# Patient Record
Sex: FEMALE | Race: WHITE | NOT HISPANIC OR LATINO | Employment: OTHER | URBAN - METROPOLITAN AREA
[De-identification: names, ages, dates, MRNs, and addresses within clinical notes are randomized per-mention and may not be internally consistent; named-entity substitution may affect disease eponyms.]

---

## 2017-01-01 ENCOUNTER — MYC REFILL (OUTPATIENT)
Dept: INTERNAL MEDICINE | Facility: CLINIC | Age: 62
End: 2017-01-01

## 2017-01-01 DIAGNOSIS — F41.9 ANXIETY: Primary | ICD-10-CM

## 2017-01-01 DIAGNOSIS — M54.50 CHRONIC BILATERAL LOW BACK PAIN WITHOUT SCIATICA: ICD-10-CM

## 2017-01-01 DIAGNOSIS — G89.29 CHRONIC BILATERAL LOW BACK PAIN WITHOUT SCIATICA: ICD-10-CM

## 2017-01-04 RX ORDER — ALPRAZOLAM 0.25 MG
TABLET ORAL
Qty: 30 TABLET | Refills: 0 | Status: SHIPPED | OUTPATIENT
Start: 2017-01-04 | End: 2017-02-28

## 2017-01-04 RX ORDER — HYDROCODONE BITARTRATE AND ACETAMINOPHEN 5; 325 MG/1; MG/1
1-2 TABLET ORAL EVERY 6 HOURS PRN
Qty: 100 TABLET | Refills: 0 | Status: SHIPPED | OUTPATIENT
Start: 2017-01-04 | End: 2017-01-20

## 2017-01-13 ENCOUNTER — OFFICE VISIT (OUTPATIENT)
Dept: INTERNAL MEDICINE | Facility: CLINIC | Age: 62
End: 2017-01-13
Payer: COMMERCIAL

## 2017-01-13 ENCOUNTER — TELEPHONE (OUTPATIENT)
Dept: INTERNAL MEDICINE | Facility: CLINIC | Age: 62
End: 2017-01-13

## 2017-01-13 ENCOUNTER — RADIANT APPOINTMENT (OUTPATIENT)
Dept: GENERAL RADIOLOGY | Facility: CLINIC | Age: 62
End: 2017-01-13
Attending: INTERNAL MEDICINE
Payer: COMMERCIAL

## 2017-01-13 VITALS
TEMPERATURE: 97.7 F | BODY MASS INDEX: 23.91 KG/M2 | HEIGHT: 70 IN | SYSTOLIC BLOOD PRESSURE: 118 MMHG | HEART RATE: 74 BPM | OXYGEN SATURATION: 95 % | WEIGHT: 167 LBS | DIASTOLIC BLOOD PRESSURE: 74 MMHG

## 2017-01-13 DIAGNOSIS — M54.5 CHRONIC BILATERAL LOW BACK PAIN, WITH SCIATICA PRESENCE UNSPECIFIED: ICD-10-CM

## 2017-01-13 DIAGNOSIS — F41.9 ANXIETY: ICD-10-CM

## 2017-01-13 DIAGNOSIS — J45.40 MODERATE PERSISTENT ASTHMA WITHOUT COMPLICATION: Primary | ICD-10-CM

## 2017-01-13 DIAGNOSIS — I42.0 DILATED CARDIOMYOPATHY (H): ICD-10-CM

## 2017-01-13 DIAGNOSIS — R06.02 SOB (SHORTNESS OF BREATH): ICD-10-CM

## 2017-01-13 DIAGNOSIS — I25.10 CORONARY ARTERY DISEASE INVOLVING NATIVE CORONARY ARTERY OF NATIVE HEART WITHOUT ANGINA PECTORIS: ICD-10-CM

## 2017-01-13 DIAGNOSIS — G89.29 CHRONIC BILATERAL LOW BACK PAIN, WITH SCIATICA PRESENCE UNSPECIFIED: ICD-10-CM

## 2017-01-13 LAB
ALBUMIN SERPL-MCNC: 3.5 G/DL (ref 3.4–5)
ALP SERPL-CCNC: 53 U/L (ref 40–150)
ALT SERPL W P-5'-P-CCNC: 22 U/L (ref 0–50)
ANION GAP SERPL CALCULATED.3IONS-SCNC: 7 MMOL/L (ref 3–14)
AST SERPL W P-5'-P-CCNC: 14 U/L (ref 0–45)
BILIRUB SERPL-MCNC: 0.4 MG/DL (ref 0.2–1.3)
BUN SERPL-MCNC: 6 MG/DL (ref 7–30)
CALCIUM SERPL-MCNC: 8.4 MG/DL (ref 8.5–10.1)
CHLORIDE SERPL-SCNC: 104 MMOL/L (ref 94–109)
CO2 SERPL-SCNC: 29 MMOL/L (ref 20–32)
CREAT SERPL-MCNC: 0.81 MG/DL (ref 0.52–1.04)
D DIMER PPP FEU-MCNC: NORMAL UG/ML FEU (ref 0–0.5)
ERYTHROCYTE [DISTWIDTH] IN BLOOD BY AUTOMATED COUNT: 12.8 % (ref 10–15)
GFR SERPL CREATININE-BSD FRML MDRD: 72 ML/MIN/1.7M2
GLUCOSE SERPL-MCNC: 95 MG/DL (ref 70–99)
HCT VFR BLD AUTO: 44.7 % (ref 35–47)
HGB BLD-MCNC: 14 G/DL (ref 11.7–15.7)
MCH RBC QN AUTO: 29.6 PG (ref 26.5–33)
MCHC RBC AUTO-ENTMCNC: 31.3 G/DL (ref 31.5–36.5)
MCV RBC AUTO: 95 FL (ref 78–100)
NT-PROBNP SERPL-MCNC: 223 PG/ML (ref 0–125)
PLATELET # BLD AUTO: 275 10E9/L (ref 150–450)
POTASSIUM SERPL-SCNC: 3.7 MMOL/L (ref 3.4–5.3)
PROT SERPL-MCNC: 6.4 G/DL (ref 6.8–8.8)
RBC # BLD AUTO: 4.73 10E12/L (ref 3.8–5.2)
SODIUM SERPL-SCNC: 140 MMOL/L (ref 133–144)
TROPONIN I SERPL-MCNC: NORMAL UG/L (ref 0–0.04)
WBC # BLD AUTO: 7.4 10E9/L (ref 4–11)

## 2017-01-13 PROCEDURE — 80053 COMPREHEN METABOLIC PANEL: CPT | Performed by: INTERNAL MEDICINE

## 2017-01-13 PROCEDURE — 36415 COLL VENOUS BLD VENIPUNCTURE: CPT | Performed by: INTERNAL MEDICINE

## 2017-01-13 PROCEDURE — 71020 XR CHEST 2 VW: CPT

## 2017-01-13 PROCEDURE — 99215 OFFICE O/P EST HI 40 MIN: CPT | Performed by: INTERNAL MEDICINE

## 2017-01-13 PROCEDURE — 83880 ASSAY OF NATRIURETIC PEPTIDE: CPT | Performed by: INTERNAL MEDICINE

## 2017-01-13 PROCEDURE — 85379 FIBRIN DEGRADATION QUANT: CPT | Performed by: INTERNAL MEDICINE

## 2017-01-13 PROCEDURE — 84484 ASSAY OF TROPONIN QUANT: CPT | Performed by: INTERNAL MEDICINE

## 2017-01-13 PROCEDURE — 85027 COMPLETE CBC AUTOMATED: CPT | Performed by: INTERNAL MEDICINE

## 2017-01-13 PROCEDURE — 93000 ELECTROCARDIOGRAM COMPLETE: CPT | Performed by: INTERNAL MEDICINE

## 2017-01-13 RX ORDER — CODEINE PHOSPHATE AND GUAIFENESIN 10; 100 MG/5ML; MG/5ML
1 SOLUTION ORAL EVERY 4 HOURS PRN
Qty: 120 ML | Refills: 0 | Status: SHIPPED | OUTPATIENT
Start: 2017-01-13 | End: 2017-04-03

## 2017-01-13 RX ORDER — IPRATROPIUM BROMIDE AND ALBUTEROL SULFATE 2.5; .5 MG/3ML; MG/3ML
1 SOLUTION RESPIRATORY (INHALATION) EVERY 6 HOURS PRN
Qty: 30 VIAL | Refills: 1 | Status: SHIPPED | OUTPATIENT
Start: 2017-01-13 | End: 2017-05-23

## 2017-01-13 RX ORDER — TIOTROPIUM BROMIDE 18 UG/1
18 CAPSULE ORAL; RESPIRATORY (INHALATION) DAILY
Qty: 90 CAPSULE | Refills: 0 | Status: SHIPPED | OUTPATIENT
Start: 2017-01-13 | End: 2017-05-12

## 2017-01-13 RX ORDER — PREDNISONE 20 MG/1
20 TABLET ORAL 2 TIMES DAILY
Qty: 20 TABLET | Refills: 0 | Status: SHIPPED | OUTPATIENT
Start: 2017-01-13 | End: 2017-02-13

## 2017-01-13 RX ORDER — ALBUTEROL SULFATE 0.83 MG/ML
1 SOLUTION RESPIRATORY (INHALATION) EVERY 6 HOURS PRN
Qty: 25 VIAL | Refills: 0 | Status: SHIPPED | OUTPATIENT
Start: 2017-01-13 | End: 2018-04-16

## 2017-01-13 NOTE — PROGRESS NOTES
"  SUBJECTIVE:                                                    Trisha Bender is a 61 year old female who presents to clinic today for the following health issues:      Asthma Sxs ( SOB/Wheezing/mild cough):    Presents with increased SOB for 2-3  Weeks. Started to improve but past 2  Days again has SOB. Has had cough with thick clear phlegm. No fever. No orthopnea. Takes Albuterol up to 8 times daily. No chest pain. No weight change.   Has h/o asthma. On inhaled steroid and bronchodilators.   Has h/o CAD, CHF. Unable to tolerate B blockers, provoked asthma exacerbations. No legs edema. No palpitations   Has h/o anxiety. Controlled on PRN anxiolytic.   Has chronic back pain, related to DDD. On analgesics. Tried Lyrica , but stopped , caused edema. Prior to that has failed Neurontin.       PROBLEMS TO ADD ON...    Problem list and histories reviewed & adjusted, as indicated.  Additional history: none    Problem list, Medication list, Allergies, and Medical/Social/Surgical histories reviewed in EPIC and updated as appropriate.    ROS:  Constitutional, HEENT, cardiovascular, pulmonary, GI, , musculoskeletal, neuro, skin, endocrine and psych systems are negative, except as otherwise noted.    OBJECTIVE:                                                    /74 mmHg  Pulse 74  Temp(Src) 97.7  F (36.5  C) (Oral)  Ht 1.778 m (5' 10\")  Wt 75.751 kg (167 lb)  BMI 23.96 kg/m2  SpO2 95%  LMP 09/20/2005  Body mass index is 23.96 kg/(m^2).  GENERAL:weak , frail , alert and no distress  EYES: Eyes grossly normal to inspection, PERRL and conjunctivae and sclerae normal  HENT: ear canals and TM's normal, nose and mouth without ulcers or lesions  NECK: no adenopathy, no asymmetry, masses, or scars and thyroid normal to palpation  RESP: lungs clear to auscultation - no rales, rhonchi or wheezes, wheezing with forced expirium   CV: regular rate and rhythm, normal S1 S2, no S3 or S4, no murmur, click or rub, no " peripheral edema and peripheral pulses strong  ABDOMEN: soft, nontender, no hepatosplenomegaly, no masses and bowel sounds normal  MS: no gross musculoskeletal defects noted, no edema  SKIN: no suspicious lesions or rashes  NEURO: Normal strength and tone, mentation intact and speech normal    Diagnostic Test Results:  Results for orders placed or performed in visit on 01/13/17   CBC with platelets   Result Value Ref Range    WBC 7.4 4.0 - 11.0 10e9/L    RBC Count 4.73 3.8 - 5.2 10e12/L    Hemoglobin 14.0 11.7 - 15.7 g/dL    Hematocrit 44.7 35.0 - 47.0 %    MCV 95 78 - 100 fl    MCH 29.6 26.5 - 33.0 pg    MCHC 31.3 (L) 31.5 - 36.5 g/dL    RDW 12.8 10.0 - 15.0 %    Platelet Count 275 150 - 450 10e9/L   Comprehensive metabolic panel   Result Value Ref Range    Sodium 140 133 - 144 mmol/L    Potassium 3.7 3.4 - 5.3 mmol/L    Chloride 104 94 - 109 mmol/L    Carbon Dioxide 29 20 - 32 mmol/L    Anion Gap 7 3 - 14 mmol/L    Glucose 95 70 - 99 mg/dL    Urea Nitrogen 6 (L) 7 - 30 mg/dL    Creatinine 0.81 0.52 - 1.04 mg/dL    GFR Estimate 72 >60 mL/min/1.7m2    GFR Estimate If Black 87 >60 mL/min/1.7m2    Calcium 8.4 (L) 8.5 - 10.1 mg/dL    Bilirubin Total 0.4 0.2 - 1.3 mg/dL    Albumin 3.5 3.4 - 5.0 g/dL    Protein Total 6.4 (L) 6.8 - 8.8 g/dL    Alkaline Phosphatase 53 40 - 150 U/L    ALT 22 0 - 50 U/L    AST 14 0 - 45 U/L   BNP-N terminal pro   Result Value Ref Range    N-Terminal Pro Bnp 223 (H) 0 - 125 pg/mL   Troponin I   Result Value Ref Range    Troponin I ES  0.000 - 0.045 ug/L     <0.015  The 99th percentile for upper reference range is 0.045 ug/L.  Troponin values in   the range of 0.045 - 0.120 ug/L may be associated with risks of adverse   clinical events.     D dimer, quantitative   Result Value Ref Range    D Dimer  0.0 - 0.50 ug/ml FEU     <0.3  This D-dimer assay is intended for use in conjuntion with a clinical pretest   probability assessment model to exclude pulmonary embolism (PE) and as an aid   in the  diagnosis of deep venous thrombosis (DVT) in outpatients suspected of PE   or DVT. The cut-off value is 0.5 g/mL FEU.          ASSESSMENT/PLAN:                                                      Problem List Items Addressed This Visit     Anxiety    Chronic low back pain    Relevant Medications    predniSONE (DELTASONE) 20 MG tablet    Dilated cardiomyopathy (H)    SOB (shortness of breath)    Relevant Medications    albuterol (2.5 MG/3ML) 0.083% neb solution    tiotropium (SPIRIVA HANDIHALER) 18 MCG capsule    ipratropium - albuterol 0.5 mg/2.5 mg/3 mL (DUONEB) 0.5-2.5 (3) MG/3ML neb solution    Other Relevant Orders    EKG 12-lead complete w/read - Clinics (Completed)    XR Chest 2 Views (Completed)    CBC with platelets (Completed)    Comprehensive metabolic panel (Completed)    BNP-N terminal pro (Completed)    Troponin I (Completed)    D dimer, quantitative (Completed)    Coronary artery disease involving native coronary artery of native heart without angina pectoris    Moderate persistent asthma without complication - Primary    Relevant Medications    albuterol (2.5 MG/3ML) 0.083% neb solution    tiotropium (SPIRIVA HANDIHALER) 18 MCG capsule    ipratropium - albuterol 0.5 mg/2.5 mg/3 mL (DUONEB) 0.5-2.5 (3) MG/3ML neb solution    predniSONE (DELTASONE) 20 MG tablet    guaiFENesin-codeine (ROBITUSSIN AC) 100-10 MG/5ML SOLN solution           Assess CXR, lab , EKG - possible pneumonia, asthma exacerbation, CHF exacerbation   Results reviewed, negative  Start on Prednisone taper, duonebs   Cont rest of medications  Symptomatic mucolytic, advised for side effects     Follow-Up:in 1 week if needed     Bandar Weinberg MD  [facility]

## 2017-01-13 NOTE — NURSING NOTE
"Chief Complaint   Patient presents with     Asthma     Pt C/O extreme SOB/Wheezing, mild cough ( using Albuterol PRN)       Initial /74 mmHg  Pulse 74  Temp(Src) 97.7  F (36.5  C) (Oral)  Ht 5' 10\" (1.778 m)  Wt 167 lb (75.751 kg)  BMI 23.96 kg/m2  SpO2 95%  LMP 09/20/2005 Estimated body mass index is 23.96 kg/(m^2) as calculated from the following:    Height as of this encounter: 5' 10\" (1.778 m).    Weight as of this encounter: 167 lb (75.751 kg).  BP completed using cuff size: lolita Garcia MA      "

## 2017-01-13 NOTE — TELEPHONE ENCOUNTER
"Pt calls, reports she had cold symptoms with cough that has resolved, but SOB has continued. Indicates she had \"extreme\" SOB yest, but not today. Discuss that if she were to have severe/extreme SOB again she should go to ER. Verbalized understanding.     Pt made appt with PCP on Monday. No appts available today. Pt asking if MD would send in prednisone rx to get her to her appt no Mon or asking if MD would work her into his schedule today. Indicates her  is her ride who works an hour drive away so would need at least 1.5 hour advance notice if appt worked in today.    Please advise, thanks.  "

## 2017-01-18 ENCOUNTER — MYC REFILL (OUTPATIENT)
Dept: INTERNAL MEDICINE | Facility: CLINIC | Age: 62
End: 2017-01-18

## 2017-01-18 DIAGNOSIS — G89.29 CHRONIC BILATERAL LOW BACK PAIN WITHOUT SCIATICA: Primary | ICD-10-CM

## 2017-01-18 DIAGNOSIS — M54.50 CHRONIC BILATERAL LOW BACK PAIN WITHOUT SCIATICA: Primary | ICD-10-CM

## 2017-01-20 ENCOUNTER — THERAPY VISIT (OUTPATIENT)
Dept: PHYSICAL THERAPY | Facility: CLINIC | Age: 62
End: 2017-01-20
Payer: COMMERCIAL

## 2017-01-20 DIAGNOSIS — M54.59 MECHANICAL LOW BACK PAIN: Primary | ICD-10-CM

## 2017-01-20 PROCEDURE — 97112 NEUROMUSCULAR REEDUCATION: CPT | Mod: GP | Performed by: PHYSICAL THERAPIST

## 2017-01-20 PROCEDURE — 97110 THERAPEUTIC EXERCISES: CPT | Mod: GP | Performed by: PHYSICAL THERAPIST

## 2017-01-20 RX ORDER — HYDROCODONE BITARTRATE AND ACETAMINOPHEN 5; 325 MG/1; MG/1
1-2 TABLET ORAL EVERY 6 HOURS PRN
Qty: 100 TABLET | Refills: 0 | Status: SHIPPED | OUTPATIENT
Start: 2017-01-20 | End: 2017-02-08

## 2017-01-20 NOTE — TELEPHONE ENCOUNTER
Norco      Last Written Prescription Date:  1/4/2017  Last Fill Quantity: 100,   # refills: 0  Last Office Visit with [facility], [facility] or [facility] prescribing provider: 1/13/2017  Future Office visit:    Next 5 appointments (look out 90 days)     Apr 03, 2017  3:10 PM   Return Visit with ELLE Medina CNP   [facility]YS[facility] AT [place] ([facility])    [address] [address] [address] [phone]                   Routing refill request to provider for review/approval because:  Drug not on the [facility], [facility] or [facility] refill protocol or controlled substance      Pt informed that she is calling early for the refill  Pt stated that she is allowed 7 per day, so that's why she is short.  7 for 30 day is 210.  Please advise  Hakan MARKS RN

## 2017-01-25 DIAGNOSIS — G43.809 OTHER TYPE OF MIGRAINE: Primary | ICD-10-CM

## 2017-01-25 RX ORDER — SUMATRIPTAN 25 MG/1
25 TABLET, FILM COATED ORAL
Qty: 12 TABLET | Refills: 3 | Status: SHIPPED | OUTPATIENT
Start: 2017-01-25 | End: 2017-06-02

## 2017-01-31 ENCOUNTER — TELEPHONE (OUTPATIENT)
Dept: INTERNAL MEDICINE | Facility: CLINIC | Age: 62
End: 2017-01-31

## 2017-01-31 DIAGNOSIS — R06.02 SOB (SHORTNESS OF BREATH): Primary | ICD-10-CM

## 2017-01-31 DIAGNOSIS — J43.9 PULMONARY EMPHYSEMA, UNSPECIFIED EMPHYSEMA TYPE (H): ICD-10-CM

## 2017-01-31 NOTE — TELEPHONE ENCOUNTER
Advair       Last Written Prescription Date: 06/09/10  Last Fill Quantity: 3, # refills: 3    Last Office Visit with [facility], [facility] or [facility] prescribing provider:  01/13/17   Future Office Visit:    Next 5 appointments (look out 90 days)     Apr 03, 2017  3:10 PM   Return Visit with ELLE Medina CNP   [facility]YSIC[facility]ANS [facility] AT [place] ([facility])    [address] [address] [address] [phone]                   Date of Last Asthma Action Plan Letter:   Asthma Action Plan Q1 Year    Topic Date Due     Asthma Action Plan - yearly  11/08/2017      Asthma Control Test:   ACT Total Scores 11/8/2016   ACT TOTAL SCORE -   ASTHMA ER VISITS -   ASTHMA HOSPITALIZATIONS -   ACT TOTAL SCORE (Goal Greater than or Equal to 20) 24   In the past 12 months, how many times did you visit the emergency room for your asthma without being admitted to the hospital? 0   In the past 12 months, how many times were you hospitalized overnight because of your asthma? 0       Date of Last Spirometry Test:   No results found for this or any previous visit.

## 2017-02-07 ENCOUNTER — MYC REFILL (OUTPATIENT)
Dept: INTERNAL MEDICINE | Facility: CLINIC | Age: 62
End: 2017-02-07

## 2017-02-07 DIAGNOSIS — G89.29 CHRONIC BILATERAL LOW BACK PAIN WITHOUT SCIATICA: ICD-10-CM

## 2017-02-07 DIAGNOSIS — J45.40 MODERATE PERSISTENT ASTHMA WITHOUT COMPLICATION: ICD-10-CM

## 2017-02-07 DIAGNOSIS — M54.50 CHRONIC BILATERAL LOW BACK PAIN WITHOUT SCIATICA: ICD-10-CM

## 2017-02-08 ENCOUNTER — RADIANT APPOINTMENT (OUTPATIENT)
Dept: GENERAL RADIOLOGY | Facility: CLINIC | Age: 62
End: 2017-02-08
Attending: INTERNAL MEDICINE
Payer: COMMERCIAL

## 2017-02-08 ENCOUNTER — OFFICE VISIT (OUTPATIENT)
Dept: INTERNAL MEDICINE | Facility: CLINIC | Age: 62
End: 2017-02-08
Payer: COMMERCIAL

## 2017-02-08 VITALS
HEART RATE: 78 BPM | WEIGHT: 167.6 LBS | DIASTOLIC BLOOD PRESSURE: 70 MMHG | RESPIRATION RATE: 16 BRPM | OXYGEN SATURATION: 95 % | HEIGHT: 70 IN | SYSTOLIC BLOOD PRESSURE: 122 MMHG | BODY MASS INDEX: 23.99 KG/M2 | TEMPERATURE: 97.5 F

## 2017-02-08 DIAGNOSIS — R05.9 COUGH: ICD-10-CM

## 2017-02-08 DIAGNOSIS — I10 ESSENTIAL HYPERTENSION: ICD-10-CM

## 2017-02-08 DIAGNOSIS — R60.0 BILATERAL EDEMA OF LOWER EXTREMITY: ICD-10-CM

## 2017-02-08 DIAGNOSIS — R09.81 NASAL CONGESTION: ICD-10-CM

## 2017-02-08 DIAGNOSIS — J43.9 PULMONARY EMPHYSEMA, UNSPECIFIED EMPHYSEMA TYPE (H): ICD-10-CM

## 2017-02-08 DIAGNOSIS — G89.29 CHRONIC BILATERAL LOW BACK PAIN WITHOUT SCIATICA: Primary | ICD-10-CM

## 2017-02-08 DIAGNOSIS — R07.0 THROAT PAIN: ICD-10-CM

## 2017-02-08 DIAGNOSIS — M54.50 CHRONIC BILATERAL LOW BACK PAIN WITHOUT SCIATICA: Primary | ICD-10-CM

## 2017-02-08 LAB
DEPRECATED S PYO AG THROAT QL EIA: NORMAL
ERYTHROCYTE [DISTWIDTH] IN BLOOD BY AUTOMATED COUNT: 12.6 % (ref 10–15)
HCT VFR BLD AUTO: 41.1 % (ref 35–47)
HGB BLD-MCNC: 12.9 G/DL (ref 11.7–15.7)
MCH RBC QN AUTO: 30.1 PG (ref 26.5–33)
MCHC RBC AUTO-ENTMCNC: 31.4 G/DL (ref 31.5–36.5)
MCV RBC AUTO: 96 FL (ref 78–100)
MICRO REPORT STATUS: NORMAL
NT-PROBNP SERPL-MCNC: 207 PG/ML (ref 0–125)
PLATELET # BLD AUTO: 207 10E9/L (ref 150–450)
RBC # BLD AUTO: 4.28 10E12/L (ref 3.8–5.2)
SPECIMEN SOURCE: NORMAL
WBC # BLD AUTO: 5.7 10E9/L (ref 4–11)

## 2017-02-08 PROCEDURE — 36415 COLL VENOUS BLD VENIPUNCTURE: CPT | Performed by: INTERNAL MEDICINE

## 2017-02-08 PROCEDURE — 87880 STREP A ASSAY W/OPTIC: CPT | Performed by: INTERNAL MEDICINE

## 2017-02-08 PROCEDURE — 80053 COMPREHEN METABOLIC PANEL: CPT | Performed by: INTERNAL MEDICINE

## 2017-02-08 PROCEDURE — 71020 XR CHEST 2 VW: CPT

## 2017-02-08 PROCEDURE — 99214 OFFICE O/P EST MOD 30 MIN: CPT | Performed by: INTERNAL MEDICINE

## 2017-02-08 PROCEDURE — 83880 ASSAY OF NATRIURETIC PEPTIDE: CPT | Performed by: INTERNAL MEDICINE

## 2017-02-08 PROCEDURE — 87081 CULTURE SCREEN ONLY: CPT | Performed by: INTERNAL MEDICINE

## 2017-02-08 PROCEDURE — 85027 COMPLETE CBC AUTOMATED: CPT | Performed by: INTERNAL MEDICINE

## 2017-02-08 RX ORDER — HYDROCODONE BITARTRATE AND ACETAMINOPHEN 5; 325 MG/1; MG/1
1-2 TABLET ORAL EVERY 6 HOURS PRN
Qty: 100 TABLET | Refills: 0 | Status: SHIPPED | OUTPATIENT
Start: 2017-02-08 | End: 2017-04-12

## 2017-02-08 RX ORDER — CODEINE PHOSPHATE AND GUAIFENESIN 10; 100 MG/5ML; MG/5ML
1 SOLUTION ORAL EVERY 4 HOURS PRN
Qty: 120 ML | Refills: 0 | Status: SHIPPED | OUTPATIENT
Start: 2017-02-08 | End: 2017-04-03

## 2017-02-08 RX ORDER — FLUTICASONE PROPIONATE 50 MCG
1-2 SPRAY, SUSPENSION (ML) NASAL DAILY
Qty: 1 BOTTLE | Refills: 11 | Status: SHIPPED | OUTPATIENT
Start: 2017-02-08 | End: 2018-02-14

## 2017-02-08 NOTE — PROGRESS NOTES
"  SUBJECTIVE:                                                    Trisha Bender is a 61 year old female who presents to clinic today for the following health issues:      RESPIRATORY SYMPTOMS      Duration: 7 days    Description  nasal congestion, cough and headache    Severity: moderate    Accompanying signs and symptoms: None    History (predisposing factors):  none    Precipitating or alleviating factors: None    Therapies tried and outcome:  none     Patient is seen for a follow up visit.    Has had recurrent symptoms of cough, SOB, congestion.   No fever. Feels tired. Concern for LE edema. No orthopnea.   Has h/o COPD and CHF. On medical treatment.   Has had increased productive cough. Started Doxycycline. Gradually improving.   Has chronic back and chest pain. On narcotic analgesics. No side effects. Reports improved pain.   Has h/o HTN. on medical treatment. BP has been controlled. No side effects from medications. No CP, HA, dizziness. good compliance with medications and low salt diet.    Problem list and histories reviewed & adjusted, as indicated.  Additional history: as documented    Labs reviewed in EPIC  Problem list, Medication list, Allergies, and Medical/Social/Surgical histories reviewed in [facility] and updated as appropriate.    ROS:  Constitutional, HEENT, cardiovascular, pulmonary, gi and gu systems are negative, except as otherwise noted.    OBJECTIVE:                                                    /70 mmHg  Pulse 78  Temp(Src) 97.5  F (36.4  C) (Oral)  Resp 16  Ht 5' 10\" (1.778 m)  Wt 167 lb 9.6 oz (76.023 kg)  BMI 24.05 kg/m2  SpO2 95%  LMP 09/20/2005  Body mass index is 24.05 kg/(m^2).  GENERAL: weak , alert and no distress  NECK: no adenopathy, no asymmetry, masses, or scars and thyroid normal to palpation  RESP: lungs clear to auscultation - no rales, rhonchi or wheezes  CV: regular rate and rhythm, normal S1 S2, no S3 or S4, no murmur, click or rub, no peripheral edema " and peripheral pulses strong  ABDOMEN: soft, nontender, no hepatosplenomegaly, no masses and bowel sounds normal  MS: no gross musculoskeletal defects noted, no edema    Diagnostic Test Results:  none      ASSESSMENT/PLAN:                                                      Problem List Items Addressed This Visit     Essential hypertension    Chronic airway obstruction (H)    Relevant Medications    fluticasone (FLONASE) 50 MCG/ACT spray    guaiFENesin-codeine (ROBITUSSIN AC) 100-10 MG/5ML SOLN solution    Chronic low back pain - Primary    Relevant Medications    HYDROcodone-acetaminophen (NORCO) 5-325 MG per tablet      Other Visit Diagnoses     Nasal congestion         Relevant Medications     fluticasone (FLONASE) 50 MCG/ACT spray     Other Relevant Orders     Strep, Rapid Screen (Completed)     Cough         Relevant Medications     fluticasone (FLONASE) 50 MCG/ACT spray     guaiFENesin-codeine (ROBITUSSIN AC) 100-10 MG/5ML SOLN solution     Other Relevant Orders     XR Chest 2 Views (Completed)     Strep, Rapid Screen (Completed)     Throat pain         Relevant Orders     CBC with platelets (Completed)     Comprehensive metabolic panel (Completed)     BNP-N terminal pro (Completed)     Strep, Rapid Screen (Completed)     Beta strep group A culture (Completed)     Bilateral edema of lower extremity         Relevant Medications     HYDROcodone-acetaminophen (NORCO) 5-325 MG per tablet     fluticasone (FLONASE) 50 MCG/ACT spray            Assess lab and CXR  Cont treatment   Medications refilled   Start on nasal steroid       Follow-Up: in 1 month     Bandar Weinberg MD  [facility]

## 2017-02-08 NOTE — NURSING NOTE
"Chief Complaint   Patient presents with     URI       Initial /70 mmHg  Pulse 78  Temp(Src) 97.5  F (36.4  C) (Oral)  Resp 16  Ht 5' 10\" (1.778 m)  Wt 167 lb 9.6 oz (76.023 kg)  BMI 24.05 kg/m2  SpO2 95%  LMP 09/20/2005 Estimated body mass index is 24.05 kg/(m^2) as calculated from the following:    Height as of this encounter: 5' 10\" (1.778 m).    Weight as of this encounter: 167 lb 9.6 oz (76.023 kg).  Medication Reconciliation: complete    "

## 2017-02-09 LAB
ALBUMIN SERPL-MCNC: 3.1 G/DL (ref 3.4–5)
ALP SERPL-CCNC: 57 U/L (ref 40–150)
ALT SERPL W P-5'-P-CCNC: 23 U/L (ref 0–50)
ANION GAP SERPL CALCULATED.3IONS-SCNC: 7 MMOL/L (ref 3–14)
AST SERPL W P-5'-P-CCNC: 16 U/L (ref 0–45)
BILIRUB SERPL-MCNC: 0.3 MG/DL (ref 0.2–1.3)
BUN SERPL-MCNC: 7 MG/DL (ref 7–30)
CALCIUM SERPL-MCNC: 8.6 MG/DL (ref 8.5–10.1)
CHLORIDE SERPL-SCNC: 103 MMOL/L (ref 94–109)
CO2 SERPL-SCNC: 31 MMOL/L (ref 20–32)
CREAT SERPL-MCNC: 0.75 MG/DL (ref 0.52–1.04)
GFR SERPL CREATININE-BSD FRML MDRD: 78 ML/MIN/1.7M2
GLUCOSE SERPL-MCNC: 96 MG/DL (ref 70–99)
POTASSIUM SERPL-SCNC: 3.7 MMOL/L (ref 3.4–5.3)
PROT SERPL-MCNC: 5.8 G/DL (ref 6.8–8.8)
SODIUM SERPL-SCNC: 141 MMOL/L (ref 133–144)

## 2017-02-13 LAB
BACTERIA SPEC CULT: NORMAL
MICRO REPORT STATUS: NORMAL
SPECIMEN SOURCE: NORMAL

## 2017-02-13 RX ORDER — HYDROCODONE BITARTRATE AND ACETAMINOPHEN 5; 325 MG/1; MG/1
1-2 TABLET ORAL EVERY 6 HOURS PRN
Qty: 100 TABLET | Refills: 0 | Status: SHIPPED | OUTPATIENT
Start: 2017-02-13 | End: 2017-03-20

## 2017-02-13 RX ORDER — PREDNISONE 20 MG/1
20 TABLET ORAL 2 TIMES DAILY
Qty: 20 TABLET | Refills: 0 | Status: SHIPPED | OUTPATIENT
Start: 2017-02-13 | End: 2017-04-03

## 2017-02-13 NOTE — TELEPHONE ENCOUNTER
Message from [employer]:  Kiana Sung RN Sun Feb 12, 2017 10:53 AM        ----- Message -----   From: Trisha Bender   Sent: 2/7/2017 2:43 PM   To: Ri Ian  Subject: Medication Renewal Request     Original authorizing provider: MD Trisha Marvin would like a refill of the following medications:  predniSONE (DELTASONE) 20 MG tablet [Bandar Weinberg MD]  HYDROcodone-acetaminophen (NORCO) 5-325 MG per tablet [Bandar Weinberg MD]    Preferred pharmacy: [address] [place].    Comment:  Dr. Weinberg and Nursing Staff: I would like to get a refill on the Hydrocodone-Acetaminophen prescription. I would also like to get the Prednisone refilled. I have a terrible cold/cough again. Soonest appointment I could get isn't until 2/14. With my asthma I feel the Prednisone would be beneficial. Thank you. Trisha Bender

## 2017-02-20 DIAGNOSIS — I50.21 ACUTE SYSTOLIC CONGESTIVE HEART FAILURE (H): ICD-10-CM

## 2017-02-20 RX ORDER — FUROSEMIDE 20 MG
20 TABLET ORAL DAILY PRN
Qty: 30 TABLET | Refills: 2 | Status: SHIPPED | OUTPATIENT
Start: 2017-02-20 | End: 2017-05-08

## 2017-02-28 ENCOUNTER — MYC REFILL (OUTPATIENT)
Dept: INTERNAL MEDICINE | Facility: CLINIC | Age: 62
End: 2017-02-28

## 2017-02-28 DIAGNOSIS — F41.9 ANXIETY: ICD-10-CM

## 2017-03-01 RX ORDER — ALPRAZOLAM 0.25 MG
TABLET ORAL
Qty: 30 TABLET | Refills: 0 | Status: SHIPPED | OUTPATIENT
Start: 2017-03-01 | End: 2017-03-31

## 2017-03-01 NOTE — TELEPHONE ENCOUNTER
Xanax      Last Written Prescription Date:  1/4/17  Last Fill Quantity: 30,   # refills: 0  Last Office Visit with [facility] or [facility] prescribing provider: 2/8/17  Future Office visit:    Next 5 appointments (look out 90 days)     Apr 03, 2017  3:10 PM CDT   Return Visit with ELLE Medina CNP   [facility] AT [place] ([facility])    [address] [place] [address] [phone]                   Routing refill request to provider for review/approval because:  Drug not on the [facility], [facility] or [facility] refill protocol or controlled substance

## 2017-03-01 NOTE — TELEPHONE ENCOUNTER
Message from [employer]:  Original authorizing provider: MD Cynthia Marvin would like a refill of the following medications:  ALPRAZolam (XANAX) 0.25 MG tablet [Bandar Weinberg MD]    Preferred pharmacy: [place], [place] - [facility] [address] [facility].    Comment:  Dr. Weinberg and Nursing Staff, I would like to get a refill on the Alprazolam prescription. Thank you. Trisha Bender

## 2017-03-08 ENCOUNTER — MYC REFILL (OUTPATIENT)
Dept: INTERNAL MEDICINE | Facility: CLINIC | Age: 62
End: 2017-03-08

## 2017-03-08 DIAGNOSIS — J06.9 UPPER RESPIRATORY TRACT INFECTION, UNSPECIFIED TYPE: ICD-10-CM

## 2017-03-09 NOTE — TELEPHONE ENCOUNTER
Message from [employer]:  Original authorizing provider: MD Cynthia Marvin would like a refill of the following medications:  doxycycline (VIBRAMYCIN) 100 MG capsule [Bandar Weinberg MD]    Preferred pharmacy: [facility] [address] [facility] [id number] [address]    Comment:  Dr. Weinberg and Nursing Staff, I recently returned from a two week trip with another cold/cough. I would like to get a refill and get started on the Doxycycline prescription as soon as possible. Thank you. Trisha Bneder

## 2017-03-10 RX ORDER — DOXYCYCLINE 100 MG/1
100 CAPSULE ORAL 2 TIMES DAILY
Qty: 20 CAPSULE | Refills: 0 | Status: SHIPPED | OUTPATIENT
Start: 2017-03-10 | End: 2017-04-03

## 2017-03-20 ENCOUNTER — MYC REFILL (OUTPATIENT)
Dept: INTERNAL MEDICINE | Facility: CLINIC | Age: 62
End: 2017-03-20

## 2017-03-20 DIAGNOSIS — G89.29 CHRONIC BILATERAL LOW BACK PAIN WITHOUT SCIATICA: ICD-10-CM

## 2017-03-20 DIAGNOSIS — M54.50 CHRONIC BILATERAL LOW BACK PAIN WITHOUT SCIATICA: ICD-10-CM

## 2017-03-20 RX ORDER — HYDROCODONE BITARTRATE AND ACETAMINOPHEN 5; 325 MG/1; MG/1
1-2 TABLET ORAL EVERY 6 HOURS PRN
Qty: 100 TABLET | Refills: 0 | Status: SHIPPED | OUTPATIENT
Start: 2017-03-20 | End: 2017-04-03

## 2017-03-20 NOTE — TELEPHONE ENCOUNTER
Message from [employer]:  Original authorizing provider: MD Trisha Marvin would like a refill of the following medications:  HYDROcodone-acetaminophen (NORCO) 5-325 MG per tablet [Bandar Weinberg MD]    Preferred pharmacy: [place], [place] - [facility] [address]    Comment:  Dr. Weinberg and Nursing Staff, I would like to get a refill on the Hydrocodone-Acetaminophen prescription. Thank you. Trisha Bender

## 2017-03-22 ENCOUNTER — OFFICE VISIT (OUTPATIENT)
Dept: URGENT CARE | Facility: URGENT CARE | Age: 62
End: 2017-03-22
Payer: COMMERCIAL

## 2017-03-22 ENCOUNTER — RADIANT APPOINTMENT (OUTPATIENT)
Dept: GENERAL RADIOLOGY | Facility: CLINIC | Age: 62
End: 2017-03-22
Attending: NURSE PRACTITIONER
Payer: COMMERCIAL

## 2017-03-22 VITALS
HEIGHT: 70 IN | WEIGHT: 159.2 LBS | DIASTOLIC BLOOD PRESSURE: 82 MMHG | TEMPERATURE: 97.6 F | OXYGEN SATURATION: 97 % | RESPIRATION RATE: 18 BRPM | BODY MASS INDEX: 22.79 KG/M2 | SYSTOLIC BLOOD PRESSURE: 120 MMHG | HEART RATE: 66 BPM

## 2017-03-22 DIAGNOSIS — R63.4 LOSS OF WEIGHT: ICD-10-CM

## 2017-03-22 DIAGNOSIS — J45.30 MILD PERSISTENT ASTHMA WITHOUT COMPLICATION: ICD-10-CM

## 2017-03-22 DIAGNOSIS — R06.02 SOB (SHORTNESS OF BREATH): ICD-10-CM

## 2017-03-22 DIAGNOSIS — R11.0 NAUSEA: ICD-10-CM

## 2017-03-22 DIAGNOSIS — J45.991 COUGH VARIANT ASTHMA: Primary | ICD-10-CM

## 2017-03-22 PROCEDURE — 99214 OFFICE O/P EST MOD 30 MIN: CPT | Performed by: NURSE PRACTITIONER

## 2017-03-22 PROCEDURE — 71020 XR CHEST 2 VW: CPT

## 2017-03-22 RX ORDER — ALBUTEROL SULFATE 90 UG/1
2 AEROSOL, METERED RESPIRATORY (INHALATION) EVERY 6 HOURS PRN
Qty: 1 INHALER | Refills: 0 | Status: SHIPPED | OUTPATIENT
Start: 2017-03-22 | End: 2017-04-18

## 2017-03-22 RX ORDER — ONDANSETRON 4 MG/1
4-8 TABLET, ORALLY DISINTEGRATING ORAL EVERY 8 HOURS PRN
Qty: 20 TABLET | Refills: 0 | Status: SHIPPED | OUTPATIENT
Start: 2017-03-22 | End: 2018-03-23

## 2017-03-22 RX ORDER — PREDNISONE 20 MG/1
40 TABLET ORAL DAILY
Qty: 10 TABLET | Refills: 0 | Status: SHIPPED | OUTPATIENT
Start: 2017-03-22 | End: 2017-03-27

## 2017-03-22 NOTE — MR AVS SNAPSHOT
After Visit Summary   3/22/2017    Trisha Bender    MRN: [id number]           Patient Information     Date Of Birth          1955        Visit Information        Provider Department      3/22/2017 7:45 PM Guido Espinal APRN [facility]        Today's Diagnoses     Cough variant asthma    -  1    SOB (shortness of breath)        Nausea          Care Instructions      Shortness of Breath (Dyspnea)  Shortness of breath is the feeling that you can't catch your breath or get enough air. It is also known as dyspnea.  Dyspnea can be caused by many different conditions. They include:    Acute asthma attack.    Worsening of chronic lung diseases such as chronic bronchitis and emphysema.     Heart failure. This is when weak heart muscle allows extra fluid to collect in the lungs.    Panic attacks or anxiety. Fear can cause rapid breathing (hyperventilation).    Pneumonia, or an infection in the lung tissue.    Exposure to toxic substances, fumes, smoke, or certain medicines.    Blood clot in the lung (pulmonary embolism). This is often from a piece of blood clot in a deep vein of the leg (deep vein thrombosis) that breaks off and travels to the lungs.     Heart attack or heart-related chest pain (angina).    Anemia.    Collapsed lung (pneumothorax).    Dehydration.    Pregnancy.  Based on your visit today, the exact cause of your shortness of breath is not certain. Your tests don t show any of the serious causes of dyspnea. You may need other tests to find out if you have a serious problem. It s important to watch for any new symptoms or symptoms that get worse. Follow up with your healthcare provider as directed.  Home care  Follow these tips to take care of yourself at home:    When your symptoms are better, go back to your usual activities.    If you smoke, you should stop. Join a quit-smoking program or ask your healthcare provider for help.    Eat a healthy diet  and get plenty of sleep.    Get regular exercise. Talk with your healthcare provider before starting to exercise, especially if you have other medical problems.    Cut down on the amount of caffeine and stimulants you consume.  Follow-up care  Follow up with your healthcare provider, or as advised.  If tests were done, you will be told if your treatment needs to be changed. You can call as directed for the results.  (Note: If an X-ray was taken, a specialist will review it. You will be notified of any new findings that may affect your care.)  Call 911 or get immediate medical care  Shortness of breath may be a sign of a serious medical problem. For example, it may be a problem with your heart or lungs. Call 911 if you have worsening shortness of breath or trouble breathing, especially with any of the symptoms below:    You are confused or it s difficult to wake you.    You faint or lose consciousness.    You have a fast heartbeat, or your heartbeat is irregular.     You are coughing up blood.    You have pain in your chest, arm, shoulder, neck, or upper back.    You break out in a sweat.  When to seek medical advice  Call your healthcare provider right away if any of these occur:    Slight shortness of breath or wheezing     Redness, pain or swelling in your leg, arm, or other body area    Swelling in both legs or ankles    Fast weight gain    Dizziness or weakness    Fever of 100.4 F (38 C) or higher, or as directed by your healthcare provider    8222-0811 [employer]. [address] [place] [address]. All rights reserved. This information is not intended as a substitute for professional medical care. Always follow your healthcare professional's instructions.              Follow-ups after your visit        Your next 10 appointments already scheduled     Apr 03, 2017  3:10 PM CDT   Return Visit with ELLE Medina CNP   [facility] ([facility])  "   [address]  [facility] [id number] [phone]            Apr 05, 2017  2:40 PM CDT   Elliott Rodriguez with Bandar Weinberg MD   [facility]    [address]  [facility] [id number] [phone]              Who to contact     If you have questions or need follow up information about today's clinic visit or your schedule please contact [facility] directly at [phone].  Normal or non-critical lab and imaging results will be communicated to you by MyChart, letter or phone within 4 business days after the clinic has received the results. If you do not hear from us within 7 days, please contact the clinic through [employer] or phone. If you have a critical or abnormal lab result, we will notify you by phone as soon as possible.  Submit refill requests through [employer] or call your pharmacy and they will forward the refill request to us. Please allow 3 business days for your refill to be completed.          Additional Information About Your Visit        MyChart Information     [employer] gives you secure access to your electronic health record. If you see a primary care provider, you can also send messages to your care team and make appointments. If you have questions, please call your primary care clinic.  If you do not have a primary care provider, please call [phone] and they will assist you.        Care EveryWhere ID     This is your Care EveryWhere ID. This could be used by other organizations to access your [place] medical records  [id number]        Your Vitals Were     Pulse Temperature Respirations Height Last Period Pulse Oximetry    66 97.6  F (36.4  C) (Oral) 18 5' 10\" (1.778 m) 09/20/2005 97%    BMI (Body Mass Index)                   22.84 kg/m2            Blood Pressure from Last 3 Encounters:   03/22/17 120/82   02/08/17 122/70   01/13/17 118/74    Weight from Last 3 Encounters:   03/22/17 159 lb " 3.2 oz (72.2 kg)   02/08/17 167 lb 9.6 oz (76 kg)   01/13/17 167 lb (75.8 kg)                 Today's Medication Changes          These changes are accurate as of: 3/22/17  9:46 PM.  If you have any questions, ask your nurse or doctor.               Start taking these medicines.        Dose/Directions    ondansetron 4 MG ODT tab   Commonly known as:  ZOFRAN ODT   Used for:  Nausea   Started by:  Guido Espinal APRN CNP        Dose:  4-8 mg   Take 1-2 tablets (4-8 mg) by mouth every 8 hours as needed for nausea   Quantity:  20 tablet   Refills:  0         These medicines have changed or have updated prescriptions.        Dose/Directions    * predniSONE 20 MG tablet   Commonly known as:  DELTASONE   This may have changed:  Another medication with the same name was added. Make sure you understand how and when to take each.   Used for:  Moderate persistent asthma without complication   Changed by:  Bandar Weinberg MD        Dose:  20 mg   Take 1 tablet (20 mg) by mouth 2 times daily Two times daily for 5 days, then once daily for 5 days, then 1/2 daily for 7 days.   Quantity:  20 tablet   Refills:  0       * predniSONE 20 MG tablet   Commonly known as:  DELTASONE   This may have changed:  You were already taking a medication with the same name, and this prescription was added. Make sure you understand how and when to take each.   Used for:  Cough variant asthma   Changed by:  Guido Espinal APRN CNP        Dose:  40 mg   Take 2 tablets (40 mg) by mouth daily for 5 days   Quantity:  10 tablet   Refills:  0       * Notice:  This list has 2 medication(s) that are the same as other medications prescribed for you. Read the directions carefully, and ask your doctor or other care provider to review them with you.         Where to get your medicines      These medications were sent to [facility] [address] [place] [address] [id number] AT [place] [address]  [id number] [place] [address] [facility]  [facility] [address]    Hours:  24-hours Phone:  [phone]     ondansetron 4 MG ODT tab    predniSONE 20 MG tablet                Primary Care Provider Office Phone # Fax #    Bandar Weinberg [phone]       [facility] [address]  [facility] [address]        Thank you!     Thank you for choosing [facility]  for your care. Our goal is always to provide you with excellent care. Hearing back from our patients is one way we can continue to improve our services. Please take a few minutes to complete the written survey that you may receive in the mail after your visit with us. Thank you!             Your Updated Medication List - Protect others around you: Learn how to safely use, store and throw away your medicines at www.disposemymeds.org.          This list is accurate as of: 3/22/17  9:46 PM.  Always use your most recent med list.                   Brand Name Dispense Instructions for use    * albuterol 108 (90 BASE) MCG/ACT Inhaler    albuterol    1 Inhaler    Inhale 2 puffs into the lungs every 4 hours as needed for shortness of breath / dyspnea       * albuterol (2.5 MG/3ML) 0.083% neb solution     25 vial    Take 1 vial (2.5 mg) by nebulization every 6 hours as needed for shortness of breath / dyspnea or wheezing       ALPRAZolam 0.25 MG tablet    XANAX    30 tablet    Take 1 tablet by mouth. Every twelve hours as needed for anxiety       atorvastatin 10 MG tablet    LIPITOR    30 tablet    3 times per week on Monday, Wednesday, friday       baclofen 10 MG tablet    LIORESAL    30 tablet    Take 1 tablet (10 mg) by mouth 3 times daily       [employer] FIBER GUMMIES 2.5 G Chew      Take 1 tablet by mouth daily       doxycycline 100 MG capsule    VIBRAMYCIN    20 capsule    Take 1 capsule (100 mg) by mouth 2 times daily       fluticasone 50 MCG/ACT spray    FLONASE    1 Bottle    Spray 1-2 sprays into both nostrils daily       fluticasone-salmeterol 100-50  MCG/DOSE diskus inhaler    ADVAIR    3 Inhaler    Inhale 1 puff into the lungs every morning       furosemide 20 MG tablet    LASIX    30 tablet    Take 1 tablet (20 mg) by mouth daily as needed severe swelling       * guaiFENesin-codeine 100-10 MG/5ML Soln solution    ROBITUSSIN AC    120 mL    Take 5 mLs by mouth every 4 hours as needed for cough       * guaiFENesin-codeine 100-10 MG/5ML Soln solution    ROBITUSSIN AC    120 mL    Take 5 mLs by mouth every 4 hours as needed for cough       * HYDROcodone-acetaminophen 5-325 MG per tablet    NORCO    100 tablet    Take 1-2 tablets by mouth every 6 hours as needed for pain Maximum 7 daily       * HYDROcodone-acetaminophen 5-325 MG per tablet    NORCO    100 tablet    Take 1-2 tablets by mouth every 6 hours as needed for pain Maximum 7 daily       ipratropium - albuterol 0.5 mg/2.5 mg/3 mL 0.5-2.5 (3) MG/3ML neb solution    DUONEB    30 vial    Take 1 vial (3 mLs) by nebulization every 6 hours as needed for shortness of breath / dyspnea or wheezing       lisinopril 2.5 MG tablet    PRINIVIL/Zestril    90 tablet    Take 1 tablet (2.5 mg) by mouth daily       montelukast 10 MG tablet    SINGULAIR    90 tablet    Take 1 tablet (10 mg) by mouth At Bedtime       Multiple Vitamin Chew      Take 1 tablet by mouth daily       nitroglycerin 0.4 MG sublingual tablet    NITROSTAT    25 tablet    Place 1 tablet (0.4 mg) under the tongue every 5 minutes as needed for chest pain If you are still having symptoms after 3 doses (15 minutes) call 911.       ondansetron 4 MG ODT tab    ZOFRAN ODT    20 tablet    Take 1-2 tablets (4-8 mg) by mouth every 8 hours as needed for nausea       ondansetron 4 MG tablet    ZOFRAN    20 tablet    Take 1 tablet (4 mg) by mouth every 8 hours as needed for nausea       pantoprazole 40 MG EC tablet    PROTONIX    90 tablet    Take 1 tablet (40 mg) by mouth daily       potassium chloride 10 MEQ tablet    K-TAB,KLOR-CON    1 tablet    Take 1 tablet (10  mEq) by mouth 3 times daily       * predniSONE 20 MG tablet    DELTASONE    20 tablet    Take 1 tablet (20 mg) by mouth 2 times daily Two times daily for 5 days, then once daily for 5 days, then 1/2 daily for 7 days.       * predniSONE 20 MG tablet    DELTASONE    10 tablet    Take 2 tablets (40 mg) by mouth daily for 5 days       pregabalin 50 MG capsule    LYRICA    90 capsule    Take 1 capsule (50 mg) by mouth 3 times daily       SUMAtriptan 25 MG tablet    IMITREX    12 tablet    Take 1 tablet (25 mg) by mouth at onset of headache for migraine May repeat in 2 hours if needed: max 2/day;       tiotropium 18 MCG capsule    SPIRIVA HANDIHALER    90 capsule    Inhale 1 capsule (18 mcg) into the lungs daily Inhale contents of one capsule       TYLENOL EXTRA STRENGTH PO      1 TABLET EVERY 4 HOURS AS NEEDED       * Notice:  This list has 8 medication(s) that are the same as other medications prescribed for you. Read the directions carefully, and ask your doctor or other care provider to review them with you.       PAIN SCALE 5 OF 10.

## 2017-03-23 NOTE — PATIENT INSTRUCTIONS
Shortness of Breath (Dyspnea)  Shortness of breath is the feeling that you can't catch your breath or get enough air. It is also known as dyspnea.  Dyspnea can be caused by many different conditions. They include:    Acute asthma attack.    Worsening of chronic lung diseases such as chronic bronchitis and emphysema.     Heart failure. This is when weak heart muscle allows extra fluid to collect in the lungs.    Panic attacks or anxiety. Fear can cause rapid breathing (hyperventilation).    Pneumonia, or an infection in the lung tissue.    Exposure to toxic substances, fumes, smoke, or certain medicines.    Blood clot in the lung (pulmonary embolism). This is often from a piece of blood clot in a deep vein of the leg (deep vein thrombosis) that breaks off and travels to the lungs.     Heart attack or heart-related chest pain (angina).    Anemia.    Collapsed lung (pneumothorax).    Dehydration.    Pregnancy.  Based on your visit today, the exact cause of your shortness of breath is not certain. Your tests don t show any of the serious causes of dyspnea. You may need other tests to find out if you have a serious problem. It s important to watch for any new symptoms or symptoms that get worse. Follow up with your healthcare provider as directed.  Home care  Follow these tips to take care of yourself at home:    When your symptoms are better, go back to your usual activities.    If you smoke, you should stop. Join a quit-smoking program or ask your healthcare provider for help.    Eat a healthy diet and get plenty of sleep.    Get regular exercise. Talk with your healthcare provider before starting to exercise, especially if you have other medical problems.    Cut down on the amount of caffeine and stimulants you consume.  Follow-up care  Follow up with your healthcare provider, or as advised.  If tests were done, you will be told if your treatment needs to be changed. You can call as directed for the  results.  (Note: If an X-ray was taken, a specialist will review it. You will be notified of any new findings that may affect your care.)  Call 911 or get immediate medical care  Shortness of breath may be a sign of a serious medical problem. For example, it may be a problem with your heart or lungs. Call 911 if you have worsening shortness of breath or trouble breathing, especially with any of the symptoms below:    You are confused or it s difficult to wake you.    You faint or lose consciousness.    You have a fast heartbeat, or your heartbeat is irregular.     You are coughing up blood.    You have pain in your chest, arm, shoulder, neck, or upper back.    You break out in a sweat.  When to seek medical advice  Call your healthcare provider right away if any of these occur:    Slight shortness of breath or wheezing     Redness, pain or swelling in your leg, arm, or other body area    Swelling in both legs or ankles    Fast weight gain    Dizziness or weakness    Fever of 100.4 F (38 C) or higher, or as directed by your healthcare provider    8064-0064 [employer] [address] [place] [address]. All rights reserved. This information is not intended as a substitute for professional medical care. Always follow your healthcare professional's instructions.

## 2017-03-23 NOTE — PROGRESS NOTES
Karlee Rico,     Your results have come back and are within acceptable limits.     If you have any questions please let me know.     Sincerely,   ELLE Kennedy CNP

## 2017-03-23 NOTE — PROGRESS NOTES
"Chief Complaint   Patient presents with     Urgent Care     Asthma     Nausea     Headache       SUBJECTIVE:  Trisha Bender is a 61 year old female who presents with multiple ongoing symptoms. She has had some shortness of breath, some coughing, some nausea, headaches, and some weight loss. Symptoms have been ongoing for several weeks. Initially about 3 months ago she had some similar symptoms which were treated with an antibiotic and prednisone for a combination of possible asthma and bacterial bronchitis. Symptoms improved somewhat but early this month she started having symptoms again. She contacted her PCP who prescribed her some doxycycline which concluded yesterday. She had mild improvement of symptoms. She is a former smoker with a 30-pack-year history of smoking. Reporting having received only mild improvement of symptoms with the doxycycline. Lately she has been having some dyspnea to exertion. No edema. No calf tenderness. No prior history of thrombosis. However previously she has had some form of heart failure and is being seen by cardiology. She has a pending follow-up appointment with cardiology. Has noted about 1 pound weight loss every 2 days over the last 1 week. No appetite. No diarrhea. No constipation. No urinary symptoms. Some nausea but no vomiting. No sore throat. No relevant exposure. Denying chest pain.        OBJECTIVE:  /82  Pulse 66  Temp 97.6  F (36.4  C) (Oral)  Resp 18  Ht 5' 10\" (1.778 m)  Wt 159 lb 3.2 oz (72.2 kg)  LMP 09/20/2005  SpO2 97%  BMI 22.84 kg/m2   elderly female here with spouse in no acute distress. Nontoxic looking. Some slightly coarse lung sounds with expiratory wheezing but no rales. No rhonchi. No edema. No calf tenderness. Bilateral eyes were non injected with pupils equal and reactive to light. Fundi was normal. Bilateral TM were clear. Bilateral external ear canals were clear. Oral mucosa was moist without any erythema or exudate. No " significant cervical adenopathy.  Heart was of regular rhythm and rate. No murmur. Abdomen was soft and nontender, with bowel sounds active throughout. No organomegaly. Skin was benign.    Chest x-ray was normal        ASSESSMENT/PLAN:      (R06.02) SOB (shortness of breath)  Comment: Symptoms may be a case of reactive airway. However we discussed complexity of symptoms including weight loss. For now I will start her on prednisone. I advised her to follow up with primary care for definitive further evaluation. She can be seen emergently with concerning symptomatology.  Plan: XR Chest 2 Views            (R11.0) Nausea  Comment: Unclear etiology. Symptomatic management for now. Is following up with PCP for further evaluation.  Plan: ondansetron (ZOFRAN ODT) 4 MG ODT tab                ELLE Kennedy CNP

## 2017-03-23 NOTE — NURSING NOTE
"Chief Complaint   Patient presents with     Urgent Care     Asthma     Nausea       Initial /82  Pulse 66  Temp 97.6  F (36.4  C) (Oral)  Resp 18  Ht 5' 10\" (1.778 m)  Wt 159 lb 3.2 oz (72.2 kg)  LMP 09/20/2005  SpO2 97%  BMI 22.84 kg/m2 Estimated body mass index is 22.84 kg/(m^2) as calculated from the following:    Height as of this encounter: 5' 10\" (1.778 m).    Weight as of this encounter: 159 lb 3.2 oz (72.2 kg).  Medication Reconciliation: complete       Мария Shirley CMA      "

## 2017-03-31 ENCOUNTER — MYC REFILL (OUTPATIENT)
Dept: INTERNAL MEDICINE | Facility: CLINIC | Age: 62
End: 2017-03-31

## 2017-03-31 DIAGNOSIS — F41.9 ANXIETY: ICD-10-CM

## 2017-04-03 ENCOUNTER — OFFICE VISIT (OUTPATIENT)
Dept: CARDIOLOGY | Facility: CLINIC | Age: 62
End: 2017-04-03
Attending: NURSE PRACTITIONER
Payer: COMMERCIAL

## 2017-04-03 VITALS
HEART RATE: 91 BPM | DIASTOLIC BLOOD PRESSURE: 62 MMHG | HEIGHT: 70 IN | SYSTOLIC BLOOD PRESSURE: 106 MMHG | WEIGHT: 162.6 LBS | BODY MASS INDEX: 23.28 KG/M2

## 2017-04-03 DIAGNOSIS — I42.9 CARDIOMYOPATHY, UNSPECIFIED (H): ICD-10-CM

## 2017-04-03 PROCEDURE — 99213 OFFICE O/P EST LOW 20 MIN: CPT | Performed by: NURSE PRACTITIONER

## 2017-04-03 RX ORDER — ALPRAZOLAM 0.25 MG
TABLET ORAL
Qty: 30 TABLET | Refills: 0 | Status: SHIPPED | OUTPATIENT
Start: 2017-04-03 | End: 2017-04-19

## 2017-04-03 NOTE — LETTER
4/3/2017    Bandar Weinberg MD  [facility]   [address] [facility] [address]    RE: Trisha Bender       Dear Colleague,    I had the pleasure of seeing Trisha Bender in the [facility].    1.  This 61-year-old female who presents to [facility] today for a followup visit.  She is a patient of Dr. Odell's seen in our clinic for cardiomyopathy, mixed hyperlipidemia and aspirin.      Trisha was noted to have congestive heart failure in 07/2015.  Echocardiogram did demonstrate left ventricular ejection fraction of 20%-25%.  Coronary angiography revealed mild nonobstructive coronary artery disease and she was diagnosed with idiopathic cardiomyopathy.  There was some thought this was likely Takotsubo or stress cardiomyopathy.  In followup, on carvedilol and lisinopril, her left ventricular function normalized 3 months later.  However, she has had intolerance to carvedilol and Toprol due to worsening asthma symptoms and for the last year or so she has been on lisinopril and as needed Lasix.  An echocardiogram in 09/2016 demonstrated a left ventricular ejection fraction of 45%-50%, 1+ mitral regurgitation and no significant change.      Trisha does have an extensive history of asthma, on multiple agents to control.  It appears through the records for the last few months she has had asthma exacerbations and bronchitis.  She has been intermittently on prednisone.  A BNP drawn February of this year was 207.  She returns today for a 6-month reassessment.      Trisha comes in with her  today as usual.  She did state she has had 3 bouts of asthma and bronchitis issues over the last 3 months.  She tells me now that she has been off prednisone for a week and her breathing has been quite stable.  She tends to get short of breath first thing in the morning and for the last week or so has needed Xanax to help sleep.  She  denies any orthopnea.  Trisha does watch her weight on a regular basis and has not noticed any weight fluctuations at home.  She did appreciate some more peripheral edema when on prednisone, but this has now stabilized.  Trisha tells me for the last 6-9 months, she has been taking 40 mg of Lasix every day and this has kept her weight stable.  She has no chest pain with activity or at rest.  She has no sensations of palpitations, lightheadedness or dizziness.  Holli is planning on meeting with Dr. Weinberg later this week in regards to her respiratory issues.      PHYSICAL EXAMINATION:  Blood pressure today is 106/62 mmHg, heart rate 91 beats per minute and is regular.  She does have distant heart tones.  Her lungs are clear.  There is no peripheral edema.  Her weight is fairly stable at 162 pounds.     Outpatient Encounter Prescriptions as of 4/3/2017   Medication Sig Dispense Refill     ALPRAZolam (XANAX) 0.25 MG tablet Take 1 tablet by mouth. Every twelve hours as needed for anxiety 30 tablet 0     ondansetron (ZOFRAN ODT) 4 MG ODT tab Take 1-2 tablets (4-8 mg) by mouth every 8 hours as needed for nausea 20 tablet 0     albuterol (ALBUTEROL) 108 (90 BASE) MCG/ACT Inhaler Inhale 2 puffs into the lungs every 6 hours as needed for shortness of breath / dyspnea 1 Inhaler 0     furosemide (LASIX) 20 MG tablet Take 1 tablet (20 mg) by mouth daily as needed severe swelling 30 tablet 2     HYDROcodone-acetaminophen (NORCO) 5-325 MG per tablet Take 1-2 tablets by mouth every 6 hours as needed for pain Maximum 7 daily 100 tablet 0     fluticasone (FLONASE) 50 MCG/ACT spray Spray 1-2 sprays into both nostrils daily 1 Bottle 11     fluticasone-salmeterol (ADVAIR) 100-50 MCG/DOSE diskus inhaler Inhale 1 puff into the lungs every morning 3 Inhaler 1     SUMAtriptan (IMITREX) 25 MG tablet Take 1 tablet (25 mg) by mouth at onset of headache for migraine May repeat in 2 hours if needed: max 2/day; 12 tablet 3     albuterol (2.5  MG/3ML) 0.083% neb solution Take 1 vial (2.5 mg) by nebulization every 6 hours as needed for shortness of breath / dyspnea or wheezing 25 vial 0     tiotropium (SPIRIVA HANDIHALER) 18 MCG capsule Inhale 1 capsule (18 mcg) into the lungs daily Inhale contents of one capsule 90 capsule 0     ipratropium - albuterol 0.5 mg/2.5 mg/3 mL (DUONEB) 0.5-2.5 (3) MG/3ML neb solution Take 1 vial (3 mLs) by nebulization every 6 hours as needed for shortness of breath / dyspnea or wheezing 30 vial 1     lisinopril (PRINIVIL,ZESTRIL) 2.5 MG tablet Take 1 tablet (2.5 mg) by mouth daily 90 tablet 1     pantoprazole (PROTONIX) 40 MG enteric coated tablet Take 1 tablet (40 mg) by mouth daily 90 tablet 3     potassium chloride (K-TAB,KLOR-CON) 10 MEQ tablet Take 1 tablet (10 mEq) by mouth 3 times daily 1 tablet 0     montelukast (SINGULAIR) 10 MG tablet Take 1 tablet (10 mg) by mouth At Bedtime 90 tablet 1     atorvastatin (LIPITOR) 10 MG tablet 3 times per week on Monday, Wednesday, friday 30 tablet 11     nitroglycerin (NITROSTAT) 0.4 MG SL tablet Place 1 tablet (0.4 mg) under the tongue every 5 minutes as needed for chest pain If you are still having symptoms after 3 doses (15 minutes) call 911. 25 tablet 0     [employer] FIBER GUMMIES 2.5 G CHEW Take 1 tablet by mouth daily       Multiple Vitamin CHEW Take 1 tablet by mouth daily        TYLENOL EXTRA STRENGTH OR 1 TABLET EVERY 4 HOURS AS NEEDED       [DISCONTINUED] HYDROcodone-acetaminophen (NORCO) 5-325 MG per tablet Take 1-2 tablets by mouth every 6 hours as needed for pain Maximum 7 daily 100 tablet 0     [DISCONTINUED] doxycycline (VIBRAMYCIN) 100 MG capsule Take 1 capsule (100 mg) by mouth 2 times daily 20 capsule 0     [DISCONTINUED] predniSONE (DELTASONE) 20 MG tablet Take 1 tablet (20 mg) by mouth 2 times daily Two times daily for 5 days, then once daily for 5 days, then 1/2 daily for 7 days. 20 tablet 0     [DISCONTINUED] guaiFENesin-codeine (ROBITUSSIN AC) 100-10 MG/5ML SOLN  solution Take 5 mLs by mouth every 4 hours as needed for cough 120 mL 0     [DISCONTINUED] guaiFENesin-codeine (ROBITUSSIN AC) 100-10 MG/5ML SOLN solution Take 5 mLs by mouth every 4 hours as needed for cough 120 mL 0     baclofen (LIORESAL) 10 MG tablet Take 1 tablet (10 mg) by mouth 3 times daily (Patient not taking: Reported on 4/3/2017) 30 tablet 1     [DISCONTINUED] pregabalin (LYRICA) 50 MG capsule Take 1 capsule (50 mg) by mouth 3 times daily 90 capsule 0     [DISCONTINUED] ondansetron (ZOFRAN) 4 MG tablet Take 1 tablet (4 mg) by mouth every 8 hours as needed for nausea 20 tablet 1     No facility-administered encounter medications on file as of 4/3/2017.       IMPRESSION AND PLAN:   1.  Idiopathic cardiomyopathy.  Initially diagnosed in 07/2015.  It was felt that her initial left ejection fraction of 20%-25% was likely stress induced.  In followup, her left ventricular function has been stable around 45%-50%.  Unfortunately, she is intolerant to beta blockers due to worsening asthma symptoms, but has been faithful about taking her lisinopril.  She also continues with Lasix and a low-salt diet.  There are no significant signs and symptoms of fluid overload on today's examination.  We will continue the current medical regimen.   2.  Asthma.  She has had frequent bronchitis and asthma exacerbations here in the last few months and does have followup planned with Dr. Weinberg in a week or so.   3.  Treated hyperlipidemia.  Recent LDL level of 37 on low-dose atorvastatin.            Thank you for allowing me to participate in this patient's care.  We will have her follow up with Dr. Odell in about 9 months along with a BMP.      Thank you for allowing me to participate in this patient's care.     Sincerely,    ELLE Landa [facility]

## 2017-04-03 NOTE — PROGRESS NOTES
HPI and Plan:  #[id number]  See dictation    Orders Placed This Encounter   Procedures     Basic metabolic panel     Follow-Up with Cardiologist       No orders of the defined types were placed in this encounter.      Medications Discontinued During This Encounter   Medication Reason     doxycycline (VIBRAMYCIN) 100 MG capsule Therapy completed     guaiFENesin-codeine (ROBITUSSIN AC) 100-10 MG/5ML SOLN solution Therapy completed     guaiFENesin-codeine (ROBITUSSIN AC) 100-10 MG/5ML SOLN solution Therapy completed     HYDROcodone-acetaminophen (NORCO) 5-325 MG per tablet Medication Reconciliation Clean Up     ondansetron (ZOFRAN) 4 MG tablet Medication Reconciliation Clean Up     predniSONE (DELTASONE) 20 MG tablet Therapy completed     pregabalin (LYRICA) 50 MG capsule Stopped by Patient         Encounter Diagnosis   Name Primary?     Cardiomyopathy, unspecified (H)        CURRENT MEDICATIONS:  Current Outpatient Prescriptions   Medication Sig Dispense Refill     ALPRAZolam (XANAX) 0.25 MG tablet Take 1 tablet by mouth. Every twelve hours as needed for anxiety 30 tablet 0     ondansetron (ZOFRAN ODT) 4 MG ODT tab Take 1-2 tablets (4-8 mg) by mouth every 8 hours as needed for nausea 20 tablet 0     albuterol (ALBUTEROL) 108 (90 BASE) MCG/ACT Inhaler Inhale 2 puffs into the lungs every 6 hours as needed for shortness of breath / dyspnea 1 Inhaler 0     furosemide (LASIX) 20 MG tablet Take 1 tablet (20 mg) by mouth daily as needed severe swelling 30 tablet 2     HYDROcodone-acetaminophen (NORCO) 5-325 MG per tablet Take 1-2 tablets by mouth every 6 hours as needed for pain Maximum 7 daily 100 tablet 0     fluticasone (FLONASE) 50 MCG/ACT spray Spray 1-2 sprays into both nostrils daily 1 Bottle 11     fluticasone-salmeterol (ADVAIR) 100-50 MCG/DOSE diskus inhaler Inhale 1 puff into the lungs every morning 3 Inhaler 1     SUMAtriptan (IMITREX) 25 MG tablet Take 1 tablet (25 mg) by mouth at onset of headache for migraine May  repeat in 2 hours if needed: max 2/day; 12 tablet 3     albuterol (2.5 MG/3ML) 0.083% neb solution Take 1 vial (2.5 mg) by nebulization every 6 hours as needed for shortness of breath / dyspnea or wheezing 25 vial 0     tiotropium (SPIRIVA HANDIHALER) 18 MCG capsule Inhale 1 capsule (18 mcg) into the lungs daily Inhale contents of one capsule 90 capsule 0     ipratropium - albuterol 0.5 mg/2.5 mg/3 mL (DUONEB) 0.5-2.5 (3) MG/3ML neb solution Take 1 vial (3 mLs) by nebulization every 6 hours as needed for shortness of breath / dyspnea or wheezing 30 vial 1     lisinopril (PRINIVIL,ZESTRIL) 2.5 MG tablet Take 1 tablet (2.5 mg) by mouth daily 90 tablet 1     pantoprazole (PROTONIX) 40 MG enteric coated tablet Take 1 tablet (40 mg) by mouth daily 90 tablet 3     potassium chloride (K-TAB,KLOR-CON) 10 MEQ tablet Take 1 tablet (10 mEq) by mouth 3 times daily 1 tablet 0     montelukast (SINGULAIR) 10 MG tablet Take 1 tablet (10 mg) by mouth At Bedtime 90 tablet 1     atorvastatin (LIPITOR) 10 MG tablet 3 times per week on Monday, Wednesday, friday 30 tablet 11     nitroglycerin (NITROSTAT) 0.4 MG SL tablet Place 1 tablet (0.4 mg) under the tongue every 5 minutes as needed for chest pain If you are still having symptoms after 3 doses (15 minutes) call 911. 25 tablet 0     [employer] FIBER GUMMIES 2.5 G CHEW Take 1 tablet by mouth daily       Multiple Vitamin CHEW Take 1 tablet by mouth daily        TYLENOL EXTRA STRENGTH OR 1 TABLET EVERY 4 HOURS AS NEEDED       baclofen (LIORESAL) 10 MG tablet Take 1 tablet (10 mg) by mouth 3 times daily (Patient not taking: Reported on 4/3/2017) 30 tablet 1       ALLERGIES     Allergies   Allergen Reactions     Gabapentin Swelling     Aspirin      Contrast Dye      Iodine     Ibuprofen      Peanuts [Nuts]      Penicillins      rash     Sulfa Drugs      Tace [Chlorotrianisene]      joint swelling     Strawberry Rash       PAST MEDICAL HISTORY:  Past Medical History:   Diagnosis Date     Anxiety       CAD (coronary artery disease) 7/2/15    mild per cath     CHF (congestive heart failure) (H)     EF=20-25% per echo 6/30/15     Chronic airway obstruction, not elsewhere classified     FEV1 36% of pred; VC 67%  in .     COPD (chronic obstructive pulmonary disease) (H)      Esophageal reflux      Mild persistent asthma 2012     OSTEOPENIA      Other and unspecified hyperlipidemia      Other forms of migraine, without mention of intractable migraine without mention of status migrainosus      Takotsubo cardiomyopathy      Unspecified essential hypertension        PAST SURGICAL HISTORY:  Past Surgical History:   Procedure Laterality Date     ANGIOGRAM  2015    Minimal CAD. LV dysfunction, Mid RCA 20% stenosis, EF 20-25%, c/w Takotsubo cardiomyopathy     APPENDECTOMY       C NONSPECIFIC PROCEDURE      GB and appendectomy  abstracted [id number]     C NONSPECIFIC PROCEDURE      Nasal septoplasty and mucous retention cyst     CHOLECYSTECTOMY       ESOPHAGOSCOPY, GASTROSCOPY, DUODENOSCOPY (EGD), COMBINED  11/15/2011    Procedure:COMBINED ESOPHAGOSCOPY, GASTROSCOPY, DUODENOSCOPY (EGD);  ESOPHAGOSCOPY, GASTROSCOPY, DUODENOSCOPY (EGD) ; Surgeon:JIM URENA; Location:[facility]     HC REMOVE TONSILS/ADENOIDS,<13 Y/O       LAPAROSCOPIC ABLATION ENDOMETRIOSIS       wisdom teeth         FAMILY HISTORY:  Family History   Problem Relation Age of Onset     CANCER Mother      Colon cancer; dxed at age 64;  at age 69     Cancer - colorectal Mother      Other Cancer Mother      GASTROINTESTINAL DISEASE Sister      Acute pancreatitis     HEART DISEASE Father      ?     Coronary Artery Disease Father      Circulatory Maternal Aunt      AAA     Circulatory Maternal Uncle      AAA       SOCIAL HISTORY:  Social History     Social History     Marital status:      Spouse name: N/A     Number of children: N/A     Years of education: N/A     Social History Main Topics     Smoking status: Former Smoker      "Packs/day: 1.00     Years: 30.00     Quit date: 1/17/2006     Smokeless tobacco: Never Used      Comment: Quit in 2006     Alcohol use No     Drug use: No     Sexual activity: No      Comment: vasectomy     Other Topics Concern     Caffeine Concern No     none     Hobby Hazards No     Stress Concern No     Special Diet Yes     low sodium     Exercise Yes     limited due to back     Social History Narrative       Review of Systems:  Skin:  Positive for bruising eczema   Eyes:  Positive for glasses    ENT:  Positive for sinus trouble allergies  Respiratory:  Positive for dyspnea on exertion;shortness of breath asthma   Cardiovascular:    Positive for;palpitations;edema heart is racing in the am when waking up - takes xanax; edema of the ankles  Gastroenterology: Positive for reflux;heartburn;nausea gas in stomach - takes gas-x  Genitourinary:  Negative      Musculoskeletal:  Positive for back pain    Neurologic:  Positive for migraine headaches    Psychiatric:  Positive for anxiety sleeping a little better that usual  Heme/Lymph/Imm:  Negative      Endocrine:  Positive for thyroid disorder growths on thyroid - being watched    Physical Exam:  Vitals: /62 (BP Location: Right arm, Patient Position: Chair, Cuff Size: Adult Regular)  Pulse 91  Ht 1.778 m (5' 10\")  Wt 73.8 kg (162 lb 9.6 oz)  LMP 09/20/2005  BMI 23.33 kg/m2    Constitutional:           Skin:           Head:           Eyes:           ENT:           Neck:           Chest:             Cardiac:                    Abdomen:           Vascular:                                          Extremities and Back:                 Neurological:                 AMY Snell, APRN [facility]  [address]  [place] [address]                  "

## 2017-04-03 NOTE — MR AVS SNAPSHOT
After Visit Summary   4/3/2017    Trisha Bender    MRN: [id number]           Patient Information     Date Of Birth          1955        Visit Information        Provider Department      4/3/2017 3:10 PM Yi Snell APRN CNP [facility]        Today's Diagnoses     Cardiomyopathy, unspecified (H)           Follow-ups after your visit        Additional Services     Follow-Up with Cardiologist                 Your next 10 appointments already scheduled     Apr 05, 2017  2:40 PM CDT   Elliott Rodriguez with Bandar Weinberg MD   [facility] ([facility])    [address]  [facility] [address]   [phone]              Future tests that were ordered for you today     Open Future Orders        Priority Expected Expires Ordered    Basic metabolic panel Routine 12/29/2017 4/3/2018 4/3/2017    Follow-Up with Cardiologist Routine 12/29/2017 4/3/2018 4/3/2017            Who to contact     If you have questions or need follow up information about today's clinic visit or your schedule please contact [facility] directly at [phone].  Normal or non-critical lab and imaging results will be communicated to you by MyChart, letter or phone within 4 business days after the clinic has received the results. If you do not hear from us within 7 days, please contact the clinic through [employer] or phone. If you have a critical or abnormal lab result, we will notify you by phone as soon as possible.  Submit refill requests through [employer] or call your pharmacy and they will forward the refill request to us. Please allow 3 business days for your refill to be completed.          Additional Information About Your Visit        MyChart Information     [employer] gives you secure access to your electronic health record. If you see a primary care provider, you can also send messages to your care  "team and make appointments. If you have questions, please call your primary care clinic.  If you do not have a primary care provider, please call [phone] and they will assist you.        Care EveryWhere ID     This is your Care EveryWhere ID. This could be used by other organizations to access your [place] medical records  [id number]        Your Vitals Were     Pulse Height Last Period BMI (Body Mass Index)          91 1.778 m (5' 10\") 09/20/2005 23.33 kg/m2         Blood Pressure from Last 3 Encounters:   04/03/17 106/62   03/22/17 120/82   02/08/17 122/70    Weight from Last 3 Encounters:   04/03/17 73.8 kg (162 lb 9.6 oz)   03/22/17 72.2 kg (159 lb 3.2 oz)   02/08/17 76 kg (167 lb 9.6 oz)              We Performed the Following     Follow-Up with Cardiac Advanced Practice Provider        Primary Care Provider Office Phone # Fax #    Bandar Weinberg [phone]       [facility] [address] [place] [address]        Thank you!     Thank you for choosing [facility] [place]  for your care. Our goal is always to provide you with excellent care. Hearing back from our patients is one way we can continue to improve our services. Please take a few minutes to complete the written survey that you may receive in the mail after your visit with us. Thank you!             Your Updated Medication List - Protect others around you: Learn how to safely use, store and throw away your medicines at www.disposemymeds.org.          This list is accurate as of: 4/3/17  3:52 PM.  Always use your most recent med list.                   Brand Name Dispense Instructions for use    * albuterol (2.5 MG/3ML) 0.083% neb solution     25 vial    Take 1 vial (2.5 mg) by nebulization every 6 hours as needed for shortness of breath / dyspnea or wheezing       * albuterol 108 (90 BASE) MCG/ACT Inhaler    albuterol    1 Inhaler    Inhale 2 puffs into the lungs every 6 " hours as needed for shortness of breath / dyspnea       ALPRAZolam 0.25 MG tablet    XANAX    30 tablet    Take 1 tablet by mouth. Every twelve hours as needed for anxiety       atorvastatin 10 MG tablet    LIPITOR    30 tablet    3 times per week on Monday, Wednesday, friday       baclofen 10 MG tablet    LIORESAL    30 tablet    Take 1 tablet (10 mg) by mouth 3 times daily       [employer] FIBER GUMMIES 2.5 G Chew      Take 1 tablet by mouth daily       fluticasone 50 MCG/ACT spray    FLONASE    1 Bottle    Spray 1-2 sprays into both nostrils daily       fluticasone-salmeterol 100-50 MCG/DOSE diskus inhaler    ADVAIR    3 Inhaler    Inhale 1 puff into the lungs every morning       furosemide 20 MG tablet    LASIX    30 tablet    Take 1 tablet (20 mg) by mouth daily as needed severe swelling       HYDROcodone-acetaminophen 5-325 MG per tablet    NORCO    100 tablet    Take 1-2 tablets by mouth every 6 hours as needed for pain Maximum 7 daily       ipratropium - albuterol 0.5 mg/2.5 mg/3 mL 0.5-2.5 (3) MG/3ML neb solution    DUONEB    30 vial    Take 1 vial (3 mLs) by nebulization every 6 hours as needed for shortness of breath / dyspnea or wheezing       lisinopril 2.5 MG tablet    PRINIVIL/Zestril    90 tablet    Take 1 tablet (2.5 mg) by mouth daily       montelukast 10 MG tablet    SINGULAIR    90 tablet    Take 1 tablet (10 mg) by mouth At Bedtime       Multiple Vitamin Chew      Take 1 tablet by mouth daily       nitroglycerin 0.4 MG sublingual tablet    NITROSTAT    25 tablet    Place 1 tablet (0.4 mg) under the tongue every 5 minutes as needed for chest pain If you are still having symptoms after 3 doses (15 minutes) call 911.       ondansetron 4 MG ODT tab    ZOFRAN ODT    20 tablet    Take 1-2 tablets (4-8 mg) by mouth every 8 hours as needed for nausea       pantoprazole 40 MG EC tablet    PROTONIX    90 tablet    Take 1 tablet (40 mg) by mouth daily       potassium chloride 10 MEQ tablet    K-TAB,KLOR-CON    1  tablet    Take 1 tablet (10 mEq) by mouth 3 times daily       SUMAtriptan 25 MG tablet    IMITREX    12 tablet    Take 1 tablet (25 mg) by mouth at onset of headache for migraine May repeat in 2 hours if needed: max 2/day;       tiotropium 18 MCG capsule    SPIRIVA HANDIHALER    90 capsule    Inhale 1 capsule (18 mcg) into the lungs daily Inhale contents of one capsule       TYLENOL EXTRA STRENGTH PO      1 TABLET EVERY 4 HOURS AS NEEDED       * Notice:  This list has 2 medication(s) that are the same as other medications prescribed for you. Read the directions carefully, and ask your doctor or other care provider to review them with you.

## 2017-04-03 NOTE — TELEPHONE ENCOUNTER
Message from [employer]:  Original authorizing provider: MD Trisha Marvin would like a refill of the following medications:  ALPRAZolam (XANAX) 0.25 MG tablet [Bandar Weinberg MD]    Preferred pharmacy: [place] [address] [facility].    Comment:  Dr. Weinberg and Nursing Staff, I would like to get a refill for the Alprazolam prescription as soon as possible. Thank you. Trisha Bender

## 2017-04-04 NOTE — PROGRESS NOTES
HISTORY OF PRESENT ILLNESS:   1.  This 61-year-old female who presents to [facility] today for a followup visit.  She is a patient of Dr. Odell's seen in our clinic for cardiomyopathy, mixed hyperlipidemia and aspirin.      Trisha was noted to have congestive heart failure in 07/2015.  Echocardiogram did demonstrate left ventricular ejection fraction of 20%-25%.  Coronary angiography revealed mild nonobstructive coronary artery disease and she was diagnosed with idiopathic cardiomyopathy.  There was some thought this was likely Takotsubo or stress cardiomyopathy.  In followup, on carvedilol and lisinopril, her left ventricular function normalized 3 months later.  However, she has had intolerance to carvedilol and Toprol due to worsening asthma symptoms and for the last year or so she has been on lisinopril and as needed Lasix.  An echocardiogram in 09/2016 demonstrated a left ventricular ejection fraction of 45%-50%, 1+ mitral regurgitation and no significant change.      Trisha does have an extensive history of asthma, on multiple agents to control.  It appears through the records for the last few months she has had asthma exacerbations and bronchitis.  She has been intermittently on prednisone.  A BNP drawn February of this year was 207.  She returns today for a 6-month reassessment.      Trisha comes in with her  today as usual.  She did state she has had 3 bouts of asthma and bronchitis issues over the last 3 months.  She tells me now that she has been off prednisone for a week and her breathing has been quite stable.  She tends to get short of breath first thing in the morning and for the last week or so has needed Xanax to help sleep.  She denies any orthopnea.  Trisha does watch her weight on a regular basis and has not noticed any weight fluctuations at home.  She did appreciate some more peripheral edema when on prednisone, but this has now stabilized.   Moses tells me for the last 6-9 months, she has been taking 40 mg of Lasix every day and this has kept her weight stable.  She has no chest pain with activity or at rest.  She has no sensations of palpitations, lightheadedness or dizziness.  Holli is planning on meeting with Dr. Weinberg later this week in regards to her respiratory issues.      PHYSICAL EXAMINATION:  Blood pressure today is 106/62 mmHg, heart rate 91 beats per minute and is regular.  She does have distant heart tones.  Her lungs are clear.  There is no peripheral edema.  Her weight is fairly stable at 162 pounds.      IMPRESSION AND PLAN:   1.  Idiopathic cardiomyopathy.  Initially diagnosed in 2015.  It was felt that her initial left ejection fraction of 20%-25% was likely stress induced.  In followup, her left ventricular function has been stable around 45%-50%.  Unfortunately, she is intolerant to beta blockers due to worsening asthma symptoms, but has been faithful about taking her lisinopril.  She also continues with Lasix and a low-salt diet.  There are no significant signs and symptoms of fluid overload on today's examination.  We will continue the current medical regimen.   2.  Asthma.  She has had frequent bronchitis and asthma exacerbations here in the last few months and does have followup planned with Dr. Weinberg in a week or so.   3.  Treated hyperlipidemia.  Recent LDL level of 37 on low-dose atorvastatin.      Thank you for allowing me to participate in this patient's care.  We will have her follow up with Dr. Odell in about 9 months along with a BMP.      Thank you for allowing me to participate in this patient's care.         ELLE SHANKS, CNP             D: 2017 15:52   T: 2017 08:58   MT: al      Name:     MOSES TIMMONS   MRN:      [id number]        Account:      [id number]   :      1955           Service Date: 2017      Document: [id number]

## 2017-04-08 DIAGNOSIS — J45.30 MILD PERSISTENT ASTHMA WITHOUT COMPLICATION: ICD-10-CM

## 2017-04-10 RX ORDER — ALBUTEROL SULFATE 90 UG/1
AEROSOL, METERED RESPIRATORY (INHALATION)
Qty: 18 G | Refills: 0 | Status: SHIPPED | OUTPATIENT
Start: 2017-04-10 | End: 2017-05-08

## 2017-04-12 ENCOUNTER — MYC REFILL (OUTPATIENT)
Dept: INTERNAL MEDICINE | Facility: CLINIC | Age: 62
End: 2017-04-12

## 2017-04-12 DIAGNOSIS — G89.29 CHRONIC BILATERAL LOW BACK PAIN WITHOUT SCIATICA: ICD-10-CM

## 2017-04-12 DIAGNOSIS — G43.809 OTHER TYPE OF MIGRAINE: ICD-10-CM

## 2017-04-12 DIAGNOSIS — M54.50 CHRONIC BILATERAL LOW BACK PAIN WITHOUT SCIATICA: ICD-10-CM

## 2017-04-12 RX ORDER — SUMATRIPTAN 25 MG/1
25 TABLET, FILM COATED ORAL
Qty: 12 TABLET | Refills: 3 | Status: CANCELLED | OUTPATIENT
Start: 2017-04-12

## 2017-04-12 RX ORDER — HYDROCODONE BITARTRATE AND ACETAMINOPHEN 5; 325 MG/1; MG/1
1-2 TABLET ORAL EVERY 6 HOURS PRN
Qty: 100 TABLET | Refills: 0 | Status: SHIPPED | OUTPATIENT
Start: 2017-04-12 | End: 2017-04-27

## 2017-04-12 NOTE — TELEPHONE ENCOUNTER
Message from [employer]:  Original authorizing provider: MD Trisha Marvin would like a refill of the following medications:  SUMAtriptan (IMITREX) 25 MG tablet [Bandar Weinberg MD]  HYDROcodone-acetaminophen (NORCO) 5-325 MG per tablet [Bandar Weinberg MD]    Preferred pharmacy: [place] [address]    Comment:  Dr. Weinberg and Nursing Staff, I would like to get a refill on the Hydrocodone-acetaminophen prescription and also the Sumatriptan for Migraines. I was told by Pharmacy that the Sumatriptan can be written for 12 pills instead of 9. Thank you. Trisha Bender

## 2017-04-15 DIAGNOSIS — J45.30 MILD PERSISTENT ASTHMA WITHOUT COMPLICATION: ICD-10-CM

## 2017-04-17 RX ORDER — MONTELUKAST SODIUM 10 MG/1
TABLET ORAL
Qty: 90 TABLET | Refills: 1 | Status: SHIPPED | OUTPATIENT
Start: 2017-04-17 | End: 2017-10-14

## 2017-04-18 ENCOUNTER — OFFICE VISIT (OUTPATIENT)
Dept: INTERNAL MEDICINE | Facility: CLINIC | Age: 62
End: 2017-04-18
Payer: COMMERCIAL

## 2017-04-18 VITALS
TEMPERATURE: 97.5 F | SYSTOLIC BLOOD PRESSURE: 114 MMHG | WEIGHT: 155.8 LBS | BODY MASS INDEX: 22.3 KG/M2 | HEIGHT: 70 IN | OXYGEN SATURATION: 96 % | DIASTOLIC BLOOD PRESSURE: 80 MMHG | HEART RATE: 148 BPM

## 2017-04-18 DIAGNOSIS — R06.02 SOB (SHORTNESS OF BREATH): Primary | ICD-10-CM

## 2017-04-18 DIAGNOSIS — J45.40 MODERATE PERSISTENT ASTHMA WITHOUT COMPLICATION: ICD-10-CM

## 2017-04-18 DIAGNOSIS — J45.30 MILD PERSISTENT ASTHMA WITHOUT COMPLICATION: ICD-10-CM

## 2017-04-18 DIAGNOSIS — I50.22 CHRONIC SYSTOLIC CONGESTIVE HEART FAILURE (H): ICD-10-CM

## 2017-04-18 LAB
ANION GAP SERPL CALCULATED.3IONS-SCNC: 8 MMOL/L (ref 3–14)
BUN SERPL-MCNC: 4 MG/DL (ref 7–30)
CALCIUM SERPL-MCNC: 9.1 MG/DL (ref 8.5–10.1)
CHLORIDE SERPL-SCNC: 103 MMOL/L (ref 94–109)
CO2 SERPL-SCNC: 31 MMOL/L (ref 20–32)
CREAT SERPL-MCNC: 0.76 MG/DL (ref 0.52–1.04)
GFR SERPL CREATININE-BSD FRML MDRD: 78 ML/MIN/1.7M2
GLUCOSE SERPL-MCNC: 102 MG/DL (ref 70–99)
NT-PROBNP SERPL-MCNC: 483 PG/ML (ref 0–125)
POTASSIUM SERPL-SCNC: 3.5 MMOL/L (ref 3.4–5.3)
SODIUM SERPL-SCNC: 142 MMOL/L (ref 133–144)

## 2017-04-18 PROCEDURE — 80048 BASIC METABOLIC PNL TOTAL CA: CPT | Performed by: FAMILY MEDICINE

## 2017-04-18 PROCEDURE — 83880 ASSAY OF NATRIURETIC PEPTIDE: CPT | Performed by: FAMILY MEDICINE

## 2017-04-18 PROCEDURE — 99214 OFFICE O/P EST MOD 30 MIN: CPT | Performed by: FAMILY MEDICINE

## 2017-04-18 PROCEDURE — 36415 COLL VENOUS BLD VENIPUNCTURE: CPT | Performed by: FAMILY MEDICINE

## 2017-04-18 RX ORDER — PREDNISONE 20 MG/1
20 TABLET ORAL 2 TIMES DAILY
Qty: 20 TABLET | Refills: 0 | Status: SHIPPED | OUTPATIENT
Start: 2017-04-18 | End: 2017-04-24

## 2017-04-18 NOTE — NURSING NOTE
"Chief Complaint   Patient presents with     Breathing Problem     She is having shortness of breath, feeling nausea and tired, when woke up in the morning-can't breathe. She is using her albuterol at home.       Initial /80 (BP Location: Right arm, Patient Position: Chair, Cuff Size: Adult Regular)  Pulse 148  Temp 97.5  F (36.4  C) (Oral)  Ht 5' 10\" (1.778 m)  Wt 155 lb 12.8 oz (70.7 kg)  LMP 09/20/2005  SpO2 96%  BMI 22.35 kg/m2 Estimated body mass index is 22.35 kg/(m^2) as calculated from the following:    Height as of this encounter: 5' 10\" (1.778 m).    Weight as of this encounter: 155 lb 12.8 oz (70.7 kg).  Medication Reconciliation: complete   Dolores Peraza MA        "

## 2017-04-18 NOTE — MR AVS SNAPSHOT
After Visit Summary   4/18/2017    Trisha Bender    MRN: [id number]           Patient Information     Date Of Birth          1955        Visit Information        Provider Department      4/18/2017 10:40 AM Hugh Rolon MD [facility]        Today's Diagnoses     SOB (shortness of breath)    -  1    Chronic systolic congestive heart failure (H)        Mild persistent asthma without complication        Moderate persistent asthma without complication          Care Instructions    Take prescribed medication as directed.    Make appt with Lung Specialist.    We will reply on labs.          Follow-ups after your visit        Additional Services     PULMONARY MEDICINE REFERRAL       Your provider has referred you to: N: [facility] [phone]   [email or web address]    Please be aware that coverage of these services is subject to the terms and limitations of your health insurance plan.  Call member services at your health plan with any benefit or coverage questions.      Please bring the following with you to your appointment:    (1) Any X-Rays, CTs or MRIs which have been performed.  Contact the facility where they were done to arrange for  prior to your scheduled appointment.    (2) List of current medications   (3) This referral request   (4) Any documents/labs given to you for this referral                  Who to contact     If you have questions or need follow up information about today's clinic visit or your schedule please contact [facility] directly at [phone].  Normal or non-critical lab and imaging results will be communicated to you by MyChart, letter or phone within 4 business days after the clinic has received the results. If you do not hear from us within 7 days, please contact the clinic through MyChart or phone. If you have a critical or abnormal lab result, we will notify you by phone as soon as  "possible.  Submit refill requests through [employer] or call your pharmacy and they will forward the refill request to us. Please allow 3 business days for your refill to be completed.          Additional Information About Your Visit        [employer] Information     [employer] gives you secure access to your electronic health record. If you see a primary care provider, you can also send messages to your care team and make appointments. If you have questions, please call your primary care clinic.  If you do not have a primary care provider, please call [phone] and they will assist you.        Care EveryWhere ID     This is your Care EveryWhere ID. This could be used by other organizations to access your [facility] medical records  [id number]        Your Vitals Were     Pulse Temperature Height Last Period Pulse Oximetry BMI (Body Mass Index)    148 97.5  F (36.4  C) (Oral) 5' 10\" (1.778 m) 09/20/2005 96% 22.35 kg/m2       Blood Pressure from Last 3 Encounters:   04/18/17 114/80   04/03/17 106/62   03/22/17 120/82    Weight from Last 3 Encounters:   04/18/17 155 lb 12.8 oz (70.7 kg)   04/03/17 162 lb 9.6 oz (73.8 kg)   03/22/17 159 lb 3.2 oz (72.2 kg)              We Performed the Following     Basic metabolic panel  (Ca, Cl, CO2, Creat, Gluc, K, Na, BUN)     BNP-N terminal pro     PULMONARY MEDICINE REFERRAL          Today's Medication Changes          These changes are accurate as of: 4/18/17 11:14 AM.  If you have any questions, ask your nurse or doctor.               Start taking these medicines.        Dose/Directions    predniSONE 20 MG tablet   Commonly known as:  DELTASONE   Used for:  Moderate persistent asthma without complication, Mild persistent asthma without complication   Started by:  uHgh Rolon MD        Dose:  20 mg   Take 1 tablet (20 mg) by mouth 2 times daily Two times daily for 5 days, then once daily for 5 days, then 1/2 daily for 7 days.   Quantity:  20 tablet   Refills:  0            Where " to get your medicines      These medications were sent to [facility] [place], [place] - [address]  [address] [facility] [address]     Phone:  [phone]     predniSONE 20 MG tablet                Primary Care Provider Office Phone # Fax #    Bandar Weinberg [phone]       [facility] [address]  [facility] [address]        Thank you!     Thank you for choosing [facility]  for your care. Our goal is always to provide you with excellent care. Hearing back from our patients is one way we can continue to improve our services. Please take a few minutes to complete the written survey that you may receive in the mail after your visit with us. Thank you!             Your Updated Medication List - Protect others around you: Learn how to safely use, store and throw away your medicines at www.disposemymeds.org.          This list is accurate as of: 4/18/17 11:14 AM.  Always use your most recent med list.                   Brand Name Dispense Instructions for use    * albuterol (2.5 MG/3ML) 0.083% neb solution     25 vial    Take 1 vial (2.5 mg) by nebulization every 6 hours as needed for shortness of breath / dyspnea or wheezing       * VENTOLIN  (90 BASE) MCG/ACT Inhaler   Generic drug:  albuterol     18 g    INHALE 2 PUFFS BY MOUTH EVERY 4 HOURS AS NEEDED FOR SHORTNESS OF BREATH       ALPRAZolam 0.25 MG tablet    XANAX    30 tablet    Take 1 tablet by mouth. Every twelve hours as needed for anxiety       atorvastatin 10 MG tablet    LIPITOR    30 tablet    Reported on 4/18/2017       baclofen 10 MG tablet    LIORESAL    30 tablet    Take 1 tablet (10 mg) by mouth 3 times daily       [employer] FIBER GUMMIES 2.5 G Chew      Take 1 tablet by mouth daily       fluticasone 50 MCG/ACT spray    FLONASE    1 Bottle    Spray 1-2 sprays into both nostrils daily       fluticasone-salmeterol 100-50 MCG/DOSE diskus inhaler    ADVAIR     3 Inhaler    Inhale 1 puff into the lungs every morning       furosemide 20 MG tablet    LASIX    30 tablet    Take 1 tablet (20 mg) by mouth daily as needed severe swelling       HYDROcodone-acetaminophen 5-325 MG per tablet    NORCO    100 tablet    Take 1-2 tablets by mouth every 6 hours as needed for pain Maximum 7 daily       ipratropium - albuterol 0.5 mg/2.5 mg/3 mL 0.5-2.5 (3) MG/3ML neb solution    DUONEB    30 vial    Take 1 vial (3 mLs) by nebulization every 6 hours as needed for shortness of breath / dyspnea or wheezing       lisinopril 2.5 MG tablet    PRINIVIL/Zestril    90 tablet    Take 1 tablet (2.5 mg) by mouth daily       montelukast 10 MG tablet    SINGULAIR    90 tablet    TAKE 1 TABLET AT BEDTIME       Multiple Vitamin Chew      Take 1 tablet by mouth daily       nitroglycerin 0.4 MG sublingual tablet    NITROSTAT    25 tablet    Place 1 tablet (0.4 mg) under the tongue every 5 minutes as needed for chest pain If you are still having symptoms after 3 doses (15 minutes) call 911.       ondansetron 4 MG ODT tab    ZOFRAN ODT    20 tablet    Take 1-2 tablets (4-8 mg) by mouth every 8 hours as needed for nausea       pantoprazole 40 MG EC tablet    PROTONIX    90 tablet    Take 1 tablet (40 mg) by mouth daily       potassium chloride 10 MEQ tablet    K-TAB,KLOR-CON    1 tablet    Take 1 tablet (10 mEq) by mouth 3 times daily       predniSONE 20 MG tablet    DELTASONE    20 tablet    Take 1 tablet (20 mg) by mouth 2 times daily Two times daily for 5 days, then once daily for 5 days, then 1/2 daily for 7 days.       SUMAtriptan 25 MG tablet    IMITREX    12 tablet    Take 1 tablet (25 mg) by mouth at onset of headache for migraine May repeat in 2 hours if needed: max 2/day;       tiotropium 18 MCG capsule    SPIRIVA HANDIHALER    90 capsule    Inhale 1 capsule (18 mcg) into the lungs daily Inhale contents of one capsule       TYLENOL EXTRA STRENGTH PO      1 TABLET EVERY 4 HOURS AS NEEDED       *  Notice:  This list has 2 medication(s) that are the same as other medications prescribed for you. Read the directions carefully, and ask your doctor or other care provider to review them with you.

## 2017-04-18 NOTE — PROGRESS NOTES
CHIEF COMPLAINT    SOB      HISTORY    Trisha indicates that she has felt more SOB in the last week. She is using Albuterol by inhaler and occasionally by neb. She is on Advair qd. She says she has had respiratory infection monthly Jan - March. Usually improves with Prednisone.     She is wondering about CHF vs asthma/COPD.    She has FEV1 of 1.15 liter (37%) Jan 2016    Her EF is 45-50% as of Echo 9-2016.    Patient Active Problem List   Diagnosis     Essential hypertension     Other type of migraine     Esophageal reflux     Disorder of bone and cartilage     Family history of malignant neoplasm of gastrointestinal tract     Chronic airway obstruction (H)     Cystocele     Advanced directives, counseling/discussion     Family history of thyroid cancer - 1/2 brother     Anxiety     Mild persistent asthma without complication     Chronic low back pain     Hair loss     Lateral epicondylitis of right elbow     Dilated cardiomyopathy (H)     Right bundle branch block (RBBB)     CHF (congestive heart failure) (H)     SOB (shortness of breath)     Coronary artery disease involving native coronary artery of native heart without angina pectoris     Mixed hyperlipidemia     Controlled substance agreement signed     Moderate persistent asthma without complication     Hypokalemia     Mechanical low back pain       REVIEW OF SYSTEMS    No fever  No sputum production  No N and V  No CP  No edema      Past Medical History:   Diagnosis Date     Anxiety      CAD (coronary artery disease) 7/2/15    mild per cath     CHF (congestive heart failure) (H)     EF=20-25% per echo 6/30/15     Chronic airway obstruction, not elsewhere classified     FEV1 36% of pred; VC 67%  in Jan 06.     COPD (chronic obstructive pulmonary disease) (H)      Esophageal reflux      Mild persistent asthma 8/30/2012     OSTEOPENIA      Other and unspecified hyperlipidemia      Other forms of migraine, without mention of intractable migraine without mention  "of status migrainosus      Takotsubo cardiomyopathy      Unspecified essential hypertension        EXAM  /80 (BP Location: Right arm, Patient Position: Chair, Cuff Size: Adult Regular)  Pulse 148  Temp 97.5  F (36.4  C) (Oral)  Ht 5' 10\" (1.778 m)  Wt 155 lb 12.8 oz (70.7 kg)  LMP 09/20/2005  SpO2 96%  BMI 22.35 kg/m2    Thin woman.  She can talk in repeated sentences w/o struggling to breath.  Neck: no mass  Chest: few exp wheezes bilat  CV: RSR, no murmur  Extrem: no edema      (R06.02) SOB (shortness of breath)  (primary encounter diagnosis)  Comment: I suspect this is primarily a lung issue today.  Plan: BNP-N terminal pro, PULMONARY MEDICINE REFERRAL            (I50.22) Chronic systolic congestive heart failure (H)  Comment:   Plan: Basic metabolic panel  (Ca, Cl, CO2, Creat,         Gluc, K, Na, BUN)            (J45.30) Mild persistent asthma without complication  Comment:   Plan: predniSONE (DELTASONE) 20 MG tablet, PULMONARY         MEDICINE REFERRAL            (J45.40) Moderate persistent asthma without complication  Comment:   Plan: predniSONE (DELTASONE) 20 MG tablet            Take prescribed medication as directed.    Make appt with Lung Specialist.    We will reply on labs.                              "

## 2017-04-18 NOTE — PATIENT INSTRUCTIONS
Take prescribed medication as directed.    Make appt with Lung Specialist.    We will reply on labs.

## 2017-04-19 ENCOUNTER — MYC MEDICAL ADVICE (OUTPATIENT)
Dept: INTERNAL MEDICINE | Facility: CLINIC | Age: 62
End: 2017-04-19

## 2017-04-19 ENCOUNTER — MYC REFILL (OUTPATIENT)
Dept: INTERNAL MEDICINE | Facility: CLINIC | Age: 62
End: 2017-04-19

## 2017-04-19 DIAGNOSIS — F41.9 ANXIETY: ICD-10-CM

## 2017-04-19 RX ORDER — ALPRAZOLAM 0.25 MG
TABLET ORAL
Qty: 30 TABLET | Refills: 0 | Status: SHIPPED | OUTPATIENT
Start: 2017-04-19 | End: 2017-06-02

## 2017-04-19 ASSESSMENT — ASTHMA QUESTIONNAIRES: ACT_TOTALSCORE: 11

## 2017-04-19 NOTE — TELEPHONE ENCOUNTER
Message from [employer]:  Original authorizing provider: MD Trisha Marvin would like a refill of the following medications:  ALPRAZolam (XANAX) 0.25 MG tablet [Bandar Weinberg MD]    Preferred pharmacy: [place] [address] [facility].    Comment:  Dr. Weinberg and Nursing Staff, I would like to get a refill on the Alprazolam prescription as soon as possible. Thank you. Trisha Bender

## 2017-04-19 NOTE — TELEPHONE ENCOUNTER
Xanax      Last Written Prescription Date:  4-3-17  Last Fill Quantity: 30,   # refills: 0  Last [facility] Visit with [facility] prescribing provider: 4-18-17  Future Office visit:    Next 5 appointments (look out 90 days)     May 03, 2017  4:20 PM CDT   Elliott oRdriguez with Bandar Weinberg MD   [facility]    [address]  [facility] [address]   [phone]                   Routing refill request to provider for review/approval because:  Drug not on the [facility] refill protocol or controlled substance    Signed CSA form in chart.    Please advise, thanks.

## 2017-04-24 ENCOUNTER — MYC REFILL (OUTPATIENT)
Dept: INTERNAL MEDICINE | Facility: CLINIC | Age: 62
End: 2017-04-24

## 2017-04-24 DIAGNOSIS — J45.40 MODERATE PERSISTENT ASTHMA WITHOUT COMPLICATION: ICD-10-CM

## 2017-04-24 DIAGNOSIS — J45.30 MILD PERSISTENT ASTHMA WITHOUT COMPLICATION: ICD-10-CM

## 2017-04-25 ENCOUNTER — MYC MEDICAL ADVICE (OUTPATIENT)
Dept: INTERNAL MEDICINE | Facility: CLINIC | Age: 62
End: 2017-04-25

## 2017-04-25 RX ORDER — PREDNISONE 20 MG/1
20 TABLET ORAL 2 TIMES DAILY
Qty: 20 TABLET | Refills: 0 | Status: SHIPPED | OUTPATIENT
Start: 2017-04-25 | End: 2017-04-26

## 2017-04-26 ENCOUNTER — MYC REFILL (OUTPATIENT)
Dept: INTERNAL MEDICINE | Facility: CLINIC | Age: 62
End: 2017-04-26

## 2017-04-26 DIAGNOSIS — J45.30 MILD PERSISTENT ASTHMA WITHOUT COMPLICATION: ICD-10-CM

## 2017-04-26 DIAGNOSIS — J45.40 MODERATE PERSISTENT ASTHMA WITHOUT COMPLICATION: ICD-10-CM

## 2017-04-26 RX ORDER — PREDNISONE 20 MG/1
20 TABLET ORAL 2 TIMES DAILY
Qty: 20 TABLET | Refills: 0 | Status: SHIPPED | OUTPATIENT
Start: 2017-04-26 | End: 2017-05-08

## 2017-04-26 NOTE — TELEPHONE ENCOUNTER
Message from [employer]:  Kiana Sung RN Wed Apr 26, 2017 8:38 AM        ----- Message -----   From: Trisha Bender   Sent: 4/26/2017 8:38 AM   To: Radha Sosa  Subject: Medication Renewal Request     Original authorizing provider: MD Trisha Marvin would like a refill of the following medications:  predniSONE (DELTASONE) 20 MG tablet [Bandar Weinberg MD]    Preferred pharmacy: [facility] - [place], [place] - [address]    Comment:  Dr. Weinberg and Nursing Staff, Although Dr. Weinberg approved the Prednisone prescription yesterday so that I could taper off gradually, the pharmacy has refused to fill it. They say it is too soon. I took too much last time, more than it was written for, in order to control my asthma. I guess you have to change the instructions for dosage to meet their requirement. Could you please contact [facility] and up dosage or get special dispensation? I am at 20mg. now but may run out before Monday.

## 2017-04-27 ENCOUNTER — MYC REFILL (OUTPATIENT)
Dept: INTERNAL MEDICINE | Facility: CLINIC | Age: 62
End: 2017-04-27

## 2017-04-27 DIAGNOSIS — G89.29 CHRONIC BILATERAL LOW BACK PAIN WITHOUT SCIATICA: ICD-10-CM

## 2017-04-27 DIAGNOSIS — M54.50 CHRONIC BILATERAL LOW BACK PAIN WITHOUT SCIATICA: ICD-10-CM

## 2017-04-28 RX ORDER — HYDROCODONE BITARTRATE AND ACETAMINOPHEN 5; 325 MG/1; MG/1
1-2 TABLET ORAL EVERY 6 HOURS PRN
Qty: 100 TABLET | Refills: 0 | Status: SHIPPED | OUTPATIENT
Start: 2017-04-28 | End: 2017-05-15

## 2017-04-28 NOTE — TELEPHONE ENCOUNTER
Message from [employer]:  Original authorizing provider: MD Trisha Marvin would like a refill of the following medications:  HYDROcodone-acetaminophen (NORCO) 5-325 MG per tablet [Bandar Weinberg MD]    Preferred pharmacy: [place], [place] - [facility] [address]    Comment:  Dr. Weinberg and Nursing Staff, I would like to get a refill on the Hydrocodone-Acetaminophen prescription as soon as possible. Thank you. Trisha Bender

## 2017-04-28 NOTE — TELEPHONE ENCOUNTER
Pt req Norco refill.  See mychart message from pt re: amount taken per day.    Please advise, thanks.    Please hand carry rx to [facility].

## 2017-05-08 ENCOUNTER — OFFICE VISIT (OUTPATIENT)
Dept: INTERNAL MEDICINE | Facility: CLINIC | Age: 62
End: 2017-05-08
Payer: COMMERCIAL

## 2017-05-08 VITALS
BODY MASS INDEX: 22.48 KG/M2 | HEART RATE: 108 BPM | OXYGEN SATURATION: 96 % | DIASTOLIC BLOOD PRESSURE: 78 MMHG | TEMPERATURE: 97.6 F | SYSTOLIC BLOOD PRESSURE: 124 MMHG | WEIGHT: 157 LBS | HEIGHT: 70 IN

## 2017-05-08 DIAGNOSIS — N64.4 BREAST PAIN: ICD-10-CM

## 2017-05-08 DIAGNOSIS — I50.22 CHRONIC SYSTOLIC CONGESTIVE HEART FAILURE (H): ICD-10-CM

## 2017-05-08 DIAGNOSIS — Z12.11 COLON CANCER SCREENING: ICD-10-CM

## 2017-05-08 DIAGNOSIS — L20.84 INTRINSIC ECZEMA: ICD-10-CM

## 2017-05-08 DIAGNOSIS — I10 ESSENTIAL HYPERTENSION: ICD-10-CM

## 2017-05-08 DIAGNOSIS — E87.6 HYPOKALEMIA: ICD-10-CM

## 2017-05-08 DIAGNOSIS — J45.40 MODERATE PERSISTENT ASTHMA WITHOUT COMPLICATION: ICD-10-CM

## 2017-05-08 DIAGNOSIS — R06.02 SOB (SHORTNESS OF BREATH): Primary | ICD-10-CM

## 2017-05-08 DIAGNOSIS — Z87.891 HISTORY OF TOBACCO USE: ICD-10-CM

## 2017-05-08 DIAGNOSIS — M54.41 CHRONIC BILATERAL LOW BACK PAIN WITH RIGHT-SIDED SCIATICA: ICD-10-CM

## 2017-05-08 DIAGNOSIS — G89.29 CHRONIC BILATERAL LOW BACK PAIN WITH RIGHT-SIDED SCIATICA: ICD-10-CM

## 2017-05-08 DIAGNOSIS — I50.21 ACUTE SYSTOLIC CONGESTIVE HEART FAILURE (H): ICD-10-CM

## 2017-05-08 PROCEDURE — 93000 ELECTROCARDIOGRAM COMPLETE: CPT | Performed by: INTERNAL MEDICINE

## 2017-05-08 PROCEDURE — G0296 VISIT TO DETERM LDCT ELIG: HCPCS | Performed by: INTERNAL MEDICINE

## 2017-05-08 PROCEDURE — 99215 OFFICE O/P EST HI 40 MIN: CPT | Performed by: INTERNAL MEDICINE

## 2017-05-08 RX ORDER — BACLOFEN 10 MG/1
10 TABLET ORAL 3 TIMES DAILY PRN
Qty: 30 TABLET | Refills: 1 | Status: SHIPPED | OUTPATIENT
Start: 2017-05-08 | End: 2017-08-14

## 2017-05-08 RX ORDER — PREDNISONE 20 MG/1
10 TABLET ORAL DAILY
Qty: 20 TABLET | Refills: 0 | Status: SHIPPED | OUTPATIENT
Start: 2017-05-08 | End: 2017-05-23

## 2017-05-08 RX ORDER — MOMETASONE FUROATE 1 MG/G
OINTMENT TOPICAL
Qty: 45 G | Refills: 0 | Status: SHIPPED | OUTPATIENT
Start: 2017-05-08 | End: 2018-09-20

## 2017-05-08 RX ORDER — FUROSEMIDE 20 MG
40 TABLET ORAL DAILY
Qty: 180 TABLET | Refills: 1 | Status: SHIPPED | OUTPATIENT
Start: 2017-05-08 | End: 2017-10-11

## 2017-05-08 RX ORDER — POTASSIUM CHLORIDE 750 MG/1
10 TABLET, EXTENDED RELEASE ORAL 3 TIMES DAILY
Qty: 90 TABLET | Refills: 5 | Status: SHIPPED | OUTPATIENT
Start: 2017-05-08 | End: 2017-12-11

## 2017-05-08 RX ORDER — ALBUTEROL SULFATE 90 UG/1
2 AEROSOL, METERED RESPIRATORY (INHALATION) EVERY 6 HOURS PRN
Qty: 18 G | Refills: 3 | Status: SHIPPED | OUTPATIENT
Start: 2017-05-08 | End: 2017-06-02

## 2017-05-08 NOTE — PROGRESS NOTES
SUBJECTIVE:                                                    Trisha Bender is a 61 year old female who presents to clinic today for the following health issues:    Complains of SOB. Has h/o asthma. Has had cough, wheezing. Improved with Prednisone. Still has SOB on exertion. Taking her Albuterol daily. No chest pain. No fever.      Has h/o CHF. Legs are not swollen. No orthopnea. Seen cardiology. Advised for continuing her current Lasix at 40 mg daily.     Hyperlipidemia Follow-Up      Rate your low fat/cholesterol diet?: good    Taking statin?  Not taking, Pt stopped due to cramps (for 2 months)    Other lipid medications/supplements?:  none   Has H/O hyperlipidemia. On medical treatment and diet. No side effects. No muscle weakness, myalgias or upset stomach.     Hypertension Follow-up      Outpatient blood pressures are not being checked.    Low Salt Diet: low salt   Has h/o HTN. on medical treatment. BP has been controlled. No side effects from medications. No CP, HA, dizziness. good compliance with medications and low salt diet.    Has chronic LBP. On narcotic analgesic, PRN and Baclofen PRN.   Controlled.  Concern for right breat pain. No lumps.   Has family history of colon cancer. Needs colonoscopy.   Has h/o smoking, quit in 2006. Has had CT for lung cancer screen.       Amount of exercise or physical activity: minimal    Problems taking medications regularly: No    Medication side effects: muscle cramps on statin    Diet: regular and low salt          Problem list and histories reviewed & adjusted, as indicated.  Additional history: none    Patient Active Problem List   Diagnosis     Essential hypertension     Other type of migraine     Esophageal reflux     Disorder of bone and cartilage     Family history of malignant neoplasm of gastrointestinal tract     Chronic airway obstruction (H)     Cystocele     Advanced directives, counseling/discussion     Family history of thyroid cancer - 1/2  brother     Anxiety     Mild persistent asthma without complication     Chronic low back pain     Hair loss     Lateral epicondylitis of right elbow     Dilated cardiomyopathy (H)     Right bundle branch block (RBBB)     CHF (congestive heart failure) (H)     SOB (shortness of breath)     Coronary artery disease involving native coronary artery of native heart without angina pectoris     Mixed hyperlipidemia     Controlled substance agreement signed     Moderate persistent asthma without complication     Hypokalemia     Mechanical low back pain     Past Surgical History:   Procedure Laterality Date     ANGIOGRAM  2015    Minimal CAD. LV dysfunction, Mid RCA 20% stenosis, EF 20-25%, c/w Takotsubo cardiomyopathy     APPENDECTOMY       C NONSPECIFIC PROCEDURE      GB and appendectomy  abstracted [id number]     C NONSPECIFIC PROCEDURE      Nasal septoplasty and mucous retention cyst     CHOLECYSTECTOMY       ESOPHAGOSCOPY, GASTROSCOPY, DUODENOSCOPY (EGD), COMBINED  11/15/2011    Procedure:COMBINED ESOPHAGOSCOPY, GASTROSCOPY, DUODENOSCOPY (EGD);  ESOPHAGOSCOPY, GASTROSCOPY, DUODENOSCOPY (EGD) ; Surgeon:JIM URENA; Location: GI     HC REMOVE TONSILS/ADENOIDS,<11 Y/O       LAPAROSCOPIC ABLATION ENDOMETRIOSIS  1998     wisdom teeth         Social History   Substance Use Topics     Smoking status: Former Smoker     Packs/day: 1.00     Years: 30.00     Quit date: 2006     Smokeless tobacco: Never Used      Comment: Quit in      Alcohol use No     Family History   Problem Relation Age of Onset     CANCER Mother      Colon cancer; dxed at age 64;  at age 69     Cancer - colorectal Mother      Other Cancer Mother      GASTROINTESTINAL DISEASE Sister      Acute pancreatitis     HEART DISEASE Father      ?     Coronary Artery Disease Father      Circulatory Maternal Aunt      AAA     Circulatory Maternal Uncle      AAA     DIABETES Son          Current Outpatient Prescriptions   Medication Sig Dispense  Refill     furosemide (LASIX) 20 MG tablet Take 2 tablets (40 mg) by mouth daily severe swelling 180 tablet 1     potassium chloride (K-TAB,KLOR-CON) 10 MEQ tablet Take 1 tablet (10 mEq) by mouth 3 times daily 90 tablet 5     albuterol (VENTOLIN HFA) 108 (90 BASE) MCG/ACT Inhaler Inhale 2 puffs into the lungs every 6 hours as needed for shortness of breath / dyspnea or wheezing 18 g 3     predniSONE (DELTASONE) 20 MG tablet Take 0.5 tablets (10 mg) by mouth daily Two times daily for 5 days, then once daily for 5 days, then 1/2 daily for 7 days. 20 tablet 0     mometasone (ELOCON) 0.1 % ointment Apply sparingly to affected area twice daily as needed.  Do not apply to face. 45 g 0     baclofen (LIORESAL) 10 MG tablet Take 1 tablet (10 mg) by mouth 3 times daily as needed for muscle spasms 30 tablet 1     HYDROcodone-acetaminophen (NORCO) 5-325 MG per tablet Take 1-2 tablets by mouth every 6 hours as needed for pain Maximum 7 daily 100 tablet 0     ALPRAZolam (XANAX) 0.25 MG tablet Take 1 tablet by mouth. Every twelve hours as needed for anxiety 30 tablet 0     montelukast (SINGULAIR) 10 MG tablet TAKE 1 TABLET AT BEDTIME 90 tablet 1     ondansetron (ZOFRAN ODT) 4 MG ODT tab Take 1-2 tablets (4-8 mg) by mouth every 8 hours as needed for nausea 20 tablet 0     fluticasone (FLONASE) 50 MCG/ACT spray Spray 1-2 sprays into both nostrils daily 1 Bottle 11     fluticasone-salmeterol (ADVAIR) 100-50 MCG/DOSE diskus inhaler Inhale 1 puff into the lungs every morning 3 Inhaler 1     SUMAtriptan (IMITREX) 25 MG tablet Take 1 tablet (25 mg) by mouth at onset of headache for migraine May repeat in 2 hours if needed: max 2/day; 12 tablet 3     albuterol (2.5 MG/3ML) 0.083% neb solution Take 1 vial (2.5 mg) by nebulization every 6 hours as needed for shortness of breath / dyspnea or wheezing 25 vial 0     tiotropium (SPIRIVA HANDIHALER) 18 MCG capsule Inhale 1 capsule (18 mcg) into the lungs daily Inhale contents of one capsule 90  "capsule 0     ipratropium - albuterol 0.5 mg/2.5 mg/3 mL (DUONEB) 0.5-2.5 (3) MG/3ML neb solution Take 1 vial (3 mLs) by nebulization every 6 hours as needed for shortness of breath / dyspnea or wheezing 30 vial 1     lisinopril (PRINIVIL,ZESTRIL) 2.5 MG tablet Take 1 tablet (2.5 mg) by mouth daily 90 tablet 1     pantoprazole (PROTONIX) 40 MG enteric coated tablet Take 1 tablet (40 mg) by mouth daily 90 tablet 3     nitroglycerin (NITROSTAT) 0.4 MG SL tablet Place 1 tablet (0.4 mg) under the tongue every 5 minutes as needed for chest pain If you are still having symptoms after 3 doses (15 minutes) call 911. 25 tablet 0     [employer] FIBER GUMMIES 2.5 G CHEW Take 1 tablet by mouth daily Reported on 5/8/2017       Multiple Vitamin CHEW Take 1 tablet by mouth daily        TYLENOL EXTRA STRENGTH OR 1 TABLET EVERY 4 HOURS AS NEEDED       [DISCONTINUED] VENTOLIN  (90 BASE) MCG/ACT Inhaler INHALE 2 PUFFS BY MOUTH EVERY 4 HOURS AS NEEDED FOR SHORTNESS OF BREATH 18 g 0     [DISCONTINUED] furosemide (LASIX) 20 MG tablet Take 1 tablet (20 mg) by mouth daily as needed severe swelling 30 tablet 2     atorvastatin (LIPITOR) 10 MG tablet Reported on 5/8/2017 30 tablet 11       Reviewed and updated as needed this visit by clinical staff       Reviewed and updated as needed this visit by Provider         ROS:  Constitutional, HEENT, cardiovascular, pulmonary, GI, , musculoskeletal, neuro, skin, endocrine and psych systems are negative, except as otherwise noted.    OBJECTIVE:                                                    /78  Pulse 108  Temp 97.6  F (36.4  C) (Oral)  Ht 5' 10\" (1.778 m)  Wt 157 lb (71.2 kg)  LMP 09/20/2005  SpO2 96%  BMI 22.53 kg/m2  Body mass index is 22.53 kg/(m^2).  GENERAL: frail, alert and no distress  EYES: Eyes grossly normal to inspection, PERRL and conjunctivae and sclerae normal  NECK: no adenopathy, no asymmetry, masses, or scars and thyroid normal to palpation  RESP: lungs clear to " auscultation - no rales, rhonchi or wheezes, diminished breaths sounds diffusely   CV: regular rate and rhythm, normal S1 S2, no S3 or S4, no murmur, click or rub,   ABDOMEN: soft, nontender, no hepatosplenomegaly, no masses and bowel sounds normal  MS: no gross musculoskeletal defects noted, trace LE edema  SKIN: no suspicious lesions or rashes  NEURO: Normal strength and tone, mentation intact and speech normal    Diagnostic Test Results:  none      ASSESSMENT/PLAN:                                                      Problem List Items Addressed This Visit     Essential hypertension    Chronic low back pain    Relevant Medications    predniSONE (DELTASONE) 20 MG tablet    baclofen (LIORESAL) 10 MG tablet    CHF (congestive heart failure) (H)    Relevant Medications    furosemide (LASIX) 20 MG tablet    SOB (shortness of breath) - Primary    Relevant Medications    albuterol (VENTOLIN HFA) 108 (90 BASE) MCG/ACT Inhaler    Other Relevant Orders    EKG 12-lead complete w/read - Clinics (Completed)    Echocardiogram Complete    Moderate persistent asthma without complication    Relevant Medications    albuterol (VENTOLIN HFA) 108 (90 BASE) MCG/ACT Inhaler    predniSONE (DELTASONE) 20 MG tablet    Hypokalemia    Relevant Medications    potassium chloride (K-TAB,KLOR-CON) 10 MEQ tablet      Other Visit Diagnoses     Colon cancer screening        Relevant Orders    GASTROENTEROLOGY ADULT REF PROCEDURE ONLY    Intrinsic eczema        Relevant Medications    predniSONE (DELTASONE) 20 MG tablet    mometasone (ELOCON) 0.1 % ointment    Breast pain        Relevant Orders    MA Diagnostic Digital Bilateral    History of tobacco use        Relevant Orders    Prof fee: Shared Decisionmaking for Lung Cancer Screening (Completed)    CT Chest Lung Cancer Scrn Low Dose wo    Okay for Smoking Cessation Study (PLUTO) to Contact Patient (Completed)           Assess for CHF  To see pulmonary , continue Prednisone 5 mg bid until seen      Follow-Up:with results     Bandar Weinberg MD  [facility]

## 2017-05-08 NOTE — MR AVS SNAPSHOT
After Visit Summary   5/8/2017    Trisha Bender    MRN: [id number]           Patient Information     Date Of Birth          1955        Visit Information        Provider Department      5/8/2017 3:40 PM Bandar Weinberg MD [facility]        Today's Diagnoses     Colon cancer screening    -  1    Acute systolic congestive heart failure (H)        Hypokalemia        Mild persistent asthma without complication        Moderate persistent asthma without complication        Intrinsic eczema        Breast pain        SOB (shortness of breath)        Chronic bilateral low back pain with right-sided sciatica           Follow-ups after your visit        Additional Services     GASTROENTEROLOGY ADULT REF PROCEDURE ONLY       Last Lab Result: Creatinine (mg/dL)       Date                     Value                 04/18/2017               0.76             ----------  Body mass index is 22.53 kg/(m^2).     Needed:  No  Language:  English    Patient will be contacted to schedule procedure.     Please be aware that coverage of these services is subject to the terms and limitations of your health insurance plan.  Call member services at your health plan with any benefit or coverage questions.  Any procedures must be performed at a [facility] facility OR coordinated by your clinic's referral office.    Please bring the following with you to your appointment:    (1) Any X-Rays, CTs or MRIs which have been performed.  Contact the facility where they were done to arrange for  prior to your scheduled appointment.    (2) List of current medications   (3) This referral request   (4) Any documents/labs given to you for this referral                  Future tests that were ordered for you today     Open Future Orders        Priority Expected Expires Ordered    Echocardiogram Complete Routine  5/8/2018 5/8/2017    MA Diagnostic Digital Bilateral Routine  5/8/2018 5/8/2017           "  Who to contact     If you have questions or need follow up information about today's clinic visit or your schedule please contact [facility] directly at [phone].  Normal or non-critical lab and imaging results will be communicated to you by MyChart, letter or phone within 4 business days after the clinic has received the results. If you do not hear from us within 7 days, please contact the clinic through [employer] or phone. If you have a critical or abnormal lab result, we will notify you by phone as soon as possible.  Submit refill requests through [employer] or call your pharmacy and they will forward the refill request to us. Please allow 3 business days for your refill to be completed.          Additional Information About Your Visit        [employer] Information     [employer] gives you secure access to your electronic health record. If you see a primary care provider, you can also send messages to your care team and make appointments. If you have questions, please call your primary care clinic.  If you do not have a primary care provider, please call [phone] and they will assist you.        Care EveryWhere ID     This is your Care EveryWhere ID. This could be used by other organizations to access your [facility] medical records  [id number]        Your Vitals Were     Pulse Temperature Height Last Period Pulse Oximetry BMI (Body Mass Index)    108 97.6  F (36.4  C) (Oral) 5' 10\" (1.778 m) 09/20/2005 96% 22.53 kg/m2       Blood Pressure from Last 3 Encounters:   05/08/17 124/78   04/18/17 114/80   04/03/17 106/62    Weight from Last 3 Encounters:   05/08/17 157 lb (71.2 kg)   04/18/17 155 lb 12.8 oz (70.7 kg)   04/03/17 162 lb 9.6 oz (73.8 kg)              We Performed the Following     EKG 12-lead complete w/read - Clinics     GASTROENTEROLOGY ADULT REF PROCEDURE ONLY          Today's Medication Changes          These changes are accurate as of: 5/8/17  4:28 PM.  If you have any questions, ask " your nurse or doctor.               Start taking these medicines.        Dose/Directions    mometasone 0.1 % ointment   Commonly known as:  ELOCON   Used for:  Intrinsic eczema   Started by:  Bandar Weinberg MD        Apply sparingly to affected area twice daily as needed.  Do not apply to face.   Quantity:  45 g   Refills:  0         These medicines have changed or have updated prescriptions.        Dose/Directions    * albuterol (2.5 MG/3ML) 0.083% neb solution   This may have changed:  Another medication with the same name was changed. Make sure you understand how and when to take each.   Used for:  SOB (shortness of breath)   Changed by:  Bandar Weinberg MD        Dose:  1 vial   Take 1 vial (2.5 mg) by nebulization every 6 hours as needed for shortness of breath / dyspnea or wheezing   Quantity:  25 vial   Refills:  0       * albuterol 108 (90 BASE) MCG/ACT Inhaler   Commonly known as:  VENTOLIN HFA   This may have changed:  See the new instructions.   Used for:  Mild persistent asthma without complication   Changed by:  Bandar Weinberg MD        Dose:  2 puff   Inhale 2 puffs into the lungs every 6 hours as needed for shortness of breath / dyspnea or wheezing   Quantity:  18 g   Refills:  3       baclofen 10 MG tablet   Commonly known as:  LIORESAL   This may have changed:    - when to take this  - reasons to take this   Used for:  Chronic bilateral low back pain with right-sided sciatica   Changed by:  Bandar Weinberg MD        Dose:  10 mg   Take 1 tablet (10 mg) by mouth 3 times daily as needed for muscle spasms   Quantity:  30 tablet   Refills:  1       furosemide 20 MG tablet   Commonly known as:  LASIX   This may have changed:    - how much to take  - when to take this  - reasons to take this   Used for:  Acute systolic congestive heart failure (H)   Changed by:  Bandar Weinberg MD        Dose:  40 mg   Take 2 tablets (40 mg) by mouth daily severe swelling   Quantity:  180 tablet    Refills:  1       predniSONE 20 MG tablet   Commonly known as:  DELTASONE   This may have changed:    - how much to take  - when to take this   Used for:  Moderate persistent asthma without complication, Mild persistent asthma without complication   Changed by:  Bandar Weinberg MD        Dose:  10 mg   Take 0.5 tablets (10 mg) by mouth daily Two times daily for 5 days, then once daily for 5 days, then 1/2 daily for 7 days.   Quantity:  20 tablet   Refills:  0       * Notice:  This list has 2 medication(s) that are the same as other medications prescribed for you. Read the directions carefully, and ask your doctor or other care provider to review them with you.         Where to get your medicines      These medications were sent to [facility] [place], [place] - [facility].  [facility] [address] [facility] [address]     Phone:  [phone]     albuterol 108 (90 BASE) MCG/ACT Inhaler    baclofen 10 MG tablet    furosemide 20 MG tablet    mometasone 0.1 % ointment    potassium chloride 10 MEQ tablet    predniSONE 20 MG tablet                Primary Care Provider Office Phone # Fax #    Bandar Weinberg [phone]       [facility] [address]  [place] [place] [address]        Thank you!     Thank you for choosing [facility]  for your care. Our goal is always to provide you with excellent care. Hearing back from our patients is one way we can continue to improve our services. Please take a few minutes to complete the written survey that you may receive in the mail after your visit with us. Thank you!             Your Updated Medication List - Protect others around you: Learn how to safely use, store and throw away your medicines at www.disposemymeds.org.          This list is accurate as of: 5/8/17  4:28 PM.  Always use your most recent med list.                   Brand Name Dispense Instructions for use    * albuterol (2.5 MG/3ML)  0.083% neb solution     25 vial    Take 1 vial (2.5 mg) by nebulization every 6 hours as needed for shortness of breath / dyspnea or wheezing       * albuterol 108 (90 BASE) MCG/ACT Inhaler    VENTOLIN HFA    18 g    Inhale 2 puffs into the lungs every 6 hours as needed for shortness of breath / dyspnea or wheezing       ALPRAZolam 0.25 MG tablet    XANAX    30 tablet    Take 1 tablet by mouth. Every twelve hours as needed for anxiety       atorvastatin 10 MG tablet    LIPITOR    30 tablet    Reported on 5/8/2017       baclofen 10 MG tablet    LIORESAL    30 tablet    Take 1 tablet (10 mg) by mouth 3 times daily as needed for muscle spasms       [employer] F[employer]ER GUMMIES 2.5 G Chew      Take 1 tablet by mouth daily Reported on 5/8/2017       fluticasone 50 MCG/ACT spray    FLONASE    1 Bottle    Spray 1-2 sprays into both nostrils daily       fluticasone-salmeterol 100-50 MCG/DOSE diskus inhaler    ADVAIR    3 Inhaler    Inhale 1 puff into the lungs every morning       furosemide 20 MG tablet    LASIX    180 tablet    Take 2 tablets (40 mg) by mouth daily severe swelling       HYDROcodone-acetaminophen 5-325 MG per tablet    NORCO    100 tablet    Take 1-2 tablets by mouth every 6 hours as needed for pain Maximum 7 daily       ipratropium - albuterol 0.5 mg/2.5 mg/3 mL 0.5-2.5 (3) MG/3ML neb solution    DUONEB    30 vial    Take 1 vial (3 mLs) by nebulization every 6 hours as needed for shortness of breath / dyspnea or wheezing       lisinopril 2.5 MG tablet    PRINIVIL/Zestril    90 tablet    Take 1 tablet (2.5 mg) by mouth daily       mometasone 0.1 % ointment    ELOCON    45 g    Apply sparingly to affected area twice daily as needed.  Do not apply to face.       montelukast 10 MG tablet    SINGULAIR    90 tablet    TAKE 1 TABLET AT BEDTIME       Multiple Vitamin Chew      Take 1 tablet by mouth daily       nitroglycerin 0.4 MG sublingual tablet    NITROSTAT    25 tablet    Place 1 tablet (0.4 mg) under the tongue every  5 minutes as needed for chest pain If you are still having symptoms after 3 doses (15 minutes) call 911.       ondansetron 4 MG ODT tab    ZOFRAN ODT    20 tablet    Take 1-2 tablets (4-8 mg) by mouth every 8 hours as needed for nausea       pantoprazole 40 MG EC tablet    PROTONIX    90 tablet    Take 1 tablet (40 mg) by mouth daily       potassium chloride 10 MEQ tablet    K-TAB,KLOR-CON    90 tablet    Take 1 tablet (10 mEq) by mouth 3 times daily       predniSONE 20 MG tablet    DELTASONE    20 tablet    Take 0.5 tablets (10 mg) by mouth daily Two times daily for 5 days, then once daily for 5 days, then 1/2 daily for 7 days.       SUMAtriptan 25 MG tablet    IMITREX    12 tablet    Take 1 tablet (25 mg) by mouth at onset of headache for migraine May repeat in 2 hours if needed: max 2/day;       tiotropium 18 MCG capsule    SPIRIVA HANDIHALER    90 capsule    Inhale 1 capsule (18 mcg) into the lungs daily Inhale contents of one capsule       TYLENOL EXTRA STRENGTH PO      1 TABLET EVERY 4 HOURS AS NEEDED       * Notice:  This list has 2 medication(s) that are the same as other medications prescribed for you. Read the directions carefully, and ask your doctor or other care provider to review them with you.

## 2017-05-08 NOTE — PROGRESS NOTES
Lung Cancer Screening Shared Decision Making Visit     Trisha Bender is eligible for lung cancer screening on the basis of the information provided in my signed lung cancer screening order.     I have discussed with patient the risks and benefits of screening for lung cancer with low-dose CT.     The risks include:   radiation exposure    false positives     over-diagnosis    The benefit of early detection of lung cancer is contingent upon adherence to annual screening or more frequent follow up if indicated.     Furthermore, reaping the benefits of screening requires Trisha Bender to be willing and physically able to undergo diagnostic procedures, if indicated. Although no specific guide is available for determining severity of comorbidities, it is reasonable to withhold screening in patients who have greater mortality risk from other diseases.     We did discuss that the only way to prevent lung cancer is to not smoke. Smoking cessation assistance was offered.    I did offer risk estimation using a calculator such as this one:    ShouldIScreen

## 2017-05-08 NOTE — NURSING NOTE
"Chief Complaint   Patient presents with     Recheck Medication     F/U on asthma, meds       Initial /78  Pulse 108  Temp 97.6  F (36.4  C) (Oral)  Ht 5' 10\" (1.778 m)  Wt 157 lb (71.2 kg)  LMP 09/20/2005  SpO2 96%  BMI 22.53 kg/m2 Estimated body mass index is 22.53 kg/(m^2) as calculated from the following:    Height as of this encounter: 5' 10\" (1.778 m).    Weight as of this encounter: 157 lb (71.2 kg).  Medication Reconciliation: complete   Madhavi Garcia MA      "

## 2017-05-08 NOTE — PATIENT INSTRUCTIONS

## 2017-05-12 DIAGNOSIS — J45.40 MODERATE PERSISTENT ASTHMA WITHOUT COMPLICATION: ICD-10-CM

## 2017-05-13 RX ORDER — TIOTROPIUM BROMIDE 18 UG/1
CAPSULE ORAL; RESPIRATORY (INHALATION)
Qty: 90 CAPSULE | Refills: 1 | Status: SHIPPED | OUTPATIENT
Start: 2017-05-13 | End: 2017-11-06

## 2017-05-15 ENCOUNTER — MYC REFILL (OUTPATIENT)
Dept: INTERNAL MEDICINE | Facility: CLINIC | Age: 62
End: 2017-05-15

## 2017-05-15 DIAGNOSIS — G89.29 CHRONIC BILATERAL LOW BACK PAIN WITHOUT SCIATICA: ICD-10-CM

## 2017-05-15 DIAGNOSIS — M54.50 CHRONIC BILATERAL LOW BACK PAIN WITHOUT SCIATICA: ICD-10-CM

## 2017-05-15 RX ORDER — HYDROCODONE BITARTRATE AND ACETAMINOPHEN 5; 325 MG/1; MG/1
1-2 TABLET ORAL EVERY 6 HOURS PRN
Qty: 100 TABLET | Refills: 0 | Status: SHIPPED | OUTPATIENT
Start: 2017-05-15 | End: 2017-05-26

## 2017-05-15 NOTE — TELEPHONE ENCOUNTER
Message from [employer]:  Original authorizing provider: MD Trisha Marvin would like a refill of the following medications:  HYDROcodone-acetaminophen (NORCO) 5-325 MG per tablet [Bandar Weinberg MD]    Preferred pharmacy: [place], [place] - [facility] [address]    Comment:  Dr. Weinberg and Nursing Staff, I would like to get a refill on the Hydrocodone-acetaminophen prescription as soon as possible. Thank you. Trisha Bender

## 2017-05-16 ENCOUNTER — TRANSFERRED RECORDS (OUTPATIENT)
Dept: HEALTH INFORMATION MANAGEMENT | Facility: CLINIC | Age: 62
End: 2017-05-16

## 2017-05-17 ENCOUNTER — MYC MEDICAL ADVICE (OUTPATIENT)
Dept: INTERNAL MEDICINE | Facility: CLINIC | Age: 62
End: 2017-05-17

## 2017-05-17 NOTE — TELEPHONE ENCOUNTER
See mychart message.  See my response to [employer] message.    Please advice  Pharmacy listed.  Hakan MARKS RN

## 2017-05-18 ENCOUNTER — HOSPITAL ENCOUNTER (OUTPATIENT)
Dept: MAMMOGRAPHY | Facility: CLINIC | Age: 62
Discharge: HOME | End: 2017-05-18
Attending: INTERNAL MEDICINE | Admitting: INTERNAL MEDICINE
Payer: COMMERCIAL

## 2017-05-18 ENCOUNTER — HOSPITAL ENCOUNTER (OUTPATIENT)
Dept: ULTRASOUND IMAGING | Facility: CLINIC | Age: 62
End: 2017-05-18
Attending: INTERNAL MEDICINE
Payer: COMMERCIAL

## 2017-05-18 DIAGNOSIS — N64.4 BREAST PAIN: ICD-10-CM

## 2017-05-18 PROCEDURE — 76642 ULTRASOUND BREAST LIMITED: CPT | Mod: RT

## 2017-05-18 PROCEDURE — G0204 DX MAMMO INCL CAD BI: HCPCS

## 2017-05-19 ENCOUNTER — HOSPITAL ENCOUNTER (OUTPATIENT)
Dept: CT IMAGING | Facility: CLINIC | Age: 62
Discharge: HOME | End: 2017-05-19
Attending: INTERNAL MEDICINE | Admitting: INTERNAL MEDICINE
Payer: COMMERCIAL

## 2017-05-19 DIAGNOSIS — Z87.891 HISTORY OF TOBACCO USE: ICD-10-CM

## 2017-05-19 PROCEDURE — G0297 LDCT FOR LUNG CA SCREEN: HCPCS

## 2017-05-22 ENCOUNTER — MYC MEDICAL ADVICE (OUTPATIENT)
Dept: INTERNAL MEDICINE | Facility: CLINIC | Age: 62
End: 2017-05-22

## 2017-05-23 ENCOUNTER — OFFICE VISIT (OUTPATIENT)
Dept: INTERNAL MEDICINE | Facility: CLINIC | Age: 62
End: 2017-05-23
Payer: COMMERCIAL

## 2017-05-23 VITALS
TEMPERATURE: 97.7 F | OXYGEN SATURATION: 95 % | DIASTOLIC BLOOD PRESSURE: 80 MMHG | BODY MASS INDEX: 22.9 KG/M2 | WEIGHT: 160 LBS | SYSTOLIC BLOOD PRESSURE: 126 MMHG | HEIGHT: 70 IN | HEART RATE: 100 BPM

## 2017-05-23 DIAGNOSIS — J45.40 MODERATE PERSISTENT ASTHMA WITHOUT COMPLICATION: ICD-10-CM

## 2017-05-23 DIAGNOSIS — I42.0 DILATED CARDIOMYOPATHY (H): ICD-10-CM

## 2017-05-23 DIAGNOSIS — J43.9 PULMONARY EMPHYSEMA, UNSPECIFIED EMPHYSEMA TYPE (H): ICD-10-CM

## 2017-05-23 DIAGNOSIS — J45.901 ASTHMA EXACERBATION: Primary | ICD-10-CM

## 2017-05-23 DIAGNOSIS — G89.29 CHRONIC BILATERAL LOW BACK PAIN, WITH SCIATICA PRESENCE UNSPECIFIED: ICD-10-CM

## 2017-05-23 DIAGNOSIS — M54.5 CHRONIC BILATERAL LOW BACK PAIN, WITH SCIATICA PRESENCE UNSPECIFIED: ICD-10-CM

## 2017-05-23 DIAGNOSIS — R06.02 SOB (SHORTNESS OF BREATH): ICD-10-CM

## 2017-05-23 PROCEDURE — 99214 OFFICE O/P EST MOD 30 MIN: CPT | Performed by: INTERNAL MEDICINE

## 2017-05-23 RX ORDER — DOXYCYCLINE 100 MG/1
100 CAPSULE ORAL 2 TIMES DAILY
Qty: 20 CAPSULE | Refills: 0 | Status: SHIPPED | OUTPATIENT
Start: 2017-05-23 | End: 2017-06-02

## 2017-05-23 RX ORDER — CODEINE PHOSPHATE AND GUAIFENESIN 10; 100 MG/5ML; MG/5ML
1 SOLUTION ORAL EVERY 4 HOURS PRN
Qty: 120 ML | Refills: 0 | Status: SHIPPED | OUTPATIENT
Start: 2017-05-23 | End: 2017-05-26

## 2017-05-23 RX ORDER — IPRATROPIUM BROMIDE AND ALBUTEROL SULFATE 2.5; .5 MG/3ML; MG/3ML
1 SOLUTION RESPIRATORY (INHALATION) EVERY 6 HOURS PRN
Qty: 30 VIAL | Refills: 1 | Status: SHIPPED | OUTPATIENT
Start: 2017-05-23 | End: 2023-01-01

## 2017-05-23 RX ORDER — LISINOPRIL 2.5 MG/1
2.5 TABLET ORAL DAILY
Qty: 90 TABLET | Refills: 1 | Status: SHIPPED | OUTPATIENT
Start: 2017-05-23 | End: 2017-11-06

## 2017-05-23 RX ORDER — PREDNISONE 20 MG/1
10 TABLET ORAL DAILY
Qty: 15 TABLET | Refills: 0 | Status: SHIPPED | OUTPATIENT
Start: 2017-05-23 | End: 2017-05-26

## 2017-05-23 NOTE — NURSING NOTE
"Chief Complaint   Patient presents with     URI     Cough, congestion and SOB       Initial /80  Pulse 100  Temp 97.7  F (36.5  C) (Oral)  Ht 5' 10\" (1.778 m)  Wt 160 lb (72.6 kg)  LMP 09/20/2005  SpO2 95%  BMI 22.96 kg/m2 Estimated body mass index is 22.96 kg/(m^2) as calculated from the following:    Height as of this encounter: 5' 10\" (1.778 m).    Weight as of this encounter: 160 lb (72.6 kg).  Medication Reconciliation: complete   Madhavi Garcia MA      "

## 2017-05-23 NOTE — MR AVS SNAPSHOT
After Visit Summary   5/23/2017    Trisha Bender    MRN: [id number]           Patient Information     Date Of Birth          1955        Visit Information        Provider Department      5/23/2017 4:20 PM Bandar Weinberg MD [facility]        Today's Diagnoses     Asthma exacerbation    -  1    Dilated cardiomyopathy (H)        Moderate persistent asthma without complication        Chronic bilateral low back pain, with sciatica presence unspecified        SOB (shortness of breath)        Pulmonary emphysema, unspecified emphysema type (H)           Follow-ups after your visit        Your next 10 appointments already scheduled     May 24, 2017  3:00 PM CDT   Ech Complete with [id number]   [facility]    [address]  [place] [id number] [phone]           1.  Please bring or wear a comfortable two-piece outfit. 2.  You may eat, drink and take your normal medicines. 3.  For any questions that cannot be answered, please contact the ordering physician ***Please check-in at the [place] [facility] located in [address] in the [facility] building. When you are finished registering, please go to [address] and have a seat. The technician will call your name for the test.            May 30, 2017   Procedure with Steve Acosta MD   [facility] ([facility])    [address] [facility] [address]   [phone]           [facility] is located at [address] [facility]. [place]            Jun 02, 2017  2:40 PM CDT   MyChart Long with Bandar Weinberg MD   [facility] ([facility])    [address] [place]  [place] [address]   [phone]            Jun 08, 2017  4:30 PM CDT   Pulmonary Eval with [facility] 2   [facility] ([facility])    [address]  [address]  [facility] [id number] [phone]              Who to contact     If you have questions or need follow up information about today's clinic visit or your schedule please contact [facility] directly at [phone].  Normal or non-critical lab and imaging results will be communicated to you by MyChart, letter or phone within 4 business days after the clinic has received the results. If you do not hear from us within 7 days, please contact the clinic through [facility] or phone. If you have a critical or abnormal lab result, we will notify you by phone as soon as possible.  Submit refill requests through [employer] or call your pharmacy and they will forward the refill request to us. Please allow 3 business days for your refill to be completed.          Additional Information About Your Visit        [facility] Information     [employer] gives you secure access to your electronic health record. If you see a primary care provider, you can also send messages to your care team and make appointments. If you have questions, please call your primary care clinic.  If you do not have a primary care provider, please call [phone] and they will assist you.        Care EveryWhere ID     This is your Care EveryWhere ID. This could be used by other organizations to access your [place] medical records  [id number]        Your Vitals Were     Pulse Temperature Height Last Period Pulse Oximetry BMI (Body Mass Index)    100 97.7  F (36.5  C) (Oral) 5' 10\" (1.778 m) 09/20/2005 95% 22.96 kg/m2       Blood Pressure from Last 3 Encounters:   05/23/17 126/80   05/08/17 124/78   04/18/17 114/80    Weight from Last 3 Encounters:   05/23/17 160 lb (72.6 kg)   05/08/17 157 lb (71.2 kg)   04/18/17 155 lb 12.8 oz (70.7 kg)              Today, you had the following     No orders found for display         Today's Medication Changes          These changes are accurate as of: 5/23/17 11:59 PM.  If you have " any questions, ask your nurse or doctor.               Start taking these medicines.        Dose/Directions    doxycycline 100 MG capsule   Commonly known as:  VIBRAMYCIN   Used for:  Asthma exacerbation   Started by:  Bandar Weinberg MD        Dose:  100 mg   Take 1 capsule (100 mg) by mouth 2 times daily   Quantity:  20 capsule   Refills:  0       guaiFENesin-codeine 100-10 MG/5ML Soln solution   Commonly known as:  ROBITUSSIN AC   Used for:  Asthma exacerbation   Started by:  Bandar Weinberg MD        Dose:  1 tsp.   Take 5 mLs by mouth every 4 hours as needed for cough   Quantity:  120 mL   Refills:  0         These medicines have changed or have updated prescriptions.        Dose/Directions    fluticasone-salmeterol 250-50 MCG/DOSE diskus inhaler   Commonly known as:  ADVAIR   This may have changed:  Another medication with the same name was removed. Continue taking this medication, and follow the directions you see here.   Used for:  Moderate persistent asthma without complication   Changed by:  Bandar Weinberg MD        Dose:  1 puff   Inhale 1 puff into the lungs 2 times daily   Quantity:  1 Inhaler   Refills:  1       predniSONE 20 MG tablet   Commonly known as:  DELTASONE   This may have changed:  additional instructions   Used for:  Moderate persistent asthma without complication   Changed by:  Bandar Weinberg MD        Dose:  10 mg   Take 0.5 tablets (10 mg) by mouth daily Take 20 mg am and 10 mg pm for 5 days, then 20 mg in am for 5 days, then 10 mg in am for 5 days, and stop.   Quantity:  15 tablet   Refills:  0            Where to get your medicines      These medications were sent to [facility] [place], [place] - [address]  [address] [facility] [address]     Phone:  [phone]     doxycycline 100 MG capsule    ipratropium - albuterol 0.5 mg/2.5 mg/3 mL 0.5-2.5 (3) MG/3ML neb solution    lisinopril 2.5 MG tablet         Some of these will need  a paper prescription and others can be bought over the counter.  Ask your nurse if you have questions.     Bring a paper prescription for each of these medications     guaiFENesin-codeine 100-10 MG/5ML Soln solution    predniSONE 20 MG tablet                Primary Care Provider Office Phone # Fax #    Bandar Weinberg [phone]       [facility] [address] [facility] [address]        Thank you!     Thank you for choosing [facility]  for your care. Our goal is always to provide you with excellent care. Hearing back from our patients is one way we can continue to improve our services. Please take a few minutes to complete the written survey that you may receive in the mail after your visit with us. Thank you!             Your Updated Medication List - Protect others around you: Learn how to safely use, store and throw away your medicines at www.disposemymeds.org.          This list is accurate as of: 5/23/17 11:59 PM.  Always use your most recent med list.                   Brand Name Dispense Instructions for use    * albuterol (2.5 MG/3ML) 0.083% neb solution     25 vial    Take 1 vial (2.5 mg) by nebulization every 6 hours as needed for shortness of breath / dyspnea or wheezing       * albuterol 108 (90 BASE) MCG/ACT Inhaler    VENTOLIN HFA    18 g    Inhale 2 puffs into the lungs every 6 hours as needed for shortness of breath / dyspnea or wheezing       ALPRAZolam 0.25 MG tablet    XANAX    30 tablet    Take 1 tablet by mouth. Every twelve hours as needed for anxiety       atorvastatin 10 MG tablet    LIPITOR    30 tablet    Reported on 5/8/2017       baclofen 10 MG tablet    LIORESAL    30 tablet    Take 1 tablet (10 mg) by mouth 3 times daily as needed for muscle spasms       [employer] F[employer] GUMMIES 2.5 G Chew      Take 1 tablet by mouth daily Reported on 5/8/2017       doxycycline 100 MG capsule    VIBRAMYCIN    20 capsule    Take 1 capsule (100 mg) by mouth  2 times daily       fluticasone 50 MCG/ACT spray    FLONASE    1 Bottle    Spray 1-2 sprays into both nostrils daily       fluticasone-salmeterol 250-50 MCG/DOSE diskus inhaler    ADVAIR    1 Inhaler    Inhale 1 puff into the lungs 2 times daily       furosemide 20 MG tablet    LASIX    180 tablet    Take 2 tablets (40 mg) by mouth daily severe swelling       guaiFENesin-codeine 100-10 MG/5ML Soln solution    ROBITUSSIN AC    120 mL    Take 5 mLs by mouth every 4 hours as needed for cough       HYDROcodone-acetaminophen 5-325 MG per tablet    NORCO    100 tablet    Take 1-2 tablets by mouth every 6 hours as needed for pain Maximum 7 daily       ipratropium - albuterol 0.5 mg/2.5 mg/3 mL 0.5-2.5 (3) MG/3ML neb solution    DUONEB    30 vial    Take 1 vial (3 mLs) by nebulization every 6 hours as needed for shortness of breath / dyspnea or wheezing       lisinopril 2.5 MG tablet    PRINIVIL/Zestril    90 tablet    Take 1 tablet (2.5 mg) by mouth daily       mometasone 0.1 % ointment    ELOCON    45 g    Apply sparingly to affected area twice daily as needed.  Do not apply to face.       montelukast 10 MG tablet    SINGULAIR    90 tablet    TAKE 1 TABLET AT BEDTIME       Multiple Vitamin Chew      Take 1 tablet by mouth daily       nitroglycerin 0.4 MG sublingual tablet    NITROSTAT    25 tablet    Place 1 tablet (0.4 mg) under the tongue every 5 minutes as needed for chest pain If you are still having symptoms after 3 doses (15 minutes) call 911.       ondansetron 4 MG ODT tab    ZOFRAN ODT    20 tablet    Take 1-2 tablets (4-8 mg) by mouth every 8 hours as needed for nausea       pantoprazole 40 MG EC tablet    PROTONIX    90 tablet    Take 1 tablet (40 mg) by mouth daily       potassium chloride 10 MEQ tablet    K-TAB,KLOR-CON    90 tablet    Take 1 tablet (10 mEq) by mouth 3 times daily       predniSONE 20 MG tablet    DELTASONE    15 tablet    Take 0.5 tablets (10 mg) by mouth daily Take 20 mg am and 10 mg pm for 5  days, then 20 mg in am for 5 days, then 10 mg in am for 5 days, and stop.       SPIRIVA HANDIHALER 18 MCG capsule   Generic drug:  tiotropium     90 capsule    USING THE HANDIHALER, INHALE THE CONTENTS OF ONE CAPSULE BY MOUTH DAILY       SUMAtriptan 25 MG tablet    IMITREX    12 tablet    Take 1 tablet (25 mg) by mouth at onset of headache for migraine May repeat in 2 hours if needed: max 2/day;       TYLENOL EXTRA STRENGTH PO      1 TABLET EVERY 4 HOURS AS NEEDED       * Notice:  This list has 2 medication(s) that are the same as other medications prescribed for you. Read the directions carefully, and ask your doctor or other care provider to review them with you.

## 2017-05-23 NOTE — PROGRESS NOTES
SUBJECTIVE:                                                    Trisha Bender is a 61 year old female who presents to clinic today for the following health issues:    SOB, cough and congestion:  Patient is seen for a follow up visit.  Has had increased cough, wheezing, SOB, phlegm . For 2 weeks. Has h/o asthma. Seen pulmonary. Started on higher dose Advair - 250/50.   Has stopped Prednisone, but now recurrent symptoms.   Has h/o cardiomyopathy. Has noted mild legs edema. Pending ECHO tomorrow. On low dose Lisinopril.   Has chronic pain, on narcotic analgesics, currently is controlled.       Problem list and histories reviewed & adjusted, as indicated.  Additional history: as documented    Patient Active Problem List   Diagnosis     Essential hypertension     Other type of migraine     Esophageal reflux     Disorder of bone and cartilage     Family history of malignant neoplasm of gastrointestinal tract     Chronic airway obstruction (H)     Cystocele     Advanced directives, counseling/discussion     Family history of thyroid cancer - 1/2 brother     Anxiety     Mild persistent asthma without complication     Chronic low back pain     Hair loss     Lateral epicondylitis of right elbow     Dilated cardiomyopathy (H)     Right bundle branch block (RBBB)     CHF (congestive heart failure) (H)     SOB (shortness of breath)     Coronary artery disease involving native coronary artery of native heart without angina pectoris     Mixed hyperlipidemia     Controlled substance agreement signed     Moderate persistent asthma without complication     Hypokalemia     Mechanical low back pain     Past Surgical History:   Procedure Laterality Date     ANGIOGRAM  07/02/2015    Minimal CAD. LV dysfunction, Mid RCA 20% stenosis, EF 20-25%, c/w Takotsubo cardiomyopathy     APPENDECTOMY       C NONSPECIFIC PROCEDURE  1974    GB and appendectomy  abstracted [id number]     C NONSPECIFIC PROCEDURE      Nasal septoplasty and mucous  retention cyst     CHOLECYSTECTOMY       ESOPHAGOSCOPY, GASTROSCOPY, DUODENOSCOPY (EGD), COMBINED  11/15/2011    Procedure:COMBINED ESOPHAGOSCOPY, GASTROSCOPY, DUODENOSCOPY (EGD);  ESOPHAGOSCOPY, GASTROSCOPY, DUODENOSCOPY (EGD) ; Surgeon:JIM URENA; Location:[facility]     HC REMOVE TONSILS/ADENOIDS,<11 Y/O       LAPAROSCOPIC ABLATION ENDOMETRIOSIS       wisdom teeth         Social History   Substance Use Topics     Smoking status: Former Smoker     Packs/day: 1.00     Years: 30.00     Quit date: 2006     Smokeless tobacco: Never Used      Comment: Quit in      Alcohol use No     Family History   Problem Relation Age of Onset     CANCER Mother      Colon cancer; dxed at age 64;  at age 69     Cancer - colorectal Mother      Other Cancer Mother      GASTROINTESTINAL DISEASE Sister      Acute pancreatitis     HEART DISEASE Father      ?     Coronary Artery Disease Father      Circulatory Maternal Aunt      AAA     Circulatory Maternal Uncle      AAA     DIABETES Son          Current Outpatient Prescriptions   Medication Sig Dispense Refill     fluticasone-salmeterol (ADVAIR) 250-50 MCG/DOSE diskus inhaler Inhale 1 puff into the lungs 2 times daily 1 Inhaler 1     lisinopril (PRINIVIL/ZESTRIL) 2.5 MG tablet Take 1 tablet (2.5 mg) by mouth daily 90 tablet 1     predniSONE (DELTASONE) 20 MG tablet Take 0.5 tablets (10 mg) by mouth daily Take 20 mg am and 10 mg pm for 5 days, then 20 mg in am for 5 days, then 10 mg in am for 5 days, and stop. 15 tablet 0     ipratropium - albuterol 0.5 mg/2.5 mg/3 mL (DUONEB) 0.5-2.5 (3) MG/3ML neb solution Take 1 vial (3 mLs) by nebulization every 6 hours as needed for shortness of breath / dyspnea or wheezing 30 vial 1     guaiFENesin-codeine (ROBITUSSIN AC) 100-10 MG/5ML SOLN solution Take 5 mLs by mouth every 4 hours as needed for cough 120 mL 0     doxycycline (VIBRAMYCIN) 100 MG capsule Take 1 capsule (100 mg) by mouth 2 times daily 20 capsule 0      HYDROcodone-acetaminophen (NORCO) 5-325 MG per tablet Take 1-2 tablets by mouth every 6 hours as needed for pain Maximum 7 daily 100 tablet 0     SPIRIVA HANDIHALER 18 MCG capsule USING THE HANDIHALER, INHALE THE CONTENTS OF ONE CAPSULE BY MOUTH DAILY 90 capsule 1     furosemide (LASIX) 20 MG tablet Take 2 tablets (40 mg) by mouth daily severe swelling 180 tablet 1     potassium chloride (K-TAB,KLOR-CON) 10 MEQ tablet Take 1 tablet (10 mEq) by mouth 3 times daily 90 tablet 5     albuterol (VENTOLIN HFA) 108 (90 BASE) MCG/ACT Inhaler Inhale 2 puffs into the lungs every 6 hours as needed for shortness of breath / dyspnea or wheezing 18 g 3     mometasone (ELOCON) 0.1 % ointment Apply sparingly to affected area twice daily as needed.  Do not apply to face. 45 g 0     baclofen (LIORESAL) 10 MG tablet Take 1 tablet (10 mg) by mouth 3 times daily as needed for muscle spasms 30 tablet 1     ALPRAZolam (XANAX) 0.25 MG tablet Take 1 tablet by mouth. Every twelve hours as needed for anxiety 30 tablet 0     montelukast (SINGULAIR) 10 MG tablet TAKE 1 TABLET AT BEDTIME 90 tablet 1     ondansetron (ZOFRAN ODT) 4 MG ODT tab Take 1-2 tablets (4-8 mg) by mouth every 8 hours as needed for nausea 20 tablet 0     fluticasone (FLONASE) 50 MCG/ACT spray Spray 1-2 sprays into both nostrils daily 1 Bottle 11     SUMAtriptan (IMITREX) 25 MG tablet Take 1 tablet (25 mg) by mouth at onset of headache for migraine May repeat in 2 hours if needed: max 2/day; 12 tablet 3     albuterol (2.5 MG/3ML) 0.083% neb solution Take 1 vial (2.5 mg) by nebulization every 6 hours as needed for shortness of breath / dyspnea or wheezing 25 vial 0     pantoprazole (PROTONIX) 40 MG enteric coated tablet Take 1 tablet (40 mg) by mouth daily 90 tablet 3     atorvastatin (LIPITOR) 10 MG tablet Reported on 5/8/2017 30 tablet 11     nitroglycerin (NITROSTAT) 0.4 MG SL tablet Place 1 tablet (0.4 mg) under the tongue every 5 minutes as needed for chest pain If you are  "still having symptoms after 3 doses (15 minutes) call 911. 25 tablet 0     [employer] F[employer] GUMMIES 2.5 G CHEW Take 1 tablet by mouth daily Reported on 5/8/2017       Multiple Vitamin CHEW Take 1 tablet by mouth daily        TYLENOL EXTRA STRENGTH OR 1 TABLET EVERY 4 HOURS AS NEEDED       [DISCONTINUED] fluticasone-salmeterol (ADVAIR) 100-50 MCG/DOSE diskus inhaler Inhale 1 puff into the lungs every morning 3 Inhaler 1       Reviewed and updated as needed this visit by clinical staff       Reviewed and updated as needed this visit by Provider         ROS:  Constitutional, HEENT, cardiovascular, pulmonary, GI, , musculoskeletal, neuro, skin, endocrine and psych systems are negative, except as otherwise noted.    OBJECTIVE:                                                    /80  Pulse 100  Temp 97.7  F (36.5  C) (Oral)  Ht 5' 10\" (1.778 m)  Wt 160 lb (72.6 kg)  LMP 09/20/2005  SpO2 95%  BMI 22.96 kg/m2  Body mass index is 22.96 kg/(m^2).  GENERAL: healthy, alert and no distress  NECK: no adenopathy, no asymmetry, masses, or scars and thyroid normal to palpation  RESP: lungs  - no rales,  Mild bilateral rhonchi and expiratory wheezes, with prolonged expirium   CV: regular rate and rhythm, normal S1 S2, no S3 or S4, no murmur, click or rub, no peripheral edema and peripheral pulses strong  ABDOMEN: soft, nontender, no hepatosplenomegaly, no masses and bowel sounds normal  MS: no gross musculoskeletal defects noted, no edema    Diagnostic Test Results:  none      ASSESSMENT/PLAN:                                                      Problem List Items Addressed This Visit     Chronic airway obstruction (H)    Relevant Medications    predniSONE (DELTASONE) 20 MG tablet    ipratropium - albuterol 0.5 mg/2.5 mg/3 mL (DUONEB) 0.5-2.5 (3) MG/3ML neb solution    guaiFENesin-codeine (ROBITUSSIN AC) 100-10 MG/5ML SOLN solution    fluticasone-salmeterol (ADVAIR) 250-50 MCG/DOSE diskus inhaler    Chronic low back pain    " Relevant Medications    predniSONE (DELTASONE) 20 MG tablet    Dilated cardiomyopathy (H)    Relevant Medications    lisinopril (PRINIVIL/ZESTRIL) 2.5 MG tablet    SOB (shortness of breath)    Relevant Medications    ipratropium - albuterol 0.5 mg/2.5 mg/3 mL (DUONEB) 0.5-2.5 (3) MG/3ML neb solution    fluticasone-salmeterol (ADVAIR) 250-50 MCG/DOSE diskus inhaler    Moderate persistent asthma without complication    Relevant Medications    predniSONE (DELTASONE) 20 MG tablet    ipratropium - albuterol 0.5 mg/2.5 mg/3 mL (DUONEB) 0.5-2.5 (3) MG/3ML neb solution    fluticasone-salmeterol (ADVAIR) 250-50 MCG/DOSE diskus inhaler      Other Visit Diagnoses     Asthma exacerbation    -  Primary    Relevant Medications    ipratropium - albuterol 0.5 mg/2.5 mg/3 mL (DUONEB) 0.5-2.5 (3) MG/3ML neb solution    guaiFENesin-codeine (ROBITUSSIN AC) 100-10 MG/5ML SOLN solution    doxycycline (VIBRAMYCIN) 100 MG capsule    fluticasone-salmeterol (ADVAIR) 250-50 MCG/DOSE diskus inhaler           Start on antibiotic   Prednisone taper   Use duonebs tid until improved   Cont rest of medications   Has had recent chest CT- no pneumonia   Pending ECHO    Follow-Up:in 2 weeks     Bandar Weinberg MD  [facility]

## 2017-05-24 ENCOUNTER — HOSPITAL ENCOUNTER (OUTPATIENT)
Dept: CARDIOLOGY | Facility: CLINIC | Age: 62
Discharge: HOME | End: 2017-05-24
Attending: INTERNAL MEDICINE | Admitting: INTERNAL MEDICINE
Payer: COMMERCIAL

## 2017-05-24 DIAGNOSIS — R06.02 SOB (SHORTNESS OF BREATH): ICD-10-CM

## 2017-05-24 PROCEDURE — 93306 TTE W/DOPPLER COMPLETE: CPT

## 2017-05-24 PROCEDURE — 93306 TTE W/DOPPLER COMPLETE: CPT | Mod: 26 | Performed by: INTERNAL MEDICINE

## 2017-06-02 ENCOUNTER — OFFICE VISIT (OUTPATIENT)
Dept: INTERNAL MEDICINE | Facility: CLINIC | Age: 62
End: 2017-06-02
Payer: COMMERCIAL

## 2017-06-02 VITALS
HEIGHT: 70 IN | OXYGEN SATURATION: 96 % | HEART RATE: 100 BPM | BODY MASS INDEX: 22.9 KG/M2 | DIASTOLIC BLOOD PRESSURE: 80 MMHG | WEIGHT: 160 LBS | SYSTOLIC BLOOD PRESSURE: 126 MMHG | TEMPERATURE: 98.1 F

## 2017-06-02 DIAGNOSIS — G43.809 OTHER TYPE OF MIGRAINE: ICD-10-CM

## 2017-06-02 DIAGNOSIS — F41.9 ANXIETY: ICD-10-CM

## 2017-06-02 DIAGNOSIS — J45.901 ASTHMA EXACERBATION: ICD-10-CM

## 2017-06-02 DIAGNOSIS — I10 ESSENTIAL HYPERTENSION: Primary | ICD-10-CM

## 2017-06-02 PROCEDURE — 99214 OFFICE O/P EST MOD 30 MIN: CPT | Performed by: INTERNAL MEDICINE

## 2017-06-02 RX ORDER — SUMATRIPTAN 25 MG/1
25 TABLET, FILM COATED ORAL
Qty: 12 TABLET | Refills: 3 | Status: SHIPPED | OUTPATIENT
Start: 2017-06-02 | End: 2017-12-19

## 2017-06-02 RX ORDER — PREDNISONE 5 MG/1
5 TABLET ORAL 2 TIMES DAILY
Qty: 30 TABLET | Refills: 0 | Status: SHIPPED | OUTPATIENT
Start: 2017-06-02 | End: 2017-06-09

## 2017-06-02 RX ORDER — ALBUTEROL SULFATE 90 UG/1
2 AEROSOL, METERED RESPIRATORY (INHALATION) EVERY 6 HOURS PRN
Qty: 18 G | Refills: 3 | Status: SHIPPED | OUTPATIENT
Start: 2017-06-02

## 2017-06-02 RX ORDER — CODEINE PHOSPHATE AND GUAIFENESIN 10; 100 MG/5ML; MG/5ML
1 SOLUTION ORAL EVERY 4 HOURS PRN
Qty: 120 ML | Refills: 0 | Status: SHIPPED | OUTPATIENT
Start: 2017-06-02 | End: 2017-06-09

## 2017-06-02 RX ORDER — DOXYCYCLINE 100 MG/1
100 CAPSULE ORAL 2 TIMES DAILY
Qty: 20 CAPSULE | Refills: 0 | Status: SHIPPED | OUTPATIENT
Start: 2017-06-02 | End: 2017-06-14

## 2017-06-02 RX ORDER — ALPRAZOLAM 0.25 MG
TABLET ORAL
Qty: 30 TABLET | Refills: 0 | Status: SHIPPED | OUTPATIENT
Start: 2017-06-02 | End: 2017-07-31

## 2017-06-02 NOTE — PROGRESS NOTES
SUBJECTIVE:                                                    Trisha Bender is a 61 year old female who presents to clinic today for the following health issues:      Cough, congestion, wheezing and SOB:    Patient is seen for a follow up visit.  Pt with h/o asthma. Has had recurrent symptoms of SOB, cough, wheezing. Has been on Doxycycline and Prednisone for 10 days. Slowly improving , but still has significant SOB on exertion , continuous cough, productive of scarce yellow phlegm. No fever. Not on oxygen.   Has h/o HTN. on medical treatment. BP has been controlled. No side effects from medications. No CP, HA, dizziness. good compliance with medications and low salt diet.  Has h/o CHF, recent ECHO showed low- normal EF. Has  Mild ankles edema. No weight changes.   Has h/o anxiety. On anxiolytics , controlled.       PROBLEMS TO ADD ON...    Problem list and histories reviewed & adjusted, as indicated.  Additional history: as documented    Patient Active Problem List   Diagnosis     Essential hypertension     Other type of migraine     Esophageal reflux     Disorder of bone and cartilage     Family history of malignant neoplasm of gastrointestinal tract     Chronic airway obstruction (H)     Cystocele     Advanced directives, counseling/discussion     Family history of thyroid cancer - 1/2 brother     Anxiety     Mild persistent asthma without complication     Chronic low back pain     Hair loss     Lateral epicondylitis of right elbow     Dilated cardiomyopathy (H)     Right bundle branch block (RBBB)     CHF (congestive heart failure) (H)     SOB (shortness of breath)     Coronary artery disease involving native coronary artery of native heart without angina pectoris     Mixed hyperlipidemia     Controlled substance agreement signed     Moderate persistent asthma without complication     Hypokalemia     Mechanical low back pain     Past Surgical History:   Procedure Laterality Date     ANGIOGRAM   2015    Minimal CAD. LV dysfunction, Mid RCA 20% stenosis, EF 20-25%, c/w Takotsubo cardiomyopathy     APPENDECTOMY       C NONSPECIFIC PROCEDURE      GB and appendectomy  abstracted [id number]     C NONSPECIFIC PROCEDURE      Nasal septoplasty and mucous retention cyst     CHOLECYSTECTOMY       ESOPHAGOSCOPY, GASTROSCOPY, DUODENOSCOPY (EGD), COMBINED  11/15/2011    Procedure:COMBINED ESOPHAGOSCOPY, GASTROSCOPY, DUODENOSCOPY (EGD);  ESOPHAGOSCOPY, GASTROSCOPY, DUODENOSCOPY (EGD) ; Surgeon:JIM URENA; Location:[facility]     HC REMOVE TONSILS/ADENOIDS,<13 Y/O       LAPAROSCOPIC ABLATION ENDOMETRIOSIS       wisdom teeth         Social History   Substance Use Topics     Smoking status: Former Smoker     Packs/day: 1.00     Years: 30.00     Quit date: 2006     Smokeless tobacco: Never Used      Comment: Quit in      Alcohol use No     Family History   Problem Relation Age of Onset     CANCER Mother      Colon cancer; dxed at age 64;  at age 69     Cancer - colorectal Mother      Other Cancer Mother      GASTROINTESTINAL DISEASE Sister      Acute pancreatitis     HEART DISEASE Father      ?     Coronary Artery Disease Father      Circulatory Maternal Aunt      AAA     Circulatory Maternal Uncle      AAA     DIABETES Son          Current Outpatient Prescriptions   Medication Sig Dispense Refill     doxycycline (VIBRAMYCIN) 100 MG capsule Take 1 capsule (100 mg) by mouth 2 times daily 20 capsule 0     albuterol (VENTOLIN HFA) 108 (90 BASE) MCG/ACT Inhaler Inhale 2 puffs into the lungs every 6 hours as needed for shortness of breath / dyspnea or wheezing 18 g 3     ALPRAZolam (XANAX) 0.25 MG tablet Take 1 tablet by mouth. Every twelve hours as needed for anxiety 30 tablet 0     predniSONE (DELTASONE) 5 MG tablet Take 1 tablet (5 mg) by mouth 2 times daily Taper down by 5 mg weekly 30 tablet 0     guaiFENesin-codeine (ROBITUSSIN AC) 100-10 MG/5ML SOLN solution Take 5 mLs by mouth every 4 hours as  needed for cough 120 mL 0     SUMAtriptan (IMITREX) 25 MG tablet Take 1 tablet (25 mg) by mouth at onset of headache for migraine May repeat in 2 hours if needed: max 2/day; 12 tablet 3     HYDROcodone-acetaminophen (NORCO) 5-325 MG per tablet Take 1-2 tablets by mouth every 6 hours as needed for pain Maximum 7 daily 100 tablet 0     predniSONE (DELTASONE) 20 MG tablet Take 0.5 tablets (10 mg) by mouth daily Take 20 mg am and 10 mg pm for 5 days, then 20 mg in am for 5 days, then 10 mg in am for 5 days, and stop. 15 tablet 0     fluticasone-salmeterol (ADVAIR) 250-50 MCG/DOSE diskus inhaler Inhale 1 puff into the lungs 2 times daily 1 Inhaler 1     lisinopril (PRINIVIL/ZESTRIL) 2.5 MG tablet Take 1 tablet (2.5 mg) by mouth daily 90 tablet 1     ipratropium - albuterol 0.5 mg/2.5 mg/3 mL (DUONEB) 0.5-2.5 (3) MG/3ML neb solution Take 1 vial (3 mLs) by nebulization every 6 hours as needed for shortness of breath / dyspnea or wheezing 30 vial 1     SPIRIVA HANDIHALER 18 MCG capsule USING THE HANDIHALER, INHALE THE CONTENTS OF ONE CAPSULE BY MOUTH DAILY 90 capsule 1     furosemide (LASIX) 20 MG tablet Take 2 tablets (40 mg) by mouth daily severe swelling 180 tablet 1     potassium chloride (K-TAB,KLOR-CON) 10 MEQ tablet Take 1 tablet (10 mEq) by mouth 3 times daily 90 tablet 5     mometasone (ELOCON) 0.1 % ointment Apply sparingly to affected area twice daily as needed.  Do not apply to face. 45 g 0     baclofen (LIORESAL) 10 MG tablet Take 1 tablet (10 mg) by mouth 3 times daily as needed for muscle spasms 30 tablet 1     montelukast (SINGULAIR) 10 MG tablet TAKE 1 TABLET AT BEDTIME 90 tablet 1     ondansetron (ZOFRAN ODT) 4 MG ODT tab Take 1-2 tablets (4-8 mg) by mouth every 8 hours as needed for nausea 20 tablet 0     fluticasone (FLONASE) 50 MCG/ACT spray Spray 1-2 sprays into both nostrils daily 1 Bottle 11     albuterol (2.5 MG/3ML) 0.083% neb solution Take 1 vial (2.5 mg) by nebulization every 6 hours as needed  "for shortness of breath / dyspnea or wheezing 25 vial 0     pantoprazole (PROTONIX) 40 MG enteric coated tablet Take 1 tablet (40 mg) by mouth daily 90 tablet 3     atorvastatin (LIPITOR) 10 MG tablet Reported on 5/8/2017 30 tablet 11     nitroglycerin (NITROSTAT) 0.4 MG SL tablet Place 1 tablet (0.4 mg) under the tongue every 5 minutes as needed for chest pain If you are still having symptoms after 3 doses (15 minutes) call 911. 25 tablet 0     [employer] FIBER GUMMIES 2.5 G CHEW Take 1 tablet by mouth daily Reported on 5/8/2017       Multiple Vitamin CHEW Take 1 tablet by mouth daily        TYLENOL EXTRA STRENGTH OR 1 TABLET EVERY 4 HOURS AS NEEDED         Reviewed and updated as needed this visit by clinical staff       Reviewed and updated as needed this visit by Provider         ROS:  Constitutional, HEENT, cardiovascular, pulmonary, GI, , musculoskeletal, neuro, skin, endocrine and psych systems are negative, except as otherwise noted.    OBJECTIVE:                                                    /80  Pulse 100  Temp 98.1  F (36.7  C) (Oral)  Ht 5' 10\" (1.778 m)  Wt 160 lb (72.6 kg)  LMP 09/20/2005  SpO2 96%  BMI 22.96 kg/m2  Body mass index is 22.96 kg/(m^2).  GENERAL: healthy, alert and no distress  EYES: Eyes grossly normal to inspection  HENT: ear canals and TM's normal, nose and mouth without ulcers or lesions  NECK: no adenopathy, no asymmetry, masses, or scars and thyroid normal to palpation  RESP: lungs clear to auscultation - no rales, rhonchi , expiratory mild wheezes  CV: regular rate and rhythm, normal S1 S2, no S3 or S4, no murmur, click or rub,  and peripheral pulses strong  ABDOMEN: soft, nontender, no hepatosplenomegaly, no masses and bowel sounds normal  MS: no gross musculoskeletal defects noted, trace ankles edema    Diagnostic Test Results:  none      ASSESSMENT/PLAN:                                                      Problem List Items Addressed This Visit     Essential " hypertension - Primary    Other type of migraine    Relevant Medications    SUMAtriptan (IMITREX) 25 MG tablet    Anxiety    Relevant Medications    ALPRAZolam (XANAX) 0.25 MG tablet      Other Visit Diagnoses     Asthma exacerbation        Relevant Medications    doxycycline (VIBRAMYCIN) 100 MG capsule    albuterol (VENTOLIN HFA) 108 (90 BASE) MCG/ACT Inhaler    predniSONE (DELTASONE) 5 MG tablet    guaiFENesin-codeine (ROBITUSSIN AC) 100-10 MG/5ML SOLN solution           Repeat course of antibiotic and steroid  Symptomatic treatment  Cont rest of medications   Follow up with pulmonary     Follow-Up: in 1 month     Bandar Weinberg MD  [facility]

## 2017-06-02 NOTE — NURSING NOTE
"Chief Complaint   Patient presents with     Cough     Cough, congestion, asthma Sxs( Wheezing/rattling, SOB)       Initial /80  Pulse 100  Temp 98.1  F (36.7  C) (Oral)  Ht 5' 10\" (1.778 m)  Wt 160 lb (72.6 kg)  LMP 09/20/2005  SpO2 96%  BMI 22.96 kg/m2 Estimated body mass index is 22.96 kg/(m^2) as calculated from the following:    Height as of this encounter: 5' 10\" (1.778 m).    Weight as of this encounter: 160 lb (72.6 kg).  Medication Reconciliation: complete   Madhavi Garcia MA      "

## 2017-06-02 NOTE — MR AVS SNAPSHOT
"              After Visit Summary   6/2/2017    Trisha Bender    MRN: [id number]           Patient Information     Date Of Birth          1955        Visit Information        Provider Department      6/2/2017 2:40 PM Bandar Weinberg MD [facility]        Today's Diagnoses     Essential hypertension    -  1    Asthma exacerbation        Anxiety        Other type of migraine           Follow-ups after your visit        Who to contact     If you have questions or need follow up information about today's clinic visit or your schedule please contact [facility] directly at [phone].  Normal or non-critical lab and imaging results will be communicated to you by Adociahart, letter or phone within 4 business days after the clinic has received the results. If you do not hear from us within 7 days, please contact the clinic through [employer] or phone. If you have a critical or abnormal lab result, we will notify you by phone as soon as possible.  Submit refill requests through [employer] or call your pharmacy and they will forward the refill request to us. Please allow 3 business days for your refill to be completed.          Additional Information About Your Visit        MyChart Information     [employer] gives you secure access to your electronic health record. If you see a primary care provider, you can also send messages to your care team and make appointments. If you have questions, please call your primary care clinic.  If you do not have a primary care provider, please call [phone] and they will assist you.        Care EveryWhere ID     This is your Care EveryWhere ID. This could be used by other organizations to access your [place] medical records  [id number]        Your Vitals Were     Pulse Temperature Height Last Period Pulse Oximetry BMI (Body Mass Index)    100 98.1  F (36.7  C) (Oral) 5' 10\" (1.778 m) 09/20/2005 96% 22.96 kg/m2       Blood Pressure from Last 3 " Encounters:   06/02/17 126/80   05/23/17 126/80   05/08/17 124/78    Weight from Last 3 Encounters:   06/02/17 160 lb (72.6 kg)   05/23/17 160 lb (72.6 kg)   05/08/17 157 lb (71.2 kg)              Today, you had the following     No orders found for display         Today's Medication Changes          These changes are accurate as of: 6/2/17 11:59 PM.  If you have any questions, ask your nurse or doctor.               These medicines have changed or have updated prescriptions.        Dose/Directions    * predniSONE 20 MG tablet   Commonly known as:  DELTASONE   This may have changed:  Another medication with the same name was added. Make sure you understand how and when to take each.   Used for:  Moderate persistent asthma without complication   Changed by:  Bandar Weinberg MD        Dose:  10 mg   Take 0.5 tablets (10 mg) by mouth daily Take 20 mg am and 10 mg pm for 5 days, then 20 mg in am for 5 days, then 10 mg in am for 5 days, and stop.   Quantity:  15 tablet   Refills:  0       * predniSONE 5 MG tablet   Commonly known as:  DELTASONE   This may have changed:  You were already taking a medication with the same name, and this prescription was added. Make sure you understand how and when to take each.   Used for:  Asthma exacerbation   Changed by:  Bandar Weinbreg MD        Dose:  5 mg   Take 1 tablet (5 mg) by mouth 2 times daily Taper down by 5 mg weekly   Quantity:  30 tablet   Refills:  0       * Notice:  This list has 2 medication(s) that are the same as other medications prescribed for you. Read the directions carefully, and ask your doctor or other care provider to review them with you.         Where to get your medicines      These medications were sent to [facility] [place], [place] - [address]  [address] [facility] [address]     Phone:  [phone]     albuterol 108 (90 BASE) MCG/ACT Inhaler    doxycycline 100 MG capsule    predniSONE 5 MG tablet     SUMAtriptan 25 MG tablet         Some of these will need a paper prescription and others can be bought over the counter.  Ask your nurse if you have questions.     Bring a paper prescription for each of these medications     ALPRAZolam 0.25 MG tablet    guaiFENesin-codeine 100-10 MG/5ML Soln solution                Primary Care Provider Office Phone # Fax #    Bandar Weinberg [phone]       [facility] [address] [facility] [address]        Thank you!     Thank you for choosing [facility]  for your care. Our goal is always to provide you with excellent care. Hearing back from our patients is one way we can continue to improve our services. Please take a few minutes to complete the written survey that you may receive in the mail after your visit with us. Thank you!             Your Updated Medication List - Protect others around you: Learn how to safely use, store and throw away your medicines at www.disposemymeds.org.          This list is accurate as of: 6/2/17 11:59 PM.  Always use your most recent med list.                   Brand Name Dispense Instructions for use    * albuterol (2.5 MG/3ML) 0.083% neb solution     25 vial    Take 1 vial (2.5 mg) by nebulization every 6 hours as needed for shortness of breath / dyspnea or wheezing       * albuterol 108 (90 BASE) MCG/ACT Inhaler    VENTOLIN HFA    18 g    Inhale 2 puffs into the lungs every 6 hours as needed for shortness of breath / dyspnea or wheezing       ALPRAZolam 0.25 MG tablet    XANAX    30 tablet    Take 1 tablet by mouth. Every twelve hours as needed for anxiety       atorvastatin 10 MG tablet    LIPITOR    30 tablet    Reported on 5/8/2017       baclofen 10 MG tablet    LIORESAL    30 tablet    Take 1 tablet (10 mg) by mouth 3 times daily as needed for muscle spasms       [employer] FIBER GUMMIES 2.5 G Chew      Take 1 tablet by mouth daily Reported on 5/8/2017       doxycycline 100 MG capsule     VIBRAMYCIN    20 capsule    Take 1 capsule (100 mg) by mouth 2 times daily       fluticasone 50 MCG/ACT spray    FLONASE    1 Bottle    Spray 1-2 sprays into both nostrils daily       fluticasone-salmeterol 250-50 MCG/DOSE diskus inhaler    ADVAIR    1 Inhaler    Inhale 1 puff into the lungs 2 times daily       furosemide 20 MG tablet    LASIX    180 tablet    Take 2 tablets (40 mg) by mouth daily severe swelling       guaiFENesin-codeine 100-10 MG/5ML Soln solution    ROBITUSSIN AC    120 mL    Take 5 mLs by mouth every 4 hours as needed for cough       HYDROcodone-acetaminophen 5-325 MG per tablet    NORCO    100 tablet    Take 1-2 tablets by mouth every 6 hours as needed for pain Maximum 7 daily       ipratropium - albuterol 0.5 mg/2.5 mg/3 mL 0.5-2.5 (3) MG/3ML neb solution    DUONEB    30 vial    Take 1 vial (3 mLs) by nebulization every 6 hours as needed for shortness of breath / dyspnea or wheezing       lisinopril 2.5 MG tablet    PRINIVIL/Zestril    90 tablet    Take 1 tablet (2.5 mg) by mouth daily       mometasone 0.1 % ointment    ELOCON    45 g    Apply sparingly to affected area twice daily as needed.  Do not apply to face.       montelukast 10 MG tablet    SINGULAIR    90 tablet    TAKE 1 TABLET AT BEDTIME       Multiple Vitamin Chew      Take 1 tablet by mouth daily       nitroglycerin 0.4 MG sublingual tablet    NITROSTAT    25 tablet    Place 1 tablet (0.4 mg) under the tongue every 5 minutes as needed for chest pain If you are still having symptoms after 3 doses (15 minutes) call 911.       ondansetron 4 MG ODT tab    ZOFRAN ODT    20 tablet    Take 1-2 tablets (4-8 mg) by mouth every 8 hours as needed for nausea       pantoprazole 40 MG EC tablet    PROTONIX    90 tablet    Take 1 tablet (40 mg) by mouth daily       potassium chloride 10 MEQ tablet    K-TAB,KLOR-CON    90 tablet    Take 1 tablet (10 mEq) by mouth 3 times daily       * predniSONE 20 MG tablet    DELTASONE    15 tablet    Take 0.5  tablets (10 mg) by mouth daily Take 20 mg am and 10 mg pm for 5 days, then 20 mg in am for 5 days, then 10 mg in am for 5 days, and stop.       * predniSONE 5 MG tablet    DELTASONE    30 tablet    Take 1 tablet (5 mg) by mouth 2 times daily Taper down by 5 mg weekly       SPIRIVA HANDIHALER 18 MCG capsule   Generic drug:  tiotropium     90 capsule    USING THE HANDIHALER, INHALE THE CONTENTS OF ONE CAPSULE BY MOUTH DAILY       SUMAtriptan 25 MG tablet    IMITREX    12 tablet    Take 1 tablet (25 mg) by mouth at onset of headache for migraine May repeat in 2 hours if needed: max 2/day;       TYLENOL EXTRA STRENGTH PO      1 TABLET EVERY 4 HOURS AS NEEDED       * Notice:  This list has 4 medication(s) that are the same as other medications prescribed for you. Read the directions carefully, and ask your doctor or other care provider to review them with you.

## 2017-06-04 ASSESSMENT — ANXIETY QUESTIONNAIRES
7. FEELING AFRAID AS IF SOMETHING AWFUL MIGHT HAPPEN: NEARLY EVERY DAY
IF YOU CHECKED OFF ANY PROBLEMS ON THIS QUESTIONNAIRE, HOW DIFFICULT HAVE THESE PROBLEMS MADE IT FOR YOU TO DO YOUR WORK, TAKE CARE OF THINGS AT HOME, OR GET ALONG WITH OTHER PEOPLE: SOMEWHAT DIFFICULT
GAD7 TOTAL SCORE: 13
6. BECOMING EASILY ANNOYED OR IRRITABLE: SEVERAL DAYS
3. WORRYING TOO MUCH ABOUT DIFFERENT THINGS: MORE THAN HALF THE DAYS
1. FEELING NERVOUS, ANXIOUS, OR ON EDGE: NEARLY EVERY DAY
5. BEING SO RESTLESS THAT IT IS HARD TO SIT STILL: NOT AT ALL
2. NOT BEING ABLE TO STOP OR CONTROL WORRYING: MORE THAN HALF THE DAYS

## 2017-06-04 ASSESSMENT — PATIENT HEALTH QUESTIONNAIRE - PHQ9: 5. POOR APPETITE OR OVEREATING: MORE THAN HALF THE DAYS

## 2017-06-05 ASSESSMENT — ANXIETY QUESTIONNAIRES: GAD7 TOTAL SCORE: 13

## 2017-06-07 ENCOUNTER — MYC REFILL (OUTPATIENT)
Dept: INTERNAL MEDICINE | Facility: CLINIC | Age: 62
End: 2017-06-07

## 2017-06-07 DIAGNOSIS — M54.50 CHRONIC BILATERAL LOW BACK PAIN WITHOUT SCIATICA: ICD-10-CM

## 2017-06-07 DIAGNOSIS — G89.29 CHRONIC BILATERAL LOW BACK PAIN WITHOUT SCIATICA: ICD-10-CM

## 2017-06-07 DIAGNOSIS — J45.901 ASTHMA EXACERBATION: ICD-10-CM

## 2017-06-07 RX ORDER — PREDNISONE 5 MG/1
5 TABLET ORAL 2 TIMES DAILY
Qty: 30 TABLET | Refills: 0 | Status: CANCELLED | OUTPATIENT
Start: 2017-06-07

## 2017-06-07 RX ORDER — CODEINE PHOSPHATE AND GUAIFENESIN 10; 100 MG/5ML; MG/5ML
1 SOLUTION ORAL EVERY 4 HOURS PRN
Qty: 120 ML | Refills: 0 | Status: CANCELLED | OUTPATIENT
Start: 2017-06-07

## 2017-06-08 NOTE — TELEPHONE ENCOUNTER
Last refill on Vicodin-5/26/17-#100    Last OV-6/2/17    CSA on file     Last refill on Prednisone-6/2/17-#30  Last refill on Robitussin-6/2/17-#120mls      Appt sched for 7/24/17

## 2017-06-08 NOTE — TELEPHONE ENCOUNTER
Message from [employer]:  Original authorizing provider: MD Trisha Marvin would like a refill of the following medications:  predniSONE (DELTASONE) 5 MG tablet [Bandar Weinberg MD]  guaiFENesin-codeine (ROBITUSSIN AC) 100-10 MG/5ML SOLN solution [Bandar Weinberg MD]    Preferred pharmacy: [place], [place] - [facility] [address]    Comment:  Dr. Weinberg and Nursing Staff, I am still having asthma/lung inflammation. The cough medicine is working but the quantity doesn't last if one takes it every 4 hours. Trying to reduce amount of Prednisone by tapering off. Would like a refill on pain medication by Monday. Made an appointment to see Dr. Weinberg but soonest is July 24? Thank you. Trisha Bender    Medication renewals requested in this message routed to other providers:  HYDROcodone-acetaminophen (NORCO) 5-325 MG per tablet [Madeline Ferrer MD]

## 2017-06-08 NOTE — TELEPHONE ENCOUNTER
Message from [employer]:  Original authorizing provider: MD Trisha Marshall would like a refill of the following medications:  HYDROcodone-acetaminophen (NORCO) 5-325 MG per tablet [Madeline Ferrer MD]    Preferred pharmacy: [place], [place] - [facility] [address]    Comment:  Dr. Weinberg and Nursing Staff, I am still having asthma/lung inflammation. The cough medicine is working but the quantity doesn't last if one takes it every 4 hours. Trying to reduce amount of Prednisone by tapering off. Would like a refill on pain medication by Monday. Made an appointment to see Dr. Weinberg but soonest is July 24? Thank you. Trisha Bender    Medication renewals requested in this message routed to other providers:  predniSONE (DELTASONE) 5 MG tablet [Bandar Weinberg MD]  guaiFENesin-codeine (ROBITUSSIN AC) 100-10 MG/5ML SOLN solution [Bandar Weinberg MD]

## 2017-06-09 ENCOUNTER — MYC REFILL (OUTPATIENT)
Dept: INTERNAL MEDICINE | Facility: CLINIC | Age: 62
End: 2017-06-09

## 2017-06-09 DIAGNOSIS — J45.901 ASTHMA EXACERBATION: ICD-10-CM

## 2017-06-12 RX ORDER — CODEINE PHOSPHATE AND GUAIFENESIN 10; 100 MG/5ML; MG/5ML
1 SOLUTION ORAL EVERY 4 HOURS PRN
Qty: 120 ML | Refills: 0 | Status: SHIPPED | OUTPATIENT
Start: 2017-06-12 | End: 2017-10-11

## 2017-06-12 RX ORDER — PREDNISONE 5 MG/1
5 TABLET ORAL 2 TIMES DAILY
Qty: 30 TABLET | Refills: 0 | Status: SHIPPED | OUTPATIENT
Start: 2017-06-12 | End: 2017-10-11

## 2017-06-12 NOTE — TELEPHONE ENCOUNTER
Norco was refilled 6/9/17  I will refill today Prednisone and Robitussin AC, but she needs further assessment with pulmonary.

## 2017-06-12 NOTE — TELEPHONE ENCOUNTER
Message from [employer]:  Kiana Sung RN Mon Jun 12, 2017 6:57 AM        ----- Message -----   From: Trisha Bender   Sent: 6/9/2017 2:19 PM   To: Radha Sosa  Subject: Medication Renewal Request     Original authorizing provider: MD Trisha Marvin would like a refill of the following medications:  predniSONE (DELTASONE) 5 MG tablet [Bandar Weinberg MD]  guaiFENesin-codeine (ROBITUSSIN AC) 100-10 MG/5ML SOLN solution [Bandar Weinberg MD]    Preferred pharmacy: [place], [place] - [facility].    Comment:  Dr. Weinberg and Nursing Staff, I am wondering about the refills I wrote about on Wednesday. The request should be going to Dr. Weinberg unless he is unavailable. Thank you. Trisha Bender    Medication renewals requested in this message routed to other providers:  HYDROcodone-acetaminophen (NORCO) 5-325 MG per tablet [Madeline Ferrer MD]

## 2017-06-14 ENCOUNTER — MYC REFILL (OUTPATIENT)
Dept: INTERNAL MEDICINE | Facility: CLINIC | Age: 62
End: 2017-06-14

## 2017-06-14 DIAGNOSIS — J45.901 ASTHMA EXACERBATION: ICD-10-CM

## 2017-06-15 RX ORDER — DOXYCYCLINE 100 MG/1
100 CAPSULE ORAL 2 TIMES DAILY
Qty: 20 CAPSULE | Refills: 0 | Status: SHIPPED | OUTPATIENT
Start: 2017-06-15 | End: 2017-10-11

## 2017-06-15 NOTE — TELEPHONE ENCOUNTER
Message from [employer]:  Original authorizing provider: MD Trisha Marvin would like a refill of the following medications:  doxycycline (VIBRAMYCIN) 100 MG capsule [Bandar Weinberg MD]    Preferred pharmacy: [place] [address]    Comment:  Dr. Weinberg and Nursing Staff, Am requesting another refill on the antibiotic. Finished last dosage a few days ago, I do think it was working. Still have the cough and bringing up mucus with terrible aftertaste, so would like a refill on the Doxycycline. Thank you. Trisha Bender

## 2017-06-20 ENCOUNTER — OFFICE VISIT (OUTPATIENT)
Dept: PODIATRY | Facility: CLINIC | Age: 62
End: 2017-06-20
Payer: COMMERCIAL

## 2017-06-20 VITALS — HEART RATE: 98 BPM | HEIGHT: 70 IN | BODY MASS INDEX: 23.05 KG/M2 | WEIGHT: 161 LBS

## 2017-06-20 DIAGNOSIS — L98.9 SKIN LESION: ICD-10-CM

## 2017-06-20 DIAGNOSIS — L84 CALLUS: ICD-10-CM

## 2017-06-20 DIAGNOSIS — L60.0 ONYCHOCRYPTOSIS: Primary | ICD-10-CM

## 2017-06-20 PROCEDURE — 99203 OFFICE O/P NEW LOW 30 MIN: CPT | Performed by: PODIATRIST

## 2017-06-20 NOTE — NURSING NOTE
"Chief Complaint   Patient presents with     Ingrown Toenail     L medial edge     Foot Problems     L plantar forefoot pain       Initial Pulse 98  Ht 5' 10\" (1.778 m)  Wt 161 lb (73 kg)  LMP 09/20/2005  BMI 23.1 kg/m2 Estimated body mass index is 23.1 kg/(m^2) as calculated from the following:    Height as of this encounter: 5' 10\" (1.778 m).    Weight as of this encounter: 161 lb (73 kg).  Medication Reconciliation: complete  "

## 2017-06-20 NOTE — MR AVS SNAPSHOT
After Visit Summary   6/20/2017    Trisha Bender    MRN: [id number]           Patient Information     Date Of Birth          1955        Visit Information        Provider Department      6/20/2017 3:30 PM Lukas Rawls DPM [facility] [place] [facility]        Care Instructions      DR. RAWLS'S CLINIC LOCATIONS:   MONDAY - [place] TUESDAY - [place]   [address] [facility] [address] [facility] [address]   [place] [address] [place] [address]   [phone]       THURSDAY AM - [place] THURSDAY PM - [place]   [address]   [place] [address] [place], [place] [address] [phone]       FRIDAY AM - [place] SCHEDULE SURGERY: [phone] [address] APPOINTMENTS: [phone]   [place] [address] AFTER HOURS: [phone]   [phone] FAX NUMBER: [phone]     Follow Up: as needed    CALLUS / CORN / IPK    When there is excessive friction or pressure on the skin, the body responds by making the skin thicker to protect the deeper structures from becoming exposed. While this works well to protect the deeper structures, the thickened skin can cause increased pressure and pain.    Callus: flat, diffuse thickened areas are simple calluses and they are usually caused by friction. Often these are the result of rubbing on a shoe or from going barefoot.    Corns: calluses with a central core on or between the toes are called corns. These result from prominent joints on toes rubbing together. Theses are a symptom of bone malalignment or illfitting shoes and will always recur unless the underlying bones are addressed surgically.    IPK: calluses with a central core on the ball of the foot are usually IPKs (intractable plantar keratosis). These are caused by excessive pressure from the metatarsals, the bones that make up the ball of the foot. Often one of these bones is too long or too prominent. Again, these will always  recur unless the underlying bone issue is addressed. There is no cure for these. They will either go away by themselves, recur, or more could develop.    Home Treatment  - File: Trim them down with a pumice stone or callus file a couple times a week to remove callus tissue that builds up. An electric callus removing device. Amope Pedi Perfect Electronic Pedicure Foot File and Callus Remover can be a good option.   - Moisturize: Lotion can be applied to soften the callus. A lactic acid or urea based cream such as Carmol, Kersal or Vanicream or thicker cream with shea butter are good options.   - Foot Gear: Good supportive shoes and minimizing barefoot walking can slow down callus formation and can decrease pain levels. Gel inserts can also provide padding to the bottom of the foot to prevent pain and slow recurrence. Toe spacers, toe covers, can custom orthotic inserts can be beneficial as well.  - Surgery: If there is a surgical pathology noted, such as a prominent bone, often this needs to be addressed surgically to minimize recurrence. However, sometimes the lesion simply migrates to another spot after surgery, so it is not a guaranteed cure.     If you cannot treat them yourself at home, call the home foot care nurses below:  [facility]  [phone] [facility]   [phone] [facility]   [phone]                   Body Mass Index (BMI)  Many things can cause foot and ankle problems. Foot structure, activity level, foot mechanics and injuries are common causes of pain.  One very important issue that often goes unmentioned, is body weight.  Extra weight can cause increased stress on muscles, ligaments, bones and tendons.  Sometimes just a few extra pounds is all it takes to put one over her/his threshold. Without reducing that stress, it can be difficult to alleviate pain. Some people are uncomfortable addressing this issue, but we feel it is important for you to think about it. As Foot &  Ankle specialists,  our job is addressing the lower extremity problem and possible causes. Regarding extra body weight, we encourage patients to discuss diet and weight management plans with their primary care doctors. It is this team approach that gives you the best opportunity for pain relief and getting you back on your feet.            Follow-ups after your visit        Your next 10 appointments already scheduled     Jul 24, 2017  4:20 PM CDT   Elliott Rodriguez with Bandar Weinberg MD   [facility]    [address]  [facility] [address] [phone]              Who to contact     If you have questions or need follow up information about today's clinic visit or your schedule please contact [facility] directly at [phone].  Normal or non-critical lab and imaging results will be communicated to you by MyChart, letter or phone within 4 business days after the clinic has received the results. If you do not hear from us within 7 days, please contact the clinic through [employer] or phone. If you have a critical or abnormal lab result, we will notify you by phone as soon as possible.  Submit refill requests through [employer] or call your pharmacy and they will forward the refill request to us. Please allow 3 business days for your refill to be completed.          Additional Information About Your Visit        MyChart Information     [employer] gives you secure access to your electronic health record. If you see a primary care provider, you can also send messages to your care team and make appointments. If you have questions, please call your primary care clinic.  If you do not have a primary care provider, please call [phone] and they will assist you.        Care EveryWhere ID     This is your Care EveryWhere ID. This could be used by other organizations to access your [place] medical records  [id number]        Your Vitals Were     Pulse Height Last Period BMI  "(Body Mass Index)          98 5' 10\" (1.778 m) 09/20/2005 23.1 kg/m2         Blood Pressure from Last 3 Encounters:   06/02/17 126/80   05/23/17 126/80   05/08/17 124/78    Weight from Last 3 Encounters:   06/20/17 161 lb (73 kg)   06/02/17 160 lb (72.6 kg)   05/23/17 160 lb (72.6 kg)              Today, you had the following     No orders found for display       Primary Care Provider Office Phone # Fax #    Bandar Weinberg [phone]       [facility] [address] [facility] [address]        Thank you!     Thank you for choosing [facility]  for your care. Our goal is always to provide you with excellent care. Hearing back from our patients is one way we can continue to improve our services. Please take a few minutes to complete the written survey that you may receive in the mail after your visit with us. Thank you!             Your Updated Medication List - Protect others around you: Learn how to safely use, store and throw away your medicines at www.disposemymeds.org.          This list is accurate as of: 6/20/17  3:44 PM.  Always use your most recent med list.                   Brand Name Dispense Instructions for use    * albuterol (2.5 MG/3ML) 0.083% neb solution     25 vial    Take 1 vial (2.5 mg) by nebulization every 6 hours as needed for shortness of breath / dyspnea or wheezing       * albuterol 108 (90 BASE) MCG/ACT Inhaler    VENTOLIN HFA    18 g    Inhale 2 puffs into the lungs every 6 hours as needed for shortness of breath / dyspnea or wheezing       ALPRAZolam 0.25 MG tablet    XANAX    30 tablet    Take 1 tablet by mouth. Every twelve hours as needed for anxiety       atorvastatin 10 MG tablet    LIPITOR    30 tablet    Reported on 5/8/2017       baclofen 10 MG tablet    LIORESAL    30 tablet    Take 1 tablet (10 mg) by mouth 3 times daily as needed for muscle spasms       [employer] F[employer]ER GUMMIES 2.5 G Chew      Take 1 tablet by mouth daily Reported on " 5/8/2017       doxycycline 100 MG capsule    VIBRAMYCIN    20 capsule    Take 1 capsule (100 mg) by mouth 2 times daily       fluticasone 50 MCG/ACT spray    FLONASE    1 Bottle    Spray 1-2 sprays into both nostrils daily       fluticasone-salmeterol 250-50 MCG/DOSE diskus inhaler    ADVAIR    1 Inhaler    Inhale 1 puff into the lungs 2 times daily       furosemide 20 MG tablet    LASIX    180 tablet    Take 2 tablets (40 mg) by mouth daily severe swelling       guaiFENesin-codeine 100-10 MG/5ML Soln solution    ROBITUSSIN AC    120 mL    Take 5 mLs by mouth every 4 hours as needed for cough       HYDROcodone-acetaminophen 5-325 MG per tablet    NORCO    100 tablet    Take 2 tablets by mouth every 8 hours as needed for pain Maximum 6 daily       ipratropium - albuterol 0.5 mg/2.5 mg/3 mL 0.5-2.5 (3) MG/3ML neb solution    DUONEB    30 vial    Take 1 vial (3 mLs) by nebulization every 6 hours as needed for shortness of breath / dyspnea or wheezing       lisinopril 2.5 MG tablet    PRINIVIL/Zestril    90 tablet    Take 1 tablet (2.5 mg) by mouth daily       mometasone 0.1 % ointment    ELOCON    45 g    Apply sparingly to affected area twice daily as needed.  Do not apply to face.       montelukast 10 MG tablet    SINGULAIR    90 tablet    TAKE 1 TABLET AT BEDTIME       Multiple Vitamin Chew      Take 1 tablet by mouth daily       nitroglycerin 0.4 MG sublingual tablet    NITROSTAT    25 tablet    Place 1 tablet (0.4 mg) under the tongue every 5 minutes as needed for chest pain If you are still having symptoms after 3 doses (15 minutes) call 911.       ondansetron 4 MG ODT tab    ZOFRAN ODT    20 tablet    Take 1-2 tablets (4-8 mg) by mouth every 8 hours as needed for nausea       pantoprazole 40 MG EC tablet    PROTONIX    90 tablet    Take 1 tablet (40 mg) by mouth daily       potassium chloride 10 MEQ tablet    K-TAB,KLOR-CON    90 tablet    Take 1 tablet (10 mEq) by mouth 3 times daily       * predniSONE 20 MG  tablet    DELTASONE    15 tablet    Take 0.5 tablets (10 mg) by mouth daily Take 20 mg am and 10 mg pm for 5 days, then 20 mg in am for 5 days, then 10 mg in am for 5 days, and stop.       * predniSONE 5 MG tablet    DELTASONE    30 tablet    Take 1 tablet (5 mg) by mouth 2 times daily Taper down by 5 mg weekly       SPIRIVA HANDIHALER 18 MCG capsule   Generic drug:  tiotropium     90 capsule    USING THE HANDIHALER, INHALE THE CONTENTS OF ONE CAPSULE BY MOUTH DAILY       SUMAtriptan 25 MG tablet    IMITREX    12 tablet    Take 1 tablet (25 mg) by mouth at onset of headache for migraine May repeat in 2 hours if needed: max 2/day;       TYLENOL EXTRA STRENGTH PO      1 TABLET EVERY 4 HOURS AS NEEDED       * Notice:  This list has 4 medication(s) that are the same as other medications prescribed for you. Read the directions carefully, and ask your doctor or other care provider to review them with you.

## 2017-06-20 NOTE — PROGRESS NOTES
"Foot & Ankle Surgery  June 20, 2017    CC: L foot pain    I was asked to see Trisha Bender regarding the chief complaint by:  none    HPI:  Pt is a 61 year old female who presents with above complaint.  L foot pain for \"years\".  Describes \"throbbing\" pain.  7/10 \"every few weeks.  Pain medial L hallux, painful when nail grows back.  She's been \"self cutting of nail but made it bleed\".  She also notices a bump lateral R foot, which rubs in her shoes.  She also has a bump L forefoot, treating at home, and a callus sub 5th MPJ R that she's treated with a scalpel, but she cut herself.      ROS:   Pos for CC.  The patient denies current nausea, vomiting, chills, fevers, belly pain, calf pain, chest pain or SOB.  Complete remainder of ROS is otherwise neg.    VITALS:    Vitals:    06/20/17 1529   Pulse: 98   Weight: 161 lb (73 kg)   Height: 5' 10\" (1.778 m)       PMH:    Past Medical History:   Diagnosis Date     Anxiety      CAD (coronary artery disease) 7/2/15    mild per cath     CHF (congestive heart failure) (H)     EF=20-25% per echo 6/30/15     Chronic airway obstruction, not elsewhere classified     FEV1 36% of pred; VC 67%  in Jan 06.     COPD (chronic obstructive pulmonary disease) (H)      Esophageal reflux      Mild persistent asthma 8/30/2012     OSTEOPENIA      Other and unspecified hyperlipidemia      Other forms of migraine, without mention of intractable migraine without mention of status migrainosus      Takotsubo cardiomyopathy      Unspecified essential hypertension        SXHX:    Past Surgical History:   Procedure Laterality Date     ANGIOGRAM  07/02/2015    Minimal CAD. LV dysfunction, Mid RCA 20% stenosis, EF 20-25%, c/w Takotsubo cardiomyopathy     APPENDECTOMY       C NONSPECIFIC PROCEDURE  1974    GB and appendectomy  abstracted [id number]     C NONSPECIFIC PROCEDURE      Nasal septoplasty and mucous retention cyst     CHOLECYSTECTOMY  1974     ESOPHAGOSCOPY, GASTROSCOPY, DUODENOSCOPY (EGD), " COMBINED  11/15/2011    Procedure:COMBINED ESOPHAGOSCOPY, GASTROSCOPY, DUODENOSCOPY (EGD);  ESOPHAGOSCOPY, GASTROSCOPY, DUODENOSCOPY (EGD) ; Surgeon:JIM URENA; Location:[facility]     HC REMOVE TONSILS/ADENOIDS,<13 Y/O       LAPAROSCOPIC ABLATION ENDOMETRIOSIS  1998     wisdom teeth          MEDS:    Current Outpatient Prescriptions   Medication     doxycycline (VIBRAMYCIN) 100 MG capsule     predniSONE (DELTASONE) 5 MG tablet     guaiFENesin-codeine (ROBITUSSIN AC) 100-10 MG/5ML SOLN solution     HYDROcodone-acetaminophen (NORCO) 5-325 MG per tablet     albuterol (VENTOLIN HFA) 108 (90 BASE) MCG/ACT Inhaler     ALPRAZolam (XANAX) 0.25 MG tablet     SUMAtriptan (IMITREX) 25 MG tablet     predniSONE (DELTASONE) 20 MG tablet     fluticasone-salmeterol (ADVAIR) 250-50 MCG/DOSE diskus inhaler     lisinopril (PRINIVIL/ZESTRIL) 2.5 MG tablet     ipratropium - albuterol 0.5 mg/2.5 mg/3 mL (DUONEB) 0.5-2.5 (3) MG/3ML neb solution     SPIRIVA HANDIHALER 18 MCG capsule     furosemide (LASIX) 20 MG tablet     potassium chloride (K-TAB,KLOR-CON) 10 MEQ tablet     mometasone (ELOCON) 0.1 % ointment     baclofen (LIORESAL) 10 MG tablet     montelukast (SINGULAIR) 10 MG tablet     ondansetron (ZOFRAN ODT) 4 MG ODT tab     fluticasone (FLONASE) 50 MCG/ACT spray     albuterol (2.5 MG/3ML) 0.083% neb solution     pantoprazole (PROTONIX) 40 MG enteric coated tablet     atorvastatin (LIPITOR) 10 MG tablet     nitroglycerin (NITROSTAT) 0.4 MG SL tablet     [employer] FIBER GUMMIES 2.5 G CHEW     Multiple Vitamin CHEW     TYLENOL EXTRA STRENGTH OR     No current facility-administered medications for this visit.        ALL:     Allergies   Allergen Reactions     Gabapentin Swelling     Aspirin      Contrast Dye      Iodine     Ibuprofen      Peanuts [Nuts]      Penicillins      rash     Sulfa Drugs      Tace [Chlorotrianisene]      joint swelling     Strawberry Rash       FMH:    Family History   Problem Relation Age of Onset     CANCER Mother   "    Colon cancer; dxed at age 64;  at age 69     Cancer - colorectal Mother      Other Cancer Mother      GASTROINTESTINAL DISEASE Sister      Acute pancreatitis     HEART DISEASE Father      ?     Coronary Artery Disease Father      Circulatory Maternal Aunt      AAA     Circulatory Maternal Uncle      AAA     DIABETES Son        SocHx:    Social History     Social History     Marital status:      Spouse name: N/A     Number of children: N/A     Years of education: N/A     Occupational History     Not on file.     Social History Main Topics     Smoking status: Former Smoker     Packs/day: 1.00     Years: 30.00     Quit date: 2006     Smokeless tobacco: Never Used      Comment: Quit in      Alcohol use No     Drug use: No     Sexual activity: No      Comment: vasectomy     Other Topics Concern     Caffeine Concern No     none     Hobby Hazards No     Stress Concern No     Special Diet Yes     low sodium     Exercise Yes     limited due to back     Social History Narrative           EXAMINATION:  Gen:   No apparent distress  Neuro:   A&Ox3, no deficits  Psych:    Answering questions appropriately for age and situation with normal affect  Head:    NCAT  Eye:    Visual scanning without deficit  Ear:    Response to auditory stimuli wnl  Lung:    Non-labored breathing on RA noted  Abd:    NTND per patient report  Lymph:    Neg for pitting/non-pitting edema BLE  Vasc:    Pulses palpable, CFT minimally delayed  Neuro:    Light touch sensation intact to all sensory nerve distributions without paresthesias  Derm:    Medial L hallux onychocryptosis, a portion has been removed.  No SOI, minimal discomfort today.  Callus sub 5th MPJ R.  Callus/IPK L forefoot.  Small \"bump\" lateral R midfoot, does not appear to be a callus or a wart, but rather a skin or sub-q small mass.    MSK:    ROM, strength wnl without limitation, no pain on palpation noted.  Calf:    Neg for redness, swelling or " tenderness      Assessment:  61 year old female with onychocryptosis medial L hallux; callus sub 5th MPJ R foot; callus/IPK L forefoot; skin/sub-q lesion lateral R foot      Plan:  Discussed etiologies and options  1.  Onychocryptosis medial L hallux  -continue home debridement as able  -discussed partial permanent avulsion as next option    2.  Callus/IPK L forefoot  -home callus instructions dispensed and discussed    3.  Callus plantar R 5th MPJ  -home callus instructions    4.  Skin/sub-q lesion  -discussed padding and accommodative shoe gear  -discussed excision/biopsy option       Follow up:  Prn  or sooner with acute issues      Patient's medical history was reviewed today    Body mass index is 23.1 kg/(m^2).          Lukas Gomes DPM   Podiatric Foot & Ankle Surgeon  [facility]  [phone]

## 2017-06-20 NOTE — PATIENT INSTRUCTIONS
DR. RAWLS'S CLINIC LOCATIONS:   MONDAY - [place] TUESDAY - [place]   [address] [facility] [address] [facility] [address]   [place] [address] [place] [address]   [phone]       THURSDAY AM - [place] THURSDAY PM - [place]   [address]   [place] [address] [place] [address]   [phone]       FRIDAY AM - [place] SCHEDULE SURGERY: [phone] [address] APPOINTMENTS: [phone]   [place] [address] AFTER HOURS: [phone] FAX NUMBER: [phone]     Follow Up: as needed    CALLUS / CORN / IPK    When there is excessive friction or pressure on the skin, the body responds by making the skin thicker to protect the deeper structures from becoming exposed. While this works well to protect the deeper structures, the thickened skin can cause increased pressure and pain.    Callus: flat, diffuse thickened areas are simple calluses and they are usually caused by friction. Often these are the result of rubbing on a shoe or from going barefoot.    Corns: calluses with a central core on or between the toes are called corns. These result from prominent joints on toes rubbing together. Theses are a symptom of bone malalignment or illfitting shoes and will always recur unless the underlying bones are addressed surgically.    IPK: calluses with a central core on the ball of the foot are usually IPKs (intractable plantar keratosis). These are caused by excessive pressure from the metatarsals, the bones that make up the ball of the foot. Often one of these bones is too long or too prominent. Again, these will always recur unless the underlying bone issue is addressed. There is no cure for these. They will either go away by themselves, recur, or more could develop.    Home Treatment  - File: Trim them down with a pumice stone or callus file a couple times a week to remove callus tissue that builds up. An electric callus  removing device. Amope Pedi Perfect Electronic Pedicure Foot File and Callus Remover can be a good option.   - Moisturize: Lotion can be applied to soften the callus. A lactic acid or urea based cream such as Carmol, Kersal or Vanicream or thicker cream with shea butter are good options.   - Foot Gear: Good supportive shoes and minimizing barefoot walking can slow down callus formation and can decrease pain levels. Gel inserts can also provide padding to the bottom of the foot to prevent pain and slow recurrence. Toe spacers, toe covers, can custom orthotic inserts can be beneficial as well.  - Surgery: If there is a surgical pathology noted, such as a prominent bone, often this needs to be addressed surgically to minimize recurrence. However, sometimes the lesion simply migrates to another spot after surgery, so it is not a guaranteed cure.     If you cannot treat them yourself at home, call the home foot care nurses below:  [facility]  [phone] [facility]   [phone] [facility]   [phone]                   Body Mass Index (BMI)  Many things can cause foot and ankle problems. Foot structure, activity level, foot mechanics and injuries are common causes of pain.  One very important issue that often goes unmentioned, is body weight.  Extra weight can cause increased stress on muscles, ligaments, bones and tendons.  Sometimes just a few extra pounds is all it takes to put one over her/his threshold. Without reducing that stress, it can be difficult to alleviate pain. Some people are uncomfortable addressing this issue, but we feel it is important for you to think about it. As Foot &  Ankle specialists, our job is addressing the lower extremity problem and possible causes. Regarding extra body weight, we encourage patients to discuss diet and weight management plans with their primary care doctors. It is this team approach that gives you the best opportunity for pain relief and getting you back on your  feet.

## 2017-06-23 ENCOUNTER — MYC REFILL (OUTPATIENT)
Dept: INTERNAL MEDICINE | Facility: CLINIC | Age: 62
End: 2017-06-23

## 2017-06-23 DIAGNOSIS — G89.29 CHRONIC BILATERAL LOW BACK PAIN WITHOUT SCIATICA: ICD-10-CM

## 2017-06-23 DIAGNOSIS — J45.901 ASTHMA EXACERBATION: ICD-10-CM

## 2017-06-23 DIAGNOSIS — M54.50 CHRONIC BILATERAL LOW BACK PAIN WITHOUT SCIATICA: ICD-10-CM

## 2017-06-23 RX ORDER — ALBUTEROL SULFATE 90 UG/1
2 AEROSOL, METERED RESPIRATORY (INHALATION) EVERY 6 HOURS PRN
Qty: 18 G | Refills: 3 | Status: CANCELLED | OUTPATIENT
Start: 2017-06-23

## 2017-06-23 RX ORDER — HYDROCODONE BITARTRATE AND ACETAMINOPHEN 5; 325 MG/1; MG/1
2 TABLET ORAL EVERY 8 HOURS PRN
Qty: 100 TABLET | Refills: 0 | Status: CANCELLED | OUTPATIENT
Start: 2017-06-23

## 2017-06-23 NOTE — TELEPHONE ENCOUNTER
Message from [employer]:  Original authorizing provider: MD Trisha Marvin would like a refill of the following medications:  albuterol (VENTOLIN HFA) 108 (90 BASE) MCG/ACT Inhaler [Bandar Weinberg MD]  HYDROcodone-acetaminophen (NORCO) 5-325 MG per tablet [Bandar Weinberg MD]    Preferred pharmacy: [address].    Comment:  Dr. Weinberg and Nursing Staff, Hi. I would like to get refills on the Hydrocodone-acetaminophen and the Ventolin Inhaler by Monday. Thank you. Trisha Bender

## 2017-06-23 NOTE — TELEPHONE ENCOUNTER
"Not due for albuterol refill yet.    Norco      Last Written Prescription Date:  06/09/17  Last Fill Quantity: 100,   # refills: 0  Last Office Visit with G, P or [facility] prescribing provider: 06/02/17  Future Office visit:    Next 5 appointments (look out 90 days)     Jul 24, 2017  4:20 PM CDT   MyChart Long with Bandar Weinberg MD   [facility]    [address] [facility] [id number] [phone]                   Sent pt [employer] message as it early for a refill as dispense amount is meant to last 1 month.    Per 06/09 mychart refill: \"For the refill of Norco I want to taper it down slowly as it it a narcotic medication and long term use is not recommended.   Will change to take maximum 6 tablets daily.\"  "

## 2017-06-24 NOTE — TELEPHONE ENCOUNTER
Last refill-6/9/17-#100    Last OV-6/2/17    CSA on file     See pt's my chart message-pt allowed max 6/day

## 2017-06-24 NOTE — TELEPHONE ENCOUNTER
[facility]:  Original authorizing provider: MD Trisha Marvin would like a refill of the following medications:  HYDROcodone-acetaminophen (NORCO) 5-325 MG per tablet [Bandar Weinberg MD]    Preferred pharmacy: [place], [place] - [facility] [address].    Comment:  Dr. Weinberg and Nursing Staff, I want to be sure that you know the prescription is written for up to 6 pills per day which means it will last 16 days. I asked for a refill for Monday which will be 17 days since it was filled. I really need the medication to be filled on Monday. Please forward this message to Dr. Weinberg instead of being denied by the Triage nurse. Thank you. Trisha Bender

## 2017-06-26 ENCOUNTER — MYC REFILL (OUTPATIENT)
Dept: INTERNAL MEDICINE | Facility: CLINIC | Age: 62
End: 2017-06-26

## 2017-06-26 DIAGNOSIS — M54.50 CHRONIC BILATERAL LOW BACK PAIN WITHOUT SCIATICA: ICD-10-CM

## 2017-06-26 DIAGNOSIS — G89.29 CHRONIC BILATERAL LOW BACK PAIN WITHOUT SCIATICA: ICD-10-CM

## 2017-06-26 RX ORDER — HYDROCODONE BITARTRATE AND ACETAMINOPHEN 5; 325 MG/1; MG/1
2 TABLET ORAL EVERY 8 HOURS PRN
Qty: 100 TABLET | Refills: 0 | Status: SHIPPED | OUTPATIENT
Start: 2017-06-26 | End: 2017-07-11

## 2017-06-26 NOTE — TELEPHONE ENCOUNTER
Vicodin RX given to me from Dr. Sam.  Pharmacy is downstairs but they are closed right now  Left on JACKIE Gustafson desk to to bring down in am.    Hakan MARKS RN

## 2017-06-26 NOTE — TELEPHONE ENCOUNTER
Message from Anipipo:  Kiana Sung RN Mon Jun 26, 2017 11:25 AM        ----- Message -----   From: Trisha Bender   Sent: 6/26/2017 11:24 AM   To: Radha Sosa  Subject: Medication Renewal Request     Original authorizing provider: MD Trisha Marvin would like a refill of the following medications:  HYDROcodone-acetaminophen (NORCO) 5-325 MG per tablet [Bandar Weinberg MD]    Preferred pharmacy: [place] [address]    Comment:  Dr. Weinberg and Nursing Staff, I requested a refill last Friday for the Hydrocodone-acetaminophen prescription to be filled today. I got a response from Triage nurse Friday saying it was too soon though be filled. I wrote Friday explaining about the dosage of 6 pills per day and how I would run out of medication and haven't heard anything about the refill since. It also appears that the last request didn't get read so am re sending the request. Thank you. Trisha Bender

## 2017-07-11 ENCOUNTER — MYC REFILL (OUTPATIENT)
Dept: INTERNAL MEDICINE | Facility: CLINIC | Age: 62
End: 2017-07-11

## 2017-07-11 DIAGNOSIS — G89.29 CHRONIC BILATERAL LOW BACK PAIN WITHOUT SCIATICA: ICD-10-CM

## 2017-07-11 DIAGNOSIS — M54.50 CHRONIC BILATERAL LOW BACK PAIN WITHOUT SCIATICA: ICD-10-CM

## 2017-07-11 NOTE — TELEPHONE ENCOUNTER
Last refill-6/26/17-#100    Last OV-6/2/17    CSA on file     Per my chart message-Comment:  Dr. Sam for Dr. Weinberg and Nursing Staff, I am requesting a refill on the Hydrocodone-acetaminophen prescription.  It is written for 6 pills per day and that is generally the dosage that I take for severe back pain.  Would like to receive it as soon as possible as I will be out of medication soon.  Really appreciate your attention to this.  Thank you. Trisha Bender

## 2017-07-11 NOTE — TELEPHONE ENCOUNTER
Message from [employer]:  Original authorizing provider: MD Trisha Frye would like a refill of the following medications:  HYDROcodone-acetaminophen (NORCO) 5-325 MG per tablet [Ibis Sam MD]    Preferred pharmacy: [place], [place] - [facility] [address]    Comment:  Dr. Sam for Dr. Weinberg and Nursing Staff, I am requesting a refill on the Hydrocodone-acetaminophen prescription. It is written for 6 pills per day and that is generally the dosage that I take for severe back pain. Would like to receive it as soon as possible as I will be out of medication soon. Really appreciate your attention to this. Thank you. Trisha Bender

## 2017-07-12 RX ORDER — HYDROCODONE BITARTRATE AND ACETAMINOPHEN 5; 325 MG/1; MG/1
2 TABLET ORAL EVERY 8 HOURS PRN
Qty: 100 TABLET | Refills: 0 | Status: SHIPPED | OUTPATIENT
Start: 2017-07-12 | End: 2017-07-25

## 2017-07-12 NOTE — TELEPHONE ENCOUNTER
Rx hand carried to [facility].   Informed patient via [email or web address] message.

## 2017-07-19 ENCOUNTER — TRANSFERRED RECORDS (OUTPATIENT)
Dept: HEALTH INFORMATION MANAGEMENT | Facility: CLINIC | Age: 62
End: 2017-07-19

## 2017-07-25 ENCOUNTER — MYC REFILL (OUTPATIENT)
Dept: INTERNAL MEDICINE | Facility: CLINIC | Age: 62
End: 2017-07-25

## 2017-07-25 DIAGNOSIS — G89.29 CHRONIC BILATERAL LOW BACK PAIN WITHOUT SCIATICA: ICD-10-CM

## 2017-07-25 DIAGNOSIS — M54.50 CHRONIC BILATERAL LOW BACK PAIN WITHOUT SCIATICA: ICD-10-CM

## 2017-07-26 RX ORDER — HYDROCODONE BITARTRATE AND ACETAMINOPHEN 5; 325 MG/1; MG/1
2 TABLET ORAL EVERY 8 HOURS PRN
Qty: 100 TABLET | Refills: 0 | Status: SHIPPED | OUTPATIENT
Start: 2017-07-26 | End: 2017-08-08

## 2017-07-31 ENCOUNTER — MYC REFILL (OUTPATIENT)
Dept: INTERNAL MEDICINE | Facility: CLINIC | Age: 62
End: 2017-07-31

## 2017-07-31 DIAGNOSIS — F41.9 ANXIETY: ICD-10-CM

## 2017-07-31 RX ORDER — ALPRAZOLAM 0.25 MG
TABLET ORAL
Qty: 30 TABLET | Refills: 0 | Status: SHIPPED | OUTPATIENT
Start: 2017-07-31 | End: 2017-09-11

## 2017-07-31 NOTE — TELEPHONE ENCOUNTER
Message from artaculoushart:  Original authorizing provider: MD Trisha Marvin would like a refill of the following medications:  ALPRAZolam (XANAX) 0.25 MG tablet [Bandar Weinberg MD]    Preferred pharmacy: [place], [place] - [facility] [address]    Comment:  Dr. Weinberg and Nursing Staff, I would like to get a refill on the Alprazolam prescription. Thank you very much. Trisha Bender

## 2017-08-08 ENCOUNTER — MYC REFILL (OUTPATIENT)
Dept: INTERNAL MEDICINE | Facility: CLINIC | Age: 62
End: 2017-08-08

## 2017-08-08 DIAGNOSIS — M54.50 CHRONIC BILATERAL LOW BACK PAIN WITHOUT SCIATICA: ICD-10-CM

## 2017-08-08 DIAGNOSIS — G89.29 CHRONIC BILATERAL LOW BACK PAIN WITHOUT SCIATICA: ICD-10-CM

## 2017-08-09 RX ORDER — HYDROCODONE BITARTRATE AND ACETAMINOPHEN 5; 325 MG/1; MG/1
2 TABLET ORAL EVERY 8 HOURS PRN
Qty: 100 TABLET | Refills: 0 | Status: SHIPPED | OUTPATIENT
Start: 2017-08-09 | End: 2017-10-11

## 2017-08-09 NOTE — TELEPHONE ENCOUNTER
Message from [employer]:  Original authorizing provider: MD Trisha Marvin would like a refill of the following medications:  HYDROcodone-acetaminophen (NORCO) 5-325 MG per tablet [Bandar Weinberg MD]    Preferred pharmacy: [place], [place] - [facility] [address]    Comment:  Dr. Weinberg and Nursing Staff, I would like to get a refill on the Hydrocodone-acetaminophen prescription by Friday since I will run out of medication this weekend. Thank you. Trisha Bender

## 2017-08-14 DIAGNOSIS — M54.41 CHRONIC BILATERAL LOW BACK PAIN WITH RIGHT-SIDED SCIATICA: ICD-10-CM

## 2017-08-14 DIAGNOSIS — G89.29 CHRONIC BILATERAL LOW BACK PAIN WITH RIGHT-SIDED SCIATICA: ICD-10-CM

## 2017-08-14 RX ORDER — BACLOFEN 10 MG/1
TABLET ORAL
Qty: 30 TABLET | Refills: 1 | Status: SHIPPED | OUTPATIENT
Start: 2017-08-14 | End: 2017-10-11

## 2017-08-14 NOTE — TELEPHONE ENCOUNTER
Baclofen      Last Written Prescription Date: 5/8/17  Last Fill Quantity: 30,  # refills: 1   Last Office Visit with G, P or [facility] prescribing provider: 6/2/17

## 2017-08-18 ENCOUNTER — TELEPHONE (OUTPATIENT)
Dept: INTERNAL MEDICINE | Facility: CLINIC | Age: 62
End: 2017-08-18

## 2017-08-18 RX ORDER — HYDROCODONE BITARTRATE AND ACETAMINOPHEN 5; 325 MG/1; MG/1
1-2 TABLET ORAL EVERY 6 HOURS PRN
Qty: 100 TABLET | Refills: 0 | Status: SHIPPED | OUTPATIENT
Start: 2017-08-18 | End: 2017-09-05

## 2017-08-18 RX ORDER — CODEINE PHOSPHATE AND GUAIFENESIN 10; 100 MG/5ML; MG/5ML
1 SOLUTION ORAL EVERY 4 HOURS PRN
Qty: 120 ML | Refills: 0 | Status: SHIPPED | OUTPATIENT
Start: 2017-08-18 | End: 2018-02-26

## 2017-08-18 RX ORDER — PREDNISONE 5 MG/1
5 TABLET ORAL 2 TIMES DAILY
Qty: 30 TABLET | Refills: 0 | Status: SHIPPED | OUTPATIENT
Start: 2017-08-18 | End: 2017-09-17

## 2017-08-18 NOTE — TELEPHONE ENCOUNTER
Yandy, pharmacist from [facility] called to clarify directions on prednisone 5mg that was sent over today 8/18/17. Pharmacist states that if patient to follow directions as written it would be for a two week cycle with 21 tablets but the script is written for 30 tablets.     PCP please review and advise  Thank you

## 2017-08-22 ENCOUNTER — MYC REFILL (OUTPATIENT)
Dept: INTERNAL MEDICINE | Facility: CLINIC | Age: 62
End: 2017-08-22

## 2017-08-22 DIAGNOSIS — G89.29 CHRONIC BILATERAL LOW BACK PAIN WITHOUT SCIATICA: ICD-10-CM

## 2017-08-22 DIAGNOSIS — M54.50 CHRONIC BILATERAL LOW BACK PAIN WITHOUT SCIATICA: ICD-10-CM

## 2017-08-22 RX ORDER — HYDROCODONE BITARTRATE AND ACETAMINOPHEN 5; 325 MG/1; MG/1
2 TABLET ORAL EVERY 8 HOURS PRN
Qty: 100 TABLET | Refills: 0 | Status: CANCELLED | OUTPATIENT
Start: 2017-08-22

## 2017-08-23 NOTE — TELEPHONE ENCOUNTER
Message from [employer]:  Original authorizing provider: MD Trisha Marvin would like a refill of the following medications:  HYDROcodone-acetaminophen (NORCO) 5-325 MG per tablet [Bandar Weinberg MD]    Preferred pharmacy: [place], [place] - [facility] [address]    Comment:  Dr. Weinberg and Nursing Staff, I would like to get a refill on the Hydrocodone-acetaminophen prescription to be picked up Friday. I am confused about my medication list, it looks as though the prescription has already been approved but not taken to pharmacy? My back pain has been so severe the past two weeks, been difficult to walk. I will run out before the weekend and doubt Pharmacy will fill before Monday but hope they will Friday. Thank you. Trisha Bender

## 2017-08-23 NOTE — TELEPHONE ENCOUNTER
Rx done per PCP 8-18-17.    Spoke with Madeline in pharm.  Received rx but states earliest med can be filled is 8-25-17.    Pt informed of this via IPGt.

## 2017-09-05 ENCOUNTER — MYC REFILL (OUTPATIENT)
Dept: INTERNAL MEDICINE | Facility: CLINIC | Age: 62
End: 2017-09-05

## 2017-09-05 DIAGNOSIS — M54.50 CHRONIC BILATERAL LOW BACK PAIN WITHOUT SCIATICA: ICD-10-CM

## 2017-09-05 DIAGNOSIS — G89.29 CHRONIC BILATERAL LOW BACK PAIN WITHOUT SCIATICA: ICD-10-CM

## 2017-09-06 RX ORDER — HYDROCODONE BITARTRATE AND ACETAMINOPHEN 5; 325 MG/1; MG/1
1-2 TABLET ORAL EVERY 6 HOURS PRN
Qty: 100 TABLET | Refills: 0 | Status: SHIPPED | OUTPATIENT
Start: 2017-09-06 | End: 2017-09-17

## 2017-09-06 NOTE — TELEPHONE ENCOUNTER
Message from larkhart:  Original authorizing provider: MD Trisha Marvin would like a refill of the following medications:  HYDROcodone-acetaminophen (NORCO) 5-325 MG per tablet [Bandar Weinberg MD]    Preferred pharmacy: [place], [place] - [facility] [address]    Comment:  Dr. Weinberg and Nursing Staff, I would like to get a refill on the Hydrocodone-acetaminophen prescription to be picked up by this Friday. Thank you. Trisha Bender

## 2017-09-11 DIAGNOSIS — F41.9 ANXIETY: ICD-10-CM

## 2017-09-11 RX ORDER — ALPRAZOLAM 0.25 MG
TABLET ORAL
Qty: 30 TABLET | Refills: 0 | Status: SHIPPED | OUTPATIENT
Start: 2017-09-11 | End: 2017-10-11

## 2017-09-11 NOTE — TELEPHONE ENCOUNTER
Xanax 0.25mg      Last Written Prescription Date: 07/31/17  Last Fill Quantity: 30,  # refills: 0   Last Office Visit with G, [facility] or [facility] prescribing provider: 06/02/17

## 2017-09-17 ENCOUNTER — MYC REFILL (OUTPATIENT)
Dept: INTERNAL MEDICINE | Facility: CLINIC | Age: 62
End: 2017-09-17

## 2017-09-17 DIAGNOSIS — J45.901 ASTHMA EXACERBATION: ICD-10-CM

## 2017-09-17 DIAGNOSIS — M54.50 CHRONIC BILATERAL LOW BACK PAIN WITHOUT SCIATICA: ICD-10-CM

## 2017-09-17 DIAGNOSIS — G89.29 CHRONIC BILATERAL LOW BACK PAIN WITHOUT SCIATICA: ICD-10-CM

## 2017-09-18 RX ORDER — PREDNISONE 5 MG/1
TABLET ORAL
Qty: 21 TABLET | Refills: 0 | Status: SHIPPED | OUTPATIENT
Start: 2017-09-18 | End: 2017-10-11

## 2017-09-18 RX ORDER — HYDROCODONE BITARTRATE AND ACETAMINOPHEN 5; 325 MG/1; MG/1
1-2 TABLET ORAL EVERY 6 HOURS PRN
Qty: 100 TABLET | Refills: 0 | Status: SHIPPED | OUTPATIENT
Start: 2017-09-18 | End: 2017-09-30

## 2017-09-18 NOTE — TELEPHONE ENCOUNTER
Message from [employer]:  Original authorizing provider: MD Trisha Marvin would like a refill of the following medications:  HYDROcodone-acetaminophen (NORCO) 5-325 MG per tablet [Bandar Weinberg MD]    Preferred pharmacy: [place], [place] - [facility] [address]    Comment:  Dr. Weinberg and Nursing Staff, I would like to get a refill on the Hydrocodone-acetaminophen prescription. Thank you. Trisha Bender

## 2017-09-30 ENCOUNTER — MYC REFILL (OUTPATIENT)
Dept: INTERNAL MEDICINE | Facility: CLINIC | Age: 62
End: 2017-09-30

## 2017-09-30 DIAGNOSIS — M54.50 CHRONIC BILATERAL LOW BACK PAIN WITHOUT SCIATICA: ICD-10-CM

## 2017-09-30 DIAGNOSIS — J45.901 ASTHMA EXACERBATION: ICD-10-CM

## 2017-09-30 DIAGNOSIS — G89.29 CHRONIC BILATERAL LOW BACK PAIN WITHOUT SCIATICA: ICD-10-CM

## 2017-09-30 DIAGNOSIS — I50.21 ACUTE SYSTOLIC CONGESTIVE HEART FAILURE (H): ICD-10-CM

## 2017-10-02 RX ORDER — PREDNISONE 5 MG/1
TABLET ORAL
Qty: 21 TABLET | Refills: 0 | OUTPATIENT
Start: 2017-10-02

## 2017-10-02 RX ORDER — HYDROCODONE BITARTRATE AND ACETAMINOPHEN 5; 325 MG/1; MG/1
1-2 TABLET ORAL EVERY 8 HOURS PRN
Qty: 180 TABLET | Refills: 0 | Status: SHIPPED | OUTPATIENT
Start: 2017-10-02 | End: 2017-10-29

## 2017-10-02 RX ORDER — FUROSEMIDE 20 MG
TABLET ORAL
Qty: 180 TABLET | Refills: 1 | OUTPATIENT
Start: 2017-10-02

## 2017-10-02 NOTE — TELEPHONE ENCOUNTER
Message from RPOhart:  Original authorizing provider: MD Trisha Marvin would like a refill of the following medications:  HYDROcodone-acetaminophen (NORCO) 5-325 MG per tablet [Bandar Weinberg MD]  predniSONE (DELTASONE) 5 MG tablet [Bandar Weinberg MD]    [facility] [address] [facility]    Comment:  Dr. Weinberg and Nursing Staff, I would like to get refills on the Hydrocodone-Acetaminophen prescription and the 5 mg. Prednisone prescription as soon as possible. Your attention to this is much appreciated. Thank you. Trisha Bender

## 2017-10-02 NOTE — TELEPHONE ENCOUNTER
Pt should not need refill on Lasix-rx faxed on 5/8/17-#180 x1    Prednisone      Last Written Prescription Date: 9/18/17  Last Fill Quantity: 21,  # refills: 0   Last Office Visit with G, P or [facility] prescribing provider: 6/2/17      [facility] refused Pred rx in other 9/30 refill entry

## 2017-10-02 NOTE — TELEPHONE ENCOUNTER
Last refill on Vicodin-9/18/17-#100    Last refill on Prednisone-9/18/17-#21    Last OV=6/2/17    CSA on file

## 2017-10-11 ENCOUNTER — OFFICE VISIT (OUTPATIENT)
Dept: INTERNAL MEDICINE | Facility: CLINIC | Age: 62
End: 2017-10-11
Payer: COMMERCIAL

## 2017-10-11 VITALS
BODY MASS INDEX: 25.4 KG/M2 | TEMPERATURE: 98 F | RESPIRATION RATE: 24 BRPM | HEART RATE: 130 BPM | SYSTOLIC BLOOD PRESSURE: 110 MMHG | WEIGHT: 177.4 LBS | OXYGEN SATURATION: 97 % | HEIGHT: 70 IN | DIASTOLIC BLOOD PRESSURE: 70 MMHG

## 2017-10-11 DIAGNOSIS — J43.9 PULMONARY EMPHYSEMA, UNSPECIFIED EMPHYSEMA TYPE (H): Primary | ICD-10-CM

## 2017-10-11 DIAGNOSIS — M54.41 CHRONIC BILATERAL LOW BACK PAIN WITH RIGHT-SIDED SCIATICA: ICD-10-CM

## 2017-10-11 DIAGNOSIS — Z23 NEED FOR VACCINATION: ICD-10-CM

## 2017-10-11 DIAGNOSIS — I50.22 CHRONIC SYSTOLIC CONGESTIVE HEART FAILURE (H): ICD-10-CM

## 2017-10-11 DIAGNOSIS — F41.9 ANXIETY: ICD-10-CM

## 2017-10-11 DIAGNOSIS — Z12.11 SPECIAL SCREENING FOR MALIGNANT NEOPLASMS, COLON: ICD-10-CM

## 2017-10-11 DIAGNOSIS — Z23 NEED FOR PROPHYLACTIC VACCINATION AND INOCULATION AGAINST INFLUENZA: ICD-10-CM

## 2017-10-11 DIAGNOSIS — G89.29 CHRONIC BILATERAL LOW BACK PAIN WITH RIGHT-SIDED SCIATICA: ICD-10-CM

## 2017-10-11 DIAGNOSIS — I50.21 ACUTE SYSTOLIC CONGESTIVE HEART FAILURE (H): ICD-10-CM

## 2017-10-11 DIAGNOSIS — I10 ESSENTIAL HYPERTENSION: ICD-10-CM

## 2017-10-11 LAB
ERYTHROCYTE [DISTWIDTH] IN BLOOD BY AUTOMATED COUNT: 12.3 % (ref 10–15)
HCT VFR BLD AUTO: 43.7 % (ref 35–47)
HGB BLD-MCNC: 13.6 G/DL (ref 11.7–15.7)
MCH RBC QN AUTO: 29.6 PG (ref 26.5–33)
MCHC RBC AUTO-ENTMCNC: 31.1 G/DL (ref 31.5–36.5)
MCV RBC AUTO: 95 FL (ref 78–100)
PLATELET # BLD AUTO: 274 10E9/L (ref 150–450)
RBC # BLD AUTO: 4.59 10E12/L (ref 3.8–5.2)
WBC # BLD AUTO: 7.3 10E9/L (ref 4–11)

## 2017-10-11 PROCEDURE — 90471 IMMUNIZATION ADMIN: CPT | Performed by: INTERNAL MEDICINE

## 2017-10-11 PROCEDURE — 90714 TD VACC NO PRESV 7 YRS+ IM: CPT | Performed by: INTERNAL MEDICINE

## 2017-10-11 PROCEDURE — 90472 IMMUNIZATION ADMIN EACH ADD: CPT | Performed by: INTERNAL MEDICINE

## 2017-10-11 PROCEDURE — 85027 COMPLETE CBC AUTOMATED: CPT | Performed by: INTERNAL MEDICINE

## 2017-10-11 PROCEDURE — 36415 COLL VENOUS BLD VENIPUNCTURE: CPT | Performed by: INTERNAL MEDICINE

## 2017-10-11 PROCEDURE — 80053 COMPREHEN METABOLIC PANEL: CPT | Performed by: INTERNAL MEDICINE

## 2017-10-11 PROCEDURE — 90686 IIV4 VACC NO PRSV 0.5 ML IM: CPT | Performed by: INTERNAL MEDICINE

## 2017-10-11 PROCEDURE — 99215 OFFICE O/P EST HI 40 MIN: CPT | Mod: 25 | Performed by: INTERNAL MEDICINE

## 2017-10-11 RX ORDER — ALPRAZOLAM 0.25 MG
TABLET ORAL
Qty: 30 TABLET | Refills: 0 | Status: SHIPPED | OUTPATIENT
Start: 2017-10-11 | End: 2018-01-19

## 2017-10-11 RX ORDER — FUROSEMIDE 20 MG
40 TABLET ORAL DAILY
Qty: 180 TABLET | Refills: 1 | Status: SHIPPED | OUTPATIENT
Start: 2017-10-11 | End: 2017-11-06

## 2017-10-11 RX ORDER — BACLOFEN 10 MG/1
TABLET ORAL
Qty: 90 TABLET | Refills: 1 | Status: SHIPPED | OUTPATIENT
Start: 2017-10-11 | End: 2018-02-14

## 2017-10-11 RX ORDER — PREDNISONE 5 MG/1
5 TABLET ORAL DAILY
Qty: 15 TABLET | Refills: 0 | Status: SHIPPED | OUTPATIENT
Start: 2017-10-11 | End: 2017-10-16

## 2017-10-11 ASSESSMENT — PAIN SCALES - GENERAL: PAINLEVEL: MODERATE PAIN (5)

## 2017-10-11 NOTE — PROGRESS NOTES
SUBJECTIVE:   Trisha Bender is a 62 year old female who presents to clinic today for the following health issues:    SOB ongoing for over a year close to 2 years  TD and FLU shot today   Medication refills    Patient is seen for a follow up visit.    Patient has h/o COPD. Has remote history of smoking. Has symptoms of SOB on exertion, cough, wheezing. Not on oxygen. Has seen pulmonary and is on inhaled steroids and bronchodilators. Has had recurrent exacerbations of SOB and is treated with short courses of systemic steroids - Prednisone. When off Prednisone her symptoms re occur. Currently has SOB on minimal exertion. No fever or chest pain. No productive cough.     Has history of CHF. Follows with cardiology. Her latest ECHO from May 2017  showed preserved EF, mild to moderate MR.   Has has symptoms of LE edema, on Lasix and has been SOB on exertion. No chest pain.     Has h/o chronic LBP and right sided abdominal and chest pain, no definite etiology. On PRN analgesics. No change in symptoms.       Has chronic anxiety. On PRN Xanax. Helps with symptoms.     Has h/o GERD on PPI treatment. symptoms are controlled. No nausea, vomiting, heartburns, bloating.    Has H/O hyperlipidemia. On medical treatment and diet. No side effects. No muscle weakness, myalgias or upset stomach.           Problem list and histories reviewed & adjusted, as indicated.  Additional history: as documented    Patient Active Problem List   Diagnosis     Essential hypertension     Other type of migraine     Esophageal reflux     Disorder of bone and cartilage     Family history of malignant neoplasm of gastrointestinal tract     Chronic airway obstruction (H)     Cystocele     Advanced directives, counseling/discussion     Family history of thyroid cancer - 1/2 brother     Anxiety     Mild persistent asthma without complication     Chronic low back pain     Hair loss     Lateral epicondylitis of right elbow     Dilated cardiomyopathy  (H)     Right bundle branch block (RBBB)     CHF (congestive heart failure) (H)     SOB (shortness of breath)     Coronary artery disease involving native coronary artery of native heart without angina pectoris     Mixed hyperlipidemia     Controlled substance agreement signed     Moderate persistent asthma without complication     Hypokalemia     Mechanical low back pain     Past Surgical History:   Procedure Laterality Date     ANGIOGRAM  2015    Minimal CAD. LV dysfunction, Mid RCA 20% stenosis, EF 20-25%, c/w Takotsubo cardiomyopathy     APPENDECTOMY       C NONSPECIFIC PROCEDURE      GB and appendectomy  abstracted [id number]     C NONSPECIFIC PROCEDURE      Nasal septoplasty and mucous retention cyst     CHOLECYSTECTOMY       ESOPHAGOSCOPY, GASTROSCOPY, DUODENOSCOPY (EGD), COMBINED  11/15/2011    Procedure:COMBINED ESOPHAGOSCOPY, GASTROSCOPY, DUODENOSCOPY (EGD);  ESOPHAGOSCOPY, GASTROSCOPY, DUODENOSCOPY (EGD) ; Surgeon:JIM URENA; Location:[facility]     HC REMOVE TONSILS/ADENOIDS,<13 Y/O       LAPAROSCOPIC ABLATION ENDOMETRIOSIS       wisdom teeth         Social History   Substance Use Topics     Smoking status: Former Smoker     Packs/day: 1.00     Years: 30.00     Quit date: 2006     Smokeless tobacco: Never Used      Comment: Quit in      Alcohol use No     Family History   Problem Relation Age of Onset     CANCER Mother      Colon cancer; dxed at age 64;  at age 69     Cancer - colorectal Mother      Other Cancer Mother      GASTROINTESTINAL DISEASE Sister      Acute pancreatitis     HEART DISEASE Father      ?     Coronary Artery Disease Father      Circulatory Maternal Aunt      AAA     Circulatory Maternal Uncle      AAA     DIABETES Son          Current Outpatient Prescriptions   Medication Sig Dispense Refill     fluticasone-salmeterol (ADVAIR) 500-50 MCG/DOSE diskus inhaler Inhale 1 puff into the lungs 2 times daily 3 Inhaler 1     predniSONE (DELTASONE) 5 MG tablet Take 1  tablet (5 mg) by mouth daily 15 tablet 0     baclofen (LIORESAL) 10 MG tablet TAKE ONE TABLET BY MOUTH THREE TIMES A DAY AS NEEDED FOR MUSCLE SPASMS 90 tablet 1     ALPRAZolam (XANAX) 0.25 MG tablet Take 1 tablet by mouth. Every twelve hours as needed for anxiety 30 tablet 0     furosemide (LASIX) 20 MG tablet Take 2 tablets (40 mg) by mouth daily severe swelling 180 tablet 1     HYDROcodone-acetaminophen (NORCO) 5-325 MG per tablet Take 1-2 tablets by mouth every 8 hours as needed for pain Maximum 6 daily 180 tablet 0     albuterol (VENTOLIN HFA) 108 (90 BASE) MCG/ACT Inhaler Inhale 2 puffs into the lungs every 6 hours as needed for shortness of breath / dyspnea or wheezing 18 g 3     SUMAtriptan (IMITREX) 25 MG tablet Take 1 tablet (25 mg) by mouth at onset of headache for migraine May repeat in 2 hours if needed: max 2/day; 12 tablet 3     fluticasone-salmeterol (ADVAIR) 250-50 MCG/DOSE diskus inhaler Inhale 1 puff into the lungs 2 times daily 1 Inhaler 1     lisinopril (PRINIVIL/ZESTRIL) 2.5 MG tablet Take 1 tablet (2.5 mg) by mouth daily 90 tablet 1     ipratropium - albuterol 0.5 mg/2.5 mg/3 mL (DUONEB) 0.5-2.5 (3) MG/3ML neb solution Take 1 vial (3 mLs) by nebulization every 6 hours as needed for shortness of breath / dyspnea or wheezing 30 vial 1     SPIRIVA HANDIHALER 18 MCG capsule USING THE HANDIHALER, INHALE THE CONTENTS OF ONE CAPSULE BY MOUTH DAILY 90 capsule 1     potassium chloride (K-TAB,KLOR-CON) 10 MEQ tablet Take 1 tablet (10 mEq) by mouth 3 times daily 90 tablet 5     mometasone (ELOCON) 0.1 % ointment Apply sparingly to affected area twice daily as needed.  Do not apply to face. 45 g 0     montelukast (SINGULAIR) 10 MG tablet TAKE 1 TABLET AT BEDTIME 90 tablet 1     ondansetron (ZOFRAN ODT) 4 MG ODT tab Take 1-2 tablets (4-8 mg) by mouth every 8 hours as needed for nausea 20 tablet 0     fluticasone (FLONASE) 50 MCG/ACT spray Spray 1-2 sprays into both nostrils daily 1 Bottle 11     albuterol  "(2.5 MG/3ML) 0.083% neb solution Take 1 vial (2.5 mg) by nebulization every 6 hours as needed for shortness of breath / dyspnea or wheezing 25 vial 0     pantoprazole (PROTONIX) 40 MG enteric coated tablet Take 1 tablet (40 mg) by mouth daily 90 tablet 3     atorvastatin (LIPITOR) 10 MG tablet Reported on 5/8/2017 30 tablet 11     nitroglycerin (NITROSTAT) 0.4 MG SL tablet Place 1 tablet (0.4 mg) under the tongue every 5 minutes as needed for chest pain If you are still having symptoms after 3 doses (15 minutes) call 911. 25 tablet 0     [employer] FIBER GUMMIES 2.5 G CHEW Take 1 tablet by mouth daily Reported on 5/8/2017       Multiple Vitamin CHEW Take 1 tablet by mouth daily        TYLENOL EXTRA STRENGTH OR 1 TABLET EVERY 4 HOURS AS NEEDED       guaiFENesin-codeine (ROBITUSSIN AC) 100-10 MG/5ML SOLN solution Take 5 mLs by mouth every 4 hours as needed for cough (Patient not taking: Reported on 10/11/2017) 120 mL 0         Reviewed and updated as needed this visit by clinical staffTobacco  Allergies       Reviewed and updated as needed this visit by Provider         ROS:  Constitutional, HEENT, cardiovascular, pulmonary, GI, , musculoskeletal, neuro, skin, endocrine and psych systems are negative, except as otherwise noted.      OBJECTIVE:   /70 (BP Location: Left arm, Patient Position: Sitting, Cuff Size: Adult Regular)  Pulse 130  Temp 98  F (36.7  C) (Oral)  Resp 24  Ht 5' 10\" (1.778 m)  Wt 177 lb 6.4 oz (80.5 kg)  LMP 09/20/2005  SpO2 97%  Breastfeeding? No  BMI 25.45 kg/m2  Body mass index is 25.45 kg/(m^2).   GENERAL: frail, alert and no distress  NECK: no adenopathy, no asymmetry, masses, or scars and thyroid normal to palpation  RESP: lungs clear to auscultation - no rales, rhonchi or wheezes, mild base crepitations, diffusely diminished breaths sounds   CV: regular rate and rhythm, normal S1 S2, no S3 or S4, 3/6 systolic murmur,no click or rub, 1+ peripheral edema and peripheral pulses " strong  ABDOMEN: soft, mildly tender RUQ, no hepatosplenomegaly, no masses and bowel sounds normal  MS: no gross musculoskeletal defects noted, 1+ LE edema  SKIN: no suspicious lesions or rashes  NEURO: Normal strength and tone, mentation intact and speech normal    Diagnostic Test Results:  Results for orders placed or performed in visit on 10/11/17   Comprehensive metabolic panel   Result Value Ref Range    Sodium 140 133 - 144 mmol/L    Potassium 3.9 3.4 - 5.3 mmol/L    Chloride 104 94 - 109 mmol/L    Carbon Dioxide 26 20 - 32 mmol/L    Anion Gap 10 3 - 14 mmol/L    Glucose 94 70 - 99 mg/dL    Urea Nitrogen 6 (L) 7 - 30 mg/dL    Creatinine 0.75 0.52 - 1.04 mg/dL    GFR Estimate 78 >60 mL/min/1.7m2    GFR Estimate If Black >90 >60 mL/min/1.7m2    Calcium 8.9 8.5 - 10.1 mg/dL    Bilirubin Total 0.2 0.2 - 1.3 mg/dL    Albumin 3.3 (L) 3.4 - 5.0 g/dL    Protein Total 6.2 (L) 6.8 - 8.8 g/dL    Alkaline Phosphatase 72 40 - 150 U/L    ALT 34 0 - 50 U/L    AST 20 0 - 45 U/L   CBC with platelets   Result Value Ref Range    WBC 7.3 4.0 - 11.0 10e9/L    RBC Count 4.59 3.8 - 5.2 10e12/L    Hemoglobin 13.6 11.7 - 15.7 g/dL    Hematocrit 43.7 35.0 - 47.0 %    MCV 95 78 - 100 fl    MCH 29.6 26.5 - 33.0 pg    MCHC 31.1 (L) 31.5 - 36.5 g/dL    RDW 12.3 10.0 - 15.0 %    Platelet Count 274 150 - 450 10e9/L       ASSESSMENT/PLAN:     Problem List Items Addressed This Visit     Essential hypertension    Relevant Orders    Comprehensive metabolic panel (Completed)    CBC with platelets (Completed)    Chronic airway obstruction (H) - Primary    Relevant Medications    fluticasone-salmeterol (ADVAIR) 500-50 MCG/DOSE diskus inhaler    predniSONE (DELTASONE) 5 MG tablet    Anxiety    Relevant Medications    ALPRAZolam (XANAX) 0.25 MG tablet    Chronic low back pain    Relevant Medications    predniSONE (DELTASONE) 5 MG tablet    baclofen (LIORESAL) 10 MG tablet    CHF (congestive heart failure) (H)    Relevant Medications    furosemide  (LASIX) 20 MG tablet      Other Visit Diagnoses     Need for prophylactic vaccination and inoculation against influenza        Relevant Orders    FLU VAC, SPLIT VIRUS IM > 3 YO (QUADRIVALENT) [95809] (Completed)    Vaccine Administration, Initial [87090] (Completed)    Need for vaccination        Relevant Orders    TD PRSERV FREE >=7 YRS ADS IM [71095] (Completed)    Each additional admin.  (Right click and add QUANTITY)  [42212] (Completed)    Special screening for malignant neoplasms, colon        Relevant Orders    Fecal colorectal cancer screen (FIT)           Assess lab work   Will increase inhaled steroid dose - advair 500/50  Start low dose Prednisone for 2 weeks, then reassess   Needs to see pulmonary for follow up   Continue diuretic, use TEDs  Immunized   Colon cancer screen     Follow-Up:in 2 weeks     Bandar Weinberg MD  [facility]  Injectable Influenza Immunization Documentation    1.  Is the person to be vaccinated sick today?   No    2. Does the person to be vaccinated have an allergy to a component   of the vaccine?   No    3. Has the person to be vaccinated ever had a serious reaction   to influenza vaccine in the past?   No    4. Has the person to be vaccinated ever had Guillain-Barré syndrome?   No    Form completed by Aileen Moreno, Medical Assistant

## 2017-10-11 NOTE — MR AVS SNAPSHOT
After Visit Summary   10/11/2017    Trisha Bender    MRN: [id number]           Patient Information     Date Of Birth          1955        Visit Information        Provider Department      10/11/2017 3:40 PM Bandar Weinberg MD [facility]        Today's Diagnoses     Pulmonary emphysema, unspecified emphysema type (H)    -  1    Need for prophylactic vaccination and inoculation against influenza        Need for vaccination        Essential hypertension        Chronic systolic congestive heart failure (H)        Chronic bilateral low back pain with right-sided sciatica        Anxiety        Acute systolic congestive heart failure (H)        Special screening for malignant neoplasms, colon          Care Instructions    Will increase Advair to 500 / 50   Start on low dose Prednisone for 2 weeks - 5 mg  Follow up with lung specialist           Follow-ups after your visit        Follow-up notes from your care team     Return in about 4 weeks (around 11/8/2017).      Future tests that were ordered for you today     Open Future Orders        Priority Expected Expires Ordered    Fecal colorectal cancer screen (FIT) Routine 11/1/2017 1/3/2018 10/11/2017            Who to contact     If you have questions or need follow up information about today's clinic visit or your schedule please contact [facility] directly at [phone].  Normal or non-critical lab and imaging results will be communicated to you by MyChart, letter or phone within 4 business days after the clinic has received the results. If you do not hear from us within 7 days, please contact the clinic through MyChart or phone. If you have a critical or abnormal lab result, we will notify you by phone as soon as possible.  Submit refill requests through [facility] or call your pharmacy and they will forward the refill request to us. Please allow 3 business days for your refill to be completed.           "Additional Information About Your Visit        [employer] Information     [employer] gives you secure access to your electronic health record. If you see a primary care provider, you can also send messages to your care team and make appointments. If you have questions, please call your primary care clinic.  If you do not have a primary care provider, please call [phone] and they will assist you.        Care EveryWhere ID     This is your Care EveryWhere ID. This could be used by other organizations to access your [place] medical records  [id number]        Your Vitals Were     Pulse Temperature Respirations Height Last Period Pulse Oximetry    130 98  F (36.7  C) (Oral) 24 5' 10\" (1.778 m) 09/20/2005 97%    Breastfeeding? BMI (Body Mass Index)                No 25.45 kg/m2           Blood Pressure from Last 3 Encounters:   10/11/17 110/70   06/02/17 126/80   05/23/17 126/80    Weight from Last 3 Encounters:   10/11/17 177 lb 6.4 oz (80.5 kg)   06/20/17 161 lb (73 kg)   06/02/17 160 lb (72.6 kg)              We Performed the Following     CBC with platelets     Comprehensive metabolic panel     Each additional admin.  (Right click and add QUANTITY)  [63483]     FLU VAC, SPLIT VIRUS IM > 3 YO (QUADRIVALENT) [38085]     TD PRSERV FREE >=7 YRS ADS IM [07853]     Vaccine Administration, Initial [68361]          Today's Medication Changes          These changes are accurate as of: 10/11/17  4:38 PM.  If you have any questions, ask your nurse or doctor.               Start taking these medicines.        Dose/Directions    predniSONE 5 MG tablet   Commonly known as:  DELTASONE   Used for:  Pulmonary emphysema, unspecified emphysema type (H)   Started by:  Bandar Weinberg MD        Dose:  5 mg   Take 1 tablet (5 mg) by mouth daily   Quantity:  15 tablet   Refills:  0         These medicines have changed or have updated prescriptions.        Dose/Directions    baclofen 10 MG tablet   Commonly known as:  LIORESAL   This " may have changed:  See the new instructions.   Used for:  Chronic bilateral low back pain with right-sided sciatica   Changed by:  Bandar Weinberg MD        TAKE ONE TABLET BY MOUTH THREE TIMES A DAY AS NEEDED FOR MUSCLE SPASMS   Quantity:  90 tablet   Refills:  1       * fluticasone-salmeterol 250-50 MCG/DOSE diskus inhaler   Commonly known as:  ADVAIR   This may have changed:  Another medication with the same name was added. Make sure you understand how and when to take each.   Used for:  Moderate persistent asthma without complication   Changed by:  Bandar Weinberg MD        Dose:  1 puff   Inhale 1 puff into the lungs 2 times daily   Quantity:  1 Inhaler   Refills:  1       * fluticasone-salmeterol 500-50 MCG/DOSE diskus inhaler   Commonly known as:  ADVAIR   This may have changed:  You were already taking a medication with the same name, and this prescription was added. Make sure you understand how and when to take each.   Used for:  Pulmonary emphysema, unspecified emphysema type (H)   Changed by:  Bandar Weinberg MD        Dose:  1 puff   Inhale 1 puff into the lungs 2 times daily   Quantity:  3 Inhaler   Refills:  1       * Notice:  This list has 2 medication(s) that are the same as other medications prescribed for you. Read the directions carefully, and ask your doctor or other care provider to review them with you.         Where to get your medicines      These medications were sent to [facility] [place] [address]  [address] [facility] [address]     Phone:  [phone]     baclofen 10 MG tablet    fluticasone-salmeterol 500-50 MCG/DOSE diskus inhaler    furosemide 20 MG tablet    predniSONE 5 MG tablet         Some of these will need a paper prescription and others can be bought over the counter.  Ask your nurse if you have questions.     Bring a paper prescription for each of these medications     ALPRAZolam 0.25 MG tablet                 Primary Care Provider Office Phone # Fax #    Bandar Weinberg [phone]       [address] [facility] [address]        Equal Access to Services     KAMILA RIVEROJOSHUA : Hadii aad ku hadbriano Soomaali, waaxda luqadaha, qaybta kaalmada adefrancoiseyada, jonathan gamezleora aditya. So [facility] [phone].    ATENCIÓN: Si habla español, tiene a madison disposición servicios gratuitos de asistencia lingüística. Llame al [phone].    We comply with applicable federal civil rights laws and [place] laws. We do not discriminate on the basis of race, color, national origin, age, disability, sex, sexual orientation, or gender identity.            Thank you!     Thank you for choosing [facility]  for your care. Our goal is always to provide you with excellent care. Hearing back from our patients is one way we can continue to improve our services. Please take a few minutes to complete the written survey that you may receive in the mail after your visit with us. Thank you!             Your Updated Medication List - Protect others around you: Learn how to safely use, store and throw away your medicines at www.disposemymeds.org.          This list is accurate as of: 10/11/17  4:38 PM.  Always use your most recent med list.                   Brand Name Dispense Instructions for use Diagnosis    * albuterol (2.5 MG/3ML) 0.083% neb solution     25 vial    Take 1 vial (2.5 mg) by nebulization every 6 hours as needed for shortness of breath / dyspnea or wheezing    SOB (shortness of breath)       * albuterol 108 (90 BASE) MCG/ACT Inhaler    VENTOLIN HFA    18 g    Inhale 2 puffs into the lungs every 6 hours as needed for shortness of breath / dyspnea or wheezing    Asthma exacerbation       ALPRAZolam 0.25 MG tablet    XANAX    30 tablet    Take 1 tablet by mouth. Every twelve hours as needed for anxiety    Anxiety       atorvastatin 10 MG tablet    LIPITOR    30 tablet    Reported on  5/8/2017    Coronary artery disease involving native coronary artery of native heart without angina pectoris, Mixed hyperlipidemia       baclofen 10 MG tablet    LIORESAL    90 tablet    TAKE ONE TABLET BY MOUTH THREE TIMES A DAY AS NEEDED FOR MUSCLE SPASMS    Chronic bilateral low back pain with right-sided sciatica       [employer] FIBER GUMMIES 2.5 G Chew      Take 1 tablet by mouth daily Reported on 5/8/2017        fluticasone 50 MCG/ACT spray    FLONASE    1 Bottle    Spray 1-2 sprays into both nostrils daily    Nasal congestion       * fluticasone-salmeterol 250-50 MCG/DOSE diskus inhaler    ADVAIR    1 Inhaler    Inhale 1 puff into the lungs 2 times daily    Moderate persistent asthma without complication       * fluticasone-salmeterol 500-50 MCG/DOSE diskus inhaler    ADVAIR    3 Inhaler    Inhale 1 puff into the lungs 2 times daily    Pulmonary emphysema, unspecified emphysema type (H)       furosemide 20 MG tablet    LASIX    180 tablet    Take 2 tablets (40 mg) by mouth daily severe swelling    Acute systolic congestive heart failure (H)       guaiFENesin-codeine 100-10 MG/5ML Soln solution    ROBITUSSIN AC    120 mL    Take 5 mLs by mouth every 4 hours as needed for cough    Asthma exacerbation       HYDROcodone-acetaminophen 5-325 MG per tablet    NORCO    180 tablet    Take 1-2 tablets by mouth every 8 hours as needed for pain Maximum 6 daily    Chronic bilateral low back pain without sciatica       ipratropium - albuterol 0.5 mg/2.5 mg/3 mL 0.5-2.5 (3) MG/3ML neb solution    DUONEB    30 vial    Take 1 vial (3 mLs) by nebulization every 6 hours as needed for shortness of breath / dyspnea or wheezing    Moderate persistent asthma without complication       lisinopril 2.5 MG tablet    PRINIVIL/Zestril    90 tablet    Take 1 tablet (2.5 mg) by mouth daily    Dilated cardiomyopathy (H)       mometasone 0.1 % ointment    ELOCON    45 g    Apply sparingly to affected area twice daily as needed.  Do not apply to  face.    Intrinsic eczema       montelukast 10 MG tablet    SINGULAIR    90 tablet    TAKE 1 TABLET AT BEDTIME    Mild persistent asthma without complication       Multiple Vitamin Chew      Take 1 tablet by mouth daily        nitroGLYcerin 0.4 MG sublingual tablet    NITROSTAT    25 tablet    Place 1 tablet (0.4 mg) under the tongue every 5 minutes as needed for chest pain If you are still having symptoms after 3 doses (15 minutes) call 911.    Acute chest pain       ondansetron 4 MG ODT tab    ZOFRAN ODT    20 tablet    Take 1-2 tablets (4-8 mg) by mouth every 8 hours as needed for nausea    Nausea       pantoprazole 40 MG EC tablet    PROTONIX    90 tablet    Take 1 tablet (40 mg) by mouth daily    Gastroesophageal reflux disease without esophagitis       potassium chloride 10 MEQ tablet    K-TAB,KLOR-CON    90 tablet    Take 1 tablet (10 mEq) by mouth 3 times daily    Hypokalemia       predniSONE 5 MG tablet    DELTASONE    15 tablet    Take 1 tablet (5 mg) by mouth daily    Pulmonary emphysema, unspecified emphysema type (H)       SPIRIVA HANDIHALER 18 MCG capsule   Generic drug:  tiotropium     90 capsule    USING THE HANDIHALER, INHALE THE CONTENTS OF ONE CAPSULE BY MOUTH DAILY    Moderate persistent asthma without complication       SUMAtriptan 25 MG tablet    IMITREX    12 tablet    Take 1 tablet (25 mg) by mouth at onset of headache for migraine May repeat in 2 hours if needed: max 2/day;    Other type of migraine       TYLENOL EXTRA STRENGTH PO      1 TABLET EVERY 4 HOURS AS NEEDED    Abdominal pain, right upper quadrant       * Notice:  This list has 4 medication(s) that are the same as other medications prescribed for you. Read the directions carefully, and ask your doctor or other care provider to review them with you.

## 2017-10-11 NOTE — PATIENT INSTRUCTIONS
Will increase Advair to 500 / 50   Start on low dose Prednisone for 2 weeks - 5 mg  Follow up with lung specialist

## 2017-10-11 NOTE — NURSING NOTE
"Chief Complaint   Patient presents with     Shortness of Breath     ongoing for over year     Imm/Inj     TD and flu shot     Recheck Medication     medication refills       Initial /70 (BP Location: Left arm, Patient Position: Sitting, Cuff Size: Adult Regular)  Pulse 130  Temp 98  F (36.7  C) (Oral)  Resp 24  Ht 5' 10\" (1.778 m)  Wt 177 lb 6.4 oz (80.5 kg)  LMP 09/20/2005  SpO2 97%  Breastfeeding? No  BMI 25.45 kg/m2 Estimated body mass index is 25.45 kg/(m^2) as calculated from the following:    Height as of this encounter: 5' 10\" (1.778 m).    Weight as of this encounter: 177 lb 6.4 oz (80.5 kg).  Medication Reconciliation: complete   Aileen Moreno, Medical Assistant      "

## 2017-10-12 LAB
ALBUMIN SERPL-MCNC: 3.3 G/DL (ref 3.4–5)
ALP SERPL-CCNC: 72 U/L (ref 40–150)
ALT SERPL W P-5'-P-CCNC: 34 U/L (ref 0–50)
ANION GAP SERPL CALCULATED.3IONS-SCNC: 10 MMOL/L (ref 3–14)
AST SERPL W P-5'-P-CCNC: 20 U/L (ref 0–45)
BILIRUB SERPL-MCNC: 0.2 MG/DL (ref 0.2–1.3)
BUN SERPL-MCNC: 6 MG/DL (ref 7–30)
CALCIUM SERPL-MCNC: 8.9 MG/DL (ref 8.5–10.1)
CHLORIDE SERPL-SCNC: 104 MMOL/L (ref 94–109)
CO2 SERPL-SCNC: 26 MMOL/L (ref 20–32)
CREAT SERPL-MCNC: 0.75 MG/DL (ref 0.52–1.04)
GFR SERPL CREATININE-BSD FRML MDRD: 78 ML/MIN/1.7M2
GLUCOSE SERPL-MCNC: 94 MG/DL (ref 70–99)
POTASSIUM SERPL-SCNC: 3.9 MMOL/L (ref 3.4–5.3)
PROT SERPL-MCNC: 6.2 G/DL (ref 6.8–8.8)
SODIUM SERPL-SCNC: 140 MMOL/L (ref 133–144)

## 2017-10-14 DIAGNOSIS — J45.30 MILD PERSISTENT ASTHMA WITHOUT COMPLICATION: ICD-10-CM

## 2017-10-16 ENCOUNTER — MYC REFILL (OUTPATIENT)
Dept: INTERNAL MEDICINE | Facility: CLINIC | Age: 62
End: 2017-10-16

## 2017-10-16 DIAGNOSIS — J43.9 PULMONARY EMPHYSEMA, UNSPECIFIED EMPHYSEMA TYPE (H): ICD-10-CM

## 2017-10-17 RX ORDER — MONTELUKAST SODIUM 10 MG/1
TABLET ORAL
Qty: 90 TABLET | Refills: 1 | Status: SHIPPED | OUTPATIENT
Start: 2017-10-17 | End: 2017-11-06

## 2017-10-17 RX ORDER — PREDNISONE 5 MG/1
5 TABLET ORAL DAILY
Qty: 15 TABLET | Refills: 0 | Status: SHIPPED | OUTPATIENT
Start: 2017-10-17 | End: 2018-03-23

## 2017-10-17 NOTE — TELEPHONE ENCOUNTER
Message from [employer]:  Original authorizing provider: MD Trisha Marvin would like a refill of the following medications:  predniSONE (DELTASONE) 5 MG tablet [Bandar Weinberg MD]    Preferred pharmacy: [facility] [place] [address]    Comment:  Dr. Weinberg and Nursing Staff, I misunderstood you at the clinic and thought you had started me on 10 mg of Prednisone, 5 mg in the morning and 5 mg at night. So I started out that way before realizing the dosage was 5 mg daily. I am now taking the 5 mg daily in the morning. The earliest appointment I could get with Dr. Valerio (Lung) is Oct. 24. You said you would refill Prednisone once or twice. Would you please refill Prednisone with 5 or 10 mg. daily until I see Dr. Valerio? Thank you.

## 2017-10-17 NOTE — TELEPHONE ENCOUNTER
Per my chart message-Comment:  Dr. Weinberg and Nursing Staff,     I misunderstood you at the clinic and thought you had started me on 10 mg of Prednisone, 5 mg in the morning and 5 mg at night.  So I started out that way before realizing the dosage was 5 mg daily.  I am now taking the 5 mg daily in the morning.  The earliest appointment I could get with Dr. Valerio (Lung) is Oct. 24.  You said you would refill Prednisone once or twice.  Would you please refill Prednisone with 5 or 10 mg. daily until I see Dr. Valerio?  Thank you.

## 2017-10-24 ENCOUNTER — TRANSFERRED RECORDS (OUTPATIENT)
Dept: HEALTH INFORMATION MANAGEMENT | Facility: CLINIC | Age: 62
End: 2017-10-24

## 2017-10-25 ENCOUNTER — TELEPHONE (OUTPATIENT)
Dept: TRANSPLANT | Facility: CLINIC | Age: 62
End: 2017-10-25

## 2017-10-25 VITALS — HEIGHT: 70 IN | BODY MASS INDEX: 24.34 KG/M2 | WEIGHT: 170 LBS

## 2017-10-25 NOTE — LETTER
October 25, 2017    Trisha Bender  [address] [facility] [address]    Fax: [phone]  Re: Trisha Bender    MRN: [id number]     Hello,   Our mutual patient, Trisha Bender has been referred for a lung transplant at the [facility].  In order to provide the best possible recommendations for this patient, we require some medical records as listed on the attached page.    All information needs to arrive at our facility by 10/28/2017.  All scans may be burned onto a CD in DICOM format.  Pathology slides should be accompanied by the appropriate report from your institution.    Please fax all paper records to [phone].    Please send all scans/slides to:   [facility]  Solid Organ Transplant Office  [address] [address]    Please call our office at [phone] if you have any questions or concerns.  Please call or fax if the patient is hand carrying any information, or if any of the requested information is not at your facility.  Thank you for your assistance in caring for this patient!     Sincerely,   [facility]   Lung Transplant TeamRequest for Records from Primary Care Providers Office for Patients Referred to [facility] Lung Transplant Program  Images Needed to Process Intake of Patient:  o CXR Images (most recent)  o Chest CT Images (all within last 24 months or most recent)  Records Needed to Process Intake of Patient:   o Chest CT Report (all within last 24 months or most recent)  o Chest X-Ray Report (all within last 24 months or most recent)  o Colonoscopy Results with Pathology  o Consulting Physician History and Physical (last 3 reports on file)  o Culture Results (last 2 years on file)  o DEXA Scan Results (most recent on file)  o ECHO Results (most recent on file)  o Hospital Discharge Summaries (last 2 years on file)  o Immunization Records  (most recent on file)  o Lab Results (most recent on file)  o Original History and Physical   o Physician Notes (last 3 reports on file)  o Pulmonary Function Testing Results (last 3 reports on file)  o Radiology Reports not including Chest X-rays (last 2 years on file)  o Rheumatology Provider Notes (original and most recent on file)  As applicable:   o Gynecological Exam (most recent on file)  o Mammogram Results (most recent on file)  o Pap Smear Results (most recent on file)  o PSA Lab Results (most recent on file)    Please fax all paper records to [phone].    Please send all scans/slides to:   [facility]  Solid Organ Transplant Office  [address] [place] [address], [address] [address]  Please call our office at [phone] if you have any questions or concerns.

## 2017-10-25 NOTE — TELEPHONE ENCOUNTER
Intake Progress Note    Organ: Lung    Initial Call Made by: Referral received via fax from [facility]    Referring Physician: Dr. Thomas Valerio    Assigned Coordinator: Elisa Roger    Reported Diagnosis: COPD    On Dialysis: No    Dialysis Schedule: N/A    Reported Medical History: COPD. No hospitalizations in the past 12 months per patient.    Records:  I will request.     Preferred Evaluation Start Date:  ASAP     Online Forms    Best time patient can be reached: Anytime    Type of packet sent:  Lung Packet    Misc. Notes: May speak with emergency contacts listed: Y    Verbal Consent: Y     Email Consent: Y     Informed patient to call if doesn't receive packet and or phone call from coordinator within given time: Y    Insurance: [employer]BS on file in [employer]    PCP is Liu Alarcon- Dr. Bandar Weinberg

## 2017-10-25 NOTE — LETTER
October 25, 2017    Trisha Bender  [address] [facility] [address]    Fax: [phone]//Attn: Medical Records  Re: Trisha Bender    MRN: [id number]     [facility],   Our mutual patient, Trisha Bender has been referred for a lung transplant at the [facility].  In order to provide the best possible recommendations for this patient, we require some medical records as listed on the attached page.    All information needs to arrive at our facility by 10/28/2017.  All scans may be burned onto a CD in DICOM format.  Pathology slides should be accompanied by the appropriate report from your institution.    Please fax all paper records to [phone].    Please send all scans/slides to:   [facility]  Solid Organ Transplant Office  [address] [place] [address]    Please call our office at [phone] if you have any questions or concerns.  Please call or fax if the patient is hand carrying any information, or if any of the requested information is not at your facility.  Thank you for your assistance in caring for this patient!     Sincerely,   [facility]   Lung Transplant TeamRequest for Records from Referring Providers Office for Patients Referred to [facility] Lung Transplant Program  Images Needed to Process Intake of Patient:  o CXR Images (most recent)  o Chest CT Images (all within last 24 months or most recent)  Records Needed to Process Intake of Patient:   o Any Alpha 1 Level and Typing available  o Cardiac Catheterization Results (most recent on file)  o Any Cystic Fibrosis Genotyping available  o Chest CT Report (all within last 24 months or most recent)  o Chest X-Ray Report (all within last 24 months or most recent)  o Culture Results (last 2 years on file)  o ECHO Results (most recent on file)  o Hospital Discharge Summaries (last 2 years  on file)  o Lab Results (most recent on file)  o History and Physical (original on file)  o Any Pathology Reports available  o Physicians Notes (last 3 reports on file)  o Pulmonary Function Test Results (last 3 reports on file)  o Pulmonary Testing (last 2 years on file)  o Radiology Reports (not including Chest X-ray, last 2 years on file)  o Rheumatology Notes (original note and most recent note on file)  o Six Minute Walk Results (most recent on file)    Please fax all paper records to [phone].    Please send all scans/slides to:   [facility]  Solid Organ Transplant Office  [address] [place] [address], [address] [address]  Please call our office at [phone] if you have any questions or concerns.

## 2017-10-25 NOTE — LETTER
October 25, 2017      Dr. Thomas Valerio  [address] [place] [address]      RE:  Trisha Bender          1955      Dear Dr. Valerio,    We are writing to inform you that Trisha Bender has completed the referral process with us to be evaluated for a lung transplant.    Their assigned Transplant Coordinator is Elisa Roger.  If you should have any questions or concerns, please don t hesitate to contact us.        Thank you,       Solid Organ Transplant Department  [address]  [address], [address] [address]  Phone:  [phone]

## 2017-10-29 ENCOUNTER — MYC REFILL (OUTPATIENT)
Dept: INTERNAL MEDICINE | Facility: CLINIC | Age: 62
End: 2017-10-29

## 2017-10-29 DIAGNOSIS — G89.29 CHRONIC BILATERAL LOW BACK PAIN WITHOUT SCIATICA: ICD-10-CM

## 2017-10-29 DIAGNOSIS — M54.50 CHRONIC BILATERAL LOW BACK PAIN WITHOUT SCIATICA: ICD-10-CM

## 2017-10-30 RX ORDER — HYDROCODONE BITARTRATE AND ACETAMINOPHEN 5; 325 MG/1; MG/1
1-2 TABLET ORAL EVERY 8 HOURS PRN
Qty: 180 TABLET | Refills: 0 | Status: SHIPPED | OUTPATIENT
Start: 2017-10-30 | End: 2017-11-03

## 2017-10-30 NOTE — TELEPHONE ENCOUNTER
Message from [employer]:  Original authorizing provider: MD Trisha Marvin would like a refill of the following medications:  HYDROcodone-acetaminophen (NORCO) 5-325 MG per tablet [Bandar Weinberg MD]    Preferred pharmacy: [place], [place] - [facility] [address].    Comment:  Dr. Weinberg and Nursing Staff, I would like to get a refill on the Hydrocodone-Acetaminophen prescription as soon as possible. I found it to be difficult to make the 6 pills per day last the month. It was taxing to do and much easier to have a little leeway when there was 7 pills per day. Your attention to this is much appreciated. Thank you. Trisha Bender

## 2017-10-31 ENCOUNTER — TELEPHONE (OUTPATIENT)
Dept: INTERNAL MEDICINE | Facility: CLINIC | Age: 62
End: 2017-10-31

## 2017-10-31 NOTE — TELEPHONE ENCOUNTER
Pt's  dropped off letter from pt's insurance company detailing why they denied paying for her low-dose chest CT. States that pt spoke to Dr. Weinberg about this and Dr. Weinberg had agreed to write a letter stating why this was medically necessary in order to get them to cover it. Will place letter/form in Dr. Weinberg's inbasket for review.

## 2017-11-01 ENCOUNTER — MYC MEDICAL ADVICE (OUTPATIENT)
Dept: INTERNAL MEDICINE | Facility: CLINIC | Age: 62
End: 2017-11-01

## 2017-11-01 ENCOUNTER — MYC REFILL (OUTPATIENT)
Dept: INTERNAL MEDICINE | Facility: CLINIC | Age: 62
End: 2017-11-01

## 2017-11-01 DIAGNOSIS — M54.50 CHRONIC BILATERAL LOW BACK PAIN WITHOUT SCIATICA: ICD-10-CM

## 2017-11-01 DIAGNOSIS — G89.29 CHRONIC BILATERAL LOW BACK PAIN WITHOUT SCIATICA: ICD-10-CM

## 2017-11-01 RX ORDER — HYDROCODONE BITARTRATE AND ACETAMINOPHEN 5; 325 MG/1; MG/1
1-2 TABLET ORAL EVERY 8 HOURS PRN
Qty: 180 TABLET | Refills: 0 | Status: CANCELLED | OUTPATIENT
Start: 2017-11-01

## 2017-11-01 NOTE — TELEPHONE ENCOUNTER
Please see pt's mychart message below--asking for a new rx for Brand Norco.    Please advise, thanks.

## 2017-11-01 NOTE — TELEPHONE ENCOUNTER
Dr Weinberg states to have the Walk in nurse see her and count her pills before he will write a new script.    Will send message back.

## 2017-11-01 NOTE — TELEPHONE ENCOUNTER
Message from [employer]:  Original authorizing provider: MD Trisha Marvin would like a refill of the following medications:  HYDROcodone-acetaminophen (NORCO) 5-325 MG per tablet [Bandar Weinberg MD]    Preferred pharmacy: [place], [place] - [facility] [address].    Comment:  Dr. Weinberg and Nursing Staff, Sent another letter through My Chart detailing that the [employer] manufactured Hydrocodone- Acetaminophen does not work and gives me hives and a headache. Would you please represcribe for the name brand medication instead since the pharmacy changed their generic provider? I will bring in the other bottle. Your attention to this is much appreciated. Thank you. Trisha Bender

## 2017-11-03 ENCOUNTER — TELEPHONE (OUTPATIENT)
Dept: INTERNAL MEDICINE | Facility: CLINIC | Age: 62
End: 2017-11-03

## 2017-11-03 ENCOUNTER — ALLIED HEALTH/NURSE VISIT (OUTPATIENT)
Dept: NURSING | Facility: CLINIC | Age: 62
End: 2017-11-03

## 2017-11-03 DIAGNOSIS — M54.50 CHRONIC BILATERAL LOW BACK PAIN WITHOUT SCIATICA: ICD-10-CM

## 2017-11-03 DIAGNOSIS — G89.29 CHRONIC BILATERAL LOW BACK PAIN WITHOUT SCIATICA: ICD-10-CM

## 2017-11-03 DIAGNOSIS — M54.5 LOW BACK PAIN, UNSPECIFIED BACK PAIN LATERALITY, UNSPECIFIED CHRONICITY, WITH SCIATICA PRESENCE UNSPECIFIED: Primary | ICD-10-CM

## 2017-11-03 DIAGNOSIS — Z53.9 DIAGNOSIS FOR ++++ WALK IN CLINIC ++++: Primary | ICD-10-CM

## 2017-11-03 RX ORDER — HYDROCODONE BITARTRATE AND ACETAMINOPHEN 5; 325 MG/1; MG/1
1-2 TABLET ORAL EVERY 8 HOURS PRN
Qty: 18 TABLET | Refills: 0 | Status: SHIPPED | OUTPATIENT
Start: 2017-11-03 | End: 2018-02-25

## 2017-11-03 RX ORDER — HYDROCODONE BITARTRATE AND ACETAMINOPHEN 5; 300 MG/1; MG/1
1-2 TABLET ORAL 3 TIMES DAILY PRN
Qty: 18 TABLET | Refills: 0 | Status: SHIPPED | OUTPATIENT
Start: 2017-11-03 | End: 2018-02-26

## 2017-11-03 NOTE — MR AVS SNAPSHOT
After Visit Summary   11/3/2017    Trisha Bender    MRN: [id number]           Patient Information     Date Of Birth          1955        Visit Information        Provider Department      11/3/2017 9:00 AM Rn, [facility]        Today's Diagnoses     DIAGNOSIS FOR ++++ WALK IN CLINIC ++++    -  1       Follow-ups after your visit        Your next 10 appointments already scheduled     Nov 06, 2017  4:20 PM CST   Elliott Rodriguez with Bandar Weinberg MD   [facility] ([facility]    [address]  [facility] [id number] [phone]              Who to contact     If you have questions or need follow up information about today's clinic visit or your schedule please contact [facility] directly at [phone].  Normal or non-critical lab and imaging results will be communicated to you by MyChart, letter or phone within 4 business days after the clinic has received the results. If you do not hear from us within 7 days, please contact the clinic through [employer] or phone. If you have a critical or abnormal lab result, we will notify you by phone as soon as possible.  Submit refill requests through [employer] or call your pharmacy and they will forward the refill request to us. Please allow 3 business days for your refill to be completed.          Additional Information About Your Visit        MyChart Information     [employer] gives you secure access to your electronic health record. If you see a primary care provider, you can also send messages to your care team and make appointments. If you have questions, please call your primary care clinic.  If you do not have a primary care provider, please call [phone] and they will assist you.        Care EveryWhere ID     This is your Care EveryWhere ID. This could be used by other organizations to access your [facility] medical records  [id number]        Your Vitals Were      Last Period                   09/20/2005            Blood Pressure from Last 3 Encounters:   10/11/17 110/70   06/02/17 126/80   05/23/17 126/80    Weight from Last 3 Encounters:   10/25/17 170 lb (77.1 kg)   10/11/17 177 lb 6.4 oz (80.5 kg)   06/20/17 161 lb (73 kg)              Today, you had the following     No orders found for display         Where to get your medicines      Some of these will need a paper prescription and others can be bought over the counter.  Ask your nurse if you have questions.     Bring a paper prescription for each of these medications     HYDROcodone-acetaminophen 5-325 MG per tablet          Primary Care Provider Office Phone # Fax #    Bandar Weinberg [phone]       [address]  [facility] [address]        Equal Access to Services     [facility] CenterJOSHUA : Hadii aad ku hadasho Soradha, waaxda luqadaha, qaybta kaalmada adesherman, jonathan burris . So [facility] [phone].    ATENCIÓN: Si habla español, tiene a madison disposición servicios gratuitos de asistencia lingüística. LlSouthern [facility] [phone].    We comply with applicable federal civil rights laws and [place] laws. We do not discriminate on the basis of race, color, national origin, age, disability, sex, sexual orientation, or gender identity.            Thank you!     Thank you for choosing [facility]  for your care. Our goal is always to provide you with excellent care. Hearing back from our patients is one way we can continue to improve our services. Please take a few minutes to complete the written survey that you may receive in the mail after your visit with us. Thank you!             Your Updated Medication List - Protect others around you: Learn how to safely use, store and throw away your medicines at www.disposemymeds.org.          This list is accurate as of: 11/3/17  9:05 AM.  Always use your most recent med list.                   Brand Name Dispense  Instructions for use Diagnosis    * albuterol (2.5 MG/3ML) 0.083% neb solution     25 vial    Take 1 vial (2.5 mg) by nebulization every 6 hours as needed for shortness of breath / dyspnea or wheezing    SOB (shortness of breath)       * albuterol 108 (90 BASE) MCG/ACT Inhaler    VENTOLIN HFA    18 g    Inhale 2 puffs into the lungs every 6 hours as needed for shortness of breath / dyspnea or wheezing    Asthma exacerbation       ALPRAZolam 0.25 MG tablet    XANAX    30 tablet    Take 1 tablet by mouth. Every twelve hours as needed for anxiety    Anxiety       atorvastatin 10 MG tablet    LIPITOR    30 tablet    Reported on 5/8/2017    Coronary artery disease involving native coronary artery of native heart without angina pectoris, Mixed hyperlipidemia       baclofen 10 MG tablet    LIORESAL    90 tablet    TAKE ONE TABLET BY MOUTH THREE TIMES A DAY AS NEEDED FOR MUSCLE SPASMS    Chronic bilateral low back pain with right-sided sciatica       [employer] FIBER GUMMIES 2.5 G Chew      Take 1 tablet by mouth daily Reported on 5/8/2017        fluticasone 50 MCG/ACT spray    FLONASE    1 Bottle    Spray 1-2 sprays into both nostrils daily    Nasal congestion       * fluticasone-salmeterol 250-50 MCG/DOSE diskus inhaler    ADVAIR    1 Inhaler    Inhale 1 puff into the lungs 2 times daily    Moderate persistent asthma without complication       * fluticasone-salmeterol 500-50 MCG/DOSE diskus inhaler    ADVAIR    3 Inhaler    Inhale 1 puff into the lungs 2 times daily    Pulmonary emphysema, unspecified emphysema type (H)       furosemide 20 MG tablet    LASIX    180 tablet    Take 2 tablets (40 mg) by mouth daily severe swelling    Acute systolic congestive heart failure (H)       guaiFENesin-codeine 100-10 MG/5ML Soln solution    ROBITUSSIN AC    120 mL    Take 5 mLs by mouth every 4 hours as needed for cough    Asthma exacerbation       HYDROcodone-acetaminophen 5-325 MG per tablet    NORCO    18 tablet    Take 1-2 tablets by  mouth every 8 hours as needed for pain Maximum 6 daily    Chronic bilateral low back pain without sciatica       ipratropium - albuterol 0.5 mg/2.5 mg/3 mL 0.5-2.5 (3) MG/3ML neb solution    DUONEB    30 vial    Take 1 vial (3 mLs) by nebulization every 6 hours as needed for shortness of breath / dyspnea or wheezing    Moderate persistent asthma without complication       lisinopril 2.5 MG tablet    PRINIVIL/Zestril    90 tablet    Take 1 tablet (2.5 mg) by mouth daily    Dilated cardiomyopathy (H)       mometasone 0.1 % ointment    ELOCON    45 g    Apply sparingly to affected area twice daily as needed.  Do not apply to face.    Intrinsic eczema       montelukast 10 MG tablet    SINGULAIR    90 tablet    TAKE 1 TABLET AT BEDTIME    Mild persistent asthma without complication       Multiple Vitamin Chew      Take 1 tablet by mouth daily        nitroGLYcerin 0.4 MG sublingual tablet    NITROSTAT    25 tablet    Place 1 tablet (0.4 mg) under the tongue every 5 minutes as needed for chest pain If you are still having symptoms after 3 doses (15 minutes) call 911.    Acute chest pain       ondansetron 4 MG ODT tab    ZOFRAN ODT    20 tablet    Take 1-2 tablets (4-8 mg) by mouth every 8 hours as needed for nausea    Nausea       pantoprazole 40 MG EC tablet    PROTONIX    90 tablet    Take 1 tablet (40 mg) by mouth daily    Gastroesophageal reflux disease without esophagitis       potassium chloride 10 MEQ tablet    K-TAB,KLOR-CON    90 tablet    Take 1 tablet (10 mEq) by mouth 3 times daily    Hypokalemia       predniSONE 5 MG tablet    DELTASONE    15 tablet    Take 1 tablet (5 mg) by mouth daily    Pulmonary emphysema, unspecified emphysema type (H)       SPIRIVA HANDIHALER 18 MCG capsule   Generic drug:  tiotropium     90 capsule    USING THE HANDIHALER, INHALE THE CONTENTS OF ONE CAPSULE BY MOUTH DAILY    Moderate persistent asthma without complication       SUMAtriptan 25 MG tablet    IMITREX    12 tablet    Take 1  tablet (25 mg) by mouth at onset of headache for migraine May repeat in 2 hours if needed: max 2/day;    Other type of migraine       TYLENOL EXTRA STRENGTH PO      1 TABLET EVERY 4 HOURS AS NEEDED    Abdominal pain, right upper quadrant       * Notice:  This list has 4 medication(s) that are the same as other medications prescribed for you. Read the directions carefully, and ask your doctor or other care provider to review them with you.

## 2017-11-03 NOTE — TELEPHONE ENCOUNTER
Pt walked into clinic.  She brought in remaining Norco tabs (almost a full bottle).  She was given a new written rx for Norco JULIO #18 tabs to get her thru the weekend.  She will find a pharmacy that has JULIO [place] in stock.    She spoke with  pharm.  They are currently out of stock for the previous Norton med/ ([place]) but pt was told should have med in on 11-6-17.    She has an OV on 11-6-17 to see PCP and will get a new rx to be hand carried to pharm for the Norco made by [facility] --that is expected to be in stock at the pharm on 11-6-17.    Norco tabs that pt brought in today were destroyed in clinic using Rx destroyer--a total of 168 tabs--and witnessed by Valencia PASTOR RN.

## 2017-11-03 NOTE — TELEPHONE ENCOUNTER
(See [employer] message 11-1-17.)  Pt walked into clinic for  2nd time today.  Was given an rx for JULIO [place] earlier today to hand carry to a [facility] pharmacy.  Pt states that the pharmacy does not have JULIO Norco in stock.  They have Vicodin 3/500mg in stock and is asking for an rx to be written for this med.    Also, needs letter (for insurance purpose d/t will come up as being too early to fill med) to give to the pharm stating ok to fill Vicodin today and that pt brought her remaining Norco tabs, from rx written 10-30-17, to clinic to be destroyed.    New rx for Vicodin 5/300mg written per PCP and given to pt to hand carry to [facility] pharm.  Letter also given to pt.    (Rx written earlier today for JULIO [place] destroyed and witnessed by JACKIE Bruno.)

## 2017-11-03 NOTE — LETTER
[facility]  [address], [address]  [place], [place]  [address]                                            TEL:[phone]  FAX:[phone]      Trisha Bender  [address] [facility] [address]      November 3, 2017    To Whom It May Concern:  Please allow pt to have the Vicodin 5/300mg filled today.  She returned the rest of the prescription of her Norco tabs that was written and filled on 10-30-17 (168 tabs were destroyed in the clinic today).      Sincerely,      Amelie Hung RN for Dr. Weinberg

## 2017-11-06 ENCOUNTER — OFFICE VISIT (OUTPATIENT)
Dept: INTERNAL MEDICINE | Facility: CLINIC | Age: 62
End: 2017-11-06
Payer: COMMERCIAL

## 2017-11-06 VITALS
DIASTOLIC BLOOD PRESSURE: 70 MMHG | TEMPERATURE: 97.6 F | WEIGHT: 172.1 LBS | HEIGHT: 70 IN | BODY MASS INDEX: 24.64 KG/M2 | HEART RATE: 56 BPM | SYSTOLIC BLOOD PRESSURE: 116 MMHG | OXYGEN SATURATION: 96 %

## 2017-11-06 DIAGNOSIS — G89.29 CHRONIC BILATERAL LOW BACK PAIN WITHOUT SCIATICA: ICD-10-CM

## 2017-11-06 DIAGNOSIS — J45.30 MILD PERSISTENT ASTHMA WITHOUT COMPLICATION: ICD-10-CM

## 2017-11-06 DIAGNOSIS — J43.9 PULMONARY EMPHYSEMA, UNSPECIFIED EMPHYSEMA TYPE (H): ICD-10-CM

## 2017-11-06 DIAGNOSIS — J06.9 UPPER RESPIRATORY TRACT INFECTION, UNSPECIFIED TYPE: Primary | ICD-10-CM

## 2017-11-06 DIAGNOSIS — M54.50 CHRONIC BILATERAL LOW BACK PAIN WITHOUT SCIATICA: ICD-10-CM

## 2017-11-06 DIAGNOSIS — I50.21 ACUTE SYSTOLIC CONGESTIVE HEART FAILURE (H): ICD-10-CM

## 2017-11-06 DIAGNOSIS — R30.0 DYSURIA: ICD-10-CM

## 2017-11-06 DIAGNOSIS — I42.0 DILATED CARDIOMYOPATHY (H): ICD-10-CM

## 2017-11-06 DIAGNOSIS — J45.40 MODERATE PERSISTENT ASTHMA WITHOUT COMPLICATION: ICD-10-CM

## 2017-11-06 PROCEDURE — 99215 OFFICE O/P EST HI 40 MIN: CPT | Performed by: INTERNAL MEDICINE

## 2017-11-06 RX ORDER — MONTELUKAST SODIUM 10 MG/1
1 TABLET ORAL AT BEDTIME
Qty: 90 TABLET | Refills: 1 | Status: SHIPPED | OUTPATIENT
Start: 2017-11-06 | End: 2020-03-03

## 2017-11-06 RX ORDER — HYDROCODONE BITARTRATE AND ACETAMINOPHEN 5; 325 MG/1; MG/1
1-2 TABLET ORAL EVERY 8 HOURS PRN
Qty: 180 TABLET | Refills: 0 | Status: SHIPPED | OUTPATIENT
Start: 2017-11-06 | End: 2017-12-03

## 2017-11-06 RX ORDER — DOXYCYCLINE 100 MG/1
100 CAPSULE ORAL 2 TIMES DAILY
Qty: 20 CAPSULE | Refills: 0 | Status: SHIPPED | OUTPATIENT
Start: 2017-11-06 | End: 2018-02-26

## 2017-11-06 RX ORDER — LISINOPRIL 2.5 MG/1
2.5 TABLET ORAL DAILY
Qty: 90 TABLET | Refills: 3 | Status: SHIPPED | OUTPATIENT
Start: 2017-11-06 | End: 2020-01-07

## 2017-11-06 RX ORDER — FUROSEMIDE 20 MG
40 TABLET ORAL DAILY
Qty: 180 TABLET | Refills: 1 | Status: SHIPPED | OUTPATIENT
Start: 2017-11-06 | End: 2018-04-10

## 2017-11-06 RX ORDER — TIOTROPIUM BROMIDE 18 UG/1
CAPSULE ORAL; RESPIRATORY (INHALATION)
Qty: 90 CAPSULE | Refills: 1 | Status: SHIPPED | OUTPATIENT
Start: 2017-11-06 | End: 2018-04-10

## 2017-11-06 RX ORDER — ROFLUMILAST 500 UG/1
500 TABLET ORAL AT BEDTIME
COMMUNITY
Start: 2017-11-06

## 2017-11-06 NOTE — PROGRESS NOTES
SUBJECTIVE:   Trisha Bender is a 62 year old female who presents to clinic today for the following health issues:      She is here for a follow up on shortness of breath-saw you last time, was given medication for that. She also need refills on her medications for 90 days worth.  Patient is seen for a follow up visit.  Patient is feeling better regarding her SOB. On Prednisone 10 mg daily for one month. Has h/o COPD/ Asthma, history of smoking. On increased dose of Advair to 500/50, Spiriva , singulair. PRN Albuterol.   Has seen pulmonary. Has discussed possible considerations for lung transplant in the future. She is not on Oxygen treatment. No cough, wheezing. Has mild SOB, feels improved.   Has h/o CHF. EF has improved with medical treatment. No change in weight. Has mild LE edema, no chest pain or palpitations.   Has h/o chronic back pain. On narcotic analgesics. Reports controlled pain with episodes of exacerbations. No medications side effects. No neurologic symptoms.       PROBLEMS TO ADD ON...  Concern for urine frequency, no burning , hematuria or flank pain.     Has h/o anxiety. No change, controlled.   Has h/o GERD on PPI  treatment. symptoms are controlled. No nausea, vomiting, heartburns, bloating.  Has H/O hyperlipidemia. On medical treatment and diet. No side effects. No muscle weakness, myalgias or upset stomach.       Problem list and histories reviewed & adjusted, as indicated.  Additional history: as documented    Patient Active Problem List   Diagnosis     Essential hypertension     Other type of migraine     Esophageal reflux     Disorder of bone and cartilage     Family history of malignant neoplasm of gastrointestinal tract     Chronic airway obstruction (H)     Cystocele     Advanced directives, counseling/discussion     Family history of thyroid cancer - 1/2 brother     Anxiety     Mild persistent asthma without complication     Chronic low back pain     Hair loss     Lateral  epicondylitis of right elbow     Dilated cardiomyopathy (H)     Right bundle branch block (RBBB)     CHF (congestive heart failure) (H)     SOB (shortness of breath)     Coronary artery disease involving native coronary artery of native heart without angina pectoris     Mixed hyperlipidemia     Controlled substance agreement signed     Moderate persistent asthma without complication     Hypokalemia     Mechanical low back pain     Pulmonary emphysema, unspecified emphysema type (H)     Past Surgical History:   Procedure Laterality Date     ANGIOGRAM  2015    Minimal CAD. LV dysfunction, Mid RCA 20% stenosis, EF 20-25%, c/w Takotsubo cardiomyopathy     APPENDECTOMY       C NONSPECIFIC PROCEDURE      GB and appendectomy  abstracted [id number]     C NONSPECIFIC PROCEDURE      Nasal septoplasty and mucous retention cyst     CHOLECYSTECTOMY       ESOPHAGOSCOPY, GASTROSCOPY, DUODENOSCOPY (EGD), COMBINED  11/15/2011    Procedure:COMBINED ESOPHAGOSCOPY, GASTROSCOPY, DUODENOSCOPY (EGD);  ESOPHAGOSCOPY, GASTROSCOPY, DUODENOSCOPY (EGD) ; Surgeon:JIM URENA; Location: GI     HC REMOVE TONSILS/ADENOIDS,<13 Y/O       LAPAROSCOPIC ABLATION ENDOMETRIOSIS  1998     wisdom teeth         Social History   Substance Use Topics     Smoking status: Former Smoker     Packs/day: 1.00     Years: 30.00     Quit date: 2006     Smokeless tobacco: Never Used      Comment: Quit in      Alcohol use No     Family History   Problem Relation Age of Onset     CANCER Mother      Colon cancer; dxed at age 64;  at age 69     Cancer - colorectal Mother      Other Cancer Mother      GASTROINTESTINAL DISEASE Sister      Acute pancreatitis     HEART DISEASE Father      ?     Coronary Artery Disease Father      Circulatory Maternal Aunt      AAA     Circulatory Maternal Uncle      AAA     DIABETES Son          Current Outpatient Prescriptions   Medication Sig Dispense Refill     lisinopril (PRINIVIL/ZESTRIL) 2.5 MG tablet Take 1  tablet (2.5 mg) by mouth daily 90 tablet 3     furosemide (LASIX) 20 MG tablet Take 2 tablets (40 mg) by mouth daily severe swelling 180 tablet 1     montelukast (SINGULAIR) 10 MG tablet Take 1 tablet (10 mg) by mouth At Bedtime 90 tablet 1     tiotropium (SPIRIVA HANDIHALER) 18 MCG capsule USING THE HANDIHALER, INHALE THE CONTENTS OF ONE CAPSULE BY MOUTH DAILY 90 capsule 1     roflumilast (DALIRESP) 500 MCG TABS tablet Take 1 tablet (500 mcg) by mouth daily       HYDROcodone-acetaminophen (NORCO) 5-325 MG per tablet Take 1-2 tablets by mouth every 8 hours as needed for pain Maximum 6 daily 180 tablet 0     doxycycline (VIBRAMYCIN) 100 MG capsule Take 1 capsule (100 mg) by mouth 2 times daily 20 capsule 0     HYDROcodone-acetaminophen (NORCO) 5-325 MG per tablet Take 1-2 tablets by mouth every 8 hours as needed for pain Maximum 6 daily 18 tablet 0     Hydrocodone-Acetaminophen 5-300 MG TABS Take 1-2 tablets by mouth 3 times daily as needed 18 tablet 0     predniSONE (DELTASONE) 5 MG tablet Take 1 tablet (5 mg) by mouth daily 15 tablet 0     fluticasone-salmeterol (ADVAIR) 500-50 MCG/DOSE diskus inhaler Inhale 1 puff into the lungs 2 times daily 3 Inhaler 1     baclofen (LIORESAL) 10 MG tablet TAKE ONE TABLET BY MOUTH THREE TIMES A DAY AS NEEDED FOR MUSCLE SPASMS 90 tablet 1     ALPRAZolam (XANAX) 0.25 MG tablet Take 1 tablet by mouth. Every twelve hours as needed for anxiety 30 tablet 0     guaiFENesin-codeine (ROBITUSSIN AC) 100-10 MG/5ML SOLN solution Take 5 mLs by mouth every 4 hours as needed for cough 120 mL 0     albuterol (VENTOLIN HFA) 108 (90 BASE) MCG/ACT Inhaler Inhale 2 puffs into the lungs every 6 hours as needed for shortness of breath / dyspnea or wheezing 18 g 3     SUMAtriptan (IMITREX) 25 MG tablet Take 1 tablet (25 mg) by mouth at onset of headache for migraine May repeat in 2 hours if needed: max 2/day; 12 tablet 3     ipratropium - albuterol 0.5 mg/2.5 mg/3 mL (DUONEB) 0.5-2.5 (3) MG/3ML neb  solution Take 1 vial (3 mLs) by nebulization every 6 hours as needed for shortness of breath / dyspnea or wheezing 30 vial 1     potassium chloride (K-TAB,KLOR-CON) 10 MEQ tablet Take 1 tablet (10 mEq) by mouth 3 times daily 90 tablet 5     mometasone (ELOCON) 0.1 % ointment Apply sparingly to affected area twice daily as needed.  Do not apply to face. 45 g 0     ondansetron (ZOFRAN ODT) 4 MG ODT tab Take 1-2 tablets (4-8 mg) by mouth every 8 hours as needed for nausea 20 tablet 0     fluticasone (FLONASE) 50 MCG/ACT spray Spray 1-2 sprays into both nostrils daily 1 Bottle 11     albuterol (2.5 MG/3ML) 0.083% neb solution Take 1 vial (2.5 mg) by nebulization every 6 hours as needed for shortness of breath / dyspnea or wheezing 25 vial 0     pantoprazole (PROTONIX) 40 MG enteric coated tablet Take 1 tablet (40 mg) by mouth daily 90 tablet 3     atorvastatin (LIPITOR) 10 MG tablet Reported on 5/8/2017 30 tablet 11     nitroglycerin (NITROSTAT) 0.4 MG SL tablet Place 1 tablet (0.4 mg) under the tongue every 5 minutes as needed for chest pain If you are still having symptoms after 3 doses (15 minutes) call 911. 25 tablet 0     [employer] FIBER GUMMIES 2.5 G CHEW Take 1 tablet by mouth daily Reported on 5/8/2017       Multiple Vitamin CHEW Take 1 tablet by mouth daily        TYLENOL EXTRA STRENGTH OR 1 TABLET EVERY 4 HOURS AS NEEDED           Reviewed and updated as needed this visit by clinical staffTobacco  Allergies  Med Hx  Surg Hx  Fam Hx  Soc Hx      Reviewed and updated as needed this visit by Provider         ROS:  C: NEGATIVE for fever, chills, change in weight  I: NEGATIVE for worrisome rashes, moles or lesions  E: NEGATIVE for vision changes or irritation  E/M: NEGATIVE for ear, mouth and throat problems  R: NEGATIVE for significant cough , + mild SOB  B: NEGATIVE for masses, tenderness or discharge  CV: NEGATIVE for chest pain, palpitations or peripheral edema  GI: NEGATIVE for nausea, abdominal pain,  "heartburn, or change in bowel habits  : NEGATIVE for frequency, dysuria, or hematuria  M: NEGATIVE for significant arthralgias or myalgia, + chronic back pain  N: NEGATIVE for weakness, dizziness or paresthesias  E: NEGATIVE for temperature intolerance, skin/hair changes  H: NEGATIVE for bleeding problems  P: NEGATIVE for changes in mood or affect    OBJECTIVE:     /70 (BP Location: Left arm, Patient Position: Sitting, Cuff Size: Adult Large)  Pulse 56  Temp 97.6  F (36.4  C) (Oral)  Ht 5' 10\" (1.778 m)  Wt 172 lb 1.6 oz (78.1 kg)  LMP 09/20/2005  SpO2 96%  BMI 24.69 kg/m2  Body mass index is 24.69 kg/(m^2).   GENERAL: frail, alert and no distress  EYES: Eyes grossly normal to inspection, PERRL and conjunctivae and sclerae normal  HENT: ear canals and TM's normal, nose and mouth without ulcers or lesions  NECK: no adenopathy, no asymmetry, masses, or scars and thyroid normal to palpation  RESP: lungs clear to auscultation - no rales, rhonchi or wheezes  CV: regular rate and rhythm, normal S1 S2, no S3 or S4, no murmur, click or rub, no peripheral edema and peripheral pulses strong  ABDOMEN: soft, nontender, no hepatosplenomegaly, no masses and bowel sounds normal  MS: no gross musculoskeletal defects noted, 1+ le edema  SKIN: no suspicious lesions or rashes  NEURO: Normal strength and tone, mentation intact and speech normal  PSYCH: mentation appears normal, affect normal/bright    Diagnostic Test Results:  Results for orders placed or performed in visit on 10/11/17   Comprehensive metabolic panel   Result Value Ref Range    Sodium 140 133 - 144 mmol/L    Potassium 3.9 3.4 - 5.3 mmol/L    Chloride 104 94 - 109 mmol/L    Carbon Dioxide 26 20 - 32 mmol/L    Anion Gap 10 3 - 14 mmol/L    Glucose 94 70 - 99 mg/dL    Urea Nitrogen 6 (L) 7 - 30 mg/dL    Creatinine 0.75 0.52 - 1.04 mg/dL    GFR Estimate 78 >60 mL/min/1.7m2    GFR Estimate If Black >90 >60 mL/min/1.7m2    Calcium 8.9 8.5 - 10.1 mg/dL    " Bilirubin Total 0.2 0.2 - 1.3 mg/dL    Albumin 3.3 (L) 3.4 - 5.0 g/dL    Protein Total 6.2 (L) 6.8 - 8.8 g/dL    Alkaline Phosphatase 72 40 - 150 U/L    ALT 34 0 - 50 U/L    AST 20 0 - 45 U/L   CBC with platelets   Result Value Ref Range    WBC 7.3 4.0 - 11.0 10e9/L    RBC Count 4.59 3.8 - 5.2 10e12/L    Hemoglobin 13.6 11.7 - 15.7 g/dL    Hematocrit 43.7 35.0 - 47.0 %    MCV 95 78 - 100 fl    MCH 29.6 26.5 - 33.0 pg    MCHC 31.1 (L) 31.5 - 36.5 g/dL    RDW 12.3 10.0 - 15.0 %    Platelet Count 274 150 - 450 10e9/L       ASSESSMENT/PLAN:     Problem List Items Addressed This Visit     Mild persistent asthma without complication    Relevant Medications    montelukast (SINGULAIR) 10 MG tablet    tiotropium (SPIRIVA HANDIHALER) 18 MCG capsule    roflumilast (DALIRESP) 500 MCG TABS tablet    Chronic low back pain    Relevant Medications    HYDROcodone-acetaminophen (NORCO) 5-325 MG per tablet    Dilated cardiomyopathy (H)    Relevant Medications    lisinopril (PRINIVIL/ZESTRIL) 2.5 MG tablet    CHF (congestive heart failure) (H)    Relevant Medications    furosemide (LASIX) 20 MG tablet    Moderate persistent asthma without complication    Relevant Medications    montelukast (SINGULAIR) 10 MG tablet    tiotropium (SPIRIVA HANDIHALER) 18 MCG capsule    roflumilast (DALIRESP) 500 MCG TABS tablet    Pulmonary emphysema, unspecified emphysema type (H)    Relevant Medications    montelukast (SINGULAIR) 10 MG tablet    tiotropium (SPIRIVA HANDIHALER) 18 MCG capsule    roflumilast (DALIRESP) 500 MCG TABS tablet      Other Visit Diagnoses     Upper respiratory tract infection, unspecified type    -  Primary    Relevant Medications    doxycycline (VIBRAMYCIN) 100 MG capsule    Dysuria        Relevant Orders    *UA reflex to Microscopic and Culture ([place] [facility] (except [place] and [place])           CONTINUE CURRENT TREATMENT   Improved SOB, taper steroid treatment slowly   Follow up with pulmonary in 3 months    Monitor BP  Discussed chronic narcotic use, trial to decrease dose slowly   Assess UA for possible UTI    Follow-Up:in 3 months or as needed     Bandar Weinberg MD  [facility]

## 2017-11-06 NOTE — NURSING NOTE
"Chief Complaint   Patient presents with     RECHECK     Follow up on SOB and need refill on medications.       Initial /70 (BP Location: Left arm, Patient Position: Sitting, Cuff Size: Adult Large)  Pulse 56  Temp 97.6  F (36.4  C) (Oral)  Ht 5' 10\" (1.778 m)  Wt 172 lb 1.6 oz (78.1 kg)  LMP 09/20/2005  SpO2 96%  BMI 24.69 kg/m2 Estimated body mass index is 24.69 kg/(m^2) as calculated from the following:    Height as of this encounter: 5' 10\" (1.778 m).    Weight as of this encounter: 172 lb 1.6 oz (78.1 kg).  Medication Reconciliation: complete   Dolores Peraza MA    "

## 2017-11-06 NOTE — MR AVS SNAPSHOT
After Visit Summary   11/6/2017    Trisha Bender    MRN: [id number]           Patient Information     Date Of Birth          1955        Visit Information        Provider Department      11/6/2017 4:20 PM Bandar Weinberg MD [facility]        Today's Diagnoses     Upper respiratory tract infection, unspecified type    -  1    Dilated cardiomyopathy (H)        Acute systolic congestive heart failure (H)        Mild persistent asthma without complication        Moderate persistent asthma without complication        Chronic bilateral low back pain without sciatica        Dysuria        Pulmonary emphysema, unspecified emphysema type (H)           Follow-ups after your visit        Who to contact     If you have questions or need follow up information about today's clinic visit or your schedule please contact [facility] directly at [phone].  Normal or non-critical lab and imaging results will be communicated to you by LeadCloudhart, letter or phone within 4 business days after the clinic has received the results. If you do not hear from us within 7 days, please contact the clinic through LeadCloudhart or phone. If you have a critical or abnormal lab result, we will notify you by phone as soon as possible.  Submit refill requests through PadSquad or call your pharmacy and they will forward the refill request to us. Please allow 3 business days for your refill to be completed.          Additional Information About Your Visit        MyCharShawarmanji Information     PadSquad gives you secure access to your electronic health record. If you see a primary care provider, you can also send messages to your care team and make appointments. If you have questions, please call your primary care clinic.  If you do not have a primary care provider, please call [phone] and they will assist you.        Care EveryWhere ID     This is your Care EveryWhere ID. This could be used by other  "organizations to access your [place] medical records  [id number]        Your Vitals Were     Pulse Temperature Height Last Period Pulse Oximetry BMI (Body Mass Index)    56 97.6  F (36.4  C) (Oral) 5' 10\" (1.778 m) 09/20/2005 96% 24.69 kg/m2       Blood Pressure from Last 3 Encounters:   11/06/17 116/70   10/11/17 110/70   06/02/17 126/80    Weight from Last 3 Encounters:   11/06/17 172 lb 1.6 oz (78.1 kg)   10/25/17 170 lb (77.1 kg)   10/11/17 177 lb 6.4 oz (80.5 kg)                 Today's Medication Changes          These changes are accurate as of: 11/6/17 11:59 PM.  If you have any questions, ask your nurse or doctor.               Start taking these medicines.        Dose/Directions    doxycycline 100 MG capsule   Commonly known as:  VIBRAMYCIN   Used for:  Upper respiratory tract infection, unspecified type   Started by:  Bandar Weinberg MD        Dose:  100 mg   Take 1 capsule (100 mg) by mouth 2 times daily   Quantity:  20 capsule   Refills:  0         These medicines have changed or have updated prescriptions.        Dose/Directions    * HYDROcodone-acetaminophen 5-325 MG per tablet   Commonly known as:  NORCO   This may have changed:  Another medication with the same name was added. Make sure you understand how and when to take each.   Used for:  Chronic bilateral low back pain without sciatica   Changed by:  Bandar Weinberg MD        Dose:  1-2 tablet   Take 1-2 tablets by mouth every 8 hours as needed for pain Maximum 6 daily   Quantity:  18 tablet   Refills:  0       * Hydrocodone-Acetaminophen 5-300 MG Tabs   This may have changed:  Another medication with the same name was added. Make sure you understand how and when to take each.   Used for:  Low back pain, unspecified back pain laterality, unspecified chronicity, with sciatica presence unspecified   Changed by:  Bandar Weinberg MD        Dose:  1-2 tablet   Take 1-2 tablets by mouth 3 times daily as needed   Quantity:  18 tablet "   Refills:  0       * HYDROcodone-acetaminophen 5-325 MG per tablet   Commonly known as:  NORCO   This may have changed:  You were already taking a medication with the same name, and this prescription was added. Make sure you understand how and when to take each.   Used for:  Chronic bilateral low back pain without sciatica   Changed by:  Bandar Weinberg MD        Dose:  1-2 tablet   Take 1-2 tablets by mouth every 8 hours as needed for pain Maximum 6 daily   Quantity:  180 tablet   Refills:  0       montelukast 10 MG tablet   Commonly known as:  SINGULAIR   This may have changed:  See the new instructions.   Used for:  Mild persistent asthma without complication   Changed by:  Bandar Weinberg MD        Dose:  1 tablet   Take 1 tablet (10 mg) by mouth At Bedtime   Quantity:  90 tablet   Refills:  1       tiotropium 18 MCG capsule   Commonly known as:  SPIRIVA HANDIHALER   This may have changed:  See the new instructions.   Used for:  Moderate persistent asthma without complication   Changed by:  Bandar Weinberg MD        USING THE HANDIHALER, INHALE THE CONTENTS OF ONE CAPSULE BY MOUTH DAILY   Quantity:  90 capsule   Refills:  1       * Notice:  This list has 3 medication(s) that are the same as other medications prescribed for you. Read the directions carefully, and ask your doctor or other care provider to review them with you.         Where to get your medicines      These medications were sent to [place] [address] [place], [place] - [address]  [address] [facility] [address]     Phone:  [phone]     furosemide 20 MG tablet    lisinopril 2.5 MG tablet    montelukast 10 MG tablet    tiotropium 18 MCG capsule         These medications were sent to [place], [place] - [address]  [address] [facility] [address]     Phone:  [phone]     doxycycline 100 MG capsule         Some of these will need a paper prescription and others  can be bought over the counter.  Ask your nurse if you have questions.     Bring a paper prescription for each of these medications     HYDROcodone-acetaminophen 5-325 MG per tablet                Primary Care Provider Office Phone # Fax #    Bandar Weinberg [phone]       [address] [facility] [address]        Equal Access to Services     MELANIEMad [facility] Community HospitalJOSHUA : Hadii aad ku hadasho Soomaali, waaxda luqadaha, qaybta kaalmada adeegyada, waxay idiin hayaan adeeg khradsh lason . So [facility] [phone].    ATENCIÓN: Si habla español, tiene a madison disposición servicios gratuitos de asistencia lingüística. Llame al [phone].    We comply with applicable federal civil rights laws and [place] laws. We do not discriminate on the basis of race, color, national origin, age, disability, sex, sexual orientation, or gender identity.            Thank you!     Thank you for choosing [facility]  for your care. Our goal is always to provide you with excellent care. Hearing back from our patients is one way we can continue to improve our services. Please take a few minutes to complete the written survey that you may receive in the mail after your visit with us. Thank you!             Your Updated Medication List - Protect others around you: Learn how to safely use, store and throw away your medicines at www.disposemymeds.org.          This list is accurate as of: 11/6/17 11:59 PM.  Always use your most recent med list.                   Brand Name Dispense Instructions for use Diagnosis    * albuterol (2.5 MG/3ML) 0.083% neb solution     25 vial    Take 1 vial (2.5 mg) by nebulization every 6 hours as needed for shortness of breath / dyspnea or wheezing    SOB (shortness of breath)       * albuterol 108 (90 BASE) MCG/ACT Inhaler    VENTOLIN HFA    18 g    Inhale 2 puffs into the lungs every 6 hours as needed for shortness of breath / dyspnea or wheezing    Asthma exacerbation        ALPRAZolam 0.25 MG tablet    XANAX    30 tablet    Take 1 tablet by mouth. Every twelve hours as needed for anxiety    Anxiety       atorvastatin 10 MG tablet    LIPITOR    30 tablet    Reported on 5/8/2017    Coronary artery disease involving native coronary artery of native heart without angina pectoris, Mixed hyperlipidemia       baclofen 10 MG tablet    LIORESAL    90 tablet    TAKE ONE TABLET BY MOUTH THREE TIMES A DAY AS NEEDED FOR MUSCLE SPASMS    Chronic bilateral low back pain with right-sided sciatica       [employer] FIBER GUMMIES 2.5 G Chew      Take 1 tablet by mouth daily Reported on 5/8/2017        doxycycline 100 MG capsule    VIBRAMYCIN    20 capsule    Take 1 capsule (100 mg) by mouth 2 times daily    Upper respiratory tract infection, unspecified type       fluticasone 50 MCG/ACT spray    FLONASE    1 Bottle    Spray 1-2 sprays into both nostrils daily    Nasal congestion       fluticasone-salmeterol 500-50 MCG/DOSE diskus inhaler    ADVAIR    3 Inhaler    Inhale 1 puff into the lungs 2 times daily    Pulmonary emphysema, unspecified emphysema type (H)       furosemide 20 MG tablet    LASIX    180 tablet    Take 2 tablets (40 mg) by mouth daily severe swelling    Acute systolic congestive heart failure (H)       guaiFENesin-codeine 100-10 MG/5ML Soln solution    ROBITUSSIN AC    120 mL    Take 5 mLs by mouth every 4 hours as needed for cough    Asthma exacerbation       * HYDROcodone-acetaminophen 5-325 MG per tablet    NORCO    18 tablet    Take 1-2 tablets by mouth every 8 hours as needed for pain Maximum 6 daily    Chronic bilateral low back pain without sciatica       * Hydrocodone-Acetaminophen 5-300 MG Tabs     18 tablet    Take 1-2 tablets by mouth 3 times daily as needed    Low back pain, unspecified back pain laterality, unspecified chronicity, with sciatica presence unspecified       * HYDROcodone-acetaminophen 5-325 MG per tablet    NORCO    180 tablet    Take 1-2 tablets by mouth every 8  hours as needed for pain Maximum 6 daily    Chronic bilateral low back pain without sciatica       ipratropium - albuterol 0.5 mg/2.5 mg/3 mL 0.5-2.5 (3) MG/3ML neb solution    DUONEB    30 vial    Take 1 vial (3 mLs) by nebulization every 6 hours as needed for shortness of breath / dyspnea or wheezing    Moderate persistent asthma without complication       lisinopril 2.5 MG tablet    PRINIVIL/Zestril    90 tablet    Take 1 tablet (2.5 mg) by mouth daily    Dilated cardiomyopathy (H)       mometasone 0.1 % ointment    ELOCON    45 g    Apply sparingly to affected area twice daily as needed.  Do not apply to face.    Intrinsic eczema       montelukast 10 MG tablet    SINGULAIR    90 tablet    Take 1 tablet (10 mg) by mouth At Bedtime    Mild persistent asthma without complication       Multiple Vitamin Chew      Take 1 tablet by mouth daily        nitroGLYcerin 0.4 MG sublingual tablet    NITROSTAT    25 tablet    Place 1 tablet (0.4 mg) under the tongue every 5 minutes as needed for chest pain If you are still having symptoms after 3 doses (15 minutes) call 911.    Acute chest pain       ondansetron 4 MG ODT tab    ZOFRAN ODT    20 tablet    Take 1-2 tablets (4-8 mg) by mouth every 8 hours as needed for nausea    Nausea       pantoprazole 40 MG EC tablet    PROTONIX    90 tablet    Take 1 tablet (40 mg) by mouth daily    Gastroesophageal reflux disease without esophagitis       potassium chloride 10 MEQ tablet    K-TAB,KLOR-CON    90 tablet    Take 1 tablet (10 mEq) by mouth 3 times daily    Hypokalemia       predniSONE 5 MG tablet    DELTASONE    15 tablet    Take 1 tablet (5 mg) by mouth daily    Pulmonary emphysema, unspecified emphysema type (H)       roflumilast 500 MCG Tabs tablet    DALIRESP     Take 1 tablet (500 mcg) by mouth daily        SUMAtriptan 25 MG tablet    IMITREX    12 tablet    Take 1 tablet (25 mg) by mouth at onset of headache for migraine May repeat in 2 hours if needed: max 2/day;    Other  type of migraine       tiotropium 18 MCG capsule    SPIRIVA HANDIHALER    90 capsule    USING THE HANDIHALER, INHALE THE CONTENTS OF ONE CAPSULE BY MOUTH DAILY    Moderate persistent asthma without complication       TYLENOL EXTRA STRENGTH PO      1 TABLET EVERY 4 HOURS AS NEEDED    Abdominal pain, right upper quadrant       * Notice:  This list has 5 medication(s) that are the same as other medications prescribed for you. Read the directions carefully, and ask your doctor or other care provider to review them with you.

## 2017-11-07 PROBLEM — J43.9 PULMONARY EMPHYSEMA, UNSPECIFIED EMPHYSEMA TYPE (H): Status: ACTIVE | Noted: 2017-11-07

## 2017-11-08 DIAGNOSIS — R30.0 DYSURIA: ICD-10-CM

## 2017-11-08 LAB
ALBUMIN UR-MCNC: NEGATIVE MG/DL
APPEARANCE UR: CLEAR
BILIRUB UR QL STRIP: NEGATIVE
COLOR UR AUTO: YELLOW
GLUCOSE UR STRIP-MCNC: NEGATIVE MG/DL
HGB UR QL STRIP: NEGATIVE
KETONES UR STRIP-MCNC: NEGATIVE MG/DL
LEUKOCYTE ESTERASE UR QL STRIP: NEGATIVE
NITRATE UR QL: NEGATIVE
PH UR STRIP: 6 PH (ref 5–7)
SOURCE: NORMAL
SP GR UR STRIP: 1.01 (ref 1–1.03)
UROBILINOGEN UR STRIP-ACNC: 0.2 EU/DL (ref 0.2–1)

## 2017-11-08 PROCEDURE — 81003 URINALYSIS AUTO W/O SCOPE: CPT | Performed by: INTERNAL MEDICINE

## 2017-11-13 ENCOUNTER — MYC REFILL (OUTPATIENT)
Dept: INTERNAL MEDICINE | Facility: CLINIC | Age: 62
End: 2017-11-13

## 2017-11-13 DIAGNOSIS — J45.40 MODERATE PERSISTENT ASTHMA WITHOUT COMPLICATION: ICD-10-CM

## 2017-11-13 RX ORDER — TIOTROPIUM BROMIDE 18 UG/1
CAPSULE ORAL; RESPIRATORY (INHALATION)
Qty: 90 CAPSULE | Refills: 1 | Status: CANCELLED | OUTPATIENT
Start: 2017-11-13

## 2017-11-13 NOTE — TELEPHONE ENCOUNTER
Message from Science Exchanget:  Original authorizing provider: MD Trisha Marvin would like a refill of the following medications:  tiotropium (SPIRIVA HANDIHALER) 18 MCG capsule [Bandar Weinberg MD]    Preferred pharmacy: [facility] [address] [place] [facility], [place] - [address] [address]    Comment:  Dr. Weinberg and Nursing Staff, Thought I had enough of the Spiriva for my trip but as I was packing last night (supposed to leave today) I discovered that I would run out on the trip. Not sure if the "[employer] insurance co. wants this written for 3 months as they do others. Could you please, please get it called in to [facility] in [place] as soon as possible. I really appreciate it. Thank you so much. Trisha Bender

## 2017-11-16 NOTE — TELEPHONE ENCOUNTER
SOT LUNG INTAKE    November 16, 2017    Trisha Bender  [id number]  Body mass index is 24.39 kg/(m^2).  Referring Provider: Dr. Thomas Valerio (Pulmonary), Dr. Bandar Weinberg (PCP at [facility])  Diagnosis: COPD  [facility]    History:  Smoking/nicotine use history: cigarettes 1 pk/ day for 30 yrs, quit 2006  Alcohol use history:    Drug use history:   Cancer history:    Cardiac history: CAD, dilated cardiomyopathy, CHF    Family History and Social History were completed.    Allergies were updated    Primary Care  Mammogram: [facility]  5/18/17, in [facility]  PAP: [facility]  7/20/16, in [facility]  Colonoscopy: [facility]  8/20/09, in [facility]  Dental:   Hepatitis A/B: none seen   Pneumovax: updated in Epic    Pulmonary Tests   PFTs: [facility]  10/24/17 (pg 7/ 31)  6 Minute Walk:   Sleep Study:     Diagnostic Tests/Imaging  Heart cath: [facility]  7/2/15, in Epic  Stress Test:   ECHO: [facility]  5/24/17, in [facility]     Chest CT: [facility], per clinic notes done on 5/2017  DEXA: [facility]  5/16/11, in [facility]   Upper GI: [facility]  11/15/11, in Epic     Notes: Clinic note 10/24/17- a 3 month f/u visit.  Pt with frequent exacerbations requiring prednisone, remains on 15 mg, attempt to taper.  Plan to start daliresp, low dose prednisone 5mg qd, start pulmonary rehab at [facility], next CT in 5/2018, referral to [facility] lung transplant.    I had attempted contacts on:   11/16/2017 02:03 PM Phone (Outgoing) Napoleon Trisha Cynthia (Self) [phone] (M)   Left Message -  1st attempt to complete intake, lft vm msg, as noted on 11/13, pt planned a trip.   01/04/2018 09:20 AM Phone (Outgoing) Marty Bendera Cynthia (Self) [phone] (H)   Left Message -  2nd attempt to complete intake, lft vn msgs on both nbrs listed.     My intake was completed per [facility] and clinic notes received.  Per Edouard, she contacted pt and so will be routing chart to her and will complete checklist once all  med rec req received.

## 2017-11-22 NOTE — TELEPHONE ENCOUNTER
Detail Level: Zone Please see MyChart msg from pt, she had OV 6/2/17.  Do you want her to be seen again for this med?

## 2017-12-03 ENCOUNTER — MYC REFILL (OUTPATIENT)
Dept: INTERNAL MEDICINE | Facility: CLINIC | Age: 62
End: 2017-12-03

## 2017-12-03 DIAGNOSIS — G89.29 CHRONIC BILATERAL LOW BACK PAIN WITHOUT SCIATICA: ICD-10-CM

## 2017-12-03 DIAGNOSIS — M54.50 CHRONIC BILATERAL LOW BACK PAIN WITHOUT SCIATICA: ICD-10-CM

## 2017-12-04 ENCOUNTER — MYC REFILL (OUTPATIENT)
Dept: INTERNAL MEDICINE | Facility: CLINIC | Age: 62
End: 2017-12-04

## 2017-12-04 DIAGNOSIS — J43.9 PULMONARY EMPHYSEMA, UNSPECIFIED EMPHYSEMA TYPE (H): ICD-10-CM

## 2017-12-04 RX ORDER — HYDROCODONE BITARTRATE AND ACETAMINOPHEN 5; 325 MG/1; MG/1
1-2 TABLET ORAL EVERY 8 HOURS PRN
Qty: 150 TABLET | Refills: 0 | Status: SHIPPED | OUTPATIENT
Start: 2017-12-04 | End: 2018-01-01

## 2017-12-04 NOTE — TELEPHONE ENCOUNTER
Message from Funsherpahart:  Original authorizing provider: MD Trisha Marvin would like a refill of the following medications:  HYDROcodone-acetaminophen (NORCO) 5-325 MG per tablet [Bandar Weinberg MD]    Preferred pharmacy: [place], [place] - [facility] [address]    Comment:  Dr. Weinberg and Nursing Staff, I would like to get a refill on the Hydrocodone-Acetaminophen prescription as soon as possible. Thank you. Trisha Bender

## 2017-12-04 NOTE — TELEPHONE ENCOUNTER
Called Pt and informed of MD message/recommendations below. Pt seems to understand and will try to take the Tylenol as directed per MD.  Rx sent to [place] pharmacy.  Madhavi Garcia MA

## 2017-12-04 NOTE — TELEPHONE ENCOUNTER
Should limit the use of narcotics. Will decrease dose to maximum of 5 Hydrocodone daily. Should take one at a time and repeat if needed.   Substitute one Hydrocodone with Tylenol. Will dispense 150 tablets to last her a month.

## 2017-12-05 NOTE — TELEPHONE ENCOUNTER
Message from Betfairt:  Original authorizing provider: MD Trisha Marvin would like a refill of the following medications:  fluticasone-salmeterol (ADVAIR) 500-50 MCG/DOSE diskus inhaler [Bandar Weinberg MD]    Preferred pharmacy: [facility] [id number] [facility], [place] - [address] [address]    Comment:  Dr. Weinberg and Nursing Staff, Hello. I had called in a refill on Advair and heard back from [facility] that my insurance co. would not fill it. [facility] is now insisting that prescriptions be written for 90 days at a time and go through [facility] or their mail order. Could you please send refill for Advair for 90 days to [facility] in [place]? Their phone number is: [phone]. Will be out of this medication soon. Thanks! Trisha Bender

## 2017-12-11 DIAGNOSIS — E87.6 HYPOKALEMIA: ICD-10-CM

## 2017-12-12 RX ORDER — POTASSIUM CHLORIDE 750 MG/1
TABLET, EXTENDED RELEASE ORAL
Qty: 90 TABLET | Refills: 5 | Status: SHIPPED | OUTPATIENT
Start: 2017-12-12 | End: 2018-08-30

## 2017-12-13 ENCOUNTER — TRANSFERRED RECORDS (OUTPATIENT)
Dept: HEALTH INFORMATION MANAGEMENT | Facility: CLINIC | Age: 62
End: 2017-12-13

## 2017-12-19 ENCOUNTER — MYC REFILL (OUTPATIENT)
Dept: INTERNAL MEDICINE | Facility: CLINIC | Age: 62
End: 2017-12-19

## 2017-12-19 DIAGNOSIS — G43.709 CHRONIC MIGRAINE WITHOUT AURA WITHOUT STATUS MIGRAINOSUS, NOT INTRACTABLE: ICD-10-CM

## 2017-12-21 RX ORDER — SUMATRIPTAN 25 MG/1
25 TABLET, FILM COATED ORAL
Qty: 9 TABLET | Refills: 11 | Status: SHIPPED | OUTPATIENT
Start: 2017-12-21 | End: 2018-12-17

## 2017-12-21 NOTE — TELEPHONE ENCOUNTER
Message from CyVekhart:  Original authorizing provider: MD Trisha Marvin would like a refill of the following medications:  SUMAtriptan (IMITREX) 25 MG tablet [Bandar Weinberg MD]    Preferred pharmacy: [facility] [place], [place] - [facility] [address]    Comment:  Dr. Weinberg and Nursing Staff, Although I have a refill on the Sumatriptan the [facility] says it's too soon to refill it. I had it filled on 12/11/17 and wanted to get more before the Christmas Holiday. I have been having so many migraines lately. Please authorize and refill the Sumatriptan for me. Thank you so much. Happy Holidays to everyone there! Trisha Bender

## 2017-12-21 NOTE — TELEPHONE ENCOUNTER
See my chart message    Imitrex       Last Written Prescription Date: 6/2/17  Last Fill Quantity: 12, # refills: 3  Last Office Visit with G, [facility] or [facility] prescribing provider: 11/6/17  Next 5 appointments (look out 90 days)     Feb 05, 2018  3:15 PM CST   Return Visit with Michael Odell MD   [facility] ([facility])    [address] [place] [address] [phone]                   BP Readings from Last 3 Encounters:   11/06/17 116/70   10/11/17 110/70   06/02/17 126/80

## 2018-01-01 ENCOUNTER — MYC REFILL (OUTPATIENT)
Dept: INTERNAL MEDICINE | Facility: CLINIC | Age: 63
End: 2018-01-01

## 2018-01-01 DIAGNOSIS — G89.29 CHRONIC BILATERAL LOW BACK PAIN WITHOUT SCIATICA: ICD-10-CM

## 2018-01-01 DIAGNOSIS — M54.50 CHRONIC BILATERAL LOW BACK PAIN WITHOUT SCIATICA: ICD-10-CM

## 2018-01-02 ENCOUNTER — TELEPHONE (OUTPATIENT)
Dept: TRANSPLANT | Facility: CLINIC | Age: 63
End: 2018-01-02

## 2018-01-02 NOTE — TELEPHONE ENCOUNTER
NPT CALL- NO ANSWER LEFT MESSAGE  Called patient to complete NPT intake, discuss transplant process, and schedule NPT visit. Patient was unavailable at time of call. I left a message with contact information requesting patient call back. A plan was made to call patient if no return call occurs within one week. After three attempts to contact patient will send patient a letter indicating inability to contact patient and closure of lung transplant referral.

## 2018-01-03 ENCOUNTER — MYC REFILL (OUTPATIENT)
Dept: INTERNAL MEDICINE | Facility: CLINIC | Age: 63
End: 2018-01-03

## 2018-01-03 DIAGNOSIS — G89.29 CHRONIC BILATERAL LOW BACK PAIN WITHOUT SCIATICA: ICD-10-CM

## 2018-01-03 DIAGNOSIS — M54.50 CHRONIC BILATERAL LOW BACK PAIN WITHOUT SCIATICA: ICD-10-CM

## 2018-01-04 ENCOUNTER — TELEPHONE (OUTPATIENT)
Dept: TRANSPLANT | Facility: CLINIC | Age: 63
End: 2018-01-04

## 2018-01-04 NOTE — TELEPHONE ENCOUNTER
Norco    CSA signed.   Last Written Prescription Date:  12/4/17  Last Fill Quantity: 150,   # refills: 0    Last Office Visit: 11/6/17    Future Office visit:    Next 5 appointments (look out 90 days)     Feb 05, 2018  3:15 PM CST   Return Visit with Michael Odell MD   [facility] ([facility])    [address] [place] [address] [phone]                   Routing refill request to provider for review/approval because:  Drug not on the [facility], [facility] or [facility] refill protocol or controlled substance

## 2018-01-04 NOTE — TELEPHONE ENCOUNTER
Message from Central Desktophart:  Original authorizing provider: MD Trisha Marvin would like a refill of the following medications:  HYDROcodone-acetaminophen (NORCO) 5-325 MG per tablet [Bandar Weinberg MD]    Preferred pharmacy: [place], [place] - [facility] [address]    Comment:  Dr. Weinberg and Nursing Staff, I would like to get a refill on the Hydrocodone-Acetaminophen prescription. Thank you. Trisha Bender

## 2018-01-05 RX ORDER — HYDROCODONE BITARTRATE AND ACETAMINOPHEN 5; 325 MG/1; MG/1
1-2 TABLET ORAL EVERY 8 HOURS PRN
Qty: 150 TABLET | Refills: 0 | Status: SHIPPED | OUTPATIENT
Start: 2018-01-05 | End: 2018-01-29

## 2018-01-08 RX ORDER — HYDROCODONE BITARTRATE AND ACETAMINOPHEN 5; 325 MG/1; MG/1
1-2 TABLET ORAL EVERY 8 HOURS PRN
Qty: 150 TABLET | Refills: 0 | OUTPATIENT
Start: 2018-01-08

## 2018-01-08 NOTE — TELEPHONE ENCOUNTER
Message from Knokhart:  Original authorizing provider: MD Trisha Marvin would like a refill of the following medications:  HYDROcodone-acetaminophen (NORCO) 5-325 MG per tablet [Bandar Weinberg MD]    Preferred pharmacy: [place], [place] - [facility] [address]    Comment:  Dr. Weinberg and Nursing Staff, It doesn't appear that the previous request for my prescription renewal was read and may have been overlooked so I am sending it again. Your attention to this is much appreciated. Thank you. Trisha Bender

## 2018-01-19 DIAGNOSIS — F41.9 ANXIETY: ICD-10-CM

## 2018-01-22 ENCOUNTER — MYC REFILL (OUTPATIENT)
Dept: INTERNAL MEDICINE | Facility: CLINIC | Age: 63
End: 2018-01-22

## 2018-01-22 DIAGNOSIS — F41.9 ANXIETY: ICD-10-CM

## 2018-01-22 RX ORDER — ALPRAZOLAM 0.25 MG
TABLET ORAL
Qty: 30 TABLET | Refills: 0 | Status: CANCELLED | OUTPATIENT
Start: 2018-01-22

## 2018-01-24 RX ORDER — ALPRAZOLAM 0.25 MG
TABLET ORAL
Qty: 30 TABLET | Refills: 0 | Status: SHIPPED | OUTPATIENT
Start: 2018-01-24 | End: 2018-03-01

## 2018-01-24 NOTE — TELEPHONE ENCOUNTER
Xanax      Last Written Prescription Date:  10-11-17  Last Fill Quantity: 30,   # refills: 0  Last Office Visit: 11-6-17  Future Office visit:    Next 5 appointments (look out 90 days)     Feb 05, 2018  3:15 PM CST   Return Visit with Michael Odell MD   [facility] ([facility])    [address] [address] [address] [phone]                   Routing refill request to provider for review/approval because:  Drug not on the [facility], [facility] or [facility] refill protocol or controlled substance    Signed CSA form in chart.    [place]: access not granted by provider.    [place]  [email or web address]      Please advise, thanks.

## 2018-01-25 NOTE — TELEPHONE ENCOUNTER
Message from Ymagishart:  Original authorizing provider: MD Trisha Marvin would like a refill of the following medications:  ALPRAZolam (XANAX) 0.25 MG tablet [Bandar Weinberg MD]    Preferred pharmacy: [place], [place] - [facility] [address]    Comment:  Dr. Weinberg and Nursing Staff, I would like to get a refill on the Alprazolam prescription soon. I called it into the [facility] automated line last week but perhaps that wasn t correct for the medication since it hasn t been filled and I haven t heard back from anyone there. Your attention to this is my hero appreciated. Thank you. Trisha Bender

## 2018-01-29 ENCOUNTER — MYC REFILL (OUTPATIENT)
Dept: INTERNAL MEDICINE | Facility: CLINIC | Age: 63
End: 2018-01-29

## 2018-01-29 ENCOUNTER — MYC MEDICAL ADVICE (OUTPATIENT)
Dept: INTERNAL MEDICINE | Facility: CLINIC | Age: 63
End: 2018-01-29

## 2018-01-29 DIAGNOSIS — G89.29 CHRONIC BILATERAL LOW BACK PAIN WITHOUT SCIATICA: ICD-10-CM

## 2018-01-29 DIAGNOSIS — M54.50 CHRONIC BILATERAL LOW BACK PAIN WITHOUT SCIATICA: ICD-10-CM

## 2018-01-31 RX ORDER — HYDROCODONE BITARTRATE AND ACETAMINOPHEN 5; 325 MG/1; MG/1
1-2 TABLET ORAL EVERY 8 HOURS PRN
Qty: 150 TABLET | Refills: 0 | Status: SHIPPED | OUTPATIENT
Start: 2018-01-31 | End: 2018-02-05

## 2018-01-31 NOTE — TELEPHONE ENCOUNTER
Controlled Substance Refill Request for norco  Problem List Complete:  No     PROVIDER TO CONSIDER COMPLETION OF PROBLEM LIST AND OVERVIEW/CONTROLLED SUBSTANCE AGREEMENT    Last Written Prescription Date:  1/5/18  Last Fill Quantity: 150,   # refills: 0    Last Office Visit with [facility] primary care provider: 11/6/17    Future Office visit:   Next 5 appointments (look out 90 days)     Feb 05, 2018  3:15 PM CST   Return Visit with Michael Odell MD   [facility] ([facility])    [address] [place] [address] [phone]                  Controlled substance agreement on file: No.     Processing:  Staff will hand deliver Rx to on-site pharmacy

## 2018-01-31 NOTE — TELEPHONE ENCOUNTER
Message from Evirxhart:  Original authorizing provider: MD Trisha Marvin would like a refill of the following medications:  HYDROcodone-acetaminophen (NORCO) 5-325 MG per tablet [Bandar Weinberg MD]    Preferred pharmacy: [place], [place] - [facility] [address].    Comment:  Dr. Weinberg and Nursing Staff, I would like to get a refill on the Hydrocodone-Acetaminophen prescription as soon as possible. It has been very difficult to go from 6 pills to 5 pills per day. On some days I had to take 6 for my back pain. Therefore, I will run out by the weekend so hope to get a refill and that [facility] will fill it by Friday. Thank you for your attention to this. Trisha Bender

## 2018-02-05 ENCOUNTER — OFFICE VISIT (OUTPATIENT)
Dept: CARDIOLOGY | Facility: CLINIC | Age: 63
End: 2018-02-05
Attending: NURSE PRACTITIONER
Payer: COMMERCIAL

## 2018-02-05 VITALS
WEIGHT: 180 LBS | BODY MASS INDEX: 25.77 KG/M2 | SYSTOLIC BLOOD PRESSURE: 112 MMHG | HEIGHT: 70 IN | HEART RATE: 98 BPM | DIASTOLIC BLOOD PRESSURE: 68 MMHG

## 2018-02-05 DIAGNOSIS — R06.02 SOB (SHORTNESS OF BREATH): ICD-10-CM

## 2018-02-05 DIAGNOSIS — I42.9 CARDIOMYOPATHY (H): ICD-10-CM

## 2018-02-05 DIAGNOSIS — I42.0 DILATED CARDIOMYOPATHY (H): Primary | ICD-10-CM

## 2018-02-05 DIAGNOSIS — J43.1 PANLOBULAR EMPHYSEMA (H): ICD-10-CM

## 2018-02-05 DIAGNOSIS — I10 ESSENTIAL HYPERTENSION WITH GOAL BLOOD PRESSURE LESS THAN 130/80: ICD-10-CM

## 2018-02-05 LAB
ANION GAP SERPL CALCULATED.3IONS-SCNC: 8 MMOL/L (ref 3–14)
BUN SERPL-MCNC: 5 MG/DL (ref 7–30)
CALCIUM SERPL-MCNC: 8.6 MG/DL (ref 8.5–10.1)
CHLORIDE SERPL-SCNC: 100 MMOL/L (ref 94–109)
CO2 SERPL-SCNC: 29 MMOL/L (ref 20–32)
CREAT SERPL-MCNC: 0.65 MG/DL (ref 0.52–1.04)
GFR SERPL CREATININE-BSD FRML MDRD: >90 ML/MIN/1.7M2
GLUCOSE SERPL-MCNC: 119 MG/DL (ref 70–99)
POTASSIUM SERPL-SCNC: 3.9 MMOL/L (ref 3.4–5.3)
SODIUM SERPL-SCNC: 137 MMOL/L (ref 133–144)

## 2018-02-05 PROCEDURE — 99213 OFFICE O/P EST LOW 20 MIN: CPT | Performed by: INTERNAL MEDICINE

## 2018-02-05 PROCEDURE — 80048 BASIC METABOLIC PNL TOTAL CA: CPT | Performed by: NURSE PRACTITIONER

## 2018-02-05 PROCEDURE — 36415 COLL VENOUS BLD VENIPUNCTURE: CPT | Performed by: NURSE PRACTITIONER

## 2018-02-05 NOTE — LETTER
2/5/2018      Bandar Weinberg MD  [address] [facility] [address]      RE: Trisha Bender       Dear Colleague,    I had the pleasure of seeing Trisha Bender in the [facility].    Service Date: 02/05/2018      PRIMARY CARE PHYSICIAN:  Dr. Bandar Weinberg.      HISTORY OF PRESENT ILLNESS:  I again had the pleasure of seeing your patient, Trisha Bender, at [facility] for evaluation of an idiopathic cardiomyopathy.  The patient presented with shortness of breath in 07/2015.  Her ejection fraction was 20%-25% by echocardiography.  Coronary angiography showed mild coronary artery disease.  Medical management was recommended.  We felt these findings were consistent with takotsubo cardiomyopathy.  She was discharged on Lasix, carvedilol and lisinopril.  The Lasix was decreased due to dizziness.  Because of peripheral edema the Lasix was then increased to 20 mg daily.  She has had increasing shortness of breath requiring discontinuation of her carvedilol and then metoprolol.  She has a history of asthma and COPD.  She has followed up with her pulmonologist who actually has now suggested she undergo lung transplantation evaluation.  Her ejection fraction has varied from full recovery at 55%-60% to more recently in 05/2017 to 50%-55%.  No other significant abnormalities were seen with normal right ventricular systolic pressures.  She does not exercise to any major extent.  The patient has not been exercising and remains in her house trying to avoid influenza.  She denies PND, orthopnea or peripheral edema.  She is now on steroids since 11/2017.  She has gained 20 pounds since that time.  Her BMP today includes sodium 137, potassium 3.9, BUN 5, creatinine 0.65.  Her blood glucose level is 119.      PHYSICAL EXAMINATION:  As listed below.      ASSESSMENT:   1.  Trisha Bender is a pleasant 62-year-old female accompanied by her  today and  evaluated for an idiopathic cardiomyopathy.  We suspected this was takotsubo cardiomyopathy.  Her ejection fraction has varied from 20%-25% to more recently 50%-55%.  She is now off all beta blockers but remains on lisinopril.  We will reassess with an echocardiogram in May of this year.  Her heart rate remains elevated possibly due to her COPD.   2.  She has no significant pulmonary hypertension.  She has had mild venous insufficiency.  I would suggest continuing with support stockings usually above the knee if possible.   3.  The patient is doing well with atorvastatin.  I suspect her triglycerides are due to her steroids and possibly on a genetic basis.  We can follow this periodically.      It is my intention to see this patient again in 1 year.  We will assess with an echocardiogram in 3 months.  All her questions were answered to her satisfaction.   Jim Odell MD       cc:   Bandar Weinberg MD    [facility] [place]    [address], [address]    [place]  [address]         JIM ODELL MD, [facility]             D: 2018   T: 2018   MT: HERNAN      Name:     MOSES TIMMONS   MRN:      [id number]        Account:      [id number]   :      1955           Service Date: 2018      Document: [id number]         Outpatient Encounter Prescriptions as of 2018   Medication Sig Dispense Refill     ALPRAZolam (XANAX) 0.25 MG tablet Take 1 tablet by mouth. Every twelve hours as needed for anxiety 30 tablet 0     SUMAtriptan (IMITREX) 25 MG tablet Take 1 tablet (25 mg) by mouth at onset of headache for migraine May repeat in 2 hours if needed: max 2/day; 9 tablet 11     potassium chloride (K-TAB,KLOR-CON) 10 MEQ tablet TAKE ONE TABLET BY MOUTH THREE TIMES A DAY 90 tablet 5     lisinopril (PRINIVIL/ZESTRIL) 2.5 MG tablet Take 1 tablet (2.5 mg) by mouth daily 90 tablet 3     montelukast (SINGULAIR) 10 MG tablet Take 1 tablet (10 mg) by mouth At Bedtime 90 tablet 1      tiotropium (SPIRIVA HANDIHALER) 18 MCG capsule USING THE HANDIHALER, INHALE THE CONTENTS OF ONE CAPSULE BY MOUTH DAILY 90 capsule 1     roflumilast (DALIRESP) 500 MCG TABS tablet Take 1 tablet (500 mcg) by mouth daily       doxycycline (VIBRAMYCIN) 100 MG capsule Take 1 capsule (100 mg) by mouth 2 times daily 20 capsule 0     HYDROcodone-acetaminophen (NORCO) 5-325 MG per tablet Take 1-2 tablets by mouth every 8 hours as needed for pain Maximum 6 daily 18 tablet 0     Hydrocodone-Acetaminophen 5-300 MG TABS Take 1-2 tablets by mouth 3 times daily as needed 18 tablet 0     predniSONE (DELTASONE) 5 MG tablet Take 1 tablet (5 mg) by mouth daily 15 tablet 0     fluticasone-salmeterol (ADVAIR) 500-50 MCG/DOSE diskus inhaler Inhale 1 puff into the lungs 2 times daily 3 Inhaler 1     baclofen (LIORESAL) 10 MG tablet TAKE ONE TABLET BY MOUTH THREE TIMES A DAY AS NEEDED FOR MUSCLE SPASMS 90 tablet 1     albuterol (VENTOLIN HFA) 108 (90 BASE) MCG/ACT Inhaler Inhale 2 puffs into the lungs every 6 hours as needed for shortness of breath / dyspnea or wheezing 18 g 3     ipratropium - albuterol 0.5 mg/2.5 mg/3 mL (DUONEB) 0.5-2.5 (3) MG/3ML neb solution Take 1 vial (3 mLs) by nebulization every 6 hours as needed for shortness of breath / dyspnea or wheezing 30 vial 1     mometasone (ELOCON) 0.1 % ointment Apply sparingly to affected area twice daily as needed.  Do not apply to face. 45 g 0     ondansetron (ZOFRAN ODT) 4 MG ODT tab Take 1-2 tablets (4-8 mg) by mouth every 8 hours as needed for nausea 20 tablet 0     fluticasone (FLONASE) 50 MCG/ACT spray Spray 1-2 sprays into both nostrils daily 1 Bottle 11     albuterol (2.5 MG/3ML) 0.083% neb solution Take 1 vial (2.5 mg) by nebulization every 6 hours as needed for shortness of breath / dyspnea or wheezing 25 vial 0     pantoprazole (PROTONIX) 40 MG enteric coated tablet Take 1 tablet (40 mg) by mouth daily 90 tablet 3     atorvastatin (LIPITOR) 10 MG tablet Takes 3 times a week  30 tablet 11     nitroglycerin (NITROSTAT) 0.4 MG SL tablet Place 1 tablet (0.4 mg) under the tongue every 5 minutes as needed for chest pain If you are still having symptoms after 3 doses (15 minutes) call 911. 25 tablet 0     Multiple Vitamin CHEW Take 1 tablet by mouth daily        TYLENOL EXTRA STRENGTH OR 1 TABLET EVERY 4 HOURS AS NEEDED       [DISCONTINUED] HYDROcodone-acetaminophen (NORCO) 5-325 MG per tablet Take 1-2 tablets by mouth every 8 hours as needed for pain Maximum 5 daily 150 tablet 0     furosemide (LASIX) 20 MG tablet Take 2 tablets (40 mg) by mouth daily severe swelling (Patient not taking: Reported on 2/5/2018) 180 tablet 1     guaiFENesin-codeine (ROBITUSSIN AC) 100-10 MG/5ML SOLN solution Take 5 mLs by mouth every 4 hours as needed for cough (Patient not taking: Reported on 2/5/2018) 120 mL 0     [employer] FIBER GUMMIES 2.5 G CHEW Take 1 tablet by mouth daily Reported on 5/8/2017       No facility-administered encounter medications on file as of 2/5/2018.        Again, thank you for allowing me to participate in the care of your patient.      Sincerely,    Michael Odell MD     [facility]    cc:   Yi Snell, APRN CNP  [address]  [place] [address]

## 2018-02-05 NOTE — MR AVS SNAPSHOT
After Visit Summary   2/5/2018    Trisha Bender    MRN: [id number]           Patient Information     Date Of Birth          1955        Visit Information        Provider Department      2/5/2018 3:15 PM Michael Odell MD [facility]        Today's Diagnoses     Dilated cardiomyopathy (H)    -  1    Essential hypertension with goal blood pressure less than 130/80        SOB (shortness of breath)        Panlobular emphysema (H)           Follow-ups after your visit        Additional Services     Follow-Up with Cardiologist                 Your next 10 appointments already scheduled     Feb 12, 2018  1:00 PM CST   Pulmonary Eval with [facility]   [facility] ([facility])    [address] [place] [id number] [phone]              Future tests that were ordered for you today     Open Future Orders        Priority Expected Expires Ordered    Follow-Up with Cardiologist Routine 2/5/2019 2/6/2019 2/5/2018    Echocardiogram Routine 5/24/2018 2/5/2019 2/5/2018            Who to contact     If you have questions or need follow up information about today's clinic visit or your schedule please contact [facility] directly at [phone].  Normal or non-critical lab and imaging results will be communicated to you by MyChart, letter or phone within 4 business days after the clinic has received the results. If you do not hear from us within 7 days, please contact the clinic through MyChart or phone. If you have a critical or abnormal lab result, we will notify you by phone as soon as possible.  Submit refill requests through [employer] or call your pharmacy and they will forward the refill request to us. Please allow 3 business days for your refill to be completed.          Additional Information About Your Visit        MyChart Information     [employer]  "gives you secure access to your electronic health record. If you see a primary care provider, you can also send messages to your care team and make appointments. If you have questions, please call your primary care clinic.  If you do not have a primary care provider, please call [phone] and they will assist you.        Care EveryWhere ID     This is your Care EveryWhere ID. This could be used by other organizations to access your [facility] medical records  [id number]        Your Vitals Were     Pulse Height Last Period BMI (Body Mass Index)          98 1.778 m (5' 10\") 09/29/2005 25.83 kg/m2         Blood Pressure from Last 3 Encounters:   02/05/18 112/68   11/06/17 116/70   10/11/17 110/70    Weight from Last 3 Encounters:   02/05/18 81.6 kg (180 lb)   11/06/17 78.1 kg (172 lb 1.6 oz)   10/25/17 77.1 kg (170 lb)              We Performed the Following     Follow-Up with Cardiologist          Today's Medication Changes          These changes are accurate as of 2/5/18  3:59 PM.  If you have any questions, ask your nurse or doctor.               These medicines have changed or have updated prescriptions.        Dose/Directions    * HYDROcodone-acetaminophen 5-325 MG per tablet   Commonly known as:  NORCO   This may have changed:  Another medication with the same name was removed. Continue taking this medication, and follow the directions you see here.   Used for:  Chronic bilateral low back pain without sciatica   Changed by:  Michael Odell MD        Dose:  1-2 tablet   Take 1-2 tablets by mouth every 8 hours as needed for pain Maximum 6 daily   Quantity:  18 tablet   Refills:  0       * Hydrocodone-Acetaminophen 5-300 MG Tabs   This may have changed:  Another medication with the same name was removed. Continue taking this medication, and follow the directions you see here.   Used for:  Low back pain, unspecified back pain laterality, unspecified chronicity, with sciatica presence unspecified   Changed by: "  Michael Odell MD        Dose:  1-2 tablet   Take 1-2 tablets by mouth 3 times daily as needed   Quantity:  18 tablet   Refills:  0       * Notice:  This list has 2 medication(s) that are the same as other medications prescribed for you. Read the directions carefully, and ask your doctor or other care provider to review them with you.             Primary Care Provider Office Phone # Fax #    Bandar Weinberg [phone]       [address] [facility] [address]        Equal Access to Services     [facility]: Hadii aad ku hadasho Soomaali, waaxda luqadaha, qaybta kaalmada adeegyada, waxay idiin hayaan adeeg kharajoseph burris . So [facility] [phone].    ATENCIÓN: Si habla español, tiene a madison disposición servicios gratuitos de asistencia lingüística. Llame al [phone].    We comply with applicable federal civil rights laws and [place] laws. We do not discriminate on the basis of race, color, national origin, age, disability, sex, sexual orientation, or gender identity.            Thank you!     Thank you for choosing [facility]  for your care. Our goal is always to provide you with excellent care. Hearing back from our patients is one way we can continue to improve our services. Please take a few minutes to complete the written survey that you may receive in the mail after your visit with us. Thank you!             Your Updated Medication List - Protect others around you: Learn how to safely use, store and throw away your medicines at www.disposemymeds.org.          This list is accurate as of 2/5/18  3:59 PM.  Always use your most recent med list.                   Brand Name Dispense Instructions for use Diagnosis    * albuterol (2.5 MG/3ML) 0.083% neb solution     25 vial    Take 1 vial (2.5 mg) by nebulization every 6 hours as needed for shortness of breath / dyspnea or wheezing    SOB (shortness of breath)       * albuterol 108 (90  BASE) MCG/ACT Inhaler    VENTOLIN HFA    18 g    Inhale 2 puffs into the lungs every 6 hours as needed for shortness of breath / dyspnea or wheezing    Asthma exacerbation       ALPRAZolam 0.25 MG tablet    XANAX    30 tablet    Take 1 tablet by mouth. Every twelve hours as needed for anxiety    Anxiety       atorvastatin 10 MG tablet    LIPITOR    30 tablet    Takes 3 times a week    Coronary artery disease involving native coronary artery of native heart without angina pectoris, Mixed hyperlipidemia       baclofen 10 MG tablet    LIORESAL    90 tablet    TAKE ONE TABLET BY MOUTH THREE TIMES A DAY AS NEEDED FOR MUSCLE SPASMS    Chronic bilateral low back pain with right-sided sciatica       [employer] FIBER GUMMIES 2.5 G Chew      Take 1 tablet by mouth daily Reported on 5/8/2017        doxycycline 100 MG capsule    VIBRAMYCIN    20 capsule    Take 1 capsule (100 mg) by mouth 2 times daily    Upper respiratory tract infection, unspecified type       fluticasone 50 MCG/ACT spray    FLONASE    1 Bottle    Spray 1-2 sprays into both nostrils daily    Nasal congestion       fluticasone-salmeterol 500-50 MCG/DOSE diskus inhaler    ADVAIR    3 Inhaler    Inhale 1 puff into the lungs 2 times daily    Pulmonary emphysema, unspecified emphysema type (H)       furosemide 20 MG tablet    LASIX    180 tablet    Take 2 tablets (40 mg) by mouth daily severe swelling    Acute systolic congestive heart failure (H)       guaiFENesin-codeine 100-10 MG/5ML Soln solution    ROBITUSSIN AC    120 mL    Take 5 mLs by mouth every 4 hours as needed for cough    Asthma exacerbation       * HYDROcodone-acetaminophen 5-325 MG per tablet    NORCO    18 tablet    Take 1-2 tablets by mouth every 8 hours as needed for pain Maximum 6 daily    Chronic bilateral low back pain without sciatica       * Hydrocodone-Acetaminophen 5-300 MG Tabs     18 tablet    Take 1-2 tablets by mouth 3 times daily as needed    Low back pain, unspecified back pain laterality,  unspecified chronicity, with sciatica presence unspecified       ipratropium - albuterol 0.5 mg/2.5 mg/3 mL 0.5-2.5 (3) MG/3ML neb solution    DUONEB    30 vial    Take 1 vial (3 mLs) by nebulization every 6 hours as needed for shortness of breath / dyspnea or wheezing    Moderate persistent asthma without complication       lisinopril 2.5 MG tablet    PRINIVIL/Zestril    90 tablet    Take 1 tablet (2.5 mg) by mouth daily    Dilated cardiomyopathy (H)       mometasone 0.1 % ointment    ELOCON    45 g    Apply sparingly to affected area twice daily as needed.  Do not apply to face.    Intrinsic eczema       montelukast 10 MG tablet    SINGULAIR    90 tablet    Take 1 tablet (10 mg) by mouth At Bedtime    Mild persistent asthma without complication       Multiple Vitamin Chew      Take 1 tablet by mouth daily        nitroGLYcerin 0.4 MG sublingual tablet    NITROSTAT    25 tablet    Place 1 tablet (0.4 mg) under the tongue every 5 minutes as needed for chest pain If you are still having symptoms after 3 doses (15 minutes) call 911.    Acute chest pain       ondansetron 4 MG ODT tab    ZOFRAN ODT    20 tablet    Take 1-2 tablets (4-8 mg) by mouth every 8 hours as needed for nausea    Nausea       pantoprazole 40 MG EC tablet    PROTONIX    90 tablet    Take 1 tablet (40 mg) by mouth daily    Gastroesophageal reflux disease without esophagitis       potassium chloride 10 MEQ tablet    K-TAB,KLOR-CON    90 tablet    TAKE ONE TABLET BY MOUTH THREE TIMES A DAY    Hypokalemia       predniSONE 5 MG tablet    DELTASONE    15 tablet    Take 1 tablet (5 mg) by mouth daily    Pulmonary emphysema, unspecified emphysema type (H)       roflumilast 500 MCG Tabs tablet    DALIRESP     Take 1 tablet (500 mcg) by mouth daily        SUMAtriptan 25 MG tablet    IMITREX    9 tablet    Take 1 tablet (25 mg) by mouth at onset of headache for migraine May repeat in 2 hours if needed: max 2/day;    Chronic migraine without aura without status  migrainosus, not intractable       tiotropium 18 MCG capsule    SPIRIVA HANDIHALER    90 capsule    USING THE HANDIHALER, INHALE THE CONTENTS OF ONE CAPSULE BY MOUTH DAILY    Moderate persistent asthma without complication       TYLENOL EXTRA STRENGTH PO      1 TABLET EVERY 4 HOURS AS NEEDED    Abdominal pain, right upper quadrant       * Notice:  This list has 4 medication(s) that are the same as other medications prescribed for you. Read the directions carefully, and ask your doctor or other care provider to review them with you.

## 2018-02-05 NOTE — PROGRESS NOTES
HPI and Plan:   See dictation:[id number]    Orders Placed This Encounter   Procedures     Follow-Up with Cardiologist     Echocardiogram       No orders of the defined types were placed in this encounter.      Medications Discontinued During This Encounter   Medication Reason     HYDROcodone-acetaminophen (NORCO) 5-325 MG per tablet          Encounter Diagnoses   Name Primary?     Dilated cardiomyopathy (H) Yes     Essential hypertension with goal blood pressure less than 130/80      SOB (shortness of breath)      Panlobular emphysema (H)        CURRENT MEDICATIONS:  Current Outpatient Prescriptions   Medication Sig Dispense Refill     ALPRAZolam (XANAX) 0.25 MG tablet Take 1 tablet by mouth. Every twelve hours as needed for anxiety 30 tablet 0     SUMAtriptan (IMITREX) 25 MG tablet Take 1 tablet (25 mg) by mouth at onset of headache for migraine May repeat in 2 hours if needed: max 2/day; 9 tablet 11     potassium chloride (K-TAB,KLOR-CON) 10 MEQ tablet TAKE ONE TABLET BY MOUTH THREE TIMES A DAY 90 tablet 5     lisinopril (PRINIVIL/ZESTRIL) 2.5 MG tablet Take 1 tablet (2.5 mg) by mouth daily 90 tablet 3     montelukast (SINGULAIR) 10 MG tablet Take 1 tablet (10 mg) by mouth At Bedtime 90 tablet 1     tiotropium (SPIRIVA HANDIHALER) 18 MCG capsule USING THE HANDIHALER, INHALE THE CONTENTS OF ONE CAPSULE BY MOUTH DAILY 90 capsule 1     roflumilast (DALIRESP) 500 MCG TABS tablet Take 1 tablet (500 mcg) by mouth daily       doxycycline (VIBRAMYCIN) 100 MG capsule Take 1 capsule (100 mg) by mouth 2 times daily 20 capsule 0     HYDROcodone-acetaminophen (NORCO) 5-325 MG per tablet Take 1-2 tablets by mouth every 8 hours as needed for pain Maximum 6 daily 18 tablet 0     Hydrocodone-Acetaminophen 5-300 MG TABS Take 1-2 tablets by mouth 3 times daily as needed 18 tablet 0     predniSONE (DELTASONE) 5 MG tablet Take 1 tablet (5 mg) by mouth daily 15 tablet 0     fluticasone-salmeterol (ADVAIR) 500-50 MCG/DOSE diskus inhaler  Inhale 1 puff into the lungs 2 times daily 3 Inhaler 1     baclofen (LIORESAL) 10 MG tablet TAKE ONE TABLET BY MOUTH THREE TIMES A DAY AS NEEDED FOR MUSCLE SPASMS 90 tablet 1     albuterol (VENTOLIN HFA) 108 (90 BASE) MCG/ACT Inhaler Inhale 2 puffs into the lungs every 6 hours as needed for shortness of breath / dyspnea or wheezing 18 g 3     ipratropium - albuterol 0.5 mg/2.5 mg/3 mL (DUONEB) 0.5-2.5 (3) MG/3ML neb solution Take 1 vial (3 mLs) by nebulization every 6 hours as needed for shortness of breath / dyspnea or wheezing 30 vial 1     mometasone (ELOCON) 0.1 % ointment Apply sparingly to affected area twice daily as needed.  Do not apply to face. 45 g 0     ondansetron (ZOFRAN ODT) 4 MG ODT tab Take 1-2 tablets (4-8 mg) by mouth every 8 hours as needed for nausea 20 tablet 0     fluticasone (FLONASE) 50 MCG/ACT spray Spray 1-2 sprays into both nostrils daily 1 Bottle 11     albuterol (2.5 MG/3ML) 0.083% neb solution Take 1 vial (2.5 mg) by nebulization every 6 hours as needed for shortness of breath / dyspnea or wheezing 25 vial 0     pantoprazole (PROTONIX) 40 MG enteric coated tablet Take 1 tablet (40 mg) by mouth daily 90 tablet 3     atorvastatin (LIPITOR) 10 MG tablet Takes 3 times a week 30 tablet 11     nitroglycerin (NITROSTAT) 0.4 MG SL tablet Place 1 tablet (0.4 mg) under the tongue every 5 minutes as needed for chest pain If you are still having symptoms after 3 doses (15 minutes) call 911. 25 tablet 0     Multiple Vitamin CHEW Take 1 tablet by mouth daily        TYLENOL EXTRA STRENGTH OR 1 TABLET EVERY 4 HOURS AS NEEDED       furosemide (LASIX) 20 MG tablet Take 2 tablets (40 mg) by mouth daily severe swelling (Patient not taking: Reported on 2/5/2018) 180 tablet 1     guaiFENesin-codeine (ROBITUSSIN AC) 100-10 MG/5ML SOLN solution Take 5 mLs by mouth every 4 hours as needed for cough (Patient not taking: Reported on 2/5/2018) 120 mL 0     [employer] FIBER GUMMIES 2.5 G CHEW Take 1 tablet by mouth daily  Reported on 2017         ALLERGIES     Allergies   Allergen Reactions     Gabapentin Swelling     Aspirin      Contrast Dye      Iodine     Ibuprofen      Peanuts [Nuts]      Penicillins      rash     Sulfa Drugs      Tace [Chlorotrianisene]      joint swelling     Strawberry Rash       PAST MEDICAL HISTORY:  Past Medical History:   Diagnosis Date     Anxiety      CAD (coronary artery disease) 7/2/15    mild per cath     CHF (congestive heart failure) (H)     EF=20-25% per echo 6/30/15     Chronic airway obstruction, not elsewhere classified     FEV1 36% of pred; VC 67%  in .     COPD (chronic obstructive pulmonary disease) (H)      Esophageal reflux      Mild persistent asthma 2012     OSTEOPENIA      Other and unspecified hyperlipidemia      Other forms of migraine, without mention of intractable migraine without mention of status migrainosus      Takotsubo cardiomyopathy      Unspecified essential hypertension        PAST SURGICAL HISTORY:  Past Surgical History:   Procedure Laterality Date     ANGIOGRAM  2015    Minimal CAD. LV dysfunction, Mid RCA 20% stenosis, EF 20-25%, c/w Takotsubo cardiomyopathy     APPENDECTOMY       C NONSPECIFIC PROCEDURE      GB and appendectomy  abstracted [id number]     C NONSPECIFIC PROCEDURE      Nasal septoplasty and mucous retention cyst     CHOLECYSTECTOMY       ESOPHAGOSCOPY, GASTROSCOPY, DUODENOSCOPY (EGD), COMBINED  11/15/2011    Procedure:COMBINED ESOPHAGOSCOPY, GASTROSCOPY, DUODENOSCOPY (EGD);  ESOPHAGOSCOPY, GASTROSCOPY, DUODENOSCOPY (EGD) ; Surgeon:JIM URENA; Location: GI     HC REMOVE TONSILS/ADENOIDS,<11 Y/O       LAPAROSCOPIC ABLATION ENDOMETRIOSIS       wisdom teeth         FAMILY HISTORY:  Family History   Problem Relation Age of Onset     CANCER Mother      Colon cancer; dxed at age 64;  at age 69     Cancer - colorectal Mother      Other Cancer Mother      GASTROINTESTINAL DISEASE Sister      Acute pancreatitis     HEART  "DISEASE Father      ?     Coronary Artery Disease Father      Circulatory Maternal Aunt      AAA     Circulatory Maternal Uncle      AAA     DIABETES Son        SOCIAL HISTORY:  Social History     Social History     Marital status:      Spouse name: N/A     Number of children: N/A     Years of education: N/A     Social History Main Topics     Smoking status: Former Smoker     Packs/day: 1.00     Years: 30.00     Quit date: 1/17/2006     Smokeless tobacco: Never Used      Comment: Quit in 2006     Alcohol use No     Drug use: No     Sexual activity: No      Comment: vasectomy     Other Topics Concern     Caffeine Concern No     none     Hobby Hazards No     Stress Concern No     Special Diet Yes     low sodium     Exercise Yes     limited due to back     Social History Narrative       Review of Systems:  Skin:  Negative for       Eyes:  Positive for glasses    ENT:  Negative for      Respiratory:    dyspnea on exertion     Cardiovascular:    edema;Positive for edema ankles  Gastroenterology: Positive for heartburn;reflux    Genitourinary:  Negative for      Musculoskeletal:  Positive for arthritis;back pain    Neurologic:    migraine headaches    Psychiatric:    sleep disturbances;anxiety    Heme/Lymph/Imm:  Positive for allergies    Endocrine:  Positive for thyroid disorder growths on thyroid - being watched    Physical Exam:  Vitals: /68  Pulse 98  Ht 1.778 m (5' 10\")  Wt 81.6 kg (180 lb)  LMP 09/29/2005  BMI 25.83 kg/m2    Constitutional:  cooperative, alert and oriented, well developed, well nourished, in no acute distress        Skin:  warm and dry to the touch, no apparent skin lesions or masses noted          Head:  normocephalic, no masses or lesions        Eyes:  pupils equal and round;sclera white;no xanthalasma        Lymph:      ENT:  no pallor or cyanosis, dentition good        Neck:  carotid pulses are full and equal bilaterally, JVP normal, no carotid bruit        Respiratory:  clear " to auscultation prolonged expiration       Cardiac: regular rhythm;normal S1 and S2;no S3 or S4 tachycardic distant heart sounds no presence of murmur          pulses full and equal, no bruits auscultated                                        GI:  abdomen soft, non-tender, BS normoactive, no mass, no HSM, no bruits        Extremities and Muscular Skeletal:  no deformities, clubbing, cyanosis, erythema observed;no edema clubbing       bilateral support stockings    Neurological:  no gross motor deficits;affect appropriate        Psych:  Alert and Oriented x 3        CC  ELLE Medina CNP  [id number]  [place] [address]

## 2018-02-12 ENCOUNTER — HOSPITAL ENCOUNTER (OUTPATIENT)
Dept: CARDIAC REHAB | Facility: CLINIC | Age: 63
End: 2018-02-12
Attending: INTERNAL MEDICINE
Payer: COMMERCIAL

## 2018-02-12 PROCEDURE — G0424 PULMONARY REHAB W EXER: HCPCS

## 2018-02-12 PROCEDURE — 40000244 ZZH STATISTIC VISIT PULM REHAB

## 2018-02-14 DIAGNOSIS — G89.29 CHRONIC BILATERAL LOW BACK PAIN WITH RIGHT-SIDED SCIATICA: ICD-10-CM

## 2018-02-14 DIAGNOSIS — R09.81 NASAL CONGESTION: ICD-10-CM

## 2018-02-14 DIAGNOSIS — M54.41 CHRONIC BILATERAL LOW BACK PAIN WITH RIGHT-SIDED SCIATICA: ICD-10-CM

## 2018-02-15 DIAGNOSIS — J43.9 PULMONARY EMPHYSEMA, UNSPECIFIED EMPHYSEMA TYPE (H): ICD-10-CM

## 2018-02-15 NOTE — ADDENDUM NOTE
Encounter addended by: Jenifer Alejandra RT on: 2/15/2018  5:52 PM<BR>     Actions taken: Charge Capture section accepted

## 2018-02-16 ENCOUNTER — MYC REFILL (OUTPATIENT)
Dept: INTERNAL MEDICINE | Facility: CLINIC | Age: 63
End: 2018-02-16

## 2018-02-16 DIAGNOSIS — M54.41 CHRONIC BILATERAL LOW BACK PAIN WITH RIGHT-SIDED SCIATICA: ICD-10-CM

## 2018-02-16 DIAGNOSIS — G89.29 CHRONIC BILATERAL LOW BACK PAIN WITH RIGHT-SIDED SCIATICA: ICD-10-CM

## 2018-02-16 DIAGNOSIS — R09.81 NASAL CONGESTION: ICD-10-CM

## 2018-02-16 RX ORDER — BACLOFEN 10 MG/1
TABLET ORAL
Qty: 90 TABLET | Refills: 1 | Status: CANCELLED | OUTPATIENT
Start: 2018-02-16

## 2018-02-16 RX ORDER — FLUTICASONE PROPIONATE 50 MCG
1-2 SPRAY, SUSPENSION (ML) NASAL DAILY
Qty: 1 BOTTLE | Refills: 11 | Status: CANCELLED | OUTPATIENT
Start: 2018-02-16

## 2018-02-19 ENCOUNTER — MYC REFILL (OUTPATIENT)
Dept: INTERNAL MEDICINE | Facility: CLINIC | Age: 63
End: 2018-02-19

## 2018-02-19 DIAGNOSIS — J43.9 PULMONARY EMPHYSEMA, UNSPECIFIED EMPHYSEMA TYPE (H): ICD-10-CM

## 2018-02-19 RX ORDER — FLUTICASONE PROPIONATE 50 MCG
SPRAY, SUSPENSION (ML) NASAL
Qty: 16 G | Refills: 7 | Status: ON HOLD | OUTPATIENT
Start: 2018-02-19 | End: 2019-09-08

## 2018-02-19 RX ORDER — BACLOFEN 10 MG/1
TABLET ORAL
Qty: 90 TABLET | Refills: 1 | Status: SHIPPED | OUTPATIENT
Start: 2018-02-19 | End: 2018-09-12

## 2018-02-19 NOTE — TELEPHONE ENCOUNTER
"Requested Prescriptions   Pending Prescriptions Disp Refills     baclofen (LIORESAL) 10 MG tablet [Pharmacy Med Name: BACLOFEN 10MG TABS] 90 tablet 1     Sig: TAKE ONE TABLET BY MOUTH THREE TIMES A DAY AS NEEDED FOR MUSCLE SPASMS    There is no refill protocol information for this order         Last Written Prescription Date:  10/11/17  Last Fill Quantity: 90,   # refills: 1  Last Office Visit: 2/5/18  Future Office visit:       Routing refill request to provider for review/approval because:  Drug not on the [facility], [facility] or [facility] refill protocol or controlled substance       fluticasone (FLONASE) 50 MCG/ACT spray [Pharmacy Med Name: FLUTICASONE 50MCG NASAL SPRAY] 16 g 11     Sig: SPRAY 1 TO 2 SPRAYS IN EACH NOSTRIL ONCE DAILY    Inhaled Steroids Protocol Failed    2/14/2018  3:40 AM       Failed - Asthma control test 20 or greater in last 6 months    Please review ACT score.          Passed - Patient is age 12 or older       Passed - Recent (6 mo) or future visit with authorizing provider's specialty    Patient had office visit in the last 6 months or has a visit in the next 30 days with authorizing provider.  See \"Patient Info\" tab in inbasket, or \"Choose Columns\" in Meds & Orders section of the refill encounter.          Prescription approved per [facility] Refill Protocol.      "

## 2018-02-20 NOTE — TELEPHONE ENCOUNTER
Message from [email or web address]:  Original authorizing provider: MD Trisha Marvin would like a refill of the following medications:  fluticasone (FLONASE) 50 MCG/ACT spray [Bandar Weinberg MD]  baclofen (LIORESAL) 10 MG tablet [Bandar Weinberg MD]    Preferred pharmacy: [address]    Comment:  Dr. Weinberg and Nursing Staff, Hi. I called the Fluticasone and Baclofen prescriptions in through the refill line at [facility] last Tuesday. Thought I would have heard something by now. Thanks for your attention to this. Trisha Bender

## 2018-02-20 NOTE — TELEPHONE ENCOUNTER
"Requested Prescriptions   Pending Prescriptions Disp Refills     ADVAIR DISKUS 500-50 MCG/DOSE diskus inhaler [Pharmacy Med Name: ADVAIR 500-50 DISKUS]  0     Sig: INHALE 1 PUFF TWO TIMES DAILY    Inhaled Steroids Protocol Failed    2/15/2018 10:29 AM       Failed - Asthma control test 20 or greater in last 6 months    Please review ACT score.          Passed - Patient is age 12 or older       Passed - Recent (6 mo) or future visit with authorizing provider's specialty    Patient had office visit in the last 6 months or has a visit in the next 30 days with authorizing provider.  See \"Patient Info\" tab in inbasket, or \"Choose Columns\" in Meds & Orders section of the refill encounter.            Routing refill request to provider for review/approval because:  Drug not on the [facility] refill protocol for Dx        "

## 2018-02-20 NOTE — TELEPHONE ENCOUNTER
Message from [email or web address]:  Original authorizing provider: MD Trisha Marvin would like a refill of the following medications:  fluticasone-salmeterol (ADVAIR) 500-50 MCG/DOSE diskus inhaler [Bandar Weinberg MD]    Preferred pharmacy: [address], [place] - [address] [address]    Comment:  Dr. Weinberg and Nursing Staff, I thought I had a refill on the Advair at [facility]. They said that they have a call in to you for a refill and are waiting to hear back. I think it must be that 3 month thing instead of one. Thank you for your attention to this. Really appreciate it. Trisha Bender

## 2018-02-22 DIAGNOSIS — Z76.82 LUNG TRANSPLANT CANDIDATE: ICD-10-CM

## 2018-02-22 DIAGNOSIS — J44.9 COPD (CHRONIC OBSTRUCTIVE PULMONARY DISEASE) (H): Primary | ICD-10-CM

## 2018-02-25 ENCOUNTER — MYC MEDICAL ADVICE (OUTPATIENT)
Dept: INTERNAL MEDICINE | Facility: CLINIC | Age: 63
End: 2018-02-25

## 2018-02-25 ENCOUNTER — MYC REFILL (OUTPATIENT)
Dept: INTERNAL MEDICINE | Facility: CLINIC | Age: 63
End: 2018-02-25

## 2018-02-25 DIAGNOSIS — G89.29 CHRONIC BILATERAL LOW BACK PAIN WITHOUT SCIATICA: ICD-10-CM

## 2018-02-25 DIAGNOSIS — M54.50 CHRONIC BILATERAL LOW BACK PAIN WITHOUT SCIATICA: ICD-10-CM

## 2018-02-25 DIAGNOSIS — M54.59 MECHANICAL LOW BACK PAIN: Primary | ICD-10-CM

## 2018-02-26 ENCOUNTER — OFFICE VISIT (OUTPATIENT)
Dept: TRANSPLANT | Facility: CLINIC | Age: 63
End: 2018-02-26
Attending: INTERNAL MEDICINE
Payer: COMMERCIAL

## 2018-02-26 ENCOUNTER — MYC MEDICAL ADVICE (OUTPATIENT)
Dept: INTERNAL MEDICINE | Facility: CLINIC | Age: 63
End: 2018-02-26

## 2018-02-26 ENCOUNTER — MYC REFILL (OUTPATIENT)
Dept: INTERNAL MEDICINE | Facility: CLINIC | Age: 63
End: 2018-02-26

## 2018-02-26 VITALS
SYSTOLIC BLOOD PRESSURE: 135 MMHG | OXYGEN SATURATION: 96 % | HEART RATE: 125 BPM | BODY MASS INDEX: 25.81 KG/M2 | WEIGHT: 180.3 LBS | DIASTOLIC BLOOD PRESSURE: 84 MMHG | TEMPERATURE: 97.5 F | HEIGHT: 70 IN

## 2018-02-26 DIAGNOSIS — Z76.82 LUNG TRANSPLANT CANDIDATE: ICD-10-CM

## 2018-02-26 DIAGNOSIS — M54.50 CHRONIC BILATERAL LOW BACK PAIN WITHOUT SCIATICA: ICD-10-CM

## 2018-02-26 DIAGNOSIS — G89.29 CHRONIC BILATERAL LOW BACK PAIN WITHOUT SCIATICA: ICD-10-CM

## 2018-02-26 DIAGNOSIS — J42 CHRONIC BRONCHITIS, UNSPECIFIED CHRONIC BRONCHITIS TYPE (H): Primary | ICD-10-CM

## 2018-02-26 DIAGNOSIS — R06.00 DYSPNEA, UNSPECIFIED TYPE: ICD-10-CM

## 2018-02-26 DIAGNOSIS — J44.9 COPD (CHRONIC OBSTRUCTIVE PULMONARY DISEASE) (H): ICD-10-CM

## 2018-02-26 LAB
ALBUMIN SERPL-MCNC: 3.3 G/DL (ref 3.4–5)
ALP SERPL-CCNC: 55 U/L (ref 40–150)
ALT SERPL W P-5'-P-CCNC: 32 U/L (ref 0–50)
ANION GAP SERPL CALCULATED.3IONS-SCNC: 6 MMOL/L (ref 3–14)
AST SERPL W P-5'-P-CCNC: 15 U/L (ref 0–45)
BASE EXCESS BLDV CALC-SCNC: 5.4 MMOL/L
BILIRUB SERPL-MCNC: 0.4 MG/DL (ref 0.2–1.3)
BUN SERPL-MCNC: 9 MG/DL (ref 7–30)
CALCIUM SERPL-MCNC: 8.7 MG/DL (ref 8.5–10.1)
CHLORIDE SERPL-SCNC: 101 MMOL/L (ref 94–109)
CO2 SERPL-SCNC: 30 MMOL/L (ref 20–32)
CREAT SERPL-MCNC: 0.85 MG/DL (ref 0.52–1.04)
ERYTHROCYTE [DISTWIDTH] IN BLOOD BY AUTOMATED COUNT: 13.1 % (ref 10–15)
GFR SERPL CREATININE-BSD FRML MDRD: 68 ML/MIN/1.7M2
GLUCOSE SERPL-MCNC: 110 MG/DL (ref 70–99)
HCO3 BLDV-SCNC: 32 MMOL/L (ref 21–28)
HCT VFR BLD AUTO: 43.7 % (ref 35–47)
HGB BLD-MCNC: 13.6 G/DL (ref 11.7–15.7)
MCH RBC QN AUTO: 29.4 PG (ref 26.5–33)
MCHC RBC AUTO-ENTMCNC: 31.1 G/DL (ref 31.5–36.5)
MCV RBC AUTO: 95 FL (ref 78–100)
O2/TOTAL GAS SETTING VFR VENT: ABNORMAL %
PCO2 BLDV: 53 MM HG (ref 40–50)
PH BLDV: 7.39 PH (ref 7.32–7.43)
PLATELET # BLD AUTO: 313 10E9/L (ref 150–450)
PO2 BLDV: 19 MM HG (ref 25–47)
POTASSIUM SERPL-SCNC: 3.2 MMOL/L (ref 3.4–5.3)
PROT SERPL-MCNC: 6.2 G/DL (ref 6.8–8.8)
RBC # BLD AUTO: 4.62 10E12/L (ref 3.8–5.2)
SODIUM SERPL-SCNC: 137 MMOL/L (ref 133–144)
WBC # BLD AUTO: 12.6 10E9/L (ref 4–11)

## 2018-02-26 PROCEDURE — 85027 COMPLETE CBC AUTOMATED: CPT | Performed by: INTERNAL MEDICINE

## 2018-02-26 PROCEDURE — 80307 DRUG TEST PRSMV CHEM ANLYZR: CPT | Performed by: INTERNAL MEDICINE

## 2018-02-26 PROCEDURE — 40000724 ZZH UMP OPEN ENCOUNTER >70 DAYS

## 2018-02-26 PROCEDURE — 80053 COMPREHEN METABOLIC PANEL: CPT | Performed by: INTERNAL MEDICINE

## 2018-02-26 PROCEDURE — 36415 COLL VENOUS BLD VENIPUNCTURE: CPT | Performed by: INTERNAL MEDICINE

## 2018-02-26 PROCEDURE — G0463 HOSPITAL OUTPT CLINIC VISIT: HCPCS | Mod: ZF

## 2018-02-26 PROCEDURE — 82803 BLOOD GASES ANY COMBINATION: CPT | Performed by: INTERNAL MEDICINE

## 2018-02-26 RX ORDER — HYDROCODONE BITARTRATE AND ACETAMINOPHEN 5; 325 MG/1; MG/1
1-2 TABLET ORAL EVERY 8 HOURS PRN
Qty: 18 TABLET | Refills: 0 | Status: SHIPPED | OUTPATIENT
Start: 2018-02-26 | End: 2018-02-27

## 2018-02-26 RX ORDER — HYDROCODONE BITARTRATE AND ACETAMINOPHEN 5; 325 MG/1; MG/1
1-2 TABLET ORAL EVERY 8 HOURS PRN
Qty: 18 TABLET | Refills: 0 | Status: CANCELLED | OUTPATIENT
Start: 2018-02-26

## 2018-02-26 RX ORDER — LIDOCAINE 50 MG/G
PATCH TOPICAL
Qty: 30 PATCH | Refills: 0 | Status: ON HOLD | OUTPATIENT
Start: 2018-02-26 | End: 2019-09-08

## 2018-02-26 ASSESSMENT — PAIN SCALES - GENERAL: PAINLEVEL: MODERATE PAIN (4)

## 2018-02-26 NOTE — TELEPHONE ENCOUNTER
"Requested Prescriptions   Pending Prescriptions Disp Refills     lidocaine (LIDODERM) 5 % Patch 30 patch 0     Sig: Apply up to 3 patches to painful area at once for up to 12 h within a 24 h period.  Remove after 12 hours.    Topical Anesthetic Agents Passed    2/26/2018  3:42 PM       Passed - Recent or future visit with authorizing provider's specialty    Patient had office visit in the last year or has a visit in the next 30 days with authorizing provider.  See \"Patient Info\" tab in inbasket, or \"Choose Columns\" in Meds & Orders section of the refill encounter.            Passed - Patient is age 2 or older        Routing refill request to provider for review/approval because:  Medication is reported/historical        "

## 2018-02-26 NOTE — LETTER
2/26/2018      RE: Trisha Bender  [address] [facility] [id number]             Pulmonary Medicine  Pre - Lung Transplant Clinic Note   February 26, 2018       Patient: Trisha Bender  MRN: [id number]           Assessment and Plan:     Trisha Bender is a 62 year old female with a PMHx significant for severe COPD. She is no longer smoking and denies vaping. She has no recent imaging or PFTs for my review at this appointment. However I feel that it is very reasonable to proceed with the full pre-lung transplant evaluation    Lung tx consideration:     A. I spent quite some time discussing both the lung transplantation evaluation listing process including complications that can be expected post lung transplantation.      B. One of the main points I did reinforce is that the survival post lung transplantation at this time is around 50 to 55% at 5 years. The main reason for this is infection and/or rejection. While most bacterial infections are treatable, the viral infections can cause significant morbidity and mortality. Acute rejection is usually treatable with high dose steroids but chronic rejection (BLOSSOM) as you already know does not have good therapy as of date. The other main point is that there is minimal to no survival advantage with lung transplantation.     C. The lung transplant evaluation will involve multiple tests and meeting with cardiothoracic surgeon, Transplant , Nutritionist etc., from our transplant team. Post testing, we will discuss the details at our transplant meeting and will be listed if she meets the criteria for lung transplantation.      D. Once on the list, Trisha Bender can expect to stay on the list anywhere from few months to few years, depending on the LAS score. Also the other factors that might play a role is number of organs required and whether she is positive for panel reactive antibodies. She has not recieved transfusions to date. She  will need to stay within a 50 mile radius of our center for the first three months after the lung transplantation (after hospital discharge).     E. Post lung transplantation, she will require multiple bronchoscopies to evaluate for infections and/or rejections. The major complications post transplantation experienced by most of our patients include: 1. Diabetes, about 30 to 40% of our patients remain on insulin for the rest of their life. 2. Chronic kidney disease, with majority losing about 50% of the kidney function and up to 5 ot 10% requiring hemodialysis and/or renal transplantation. 3. Hypertension, usually well controlled with medications. 4. Malignancy: Especially of skin cancer, and/or lymphoma - usually treatable. The above is not an exhaustive list of all the complications. The others include also airway complications occurring in about 5% of the patients requiring bronchoscopy with bronchial dilation, sometimes stent placement. There is increased risk for DVT and PE particularly in the first six months after transplantation.      F. Discussed with Trisha Bender that lung transplantation is an option. The other option is to continue as is and continue cares with us or with her local pulmonologist. We will glad to have palliative care team involved in your care to help manage your symptoms.     G. Discussed about increased risk donors (For HIV/Hep C predominantly).      H. I answered all of the patient and family's questions and provided them support     Issues to be addressed:  - CT imaging for Multi-pulmonary Nodules  - Obtain hospital records of admission for Tiffany       Other Recommendations:  1. Encourage daily physical activity  2. Strong encourage participation in pulmonary rehab  3. Continue cessation from tobacco products and vaping  4. Continue to follow-up with your Primary Pulmonologist for management of your lung disease and with you Primary Care Physician for age- and  gender-specific health care maintenance  5. Will obtain patient prior medical records for review          Subjective & Interval History:     Trisha Bender is a 62 year old female, former smoker and denies recent tobacco or use of vaping products, with a PMHx significant for severe COPD, History of Tako-Tsubo cardiomyopathy admitted in 06/2015 with that diagnosis.  At that time she had an echocardiogram which showed EF of 20-25%.  Etiology was thought to be myocarditis.  She had an angiogram at that time showing minimal coronary disease.  Her last echocardiogram was done in 08/2015, which showed normal improved EF of 50-55%.  At that time appeared that her Lasix was discontinued and plan for 6 month repeat echo was done at that time, ct of chest notable for multiple small pulmonary nodules undergoing screening with no significant change,  and History of angiogram which showed mild coronary disease only in 06/2015. With respect to her COPD   She has had multple visits to the ER or admission for exacerbations. She is currently on a bronchodilator regimen that includes Advair 500/50, Spiriva, Daliresp and prn albuterol. She is currently not on prednisone but has required multiple prednisone burst and/or tapers in the past year. She reports daily symptoms in including paroxysm of cough, wheezing, and chest tightness. She denies any occupational or environmental exposures.  She denies fevers, chills, chest pain productive cough, and reflux symptoms. She had many members of her family (adult children) attend this appointment.           Medications:     Current Outpatient Prescriptions   Medication     ADVAIR DISKUS 500-50 MCG/DOSE diskus inhaler     baclofen (LIORESAL) 10 MG tablet     fluticasone (FLONASE) 50 MCG/ACT spray     SUMAtriptan (IMITREX) 25 MG tablet     potassium chloride (K-TAB,KLOR-CON) 10 MEQ tablet     lisinopril (PRINIVIL/ZESTRIL) 2.5 MG tablet     furosemide (LASIX) 20 MG tablet     montelukast  (SINGULAIR) 10 MG tablet     tiotropium (SPIRIVA HANDIHALER) 18 MCG capsule     roflumilast (DALIRESP) 500 MCG TABS tablet     predniSONE (DELTASONE) 5 MG tablet     albuterol (VENTOLIN HFA) 108 (90 BASE) MCG/ACT Inhaler     mometasone (ELOCON) 0.1 % ointment     albuterol (2.5 MG/3ML) 0.083% neb solution     [employer] FIBER GUMMIES 2.5 G CHEW     Multiple Vitamin CHEW     TYLENOL EXTRA STRENGTH OR     ALPRAZolam (XANAX) 0.25 MG tablet     pantoprazole (PROTONIX) 40 MG EC tablet     HYDROcodone-acetaminophen (NORCO) 5-325 MG per tablet     lidocaine (LIDODERM) 5 % Patch     ipratropium - albuterol 0.5 mg/2.5 mg/3 mL (DUONEB) 0.5-2.5 (3) MG/3ML neb solution     ondansetron (ZOFRAN ODT) 4 MG ODT tab     atorvastatin (LIPITOR) 10 MG tablet     nitroglycerin (NITROSTAT) 0.4 MG SL tablet     No current facility-administered medications for this visit.       Social History     Social History     Marital status:      Spouse name: N/A     Number of children: N/A     Years of education: N/A     Occupational History     Not on file.     Social History Main Topics     Smoking status: Former Smoker     Packs/day: 1.00     Years: 30.00     Quit date: 2006     Smokeless tobacco: Never Used      Comment: Quit in      Alcohol use No     Drug use: No     Sexual activity: No      Comment: vasectomy     Other Topics Concern     Caffeine Concern No     none     Hobby Hazards No     Stress Concern No     Special Diet Yes     low sodium     Exercise Yes     limited due to back     Social History Narrative     Family History   Problem Relation Age of Onset     CANCER Mother      Colon cancer; dxed at age 64;  at age 69     Cancer - colorectal Mother      Other Cancer Mother      GASTROINTESTINAL DISEASE Sister      Acute pancreatitis     HEART DISEASE Father      ?     Coronary Artery Disease Father      Circulatory Maternal Aunt      AAA     Circulatory Maternal Uncle      AAA     DIABETES Son                 Review of  "Systems:     Please see HPI.  ROS: 10 point ROS neg other than the symptoms noted above in the HPI.             Physical Exam:     /84  Pulse 125  Temp 97.5  F (36.4  C) (Oral)  Ht 1.778 m (5' 10\")  Wt 81.8 kg (180 lb 4.8 oz)  LMP 09/29/2005  SpO2 96%  BMI 25.87 kg/m2    .PHYSICAL EXAMINATION:   GEN: Awake and alert, in no acute distress, sitting upright in chair. Pleasant and cooperative  HEENT: Pupils equal and reactive to light, conjunctiva are pink conjunctivae, sclera anicteric,  Oral mucosa moist without lesions.  NECK: supple no JVD/LAD  PULM: Good air flow bilaterally, symmetric in expansion, Scattered crackles to lower lobes. No rhonchi, no wheezes. Breathing non-labored.   CV: Normal S1 and S2. RRR.  No murmur, gallop, or rub.  No peripheral edema.  Peripheral pulses intact.   ABD: Soft, nontender, nondistended. Bowel sound normoactive, no guarding, no rebound.   MSK: .  No apparent muscle wasting. No clubbing, cyanosis, or edema  NEURO: Alert and oriented to person, place, and time. , motor 5/5 upper/lower extremity b/l, sensation grossly intact to touch, gait normal  SKIN: Warm and dry. No visible rashes or lesions   PSYCH: Mood stable, judgment and insight appropriate.              Data:     Weight trend:   Vitals:    02/26/18 1224   Weight: 81.8 kg (180 lb 4.8 oz)      Lab Results   Component Value Date    WBC 12.6 02/26/2018     Lab Results   Component Value Date    RBC 4.62 02/26/2018     Lab Results   Component Value Date    HGB 13.6 02/26/2018     Lab Results   Component Value Date    HCT 43.7 02/26/2018     No components found for: MCT  Lab Results   Component Value Date    MCV 95 02/26/2018     Lab Results   Component Value Date    MCH 29.4 02/26/2018     Lab Results   Component Value Date    MCHC 31.1 02/26/2018     Lab Results   Component Value Date    RDW 13.1 02/26/2018     Lab Results   Component Value Date     02/26/2018     basic metabolic panel  Last Basic Metabolic " Panel:  Lab Results   Component Value Date     02/26/2018      Lab Results   Component Value Date    POTASSIUM 3.2 02/26/2018     Lab Results   Component Value Date    CHLORIDE 101 02/26/2018     Lab Results   Component Value Date    JEANNIE 8.7 02/26/2018     Lab Results   Component Value Date    CO2 30 02/26/2018     Lab Results   Component Value Date    BUN 9 02/26/2018     Lab Results   Component Value Date    CR 0.85 02/26/2018     Lab Results   Value Date    110 02/26/2018 2/26/2018 12:17   Nicotine and Cotinine Urine NEGATIVE        2/26/2018 12:01   Ph Venous 7.39   PCO2 Venous 53 (H)   PO2 Venous 19 (L)   Bicarbonate Venous 32 (H)   Base Excess Venous 5.4     CT of Chest 5/19/17 report and film personally reviewd by me:  Lungs: Stable 0.1 cm pulmonary nodule posterior right lower lobe  series 3 image 48. Stable calcified granuloma lateral right lower lobe  image 46. Stable cluster of ground-glass nodules at the posterolateral  left lower lobe base with an example nodule measuring 0.5 cm series 3  image 51. No significant change in comparison with previous studies    No recent imaging or PFTs for my review.    Patient was seen and examined by me. I personally reviewed the electronic medical record including labs, flowsheets, imaging reports and films, vitals, and medications.     Lebron Kahn MD, [facility]   of Medicine  Pulmonary, Critical Care and Sleep Medicine  [facility]  Pager: [phone]

## 2018-02-26 NOTE — TELEPHONE ENCOUNTER
"SOT LUNG INTAKE    2018    Trisha Bender  [id number]  Referring Provider: Dr. Thomas Valerio (Pulmonary), Dr. Bandar Weinberg (PCP at [facility])  Diagnosis: COPD  Source/Facility: [facility] [place] [facility]   62 year old    Height: 5'10 Weight: 177 BMI: 24.44 (10/25/2018)    Significant Chemical Dependency History:  Smokin ppd for approximately 30 years, started at age 17 years intermittently smoking, patient noted she did not smoke during her three pregnancies.   Quit date:  - Patient's  reported her quit date at     Nicotine Products: None, patient reports never using nicotine products to help quit   Quit date: n/a - Patient's  reported she use nicotine gum to help her quit    Second-hand Smoke exposure: None, patient denies     ETOH Hx: Intermittent use, rare use. Patient reported she drinks very rarely, only on special occassions and will only consume one drink.  Quit date: n/a    Recreational Drug: None, patient denies history.   Quit date: n/a    Past Medical History  Pulmonary Manifestations date: 2991-5250   Details: Patient reported she felt short of breath walking up a hill while on vacations with friends, noted her shortness of breath to be more than what she would have thought was normal. Noted she did not see a pulmonologist until , did not know her lung disease was as severe.   Diabetes: Patient denies hx;  Family Hx: Patient reported her son's (twins) have diabetes Type 2- noting one son is diabetic and requires medications/ glucose monitoring and the other is prediabetic.   Coronary Artery Disease: Patient denies hx, patient's [facility] Angio noted mild CAD present. 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 Recent visit to cardiologist who told patient her heart had recovered. Plan made to see cardiologist in 3  Month for a \"final follow up\" ECHO. Patient had been on carvedilol, was switched to metoprolol but had increase in " "shortness of breath so was stopped.    Hypertension: Patient denies hx;  Family Hx:None noted   Previous transfusion(s): Patient denies hx  History of Cancer: Patient denies hx;  Family Hx: None noted   GERD: Patient reported she was diagnosed with GERD \"a few years ago\"; currently treated with Protonix.   Bleeding Disorders: Patient denies hx;  Family Hx: None noted    Other Past Medical History: Patient noted having endometriosis in 1995 dx from severe stomach pain requiring surgical intervention for adhesions.     Past Medical History:   Diagnosis Date     Anxiety      CAD (coronary artery disease) 7/2/15    mild per cath     CHF (congestive heart failure) (H)     EF=20-25% per echo 6/30/15     Chronic airway obstruction, not elsewhere classified     FEV1 36% of pred; VC 67%  in Jan 06.     COPD (chronic obstructive pulmonary disease) (H)      Esophageal reflux      Mild persistent asthma 8/30/2012     OSTEOPENIA      Other and unspecified hyperlipidemia      Other forms of migraine, without mention of intractable migraine without mention of status migrainosus      Takotsubo cardiomyopathy      Unspecified essential hypertension        Surgical History   Lung Biopsy: Patient denies hx  Pneumothorax: Patient denies hx  Chest Surgery: Patient denies hx    Past Surgical History:   Procedure Laterality Date     ANGIOGRAM  07/02/2015    Minimal CAD. LV dysfunction, Mid RCA 20% stenosis, EF 20-25%, c/w Takotsubo cardiomyopathy     APPENDECTOMY       C NONSPECIFIC PROCEDURE  1974    GB and appendectomy  abstracted [id number]     C NONSPECIFIC PROCEDURE      Nasal septoplasty and mucous retention cyst     CHOLECYSTECTOMY  1974     ESOPHAGOSCOPY, GASTROSCOPY, DUODENOSCOPY (EGD), COMBINED  11/15/2011    Procedure:COMBINED ESOPHAGOSCOPY, GASTROSCOPY, DUODENOSCOPY (EGD);  ESOPHAGOSCOPY, GASTROSCOPY, DUODENOSCOPY (EGD) ; Surgeon:JIM URENA; Location: GI     HC REMOVE TONSILS/ADENOIDS,<11 Y/O       LAPAROSCOPIC ABLATION " ENDOMETRIOSIS  1998     wisdom teeth         Pertinent Pulmonary History  Oxygen use   At rest: None   With Activity: None   Date of O2 initiation: n/a    Oxygen Company: n/a   Contact name/number: n/a    BiPAP/CPAP: No    Pulmonary Rehab: Currently enrolled; Patient reports she feels she will benefit from pulmonary rehab and is eager to continue to go.    Date: Started 2018 Location: [facility] (18 week program)    Hospitalizations in prior 12 months - None    Pulmonary Tests   PFT's  Date: 2017  FVC 4.03/2.20 55%  FEV1  3.11/0.88 28%  DLCO Not completed  Date: 10/24/2017  FVC 4.03/2.09 52%  FEV1  3.11/0.86 28%  DLCO Not completed  Date: 2017  FVC 4.06/1.90 47%  FEV1  3.13/0.71 23%  DLCO Not completed  Date: 2017  FVC 4.06/1.80 44%  FEV1  3.13/0.76 24%  DLCO 22.6/14.2 63% TLC 6.09/3.44 56%    Historical AB2015   Ref. Range 2015 07:35   pH Arterial Latest Ref Range: 7.35 - 7.45 pH 7.41   pCO2 Arterial Latest Ref Range: 35 - 45 mm Hg 35   PO2 Arterial Latest Ref Range: 80 - 105 mm Hg 68 (L)   Bicarbonate Arterial Latest Ref Range: 21 - 28 mmol/L 22   Base Deficit Art Latest Units: mmol/L 2.1   FIO2 Unknown Room Air      Current VBG 2018    Ref. Range 2018 12:01   FIO2 Unknown Right   Ph Venous Latest Ref Range: 7.32 - 7.43 pH 7.39   PCO2 Venous Latest Ref Range: 40 - 50 mm Hg 53 (H)   PO2 Venous Latest Ref Range: 25 - 47 mm Hg 19 (L)   Bicarbonate Venous Latest Ref Range: 21 - 28 mmol/L 32 (H)   Base Excess Venous Latest Units: mmol/L 5.4     6 Minute Walk: Per patient report recently completed at cardiopulmonary rehab [facility] - Will request record as none in EMR (2018)    Sleep Study: No    LABS  A1AT: Yes, MS phenotype  Rheumatology: No, Patient denies joint pain, skin rash  Cystic Fibrosis: No    Medications  Blood Thinners: No  Prednisone: Yes, has been on prednisone since 2017. Started on 15 mg daily and  has been tapering down. Currently taking 5 mg AM and 5 mg PRN.   Antibiotics: None, last reported abx April/May  2017 for pneumonia    Diagnostic Tests/Imaging  Heart cath: Completed 7/6/2015 - Findings consistent with Takotsubo Stress Cardiomyopathy   FINDINGS: Mild CAD. LV dysfunction   LEFT MAIN CORONARY ARTERY: Normal   LEFT ANTERIOR DESCENDING ARTERY: Minimal luminal irregularities    CIRCUMFLEX ARTERY: Normal    RIGHT CORONARY ARTERY: Mild luminal irregularities. The mid vessel of 20% stenosis.   HEMODYNAMICS: Aortic pressures is 119/73, LV systolic pressures is 113, LV end-systolic pressure is 24. Left ventriculography revealed ejection fraction 20-25%. The basilar  segment best functioning.    Stress Test: No    ECHO: 09/16/2016 - Left ventricular systolic function is mildly reduced. The visual ejection fraction is estimated at 45-50%. There is mild global hypokinesia of the left ventricle. There is mild (1+) mitral regurgitation. The study was technically difficult. Compared to  prior study, there is no significant change.    ECHO: 05/24/2017 - Left ventricular systolic function is low normal. The visual ejection fraction is estimated at 50-55%. There is mild to moderate (1-2+) mitral regurgitation. No significant change compared to study from 9/16. The study was technically difficult.    CXR: 03/22/2017 - Bilateral hyperinflated lungs with no concerning nodules, masses effusion, pneumothorax.     Chest CT: 05/2017  FINDINGS: Lungs: Stable 0.1 cm pulmonary nodule posterior right lower lobe series 3 image 48. Stable calcified granuloma lateral right lower lobe image 46. Stable cluster of ground-glass nodules at the posterolateral left lower lobe base with an example nodule measuring  0.5 cm series 3  image 51.  DEXA: 05/16/2018    FINDINGS: Lumbar Spine L1-L4:  T-score -0.6; Left Femoral Neck:  T-score -1.1;  Right Femoral Neck:  T-score -0.7    IMPRESSION: Osteopenia  Upper GI: 11/15/2011 Z-line regular, 38  cm from the incisors. Normal cricopharyngeus, upper third of esophagus middle third of esophagus and lower third of esophagus. Acute gastritis. Normal anterior wall of the stomach and posterior wall of the stomach.   Normal anterior wall of the stomach and posterior wall of the stomach. Normal duodenal bulb and 2nd part of the duodenum.  US Thyroid: 07/28/2011 Imrpession: Nodules in the right lobe and left thyroid isthmus as described. Thyroid nodules are identified as follows: 1. Lateral lower right lobe, 1.8 x 0.8 x 1.1 cm, mostly solid with peripheral cystic components and no calcifications. This is the  Nodule seen on the MRI. 2. Left thyroid isthmus, 1.2 x 1.4 x 0.5 cm, hypoechoic without calcifications. Pathology 08/10/2011 from needle aspiration - left isthmus (benign) and lateral lower right lobe (benign).     Primary Care  Mammogram: 05/08/2017   IMPRESSION: BI-RADS CATEGORY: 1 -  NEGATIVE.   RECOMMENDED FOLLOW-UP: Annual Mammography.  PAP: Patient reported she has not completed recently, will complete when able; Last completed- 7/20/2016 [facility]  Colonoscopy: Family history of colon CA, mother diagnosed and passed away from Colon CA. 08/20/2009: 2 polyps ( Sessile 3 mm, 5 mm) removed, recommended follow up 5 years- patient reports not completing follow up.   Dental: Patient reported her last visit 2/21/2018 No issues noted.   Hepatitis A/B: No  Pneumovax: Prevnar 13 02/09/2015; Pneumo- poly 10/31/2006; ZostaVax 12/26/2014     Allergies  Gabapentin; Aspirin; Contrast dye; Ibuprofen; Peanuts [nuts]; Penicillins; Sulfa drugs; Tace [chlorotrianisene]; and Labelle     Psycho-Social   Spouse: Steve ()  42 years, supportive of lung transplant   Support: Four adult children (son's) all but one live in surrounding [place] area.   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 Noted concern for mold in basement, home being abated for mold/radon  Pets: 2 Dogs, 2 Cats No history with birds    Concerns: Patient noted she was not against a transplant but did not know if she was sick enough to warrant one at this time. She reported being more limited by her lung dysfunction but did not require O2.

## 2018-02-26 NOTE — MR AVS SNAPSHOT
"              After Visit Summary   2/26/2018    Trisha Bender    MRN: [id number]           Patient Information     Date Of Birth          1955        Visit Information        Provider Department      2/26/2018 1:00 PM Lebron Kahn MD [facility] Solid [facility] Transplant        Care Instructions    Pulmonary Rehab: Please remember to stay active.  Continue exercises learned in pulmonary rehab or continue participating in pulmonary rehab, if able.     Current oxygen use: Rest 0L Activity 0L Sleep 0L     5' 10\" 180 lbs 4.8 oz Body mass index is 25.87 kg/(m^2).  Goal BMI greater than 18, less than 30 for lung transplant.     Medication changes: None made   Future orders: None  Antibody blood test (PRA) due every 3 months: Not completed this visit will complete during lung transplant evaluation.   Next appointment:     Test or procedures to be complete prior to next appointment:   PFTs: Not completed today    6MW: Not completed today    CT Scan: Not completed today    Please remember to stay up to date with your primary care requirements including:  Annual check-ups with primary care physician   Mammogram   Pap smear (as appropriate for women)    PSA (for men over 50)   Dental visits   Annual flu shots/ immunizations as needed   Colonoscopy (patients over 50).     Thank-you for allowing us to participate in your care.    If your condition should change, please contact your transplant coordinator. This includes: worsening symptoms, need for antibiotics, hospitalizations, transfusions.    Thoracic Transplant Office phone [phone], option 2, fax [phone]    Office Hours 8:30 - 5:00 pm                Follow-ups after your visit        Your next 10 appointments already scheduled     Mar 05, 2018  3:15 PM CST   CONSULT with  Pulmonary Rehab 1   [facility]    [address] [facility], [address] [address] [phone]              Who to contact     " "If you have questions or need follow up information about today's clinic visit or your schedule please contact [facility] directly at [phone].  Normal or non-critical lab and imaging results will be communicated to you by Buttonhart, letter or phone within 4 business days after the clinic has received the results. If you do not hear from us within 7 days, please contact the clinic through FlightOfficet or phone. If you have a critical or abnormal lab result, we will notify you by phone as soon as possible.  Submit refill requests through Jobs2Web or call your pharmacy and they will forward the refill request to us. Please allow 3 business days for your refill to be completed.          Additional Information About Your Visit        Jobs2Web Information     [employer] gives you secure access to your electronic health record. If you see a primary care provider, you can also send messages to your care team and make appointments. If you have questions, please call your primary care clinic.  If you do not have a primary care provider, please call [phone] and they will assist you.        Care EveryWhere ID     This is your Care EveryWhere ID. This could be used by other organizations to access your [place] medical records  [id number]        Your Vitals Were     Pulse Temperature Height Last Period Pulse Oximetry BMI (Body Mass Index)    125 97.5  F (36.4  C) (Oral) 1.778 m (5' 10\") 09/29/2005 96% 25.87 kg/m2       Blood Pressure from Last 3 Encounters:   02/26/18 135/84   02/05/18 112/68   11/06/17 116/70    Weight from Last 3 Encounters:   02/26/18 81.8 kg (180 lb 4.8 oz)   02/05/18 81.6 kg (180 lb)   11/06/17 78.1 kg (172 lb 1.6 oz)              Today, you had the following     No orders found for display         Today's Medication Changes          These changes are accurate as of 2/26/18  2:07 PM.  If you have any questions, ask your nurse or doctor.               These medicines have changed or have " updated prescriptions.        Dose/Directions    HYDROcodone-acetaminophen 5-325 MG per tablet   Commonly known as:  NORCO   This may have changed:  Another medication with the same name was removed. Continue taking this medication, and follow the directions you see here.   Used for:  Chronic bilateral low back pain without sciatica   Changed by:  Lebron Kahn MD        Dose:  1-2 tablet   Take 1-2 tablets by mouth every 8 hours as needed for pain Maximum 6 daily   Quantity:  18 tablet   Refills:  0         Stop taking these medicines if you haven't already. Please contact your care team if you have questions.     doxycycline 100 MG capsule   Commonly known as:  VIBRAMYCIN   Stopped by:  Lebron Kahn MD           guaiFENesin-codeine 100-10 MG/5ML Soln solution   Commonly known as:  ROBITUSSIN AC   Stopped by:  Lebron Kahn MD                    Primary Care Provider Office Phone # Fax #    Bandar Weinberg [phone]       [address] [facility] [address]        Equal Access to Services     [facility]: Hadii aad ku hadasho Soomaali, waaxda luqadaha, qaybta kaalmada adeegyada, waxay idiin hayaan adefrancoise burris . So [facility] [phone].    ATENCIÓN: Si raysala español, tiene a madison disposición servicios gratuitos de asistencia lingüística. Llame al [phone].    We comply with applicable federal civil rights laws and [place] laws. We do not discriminate on the basis of race, color, national origin, age, disability, sex, sexual orientation, or gender identity.            Thank you!     Thank you for choosing [facility]  for your care. Our goal is always to provide you with excellent care. Hearing back from our patients is one way we can continue to improve our services. Please take a few minutes to complete the written survey that you may receive in the mail after your visit with us. Thank you!             Your Updated Medication List - Protect  others around you: Learn how to safely use, store and throw away your medicines at www.disposemymeds.org.          This list is accurate as of 2/26/18  2:07 PM.  Always use your most recent med list.                   Brand Name Dispense Instructions for use Diagnosis    ADVAIR DISKUS 500-50 MCG/DOSE diskus inhaler   Generic drug:  fluticasone-salmeterol     3 Inhaler    INHALE 1 PUFF TWO TIMES DAILY    Pulmonary emphysema, unspecified emphysema type (H)       * albuterol (2.5 MG/3ML) 0.083% neb solution     25 vial    Take 1 vial (2.5 mg) by nebulization every 6 hours as needed for shortness of breath / dyspnea or wheezing    SOB (shortness of breath)       * albuterol 108 (90 BASE) MCG/ACT Inhaler    VENTOLIN HFA    18 g    Inhale 2 puffs into the lungs every 6 hours as needed for shortness of breath / dyspnea or wheezing    Asthma exacerbation       ALPRAZolam 0.25 MG tablet    XANAX    30 tablet    Take 1 tablet by mouth. Every twelve hours as needed for anxiety    Anxiety       atorvastatin 10 MG tablet    LIPITOR    30 tablet    Takes 3 times a week    Coronary artery disease involving native coronary artery of native heart without angina pectoris, Mixed hyperlipidemia       baclofen 10 MG tablet    LIORESAL    90 tablet    TAKE ONE TABLET BY MOUTH THREE TIMES A DAY AS NEEDED FOR MUSCLE SPASMS    Chronic bilateral low back pain with right-sided sciatica       [facility] FIBER GUMMIES 2.5 G Chew      Take 1 tablet by mouth daily Reported on 5/8/2017        fluticasone 50 MCG/ACT spray    FLONASE    16 g    SPRAY 1 TO 2 SPRAYS IN EACH NOSTRIL ONCE DAILY    Nasal congestion       furosemide 20 MG tablet    LASIX    180 tablet    Take 2 tablets (40 mg) by mouth daily severe swelling    Acute systolic congestive heart failure (H)       HYDROcodone-acetaminophen 5-325 MG per tablet    NORCO    18 tablet    Take 1-2 tablets by mouth every 8 hours as needed for pain Maximum 6 daily    Chronic bilateral low back pain without  sciatica       ipratropium - albuterol 0.5 mg/2.5 mg/3 mL 0.5-2.5 (3) MG/3ML neb solution    DUONEB    30 vial    Take 1 vial (3 mLs) by nebulization every 6 hours as needed for shortness of breath / dyspnea or wheezing    Moderate persistent asthma without complication       lisinopril 2.5 MG tablet    PRINIVIL/Zestril    90 tablet    Take 1 tablet (2.5 mg) by mouth daily    Dilated cardiomyopathy (H)       mometasone 0.1 % ointment    ELOCON    45 g    Apply sparingly to affected area twice daily as needed.  Do not apply to face.    Intrinsic eczema       montelukast 10 MG tablet    SINGULAIR    90 tablet    Take 1 tablet (10 mg) by mouth At Bedtime    Mild persistent asthma without complication       Multiple Vitamin Chew      Take 1 tablet by mouth daily        nitroGLYcerin 0.4 MG sublingual tablet    NITROSTAT    25 tablet    Place 1 tablet (0.4 mg) under the tongue every 5 minutes as needed for chest pain If you are still having symptoms after 3 doses (15 minutes) call 911.    Acute chest pain       ondansetron 4 MG ODT tab    ZOFRAN ODT    20 tablet    Take 1-2 tablets (4-8 mg) by mouth every 8 hours as needed for nausea    Nausea       pantoprazole 40 MG EC tablet    PROTONIX    90 tablet    Take 1 tablet (40 mg) by mouth daily    Gastroesophageal reflux disease without esophagitis       potassium chloride 10 MEQ tablet    K-TAB,KLOR-CON    90 tablet    TAKE ONE TABLET BY MOUTH THREE TIMES A DAY    Hypokalemia       predniSONE 5 MG tablet    DELTASONE    15 tablet    Take 1 tablet (5 mg) by mouth daily    Pulmonary emphysema, unspecified emphysema type (H)       roflumilast 500 MCG Tabs tablet    DALIRESP     Take 1 tablet (500 mcg) by mouth daily        SUMAtriptan 25 MG tablet    IMITREX    9 tablet    Take 1 tablet (25 mg) by mouth at onset of headache for migraine May repeat in 2 hours if needed: max 2/day;    Chronic migraine without aura without status migrainosus, not intractable       tiotropium 18  MCG capsule    SPIRIVA HANDIHALER    90 capsule    USING THE HANDIHALER, INHALE THE CONTENTS OF ONE CAPSULE BY MOUTH DAILY    Moderate persistent asthma without complication       TYLENOL EXTRA STRENGTH PO      1 TABLET EVERY 4 HOURS AS NEEDED    Abdominal pain, right upper quadrant       * Notice:  This list has 2 medication(s) that are the same as other medications prescribed for you. Read the directions carefully, and ask your doctor or other care provider to review them with you.

## 2018-02-26 NOTE — TELEPHONE ENCOUNTER
Norco   TAKE to  pharmacy     Last Written Prescription Date:  11/3/17  Last Fill Quantity: 18,   # refills: 0    Last Office Visit: 11/6/17    Future Office visit:       Routing refill request to provider for review/approval because:  Drug not on the FMG, UMP or [facility] refill protocol or controlled substance

## 2018-02-26 NOTE — TELEPHONE ENCOUNTER
Message from [employer]:  Original authorizing provider: MD Trisha Marvin would like a refill of the following medications:  HYDROcodone-acetaminophen (NORCO) 5-325 MG per tablet [Bandar Weinberg MD]    Preferred pharmacy: [place], [place] - [facility] [address].    Comment:  Dr. Weinberg and Nursing Staff, I would like to get a refill on the Hydrocodone-Acetaminophen 5-325 for 150-180 tablets. I have extreme back pain. Wrote two previous letters addressing my issues. Hope this gets filled as soon as possible. This site seems to be working now but the prescription was not listed above so checked another one. Thank you. Trisha Bender

## 2018-02-26 NOTE — PATIENT INSTRUCTIONS
"Pulmonary Rehab: Please remember to stay active.  Continue exercises learned in pulmonary rehab or continue participating in pulmonary rehab, if able.     Current oxygen use: Rest 0L Activity 0L Sleep 0L     5' 10\" 180 lbs 4.8 oz Body mass index is 25.87 kg/(m^2).  Goal BMI greater than 18, less than 30 for lung transplant.     Medication changes: None made   Future orders: None  Antibody blood test (PRA) due every 3 months: Not completed this visit will complete during lung transplant evaluation.   Next appointment:     Test or procedures to be complete prior to next appointment:   PFTs: Not completed today    6MW: Not completed today    CT Scan: Not completed today    Please remember to stay up to date with your primary care requirements including:  Annual check-ups with primary care physician   Mammogram   Pap smear (as appropriate for women)    PSA (for men over 50)   Dental visits   Annual flu shots/ immunizations as needed   Colonoscopy (patients over 50).     Thank-you for allowing us to participate in your care.    If your condition should change, please contact your transplant coordinator. This includes: worsening symptoms, need for antibiotics, hospitalizations, transfusions.    Thoracic Transplant Office phone [phone], option 2, fax [phone]    Office Hours 8:30 - 5:00 pm        "

## 2018-02-26 NOTE — NURSING NOTE
"CLINIC VISIT    Trisha Bender 62 year old  [id number]    5' 10\"  180 lbs 4.8 oz  Body mass index is 25.87 kg/(m^2).    Support Person Present: Patient arrives to clinic with her  and primary support. Both are actively involved in patient care and asking appropriate questions.     O2 needs: Not currently on oxygen    Nicotine use, if applicable: None, patient reported quitting in 2012   ETOH intake: Rare use, only on special occassions.     Physical Status    Current Activity Tolerated  Basic ADLs     Feeding No concerns noted    Toileting No concerns noted    Selecting proper attire No concerns noted    Grooming No concerns noted    Maintaining continence No concerns noted    Putting on clothes Patient reports shortness in breath which varies from day to day but is able to complete independently.     Bathing Patient reports shortness in breath which varies from day to day but is able to complete independently.     Walking & Transferring  Experiences shortness of breath, in particular with climbing stairs.      Instrumental ADLs     Managing Finances No concerns noted    Handling Transportation No concerns noted    Shopping No concerns noted    Preparing meals No concerns noted    Using telephone & communication devices Reports in home wifi and computer, not concerns noted    Managing medications No concerns noted    Housework & basic home maintenance No concerns noted     Steroid Use: On daily prednisone since November 2017. Currently taking 5 mg in AM and 5 mg PM (PRN).     Primary Care Needs   Dentist: Last seen 2/21/2018, no issues noted   Mammo: 5/2017 Bi RADS Negative   Pap: Not recently completed, patient educated she will need to complete prior to transplant    PSA: n/a   Colonoscopy: 2009     Recent Hospitalizations: No hospitalizations in the last 12 months    Voiced Concerns: MD discussed MITCH index in regard to timing of transplant.     Plan: Complete lung transplant evaluation based on " patient decision to move forward with lung transplant process.     Follow-up needed: Will need to get recent 6MW from [facility].

## 2018-02-26 NOTE — NURSING NOTE
"Chief Complaint   Patient presents with     RECHECK     follow up with pre lung transplant, tzimmer cma       Initial /84  Pulse 125  Temp 97.5  F (36.4  C) (Oral)  Ht 1.778 m (5' 10\")  Wt 81.8 kg (180 lb 4.8 oz)  LMP 09/29/2005  SpO2 96%  BMI 25.87 kg/m2 Estimated body mass index is 25.87 kg/(m^2) as calculated from the following:    Height as of this encounter: 1.778 m (5' 10\").    Weight as of this encounter: 81.8 kg (180 lb 4.8 oz).  Medication Reconciliation: complete    "

## 2018-02-27 DIAGNOSIS — K21.9 GASTROESOPHAGEAL REFLUX DISEASE WITHOUT ESOPHAGITIS: ICD-10-CM

## 2018-02-27 RX ORDER — HYDROCODONE BITARTRATE AND ACETAMINOPHEN 5; 325 MG/1; MG/1
1-2 TABLET ORAL EVERY 8 HOURS PRN
Qty: 150 TABLET | Refills: 0 | Status: SHIPPED | OUTPATIENT
Start: 2018-02-27 | End: 2018-03-23

## 2018-02-27 NOTE — TELEPHONE ENCOUNTER
Message from [employer]:  Original authorizing provider: MD Trisha Marvin would like a refill of the following medications:  HYDROcodone-acetaminophen (NORCO) 5-325 MG per tablet [Bandar Weinberg MD]    Preferred pharmacy: [place], [place] - [facility] [address].    Comment:  Dr. Weinberg and Nursing Staff, This prescription is not the amount that I have been taking. The other prescription was missing so I checked this one to be able to continue instead of cancel. [facility] has the most recent dosage. Have sent a letter explaining this to you. Thank you for your attention to this. My back pain has been extremely severe. Trisha Bender

## 2018-02-27 NOTE — TELEPHONE ENCOUNTER
"See [employer] message below requesting Norco rx for #150-180. Per 02/26 mychart refill encounter: \"Dr. Weinberg and Nursing Staff,  This prescription is not the amount that I have been taking. The other prescription was missing so I checked this one to be able to continue instead of cancel.  [facility] has the most recent dosage. Have sent a letter explaining this to you.  Thank you for your attention to this. My back pain has been extremely severe.\" Per 01/31/18 rx disp amount was 150.     Pended Norco rx for #150 per pt request.    Requested Prescriptions   Pending Prescriptions Disp Refills     HYDROcodone-acetaminophen (NORCO) 5-325 MG per tablet  0     Sig: Take 1-2 tablets by mouth every 8 hours as needed for pain Maximum 6 daily          Last Written Prescription Date:  02/26/18  Last Fill Quantity: 18,   # refills: 0  Last Office Visit: 11/06/17  Future Office visit:       Routing refill request to provider for review/approval because:  Drug not on the FMG, P or  Health refill protocol or controlled substance  CSA in epic.  Rx to [facility] pharm when available.     RX [place] reviewed: not authorized to review for this PCP    [place]:  [email or web address]  Please advise, thanks.          "

## 2018-02-28 LAB — COTININE UR QL: NEGATIVE NG/ML

## 2018-02-28 RX ORDER — PANTOPRAZOLE SODIUM 40 MG/1
TABLET, DELAYED RELEASE ORAL
Qty: 90 TABLET | Refills: 2 | Status: SHIPPED | OUTPATIENT
Start: 2018-02-28 | End: 2018-09-05

## 2018-02-28 NOTE — TELEPHONE ENCOUNTER
"Last OV 11/6/17    Requested Prescriptions   Pending Prescriptions Disp Refills     pantoprazole (PROTONIX) 40 MG EC tablet [Pharmacy Med Name: PANTOPRAZOLE SODIUM 40MG TBEC] 90 tablet 3     Sig: TAKE ONE TABLET BY MOUTH EVERY DAY    PPI Protocol Failed    2/27/2018  5:58 PM       Failed - No positive pregnancy test in past 12 months       Passed - Not on Clopidogrel (unless Pantoprazole ordered)       Passed - No diagnosis of osteoporosis on record       Passed - Recent or future visit with authorizing provider's specialty    Patient had office visit in the last year or has a visit in the next 30 days with authorizing provider.  See \"Patient Info\" tab in inbasket, or \"Choose Columns\" in Meds & Orders section of the refill encounter.            Passed - Patient is age 18 or older       Passed - No active pregnacy on record          "

## 2018-03-01 ENCOUNTER — TELEPHONE (OUTPATIENT)
Dept: INTERNAL MEDICINE | Facility: CLINIC | Age: 63
End: 2018-03-01

## 2018-03-01 ENCOUNTER — MYC REFILL (OUTPATIENT)
Dept: INTERNAL MEDICINE | Facility: CLINIC | Age: 63
End: 2018-03-01

## 2018-03-01 DIAGNOSIS — F41.9 ANXIETY: ICD-10-CM

## 2018-03-01 NOTE — TELEPHONE ENCOUNTER
Prior Authorization Retail Medication Request  Medication/Dose: lidocaine 5%  Diagnosis and ICD code: Mechanical low back pain [M54.5]   New/Renewal/Insurance Change PA:   Previously Tried and Failed Therapies:     Received from [email or web address]-  Help Desk: [phone]    [employer] [facility] [phone]  Fax: [phone]

## 2018-03-02 RX ORDER — ALPRAZOLAM 0.25 MG
TABLET ORAL
Qty: 30 TABLET | Refills: 0 | Status: SHIPPED | OUTPATIENT
Start: 2018-03-02 | End: 2018-03-23

## 2018-03-02 NOTE — TELEPHONE ENCOUNTER
Message from [employer]el[employer]:  Original authorizing provider: MD Trisha Marvin would like a refill of the following medications:  ALPRAZolam (XANAX) 0.25 MG tablet [Bandar Weinberg MD]    Preferred pharmacy: [place], [place] - [facility] [address] [facility].    Comment:  Dr. Weinberg and Nursing Staff, I would like to get a refill on the Alprazolam prescription. Thank you. Trisha Bender

## 2018-03-02 NOTE — TELEPHONE ENCOUNTER
xanax      Last Written Prescription Date:  1/24/18  Last Fill Quantity: 30,   # refills: 0  Last Office Visit: 11/6/17  Future Office visit:    Next 5 appointments (look out 90 days)     Mar 23, 2018  2:40 PM CDT   Elliott Rodriguez with Bandar Weinberg MD   [facility]    [address]  [facility] [address]   [phone]                   Routing refill request to provider for review/approval because:  Drug not on the FMG, UMP or [facility] refill protocol or controlled substance

## 2018-03-05 ENCOUNTER — HOSPITAL ENCOUNTER (OUTPATIENT)
Dept: CARDIAC REHAB | Facility: CLINIC | Age: 63
End: 2018-03-05
Attending: INTERNAL MEDICINE
Payer: COMMERCIAL

## 2018-03-05 PROCEDURE — G0424 PULMONARY REHAB W EXER: HCPCS

## 2018-03-05 PROCEDURE — 40000244 ZZH STATISTIC VISIT PULM REHAB

## 2018-03-07 NOTE — TELEPHONE ENCOUNTER
PA Initiation    Medication: Lidocaine 5% Patch  Insurance Company: [email or web address] - Phone [phone] Fax [phone]  Pharmacy Filling [id number] [place], [place] - [address]  Filling Pharmacy Phone: [phone]  Filling Pharmacy Fax: [phone]  Start Date: 3/7/2018    Central Prior Authorization Team   Phone: [phone]

## 2018-03-07 NOTE — TELEPHONE ENCOUNTER
PRIOR AUTHORIZATION DENIED    Medication: Lidocaine 5% Patch DENIED    Denial Date: 3/7/2018    Denial Rational: Denied as patient does not have a diagnosis of pain associated with PHN (shingles), pain associated with diabetic neuropathy, or pain associated with cancer related neuropathy:            Appeal Information:

## 2018-03-08 NOTE — TELEPHONE ENCOUNTER
Hello, the PA has been denied, please see rational. If an appeal is desired, please submit a written letter of medical necessity to our PA Team and we can initiate the appeal. Please close encounter if finished. Thank you, Jo-Ann   (Routing comment)            OTC?

## 2018-03-12 ENCOUNTER — HOSPITAL ENCOUNTER (OUTPATIENT)
Dept: CARDIAC REHAB | Facility: CLINIC | Age: 63
End: 2018-03-12
Attending: INTERNAL MEDICINE
Payer: COMMERCIAL

## 2018-03-12 PROCEDURE — G0424 PULMONARY REHAB W EXER: HCPCS

## 2018-03-12 PROCEDURE — 40000244 ZZH STATISTIC VISIT PULM REHAB

## 2018-03-13 ENCOUNTER — HOSPITAL ENCOUNTER (OUTPATIENT)
Dept: CARDIAC REHAB | Facility: CLINIC | Age: 63
End: 2018-03-13
Attending: INTERNAL MEDICINE
Payer: COMMERCIAL

## 2018-03-13 PROCEDURE — 40000244 ZZH STATISTIC VISIT PULM REHAB

## 2018-03-13 PROCEDURE — G0424 PULMONARY REHAB W EXER: HCPCS

## 2018-03-15 ENCOUNTER — HOSPITAL ENCOUNTER (OUTPATIENT)
Dept: CARDIAC REHAB | Facility: CLINIC | Age: 63
End: 2018-03-15
Attending: INTERNAL MEDICINE
Payer: COMMERCIAL

## 2018-03-15 PROCEDURE — G0424 PULMONARY REHAB W EXER: HCPCS

## 2018-03-15 PROCEDURE — 40000244 ZZH STATISTIC VISIT PULM REHAB

## 2018-03-16 NOTE — PROGRESS NOTES
Pulmonary Medicine  Pre - Lung Transplant Clinic Note   February 26, 2018       Patient: Trisha Bender  MRN: [id number]           Assessment and Plan:     Trisha Bender is a 62 year old female with a PMHx significant for severe COPD. She is no longer smoking and denies vaping. She has no recent imaging or PFTs for my review at this appointment. However I feel that it is very reasonable to proceed with the full pre-lung transplant evaluation    Lung tx consideration:     A. I spent quite some time discussing both the lung transplantation evaluation listing process including complications that can be expected post lung transplantation.      B. One of the main points I did reinforce is that the survival post lung transplantation at this time is around 50 to 55% at 5 years. The main reason for this is infection and/or rejection. While most bacterial infections are treatable, the viral infections can cause significant morbidity and mortality. Acute rejection is usually treatable with high dose steroids but chronic rejection (BLOSSOM) as you already know does not have good therapy as of date. The other main point is that there is minimal to no survival advantage with lung transplantation.     C. The lung transplant evaluation will involve multiple tests and meeting with cardiothoracic surgeon, Transplant , Nutritionist etc., from our transplant team. Post testing, we will discuss the details at our transplant meeting and will be listed if she meets the criteria for lung transplantation.      D. Once on the list, Trisha Bender can expect to stay on the list anywhere from few months to few years, depending on the LAS score. Also the other factors that might play a role is number of organs required and whether she is positive for panel reactive antibodies. She has not recieved transfusions to date. She will need to stay within a 50 mile radius of our center for the first three months after  the lung transplantation (after hospital discharge).     E. Post lung transplantation, she will require multiple bronchoscopies to evaluate for infections and/or rejections. The major complications post transplantation experienced by most of our patients include: 1. Diabetes, about 30 to 40% of our patients remain on insulin for the rest of their life. 2. Chronic kidney disease, with majority losing about 50% of the kidney function and up to 5 ot 10% requiring hemodialysis and/or renal transplantation. 3. Hypertension, usually well controlled with medications. 4. Malignancy: Especially of skin cancer, and/or lymphoma - usually treatable. The above is not an exhaustive list of all the complications. The others include also airway complications occurring in about 5% of the patients requiring bronchoscopy with bronchial dilation, sometimes stent placement. There is increased risk for DVT and PE particularly in the first six months after transplantation.      F. Discussed with Trisha Bender that lung transplantation is an option. The other option is to continue as is and continue cares with us or with her local pulmonologist. We will glad to have palliative care team involved in your care to help manage your symptoms.     G. Discussed about increased risk donors (For HIV/Hep C predominantly).      H. I answered all of the patient and family's questions and provided them support     Issues to be addressed:  - CT imaging for Multi-pulmonary Nodules  - Obtain hospital records of admission for [facility]       Other Recommendations:  1. Encourage daily physical activity  2. Strong encourage participation in pulmonary rehab  3. Continue cessation from tobacco products and vaping  4. Continue to follow-up with your Primary Pulmonologist for management of your lung disease and with you Primary Care Physician for age- and gender-specific health care maintenance  5. Will obtain patient prior medical records for review           Subjective & Interval History:     Trisha Bender is a 62 year old female, former smoker and denies recent tobacco or use of vaping products, with a PMHx significant for severe COPD, History of Tako-Tsubo cardiomyopathy admitted in 06/2015 with that diagnosis.  At that time she had an echocardiogram which showed EF of 20-25%.  Etiology was thought to be myocarditis.  She had an angiogram at that time showing minimal coronary disease.  Her last echocardiogram was done in 08/2015, which showed normal improved EF of 50-55%.  At that time appeared that her Lasix was discontinued and plan for 6 month repeat echo was done at that time, ct of chest notable for multiple small pulmonary nodules undergoing screening with no significant change,  and History of angiogram which showed mild coronary disease only in 06/2015. With respect to her COPD   She has had multple visits to the ER or admission for exacerbations. She is currently on a bronchodilator regimen that includes Advair 500/50, Spiriva, Daliresp and prn albuterol. She is currently not on prednisone but has required multiple prednisone burst and/or tapers in the past year. She reports daily symptoms in including paroxysm of cough, wheezing, and chest tightness. She denies any occupational or environmental exposures.  She denies fevers, chills, chest pain productive cough, and reflux symptoms. She had many members of her family (adult children) attend this appointment.           Medications:     Current Outpatient Prescriptions   Medication     ADVAIR DISKUS 500-50 MCG/DOSE diskus inhaler     baclofen (LIORESAL) 10 MG tablet     fluticasone (FLONASE) 50 MCG/ACT spray     SUMAtriptan (IMITREX) 25 MG tablet     potassium chloride (K-TAB,KLOR-CON) 10 MEQ tablet     lisinopril (PRINIVIL/ZESTRIL) 2.5 MG tablet     furosemide (LASIX) 20 MG tablet     montelukast (SINGULAIR) 10 MG tablet     tiotropium (SPIRIVA HANDIHALER) 18 MCG capsule     roflumilast (DALIRESP) 500  MCG TABS tablet     predniSONE (DELTASONE) 5 MG tablet     albuterol (VENTOLIN HFA) 108 (90 BASE) MCG/ACT Inhaler     mometasone (ELOCON) 0.1 % ointment     albuterol (2.5 MG/3ML) 0.083% neb solution     [employer] FIBER GUMMIES 2.5 G CHEW     Multiple Vitamin CHEW     TYLENOL EXTRA STRENGTH OR     ALPRAZolam (XANAX) 0.25 MG tablet     pantoprazole (PROTONIX) 40 MG EC tablet     HYDROcodone-acetaminophen (NORCO) 5-325 MG per tablet     lidocaine (LIDODERM) 5 % Patch     ipratropium - albuterol 0.5 mg/2.5 mg/3 mL (DUONEB) 0.5-2.5 (3) MG/3ML neb solution     ondansetron (ZOFRAN ODT) 4 MG ODT tab     atorvastatin (LIPITOR) 10 MG tablet     nitroglycerin (NITROSTAT) 0.4 MG SL tablet     No current facility-administered medications for this visit.       Social History     Social History     Marital status:      Spouse name: N/A     Number of children: N/A     Years of education: N/A     Occupational History     Not on file.     Social History Main Topics     Smoking status: Former Smoker     Packs/day: 1.00     Years: 30.00     Quit date: 2006     Smokeless tobacco: Never Used      Comment: Quit in      Alcohol use No     Drug use: No     Sexual activity: No      Comment: vasectomy     Other Topics Concern     Caffeine Concern No     none     Hobby Hazards No     Stress Concern No     Special Diet Yes     low sodium     Exercise Yes     limited due to back     Social History Narrative     Family History   Problem Relation Age of Onset     CANCER Mother      Colon cancer; dxed at age 64;  at age 69     Cancer - colorectal Mother      Other Cancer Mother      GASTROINTESTINAL DISEASE Sister      Acute pancreatitis     HEART DISEASE Father      ?     Coronary Artery Disease Father      Circulatory Maternal Aunt      AAA     Circulatory Maternal Uncle      AAA     DIABETES Son                 Review of Systems:     Please see HPI.  ROS: 10 point ROS neg other than the symptoms noted above in the HPI.              "Physical Exam:     /84  Pulse 125  Temp 97.5  F (36.4  C) (Oral)  Ht 1.778 m (5' 10\")  Wt 81.8 kg (180 lb 4.8 oz)  LMP 09/29/2005  SpO2 96%  BMI 25.87 kg/m2    .PHYSICAL EXAMINATION:   GEN: Awake and alert, in no acute distress, sitting upright in chair. Pleasant and cooperative  HEENT: Pupils equal and reactive to light, conjunctiva are pink conjunctivae, sclera anicteric,  Oral mucosa moist without lesions.  NECK: supple no JVD/LAD  PULM: Good air flow bilaterally, symmetric in expansion, Scattered crackles to lower lobes. No rhonchi, no wheezes. Breathing non-labored.   CV: Normal S1 and S2. RRR.  No murmur, gallop, or rub.  No peripheral edema.  Peripheral pulses intact.   ABD: Soft, nontender, nondistended. Bowel sound normoactive, no guarding, no rebound.   MSK: .  No apparent muscle wasting. No clubbing, cyanosis, or edema  NEURO: Alert and oriented to person, place, and time. , motor 5/5 upper/lower extremity b/l, sensation grossly intact to touch, gait normal  SKIN: Warm and dry. No visible rashes or lesions   PSYCH: Mood stable, judgment and insight appropriate.              Data:     Weight trend:   Vitals:    02/26/18 1224   Weight: 81.8 kg (180 lb 4.8 oz)      Lab Results   Component Value Date    WBC 12.6 02/26/2018     Lab Results   Component Value Date    RBC 4.62 02/26/2018     Lab Results   Component Value Date    HGB 13.6 02/26/2018     Lab Results   Component Value Date    HCT 43.7 02/26/2018     No components found for: MCT  Lab Results   Component Value Date    MCV 95 02/26/2018     Lab Results   Component Value Date    MCH 29.4 02/26/2018     Lab Results   Component Value Date    MCHC 31.1 02/26/2018     Lab Results   Component Value Date    RDW 13.1 02/26/2018     Lab Results   Component Value Date     02/26/2018     basic metabolic panel  Last Basic Metabolic Panel:  Lab Results   Component Value Date     02/26/2018      Lab Results   Component Value Date    " POTASSIUM 3.2 02/26/2018     Lab Results   Component Value Date    CHLORIDE 101 02/26/2018     Lab Results   Component Value Date    JEANNIE 8.7 02/26/2018     Lab Results   Component Value Date    CO2 30 02/26/2018     Lab Results   Component Value Date    BUN 9 02/26/2018     Lab Results   Component Value Date    CR 0.85 02/26/2018     Lab Results   Value Date    110 02/26/2018 2/26/2018 12:17   Nicotine and Cotinine Urine NEGATIVE        2/26/2018 12:01   Ph Venous 7.39   PCO2 Venous 53 (H)   PO2 Venous 19 (L)   Bicarbonate Venous 32 (H)   Base Excess Venous 5.4     CT of Chest 5/19/17 report and film personally reviewd by me:  Lungs: Stable 0.1 cm pulmonary nodule posterior right lower lobe  series 3 image 48. Stable calcified granuloma lateral right lower lobe  image 46. Stable cluster of ground-glass nodules at the posterolateral  left lower lobe base with an example nodule measuring 0.5 cm series 3  image 51. No significant change in comparison with previous studies    No recent imaging or PFTs for my review.    Patient was seen and examined by me. I personally reviewed the electronic medical record including labs, flowsheets, imaging reports and films, vitals, and medications.     Lebron Kahn MD, MACM   of Medicine  Pulmonary, Critical Care and Sleep Medicine  [facility]  Pager: [phone]

## 2018-03-19 ENCOUNTER — HOSPITAL ENCOUNTER (OUTPATIENT)
Dept: CARDIAC REHAB | Facility: CLINIC | Age: 63
End: 2018-03-19
Attending: INTERNAL MEDICINE
Payer: COMMERCIAL

## 2018-03-19 PROCEDURE — 40000576 ZZH STATISTIC CARDIO PULMONARY REHAB VISIT #2: Performed by: REHABILITATION PRACTITIONER

## 2018-03-19 PROCEDURE — G0424 PULMONARY REHAB W EXER: HCPCS | Performed by: REHABILITATION PRACTITIONER

## 2018-03-21 ENCOUNTER — TRANSFERRED RECORDS (OUTPATIENT)
Dept: HEALTH INFORMATION MANAGEMENT | Facility: CLINIC | Age: 63
End: 2018-03-21

## 2018-03-22 ENCOUNTER — HOSPITAL ENCOUNTER (OUTPATIENT)
Dept: CARDIAC REHAB | Facility: CLINIC | Age: 63
End: 2018-03-22
Attending: INTERNAL MEDICINE
Payer: COMMERCIAL

## 2018-03-22 PROCEDURE — 40000116 ZZH STATISTIC OP CR VISIT

## 2018-03-22 PROCEDURE — G0424 PULMONARY REHAB W EXER: HCPCS

## 2018-03-23 ENCOUNTER — TELEPHONE (OUTPATIENT)
Dept: INTERNAL MEDICINE | Facility: CLINIC | Age: 63
End: 2018-03-23

## 2018-03-23 ENCOUNTER — OFFICE VISIT (OUTPATIENT)
Dept: INTERNAL MEDICINE | Facility: CLINIC | Age: 63
End: 2018-03-23
Payer: COMMERCIAL

## 2018-03-23 VITALS
DIASTOLIC BLOOD PRESSURE: 66 MMHG | HEIGHT: 70 IN | HEART RATE: 77 BPM | SYSTOLIC BLOOD PRESSURE: 110 MMHG | BODY MASS INDEX: 25.77 KG/M2 | OXYGEN SATURATION: 95 % | TEMPERATURE: 97.6 F | WEIGHT: 180 LBS

## 2018-03-23 DIAGNOSIS — R11.0 NAUSEA: ICD-10-CM

## 2018-03-23 DIAGNOSIS — F41.9 ANXIETY: ICD-10-CM

## 2018-03-23 DIAGNOSIS — M54.50 BILATERAL LOW BACK PAIN WITHOUT SCIATICA, UNSPECIFIED CHRONICITY: ICD-10-CM

## 2018-03-23 DIAGNOSIS — M54.50 CHRONIC BILATERAL LOW BACK PAIN WITHOUT SCIATICA: ICD-10-CM

## 2018-03-23 DIAGNOSIS — G89.29 CHRONIC BILATERAL LOW BACK PAIN WITHOUT SCIATICA: ICD-10-CM

## 2018-03-23 DIAGNOSIS — F33.2 SEVERE EPISODE OF RECURRENT MAJOR DEPRESSIVE DISORDER, WITHOUT PSYCHOTIC FEATURES (H): Primary | ICD-10-CM

## 2018-03-23 DIAGNOSIS — J43.9 PULMONARY EMPHYSEMA, UNSPECIFIED EMPHYSEMA TYPE (H): ICD-10-CM

## 2018-03-23 LAB
ERYTHROCYTE [DISTWIDTH] IN BLOOD BY AUTOMATED COUNT: 13.6 % (ref 10–15)
HCT VFR BLD AUTO: 43.4 % (ref 35–47)
HGB BLD-MCNC: 13.3 G/DL (ref 11.7–15.7)
MCH RBC QN AUTO: 29.5 PG (ref 26.5–33)
MCHC RBC AUTO-ENTMCNC: 30.6 G/DL (ref 31.5–36.5)
MCV RBC AUTO: 96 FL (ref 78–100)
PLATELET # BLD AUTO: 330 10E9/L (ref 150–450)
RBC # BLD AUTO: 4.51 10E12/L (ref 3.8–5.2)
WBC # BLD AUTO: 8.4 10E9/L (ref 4–11)

## 2018-03-23 PROCEDURE — 80048 BASIC METABOLIC PNL TOTAL CA: CPT | Performed by: INTERNAL MEDICINE

## 2018-03-23 PROCEDURE — 99215 OFFICE O/P EST HI 40 MIN: CPT | Performed by: INTERNAL MEDICINE

## 2018-03-23 PROCEDURE — 85027 COMPLETE CBC AUTOMATED: CPT | Performed by: INTERNAL MEDICINE

## 2018-03-23 PROCEDURE — 36415 COLL VENOUS BLD VENIPUNCTURE: CPT | Performed by: INTERNAL MEDICINE

## 2018-03-23 RX ORDER — PREDNISONE 5 MG/1
5 TABLET ORAL DAILY
Qty: 15 TABLET | Refills: 0 | Status: SHIPPED | OUTPATIENT
Start: 2018-03-23 | End: 2018-04-01

## 2018-03-23 RX ORDER — ONDANSETRON 4 MG/1
4-8 TABLET, ORALLY DISINTEGRATING ORAL EVERY 8 HOURS PRN
Qty: 20 TABLET | Refills: 3 | Status: SHIPPED | OUTPATIENT
Start: 2018-03-23 | End: 2018-12-17

## 2018-03-23 RX ORDER — OXYCODONE AND ACETAMINOPHEN 5; 325 MG/1; MG/1
1 TABLET ORAL EVERY 4 HOURS PRN
Qty: 42 TABLET | Refills: 0 | Status: SHIPPED | OUTPATIENT
Start: 2018-03-23 | End: 2018-04-13

## 2018-03-23 RX ORDER — HYDROCODONE BITARTRATE AND ACETAMINOPHEN 5; 325 MG/1; MG/1
1-2 TABLET ORAL EVERY 8 HOURS PRN
Qty: 180 TABLET | Refills: 0 | Status: SHIPPED | OUTPATIENT
Start: 2018-03-27 | End: 2018-04-23

## 2018-03-23 RX ORDER — HYDROCODONE BITARTRATE AND ACETAMINOPHEN 5; 325 MG/1; MG/1
1-2 TABLET ORAL EVERY 8 HOURS PRN
Qty: 150 TABLET | Refills: 0 | Status: SHIPPED | OUTPATIENT
Start: 2018-03-23 | End: 2018-03-23

## 2018-03-23 RX ORDER — DULOXETIN HYDROCHLORIDE 20 MG/1
20 CAPSULE, DELAYED RELEASE ORAL 2 TIMES DAILY
Qty: 60 CAPSULE | Refills: 1 | Status: SHIPPED | OUTPATIENT
Start: 2018-03-23 | End: 2018-04-16

## 2018-03-23 RX ORDER — ALPRAZOLAM 0.25 MG
TABLET ORAL
Qty: 30 TABLET | Refills: 0 | Status: SHIPPED | OUTPATIENT
Start: 2018-03-23 | End: 2018-04-23

## 2018-03-23 ASSESSMENT — ANXIETY QUESTIONNAIRES
3. WORRYING TOO MUCH ABOUT DIFFERENT THINGS: NEARLY EVERY DAY
6. BECOMING EASILY ANNOYED OR IRRITABLE: SEVERAL DAYS
2. NOT BEING ABLE TO STOP OR CONTROL WORRYING: NEARLY EVERY DAY
5. BEING SO RESTLESS THAT IT IS HARD TO SIT STILL: NEARLY EVERY DAY
IF YOU CHECKED OFF ANY PROBLEMS ON THIS QUESTIONNAIRE, HOW DIFFICULT HAVE THESE PROBLEMS MADE IT FOR YOU TO DO YOUR WORK, TAKE CARE OF THINGS AT HOME, OR GET ALONG WITH OTHER PEOPLE: EXTREMELY DIFFICULT
1. FEELING NERVOUS, ANXIOUS, OR ON EDGE: NEARLY EVERY DAY
7. FEELING AFRAID AS IF SOMETHING AWFUL MIGHT HAPPEN: NEARLY EVERY DAY
GAD7 TOTAL SCORE: 19

## 2018-03-23 ASSESSMENT — PATIENT HEALTH QUESTIONNAIRE - PHQ9: 5. POOR APPETITE OR OVEREATING: NEARLY EVERY DAY

## 2018-03-23 NOTE — TELEPHONE ENCOUNTER
Spoke to Dr. Weinberg, he was concerned insurance would not allow pt to fill Norco now. If they will allow Norco to be filled early, he gives verbal order for this, if not then Percocet should be filled. He states current Norco order would be a 25 day supply.     Called Yandy, gave her verbal order from Dr Weinberg to fill [place] early. Even with adjusting previous prescription to a 25 day supply, pt would be 4 days early for refill - pt last filled 3/2/18. Insurance will only allow 240 tabs every 25 days. She states they could fill a small prescription for 30 tabs and then do a new prescription on 3/27/18 for the 180 tablets for a 30 days supply.     Dr. Weinberg gave verbal order to dispense 30 tabs today and agreed to do new order for 30 day supply to be filled on 3/27/18.    Yandy advised of plan, she will fill 30 tabs of Norco today and shred the prescription for Percocet. Order pended for Norco, #180 with start date of 3/27/18 - Yandy states we can bring this down today and they will not fill until 3/27/18.

## 2018-03-23 NOTE — MR AVS SNAPSHOT
After Visit Summary   3/23/2018    Trisha Bender    MRN: [id number]           Patient Information     Date Of Birth          1955        Visit Information        Provider Department      3/23/2018 2:40 PM Bandar Weinberg MD [facility]        Today's Diagnoses     Severe episode of recurrent major depressive disorder, without psychotic features (H)    -  1    Pulmonary emphysema, unspecified emphysema type (H)        Nausea        Bilateral low back pain without sciatica, unspecified chronicity        Anxiety        Chronic bilateral low back pain without sciatica          Care Instructions    Call in 2 week for Prednisone refill           Follow-ups after your visit        Additional Services     [facility]       Please call [phone] to make an appointment. Clinic is located: [facility] [address] 4th [place] [address]      Please complete the following questions:    Procedure/Referral: Procedure Only -  Procedure or injection only - please call the [facility] at [phone] to schedule: Epidural Steroid (interlaminar approach): Lumbar    What is your diagnosis for the patient's pain? LBP, DDD     What are your specific questions for the pain specialist? Epidural steroid injection     Are there any red flags that may impact the assessment or management of the patient? None                  Follow-up notes from your care team     Return in about 4 weeks (around 4/20/2018).      Your next 10 appointments already scheduled     Mar 26, 2018  3:15 PM CDT   CONSULT with  Pulmonary Rehab [address] [place] [address] ([facility])    [id number] [place] [address], [address] [id number] [phone]            Mar 27, 2018  3:00 PM CDT   Pulmonary Treatment with  Pulmonary Rehab [address] [place] [address] ([facility])    [address] [place] [address], [address]  [facility] [address]   [phone]            Mar 29, 2018  3:00 PM CDT   Pulmonary Treatment with [facility] 1   [facility] ([facility])    [address], [address]  [facility] [address]   [phone]            Apr 02, 2018  2:00 PM CDT   Pulmonary Treatment with [facility] 1   [facility] ([facility])    [address]  [facility] [address]   [phone]            Apr 03, 2018  3:00 PM CDT   Pulmonary Treatment with [facility] Pulmonary Rehab 1   [facility] ([facility])    [address]  [facility] [address]   [phone]            Apr 05, 2018  3:00 PM CDT   Pulmonary Treatment with [facility] Rehab 1   [facility] ([facility])    [address]  [facility] [address]   [phone]            Apr 09, 2018  2:00 PM CDT   Pulmonary Treatment with [facility] 1   [facility] ([facility])    [address]  [facility] [address]   [phone]            Apr 10, 2018  3:00 PM CDT   Pulmonary Treatment with [facility] Re[facility] 1   [facility] ([facility])    [address]  [facility] [address]   [phone]            Apr 12, 2018  3:00 PM CDT   Pulmonary Treatment with [facility] 1   [facility] ([facility])    [address]  [facility] [address]   [phone]            Apr 16, 2018  2:00 PM CDT   Pulmonary Treatment with [facility] Rehab 1   [facility] ([facility])    [address]  [facility] [address]   [phone]              Who to contact     If you have questions or need follow up information about today's clinic visit or your schedule please contact [facility] directly at [phone].  Normal or non-critical lab and imaging results will be communicated to you by MyChart, letter or phone within 4 business days after the clinic has received the results. If you do not hear from us within 7 days, please contact the clinic through M[employer] [employer]hart or phone. If you have a critical or abnormal lab result, we will notify you by phone as soon as possible.  Submit refill requests through [employer] or call your pharmacy and they will forward the refill request to us. Please allow 3 business days for your refill to be completed.          Additional Information About Your Visit        Moxie JeanharApaja Information     [employer] gives you secure access to your electronic health record. If you see a primary care provider, you can also send messages to your care team and make appointments. If you have questions, please call your primary care clinic.  If you do not have a primary care provider, please call [phone] and they will assist you.        Care EveryWhere ID     This is your Care EveryWhere ID. This could be used by other organizations to access your [facility] medical records  [id number]        Your Vitals Were     Pulse Temperature Height Last Period Pulse Oximetry BMI (Body Mass Index)    77 97.6  F (36.4  C) (Oral) 5' 10\" (1.778 m) 09/29/2005 95% 25.83 kg/m2       Blood Pressure from Last 3 Encounters:   03/23/18 110/66   02/26/18 135/84   02/05/18 112/68    Weight from Last 3 Encounters:   03/23/18 180 lb (81.6 kg)   02/26/18 180 lb 4.8 oz (81.8 kg)   02/05/18 180 lb (81.6 kg)              We Performed the Following     Basic metabolic panel     CBC with platelets     [facility]          Today's Medication Changes          These changes are accurate as of 3/23/18  3:28 PM.  If you have any questions, ask your nurse or doctor.               Start taking these medicines.        Dose/Directions    DULoxetine 20 MG EC capsule   Commonly known as:  " CYMBALTA   Used for:  Severe episode of recurrent major depressive disorder, without psychotic features (H)   Started by:  Bandar Weinberg MD        Dose:  20 mg   Take 1 capsule (20 mg) by mouth 2 times daily   Quantity:  60 capsule   Refills:  1            Where to get your medicines      These medications were sent to [facility] [place], [place] - [facility] [address]  [facility] [address] [facility] [address]     Phone:  [phone]     DULoxetine 20 MG EC capsule    ondansetron 4 MG ODT tab    predniSONE 5 MG tablet         Some of these will need a paper prescription and others can be bought over the counter.  Ask your nurse if you have questions.     Bring a paper prescription for each of these medications     ALPRAZolam 0.25 MG tablet    HYDROcodone-acetaminophen 5-325 MG per tablet                Primary Care Provider Office Phone # Fax #    Bandar Weinberg [phone]       [address]  [facility] [address]        Equal Access to Services     [facility] AH: Hadii aad ku hadasho Soomaali, waaxda luqadaha, qaybta kaalmada adeegyada, waxay idiin hayverenicen frank burris . So [facility] [phone].    ATENCIÓN: Si habla español, tiene a madison disposición servicios gratuitos de asistencia lingüística. [facility] [phone].    We comply with applicable federal civil rights laws and [place] laws. We do not discriminate on the basis of race, color, national origin, age, disability, sex, sexual orientation, or gender identity.            Thank you!     Thank you for choosing [facility]  for your care. Our goal is always to provide you with excellent care. Hearing back from our patients is one way we can continue to improve our services. Please take a few minutes to complete the written survey that you may receive in the mail after your visit with us. Thank you!             Your Updated Medication List - Protect others around you: Learn how to  safely use, store and throw away your medicines at www.disposemymeds.org.          This list is accurate as of 3/23/18  3:28 PM.  Always use your most recent med list.                   Brand Name Dispense Instructions for use Diagnosis    ADVAIR DISKUS 500-50 MCG/DOSE diskus inhaler   Generic drug:  fluticasone-salmeterol     3 Inhaler    INHALE 1 PUFF TWO TIMES DAILY    Pulmonary emphysema, unspecified emphysema type (H)       * albuterol (2.5 MG/3ML) 0.083% neb solution     25 vial    Take 1 vial (2.5 mg) by nebulization every 6 hours as needed for shortness of breath / dyspnea or wheezing    SOB (shortness of breath)       * albuterol 108 (90 BASE) MCG/ACT Inhaler    VENTOLIN HFA    18 g    Inhale 2 puffs into the lungs every 6 hours as needed for shortness of breath / dyspnea or wheezing    Asthma exacerbation       ALPRAZolam 0.25 MG tablet    XANAX    30 tablet    Take 1 tablet by mouth. Every twelve hours as needed for anxiety    Anxiety       atorvastatin 10 MG tablet    LIPITOR    30 tablet    Takes 3 times a week    Coronary artery disease involving native coronary artery of native heart without angina pectoris, Mixed hyperlipidemia       baclofen 10 MG tablet    LIORESAL    90 tablet    TAKE ONE TABLET BY MOUTH THREE TIMES A DAY AS NEEDED FOR MUSCLE SPASMS    Chronic bilateral low back pain with right-sided sciatica       [employer] F[employer]ER GUMMIES 2.5 G Chew      Take 1 tablet by mouth daily Reported on 5/8/2017        DULoxetine 20 MG EC capsule    CYMBALTA    60 capsule    Take 1 capsule (20 mg) by mouth 2 times daily    Severe episode of recurrent major depressive disorder, without psychotic features (H)       fluticasone 50 MCG/ACT spray    FLONASE    16 g    SPRAY 1 TO 2 SPRAYS IN EACH NOSTRIL ONCE DAILY    Nasal congestion       furosemide 20 MG tablet    LASIX    180 tablet    Take 2 tablets (40 mg) by mouth daily severe swelling    Acute systolic congestive heart failure (H)        HYDROcodone-acetaminophen 5-325 MG per tablet    NORCO    150 tablet    Take 1-2 tablets by mouth every 8 hours as needed for pain Maximum 6 daily    Chronic bilateral low back pain without sciatica       ipratropium - albuterol 0.5 mg/2.5 mg/3 mL 0.5-2.5 (3) MG/3ML neb solution    DUONEB    30 vial    Take 1 vial (3 mLs) by nebulization every 6 hours as needed for shortness of breath / dyspnea or wheezing    Moderate persistent asthma without complication       lidocaine 5 % Patch    LIDODERM    30 patch    Apply up to 3 patches to painful area at once for up to 12 h within a 24 h period.  Remove after 12 hours.    Mechanical low back pain       lisinopril 2.5 MG tablet    PRINIVIL/Zestril    90 tablet    Take 1 tablet (2.5 mg) by mouth daily    Dilated cardiomyopathy (H)       mometasone 0.1 % ointment    ELOCON    45 g    Apply sparingly to affected area twice daily as needed.  Do not apply to face.    Intrinsic eczema       montelukast 10 MG tablet    SINGULAIR    90 tablet    Take 1 tablet (10 mg) by mouth At Bedtime    Mild persistent asthma without complication       Multiple Vitamin Chew      Take 1 tablet by mouth daily        nitroGLYcerin 0.4 MG sublingual tablet    NITROSTAT    25 tablet    Place 1 tablet (0.4 mg) under the tongue every 5 minutes as needed for chest pain If you are still having symptoms after 3 doses (15 minutes) call 911.    Acute chest pain       ondansetron 4 MG ODT tab    ZOFRAN ODT    20 tablet    Take 1-2 tablets (4-8 mg) by mouth every 8 hours as needed for nausea    Nausea       pantoprazole 40 MG EC tablet    PROTONIX    90 tablet    TAKE ONE TABLET BY MOUTH EVERY DAY    Gastroesophageal reflux disease without esophagitis       potassium chloride 10 MEQ tablet    K-TAB,KLOR-CON    90 tablet    TAKE ONE TABLET BY MOUTH THREE TIMES A DAY    Hypokalemia       predniSONE 5 MG tablet    DELTASONE    15 tablet    Take 1 tablet (5 mg) by mouth daily    Pulmonary emphysema, unspecified  emphysema type (H)       roflumilast 500 MCG Tabs tablet    DALIRESP     Take 1 tablet (500 mcg) by mouth daily        SUMAtriptan 25 MG tablet    IMITREX    9 tablet    Take 1 tablet (25 mg) by mouth at onset of headache for migraine May repeat in 2 hours if needed: max 2/day;    Chronic migraine without aura without status migrainosus, not intractable       tiotropium 18 MCG capsule    SPIRIVA HANDIHALER    90 capsule    USING THE HANDIHALER, INHALE THE CONTENTS OF ONE CAPSULE BY MOUTH DAILY    Moderate persistent asthma without complication       TYLENOL EXTRA STRENGTH PO      1 TABLET EVERY 4 HOURS AS NEEDED    Abdominal pain, right upper quadrant       * Notice:  This list has 2 medication(s) that are the same as other medications prescribed for you. Read the directions carefully, and ask your doctor or other care provider to review them with you.

## 2018-03-23 NOTE — PROGRESS NOTES
SUBJECTIVE:   Trisha Bender is a 62 year old female who presents to clinic today for the following health issues:    Patient is seen for a follow up visit.  Has h/o COPD. Follows with pulmonary. On inhaled steroids, bronchodilators, PO Prednisone. Assessed for lung transplant. Advised to stop Prednisone if transplant considered.   Has SOB on exertion, cough, weakness.     Has developed depression symptoms, feels down, no energy, no ambition, crying frequently.   Has h/o anxiety. On Xanax. Helps with symptoms.     Reports recurrent lower back and thoracic back pain. Has h/o DDD. On chronic narcotic analgesics. Not well controlled. Takes more than prescribed pain pills. No sedation, no side effects.     Hyperlipidemia Follow-Up  Has H/O hyperlipidemia. On medical treatment and diet. No side effects. No muscle weakness, myalgias or upset stomach.       Rate your low fat/cholesterol diet?: good    Taking statin?  Stopped     Other lipid medications/supplements?:  none    Hypertension Follow-up  Has h/o HTN. on medical treatment. BP has been controlled. No side effects from medications. No CP, HA, dizziness. good compliance with medications and low salt diet.      Outpatient blood pressures are being checked at office.  Results are normal.    Low Salt Diet: no added salt    Has h/o IHD. No chest pain.         Problem list and histories reviewed & adjusted, as indicated.  Additional history: as documented    Patient Active Problem List   Diagnosis     Essential hypertension with goal blood pressure less than 130/80     Chronic migraine without aura without status migrainosus, not intractable     Esophageal reflux     Disorder of bone and cartilage     Family history of malignant neoplasm of gastrointestinal tract     Chronic airway obstruction (H)     Cystocele     Advanced directives, counseling/discussion     Family history of thyroid cancer - 1/2 brother     Anxiety     Mild persistent asthma without  complication     Chronic low back pain     Hair loss     Lateral epicondylitis of right elbow     Dilated cardiomyopathy (H)     Right bundle branch block (RBBB)     CHF (congestive heart failure) (H)     SOB (shortness of breath)     Coronary artery disease involving native coronary artery of native heart without angina pectoris     Mixed hyperlipidemia     Controlled substance agreement signed     Moderate persistent asthma without complication     Hypokalemia     Mechanical low back pain     Panlobular emphysema (H)     Severe episode of recurrent major depressive disorder, without psychotic features (H)     Past Surgical History:   Procedure Laterality Date     ANGIOGRAM  2015    Minimal CAD. LV dysfunction, Mid RCA 20% stenosis, EF 20-25%, c/w Takotsubo cardiomyopathy     APPENDECTOMY       C NONSPECIFIC PROCEDURE      GB and appendectomy  abstracted [id number]     C NONSPECIFIC PROCEDURE      Nasal septoplasty and mucous retention cyst     CHOLECYSTECTOMY       ESOPHAGOSCOPY, GASTROSCOPY, DUODENOSCOPY (EGD), COMBINED  11/15/2011    Procedure:COMBINED ESOPHAGOSCOPY, GASTROSCOPY, DUODENOSCOPY (EGD);  ESOPHAGOSCOPY, GASTROSCOPY, DUODENOSCOPY (EGD) ; Surgeon:JIM URENA; Location:[facility]     HC REMOVE TONSILS/ADENOIDS,<11 Y/O       LAPAROSCOPIC ABLATION ENDOMETRIOSIS       wisdom teeth         Social History   Substance Use Topics     Smoking status: Former Smoker     Packs/day: 1.00     Years: 30.00     Quit date: 2006     Smokeless tobacco: Never Used      Comment: Quit in      Alcohol use No     Family History   Problem Relation Age of Onset     CANCER Mother      Colon cancer; dxed at age 64;  at age 69     Cancer - colorectal Mother      Other Cancer Mother      GASTROINTESTINAL DISEASE Sister      Acute pancreatitis     HEART DISEASE Father      ?     Coronary Artery Disease Father      Circulatory Maternal Aunt      AAA     Circulatory Maternal Uncle      AAA     DIABETES Son           Current Outpatient Prescriptions   Medication Sig Dispense Refill     predniSONE (DELTASONE) 5 MG tablet Take 1 tablet (5 mg) by mouth daily 15 tablet 0     ondansetron (ZOFRAN ODT) 4 MG ODT tab Take 1-2 tablets (4-8 mg) by mouth every 8 hours as needed for nausea 20 tablet 3     DULoxetine (CYMBALTA) 20 MG EC capsule Take 1 capsule (20 mg) by mouth 2 times daily 60 capsule 1     ALPRAZolam (XANAX) 0.25 MG tablet Take 1 tablet by mouth. Every twelve hours as needed for anxiety 30 tablet 0     HYDROcodone-acetaminophen (NORCO) 5-325 MG per tablet Take 1-2 tablets by mouth every 8 hours as needed for pain Maximum 6 daily 150 tablet 0     oxyCODONE-acetaminophen (PERCOCET) 5-325 MG per tablet Take 1 tablet by mouth every 4 hours as needed for severe pain maximum 6 tablet(s) per day 42 tablet 0     pantoprazole (PROTONIX) 40 MG EC tablet TAKE ONE TABLET BY MOUTH EVERY DAY 90 tablet 2     lidocaine (LIDODERM) 5 % Patch Apply up to 3 patches to painful area at once for up to 12 h within a 24 h period.  Remove after 12 hours. 30 patch 0     ADVAIR DISKUS 500-50 MCG/DOSE diskus inhaler INHALE 1 PUFF TWO TIMES DAILY 3 Inhaler 3     baclofen (LIORESAL) 10 MG tablet TAKE ONE TABLET BY MOUTH THREE TIMES A DAY AS NEEDED FOR MUSCLE SPASMS 90 tablet 1     fluticasone (FLONASE) 50 MCG/ACT spray SPRAY 1 TO 2 SPRAYS IN EACH NOSTRIL ONCE DAILY 16 g 7     SUMAtriptan (IMITREX) 25 MG tablet Take 1 tablet (25 mg) by mouth at onset of headache for migraine May repeat in 2 hours if needed: max 2/day; 9 tablet 11     potassium chloride (K-TAB,KLOR-CON) 10 MEQ tablet TAKE ONE TABLET BY MOUTH THREE TIMES A DAY 90 tablet 5     lisinopril (PRINIVIL/ZESTRIL) 2.5 MG tablet Take 1 tablet (2.5 mg) by mouth daily 90 tablet 3     furosemide (LASIX) 20 MG tablet Take 2 tablets (40 mg) by mouth daily severe swelling 180 tablet 1     montelukast (SINGULAIR) 10 MG tablet Take 1 tablet (10 mg) by mouth At Bedtime 90 tablet 1     tiotropium (SPIRIVA  "HANDIHALER) 18 MCG capsule USING THE HANDIHALER, INHALE THE CONTENTS OF ONE CAPSULE BY MOUTH DAILY 90 capsule 1     roflumilast (DALIRESP) 500 MCG TABS tablet Take 1 tablet (500 mcg) by mouth daily       albuterol (VENTOLIN HFA) 108 (90 BASE) MCG/ACT Inhaler Inhale 2 puffs into the lungs every 6 hours as needed for shortness of breath / dyspnea or wheezing 18 g 3     ipratropium - albuterol 0.5 mg/2.5 mg/3 mL (DUONEB) 0.5-2.5 (3) MG/3ML neb solution Take 1 vial (3 mLs) by nebulization every 6 hours as needed for shortness of breath / dyspnea or wheezing 30 vial 1     mometasone (ELOCON) 0.1 % ointment Apply sparingly to affected area twice daily as needed.  Do not apply to face. 45 g 0     albuterol (2.5 MG/3ML) 0.083% neb solution Take 1 vial (2.5 mg) by nebulization every 6 hours as needed for shortness of breath / dyspnea or wheezing 25 vial 0     atorvastatin (LIPITOR) 10 MG tablet Takes 3 times a week 30 tablet 11     nitroglycerin (NITROSTAT) 0.4 MG SL tablet Place 1 tablet (0.4 mg) under the tongue every 5 minutes as needed for chest pain If you are still having symptoms after 3 doses (15 minutes) call 911. 25 tablet 0     [employer] FIBER GUMMIES 2.5 G CHEW Take 1 tablet by mouth daily Reported on 5/8/2017       Multiple Vitamin CHEW Take 1 tablet by mouth daily        TYLENOL EXTRA STRENGTH OR 1 TABLET EVERY 4 HOURS AS NEEDED         Reviewed and updated as needed this visit by clinical staff       Reviewed and updated as needed this visit by Provider         ROS:  Constitutional, HEENT, cardiovascular, pulmonary, GI, , musculoskeletal, neuro, skin, endocrine and psych systems are negative, except as otherwise noted.    OBJECTIVE:     /66  Pulse 77  Temp 97.6  F (36.4  C) (Oral)  Ht 5' 10\" (1.778 m)  Wt 180 lb (81.6 kg)  LMP 09/29/2005  SpO2 95%  BMI 25.83 kg/m2  Body mass index is 25.83 kg/(m^2).   GENERAL: weak, frail, alert and no distress  EYES: Eyes grossly normal to inspection, PERRL and " conjunctivae and sclerae normal  NECK: no adenopathy, no asymmetry, masses, or scars and thyroid normal to palpation  RESP: lungs clear to auscultation - no rales, rhonchi or wheezes, diminished breaths sounds diffusely   CV: regular rate and rhythm, normal S1 S2, no S3 or S4, no murmur, click or rub, no peripheral edema and peripheral pulses strong  ABDOMEN: soft, nontender, no hepatosplenomegaly, no masses and bowel sounds normal  MS: no gross musculoskeletal defects noted, no edema, LS spine palpatory tenderness   SKIN: no suspicious lesions or rashes  NEURO: Normal strength and tone, mentation intact and speech normal    Diagnostic Test Results:  none     ASSESSMENT/PLAN:     Problem List Items Addressed This Visit     Chronic airway obstruction (H)    Relevant Medications    predniSONE (DELTASONE) 5 MG tablet    Other Relevant Orders    CBC with platelets (Completed)    Basic metabolic panel (Completed)    Anxiety    Relevant Medications    DULoxetine (CYMBALTA) 20 MG EC capsule    ALPRAZolam (XANAX) 0.25 MG tablet    Chronic low back pain    Relevant Medications    predniSONE (DELTASONE) 5 MG tablet    HYDROcodone-acetaminophen (NORCO) 5-325 MG per tablet    oxyCODONE-acetaminophen (PERCOCET) 5-325 MG per tablet    Severe episode of recurrent major depressive disorder, without psychotic features (H) - Primary    Relevant Medications    DULoxetine (CYMBALTA) 20 MG EC capsule    ALPRAZolam (XANAX) 0.25 MG tablet      Other Visit Diagnoses     Nausea        Relevant Medications    ondansetron (ZOFRAN ODT) 4 MG ODT tab    Bilateral low back pain without sciatica, unspecified chronicity        Relevant Medications    predniSONE (DELTASONE) 5 MG tablet    HYDROcodone-acetaminophen (NORCO) 5-325 MG per tablet    oxyCODONE-acetaminophen (PERCOCET) 5-325 MG per tablet    Other Relevant Orders    [facility]           Assess lab work   Refer to pain management for epidural steroid injection    Taper down Prednisone by 1 mg every 1-2 weeks as tolerated   Start on antidepressant , titrate dose as tolerated   Cont pain treatment   Cont inhalers     Follow-Up:in 1 month     Bandar Weinberg MD  [facility]

## 2018-03-23 NOTE — LETTER
April 2, 2018      Trisha Bender  [address] [facility] [address]        Dear ,    We are writing to inform you of your test results.    NORMAL    Resulted Orders   CBC with platelets   Result Value Ref Range    WBC 8.4 4.0 - 11.0 10e9/L    RBC Count 4.51 3.8 - 5.2 10e12/L    Hemoglobin 13.3 11.7 - 15.7 g/dL    Hematocrit 43.4 35.0 - 47.0 %    MCV 96 78 - 100 fl    MCH 29.5 26.5 - 33.0 pg    MCHC 30.6 (L) 31.5 - 36.5 g/dL      Comment:      Reviewed: OK with previous    RDW 13.6 10.0 - 15.0 %    Platelet Count 330 150 - 450 10e9/L   Basic metabolic panel   Result Value Ref Range    Sodium 142 133 - 144 mmol/L    Potassium 3.7 3.4 - 5.3 mmol/L    Chloride 106 94 - 109 mmol/L    Carbon Dioxide 30 20 - 32 mmol/L    Anion Gap 6 3 - 14 mmol/L    Glucose 103 (H) 70 - 99 mg/dL    Urea Nitrogen 6 (L) 7 - 30 mg/dL    Creatinine 0.73 0.52 - 1.04 mg/dL    GFR Estimate 81 >60 mL/min/1.7m2      Comment:      Non  GFR Calc    GFR Estimate If Black >90 >60 mL/min/1.7m2      Comment:       GFR Calc    Calcium 8.9 8.5 - 10.1 mg/dL       If you have any questions or concerns, please call the clinic at the number listed above.       Sincerely,        Bandar Weinberg MD

## 2018-03-23 NOTE — NURSING NOTE
"Chief Complaint   Patient presents with     Recheck Medication     F/U on meds, Discuss/review depression     RECHECK     F/U from Pulmonary       Initial /66  Pulse 77  Temp 97.6  F (36.4  C) (Oral)  Ht 5' 10\" (1.778 m)  Wt 180 lb (81.6 kg)  LMP 09/29/2005  SpO2 95%  BMI 25.83 kg/m2 Estimated body mass index is 25.83 kg/(m^2) as calculated from the following:    Height as of this encounter: 5' 10\" (1.778 m).    Weight as of this encounter: 180 lb (81.6 kg).  Medication Reconciliation: complete   Madhavi Garcia MA      "

## 2018-03-24 LAB
ANION GAP SERPL CALCULATED.3IONS-SCNC: 6 MMOL/L (ref 3–14)
BUN SERPL-MCNC: 6 MG/DL (ref 7–30)
CALCIUM SERPL-MCNC: 8.9 MG/DL (ref 8.5–10.1)
CHLORIDE SERPL-SCNC: 106 MMOL/L (ref 94–109)
CO2 SERPL-SCNC: 30 MMOL/L (ref 20–32)
CREAT SERPL-MCNC: 0.73 MG/DL (ref 0.52–1.04)
GFR SERPL CREATININE-BSD FRML MDRD: 81 ML/MIN/1.7M2
GLUCOSE SERPL-MCNC: 103 MG/DL (ref 70–99)
POTASSIUM SERPL-SCNC: 3.7 MMOL/L (ref 3.4–5.3)
SODIUM SERPL-SCNC: 142 MMOL/L (ref 133–144)

## 2018-03-24 ASSESSMENT — ANXIETY QUESTIONNAIRES: GAD7 TOTAL SCORE: 19

## 2018-03-24 ASSESSMENT — ASTHMA QUESTIONNAIRES: ACT_TOTALSCORE: 8

## 2018-03-24 ASSESSMENT — PATIENT HEALTH QUESTIONNAIRE - PHQ9: SUM OF ALL RESPONSES TO PHQ QUESTIONS 1-9: 20

## 2018-03-26 ENCOUNTER — TELEPHONE (OUTPATIENT)
Dept: ANESTHESIOLOGY | Facility: CLINIC | Age: 63
End: 2018-03-26

## 2018-03-26 ENCOUNTER — HOSPITAL ENCOUNTER (OUTPATIENT)
Dept: CARDIAC REHAB | Facility: CLINIC | Age: 63
End: 2018-03-26
Attending: INTERNAL MEDICINE
Payer: COMMERCIAL

## 2018-03-26 DIAGNOSIS — M54.50 BILATERAL LOW BACK PAIN WITHOUT SCIATICA: Primary | ICD-10-CM

## 2018-03-26 PROCEDURE — 40000244 ZZH STATISTIC VISIT PULM REHAB

## 2018-03-26 PROCEDURE — G0424 PULMONARY REHAB W EXER: HCPCS

## 2018-03-26 NOTE — TELEPHONE ENCOUNTER
Rx sent to [facility] pharmacy. Called Pt and informed it will be ready tomorrow by pharmacy.  Madhavi Garcia MA

## 2018-03-26 NOTE — PROGRESS NOTES
LPN called pt and left VM, informing them that their pre-procedure instruction will be sent to them via [employer].  Pt was asked to read through them and then follow up with clinic nurse if they were to have questions.     Meghan Myers LPN

## 2018-03-26 NOTE — TELEPHONE ENCOUNTER
I called Trisha to schedule her procedure.     I have her scheduled for 4/13/2018.     She is aware that she needs a .

## 2018-03-27 ENCOUNTER — HOSPITAL ENCOUNTER (OUTPATIENT)
Dept: CARDIAC REHAB | Facility: CLINIC | Age: 63
End: 2018-03-27
Attending: INTERNAL MEDICINE
Payer: COMMERCIAL

## 2018-03-27 ENCOUNTER — TELEPHONE (OUTPATIENT)
Dept: GASTROENTEROLOGY | Facility: CLINIC | Age: 63
End: 2018-03-27

## 2018-03-27 PROCEDURE — G0424 PULMONARY REHAB W EXER: HCPCS

## 2018-03-27 PROCEDURE — 40000244 ZZH STATISTIC VISIT PULM REHAB

## 2018-03-27 NOTE — PATIENT INSTRUCTIONS
Spoke with Cynthia to let her know I consulted with Dr Acosta and colonoscopy will be in OR due to cardiac and lung issues.  Cynthia expressed thanks and I let her know I will be sending her the instructions.

## 2018-03-29 ENCOUNTER — HOSPITAL ENCOUNTER (OUTPATIENT)
Dept: CARDIAC REHAB | Facility: CLINIC | Age: 63
End: 2018-03-29
Attending: INTERNAL MEDICINE
Payer: COMMERCIAL

## 2018-03-29 PROCEDURE — 40000244 ZZH STATISTIC VISIT PULM REHAB

## 2018-03-29 PROCEDURE — G0424 PULMONARY REHAB W EXER: HCPCS

## 2018-04-01 ENCOUNTER — MYC REFILL (OUTPATIENT)
Dept: INTERNAL MEDICINE | Facility: CLINIC | Age: 63
End: 2018-04-01

## 2018-04-01 DIAGNOSIS — J43.9 PULMONARY EMPHYSEMA, UNSPECIFIED EMPHYSEMA TYPE (H): ICD-10-CM

## 2018-04-02 ENCOUNTER — HOSPITAL ENCOUNTER (OUTPATIENT)
Dept: CARDIAC REHAB | Facility: CLINIC | Age: 63
End: 2018-04-02
Attending: INTERNAL MEDICINE
Payer: COMMERCIAL

## 2018-04-02 DIAGNOSIS — R93.89 ABNORMAL CT SCAN, CHEST: ICD-10-CM

## 2018-04-02 DIAGNOSIS — J44.9 COPD (CHRONIC OBSTRUCTIVE PULMONARY DISEASE) (H): Primary | ICD-10-CM

## 2018-04-02 DIAGNOSIS — Z76.82 ORGAN TRANSPLANT CANDIDATE: ICD-10-CM

## 2018-04-02 PROCEDURE — 40000244 ZZH STATISTIC VISIT PULM REHAB

## 2018-04-02 PROCEDURE — G0424 PULMONARY REHAB W EXER: HCPCS

## 2018-04-02 RX ORDER — PREDNISONE 5 MG/1
5 TABLET ORAL DAILY
Qty: 15 TABLET | Refills: 0 | Status: SHIPPED | OUTPATIENT
Start: 2018-04-02 | End: 2018-04-12

## 2018-04-02 NOTE — Clinical Note
Loida, Orders for full lung txp evaluation 4/16/2018. Patient does not need to see Dr Kahn if no availability. Please omit EKG, to be ordered and completed during [facility].Patient will need complete PFTs and all other standard testing for Lung TXP. Please schedule patient for RN visit with me at end of week for 30 minutes to complete frailty testing. Can be scheduled prior to one hour closure visit.

## 2018-04-02 NOTE — TELEPHONE ENCOUNTER
Message from [employer]:  Original authorizing provider: MD Trisha Marvin would like a refill of the following medications:  predniSONE (DELTASONE) 5 MG tablet [Bandar Weinberg MD]    Preferred pharmacy: [facility] [address]    Comment:  Dr. Weinberg and Nursing Staff, DrRdaha asked me to notify him at the beginning of the week how things were going. Have an appt. set up to see him on 4/23. Would like to stay on the 5mg. dosage of Prednisone until then. Please refill the 5 mg. Prednisone prescription as soon as possible. Thank you. Trisha Bender

## 2018-04-03 ENCOUNTER — HOSPITAL ENCOUNTER (OUTPATIENT)
Facility: CLINIC | Age: 63
End: 2018-04-03
Attending: INTERNAL MEDICINE | Admitting: INTERNAL MEDICINE
Payer: COMMERCIAL

## 2018-04-03 NOTE — PROGRESS NOTES
Patient called to confirm she would like to move forward with full lung transplant evaluation. Discussed with Dr Kahn, who is ok with plan. Patient requesting first available evaluation. Orders sent for evaluation week to start 4/16/2018.

## 2018-04-05 ENCOUNTER — HOSPITAL ENCOUNTER (OUTPATIENT)
Dept: CARDIAC REHAB | Facility: CLINIC | Age: 63
End: 2018-04-05
Attending: INTERNAL MEDICINE
Payer: COMMERCIAL

## 2018-04-05 PROCEDURE — G0424 PULMONARY REHAB W EXER: HCPCS

## 2018-04-05 PROCEDURE — 40000244 ZZH STATISTIC VISIT PULM REHAB

## 2018-04-09 ENCOUNTER — HOSPITAL ENCOUNTER (OUTPATIENT)
Dept: CARDIAC REHAB | Facility: CLINIC | Age: 63
End: 2018-04-09
Attending: INTERNAL MEDICINE
Payer: COMMERCIAL

## 2018-04-09 PROCEDURE — 40000244 ZZH STATISTIC VISIT PULM REHAB

## 2018-04-09 PROCEDURE — G0424 PULMONARY REHAB W EXER: HCPCS

## 2018-04-10 ENCOUNTER — HOSPITAL ENCOUNTER (OUTPATIENT)
Dept: CARDIAC REHAB | Facility: CLINIC | Age: 63
End: 2018-04-10
Attending: INTERNAL MEDICINE
Payer: COMMERCIAL

## 2018-04-10 DIAGNOSIS — I50.21 ACUTE SYSTOLIC CONGESTIVE HEART FAILURE (H): ICD-10-CM

## 2018-04-10 DIAGNOSIS — J45.40 MODERATE PERSISTENT ASTHMA WITHOUT COMPLICATION: ICD-10-CM

## 2018-04-10 PROCEDURE — G0424 PULMONARY REHAB W EXER: HCPCS

## 2018-04-10 PROCEDURE — 40000244 ZZH STATISTIC VISIT PULM REHAB

## 2018-04-11 NOTE — TELEPHONE ENCOUNTER
"Last OV 3/23/18. Last ACT = 8 on 3/23/18.   Provider approval needed.     ACT Total Scores 11/8/2016 4/18/2017 3/23/2018   ACT TOTAL SCORE - - -   ASTHMA ER VISITS - - -   ASTHMA HOSPITALIZATIONS - - -   ACT TOTAL SCORE (Goal Greater than or Equal to 20) 24 11 8   In the past 12 months, how many times did you visit the emergency room for your asthma without being admitted to the hospital? 0 1 0   In the past 12 months, how many times were you hospitalized overnight because of your asthma? 0 0 0       Requested Prescriptions   Pending Prescriptions Disp Refills     furosemide (LASIX) 20 MG tablet [Pharmacy Med Name: FUROSEMIDE 20 MG TABLET] 180 tablet 1     Sig: TAKE 2 TABLETS (40 MG) BY MOUTH DAILY SEVERE SWELLING    Diuretics (Including Combos) Protocol Passed    4/10/2018  6:24 PM       Passed - Blood pressure under 140/90 in past 12 months    BP Readings from Last 3 Encounters:   03/23/18 110/66   02/26/18 135/84   02/05/18 112/68                Passed - Recent (12 mo) or future (30 days) visit within the authorizing provider's specialty    Patient had office visit in the last 12 months or has a visit in the next 30 days with authorizing provider or within the authorizing provider's specialty.  See \"Patient Info\" tab in inbasket, or \"Choose Columns\" in Meds & Orders section of the refill encounter.           Passed - Patient is age 18 or older       Passed - No active pregancy on record       Passed - Normal serum creatinine on file in past 12 months    Recent Labs   Lab Test  03/23/18   1532   CR  0.73             Passed - Normal serum potassium on file in past 12 months    Recent Labs   Lab Test  03/23/18   1532   POTASSIUM  3.7                   Passed - Normal serum sodium on file in past 12 months    Recent Labs   Lab Test  03/23/18   1532   NA  142             Passed - No positive pregnancy test in past 12 months        SPIRIVA HANDIHALER 18 MCG capsule [Pharmacy Med Name: SPIRIVA 18 MCG CP-HANDIHALER]  1 " "    Sig: USING THE HANDIHALER, INHALE THE CONTENTS OF ONE CAPSULE BY MOUTH DAILY    Asthma Maintenance Inhalers - Anticholinergics Failed    4/10/2018  6:24 PM       Failed - Asthma control assessment score within normal limits in last 6 months    Please review ACT score.          Passed - Patient is age 12 years or older       Passed - Recent (6 mo) or future (30 days) visit within the authorizing provider's specialty    Patient had office visit in the last 6 months or has a visit in the next 30 days with authorizing provider or within the authorizing provider's specialty.  See \"Patient Info\" tab in inbasket, or \"Choose Columns\" in Meds & Orders section of the refill encounter.              "

## 2018-04-12 ENCOUNTER — HOSPITAL ENCOUNTER (OUTPATIENT)
Dept: CARDIAC REHAB | Facility: CLINIC | Age: 63
End: 2018-04-12
Attending: INTERNAL MEDICINE
Payer: COMMERCIAL

## 2018-04-12 ENCOUNTER — MYC REFILL (OUTPATIENT)
Dept: INTERNAL MEDICINE | Facility: CLINIC | Age: 63
End: 2018-04-12

## 2018-04-12 DIAGNOSIS — E78.2 MIXED HYPERLIPIDEMIA: ICD-10-CM

## 2018-04-12 DIAGNOSIS — J43.9 PULMONARY EMPHYSEMA, UNSPECIFIED EMPHYSEMA TYPE (H): ICD-10-CM

## 2018-04-12 DIAGNOSIS — I25.10 CORONARY ARTERY DISEASE INVOLVING NATIVE CORONARY ARTERY OF NATIVE HEART WITHOUT ANGINA PECTORIS: ICD-10-CM

## 2018-04-12 PROCEDURE — G0424 PULMONARY REHAB W EXER: HCPCS

## 2018-04-12 PROCEDURE — 40000244 ZZH STATISTIC VISIT PULM REHAB

## 2018-04-12 RX ORDER — ATORVASTATIN CALCIUM 10 MG/1
TABLET, FILM COATED ORAL
Qty: 36 TABLET | Refills: 3 | Status: SHIPPED | OUTPATIENT
Start: 2018-04-12 | End: 2019-05-16

## 2018-04-12 NOTE — TELEPHONE ENCOUNTER
Message from [employer]:  Original authorizing provider: MD Trisha Marvin would like a refill of the following medications:  predniSONE (DELTASONE) 5 MG tablet [Bandar Weinberg MD]    Preferred pharmacy: [facility] [address]    Comment:  Dr. Weinberg and Nursing Staff, I would like to get a refill on the 5 mg. Prednisone prescription. Thank you. Trisha Bender

## 2018-04-13 ENCOUNTER — RADIANT APPOINTMENT (OUTPATIENT)
Dept: RADIOLOGY | Facility: AMBULATORY SURGERY CENTER | Age: 63
End: 2018-04-13
Payer: COMMERCIAL

## 2018-04-13 ENCOUNTER — HOSPITAL ENCOUNTER (OUTPATIENT)
Facility: AMBULATORY SURGERY CENTER | Age: 63
End: 2018-04-13
Payer: COMMERCIAL

## 2018-04-13 ENCOUNTER — SURGERY (OUTPATIENT)
Age: 63
End: 2018-04-13

## 2018-04-13 VITALS
SYSTOLIC BLOOD PRESSURE: 124 MMHG | BODY MASS INDEX: 25.77 KG/M2 | WEIGHT: 180 LBS | HEART RATE: 92 BPM | DIASTOLIC BLOOD PRESSURE: 70 MMHG | RESPIRATION RATE: 15 BRPM | OXYGEN SATURATION: 100 % | HEIGHT: 70 IN | TEMPERATURE: 98.5 F

## 2018-04-13 DIAGNOSIS — R52 PAIN: ICD-10-CM

## 2018-04-13 RX ORDER — BUPIVACAINE HYDROCHLORIDE 2.5 MG/ML
INJECTION, SOLUTION EPIDURAL; INFILTRATION; INTRACAUDAL PRN
Status: DISCONTINUED | OUTPATIENT
Start: 2018-04-13 | End: 2018-04-13 | Stop reason: HOSPADM

## 2018-04-13 RX ORDER — FUROSEMIDE 20 MG
TABLET ORAL
Qty: 180 TABLET | Refills: 1 | Status: SHIPPED | OUTPATIENT
Start: 2018-04-13 | End: 2018-09-06

## 2018-04-13 RX ORDER — LIDOCAINE HYDROCHLORIDE 10 MG/ML
INJECTION, SOLUTION EPIDURAL; INFILTRATION; INTRACAUDAL; PERINEURAL PRN
Status: DISCONTINUED | OUTPATIENT
Start: 2018-04-13 | End: 2018-04-13 | Stop reason: HOSPADM

## 2018-04-13 RX ORDER — METHYLPREDNISOLONE ACETATE 40 MG/ML
INJECTION, SUSPENSION INTRA-ARTICULAR; INTRALESIONAL; INTRAMUSCULAR; SOFT TISSUE PRN
Status: DISCONTINUED | OUTPATIENT
Start: 2018-04-13 | End: 2018-04-13 | Stop reason: HOSPADM

## 2018-04-13 RX ORDER — PREDNISONE 5 MG/1
5 TABLET ORAL DAILY
Qty: 15 TABLET | Refills: 0 | Status: SHIPPED | OUTPATIENT
Start: 2018-04-13 | End: 2018-04-23

## 2018-04-13 RX ORDER — TIOTROPIUM BROMIDE 18 UG/1
CAPSULE ORAL; RESPIRATORY (INHALATION)
Qty: 90 CAPSULE | Refills: 1 | Status: SHIPPED | OUTPATIENT
Start: 2018-04-13 | End: 2018-09-27

## 2018-04-13 RX ADMIN — METHYLPREDNISOLONE ACETATE 80 MG: 40 INJECTION, SUSPENSION INTRA-ARTICULAR; INTRALESIONAL; INTRAMUSCULAR; SOFT TISSUE at 11:40

## 2018-04-13 RX ADMIN — BUPIVACAINE HYDROCHLORIDE 1 ML: 2.5 INJECTION, SOLUTION EPIDURAL; INFILTRATION; INTRACAUDAL at 11:40

## 2018-04-13 RX ADMIN — LIDOCAINE HYDROCHLORIDE 5 ML: 10 INJECTION, SOLUTION EPIDURAL; INFILTRATION; INTRACAUDAL; PERINEURAL at 11:40

## 2018-04-13 NOTE — IP AVS SNAPSHOT
[facility]    [address] [place] [address]    Phone:  [phone]    Fax:  [phone]                                       After Visit Summary   4/13/2018    Trisha Bender    MRN: [id number]           After Visit Summary Signature Page     I have received my discharge instructions, and my questions have been answered. I have discussed any challenges I see with this plan with the nurse or doctor.    ..........................................................................................................................................  Patient/Patient Representative Signature      ..........................................................................................................................................  Patient Representative Print Name and Relationship to Patient    ..................................................               ................................................  Date                                            Time    ..........................................................................................................................................  Reviewed by Signature/Title    ...................................................              ..............................................  Date                                                            Time

## 2018-04-13 NOTE — DISCHARGE INSTRUCTIONS
Home Care Instructions after an Epidural Steroid Pain Injection    A lumbar epidural steroid injection delivers steroid medication directly into the area that may be causing your lower back pain and/or leg pain. A cervical or thoracic epidural steroid injection delivers steroids into the epidural space surrounding spinal nerve roots to help relieve pain in the upper spine/neck.    Activity  -Rest today  -Do not work today  -Resume normal activity tomorrow  -DO NOT shower for 24 hours  -DO NOT remove bandaid for 24 hours    Pain  -You may experience soreness at the injection site for one or two days  -You may use an ice pack for 20 minutes every 2 hours for the first 24 hours  -You may use a heating pad after the first 24 hours  -You may use Tylenol (acetaminophen) every 4 hours or other pain medicines as     directed by your physician    You may experience numbness radiating into your legs or arms (depending on the procedure location). This numbness may last several hours. Until sensation returns to normal; please use caution in walking, climbing stairs, and stepping out of your vehicle, etc.    DID YOU RECEIVE SEDATION TODAY?  No    Safety  Sedation medicine, if given, may remain active for many hours. It is important for the next 24 hours that you do not:  -Drive a car  -Operate machines or power tools  -Consume alcohol, including beer  -Sign any important papers or legal documents      Common side effects of steroids:  Not everyone will experience corticosteroid side effects. If side effects are experienced, they will gradually subside in the 7-10 day period following an injection. Most common side effects include:  -Flushed face and/or chest  -Feeling of warmth, particularly in the face but could be an overall feeling of warmth  -Increased blood sugar in diabetic patients  -Menstrual irregularities my occur. If taking hormone-based birth control an alternate method of birth control is recommended  -Sleep  disturbances and/or mood swings are possible  -Leg cramps    Please contact us if you have:  -Severe pain  -Fever more than 101.5 degrees Fahrenheit  -Signs of infection at the injection site (redness, swelling, or drainage)    If you have questions, please contact our office at [phone] between the hours of 7:00 am and 3:00 pm Monday through Friday. After office hours you can contact the on call provider by dialing [phone]. If you need immediate attention, we recommend that you go to a hospital emergency room or dial 118.

## 2018-04-16 ENCOUNTER — HOSPITAL ENCOUNTER (OUTPATIENT)
Dept: CARDIOLOGY | Facility: CLINIC | Age: 63
Discharge: HOME | End: 2018-04-16
Attending: INTERNAL MEDICINE | Admitting: INTERNAL MEDICINE
Payer: COMMERCIAL

## 2018-04-16 ENCOUNTER — RESULTS ONLY (OUTPATIENT)
Dept: OTHER | Facility: CLINIC | Age: 63
End: 2018-04-16

## 2018-04-16 ENCOUNTER — RADIANT APPOINTMENT (OUTPATIENT)
Dept: GENERAL RADIOLOGY | Facility: CLINIC | Age: 63
End: 2018-04-16
Attending: INTERNAL MEDICINE
Payer: COMMERCIAL

## 2018-04-16 ENCOUNTER — OFFICE VISIT (OUTPATIENT)
Dept: TRANSPLANT | Facility: CLINIC | Age: 63
End: 2018-04-16
Attending: INTERNAL MEDICINE
Payer: COMMERCIAL

## 2018-04-16 ENCOUNTER — RADIANT APPOINTMENT (OUTPATIENT)
Dept: BONE DENSITY | Facility: CLINIC | Age: 63
End: 2018-04-16
Attending: INTERNAL MEDICINE
Payer: COMMERCIAL

## 2018-04-16 ENCOUNTER — HOSPITAL ENCOUNTER (OUTPATIENT)
Dept: NUCLEAR MEDICINE | Facility: CLINIC | Age: 63
Setting detail: NUCLEAR MEDICINE
End: 2018-04-16
Attending: INTERNAL MEDICINE
Payer: COMMERCIAL

## 2018-04-16 VITALS
HEIGHT: 70 IN | WEIGHT: 181.1 LBS | DIASTOLIC BLOOD PRESSURE: 78 MMHG | TEMPERATURE: 98.2 F | SYSTOLIC BLOOD PRESSURE: 131 MMHG | HEART RATE: 72 BPM | OXYGEN SATURATION: 99 % | BODY MASS INDEX: 25.93 KG/M2

## 2018-04-16 DIAGNOSIS — J44.9 COPD (CHRONIC OBSTRUCTIVE PULMONARY DISEASE) (H): ICD-10-CM

## 2018-04-16 DIAGNOSIS — Z76.82 ORGAN TRANSPLANT CANDIDATE: ICD-10-CM

## 2018-04-16 DIAGNOSIS — J43.8 OTHER EMPHYSEMA (H): Primary | ICD-10-CM

## 2018-04-16 LAB
ABO + RH BLD: NORMAL
ALBUMIN SERPL-MCNC: 3.5 G/DL (ref 3.4–5)
ALBUMIN UR-MCNC: NEGATIVE MG/DL
ALP SERPL-CCNC: 58 U/L (ref 40–150)
ALT SERPL W P-5'-P-CCNC: 17 U/L (ref 0–50)
AMYLASE SERPL-CCNC: 33 U/L (ref 30–110)
ANION GAP SERPL CALCULATED.3IONS-SCNC: 8 MMOL/L (ref 3–14)
APPEARANCE UR: CLEAR
APTT PPP: 27 SEC (ref 22–37)
AST SERPL W P-5'-P-CCNC: 6 U/L (ref 0–45)
BASE EXCESS BLDV CALC-SCNC: 5.6 MMOL/L
BASOPHILS # BLD AUTO: 0 10E9/L (ref 0–0.2)
BASOPHILS NFR BLD AUTO: 0.4 %
BILIRUB SERPL-MCNC: 0.4 MG/DL (ref 0.2–1.3)
BILIRUB UR QL STRIP: NEGATIVE
BLD GP AB SCN SERPL QL: NORMAL
BLD GP AB SCN TITR SERPL: NORMAL {TITER}
BLOOD BANK CMNT PATIENT-IMP: NORMAL
BLOOD BANK CMNT PATIENT-IMP: NORMAL
BUN SERPL-MCNC: 8 MG/DL (ref 7–30)
CALCIUM SERPL-MCNC: 8.6 MG/DL (ref 8.5–10.1)
CHLORIDE SERPL-SCNC: 105 MMOL/L (ref 94–109)
CHOLEST SERPL-MCNC: 199 MG/DL
CMV IGG SERPL QL IA: >8 AI (ref 0–0.8)
CO2 SERPL-SCNC: 28 MMOL/L (ref 20–32)
COLOR UR AUTO: YELLOW
CREAT SERPL-MCNC: 0.66 MG/DL (ref 0.52–1.04)
DEPRECATED CALCIDIOL+CALCIFEROL SERPL-MC: 29 UG/L (ref 20–75)
DIFFERENTIAL METHOD BLD: ABNORMAL
EBV VCA IGG SER QL IA: 7.7 AI (ref 0–0.8)
EOSINOPHIL # BLD AUTO: 0 10E9/L (ref 0–0.7)
EOSINOPHIL NFR BLD AUTO: 0.2 %
ERYTHROCYTE [DISTWIDTH] IN BLOOD BY AUTOMATED COUNT: 12.9 % (ref 10–15)
GFR SERPL CREATININE-BSD FRML MDRD: 90 ML/MIN/1.7M2
GLUCOSE SERPL-MCNC: 108 MG/DL (ref 70–99)
GLUCOSE UR STRIP-MCNC: NEGATIVE MG/DL
HAV IGG SER QL IA: NONREACTIVE
HBA1C MFR BLD: 5.7 % (ref 0–6.4)
HBV CORE AB SERPL QL IA: NONREACTIVE
HBV SURFACE AB SERPL IA-ACNC: 0 M[IU]/ML
HBV SURFACE AG SERPL QL IA: NONREACTIVE
HCO3 BLDV-SCNC: 32 MMOL/L (ref 21–28)
HCT VFR BLD AUTO: 41.5 % (ref 35–47)
HCV AB SERPL QL IA: NONREACTIVE
HDLC SERPL-MCNC: 50 MG/DL
HGB BLD-MCNC: 13.5 G/DL (ref 11.7–15.7)
HGB UR QL STRIP: NEGATIVE
HIV 1+2 AB+HIV1 P24 AG SERPL QL IA: NONREACTIVE
HLA TYPING COMPLETE SOT RECIPIENT: NORMAL
HSV1 IGG SERPL QL IA: <0.2 AI (ref 0–0.8)
HSV2 IGG SERPL QL IA: <0.2 AI (ref 0–0.8)
IGA SERPL-MCNC: 66 MG/DL (ref 70–380)
IGM SERPL-MCNC: 60 MG/DL (ref 60–265)
IMM GRANULOCYTES # BLD: 0.1 10E9/L (ref 0–0.4)
IMM GRANULOCYTES NFR BLD: 0.9 %
INR PPP: 0.98 (ref 0.86–1.14)
KETONES UR STRIP-MCNC: NEGATIVE MG/DL
LDLC SERPL CALC-MCNC: 93 MG/DL
LEUKOCYTE ESTERASE UR QL STRIP: NEGATIVE
LYMPHOCYTES # BLD AUTO: 2 10E9/L (ref 0.8–5.3)
LYMPHOCYTES NFR BLD AUTO: 17.4 %
MAGNESIUM SERPL-MCNC: 2.1 MG/DL (ref 1.6–2.3)
MCH RBC QN AUTO: 31 PG (ref 26.5–33)
MCHC RBC AUTO-ENTMCNC: 32.5 G/DL (ref 31.5–36.5)
MCV RBC AUTO: 95 FL (ref 78–100)
MONOCYTES # BLD AUTO: 0.9 10E9/L (ref 0–1.3)
MONOCYTES NFR BLD AUTO: 7.7 %
NEUTROPHILS # BLD AUTO: 8.3 10E9/L (ref 1.6–8.3)
NEUTROPHILS NFR BLD AUTO: 73.4 %
NITRATE UR QL: NEGATIVE
NONHDLC SERPL-MCNC: 149 MG/DL
NRBC # BLD AUTO: 0 10*3/UL
NRBC BLD AUTO-RTO: 0 /100
O2/TOTAL GAS SETTING VFR VENT: ABNORMAL %
OXYHGB MFR BLDV: 29 %
PCO2 BLDV: 53 MM HG (ref 40–50)
PH BLDV: 7.39 PH (ref 7.32–7.43)
PH UR STRIP: 7 PH (ref 5–7)
PHOSPHATE SERPL-MCNC: 3.4 MG/DL (ref 2.5–4.5)
PLATELET # BLD AUTO: 326 10E9/L (ref 150–450)
PO2 BLDV: 19 MM HG (ref 25–47)
POTASSIUM SERPL-SCNC: 3.5 MMOL/L (ref 3.4–5.3)
PRA SINGLE ANTIGEN IGG ANTIBODY: NORMAL
PREALB SERPL IA-MCNC: 32 MG/DL (ref 15–45)
PROT SERPL-MCNC: 6.5 G/DL (ref 6.8–8.8)
RBC # BLD AUTO: 4.36 10E12/L (ref 3.8–5.2)
RBC #/AREA URNS AUTO: 1 /HPF (ref 0–2)
SODIUM SERPL-SCNC: 141 MMOL/L (ref 133–144)
SOURCE: NORMAL
SP GR UR STRIP: 1.01 (ref 1–1.03)
SPECIMEN EXP DATE BLD: NORMAL
SPECIMEN EXP DATE BLD: NORMAL
SQUAMOUS #/AREA URNS AUTO: 1 /HPF (ref 0–1)
T GONDII IGG SER QL: <3 IU/ML (ref 0–7.1)
TRANSFERRIN SERPL-MCNC: 267 MG/DL (ref 210–360)
TRIGL SERPL-MCNC: 276 MG/DL
TSH SERPL DL<=0.005 MIU/L-ACNC: 0.4 MU/L (ref 0.4–4)
UROBILINOGEN UR STRIP-MCNC: 0 MG/DL (ref 0–2)
VZV IGG SER QL IA: 2 AI (ref 0–0.8)
WBC # BLD AUTO: 11.3 10E9/L (ref 4–11)
WBC #/AREA URNS AUTO: <1 /HPF (ref 0–5)

## 2018-04-16 PROCEDURE — 84443 ASSAY THYROID STIM HORMONE: CPT | Performed by: INTERNAL MEDICINE

## 2018-04-16 PROCEDURE — 82787 IGG 1 2 3 OR 4 EACH: CPT | Performed by: INTERNAL MEDICINE

## 2018-04-16 PROCEDURE — 34300033 ZZH RX 343: Performed by: INTERNAL MEDICINE

## 2018-04-16 PROCEDURE — 86850 RBC ANTIBODY SCREEN: CPT | Performed by: INTERNAL MEDICINE

## 2018-04-16 PROCEDURE — 80061 LIPID PANEL: CPT | Performed by: INTERNAL MEDICINE

## 2018-04-16 PROCEDURE — 86900 BLOOD TYPING SEROLOGIC ABO: CPT | Performed by: INTERNAL MEDICINE

## 2018-04-16 PROCEDURE — 85025 COMPLETE CBC W/AUTO DIFF WBC: CPT | Performed by: INTERNAL MEDICINE

## 2018-04-16 PROCEDURE — 86665 EPSTEIN-BARR CAPSID VCA: CPT | Performed by: INTERNAL MEDICINE

## 2018-04-16 PROCEDURE — 80323 ALKALOIDS NOS: CPT | Performed by: INTERNAL MEDICINE

## 2018-04-16 PROCEDURE — 86704 HEP B CORE ANTIBODY TOTAL: CPT | Performed by: INTERNAL MEDICINE

## 2018-04-16 PROCEDURE — 82657 ENZYME CELL ACTIVITY: CPT | Performed by: INTERNAL MEDICINE

## 2018-04-16 PROCEDURE — 25500064 ZZH RX 255 OP 636: Performed by: INTERNAL MEDICINE

## 2018-04-16 PROCEDURE — 83036 HEMOGLOBIN GLYCOSYLATED A1C: CPT | Performed by: INTERNAL MEDICINE

## 2018-04-16 PROCEDURE — 81001 URINALYSIS AUTO W/SCOPE: CPT | Performed by: INTERNAL MEDICINE

## 2018-04-16 PROCEDURE — 86832 HLA CLASS I HIGH DEFIN QUAL: CPT

## 2018-04-16 PROCEDURE — A9540 TC99M MAA: HCPCS | Performed by: INTERNAL MEDICINE

## 2018-04-16 PROCEDURE — 36415 COLL VENOUS BLD VENIPUNCTURE: CPT | Performed by: INTERNAL MEDICINE

## 2018-04-16 PROCEDURE — 86696 HERPES SIMPLEX TYPE 2 TEST: CPT | Performed by: INTERNAL MEDICINE

## 2018-04-16 PROCEDURE — 82805 BLOOD GASES W/O2 SATURATION: CPT | Performed by: INTERNAL MEDICINE

## 2018-04-16 PROCEDURE — 85610 PROTHROMBIN TIME: CPT | Performed by: INTERNAL MEDICINE

## 2018-04-16 PROCEDURE — 86708 HEPATITIS A ANTIBODY: CPT | Performed by: INTERNAL MEDICINE

## 2018-04-16 PROCEDURE — 85730 THROMBOPLASTIN TIME PARTIAL: CPT | Performed by: INTERNAL MEDICINE

## 2018-04-16 PROCEDURE — 82150 ASSAY OF AMYLASE: CPT | Performed by: INTERNAL MEDICINE

## 2018-04-16 PROCEDURE — 86905 BLOOD TYPING RBC ANTIGENS: CPT | Performed by: INTERNAL MEDICINE

## 2018-04-16 PROCEDURE — 93306 TTE W/DOPPLER COMPLETE: CPT

## 2018-04-16 PROCEDURE — 86706 HEP B SURFACE ANTIBODY: CPT | Performed by: INTERNAL MEDICINE

## 2018-04-16 PROCEDURE — 84100 ASSAY OF PHOSPHORUS: CPT | Performed by: INTERNAL MEDICINE

## 2018-04-16 PROCEDURE — 80307 DRUG TEST PRSMV CHEM ANLYZR: CPT | Performed by: INTERNAL MEDICINE

## 2018-04-16 PROCEDURE — G0463 HOSPITAL OUTPT CLINIC VISIT: HCPCS | Mod: ZF

## 2018-04-16 PROCEDURE — 82784 ASSAY IGA/IGD/IGG/IGM EACH: CPT | Performed by: INTERNAL MEDICINE

## 2018-04-16 PROCEDURE — 86695 HERPES SIMPLEX TYPE 1 TEST: CPT | Performed by: INTERNAL MEDICINE

## 2018-04-16 PROCEDURE — 86803 HEPATITIS C AB TEST: CPT | Performed by: INTERNAL MEDICINE

## 2018-04-16 PROCEDURE — 87340 HEPATITIS B SURFACE AG IA: CPT | Performed by: INTERNAL MEDICINE

## 2018-04-16 PROCEDURE — 86644 CMV ANTIBODY: CPT | Performed by: INTERNAL MEDICINE

## 2018-04-16 PROCEDURE — 86886 COOMBS TEST INDIRECT TITER: CPT | Performed by: INTERNAL MEDICINE

## 2018-04-16 PROCEDURE — 40000866 ZZHCL STATISTIC HIV 1/2 ANTIGEN/ANTIBODY PRETRANSPLANT ONLY: Performed by: INTERNAL MEDICINE

## 2018-04-16 PROCEDURE — 93306 TTE W/DOPPLER COMPLETE: CPT | Mod: 26 | Performed by: INTERNAL MEDICINE

## 2018-04-16 PROCEDURE — 78580 LUNG PERFUSION IMAGING: CPT

## 2018-04-16 PROCEDURE — 86787 VARICELLA-ZOSTER ANTIBODY: CPT | Performed by: INTERNAL MEDICINE

## 2018-04-16 PROCEDURE — 82785 ASSAY OF IGE: CPT | Performed by: INTERNAL MEDICINE

## 2018-04-16 PROCEDURE — 84466 ASSAY OF TRANSFERRIN: CPT | Performed by: INTERNAL MEDICINE

## 2018-04-16 PROCEDURE — 84134 ASSAY OF PREALBUMIN: CPT | Performed by: INTERNAL MEDICINE

## 2018-04-16 PROCEDURE — 80053 COMPREHEN METABOLIC PANEL: CPT | Performed by: INTERNAL MEDICINE

## 2018-04-16 PROCEDURE — 86901 BLOOD TYPING SEROLOGIC RH(D): CPT | Performed by: INTERNAL MEDICINE

## 2018-04-16 PROCEDURE — 82306 VITAMIN D 25 HYDROXY: CPT | Performed by: INTERNAL MEDICINE

## 2018-04-16 PROCEDURE — 86777 TOXOPLASMA ANTIBODY: CPT | Performed by: INTERNAL MEDICINE

## 2018-04-16 PROCEDURE — 86480 TB TEST CELL IMMUN MEASURE: CPT | Performed by: INTERNAL MEDICINE

## 2018-04-16 PROCEDURE — 83735 ASSAY OF MAGNESIUM: CPT | Performed by: INTERNAL MEDICINE

## 2018-04-16 RX ADMIN — Medication 6.1 MILLICURIE: at 14:19

## 2018-04-16 RX ADMIN — HUMAN ALBUMIN MICROSPHERES AND PERFLUTREN 6 ML: 10; .22 INJECTION, SOLUTION INTRAVENOUS at 14:00

## 2018-04-16 ASSESSMENT — PAIN SCALES - GENERAL: PAINLEVEL: NO PAIN (0)

## 2018-04-16 NOTE — LETTER
2018      RE: Trisha Benedr  [address] [facility] [address]             Pulmonary Medicine   Post-Lung Transplant Clinic Note   2018       Patient: Trisha Bender  MRN: [id number]           Assessment and Plan:     Trisha Bender is a 62 year old female with a PMHx significant for end-stage COPD who is undergoing prelung transplant evaluation  Recommendations:  - encourage continue participation in pulmonary rehab  - encourage ongoing particpation in LTX support group  - complete pre-lung transplant evaluation  - RTC in 2 - 4  weeks to discuss results of evaluation     Clinical update and recommendations were discussed with the patient and her . I answered all of their questions and provided them support        Subjective & Interval History:   Patient presents for routine followup. Her  (to be prinicpal caregiver post-LTX) was also in attendance Patient has no new respiratory complaint. States she was recently restarted on prednisone 5 mg daily for exacerbation of her lung disease She reports enjoying pulmonary rehab and her visit to the LTX support group. She has found them to be empowering. She had questions about ability to travel to [facility] for her son's wedding. She denies fevers, chills, chest pain, N/V, abdomina pain. She is on the first day of her pre-lung transplant evaluation.            Review of Systems:     See HPI.  ROS: 10 point ROS neg other than the symptoms noted above in the HPI.    I reviewed the family and social histories with the patient and there are not significant changegs  Social History   Substance Use Topics     Smoking status: Former Smoker     Packs/day: 1.00     Years: 30.00     Quit date: 2006     Smokeless tobacco: Never Used      Comment: Quit in      Alcohol use No     Family History   Problem Relation Age of Onset     CANCER Mother      Colon cancer; dxed at age 64;  at age 69     Cancer - colorectal  Mother      Other Cancer Mother      GASTROINTESTINAL DISEASE Sister      Acute pancreatitis     HEART DISEASE Father      ?     Coronary Artery Disease Father      Circulatory Maternal Aunt      AAA     Circulatory Maternal Uncle      AAA     DIABETES Son                Medications:     Current Outpatient Prescriptions   Medication     furosemide (LASIX) 20 MG tablet     SPIRIVA HANDIHALER 18 MCG capsule     predniSONE (DELTASONE) 5 MG tablet     atorvastatin (LIPITOR) 10 MG tablet     ondansetron (ZOFRAN ODT) 4 MG ODT tab     ALPRAZolam (XANAX) 0.25 MG tablet     HYDROcodone-acetaminophen (NORCO) 5-325 MG per tablet     pantoprazole (PROTONIX) 40 MG EC tablet     lidocaine (LIDODERM) 5 % Patch     ADVAIR DISKUS 500-50 MCG/DOSE diskus inhaler     baclofen (LIORESAL) 10 MG tablet     fluticasone (FLONASE) 50 MCG/ACT spray     SUMAtriptan (IMITREX) 25 MG tablet     potassium chloride (K-TAB,KLOR-CON) 10 MEQ tablet     lisinopril (PRINIVIL/ZESTRIL) 2.5 MG tablet     montelukast (SINGULAIR) 10 MG tablet     roflumilast (DALIRESP) 500 MCG TABS tablet     albuterol (VENTOLIN HFA) 108 (90 BASE) MCG/ACT Inhaler     ipratropium - albuterol 0.5 mg/2.5 mg/3 mL (DUONEB) 0.5-2.5 (3) MG/3ML neb solution     mometasone (ELOCON) 0.1 % ointment     [employer] FIBER GUMMIES 2.5 G CHEW     Multiple Vitamin CHEW     TYLENOL EXTRA STRENGTH OR     [DISCONTINUED] albuterol (2.5 MG/3ML) 0.083% neb solution     nitroglycerin (NITROSTAT) 0.4 MG SL tablet     No current facility-administered medications for this visit.      Social History     Social History     Marital status:      Spouse name: N/A     Number of children: N/A     Years of education: N/A     Occupational History     Not on file.     Social History Main Topics     Smoking status: Former Smoker     Packs/day: 1.00     Years: 30.00     Quit date: 1/17/2006     Smokeless tobacco: Never Used      Comment: Quit in 2006     Alcohol use No     Drug use: No     Sexual activity: No      " Comment: vasectomy     Other Topics Concern     Caffeine Concern No     none     Hobby Hazards No     Stress Concern No     Special Diet Yes     low sodium     Exercise Yes     limited due to back     Social History Narrative     Family History   Problem Relation Age of Onset     CANCER Mother      Colon cancer; dxed at age 64;  at age 69     Cancer - colorectal Mother      Other Cancer Mother      GASTROINTESTINAL DISEASE Sister      Acute pancreatitis     HEART DISEASE Father      ?     Coronary Artery Disease Father      Circulatory Maternal Aunt      AAA     Circulatory Maternal Uncle      AAA     DIABETES Son             Physical Exam:   PHYSICAL EXAMINATION:   GEN: Awake and alert, in no acute distress, sitting upright in chair. Pleasant and cooperative  HEENT: Pupils equal and reactive to light, conjunctiva are pink conjunctivae, sclera anicteric,  Oral mucosa moist without lesions.  NECK: supple no JVD/LAD  PULM: Good air flow bilaterally, symmetric in expansion, CTAB No rhonchi, no wheezes. Breathing non-labored.   CV: Normal S1 and S2. RRR.  No murmur, gallop, or rub.  No peripheral edema.  Peripheral pulses intact.   ABD: Soft, nontender, nondistended. Bowel sound normoactive, no guarding, no rebound.   MSK: .  No apparent muscle wasting. No clubbing, cyanosis, or edema  NEURO: Alert and oriented to person, place, and time. motor 5/5 upper/lower extremity b/l, sensation grossly intact to touch, gait normal  SKIN: Warm and dry. No visible rashes or lesions   PSYCH: Mood stable, judgment and insight appropriate.         Data:     All vital signs, laboratory and imaging data for the past 24 hours reviewed.      Vital signs:  Temp: 98.2  F (36.8  C) Temp src: Oral BP: 131/78 Pulse: 72     SpO2: 99 %     Height: 177.8 cm (5' 10\") Weight: 82.1 kg (181 lb 1.6 oz)    Weight trend:   Vitals:    18 1517   Weight: 82.1 kg (181 lb 1.6 oz)      Patient was seen and examined by me. I personally reviewed the " electronic medical record including  vitals, and medications.  Clinical update provided to the patient and her potential caregiver, I answered all of their questions and provided them support    Lebron Kahn MD, [facility]   of Medicine  Pulmonary, Critical Care and Sleep Medicine  [facility]  Pager: [phone]

## 2018-04-16 NOTE — LETTER
4/16/2018       RE: Trisha Bender  [address] [facility] [address]     Dear Colleague,    Thank you for referring your patient, Trisha Bender, to the [facility]GAN TRANSPL[facility] at [facility]. Please see a copy of my visit note below.          Pulmonary Medicine   Post-Lung Transplant Clinic Note   April 16, 2018       Patient: Trisha Bender  MRN: [id number]           Assessment and Plan:     Trisha Bender is a 62 year old female with a PMHx significant for end-stage COPD who is undergoing prelung transplant evaluation  Recommendations:  - encourage continue participation in pulmonary rehab  - encourage ongoing particpation in LTX support group  - complete pre-lung transplant evaluation  - RTC in 2 - 4  weeks to discuss results of evaluation     Clinical update and recommendations were discussed with the patient and her . I answered all of their questions and provided them support        Subjective & Interval History:   Patient presents for routine followup. Her  (to be prinicpal caregiver post-LTX) was also in attendance Patient has no new respiratory complaint. States she was recently restarted on prednisone 5 mg daily for exacerbation of her lung disease She reports enjoying pulmonary rehab and her visit to the LTX support group. She has found them to be empowering. She had questions about ability to travel to [facility] for her son's wedding. She denies fevers, chills, chest pain, N/V, abdomina pain. She is on the first day of her pre-lung transplant evaluation.            Review of Systems:     See HPI.  ROS: 10 point ROS neg other than the symptoms noted above in the HPI.    I reviewed the family and social histories with the patient and there are not significant changegs  Social History   Substance Use Topics     Smoking status: Former Smoker     Packs/day: 1.00     Years: 30.00     Quit date: 1/17/2006      Smokeless tobacco: Never Used      Comment: Quit in 2006     Alcohol use No     Family History   Problem Relation Age of Onset     CANCER Mother      Colon cancer; dxed at age 64;  at age 69     Cancer - colorectal Mother      Other Cancer Mother      GASTROINTESTINAL DISEASE Sister      Acute pancreatitis     HEART DISEASE Father      ?     Coronary Artery Disease Father      Circulatory Maternal Aunt      AAA     Circulatory Maternal Uncle      AAA     DIABETES Son                Medications:     Current Outpatient Prescriptions   Medication     furosemide (LASIX) 20 MG tablet     SPIRIVA HANDIHALER 18 MCG capsule     predniSONE (DELTASONE) 5 MG tablet     atorvastatin (LIPITOR) 10 MG tablet     ondansetron (ZOFRAN ODT) 4 MG ODT tab     ALPRAZolam (XANAX) 0.25 MG tablet     HYDROcodone-acetaminophen (NORCO) 5-325 MG per tablet     pantoprazole (PROTONIX) 40 MG EC tablet     lidocaine (LIDODERM) 5 % Patch     ADVAIR DISKUS 500-50 MCG/DOSE diskus inhaler     baclofen (LIORESAL) 10 MG tablet     fluticasone (FLONASE) 50 MCG/ACT spray     SUMAtriptan (IMITREX) 25 MG tablet     potassium chloride (K-TAB,KLOR-CON) 10 MEQ tablet     lisinopril (PRINIVIL/ZESTRIL) 2.5 MG tablet     montelukast (SINGULAIR) 10 MG tablet     roflumilast (DALIRESP) 500 MCG TABS tablet     albuterol (VENTOLIN HFA) 108 (90 BASE) MCG/ACT Inhaler     ipratropium - albuterol 0.5 mg/2.5 mg/3 mL (DUONEB) 0.5-2.5 (3) MG/3ML neb solution     mometasone (ELOCON) 0.1 % ointment     [employer] FIBER GUMMIES 2.5 G CHEW     Multiple Vitamin CHEW     TYLENOL EXTRA STRENGTH OR     [DISCONTINUED] albuterol (2.5 MG/3ML) 0.083% neb solution     nitroglycerin (NITROSTAT) 0.4 MG SL tablet     No current facility-administered medications for this visit.      Social History     Social History     Marital status:      Spouse name: N/A     Number of children: N/A     Years of education: N/A     Occupational History     Not on file.     Social History Main Topics      Smoking status: Former Smoker     Packs/day: 1.00     Years: 30.00     Quit date: 2006     Smokeless tobacco: Never Used      Comment: Quit in      Alcohol use No     Drug use: No     Sexual activity: No      Comment: vasectomy     Other Topics Concern     Caffeine Concern No     none     Hobby Hazards No     Stress Concern No     Special Diet Yes     low sodium     Exercise Yes     limited due to back     Social History Narrative     Family History   Problem Relation Age of Onset     CANCER Mother      Colon cancer; dxed at age 64;  at age 69     Cancer - colorectal Mother      Other Cancer Mother      GASTROINTESTINAL DISEASE Sister      Acute pancreatitis     HEART DISEASE Father      ?     Coronary Artery Disease Father      Circulatory Maternal Aunt      AAA     Circulatory Maternal Uncle      AAA     DIABETES Son             Physical Exam:   PHYSICAL EXAMINATION:   GEN: Awake and alert, in no acute distress, sitting upright in chair. Pleasant and cooperative  HEENT: Pupils equal and reactive to light, conjunctiva are pink conjunctivae, sclera anicteric,  Oral mucosa moist without lesions.  NECK: supple no JVD/LAD  PULM: Good air flow bilaterally, symmetric in expansion, CTAB No rhonchi, no wheezes. Breathing non-labored.   CV: Normal S1 and S2. RRR.  No murmur, gallop, or rub.  No peripheral edema.  Peripheral pulses intact.   ABD: Soft, nontender, nondistended. Bowel sound normoactive, no guarding, no rebound.   MSK: .  No apparent muscle wasting. No clubbing, cyanosis, or edema  NEURO: Alert and oriented to person, place, and time. motor 5/5 upper/lower extremity b/l, sensation grossly intact to touch, gait normal  SKIN: Warm and dry. No visible rashes or lesions   PSYCH: Mood stable, judgment and insight appropriate.         Data:     All vital signs, laboratory and imaging data for the past 24 hours reviewed.      Vital signs:  Temp: 98.2  F (36.8  C) Temp src: Oral BP: 131/78 Pulse: 72     " SpO2: 99 %     Height: 177.8 cm (5' 10\") Weight: 82.1 kg (181 lb 1.6 oz)    Weight trend:   Vitals:    04/16/18 1517   Weight: 82.1 kg (181 lb 1.6 oz)      Patient was seen and examined by me. I personally reviewed the electronic medical record including  vitals, and medications.  Clinical update provided to the patient and her potential caregiver, I answered all of their questions and provided them support    Lebron Kahn MD, [facility]   of Medicine  Pulmonary, Critical Care and Sleep Medicine  [facility]  Pager: [phone]          "

## 2018-04-16 NOTE — MR AVS SNAPSHOT
"              After Visit Summary   4/16/2018    Trisha Bender    MRN: [id number]           Patient Information     Date Of Birth          1955        Visit Information        Provider Department      4/16/2018 3:00 PM Lebron Kahn MD [facility] Solid Organ Transplant        Care Instructions    Pulmonary Rehab: Please remember to stay active.  Continue exercises learned in pulmonary rehab or continue participating in pulmonary rehab, if able.     Current oxygen use: Rest 0L Activity 0L Sleep 0L     5' 10\" 181 lbs 1.6 oz Body mass index is 25.99 kg/(m^2).  Goal BMI greater than 18, less than 30 for lung transplant.     Medication changes: No medication changes  Future orders: Continue with lung transplant evaluation  Antibody blood test (PRA) due every 3 months: Completed during evaluation will need to complete every three month  Next appointment: TBD based on MD recommendation    Test or procedures to be complete prior to next appointment:   PFTs: To be completed 4/17/2018   6MW: To be complete 4/17/2018   CT Scan: To be completed 4/20/2018    Please remember to stay up to date with your primary care requirements including: Annual check-ups with primary care physician   Mammogram   Pap smear (as appropriate for women)    PSA (for men over 50)   Dental visits   Annual flu shots/ immunizations as needed   Colonoscopy (patients over 50).     Thank-you for allowing us to participate in your care.    If your condition should change, please contact your transplant coordinator. This includes: worsening symptoms, need for antibiotics, hospitalizations, transfusions.    Thoracic Transplant Office phone [phone], option 2, fax [phone]    Office Hours 8:30 - 5:00 pm                Follow-ups after your visit        Your next 10 appointments already scheduled     Apr 17, 2018  8:00 AM CDT   SIX MINUTE WALK with UC PFL 6 MINUTE WALK 1, UC PFL B   [facility] Pulmonary Function Testing ([facility])    [address]  3rd Floor  [facility] [place] [facility] [address]   [phone]            Apr 17, 2018  9:30 AM CDT   (Arrive by 9:15 AM)   SOT CARE COORDINATOR EVAL with Elisa Roger RN   [facility] Solid Organ Transplant ([facility])    [address]  [address]  [place] [address]   [phone]            Apr 17, 2018 10:00 AM CDT   (Arrive by 9:45 AM)   SOT CARE COORDINATOR EVAL with Elisa Roger RN   [facility] Solid Organ Transplant ([facility])    [address]  [address]  [place] [address]   [phone]            Apr 18, 2018 10:30 AM CDT   Procedure 1.5 hr with U2A ROOM 6   [facility] ([facility])    [address] [address]               Apr 18, 2018 12:00 PM CDT   Cath 90 Minute with UUHCVR4   [facility],  Heart Cath Lab ([facility])    [address] [address]   [phone]            Apr 19, 2018 10:30 AM CDT   (Arrive by 10:15 AM)   New Patient Visit with Nikolai Gore MD   [facility] Heart Care ([facility])    [address]  [address]  [place] [address]   [phone]            Apr 19, 2018 11:15 AM CDT   Lab with UC LAB    Health Lab ([facility])    [address]  1st Floor  [place] [address]   [phone]            Apr 19, 2018   Procedure with Rober Martinez MD   [facility] [place], Endoscopy ([facility])    [address] [address]   [phone]           The [facility] is located on the corner of [address] and [address] on the [address]. It is easily accessible from virtually any point in the [place] area, via [address] and [address].            Apr 20, 2018 10:00 AM CDT  "  (Arrive by 9:45 AM)   SOT SOCIAL WORK EVAL with Mikal Crane, LICSW   [facility] Solid Organ Transplant ([facility])    [address]  [address]  [facility] [address] [phone]              Who to contact     If you have questions or need follow up information about today's clinic visit or your schedule please contact [facility] SOLID ORGAN TRANSPLANT directly at [phone].  Normal or non-critical lab and imaging results will be communicated to you by [employer], letter or phone within 4 business days after the clinic has received the results. If you do not hear from us within 7 days, please contact the clinic through [employer] or phone. If you have a critical or abnormal lab result, we will notify you by phone as soon as possible.  Submit refill requests through [employer] or call your pharmacy and they will forward the refill request to us. Please allow 3 business days for your refill to be completed.          Additional Information About Your Visit        [employer] Information     [employer] gives you secure access to your electronic health record. If you see a primary care provider, you can also send messages to your care team and make appointments. If you have questions, please call your primary care clinic.  If you do not have a primary care provider, please call [phone] and they will assist you.        Care EveryWhere ID     This is your Care EveryWhere ID. This could be used by other organizations to access your [place] medical records  [id number]        Your Vitals Were     Pulse Temperature Height Last Period Pulse Oximetry BMI (Body Mass Index)    72 98.2  F (36.8  C) (Oral) 1.778 m (5' 10\") 09/29/2005 99% 25.99 kg/m2       Blood Pressure from Last 3 Encounters:   04/16/18 131/78   04/13/18 124/70   03/23/18 110/66    Weight from Last 3 Encounters:   04/16/18 82.1 kg (181 lb 1.6 oz)   04/13/18 81.6 kg (180 lb)   03/23/18 81.6 kg (180 lb)              Today, you " had the following     No orders found for display       Primary Care Provider Office Phone # Fax #    Bandar Weinberg [phone]       [address] [facility] [address]        Equal Access to Services     KAMILA ALLEN : Hadii aad ku hadbriano Soomaali, waaxda luqadaha, qaybta kaalmada adefrancoiseyada, jonathan blandn frank gamezleora burgess. So [facility] [phone].    ATENCIÓN: Si habla español, tiene a madison disposición servicios gratuitos de asistencia lingüística. Llame al [phone].    We comply with applicable federal civil rights laws and [place] laws. We do not discriminate on the basis of race, color, national origin, age, disability, sex, sexual orientation, or gender identity.            Thank you!     Thank you for choosing [facility] SOLID ORGAN TRANSPLANT  for your care. Our goal is always to provide you with excellent care. Hearing back from our patients is one way we can continue to improve our services. Please take a few minutes to complete the written survey that you may receive in the mail after your visit with us. Thank you!             Your Updated Medication List - Protect others around you: Learn how to safely use, store and throw away your medicines at www.disposemymeds.org.          This list is accurate as of 4/16/18  4:07 PM.  Always use your most recent med list.                   Brand Name Dispense Instructions for use Diagnosis    ADVAIR DISKUS 500-50 MCG/DOSE diskus inhaler   Generic drug:  fluticasone-salmeterol     3 Inhaler    INHALE 1 PUFF TWO TIMES DAILY    Pulmonary emphysema, unspecified emphysema type (H)       albuterol 108 (90 Base) MCG/ACT Inhaler    VENTOLIN HFA    18 g    Inhale 2 puffs into the lungs every 6 hours as needed for shortness of breath / dyspnea or wheezing    Asthma exacerbation       ALPRAZolam 0.25 MG tablet    XANAX    30 tablet    Take 1 tablet by mouth. Every twelve hours as needed for anxiety    Anxiety       atorvastatin 10 MG  tablet    LIPITOR    36 tablet    Takes 3 times a week    Coronary artery disease involving native coronary artery of native heart without angina pectoris, Mixed hyperlipidemia       baclofen 10 MG tablet    LIORESAL    90 tablet    TAKE ONE TABLET BY MOUTH THREE TIMES A DAY AS NEEDED FOR MUSCLE SPASMS    Chronic bilateral low back pain with right-sided sciatica       [employer] FIBER GUMMIES 2.5 g Chew      Take 1 tablet by mouth daily Reported on 5/8/2017        fluticasone 50 MCG/ACT spray    FLONASE    16 g    SPRAY 1 TO 2 SPRAYS IN EACH NOSTRIL ONCE DAILY    Nasal congestion       furosemide 20 MG tablet    LASIX    180 tablet    TAKE 2 TABLETS (40 MG) BY MOUTH DAILY SEVERE SWELLING    Acute systolic congestive heart failure (H)       HYDROcodone-acetaminophen 5-325 MG per tablet    NORCO    180 tablet    Take 1-2 tablets by mouth every 8 hours as needed for pain Maximum 6 daily    Chronic bilateral low back pain without sciatica       ipratropium - albuterol 0.5 mg/2.5 mg/3 mL 0.5-2.5 (3) MG/3ML neb solution    DUONEB    30 vial    Take 1 vial (3 mLs) by nebulization every 6 hours as needed for shortness of breath / dyspnea or wheezing    Moderate persistent asthma without complication       lidocaine 5 % Patch    LIDODERM    30 patch    Apply up to 3 patches to painful area at once for up to 12 h within a 24 h period.  Remove after 12 hours.    Mechanical low back pain       lisinopril 2.5 MG tablet    PRINIVIL/Zestril    90 tablet    Take 1 tablet (2.5 mg) by mouth daily    Dilated cardiomyopathy (H)       mometasone 0.1 % ointment    ELOCON    45 g    Apply sparingly to affected area twice daily as needed.  Do not apply to face.    Intrinsic eczema       montelukast 10 MG tablet    SINGULAIR    90 tablet    Take 1 tablet (10 mg) by mouth At Bedtime    Mild persistent asthma without complication       Multiple Vitamin Chew      Take 1 tablet by mouth daily        nitroGLYcerin 0.4 MG sublingual tablet    NITROSTAT     25 tablet    Place 1 tablet (0.4 mg) under the tongue every 5 minutes as needed for chest pain If you are still having symptoms after 3 doses (15 minutes) call 911.    Acute chest pain       ondansetron 4 MG ODT tab    ZOFRAN ODT    20 tablet    Take 1-2 tablets (4-8 mg) by mouth every 8 hours as needed for nausea    Nausea       pantoprazole 40 MG EC tablet    PROTONIX    90 tablet    TAKE ONE TABLET BY MOUTH EVERY DAY    Gastroesophageal reflux disease without esophagitis       potassium chloride 10 MEQ tablet    K-TAB,KLOR-CON    90 tablet    TAKE ONE TABLET BY MOUTH THREE TIMES A DAY    Hypokalemia       predniSONE 5 MG tablet    DELTASONE    15 tablet    Take 1 tablet (5 mg) by mouth daily    Pulmonary emphysema, unspecified emphysema type (H)       roflumilast 500 MCG Tabs tablet    DALIRESP     Take 1 tablet (500 mcg) by mouth daily        SPIRIVA HANDIHALER 18 MCG capsule   Generic drug:  tiotropium     90 capsule    USING THE HANDIHALER, INHALE THE CONTENTS OF ONE CAPSULE BY MOUTH DAILY    Moderate persistent asthma without complication       SUMAtriptan 25 MG tablet    IMITREX    9 tablet    Take 1 tablet (25 mg) by mouth at onset of headache for migraine May repeat in 2 hours if needed: max 2/day;    Chronic migraine without aura without status migrainosus, not intractable       TYLENOL EXTRA STRENGTH PO      1 TABLET EVERY 4 HOURS AS NEEDED    Abdominal pain, right upper quadrant

## 2018-04-16 NOTE — PATIENT INSTRUCTIONS
"Pulmonary Rehab: Please remember to stay active.  Continue exercises learned in pulmonary rehab or continue participating in pulmonary rehab, if able.     Current oxygen use: Rest 0L Activity 0L Sleep 0L     5' 10\" 181 lbs 1.6 oz Body mass index is 25.99 kg/(m^2).  Goal BMI greater than 18, less than 30 for lung transplant.     Medication changes: No medication changes  Future orders: Continue with lung transplant evaluation  Antibody blood test (PRA) due every 3 months: Completed during evaluation will need to complete every three month  Next appointment: TBD based on MD recommendation    Test or procedures to be complete prior to next appointment:   PFTs: To be completed 4/17/2018   6MW: To be complete 4/17/2018   CT Scan: To be completed 4/20/2018    Please remember to stay up to date with your primary care requirements including: Annual check-ups with primary care physician   Mammogram   Pap smear (as appropriate for women)    PSA (for men over 50)   Dental visits   Annual flu shots/ immunizations as needed   Colonoscopy (patients over 50).     Thank-you for allowing us to participate in your care.    If your condition should change, please contact your transplant coordinator. This includes: worsening symptoms, need for antibiotics, hospitalizations, transfusions.    Thoracic Transplant Office phone [phone], option 2, fax [phone]    Office Hours 8:30 - 5:00 pm        "

## 2018-04-16 NOTE — PROGRESS NOTES
Pulmonary Medicine   Post-Lung Transplant Clinic Note   2018       Patient: Trisha Bender  MRN: [id number]           Assessment and Plan:     Trisha Bender is a 62 year old female with a PMHx significant for end-stage COPD who is undergoing prelung transplant evaluation  Recommendations:  - encourage continue participation in pulmonary rehab  - encourage ongoing particpation in LTX support group  - complete pre-lung transplant evaluation  - RTC in 2 - 4  weeks to discuss results of evaluation     Clinical update and recommendations were discussed with the patient and her . I answered all of their questions and provided them support        Subjective & Interval History:   Patient presents for routine followup. Her  (to be prinicpal caregiver post-LTX) was also in attendance Patient has no new respiratory complaint. States she was recently restarted on prednisone 5 mg daily for exacerbation of her lung disease She reports enjoying pulmonary rehab and her visit to the LTX support group. She has found them to be empowering. She had questions about ability to [facility] her son's [place]. She denies fevers, chills, chest pain, N/V, abdomina pain. She is on the first day of her pre-lung transplant evaluation.            Review of Systems:     See HPI.  ROS: 10 point ROS neg other than the symptoms noted above in the HPI.    I reviewed the family and social histories with the patient and there are not significant changegs  Social History   Substance Use Topics     Smoking status: Former Smoker     Packs/day: 1.00     Years: 30.00     Quit date: 2006     Smokeless tobacco: Never Used      Comment: Quit in      Alcohol use No     Family History   Problem Relation Age of Onset     CANCER Mother      Colon cancer; dxed at age 64;  at age 69     Cancer - colorectal Mother      Other Cancer Mother      GASTROINTESTINAL DISEASE Sister      Acute  pancreatitis     HEART DISEASE Father      ?     Coronary Artery Disease Father      Circulatory Maternal Aunt      AAA     Circulatory Maternal Uncle      AAA     DIABETES Son                Medications:     Current Outpatient Prescriptions   Medication     furosemide (LASIX) 20 MG tablet     SPIRIVA HANDIHALER 18 MCG capsule     predniSONE (DELTASONE) 5 MG tablet     atorvastatin (LIPITOR) 10 MG tablet     ondansetron (ZOFRAN ODT) 4 MG ODT tab     ALPRAZolam (XANAX) 0.25 MG tablet     HYDROcodone-acetaminophen (NORCO) 5-325 MG per tablet     pantoprazole (PROTONIX) 40 MG EC tablet     lidocaine (LIDODERM) 5 % Patch     ADVAIR DISKUS 500-50 MCG/DOSE diskus inhaler     baclofen (LIORESAL) 10 MG tablet     fluticasone (FLONASE) 50 MCG/ACT spray     SUMAtriptan (IMITREX) 25 MG tablet     potassium chloride (K-TAB,KLOR-CON) 10 MEQ tablet     lisinopril (PRINIVIL/ZESTRIL) 2.5 MG tablet     montelukast (SINGULAIR) 10 MG tablet     roflumilast (DALIRESP) 500 MCG TABS tablet     albuterol (VENTOLIN HFA) 108 (90 BASE) MCG/ACT Inhaler     ipratropium - albuterol 0.5 mg/2.5 mg/3 mL (DUONEB) 0.5-2.5 (3) MG/3ML neb solution     mometasone (ELOCON) 0.1 % ointment     [employer] FIBER GUMMIES 2.5 G CHEW     Multiple Vitamin CHEW     TYLENOL EXTRA STRENGTH OR     [DISCONTINUED] albuterol (2.5 MG/3ML) 0.083% neb solution     nitroglycerin (NITROSTAT) 0.4 MG SL tablet     No current facility-administered medications for this visit.      Social History     Social History     Marital status:      Spouse name: N/A     Number of children: N/A     Years of education: N/A     Occupational History     Not on file.     Social History Main Topics     Smoking status: Former Smoker     Packs/day: 1.00     Years: 30.00     Quit date: 1/17/2006     Smokeless tobacco: Never Used      Comment: Quit in 2006     Alcohol use No     Drug use: No     Sexual activity: No      Comment: vasectomy     Other Topics Concern     Caffeine Concern No     none  "    Hobby Hazards No     Stress Concern No     Special Diet Yes     low sodium     Exercise Yes     limited due to back     Social History Narrative     Family History   Problem Relation Age of Onset     CANCER Mother      Colon cancer; dxed at age 64;  at age 69     Cancer - colorectal Mother      Other Cancer Mother      GASTROINTESTINAL DISEASE Sister      Acute pancreatitis     HEART DISEASE Father      ?     Coronary Artery Disease Father      Circulatory Maternal Aunt      AAA     Circulatory Maternal Uncle      AAA     DIABETES Son             Physical Exam:   PHYSICAL EXAMINATION:   GEN: Awake and alert, in no acute distress, sitting upright in chair. Pleasant and cooperative  HEENT: Pupils equal and reactive to light, conjunctiva are pink conjunctivae, sclera anicteric,  Oral mucosa moist without lesions.  NECK: supple no JVD/LAD  PULM: Good air flow bilaterally, symmetric in expansion, CTAB No rhonchi, no wheezes. Breathing non-labored.   CV: Normal S1 and S2. RRR.  No murmur, gallop, or rub.  No peripheral edema.  Peripheral pulses intact.   ABD: Soft, nontender, nondistended. Bowel sound normoactive, no guarding, no rebound.   MSK: .  No apparent muscle wasting. No clubbing, cyanosis, or edema  NEURO: Alert and oriented to person, place, and time. motor 5/5 upper/lower extremity b/l, sensation grossly intact to touch, gait normal  SKIN: Warm and dry. No visible rashes or lesions   PSYCH: Mood stable, judgment and insight appropriate.         Data:     All vital signs, laboratory and imaging data for the past 24 hours reviewed.      Vital signs:  Temp: 98.2  F (36.8  C) Temp src: Oral BP: 131/78 Pulse: 72     SpO2: 99 %     Height: 177.8 cm (5' 10\") Weight: 82.1 kg (181 lb 1.6 oz)    Weight trend:   Vitals:    18 1517   Weight: 82.1 kg (181 lb 1.6 oz)      Patient was seen and examined by me. I personally reviewed the electronic medical record including  vitals, and medications.  Clinical update " provided to the patient and her potential caregiver, I answered all of their questions and provided them support    Lebron Kahn MD, MACM   of Medicine  Pulmonary, Critical Care and Sleep Medicine  [facility]  Pager: [phone]

## 2018-04-16 NOTE — NURSING NOTE
Chief Complaint   Patient presents with     RECHECK     Pre-Lung TXP   Pt roomed, vitals, meds, and allergies reviewed with pt. Pt ready for provider.  Kirt Powell, CMA

## 2018-04-17 ENCOUNTER — ALLIED HEALTH/NURSE VISIT (OUTPATIENT)
Dept: TRANSPLANT | Facility: CLINIC | Age: 63
End: 2018-04-17
Attending: INTERNAL MEDICINE
Payer: COMMERCIAL

## 2018-04-17 DIAGNOSIS — J44.9 COPD (CHRONIC OBSTRUCTIVE PULMONARY DISEASE) (H): Primary | ICD-10-CM

## 2018-04-17 DIAGNOSIS — Z76.82 ORGAN TRANSPLANT CANDIDATE: ICD-10-CM

## 2018-04-17 DIAGNOSIS — R93.89 ABNORMAL CT SCAN, CHEST: ICD-10-CM

## 2018-04-17 DIAGNOSIS — J43.9 PULMONARY EMPHYSEMA, UNSPECIFIED EMPHYSEMA TYPE (H): Primary | ICD-10-CM

## 2018-04-17 DIAGNOSIS — Z91.041 CONTRAST MEDIA ALLERGY: ICD-10-CM

## 2018-04-17 LAB
6 MIN WALK (FT): 1185 FT
6 MIN WALK (M): 361 M
COTININE UR QL: NEGATIVE NG/ML
IGG SERPL-MCNC: 486 MG/DL (ref 695–1620)
IGG1 SER-MCNC: 289 MG/DL (ref 300–856)
IGG2 SER-MCNC: 82 MG/DL (ref 158–761)
IGG3 SER-MCNC: 26 MG/DL (ref 24–192)
IGG4 SER-MCNC: 13 MG/DL (ref 11–86)
M TB TUBERC IFN-G BLD QL: NEGATIVE
M TB TUBERC IFN-G/MITOGEN IGNF BLD: 0 IU/ML

## 2018-04-17 RX ORDER — PREDNISONE 10 MG/1
30 TABLET ORAL ONCE
Qty: 3 TABLET | Refills: 0 | Status: SHIPPED | OUTPATIENT
Start: 2018-04-17 | End: 2018-04-17

## 2018-04-17 RX ORDER — PREDNISONE 10 MG/1
60 TABLET ORAL ONCE
Qty: 6 TABLET | Refills: 0 | Status: SHIPPED | OUTPATIENT
Start: 2018-04-17 | End: 2018-04-17

## 2018-04-17 NOTE — NURSING NOTE
"Patient and spouse came to the pre lung class, content per [employer] ([employer] videos. They were attentive, stated understanding, and asked good questions. They were given all relevant handouts.   Verified that patient has received the following items:      \"Questions and Answers for Transplant Candidates and Families about Multiple Listing Waiting Time Transfer\"       One-Year Survival Rates for Heart and Lung Transplant  for [facility].      Reviewed the following documents with the patient:      \"Guidelines for Being on the Transplant List\".      \" What You Need to Know about a Lung and Heart-Lung Transplant .      Provided Thoracic Transplant Patient Handbook.     Required signatures obtained and forms are scanned to EMR.  Addressed patient questions and concerns regarding transplant.    Discussed donor offers including increased risk, and DCD donors.     Discussed physician and coordinator management pre to post lung transplant.     HLA results pending.  Discussed PRA monitoring with patient.     Will review with Lung Transplant Team for transplant candidacy when evaluation complete.      Pt and family were encouraged to create login/password for [employer] to continue to view videos to reinforce education.      Patient received form for information for donor family at time of donation/transplant.  Will collect during closure visit.    "

## 2018-04-17 NOTE — MR AVS SNAPSHOT
After Visit Summary   4/17/2018    Trisha Bender    MRN: [id number]           Patient Information     Date Of Birth          1955        Visit Information        Provider Department      4/17/2018 10:00 AM Elisa Roger RN [facility] Solid Organ Transplant        Today's Diagnoses     COPD (chronic obstructive pulmonary disease) (H)    -  1    Organ transplant candidate           Follow-ups after your visit        Your next 10 appointments already scheduled     Apr 18, 2018 10:30 AM CDT   Procedure 1.5 hr with U2A ROOM 6   Unit 2A [facility] ([facility])    [address] [address]               Apr 18, 2018 12:00 PM CDT   Cath 90 Minute with [id number]   [facility]    [address] [address]   [phone]            Apr 19, 2018 10:30 AM CDT   (Arrive by 10:15 AM)   New Patient Visit with Nikolai Gore MD   [facility] Heart Care ([facility])    [address]  [address]  [place] [address]   [phone]            Apr 19, 2018 11:15 AM CDT   Lab with UC LAB   [facility] Lab ([facility])    [address]  1st Floor  [place] [address]   [phone]            Apr 19, 2018   Procedure with Rober Martinez MD   [facility] [place], Endoscopy ([facility])    [address] [address]   [phone]           The [facility] is located on the corner of [address] [facility] [address] on the [facility]. It is easily accessible from virtually any point in the [place] area, via [address] and [address].            Apr 20, 2018  8:30 AM CDT   Pulmonary Eval with [facility] Pul[facility] Rehab 1   [facility]    [address]st [address]  [facility] [address]   [phone]            Apr 20, 2018 10:00 AM CDT   (Arrive by 9:45 AM)   SOT SOCIAL WORK EVAL with MIRIAM Nice   [facility] Solid Organ Transplant ([facility])    [address]  [address]  [place] [facility] [address] [phone]            Apr 20, 2018 11:30 AM CDT   NUTRITION VISIT with Diamante Bruner RD   [facility] Solid Organ Transplant ([facility])    [address]  [address]  [place] [id number] [phone]            Apr 20, 2018 12:00 PM CDT   (Arrive by 11:45 AM)   CT CHEST W/O CONTRAST with [email or web address]   [facility] CT ([facility])    [address]  1st Floor  [facility] [id number] [phone]           Please bring any scans or X-rays taken at other hospitals, if similar tests were done. Also bring a list of your medicines, including vitamins, minerals and over-the-counter drugs. It is safest to leave personal items at home.  Be sure to tell your doctor:   If you have any allergies.   If there s any chance you are pregnant.   If you are breastfeeding.  You do not need to do anything special to prepare for this exam.  Please wear loose clothing, such as a sweat suit or jogging clothes. Avoid snaps, zippers and other metal. We may ask you to undress and put on a hospital gown.            Apr 20, 2018 12:30 PM CDT   (Arrive by 12:15 PM)   SOT CARE COORDINATOR EVAL with Elisa Roger RN   [facility] Solid Organ Transplant ([facility])    [address]  [address]  [facility] [address] [phone]              Who to contact     If you have questions or need follow up information about today's clinic visit or your schedule please contact [facility] SOLID ORGAN TRANSPLANT directly at [phone].  Normal or non-critical lab and imaging results will  be communicated to you by hCentivehart, letter or phone within 4 business days after the clinic has received the results. If you do not hear from us within 7 days, please contact the clinic through GoPlaceIt or phone. If you have a critical or abnormal lab result, we will notify you by phone as soon as possible.  Submit refill requests through GoPlaceIt or call your pharmacy and they will forward the refill request to us. Please allow 3 business days for your refill to be completed.          Additional Information About Your Visit        GoPlaceIt Information     GoPlaceIt gives you secure access to your electronic health record. If you see a primary care provider, you can also send messages to your care team and make appointments. If you have questions, please call your primary care clinic.  If you do not have a primary care provider, please call [phone] and they will assist you.        Care EveryWhere ID     This is your Care EveryWhere ID. This could be used by other organizations to access your [place] medical records  [id number]        Your Vitals Were     Last Period                   09/29/2005            Blood Pressure from Last 3 Encounters:   04/16/18 131/78   04/13/18 124/70   03/23/18 110/66    Weight from Last 3 Encounters:   04/16/18 82.1 kg (181 lb 1.6 oz)   04/13/18 81.6 kg (180 lb)   03/23/18 81.6 kg (180 lb)              Today, you had the following     No orders found for display         Today's Medication Changes          These changes are accurate as of 4/17/18  4:03 PM.  If you have any questions, ask your nurse or doctor.               These medicines have changed or have updated prescriptions.        Dose/Directions    * predniSONE 5 MG tablet   Commonly known as:  DELTASONE   This may have changed:  Another medication with the same name was added. Make sure you understand how and when to take each.   Used for:  Pulmonary emphysema, unspecified emphysema type (H)   Changed by:  Elisa Roger,  RN        Dose:  5 mg   Take 1 tablet (5 mg) by mouth daily   Quantity:  15 tablet   Refills:  0       * predniSONE 10 MG tablet   Commonly known as:  DELTASONE   This may have changed:  You were already taking a medication with the same name, and this prescription was added. Make sure you understand how and when to take each.   Used for:  Contrast media allergy, COPD (chronic obstructive pulmonary disease) (H)   Changed by:  Elisa Roger RN        Dose:  60 mg   Take 6 tablets (60 mg) by mouth once for 1 dose   Quantity:  6 tablet   Refills:  0       * predniSONE 10 MG tablet   Commonly known as:  DELTASONE   This may have changed:  You were already taking a medication with the same name, and this prescription was added. Make sure you understand how and when to take each.   Used for:  COPD (chronic obstructive pulmonary disease) (H), Contrast media allergy   Changed by:  Elisa Roger RN        Dose:  30 mg   Take 3 tablets (30 mg) by mouth once for 1 dose   Quantity:  3 tablet   Refills:  0       * Notice:  This list has 3 medication(s) that are the same as other medications prescribed for you. Read the directions carefully, and ask your doctor or other care provider to review them with you.         Where to get your medicines      These medications were sent to [address] [id number]    Hours:  TRANSPLANT PHONE NUMBER [phone] Phone:  [phone]     predniSONE 10 MG tablet    predniSONE 10 MG tablet                Primary Care Provider Office Phone # Fax #    Bandar Weinberg [phone]       [facility] [address]  [facility] [address]        Equal Access to Services     [facility] AH: Hadii pradeep leavitt hadasho Soradha, waaxda luqadaha, qaybta kaalmada wandy, jonathan burgess. Formerly [facility] [phone].    ATENCIÓN: alexander Bernal  disposición servicios gratuitos de asistencia lingüística. [place] mcallister [phone].    We comply with applicable federal civil rights laws and [place] laws. We do not discriminate on the basis of race, color, national origin, age, disability, sex, sexual orientation, or gender identity.            Thank you!     Thank you for choosing [facility] SOLID ORGAN TRANSPLANT  for your care. Our goal is always to provide you with excellent care. Hearing back from our patients is one way we can continue to improve our services. Please take a few minutes to complete the written survey that you may receive in the mail after your visit with us. Thank you!             Your Updated Medication List - Protect others around you: Learn how to safely use, store and throw away your medicines at www.disposemymeds.org.          This list is accurate as of 4/17/18  4:03 PM.  Always use your most recent med list.                   Brand Name Dispense Instructions for use Diagnosis    ADVAIR DISKUS 500-50 MCG/DOSE diskus inhaler   Generic drug:  fluticasone-salmeterol     3 Inhaler    INHALE 1 PUFF TWO TIMES DAILY    Pulmonary emphysema, unspecified emphysema type (H)       albuterol 108 (90 Base) MCG/ACT Inhaler    VENTOLIN HFA    18 g    Inhale 2 puffs into the lungs every 6 hours as needed for shortness of breath / dyspnea or wheezing    Asthma exacerbation       ALPRAZolam 0.25 MG tablet    XANAX    30 tablet    Take 1 tablet by mouth. Every twelve hours as needed for anxiety    Anxiety       atorvastatin 10 MG tablet    LIPITOR    36 tablet    Takes 3 times a week    Coronary artery disease involving native coronary artery of native heart without angina pectoris, Mixed hyperlipidemia       baclofen 10 MG tablet    LIORESAL    90 tablet    TAKE ONE TABLET BY MOUTH THREE TIMES A DAY AS NEEDED FOR MUSCLE SPASMS    Chronic bilateral low back pain with right-sided sciatica       [employer] FIBER GUMMIES 2.5 g Chew      Take 1 tablet by mouth daily  Reported on 5/8/2017        fluticasone 50 MCG/ACT spray    FLONASE    16 g    SPRAY 1 TO 2 SPRAYS IN EACH NOSTRIL ONCE DAILY    Nasal congestion       furosemide 20 MG tablet    LASIX    180 tablet    TAKE 2 TABLETS (40 MG) BY MOUTH DAILY SEVERE SWELLING    Acute systolic congestive heart failure (H)       HYDROcodone-acetaminophen 5-325 MG per tablet    NORCO    180 tablet    Take 1-2 tablets by mouth every 8 hours as needed for pain Maximum 6 daily    Chronic bilateral low back pain without sciatica       ipratropium - albuterol 0.5 mg/2.5 mg/3 mL 0.5-2.5 (3) MG/3ML neb solution    DUONEB    30 vial    Take 1 vial (3 mLs) by nebulization every 6 hours as needed for shortness of breath / dyspnea or wheezing    Moderate persistent asthma without complication       lidocaine 5 % Patch    LIDODERM    30 patch    Apply up to 3 patches to painful area at once for up to 12 h within a 24 h period.  Remove after 12 hours.    Mechanical low back pain       lisinopril 2.5 MG tablet    PRINIVIL/Zestril    90 tablet    Take 1 tablet (2.5 mg) by mouth daily    Dilated cardiomyopathy (H)       mometasone 0.1 % ointment    ELOCON    45 g    Apply sparingly to affected area twice daily as needed.  Do not apply to face.    Intrinsic eczema       montelukast 10 MG tablet    SINGULAIR    90 tablet    Take 1 tablet (10 mg) by mouth At Bedtime    Mild persistent asthma without complication       Multiple Vitamin Chew      Take 1 tablet by mouth daily        nitroGLYcerin 0.4 MG sublingual tablet    NITROSTAT    25 tablet    Place 1 tablet (0.4 mg) under the tongue every 5 minutes as needed for chest pain If you are still having symptoms after 3 doses (15 minutes) call 911.    Acute chest pain       ondansetron 4 MG ODT tab    ZOFRAN ODT    20 tablet    Take 1-2 tablets (4-8 mg) by mouth every 8 hours as needed for nausea    Nausea       pantoprazole 40 MG EC tablet    PROTONIX    90 tablet    TAKE ONE TABLET BY MOUTH EVERY DAY     Gastroesophageal reflux disease without esophagitis       potassium chloride 10 MEQ tablet    K-TAB,KLOR-CON    90 tablet    TAKE ONE TABLET BY MOUTH THREE TIMES A DAY    Hypokalemia       * predniSONE 5 MG tablet    DELTASONE    15 tablet    Take 1 tablet (5 mg) by mouth daily    Pulmonary emphysema, unspecified emphysema type (H)       * predniSONE 10 MG tablet    DELTASONE    6 tablet    Take 6 tablets (60 mg) by mouth once for 1 dose    Contrast media allergy, COPD (chronic obstructive pulmonary disease) (H)       * predniSONE 10 MG tablet    DELTASONE    3 tablet    Take 3 tablets (30 mg) by mouth once for 1 dose    COPD (chronic obstructive pulmonary disease) (H), Contrast media allergy       roflumilast 500 MCG Tabs tablet    DALIRESP     Take 1 tablet (500 mcg) by mouth daily        SPIRIVA HANDIHALER 18 MCG capsule   Generic drug:  tiotropium     90 capsule    USING THE HANDIHALER, INHALE THE CONTENTS OF ONE CAPSULE BY MOUTH DAILY    Moderate persistent asthma without complication       SUMAtriptan 25 MG tablet    IMITREX    9 tablet    Take 1 tablet (25 mg) by mouth at onset of headache for migraine May repeat in 2 hours if needed: max 2/day;    Chronic migraine without aura without status migrainosus, not intractable       TYLENOL EXTRA STRENGTH PO      1 TABLET EVERY 4 HOURS AS NEEDED    Abdominal pain, right upper quadrant       * Notice:  This list has 3 medication(s) that are the same as other medications prescribed for you. Read the directions carefully, and ask your doctor or other care provider to review them with you.

## 2018-04-17 NOTE — NURSING NOTE
Met with Trisha and her  Naun to discuss the coronary angiogram/right heart cath scheduled for 04/18/2018. Will check in at 1030 to [facility] Waiting Room.  Reviewed rationale,  procedure and post care. Provided Coronary Angiogram booklet/Right heart cath booklet.  Instructed pt to take 325 mg ASA the night before and morning of procedure, per Heart Cath Lab protocol. Cath Lab RN Ivette Cardozo noted patient has a contrast allergy. Patient reported after receiving contrast in past she has some nausea and dizziness, but noted no concerns after RHC Angio completed two years ago. Per cath lab team patient to take 60 mg PO prednisone the PM before procedure and 30 mg the AM of the procedure. Confirmed with cath lab charge RN.  K+ 3.5 (WNL).  Instructed patient not to eat or drink after midnight, but could take meds in AM with sips clear liquids.  Naun will be available to drive and care for patient after procedure.

## 2018-04-17 NOTE — MR AVS SNAPSHOT
After Visit Summary   4/17/2018    Trisha Bender    MRN: [id number]           Patient Information     Date Of Birth          1955        Visit Information        Provider Department      4/17/2018 9:30 AM Elisa Roger RN [facility]        Today's Diagnoses     COPD (chronic obstructive pulmonary disease) (H)    -  1    Abnormal CT scan, chest        Organ transplant candidate           Follow-ups after your visit        Your next 10 appointments already scheduled     Apr 18, 2018 10:30 AM CDT   Procedure 1.5 hr with U2A ROOM 6   [address] [facility] ([facility])    [address] [address]               Apr 18, 2018 12:00 PM CDT   Cath 90 Minute with UUHCVR4   [facility])    [address] [address]   [phone]            Apr 19, 2018 10:30 AM CDT   (Arrive by 10:15 AM)   New Patient Visit with Nikolai Gore MD   [facility] ([facility])    [address]  [address]  [place] [address]   [phone]            Apr 19, 2018 11:15 AM CDT   Lab with  LAB   [facility] ([facility])    [address]  1st Floor  [place] [address]   [phone]            Apr 19, 2018   Procedure with Rober Martinez MD   [facility] ([facility])    [address] [address]   [phone]           The [facility] is located on the corner of [address] and [address] on the [address]. It is easily accessible from virtually any point in the [place] area, via [address] and [address].            Apr 20, 2018  8:30 AM CDT   Pulmonary Eval with  Pulmonary Rehab 1   [facility] ([facility])    [address] 1st [address]  [facility] [address]   [phone]            Apr 20, 2018 10:00 AM CDT   (Arrive by 9:45 AM)   SOT SOCIAL WORK EVAL with MIRIAM Nice   [facility] Solid Organ Transplant ([facility])    [address]  [address]  [place] [id number] [phone]            Apr 20, 2018 11:30 AM CDT   NUTRITION VISIT with Diamante Bruner RD   [facility] Transplant ([facility])    [address]  [address]  [place] [id number] [phone]            Apr 20, 2018 12:00 PM CDT   (Arrive by 11:45 AM)   CT CHEST W/O CONTRAST with UCCT1   [facility] CT ([facility])    [address]  1st Floor  [place] [facility] [id number] [phone]           Please bring any scans or X-rays taken at other hospitals, if similar tests were done. Also bring a list of your medicines, including vitamins, minerals and over-the-counter drugs. It is safest to leave personal items at home.  Be sure to tell your doctor:   If you have any allergies.   If there s any chance you are pregnant.   If you are breastfeeding.  You do not need to do anything special to prepare for this exam.  Please wear loose clothing, such as a sweat suit or jogging clothes. Avoid snaps, zippers and other metal. We may ask you to undress and put on a hospital gown.            Apr 20, 2018 12:30 PM CDT   (Arrive by 12:15 PM)   SOT CARE COORDINATOR EVAL with Elisa Roger RN   [facility] Trans[facility] ([facility])    [address]  [address]  [facility] [id number] [phone]              Who to contact     If you have questions or need follow up information about today's clinic visit or your schedule please contact [facility]GAN TRANSPL[facility] directly at [phone].  Normal or non-critical  lab and imaging results will be communicated to you by Whirlpoolhart, letter or phone within 4 business days after the clinic has received the results. If you do not hear from us within 7 days, please contact the clinic through Bueenot or phone. If you have a critical or abnormal lab result, we will notify you by phone as soon as possible.  Submit refill requests through Platogo or call your pharmacy and they will forward the refill request to us. Please allow 3 business days for your refill to be completed.          Additional Information About Your Visit        [employer] Information     [employer]atogo gives you secure access to your electronic health record. If you see a primary care provider, you can also send messages to your care team and make appointments. If you have questions, please call your primary care clinic.  If you do not have a primary care provider, please call [phone] and they will assist you.        Care EveryWhere ID     This is your Care EveryWhere ID. This could be used by other organizations to access your [place] medical records  [id number]        Your Vitals Were     Last Period                   09/29/2005            Blood Pressure from Last 3 Encounters:   04/16/18 131/78   04/13/18 124/70   03/23/18 110/66    Weight from Last 3 Encounters:   04/16/18 82.1 kg (181 lb 1.6 oz)   04/13/18 81.6 kg (180 lb)   03/23/18 81.6 kg (180 lb)              Today, you had the following     No orders found for display         Today's Medication Changes          These changes are accurate as of 4/17/18  1:04 PM.  If you have any questions, ask your nurse or doctor.               These medicines have changed or have updated prescriptions.        Dose/Directions    * predniSONE 5 MG tablet   Commonly known as:  DELTASONE   This may have changed:  Another medication with the same name was added. Make sure you understand how and when to take each.   Used for:  Pulmonary emphysema, unspecified emphysema type (H)    Changed by:  Elisa Roger RN        Dose:  5 mg   Take 1 tablet (5 mg) by mouth daily   Quantity:  15 tablet   Refills:  0       * predniSONE 10 MG tablet   Commonly known as:  DELTASONE   This may have changed:  You were already taking a medication with the same name, and this prescription was added. Make sure you understand how and when to take each.   Used for:  Contrast media allergy, COPD (chronic obstructive pulmonary disease) (H)   Changed by:  Elisa Roger RN        Dose:  60 mg   Take 6 tablets (60 mg) by mouth once for 1 dose   Quantity:  6 tablet   Refills:  0       * predniSONE 10 MG tablet   Commonly known as:  DELTASONE   This may have changed:  You were already taking a medication with the same name, and this prescription was added. Make sure you understand how and when to take each.   Used for:  COPD (chronic obstructive pulmonary disease) (H), Contrast media allergy   Changed by:  Elisa Roger RN        Dose:  30 mg   Take 3 tablets (30 mg) by mouth once for 1 dose   Quantity:  3 tablet   Refills:  0       * Notice:  This list has 3 medication(s) that are the same as other medications prescribed for you. Read the directions carefully, and ask your doctor or other care provider to review them with you.         Where to get your medicines      These medications were sent to [address] [address]    Hours:  TRANSPLANT PHONE NUMBER [phone] Phone:  [phone]     predniSONE 10 MG tablet    predniSONE 10 MG tablet                Primary Care Provider Office Phone # Fax #    Bandar Weinberg [phone]       [facility] [address] [facility] [address]        Equal Access to Services     [facility] AH: Hadronnie Mclaughlin, tejal hernandez, qapaige saba, jonathan burgess. So [facility] [phone].    ATENCIÓN: Si  adrianna ruiz, tiene a madison disposición servicios gratuitos de asistencia lingüística. Austin mcallister [phone].    We comply with applicable federal civil rights laws and [place] laws. We do not discriminate on the basis of race, color, national origin, age, disability, sex, sexual orientation, or gender identity.            Thank you!     Thank you for choosing [facility]  for your care. Our goal is always to provide you with excellent care. Hearing back from our patients is one way we can continue to improve our services. Please take a few minutes to complete the written survey that you may receive in the mail after your visit with us. Thank you!             Your Updated Medication List - Protect others around you: Learn how to safely use, store and throw away your medicines at www.disposemymeds.org.          This list is accurate as of 4/17/18  1:04 PM.  Always use your most recent med list.                   Brand Name Dispense Instructions for use Diagnosis    ADVAIR DISKUS 500-50 MCG/DOSE diskus inhaler   Generic drug:  fluticasone-salmeterol     3 Inhaler    INHALE 1 PUFF TWO TIMES DAILY    Pulmonary emphysema, unspecified emphysema type (H)       albuterol 108 (90 Base) MCG/ACT Inhaler    VENTOLIN HFA    18 g    Inhale 2 puffs into the lungs every 6 hours as needed for shortness of breath / dyspnea or wheezing    Asthma exacerbation       ALPRAZolam 0.25 MG tablet    XANAX    30 tablet    Take 1 tablet by mouth. Every twelve hours as needed for anxiety    Anxiety       atorvastatin 10 MG tablet    LIPITOR    36 tablet    Takes 3 times a week    Coronary artery disease involving native coronary artery of native heart without angina pectoris, Mixed hyperlipidemia       baclofen 10 MG tablet    LIORESAL    90 tablet    TAKE ONE TABLET BY MOUTH THREE TIMES A DAY AS NEEDED FOR MUSCLE SPASMS    Chronic bilateral low back pain with right-sided sciatica       [employer] FIBER GUMMIES 2.5 g Chew      Take  1 tablet by mouth daily Reported on 5/8/2017        fluticasone 50 MCG/ACT spray    FLONASE    16 g    SPRAY 1 TO 2 SPRAYS IN EACH NOSTRIL ONCE DAILY    Nasal congestion       furosemide 20 MG tablet    LASIX    180 tablet    TAKE 2 TABLETS (40 MG) BY MOUTH DAILY SEVERE SWELLING    Acute systolic congestive heart failure (H)       HYDROcodone-acetaminophen 5-325 MG per tablet    NORCO    180 tablet    Take 1-2 tablets by mouth every 8 hours as needed for pain Maximum 6 daily    Chronic bilateral low back pain without sciatica       ipratropium - albuterol 0.5 mg/2.5 mg/3 mL 0.5-2.5 (3) MG/3ML neb solution    DUONEB    30 vial    Take 1 vial (3 mLs) by nebulization every 6 hours as needed for shortness of breath / dyspnea or wheezing    Moderate persistent asthma without complication       lidocaine 5 % Patch    LIDODERM    30 patch    Apply up to 3 patches to painful area at once for up to 12 h within a 24 h period.  Remove after 12 hours.    Mechanical low back pain       lisinopril 2.5 MG tablet    PRINIVIL/Zestril    90 tablet    Take 1 tablet (2.5 mg) by mouth daily    Dilated cardiomyopathy (H)       mometasone 0.1 % ointment    ELOCON    45 g    Apply sparingly to affected area twice daily as needed.  Do not apply to face.    Intrinsic eczema       montelukast 10 MG tablet    SINGULAIR    90 tablet    Take 1 tablet (10 mg) by mouth At Bedtime    Mild persistent asthma without complication       Multiple Vitamin Chew      Take 1 tablet by mouth daily        nitroGLYcerin 0.4 MG sublingual tablet    NITROSTAT    25 tablet    Place 1 tablet (0.4 mg) under the tongue every 5 minutes as needed for chest pain If you are still having symptoms after 3 doses (15 minutes) call 911.    Acute chest pain       ondansetron 4 MG ODT tab    ZOFRAN ODT    20 tablet    Take 1-2 tablets (4-8 mg) by mouth every 8 hours as needed for nausea    Nausea       pantoprazole 40 MG EC tablet    PROTONIX    90 tablet    TAKE ONE TABLET BY  MOUTH EVERY DAY    Gastroesophageal reflux disease without esophagitis       potassium chloride 10 MEQ tablet    K-TAB,KLOR-CON    90 tablet    TAKE ONE TABLET BY MOUTH THREE TIMES A DAY    Hypokalemia       * predniSONE 5 MG tablet    DELTASONE    15 tablet    Take 1 tablet (5 mg) by mouth daily    Pulmonary emphysema, unspecified emphysema type (H)       * predniSONE 10 MG tablet    DELTASONE    6 tablet    Take 6 tablets (60 mg) by mouth once for 1 dose    Contrast media allergy, COPD (chronic obstructive pulmonary disease) (H)       * predniSONE 10 MG tablet    DELTASONE    3 tablet    Take 3 tablets (30 mg) by mouth once for 1 dose    COPD (chronic obstructive pulmonary disease) (H), Contrast media allergy       roflumilast 500 MCG Tabs tablet    DALIRESP     Take 1 tablet (500 mcg) by mouth daily        SPIRIVA HANDIHALER 18 MCG capsule   Generic drug:  tiotropium     90 capsule    USING THE HANDIHALER, INHALE THE CONTENTS OF ONE CAPSULE BY MOUTH DAILY    Moderate persistent asthma without complication       SUMAtriptan 25 MG tablet    IMITREX    9 tablet    Take 1 tablet (25 mg) by mouth at onset of headache for migraine May repeat in 2 hours if needed: max 2/day;    Chronic migraine without aura without status migrainosus, not intractable       TYLENOL EXTRA STRENGTH PO      1 TABLET EVERY 4 HOURS AS NEEDED    Abdominal pain, right upper quadrant       * Notice:  This list has 3 medication(s) that are the same as other medications prescribed for you. Read the directions carefully, and ask your doctor or other care provider to review them with you.

## 2018-04-17 NOTE — PROGRESS NOTES
Received staff message from Ivette Cardozo regarding patient scheduled for coronary angiogram and right heart cath but she has a contrast dye allergy. Per Ivette,  patient will need pretreatment recommending  PO prednisone 60 mg the night before and 30 mg the morning of the procedure. Discussed with transplant pulmonologist Dr Kahn, who deferred to the cath lab team.  I called the Cath Lab charge RN at [phone] and confirmed patient currently on low dose prednisone and signs and symptoms patient experienced previously when she received contrast dye to include dizziness and nausea. Educated patient on need for increased prednisone and instructed patient to  one time prednisone prescription from [facility] pharmacy.

## 2018-04-18 ENCOUNTER — HOSPITAL ENCOUNTER (OUTPATIENT)
Facility: CLINIC | Age: 63
Discharge: HOME | End: 2018-04-18
Attending: INTERNAL MEDICINE | Admitting: INTERNAL MEDICINE
Payer: COMMERCIAL

## 2018-04-18 ENCOUNTER — APPOINTMENT (OUTPATIENT)
Dept: MEDSURG UNIT | Facility: CLINIC | Age: 63
End: 2018-04-18
Attending: INTERNAL MEDICINE
Payer: COMMERCIAL

## 2018-04-18 ENCOUNTER — APPOINTMENT (OUTPATIENT)
Dept: CARDIOLOGY | Facility: CLINIC | Age: 63
End: 2018-04-18
Attending: INTERNAL MEDICINE
Payer: COMMERCIAL

## 2018-04-18 VITALS
OXYGEN SATURATION: 97 % | DIASTOLIC BLOOD PRESSURE: 77 MMHG | SYSTOLIC BLOOD PRESSURE: 137 MMHG | RESPIRATION RATE: 16 BRPM | WEIGHT: 180.78 LBS | TEMPERATURE: 98 F | BODY MASS INDEX: 25.88 KG/M2 | HEART RATE: 99 BPM | HEIGHT: 70 IN

## 2018-04-18 DIAGNOSIS — Z80.8 FAMILY HISTORY OF THYROID CANCER: ICD-10-CM

## 2018-04-18 DIAGNOSIS — M77.11 LATERAL EPICONDYLITIS OF RIGHT ELBOW: ICD-10-CM

## 2018-04-18 DIAGNOSIS — J45.30 MILD PERSISTENT ASTHMA WITHOUT COMPLICATION: ICD-10-CM

## 2018-04-18 DIAGNOSIS — R93.89 ABNORMAL CT SCAN, CHEST: ICD-10-CM

## 2018-04-18 DIAGNOSIS — I42.0 DILATED CARDIOMYOPATHY (H): ICD-10-CM

## 2018-04-18 DIAGNOSIS — F33.2 SEVERE EPISODE OF RECURRENT MAJOR DEPRESSIVE DISORDER, WITHOUT PSYCHOTIC FEATURES (H): Primary | ICD-10-CM

## 2018-04-18 DIAGNOSIS — J44.9 COPD (CHRONIC OBSTRUCTIVE PULMONARY DISEASE) (H): ICD-10-CM

## 2018-04-18 DIAGNOSIS — I25.10 CORONARY ARTERY DISEASE INVOLVING NATIVE CORONARY ARTERY OF NATIVE HEART WITHOUT ANGINA PECTORIS: ICD-10-CM

## 2018-04-18 DIAGNOSIS — I45.10 RIGHT BUNDLE BRANCH BLOCK (RBBB): ICD-10-CM

## 2018-04-18 DIAGNOSIS — J45.40 MODERATE PERSISTENT ASTHMA WITHOUT COMPLICATION: ICD-10-CM

## 2018-04-18 DIAGNOSIS — Z71.89 ADVANCED DIRECTIVES, COUNSELING/DISCUSSION: ICD-10-CM

## 2018-04-18 DIAGNOSIS — Z79.899 CONTROLLED SUBSTANCE AGREEMENT SIGNED: ICD-10-CM

## 2018-04-18 DIAGNOSIS — G43.709 CHRONIC MIGRAINE WITHOUT AURA WITHOUT STATUS MIGRAINOSUS, NOT INTRACTABLE: ICD-10-CM

## 2018-04-18 DIAGNOSIS — I10 ESSENTIAL HYPERTENSION WITH GOAL BLOOD PRESSURE LESS THAN 130/80: ICD-10-CM

## 2018-04-18 DIAGNOSIS — L65.9 HAIR LOSS: ICD-10-CM

## 2018-04-18 DIAGNOSIS — Z80.0 FAMILY HISTORY OF MALIGNANT NEOPLASM OF GASTROINTESTINAL TRACT: ICD-10-CM

## 2018-04-18 DIAGNOSIS — M94.9 DISORDER OF BONE AND CARTILAGE: ICD-10-CM

## 2018-04-18 DIAGNOSIS — M54.59 MECHANICAL LOW BACK PAIN: ICD-10-CM

## 2018-04-18 DIAGNOSIS — E78.2 MIXED HYPERLIPIDEMIA: ICD-10-CM

## 2018-04-18 DIAGNOSIS — E87.6 HYPOKALEMIA: ICD-10-CM

## 2018-04-18 DIAGNOSIS — F41.9 ANXIETY: ICD-10-CM

## 2018-04-18 DIAGNOSIS — R06.02 SOB (SHORTNESS OF BREATH): ICD-10-CM

## 2018-04-18 DIAGNOSIS — Z76.82 ORGAN TRANSPLANT CANDIDATE: ICD-10-CM

## 2018-04-18 DIAGNOSIS — M89.9 DISORDER OF BONE AND CARTILAGE: ICD-10-CM

## 2018-04-18 DIAGNOSIS — J43.1 PANLOBULAR EMPHYSEMA (H): ICD-10-CM

## 2018-04-18 LAB — IGE SERPL-ACNC: 10 KIU/L (ref 0–114)

## 2018-04-18 PROCEDURE — 40000172 ZZH STATISTIC PROCEDURE PREP ONLY

## 2018-04-18 PROCEDURE — 27210982 ZZH KIT RT HC TOTES DISP CR7

## 2018-04-18 PROCEDURE — 93451 RIGHT HEART CATH: CPT

## 2018-04-18 PROCEDURE — 25000128 H RX IP 250 OP 636: Performed by: INTERNAL MEDICINE

## 2018-04-18 PROCEDURE — 93005 ELECTROCARDIOGRAM TRACING: CPT

## 2018-04-18 PROCEDURE — 27210787 ZZH MANIFOLD CR2

## 2018-04-18 PROCEDURE — 93451 RIGHT HEART CATH: CPT | Mod: 26 | Performed by: INTERNAL MEDICINE

## 2018-04-18 PROCEDURE — 93010 ELECTROCARDIOGRAM REPORT: CPT | Performed by: INTERNAL MEDICINE

## 2018-04-18 PROCEDURE — 27211181 ZZH BALLOON TIP PRESSURE CR5

## 2018-04-18 PROCEDURE — 40000065 ZZH STATISTIC EKG NON-CHARGEABLE

## 2018-04-18 RX ORDER — ADENOSINE 3 MG/ML
12-12000 INJECTION, SOLUTION INTRAVENOUS
Status: DISCONTINUED | OUTPATIENT
Start: 2018-04-18 | End: 2018-04-18 | Stop reason: HOSPADM

## 2018-04-18 RX ORDER — ARGATROBAN 1 MG/ML
350 INJECTION, SOLUTION INTRAVENOUS
Status: DISCONTINUED | OUTPATIENT
Start: 2018-04-18 | End: 2018-04-18 | Stop reason: HOSPADM

## 2018-04-18 RX ORDER — LORAZEPAM 2 MG/ML
.5-2 INJECTION INTRAMUSCULAR EVERY 4 HOURS PRN
Status: DISCONTINUED | OUTPATIENT
Start: 2018-04-18 | End: 2018-04-18 | Stop reason: HOSPADM

## 2018-04-18 RX ORDER — DOPAMINE HYDROCHLORIDE 160 MG/100ML
2-20 INJECTION, SOLUTION INTRAVENOUS CONTINUOUS PRN
Status: DISCONTINUED | OUTPATIENT
Start: 2018-04-18 | End: 2018-04-18 | Stop reason: HOSPADM

## 2018-04-18 RX ORDER — HYDRALAZINE HYDROCHLORIDE 20 MG/ML
10-20 INJECTION INTRAMUSCULAR; INTRAVENOUS
Status: DISCONTINUED | OUTPATIENT
Start: 2018-04-18 | End: 2018-04-18 | Stop reason: HOSPADM

## 2018-04-18 RX ORDER — HEPARIN SODIUM 1000 [USP'U]/ML
1000-10000 INJECTION, SOLUTION INTRAVENOUS; SUBCUTANEOUS EVERY 5 MIN PRN
Status: DISCONTINUED | OUTPATIENT
Start: 2018-04-18 | End: 2018-04-18 | Stop reason: HOSPADM

## 2018-04-18 RX ORDER — NITROGLYCERIN 0.4 MG/1
0.4 TABLET SUBLINGUAL EVERY 5 MIN PRN
Status: DISCONTINUED | OUTPATIENT
Start: 2018-04-18 | End: 2018-04-18 | Stop reason: HOSPADM

## 2018-04-18 RX ORDER — NIFEDIPINE 10 MG/1
10 CAPSULE ORAL
Status: DISCONTINUED | OUTPATIENT
Start: 2018-04-18 | End: 2018-04-18 | Stop reason: HOSPADM

## 2018-04-18 RX ORDER — POTASSIUM CHLORIDE 29.8 MG/ML
20 INJECTION INTRAVENOUS
Status: DISCONTINUED | OUTPATIENT
Start: 2018-04-18 | End: 2018-04-18 | Stop reason: HOSPADM

## 2018-04-18 RX ORDER — PROTAMINE SULFATE 10 MG/ML
1-5 INJECTION, SOLUTION INTRAVENOUS
Status: DISCONTINUED | OUTPATIENT
Start: 2018-04-18 | End: 2018-04-18 | Stop reason: HOSPADM

## 2018-04-18 RX ORDER — DOBUTAMINE HYDROCHLORIDE 200 MG/100ML
2-20 INJECTION INTRAVENOUS CONTINUOUS PRN
Status: DISCONTINUED | OUTPATIENT
Start: 2018-04-18 | End: 2018-04-18 | Stop reason: HOSPADM

## 2018-04-18 RX ORDER — METOPROLOL TARTRATE 1 MG/ML
5 INJECTION, SOLUTION INTRAVENOUS EVERY 5 MIN PRN
Status: DISCONTINUED | OUTPATIENT
Start: 2018-04-18 | End: 2018-04-18 | Stop reason: HOSPADM

## 2018-04-18 RX ORDER — EPTIFIBATIDE 2 MG/ML
2 INJECTION, SOLUTION INTRAVENOUS CONTINUOUS PRN
Status: DISCONTINUED | OUTPATIENT
Start: 2018-04-18 | End: 2018-04-18 | Stop reason: HOSPADM

## 2018-04-18 RX ORDER — ASPIRIN 325 MG
325 TABLET ORAL
Status: DISCONTINUED | OUTPATIENT
Start: 2018-04-18 | End: 2018-04-18 | Stop reason: HOSPADM

## 2018-04-18 RX ORDER — ENALAPRILAT 1.25 MG/ML
1.25-2.5 INJECTION INTRAVENOUS
Status: DISCONTINUED | OUTPATIENT
Start: 2018-04-18 | End: 2018-04-18 | Stop reason: HOSPADM

## 2018-04-18 RX ORDER — DEXTROSE MONOHYDRATE 25 G/50ML
12.5-5 INJECTION, SOLUTION INTRAVENOUS EVERY 30 MIN PRN
Status: DISCONTINUED | OUTPATIENT
Start: 2018-04-18 | End: 2018-04-18 | Stop reason: HOSPADM

## 2018-04-18 RX ORDER — FUROSEMIDE 10 MG/ML
20-100 INJECTION INTRAMUSCULAR; INTRAVENOUS
Status: DISCONTINUED | OUTPATIENT
Start: 2018-04-18 | End: 2018-04-18 | Stop reason: HOSPADM

## 2018-04-18 RX ORDER — CLOPIDOGREL BISULFATE 75 MG/1
300-600 TABLET ORAL
Status: DISCONTINUED | OUTPATIENT
Start: 2018-04-18 | End: 2018-04-18 | Stop reason: HOSPADM

## 2018-04-18 RX ORDER — ARGATROBAN 1 MG/ML
150 INJECTION, SOLUTION INTRAVENOUS
Status: DISCONTINUED | OUTPATIENT
Start: 2018-04-18 | End: 2018-04-18 | Stop reason: HOSPADM

## 2018-04-18 RX ORDER — NITROGLYCERIN 20 MG/100ML
.07-2 INJECTION INTRAVENOUS CONTINUOUS PRN
Status: DISCONTINUED | OUTPATIENT
Start: 2018-04-18 | End: 2018-04-18 | Stop reason: HOSPADM

## 2018-04-18 RX ORDER — FLUMAZENIL 0.1 MG/ML
0.2 INJECTION, SOLUTION INTRAVENOUS
Status: DISCONTINUED | OUTPATIENT
Start: 2018-04-18 | End: 2018-04-18 | Stop reason: HOSPADM

## 2018-04-18 RX ORDER — MAGNESIUM HYDROXIDE 1200 MG/15ML
1000 LIQUID ORAL CONTINUOUS PRN
Status: DISCONTINUED | OUTPATIENT
Start: 2018-04-18 | End: 2018-04-18 | Stop reason: HOSPADM

## 2018-04-18 RX ORDER — SODIUM CHLORIDE 9 MG/ML
INJECTION, SOLUTION INTRAVENOUS CONTINUOUS
Status: DISCONTINUED | OUTPATIENT
Start: 2018-04-18 | End: 2018-04-18 | Stop reason: HOSPADM

## 2018-04-18 RX ORDER — METHYLPREDNISOLONE SODIUM SUCCINATE 125 MG/2ML
125 INJECTION, POWDER, LYOPHILIZED, FOR SOLUTION INTRAMUSCULAR; INTRAVENOUS
Status: DISCONTINUED | OUTPATIENT
Start: 2018-04-18 | End: 2018-04-18 | Stop reason: HOSPADM

## 2018-04-18 RX ORDER — EPINEPHRINE 1 MG/ML
0.3 INJECTION, SOLUTION, CONCENTRATE INTRAVENOUS
Status: DISCONTINUED | OUTPATIENT
Start: 2018-04-18 | End: 2018-04-18 | Stop reason: HOSPADM

## 2018-04-18 RX ORDER — EPTIFIBATIDE 2 MG/ML
180 INJECTION, SOLUTION INTRAVENOUS EVERY 10 MIN PRN
Status: DISCONTINUED | OUTPATIENT
Start: 2018-04-18 | End: 2018-04-18 | Stop reason: HOSPADM

## 2018-04-18 RX ORDER — ATROPINE SULFATE 0.1 MG/ML
.5-1 INJECTION INTRAVENOUS
Status: DISCONTINUED | OUTPATIENT
Start: 2018-04-18 | End: 2018-04-18 | Stop reason: HOSPADM

## 2018-04-18 RX ORDER — PHENYLEPHRINE HCL IN 0.9% NACL 1 MG/10 ML
20-100 SYRINGE (ML) INTRAVENOUS
Status: DISCONTINUED | OUTPATIENT
Start: 2018-04-18 | End: 2018-04-18 | Stop reason: HOSPADM

## 2018-04-18 RX ORDER — PRASUGREL 10 MG/1
10-60 TABLET, FILM COATED ORAL
Status: DISCONTINUED | OUTPATIENT
Start: 2018-04-18 | End: 2018-04-18 | Stop reason: HOSPADM

## 2018-04-18 RX ORDER — CLOPIDOGREL BISULFATE 75 MG/1
75 TABLET ORAL
Status: DISCONTINUED | OUTPATIENT
Start: 2018-04-18 | End: 2018-04-18 | Stop reason: HOSPADM

## 2018-04-18 RX ORDER — LIDOCAINE 40 MG/G
CREAM TOPICAL
Status: DISCONTINUED | OUTPATIENT
Start: 2018-04-18 | End: 2018-04-18 | Stop reason: HOSPADM

## 2018-04-18 RX ORDER — FENTANYL CITRATE 50 UG/ML
25-50 INJECTION, SOLUTION INTRAMUSCULAR; INTRAVENOUS
Status: DISCONTINUED | OUTPATIENT
Start: 2018-04-18 | End: 2018-04-18 | Stop reason: HOSPADM

## 2018-04-18 RX ORDER — VERAPAMIL HYDROCHLORIDE 2.5 MG/ML
1-5 INJECTION, SOLUTION INTRAVENOUS
Status: DISCONTINUED | OUTPATIENT
Start: 2018-04-18 | End: 2018-04-18 | Stop reason: HOSPADM

## 2018-04-18 RX ORDER — NITROGLYCERIN 5 MG/ML
100-200 VIAL (ML) INTRAVENOUS
Status: DISCONTINUED | OUTPATIENT
Start: 2018-04-18 | End: 2018-04-18 | Stop reason: HOSPADM

## 2018-04-18 RX ORDER — POTASSIUM CHLORIDE 7.45 MG/ML
10 INJECTION INTRAVENOUS
Status: DISCONTINUED | OUTPATIENT
Start: 2018-04-18 | End: 2018-04-18 | Stop reason: HOSPADM

## 2018-04-18 RX ORDER — SODIUM NITROPRUSSIDE 25 MG/ML
100-200 INJECTION INTRAVENOUS
Status: DISCONTINUED | OUTPATIENT
Start: 2018-04-18 | End: 2018-04-18 | Stop reason: HOSPADM

## 2018-04-18 RX ORDER — LIDOCAINE HYDROCHLORIDE 10 MG/ML
30 INJECTION, SOLUTION EPIDURAL; INFILTRATION; INTRACAUDAL; PERINEURAL
Status: DISCONTINUED | OUTPATIENT
Start: 2018-04-18 | End: 2018-04-18 | Stop reason: HOSPADM

## 2018-04-18 RX ORDER — ATORVASTATIN CALCIUM 10 MG/1
TABLET, FILM COATED ORAL
Qty: 36 TABLET | Refills: 3 | Status: CANCELLED | OUTPATIENT
Start: 2018-04-18

## 2018-04-18 RX ORDER — NITROGLYCERIN 5 MG/ML
100-500 VIAL (ML) INTRAVENOUS
Status: DISCONTINUED | OUTPATIENT
Start: 2018-04-18 | End: 2018-04-18 | Stop reason: HOSPADM

## 2018-04-18 RX ORDER — DIPHENHYDRAMINE HYDROCHLORIDE 50 MG/ML
25-50 INJECTION INTRAMUSCULAR; INTRAVENOUS
Status: DISCONTINUED | OUTPATIENT
Start: 2018-04-18 | End: 2018-04-18 | Stop reason: HOSPADM

## 2018-04-18 RX ORDER — ONDANSETRON 2 MG/ML
4 INJECTION INTRAMUSCULAR; INTRAVENOUS EVERY 4 HOURS PRN
Status: DISCONTINUED | OUTPATIENT
Start: 2018-04-18 | End: 2018-04-18 | Stop reason: HOSPADM

## 2018-04-18 RX ORDER — PROTAMINE SULFATE 10 MG/ML
25-100 INJECTION, SOLUTION INTRAVENOUS EVERY 5 MIN PRN
Status: DISCONTINUED | OUTPATIENT
Start: 2018-04-18 | End: 2018-04-18 | Stop reason: HOSPADM

## 2018-04-18 RX ORDER — ASPIRIN 81 MG/1
81-324 TABLET, CHEWABLE ORAL
Status: DISCONTINUED | OUTPATIENT
Start: 2018-04-18 | End: 2018-04-18 | Stop reason: HOSPADM

## 2018-04-18 RX ORDER — NICARDIPINE HYDROCHLORIDE 2.5 MG/ML
100 INJECTION INTRAVENOUS
Status: DISCONTINUED | OUTPATIENT
Start: 2018-04-18 | End: 2018-04-18 | Stop reason: HOSPADM

## 2018-04-18 RX ORDER — PROMETHAZINE HYDROCHLORIDE 25 MG/ML
6.25-25 INJECTION, SOLUTION INTRAMUSCULAR; INTRAVENOUS EVERY 4 HOURS PRN
Status: DISCONTINUED | OUTPATIENT
Start: 2018-04-18 | End: 2018-04-18 | Stop reason: HOSPADM

## 2018-04-18 RX ORDER — NALOXONE HYDROCHLORIDE 0.4 MG/ML
0.4 INJECTION, SOLUTION INTRAMUSCULAR; INTRAVENOUS; SUBCUTANEOUS EVERY 5 MIN PRN
Status: DISCONTINUED | OUTPATIENT
Start: 2018-04-18 | End: 2018-04-18 | Stop reason: HOSPADM

## 2018-04-18 RX ADMIN — SODIUM CHLORIDE: 9 INJECTION, SOLUTION INTRAVENOUS at 11:13

## 2018-04-18 NOTE — IP AVS SNAPSHOT
[address] [address]                                       After Visit Summary   4/18/2018    Trisha Bender    MRN: [id number]           After Visit Summary Signature Page     I have received my discharge instructions, and my questions have been answered. I have discussed any challenges I see with this plan with the nurse or doctor.    ..........................................................................................................................................  Patient/Patient Representative Signature      ..........................................................................................................................................  Patient Representative Print Name and Relationship to Patient    ..................................................               ................................................  Date                                            Time    ..........................................................................................................................................  Reviewed by Signature/Title    ...................................................              ..............................................  Date                                                            Time

## 2018-04-18 NOTE — PROCEDURES
FINAL CARDIAC CATH REPORT    PROCEDURES PERFORMED:   1. Right heart catheterization    PHYSICIANS:  1. Attending Interventional Cardiology Staff: Beto Santos MD  2. Cardiology Fellow: Jose Davis MD     INDICATION:  Trisha Bender is a 62 year old with end stage COPD for pre-op lung transplant here for right heart catheterization.    DESCRIPTION:  1. Sterile prep and procedure.  2. Location: right internal jugular vein  3. Access: Local anesthetic with lidocaine.  A standard (18 g) with ultrasound guidance was used to establish vascular access using a modified Seldinger technique.  4. Sheath: 7 Fr in RIJ  5. Catheters: 7 Fr [place]  6. Fluoroscopy time of 1.2 min.  7. Estimated blood loss of <5 mL.  8. See below for procedure details.    HEMODYNAMICS:  BSA 2  1. HR 94 bpm  2. /63/90 mmHg  3. RA 10   4. RV 35/10  5. PA 35/20/25   6. PCW 13   7. PA sat 72%   8. PCW sat 92%  9. Hgb 11 g/dL   10. Claudai CO 6.01  11. Claudia CI 3.01   12. TD CO 5.97   13. TD CI 2.99   14. PVR 1.8     COMPLICATIONS:  1. None    SUMMARY:   1. Normal right and left sided filling pressures.  2. Mild pulmonary hypertension, secondary.  3. Normal cardiac output.    PLAN:   1. Discharge today per protocol.  2. Continued medical management and lifestyle modification for cardiovascular risk factor optimization.     The attending interventional cardiologist was present for the entire procedure.    See CVIS Cath report for final draft.    Jose Davis  Cardiovascular fellow  [phone]    Staff Cardiologist: I supervised the cardiology fellow and reviewed the hemodynamic findings with the fellow at the completion of the procedure.  I agree with the documentation above.    Beto Santos MD

## 2018-04-18 NOTE — IP AVS SNAPSHOT
MRN:[id number]                      After Visit Summary   4/18/2018    Trisha Bender    MRN: [id number]           Visit Information        Department      4/18/2018 10:16 AM [address] [facility]          Review of your medicines      UNREVIEWED medicines. Ask your doctor about these medicines        Dose / Directions    ADVAIR DISKUS 500-50 MCG/DOSE diskus inhaler   Used for:  Pulmonary emphysema, unspecified emphysema type (H)   Generic drug:  fluticasone-salmeterol        INHALE 1 PUFF TWO TIMES DAILY   Quantity:  3 Inhaler   Refills:  3       albuterol 108 (90 Base) MCG/ACT Inhaler   Commonly known as:  VENTOLIN HFA   Used for:  Asthma exacerbation        Dose:  2 puff   Inhale 2 puffs into the lungs every 6 hours as needed for shortness of breath / dyspnea or wheezing   Quantity:  18 g   Refills:  3       ALPRAZolam 0.25 MG tablet   Commonly known as:  XANAX   Used for:  Anxiety        Take 1 tablet by mouth. Every twelve hours as needed for anxiety   Quantity:  30 tablet   Refills:  0       atorvastatin 10 MG tablet   Commonly known as:  LIPITOR   Used for:  Coronary artery disease involving native coronary artery of native heart without angina pectoris, Mixed hyperlipidemia        Takes 3 times a week   Quantity:  36 tablet   Refills:  3       baclofen 10 MG tablet   Commonly known as:  LIORESAL   Used for:  Chronic bilateral low back pain with right-sided sciatica        TAKE ONE TABLET BY MOUTH THREE TIMES A DAY AS NEEDED FOR MUSCLE SPASMS   Quantity:  90 tablet   Refills:  1       [employer] FIBER GUMMIES 2.5 g Chew        Dose:  1 tablet   Take 1 tablet by mouth daily Reported on 5/8/2017   Refills:  0       fluticasone 50 MCG/ACT spray   Commonly known as:  FLONASE   Used for:  Nasal congestion        SPRAY 1 TO 2 SPRAYS IN EACH NOSTRIL ONCE DAILY   Quantity:  16 g   Refills:  7       furosemide 20 MG tablet   Commonly known as:  LASIX   Used for:  Acute systolic congestive heart  failure (H)        TAKE 2 TABLETS (40 MG) BY MOUTH DAILY SEVERE SWELLING   Quantity:  180 tablet   Refills:  1       HYDROcodone-acetaminophen 5-325 MG per tablet   Commonly known as:  NORCO   Used for:  Chronic bilateral low back pain without sciatica        Dose:  1-2 tablet   Take 1-2 tablets by mouth every 8 hours as needed for pain Maximum 6 daily   Quantity:  180 tablet   Refills:  0       ipratropium - albuterol 0.5 mg/2.5 mg/3 mL 0.5-2.5 (3) MG/3ML neb solution   Commonly known as:  DUONEB   Used for:  Moderate persistent asthma without complication        Dose:  1 vial   Take 1 vial (3 mLs) by nebulization every 6 hours as needed for shortness of breath / dyspnea or wheezing   Quantity:  30 vial   Refills:  1       lidocaine 5 % Patch   Commonly known as:  LIDODERM   Used for:  Mechanical low back pain        Apply up to 3 patches to painful area at once for up to 12 h within a 24 h period.  Remove after 12 hours.   Quantity:  30 patch   Refills:  0       lisinopril 2.5 MG tablet   Commonly known as:  PRINIVIL/Zestril   Used for:  Dilated cardiomyopathy (H)        Dose:  2.5 mg   Take 1 tablet (2.5 mg) by mouth daily   Quantity:  90 tablet   Refills:  3       mometasone 0.1 % ointment   Commonly known as:  ELOCON   Used for:  Intrinsic eczema        Apply sparingly to affected area twice daily as needed.  Do not apply to face.   Quantity:  45 g   Refills:  0       montelukast 10 MG tablet   Commonly known as:  SINGULAIR   Used for:  Mild persistent asthma without complication        Dose:  1 tablet   Take 1 tablet (10 mg) by mouth At Bedtime   Quantity:  90 tablet   Refills:  1       Multiple Vitamin Chew        Dose:  1 tablet   Take 1 tablet by mouth daily   Refills:  0       nitroGLYcerin 0.4 MG sublingual tablet   Commonly known as:  NITROSTAT   Used for:  Acute chest pain        Dose:  0.4 mg   Place 1 tablet (0.4 mg) under the tongue every 5 minutes as needed for chest pain If you are still having  symptoms after 3 doses (15 minutes) call 911.   Quantity:  25 tablet   Refills:  0       ondansetron 4 MG ODT tab   Commonly known as:  ZOFRAN ODT   Used for:  Nausea        Dose:  4-8 mg   Take 1-2 tablets (4-8 mg) by mouth every 8 hours as needed for nausea   Quantity:  20 tablet   Refills:  3       pantoprazole 40 MG EC tablet   Commonly known as:  PROTONIX   Used for:  Gastroesophageal reflux disease without esophagitis        TAKE ONE TABLET BY MOUTH EVERY DAY   Quantity:  90 tablet   Refills:  2       potassium chloride 10 MEQ tablet   Commonly known as:  K-TAB,KLOR-CON   Used for:  Hypokalemia        TAKE ONE TABLET BY MOUTH THREE TIMES A DAY   Quantity:  90 tablet   Refills:  5       * predniSONE 5 MG tablet   Commonly known as:  DELTASONE   Used for:  Pulmonary emphysema, unspecified emphysema type (H)        Dose:  5 mg   Take 1 tablet (5 mg) by mouth daily   Quantity:  15 tablet   Refills:  0       * predniSONE 10 MG tablet   Commonly known as:  DELTASONE   Used for:  Contrast media allergy, COPD (chronic obstructive pulmonary disease) (H)   Ask about: Should I take this medication?        Dose:  60 mg   Take 6 tablets (60 mg) by mouth once for 1 dose   Quantity:  6 tablet   Refills:  0       * predniSONE 10 MG tablet   Commonly known as:  DELTASONE   Used for:  COPD (chronic obstructive pulmonary disease) (H), Contrast media allergy   Ask about: Should I take this medication?        Dose:  30 mg   Take 3 tablets (30 mg) by mouth once for 1 dose   Quantity:  3 tablet   Refills:  0       roflumilast 500 MCG Tabs tablet   Commonly known as:  DALIRESP        Dose:  500 mcg   Take 1 tablet (500 mcg) by mouth daily   Refills:  0       SPIRIVA HANDIHALER 18 MCG capsule   Used for:  Moderate persistent asthma without complication   Generic drug:  tiotropium        USING THE HANDIHALER, INHALE THE CONTENTS OF ONE CAPSULE BY MOUTH DAILY   Quantity:  90 capsule   Refills:  1       SUMAtriptan 25 MG tablet    Commonly known as:  IMITREX   Used for:  Chronic migraine without aura without status migrainosus, not intractable        Dose:  25 mg   Take 1 tablet (25 mg) by mouth at onset of headache for migraine May repeat in 2 hours if needed: max 2/day;   Quantity:  9 tablet   Refills:  11       TYLENOL EXTRA STRENGTH PO   Used for:  Abdominal pain, right upper quadrant        1 TABLET EVERY 4 HOURS AS NEEDED   Refills:  0       * Notice:  This list has 3 medication(s) that are the same as other medications prescribed for you. Read the directions carefully, and ask your doctor or other care provider to review them with you.             Protect others around you: Learn how to safely use, store and throw away your medicines at www.disposemymeds.org.         Follow-ups after your visit        Your next 10 appointments already scheduled     Apr 19, 2018 10:30 AM CDT   (Arrive by 10:15 AM)   New Patient Visit with Nikolai Gore MD   [facility] ([facility])    [address] [place] [address]  [address]  [facility] [address]   [phone]            Apr 19, 2018 11:15 AM CDT   Lab with  LAB    Health Lab ([facility])    [address]  1st Floor  [facility] [address]   [phone]            Apr 19, 2018   Procedure with Rober Martinez MD   [facility], Endoscopy ([facility])    [address] [address]   [phone]           [facility] is located on the corner of [address] and [address] on the [facility]. It is easily accessible from virtually any point in the [facility] area, via [address] and [address].            Apr 20, 2018  8:30 AM CDT   Pulmonary Eval with  Pulmonary Rehab 1   [facility] ([facility])    [address] [place] [address] 1st Floor  [address]  [facility] [address]   [phone]            Apr 20, 2018 10:00 AM CDT   (Arrive by 9:45 AM)   SOT SOCIAL WORK EVAL with MIRIAM Nice   [facility] Solid Organ Transplant ([facility])    [address]  [address]  [facility] [address]   [phone]            Apr 20, 2018 11:30 AM CDT   NUTRITION VISIT with Diamante Bruner RD   [facility] Solid Organ Transplant ([facility])    [address]  [address]  [facility] [address]   [phone]            Apr 20, 2018 12:00 PM CDT   (Arrive by 11:45 AM)   CT CHEST W/O CONTRAST with UCCT1   [facility] ([facility])    [address] [place] [address]  1st Floor  [facility] [address] [phone]           Please bring any scans or X-rays taken at other hospitals, if similar tests were done. Also bring a list of your medicines, including vitamins, minerals and over-the-counter drugs. It is safest to leave personal items at home.  Be sure to tell your doctor:   If you have any allergies.   If there s any chance you are pregnant.   If you are breastfeeding.  You do not need to do anything special to prepare for this exam.  Please wear loose clothing, such as a sweat suit or jogging clothes. Avoid snaps, zippers and other metal. We may ask you to undress and put on a hospital gown.            Apr 20, 2018 12:30 PM CDT   (Arrive by 12:15 PM)   SOT CARE COORDINATOR EVAL with Elisa Roger RN   [facility] Solid Organ Transplant ([facility])    [address]  [address]  [facility] [address]   [phone]            Apr 23, 2018  2:00 PM CDT   Pulmonary Treatment with Rh Pulmonary Rehab 1   [facility] ([facility])    [address] [place] [address], [address]  [facility] [address]   [phone]            Apr 23, 2018  4:20 PM CDT   MyChart Long with Bandar Weinberg MD   [place]  [facility] ([facility])    [address]  [facility] [address]   [phone]               Care Instructions        Further instructions from your care team       [facility]                        Interventional Cardiology  Discharge Instructions   Post Right Heart Cath      AFTER YOU GO HOME:    DO drink plenty of fluids    DO resume your regular diet and medications unless otherwise instructed by your Primary Physician    Do Not scrub the procedure site vigorously    No lotion or powder to the puncture site for 3 days    CALL YOUR PRIMARY PHYSICIAN IF: You may resume all normal activity.  Monitor neck site for bleeding, swelling, or voice changes. If you notice bleeding or swelling immediately apply pressure to the site and call number below to speak with Cardiology Fellow.  If you experience any changes in your breathing you should call your doctor immediately or come to the closest Emergency Department.  Do not drive yourself.    ADDITIONAL INSTRUCTIONS: Medications: You are to resume all home medications including anticoagulation therapy unless otherwise advised by your primary cardiologist or nurse coordinator.    Follow Up: Per your primary cardiology team    If you have any questions or concerns regarding your procedure site please call [phone] at anytime and ask for Cardiology Fellow on call.  They are available 24 hours a day.  You may also contact the Cardiology Clinic after hours number at [phone].                                                       Telephone Numbers [phone] Monday-Friday 8:00 am to 4:30 pm    [phone] After 4:30 pm Monday-Friday, Weekends & Holidays  Ask for Interventional Cardiologist on call. Someone is on call 24 hours/day   [facility] toll free number [phone] Monday-Friday 8:00 am to 4:30 pm   [facility] Emergency Dept [phone]                   Statement of Approval     Ordered           "04/18/18 1243  I have reviewed and agree with all the recommendations and orders detailed in this document.  EFFECTIVE NOW     Approved and electronically signed by:  Jose Davis MD              Additional Information About Your Visit        MyChart Information     [facility] gives you secure access to your electronic health record. If you see a primary care provider, you can also send messages to your care team and make appointments. If you have questions, please call your primary care clinic.  If you do not have a primary care provider, please call [phone] and they will assist you.        Care EveryWhere ID     This is your Care EveryWhere ID. This could be used by other organizations to access your [facility] medical records  [id number]        Your Vitals Were     Blood Pressure Pulse Temperature Respirations Height Weight    137/77 99 98  F (36.7  C) (Oral) 16 1.778 m (5' 10\") 82 kg (180 lb 12.4 oz)    Last Period Pulse Oximetry BMI (Body Mass Index)             09/29/2005 97% 25.94 kg/m2          Primary Care Provider Office Phone # Fax #    Bandar Weinberg [phone]      Equal Access to Services     [facility] AH: Hadii aad ku hadasho Soomaali, waaxda luqadaha, qaybta kaalmada adeegyada, jonathan blandn frank burris . So [facility] [phone].    ATENCIÓN: Si habla español, tiene a madison disposición servicios gratuitos de asistencia lingüística. Llame al [phone].    We comply with applicable federal civil rights laws and [place] laws. We do not discriminate on the basis of race, color, national origin, age, disability, sex, sexual orientation, or gender identity.            Thank you!     Thank you for choosing [facility] for your care. Our goal is always to provide you with excellent care. Hearing back from our patients is one way we can continue to improve our services. Please take a few minutes to complete the written survey that you may receive in the " mail after you visit with us. Thank you!             Medication List: This is a list of all your medications and when to take them. Check marks below indicate your daily home schedule. Keep this list as a reference.      Medications           Morning Afternoon Evening Bedtime As Needed    ADVAIR DISKUS 500-50 MCG/DOSE diskus inhaler   INHALE 1 PUFF TWO TIMES DAILY   Generic drug:  fluticasone-salmeterol                                albuterol 108 (90 Base) MCG/ACT Inhaler   Commonly known as:  VENTOLIN HFA   Inhale 2 puffs into the lungs every 6 hours as needed for shortness of breath / dyspnea or wheezing                                ALPRAZolam 0.25 MG tablet   Commonly known as:  XANAX   Take 1 tablet by mouth. Every twelve hours as needed for anxiety                                atorvastatin 10 MG tablet   Commonly known as:  LIPITOR   Takes 3 times a week                                baclofen 10 MG tablet   Commonly known as:  LIORESAL   TAKE ONE TABLET BY MOUTH THREE TIMES A DAY AS NEEDED FOR MUSCLE SPASMS                                [employer] FIBER GUMMIES 2.5 g Chew   Take 1 tablet by mouth daily Reported on 5/8/2017                                fluticasone 50 MCG/ACT spray   Commonly known as:  FLONASE   SPRAY 1 TO 2 SPRAYS IN EACH NOSTRIL ONCE DAILY                                furosemide 20 MG tablet   Commonly known as:  LASIX   TAKE 2 TABLETS (40 MG) BY MOUTH DAILY SEVERE SWELLING                                HYDROcodone-acetaminophen 5-325 MG per tablet   Commonly known as:  NORCO   Take 1-2 tablets by mouth every 8 hours as needed for pain Maximum 6 daily                                ipratropium - albuterol 0.5 mg/2.5 mg/3 mL 0.5-2.5 (3) MG/3ML neb solution   Commonly known as:  DUONEB   Take 1 vial (3 mLs) by nebulization every 6 hours as needed for shortness of breath / dyspnea or wheezing                                lidocaine 5 % Patch   Commonly known as:  LIDODERM   Apply up to 3  patches to painful area at once for up to 12 h within a 24 h period.  Remove after 12 hours.                                lisinopril 2.5 MG tablet   Commonly known as:  PRINIVIL/Zestril   Take 1 tablet (2.5 mg) by mouth daily                                mometasone 0.1 % ointment   Commonly known as:  ELOCON   Apply sparingly to affected area twice daily as needed.  Do not apply to face.                                montelukast 10 MG tablet   Commonly known as:  SINGULAIR   Take 1 tablet (10 mg) by mouth At Bedtime                                Multiple Vitamin Chew   Take 1 tablet by mouth daily                                nitroGLYcerin 0.4 MG sublingual tablet   Commonly known as:  NITROSTAT   Place 1 tablet (0.4 mg) under the tongue every 5 minutes as needed for chest pain If you are still having symptoms after 3 doses (15 minutes) call 911.                                ondansetron 4 MG ODT tab   Commonly known as:  ZOFRAN ODT   Take 1-2 tablets (4-8 mg) by mouth every 8 hours as needed for nausea                                pantoprazole 40 MG EC tablet   Commonly known as:  PROTONIX   TAKE ONE TABLET BY MOUTH EVERY DAY                                potassium chloride 10 MEQ tablet   Commonly known as:  K-TAB,KLOR-CON   TAKE ONE TABLET BY MOUTH THREE TIMES A DAY                                * predniSONE 5 MG tablet   Commonly known as:  DELTASONE   Take 1 tablet (5 mg) by mouth daily                                roflumilast 500 MCG Tabs tablet   Commonly known as:  DALIRESP   Take 1 tablet (500 mcg) by mouth daily                                SPIRIVA HANDIHALER 18 MCG capsule   USING THE HANDIHALER, INHALE THE CONTENTS OF ONE CAPSULE BY MOUTH DAILY   Generic drug:  tiotropium                                SUMAtriptan 25 MG tablet   Commonly known as:  IMITREX   Take 1 tablet (25 mg) by mouth at onset of headache for migraine May repeat in 2 hours if needed: max 2/day;                                 TYLENOL EXTRA STRENGTH PO   1 TABLET EVERY 4 HOURS AS NEEDED                                * Notice:  This list has 1 medication(s) that are the same as other medications prescribed for you. Read the directions carefully, and ask your doctor or other care provider to review them with you.      ASK your doctor about these medications           Morning Afternoon Evening Bedtime As Needed    * predniSONE 10 MG tablet   Commonly known as:  DELTASONE   Take 6 tablets (60 mg) by mouth once for 1 dose   Ask about: Should I take this medication?                                * predniSONE 10 MG tablet   Commonly known as:  DELTASONE   Take 3 tablets (30 mg) by mouth once for 1 dose   Ask about: Should I take this medication?                                * Notice:  This list has 2 medication(s) that are the same as other medications prescribed for you. Read the directions carefully, and ask your doctor or other care provider to review them with you.

## 2018-04-18 NOTE — PROGRESS NOTES
D: Pt arrived in cath lab for Right Heart Catheterization  I: Right heart catheterization completed per MD.  7fr sheath removed from RIJ site, manual pressure applied until hemostasis achieved.  A: RIJ site clean, dry and intact. Soft, no hematoma.  P: Pt to transfer to  for discharge.  Pt educated on watching neck site for bleeding, hematoma, pressure on airway and voice changes.      Report called.

## 2018-04-18 NOTE — PROGRESS NOTES
"1355:  Received from CCL post RHC; Right neck band aid dressing CDI and flat. Patient crying saying she didn't know the procedure was going to hurt but the right neck site didn't hurt. Stated \"If they would have gone thru my groin I would have received pain medication.\"  Requesting orals.  1420:  Reviewed discharge instructions. Copy given to patient. Patient c/o her right neck and heart feeling sore, but she had spoken to Dr. Santos about her pain during the procedure and she was told her heart was \"bruised\".  Patient also stated she has nodules in her right neck near the site of the right cath placement. Stated she wanted to go home and would call if there were any issues. Ice pack given for sore right neck. Right neck remains CDI and flat. Tolerated orals. States here breathing is fine. 1345:  Discharged to home.  Patient walked self accompanied by spouse to vehicle.   "

## 2018-04-18 NOTE — DISCHARGE INSTRUCTIONS
[facility]                        Interventional Cardiology  Discharge Instructions   Post Right Heart Cath      AFTER YOU GO HOME:    DO drink plenty of fluids    DO resume your regular diet and medications unless otherwise instructed by your Primary Physician    Do Not scrub the procedure site vigorously    No lotion or powder to the puncture site for 3 days    CALL YOUR PRIMARY PHYSICIAN IF: You may resume all normal activity.  Monitor neck site for bleeding, swelling, or voice changes. If you notice bleeding or swelling immediately apply pressure to the site and call number below to speak with Cardiology Fellow.  If you experience any changes in your breathing you should call your doctor immediately or come to the closest Emergency Department.  Do not drive yourself.    ADDITIONAL INSTRUCTIONS: Medications: You are to resume all home medications including anticoagulation therapy unless otherwise advised by your primary cardiologist or nurse coordinator.    Follow Up: Per your primary cardiology team    If you have any questions or concerns regarding your procedure site please call [phone] at anytime and ask for Cardiology Fellow on call.  They are available 24 hours a day.  You may also contact the Cardiology Clinic after hours number at [phone].                                                       Telephone Numbers [phone] Monday-Friday 8:00 am to 4:30 pm    [phone] After 4:30 pm Monday-Friday, Weekends & Holidays  Ask for Interventional Cardiologist on call. Someone is on call 24 hours/day   [facility] toll free number [phone] Monday-Friday 8:00 am to 4:30 pm   [facility] Emergency Dept [phone]

## 2018-04-19 ENCOUNTER — OFFICE VISIT (OUTPATIENT)
Dept: CARDIOLOGY | Facility: CLINIC | Age: 63
End: 2018-04-19
Attending: INTERNAL MEDICINE
Payer: COMMERCIAL

## 2018-04-19 VITALS
OXYGEN SATURATION: 97 % | HEIGHT: 70 IN | HEART RATE: 96 BPM | DIASTOLIC BLOOD PRESSURE: 79 MMHG | SYSTOLIC BLOOD PRESSURE: 106 MMHG | BODY MASS INDEX: 25.61 KG/M2 | WEIGHT: 178.9 LBS

## 2018-04-19 DIAGNOSIS — J42 CHRONIC BRONCHITIS, UNSPECIFIED CHRONIC BRONCHITIS TYPE (H): Primary | ICD-10-CM

## 2018-04-19 LAB
A* LOCUS NMDP: NORMAL
A* LOCUS: NORMAL
A* NMDP: NORMAL
A*: NORMAL
ABTEST METHOD: NORMAL
B* LOCUS NMDP: NORMAL
B* LOCUS: NORMAL
B* NMDP: NORMAL
B*: NORMAL
BW-1: NORMAL
C* LOCUS NMDP: NORMAL
C* LOCUS: NORMAL
C* NMDP: NORMAL
C*: NORMAL
DPA1* LOCUS NMDP: NORMAL
DPA1* NMDP: NORMAL
DPA1*: NORMAL
DPA1*LOCUS: NORMAL
DPB1* LOCUS NMDP: NORMAL
DPB1* NMDP: NORMAL
DPB1*: NORMAL
DPB1*LOCUS: NORMAL
DQA1*: NORMAL
DQA1*LOCUS: NORMAL
DQB1* LOCUS NMDP: NORMAL
DQB1* LOCUS: NORMAL
DQB1* NMDP: NORMAL
DQB1*: NORMAL
DRB1* LOCUS NMDP: NORMAL
DRB1* LOCUS: NORMAL
DRB1* NMDP: NORMAL
DRB1*: NORMAL
DRB3* LOCUS NMDP: NORMAL
DRB3* LOCUS: NORMAL
DRB3* NMDP: NORMAL
DRSSO TEST METHOD: NORMAL
INTERPRETATION ECG - MUSE: NORMAL
PROTOCOL CUTOFF: NORMAL
SA1 CELL: NORMAL
SA1 COMMENTS: NORMAL
SA1 HI RISK ABY: NORMAL
SA1 MOD RISK ABY: NORMAL
SA1 TEST METHOD: NORMAL
SA2 CELL: NORMAL
SA2 COMMENTS: NORMAL
SA2 HI RISK ABY UA: NORMAL
SA2 MOD RISK ABY: NORMAL
SA2 TEST METHOD: NORMAL
TPMT BLD-CCNC: 23.8 U/ML (ref 24–44)
UNACCEPTABLE ANTIGEN: NORMAL

## 2018-04-19 PROCEDURE — G0463 HOSPITAL OUTPT CLINIC VISIT: HCPCS | Mod: 25,ZF

## 2018-04-19 ASSESSMENT — PAIN SCALES - GENERAL: PAINLEVEL: NO PAIN (0)

## 2018-04-19 NOTE — LETTER
4/19/2018      RE: Trisha Bender  [id number] [facility] [id number]       Dear Colleague,    Thank you for the opportunity to participate in the care of your patient, Trisha Bender, at the [facility]. Please see a copy of my visit note below.    Cardiothoracic surgery lung transplant consultation      Trisha Bender MRN# [id number]   YOB: 1955 Age: 62 year old   Date of Service: April 19, 2018         Reason for consult: Trisha Bender is a 62 year old  female with life threatening end stage lung disease who is undergoing evaluation for lung transplant.           Assessment and Plan:   I recommend lung transplantation. I discussed the technical details of the procedure with the patient including the possibility of single versus double lung transplant, as well as the possibility of a sternotomy incision, bilateral anterolateral thoracotomy with transverse sternotomy (clamshell), and thoracotomy incisions. I also discussed the possibility of pre- or post-operative extracorporeal membrane oxygenation. I also explained the expected postoperative course and recovery, including the likelihood of a prolonged hospitalization. The patient understands the risks and benefits of the procedure. The risks include but are not limited to bleeding, infection including opportunistic infection, stroke, primary graft dysfunction, respiratory failure possibly requiring prolonged ventilation and tracheostomy, lifelong need for immunosuppression, acute or chronic rejection, renal failure, hepatic insufficiency, visceral or limb ischemia, and death. The patient understands these risks and wishes to undergo the operation. After discussion in multidisciplinary lung transplant conference, listing may be finalized.             Chief Complaint:   Trisha Bender is a 62 year old female who complains of chronic dyspnea.           History  of Present Illness:   This patient is a 62 year old female who presents with dyspnea secondary to her severe COPD. She is on home oxygen therapuy and chronic low dose prednisone. She has been able to travel recently for some important family commitments. She also has a history of Takotsubo's cardiomyopathy in 2015 which seems to have resolved completely.     She currently denies fevers, chills or productive cough.              Past Medical History:     Past Medical History:   Diagnosis Date     Anxiety      CAD (coronary artery disease) 7/2/15    mild per cath     CHF (congestive heart failure) (H)     EF=20-25% per echo 6/30/15     Chronic airway obstruction, not elsewhere classified     FEV1 36% of pred; VC 67%  in Jan 06.     COPD (chronic obstructive pulmonary disease) (H)      Esophageal reflux      Mild persistent asthma 8/30/2012     OSTEOPENIA      Other and unspecified hyperlipidemia      Other forms of migraine, without mention of intractable migraine without mention of status migrainosus      Takotsubo cardiomyopathy      Unspecified essential hypertension              Past Surgical History:     Past Surgical History:   Procedure Laterality Date     ANGIOGRAM  07/02/2015    Minimal CAD. LV dysfunction, Mid RCA 20% stenosis, EF 20-25%, c/w Takotsubo cardiomyopathy     APPENDECTOMY       C NONSPECIFIC PROCEDURE  1974    GB and appendectomy  abstracted [id number]     C NONSPECIFIC PROCEDURE      Nasal septoplasty and mucous retention cyst     CHOLECYSTECTOMY  1974     ESOPHAGOSCOPY, GASTROSCOPY, DUODENOSCOPY (EGD), COMBINED  11/15/2011    Procedure:COMBINED ESOPHAGOSCOPY, GASTROSCOPY, DUODENOSCOPY (EGD);  ESOPHAGOSCOPY, GASTROSCOPY, DUODENOSCOPY (EGD) ; Surgeon:JIM URENA; Location: GI     HC REMOVE TONSILS/ADENOIDS,<11 Y/O       INJECT EPIDURAL LUMBAR / SACRAL SINGLE N/A 4/13/2018    Procedure: INJECT EPIDURAL LUMBAR / SACRAL CONTINUAL OR INTERMITTENT;  Lumbar Epidural Steroid Injection;  Surgeon:  Az Enamorado MD;  Location: [facility] OR     LAPAROSCOPIC ABLATION ENDOMETRIOSIS  1998     wisdom teeth                 Social History:     Social History   Substance Use Topics     Smoking status: Former Smoker     Packs/day: 1.00     Years: 30.00     Quit date: 2006     Smokeless tobacco: Never Used      Comment: Quit in 2006     Alcohol use No             Family History:     Family History   Problem Relation Age of Onset     CANCER Mother      Colon cancer; dxed at age 64;  at age 69     Cancer - colorectal Mother      Other Cancer Mother      GASTROINTESTINAL DISEASE Sister      Acute pancreatitis     HEART DISEASE Father      ?     Coronary Artery Disease Father      Circulatory Maternal Aunt      AAA     Circulatory Maternal Uncle      AAA     DIABETES Son              Immunizations:     Immunization History   Administered Date(s) Administered     Influenza (H1N1) 2009     Influenza (IIV3) PF 2001, 2002, 2005, 10/31/2006, 10/30/2007, 11/10/2008, 10/09/2010, 2011, 10/01/2012, 2013, 2016     Influenza Vaccine IM 3yrs+ 4 Valent IIV4 10/24/2014, 10/20/2015, 10/11/2017     Pneumo Conj 13-V (2010&after) 2015     Pneumococcal 23 valent 10/31/2006     TD (ADULT, 7+) 10/09/1997, 10/11/2017     TDAP Vaccine (Adacel) 10/30/2007     Zoster vaccine, live 2014             Allergies:     Allergies   Allergen Reactions     Gabapentin Swelling     Aspirin      Contrast Dye      Iodine     Ibuprofen      Peanuts [Nuts]      Penicillins      rash     Sulfa Drugs      Tace [Chlorotrianisene]      joint swelling     Strawberry Rash             Medications:     Current Outpatient Prescriptions Ordered in Epic   Medication     ADVAIR DISKUS 500-50 MCG/DOSE diskus inhaler     albuterol (VENTOLIN HFA) 108 (90 BASE) MCG/ACT Inhaler     ALPRAZolam (XANAX) 0.25 MG tablet     atorvastatin (LIPITOR) 10 MG tablet     baclofen (LIORESAL) 10 MG tablet     [employer] FIBER GUMMIES 2.5 G  CHEW     fluticasone (FLONASE) 50 MCG/ACT spray     furosemide (LASIX) 20 MG tablet     HYDROcodone-acetaminophen (NORCO) 5-325 MG per tablet     ipratropium - albuterol 0.5 mg/2.5 mg/3 mL (DUONEB) 0.5-2.5 (3) MG/3ML neb solution     lidocaine (LIDODERM) 5 % Patch     lisinopril (PRINIVIL/ZESTRIL) 2.5 MG tablet     mometasone (ELOCON) 0.1 % ointment     montelukast (SINGULAIR) 10 MG tablet     Multiple Vitamin CHEW     ondansetron (ZOFRAN ODT) 4 MG ODT tab     pantoprazole (PROTONIX) 40 MG EC tablet     potassium chloride (K-TAB,KLOR-CON) 10 MEQ tablet     predniSONE (DELTASONE) 5 MG tablet     roflumilast (DALIRESP) 500 MCG TABS tablet     SPIRIVA HANDIHALER 18 MCG capsule     SUMAtriptan (IMITREX) 25 MG tablet     TYLENOL EXTRA STRENGTH OR     nitroglycerin (NITROSTAT) 0.4 MG SL tablet     No current Epic-ordered facility-administered medications on file.              Review of Systems:   The 10 point Review of Systems is negative other than noted in the HPI            Physical Exam:   Vitals were reviewed      BP: 106/79 Pulse: 96     SpO2: 97 %      Constitutional:   awake, alert, cooperative, no apparent distress, and appears stated age     Eyes:   Lids and lashes normal, pupils equal, round and reactive to light, extra ocular muscles intact, sclera clear, conjunctiva normal     ENT:   normocepalic, without obvious abnormality     Lungs:   no increased work of breathing, good air exchange and no retractions     Cardiovascular:   regular rate and rhythm     Chest / Breast:   No incisions         Musculoskeletal:   no lower extremity pitting edema present  there is no redness, warmth, or swelling of the joints  full range of motion noted  motor strength is 5 out of 5 all extremities bilaterally  tone is normal     Neurologic:   Mental Status Exam:  Level of Alertness:   awake  Orientation:   person, place, time  Motor Exam:  moves all extremities well and symmetrically     Neuropsychiatric:   General: normal,  calm and normal eye contact  Level of consciousness: alert / normal  Affect: normal     Skin:   no bruising or bleeding, normal skin color, texture, turgor and no redness, warmth, or swelling          Data:   All laboratory data reviewed  All cardiac studies reviewed by me.  All imaging studies reviewed by me.    CT CHEST  1. ACR Assessment Category:  Lung-RADS Category 2. Benign appearance  or behavior. Recommendation: Continue annual screening. .   2. Significant Incidental Finding(s):  Category S: No.     NUC MED LUNG  Quantitative evaluation shows 51.61% contribution of the right lung as  compared to 48.39% contribution of the left lung.    CARDIAC CATH  HEMODYNAMICS:  BSA 2  1. HR 94 bpm  2. /63/90 mmHg  3. RA 10   4. RV 35/10  5. PA 35/20/25   6. PCW 13   7. PA sat 72%   8. PCW sat 92%  9. Hgb 11 g/dL   10. Claudia CO 6.01  11. Claudia CI 3.01   12. TD CO 5.97   13. TD CI 2.99   14. PVR 1.8     LEFT HEART CATH 7/2015  No obstructive CAD     TTE  Interpretation Summary  Global and regional left ventricular function is normal with an EF of 55-60%.  Right ventricular function, chamber size, wall motion, and thickness are  normal.  The atrial septum is intact as assessed by agitated dextrose bubble study .  The inferior vena cava is normal.  There has been no change.      Please do not hesitate to contact me if you have any questions/concerns.     Sincerely,   Nikolai Gore MD

## 2018-04-19 NOTE — PROGRESS NOTES
Cardiothoracic surgery lung transplant consultation      Trisha Bender MRN# [id number]   YOB: 1955 Age: 62 year old   Date of Service: April 19, 2018         Reason for consult: Trisha Bender is a 62 year old  female with life threatening end stage lung disease who is undergoing evaluation for lung transplant.           Assessment and Plan:   I recommend lung transplantation. I discussed the technical details of the procedure with the patient including the possibility of single versus double lung transplant, as well as the possibility of a sternotomy incision, bilateral anterolateral thoracotomy with transverse sternotomy (clamshell), and thoracotomy incisions. I also discussed the possibility of pre- or post-operative extracorporeal membrane oxygenation. I also explained the expected postoperative course and recovery, including the likelihood of a prolonged hospitalization. The patient understands the risks and benefits of the procedure. The risks include but are not limited to bleeding, infection including opportunistic infection, stroke, primary graft dysfunction, respiratory failure possibly requiring prolonged ventilation and tracheostomy, lifelong need for immunosuppression, acute or chronic rejection, renal failure, hepatic insufficiency, visceral or limb ischemia, and death. The patient understands these risks and wishes to undergo the operation. After discussion in multidisciplinary lung transplant conference, listing may be finalized.             Chief Complaint:   Trisha Bender is a 62 year old female who complains of chronic dyspnea.           History of Present Illness:   This patient is a 62 year old female who presents with dyspnea secondary to her severe COPD. She is on home oxygen therapuy and chronic low dose prednisone. She has been able to travel recently for some important family commitments. She also has a history of Takotsubo's cardiomyopathy in 2015 which seems  to have resolved completely.     She currently denies fevers, chills or productive cough.              Past Medical History:     Past Medical History:   Diagnosis Date     Anxiety      CAD (coronary artery disease) 7/2/15    mild per cath     CHF (congestive heart failure) (H)     EF=20-25% per echo 6/30/15     Chronic airway obstruction, not elsewhere classified     FEV1 36% of pred; VC 67%  in Jan 06.     COPD (chronic obstructive pulmonary disease) (H)      Esophageal reflux      Mild persistent asthma 8/30/2012     OSTEOPENIA      Other and unspecified hyperlipidemia      Other forms of migraine, without mention of intractable migraine without mention of status migrainosus      Takotsubo cardiomyopathy      Unspecified essential hypertension              Past Surgical History:     Past Surgical History:   Procedure Laterality Date     ANGIOGRAM  07/02/2015    Minimal CAD. LV dysfunction, Mid RCA 20% stenosis, EF 20-25%, c/w Takotsubo cardiomyopathy     APPENDECTOMY       C NONSPECIFIC PROCEDURE  1974    GB and appendectomy  abstracted [id number]     C NONSPECIFIC PROCEDURE      Nasal septoplasty and mucous retention cyst     CHOLECYSTECTOMY  1974     ESOPHAGOSCOPY, GASTROSCOPY, DUODENOSCOPY (EGD), COMBINED  11/15/2011    Procedure:COMBINED ESOPHAGOSCOPY, GASTROSCOPY, DUODENOSCOPY (EGD);  ESOPHAGOSCOPY, GASTROSCOPY, DUODENOSCOPY (EGD) ; Surgeon:JIM URENA; Location:[facility]     HC REMOVE TONSILS/ADENOIDS,<11 Y/O       INJECT EPIDURAL LUMBAR / SACRAL SINGLE N/A 4/13/2018    Procedure: INJECT EPIDURAL LUMBAR / SACRAL CONTINUAL OR INTERMITTENT;  Lumbar Epidural Steroid Injection;  Surgeon: Az Enamorado MD;  Location: [facility] OR     LAPAROSCOPIC ABLATION ENDOMETRIOSIS  1998     wisdom teeth                 Social History:     Social History   Substance Use Topics     Smoking status: Former Smoker     Packs/day: 1.00     Years: 30.00     Quit date: 1/17/2006     Smokeless tobacco: Never Used      Comment: Quit in 2006      Alcohol use No             Family History:     Family History   Problem Relation Age of Onset     CANCER Mother      Colon cancer; dxed at age 64;  at age 69     Cancer - colorectal Mother      Other Cancer Mother      GASTROINTESTINAL DISEASE Sister      Acute pancreatitis     HEART DISEASE Father      ?     Coronary Artery Disease Father      Circulatory Maternal Aunt      AAA     Circulatory Maternal Uncle      AAA     DIABETES Son              Immunizations:     Immunization History   Administered Date(s) Administered     Influenza (H1N1) 2009     Influenza (IIV3) PF 2001, 2002, 2005, 10/31/2006, 10/30/2007, 11/10/2008, 10/09/2010, 2011, 10/01/2012, 2013, 2016     Influenza Vaccine IM 3yrs+ 4 Valent IIV4 10/24/2014, 10/20/2015, 10/11/2017     Pneumo Conj 13-V (2010&after) 2015     Pneumococcal 23 valent 10/31/2006     TD (ADULT, 7+) 10/09/1997, 10/11/2017     TDAP Vaccine (Adacel) 10/30/2007     Zoster vaccine, live 2014             Allergies:     Allergies   Allergen Reactions     Gabapentin Swelling     Aspirin      Contrast Dye      Iodine     Ibuprofen      Peanuts [Nuts]      Penicillins      rash     Sulfa Drugs      Tace [Chlorotrianisene]      joint swelling     Strawberry Rash             Medications:     Current Outpatient Prescriptions Ordered in [facility]   Medication     ADVAIR DISKUS 500-50 MCG/DOSE diskus inhaler     albuterol (VENTOLIN HFA) 108 (90 BASE) MCG/ACT Inhaler     ALPRAZolam (XANAX) 0.25 MG tablet     atorvastatin (LIPITOR) 10 MG tablet     baclofen (LIORESAL) 10 MG tablet     [employer] FIBER GUMMIES 2.5 G CHEW     fluticasone (FLONASE) 50 MCG/ACT spray     furosemide (LASIX) 20 MG tablet     HYDROcodone-acetaminophen (NORCO) 5-325 MG per tablet     ipratropium - albuterol 0.5 mg/2.5 mg/3 mL (DUONEB) 0.5-2.5 (3) MG/3ML neb solution     lidocaine (LIDODERM) 5 % Patch     lisinopril (PRINIVIL/ZESTRIL) 2.5 MG tablet     mometasone (ELOCON)  0.1 % ointment     montelukast (SINGULAIR) 10 MG tablet     Multiple Vitamin CHEW     ondansetron (ZOFRAN ODT) 4 MG ODT tab     pantoprazole (PROTONIX) 40 MG EC tablet     potassium chloride (K-TAB,KLOR-CON) 10 MEQ tablet     predniSONE (DELTASONE) 5 MG tablet     roflumilast (DALIRESP) 500 MCG TABS tablet     SPIRIVA HANDIHALER 18 MCG capsule     SUMAtriptan (IMITREX) 25 MG tablet     TYLENOL EXTRA STRENGTH OR     nitroglycerin (NITROSTAT) 0.4 MG SL tablet     No current Epic-ordered facility-administered medications on file.              Review of Systems:   The 10 point Review of Systems is negative other than noted in the HPI            Physical Exam:   Vitals were reviewed      BP: 106/79 Pulse: 96     SpO2: 97 %      Constitutional:   awake, alert, cooperative, no apparent distress, and appears stated age     Eyes:   Lids and lashes normal, pupils equal, round and reactive to light, extra ocular muscles intact, sclera clear, conjunctiva normal     ENT:   normocepalic, without obvious abnormality     Lungs:   no increased work of breathing, good air exchange and no retractions     Cardiovascular:   regular rate and rhythm     Chest / Breast:   No incisions         Musculoskeletal:   no lower extremity pitting edema present  there is no redness, warmth, or swelling of the joints  full range of motion noted  motor strength is 5 out of 5 all extremities bilaterally  tone is normal     Neurologic:   Mental Status Exam:  Level of Alertness:   awake  Orientation:   person, place, time  Motor Exam:  moves all extremities well and symmetrically     Neuropsychiatric:   General: normal, calm and normal eye contact  Level of consciousness: alert / normal  Affect: normal     Skin:   no bruising or bleeding, normal skin color, texture, turgor and no redness, warmth, or swelling          Data:   All laboratory data reviewed  All cardiac studies reviewed by me.  All imaging studies reviewed by me.    CT CHEST  1. ACR  Assessment Category:  Lung-RADS Category 2. Benign appearance  or behavior. Recommendation: Continue annual screening. .   2. Significant Incidental Finding(s):  Category S: No.       [facility] MED LUNG  Quantitative evaluation shows 51.61% contribution of the right lung as  compared to 48.39% contribution of the left lung.    CARDIAC CATH  HEMODYNAMICS:  BSA 2  1. HR 94 bpm  2. /63/90 mmHg  3. RA 10   4. RV 35/10  5. PA 35/20/25   6. PCW 13   7. PA sat 72%   8. PCW sat 92%  9. Hgb 11 g/dL   10. Claudia CO 6.01  11. Claudia CI 3.01   12. TD CO 5.97   13. TD CI 2.99   14. PVR 1.8     LEFT HEART CATH 7/2015  No obstructive CAD     TTE  Interpretation Summary  Global and regional left ventricular function is normal with an EF of 55-60%.  Right ventricular function, chamber size, wall motion, and thickness are  normal.  The atrial septum is intact as assessed by agitated dextrose bubble study .  The inferior vena cava is normal.  There has been no change.

## 2018-04-19 NOTE — NURSING NOTE
Chief Complaint   Patient presents with     New Patient     pre lung Eval      Vitals were taken and medications were reconciled.     Tere Carrasco RMA  10:47 AM

## 2018-04-19 NOTE — MR AVS SNAPSHOT
After Visit Summary   4/19/2018    Trisha Bender    MRN: [id number]           Patient Information     Date Of Birth          1955        Visit Information        Provider Department      4/19/2018 10:30 AM Nikolai Gore MD [facility]        Today's Diagnoses     Chronic bronchitis, unspecified chronic bronchitis type (H)    -  1       Follow-ups after your visit        Your next 10 appointments already scheduled     May 23, 2018  3:20 PM CDT   SHORT with Bandar Weinberg MD   [facility] ([facility]    [address]  [facility] [address]   [phone]            May 29, 2018  3:00 PM CDT   Pulmonary Treatment with Rh Pulmonary Rehab 1   [facility] ([facility])    [address] [place] [address] [address]  [facility] [address]   [phone]            Jun 01, 2018  1:40 PM CDT   MyChart Long with Bandar Weinberg MD   [facility] ([facility]    [address]  [facility] [address]   [phone]              Who to contact     If you have questions or need follow up information about today's clinic visit or your schedule please contact [facility] directly at [phone].  Normal or non-critical lab and imaging results will be communicated to you by MyChart, letter or phone within 4 business days after the clinic has received the results. If you do not hear from us within 7 days, please contact the clinic through MyChart or phone. If you have a critical or abnormal lab result, we will notify you by phone as soon as possible.  Submit refill requests through [facility] or call your pharmacy and they will forward the refill request to us. Please allow 3 business days for your refill to be completed.          Additional Information About Your Visit        [employer] Information     [facility] gives you secure access to your electronic health record. If  "you see a primary care provider, you can also send messages to your care team and make appointments. If you have questions, please call your primary care clinic.  If you do not have a primary care provider, please call [phone] and they will assist you.        Care EveryWhere ID     This is your Care EveryWhere ID. This could be used by other organizations to access your [facility] medical records  [id number]        Your Vitals Were     Pulse Height Last Period Pulse Oximetry BMI (Body Mass Index)       96 1.778 m (5' 10\") 09/29/2005 97% 25.67 kg/m2        Blood Pressure from Last 3 Encounters:   04/26/18 128/78   04/23/18 110/60   04/19/18 106/79    Weight from Last 3 Encounters:   04/26/18 80.7 kg (178 lb)   04/23/18 82.6 kg (182 lb)   04/19/18 81.1 kg (178 lb 14.4 oz)              Today, you had the following     No orders found for display       Primary Care Provider Office Phone # Fax #    Bandar Weinberg [phone]       [address] [facility] [address]        Equal Access to Services     [facility]: Hadii aad ku hadasho Soomaali, waaxda luqadaha, qaybta kaalmada adeegyada, jonathan blandn frank burris ah. So [facility] [phone].    ATENCIÓN: Si habla español, tiene a madison disposición servicios gratuitos de asistencia lingüística. [facility] [phone].    We comply with applicable federal civil rights laws and [place] laws. We do not discriminate on the basis of race, color, national origin, age, disability, sex, sexual orientation, or gender identity.            Thank you!     Thank you for choosing [facility]  for your care. Our goal is always to provide you with excellent care. Hearing back from our patients is one way we can continue to improve our services. Please take a few minutes to complete the written survey that you may receive in the mail after your visit with us. Thank you!             Your Updated Medication List - Protect others around you: " Learn how to safely use, store and throw away your medicines at www.disposemymeds.org.          This list is accurate as of 4/19/18 11:59 PM.  Always use your most recent med list.                   Brand Name Dispense Instructions for use Diagnosis    ADVAIR DISKUS 500-50 MCG/DOSE diskus inhaler   Generic drug:  fluticasone-salmeterol     3 Inhaler    INHALE 1 PUFF TWO TIMES DAILY    Pulmonary emphysema, unspecified emphysema type (H)       albuterol 108 (90 Base) MCG/ACT Inhaler    VENTOLIN HFA    18 g    Inhale 2 puffs into the lungs every 6 hours as needed for shortness of breath / dyspnea or wheezing    Asthma exacerbation       atorvastatin 10 MG tablet    LIPITOR    36 tablet    Takes 3 times a week    Coronary artery disease involving native coronary artery of native heart without angina pectoris, Mixed hyperlipidemia       baclofen 10 MG tablet    LIORESAL    90 tablet    TAKE ONE TABLET BY MOUTH THREE TIMES A DAY AS NEEDED FOR MUSCLE SPASMS    Chronic bilateral low back pain with right-sided sciatica       [employer] FIBER GUMMIES 2.5 g Chew      Take 1 tablet by mouth daily Reported on 5/8/2017        fluticasone 50 MCG/ACT spray    FLONASE    16 g    SPRAY 1 TO 2 SPRAYS IN EACH NOSTRIL ONCE DAILY    Nasal congestion       furosemide 20 MG tablet    LASIX    180 tablet    TAKE 2 TABLETS (40 MG) BY MOUTH DAILY SEVERE SWELLING    Acute systolic congestive heart failure (H)       ipratropium - albuterol 0.5 mg/2.5 mg/3 mL 0.5-2.5 (3) MG/3ML neb solution    DUONEB    30 vial    Take 1 vial (3 mLs) by nebulization every 6 hours as needed for shortness of breath / dyspnea or wheezing    Moderate persistent asthma without complication       lidocaine 5 % Patch    LIDODERM    30 patch    Apply up to 3 patches to painful area at once for up to 12 h within a 24 h period.  Remove after 12 hours.    Mechanical low back pain       lisinopril 2.5 MG tablet    PRINIVIL/Zestril    90 tablet    Take 1 tablet (2.5 mg) by mouth  daily    Dilated cardiomyopathy (H)       mometasone 0.1 % ointment    ELOCON    45 g    Apply sparingly to affected area twice daily as needed.  Do not apply to face.    Intrinsic eczema       montelukast 10 MG tablet    SINGULAIR    90 tablet    Take 1 tablet (10 mg) by mouth At Bedtime    Mild persistent asthma without complication       Multiple Vitamin Chew      Take 1 tablet by mouth daily        nitroGLYcerin 0.4 MG sublingual tablet    NITROSTAT    25 tablet    Place 1 tablet (0.4 mg) under the tongue every 5 minutes as needed for chest pain If you are still having symptoms after 3 doses (15 minutes) call 911.    Acute chest pain       ondansetron 4 MG ODT tab    ZOFRAN ODT    20 tablet    Take 1-2 tablets (4-8 mg) by mouth every 8 hours as needed for nausea    Nausea       pantoprazole 40 MG EC tablet    PROTONIX    90 tablet    TAKE ONE TABLET BY MOUTH EVERY DAY    Gastroesophageal reflux disease without esophagitis       potassium chloride 10 MEQ tablet    K-TAB,KLOR-CON    90 tablet    TAKE ONE TABLET BY MOUTH THREE TIMES A DAY    Hypokalemia       roflumilast 500 MCG Tabs tablet    DALIRESP     Take 1 tablet (500 mcg) by mouth daily        SPIRIVA HANDIHALER 18 MCG capsule   Generic drug:  tiotropium     90 capsule    USING THE HANDIHALER, INHALE THE CONTENTS OF ONE CAPSULE BY MOUTH DAILY    Moderate persistent asthma without complication       SUMAtriptan 25 MG tablet    IMITREX    9 tablet    Take 1 tablet (25 mg) by mouth at onset of headache for migraine May repeat in 2 hours if needed: max 2/day;    Chronic migraine without aura without status migrainosus, not intractable       TYLENOL EXTRA STRENGTH PO      1 TABLET EVERY 4 HOURS AS NEEDED    Abdominal pain, right upper quadrant

## 2018-04-20 ENCOUNTER — ALLIED HEALTH/NURSE VISIT (OUTPATIENT)
Dept: TRANSPLANT | Facility: CLINIC | Age: 63
End: 2018-04-20
Attending: INTERNAL MEDICINE
Payer: COMMERCIAL

## 2018-04-20 ENCOUNTER — RADIANT APPOINTMENT (OUTPATIENT)
Dept: CT IMAGING | Facility: CLINIC | Age: 63
End: 2018-04-20
Attending: INTERNAL MEDICINE
Payer: COMMERCIAL

## 2018-04-20 ENCOUNTER — HOSPITAL ENCOUNTER (OUTPATIENT)
Dept: CARDIAC REHAB | Facility: CLINIC | Age: 63
End: 2018-04-20
Attending: INTERNAL MEDICINE
Payer: COMMERCIAL

## 2018-04-20 DIAGNOSIS — J44.9 COPD (CHRONIC OBSTRUCTIVE PULMONARY DISEASE) (H): Primary | ICD-10-CM

## 2018-04-20 DIAGNOSIS — J43.1 PANLOBULAR EMPHYSEMA (H): Primary | ICD-10-CM

## 2018-04-20 DIAGNOSIS — Z76.82 ORGAN TRANSPLANT CANDIDATE: ICD-10-CM

## 2018-04-20 DIAGNOSIS — J44.9 COPD (CHRONIC OBSTRUCTIVE PULMONARY DISEASE) (H): ICD-10-CM

## 2018-04-20 DIAGNOSIS — Z76.82 LUNG TRANSPLANT CANDIDATE: ICD-10-CM

## 2018-04-20 LAB
COTININE SERPL-MCNC: NORMAL NG/ML
NICOTINE SERPL-MCNC: NORMAL NG/ML

## 2018-04-20 PROCEDURE — G0238 OTH RESP PROC, INDIV: HCPCS

## 2018-04-20 PROCEDURE — 40000244 ZZH STATISTIC VISIT PULM REHAB

## 2018-04-20 NOTE — PROGRESS NOTES
Outpatient MNT: Lung Transplant Evaluation    Current BMI: 25.9 (HT 70 in,  lbs/82 kg)  BMI is within criteria of <30 for lung transplant     Time Spent: 15 minutes  Visit Type: Initial  Referring Physician: Dr Kahn   Pt accompanied by: her , Naun     History of previous txp: none    Nutrition Assessment  Appetite: good, but limited in portion size (not new for pt)    Vitamins, Supplements, Pertinent Meds: fiber gummy, MVI chew, prednisone   Herbal Medicines/Supplements: none     Diet Recall  Breakfast Previously no BF in the past but now eats a roll, toast, or grape nuts with prednisone    Lunch String cheese at 4 pm    Dinner Grilled chicken and baked potato with salad or 1/2 burger with some fries or spaghetti if at home    Snacks Potato chips   Beverages Water, tea, ginger ale 1/day    Alcohol None    Dining out 4x/week for dinner      Physical Activity  Pulmonary rehab 3 hours/week + 5000 steps/day      Anthropometrics  Height:   70 in   BMI:    25.9    Weight Status:Overweight BMI 25-29.9   Weight:  181 lbs            IBW (lb): 150  % IBW: 121    Wt Hx: Pt reports has gained weight since Nov 2017 from starting prednisone but now is losing weight with prednisone taper. Pt reports desire to lose some weight.     Adj/dosing BW: 158 lbs/72 kg       Frailty Screening   Weakness Meets criteria for frailty if  strength (average of 3 trials, dominant hand) is:    Men  ?29 kg for BMI ?24  ?30 kg for BMI 24.1-26  ?30 kg for BMI 26.1-28  ?32 kg for BMI >28 Women  ?17 kg for BMI ?23  ?17.3 kg for BMI 23.1-26  ?18 kg for BMI 26.1-29  ?21 kg for BMI >29    Equipment: Micky hand dynamometer  Participant attempts to squeeze the dynamometer maximally 3 times with the dominant hand.   Average of 3 trials: 26 kg with BMI of 25.9  Meets criteria for frailty based on handgrip strength: no    Labs  Recent Labs   Lab Test  04/16/18   0847  07/18/16   0913   07/01/15   0543  01/16/15   1409   CHOL  199  137   < >   156  169   HDL  50  62   < >  43*  46*   LDL  93  37   < >  59  88   TRIG  276*  188*   < >  272*  176*   CHOLHDLRATIO   --    --    --   3.6  3.7    < > = values in this interval not displayed.     Lab Results   Component Value Date    A1C 5.7 04/16/2018    A1C 5.4 03/16/2016    A1C 5.6 06/30/2015    A1C 5.4 07/27/2011       Malnutrition  % Intake: Decreased intake does not meet criteria for malnutrition   % Weight Loss: Weight loss does not meet criteria for malnutrition   Subcutaneous Fat Loss: None  Muscle Loss: None  Fluid Accumulation/Edema: None noted  Malnutrition Diagnosis: Patient does not meet two of the above criteria necessary for diagnosing malnutrition     Estimated Nutrition Needs  Energy  5610-1254     (25-30 kcal/kg for maintenance)     Protein  58-72    (0.8-1 g/kg for maintenance)         Fluid  1 ml/kcal or per MD     Nutrition Diagnosis  Food and nutrition related knowledge deficit r/t pre lung transplant eval AEB pt verbalized not hearing pre/post transplant diet guidelines.    Nutrition Intervention  Nutrition education provided:  Reviewed current diet; pt has no concerns or questions about her diet.     Reviewed post txp diet guidelines in brief (will review in further detail post txp):  (1) Review of proper food safety measures d/t immunosuppressant therapy post-op and increased risk for food-borne illness   (2) Stressed importance of not taking any herbal/Chinese/alternative medicines or supplements post txp (d/t risk for rejection, unknown effects on the organs, potential interactions with immunosuppresants).   (3) Med regimen and possible side effects    Patient Understanding: Pt verbalized understanding of education provided.  Expected Compliance: Good  Follow-Up Plans: PRN     Nutrition Goals  1. Pt to verbalize understanding of 3 aspects of post txp education provided  2. Weight loss per patient desire    Provided pt with contact info.   Rae Bruner, RD, LD  Pgr  [phone]

## 2018-04-20 NOTE — MR AVS SNAPSHOT
After Visit Summary   4/20/2018    Trisha Bender    MRN: [id number]           Patient Information     Date Of Birth          1955        Visit Information        Provider Department      4/20/2018 12:30 PM Elisa Roger RN  [facility] Transplant        Today's Diagnoses     COPD (chronic obstructive pulmonary disease) (H)    -  1    Organ transplant candidate           Follow-ups after your visit        Your next 10 appointments already scheduled     Apr 23, 2018  2:00 PM CDT   Pulmonary Treatment with Rh Pulmonary Rehab 1   [facility] ([facility])    [address] [place] [address], [address]  [facility] [address]   [phone]            Apr 23, 2018  4:20 PM CDT   MyChart Long with Bandar Weinberg MD   [facility]    [address]  [facility] [address]   [phone]            Apr 24, 2018  3:00 PM CDT   Pulmonary Treatment with Rh Pulmonary Rehab 1   [facility] ([facility])    [address] [place] [address], [address]  [facility] [address]   [phone]            Apr 26, 2018   Procedure with Steve Acosta MD   [facility] (--)    [address]  [facility] [address]   [phone]            Apr 30, 2018  2:00 PM CDT   Pulmonary Treatment with Rh Pulmonary Rehab 1   [facility] ([facility])    [address] [place] [address], [address]  [facility] [address]   [phone]            May 01, 2018  1:00 PM CDT   Pulmonary Treatment with Rh Pulmonary Rehab 1   [facility] ([facility])    [address] [place] [address], [address]  [facility] [address]   [phone]            May 03, 2018  1:00 PM CDT   Pulmonary Treatment with Rh Pulmonary Rehab 1   [facility] ([facility])    [address] [place] [address], [address]  [facility] [address]   [phone]             May 08, 2018  3:00 PM CDT   Pulmonary Treatment with Rh Pulmonary Rehab 1   [facility] ([facility])    [address] [facility] [facility], [address]  [facility] [address] [phone]            May 10, 2018  3:00 PM CDT   Pulmonary Treatment with Rh Pulmonary Rehab 1   [facility] ([facility])    [address], [address]  [facility] [address] [phone]              Who to contact     If you have questions or need follow up information about today's clinic visit or your schedule please contact [facility] directly at [phone].  Normal or non-critical lab and imaging results will be communicated to you by [employer], letter or phone within 4 business days after the clinic has received the results. If you do not hear from us within 7 days, please contact the clinic through [employer] or phone. If you have a critical or abnormal lab result, we will notify you by phone as soon as possible.  Submit refill requests through [employer] or call your pharmacy and they will forward the refill request to us. Please allow 3 business days for your refill to be completed.          Additional Information About Your Visit        [employer] Information     [employer] gives you secure access to your electronic health record. If you see a primary care provider, you can also send messages to your care team and make appointments. If you have questions, please call your primary care clinic.  If you do not have a primary care provider, please call [phone] and they will assist you.        Care EveryWhere ID     This is your Care EveryWhere ID. This could be used by other organizations to access your [place] medical records  [id number]        Your Vitals Were     Last Period                   09/29/2005            Blood Pressure from Last 3 Encounters:   04/19/18 106/79   04/18/18 137/77   04/16/18 131/78    Weight from Last 3 Encounters:    04/19/18 81.1 kg (178 lb 14.4 oz)   04/18/18 82 kg (180 lb 12.4 oz)   04/16/18 82.1 kg (181 lb 1.6 oz)              Today, you had the following     No orders found for display       Primary Care Provider Office Phone # Fax #    Bandar Weinberg [phone]       [address] [facility] [address]        Equal Access to Services     [facility]: Hadii aad ku hadasho Soomaali, waaxda luqadaha, qaybta kaalmada adeegyada, waxay idiin hayaan adeeg kharash la'verenicen . So [facility] [phone].    ATENCIÓN: Si habla español, tiene a madison disposición servicios gratuitos de asistencia lingüística. [facility] [phone].    We comply with applicable federal civil rights laws and [place] laws. We do not discriminate on the basis of race, color, national origin, age, disability, sex, sexual orientation, or gender identity.            Thank you!     Thank you for choosing [facility]  for your care. Our goal is always to provide you with excellent care. Hearing back from our patients is one way we can continue to improve our services. Please take a few minutes to complete the written survey that you may receive in the mail after your visit with us. Thank you!             Your Updated Medication List - Protect others around you: Learn how to safely use, store and throw away your medicines at www.disposemymeds.org.          This list is accurate as of 4/20/18  3:06 PM.  Always use your most recent med list.                   Brand Name Dispense Instructions for use Diagnosis    ADVAIR DISKUS 500-50 MCG/DOSE diskus inhaler   Generic drug:  fluticasone-salmeterol     3 Inhaler    INHALE 1 PUFF TWO TIMES DAILY    Pulmonary emphysema, unspecified emphysema type (H)       albuterol 108 (90 Base) MCG/ACT Inhaler    VENTOLIN HFA    18 g    Inhale 2 puffs into the lungs every 6 hours as needed for shortness of breath / dyspnea or wheezing    Asthma exacerbation       ALPRAZolam 0.25 MG tablet     XANAX    30 tablet    Take 1 tablet by mouth. Every twelve hours as needed for anxiety    Anxiety       atorvastatin 10 MG tablet    LIPITOR    36 tablet    Takes 3 times a week    Coronary artery disease involving native coronary artery of native heart without angina pectoris, Mixed hyperlipidemia       baclofen 10 MG tablet    LIORESAL    90 tablet    TAKE ONE TABLET BY MOUTH THREE TIMES A DAY AS NEEDED FOR MUSCLE SPASMS    Chronic bilateral low back pain with right-sided sciatica       [employer] FIBER GUMMIES 2.5 g Chew      Take 1 tablet by mouth daily Reported on 5/8/2017        fluticasone 50 MCG/ACT spray    FLONASE    16 g    SPRAY 1 TO 2 SPRAYS IN EACH NOSTRIL ONCE DAILY    Nasal congestion       furosemide 20 MG tablet    LASIX    180 tablet    TAKE 2 TABLETS (40 MG) BY MOUTH DAILY SEVERE SWELLING    Acute systolic congestive heart failure (H)       HYDROcodone-acetaminophen 5-325 MG per tablet    NORCO    180 tablet    Take 1-2 tablets by mouth every 8 hours as needed for pain Maximum 6 daily    Chronic bilateral low back pain without sciatica       ipratropium - albuterol 0.5 mg/2.5 mg/3 mL 0.5-2.5 (3) MG/3ML neb solution    DUONEB    30 vial    Take 1 vial (3 mLs) by nebulization every 6 hours as needed for shortness of breath / dyspnea or wheezing    Moderate persistent asthma without complication       lidocaine 5 % Patch    LIDODERM    30 patch    Apply up to 3 patches to painful area at once for up to 12 h within a 24 h period.  Remove after 12 hours.    Mechanical low back pain       lisinopril 2.5 MG tablet    PRINIVIL/Zestril    90 tablet    Take 1 tablet (2.5 mg) by mouth daily    Dilated cardiomyopathy (H)       mometasone 0.1 % ointment    ELOCON    45 g    Apply sparingly to affected area twice daily as needed.  Do not apply to face.    Intrinsic eczema       montelukast 10 MG tablet    SINGULAIR    90 tablet    Take 1 tablet (10 mg) by mouth At Bedtime    Mild persistent asthma without  complication       Multiple Vitamin Chew      Take 1 tablet by mouth daily        nitroGLYcerin 0.4 MG sublingual tablet    NITROSTAT    25 tablet    Place 1 tablet (0.4 mg) under the tongue every 5 minutes as needed for chest pain If you are still having symptoms after 3 doses (15 minutes) call 911.    Acute chest pain       ondansetron 4 MG ODT tab    ZOFRAN ODT    20 tablet    Take 1-2 tablets (4-8 mg) by mouth every 8 hours as needed for nausea    Nausea       pantoprazole 40 MG EC tablet    PROTONIX    90 tablet    TAKE ONE TABLET BY MOUTH EVERY DAY    Gastroesophageal reflux disease without esophagitis       potassium chloride 10 MEQ tablet    K-TAB,KLOR-CON    90 tablet    TAKE ONE TABLET BY MOUTH THREE TIMES A DAY    Hypokalemia       predniSONE 5 MG tablet    DELTASONE    15 tablet    Take 1 tablet (5 mg) by mouth daily    Pulmonary emphysema, unspecified emphysema type (H)       roflumilast 500 MCG Tabs tablet    DALIRESP     Take 1 tablet (500 mcg) by mouth daily        SPIRIVA HANDIHALER 18 MCG capsule   Generic drug:  tiotropium     90 capsule    USING THE HANDIHALER, INHALE THE CONTENTS OF ONE CAPSULE BY MOUTH DAILY    Moderate persistent asthma without complication       SUMAtriptan 25 MG tablet    IMITREX    9 tablet    Take 1 tablet (25 mg) by mouth at onset of headache for migraine May repeat in 2 hours if needed: max 2/day;    Chronic migraine without aura without status migrainosus, not intractable       TYLENOL EXTRA STRENGTH PO      1 TABLET EVERY 4 HOURS AS NEEDED    Abdominal pain, right upper quadrant

## 2018-04-20 NOTE — PROGRESS NOTES
OUTPATIENT PRE-LUNG TRANSPLANT EXERCISE EVALUATION        Medical Diagnosis: COPD    Medical History:  Pulmonologist: Dr. Valerio  Current Signs and Symptoms: denies    Living Environment:  Living Arrangement: House  Number of stairs to enter home: none  Number of stairs within home: 1 flight  ADL Limitations: none  Occupation: not employed  Social Status:     Current Home Exercise:  Type of Exercise: walking, and attending pulmonary rehab at [facility]  Frequency (days per week): 2-3 days/week  Duration (minutes per session): 30-60 min    Oxygen Usage:  Supplemental Oxygen Needed:   No  Oxygen at Rest: RA  Oxygen with Activity:RA      Modalities Performed: Stair climbing   LE strengthening   UE muscle conditioning, Calisthenics    Exercise Prescription (Aerobic Exercise):  Mode: Treadmill, seated elliptical, NuStep  Duration/Time: 15-30 min, intermittent  Rating of Perceived Exertion: 4-6 out of 10 RPE and RPD  Progression of Exercise: Increase 0.2 METs per week  Oxygen Titration with Exercise: >88%      Exercise Prescription (Muscle Conditioning):  Mode: Upper and lower body muscle conditioning exercises  Frequency (days per week): 2-3 days/week  Weights: 2-4      Reps: 10-15  Progress to: Increase by 1-2 pounds when 2-3 sets of 10-15 repetitions are no longer a challenge    Patient Education: PLB, Diaphramatic Breathing, Work Simplification/Energy Conservation techniques, Home Exercise Program,      Outpatient Pulmonary Rehab/Respiratory Care Services:  Pt recommended to attend:  Yes     Location: [facility]  Comments: Patient is currently participating in rehab at [facility].  After rehab is complete, patient plans to attend either the WEL program at [facility] or join a gym.

## 2018-04-20 NOTE — MR AVS SNAPSHOT
After Visit Summary   4/20/2018    Trisha Bender    MRN: [id number]           Patient Information     Date Of Birth          1955        Visit Information        Provider Department      [facility]20[facility]2018 10[facility]00 [facility]        Today's Diagnoses     Panlobular emphysema (H)    -  1    Lung transplant candidate           Follow-ups after your visit        Your next 10 appointments already scheduled     Apr 26, 2018   Procedure with Steve Acosta MD   [facility] PeriOp [facility] (--)    [address]  [facility] [address]   [phone] Apr 30, 2018  2:00 PM CDT   Pulmonary Treatment with Rh Pulmonary Rehab 1   [facility]    [address]  [facility] [address]   [phone]            May 01, 2018  1:00 PM CDT   Pulmonary Treatment with Rh Pulmonary Rehab 1   [facility]    [address]  [facility] [address]   [phone]            May 03, 2018  1:00 PM CDT   Pulmonary Treatment with Rh Pulmonary Rehab 1   [facility]    [address]  [facility] [address]   [phone]            May 08, 2018  3:00 PM CDT   Pulmonary Treatment with Rh Pulmonary Rehab 1   [facility]    [address]  [facility] [address]   [phone]            May 10, 2018  3:00 PM CDT   Pulmonary Treatment with Rh Pulmonary Rehab 1   [facility]    [address]  [facility] [address]   [phone]            May 14, 2018  2:00 PM CDT   Pulmonary Treatment with Rh Pulmonary Rehab 1   [facility]    [address]  [facility] [address]   [phone]               Who to contact     If you have questions or need follow up information about today's clinic visit or your schedule please contact [facility] SOL[facility] ORGAN TRANSPLANT directly at [phone].  Normal or non-critical lab and imaging results will be communicated to you by MyChart, letter or phone within 4 business days after the clinic has received the results. If you do not hear from us within 7 days, please contact the clinic through Smartaxihart or phone. If you have a critical or abnormal lab result, we will notify you by phone as soon as possible.  Submit refill requests through Tufin or call your pharmacy and they will forward the refill request to us. Please allow 3 business days for your refill to be completed.          Additional Information About Your Visit        Tufin Information     Tufin gives you secure access to your electronic health record. If you see a primary care provider, you can also send messages to your care team and make appointments. If you have questions, please call your primary care clinic.  If you do not have a primary care provider, please call [phone] and they will assist you.        Care EveryWhere ID     This is your Care EveryWhere ID. This could be used by other organizations to access your [place] medical records  [id number]        Your Vitals Were     Last Period                   09/29/2005            Blood Pressure from Last 3 Encounters:   04/23/18 110/60   04/19/18 106/79   04/18/18 137/77    Weight from Last 3 Encounters:   04/23/18 82.6 kg (182 lb)   04/19/18 81.1 kg (178 lb 14.4 oz)   04/18/18 82 kg (180 lb 12.4 oz)              Today, you had the following     No orders found for display       Primary Care Provider Office Phone # Fax #    Bandar Weinberg [phone]       [address] [facility] [address]        Equal Access to Services     KAMILA ALLEN : tejal Edmondson, jonathan flores  jessica arreola frank pfeiffer'aan ah. So [facility] [phone].    ATENCIÓN: Si raysala sara, tiene a madison disposición servicios gratuitos de asistencia lingüística. [place] al [phone].    We comply with applicable federal civil rights laws and [place] laws. We do not discriminate on the basis of race, color, national origin, age, disability, sex, sexual orientation, or gender identity.            Thank you!     Thank you for choosing [facility]GAN TRANSPLANT  for your care. Our goal is always to provide you with excellent care. Hearing back from our patients is one way we can continue to improve our services. Please take a few minutes to complete the written survey that you may receive in the mail after your visit with us. Thank you!             Your Updated Medication List - Protect others around you: Learn how to safely use, store and throw away your medicines at www.disposemymeds.org.          This list is accurate as of 4/20/18 11:59 PM.  Always use your most recent med list.                   Brand Name Dispense Instructions for use Diagnosis    ADVAIR DISKUS 500-50 MCG/DOSE diskus inhaler   Generic drug:  fluticasone-salmeterol     3 Inhaler    INHALE 1 PUFF TWO TIMES DAILY    Pulmonary emphysema, unspecified emphysema type (H)       albuterol 108 (90 Base) MCG/ACT Inhaler    VENTOLIN HFA    18 g    Inhale 2 puffs into the lungs every 6 hours as needed for shortness of breath / dyspnea or wheezing    Asthma exacerbation       atorvastatin 10 MG tablet    LIPITOR    36 tablet    Takes 3 times a week    Coronary artery disease involving native coronary artery of native heart without angina pectoris, Mixed hyperlipidemia       baclofen 10 MG tablet    LIORESAL    90 tablet    TAKE ONE TABLET BY MOUTH THREE TIMES A DAY AS NEEDED FOR MUSCLE SPASMS    Chronic bilateral low back pain with right-sided sciatica       [employer] FIBER GUMMIES 2.5 g Chew      Take 1 tablet by mouth daily Reported on 5/8/2017         fluticasone 50 MCG/ACT spray    FLONASE    16 g    SPRAY 1 TO 2 SPRAYS IN EACH NOSTRIL ONCE DAILY    Nasal congestion       furosemide 20 MG tablet    LASIX    180 tablet    TAKE 2 TABLETS (40 MG) BY MOUTH DAILY SEVERE SWELLING    Acute systolic congestive heart failure (H)       ipratropium - albuterol 0.5 mg/2.5 mg/3 mL 0.5-2.5 (3) MG/3ML neb solution    DUONEB    30 vial    Take 1 vial (3 mLs) by nebulization every 6 hours as needed for shortness of breath / dyspnea or wheezing    Moderate persistent asthma without complication       lidocaine 5 % Patch    LIDODERM    30 patch    Apply up to 3 patches to painful area at once for up to 12 h within a 24 h period.  Remove after 12 hours.    Mechanical low back pain       lisinopril 2.5 MG tablet    PRINIVIL/Zestril    90 tablet    Take 1 tablet (2.5 mg) by mouth daily    Dilated cardiomyopathy (H)       mometasone 0.1 % ointment    ELOCON    45 g    Apply sparingly to affected area twice daily as needed.  Do not apply to face.    Intrinsic eczema       montelukast 10 MG tablet    SINGULAIR    90 tablet    Take 1 tablet (10 mg) by mouth At Bedtime    Mild persistent asthma without complication       Multiple Vitamin Chew      Take 1 tablet by mouth daily        nitroGLYcerin 0.4 MG sublingual tablet    NITROSTAT    25 tablet    Place 1 tablet (0.4 mg) under the tongue every 5 minutes as needed for chest pain If you are still having symptoms after 3 doses (15 minutes) call 911.    Acute chest pain       ondansetron 4 MG ODT tab    ZOFRAN ODT    20 tablet    Take 1-2 tablets (4-8 mg) by mouth every 8 hours as needed for nausea    Nausea       pantoprazole 40 MG EC tablet    PROTONIX    90 tablet    TAKE ONE TABLET BY MOUTH EVERY DAY    Gastroesophageal reflux disease without esophagitis       potassium chloride 10 MEQ tablet    K-TAB,KLOR-CON    90 tablet    TAKE ONE TABLET BY MOUTH THREE TIMES A DAY    Hypokalemia       roflumilast 500 MCG Tabs tablet    DALIRESP      Take 1 tablet (500 mcg) by mouth daily        SPIRIVA HANDIHALER 18 MCG capsule   Generic drug:  tiotropium     90 capsule    USING THE HANDIHALER, INHALE THE CONTENTS OF ONE CAPSULE BY MOUTH DAILY    Moderate persistent asthma without complication       SUMAtriptan 25 MG tablet    IMITREX    9 tablet    Take 1 tablet (25 mg) by mouth at onset of headache for migraine May repeat in 2 hours if needed: max 2/day;    Chronic migraine without aura without status migrainosus, not intractable       TYLENOL EXTRA STRENGTH PO      1 TABLET EVERY 4 HOURS AS NEEDED    Abdominal pain, right upper quadrant

## 2018-04-20 NOTE — MR AVS SNAPSHOT
After Visit Summary   4/20/2018    Trisha Bender    MRN: [id number]           Patient Information     Date Of Birth          1955        Visit Information        Provider Department      4/20/2018 11:30 AM Diamante Bruner RD M [facility] Transplant        Today's Diagnoses     COPD (chronic obstructive pulmonary disease) (H)    -  1       Follow-ups after your visit        Your next 10 appointments already scheduled     Apr 20, 2018 11:30 AM CDT   NUTRITION VISIT with CHRISTIANO Quintero [facility] ([facility])    [address]  [address]  [facility] [address]   [phone]            Apr 20, 2018 12:00 PM CDT   (Arrive by 11:45 AM)   CT CHEST W/O CONTRAST with UCCT1   [facility] ([facility])    [address] [place] [address]  1st Floor  [facility] [id number] [phone]           Please bring any scans or X-rays taken at other hospitals, if similar tests were done. Also bring a list of your medicines, including vitamins, minerals and over-the-counter drugs. It is safest to leave personal items at home.  Be sure to tell your doctor:   If you have any allergies.   If there s any chance you are pregnant.   If you are breastfeeding.  You do not need to do anything special to prepare for this exam.  Please wear loose clothing, such as a sweat suit or jogging clothes. Avoid snaps, zippers and other metal. We may ask you to undress and put on a hospital gown.            Apr 20, 2018 12:30 PM CDT   (Arrive by 12:15 PM)   SOT CARE COORDINATOR EVAL with Elisa Roger RN   [facility] ([facility])    [address]  [address]  [facility] [address]   [phone]            Apr 23, 2018  2:00 PM CDT   Pulmonary Treatment with Rh Pulmonary Rehab 1   [facility] ([facility] [place])    [address] [place] [address],  [address]  [facility] [address]   [phone]            Apr 23, 2018  4:20 PM CDT   MyChart Long with Bandar Weinberg MD   [facility] ([facility])    [address]  [facility] [address]   [phone]            Apr 24, 2018  3:00 PM CDT   Pulmonary Treatment with Rh Pulmonary Rehab 1   [facility] ([facility])    [address]  [facility] [address]   [phone]            Apr 26, 2018   Procedure with Steve Acosta MD   [facility] (--)    [address]  [facility] [address]   [phone]            Apr 30, 2018  2:00 PM CDT   Pulmonary Treatment with Rh Pulmonary Rehab 1   [facility] ([facility])    [address]  [facility] [address]   [phone]            May 01, 2018  1:00 PM CDT   Pulmonary Treatment with Rh Pulmonary Rehab 1   [facility] ([facility])    [address]  [facility] [address]   [phone]            May 03, 2018  1:00 PM CDT   Pulmonary Treatment with Rh Pulmonary Rehab 1   [facility] ([facility])    [address]  [facility] [address]   [phone]              Who to contact     If you have questions or need follow up information about today's clinic visit or your schedule please contact [facility] SOLID ORGAN TRANSPLANT directly at [phone].  Normal or non-critical lab and imaging results will be communicated to you by MyChart, letter or phone within 4 business days after the clinic has received the results. If you do not hear from us within 7 days, please contact the clinic through MyChart or phone. If you have a critical or abnormal lab result, we will notify you by phone as soon as possible.  Submit refill requests through Storactive or call your pharmacy and they will forward the  refill request to us. Please allow 3 business days for your refill to be completed.          Additional Information About Your Visit        Peach & Lilyhart Information     Conmio gives you secure access to your electronic health record. If you see a primary care provider, you can also send messages to your care team and make appointments. If you have questions, please call your primary care clinic.  If you do not have a primary care provider, please call [phone] and they will assist you.        Care EveryWhere ID     This is your Care EveryWhere ID. This could be used by other organizations to access your [place] medical records  [id number]        Your Vitals Were     Last Period                   09/29/2005            Blood Pressure from Last 3 Encounters:   04/19/18 106/79   04/18/18 137/77   04/16/18 131/78    Weight from Last 3 Encounters:   04/19/18 81.1 kg (178 lb 14.4 oz)   04/18/18 82 kg (180 lb 12.4 oz)   04/16/18 82.1 kg (181 lb 1.6 oz)              Today, you had the following     No orders found for display       Primary Care Provider Office Phone # Fax #    Bandar Weinberg [phone]       [address] [facility] [address]        Equal Access to Services     KAMILA ALLEN : Hadii aad ku hadasho Soomaali, waaxda luqadaha, qaybta kaalmada adeegyada, jonathan blandn frank burgess. So [facility] [phone].    ATENCIÓN: Si habla español, tiene a madison disposición servicios gratuitos de asistencia lingüística. [place]ame al [phone].    We comply with applicable federal civil rights laws and [place] laws. We do not discriminate on the basis of race, color, national origin, age, disability, sex, sexual orientation, or gender identity.            Thank you!     Thank you for choosing [facility] SOLID ORGAN TRANSPLANT  for your care. Our goal is always to provide you with excellent care. Hearing back from our patients is one way we can continue to improve our services. Please  take a few minutes to complete the written survey that you may receive in the mail after your visit with us. Thank you!             Your Updated Medication List - Protect others around you: Learn how to safely use, store and throw away your medicines at www.disposemymeds.org.          This list is accurate as of 4/20/18 11:17 AM.  Always use your most recent med list.                   Brand Name Dispense Instructions for use Diagnosis    ADVAIR DISKUS 500-50 MCG/DOSE diskus inhaler   Generic drug:  fluticasone-salmeterol     3 Inhaler    INHALE 1 PUFF TWO TIMES DAILY    Pulmonary emphysema, unspecified emphysema type (H)       albuterol 108 (90 Base) MCG/ACT Inhaler    VENTOLIN HFA    18 g    Inhale 2 puffs into the lungs every 6 hours as needed for shortness of breath / dyspnea or wheezing    Asthma exacerbation       ALPRAZolam 0.25 MG tablet    XANAX    30 tablet    Take 1 tablet by mouth. Every twelve hours as needed for anxiety    Anxiety       atorvastatin 10 MG tablet    LIPITOR    36 tablet    Takes 3 times a week    Coronary artery disease involving native coronary artery of native heart without angina pectoris, Mixed hyperlipidemia       baclofen 10 MG tablet    LIORESAL    90 tablet    TAKE ONE TABLET BY MOUTH THREE TIMES A DAY AS NEEDED FOR MUSCLE SPASMS    Chronic bilateral low back pain with right-sided sciatica       [employer] FIBER GUMMIES 2.5 g Chew      Take 1 tablet by mouth daily Reported on 5/8/2017        fluticasone 50 MCG/ACT spray    FLONASE    16 g    SPRAY 1 TO 2 SPRAYS IN EACH NOSTRIL ONCE DAILY    Nasal congestion       furosemide 20 MG tablet    LASIX    180 tablet    TAKE 2 TABLETS (40 MG) BY MOUTH DAILY SEVERE SWELLING    Acute systolic congestive heart failure (H)       HYDROcodone-acetaminophen 5-325 MG per tablet    NORCO    180 tablet    Take 1-2 tablets by mouth every 8 hours as needed for pain Maximum 6 daily    Chronic bilateral low back pain without sciatica       ipratropium -  albuterol 0.5 mg/2.5 mg/3 mL 0.5-2.5 (3) MG/3ML neb solution    DUONEB    30 vial    Take 1 vial (3 mLs) by nebulization every 6 hours as needed for shortness of breath / dyspnea or wheezing    Moderate persistent asthma without complication       lidocaine 5 % Patch    LIDODERM    30 patch    Apply up to 3 patches to painful area at once for up to 12 h within a 24 h period.  Remove after 12 hours.    Mechanical low back pain       lisinopril 2.5 MG tablet    PRINIVIL/Zestril    90 tablet    Take 1 tablet (2.5 mg) by mouth daily    Dilated cardiomyopathy (H)       mometasone 0.1 % ointment    ELOCON    45 g    Apply sparingly to affected area twice daily as needed.  Do not apply to face.    Intrinsic eczema       montelukast 10 MG tablet    SINGULAIR    90 tablet    Take 1 tablet (10 mg) by mouth At Bedtime    Mild persistent asthma without complication       Multiple Vitamin Chew      Take 1 tablet by mouth daily        nitroGLYcerin 0.4 MG sublingual tablet    NITROSTAT    25 tablet    Place 1 tablet (0.4 mg) under the tongue every 5 minutes as needed for chest pain If you are still having symptoms after 3 doses (15 minutes) call 911.    Acute chest pain       ondansetron 4 MG ODT tab    ZOFRAN ODT    20 tablet    Take 1-2 tablets (4-8 mg) by mouth every 8 hours as needed for nausea    Nausea       pantoprazole 40 MG EC tablet    PROTONIX    90 tablet    TAKE ONE TABLET BY MOUTH EVERY DAY    Gastroesophageal reflux disease without esophagitis       potassium chloride 10 MEQ tablet    K-TAB,KLOR-CON    90 tablet    TAKE ONE TABLET BY MOUTH THREE TIMES A DAY    Hypokalemia       predniSONE 5 MG tablet    DELTASONE    15 tablet    Take 1 tablet (5 mg) by mouth daily    Pulmonary emphysema, unspecified emphysema type (H)       roflumilast 500 MCG Tabs tablet    DALIRESP     Take 1 tablet (500 mcg) by mouth daily        SPIRIVA HANDIHALER 18 MCG capsule   Generic drug:  tiotropium     90 capsule    USING THE  HANDIHALER, INHALE THE CONTENTS OF ONE CAPSULE BY MOUTH DAILY    Moderate persistent asthma without complication       SUMAtriptan 25 MG tablet    IMITREX    9 tablet    Take 1 tablet (25 mg) by mouth at onset of headache for migraine May repeat in 2 hours if needed: max 2/day;    Chronic migraine without aura without status migrainosus, not intractable       TYLENOL EXTRA STRENGTH PO      1 TABLET EVERY 4 HOURS AS NEEDED    Abdominal pain, right upper quadrant

## 2018-04-20 NOTE — NURSING NOTE
CLOSURE VISIT    Met with Trisha and her  Naun at completion of the evaluation testing. Reviewed lab and imaging results.   Reviewed PRA results which showed no unacceptable antigens MFI 3000.  Will continue to monitor PRA results every 3 months when listed for lung transplant.  Confirmed that pH study will need to be completed in future prior to being listed for transplant. Patient elected to no complete during lung transplant evaluation due to difficulty post RHC and insurance coverage concerns.     Sputum culture:  Not completed. Patient aware she will need to complete prior to listing.   FIT test:  Not completed. Patient scheduled for full colonoscopy within next two weeks.   Diabetic status: No diabetic   Pt functional status: 80% Karnofsky with limited mobility  (Patient status report updated)  Colonoscopy: To be completed at PCP within next two weeks. Patient has a pre- procedure check up on 4/23/2018.   Mammogram: 5/18/2017 Negative    PAP: Completed with primary care guidelines of every three years, Negative. Educated patient she would need to have completed within 12 months prior to listing and then annually.   Dental: No concerns noted  Immunizations: Discussed need for Twinrix, patient to discuss with PCP.   Oxygen use: No O2 use currently.   Prednisone use: Currently taking 5 mg daily, patient reports she will increase to 7.5mg if having a bad day, would like Rx to have the ability to take 10 mg if needed. Educated patient on concerns with chronic prednisone use and bone health. Encouraged patient to discuss Rx needs with primary pulmonologist Dr Valerio.   Ventilation status: R/A saturations within mid-90s ant rest.   Prior chest surgery?: None  Need for carotid/LE ultrasounds?: No  ETOH intake: Denies  Pulmonary rehab: Currently enrolled, would like to have orders to allow her to continue to go.  My Chart: Active                   Care Everywhere: Not used this visit  OTC Medications/Herbal  Supplements: None  Reinforced need for staying locally with full time caregiver for 3 months after transplant.  Encouraged patient and family to review [email or web address] for education reinforcement.  Did not collect information form for donor family at time of donation/transplant from patient.   Will contact Trisha with transplant team recommendation following committee review.

## 2018-04-23 ENCOUNTER — MYC REFILL (OUTPATIENT)
Dept: INTERNAL MEDICINE | Facility: CLINIC | Age: 63
End: 2018-04-23

## 2018-04-23 ENCOUNTER — OFFICE VISIT (OUTPATIENT)
Dept: INTERNAL MEDICINE | Facility: CLINIC | Age: 63
End: 2018-04-23
Payer: COMMERCIAL

## 2018-04-23 ENCOUNTER — HOSPITAL ENCOUNTER (OUTPATIENT)
Dept: CARDIAC REHAB | Facility: CLINIC | Age: 63
End: 2018-04-23
Attending: INTERNAL MEDICINE
Payer: COMMERCIAL

## 2018-04-23 VITALS
WEIGHT: 182 LBS | HEIGHT: 70 IN | OXYGEN SATURATION: 96 % | TEMPERATURE: 97.8 F | BODY MASS INDEX: 26.05 KG/M2 | SYSTOLIC BLOOD PRESSURE: 110 MMHG | HEART RATE: 90 BPM | DIASTOLIC BLOOD PRESSURE: 60 MMHG

## 2018-04-23 DIAGNOSIS — M54.59 MECHANICAL LOW BACK PAIN: ICD-10-CM

## 2018-04-23 DIAGNOSIS — F41.9 ANXIETY: ICD-10-CM

## 2018-04-23 DIAGNOSIS — Z12.11 SPECIAL SCREENING FOR MALIGNANT NEOPLASMS, COLON: ICD-10-CM

## 2018-04-23 DIAGNOSIS — Z01.818 PREOP GENERAL PHYSICAL EXAM: Primary | ICD-10-CM

## 2018-04-23 DIAGNOSIS — M54.50 CHRONIC BILATERAL LOW BACK PAIN WITHOUT SCIATICA: ICD-10-CM

## 2018-04-23 DIAGNOSIS — G89.29 CHRONIC BILATERAL LOW BACK PAIN WITHOUT SCIATICA: ICD-10-CM

## 2018-04-23 DIAGNOSIS — I50.22 CHRONIC SYSTOLIC CONGESTIVE HEART FAILURE (H): ICD-10-CM

## 2018-04-23 DIAGNOSIS — F43.21 ADJUSTMENT DISORDER WITH DEPRESSED MOOD: ICD-10-CM

## 2018-04-23 DIAGNOSIS — J43.9 PULMONARY EMPHYSEMA, UNSPECIFIED EMPHYSEMA TYPE (H): ICD-10-CM

## 2018-04-23 PROCEDURE — 99215 OFFICE O/P EST HI 40 MIN: CPT | Performed by: INTERNAL MEDICINE

## 2018-04-23 PROCEDURE — 40000244 ZZH STATISTIC VISIT PULM REHAB

## 2018-04-23 PROCEDURE — G0424 PULMONARY REHAB W EXER: HCPCS

## 2018-04-23 RX ORDER — PREDNISONE 5 MG/1
10 TABLET ORAL DAILY
Qty: 30 TABLET | Refills: 0 | Status: SHIPPED | OUTPATIENT
Start: 2018-04-23 | End: 2018-05-07

## 2018-04-23 RX ORDER — ALPRAZOLAM 0.25 MG
TABLET ORAL
Qty: 30 TABLET | Refills: 0 | Status: SHIPPED | OUTPATIENT
Start: 2018-04-23 | End: 2018-06-01

## 2018-04-23 RX ORDER — ESCITALOPRAM OXALATE 10 MG/1
10 TABLET ORAL DAILY
Qty: 30 TABLET | Refills: 3 | Status: SHIPPED | OUTPATIENT
Start: 2018-04-23 | End: 2018-05-23

## 2018-04-23 NOTE — PROGRESS NOTES
[place] [address]  [facility] [address]  [phone]  Dept: [phone]    PRE-OP EVALUATION:  Today's date: 2018    Trisha Bender (: 1955) presents for pre-operative evaluation assessment as requested by Dr. Acosta.  She requires evaluation and anesthesia risk assessment prior to undergoing surgery/procedure for treatment of colonoscopy for colon cancer screening .    Proposed Surgery/ Procedure: Colonoscopy  Date of Surgery/ Procedure: 18  Time of Surgery/ Procedure: 10:00AM  Hospital/Surgical Facility: [facility]  Primary Physician: Bandar Weinberg  Type of Anesthesia Anticipated: Conscious sedation    Patient has a Health Care Directive or Living Will:  NO    1. NO - Do you have a history of heart attack, stroke, stent, bypass or surgery on an artery in the head, neck, heart or legs?  2. NO - Do you ever have any pain or discomfort in your chest?  3. YES - DO YOU HAVE A HISTORY OF HEART FAILURE   4. YES - ARE YOUR TROUBLED BY SHORTNESS OF BREATH WHEN WALKING ON THE LEVEL, UP A SLIGHT HILL OR AT NIGHT?   5. NO - Do you currently have a cold, bronchitis or other respiratory infection?  6. YES - DO YOU HAVE A COUGH, SHORTNESS OF BREATH OR WHEEZING?   7. NO - Do you sometimes get pains in the calves of your legs when you walk?  8. NO - Do you or anyone in your family have previous history of blood clots?  9. NO - Do you or does anyone in your family have a serious bleeding problem such as prolonged bleeding following surgeries or cuts?  10. NO - Have you ever had problems with anemia or been told to take iron pills?  11. NO - Have you had any abnormal blood loss such as black, tarry or bloody stools, or abnormal vaginal bleeding?  12. NO - Have you ever had a blood transfusion?  13. NO - Have you or any of your relatives ever had problems with anesthesia?  14. NO - Do you have sleep apnea, excessive snoring or daytime drowsiness?  15. NO  - Do you have any prosthetic heart valves?  16. NO - Do you have prosthetic joints?  17. NO - Is there any chance that you may be pregnant?      HPI:     HPI related to upcoming procedure: scheduled for screening colonoscopy. Needs preop assessment because of history of COPD, CHF. Currently no worsening SOB, no acute symptoms. Afebrile. Has been assessed for lung transplant. Not on the list yet. Does not require oxygen treatment. Uses inhaled bronchodilators, on chronic steroid treatment.   Has h/o stress cardiomyopathy, no CAD. Symptoms controlled, no acute SOB, CP, edema.   Patient is on chronic narcotic analgesics for treatment of chronic back pain and abdominal pain.   Currently no exacerbation of pain.   Has history of anxiety. On PRN Xanax. Helping with symptoms. Has tried Cimbalta, but developed side effects of nausea and has stopped treatment.       See problem list for active medical problems.  Problems all longstanding and stable, except as noted/documented.  See ROS for pertinent symptoms related to these conditions.                                                                                                                                                          .    MEDICAL HISTORY:     Patient Active Problem List    Diagnosis Date Noted     Severe episode of recurrent major depressive disorder, without psychotic features (H) 03/23/2018     Priority: Medium     Panlobular emphysema (H) 11/07/2017     Priority: Medium     Mechanical low back pain 12/30/2016     Priority: Medium     Moderate persistent asthma without complication 08/16/2016     Priority: Medium     Hypokalemia 08/16/2016     Priority: Medium     Controlled substance agreement signed 02/23/2016     Priority: Medium     Coronary artery disease involving native coronary artery of native heart without angina pectoris 02/09/2016     Priority: Medium     Mild per cath         Mixed hyperlipidemia 02/09/2016     Priority: Medium     SOB  (shortness of breath) 10/16/2015     Priority: Medium     CHF (congestive heart failure) (H)      Priority: Medium     Dilated cardiomyopathy (H) 06/30/2015     Priority: Medium     2/9/2016 - EF 45-50% by echo  10/16/2015 - EF 50-55% by echo  8/12/2015 - EF 50-55% by echo  6/30/2015 - EF 20-25% by echo       Right bundle branch block (RBBB) 06/30/2015     Priority: Medium     Lateral epicondylitis of right elbow 10/07/2013     Priority: Medium     Hair loss 05/23/2013     Priority: Medium     Chronic low back pain 04/12/2013     Priority: Medium     Mild persistent asthma without complication 08/30/2012     Priority: Medium     Diagnosis updated by automated process. Provider to review and confirm.       Anxiety 07/28/2011     Priority: Medium     Family history of thyroid cancer - 1/2 brother 07/27/2011     Priority: Medium     Advanced directives, counseling/discussion 07/05/2011     Priority: Medium     Discussed Advance Directive planning with patient; information given to patient to review.       Cystocele 05/07/2009     Priority: Medium     Chronic airway obstruction (H) 01/23/2006     Priority: Medium     FEV1 36% of pred; VC 67%  in Jan 06.  Problem list name updated by automated process. Provider to review       Essential hypertension with goal blood pressure less than 130/80 10/18/2004     Priority: Medium     Problem list name updated by automated process. Provider to review       Chronic migraine without aura without status migrainosus, not intractable 10/18/2004     Priority: Medium     Esophageal reflux 10/18/2004     Priority: Medium     Disorder of bone and cartilage 10/18/2004     Priority: Medium     Problem list name updated by automated process. Provider to review       Family history of malignant neoplasm of gastrointestinal tract 10/18/2004     Priority: Medium      Past Medical History:   Diagnosis Date     Anxiety      CAD (coronary artery disease) 7/2/15    mild per cath     CHF (congestive  heart failure) (H)     EF=20-25% per echo 6/30/15     Chronic airway obstruction, not elsewhere classified     FEV1 36% of pred; VC 67%  in Jan 06.     COPD (chronic obstructive pulmonary disease) (H)      Esophageal reflux      Mild persistent asthma 8/30/2012     OSTEOPENIA      Other and unspecified hyperlipidemia      Other chronic pain     Entire back     Other forms of migraine, without mention of intractable migraine without mention of status migrainosus      Takotsubo cardiomyopathy      Unspecified essential hypertension      Past Surgical History:   Procedure Laterality Date     ANGIOGRAM  07/02/2015    Minimal CAD. LV dysfunction, Mid RCA 20% stenosis, EF 20-25%, c/w Takotsubo cardiomyopathy     APPENDECTOMY       C NONSPECIFIC PROCEDURE  1974    GB and appendectomy  abstracted [id number]     C NONSPECIFIC PROCEDURE      Nasal septoplasty and mucous retention cyst     CHOLECYSTECTOMY  1974     ESOPHAGOSCOPY, GASTROSCOPY, DUODENOSCOPY (EGD), COMBINED  11/15/2011    Procedure:COMBINED ESOPHAGOSCOPY, GASTROSCOPY, DUODENOSCOPY (EGD);  ESOPHAGOSCOPY, GASTROSCOPY, DUODENOSCOPY (EGD) ; Surgeon:JIM URENA; Location:[facility]     HC REMOVE TONSILS/ADENOIDS,<11 Y/O       INJECT EPIDURAL LUMBAR / SACRAL SINGLE N/A 4/13/2018    Procedure: INJECT EPIDURAL LUMBAR / SACRAL CONTINUAL OR INTERMITTENT;  Lumbar Epidural Steroid Injection;  Surgeon: Az Enamorado MD;  Location: [facility]     LAPAROSCOPIC ABLATION ENDOMETRIOSIS  1998     LAPAROSCOPY DIAGNOSTIC (GYN)      endometriosis resection / lysis of adhesions     wisdom teeth       Current Outpatient Prescriptions   Medication Sig Dispense Refill     ADVAIR DISKUS 500-50 MCG/DOSE diskus inhaler INHALE 1 PUFF TWO TIMES DAILY 3 Inhaler 3     albuterol (VENTOLIN HFA) 108 (90 BASE) MCG/ACT Inhaler Inhale 2 puffs into the lungs every 6 hours as needed for shortness of breath / dyspnea or wheezing 18 g 3     ALPRAZolam (XANAX) 0.25 MG tablet Take 1 tablet by mouth. Every twelve  hours as needed for anxiety 30 tablet 0     atorvastatin (LIPITOR) 10 MG tablet Takes 3 times a week 36 tablet 3     baclofen (LIORESAL) 10 MG tablet TAKE ONE TABLET BY MOUTH THREE TIMES A DAY AS NEEDED FOR MUSCLE SPASMS 90 tablet 1     [employer] FIBER GUMMIES 2.5 G CHEW Take 1 tablet by mouth daily Reported on 5/8/2017       fluticasone (FLONASE) 50 MCG/ACT spray SPRAY 1 TO 2 SPRAYS IN EACH NOSTRIL ONCE DAILY 16 g 7     furosemide (LASIX) 20 MG tablet TAKE 2 TABLETS (40 MG) BY MOUTH DAILY SEVERE SWELLING 180 tablet 1     HYDROcodone-acetaminophen (NORCO) 5-325 MG per tablet Take 1-2 tablets by mouth every 8 hours as needed for pain Maximum 6 daily 180 tablet 0     ipratropium - albuterol 0.5 mg/2.5 mg/3 mL (DUONEB) 0.5-2.5 (3) MG/3ML neb solution Take 1 vial (3 mLs) by nebulization every 6 hours as needed for shortness of breath / dyspnea or wheezing 30 vial 1     lidocaine (LIDODERM) 5 % Patch Apply up to 3 patches to painful area at once for up to 12 h within a 24 h period.  Remove after 12 hours. 30 patch 0     lisinopril (PRINIVIL/ZESTRIL) 2.5 MG tablet Take 1 tablet (2.5 mg) by mouth daily 90 tablet 3     mometasone (ELOCON) 0.1 % ointment Apply sparingly to affected area twice daily as needed.  Do not apply to face. 45 g 0     montelukast (SINGULAIR) 10 MG tablet Take 1 tablet (10 mg) by mouth At Bedtime 90 tablet 1     Multiple Vitamin CHEW Take 1 tablet by mouth daily        nitroglycerin (NITROSTAT) 0.4 MG SL tablet Place 1 tablet (0.4 mg) under the tongue every 5 minutes as needed for chest pain If you are still having symptoms after 3 doses (15 minutes) call 911. (Patient not taking: Reported on 4/19/2018) 25 tablet 0     ondansetron (ZOFRAN ODT) 4 MG ODT tab Take 1-2 tablets (4-8 mg) by mouth every 8 hours as needed for nausea 20 tablet 3     pantoprazole (PROTONIX) 40 MG EC tablet TAKE ONE TABLET BY MOUTH EVERY DAY 90 tablet 2     potassium chloride (K-TAB,KLOR-CON) 10 MEQ tablet TAKE ONE TABLET BY MOUTH  THREE TIMES A DAY 90 tablet 5     predniSONE (DELTASONE) 5 MG tablet Take 1 tablet (5 mg) by mouth daily 15 tablet 0     roflumilast (DALIRESP) 500 MCG TABS tablet Take 1 tablet (500 mcg) by mouth daily       SPIRIVA HANDIHALER 18 MCG capsule USING THE HANDIHALER, INHALE THE CONTENTS OF ONE CAPSULE BY MOUTH DAILY 90 capsule 1     SUMAtriptan (IMITREX) 25 MG tablet Take 1 tablet (25 mg) by mouth at onset of headache for migraine May repeat in 2 hours if needed: max 2/day; 9 tablet 11     TYLENOL EXTRA STRENGTH OR 1 TABLET EVERY 4 HOURS AS NEEDED       OTC products: None, except as noted above    Allergies   Allergen Reactions     Gabapentin Swelling     Contrast Dye      Iodine     Peanuts [Nuts] Hives     Penicillins      rash     Sulfa Drugs      High family history     Tace [Chlorotrianisene]      joint swelling     Aspirin Rash     After 3 days     Ibuprofen Nausea and Vomiting     Strawberry Rash      Latex Allergy: NO    Social History   Substance Use Topics     Smoking status: Former Smoker     Packs/day: 1.00     Years: 30.00     Quit date: 1/17/2006     Smokeless tobacco: Never Used      Comment: Quit in 2006     Alcohol use No     History   Drug Use No       REVIEW OF SYSTEMS:   CONSTITUTIONAL: NEGATIVE for fever, chills, change in weight  INTEGUMENTARY/SKIN: NEGATIVE for worrisome rashes, moles or lesions  EYES: NEGATIVE for vision changes or irritation  ENT/MOUTH: NEGATIVE for ear, mouth and throat problems  RESP: NEGATIVE for significant cough or SOB  BREAST: NEGATIVE for masses, tenderness or discharge  CV: NEGATIVE for chest pain, palpitations or peripheral edema  GI: NEGATIVE for nausea, abdominal pain, heartburn, or change in bowel habits  : NEGATIVE for frequency, dysuria, or hematuria  MUSCULOSKELETAL: NEGATIVE for significant arthralgias or myalgia  NEURO: NEGATIVE for weakness, dizziness or paresthesias  ENDOCRINE: NEGATIVE for temperature intolerance, skin/hair changes  HEME: NEGATIVE for  bleeding problems  PSYCHIATRIC: NEGATIVE for changes in mood or affect    EXAM:   LMP 09/29/2005    GENERAL APPEARANCE: healthy, alert and no distress     EYES: EOMI, PERRL     HENT: ear canals and TM's normal and nose and mouth without ulcers or lesions     NECK: no adenopathy, no asymmetry, masses, or scars and thyroid normal to palpation     RESP: lungs clear to auscultation - no rales, rhonchi or wheezes     CV: regular rates and rhythm, normal S1 S2, no S3 or S4 and no murmur, click or rub     ABDOMEN:  soft, nontender, no HSM or masses and bowel sounds normal     MS: extremities normal- no gross deformities noted, no evidence of inflammation in joints, FROM in all extremities.     SKIN: no suspicious lesions or rashes     NEURO: Normal strength and tone, sensory exam grossly normal, mentation intact and speech normal     PSYCH: mentation appears normal. and affect normal/bright     LYMPHATICS: No cervical adenopathy    DIAGNOSTICS:     EKG: appears normal, NSR, normal axis, normal intervals, no acute ST/T changes c/w ischemia, no LVH by voltage criteria, unchanged from previous tracings  Labs Resulted Today:   Results for orders placed or performed in visit on 04/20/18   CT Chest w/o contrast    Narrative    EXAMINATION: Chest CT  4/20/2018 12:56 PM    CLINICAL HISTORY: Lung Transplant Candidate; COPD (chronic obstructive  pulmonary disease) (H); Organ transplant candidate    COMPARISON: 5/19/2017.    TECHNIQUE: CT imaging obtained through the chest without contrast.  Coronal and axial MIP reformatted images obtained.     FINDINGS:  Heart size is normal. No pericardial effusion.  Normal thoracic  vasculature. No thoracic lymphadenopathy. Central tracheobronchial  tree is patent.     Scattered calcified granulomas. No suspicious pulmonary nodules. No  pleural effusion or pneumothorax. Severe centrilobular emphysematous  changes, upper lobe predominant. There is a 4.2 mm pulmonary nodule in  the posterior left  lower lobe that is unchanged dating back to  3/24/2015. 1 mm pulmonary nodule in the right lower lobe (series 4  image 185), unchanged from 3/24/2015. Scattered peribronchovascular  groundglass nodular opacities.    Bones and soft tissues: No suspicious bone findings.     Partially imaged upper abdomen: Limited. Cholecystectomy clips.      Impression    IMPRESSION:   1. Severe upper lobe predominant centrilobular emphysematous changes.  2. Multiple sub-4 mm pulmonary nodules are unchanged dating back to  3/24/2015, statistically benign..  3. Scattered peripherally predominant peribronchovascular groundglass  nodular opacities, likely related to small airways disease    I have personally reviewed the examination and initial interpretation  and I agree with the findings.    SLADE HERBERT MD       Recent Labs   Lab Test  04/16/18   0847  03/23/18   1532   03/16/16   1356   06/11/10   2040   HGB  13.5  13.3   < >   --    < >  14.0   PLT  326  330   < >   --    < >  266   INR  0.98   --    --    --    --   0.95   NA  141  142   < >   --    < >  137   POTASSIUM  3.5  3.7   < >   --    < >  3.6   CR  0.66  0.73   < >   --    < >  1.07*   A1C  5.7   --    --   5.4   < >   --     < > = values in this interval not displayed.        IMPRESSION:   Reason for surgery/procedure: colonoscopy   Diagnosis/reason for consult: pre procedure evaluation/ clearance      The proposed surgical procedure is considered INTERMEDIATE risk.    REVISED CARDIAC RISK INDEX  The patient has the following serious cardiovascular risks for perioperative complications such as (MI, PE, VFib and 3  AV Block):  No serious cardiac risks  INTERPRETATION: 0 risks: Class I (very low risk - 0.4% complication rate)    The patient has the following additional risks for perioperative complications:  High tolerance to opioid analgesics due to chronic narcotic and anxiolytic treatment       ICD-10-CM    1. Preop general physical exam Z01.818         RECOMMENDATIONS:         --Patient is to take all scheduled medications on the day of surgery EXCEPT for modifications listed below.  Hold Lasix for 1 day prior and on day of procedure, will be on liquids and laxatives for bowel prep     APPROVAL GIVEN to proceed with proposed procedure, without further diagnostic evaluation       Signed Electronically by: Bandar Weinberg MD    Copy of this evaluation report is provided to requesting physician.    [facility] Preop Guidelines    Revised Cardiac Risk Index

## 2018-04-23 NOTE — PATIENT INSTRUCTIONS
Before Your Surgery      Call your surgeon if there is any change in your health. This includes signs of a cold or flu (such as a sore throat, runny nose, cough, rash or fever).    Do not smoke, drink alcohol or take over the counter medicine (unless your surgeon or primary care doctor tells you to) for the 24 hours before and after surgery.    If you take prescribed drugs: Follow your doctor s orders about which medicines to take and which to stop until after surgery.    Eating and drinking prior to surgery: follow the instructions from your surgeon    Take a shower or bath the night before surgery. Use the soap your surgeon gave you to gently clean your skin. If you do not have soap from your surgeon, use your regular soap. Do not shave or scrub the surgery site.  Wear clean pajamas and have clean sheets on your bed.     Don't take Lasix and Lisinopril on the day of procedure and don't take Lasix on the day of the prep for procedure.

## 2018-04-23 NOTE — NURSING NOTE
"Chief Complaint   Patient presents with     Pre-Op Exam       Initial /60  Pulse 90  Temp 97.8  F (36.6  C) (Oral)  Ht 5' 10\" (1.778 m)  Wt 182 lb (82.6 kg)  LMP 09/29/2005  SpO2 96%  BMI 26.11 kg/m2 Estimated body mass index is 26.11 kg/(m^2) as calculated from the following:    Height as of this encounter: 5' 10\" (1.778 m).    Weight as of this encounter: 182 lb (82.6 kg).  Medication Reconciliation: complete   Madhavi Garcia MA      "

## 2018-04-23 NOTE — MR AVS SNAPSHOT
After Visit Summary   4/23/2018    Trisha Bender    MRN: [id number]           Patient Information     Date Of Birth          1955        Visit Information        Provider Department      4/23/2018 4:20 PM Bandar Weinberg MD [facility]        Today's Diagnoses     Preop general physical exam    -  1    Special screening for malignant neoplasms, colon        Adjustment disorder with depressed mood        Anxiety        Pulmonary emphysema, unspecified emphysema type (H)        Mechanical low back pain        Chronic systolic congestive heart failure (H)          Care Instructions      Before Your Surgery      Call your surgeon if there is any change in your health. This includes signs of a cold or flu (such as a sore throat, runny nose, cough, rash or fever).    Do not smoke, drink alcohol or take over the counter medicine (unless your surgeon or primary care doctor tells you to) for the 24 hours before and after surgery.    If you take prescribed drugs: Follow your doctor s orders about which medicines to take and which to stop until after surgery.    Eating and drinking prior to surgery: follow the instructions from your surgeon    Take a shower or bath the night before surgery. Use the soap your surgeon gave you to gently clean your skin. If you do not have soap from your surgeon, use your regular soap. Do not shave or scrub the surgery site.  Wear clean pajamas and have clean sheets on your bed.     Don't take Lasix and Lisinopril on the day of procedure and don't take Lasix on the day of the prep for procedure.           Follow-ups after your visit        Your next 10 appointments already scheduled     Apr 26, 2018   Procedure with Steve Acosta MD   [facility] (--)    [address] [facility] [address]   [phone]            Apr 30, 2018  2:00 PM CDT   Pulmonary Treatment with Rh Pulmonary Rehab 1   [facility]  ([facility])    [address], [address]  [facility] [address]   [phone]            May 01, 2018  1:00 PM CDT   Pulmonary Treatment with Rh Pulmonary Rehab 1   [facility] ([facility])    [address], [address]  [facility] [address]   [phone]            May 03, 2018  1:00 PM CDT   Pulmonary Treatment with Rh Pulmonary Rehab 1   [facility] ([facility])    [address], [address]  [facility] [address]   [phone]            May 08, 2018  3:00 PM CDT   Pulmonary Treatment with Rh Pulmonary Rehab 1   [facility] ([facility])    [address], [address]  [facility] [address]   [phone]            May 10, 2018  3:00 PM CDT   Pulmonary Treatment with Rh Pulmonary Rehab 1   [facility] ([facility])    [address], [address]  [facility] [address]   [phone]            May 14, 2018  2:00 PM CDT   Pulmonary Treatment with Rh Pulmonary Rehab 1   [facility] ([facility])    [address], [address]  [facility] [address]   [phone]              Who to contact     If you have questions or need follow up information about today's clinic visit or your schedule please contact [facility] directly at [phone].  Normal or non-critical lab and imaging results will be communicated to you by MyChart, letter or phone within 4 business days after the clinic has received the results. If you do not hear from us within 7 days, please contact the clinic through [employer] or phone. If you have a critical or abnormal lab result, we will notify you by phone as soon as possible.  Submit refill requests through [employer] or call your pharmacy and they will forward the refill request to us. Please allow 3 business days for your refill to be completed.          Additional  "Information About Your Visit        MyChart Information     [employer] gives you secure access to your electronic health record. If you see a primary care provider, you can also send messages to your care team and make appointments. If you have questions, please call your primary care clinic.  If you do not have a primary care provider, please call [phone] and they will assist you.        Care EveryWhere ID     This is your Care EveryWhere ID. This could be used by other organizations to access your [place] medical records  [id number]        Your Vitals Were     Pulse Temperature Height Last Period Pulse Oximetry BMI (Body Mass Index)    90 97.8  F (36.6  C) (Oral) 5' 10\" (1.778 m) 09/29/2005 96% 26.11 kg/m2       Blood Pressure from Last 3 Encounters:   04/23/18 110/60   04/19/18 106/79   04/18/18 137/77    Weight from Last 3 Encounters:   04/23/18 182 lb (82.6 kg)   04/19/18 178 lb 14.4 oz (81.1 kg)   04/18/18 180 lb 12.4 oz (82 kg)              Today, you had the following     No orders found for display         Today's Medication Changes          These changes are accurate as of 4/23/18 11:59 PM.  If you have any questions, ask your nurse or doctor.               Start taking these medicines.        Dose/Directions    escitalopram 10 MG tablet   Commonly known as:  LEXAPRO   Used for:  Adjustment disorder with depressed mood   Started by:  Bandar Weinberg MD        Dose:  10 mg   Take 1 tablet (10 mg) by mouth daily   Quantity:  30 tablet   Refills:  3         These medicines have changed or have updated prescriptions.        Dose/Directions    predniSONE 5 MG tablet   Commonly known as:  DELTASONE   This may have changed:  how much to take   Used for:  Pulmonary emphysema, unspecified emphysema type (H)   Changed by:  Bandar Weinberg MD        Dose:  10 mg   Take 2 tablets (10 mg) by mouth daily   Quantity:  30 tablet   Refills:  0            Where to get your medicines      These medications were " sent to [place] [id number] - [place], [place] - [address]  [address], [facility] [address]     Phone:  [phone]     escitalopram 10 MG tablet    predniSONE 5 MG tablet         Some of these will need a paper prescription and others can be bought over the counter.  Ask your nurse if you have questions.     Bring a paper prescription for each of these medications     ALPRAZolam 0.25 MG tablet    HYDROcodone-acetaminophen 5-325 MG per tablet                Primary Care Provider Office Phone # Fax #    Bandar Weinberg [phone]       [address] [facility] [address]        Equal Access to Services     [facility] VistaJOSHUA : Hadii pradeep leavitt hadasho Sodavidali, waaxda luqadaha, qaybta kaalmada adeegyada, jonathan burris . So [facility] [phone].    ATENCIÓN: Si habla español, tiene a madison disposición servicios gratuitos de asistencia lingüística. Ll[facility] [phone].    We comply with applicable federal civil rights laws and [place] laws. We do not discriminate on the basis of race, color, national origin, age, disability, sex, sexual orientation, or gender identity.            Thank you!     Thank you for choosing [facility]  for your care. Our goal is always to provide you with excellent care. Hearing back from our patients is one way we can continue to improve our services. Please take a few minutes to complete the written survey that you may receive in the mail after your visit with us. Thank you!             Your Updated Medication List - Protect others around you: Learn how to safely use, store and throw away your medicines at www.disposemymeds.org.          This list is accurate as of 4/23/18 11:59 PM.  Always use your most recent med list.                   Brand Name Dispense Instructions for use Diagnosis    ADVAIR DISKUS 500-50 MCG/DOSE diskus inhaler   Generic drug:  fluticasone-salmeterol     3 Inhaler    INHALE 1 PUFF TWO TIMES  DAILY    Pulmonary emphysema, unspecified emphysema type (H)       albuterol 108 (90 Base) MCG/ACT Inhaler    VENTOLIN HFA    18 g    Inhale 2 puffs into the lungs every 6 hours as needed for shortness of breath / dyspnea or wheezing    Asthma exacerbation       ALPRAZolam 0.25 MG tablet    XANAX    30 tablet    Take 1 tablet by mouth. Every twelve hours as needed for anxiety    Anxiety       atorvastatin 10 MG tablet    LIPITOR    36 tablet    Takes 3 times a week    Coronary artery disease involving native coronary artery of native heart without angina pectoris, Mixed hyperlipidemia       baclofen 10 MG tablet    LIORESAL    90 tablet    TAKE ONE TABLET BY MOUTH THREE TIMES A DAY AS NEEDED FOR MUSCLE SPASMS    Chronic bilateral low back pain with right-sided sciatica       [employer] FIBER GUMMIES 2.5 g Chew      Take 1 tablet by mouth daily Reported on 5/8/2017        escitalopram 10 MG tablet    LEXAPRO    30 tablet    Take 1 tablet (10 mg) by mouth daily    Adjustment disorder with depressed mood       fluticasone 50 MCG/ACT spray    FLONASE    16 g    SPRAY 1 TO 2 SPRAYS IN EACH NOSTRIL ONCE DAILY    Nasal congestion       furosemide 20 MG tablet    LASIX    180 tablet    TAKE 2 TABLETS (40 MG) BY MOUTH DAILY SEVERE SWELLING    Acute systolic congestive heart failure (H)       HYDROcodone-acetaminophen 5-325 MG per tablet    NORCO    180 tablet    Take 1-2 tablets by mouth every 8 hours as needed for pain Maximum 6 daily    Chronic bilateral low back pain without sciatica       ipratropium - albuterol 0.5 mg/2.5 mg/3 mL 0.5-2.5 (3) MG/3ML neb solution    DUONEB    30 vial    Take 1 vial (3 mLs) by nebulization every 6 hours as needed for shortness of breath / dyspnea or wheezing    Moderate persistent asthma without complication       lidocaine 5 % Patch    LIDODERM    30 patch    Apply up to 3 patches to painful area at once for up to 12 h within a 24 h period.  Remove after 12 hours.    Mechanical low back pain        lisinopril 2.5 MG tablet    PRINIVIL/Zestril    90 tablet    Take 1 tablet (2.5 mg) by mouth daily    Dilated cardiomyopathy (H)       mometasone 0.1 % ointment    ELOCON    45 g    Apply sparingly to affected area twice daily as needed.  Do not apply to face.    Intrinsic eczema       montelukast 10 MG tablet    SINGULAIR    90 tablet    Take 1 tablet (10 mg) by mouth At Bedtime    Mild persistent asthma without complication       Multiple Vitamin Chew      Take 1 tablet by mouth daily        nitroGLYcerin 0.4 MG sublingual tablet    NITROSTAT    25 tablet    Place 1 tablet (0.4 mg) under the tongue every 5 minutes as needed for chest pain If you are still having symptoms after 3 doses (15 minutes) call 911.    Acute chest pain       ondansetron 4 MG ODT tab    ZOFRAN ODT    20 tablet    Take 1-2 tablets (4-8 mg) by mouth every 8 hours as needed for nausea    Nausea       pantoprazole 40 MG EC tablet    PROTONIX    90 tablet    TAKE ONE TABLET BY MOUTH EVERY DAY    Gastroesophageal reflux disease without esophagitis       potassium chloride 10 MEQ tablet    K-TAB,KLOR-CON    90 tablet    TAKE ONE TABLET BY MOUTH THREE TIMES A DAY    Hypokalemia       predniSONE 5 MG tablet    DELTASONE    30 tablet    Take 2 tablets (10 mg) by mouth daily    Pulmonary emphysema, unspecified emphysema type (H)       roflumilast 500 MCG Tabs tablet    DALIRESP     Take 1 tablet (500 mcg) by mouth daily        SPIRIVA HANDIHALER 18 MCG capsule   Generic drug:  tiotropium     90 capsule    USING THE HANDIHALER, INHALE THE CONTENTS OF ONE CAPSULE BY MOUTH DAILY    Moderate persistent asthma without complication       SUMAtriptan 25 MG tablet    IMITREX    9 tablet    Take 1 tablet (25 mg) by mouth at onset of headache for migraine May repeat in 2 hours if needed: max 2/day;    Chronic migraine without aura without status migrainosus, not intractable       TYLENOL EXTRA STRENGTH PO      1 TABLET EVERY 4 HOURS AS NEEDED    Abdominal  pain, right upper quadrant

## 2018-04-24 RX ORDER — HYDROCODONE BITARTRATE AND ACETAMINOPHEN 5; 325 MG/1; MG/1
1-2 TABLET ORAL EVERY 8 HOURS PRN
Qty: 180 TABLET | Refills: 0 | Status: SHIPPED | OUTPATIENT
Start: 2018-04-24 | End: 2018-05-20

## 2018-04-24 NOTE — PROGRESS NOTES
Patient of Dr. Kahn seen in clinic for psychosocial assessment.   60 minutes spent with patient. 100% of visit consisted of counseling related to issues surrounding emphysema and lung trasnplantation.    Psychosocial Assessment   Name: Trisha Bender     MRN:  [id number]        Patient Name / Age / Race: Trisha Bender (Diane) /62 year old/    Source of Information: Patient and spouse   : Mikal Crane [facility]   Interview Date: 2018   Reason for Referral: Lung Transplant     Current Living Situation   Location ([facility]State[facility] [address] [place] [id number]   With Whom: 68 year old spouse, Naun   Type of Home: single family. 2 story.  Bedrooms upstairs.     Working Phone? Yes    Three Pronged Outlet? n/a   Distance from Hospital: Local.  Within 30 minutes.     Need to Relocate Post Surgery? No   Discussed? 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 No   Income: spouse's income   Insurance: Private Insurance   Name of Private Insurance: Blue Cross Blue Shield through spouse's work   Medication Coverage: Co-pays vary.  No max out of pocket that they are aware of .      In current situation, pt. can afford medication and supply costs:  Yes    Other Financial Concerns/Issues: patient did not work out of the house, so will not qualify for SSDI or Medicare until her 65th birthday.       Family/Social Support   Marital Status:    Partner Name: Steve, but goes by Naun   Length of Time : 42 years   Partner s Employment: Works full time writing software.     Relationship: stable/supportive   Children: 4 adult sons.  Steve (41) lives in [place].  Wander (36) lives in [place].  Lukas (30) lives in [place].  Shivam (30) lives in [place].     Parents: Biological parents .   Adoptive father (88) lives in [place], [place]   Siblings: 1/2 sister,  step brother   Other Family or Friends Close by: n/a   Support System: available, helpful   Primary Support Person: spouse   Issues: Will take FMLA if needed.  patient not ready to be listed yet, spouse may be retired by time she is.       Activities/Functional Ability   Current Level: ambulatory and independent with ADL's   Daily Activities: Now in pulm. Rehab, has been able to increase activity.  Relatively home bound prior.  Does light housekeeping.  Very limited by back pain.  Takes frequent breaks.     Transportation: spouse     Medical Status   Patient s understanding of need for surgical intervention: Patient is not on oxygen, and is relatively happy with quality of life.  Is limited by back pain more than lungs.  Does not feel she is ready for transplant and after week of eval is unsure she would go through at all.  Has very low pain tolerance.  Right heart cath very distressing to her because she felt it was so painful.  Describes crying for 4 hours following heart cath.     Advanced Directive? No    Details: provided with copy.  encouraged completion.     Adherence History: No concerns   Ability to Adhere to Complex Medical Regime: No concners.       Behavioral   Chemical Dependency Issues: 1-2 drinks per year.    Discussed need for abstinence post transplant. Patient agreeable. Does not think this will be problematic.      Patient does have chronic pain and takes vicodin daily   Smoker? No,  Quit in 2006.  Used nicotine gum initially.  No relapses.  No nicotine use in over 10 years.  No e-cigs   Psychiatric impairment: Yes , hx of anxiety on daily xanax.  Denies hx of depression, but Is on lexapro and depression is listed on her problem list.  Denies hx of suidicality or mental health hospitalizations.     Coping Style/Strategies: Support from    Recent Legal History: No   Teaching Completed During Assessment Related To Transplant: 1. Housing and relocation needs post surgery.  2. Caregiver needs  post surgery.  3. Financial issues related to surgery.  4. Risks of alcohol use post surgery.  5. Common psychosocial stressors pre/post surgery.  6. Support group availability.   Psychosocial Risks Reviewed Related To Transplant: 1. Increased stress related to your emotional, family, social, employment, or financial situation.  2. Affect on work and/or disability benefits.  3. Affect on future health and life insurance.  4. Outcome expectations may not be met.  5. Mental Health risks: anxiety, depression, PTSD, guilt, grief and chronic fatigue.     Observed Behavior   Well informed? Yes  Angry? No   Process info well? Yes  Hostile? No   Evasive? No Oriented X3? Yes    Cautious/Suspicious? No Motivated? No   Appropriate Behavior? Yes  Depressed? No   Appropriate Affect? Yes  Anxious? Yes    Interview Observations: Quite dramatic in her story telling.  Emotional.  Has insight into to poor pain tolerance and admits lung transplant may not be good option for her.       Selection Criteria Met   Plan for Support Yes    Chemical Dependence Yes , but ha chronic pain. Maintained on narocotics.     Smoking Yes    Mental Health Yes    Adequate Finances Yes      Risk Issues:    1.  Chronic pain, poor pain tolerance    Final Evaluation/Assessment:     Patients inability to tolerate pain may inhibit her rehab and recovery post transplant.  She admits poor pain tolerance and states she was inconsolable for over 4 hours following her right heart cath.  She also has a hx of anxiety, controlled currently with medications.  Otherwise, no other psychosocial contraindications to transplant.  She has good support from .  No financial or insurance concerns.      Patient understands risks and benefits of Lung Transplant: Yes     Recommendation:    conditional    Signature: Mikal Crane     Title: Licensed Independent Clinical                Interview Date: April 20, 2018

## 2018-04-24 NOTE — TELEPHONE ENCOUNTER
Message from "[employer]:  Original authorizing provider: MD Trisha Marvin would like a refill of the following medications:  HYDROcodone-acetaminophen (NORCO) 5-325 MG per tablet [Bandar Weinberg MD]    Preferred pharmacy: [place], [place] - [facility] [address]    Comment:  Dr. Weinberg and Nursing Staff, I would like to get a refill on the Hydrocodone-Acetaminophen prescription as soon as possible. Thank you. Trisha Bender

## 2018-04-24 NOTE — TELEPHONE ENCOUNTER
RX brought down to [facility]. I also sent a MyChart to the patient telling her that I did so.

## 2018-04-25 LAB
DLCOCOR-%PRED-PRE: 73 %
DLCOCOR-PRE: 17.35 ML/MIN/MMHG
DLCOUNC-%PRED-PRE: 73 %
DLCOUNC-PRE: 17.41 ML/MIN/MMHG
DLCOUNC-PRED: 23.77 ML/MIN/MMHG
ERV-%PRED-PRE: 59 %
ERV-PRE: 0.57 L
ERV-PRED: 0.96 L
EXPTIME-PRE: 13.98 SEC
FEF2575-%PRED-PRE: 11 %
FEF2575-PRE: 0.29 L/SEC
FEF2575-PRED: 2.45 L/SEC
FEFMAX-%PRED-PRE: 38 %
FEFMAX-PRE: 2.76 L/SEC
FEFMAX-PRED: 7.11 L/SEC
FEV1-%PRED-PRE: 30 %
FEV1-PRE: 0.89 L
FEV1FEV6-PRE: 42 %
FEV1FEV6-PRED: 80 %
FEV1FVC-PRE: 33 %
FEV1FVC-PRED: 77 %
FEV1SVC-PRE: 28 %
FEV1SVC-PRED: 74 %
FIFMAX-PRE: 4.01 L/SEC
FRCPLETH-%PRED-PRE: 153 %
FRCPLETH-PRE: 4.67 L
FRCPLETH-PRED: 3.05 L
FVC-%PRED-PRE: 72 %
FVC-PRE: 2.75 L
FVC-PRED: 3.78 L
IC-%PRED-PRE: 85 %
IC-PRE: 2.58 L
IC-PRED: 3.02 L
RVPLETH-%PRED-PRE: 184 %
RVPLETH-PRE: 4.1 L
RVPLETH-PRED: 2.22 L
TLCPLETH-%PRED-PRE: 121 %
TLCPLETH-PRE: 7.24 L
TLCPLETH-PRED: 5.94 L
VA-%PRED-PRE: 85 %
VA-PRE: 5.18 L
VC-%PRED-PRE: 78 %
VC-PRE: 3.14 L
VC-PRED: 3.98 L

## 2018-04-25 NOTE — H&P (VIEW-ONLY)
[place] [address]  [facility] [address]  [phone]  Dept: [phone]    PRE-OP EVALUATION:  Today's date: 2018    Trisha Bender (: 1955) presents for pre-operative evaluation assessment as requested by Dr. Acosta.  She requires evaluation and anesthesia risk assessment prior to undergoing surgery/procedure for treatment of colonoscopy for colon cancer screening .    Proposed Surgery/ Procedure: Colonoscopy  Date of Surgery/ Procedure: 18  Time of Surgery/ Procedure: 10:00AM  Hospital/Surgical Facility: [facility]  Primary Physician: Bandar Weinberg  Type of Anesthesia Anticipated: Conscious sedation    Patient has a Health Care Directive or Living Will:  NO    1. NO - Do you have a history of heart attack, stroke, stent, bypass or surgery on an artery in the head, neck, heart or legs?  2. NO - Do you ever have any pain or discomfort in your chest?  3. YES - DO YOU HAVE A HISTORY OF HEART FAILURE   4. YES - ARE YOUR TROUBLED BY SHORTNESS OF BREATH WHEN WALKING ON THE LEVEL, UP A SLIGHT HILL OR AT NIGHT?   5. NO - Do you currently have a cold, bronchitis or other respiratory infection?  6. YES - DO YOU HAVE A COUGH, SHORTNESS OF BREATH OR WHEEZING?   7. NO - Do you sometimes get pains in the calves of your legs when you walk?  8. NO - Do you or anyone in your family have previous history of blood clots?  9. NO - Do you or does anyone in your family have a serious bleeding problem such as prolonged bleeding following surgeries or cuts?  10. NO - Have you ever had problems with anemia or been told to take iron pills?  11. NO - Have you had any abnormal blood loss such as black, tarry or bloody stools, or abnormal vaginal bleeding?  12. NO - Have you ever had a blood transfusion?  13. NO - Have you or any of your relatives ever had problems with anesthesia?  14. NO - Do you have sleep apnea, excessive snoring or daytime drowsiness?  15. NO  - Do you have any prosthetic heart valves?  16. NO - Do you have prosthetic joints?  17. NO - Is there any chance that you may be pregnant?      HPI:     HPI related to upcoming procedure: scheduled for screening colonoscopy. Needs preop assessment because of history of COPD, CHF. Currently no worsening SOB, no acute symptoms. Afebrile. Has been assessed for lung transplant. Not on the list yet. Does not require oxygen treatment. Uses inhaled bronchodilators, on chronic steroid treatment.   Has h/o stress cardiomyopathy, no CAD. Symptoms controlled, no acute SOB, CP, edema.   Patient is on chronic narcotic analgesics for treatment of chronic back pain and abdominal pain.   Currently no exacerbation of pain.   Has history of anxiety. On PRN Xanax. Helping with symptoms. Has tried Cimbalta, but developed side effects of nausea and has stopped treatment.       See problem list for active medical problems.  Problems all longstanding and stable, except as noted/documented.  See ROS for pertinent symptoms related to these conditions.                                                                                                                                                          .    MEDICAL HISTORY:     Patient Active Problem List    Diagnosis Date Noted     Severe episode of recurrent major depressive disorder, without psychotic features (H) 03/23/2018     Priority: Medium     Panlobular emphysema (H) 11/07/2017     Priority: Medium     Mechanical low back pain 12/30/2016     Priority: Medium     Moderate persistent asthma without complication 08/16/2016     Priority: Medium     Hypokalemia 08/16/2016     Priority: Medium     Controlled substance agreement signed 02/23/2016     Priority: Medium     Coronary artery disease involving native coronary artery of native heart without angina pectoris 02/09/2016     Priority: Medium     Mild per cath         Mixed hyperlipidemia 02/09/2016     Priority: Medium     SOB  (shortness of breath) 10/16/2015     Priority: Medium     CHF (congestive heart failure) (H)      Priority: Medium     Dilated cardiomyopathy (H) 06/30/2015     Priority: Medium     2/9/2016 - EF 45-50% by echo  10/16/2015 - EF 50-55% by echo  8/12/2015 - EF 50-55% by echo  6/30/2015 - EF 20-25% by echo       Right bundle branch block (RBBB) 06/30/2015     Priority: Medium     Lateral epicondylitis of right elbow 10/07/2013     Priority: Medium     Hair loss 05/23/2013     Priority: Medium     Chronic low back pain 04/12/2013     Priority: Medium     Mild persistent asthma without complication 08/30/2012     Priority: Medium     Diagnosis updated by automated process. Provider to review and confirm.       Anxiety 07/28/2011     Priority: Medium     Family history of thyroid cancer - 1/2 brother 07/27/2011     Priority: Medium     Advanced directives, counseling/discussion 07/05/2011     Priority: Medium     Discussed Advance Directive planning with patient; information given to patient to review.       Cystocele 05/07/2009     Priority: Medium     Chronic airway obstruction (H) 01/23/2006     Priority: Medium     FEV1 36% of pred; VC 67%  in Jan 06.  Problem list name updated by automated process. Provider to review       Essential hypertension with goal blood pressure less than 130/80 10/18/2004     Priority: Medium     Problem list name updated by automated process. Provider to review       Chronic migraine without aura without status migrainosus, not intractable 10/18/2004     Priority: Medium     Esophageal reflux 10/18/2004     Priority: Medium     Disorder of bone and cartilage 10/18/2004     Priority: Medium     Problem list name updated by automated process. Provider to review       Family history of malignant neoplasm of gastrointestinal tract 10/18/2004     Priority: Medium      Past Medical History:   Diagnosis Date     Anxiety      CAD (coronary artery disease) 7/2/15    mild per cath     CHF (congestive  heart failure) (H)     EF=20-25% per echo 6/30/15     Chronic airway obstruction, not elsewhere classified     FEV1 36% of pred; VC 67%  in Jan 06.     COPD (chronic obstructive pulmonary disease) (H)      Esophageal reflux      Mild persistent asthma 8/30/2012     OSTEOPENIA      Other and unspecified hyperlipidemia      Other chronic pain     Entire back     Other forms of migraine, without mention of intractable migraine without mention of status migrainosus      Takotsubo cardiomyopathy      Unspecified essential hypertension      Past Surgical History:   Procedure Laterality Date     ANGIOGRAM  07/02/2015    Minimal CAD. LV dysfunction, Mid RCA 20% stenosis, EF 20-25%, c/w Takotsubo cardiomyopathy     APPENDECTOMY       C NONSPECIFIC PROCEDURE  1974    GB and appendectomy  abstracted [id number]     C NONSPECIFIC PROCEDURE      Nasal septoplasty and mucous retention cyst     CHOLECYSTECTOMY  1974     ESOPHAGOSCOPY, GASTROSCOPY, DUODENOSCOPY (EGD), COMBINED  11/15/2011    Procedure:COMBINED ESOPHAGOSCOPY, GASTROSCOPY, DUODENOSCOPY (EGD);  ESOPHAGOSCOPY, GASTROSCOPY, DUODENOSCOPY (EGD) ; Surgeon:JIM URENA; Location:[facility]     HC REMOVE TONSILS/ADENOIDS,<13 Y/O       INJECT EPIDURAL LUMBAR / SACRAL SINGLE N/A 4/13/2018    Procedure: INJECT EPIDURAL LUMBAR / SACRAL CONTINUAL OR INTERMITTENT;  Lumbar Epidural Steroid Injection;  Surgeon: Az Enamorado MD;  Location: [facility]     LAPAROSCOPIC ABLATION ENDOMETRIOSIS  1998     LAPAROSCOPY DIAGNOSTIC (GYN)      endometriosis resection / lysis of adhesions     wisdom teeth       Current Outpatient Prescriptions   Medication Sig Dispense Refill     ADVAIR DISKUS 500-50 MCG/DOSE diskus inhaler INHALE 1 PUFF TWO TIMES DAILY 3 Inhaler 3     albuterol (VENTOLIN HFA) 108 (90 BASE) MCG/ACT Inhaler Inhale 2 puffs into the lungs every 6 hours as needed for shortness of breath / dyspnea or wheezing 18 g 3     ALPRAZolam (XANAX) 0.25 MG tablet Take 1 tablet by mouth. Every twelve  hours as needed for anxiety 30 tablet 0     atorvastatin (LIPITOR) 10 MG tablet Takes 3 times a week 36 tablet 3     baclofen (LIORESAL) 10 MG tablet TAKE ONE TABLET BY MOUTH THREE TIMES A DAY AS NEEDED FOR MUSCLE SPASMS 90 tablet 1     [employer] FIBER GUMMIES 2.5 G CHEW Take 1 tablet by mouth daily Reported on 5/8/2017       fluticasone (FLONASE) 50 MCG/ACT spray SPRAY 1 TO 2 SPRAYS IN EACH NOSTRIL ONCE DAILY 16 g 7     furosemide (LASIX) 20 MG tablet TAKE 2 TABLETS (40 MG) BY MOUTH DAILY SEVERE SWELLING 180 tablet 1     HYDROcodone-acetaminophen (NORCO) 5-325 MG per tablet Take 1-2 tablets by mouth every 8 hours as needed for pain Maximum 6 daily 180 tablet 0     ipratropium - albuterol 0.5 mg/2.5 mg/3 mL (DUONEB) 0.5-2.5 (3) MG/3ML neb solution Take 1 vial (3 mLs) by nebulization every 6 hours as needed for shortness of breath / dyspnea or wheezing 30 vial 1     lidocaine (LIDODERM) 5 % Patch Apply up to 3 patches to painful area at once for up to 12 h within a 24 h period.  Remove after 12 hours. 30 patch 0     lisinopril (PRINIVIL/ZESTRIL) 2.5 MG tablet Take 1 tablet (2.5 mg) by mouth daily 90 tablet 3     mometasone (ELOCON) 0.1 % ointment Apply sparingly to affected area twice daily as needed.  Do not apply to face. 45 g 0     montelukast (SINGULAIR) 10 MG tablet Take 1 tablet (10 mg) by mouth At Bedtime 90 tablet 1     Multiple Vitamin CHEW Take 1 tablet by mouth daily        nitroglycerin (NITROSTAT) 0.4 MG SL tablet Place 1 tablet (0.4 mg) under the tongue every 5 minutes as needed for chest pain If you are still having symptoms after 3 doses (15 minutes) call 911. (Patient not taking: Reported on 4/19/2018) 25 tablet 0     ondansetron (ZOFRAN ODT) 4 MG ODT tab Take 1-2 tablets (4-8 mg) by mouth every 8 hours as needed for nausea 20 tablet 3     pantoprazole (PROTONIX) 40 MG EC tablet TAKE ONE TABLET BY MOUTH EVERY DAY 90 tablet 2     potassium chloride (K-TAB,KLOR-CON) 10 MEQ tablet TAKE ONE TABLET BY MOUTH  THREE TIMES A DAY 90 tablet 5     predniSONE (DELTASONE) 5 MG tablet Take 1 tablet (5 mg) by mouth daily 15 tablet 0     roflumilast (DALIRESP) 500 MCG TABS tablet Take 1 tablet (500 mcg) by mouth daily       SPIRIVA HANDIHALER 18 MCG capsule USING THE HANDIHALER, INHALE THE CONTENTS OF ONE CAPSULE BY MOUTH DAILY 90 capsule 1     SUMAtriptan (IMITREX) 25 MG tablet Take 1 tablet (25 mg) by mouth at onset of headache for migraine May repeat in 2 hours if needed: max 2/day; 9 tablet 11     TYLENOL EXTRA STRENGTH OR 1 TABLET EVERY 4 HOURS AS NEEDED       OTC products: None, except as noted above    Allergies   Allergen Reactions     Gabapentin Swelling     Contrast Dye      Iodine     Peanuts [Nuts] Hives     Penicillins      rash     Sulfa Drugs      High family history     Tace [Chlorotrianisene]      joint swelling     Aspirin Rash     After 3 days     Ibuprofen Nausea and Vomiting     Strawberry Rash      Latex Allergy: NO    Social History   Substance Use Topics     Smoking status: Former Smoker     Packs/day: 1.00     Years: 30.00     Quit date: 1/17/2006     Smokeless tobacco: Never Used      Comment: Quit in 2006     Alcohol use No     History   Drug Use No       REVIEW OF SYSTEMS:   CONSTITUTIONAL: NEGATIVE for fever, chills, change in weight  INTEGUMENTARY/SKIN: NEGATIVE for worrisome rashes, moles or lesions  EYES: NEGATIVE for vision changes or irritation  ENT/MOUTH: NEGATIVE for ear, mouth and throat problems  RESP: NEGATIVE for significant cough or SOB  BREAST: NEGATIVE for masses, tenderness or discharge  CV: NEGATIVE for chest pain, palpitations or peripheral edema  GI: NEGATIVE for nausea, abdominal pain, heartburn, or change in bowel habits  : NEGATIVE for frequency, dysuria, or hematuria  MUSCULOSKELETAL: NEGATIVE for significant arthralgias or myalgia  NEURO: NEGATIVE for weakness, dizziness or paresthesias  ENDOCRINE: NEGATIVE for temperature intolerance, skin/hair changes  HEME: NEGATIVE for  bleeding problems  PSYCHIATRIC: NEGATIVE for changes in mood or affect    EXAM:   LMP 09/29/2005    GENERAL APPEARANCE: healthy, alert and no distress     EYES: EOMI, PERRL     HENT: ear canals and TM's normal and nose and mouth without ulcers or lesions     NECK: no adenopathy, no asymmetry, masses, or scars and thyroid normal to palpation     RESP: lungs clear to auscultation - no rales, rhonchi or wheezes     CV: regular rates and rhythm, normal S1 S2, no S3 or S4 and no murmur, click or rub     ABDOMEN:  soft, nontender, no HSM or masses and bowel sounds normal     MS: extremities normal- no gross deformities noted, no evidence of inflammation in joints, FROM in all extremities.     SKIN: no suspicious lesions or rashes     NEURO: Normal strength and tone, sensory exam grossly normal, mentation intact and speech normal     PSYCH: mentation appears normal. and affect normal/bright     LYMPHATICS: No cervical adenopathy    DIAGNOSTICS:     EKG: appears normal, NSR, normal axis, normal intervals, no acute ST/T changes c/w ischemia, no LVH by voltage criteria, unchanged from previous tracings  Labs Resulted Today:   Results for orders placed or performed in visit on 04/20/18   CT Chest w/o contrast    Narrative    EXAMINATION: Chest CT  4/20/2018 12:56 PM    CLINICAL HISTORY: Lung Transplant Candidate; COPD (chronic obstructive  pulmonary disease) (H); Organ transplant candidate    COMPARISON: 5/19/2017.    TECHNIQUE: CT imaging obtained through the chest without contrast.  Coronal and axial MIP reformatted images obtained.     FINDINGS:  Heart size is normal. No pericardial effusion.  Normal thoracic  vasculature. No thoracic lymphadenopathy. Central tracheobronchial  tree is patent.     Scattered calcified granulomas. No suspicious pulmonary nodules. No  pleural effusion or pneumothorax. Severe centrilobular emphysematous  changes, upper lobe predominant. There is a 4.2 mm pulmonary nodule in  the posterior left  lower lobe that is unchanged dating back to  3/24/2015. 1 mm pulmonary nodule in the right lower lobe (series 4  image 185), unchanged from 3/24/2015. Scattered peribronchovascular  groundglass nodular opacities.    Bones and soft tissues: No suspicious bone findings.     Partially imaged upper abdomen: Limited. Cholecystectomy clips.      Impression    IMPRESSION:   1. Severe upper lobe predominant centrilobular emphysematous changes.  2. Multiple sub-4 mm pulmonary nodules are unchanged dating back to  3/24/2015, statistically benign..  3. Scattered peripherally predominant peribronchovascular groundglass  nodular opacities, likely related to small airways disease    I have personally reviewed the examination and initial interpretation  and I agree with the findings.    SLADE HERBERT MD       Recent Labs   Lab Test  04/16/18   0847  03/23/18   1532   03/16/16   1356   06/11/10   2040   HGB  13.5  13.3   < >   --    < >  14.0   PLT  326  330   < >   --    < >  266   INR  0.98   --    --    --    --   0.95   NA  141  142   < >   --    < >  137   POTASSIUM  3.5  3.7   < >   --    < >  3.6   CR  0.66  0.73   < >   --    < >  1.07*   A1C  5.7   --    --   5.4   < >   --     < > = values in this interval not displayed.        IMPRESSION:   Reason for surgery/procedure: colonoscopy   Diagnosis/reason for consult: pre procedure evaluation/ clearance      The proposed surgical procedure is considered INTERMEDIATE risk.    REVISED CARDIAC RISK INDEX  The patient has the following serious cardiovascular risks for perioperative complications such as (MI, PE, VFib and 3  AV Block):  No serious cardiac risks  INTERPRETATION: 0 risks: Class I (very low risk - 0.4% complication rate)    The patient has the following additional risks for perioperative complications:  High tolerance to opioid analgesics due to chronic narcotic and anxiolytic treatment       ICD-10-CM    1. Preop general physical exam Z01.818         RECOMMENDATIONS:         --Patient is to take all scheduled medications on the day of surgery EXCEPT for modifications listed below.  Hold Lasix for 1 day prior and on day of procedure, will be on liquids and laxatives for bowel prep     APPROVAL GIVEN to proceed with proposed procedure, without further diagnostic evaluation       Signed Electronically by: Bandar Weinberg MD    Copy of this evaluation report is provided to requesting physician.    [facility] Preop Guidelines    Revised Cardiac Risk Index

## 2018-04-26 ENCOUNTER — SURGERY (OUTPATIENT)
Age: 63
End: 2018-04-26

## 2018-04-26 ENCOUNTER — ANESTHESIA EVENT (OUTPATIENT)
Dept: SURGERY | Facility: CLINIC | Age: 63
End: 2018-04-26
Payer: COMMERCIAL

## 2018-04-26 ENCOUNTER — HOSPITAL ENCOUNTER (OUTPATIENT)
Facility: CLINIC | Age: 63
Discharge: HOME | End: 2018-04-26
Attending: INTERNAL MEDICINE | Admitting: INTERNAL MEDICINE
Payer: COMMERCIAL

## 2018-04-26 ENCOUNTER — ANESTHESIA (OUTPATIENT)
Dept: SURGERY | Facility: CLINIC | Age: 63
End: 2018-04-26
Payer: COMMERCIAL

## 2018-04-26 VITALS
TEMPERATURE: 98.1 F | SYSTOLIC BLOOD PRESSURE: 128 MMHG | DIASTOLIC BLOOD PRESSURE: 78 MMHG | RESPIRATION RATE: 16 BRPM | WEIGHT: 178 LBS | HEIGHT: 70 IN | OXYGEN SATURATION: 99 % | BODY MASS INDEX: 25.48 KG/M2

## 2018-04-26 LAB — COLONOSCOPY: NORMAL

## 2018-04-26 PROCEDURE — 71000027 ZZH RECOVERY PHASE 2 EACH 15 MINS: Performed by: INTERNAL MEDICINE

## 2018-04-26 PROCEDURE — 25000125 ZZHC RX 250: Performed by: NURSE ANESTHETIST, CERTIFIED REGISTERED

## 2018-04-26 PROCEDURE — 27210794 ZZH OR GENERAL SUPPLY STERILE: Performed by: INTERNAL MEDICINE

## 2018-04-26 PROCEDURE — 36000050 ZZH SURGERY LEVEL 2 1ST 30 MIN: Performed by: INTERNAL MEDICINE

## 2018-04-26 PROCEDURE — 25000128 H RX IP 250 OP 636: Performed by: ANESTHESIOLOGY

## 2018-04-26 PROCEDURE — 88305 TISSUE EXAM BY PATHOLOGIST: CPT | Mod: 26 | Performed by: INTERNAL MEDICINE

## 2018-04-26 PROCEDURE — 36000052 ZZH SURGERY LEVEL 2 EA 15 ADDTL MIN: Performed by: INTERNAL MEDICINE

## 2018-04-26 PROCEDURE — 25000128 H RX IP 250 OP 636: Performed by: NURSE ANESTHETIST, CERTIFIED REGISTERED

## 2018-04-26 PROCEDURE — 37000009 ZZH ANESTHESIA TECHNICAL FEE, EACH ADDTL 15 MIN: Performed by: INTERNAL MEDICINE

## 2018-04-26 PROCEDURE — 88305 TISSUE EXAM BY PATHOLOGIST: CPT | Performed by: INTERNAL MEDICINE

## 2018-04-26 PROCEDURE — 37000008 ZZH ANESTHESIA TECHNICAL FEE, 1ST 30 MIN: Performed by: INTERNAL MEDICINE

## 2018-04-26 PROCEDURE — 40000306 ZZH STATISTIC PRE PROC ASSESS II: Performed by: INTERNAL MEDICINE

## 2018-04-26 PROCEDURE — 40000037 ZZH STATISTIC COLONOSCOPY (OR PROCEDURE): Performed by: INTERNAL MEDICINE

## 2018-04-26 RX ORDER — FLUMAZENIL 0.1 MG/ML
0.2 INJECTION, SOLUTION INTRAVENOUS
Status: CANCELLED | OUTPATIENT
Start: 2018-04-26 | End: 2018-04-26

## 2018-04-26 RX ORDER — NALOXONE HYDROCHLORIDE 0.4 MG/ML
.1-.4 INJECTION, SOLUTION INTRAMUSCULAR; INTRAVENOUS; SUBCUTANEOUS
Status: CANCELLED | OUTPATIENT
Start: 2018-04-26 | End: 2018-04-27

## 2018-04-26 RX ORDER — PROPOFOL 10 MG/ML
INJECTION, EMULSION INTRAVENOUS PRN
Status: DISCONTINUED | OUTPATIENT
Start: 2018-04-26 | End: 2018-04-26

## 2018-04-26 RX ORDER — SODIUM CHLORIDE, SODIUM LACTATE, POTASSIUM CHLORIDE, CALCIUM CHLORIDE 600; 310; 30; 20 MG/100ML; MG/100ML; MG/100ML; MG/100ML
INJECTION, SOLUTION INTRAVENOUS CONTINUOUS
Status: DISCONTINUED | OUTPATIENT
Start: 2018-04-26 | End: 2018-04-26 | Stop reason: HOSPADM

## 2018-04-26 RX ORDER — ONDANSETRON 2 MG/ML
4 INJECTION INTRAMUSCULAR; INTRAVENOUS EVERY 30 MIN PRN
Status: DISCONTINUED | OUTPATIENT
Start: 2018-04-26 | End: 2018-04-26 | Stop reason: HOSPADM

## 2018-04-26 RX ORDER — ONDANSETRON 4 MG/1
4 TABLET, ORALLY DISINTEGRATING ORAL EVERY 6 HOURS PRN
Status: CANCELLED | OUTPATIENT
Start: 2018-04-26

## 2018-04-26 RX ORDER — KETAMINE HYDROCHLORIDE 10 MG/ML
INJECTION INTRAMUSCULAR; INTRAVENOUS PRN
Status: DISCONTINUED | OUTPATIENT
Start: 2018-04-26 | End: 2018-04-26

## 2018-04-26 RX ORDER — ONDANSETRON 2 MG/ML
4 INJECTION INTRAMUSCULAR; INTRAVENOUS EVERY 6 HOURS PRN
Status: CANCELLED | OUTPATIENT
Start: 2018-04-26

## 2018-04-26 RX ORDER — LIDOCAINE 40 MG/G
CREAM TOPICAL
Status: DISCONTINUED | OUTPATIENT
Start: 2018-04-26 | End: 2018-04-26 | Stop reason: HOSPADM

## 2018-04-26 RX ORDER — MEPERIDINE HYDROCHLORIDE 25 MG/ML
12.5 INJECTION INTRAMUSCULAR; INTRAVENOUS; SUBCUTANEOUS
Status: DISCONTINUED | OUTPATIENT
Start: 2018-04-26 | End: 2018-04-26 | Stop reason: HOSPADM

## 2018-04-26 RX ORDER — NALOXONE HYDROCHLORIDE 0.4 MG/ML
.1-.4 INJECTION, SOLUTION INTRAMUSCULAR; INTRAVENOUS; SUBCUTANEOUS
Status: DISCONTINUED | OUTPATIENT
Start: 2018-04-26 | End: 2018-04-26 | Stop reason: HOSPADM

## 2018-04-26 RX ORDER — ONDANSETRON 2 MG/ML
INJECTION INTRAMUSCULAR; INTRAVENOUS PRN
Status: DISCONTINUED | OUTPATIENT
Start: 2018-04-26 | End: 2018-04-26

## 2018-04-26 RX ORDER — ONDANSETRON 4 MG/1
4 TABLET, ORALLY DISINTEGRATING ORAL EVERY 30 MIN PRN
Status: DISCONTINUED | OUTPATIENT
Start: 2018-04-26 | End: 2018-04-26 | Stop reason: HOSPADM

## 2018-04-26 RX ADMIN — PROPOFOL 20 MG: 10 INJECTION, EMULSION INTRAVENOUS at 11:25

## 2018-04-26 RX ADMIN — KETAMINE HYDROCHLORIDE 20 MG: 10 INJECTION, SOLUTION INTRAMUSCULAR; INTRAVENOUS at 11:05

## 2018-04-26 RX ADMIN — PROPOFOL 30 MG: 10 INJECTION, EMULSION INTRAVENOUS at 11:39

## 2018-04-26 RX ADMIN — PROPOFOL 30 MG: 10 INJECTION, EMULSION INTRAVENOUS at 11:38

## 2018-04-26 RX ADMIN — PROPOFOL 20 MG: 10 INJECTION, EMULSION INTRAVENOUS at 11:13

## 2018-04-26 RX ADMIN — SODIUM CHLORIDE, POTASSIUM CHLORIDE, SODIUM LACTATE AND CALCIUM CHLORIDE: 600; 310; 30; 20 INJECTION, SOLUTION INTRAVENOUS at 11:36

## 2018-04-26 RX ADMIN — DEXMEDETOMIDINE HYDROCHLORIDE 0.5 MCG/KG/HR: 100 INJECTION, SOLUTION INTRAVENOUS at 11:05

## 2018-04-26 RX ADMIN — PROPOFOL 30 MG: 10 INJECTION, EMULSION INTRAVENOUS at 11:32

## 2018-04-26 RX ADMIN — PROPOFOL 20 MG: 10 INJECTION, EMULSION INTRAVENOUS at 11:14

## 2018-04-26 RX ADMIN — MIDAZOLAM 2 MG: 1 INJECTION INTRAMUSCULAR; INTRAVENOUS at 11:01

## 2018-04-26 RX ADMIN — SODIUM CHLORIDE, POTASSIUM CHLORIDE, SODIUM LACTATE AND CALCIUM CHLORIDE: 600; 310; 30; 20 INJECTION, SOLUTION INTRAVENOUS at 11:01

## 2018-04-26 RX ADMIN — PROPOFOL 40 MG: 10 INJECTION, EMULSION INTRAVENOUS at 11:19

## 2018-04-26 RX ADMIN — ONDANSETRON 4 MG: 2 INJECTION INTRAMUSCULAR; INTRAVENOUS at 11:06

## 2018-04-26 RX ADMIN — PROPOFOL 20 MG: 10 INJECTION, EMULSION INTRAVENOUS at 11:11

## 2018-04-26 RX ADMIN — PROPOFOL 20 MG: 10 INJECTION, EMULSION INTRAVENOUS at 11:05

## 2018-04-26 RX ADMIN — PROPOFOL 20 MG: 10 INJECTION, EMULSION INTRAVENOUS at 11:15

## 2018-04-26 ASSESSMENT — COPD QUESTIONNAIRES
COPD: 1
CAT_SEVERITY: MODERATE

## 2018-04-26 NOTE — IP AVS SNAPSHOT
[facility] PreOP/PostOP    [address]    [facility] [address]    Phone:  [phone]    Fax:  [phone]                                       After Visit Summary   4/26/2018    Trisha Bender    MRN: [id number]           After Visit Summary Signature Page     I have received my discharge instructions, and my questions have been answered. I have discussed any challenges I see with this plan with the nurse or doctor.    ..........................................................................................................................................  Patient/Patient Representative Signature      ..........................................................................................................................................  Patient Representative Print Name and Relationship to Patient    ..................................................               ................................................  Date                                            Time    ..........................................................................................................................................  Reviewed by Signature/Title    ...................................................              ..............................................  Date                                                            Time

## 2018-04-26 NOTE — LETTER
March 27, 2018      Trisha Bender  [address] [facility] [id number]        Dear Trisha,       Thank you for choosing [facility]. You are scheduled for the following service.    You will need to have a History and Physical Exam done within 30 days of this scheduled procedure. Please arrange this with your primary care physician.    Date:  4-11-18    Procedure:   COLONOSCOPY    Doctor:  Karla                  Arrival Time:  1050  *check in at the Surgery Center desk*     Procedure Time: 1140      Location:   [facility]      Surgery Center (on the [facility] the [facility] [place]       [address] [place], [place] [address]    MIRALAX -GATORADE  PREP  Colonoscopy is the most accurate test to detect colon polyps and colon cancer; and the only test where polyps can be removed. During this procedure, a doctor examines the lining of your large intestine and rectum through a flexible tube.     Transportation  Arrange for a ride for the day of your procedure with a responsible adult.  A taxi ride is not an option unless you are accompanied by a responsible adult. If you fail to arrange transportation with a responsible adult, your procedure will be cancelled and rescheduled.        Purchase the  following supplies at your local pharmacy:  - 2 (two) bisacodyl tablets: each tablet contains 5 mg.  (Dulcolax  laxative NOT Dulcolax  stool softener)   - 1 (one) 8.3 oz bottle of Polyethylene Glycol (PEG) 3350 Powder   (MiraLAX , Smooth LAX , ClearLAX  or equivalent)  - 64 oz Gatorade    Regular Gatorade, Gatorade G2 , Powerade , Powerade Zero  or Pedialyte  is acceptable. Red colored flavors are not allowed; all other colors (yellow, green, orange, purple and blue) are okay. It is also okay to buy two 2.12 oz packets of powdered Gatorade that can be mixed with water to a total volume of 64 oz of liquid.  - 1 (one) 10 oz bottle of Magnesium Citrate (Red colored  flavors are not allowed)  It is also okay for you to use a 0.5 oz package of powdered magnesium citrate (17 g) mixed with 10 oz of water.      PREPARATION FOR COLONOSCOPY    7 days before:    Discontinue fiber supplements and medications containing iron. This includes Metamucil  and Fibercon ; and multivitamins with iron.  3 days before:    Begin a low-fiber diet. A low-fiber diet helps making the cleanout more effective.     Examples of a low-fiber diet include (but are not limited to): white bread, white rice, pasta, crackers, fish, chicken, eggs, ground beef, creamy peanut butter, cooked/steamed/boiled vegetables, canned fruit, bananas, melons, milk, plain yogurt cheese, salad dressing and other condiments.     The following are not allowed on a low-fiber diet: seeds, nuts, popcorn, bran, whole wheat, corn, quinoa, raw fruits and vegetables, berries and dried fruit, beans and lentils.    For additional details on low-fiber diet, please refer to the table on the last page.  2 days before:    Continue the low-fiber diet.     Drink at least 8 glasses of water throughout the day.     Stop eating solid foods at 11:45 pm.  1 day before:    In the morning: begin a clear liquid diet (liquids you can see through).     Examples of a clear liquid diet include: water, clear broth or bouillon, Gatorade, Pedialyte or Powerade, carbonated and non-carbonated soft drinks (Sprite , 7-Up , ginger ale), strained fruit juices without pulp (apple, white grape, white cranberry), Jell-O  and popsicles.     The following are not allowed on a clear liquid diet: red liquids, alcoholic beverages, coffee, dairy products (milk, creamer, and yogurt), protein shakes, creamy broths, juice with pulp and chewing tobacco.    At noon: take 2 (two) bisacodyl tablets     At 4 (and no later than 6pm): start drinking the Miralax-Gatorade preparation (8.3 oz of Miralax mixed with 64 oz of Gatorade in a large pitcher). Drink 1(one) 8 oz glass every 15  minutes thereafter, until the mixture is gone.    COLON CLEANSING TIPS: drink adequate amounts of fluids before and after your colon cleansing to prevent dehydration. Stay near a toilet because you will have diarrhea. Even if you are sitting on the toilet, continue to drink the cleansing solution every 15 minutes. If you feel nauseous or vomit, rinse your mouth with water, take a 15 to 30-minute-break and then continue drinking the solution. You will be uncomfortable until the stool has flushed from your colon (in about 2 to 4 hours). You may feel chilled.    Day of your procedure  You may take all of your morning medications including blood pressure medications, blood thinners (if you have not been instructed to stop these by our office), methadone, anti-seizure medications with sips of water 3 hours prior to your procedure or earlier. Do not take insulin or vitamins prior to your procedure. Continue the clear liquid diet.   4 hours prior: drink 10 oz of magnesium citrate. It may be easier to drink it with a straw.    STOP consuming all liquids after that.     Do not take anything by mouth during this time.     Allow extra time to travel to your procedure as you may need to stop and use a restroom along the way.  You are ready for the procedure, if you followed all instructions and your stool is no longer formed, but clear or yellow liquid. If you are unsure whether your colon is clean, please call our office at [phone] before you leave for your appointment.    Bring the following to your procedure:  - Insurance Card/Photo ID.   - List of current medications including over-the-counter medications and supplements.   - Your rescue inhaler if you currently use one to control asthma.    Canceling or rescheduling your appointment:   If you must cancel or reschedule your appointment, please call [phone] as soon as possible.    COLONOSCOPY PRE-PROCEDURE CHECKLIST  If you have diabetes, ask your regular doctor  for diet and medication restrictions.  If you take an anticoagulant or anti-platelet medication (such as Coumadin , Lovenox , Pradaxa , Xarelto , Eliquis , etc.), please call your primary doctor for advice on holding this medication.  If you take aspirin you may continue to do so.  If you are or may be pregnant, please discuss the risks and benefits of this procedure with your doctor.    What happens during a colonoscopy?  Plan to spend up to two hours, starting at registration time, at the endoscopy center the day of your procedure. The colonoscopy takes an average of 15 to 30 minutes. Recovery time is about 30 minutes.    Before the exam:    You will change into a gown.    Your medical history and medication list will be reviewed with you, unless that has been done over the phone prior to the procedure.     A nurse will insert an intravenous (IV) line into your hand or arm.    The doctor will meet with you and will give you a consent form to sign.    During the exam:     Medicine will be given through the IV line to help you relax.     Your heart rate and oxygen levels will be monitored. If your blood pressure is low, you may be given fluids through the IV line.     The doctor will insert a flexible hollow tube, called a colonoscope, into your rectum. The scope will be advanced slowly through the large intestine (colon).    You may have a feeling of fullness or pressure.     If an abnormal tissue or a polyp is found, the doctor may remove it through the endoscope for closer examination, or biopsy. Tissue removal is painless    After the exam:           Any tissue samples removed during the exam will be sent to a lab for evaluation. It may take 5-7 working days for you to be notified of the results.     A nurse will provide you with complete discharge instructions before you leave the endoscopy center. Be sure to ask the nurse for specific instructions if you take blood thinners such as Aspirin, Coumadin or Plavix.      The doctor will prepare a full report for you and for the physician who referred you for the procedure.     Your doctor will talk with you about the initial results of your exam.      Medication given during the exam will prohibit you from driving for the rest of the day.     Following the exam, you may resume your normal diet. Your first meal should be light, no greasy foods. Avoid alcohol until the next day.     You may resume your regular activities the day after the procedure.     LOW-FIBER DIET  Foods RECOMMENDED Foods to AVOID   Breads, Cereal, Rice and Pasta:   White bread, rolls, biscuits, croissant and chandler toast.   Waffles, [place] toast and pancakes.   White rice, noodles, pasta, macaroni and peeled cooked potatoes.   Plain crackers and saltines.   Cooked cereals: farina, cream of rice.   Cold cereals: Puffed Rice , Rice Krispies , Corn Flakes  and Special K    Breads, Cereal, Rice and Pasta:   Breads or rolls with nuts, seeds or fruit.   Whole wheat, pumpernickel, rye breads and cornbread.   Potatoes with skin, brown or wild rice, and kasha (buckwheat).     Vegetables:   Tender cooked and canned vegetables without seeds: carrots, asparagus tips, green or wax beans, pumpkin, spinach, lima beans. Vegetables:   Raw or steamed vegetables.   Vegetables with seeds.   Sauerkraut.   Winter squash, peas, broccoli, Brussel sprouts, cabbage, onions, cauliflower, baked beans, peas and corn.   Fruits:   Strained fruit juice.   Canned fruit, except pineapple.   Ripe bananas and melon. Fruits:   Prunes and prune juice.   Raw fruits.   Dried fruits: figs, dates and raisins.   Milk/Dairy:   Milk: plain or flavored.   Yogurt, custard and ice cream.   Cheese and cottage cheese Milk/Dairy:     Meat and other proteins:   ground, well-cooked tender beef, lamb, ham, veal, pork, fish, poultry and organ meats.   Eggs.   Peanut butter without nuts. Meat and other proteins:   Tough, fibrous meats with gristle.   Dry beans,  peas and lentils.   Peanut butter with nuts.   Tofu.   Fats, Snack, Sweets, Condiments and Beverages:   Margarine, butter, oils, mayonnaise, sour cream and salad dressing, plain gravy.   Sugar, hard candy, clear jelly, honey and syrup.   Spices, cooked herbs, bouillon, broth and soups made with allowed vegetable, ketchup and mustard.   Coffee, tea and carbonated drinks.   Plain cakes, cookies and pretzels.   Gelatin, plain puddings, custard, ice cream, sherbet and popsicles. Fats, Snack, Sweets, Condiments and Beverages:   Nuts, seeds and coconut.   Jam, marmalade and preserves.   Pickles, olives, relish and horseradish.   All desserts containing nuts, seeds, dried fruit and coconut; or made from whole grains or bran.   Candy made with nuts or seeds.   Popcorn.         DIRECTIONS TO THE SURGERY CENTER    From the north ([facility])  Take [address] south, exit on [address] [address]. Get into the left hand juaquin, turn left (east), go one-half mile to [address] and turn left. Go north to the first stoplight, take a right on [address] and follow it to the Surgery Center entrance.    From the south ([facility])  Take 35N to the 35E split and exit on [address] [address]. On [address] [address], turn left (west) to [address]. Turn right (north) on [address]. Go north to the first stoplight, take a right on [address] and follow it to the Surgery Center entrance.    From the east via 35E ([facility])  Take [address]E south to [address] [address] exit. Turn right on [address] . Go west to [address]. Turn right (north) on [address]. Go to the first stoplight, take a right and follow on [address] to the Surgery Center entrance.    From the east via [address] ([facility])  Take [address] west to [address]. Turn left (south) on [address] to [address]. Turn left (east) on [address] and follow it to the Surgery Center entrance.    From the west  via [address] ([place], [place])  [address] [facility]. Turn right (south) on [address] to [address]. Turn left (east) on [address] and follow it to the Surgery Center entrance.

## 2018-04-26 NOTE — ANESTHESIA PREPROCEDURE EVALUATION
Anesthesia Evaluation     . Pt has had prior anesthetic. Type: General    No history of anesthetic complications          ROS/MED HX    ENT/Pulmonary:     (+)asthma moderate COPD, , . .    Neurologic:  - neg neurologic ROS     Cardiovascular:     (+) hypertension--CAD, --. : . CHF . . :. . Previous cardiac testing Echodate:4/18results:Interpretation Summary  Global and regional left ventricular function is normal with an EF of 55-60%.  Right ventricular function, chamber size, wall motion, and thickness are  normal.  The atrial septum is intact as assessed by agitated dextrose bubble study .  The inferior vena cava is normal.  There has been no change.date: results: date: results: date: results:          METS/Exercise Tolerance:     Hematologic: Comments: Lab Test        04/16/18 03/23/18 02/26/18      --          06/11/10                       0847          1532          1201           --           2040          WBC          11.3*        8.4          12.6*          < >        9.3           HGB          13.5         13.3         13.6           < >        14.0          MCV          95           96           95             < >        90            PLT          326          330          313            < >        266           INR          0.98          --           --           --          0.95           < > = values in this interval not displayed.                  Lab Test        04/16/18 03/23/18 02/26/18                       0847          1532          1201          NA           141          142          137           POTASSIUM    3.5          3.7          3.2*          CHLORIDE     105          106          101           CO2          28           30           30            BUN          8            6*           9             CR           0.66         0.73         0.85          ANIONGAP     8            6            6             JEANNIE          8.6          8.9          8.7           GLC           108*         103*         110*                  Musculoskeletal:  - neg musculoskeletal ROS       GI/Hepatic:     (+) GERD Asymptomatic on medication,       Renal/Genitourinary:  - ROS Renal section negative       Endo:  - neg endo ROS       Psychiatric:  - neg psychiatric ROS       Infectious Disease:  - neg infectious disease ROS       Malignancy:         Other:    (+) H/O Chronic Pain,                   Physical Exam  Normal systems: cardiovascular    Airway   Mallampati: III  TM distance: >3 FB  Neck ROM: full    Dental   (+) missing    Cardiovascular       Pulmonary    breath sounds clear to auscultation(+) decreased breath sounds                       Anesthesia Plan      History & Physical Review  History and physical reviewed and following examination; no interval change.    ASA Status:  3 .    NPO Status:  > 8 hours    Plan for MAC with Intravenous induction. Maintenance will be Balanced.  Reason for MAC:  Chronic cardiopulmonary disease (G9)  PONV prophylaxis:  Ondansetron (or other 5HT-3) and Dexamethasone or Solumedrol       Postoperative Care  Postoperative pain management:  IV analgesics and Oral pain medications.      Consents  Anesthetic plan, risks, benefits and alternatives discussed with:  Patient..                          .

## 2018-04-26 NOTE — ANESTHESIA POSTPROCEDURE EVALUATION
Patient: Trisha Bender    Procedure(s):  COLONOSCOPY with biopsies - Wound Class: II-Clean Contaminated    Diagnosis:screening  Diagnosis Additional Information: No value filed.    Anesthesia Type:  MAC    Note:  Anesthesia Post Evaluation    Patient location during evaluation: PACU  Patient participation: Able to fully participate in evaluation  Level of consciousness: awake and alert  Pain management: adequate  Airway patency: patent  Cardiovascular status: acceptable  Respiratory status: acceptable  Hydration status: acceptable  PONV: none     Anesthetic complications: None          Last vitals:  Vitals:    04/26/18 1230 04/26/18 1245 04/26/18 1300   BP: 104/74 106/52 128/78   Resp: 16 14 16   Temp:  98.1  F (36.7  C)    SpO2: 98% 98% 99%         Electronically Signed By: Adalberto Burgess MD  April 26, 2018  1:51 PM

## 2018-04-26 NOTE — H&P
Pre-Endoscopy History and Physical     Trisha Bender MRN# [id number]   YOB: 1955 Age: 62 year old     Date of Procedure: 4/26/2018  Primary care provider: Bandar Weinberg  Type of Endoscopy: Colonoscopy with possible biopsy, possible polypectomy  Reason for Procedure: screen  Type of Anesthesia Anticipated: Conscious Sedation    HPI:    Trisha is a 62 year old female who will be undergoing the above procedure.      A history and physical has been performed. The patient's medications and allergies have been reviewed. The risks and benefits of the procedure and the sedation options and risks were discussed with the patient.  All questions were answered and informed consent was obtained.      She denies a personal or family history of anesthesia complications or bleeding disorders.     Patient Active Problem List   Diagnosis     Essential hypertension with goal blood pressure less than 130/80     Chronic migraine without aura without status migrainosus, not intractable     Esophageal reflux     Disorder of bone and cartilage     Family history of malignant neoplasm of gastrointestinal tract     Chronic airway obstruction (H)     Cystocele     Advanced directives, counseling/discussion     Family history of thyroid cancer - 1/2 brother     Anxiety     Mild persistent asthma without complication     Chronic low back pain     Hair loss     Lateral epicondylitis of right elbow     Dilated cardiomyopathy (H)     Right bundle branch block (RBBB)     CHF (congestive heart failure) (H)     SOB (shortness of breath)     Coronary artery disease involving native coronary artery of native heart without angina pectoris     Mixed hyperlipidemia     Controlled substance agreement signed     Moderate persistent asthma without complication     Hypokalemia     Mechanical low back pain     Panlobular emphysema (H)     Severe episode of recurrent major depressive disorder, without psychotic features (H)        Past  Medical History:   Diagnosis Date     Anxiety      CAD (coronary artery disease) 7/2/15    mild per cath     CHF (congestive heart failure) (H)     EF=20-25% per echo 6/30/15     Chronic airway obstruction, not elsewhere classified     FEV1 36% of pred; VC 67%  in Jan 06.     COPD (chronic obstructive pulmonary disease) (H)      Esophageal reflux      Mild persistent asthma 8/30/2012     OSTEOPENIA      Other and unspecified hyperlipidemia      Other chronic pain     Entire back     Other forms of migraine, without mention of intractable migraine without mention of status migrainosus      Takotsubo cardiomyopathy      Unspecified essential hypertension         Past Surgical History:   Procedure Laterality Date     ANGIOGRAM  07/02/2015    Minimal CAD. LV dysfunction, Mid RCA 20% stenosis, EF 20-25%, c/w Takotsubo cardiomyopathy     APPENDECTOMY       C NONSPECIFIC PROCEDURE  1974    GB and appendectomy  abstracted [id number]     C NONSPECIFIC PROCEDURE      Nasal septoplasty and mucous retention cyst     CHOLECYSTECTOMY  1974     ESOPHAGOSCOPY, GASTROSCOPY, DUODENOSCOPY (EGD), COMBINED  11/15/2011    Procedure:COMBINED ESOPHAGOSCOPY, GASTROSCOPY, DUODENOSCOPY (EGD);  ESOPHAGOSCOPY, GASTROSCOPY, DUODENOSCOPY (EGD) ; Surgeon:JIM URENA; Location:[facility]     HC REMOVE TONSILS/ADENOIDS,<11 Y/O       INJECT EPIDURAL LUMBAR / SACRAL SINGLE N/A 4/13/2018    Procedure: INJECT EPIDURAL LUMBAR / SACRAL CONTINUAL OR INTERMITTENT;  Lumbar Epidural Steroid Injection;  Surgeon: Az Enamorado MD;  Location: [facility]     LAPAROSCOPIC ABLATION ENDOMETRIOSIS  1998     LAPAROSCOPY DIAGNOSTIC (GYN)      endometriosis resection / lysis of adhesions     wisdom teeth         Social History   Substance Use Topics     Smoking status: Former Smoker     Packs/day: 1.00     Years: 30.00     Quit date: 1/17/2006     Smokeless tobacco: Never Used      Comment: Quit in 2006     Alcohol use No       Family History   Problem Relation Age of Onset      CANCER Mother      Colon cancer; dxed at age 64;  at age 69     Cancer - colorectal Mother      Other Cancer Mother      GASTROINTESTINAL DISEASE Sister      Acute pancreatitis     HEART DISEASE Father      ?     Coronary Artery Disease Father      Circulatory Maternal Aunt      AAA     Circulatory Maternal Uncle      AAA     DIABETES Son        Prior to Admission medications    Medication Sig Start Date End Date Taking? Authorizing Provider   ADVAIR DISKUS 500-50 MCG/DOSE diskus inhaler INHALE 1 PUFF TWO TIMES DAILY 18  Yes Bandar Weinberg MD   albuterol (VENTOLIN HFA) 108 (90 BASE) MCG/ACT Inhaler Inhale 2 puffs into the lungs every 6 hours as needed for shortness of breath / dyspnea or wheezing 17  Yes Bandar Weinberg MD   ALPRAZolam (XANAX) 0.25 MG tablet Take 1 tablet by mouth. Every twelve hours as needed for anxiety 18  Yes Bandar Weinberg MD   atorvastatin (LIPITOR) 10 MG tablet Takes 3 times a week 18  Yes Michael Odell MD   baclofen (LIORESAL) 10 MG tablet TAKE ONE TABLET BY MOUTH THREE TIMES A DAY AS NEEDED FOR MUSCLE SPASMS 18  Yes Bandar Weinberg MD   [employer] FIBER GUMMIES 2.5 G CHEW Take 1 tablet by mouth daily Reported on 2017   Yes Unknown, Entered By History   escitalopram (LEXAPRO) 10 MG tablet Take 1 tablet (10 mg) by mouth daily 18  Yes Bandar Weinberg MD   fluticasone (FLONASE) 50 MCG/ACT spray SPRAY 1 TO 2 SPRAYS IN EACH NOSTRIL ONCE DAILY 18  Yes Bandar Weinberg MD   furosemide (LASIX) 20 MG tablet TAKE 2 TABLETS (40 MG) BY MOUTH DAILY SEVERE SWELLING 18  Yes Bandar Weinberg MD   HYDROcodone-acetaminophen (NORCO) 5-325 MG per tablet Take 1-2 tablets by mouth every 8 hours as needed for pain Maximum 6 daily 18  Yes Bandar Weinberg MD   ipratropium - albuterol 0.5 mg/2.5 mg/3 mL (DUONEB) 0.5-2.5 (3) MG/3ML neb solution Take 1 vial (3 mLs) by nebulization every 6 hours as needed for shortness of breath /  dyspnea or wheezing 5/23/17  Yes Bandar Weinberg MD   lidocaine (LIDODERM) 5 % Patch Apply up to 3 patches to painful area at once for up to 12 h within a 24 h period.  Remove after 12 hours. 2/26/18  Yes Bandar Weinberg MD   lisinopril (PRINIVIL/ZESTRIL) 2.5 MG tablet Take 1 tablet (2.5 mg) by mouth daily 11/6/17  Yes Bandar Weinberg MD   mometasone (ELOCON) 0.1 % ointment Apply sparingly to affected area twice daily as needed.  Do not apply to face. 5/8/17  Yes Bandar Weinberg MD   montelukast (SINGULAIR) 10 MG tablet Take 1 tablet (10 mg) by mouth At Bedtime 11/6/17  Yes Bandar Weinberg MD   Multiple Vitamin CHEW Take 1 tablet by mouth daily    Yes Reported, Patient   ondansetron (ZOFRAN ODT) 4 MG ODT tab Take 1-2 tablets (4-8 mg) by mouth every 8 hours as needed for nausea 3/23/18  Yes Bandar Weinberg MD   pantoprazole (PROTONIX) 40 MG EC tablet TAKE ONE TABLET BY MOUTH EVERY DAY 2/28/18  Yes Bandar Weinberg MD   potassium chloride (K-TAB,KLOR-CON) 10 MEQ tablet TAKE ONE TABLET BY MOUTH THREE TIMES A DAY 12/12/17  Yes Bandar Weinberg MD   predniSONE (DELTASONE) 5 MG tablet Take 2 tablets (10 mg) by mouth daily 4/23/18  Yes Bandar Weinberg MD   roflumilast (DALIRESP) 500 MCG TABS tablet Take 1 tablet (500 mcg) by mouth daily 11/6/17  Yes Bandar Weinberg MD   SPIRIVA HANDIHALER 18 MCG capsule USING THE HANDIHALER, INHALE THE CONTENTS OF ONE CAPSULE BY MOUTH DAILY 4/13/18  Yes Bandar Weinberg MD   SUMAtriptan (IMITREX) 25 MG tablet Take 1 tablet (25 mg) by mouth at onset of headache for migraine May repeat in 2 hours if needed: max 2/day; 12/21/17  Yes Madeline Ferrer MD   TYLENOL EXTRA STRENGTH OR 1 TABLET EVERY 4 HOURS AS NEEDED   Yes Reported, Patient   nitroglycerin (NITROSTAT) 0.4 MG SL tablet Place 1 tablet (0.4 mg) under the tongue every 5 minutes as needed for chest pain If you are still having symptoms after 3 doses (15 minutes) call 911. 10/20/15   [place]  "Bandar CARVAJAL MD       Allergies   Allergen Reactions     Gabapentin Swelling     Contrast Dye      Iodine     Peanuts [Nuts] Hives     Penicillins      rash     Sulfa Drugs      High family history     Tace [Chlorotrianisene]      joint swelling     Aspirin Rash     After 3 days     Ibuprofen Nausea and Vomiting     Strawberry Rash        REVIEW OF SYSTEMS:   5 point ROS negative except as noted above in HPI, including Gen., Resp., CV, GI &  system review.    PHYSICAL EXAM:   BP (!) 146/104  Temp 97.7  F (36.5  C) (Temporal)  Resp 16  Ht 1.778 m (5' 10\")  Wt 80.7 kg (178 lb)  LMP 09/29/2005  SpO2 96%  BMI 25.54 kg/m2 Estimated body mass index is 25.54 kg/(m^2) as calculated from the following:    Height as of this encounter: 1.778 m (5' 10\").    Weight as of this encounter: 80.7 kg (178 lb).   GENERAL APPEARANCE: alert, and oriented  MENTAL STATUS: alert  AIRWAY EXAM: Mallampatti Class I (visualization of the soft palate, fauces, uvula, anterior and posterior pillars)  RESP: lungs clear to auscultation - no rales, rhonchi or wheezes  CV: regular rates and rhythm  DIAGNOSTICS:    Not indicated    IMPRESSION   ASA Class 3 - Severe systemic disease, but not incapacitating    PLAN:   Plan for Colonoscopy with possible biopsy, possible polypectomy. We discussed the risks, benefits and alternatives and the patient wished to proceed.    The above has been forwarded to the consulting provider.      Signed Electronically by: Steve Acosta  April 26, 2018          "

## 2018-04-26 NOTE — DISCHARGE INSTRUCTIONS
GENERAL ANESTHESIA OR SEDATION ADULT DISCHARGE INSTRUCTIONS   SPECIAL PRECAUTIONS FOR 24 HOURS AFTER SURGERY    IT IS NOT UNUSUAL TO FEEL LIGHT-HEADED OR FAINT, UP TO 24 HOURS AFTER SURGERY OR WHILE TAKING PAIN MEDICATION.  IF YOU HAVE THESE SYMPTOMS; SIT FOR A FEW MINUTES BEFORE STANDING AND HAVE SOMEONE ASSIST YOU WHEN YOU GET UP TO WALK OR USE THE BATHROOM.    YOU SHOULD REST AND RELAX FOR THE NEXT 24 HOURS AND YOU MUST MAKE ARRANGEMENTS TO HAVE SOMEONE STAY WITH YOU FOR AT LEAST 24 HOURS AFTER YOUR DISCHARGE.  AVOID HAZARDOUS AND STRENUOUS ACTIVITIES.  DO NOT MAKE IMPORTANT DECISIONS FOR 24 HOURS.    DO NOT DRIVE ANY VEHICLE OR OPERATE MECHANICAL EQUIPMENT FOR 24 HOURS FOLLOWING THE END OF YOUR SURGERY.  EVEN THOUGH YOU MAY FEEL NORMAL, YOUR REACTIONS MAY BE AFFECTED BY THE MEDICATION YOU HAVE RECEIVED.    DO NOT DRINK ALCOHOLIC BEVERAGES FOR 24 HOURS FOLLOWING YOUR SURGERY.    DRINK CLEAR LIQUIDS (APPLE JUICE, GINGER ALE, 7-UP, BROTH, ETC.).  PROGRESS TO YOUR REGULAR DIET AS YOU FEEL ABLE.    YOU MAY HAVE A DRY MOUTH, A SORE THROAT, MUSCLES ACHES OR TROUBLE SLEEPING.  THESE SHOULD GO AWAY AFTER 24 HOURS.    CALL YOUR DOCTOR FOR ANY OF THE FOLLOWING:  SIGNS OF INFECTION (FEVER, GROWING TENDERNESS AT THE SURGERY SITE, A LARGE AMOUNT OF DRAINAGE OR BLEEDING, SEVERE PAIN, FOUL-SMELLING DRAINAGE, REDNESS OR SWELLING.    IT HAS BEEN OVER 8 TO 10 HOURS SINCE SURGERY AND YOU ARE STILL NOT ABLE TO URINATE (PASS WATER).       DR. EVETTE RODRIGUEZ M.D.     CLINIC PHONE NUMBER:  [phone]      COLONOSCOPY DISCHARGE INSTRUCTIONS    You may not drive, use heavy equipment or consume alcohol for 24 hours because the drugs you were given may cause dizziness, drowsiness, forgetfulness and slower reaction time.    You may resume your regular diet and medications.    If you had a biopsy or polypectomy done, do not take aspirin, aleve (naproxen) or ibuprofen products for the next 10 days.  Tylenol (acetaminophen) is safe to  take.    What to watch for:    Problems rarely occur after the exam.  It is important for you to be aware of the early signs of a possible complication.  Call your doctor immediately if you notice any of the followin.  Unusual or persistent abdominal pain (pain that does not move around like a gas pain).    2.  Passing bright red blood from your rectum.    3.  Black or bloody stools.    4.  Temperature above 100.6 degrees F (37.5 degrees C)    If you fell this has become a medical emergency please call 911.

## 2018-04-26 NOTE — IP AVS SNAPSHOT
MRN:[id number]                      After Visit Summary   4/26/2018    Trisha Bender    MRN: [id number]           Thank you!     Thank you for choosing [facility] for your care. Our goal is always to provide you with excellent care. Hearing back from our patients is one way we can continue to improve our services. Please take a few minutes to complete the written survey that you may receive in the mail after you visit. If you would like to speak to someone directly about your visit please contact Patient Relations at [phone]. Thank you!          Patient Information     Date Of Birth          1955        About your hospital stay     You were admitted on:  April 26, 2018 You last received care in the:  [facility] PreOP/PostOP    You were discharged on:  April 26, 2018       Who to Call     For medical emergencies, please call 911.  For non-urgent questions about your medical care, please call your primary care provider or clinic, [phone]  For questions related to your surgery, please call your surgery clinic        Attending Provider     Provider Steve Cross MD Gastroenterology       Primary Care Provider Office Phone # Fax #    Bandar Weinberg [phone]      Your next 10 appointments already scheduled     Apr 30, 2018  2:00 PM CDT   Pulmonary Treatment with Rh Pulmonary Rehab 1   [facility] ([facility])    [address] [place] [address], [address] [id number] [phone]            May 01, 2018  1:00 PM CDT   Pulmonary Treatment with Rh Pulmonary Rehab 1   [facility] ([facility])    [address] [place] [address], [address] [address]   [phone]            May 03, 2018  1:00 PM CDT   Pulmonary Treatment with Rh Pulmonary Rehab 1   [facility] ([facility])    [address] [place] [address], [address]  [address]   [phone]            May 08, 2018  3:00 PM CDT   Pulmonary Treatment with Rh Pulmonary Rehab 1   [facility] ([facility])    [address], [address]  [facility] [address]   [phone]            May 10, 2018  3:00 PM CDT   Pulmonary Treatment with Rh Pulmonary Rehab 1   [facility] ([facility])    [id number] [place] [address], [address]  [facility] [address]   [phone]            May 14, 2018  2:00 PM CDT   Pulmonary Treatment with Rh Pulmonary Rehab 1   [facility] ([facility])    [address], [address]  [facility] [address]   [phone]              Further instructions from your care team       GENERAL ANESTHESIA OR SEDATION ADULT DISCHARGE INSTRUCTIONS   SPECIAL PRECAUTIONS FOR 24 HOURS AFTER SURGERY    IT IS NOT UNUSUAL TO FEEL LIGHT-HEADED OR FAINT, UP TO 24 HOURS AFTER SURGERY OR WHILE TAKING PAIN MEDICATION.  IF YOU HAVE THESE SYMPTOMS; SIT FOR A FEW MINUTES BEFORE STANDING AND HAVE SOMEONE ASSIST YOU WHEN YOU GET UP TO WALK OR USE THE BATHROOM.    YOU SHOULD REST AND RELAX FOR THE NEXT 24 HOURS AND YOU MUST MAKE ARRANGEMENTS TO HAVE SOMEONE STAY WITH YOU FOR AT LEAST 24 HOURS AFTER YOUR DISCHARGE.  AVOID HAZARDOUS AND STRENUOUS ACTIVITIES.  DO NOT MAKE IMPORTANT DECISIONS FOR 24 HOURS.    DO NOT DRIVE ANY VEHICLE OR OPERATE MECHANICAL EQUIPMENT FOR 24 HOURS FOLLOWING THE END OF YOUR SURGERY.  EVEN THOUGH YOU MAY FEEL NORMAL, YOUR REACTIONS MAY BE AFFECTED BY THE MEDICATION YOU HAVE RECEIVED.    DO NOT DRINK ALCOHOLIC BEVERAGES FOR 24 HOURS FOLLOWING YOUR SURGERY.    DRINK CLEAR LIQUIDS (APPLE JUICE, GINGER ALE, 7-UP, BROTH, ETC.).  PROGRESS TO YOUR REGULAR DIET AS YOU FEEL ABLE.    YOU MAY HAVE A DRY MOUTH, A SORE THROAT, MUSCLES ACHES OR TROUBLE SLEEPING.  THESE SHOULD GO AWAY AFTER 24 HOURS.    CALL YOUR DOCTOR FOR ANY OF THE FOLLOWING:  SIGNS OF INFECTION (FEVER,  "GROWING TENDERNESS AT THE SURGERY SITE, A LARGE AMOUNT OF DRAINAGE OR BLEEDING, SEVERE PAIN, FOUL-SMELLING DRAINAGE, REDNESS OR SWELLING.    IT HAS BEEN OVER 8 TO 10 HOURS SINCE SURGERY AND YOU ARE STILL NOT ABLE TO URINATE (PASS WATER).       DR. EVETTE ACOSTA M.D.     CLINIC PHONE NUMBER:  [phone]      COLONOSCOPY DISCHARGE INSTRUCTIONS    You may not drive, use heavy equipment or consume alcohol for 24 hours because the drugs you were given may cause dizziness, drowsiness, forgetfulness and slower reaction time.    You may resume your regular diet and medications.    If you had a biopsy or polypectomy done, do not take aspirin, aleve (naproxen) or ibuprofen products for the next 10 days.  Tylenol (acetaminophen) is safe to take.    What to watch for:    Problems rarely occur after the exam.  It is important for you to be aware of the early signs of a possible complication.  Call your doctor immediately if you notice any of the followin.  Unusual or persistent abdominal pain (pain that does not move around like a gas pain).    2.  Passing bright red blood from your rectum.    3.  Black or bloody stools.    4.  Temperature above 100.6 degrees F (37.5 degrees C)    If you fell this has become a medical emergency please call 911.            Pending Results     No orders found from 2018 to 2018.            Admission Information     Date & Time Provider Department Dept. Phone    2018 Evette Acosta MD [facility] PreOP/PostOP [phone]      Your Vitals Were     Blood Pressure Temperature Respirations Height Weight Last Period    100/64 97.9  F (36.6  C) (Temporal) 16 1.778 m (5' 10\") 80.7 kg (178 lb) 2005    Pulse Oximetry BMI (Body Mass Index)                97% 25.54 kg/m2          [employer] Information     [employer] gives you secure access to your electronic health record. If you see a primary care provider, you can also send messages to your care team and make appointments. " If you have questions, please call your primary care clinic.  If you do not have a primary care provider, please call [phone] and they will assist you.        Care EveryWhere ID     This is your Care EveryWhere ID. This could be used by other organizations to access your [place] medical records  [id number]        Equal Access to Services     KAMILA ALLEN : Hadronnie lawrence Soradha, waaxda luqadaha, qaybta kaalgildardo saba, jonathan burgess. So [facility] [phone].    ATENCIÓN: Si habla español, tiene a madison disposición servicios gratuitos de asistencia lingüística. Llame al [phone].    We comply with applicable federal civil rights laws and [place] laws. We do not discriminate on the basis of race, color, national origin, age, disability, sex, sexual orientation, or gender identity.               Review of your medicines      CONTINUE these medicines which have NOT CHANGED        Dose / Directions    ADVAIR DISKUS 500-50 MCG/DOSE diskus inhaler   Used for:  Pulmonary emphysema, unspecified emphysema type (H)   Generic drug:  fluticasone-salmeterol        INHALE 1 PUFF TWO TIMES DAILY   Quantity:  3 Inhaler   Refills:  3       albuterol 108 (90 Base) MCG/ACT Inhaler   Commonly known as:  VENTOLIN HFA   Used for:  Asthma exacerbation        Dose:  2 puff   Inhale 2 puffs into the lungs every 6 hours as needed for shortness of breath / dyspnea or wheezing   Quantity:  18 g   Refills:  3       ALPRAZolam 0.25 MG tablet   Commonly known as:  XANAX   Used for:  Anxiety        Take 1 tablet by mouth. Every twelve hours as needed for anxiety   Quantity:  30 tablet   Refills:  0       atorvastatin 10 MG tablet   Commonly known as:  LIPITOR   Used for:  Coronary artery disease involving native coronary artery of native heart without angina pectoris, Mixed hyperlipidemia        Takes 3 times a week   Quantity:  36 tablet   Refills:  3       baclofen 10 MG tablet   Commonly known as:   LIORESAL   Used for:  Chronic bilateral low back pain with right-sided sciatica        TAKE ONE TABLET BY MOUTH THREE TIMES A DAY AS NEEDED FOR MUSCLE SPASMS   Quantity:  90 tablet   Refills:  1       [employer] FIBER GUMMIES 2.5 g Chew        Dose:  1 tablet   Take 1 tablet by mouth daily Reported on 5/8/2017   Refills:  0       escitalopram 10 MG tablet   Commonly known as:  LEXAPRO   Used for:  Adjustment disorder with depressed mood        Dose:  10 mg   Take 1 tablet (10 mg) by mouth daily   Quantity:  30 tablet   Refills:  3       fluticasone 50 MCG/ACT spray   Commonly known as:  FLONASE   Used for:  Nasal congestion        SPRAY 1 TO 2 SPRAYS IN EACH NOSTRIL ONCE DAILY   Quantity:  16 g   Refills:  7       furosemide 20 MG tablet   Commonly known as:  LASIX   Used for:  Acute systolic congestive heart failure (H)        TAKE 2 TABLETS (40 MG) BY MOUTH DAILY SEVERE SWELLING   Quantity:  180 tablet   Refills:  1       HYDROcodone-acetaminophen 5-325 MG per tablet   Commonly known as:  NORCO   Used for:  Chronic bilateral low back pain without sciatica        Dose:  1-2 tablet   Take 1-2 tablets by mouth every 8 hours as needed for pain Maximum 6 daily   Quantity:  180 tablet   Refills:  0       ipratropium - albuterol 0.5 mg/2.5 mg/3 mL 0.5-2.5 (3) MG/3ML neb solution   Commonly known as:  DUONEB   Used for:  Moderate persistent asthma without complication        Dose:  1 vial   Take 1 vial (3 mLs) by nebulization every 6 hours as needed for shortness of breath / dyspnea or wheezing   Quantity:  30 vial   Refills:  1       lidocaine 5 % Patch   Commonly known as:  LIDODERM   Used for:  Mechanical low back pain        Apply up to 3 patches to painful area at once for up to 12 h within a 24 h period.  Remove after 12 hours.   Quantity:  30 patch   Refills:  0       lisinopril 2.5 MG tablet   Commonly known as:  PRINIVIL/Zestril   Used for:  Dilated cardiomyopathy (H)        Dose:  2.5 mg   Take 1 tablet (2.5 mg) by  mouth daily   Quantity:  90 tablet   Refills:  3       mometasone 0.1 % ointment   Commonly known as:  ELOCON   Used for:  Intrinsic eczema        Apply sparingly to affected area twice daily as needed.  Do not apply to face.   Quantity:  45 g   Refills:  0       montelukast 10 MG tablet   Commonly known as:  SINGULAIR   Used for:  Mild persistent asthma without complication        Dose:  1 tablet   Take 1 tablet (10 mg) by mouth At Bedtime   Quantity:  90 tablet   Refills:  1       Multiple Vitamin Chew        Dose:  1 tablet   Take 1 tablet by mouth daily   Refills:  0       nitroGLYcerin 0.4 MG sublingual tablet   Commonly known as:  NITROSTAT   Used for:  Acute chest pain        Dose:  0.4 mg   Place 1 tablet (0.4 mg) under the tongue every 5 minutes as needed for chest pain If you are still having symptoms after 3 doses (15 minutes) call 911.   Quantity:  25 tablet   Refills:  0       ondansetron 4 MG ODT tab   Commonly known as:  ZOFRAN ODT   Used for:  Nausea        Dose:  4-8 mg   Take 1-2 tablets (4-8 mg) by mouth every 8 hours as needed for nausea   Quantity:  20 tablet   Refills:  3       pantoprazole 40 MG EC tablet   Commonly known as:  PROTONIX   Used for:  Gastroesophageal reflux disease without esophagitis        TAKE ONE TABLET BY MOUTH EVERY DAY   Quantity:  90 tablet   Refills:  2       potassium chloride 10 MEQ tablet   Commonly known as:  K-TAB,KLOR-CON   Used for:  Hypokalemia        TAKE ONE TABLET BY MOUTH THREE TIMES A DAY   Quantity:  90 tablet   Refills:  5       predniSONE 5 MG tablet   Commonly known as:  DELTASONE   Used for:  Pulmonary emphysema, unspecified emphysema type (H)        Dose:  10 mg   Take 2 tablets (10 mg) by mouth daily   Quantity:  30 tablet   Refills:  0       roflumilast 500 MCG Tabs tablet   Commonly known as:  DALIRESP        Dose:  500 mcg   Take 1 tablet (500 mcg) by mouth daily   Refills:  0       SPIRIVA HANDIHALER 18 MCG capsule   Used for:  Moderate  persistent asthma without complication   Generic drug:  tiotropium        USING THE HANDIHALER, INHALE THE CONTENTS OF ONE CAPSULE BY MOUTH DAILY   Quantity:  90 capsule   Refills:  1       SUMAtriptan 25 MG tablet   Commonly known as:  IMITREX   Used for:  Chronic migraine without aura without status migrainosus, not intractable        Dose:  25 mg   Take 1 tablet (25 mg) by mouth at onset of headache for migraine May repeat in 2 hours if needed: max 2/day;   Quantity:  9 tablet   Refills:  11       TYLENOL EXTRA STRENGTH PO   Used for:  Abdominal pain, right upper quadrant        1 TABLET EVERY 4 HOURS AS NEEDED   Refills:  0                Protect others around you: Learn how to safely use, store and throw away your medicines at www.disposemymeds.org.             Medication List: This is a list of all your medications and when to take them. Check marks below indicate your daily home schedule. Keep this list as a reference.      Medications           Morning Afternoon Evening Bedtime As Needed    ADVAIR DISKUS 500-50 MCG/DOSE diskus inhaler   INHALE 1 PUFF TWO TIMES DAILY   Generic drug:  fluticasone-salmeterol                                albuterol 108 (90 Base) MCG/ACT Inhaler   Commonly known as:  VENTOLIN HFA   Inhale 2 puffs into the lungs every 6 hours as needed for shortness of breath / dyspnea or wheezing                                ALPRAZolam 0.25 MG tablet   Commonly known as:  XANAX   Take 1 tablet by mouth. Every twelve hours as needed for anxiety                                atorvastatin 10 MG tablet   Commonly known as:  LIPITOR   Takes 3 times a week                                baclofen 10 MG tablet   Commonly known as:  LIORESAL   TAKE ONE TABLET BY MOUTH THREE TIMES A DAY AS NEEDED FOR MUSCLE SPASMS                                [employer] FIBER GUMMIES 2.5 g Chew   Take 1 tablet by mouth daily Reported on 5/8/2017                                escitalopram 10 MG tablet   Commonly known as:   LEXAPRO   Take 1 tablet (10 mg) by mouth daily                                fluticasone 50 MCG/ACT spray   Commonly known as:  FLONASE   SPRAY 1 TO 2 SPRAYS IN EACH NOSTRIL ONCE DAILY                                furosemide 20 MG tablet   Commonly known as:  LASIX   TAKE 2 TABLETS (40 MG) BY MOUTH DAILY SEVERE SWELLING                                HYDROcodone-acetaminophen 5-325 MG per tablet   Commonly known as:  NORCO   Take 1-2 tablets by mouth every 8 hours as needed for pain Maximum 6 daily                                ipratropium - albuterol 0.5 mg/2.5 mg/3 mL 0.5-2.5 (3) MG/3ML neb solution   Commonly known as:  DUONEB   Take 1 vial (3 mLs) by nebulization every 6 hours as needed for shortness of breath / dyspnea or wheezing                                lidocaine 5 % Patch   Commonly known as:  LIDODERM   Apply up to 3 patches to painful area at once for up to 12 h within a 24 h period.  Remove after 12 hours.                                lisinopril 2.5 MG tablet   Commonly known as:  PRINIVIL/Zestril   Take 1 tablet (2.5 mg) by mouth daily                                mometasone 0.1 % ointment   Commonly known as:  ELOCON   Apply sparingly to affected area twice daily as needed.  Do not apply to face.                                montelukast 10 MG tablet   Commonly known as:  SINGULAIR   Take 1 tablet (10 mg) by mouth At Bedtime                                Multiple Vitamin Chew   Take 1 tablet by mouth daily                                nitroGLYcerin 0.4 MG sublingual tablet   Commonly known as:  NITROSTAT   Place 1 tablet (0.4 mg) under the tongue every 5 minutes as needed for chest pain If you are still having symptoms after 3 doses (15 minutes) call 911.                                ondansetron 4 MG ODT tab   Commonly known as:  ZOFRAN ODT   Take 1-2 tablets (4-8 mg) by mouth every 8 hours as needed for nausea                                pantoprazole 40 MG EC tablet   Commonly  known as:  PROTONIX   TAKE ONE TABLET BY MOUTH EVERY DAY                                potassium chloride 10 MEQ tablet   Commonly known as:  K-TAB,KLOR-CON   TAKE ONE TABLET BY MOUTH THREE TIMES A DAY                                predniSONE 5 MG tablet   Commonly known as:  DELTASONE   Take 2 tablets (10 mg) by mouth daily                                roflumilast 500 MCG Tabs tablet   Commonly known as:  DALIRESP   Take 1 tablet (500 mcg) by mouth daily                                SPIRIVA HANDIHALER 18 MCG capsule   USING THE HANDIHALER, INHALE THE CONTENTS OF ONE CAPSULE BY MOUTH DAILY   Generic drug:  tiotropium                                SUMAtriptan 25 MG tablet   Commonly known as:  IMITREX   Take 1 tablet (25 mg) by mouth at onset of headache for migraine May repeat in 2 hours if needed: max 2/day;                                TYLENOL EXTRA STRENGTH PO   1 TABLET EVERY 4 HOURS AS NEEDED

## 2018-04-26 NOTE — ANESTHESIA CARE TRANSFER NOTE
Patient: Trisha Bender    Procedure(s):  COLONOSCOPY with biopsies - Wound Class: II-Clean Contaminated    Diagnosis: screening  Diagnosis Additional Information: No value filed.    Anesthesia Type:   MAC     Note:  Airway :Room Air  Patient transferred to:Phase II  Comments: VSS.  Spontaneously breathing room air.  Report given to RN.Handoff Report: Identifed the Patient, Identified the Reponsible Provider, Reviewed the pertinent medical history, Discussed the surgical course, Reviewed Intra-OP anesthesia mangement and issues during anesthesia, Set expectations for post-procedure period and Allowed opportunity for questions and acknowledgement of understanding      Vitals: (Last set prior to Anesthesia Care Transfer)    CRNA VITALS  4/26/2018 1114 - 4/26/2018 1148      4/26/2018             SpO2: 100 %                Electronically Signed By: ELLE Lovelace CRNA  April 26, 2018  11:48 AM

## 2018-04-27 LAB — COPATH REPORT: NORMAL

## 2018-04-29 NOTE — PROCEDURES
Interlaminar Epidural Steroid Injection        Diagnosis: Lumbar Radiculopathy          The patient s identity, the procedure to be performed and the specific site of the procedure was verified in accordance with [facility] Protocol.      Pre-procedure Pain Score: 5/10    Procedure Note:    Informed consent was obtained.  The patient was placed comfortably into a prone position and was then prepped and draped in a sterile fashion.  There was no evidence of infection at the site of needle insertion.  The skin was anesthetized with 1% lidocaine and a tuohy needle was advanced under fluoroscopic guidance into the epidural space using a loss of resistance technique.  CSF was not aspirated, blood was not aspirated, paresthesia was not noted.  Position was confirmed with radio-opaque contrast.  The patient tolerated the procedure without complications.  The patient was observed for 30 minutes and was then released with post-procedure instructions.    The patient was given discharge instructions and verbalizes understanding, including understanding of those signs and symptoms that would require emergency care.     Level:L5/S1  Laterality: central     Needle Type:   20g touh        Medication:  80mg Kenalog  2ml Normal Saline,   1ml 0.25% Bupivacaine        Post Procedure Pain Score: 0/10      Counseling: Greater than 50% of this patient visit was spent in counseling the patient regarding the treatment of their pain, coordinating their overall treatment plan and assessing their progress.

## 2018-05-01 ENCOUNTER — HOSPITAL ENCOUNTER (OUTPATIENT)
Dept: CARDIAC REHAB | Facility: CLINIC | Age: 63
End: 2018-05-01
Attending: INTERNAL MEDICINE
Payer: COMMERCIAL

## 2018-05-01 PROCEDURE — 40000244 ZZH STATISTIC VISIT PULM REHAB

## 2018-05-01 PROCEDURE — G0424 PULMONARY REHAB W EXER: HCPCS

## 2018-05-01 PROCEDURE — G0239 OTH RESP PROC, GROUP: HCPCS

## 2018-05-07 DIAGNOSIS — J43.9 PULMONARY EMPHYSEMA, UNSPECIFIED EMPHYSEMA TYPE (H): ICD-10-CM

## 2018-05-07 RX ORDER — PREDNISONE 5 MG/1
TABLET ORAL
Qty: 30 TABLET | Refills: 0 | Status: SHIPPED | OUTPATIENT
Start: 2018-05-07 | End: 2018-05-21

## 2018-05-08 ENCOUNTER — HOSPITAL ENCOUNTER (OUTPATIENT)
Dept: CARDIAC REHAB | Facility: CLINIC | Age: 63
End: 2018-05-08
Attending: INTERNAL MEDICINE
Payer: COMMERCIAL

## 2018-05-08 PROCEDURE — G0424 PULMONARY REHAB W EXER: HCPCS

## 2018-05-08 PROCEDURE — 40000244 ZZH STATISTIC VISIT PULM REHAB

## 2018-05-20 ENCOUNTER — MYC REFILL (OUTPATIENT)
Dept: INTERNAL MEDICINE | Facility: CLINIC | Age: 63
End: 2018-05-20

## 2018-05-20 DIAGNOSIS — G89.29 CHRONIC BILATERAL LOW BACK PAIN WITHOUT SCIATICA: ICD-10-CM

## 2018-05-20 DIAGNOSIS — J06.9 UPPER RESPIRATORY TRACT INFECTION, UNSPECIFIED TYPE: ICD-10-CM

## 2018-05-20 DIAGNOSIS — M54.50 CHRONIC BILATERAL LOW BACK PAIN WITHOUT SCIATICA: ICD-10-CM

## 2018-05-21 ENCOUNTER — MYC MEDICAL ADVICE (OUTPATIENT)
Dept: INTERNAL MEDICINE | Facility: CLINIC | Age: 63
End: 2018-05-21

## 2018-05-21 DIAGNOSIS — J43.9 PULMONARY EMPHYSEMA, UNSPECIFIED EMPHYSEMA TYPE (H): ICD-10-CM

## 2018-05-21 RX ORDER — DOXYCYCLINE 100 MG/1
CAPSULE ORAL
Qty: 20 CAPSULE | Refills: 0 | OUTPATIENT
Start: 2018-05-21

## 2018-05-21 RX ORDER — HYDROCODONE BITARTRATE AND ACETAMINOPHEN 5; 325 MG/1; MG/1
1-2 TABLET ORAL EVERY 8 HOURS PRN
Qty: 180 TABLET | Refills: 0 | Status: SHIPPED | OUTPATIENT
Start: 2018-05-21 | End: 2018-06-17

## 2018-05-21 RX ORDER — PREDNISONE 5 MG/1
TABLET ORAL
Qty: 30 TABLET | Refills: 0 | Status: ON HOLD | OUTPATIENT
Start: 2018-05-21 | End: 2019-09-08

## 2018-05-21 NOTE — TELEPHONE ENCOUNTER
Last refill-5/7/18-#30      Requested Prescriptions   Pending Prescriptions Disp Refills     predniSONE (DELTASONE) 5 MG tablet [Pharmacy Med Name: PREDNISONE 5 MG TABLET] 30 tablet 0     Sig: TAKE 2 TABLETS (10 MG) BY MOUTH DAILY, TAPER DOWN BY 1/2 TAB EVERY 7 DAYS UNTIL DOWN TO 1 TAB DAILY    There is no refill protocol information for this order

## 2018-05-21 NOTE — TELEPHONE ENCOUNTER
She should discuss chronic Prednisone use with the pulmonary specialist.   Will refill once, needs to follow up with pulmonary for this.

## 2018-05-21 NOTE — TELEPHONE ENCOUNTER
Message from [facility]:  Original authorizing provider: MD Trisha Marvin would like a refill of the following medications:  HYDROcodone-acetaminophen (NORCO) 5-325 MG per tablet [Bandar Weinberg MD]    Preferred pharmacy: [place], [place] - [facility] [address]    Comment:  Dr. Weinberg and Nursing Staff, I would like to get a refill on the Hydrocodone-Acetaminophen prescription. Would like to be able to pick it up Wednesday or Thursday. Thank you. Trisha Bender

## 2018-05-21 NOTE — TELEPHONE ENCOUNTER
Refused with message       Requested Prescriptions   Pending Prescriptions Disp Refills     doxycycline (VIBRAMYCIN) 100 MG capsule [Pharmacy Med Name: DOXYCYCLINE HYCLATE 100MG CAPS] 20 capsule 0     Sig: TAKE ONE CAPSULE BY MOUTH TWICE A DAY    There is no refill protocol information for this order

## 2018-05-22 ENCOUNTER — MYC MEDICAL ADVICE (OUTPATIENT)
Dept: INTERNAL MEDICINE | Facility: CLINIC | Age: 63
End: 2018-05-22

## 2018-05-22 RX ORDER — DOXYCYCLINE 100 MG/1
100 CAPSULE ORAL 2 TIMES DAILY
Qty: 20 CAPSULE | Refills: 0 | Status: CANCELLED | OUTPATIENT
Start: 2018-05-22

## 2018-05-22 NOTE — TELEPHONE ENCOUNTER
Sultana-see my chart message. I refused Doxy rx yesterday due to it being an abx, and put message asking pt to call our office. Should I have pt do a E visit?

## 2018-05-22 NOTE — TELEPHONE ENCOUNTER
Did not see MDs message below until after appt scheduled for tomorrow. See 05/22 mychart encounter.

## 2018-05-22 NOTE — TELEPHONE ENCOUNTER
Pt calls, states she's had a cold, cough with clear sputum, allergy symptoms with wheezing, SOB, difficulty breathing that has been relieved by using prn nebulizer treatments, inhaler, and prednisone use. Denies fever.    Per pt report and mychart message below pt used doxycycline rx that she received in Nov, finished yest. See 05/21 mychart encounter, 05/21 refill encounter.    Pt requesting appt to be seen by provider for tomorrow. Scheduled appt for tomorrow. Instructed pt to call clinic if worsening symptoms before appt. Verbalized understanding.

## 2018-05-23 ENCOUNTER — RADIANT APPOINTMENT (OUTPATIENT)
Dept: GENERAL RADIOLOGY | Facility: CLINIC | Age: 63
End: 2018-05-23
Attending: INTERNAL MEDICINE
Payer: COMMERCIAL

## 2018-05-23 ENCOUNTER — OFFICE VISIT (OUTPATIENT)
Dept: INTERNAL MEDICINE | Facility: CLINIC | Age: 63
End: 2018-05-23
Payer: COMMERCIAL

## 2018-05-23 VITALS
BODY MASS INDEX: 25.48 KG/M2 | HEART RATE: 78 BPM | TEMPERATURE: 98.6 F | DIASTOLIC BLOOD PRESSURE: 88 MMHG | WEIGHT: 178 LBS | HEIGHT: 70 IN | OXYGEN SATURATION: 94 % | SYSTOLIC BLOOD PRESSURE: 136 MMHG

## 2018-05-23 DIAGNOSIS — J44.1 COPD EXACERBATION (H): ICD-10-CM

## 2018-05-23 DIAGNOSIS — G89.29 CHRONIC BILATERAL LOW BACK PAIN, WITH SCIATICA PRESENCE UNSPECIFIED: ICD-10-CM

## 2018-05-23 DIAGNOSIS — E78.2 MIXED HYPERLIPIDEMIA: ICD-10-CM

## 2018-05-23 DIAGNOSIS — F33.41 RECURRENT MAJOR DEPRESSIVE DISORDER, IN PARTIAL REMISSION (H): ICD-10-CM

## 2018-05-23 DIAGNOSIS — I10 ESSENTIAL HYPERTENSION WITH GOAL BLOOD PRESSURE LESS THAN 130/80: ICD-10-CM

## 2018-05-23 DIAGNOSIS — M54.5 CHRONIC BILATERAL LOW BACK PAIN, WITH SCIATICA PRESENCE UNSPECIFIED: ICD-10-CM

## 2018-05-23 DIAGNOSIS — J44.1 COPD EXACERBATION (H): Primary | ICD-10-CM

## 2018-05-23 PROCEDURE — 71046 X-RAY EXAM CHEST 2 VIEWS: CPT

## 2018-05-23 PROCEDURE — 99215 OFFICE O/P EST HI 40 MIN: CPT | Performed by: INTERNAL MEDICINE

## 2018-05-23 RX ORDER — CODEINE PHOSPHATE AND GUAIFENESIN 10; 100 MG/5ML; MG/5ML
1 SOLUTION ORAL EVERY 4 HOURS PRN
Qty: 120 ML | Refills: 0 | Status: SHIPPED | OUTPATIENT
Start: 2018-05-23 | End: 2018-06-07

## 2018-05-23 RX ORDER — BUPROPION HYDROCHLORIDE 150 MG/1
150 TABLET ORAL EVERY MORNING
Qty: 90 TABLET | Refills: 3 | Status: SHIPPED | OUTPATIENT
Start: 2018-05-23 | End: 2018-09-20

## 2018-05-23 RX ORDER — PREDNISONE 20 MG/1
TABLET ORAL
Qty: 20 TABLET | Refills: 0 | Status: ON HOLD | OUTPATIENT
Start: 2018-05-23 | End: 2019-09-08

## 2018-05-23 NOTE — MR AVS SNAPSHOT
After Visit Summary   5/23/2018    Trisha Bender    MRN: [id number]           Patient Information     Date Of Birth          1955        Visit Information        Provider Department      5/23/2018 3:20 PM Bandar Weinberg MD [facility]        Today's Diagnoses     COPD exacerbation (H)    -  1    Recurrent major depressive disorder, in partial remission (H)           Follow-ups after your visit        Your next 10 appointments already scheduled     May 29, 2018  3:00 PM CDT   Pulmonary Treatment with [facility] [address] [place] [address] ([facility] [place])    [address], [address]  [facility] [address]   [phone]            Jun 01, 2018  1:40 PM CDT   Elliott Rodriguez with Bandar Weinberg MD   [facility] ([facility])    [address]  [facility] [id number] [phone]              Future tests that were ordered for you today     Open Future Orders        Priority Expected Expires Ordered    XR Chest 2 Views Routine 5/23/2018 5/23/2019 5/23/2018            Who to contact     If you have questions or need follow up information about today's clinic visit or your schedule please contact [facility] directly at [phone].  Normal or non-critical lab and imaging results will be communicated to you by MyChart, letter or phone within 4 business days after the clinic has received the results. If you do not hear from us within 7 days, please contact the clinic through MyChart or phone. If you have a critical or abnormal lab result, we will notify you by phone as soon as possible.  Submit refill requests through [employer] or call your pharmacy and they will forward the refill request to us. Please allow 3 business days for your refill to be completed.          Additional Information About Your Visit        [employer] Information     [employer] gives you secure access to your electronic  "health record. If you see a primary care provider, you can also send messages to your care team and make appointments. If you have questions, please call your primary care clinic.  If you do not have a primary care provider, please call [phone] and they will assist you.        Care EveryWhere ID     This is your Care EveryWhere ID. This could be used by other organizations to access your [facility] medical records  [id number]        Your Vitals Were     Pulse Temperature Height Last Period Pulse Oximetry BMI (Body Mass Index)    78 98.6  F (37  C) (Oral) 5' 10\" (1.778 m) 09/29/2005 94% 25.54 kg/m2       Blood Pressure from Last 3 Encounters:   05/23/18 136/88   04/26/18 128/78   04/23/18 110/60    Weight from Last 3 Encounters:   05/23/18 178 lb (80.7 kg)   04/26/18 178 lb (80.7 kg)   04/23/18 182 lb (82.6 kg)                 Today's Medication Changes          These changes are accurate as of 5/23/18  4:23 PM.  If you have any questions, ask your nurse or doctor.               Start taking these medicines.        Dose/Directions    buPROPion 150 MG 24 hr tablet   Commonly known as:  WELLBUTRIN XL   Used for:  Recurrent major depressive disorder, in partial remission (H)   Started by:  Bandar Weinberg MD        Dose:  150 mg   Take 1 tablet (150 mg) by mouth every morning   Quantity:  90 tablet   Refills:  3       guaiFENesin-codeine 100-10 MG/5ML Soln solution   Commonly known as:  ROBITUSSIN AC   Used for:  COPD exacerbation (H)   Started by:  Bandar Weinberg MD        Dose:  1 tsp.   Take 5 mLs by mouth every 4 hours as needed for cough   Quantity:  120 mL   Refills:  0         These medicines have changed or have updated prescriptions.        Dose/Directions    * predniSONE 5 MG tablet   Commonly known as:  DELTASONE   This may have changed:  Another medication with the same name was added. Make sure you understand how and when to take each.   Used for:  Pulmonary emphysema, unspecified emphysema " type (H)   Changed by:  Bandar Weinberg MD        TAKE 2 TABLETS (10 MG) BY MOUTH DAILY, TAPER DOWN BY 1/2 TAB EVERY 7 DAYS UNTIL DOWN TO 1 TAB DAILY   Quantity:  30 tablet   Refills:  0       * predniSONE 20 MG tablet   Commonly known as:  DELTASONE   This may have changed:  You were already taking a medication with the same name, and this prescription was added. Make sure you understand how and when to take each.   Used for:  COPD exacerbation (H)   Changed by:  Bandar Weinberg MD        Take 3 tabs (60 mg) by mouth daily x 3 days, 2 tabs (40 mg) daily x 3 days, 1 tab (20 mg) daily x 3 days, then 1/2 tab (10 mg) x 3 days.   Quantity:  20 tablet   Refills:  0       * Notice:  This list has 2 medication(s) that are the same as other medications prescribed for you. Read the directions carefully, and ask your doctor or other care provider to review them with you.      Stop taking these medicines if you haven't already. Please contact your care team if you have questions.     escitalopram 10 MG tablet   Commonly known as:  LEXAPRO   Stopped by:  Bandar Weinberg MD                Where to get your medicines      These medications were sent to [facility] [place] [address]  [facility] [address] [address]     Phone:  [phone]     buPROPion 150 MG 24 hr tablet    predniSONE 20 MG tablet         Some of these will need a paper prescription and others can be bought over the counter.  Ask your nurse if you have questions.     Bring a paper prescription for each of these medications     guaiFENesin-codeine 100-10 MG/5ML Soln solution                Primary Care Provider Office Phone # Fax #    Bandar Weinberg [phone]       [address]  [facility] [address]        Equal Access to Services     KAMILA ALLEN AH: Carlo españao Soomaali, waaxda luqadaha, qaybta kaalmada adeegyada, waxay jessica burgess. So [facility]  [phone].    ATENCIÓN: Si adrianna ruiz, tiene a madison disposición servicios gratuitos de asistencia lingüística. Austin mcallister [phone].    We comply with applicable federal civil rights laws and [place] laws. We do not discriminate on the basis of race, color, national origin, age, disability, sex, sexual orientation, or gender identity.            Thank you!     Thank you for choosing [facility]  for your care. Our goal is always to provide you with excellent care. Hearing back from our patients is one way we can continue to improve our services. Please take a few minutes to complete the written survey that you may receive in the mail after your visit with us. Thank you!             Your Updated Medication List - Protect others around you: Learn how to safely use, store and throw away your medicines at www.disposemymeds.org.          This list is accurate as of 5/23/18  4:23 PM.  Always use your most recent med list.                   Brand Name Dispense Instructions for use Diagnosis    ADVAIR DISKUS 500-50 MCG/DOSE diskus inhaler   Generic drug:  fluticasone-salmeterol     3 Inhaler    INHALE 1 PUFF TWO TIMES DAILY    Pulmonary emphysema, unspecified emphysema type (H)       albuterol 108 (90 Base) MCG/ACT Inhaler    VENTOLIN HFA    18 g    Inhale 2 puffs into the lungs every 6 hours as needed for shortness of breath / dyspnea or wheezing    Asthma exacerbation       ALPRAZolam 0.25 MG tablet    XANAX    30 tablet    Take 1 tablet by mouth. Every twelve hours as needed for anxiety    Anxiety       atorvastatin 10 MG tablet    LIPITOR    36 tablet    Takes 3 times a week    Coronary artery disease involving native coronary artery of native heart without angina pectoris, Mixed hyperlipidemia       baclofen 10 MG tablet    LIORESAL    90 tablet    TAKE ONE TABLET BY MOUTH THREE TIMES A DAY AS NEEDED FOR MUSCLE SPASMS    Chronic bilateral low back pain with right-sided sciatica       buPROPion  150 MG 24 hr tablet    WELLBUTRIN XL    90 tablet    Take 1 tablet (150 mg) by mouth every morning    Recurrent major depressive disorder, in partial remission (H)       [employer] FIBER GUMMIES 2.5 g Chew      Take 1 tablet by mouth daily Reported on 5/8/2017        fluticasone 50 MCG/ACT spray    FLONASE    16 g    SPRAY 1 TO 2 SPRAYS IN EACH NOSTRIL ONCE DAILY    Nasal congestion       furosemide 20 MG tablet    LASIX    180 tablet    TAKE 2 TABLETS (40 MG) BY MOUTH DAILY SEVERE SWELLING    Acute systolic congestive heart failure (H)       guaiFENesin-codeine 100-10 MG/5ML Soln solution    ROBITUSSIN AC    120 mL    Take 5 mLs by mouth every 4 hours as needed for cough    COPD exacerbation (H)       HYDROcodone-acetaminophen 5-325 MG per tablet    NORCO    180 tablet    Take 1-2 tablets by mouth every 8 hours as needed for pain Maximum 6 daily    Chronic bilateral low back pain without sciatica       ipratropium - albuterol 0.5 mg/2.5 mg/3 mL 0.5-2.5 (3) MG/3ML neb solution    DUONEB    30 vial    Take 1 vial (3 mLs) by nebulization every 6 hours as needed for shortness of breath / dyspnea or wheezing    Moderate persistent asthma without complication       lidocaine 5 % Patch    LIDODERM    30 patch    Apply up to 3 patches to painful area at once for up to 12 h within a 24 h period.  Remove after 12 hours.    Mechanical low back pain       lisinopril 2.5 MG tablet    PRINIVIL/Zestril    90 tablet    Take 1 tablet (2.5 mg) by mouth daily    Dilated cardiomyopathy (H)       mometasone 0.1 % ointment    ELOCON    45 g    Apply sparingly to affected area twice daily as needed.  Do not apply to face.    Intrinsic eczema       montelukast 10 MG tablet    SINGULAIR    90 tablet    Take 1 tablet (10 mg) by mouth At Bedtime    Mild persistent asthma without complication       Multiple Vitamin Chew      Take 1 tablet by mouth daily        nitroGLYcerin 0.4 MG sublingual tablet    NITROSTAT    25 tablet    Place 1 tablet (0.4  mg) under the tongue every 5 minutes as needed for chest pain If you are still having symptoms after 3 doses (15 minutes) call 911.    Acute chest pain       ondansetron 4 MG ODT tab    ZOFRAN ODT    20 tablet    Take 1-2 tablets (4-8 mg) by mouth every 8 hours as needed for nausea    Nausea       pantoprazole 40 MG EC tablet    PROTONIX    90 tablet    TAKE ONE TABLET BY MOUTH EVERY DAY    Gastroesophageal reflux disease without esophagitis       potassium chloride 10 MEQ tablet    K-TAB,KLOR-CON    90 tablet    TAKE ONE TABLET BY MOUTH THREE TIMES A DAY    Hypokalemia       * predniSONE 5 MG tablet    DELTASONE    30 tablet    TAKE 2 TABLETS (10 MG) BY MOUTH DAILY, TAPER DOWN BY 1/2 TAB EVERY 7 DAYS UNTIL DOWN TO 1 TAB DAILY    Pulmonary emphysema, unspecified emphysema type (H)       * predniSONE 20 MG tablet    DELTASONE    20 tablet    Take 3 tabs (60 mg) by mouth daily x 3 days, 2 tabs (40 mg) daily x 3 days, 1 tab (20 mg) daily x 3 days, then 1/2 tab (10 mg) x 3 days.    COPD exacerbation (H)       roflumilast 500 MCG Tabs tablet    DALIRESP     Take 1 tablet (500 mcg) by mouth daily        SPIRIVA HANDIHALER 18 MCG capsule   Generic drug:  tiotropium     90 capsule    USING THE HANDIHALER, INHALE THE CONTENTS OF ONE CAPSULE BY MOUTH DAILY    Moderate persistent asthma without complication       SUMAtriptan 25 MG tablet    IMITREX    9 tablet    Take 1 tablet (25 mg) by mouth at onset of headache for migraine May repeat in 2 hours if needed: max 2/day;    Chronic migraine without aura without status migrainosus, not intractable       TYLENOL EXTRA STRENGTH PO      1 TABLET EVERY 4 HOURS AS NEEDED    Abdominal pain, right upper quadrant       * Notice:  This list has 2 medication(s) that are the same as other medications prescribed for you. Read the directions carefully, and ask your doctor or other care provider to review them with you.

## 2018-05-23 NOTE — PROGRESS NOTES
SUBJECTIVE:   Trisha Bender is a 62 year old female who presents to clinic today for the following health issues:      Cough, SOB and post nasal drip:    Presents with cough, phlegm production, SOB.   Has h/o COPD. On inhaled steroids, bronchodilators. Has finished Doxycycline course. On Prednisone chronically. Not on oxygen.   Still wheezing and feeling weak, no energy, no fever, chest pain.   Has h/o depression. On treatment with LexAPRO , but stopped because of side effects. Feels down, depressed, no suicidal ideation.   Has chronic back pain, on chronic narcotic analgesics. OTC treatment is not sufficient for pain control , on narcotic analgesics. Denies side effects. Pain is mild.   Has h/o HTN and hyperlipidemia, on medical treatment, controlled.         Problem list and histories reviewed & adjusted, as indicated.  Additional history: as documented    Patient Active Problem List   Diagnosis     Essential hypertension with goal blood pressure less than 130/80     Chronic migraine without aura without status migrainosus, not intractable     Esophageal reflux     Disorder of bone and cartilage     Family history of malignant neoplasm of gastrointestinal tract     Chronic airway obstruction (H)     Cystocele     Advanced directives, counseling/discussion     Family history of thyroid cancer - 1/2 brother     Anxiety     Mild persistent asthma without complication     Chronic low back pain     Hair loss     Lateral epicondylitis of right elbow     Dilated cardiomyopathy (H)     Right bundle branch block (RBBB)     CHF (congestive heart failure) (H)     SOB (shortness of breath)     Coronary artery disease involving native coronary artery of native heart without angina pectoris     Mixed hyperlipidemia     Controlled substance agreement signed     Moderate persistent asthma without complication     Hypokalemia     Mechanical low back pain     Panlobular emphysema (H)     Severe episode of recurrent major  depressive disorder, without psychotic features (H)     Past Surgical History:   Procedure Laterality Date     ANGIOGRAM  2015    Minimal CAD. LV dysfunction, Mid RCA 20% stenosis, EF 20-25%, c/w Takotsubo cardiomyopathy     APPENDECTOMY       C NONSPECIFIC PROCEDURE      GB and appendectomy  abstracted [id number]     C NONSPECIFIC PROCEDURE      Nasal septoplasty and mucous retention cyst     CHOLECYSTECTOMY       COLONOSCOPY N/A 2018    Procedure: COLONOSCOPY;  COLONOSCOPY with biopsies;  Surgeon: Steve Acosta MD;  Location: [facility] OR     ESOPHAGOSCOPY, GASTROSCOPY, DUODENOSCOPY (EGD), COMBINED  11/15/2011    Procedure:COMBINED ESOPHAGOSCOPY, GASTROSCOPY, DUODENOSCOPY (EGD);  ESOPHAGOSCOPY, GASTROSCOPY, DUODENOSCOPY (EGD) ; Surgeon:JIM URENA; Location:[facility] GI     HC REMOVE TONSILS/ADENOIDS,<13 Y/O       INJECT EPIDURAL LUMBAR / SACRAL SINGLE N/A 2018    Procedure: INJECT EPIDURAL LUMBAR / SACRAL CONTINUAL OR INTERMITTENT;  Lumbar Epidural Steroid Injection;  Surgeon: Az Enamorado MD;  Location: [facility] OR     LAPAROSCOPIC ABLATION ENDOMETRIOSIS       LAPAROSCOPY DIAGNOSTIC (GYN)      endometriosis resection / lysis of adhesions     wisdom teeth         Social History   Substance Use Topics     Smoking status: Former Smoker     Packs/day: 1.00     Years: 30.00     Quit date: 2006     Smokeless tobacco: Never Used      Comment: Quit in      Alcohol use No     Family History   Problem Relation Age of Onset     CANCER Mother      Colon cancer; dxed at age 64;  at age 69     Cancer - colorectal Mother      Other Cancer Mother      GASTROINTESTINAL DISEASE Sister      Acute pancreatitis     HEART DISEASE Father      ?     Coronary Artery Disease Father      Circulatory Maternal Aunt      AAA     Circulatory Maternal Uncle      AAA     DIABETES Son          Current Outpatient Prescriptions   Medication Sig Dispense Refill     ADVAIR DISKUS 500-50 MCG/DOSE diskus inhaler INHALE 1  PUFF TWO TIMES DAILY 3 Inhaler 3     albuterol (VENTOLIN HFA) 108 (90 BASE) MCG/ACT Inhaler Inhale 2 puffs into the lungs every 6 hours as needed for shortness of breath / dyspnea or wheezing 18 g 3     ALPRAZolam (XANAX) 0.25 MG tablet Take 1 tablet by mouth. Every twelve hours as needed for anxiety 30 tablet 0     atorvastatin (LIPITOR) 10 MG tablet Takes 3 times a week 36 tablet 3     baclofen (LIORESAL) 10 MG tablet TAKE ONE TABLET BY MOUTH THREE TIMES A DAY AS NEEDED FOR MUSCLE SPASMS 90 tablet 1     buPROPion (WELLBUTRIN XL) 150 MG 24 hr tablet Take 1 tablet (150 mg) by mouth every morning 90 tablet 3     [employer] FIBER GUMMIES 2.5 G CHEW Take 1 tablet by mouth daily Reported on 5/8/2017       fluticasone (FLONASE) 50 MCG/ACT spray SPRAY 1 TO 2 SPRAYS IN EACH NOSTRIL ONCE DAILY 16 g 7     furosemide (LASIX) 20 MG tablet TAKE 2 TABLETS (40 MG) BY MOUTH DAILY SEVERE SWELLING 180 tablet 1     guaiFENesin-codeine (ROBITUSSIN AC) 100-10 MG/5ML SOLN solution Take 5 mLs by mouth every 4 hours as needed for cough 120 mL 0     HYDROcodone-acetaminophen (NORCO) 5-325 MG per tablet Take 1-2 tablets by mouth every 8 hours as needed for pain Maximum 6 daily 180 tablet 0     ipratropium - albuterol 0.5 mg/2.5 mg/3 mL (DUONEB) 0.5-2.5 (3) MG/3ML neb solution Take 1 vial (3 mLs) by nebulization every 6 hours as needed for shortness of breath / dyspnea or wheezing 30 vial 1     lidocaine (LIDODERM) 5 % Patch Apply up to 3 patches to painful area at once for up to 12 h within a 24 h period.  Remove after 12 hours. 30 patch 0     lisinopril (PRINIVIL/ZESTRIL) 2.5 MG tablet Take 1 tablet (2.5 mg) by mouth daily 90 tablet 3     mometasone (ELOCON) 0.1 % ointment Apply sparingly to affected area twice daily as needed.  Do not apply to face. 45 g 0     montelukast (SINGULAIR) 10 MG tablet Take 1 tablet (10 mg) by mouth At Bedtime 90 tablet 1     Multiple Vitamin CHEW Take 1 tablet by mouth daily        nitroglycerin (NITROSTAT) 0.4 MG SL  "tablet Place 1 tablet (0.4 mg) under the tongue every 5 minutes as needed for chest pain If you are still having symptoms after 3 doses (15 minutes) call 911. 25 tablet 0     ondansetron (ZOFRAN ODT) 4 MG ODT tab Take 1-2 tablets (4-8 mg) by mouth every 8 hours as needed for nausea 20 tablet 3     pantoprazole (PROTONIX) 40 MG EC tablet TAKE ONE TABLET BY MOUTH EVERY DAY 90 tablet 2     potassium chloride (K-TAB,KLOR-CON) 10 MEQ tablet TAKE ONE TABLET BY MOUTH THREE TIMES A DAY 90 tablet 5     predniSONE (DELTASONE) 20 MG tablet Take 3 tabs (60 mg) by mouth daily x 3 days, 2 tabs (40 mg) daily x 3 days, 1 tab (20 mg) daily x 3 days, then 1/2 tab (10 mg) x 3 days. 20 tablet 0     predniSONE (DELTASONE) 5 MG tablet TAKE 2 TABLETS (10 MG) BY MOUTH DAILY, TAPER DOWN BY 1/2 TAB EVERY 7 DAYS UNTIL DOWN TO 1 TAB DAILY 30 tablet 0     roflumilast (DALIRESP) 500 MCG TABS tablet Take 1 tablet (500 mcg) by mouth daily       SPIRIVA HANDIHALER 18 MCG capsule USING THE HANDIHALER, INHALE THE CONTENTS OF ONE CAPSULE BY MOUTH DAILY 90 capsule 1     SUMAtriptan (IMITREX) 25 MG tablet Take 1 tablet (25 mg) by mouth at onset of headache for migraine May repeat in 2 hours if needed: max 2/day; 9 tablet 11     TYLENOL EXTRA STRENGTH OR 1 TABLET EVERY 4 HOURS AS NEEDED         Reviewed and updated as needed this visit by clinical staff       Reviewed and updated as needed this visit by Provider         ROS:  Constitutional, HEENT, cardiovascular, pulmonary, GI, , musculoskeletal, neuro, skin, endocrine and psych systems are negative, except as otherwise noted.    OBJECTIVE:     /88  Pulse 78  Temp 98.6  F (37  C) (Oral)  Ht 5' 10\" (1.778 m)  Wt 178 lb (80.7 kg)  LMP 09/29/2005  SpO2 94%  BMI 25.54 kg/m2  Body mass index is 25.54 kg/(m^2).   GENERAL: weak, alert and no distress  EYES: Eyes grossly normal to inspection, PERRL and conjunctivae and sclerae normal  HENT: ear canals and TM's normal, nose and mouth without " ulcers or lesions  NECK: no adenopathy, no asymmetry, masses, or scars and thyroid normal to palpation  RESP: lungs clear to auscultation - no rales, rhonchi or wheezes, diminished breaths sounds diffusely   CV: regular rate and rhythm, normal S1 S2, no S3 or S4, no murmur, click or rub, no peripheral edema and peripheral pulses strong  ABDOMEN: soft, nontender, no hepatosplenomegaly, no masses and bowel sounds normal  MS: no gross musculoskeletal defects noted, no edema  SKIN: no suspicious lesions or rashes  NEURO: Normal strength and tone, mentation intact and speech normal    Diagnostic Test Results:  none     ASSESSMENT/PLAN:     Problem List Items Addressed This Visit     Essential hypertension with goal blood pressure less than 130/80    Chronic low back pain    Relevant Medications    predniSONE (DELTASONE) 20 MG tablet    Mixed hyperlipidemia      Other Visit Diagnoses     COPD exacerbation (H)    -  Primary    Relevant Medications    predniSONE (DELTASONE) 20 MG tablet    guaiFENesin-codeine (ROBITUSSIN AC) 100-10 MG/5ML SOLN solution    Other Relevant Orders    XR Chest 2 Views (Completed)    Recurrent major depressive disorder, in partial remission (H)        Relevant Medications    buPROPion (WELLBUTRIN XL) 150 MG 24 hr tablet           Start on Prednisone taper  Assess X rays - no infiltrate   Start on Wellbutrin, not tolerating SSRI , discussed side effects   Cont rest of treatment       Follow-Up:in 2 weeks     Bandar Weinberg MD  [facility]

## 2018-06-01 ENCOUNTER — OFFICE VISIT (OUTPATIENT)
Dept: INTERNAL MEDICINE | Facility: CLINIC | Age: 63
End: 2018-06-01
Payer: COMMERCIAL

## 2018-06-01 VITALS
BODY MASS INDEX: 26.05 KG/M2 | HEIGHT: 70 IN | TEMPERATURE: 98 F | WEIGHT: 182 LBS | OXYGEN SATURATION: 96 % | HEART RATE: 114 BPM | SYSTOLIC BLOOD PRESSURE: 110 MMHG | DIASTOLIC BLOOD PRESSURE: 80 MMHG

## 2018-06-01 DIAGNOSIS — M54.59 MECHANICAL LOW BACK PAIN: ICD-10-CM

## 2018-06-01 DIAGNOSIS — J42 CHRONIC BRONCHITIS, UNSPECIFIED CHRONIC BRONCHITIS TYPE (H): ICD-10-CM

## 2018-06-01 DIAGNOSIS — F41.9 ANXIETY: ICD-10-CM

## 2018-06-01 DIAGNOSIS — I10 ESSENTIAL HYPERTENSION WITH GOAL BLOOD PRESSURE LESS THAN 130/80: Primary | ICD-10-CM

## 2018-06-01 PROCEDURE — 99214 OFFICE O/P EST MOD 30 MIN: CPT | Performed by: INTERNAL MEDICINE

## 2018-06-01 RX ORDER — ALPRAZOLAM 0.25 MG
TABLET ORAL
Qty: 30 TABLET | Refills: 0 | Status: SHIPPED | OUTPATIENT
Start: 2018-06-01 | End: 2018-07-19

## 2018-06-01 NOTE — PROGRESS NOTES
SUBJECTIVE:   Trisha Bender is a 62 year old female who presents to clinic today for the following health issues:      Follow up:    Patient is seen for a follow up visit.  Feels better.   Has h/o COPD. Improved breathing of tapering Prednisone. Follows with pulmonary prescribed Prednisone 10 mg to continue after tapering current treatment.   Not on oxygen.   Has h/o HTN. on medical treatment. BP has been controlled. No side effects from medications. No CP, HA, dizziness. good compliance with medications and low salt diet.  Has h/o anxiety and depression. On medical treatment, controlled, no side effects. No depressive symptoms or suicidal ideation. Needs refill of Xanax.   Has chronic back pain, related to DDD. On PRN analgesics. Helps with symptoms.       Problem list and histories reviewed & adjusted, as indicated.  Additional history: as documented    Patient Active Problem List   Diagnosis     Essential hypertension with goal blood pressure less than 130/80     Chronic migraine without aura without status migrainosus, not intractable     Esophageal reflux     Disorder of bone and cartilage     Family history of malignant neoplasm of gastrointestinal tract     Chronic airway obstruction (H)     Cystocele     Advanced directives, counseling/discussion     Family history of thyroid cancer - 1/2 brother     Anxiety     Mild persistent asthma without complication     Chronic low back pain     Hair loss     Lateral epicondylitis of right elbow     Dilated cardiomyopathy (H)     Right bundle branch block (RBBB)     CHF (congestive heart failure) (H)     SOB (shortness of breath)     Coronary artery disease involving native coronary artery of native heart without angina pectoris     Mixed hyperlipidemia     Controlled substance agreement signed     Moderate persistent asthma without complication     Hypokalemia     Mechanical low back pain     Panlobular emphysema (H)     Severe episode of recurrent major  depressive disorder, without psychotic features (H)     Past Surgical History:   Procedure Laterality Date     ANGIOGRAM  2015    Minimal CAD. LV dysfunction, Mid RCA 20% stenosis, EF 20-25%, c/w Takotsubo cardiomyopathy     APPENDECTOMY       C NONSPECIFIC PROCEDURE      GB and appendectomy  abstracted [id number]     C NONSPECIFIC PROCEDURE      Nasal septoplasty and mucous retention cyst     CHOLECYSTECTOMY       COLONOSCOPY N/A 2018    Procedure: COLONOSCOPY;  COLONOSCOPY with biopsies;  Surgeon: Steve Acosta MD;  Location: [facility] OR     ESOPHAGOSCOPY, GASTROSCOPY, DUODENOSCOPY (EGD), COMBINED  11/15/2011    Procedure:COMBINED ESOPHAGOSCOPY, GASTROSCOPY, DUODENOSCOPY (EGD);  ESOPHAGOSCOPY, GASTROSCOPY, DUODENOSCOPY (EGD) ; Surgeon:JIM URENA; Location:[facility]     HC REMOVE TONSILS/ADENOIDS,<13 Y/O       INJECT EPIDURAL LUMBAR / SACRAL SINGLE N/A 2018    Procedure: INJECT EPIDURAL LUMBAR / SACRAL CONTINUAL OR INTERMITTENT;  Lumbar Epidural Steroid Injection;  Surgeon: Az Enamorado MD;  Location: [facility]     LAPAROSCOPIC ABLATION ENDOMETRIOSIS       LAPAROSCOPY DIAGNOSTIC (GYN)      endometriosis resection / lysis of adhesions     wisdom teeth         Social History   Substance Use Topics     Smoking status: Former Smoker     Packs/day: 1.00     Years: 30.00     Quit date: 2006     Smokeless tobacco: Never Used      Comment: Quit in      Alcohol use No     Family History   Problem Relation Age of Onset     CANCER Mother      Colon cancer; dxed at age 64;  at age 69     Cancer - colorectal Mother      Other Cancer Mother      GASTROINTESTINAL DISEASE Sister      Acute pancreatitis     HEART DISEASE Father      ?     Coronary Artery Disease Father      Circulatory Maternal Aunt      AAA     Circulatory Maternal Uncle      AAA     DIABETES Son          Current Outpatient Prescriptions   Medication Sig Dispense Refill     ADVAIR DISKUS 500-50 MCG/DOSE diskus inhaler INHALE 1  PUFF TWO TIMES DAILY 3 Inhaler 3     albuterol (VENTOLIN HFA) 108 (90 BASE) MCG/ACT Inhaler Inhale 2 puffs into the lungs every 6 hours as needed for shortness of breath / dyspnea or wheezing 18 g 3     ALPRAZolam (XANAX) 0.25 MG tablet Take 1 tablet by mouth. Every twelve hours as needed for anxiety 30 tablet 0     atorvastatin (LIPITOR) 10 MG tablet Takes 3 times a week 36 tablet 3     baclofen (LIORESAL) 10 MG tablet TAKE ONE TABLET BY MOUTH THREE TIMES A DAY AS NEEDED FOR MUSCLE SPASMS 90 tablet 1     buPROPion (WELLBUTRIN XL) 150 MG 24 hr tablet Take 1 tablet (150 mg) by mouth every morning 90 tablet 3     [employer] FIBER GUMMIES 2.5 G CHEW Take 1 tablet by mouth daily Reported on 5/8/2017       fluticasone (FLONASE) 50 MCG/ACT spray SPRAY 1 TO 2 SPRAYS IN EACH NOSTRIL ONCE DAILY 16 g 7     furosemide (LASIX) 20 MG tablet TAKE 2 TABLETS (40 MG) BY MOUTH DAILY SEVERE SWELLING 180 tablet 1     guaiFENesin-codeine (ROBITUSSIN AC) 100-10 MG/5ML SOLN solution Take 5 mLs by mouth every 4 hours as needed for cough 120 mL 0     HYDROcodone-acetaminophen (NORCO) 5-325 MG per tablet Take 1-2 tablets by mouth every 8 hours as needed for pain Maximum 6 daily 180 tablet 0     ipratropium - albuterol 0.5 mg/2.5 mg/3 mL (DUONEB) 0.5-2.5 (3) MG/3ML neb solution Take 1 vial (3 mLs) by nebulization every 6 hours as needed for shortness of breath / dyspnea or wheezing 30 vial 1     lidocaine (LIDODERM) 5 % Patch Apply up to 3 patches to painful area at once for up to 12 h within a 24 h period.  Remove after 12 hours. 30 patch 0     lisinopril (PRINIVIL/ZESTRIL) 2.5 MG tablet Take 1 tablet (2.5 mg) by mouth daily 90 tablet 3     mometasone (ELOCON) 0.1 % ointment Apply sparingly to affected area twice daily as needed.  Do not apply to face. 45 g 0     montelukast (SINGULAIR) 10 MG tablet Take 1 tablet (10 mg) by mouth At Bedtime 90 tablet 1     Multiple Vitamin CHEW Take 1 tablet by mouth daily        nitroglycerin (NITROSTAT) 0.4 MG SL  "tablet Place 1 tablet (0.4 mg) under the tongue every 5 minutes as needed for chest pain If you are still having symptoms after 3 doses (15 minutes) call 911. 25 tablet 0     ondansetron (ZOFRAN ODT) 4 MG ODT tab Take 1-2 tablets (4-8 mg) by mouth every 8 hours as needed for nausea 20 tablet 3     pantoprazole (PROTONIX) 40 MG EC tablet TAKE ONE TABLET BY MOUTH EVERY DAY 90 tablet 2     potassium chloride (K-TAB,KLOR-CON) 10 MEQ tablet TAKE ONE TABLET BY MOUTH THREE TIMES A DAY 90 tablet 5     predniSONE (DELTASONE) 20 MG tablet Take 3 tabs (60 mg) by mouth daily x 3 days, 2 tabs (40 mg) daily x 3 days, 1 tab (20 mg) daily x 3 days, then 1/2 tab (10 mg) x 3 days. 20 tablet 0     predniSONE (DELTASONE) 5 MG tablet TAKE 2 TABLETS (10 MG) BY MOUTH DAILY, TAPER DOWN BY 1/2 TAB EVERY 7 DAYS UNTIL DOWN TO 1 TAB DAILY 30 tablet 0     roflumilast (DALIRESP) 500 MCG TABS tablet Take 1 tablet (500 mcg) by mouth daily       SPIRIVA HANDIHALER 18 MCG capsule USING THE HANDIHALER, INHALE THE CONTENTS OF ONE CAPSULE BY MOUTH DAILY 90 capsule 1     SUMAtriptan (IMITREX) 25 MG tablet Take 1 tablet (25 mg) by mouth at onset of headache for migraine May repeat in 2 hours if needed: max 2/day; 9 tablet 11     TYLENOL EXTRA STRENGTH OR 1 TABLET EVERY 4 HOURS AS NEEDED         Reviewed and updated as needed this visit by clinical staff       Reviewed and updated as needed this visit by Provider         ROS:  Constitutional, HEENT, cardiovascular, pulmonary, gi and gu systems are negative, except as otherwise noted.    OBJECTIVE:     /80  Pulse 114  Temp 98  F (36.7  C) (Oral)  Ht 5' 10\" (1.778 m)  Wt 182 lb (82.6 kg)  LMP 09/29/2005  SpO2 96%  BMI 26.11 kg/m2  Body mass index is 26.11 kg/(m^2).   GENERAL: weak, alert and no distress  NECK: no adenopathy, no asymmetry, masses, or scars and thyroid normal to palpation  RESP: lungs clear to auscultation - no rales, rhonchi or wheezes  CV: regular rate and rhythm, normal S1 S2, " no S3 or S4, no murmur, click or rub, no peripheral edema and peripheral pulses strong  ABDOMEN: soft, nontender, no hepatosplenomegaly, no masses and bowel sounds normal  MS: no gross musculoskeletal defects noted, no edema    Diagnostic Test Results:  none     ASSESSMENT/PLAN:     Problem List Items Addressed This Visit     Essential hypertension with goal blood pressure less than 130/80 - Primary    Chronic airway obstruction (H)    Anxiety    Relevant Medications    ALPRAZolam (XANAX) 0.25 MG tablet    Mechanical low back pain         Improved breathing, cont treatment   Refilled Xanax, advised for side effects   Controlled HTN, cont treatment         Follow-Up:as needed     Bandar Weinberg MD  [facility]

## 2018-06-01 NOTE — MR AVS SNAPSHOT
After Visit Summary   6/1/2018    Trisha Bender    MRN: [id number]           Patient Information     Date Of Birth          1955        Visit Information        Provider Department      6/1/2018 1:40 PM Bandar Weinberg MD [facility]        Today's Diagnoses     Essential hypertension with goal blood pressure less than 130/80    -  1    Anxiety        Chronic bronchitis, unspecified chronic bronchitis type (H)        Mechanical low back pain           Follow-ups after your visit        Who to contact     If you have questions or need follow up information about today's clinic visit or your schedule please contact [facility] directly at [phone].  Normal or non-critical lab and imaging results will be communicated to you by MyChart, letter or phone within 4 business days after the clinic has received the results. If you do not hear from us within 7 days, please contact the clinic through [employer] or phone. If you have a critical or abnormal lab result, we will notify you by phone as soon as possible.  Submit refill requests through [employer] or call your pharmacy and they will forward the refill request to us. Please allow 3 business days for your refill to be completed.          Additional Information About Your Visit        MyChart Information     [employer] gives you secure access to your electronic health record. If you see a primary care provider, you can also send messages to your care team and make appointments. If you have questions, please call your primary care clinic.  If you do not have a primary care provider, please call [phone] and they will assist you.        Care EveryWhere ID     This is your Care EveryWhere ID. This could be used by other organizations to access your [facility] medical records  [id number]        Your Vitals Were     Pulse Temperature Height Last Period Pulse Oximetry BMI (Body Mass Index)    114 98  F (36.7  C) (Oral)  "5' 10\" (1.778 m) 09/29/2005 96% 26.11 kg/m2       Blood Pressure from Last 3 Encounters:   06/01/18 110/80   05/23/18 136/88   04/26/18 128/78    Weight from Last 3 Encounters:   06/01/18 182 lb (82.6 kg)   05/23/18 178 lb (80.7 kg)   04/26/18 178 lb (80.7 kg)              Today, you had the following     No orders found for display         Where to get your medicines      Some of these will need a paper prescription and others can be bought over the counter.  Ask your nurse if you have questions.     Bring a paper prescription for each of these medications     ALPRAZolam 0.25 MG tablet          Primary Care Provider Office Phone # Fax #    Bandar Weinberg [phone]       [address] [facility] [address]        Equal Access to Services     [facility]: Hadii pradeep leavitt hadasho Soradha, waaxda luqadaha, qaybta kaalmada adeegyada, jonathan burris . So [facility] [phone].    ATENCIÓN: Si habla español, tiene a madison disposición servicios gratuitos de asistencia lingüística. Llame al [phone].    We comply with applicable federal civil rights laws and [place] laws. We do not discriminate on the basis of race, color, national origin, age, disability, sex, sexual orientation, or gender identity.            Thank you!     Thank you for choosing [facility]  for your care. Our goal is always to provide you with excellent care. Hearing back from our patients is one way we can continue to improve our services. Please take a few minutes to complete the written survey that you may receive in the mail after your visit with us. Thank you!             Your Updated Medication List - Protect others around you: Learn how to safely use, store and throw away your medicines at www.disposemymeds.org.          This list is accurate as of 6/1/18  4:39 PM.  Always use your most recent med list.                   Brand Name Dispense Instructions for use Diagnosis    " ADVAIR DISKUS 500-50 MCG/DOSE diskus inhaler   Generic drug:  fluticasone-salmeterol     3 Inhaler    INHALE 1 PUFF TWO TIMES DAILY    Pulmonary emphysema, unspecified emphysema type (H)       albuterol 108 (90 Base) MCG/ACT Inhaler    VENTOLIN HFA    18 g    Inhale 2 puffs into the lungs every 6 hours as needed for shortness of breath / dyspnea or wheezing    Asthma exacerbation       ALPRAZolam 0.25 MG tablet    XANAX    30 tablet    Take 1 tablet by mouth. Every twelve hours as needed for anxiety    Anxiety       atorvastatin 10 MG tablet    LIPITOR    36 tablet    Takes 3 times a week    Coronary artery disease involving native coronary artery of native heart without angina pectoris, Mixed hyperlipidemia       baclofen 10 MG tablet    LIORESAL    90 tablet    TAKE ONE TABLET BY MOUTH THREE TIMES A DAY AS NEEDED FOR MUSCLE SPASMS    Chronic bilateral low back pain with right-sided sciatica       buPROPion 150 MG 24 hr tablet    WELLBUTRIN XL    90 tablet    Take 1 tablet (150 mg) by mouth every morning    Recurrent major depressive disorder, in partial remission (H)       [employer] FIBER GUMMIES 2.5 g Chew      Take 1 tablet by mouth daily Reported on 5/8/2017        fluticasone 50 MCG/ACT spray    FLONASE    16 g    SPRAY 1 TO 2 SPRAYS IN EACH NOSTRIL ONCE DAILY    Nasal congestion       furosemide 20 MG tablet    LASIX    180 tablet    TAKE 2 TABLETS (40 MG) BY MOUTH DAILY SEVERE SWELLING    Acute systolic congestive heart failure (H)       guaiFENesin-codeine 100-10 MG/5ML Soln solution    ROBITUSSIN AC    120 mL    Take 5 mLs by mouth every 4 hours as needed for cough    COPD exacerbation (H)       HYDROcodone-acetaminophen 5-325 MG per tablet    NORCO    180 tablet    Take 1-2 tablets by mouth every 8 hours as needed for pain Maximum 6 daily    Chronic bilateral low back pain without sciatica       ipratropium - albuterol 0.5 mg/2.5 mg/3 mL 0.5-2.5 (3) MG/3ML neb solution    DUONEB    30 vial    Take 1 vial (3  mLs) by nebulization every 6 hours as needed for shortness of breath / dyspnea or wheezing    Moderate persistent asthma without complication       lidocaine 5 % Patch    LIDODERM    30 patch    Apply up to 3 patches to painful area at once for up to 12 h within a 24 h period.  Remove after 12 hours.    Mechanical low back pain       lisinopril 2.5 MG tablet    PRINIVIL/Zestril    90 tablet    Take 1 tablet (2.5 mg) by mouth daily    Dilated cardiomyopathy (H)       mometasone 0.1 % ointment    ELOCON    45 g    Apply sparingly to affected area twice daily as needed.  Do not apply to face.    Intrinsic eczema       montelukast 10 MG tablet    SINGULAIR    90 tablet    Take 1 tablet (10 mg) by mouth At Bedtime    Mild persistent asthma without complication       Multiple Vitamin Chew      Take 1 tablet by mouth daily        nitroGLYcerin 0.4 MG sublingual tablet    NITROSTAT    25 tablet    Place 1 tablet (0.4 mg) under the tongue every 5 minutes as needed for chest pain If you are still having symptoms after 3 doses (15 minutes) call 911.    Acute chest pain       ondansetron 4 MG ODT tab    ZOFRAN ODT    20 tablet    Take 1-2 tablets (4-8 mg) by mouth every 8 hours as needed for nausea    Nausea       pantoprazole 40 MG EC tablet    PROTONIX    90 tablet    TAKE ONE TABLET BY MOUTH EVERY DAY    Gastroesophageal reflux disease without esophagitis       potassium chloride 10 MEQ tablet    K-TAB,KLOR-CON    90 tablet    TAKE ONE TABLET BY MOUTH THREE TIMES A DAY    Hypokalemia       * predniSONE 5 MG tablet    DELTASONE    30 tablet    TAKE 2 TABLETS (10 MG) BY MOUTH DAILY, TAPER DOWN BY 1/2 TAB EVERY 7 DAYS UNTIL DOWN TO 1 TAB DAILY    Pulmonary emphysema, unspecified emphysema type (H)       * predniSONE 20 MG tablet    DELTASONE    20 tablet    Take 3 tabs (60 mg) by mouth daily x 3 days, 2 tabs (40 mg) daily x 3 days, 1 tab (20 mg) daily x 3 days, then 1/2 tab (10 mg) x 3 days.    COPD exacerbation (H)        roflumilast 500 MCG Tabs tablet    DALIRESP     Take 1 tablet (500 mcg) by mouth daily        SPIRIVA HANDIHALER 18 MCG capsule   Generic drug:  tiotropium     90 capsule    USING THE HANDIHALER, INHALE THE CONTENTS OF ONE CAPSULE BY MOUTH DAILY    Moderate persistent asthma without complication       SUMAtriptan 25 MG tablet    IMITREX    9 tablet    Take 1 tablet (25 mg) by mouth at onset of headache for migraine May repeat in 2 hours if needed: max 2/day;    Chronic migraine without aura without status migrainosus, not intractable       TYLENOL EXTRA STRENGTH PO      1 TABLET EVERY 4 HOURS AS NEEDED    Abdominal pain, right upper quadrant       * Notice:  This list has 2 medication(s) that are the same as other medications prescribed for you. Read the directions carefully, and ask your doctor or other care provider to review them with you.

## 2018-06-07 DIAGNOSIS — J44.1 COPD EXACERBATION (H): ICD-10-CM

## 2018-06-07 NOTE — TELEPHONE ENCOUNTER
Last Written Prescription Date:  5-23-18  Last Fill Quantity: 118,  # refills: 0  Last office visit: 6/1/2018 with prescribing provider:  6-1-18  Future Office Visit:      Thank you,  Leila Ozuna [facility]  [phone]

## 2018-06-08 RX ORDER — CODEINE PHOSPHATE AND GUAIFENESIN 10; 100 MG/5ML; MG/5ML
1 SOLUTION ORAL EVERY 4 HOURS PRN
Qty: 120 ML | Refills: 0 | Status: SHIPPED | OUTPATIENT
Start: 2018-06-08 | End: 2018-09-20

## 2018-06-10 DIAGNOSIS — I42.0 DILATED CARDIOMYOPATHY (H): ICD-10-CM

## 2018-06-11 ENCOUNTER — TELEPHONE (OUTPATIENT)
Dept: INTERNAL MEDICINE | Facility: CLINIC | Age: 63
End: 2018-06-11

## 2018-06-11 NOTE — TELEPHONE ENCOUNTER
Hugh with [facility] pharmacy calls. Insurance is requiring a 90 day supply of Escitalopram. This is no longer on patient's medication list, reviewed chart and per 5/23/18 office visit notes, patient stopped Escitalopram due to side effects. Informed Hugh patient is no longer taking this. He will update their records.

## 2018-06-13 RX ORDER — LISINOPRIL 2.5 MG/1
TABLET ORAL
Qty: 90 TABLET | Refills: 1 | Status: SHIPPED | OUTPATIENT
Start: 2018-06-13 | End: 2019-03-05

## 2018-06-13 NOTE — TELEPHONE ENCOUNTER
"Requested Prescriptions   Pending Prescriptions Disp Refills     lisinopril (PRINIVIL/ZESTRIL) 2.5 MG tablet [Pharmacy Med Name: LISINOPRIL 2.5 MG TABLET] 90 tablet 1     Sig: TAKE 1 TABLET BY MOUTH ONCE DAILY    ACE Inhibitors (Including Combos) Protocol Passed    6/10/2018 12:49 PM       Passed - Blood pressure under 140/90 in past 12 months    BP Readings from Last 3 Encounters:   06/01/18 110/80   05/23/18 136/88   04/26/18 128/78                Passed - Recent (12 mo) or future (30 days) visit within the authorizing provider's specialty    Patient had office visit in the last 12 months or has a visit in the next 30 days with authorizing provider or within the authorizing provider's specialty.  See \"Patient Info\" tab in inbasket, or \"Choose Columns\" in Meds & Orders section of the refill encounter.           Passed - Patient is age 18 or older       Passed - No active pregnancy on record       Passed - Normal serum creatinine on file in past 12 months    Recent Labs   Lab Test  04/16/18   0847   CR  0.66            Passed - Normal serum potassium on file in past 12 months    Recent Labs   Lab Test  04/16/18   0847   POTASSIUM  3.5            Passed - No positive pregnancy test in past 12 months      Prescription approved per [facility] Refill Protocol. CHAI Maria R.N.          "

## 2018-06-17 ENCOUNTER — MYC REFILL (OUTPATIENT)
Dept: INTERNAL MEDICINE | Facility: CLINIC | Age: 63
End: 2018-06-17

## 2018-06-17 DIAGNOSIS — G89.29 CHRONIC BILATERAL LOW BACK PAIN WITHOUT SCIATICA: ICD-10-CM

## 2018-06-17 DIAGNOSIS — M54.50 CHRONIC BILATERAL LOW BACK PAIN WITHOUT SCIATICA: ICD-10-CM

## 2018-06-18 RX ORDER — HYDROCODONE BITARTRATE AND ACETAMINOPHEN 5; 325 MG/1; MG/1
1-2 TABLET ORAL EVERY 8 HOURS PRN
Qty: 180 TABLET | Refills: 0 | Status: SHIPPED | OUTPATIENT
Start: 2018-06-18 | End: 2018-07-15

## 2018-06-18 NOTE — TELEPHONE ENCOUNTER
Message from [employer]:  Original authorizing provider: MD Trisha Marvin would like a refill of the following medications:  HYDROcodone-acetaminophen (NORCO) 5-325 MG per tablet [Bandar Weinberg MD]    Preferred pharmacy: [place], [place] - [facility] [address]    Comment:  Dr. Weinberg and Nursing Staff, I would like to get a refill on the Hydrocodone-Acetaminophen prescription as soon as possible. Thank you. Trisha Bender

## 2018-06-18 NOTE — TELEPHONE ENCOUNTER
Pt wants rx brought down to [facility] pharm    Last Written Prescription Date:  5/21/18  Last Fill Quantity: 180,  # refills: 0   Last office visit: 6/1/2018 with prescribing provider:     JOSE on file

## 2018-07-09 ENCOUNTER — MYC MEDICAL ADVICE (OUTPATIENT)
Dept: INTERNAL MEDICINE | Facility: CLINIC | Age: 63
End: 2018-07-09

## 2018-07-11 ENCOUNTER — HOSPITAL ENCOUNTER (OUTPATIENT)
Dept: CARDIAC REHAB | Facility: CLINIC | Age: 63
End: 2018-07-11
Attending: INTERNAL MEDICINE
Payer: COMMERCIAL

## 2018-07-11 PROCEDURE — G0424 PULMONARY REHAB W EXER: HCPCS

## 2018-07-11 PROCEDURE — 40000244 ZZH STATISTIC VISIT PULM REHAB

## 2018-07-15 ENCOUNTER — MYC REFILL (OUTPATIENT)
Dept: INTERNAL MEDICINE | Facility: CLINIC | Age: 63
End: 2018-07-15

## 2018-07-15 DIAGNOSIS — G89.29 CHRONIC BILATERAL LOW BACK PAIN WITHOUT SCIATICA: ICD-10-CM

## 2018-07-15 DIAGNOSIS — M54.50 CHRONIC BILATERAL LOW BACK PAIN WITHOUT SCIATICA: ICD-10-CM

## 2018-07-16 RX ORDER — HYDROCODONE BITARTRATE AND ACETAMINOPHEN 5; 325 MG/1; MG/1
1-2 TABLET ORAL EVERY 8 HOURS PRN
Qty: 180 TABLET | Refills: 0 | Status: SHIPPED | OUTPATIENT
Start: 2018-07-16 | End: 2018-07-19

## 2018-07-16 NOTE — TELEPHONE ENCOUNTER
Message from [email or web address]:  Original authorizing provider: MD Trisha Marvin would like a refill of the following medications:  HYDROcodone-acetaminophen (NORCO) 5-325 MG per tablet [Bandar Weinberg MD]    Preferred pharmacy: [place], [place] - [facility] [address]    Comment:  Dr. Weinberg and Nursing Staff, I would like to get a refill on the Hydrocodone-Acetaminophen prescription soon. Thank you. Trisha Bender

## 2018-07-16 NOTE — TELEPHONE ENCOUNTER
Patient wants prescription  Brought down to [facility] pharmacy         Last Written Prescription Date:  7/18/18  Last Fill Quantity: 180,  # refills: 0   Last office visit: 6/1/2018 with prescribing provider:     JOSE on file

## 2018-07-18 ENCOUNTER — TRANSFERRED RECORDS (OUTPATIENT)
Dept: HEALTH INFORMATION MANAGEMENT | Facility: CLINIC | Age: 63
End: 2018-07-18

## 2018-07-19 ENCOUNTER — MYC REFILL (OUTPATIENT)
Dept: INTERNAL MEDICINE | Facility: CLINIC | Age: 63
End: 2018-07-19

## 2018-07-19 ENCOUNTER — MYC MEDICAL ADVICE (OUTPATIENT)
Dept: INTERNAL MEDICINE | Facility: CLINIC | Age: 63
End: 2018-07-19

## 2018-07-19 DIAGNOSIS — M54.50 CHRONIC BILATERAL LOW BACK PAIN WITHOUT SCIATICA: ICD-10-CM

## 2018-07-19 DIAGNOSIS — F41.9 ANXIETY: ICD-10-CM

## 2018-07-19 DIAGNOSIS — G89.29 CHRONIC BILATERAL LOW BACK PAIN WITHOUT SCIATICA: ICD-10-CM

## 2018-07-19 NOTE — TELEPHONE ENCOUNTER
Per my chart message-  Dr. Weinberg and Nursing Staff, This refill can also wait until Dr. Weinberg returns.  Am requesting refill and would like an extra amount to last through August as I will be gone the entire month.  Thank you. Trisha Fox      Last Written Prescription Date:  6/1/18  Last Fill Quantity: 30,  # refills: 0   Last office visit: 6/1/2018 with prescribing provider:     JOSE on file

## 2018-07-19 NOTE — TELEPHONE ENCOUNTER
Per 7/15 my chart-Vicodin prescription was brought down to  pharmacy on 7/16/18 , and it was patient was notified

## 2018-07-19 NOTE — TELEPHONE ENCOUNTER
Message from [email or web address]:  Original authorizing provider: MD Trisha Marvin would like a refill of the following medications:  ALPRAZolam (XANAX) 0.25 MG tablet [Bandar Weinberg MD]    Preferred pharmacy: [place], [place] - [facility] [address]    Comment:  Dr. Weinberg and Nursing Staff, This refill can also wait until Dr. Weinberg returns. Am requesting refill and would like an extra amount to last through August as I will be gone the entire month. Thank you. Trisha Bender

## 2018-07-21 ENCOUNTER — HEALTH MAINTENANCE LETTER (OUTPATIENT)
Age: 63
End: 2018-07-21

## 2018-07-23 RX ORDER — HYDROCODONE BITARTRATE AND ACETAMINOPHEN 5; 325 MG/1; MG/1
1-2 TABLET ORAL EVERY 8 HOURS PRN
Qty: 180 TABLET | Refills: 0 | Status: SHIPPED | OUTPATIENT
Start: 2018-07-23 | End: 2018-09-10

## 2018-07-23 RX ORDER — ALPRAZOLAM 0.25 MG
TABLET ORAL
Qty: 40 TABLET | Refills: 0 | Status: SHIPPED | OUTPATIENT
Start: 2018-07-23 | End: 2018-09-10

## 2018-07-23 NOTE — TELEPHONE ENCOUNTER
Pt picked up Rx of [place] on 07/19/18 ( prescribed by Dr. Thapa) according to pharmacist Nelida.  Will keep the Rx from below note that I dropped to [place] for next month refill after 08/16/18.  Madhavi Garcia MA

## 2018-07-23 NOTE — TELEPHONE ENCOUNTER
Norco also sent to [facility] [place] [facility] pharmacy.   Madhavi Garcia MA

## 2018-07-23 NOTE — TELEPHONE ENCOUNTER
Xanax sen to [facility] pharmacy.  [employer] message sent.  Madhavi Garcia MA

## 2018-07-26 ENCOUNTER — MYC MEDICAL ADVICE (OUTPATIENT)
Dept: INTERNAL MEDICINE | Facility: CLINIC | Age: 63
End: 2018-07-26

## 2018-08-30 DIAGNOSIS — E87.6 HYPOKALEMIA: ICD-10-CM

## 2018-08-30 NOTE — TELEPHONE ENCOUNTER
"Requested Prescriptions   Pending Prescriptions Disp Refills     potassium chloride (K-TAB,KLOR-CON) 10 MEQ tablet [Pharmacy Med Name: POTASSIUM CHLORIDE ER 10MEQ TBCR]  Last Written Prescription Date:  12/12/2017  Last Fill Quantity: 90,  # refills: 5   Last office visit: 6/1/2018 with prescribing provider:     Future Office Visit:   90 tablet 5     Sig: TAKE ONE TABLET BY MOUTH THREE TIMES A DAY    Potassium Supplements Protocol Passed    8/30/2018  3:45 PM       Passed - Recent (12 mo) or future (30 days) visit within the authorizing provider's specialty    Patient had office visit in the last 12 months or has a visit in the next 30 days with authorizing provider or within the authorizing provider's specialty.  See \"Patient Info\" tab in inbasket, or \"Choose Columns\" in Meds & Orders section of the refill encounter.           Passed - Patient is age 18 or older       Passed - Normal serum potassium in past 12 months    Recent Labs   Lab Test  04/16/18   0847   POTASSIUM  3.5                    "

## 2018-08-31 DIAGNOSIS — K21.9 GASTROESOPHAGEAL REFLUX DISEASE WITHOUT ESOPHAGITIS: ICD-10-CM

## 2018-08-31 NOTE — TELEPHONE ENCOUNTER
"Requested Prescriptions   Pending Prescriptions Disp Refills     pantoprazole (PROTONIX) 40 MG EC tablet [Pharmacy Med Name: PANTOPRAZOLE SOD DR 40 MG TAB] 240 tablet 0    Last Written Prescription Date:  02/28/2018  Last Fill Quantity: 90,  # refills: 2   Last office visit: 6/1/2018 with prescribing provider:     Future Office Visit:   Sig: TAKE 1 TABLET BY MOUTH DAILY    PPI Protocol Passed    8/31/2018  3:10 PM       Passed - Not on Clopidogrel (unless Pantoprazole ordered)       Passed - No diagnosis of osteoporosis on record       Passed - Recent (12 mo) or future (30 days) visit within the authorizing provider's specialty    Patient had office visit in the last 12 months or has a visit in the next 30 days with authorizing provider or within the authorizing provider's specialty.  See \"Patient Info\" tab in inbasket, or \"Choose Columns\" in Meds & Orders section of the refill encounter.           Passed - Patient is age 18 or older       Passed - No active pregnacy on record       Passed - No positive pregnancy test in past 12 months        "

## 2018-09-05 ENCOUNTER — MYC REFILL (OUTPATIENT)
Dept: INTERNAL MEDICINE | Facility: CLINIC | Age: 63
End: 2018-09-05

## 2018-09-05 DIAGNOSIS — K21.9 GASTROESOPHAGEAL REFLUX DISEASE WITHOUT ESOPHAGITIS: ICD-10-CM

## 2018-09-05 RX ORDER — PANTOPRAZOLE SODIUM 40 MG/1
TABLET, DELAYED RELEASE ORAL
Qty: 240 TABLET | Refills: 0 | OUTPATIENT
Start: 2018-09-05

## 2018-09-05 RX ORDER — PANTOPRAZOLE SODIUM 40 MG/1
40 TABLET, DELAYED RELEASE ORAL DAILY
Qty: 90 TABLET | Refills: 2 | Status: SHIPPED | OUTPATIENT
Start: 2018-09-05 | End: 2019-05-07

## 2018-09-05 RX ORDER — POTASSIUM CHLORIDE 750 MG/1
TABLET, EXTENDED RELEASE ORAL
Qty: 90 TABLET | Refills: 5 | Status: SHIPPED | OUTPATIENT
Start: 2018-09-05 | End: 2018-12-17

## 2018-09-05 NOTE — TELEPHONE ENCOUNTER
It was denied because she had refills remaining, but this was at the  pharmacy.     Now is using the [facility] pharmacy.     Will re fax.

## 2018-09-05 NOTE — TELEPHONE ENCOUNTER
Message from [employer]:  Original authorizing provider: MD Trisha Marvin would like a refill of the following medications:  pantoprazole (PROTONIX) 40 MG EC tablet [Bandar Weinberg MD]    Preferred pharmacy: [address] [address] JAV[address] M[address]DA    Comment:  Dr. Weinberg and Nursing Staff, Hi. Ogle from [facility] that Dr. Weinberg did not approve the Pantoprazole refill and they asked me to contact my Doctor. Am just wondering about that? Thank you. Trisha Bender

## 2018-09-06 DIAGNOSIS — I50.21 ACUTE SYSTOLIC CONGESTIVE HEART FAILURE (H): ICD-10-CM

## 2018-09-06 NOTE — TELEPHONE ENCOUNTER
"Requested Prescriptions   Pending Prescriptions Disp Refills     furosemide (LASIX) 20 MG tablet [Pharmacy Med Name: FUROSEMIDE 20 MG TABLET]  Last Written Prescription Date:  4/13/2018  Last Fill Quantity: 180,  # refills: 1   Last office visit: 6/1/2018 with prescribing provider:     Future Office Visit:   Next 5 appointments (look out 90 days)     Sep 26, 2018  4:20 PM CDT   Elliott Rodriguez with Bandar Weinberg MD   [facility]    [address]  [facility] [address]   [phone]                180 tablet 1     Sig: TAKE 2 TABLETS (40 MG) BY MOUTH DAILY SEVERE SWELLING    Diuretics (Including Combos) Protocol Passed    9/6/2018  3:10 PM       Passed - Blood pressure under 140/90 in past 12 months    BP Readings from Last 3 Encounters:   06/01/18 110/80   05/23/18 136/88   04/26/18 128/78                Passed - Recent (12 mo) or future (30 days) visit within the authorizing provider's specialty    Patient had office visit in the last 12 months or has a visit in the next 30 days with authorizing provider or within the authorizing provider's specialty.  See \"Patient Info\" tab in inbasket, or \"Choose Columns\" in Meds & Orders section of the refill encounter.           Passed - Patient is age 18 or older       Passed - No active pregancy on record       Passed - Normal serum creatinine on file in past 12 months    Recent Labs   Lab Test  04/16/18   0847   CR  0.66             Passed - Normal serum potassium on file in past 12 months    Recent Labs   Lab Test  04/16/18   0847   POTASSIUM  3.5                   Passed - Normal serum sodium on file in past 12 months    Recent Labs   Lab Test  04/16/18   0847   NA  141             Passed - No positive pregnancy test in past 12 months        "

## 2018-09-07 RX ORDER — FUROSEMIDE 20 MG
TABLET ORAL
Qty: 180 TABLET | Refills: 1 | Status: SHIPPED | OUTPATIENT
Start: 2018-09-07 | End: 2019-02-07

## 2018-09-07 NOTE — TELEPHONE ENCOUNTER
Prescription approved per [facility] Refill Protocol.  Mony Ramsey RN

## 2018-09-10 ENCOUNTER — MYC MEDICAL ADVICE (OUTPATIENT)
Dept: INTERNAL MEDICINE | Facility: CLINIC | Age: 63
End: 2018-09-10

## 2018-09-10 ENCOUNTER — MYC REFILL (OUTPATIENT)
Dept: INTERNAL MEDICINE | Facility: CLINIC | Age: 63
End: 2018-09-10

## 2018-09-10 DIAGNOSIS — M54.50 ACUTE BILATERAL LOW BACK PAIN WITHOUT SCIATICA: Primary | ICD-10-CM

## 2018-09-10 DIAGNOSIS — F41.9 ANXIETY: ICD-10-CM

## 2018-09-10 DIAGNOSIS — G89.29 CHRONIC BILATERAL LOW BACK PAIN WITHOUT SCIATICA: ICD-10-CM

## 2018-09-10 DIAGNOSIS — M54.50 CHRONIC BILATERAL LOW BACK PAIN WITHOUT SCIATICA: ICD-10-CM

## 2018-09-11 NOTE — TELEPHONE ENCOUNTER
Please see patient's mychart message below.    Requesting orders for a steroid injection.    Please place orders if appropriate.

## 2018-09-12 ENCOUNTER — TELEPHONE (OUTPATIENT)
Dept: ORTHOPEDICS | Facility: CLINIC | Age: 63
End: 2018-09-12

## 2018-09-12 ENCOUNTER — MYC REFILL (OUTPATIENT)
Dept: INTERNAL MEDICINE | Facility: CLINIC | Age: 63
End: 2018-09-12

## 2018-09-12 DIAGNOSIS — F41.9 ANXIETY: ICD-10-CM

## 2018-09-12 DIAGNOSIS — G89.29 CHRONIC BILATERAL LOW BACK PAIN WITH RIGHT-SIDED SCIATICA: ICD-10-CM

## 2018-09-12 DIAGNOSIS — G89.29 CHRONIC BILATERAL LOW BACK PAIN WITHOUT SCIATICA: ICD-10-CM

## 2018-09-12 DIAGNOSIS — M54.41 CHRONIC BILATERAL LOW BACK PAIN WITH RIGHT-SIDED SCIATICA: ICD-10-CM

## 2018-09-12 DIAGNOSIS — M54.50 CHRONIC BILATERAL LOW BACK PAIN WITHOUT SCIATICA: ICD-10-CM

## 2018-09-12 RX ORDER — ALPRAZOLAM 0.25 MG
TABLET ORAL
Qty: 40 TABLET | Refills: 0 | Status: CANCELLED | OUTPATIENT
Start: 2018-09-12

## 2018-09-12 RX ORDER — ALPRAZOLAM 0.25 MG
TABLET ORAL
Qty: 40 TABLET | Refills: 0 | Status: SHIPPED | OUTPATIENT
Start: 2018-09-12 | End: 2018-10-30

## 2018-09-12 RX ORDER — HYDROCODONE BITARTRATE AND ACETAMINOPHEN 5; 325 MG/1; MG/1
1-2 TABLET ORAL EVERY 8 HOURS PRN
Qty: 180 TABLET | Refills: 0 | Status: SHIPPED | OUTPATIENT
Start: 2018-09-12 | End: 2018-10-08

## 2018-09-12 RX ORDER — HYDROCODONE BITARTRATE AND ACETAMINOPHEN 5; 325 MG/1; MG/1
1-2 TABLET ORAL EVERY 8 HOURS PRN
Qty: 180 TABLET | Refills: 0 | Status: CANCELLED | OUTPATIENT
Start: 2018-09-12

## 2018-09-12 NOTE — TELEPHONE ENCOUNTER
Received voicemail message from patient requesting to schedule an injection with Dr. Enamorado. Patient stated she has not been seen in the Pain Clinic since April 2018.

## 2018-09-12 NOTE — TELEPHONE ENCOUNTER
Message from [employer]:  Original authorizing provider: MD Trisha Marvin would like a refill of the following medications:  ALPRAZolam (XANAX) 0.25 MG tablet [Bandar Weinberg MD]  HYDROcodone-acetaminophen (NORCO) 5-325 MG per tablet [Bandar Weinberg MD]    [facility] [address] [facility]    Comment:  Dr. Weinberg and Nursing Staff, I would like to get refills on the Hydrocodone-Acetaminophen and the Alprazolam prescriptions. Am hoping by Friday as I will be running short of the pain medication and am having severe back pain. Thank you for your attention to this. Trisha Bender

## 2018-09-12 NOTE — TELEPHONE ENCOUNTER
Requested Prescriptions   Pending Prescriptions Disp Refills     baclofen (LIORESAL) 10 MG tablet [Pharmacy Med Name: BACLOFEN 10MG TABS] 90 tablet 1     Sig: TAKE ONE TABLET BY MOUTH THREE TIMES A DAY AS NEEDED FOR MUSCLE SPASMS    There is no refill protocol information for this order      Last Written Prescription Date:  02/19/2018  Last Fill Quantity: 90,  # refills: 1   Last office visit: 6/1/2018 with prescribing provider:     Future Office Visit:   Next 5 appointments (look out 90 days)     Sep 26, 2018  4:20 PM CDT   Elliott Rodriguez with Bandar Weinberg MD   [facility]    [address]  [facility] [address] [phone]

## 2018-09-12 NOTE — TELEPHONE ENCOUNTER
Fill at [facility].     Requested Prescriptions   Pending Prescriptions Disp Refills     ALPRAZolam (XANAX) 0.25 MG tablet  Last Written Prescription Date:  7/23/18  Last Fill Quantity: 40,  # refills: 0   Last office visit: 6/1/2018 with prescribing provider:  Sultana   Future Office Visit:   Next 5 appointments (look out 90 days)     Sep 26, 2018  4:20 PM CDT   Elliott Rodriguez with Bandar Weinberg MD   [facility]    [address]  [facility] [id number] [phone]                 40 tablet 0     Sig: Take 1 tablet by mouth. Every twelve hours as needed for anxiety    There is no refill protocol information for this order        HYDROcodone-acetaminophen (NORCO) 5-325 MG per tablet  Last Written Prescription Date:  7/23/18  Last Fill Quantity: 180,  # refills: 0   Last office visit: 6/1/2018 with prescribing provider:  Sultana   Future Office Visit:   Next 5 appointments (look out 90 days)     Sep 26, 2018  4:20 PM CDT   Elliott Rodriguez with Bandar Weinberg MD   [facility]    [address]  [facility] [id number] [phone]                180 tablet 0     Sig: Take 1-2 tablets by mouth every 8 hours as needed for pain Maximum 6 daily    There is no refill protocol information for this order      Routing refill request to provider for review/approval because:  Drug not on the G refill protocol

## 2018-09-13 ENCOUNTER — HOSPITAL ENCOUNTER (OUTPATIENT)
Dept: MAMMOGRAPHY | Facility: CLINIC | Age: 63
Discharge: HOME | End: 2018-09-13
Attending: INTERNAL MEDICINE | Admitting: INTERNAL MEDICINE
Payer: COMMERCIAL

## 2018-09-13 DIAGNOSIS — Z12.31 VISIT FOR SCREENING MAMMOGRAM: ICD-10-CM

## 2018-09-13 PROCEDURE — 77063 BREAST TOMOSYNTHESIS BI: CPT

## 2018-09-13 NOTE — TELEPHONE ENCOUNTER
I called and spoke with Trisha this morning at her request to schedule an injection with Dr. Enamorado. Trisha was referred to our clinic from Dr. Weinberg for a lumbar epidural steroid injection in March of this year.    She is wanting to reschedule and repeat this same injection. There is not order or referral to our clinic for this repeat injection, review of the order shows an order put in for Trisha to have this injection in IR. I communicated to her that we will need a referral again for this same injection since we have not seen her in clinic. I will follow up with Dr. Weinberg about changing the referral at Trisha's request. She states she would prefer to have Dr. Enamorado to repeat the procedure.

## 2018-09-14 RX ORDER — BACLOFEN 10 MG/1
TABLET ORAL
Qty: 90 TABLET | Refills: 1 | Status: SHIPPED | OUTPATIENT
Start: 2018-09-14 | End: 2019-04-24

## 2018-09-18 ENCOUNTER — MYC MEDICAL ADVICE (OUTPATIENT)
Dept: INTERNAL MEDICINE | Facility: CLINIC | Age: 63
End: 2018-09-18

## 2018-09-18 DIAGNOSIS — M54.59 MECHANICAL LOW BACK PAIN: Primary | ICD-10-CM

## 2018-09-19 NOTE — TELEPHONE ENCOUNTER
See her my chart message.     She needs referral for injection at the [facility]. Dr Az Enamorado's office.     Pended.

## 2018-09-20 ENCOUNTER — CARE COORDINATION (OUTPATIENT)
Dept: ANESTHESIOLOGY | Facility: CLINIC | Age: 63
End: 2018-09-20

## 2018-09-20 DIAGNOSIS — M54.16 LUMBAR RADICULOPATHY: Primary | ICD-10-CM

## 2018-09-20 NOTE — TELEPHONE ENCOUNTER
Spoke with: Trisha     Date Scheduled: 9/24/18     Provider: Dr. Enamorado     : Yes     F/U Appointment: N/A, Direct referral      Patient was educated on pre-op nurse call: yes

## 2018-09-24 ENCOUNTER — SURGERY (OUTPATIENT)
Age: 63
End: 2018-09-24

## 2018-09-24 ENCOUNTER — HOSPITAL ENCOUNTER (OUTPATIENT)
Facility: AMBULATORY SURGERY CENTER | Age: 63
End: 2018-09-24
Payer: COMMERCIAL

## 2018-09-24 ENCOUNTER — RADIANT APPOINTMENT (OUTPATIENT)
Dept: RADIOLOGY | Facility: AMBULATORY SURGERY CENTER | Age: 63
End: 2018-09-24
Payer: COMMERCIAL

## 2018-09-24 VITALS
BODY MASS INDEX: 25.77 KG/M2 | DIASTOLIC BLOOD PRESSURE: 85 MMHG | RESPIRATION RATE: 18 BRPM | OXYGEN SATURATION: 98 % | SYSTOLIC BLOOD PRESSURE: 141 MMHG | HEART RATE: 104 BPM | TEMPERATURE: 97.7 F | WEIGHT: 180 LBS | HEIGHT: 70 IN

## 2018-09-24 DIAGNOSIS — M54.50 BILATERAL LOW BACK PAIN WITHOUT SCIATICA: ICD-10-CM

## 2018-09-24 RX ORDER — LIDOCAINE HYDROCHLORIDE 10 MG/ML
INJECTION, SOLUTION EPIDURAL; INFILTRATION; INTRACAUDAL; PERINEURAL PRN
Status: DISCONTINUED | OUTPATIENT
Start: 2018-09-24 | End: 2018-09-24 | Stop reason: HOSPADM

## 2018-09-24 RX ORDER — METHYLPREDNISOLONE ACETATE 40 MG/ML
INJECTION, SUSPENSION INTRA-ARTICULAR; INTRALESIONAL; INTRAMUSCULAR; SOFT TISSUE PRN
Status: DISCONTINUED | OUTPATIENT
Start: 2018-09-24 | End: 2018-09-24 | Stop reason: HOSPADM

## 2018-09-24 RX ORDER — BUPIVACAINE HYDROCHLORIDE 2.5 MG/ML
INJECTION, SOLUTION EPIDURAL; INFILTRATION; INTRACAUDAL PRN
Status: DISCONTINUED | OUTPATIENT
Start: 2018-09-24 | End: 2018-09-24 | Stop reason: HOSPADM

## 2018-09-24 RX ADMIN — LIDOCAINE HYDROCHLORIDE 5 ML: 10 INJECTION, SOLUTION EPIDURAL; INFILTRATION; INTRACAUDAL; PERINEURAL at 13:25

## 2018-09-24 RX ADMIN — METHYLPREDNISOLONE ACETATE 80 MG: 40 INJECTION, SUSPENSION INTRA-ARTICULAR; INTRALESIONAL; INTRAMUSCULAR; SOFT TISSUE at 13:18

## 2018-09-24 RX ADMIN — BUPIVACAINE HYDROCHLORIDE 1 ML: 2.5 INJECTION, SOLUTION EPIDURAL; INFILTRATION; INTRACAUDAL at 13:25

## 2018-09-24 NOTE — DISCHARGE INSTRUCTIONS
Home Care Instructions after an Epidural Steroid Pain Injection    A lumbar epidural steroid injection delivers steroid medication directly into the area that may be causing your lower back pain and/or leg pain. A cervical or thoracic epidural steroid injection delivers steroids into the epidural space surrounding spinal nerve roots to help relieve pain in the upper spine/neck.    Activity  -Rest today  -Do not work today  -Resume normal activity tomorrow  -DO NOT shower for 24 hours  -DO NOT remove bandaid for 24 hours    Pain  -You may experience soreness at the injection site for one or two days  -You may use an ice pack for 20 minutes every 2 hours for the first 24 hours  -You may use a heating pad after the first 24 hours  -You may use Tylenol (acetaminophen) every 4 hours or other pain medicines as     directed by your physician    You may experience numbness radiating into your legs or arms (depending on the procedure location). This numbness may last several hours. Until sensation returns to normal; please use caution in walking, climbing stairs, and stepping out of your vehicle, etc.    DID YOU RECEIVE SEDATION TODAY?  No    Common side effects of steroids:  Not everyone will experience corticosteroid side effects. If side effects are experienced, they will gradually subside in the 7-10 day period following an injection. Most common side effects include:  -Flushed face and/or chest  -Feeling of warmth, particularly in the face but could be an overall feeling of warmth  -Increased blood sugar in diabetic patients  -Menstrual irregularities my occur. If taking hormone-based birth control an alternate method of birth control is recommended  -Sleep disturbances and/or mood swings are possible  -Leg cramps    Please contact us if you have:  -Severe pain  -Fever more than 101.5 degrees Fahrenheit  -Signs of infection at the injection site (redness, swelling, or drainage)    If you have questions, please contact  our office at [phone] between the hours of 7:00 am and 3:00 pm Monday through Friday. After office hours you can contact the on call provider by dialing [phone]. If you need immediate attention, we recommend that you go to a hospital emergency room or dial [phone].

## 2018-09-24 NOTE — IP AVS SNAPSHOT
[facility]    [address] [place] [address]    Phone:  [phone]    Fax:  [phone]                                       After Visit Summary   9/24/2018    Trisha Bender    MRN: [id number]           After Visit Summary Signature Page     I have received my discharge instructions, and my questions have been answered. I have discussed any challenges I see with this plan with the nurse or doctor.    ..........................................................................................................................................  Patient/Patient Representative Signature      ..........................................................................................................................................  Patient Representative Print Name and Relationship to Patient    ..................................................               ................................................  Date                                   Time    ..........................................................................................................................................  Reviewed by Signature/Title    ...................................................              ..............................................  Date                                               Time          22EPIC Rev 08/18

## 2018-09-24 NOTE — IP AVS SNAPSHOT
MRN:[id number]                      After Visit Summary   9/24/2018    Trisha Bender    MRN: [id number]           Thank you!     Thank you for choosing [facility] for your care. Our goal is always to provide you with excellent care. Hearing back from our patients is one way we can continue to improve our services. Please take a few minutes to complete the written survey that you may receive in the mail after you visit with us. Thank you!        Patient Information     Date Of Birth          1955        About your hospital stay     You were admitted on:  September 24, 2018 [facility]    You were discharged on:  September 24, 2018       Who to Call     For medical emergencies, please call 911.  For non-urgent questions about your medical care, please call your primary care provider or clinic, [phone]  For questions related to your surgery, please call your surgery clinic        Attending Provider     Provider Specialty    Az Enamorado MD Anesthesiology       Primary Care Provider Office Phone # Fax #    Bandar Weinberg [phone]      Further instructions from your care team       Home Care Instructions after an Epidural Steroid Pain Injection    A lumbar epidural steroid injection delivers steroid medication directly into the area that may be causing your lower back pain and/or leg pain. A cervical or thoracic epidural steroid injection delivers steroids into the epidural space surrounding spinal nerve roots to help relieve pain in the upper spine/neck.    Activity  -Rest today  -Do not work today  -Resume normal activity tomorrow  -DO NOT shower for 24 hours  -DO NOT remove bandaid for 24 hours    Pain  -You may experience soreness at the injection site for one or two days  -You may use an ice pack for 20 minutes every 2 hours for the first 24 hours  -You may use a heating pad after the first 24  hours  -You may use Tylenol (acetaminophen) every 4 hours or other pain medicines as     directed by your physician    You may experience numbness radiating into your legs or arms (depending on the procedure location). This numbness may last several hours. Until sensation returns to normal; please use caution in walking, climbing stairs, and stepping out of your vehicle, etc.    DID YOU RECEIVE SEDATION TODAY?  No    Common side effects of steroids:  Not everyone will experience corticosteroid side effects. If side effects are experienced, they will gradually subside in the 7-10 day period following an injection. Most common side effects include:  -Flushed face and/or chest  -Feeling of warmth, particularly in the face but could be an overall feeling of warmth  -Increased blood sugar in diabetic patients  -Menstrual irregularities my occur. If taking hormone-based birth control an alternate method of birth control is recommended  -Sleep disturbances and/or mood swings are possible  -Leg cramps    Please contact us if you have:  -Severe pain  -Fever more than 101.5 degrees Fahrenheit  -Signs of infection at the injection site (redness, swelling, or drainage)    If you have questions, please contact our office at [phone] between the hours of 7:00 am and 3:00 pm Monday through Friday. After office hours you can contact the on call provider by dialing [phone]. If you need immediate attention, we recommend that you go to a hospital emergency room or dial [phone].      Pending Results     Date and Time Order Name Status Description    9/24/2018 1214 XR SURGERY LIZANDRO FLUORO LESS THAN 5 MIN W STILLS In process             Admission Information     Date & Time Provider Department Dept. Phone    9/24/2018 Az Enamorado MD [facility] [phone]      Your Vitals Were     Blood Pressure Pulse Temperature Respirations Height Weight    164/95 (Cuff Size: Adult Regular) 62 97.7  F (36.5  C)  "(Temporal) 18 1.778 m (5' 10\") 81.6 kg (180 lb)    Last Period Pulse Oximetry BMI (Body Mass Index)             09/29/2005 98% 25.83 kg/m2         Virgance Information     Virgance gives you secure access to your electronic health record. If you see a primary care provider, you can also send messages to your care team and make appointments. If you have questions, please call your primary care clinic.  If you do not have a primary care provider, please call [phone] and they will assist you.      Virgance is an electronic gateway that provides easy, online access to your medical records. With [employer]gance, you can request a clinic appointment, read your test results, renew a prescription or communicate with your care team.     To access your existing account, please contact your [facility] or call [phone] for assistance.        Care EveryWhere ID     This is your Care EveryWhere ID. This could be used by other organizations to access your [facility] medical records  [id number]        Equal Access to Services     KAMILA ALLEN : Hadii aad ku hadasho Soomaali, waaxda luqadaha, qaybta kaalmada adeegyada, jonathan burris . So [facility] [phone].    ATENCIÓN: Si habla español, tiene a madison disposición servicios gratuitos de asistencia lingüística. Llame al [phone].    We comply with applicable federal civil rights laws and [place] laws. We do not discriminate on the basis of race, color, national origin, age, disability, sex, sexual orientation, or gender identity.               Review of your medicines      UNREVIEWED medicines. Ask your doctor about these medicines        Dose / Directions    ADVAIR DISKUS 500-50 MCG/DOSE diskus inhaler   Used for:  Pulmonary emphysema, unspecified emphysema type (H)   Generic drug:  fluticasone-salmeterol        INHALE 1 PUFF TWO TIMES DAILY   Quantity:  3 Inhaler   Refills:  3       albuterol 108 (90 Base) MCG/ACT inhaler "   Commonly known as:  VENTOLIN HFA   Used for:  Asthma exacerbation        Dose:  2 puff   Inhale 2 puffs into the lungs every 6 hours as needed for shortness of breath / dyspnea or wheezing   Quantity:  18 g   Refills:  3       ALPRAZolam 0.25 MG tablet   Commonly known as:  XANAX   Used for:  Anxiety        Take 1 tablet by mouth. Every twelve hours as needed for anxiety   Quantity:  40 tablet   Refills:  0       atorvastatin 10 MG tablet   Commonly known as:  LIPITOR   Used for:  Coronary artery disease involving native coronary artery of native heart without angina pectoris, Mixed hyperlipidemia        Takes 3 times a week   Quantity:  36 tablet   Refills:  3       baclofen 10 MG tablet   Commonly known as:  LIORESAL   Used for:  Chronic bilateral low back pain with right-sided sciatica        TAKE ONE TABLET BY MOUTH THREE TIMES A DAY AS NEEDED FOR MUSCLE SPASMS   Quantity:  90 tablet   Refills:  1       [employer] FIBER GUMMIES 2.5 g Chew        Dose:  1 tablet   Take 1 tablet by mouth daily Reported on 5/8/2017   Refills:  0       fluticasone 50 MCG/ACT spray   Commonly known as:  FLONASE   Used for:  Nasal congestion        SPRAY 1 TO 2 SPRAYS IN EACH NOSTRIL ONCE DAILY   Quantity:  16 g   Refills:  7       furosemide 20 MG tablet   Commonly known as:  LASIX   Used for:  Acute systolic congestive heart failure (H)        TAKE 2 TABLETS (40 MG) BY MOUTH DAILY SEVERE SWELLING   Quantity:  180 tablet   Refills:  1       HYDROcodone-acetaminophen 5-325 MG per tablet   Commonly known as:  NORCO   Used for:  Chronic bilateral low back pain without sciatica        Dose:  1-2 tablet   Take 1-2 tablets by mouth every 8 hours as needed for pain Maximum 6 daily   Quantity:  180 tablet   Refills:  0       ipratropium - albuterol 0.5 mg/2.5 mg/3 mL 0.5-2.5 (3) MG/3ML neb solution   Commonly known as:  DUONEB   Used for:  Moderate persistent asthma without complication        Dose:  1 vial   Take 1 vial (3 mLs) by nebulization  every 6 hours as needed for shortness of breath / dyspnea or wheezing   Quantity:  30 vial   Refills:  1       lidocaine 5 % Patch   Commonly known as:  LIDODERM   Used for:  Mechanical low back pain        Apply up to 3 patches to painful area at once for up to 12 h within a 24 h period.  Remove after 12 hours.   Quantity:  30 patch   Refills:  0       * lisinopril 2.5 MG tablet   Commonly known as:  PRINIVIL/Zestril   Used for:  Dilated cardiomyopathy (H)        Dose:  2.5 mg   Take 1 tablet (2.5 mg) by mouth daily   Quantity:  90 tablet   Refills:  3       * lisinopril 2.5 MG tablet   Commonly known as:  PRINIVIL/Zestril   Used for:  Dilated cardiomyopathy (H)        TAKE 1 TABLET BY MOUTH ONCE DAILY   Quantity:  90 tablet   Refills:  1       montelukast 10 MG tablet   Commonly known as:  SINGULAIR   Used for:  Mild persistent asthma without complication        Dose:  1 tablet   Take 1 tablet (10 mg) by mouth At Bedtime   Quantity:  90 tablet   Refills:  1       Multiple Vitamin Chew        Dose:  1 tablet   Take 1 tablet by mouth daily   Refills:  0       ondansetron 4 MG ODT tab   Commonly known as:  ZOFRAN ODT   Used for:  Nausea        Dose:  4-8 mg   Take 1-2 tablets (4-8 mg) by mouth every 8 hours as needed for nausea   Quantity:  20 tablet   Refills:  3       pantoprazole 40 MG EC tablet   Commonly known as:  PROTONIX   Used for:  Gastroesophageal reflux disease without esophagitis        Dose:  40 mg   Take 1 tablet (40 mg) by mouth daily   Quantity:  90 tablet   Refills:  2       potassium chloride 10 MEQ tablet   Commonly known as:  K-TAB,KLOR-CON   Used for:  Hypokalemia        TAKE ONE TABLET BY MOUTH THREE TIMES A DAY   Quantity:  90 tablet   Refills:  5       * predniSONE 5 MG tablet   Commonly known as:  DELTASONE   Used for:  Pulmonary emphysema, unspecified emphysema type (H)        TAKE 2 TABLETS (10 MG) BY MOUTH DAILY, TAPER DOWN BY 1/2 TAB EVERY 7 DAYS UNTIL DOWN TO 1 TAB DAILY   Quantity:  30  tablet   Refills:  0       * predniSONE 20 MG tablet   Commonly known as:  DELTASONE   Used for:  COPD exacerbation (H)        Take 3 tabs (60 mg) by mouth daily x 3 days, 2 tabs (40 mg) daily x 3 days, 1 tab (20 mg) daily x 3 days, then 1/2 tab (10 mg) x 3 days.   Quantity:  20 tablet   Refills:  0       roflumilast 500 MCG Tabs tablet   Commonly known as:  DALIRESP        Dose:  500 mcg   Take 1 tablet (500 mcg) by mouth daily   Refills:  0       SPIRIVA HANDIHALER 18 MCG capsule   Used for:  Moderate persistent asthma without complication   Generic drug:  tiotropium        USING THE HANDIHALER, INHALE THE CONTENTS OF ONE CAPSULE BY MOUTH DAILY   Quantity:  90 capsule   Refills:  1       SUMAtriptan 25 MG tablet   Commonly known as:  IMITREX   Used for:  Chronic migraine without aura without status migrainosus, not intractable        Dose:  25 mg   Take 1 tablet (25 mg) by mouth at onset of headache for migraine May repeat in 2 hours if needed: max 2/day;   Quantity:  9 tablet   Refills:  11       TYLENOL EXTRA STRENGTH PO   Used for:  Abdominal pain, right upper quadrant        1 TABLET EVERY 4 HOURS AS NEEDED   Refills:  0       * Notice:  This list has 4 medication(s) that are the same as other medications prescribed for you. Read the directions carefully, and ask your doctor or other care provider to review them with you.             Protect others around you: Learn how to safely use, store and throw away your medicines at www.disposemymeds.org.             Medication List: This is a list of all your medications and when to take them. Check marks below indicate your daily home schedule. Keep this list as a reference.      Medications           Morning Afternoon Evening Bedtime As Needed    ADVAIR DISKUS 500-50 MCG/DOSE diskus inhaler   INHALE 1 PUFF TWO TIMES DAILY   Generic drug:  fluticasone-salmeterol                                albuterol 108 (90 Base) MCG/ACT inhaler   Commonly known as:  VENTOLIN HFA    Inhale 2 puffs into the lungs every 6 hours as needed for shortness of breath / dyspnea or wheezing                                ALPRAZolam 0.25 MG tablet   Commonly known as:  XANAX   Take 1 tablet by mouth. Every twelve hours as needed for anxiety                                atorvastatin 10 MG tablet   Commonly known as:  LIPITOR   Takes 3 times a week                                baclofen 10 MG tablet   Commonly known as:  LIORESAL   TAKE ONE TABLET BY MOUTH THREE TIMES A DAY AS NEEDED FOR MUSCLE SPASMS                                [employer] FIBER GUMMIES 2.5 g Chew   Take 1 tablet by mouth daily Reported on 5/8/2017                                fluticasone 50 MCG/ACT spray   Commonly known as:  FLONASE   SPRAY 1 TO 2 SPRAYS IN EACH NOSTRIL ONCE DAILY                                furosemide 20 MG tablet   Commonly known as:  LASIX   TAKE 2 TABLETS (40 MG) BY MOUTH DAILY SEVERE SWELLING                                HYDROcodone-acetaminophen 5-325 MG per tablet   Commonly known as:  NORCO   Take 1-2 tablets by mouth every 8 hours as needed for pain Maximum 6 daily                                ipratropium - albuterol 0.5 mg/2.5 mg/3 mL 0.5-2.5 (3) MG/3ML neb solution   Commonly known as:  DUONEB   Take 1 vial (3 mLs) by nebulization every 6 hours as needed for shortness of breath / dyspnea or wheezing                                lidocaine 5 % Patch   Commonly known as:  LIDODERM   Apply up to 3 patches to painful area at once for up to 12 h within a 24 h period.  Remove after 12 hours.                                * lisinopril 2.5 MG tablet   Commonly known as:  PRINIVIL/Zestril   Take 1 tablet (2.5 mg) by mouth daily                                * lisinopril 2.5 MG tablet   Commonly known as:  PRINIVIL/Zestril   TAKE 1 TABLET BY MOUTH ONCE DAILY                                montelukast 10 MG tablet   Commonly known as:  SINGULAIR   Take 1 tablet (10 mg) by mouth At Bedtime                                 Multiple Vitamin Chew   Take 1 tablet by mouth daily                                ondansetron 4 MG ODT tab   Commonly known as:  ZOFRAN ODT   Take 1-2 tablets (4-8 mg) by mouth every 8 hours as needed for nausea                                pantoprazole 40 MG EC tablet   Commonly known as:  PROTONIX   Take 1 tablet (40 mg) by mouth daily                                potassium chloride 10 MEQ tablet   Commonly known as:  K-TAB,KLOR-CON   TAKE ONE TABLET BY MOUTH THREE TIMES A DAY                                * predniSONE 5 MG tablet   Commonly known as:  DELTASONE   TAKE 2 TABLETS (10 MG) BY MOUTH DAILY, TAPER DOWN BY 1/2 TAB EVERY 7 DAYS UNTIL DOWN TO 1 TAB DAILY                                * predniSONE 20 MG tablet   Commonly known as:  DELTASONE   Take 3 tabs (60 mg) by mouth daily x 3 days, 2 tabs (40 mg) daily x 3 days, 1 tab (20 mg) daily x 3 days, then 1/2 tab (10 mg) x 3 days.                                roflumilast 500 MCG Tabs tablet   Commonly known as:  DALIRESP   Take 1 tablet (500 mcg) by mouth daily                                SPIRIVA HANDIHALER 18 MCG capsule   USING THE HANDIHALER, INHALE THE CONTENTS OF ONE CAPSULE BY MOUTH DAILY   Generic drug:  tiotropium                                SUMAtriptan 25 MG tablet   Commonly known as:  IMITREX   Take 1 tablet (25 mg) by mouth at onset of headache for migraine May repeat in 2 hours if needed: max 2/day;                                TYLENOL EXTRA STRENGTH PO   1 TABLET EVERY 4 HOURS AS NEEDED                                * Notice:  This list has 4 medication(s) that are the same as other medications prescribed for you. Read the directions carefully, and ask your doctor or other care provider to review them with you.

## 2018-09-27 DIAGNOSIS — J45.40 MODERATE PERSISTENT ASTHMA WITHOUT COMPLICATION: ICD-10-CM

## 2018-09-27 NOTE — TELEPHONE ENCOUNTER
"Requested Prescriptions   Pending Prescriptions Disp Refills     SPIRIVA HANDIHALER 18 MCG capsule [Pharmacy Med Name: SPIRIVA 18 MCG CP-HANDIHALER]  1    Last Written Prescription Date:  04/13/2018  Last Fill Quantity: 90,  # refills: 1   Last office visit: 6/1/2018 with prescribing provider:     Future Office Visit:   Sig: USING THE HANDIHALER, INHALE THE CONTENTS OF ONE CAPSULE BY MOUTH DAILY    Asthma Maintenance Inhalers - Anticholinergics Failed    9/27/2018  1:56 AM       Failed - Asthma control assessment score within normal limits in last 6 months    Please review ACT score.          Passed - Patient is age 12 years or older       Passed - Recent (6 mo) or future (30 days) visit within the authorizing provider's specialty    Patient had office visit in the last 6 months or has a visit in the next 30 days with authorizing provider or within the authorizing provider's specialty.  See \"Patient Info\" tab in inbasket, or \"Choose Columns\" in Meds & Orders section of the refill encounter.            "

## 2018-09-27 NOTE — TELEPHONE ENCOUNTER
Last ACT 3/23/18 was 8. Call to patient. States she is currently a candidate for a lung transplant. States her issues are a combination of asthma and COPD. Current ACT 14 . Please advise.

## 2018-09-28 ASSESSMENT — ASTHMA QUESTIONNAIRES: ACT_TOTALSCORE: 14

## 2018-10-01 RX ORDER — TIOTROPIUM BROMIDE 18 UG/1
CAPSULE ORAL; RESPIRATORY (INHALATION)
Qty: 30 CAPSULE | Refills: 11 | Status: SHIPPED | OUTPATIENT
Start: 2018-10-01 | End: 2018-10-05

## 2018-10-02 ENCOUNTER — APPOINTMENT (OUTPATIENT)
Dept: MRI IMAGING | Facility: CLINIC | Age: 63
End: 2018-10-02
Attending: EMERGENCY MEDICINE
Payer: COMMERCIAL

## 2018-10-02 ENCOUNTER — HOSPITAL ENCOUNTER (EMERGENCY)
Facility: CLINIC | Age: 63
Discharge: HOME | End: 2018-10-03
Attending: EMERGENCY MEDICINE | Admitting: EMERGENCY MEDICINE
Payer: COMMERCIAL

## 2018-10-02 DIAGNOSIS — G89.29 CHRONIC MIDLINE LOW BACK PAIN WITH SCIATICA, SCIATICA LATERALITY UNSPECIFIED: ICD-10-CM

## 2018-10-02 DIAGNOSIS — M51.26 PROTRUSION OF LUMBAR INTERVERTEBRAL DISC: ICD-10-CM

## 2018-10-02 DIAGNOSIS — M54.40 CHRONIC MIDLINE LOW BACK PAIN WITH SCIATICA, SCIATICA LATERALITY UNSPECIFIED: ICD-10-CM

## 2018-10-02 LAB
ANION GAP SERPL CALCULATED.3IONS-SCNC: 8 MMOL/L (ref 3–14)
BASOPHILS # BLD AUTO: 0 10E9/L (ref 0–0.2)
BASOPHILS NFR BLD AUTO: 0.2 %
BUN SERPL-MCNC: 13 MG/DL (ref 7–30)
CALCIUM SERPL-MCNC: 8.6 MG/DL (ref 8.5–10.1)
CHLORIDE SERPL-SCNC: 101 MMOL/L (ref 94–109)
CO2 SERPL-SCNC: 28 MMOL/L (ref 20–32)
CREAT SERPL-MCNC: 0.71 MG/DL (ref 0.52–1.04)
DIFFERENTIAL METHOD BLD: ABNORMAL
EOSINOPHIL # BLD AUTO: 0 10E9/L (ref 0–0.7)
EOSINOPHIL NFR BLD AUTO: 0.1 %
ERYTHROCYTE [DISTWIDTH] IN BLOOD BY AUTOMATED COUNT: 14 % (ref 10–15)
GFR SERPL CREATININE-BSD FRML MDRD: 83 ML/MIN/1.7M2
GLUCOSE SERPL-MCNC: 147 MG/DL (ref 70–99)
HCT VFR BLD AUTO: 43.1 % (ref 35–47)
HGB BLD-MCNC: 13.6 G/DL (ref 11.7–15.7)
IMM GRANULOCYTES # BLD: 0.4 10E9/L (ref 0–0.4)
IMM GRANULOCYTES NFR BLD: 2.1 %
LYMPHOCYTES # BLD AUTO: 1 10E9/L (ref 0.8–5.3)
LYMPHOCYTES NFR BLD AUTO: 6 %
MCH RBC QN AUTO: 30.6 PG (ref 26.5–33)
MCHC RBC AUTO-ENTMCNC: 31.6 G/DL (ref 31.5–36.5)
MCV RBC AUTO: 97 FL (ref 78–100)
MONOCYTES # BLD AUTO: 0.7 10E9/L (ref 0–1.3)
MONOCYTES NFR BLD AUTO: 4 %
NEUTROPHILS # BLD AUTO: 14.8 10E9/L (ref 1.6–8.3)
NEUTROPHILS NFR BLD AUTO: 87.6 %
NRBC # BLD AUTO: 0 10*3/UL
NRBC BLD AUTO-RTO: 0 /100
PLATELET # BLD AUTO: 325 10E9/L (ref 150–450)
POTASSIUM SERPL-SCNC: 4.2 MMOL/L (ref 3.4–5.3)
RBC # BLD AUTO: 4.45 10E12/L (ref 3.8–5.2)
SODIUM SERPL-SCNC: 137 MMOL/L (ref 133–144)
WBC # BLD AUTO: 16.9 10E9/L (ref 4–11)

## 2018-10-02 PROCEDURE — 25000128 H RX IP 250 OP 636

## 2018-10-02 PROCEDURE — 25000128 H RX IP 250 OP 636: Performed by: EMERGENCY MEDICINE

## 2018-10-02 PROCEDURE — 96375 TX/PRO/DX INJ NEW DRUG ADDON: CPT

## 2018-10-02 PROCEDURE — 96374 THER/PROPH/DIAG INJ IV PUSH: CPT

## 2018-10-02 PROCEDURE — 99285 EMERGENCY DEPT VISIT HI MDM: CPT | Mod: 25

## 2018-10-02 PROCEDURE — 85025 COMPLETE CBC W/AUTO DIFF WBC: CPT | Performed by: EMERGENCY MEDICINE

## 2018-10-02 PROCEDURE — 80048 BASIC METABOLIC PNL TOTAL CA: CPT | Performed by: EMERGENCY MEDICINE

## 2018-10-02 PROCEDURE — 72148 MRI LUMBAR SPINE W/O DYE: CPT

## 2018-10-02 RX ORDER — HYDROMORPHONE HYDROCHLORIDE 1 MG/ML
INJECTION, SOLUTION INTRAMUSCULAR; INTRAVENOUS; SUBCUTANEOUS
Status: COMPLETED
Start: 2018-10-02 | End: 2018-10-02

## 2018-10-02 RX ORDER — HYDROMORPHONE HYDROCHLORIDE 1 MG/ML
0.5 INJECTION, SOLUTION INTRAMUSCULAR; INTRAVENOUS; SUBCUTANEOUS ONCE
Status: COMPLETED | OUTPATIENT
Start: 2018-10-02 | End: 2018-10-02

## 2018-10-02 RX ORDER — LORAZEPAM 2 MG/ML
1 INJECTION INTRAMUSCULAR
Status: COMPLETED | OUTPATIENT
Start: 2018-10-02 | End: 2018-10-02

## 2018-10-02 RX ADMIN — Medication 0.5 MG: at 21:58

## 2018-10-02 RX ADMIN — LORAZEPAM 1 MG: 2 INJECTION INTRAMUSCULAR; INTRAVENOUS at 21:10

## 2018-10-02 RX ADMIN — HYDROMORPHONE HYDROCHLORIDE 0.5 MG: 1 INJECTION, SOLUTION INTRAMUSCULAR; INTRAVENOUS; SUBCUTANEOUS at 21:58

## 2018-10-02 ASSESSMENT — ENCOUNTER SYMPTOMS
WEAKNESS: 0
BACK PAIN: 1
NUMBNESS: 0

## 2018-10-02 NOTE — ED AVS SNAPSHOT
[facility] Emergency Department    [address]    [facility] [address]    Phone:  [phone]    Fax:  [phone]                                       Trisha Bender   MRN: [id number]    Department:  [facility] Emergency Department   Date of Visit:  10/2/2018           After Visit Summary Signature Page     I have received my discharge instructions, and my questions have been answered. I have discussed any challenges I see with this plan with the nurse or doctor.    ..........................................................................................................................................  Patient/Patient Representative Signature      ..........................................................................................................................................  Patient Representative Print Name and Relationship to Patient    ..................................................               ................................................  Date                                   Time    ..........................................................................................................................................  Reviewed by Signature/Title    ...................................................              ..............................................  Date                                               Time          22EPIC Rev 08/18

## 2018-10-02 NOTE — ED AVS SNAPSHOT
[facility] Emergency Department    [address]    [facility] [address]    Phone:  [phone]    Fax:  [phone]                                       Trisha Bender   MRN: [id number]    Department:  [facility] Emergency Department   Date of Visit:  10/2/2018           Patient Information     Date Of Birth          1955        Your diagnoses for this visit were:     Chronic midline low back pain with sciatica, sciatica laterality unspecified     Protrusion of lumbar intervertebral disc L4-5 left paracentral with left lateral recess stenosis encroaching on left L5 nerve root       You were seen by Brian Lauren MD.      Follow-up Information     Follow up with [facility] Emergency Department.    Specialty:  EMERGENCY MEDICINE    Why:  As needed, If symptoms worsen    Contact information:    [address]  [facility] [address]  [phone]        Follow up with Bandar Weinberg MD. Schedule an appointment as soon as possible for a visit in 2 days.    Specialty:  Internal Medicine    Contact information:    [address] [facility] [address]  [phone]          Discharge Instructions       Discharge Instructions  Back Pain  You were seen today for back pain. Back pain can have many causes, but most will get better without surgery or other specific treatment. Sometimes there is a herniated ( slipped ) disc. We do not usually do MRI scans to look for these right away, since most herniated discs will get better on their own with time.  Today, we did not find any evidence that your back pain was caused by a serious condition. However, sometimes symptoms develop over time and cannot be found during an emergency visit, so it is very important that you follow up with your primary provider.  Generally, every Emergency Department visit should have a follow-up clinic visit with either a primary or a specialty clinic/provider.  Please follow-up as instructed by your emergency provider today.    Return to the Emergency Department if:    You develop a fever with your back pain.     You have weakness or change in sensation in one or both legs.    You lose control of your bowels or bladder, or cannot empty your bladder (cannot pee).    Your pain gets much worse.     Follow-up with your provider:    Unless your pain has completely gone away, please make an appointment with your provider within one week. Most of the routine care for back pain is available in a clinic and not the Emergency Department. You may need further management of your back pain, such as more pain medication, imaging such as an X-ray or MRI, or physical therapy.    What can I do to help myself?    Remain Active -- People are often afraid that they will hurt their back further or delay recovery by remaining active, but this is one of the best things you can do for your back. In fact, staying in bed for a long time to rest is not recommended. Studies have shown that people with low back pain recover faster when they remain active. Movement helps to bring blood flow to the muscles and relieve muscle spasms as well as preventing loss of muscle strength.    Heat -- Using a heating pad can help with low back pain during the first few weeks. Do not sleep with a heating pad, as you can be burned.     Pain medications - You may take a pain medication such as Tylenol  (acetaminophen), Advil , Motrin  (ibuprofen) or Aleve  (naproxen).  If you were given a prescription for medicine here today, be sure to read all of the information (including the package insert) that comes with your prescription.  This will include important information about the medicine, its side effects, and any warnings that you need to know about.  The pharmacist who fills the prescription can provide more information and answer questions you may have about the medicine.  If you have questions or concerns that the  pharmacist cannot address, please call or return to the Emergency Department.   Remember that you can always come back to the Emergency Department if you are not able to see your regular provider in the amount of time listed above, if you get any new symptoms, or if there is anything that worries you.      24 Hour Appointment Hotline       To make an appointment at any [facility], call [phone] ([phone]). If you don't have a family doctor or clinic, we will help you find one. [facility] are conveniently located to serve the needs of you and your family.             Review of your medicines      Our records show that you are taking the medicines listed below. If these are incorrect, please call your family doctor or clinic.        Dose / Directions Last dose taken    ADVAIR DISKUS 500-50 MCG/DOSE diskus inhaler   Quantity:  3 Inhaler   Generic drug:  fluticasone-salmeterol        INHALE 1 PUFF TWO TIMES DAILY   Refills:  3        albuterol 108 (90 Base) MCG/ACT inhaler   Commonly known as:  VENTOLIN HFA   Dose:  2 puff   Quantity:  18 g        Inhale 2 puffs into the lungs every 6 hours as needed for shortness of breath / dyspnea or wheezing   Refills:  3        ALPRAZolam 0.25 MG tablet   Commonly known as:  XANAX   Quantity:  40 tablet        Take 1 tablet by mouth. Every twelve hours as needed for anxiety   Refills:  0        atorvastatin 10 MG tablet   Commonly known as:  LIPITOR   Quantity:  36 tablet        Takes 3 times a week   Refills:  3        baclofen 10 MG tablet   Commonly known as:  LIORESAL   Quantity:  90 tablet        TAKE ONE TABLET BY MOUTH THREE TIMES A DAY AS NEEDED FOR MUSCLE SPASMS   Refills:  1        [employer] FIBER GUMMIES 2.5 g Chew   Dose:  1 tablet        Take 1 tablet by mouth daily Reported on 5/8/2017   Refills:  0        fluticasone 50 MCG/ACT spray   Commonly known as:  FLONASE   Quantity:  16 g        SPRAY 1 TO 2 SPRAYS IN EACH NOSTRIL ONCE DAILY   Refills:  7         furosemide 20 MG tablet   Commonly known as:  LASIX   Quantity:  180 tablet        TAKE 2 TABLETS (40 MG) BY MOUTH DAILY SEVERE SWELLING   Refills:  1        HYDROcodone-acetaminophen 5-325 MG per tablet   Commonly known as:  NORCO   Dose:  1-2 tablet   Quantity:  180 tablet        Take 1-2 tablets by mouth every 8 hours as needed for pain Maximum 6 daily   Refills:  0        ipratropium - albuterol 0.5 mg/2.5 mg/3 mL 0.5-2.5 (3) MG/3ML neb solution   Commonly known as:  DUONEB   Dose:  1 vial   Quantity:  30 vial        Take 1 vial (3 mLs) by nebulization every 6 hours as needed for shortness of breath / dyspnea or wheezing   Refills:  1        lidocaine 5 % Patch   Commonly known as:  LIDODERM   Quantity:  30 patch        Apply up to 3 patches to painful area at once for up to 12 h within a 24 h period.  Remove after 12 hours.   Refills:  0        * lisinopril 2.5 MG tablet   Commonly known as:  PRINIVIL/Zestril   Dose:  2.5 mg   Quantity:  90 tablet        Take 1 tablet (2.5 mg) by mouth daily   Refills:  3        * lisinopril 2.5 MG tablet   Commonly known as:  PRINIVIL/Zestril   Quantity:  90 tablet        TAKE 1 TABLET BY MOUTH ONCE DAILY   Refills:  1        montelukast 10 MG tablet   Commonly known as:  SINGULAIR   Dose:  1 tablet   Quantity:  90 tablet        Take 1 tablet (10 mg) by mouth At Bedtime   Refills:  1        Multiple Vitamin Chew   Dose:  1 tablet        Take 1 tablet by mouth daily   Refills:  0        ondansetron 4 MG ODT tab   Commonly known as:  ZOFRAN ODT   Dose:  4-8 mg   Quantity:  20 tablet        Take 1-2 tablets (4-8 mg) by mouth every 8 hours as needed for nausea   Refills:  3        pantoprazole 40 MG EC tablet   Commonly known as:  PROTONIX   Dose:  40 mg   Quantity:  90 tablet        Take 1 tablet (40 mg) by mouth daily   Refills:  2        potassium chloride 10 MEQ tablet   Commonly known as:  K-TAB,KLOR-CON   Quantity:  90 tablet        TAKE ONE TABLET BY MOUTH THREE TIMES A  DAY   Refills:  5        * predniSONE 5 MG tablet   Commonly known as:  DELTASONE   Quantity:  30 tablet        TAKE 2 TABLETS (10 MG) BY MOUTH DAILY, TAPER DOWN BY 1/2 TAB EVERY 7 DAYS UNTIL DOWN TO 1 TAB DAILY   Refills:  0        * predniSONE 20 MG tablet   Commonly known as:  DELTASONE   Quantity:  20 tablet        Take 3 tabs (60 mg) by mouth daily x 3 days, 2 tabs (40 mg) daily x 3 days, 1 tab (20 mg) daily x 3 days, then 1/2 tab (10 mg) x 3 days.   Refills:  0        roflumilast 500 MCG Tabs tablet   Commonly known as:  DALIRESP   Dose:  500 mcg        Take 1 tablet (500 mcg) by mouth daily   Refills:  0        SPIRIVA HANDIHALER 18 MCG capsule   Quantity:  30 capsule   Generic drug:  tiotropium        USING THE HANDIHALER, INHALE THE CONTENTS OF ONE CAPSULE BY MOUTH DAILY   Refills:  11        SUMAtriptan 25 MG tablet   Commonly known as:  IMITREX   Dose:  25 mg   Quantity:  9 tablet        Take 1 tablet (25 mg) by mouth at onset of headache for migraine May repeat in 2 hours if needed: max 2/day;   Refills:  11        TYLENOL EXTRA STRENGTH PO        1 TABLET EVERY 4 HOURS AS NEEDED   Refills:  0        * Notice:  This list has 4 medication(s) that are the same as other medications prescribed for you. Read the directions carefully, and ask your doctor or other care provider to review them with you.            Procedures and tests performed during your visit     Basic metabolic panel    CBC with platelets differential    Lumbar spine MRI w/o contrast      Orders Needing Specimen Collection     None      Pending Results     Date and Time Order Name Status Description    10/2/2018 2052 Lumbar spine MRI w/o contrast In process             Pending Culture Results     No orders found for last 3 day(s).            Pending Results Instructions     If you had any lab results that were not finalized at the time of your Discharge, you can call the ED Lab Result RN at [phone]. You will be contacted by this team  for any positive Lab results or changes in treatment. The nurses are available 7 days a week from 10A to 6:30P.  You can leave a message 24 hours per day and they will return your call.        Test Results From Your Hospital Stay        10/2/2018  9:12 PM      Component Results     Component Value Ref Range & Units Status    WBC 16.9 (H) 4.0 - 11.0 10e9/L Final    RBC Count 4.45 3.8 - 5.2 10e12/L Final    Hemoglobin 13.6 11.7 - 15.7 g/dL Final    Hematocrit 43.1 35.0 - 47.0 % Final    MCV 97 78 - 100 fl Final    MCH 30.6 26.5 - 33.0 pg Final    MCHC 31.6 31.5 - 36.5 g/dL Final    RDW 14.0 10.0 - 15.0 % Final    Platelet Count 325 150 - 450 10e9/L Final    Diff Method Automated Method  Final    % Neutrophils 87.6 % Final    % Lymphocytes 6.0 % Final    % Monocytes 4.0 % Final    % Eosinophils 0.1 % Final    % Basophils 0.2 % Final    % Immature Granulocytes 2.1 % Final    Nucleated RBCs 0 0 /100 Final    Absolute Neutrophil 14.8 (H) 1.6 - 8.3 10e9/L Final    Absolute Lymphocytes 1.0 0.8 - 5.3 10e9/L Final    Absolute Monocytes 0.7 0.0 - 1.3 10e9/L Final    Absolute Eosinophils 0.0 0.0 - 0.7 10e9/L Final    Absolute Basophils 0.0 0.0 - 0.2 10e9/L Final    Abs Immature Granulocytes 0.4 0 - 0.4 10e9/L Final    Absolute Nucleated RBC 0.0  Final         10/2/2018  9:29 PM      Component Results     Component Value Ref Range & Units Status    Sodium 137 133 - 144 mmol/L Final    Potassium 4.2 3.4 - 5.3 mmol/L Final    Chloride 101 94 - 109 mmol/L Final    Carbon Dioxide 28 20 - 32 mmol/L Final    Anion Gap 8 3 - 14 mmol/L Final    Glucose 147 (H) 70 - 99 mg/dL Final    Urea Nitrogen 13 7 - 30 mg/dL Final    Creatinine 0.71 0.52 - 1.04 mg/dL Final    GFR Estimate 83 >60 mL/min/1.7m2 Final    Non  GFR Calc    GFR Estimate If Black >90 >60 mL/min/1.7m2 Final    African American GFR Calc    Calcium 8.6 8.5 - 10.1 mg/dL Final         10/2/2018 10:28 PM      Result not yet available     Exam Ended                 Clinical Quality Measure: Blood Pressure Screening     Your blood pressure was checked while you were in the emergency department today. The last reading we obtained was  BP: 140/81 . Please read the guidelines below about what these numbers mean and what you should do about them.  If your systolic blood pressure (the top number) is less than 120 and your diastolic blood pressure (the bottom number) is less than 80, then your blood pressure is normal. There is nothing more that you need to do about it.  If your systolic blood pressure (the top number) is 120-139 or your diastolic blood pressure (the bottom number) is 80-89, your blood pressure may be higher than it should be. You should have your blood pressure rechecked within a year by a primary care provider.  If your systolic blood pressure (the top number) is 140 or greater or your diastolic blood pressure (the bottom number) is 90 or greater, you may have high blood pressure. High blood pressure is treatable, but if left untreated over time it can put you at risk for heart attack, stroke, or kidney failure. You should have your blood pressure rechecked by a primary care provider within the next 4 weeks.  If your provider in the emergency department today gave you specific instructions to follow-up with your doctor or provider even sooner than that, you should follow that instruction and not wait for up to 4 weeks for your follow-up visit.        Thank you for choosing [facility]       Thank you for choosing [facility] for your care. Our goal is always to provide you with excellent care. Hearing back from our patients is one way we can continue to improve our services. Please take a few minutes to complete the written survey that you may receive in the mail after you visit with us. Thank you!        [facility] Information     [facility] gives you secure access to your electronic health record. If you see a primary care provider, you can also send messages to your care  team and make appointments. If you have questions, please call your primary care clinic.  If you do not have a primary care provider, please call [phone] and they will assist you.        Care EveryWhere ID     This is your Care EveryWhere ID. This could be used by other organizations to access your [facility] medical records  [id number]        Equal Access to Services     KAMILA ALLEN : Carlo Mclaughlin, tejal hernandez, jonathan flores. So [place] -[phone].    ATENCIÓN: Si habla español, tiene a madison disposición servicios gratuitos de asistencia lingüística. Llame al [phone].    We comply with applicable federal civil rights laws and [place] laws. We do not discriminate on the basis of race, color, national origin, age, disability, sex, sexual orientation, or gender identity.            After Visit Summary       This is your record. Keep this with you and show to your community pharmacist(s) and doctor(s) at your next visit.

## 2018-10-03 ENCOUNTER — OFFICE VISIT (OUTPATIENT)
Dept: INTERNAL MEDICINE | Facility: CLINIC | Age: 63
End: 2018-10-03
Payer: COMMERCIAL

## 2018-10-03 ENCOUNTER — RADIANT APPOINTMENT (OUTPATIENT)
Dept: GENERAL RADIOLOGY | Facility: CLINIC | Age: 63
End: 2018-10-03
Attending: INTERNAL MEDICINE
Payer: COMMERCIAL

## 2018-10-03 VITALS
WEIGHT: 183.9 LBS | RESPIRATION RATE: 17 BRPM | HEART RATE: 125 BPM | TEMPERATURE: 97.9 F | SYSTOLIC BLOOD PRESSURE: 140 MMHG | HEIGHT: 70 IN | DIASTOLIC BLOOD PRESSURE: 100 MMHG | BODY MASS INDEX: 26.33 KG/M2 | OXYGEN SATURATION: 95 %

## 2018-10-03 VITALS
DIASTOLIC BLOOD PRESSURE: 81 MMHG | OXYGEN SATURATION: 100 % | WEIGHT: 185 LBS | RESPIRATION RATE: 20 BRPM | SYSTOLIC BLOOD PRESSURE: 140 MMHG | TEMPERATURE: 97.4 F | HEART RATE: 87 BPM | BODY MASS INDEX: 26.54 KG/M2

## 2018-10-03 DIAGNOSIS — Z23 NEED FOR PROPHYLACTIC VACCINATION AND INOCULATION AGAINST INFLUENZA: ICD-10-CM

## 2018-10-03 DIAGNOSIS — M54.6 ACUTE BILATERAL THORACIC BACK PAIN: ICD-10-CM

## 2018-10-03 DIAGNOSIS — M54.50 BILATERAL LOW BACK PAIN WITHOUT SCIATICA, UNSPECIFIED CHRONICITY: Primary | ICD-10-CM

## 2018-10-03 DIAGNOSIS — J43.1 PANLOBULAR EMPHYSEMA (H): ICD-10-CM

## 2018-10-03 DIAGNOSIS — I10 ESSENTIAL HYPERTENSION WITH GOAL BLOOD PRESSURE LESS THAN 130/80: ICD-10-CM

## 2018-10-03 PROCEDURE — 90471 IMMUNIZATION ADMIN: CPT | Performed by: INTERNAL MEDICINE

## 2018-10-03 PROCEDURE — 90686 IIV4 VACC NO PRSV 0.5 ML IM: CPT | Performed by: INTERNAL MEDICINE

## 2018-10-03 PROCEDURE — 72072 X-RAY EXAM THORAC SPINE 3VWS: CPT

## 2018-10-03 PROCEDURE — 99214 OFFICE O/P EST MOD 30 MIN: CPT | Mod: 25 | Performed by: INTERNAL MEDICINE

## 2018-10-03 RX ORDER — HYDROMORPHONE HYDROCHLORIDE 2 MG/1
2 TABLET ORAL EVERY 8 HOURS PRN
Qty: 18 TABLET | Refills: 0 | Status: SHIPPED | OUTPATIENT
Start: 2018-10-03 | End: 2018-11-16

## 2018-10-03 NOTE — ED PROVIDER NOTES
"  History     Chief Complaint:  Back pain    The history is provided by the patient.      Trisha Bender is a 63 year old female with a history of chronic back pain, COPD, asthma, HTN, hyperlipidemia, CHF, and CAD who presents to the emergency department with her  for evaluation of back pain. On Sept 24, the patient reports having a lumbar epidural injection for chronic low back pain. She states she normally has relief two-four weeks post lumbar injection. However, a few days after the injection, she reports the onset of severe back pain, that is different from her chronic back pain in the severity of the pain. She normally takes Vicodin for pain control but denies relief from this. She states the pain feels like \"gas in her spine\" and is sharp. She states it is very difficulty to lie down, sit up, or bend over. This pain started in the left side under the rib cage and has since become more centered in her low back. She denies numbness and tingling in her lower extremities or incontinence. She denies rectal numbness. The increasing severity of her pain from her back pain prompted her to seek evaluation here in the emergency department.     Allergies:  Gabapentin  Contrast dye  Escitalopram  Peanuts  Sulfa Drugs Penicillins  Strawberries  ibuprofen   Aspirin  Chlorotrianisene    Medications:    Advair  Albuterol  Xanax  Baclofen  Flonase  Zofran  Lidocaine  Lipitor  Imitrex  Spriva inhaler  Prednisone  Protonix  Lisinopril  Nroco  Laxis    Past Medical History:    Depression  Panlobular emphysema  Low back pain  Asthma  Hypokalemia  Controlled substance agreement  CAD  Hyperlipidemia  CHF  RBBB  Chronic low back pain  Lateral epicondylitis of right elbow  Dilated cardiomyopathy  Cystocele  Migraine  GERD  Anxiety  HTN  COPD  Osteopenia    Past Surgical History:    Angiogram  Appendectomy  Colonoscopy  cholecystectomy  Nasal Septoplasty and mucous retention cyst  Jeremiah teeth removal  Endometriosis " ablation  Gyn surgery  EGD  Tonsillectomy  Adenoidectomy  Lumbar / sacral epidurals    Family History:    Colon cancer  Heart disease: father  CAD  AAA: Maternal Aunt  Pancreatitis  diabetes mellitus    Social History:  Former smoker: quit date 1/17/2006  Negative for alcohol use.  Patient presents with her   Marital Status:        Review of Systems   Genitourinary:        Negative for incontinence and numbness     Musculoskeletal: Positive for back pain.   Neurological: Negative for weakness and numbness.   All other systems reviewed and are negative.    Physical Exam     Patient Vitals for the past 24 hrs:   BP Temp Temp src Pulse Heart Rate Resp SpO2 Weight   10/02/18 2145 147/86 - - - 85 - 95 % -   10/02/18 2130 (!) 139/91 - - - 93 - 95 % -   10/02/18 2115 (!) 135/97 - - - 90 - 95 % -   10/02/18 2045 - - - - 91 - 96 % -   10/02/18 2030 (!) 119/91 - - - 92 - 96 % -   10/02/18 2015 131/86 - - - 99 - 95 % -   10/02/18 2000 145/89 - - - 104 - 98 % -   10/02/18 1958 146/88 97.4  F (36.3  C) Oral 113 - 20 97 % 83.9 kg (185 lb)       Physical Exam  VS: Reviewed per above  HENT: Mucous membranes moist  EYES: sclera anicteric  CV: Rate as noted, regular rhythm.   RESP: Effort normal.   GI: no tenderness, not distended.  NEURO: Alert, moving all extremities  MSK: No deformity of the extremities. No midline C/T/L spine ttp. No redness over the back.  SKIN: Warm and dry    Emergency Department Course   Imaging:  Radiographic findings were communicated with the patient who voiced understanding of the findings.    MRI Lumbar spine w/o contrast:   Pending. As per radiology.     Laboratory:  CBC: WBC: 16.9 (H), HGB: 13.63, PLT: 325  BMP: Glucose 147 (H), o/w WNL (Creatinine: 0.71)    Interventions:  20110 Ativan 1 mg IV    Emergency Department Course:  2030 Nursing notes and vitals reviewed.  I performed an exam of the patient as documented above.     IV inserted. Medicine administered as documented above. Blood  drawn. This was sent to the lab for further testing, results above.    The patient was sent for a lumbar spine MRI while in the emergency department, findings above.     2200 I rechecked the patient and discussed the results of her workup thus far.     The patient was signed out to my colleague pending the MRI imaging study. Please refer to Dr. Izaguirre note for further management and care of this patient.   Impression & Plan       Medical Decision Making:  She presents the ER with acute on chronic low back pain.  Initial vital signs are within normal limits.  One week ago the patient underwent interlaminar lumbar epidural steroid injection.  Her symptoms have not yet improved and over the past few days has noticed worsening pain that is different from her baseline low back discomfort.  She describes a sensation of air in her low back.  She has no fevers or back pain red flags. No fever or meningismus to suggest meningitis.  However given recent instrumentation and new character of pain, MRI of the lumbar spine was ordered.  Unfortunately she has a contrast allergy and the patient cannot definitively tell me whether or not it was related to MRI or CT contrast.  Thus MRI without contrast was ordered.  This study was pending upon my signout to Dr. Izaguirre.  If negative plan for follow-up with PCP and continue prior to arrival at home pain regimen.    Diagnosis:    ICD-10-CM    1. Chronic midline low back pain with sciatica, sciatica laterality unspecified M54.40     G89.29        Disposition:  Singed out to my colleague Dr. Izaguirre pending MRI imaging     Scribe Disclosure:  I, Mounika La, am serving as a scribe on 10/2/2018 at 8:30 PM to personally document services performed by Brian Lauren MD based on my observations and the provider's statements to me.     Mounika La  10/2/2018   [facility] EMERGENCY DEPARTMENT       Brian Lauren MD  10/02/18 2165

## 2018-10-03 NOTE — PROGRESS NOTES
SUBJECTIVE:   Trisha Bender is a 63 year old female who presents to clinic today for the following health issues:      ED/UC Followup:    Facility: [facility]   Date of visit: 10/2/18  Reason for visit: back pain  Current Status: still in a lot of pain      Patient is seen for a follow up visit.  Seen in ED for back pain. For 5 days. No injury, falls.   Pain is severe , in the mid thoracic spine and paravertebral , no radiation. Worse with movements, breathing. No fever, no cough. Able to walk. No focal neurologic deficits. No change in bladder  Or bowel functions.   On analgesics and muscle relaxants, not helping.   Has h/o emphysema. Has SOB on exertion, follows with pulmonary. On Prednisone.   Has h/o HTN. on medical treatment. BP has been controlled. No side effects from medications. No CP, HA, dizziness. good compliance with medications and low salt diet.          Problem list and histories reviewed & adjusted, as indicated.  Additional history: as documented    Patient Active Problem List   Diagnosis     Essential hypertension with goal blood pressure less than 130/80     Chronic migraine without aura without status migrainosus, not intractable     Esophageal reflux     Disorder of bone and cartilage     Family history of malignant neoplasm of gastrointestinal tract     Chronic airway obstruction (H)     Cystocele     Advanced directives, counseling/discussion     Family history of thyroid cancer - 1/2 brother     Anxiety     Mild persistent asthma without complication     Chronic low back pain     Hair loss     Lateral epicondylitis of right elbow     Dilated cardiomyopathy (H)     Right bundle branch block (RBBB)     CHF (congestive heart failure) (H)     SOB (shortness of breath)     Coronary artery disease involving native coronary artery of native heart without angina pectoris     Mixed hyperlipidemia     Controlled substance agreement signed     Moderate persistent asthma without complication      Hypokalemia     Mechanical low back pain     Panlobular emphysema (H)     Severe episode of recurrent major depressive disorder, without psychotic features (H)     Past Surgical History:   Procedure Laterality Date     ANGIOGRAM  2015    Minimal CAD. LV dysfunction, Mid RCA 20% stenosis, EF 20-25%, c/w Takotsubo cardiomyopathy     APPENDECTOMY       C NONSPECIFIC PROCEDURE      GB and appendectomy  abstracted [id number]     C NONSPECIFIC PROCEDURE      Nasal septoplasty and mucous retention cyst     CHOLECYSTECTOMY       COLONOSCOPY N/A 2018    Procedure: COLONOSCOPY;  COLONOSCOPY with biopsies;  Surgeon: Steve Acosta MD;  Location: [facility] OR     ESOPHAGOSCOPY, GASTROSCOPY, DUODENOSCOPY (EGD), COMBINED  11/15/2011    Procedure:COMBINED ESOPHAGOSCOPY, GASTROSCOPY, DUODENOSCOPY (EGD);  ESOPHAGOSCOPY, GASTROSCOPY, DUODENOSCOPY (EGD) ; Surgeon:JIM URENA; Location:[facility] GI     HC REMOVE TONSILS/ADENOIDS,<11 Y/O       INJECT EPIDURAL LUMBAR / SACRAL SINGLE N/A 2018    Procedure: INJECT EPIDURAL LUMBAR / SACRAL CONTINUAL OR INTERMITTENT;  Lumbar Epidural Steroid Injection;  Surgeon: Az Enamorado MD;  Location: [facility] OR     INJECT EPIDURAL LUMBAR / SACRAL SINGLE N/A 2018    Procedure: INJECT EPIDURAL LUMBAR / SACRAL SINGLE;  Lumbar Epidural Steroid Injection;  Surgeon: Az Enamorado MD;  Location: [facility] OR     LAPAROSCOPIC ABLATION ENDOMETRIOSIS       LAPAROSCOPY DIAGNOSTIC (GYN)      endometriosis resection / lysis of adhesions     wisdom teeth         Social History   Substance Use Topics     Smoking status: Former Smoker     Packs/day: 1.00     Years: 30.00     Quit date: 2006     Smokeless tobacco: Never Used      Comment: Quit in      Alcohol use No     Family History   Problem Relation Age of Onset     Cancer Mother      Colon cancer; dxed at age 64;  at age 69     Cancer - colorectal Mother      Other Cancer Mother      GASTROINTESTINAL DISEASE Sister      Acute  pancreatitis     HEART DISEASE Father      ?     Coronary Artery Disease Father      Circulatory Maternal Aunt      AAA     Circulatory Maternal Uncle      AAA     Diabetes Son          Current Outpatient Prescriptions   Medication Sig Dispense Refill     ADVAIR DISKUS 500-50 MCG/DOSE diskus inhaler INHALE 1 PUFF TWO TIMES DAILY 3 Inhaler 3     albuterol (VENTOLIN HFA) 108 (90 BASE) MCG/ACT Inhaler Inhale 2 puffs into the lungs every 6 hours as needed for shortness of breath / dyspnea or wheezing 18 g 3     ALPRAZolam (XANAX) 0.25 MG tablet Take 1 tablet by mouth. Every twelve hours as needed for anxiety 40 tablet 0     atorvastatin (LIPITOR) 10 MG tablet Takes 3 times a week 36 tablet 3     baclofen (LIORESAL) 10 MG tablet TAKE ONE TABLET BY MOUTH THREE TIMES A DAY AS NEEDED FOR MUSCLE SPASMS 90 tablet 1     [employer] FIBER GUMMIES 2.5 G CHEW Take 1 tablet by mouth daily Reported on 5/8/2017       fluticasone (FLONASE) 50 MCG/ACT spray SPRAY 1 TO 2 SPRAYS IN EACH NOSTRIL ONCE DAILY 16 g 7     furosemide (LASIX) 20 MG tablet TAKE 2 TABLETS (40 MG) BY MOUTH DAILY SEVERE SWELLING 180 tablet 1     HYDROcodone-acetaminophen (NORCO) 5-325 MG per tablet Take 1-2 tablets by mouth every 8 hours as needed for pain Maximum 6 daily 180 tablet 0     HYDROmorphone (DILAUDID) 2 MG tablet Take 1 tablet (2 mg) by mouth every 8 hours as needed for severe pain 18 tablet 0     ipratropium - albuterol 0.5 mg/2.5 mg/3 mL (DUONEB) 0.5-2.5 (3) MG/3ML neb solution Take 1 vial (3 mLs) by nebulization every 6 hours as needed for shortness of breath / dyspnea or wheezing 30 vial 1     lidocaine (LIDODERM) 5 % Patch Apply up to 3 patches to painful area at once for up to 12 h within a 24 h period.  Remove after 12 hours. 30 patch 0     lisinopril (PRINIVIL/ZESTRIL) 2.5 MG tablet TAKE 1 TABLET BY MOUTH ONCE DAILY 90 tablet 1     lisinopril (PRINIVIL/ZESTRIL) 2.5 MG tablet Take 1 tablet (2.5 mg) by mouth daily 90 tablet 3     montelukast (SINGULAIR)  "10 MG tablet Take 1 tablet (10 mg) by mouth At Bedtime 90 tablet 1     Multiple Vitamin CHEW Take 1 tablet by mouth daily        ondansetron (ZOFRAN ODT) 4 MG ODT tab Take 1-2 tablets (4-8 mg) by mouth every 8 hours as needed for nausea 20 tablet 3     pantoprazole (PROTONIX) 40 MG EC tablet Take 1 tablet (40 mg) by mouth daily 90 tablet 2     potassium chloride (K-TAB,KLOR-CON) 10 MEQ tablet TAKE ONE TABLET BY MOUTH THREE TIMES A DAY 90 tablet 5     predniSONE (DELTASONE) 20 MG tablet Take 3 tabs (60 mg) by mouth daily x 3 days, 2 tabs (40 mg) daily x 3 days, 1 tab (20 mg) daily x 3 days, then 1/2 tab (10 mg) x 3 days. 20 tablet 0     predniSONE (DELTASONE) 5 MG tablet TAKE 2 TABLETS (10 MG) BY MOUTH DAILY, TAPER DOWN BY 1/2 TAB EVERY 7 DAYS UNTIL DOWN TO 1 TAB DAILY 30 tablet 0     roflumilast (DALIRESP) 500 MCG TABS tablet Take 1 tablet (500 mcg) by mouth daily       SPIRIVA HANDIHALER 18 MCG capsule USING THE HANDIHALER, INHALE THE CONTENTS OF ONE CAPSULE BY MOUTH DAILY 30 capsule 11     SUMAtriptan (IMITREX) 25 MG tablet Take 1 tablet (25 mg) by mouth at onset of headache for migraine May repeat in 2 hours if needed: max 2/day; 9 tablet 11     TYLENOL EXTRA STRENGTH OR 1 TABLET EVERY 4 HOURS AS NEEDED         Reviewed and updated as needed this visit by clinical staff  Tobacco  Allergies  Meds  Med Hx  Surg Hx  Fam Hx  Soc Hx      Reviewed and updated as needed this visit by Provider         ROS:  Constitutional, HEENT, cardiovascular, pulmonary, gi and gu systems are negative, except as otherwise noted.    OBJECTIVE:     BP (!) 140/100 (BP Location: Left arm, Patient Position: Chair, Cuff Size: Adult Large)  Pulse 125  Temp 97.9  F (36.6  C) (Oral)  Resp 17  Ht 5' 10\" (1.778 m)  Wt 183 lb 14.4 oz (83.4 kg)  LMP 09/29/2005  SpO2 95%  BMI 26.39 kg/m2  Body mass index is 26.39 kg/(m^2).   GENERAL: healthy, alert and no distress  NECK: no adenopathy, no asymmetry, masses, or scars and thyroid normal " to palpation  RESP: lungs clear to auscultation - no rales, rhonchi or wheezes  CV: regular rate and rhythm, normal S1 S2, no S3 or S4, no murmur, click or rub, no peripheral edema and peripheral pulses strong  ABDOMEN: soft, nontender, no hepatosplenomegaly, no masses and bowel sounds normal  MS: no gross musculoskeletal defects noted, no edema, muscle spasms in the mid thoracic paravertebral areas, palpatory tenderness. Impaired gait, small steps, pain with movements.     Diagnostic Test Results:  Results for orders placed or performed during the hospital encounter of 10/02/18 (from the past 24 hour(s))   CBC with platelets differential   Result Value Ref Range    WBC 16.9 (H) 4.0 - 11.0 10e9/L    RBC Count 4.45 3.8 - 5.2 10e12/L    Hemoglobin 13.6 11.7 - 15.7 g/dL    Hematocrit 43.1 35.0 - 47.0 %    MCV 97 78 - 100 fl    MCH 30.6 26.5 - 33.0 pg    MCHC 31.6 31.5 - 36.5 g/dL    RDW 14.0 10.0 - 15.0 %    Platelet Count 325 150 - 450 10e9/L    Diff Method Automated Method     % Neutrophils 87.6 %    % Lymphocytes 6.0 %    % Monocytes 4.0 %    % Eosinophils 0.1 %    % Basophils 0.2 %    % Immature Granulocytes 2.1 %    Nucleated RBCs 0 0 /100    Absolute Neutrophil 14.8 (H) 1.6 - 8.3 10e9/L    Absolute Lymphocytes 1.0 0.8 - 5.3 10e9/L    Absolute Monocytes 0.7 0.0 - 1.3 10e9/L    Absolute Eosinophils 0.0 0.0 - 0.7 10e9/L    Absolute Basophils 0.0 0.0 - 0.2 10e9/L    Abs Immature Granulocytes 0.4 0 - 0.4 10e9/L    Absolute Nucleated RBC 0.0    Basic metabolic panel   Result Value Ref Range    Sodium 137 133 - 144 mmol/L    Potassium 4.2 3.4 - 5.3 mmol/L    Chloride 101 94 - 109 mmol/L    Carbon Dioxide 28 20 - 32 mmol/L    Anion Gap 8 3 - 14 mmol/L    Glucose 147 (H) 70 - 99 mg/dL    Urea Nitrogen 13 7 - 30 mg/dL    Creatinine 0.71 0.52 - 1.04 mg/dL    GFR Estimate 83 >60 mL/min/1.7m2    GFR Estimate If Black >90 >60 mL/min/1.7m2    Calcium 8.6 8.5 - 10.1 mg/dL   Lumbar spine MRI w/o contrast    Narrative    MRI OF  THE LUMBAR SPINE WITHOUT CONTRAST 10/2/2018 10:56 PM     COMPARISON: Lumbar spine MR 9/20/2016    HISTORY: Recent epidural, worsening pain.     TECHNIQUE: Multiplanar, multisequence MRI images of the lumbar spine  were acquired without IV contrast.    FINDINGS: There are five lumbar-type vertebrae for the purposes of  this dictation.     There is continued normal alignment of the lumbar vertebrae. Vertebral  body heights of the lumbar spine are normal. Marrow signal throughout  the lumbar vertebrae is normal. There is no evidence for fracture or  pathologic bony lesion of the lumbar spine.    There is minimal posterior disc bulging at all levels of the lumbar  spine with exception of the normal-appearing L1-L2 disc.    The tip of the conus medullaris is at the mid L1 level which is within  normal limits. There is no evidence for intrathecal abnormality.     Level by level:     L2-L3: There is a minimal circumferential disc bulge and minimal facet  arthropathy bilaterally. There is no spinal canal or neural foraminal  stenosis at this level. No change from the comparison study.    L3-L4: There is no spinal canal or neural foraminal stenosis at this  level. No change from the comparison study.    L4-L5: There is a circumferential disc bulge with a superimposed left  paracentral/left lateral (foraminal zone) disc herniation (protrusion)  and minimal facet arthropathy bilaterally. There is no spinal canal or  neural foraminal stenosis at this level. No change from the comparison  study.    L5-S1: There is minimal facet arthropathy bilaterally. There is no  spinal canal or neural foraminal stenosis at this level. No change  from the comparison study.      Impression    IMPRESSION: Minimal degenerative changes of the lumbar spine as  described above without change from the comparison study. No evidence  for epidural or paraspinous fluid collection or infection.    ANITA GIBSON MD       ASSESSMENT/PLAN:     Problem List  Items Addressed This Visit     Essential hypertension with goal blood pressure less than 130/80    Panlobular emphysema (H)      Other Visit Diagnoses     Bilateral low back pain without sciatica, unspecified chronicity    -  Primary    Relevant Medications    HYDROmorphone (DILAUDID) 2 MG tablet    Other Relevant Orders    ORTHO  REFERRAL (Completed)    Acute bilateral thoracic back pain        Relevant Medications    HYDROmorphone (DILAUDID) 2 MG tablet    Other Relevant Orders    XR Thoracic Spine 3 Views (Completed)    Need for prophylactic vaccination and inoculation against influenza        Relevant Orders    FLU VACCINE, SPLIT VIRUS, IM (QUADRIVALENT) [52229]- >3 YRS (Completed)    Vaccine Administration, Initial [39364] (Completed)           DDD, new wedge deformity on X rays Th spine.   Refer to ortho/ spine  Short time of Dilaudid , hold other narcotics.   Topical Voltaren , heat/ ice   Cont rest of medications   Follow up with pulmonary       Follow-Up:as needed 1-2 weeks     Bandar Weinberg MD  [facility]

## 2018-10-03 NOTE — ED NOTES
"Patient states the back pain feels like \"air trapped in my back and if someone could pop the air out she would feel better\". Patient referred to the pain as \"gas in her back\".   "

## 2018-10-03 NOTE — MR AVS SNAPSHOT
After Visit Summary   10/3/2018    Trisha Bender    MRN: [id number]           Patient Information     Date Of Birth          1955        Visit Information        Provider Department      10/3/2018 2:40 PM Bandar Weinberg MD [facility]        Today's Diagnoses     Bilateral low back pain without sciatica, unspecified chronicity    -  1    Acute bilateral thoracic back pain        Need for prophylactic vaccination and inoculation against influenza           Follow-ups after your visit        Additional Services     ORTHO  REFERRAL       [facility] is referring you to the Orthopedic  Services at [facility].       The  Representative will assist you in the coordination of your Orthopedic and Musculoskeletal Care as prescribed by your physician.    The  Representative will call you within 1 business day to help schedule your appointment, or you may contact the  Representative at:    All areas ~ [phone]     Type of Referral : Spine: Lumbar: Spine Surgeon        Timeframe requested: 1-2 days     Coverage of these services is subject to the terms and limitations of your health insurance plan.  Please call member services at your health plan with any benefit or coverage questions.      If X-rays, CT or MRI's have been performed, please contact the facility where they were done to arrange for , prior to your scheduled appointment.  Please bring this referral request to your appointment and present it to your specialist.                  Who to contact     If you have questions or need follow up information about today's clinic visit or your schedule please contact [facility] directly at [phone].  Normal or non-critical lab and imaging results will be communicated to you by MyChart, letter or phone within 4 business days after the clinic has received the results.  "If you do not hear from us within 7 days, please contact the clinic through [employer] or phone. If you have a critical or abnormal lab result, we will notify you by phone as soon as possible.  Submit refill requests through [employer] or call your pharmacy and they will forward the refill request to us. Please allow 3 business days for your refill to be completed.          Additional Information About Your Visit        [employer] Information     [employer] gives you secure access to your electronic health record. If you see a primary care provider, you can also send messages to your care team and make appointments. If you have questions, please call your primary care clinic.  If you do not have a primary care provider, please call [phone] and they will assist you.        Care EveryWhere ID     This is your Care EveryWhere ID. This could be used by other organizations to access your [place] medical records  [id number]        Your Vitals Were     Pulse Temperature Respirations Height Last Period Pulse Oximetry    125 97.9  F (36.6  C) (Oral) 17 5' 10\" (1.778 m) 09/29/2005 95%    BMI (Body Mass Index)                   26.39 kg/m2            Blood Pressure from Last 3 Encounters:   10/03/18 (!) 140/100   10/03/18 140/81   09/24/18 141/85    Weight from Last 3 Encounters:   10/03/18 183 lb 14.4 oz (83.4 kg)   10/02/18 185 lb (83.9 kg)   09/24/18 180 lb (81.6 kg)              We Performed the Following     FLU VACCINE, SPLIT VIRUS, IM (QUADRIVALENT) [16944]- >3 YRS     [facility]     Vaccine Administration, Initial [36011]          Today's Medication Changes          These changes are accurate as of 10/3/18  4:20 PM.  If you have any questions, ask your nurse or doctor.               Start taking these medicines.        Dose/Directions    HYDROmorphone 2 MG tablet   Commonly known as:  DILAUDID   Used for:  Bilateral low back pain without sciatica, unspecified chronicity, Acute bilateral thoracic back pain "   Started by:  Bandar Weinberg MD        Dose:  2 mg   Take 1 tablet (2 mg) by mouth every 8 hours as needed for severe pain   Quantity:  18 tablet   Refills:  0            Where to get your medicines      Some of these will need a paper prescription and others can be bought over the counter.  Ask your nurse if you have questions.     Bring a paper prescription for each of these medications     HYDROmorphone 2 MG tablet               Information about OPIOIDS     PRESCRIPTION OPIOIDS: WHAT YOU NEED TO KNOW   We gave you an opioid (narcotic) pain medicine. It is important to manage your pain, but opioids are not always the best choice. You should first try all the other options your care team gave you. Take this medicine for as short a time (and as few doses) as possible.    Some activities can increase your pain, such as bandage changes or therapy sessions. It may help to take your pain medicine 30 to 60 minutes before these activities. Reduce your stress by getting enough sleep, working on hobbies you enjoy and practicing relaxation or meditation. Talk to your care team about ways to manage your pain beyond prescription opioids.    These medicines have risks:    DO NOT drive when on new or higher doses of pain medicine. These medicines can affect your alertness and reaction times, and you could be arrested for driving under the influence (DUI). If you need to use opioids long-term, talk to your care team about driving.    DO NOT operate heavy machinery    DO NOT do any other dangerous activities while taking these medicines.    DO NOT drink any alcohol while taking these medicines.     If the opioid prescribed includes acetaminophen, DO NOT take with any other medicines that contain acetaminophen. Read all labels carefully. Look for the word  acetaminophen  or  Tylenol.  Ask your pharmacist if you have questions or are unsure.    You can get addicted to pain medicines, especially if you have a history of  addiction (chemical, alcohol or substance dependence). Talk to your care team about ways to reduce this risk.    All opioids tend to cause constipation. Drink plenty of water and eat foods that have a lot of fiber, such as fruits, vegetables, prune juice, apple juice and high-fiber cereal. Take a laxative (Miralax, milk of magnesia, Colace, Senna) if you don t move your bowels at least every other day. Other side effects include upset stomach, sleepiness, dizziness, throwing up, tolerance (needing more of the medicine to have the same effect), physical dependence and slowed breathing.    Store your pills in a secure place, locked if possible. We will not replace any lost or stolen medicine. If you don t finish your medicine, please throw away (dispose) as directed by your pharmacist. The [place] [employer]ution [employer] has more information about safe disposal: [email or web address].[email or web address]         Primary Care Provider Office Phone # Fax #    Bandar Weinberg [phone]       [address] [facility] [address]        Equal Access to Services     CRISTIAN ALLEN : Hadii pradeep leavitt hadasho Sodavidali, waaxda luqadaha, qaybta kaalmada wandy, jonathan burris . So [facility] [phone].    ATENCIÓN: Si habla español, tiene a madison disposición servicios gratuitos de asistencia lingüística. Llame al [phone].    We comply with applicable federal civil rights laws and [place] laws. We do not discriminate on the basis of race, color, national origin, age, disability, sex, sexual orientation, or gender identity.            Thank you!     Thank you for choosing [facility]  for your care. Our goal is always to provide you with excellent care. Hearing back from our patients is one way we can continue to improve our services. Please take a few minutes to complete the written survey that you may receive in the mail after your  visit with us. Thank you!             Your Updated Medication List - Protect others around you: Learn how to safely use, store and throw away your medicines at www.disposemymeds.org.          This list is accurate as of 10/3/18  4:20 PM.  Always use your most recent med list.                   Brand Name Dispense Instructions for use Diagnosis    ADVAIR DISKUS 500-50 MCG/DOSE diskus inhaler   Generic drug:  fluticasone-salmeterol     3 Inhaler    INHALE 1 PUFF TWO TIMES DAILY    Pulmonary emphysema, unspecified emphysema type (H)       albuterol 108 (90 Base) MCG/ACT inhaler    VENTOLIN HFA    18 g    Inhale 2 puffs into the lungs every 6 hours as needed for shortness of breath / dyspnea or wheezing    Asthma exacerbation       ALPRAZolam 0.25 MG tablet    XANAX    40 tablet    Take 1 tablet by mouth. Every twelve hours as needed for anxiety    Anxiety       atorvastatin 10 MG tablet    LIPITOR    36 tablet    Takes 3 times a week    Coronary artery disease involving native coronary artery of native heart without angina pectoris, Mixed hyperlipidemia       baclofen 10 MG tablet    LIORESAL    90 tablet    TAKE ONE TABLET BY MOUTH THREE TIMES A DAY AS NEEDED FOR MUSCLE SPASMS    Chronic bilateral low back pain with right-sided sciatica       [employer] FIBER GUMMIES 2.5 g Chew      Take 1 tablet by mouth daily Reported on 5/8/2017        fluticasone 50 MCG/ACT spray    FLONASE    16 g    SPRAY 1 TO 2 SPRAYS IN EACH NOSTRIL ONCE DAILY    Nasal congestion       furosemide 20 MG tablet    LASIX    180 tablet    TAKE 2 TABLETS (40 MG) BY MOUTH DAILY SEVERE SWELLING    Acute systolic congestive heart failure (H)       HYDROcodone-acetaminophen 5-325 MG per tablet    NORCO    180 tablet    Take 1-2 tablets by mouth every 8 hours as needed for pain Maximum 6 daily    Chronic bilateral low back pain without sciatica       HYDROmorphone 2 MG tablet    DILAUDID    18 tablet    Take 1 tablet (2 mg) by mouth every 8 hours as needed for  severe pain    Bilateral low back pain without sciatica, unspecified chronicity, Acute bilateral thoracic back pain       ipratropium - albuterol 0.5 mg/2.5 mg/3 mL 0.5-2.5 (3) MG/3ML neb solution    DUONEB    30 vial    Take 1 vial (3 mLs) by nebulization every 6 hours as needed for shortness of breath / dyspnea or wheezing    Moderate persistent asthma without complication       lidocaine 5 % Patch    LIDODERM    30 patch    Apply up to 3 patches to painful area at once for up to 12 h within a 24 h period.  Remove after 12 hours.    Mechanical low back pain       * lisinopril 2.5 MG tablet    PRINIVIL/Zestril    90 tablet    Take 1 tablet (2.5 mg) by mouth daily    Dilated cardiomyopathy (H)       * lisinopril 2.5 MG tablet    PRINIVIL/Zestril    90 tablet    TAKE 1 TABLET BY MOUTH ONCE DAILY    Dilated cardiomyopathy (H)       montelukast 10 MG tablet    SINGULAIR    90 tablet    Take 1 tablet (10 mg) by mouth At Bedtime    Mild persistent asthma without complication       Multiple Vitamin Chew      Take 1 tablet by mouth daily        ondansetron 4 MG ODT tab    ZOFRAN ODT    20 tablet    Take 1-2 tablets (4-8 mg) by mouth every 8 hours as needed for nausea    Nausea       pantoprazole 40 MG EC tablet    PROTONIX    90 tablet    Take 1 tablet (40 mg) by mouth daily    Gastroesophageal reflux disease without esophagitis       potassium chloride 10 MEQ tablet    K-TAB,KLOR-CON    90 tablet    TAKE ONE TABLET BY MOUTH THREE TIMES A DAY    Hypokalemia       * predniSONE 5 MG tablet    DELTASONE    30 tablet    TAKE 2 TABLETS (10 MG) BY MOUTH DAILY, TAPER DOWN BY 1/2 TAB EVERY 7 DAYS UNTIL DOWN TO 1 TAB DAILY    Pulmonary emphysema, unspecified emphysema type (H)       * predniSONE 20 MG tablet    DELTASONE    20 tablet    Take 3 tabs (60 mg) by mouth daily x 3 days, 2 tabs (40 mg) daily x 3 days, 1 tab (20 mg) daily x 3 days, then 1/2 tab (10 mg) x 3 days.    COPD exacerbation (H)       roflumilast 500 MCG Tabs  tablet    DALIRESP     Take 1 tablet (500 mcg) by mouth daily        SPIRIVA HANDIHALER 18 MCG capsule   Generic drug:  tiotropium     30 capsule    USING THE HANDIHALER, INHALE THE CONTENTS OF ONE CAPSULE BY MOUTH DAILY    Moderate persistent asthma without complication       SUMAtriptan 25 MG tablet    IMITREX    9 tablet    Take 1 tablet (25 mg) by mouth at onset of headache for migraine May repeat in 2 hours if needed: max 2/day;    Chronic migraine without aura without status migrainosus, not intractable       TYLENOL EXTRA STRENGTH PO      1 TABLET EVERY 4 HOURS AS NEEDED    Abdominal pain, right upper quadrant       * Notice:  This list has 4 medication(s) that are the same as other medications prescribed for you. Read the directions carefully, and ask your doctor or other care provider to review them with you.

## 2018-10-03 NOTE — ED PROVIDER NOTES
ED Signout Note    Patient signed out to me by Dr. Lauren, to follow-up on MRI results.  Patient with ongoing low back pain had a lumbar steroid injection about a week ago with increasing pain for the past 3-4 days.  MRI was ordered with concern for possible complications such as hemorrhage or infection.    Preliminary MRI results came back from [facility] by fax    Conclusion:  1.  At L4-L5 there is a shallow left paracentral disc protrusion which contributes to mild left recess stenosis, encroaching on the transversing left L5 nerve root.  2.  Minimal degenerative changes elsewhere as described.    I discussed the MRI results with the patient and her .  At this point no surgical emergency such as infection, hematoma, spine fracture, cauda equina, spinal metastases is identified.    Patient will continue on her long-term Vicodin, use ice packs, light activities.  She will avoid bending or twisting or lifting heavy objects.  She cannot take aspirin or ibuprofen due to allergy.  She will follow-up with her primary, Bandar Weinberg MD and I also recommend follow-up with spine specialist for this paracentral disc bulge.  I provided a photocopy of the patient's preliminary results and also a digital copy of her MRI pictures to help aid in her follow-up.    Questions answered and she is comfortable going home.     Gaetano Izaguirre MD  10/03/18 0015

## 2018-10-03 NOTE — ED TRIAGE NOTES
Patient presents to ED due back pain. States she had spinal injection last week . Increased pain since Sunday.     Denies numbness or tingling lower extremities or incontinence

## 2018-10-05 RX ORDER — TIOTROPIUM BROMIDE 18 UG/1
CAPSULE ORAL; RESPIRATORY (INHALATION)
Qty: 90 CAPSULE | Refills: 3 | Status: SHIPPED | OUTPATIENT
Start: 2018-10-05 | End: 2024-01-01

## 2018-10-08 ENCOUNTER — MYC REFILL (OUTPATIENT)
Dept: INTERNAL MEDICINE | Facility: CLINIC | Age: 63
End: 2018-10-08

## 2018-10-08 DIAGNOSIS — G89.29 CHRONIC BILATERAL LOW BACK PAIN WITHOUT SCIATICA: ICD-10-CM

## 2018-10-08 DIAGNOSIS — M54.50 CHRONIC BILATERAL LOW BACK PAIN WITHOUT SCIATICA: ICD-10-CM

## 2018-10-09 RX ORDER — HYDROCODONE BITARTRATE AND ACETAMINOPHEN 5; 325 MG/1; MG/1
1-2 TABLET ORAL EVERY 8 HOURS PRN
Qty: 180 TABLET | Refills: 0 | Status: SHIPPED | OUTPATIENT
Start: 2018-10-09 | End: 2018-10-10

## 2018-10-09 NOTE — TELEPHONE ENCOUNTER
Message from [employer]:  Amparo Johns Oct 9, 2018 7:45 AM        ----- Message -----   From: Trisha Bender   Sent: 10/8/2018 4:14 PM   To: Radha Laguerre  Subject: Medication Renewal Request     Original authorizing provider: MD Trisha Marvin would like a refill of the following medications:  HYDROcodone-acetaminophen (NORCO) 5-325 MG per tablet [Bandar Weinberg MD]    Preferred pharmacy: [address] [facility].    Comment:  Dr. Weinberg and Nursing Staff, I would like to get a refill on the Hydrocodone-Acetaminophen prescription by Friday. Thank you. Trisha Bender

## 2018-10-10 ENCOUNTER — TELEPHONE (OUTPATIENT)
Dept: INTERNAL MEDICINE | Facility: CLINIC | Age: 63
End: 2018-10-10

## 2018-10-10 ENCOUNTER — OFFICE VISIT (OUTPATIENT)
Dept: NEUROSURGERY | Facility: CLINIC | Age: 63
End: 2018-10-10
Attending: NEUROLOGICAL SURGERY
Payer: COMMERCIAL

## 2018-10-10 VITALS
BODY MASS INDEX: 25.77 KG/M2 | HEART RATE: 103 BPM | WEIGHT: 180 LBS | OXYGEN SATURATION: 95 % | DIASTOLIC BLOOD PRESSURE: 73 MMHG | SYSTOLIC BLOOD PRESSURE: 122 MMHG | HEIGHT: 70 IN

## 2018-10-10 DIAGNOSIS — M54.50 LUMBAR SPINE PAIN: Primary | ICD-10-CM

## 2018-10-10 DIAGNOSIS — G89.29 CHRONIC BILATERAL LOW BACK PAIN WITHOUT SCIATICA: ICD-10-CM

## 2018-10-10 DIAGNOSIS — M54.50 CHRONIC BILATERAL LOW BACK PAIN WITHOUT SCIATICA: ICD-10-CM

## 2018-10-10 PROCEDURE — 99243 OFF/OP CNSLTJ NEW/EST LOW 30: CPT | Performed by: NURSE PRACTITIONER

## 2018-10-10 PROCEDURE — G0463 HOSPITAL OUTPT CLINIC VISIT: HCPCS

## 2018-10-10 RX ORDER — HYDROCODONE BITARTRATE AND ACETAMINOPHEN 5; 325 MG/1; MG/1
1-2 TABLET ORAL EVERY 8 HOURS PRN
Qty: 180 TABLET | Refills: 0 | Status: SHIPPED | OUTPATIENT
Start: 2018-10-10 | End: 2018-11-05

## 2018-10-10 ASSESSMENT — PAIN SCALES - GENERAL: PAINLEVEL: MODERATE PAIN (5)

## 2018-10-10 NOTE — NURSING NOTE
"Trisha Bender is a 63 year old female who presents for:  Chief Complaint   Patient presents with     Neurologic Problem     Bilateral low back pain without sciatica, unspecified chronicity        Vitals:    Vitals:    10/10/18 1425   BP: 122/73   BP Location: Right arm   Patient Position: Sitting   Cuff Size: Adult Regular   Pulse: 103   SpO2: 95%   Weight: 180 lb (81.6 kg)   Height: 5' 10\" (1.778 m)       BMI:  Estimated body mass index is 25.83 kg/(m^2) as calculated from the following:    Height as of this encounter: 5' 10\" (1.778 m).    Weight as of this encounter: 180 lb (81.6 kg).    Pain Score:  Moderate Pain (5)      Do you feel safe in your environment?  Yes      Breann Arevalo    "

## 2018-10-10 NOTE — MR AVS SNAPSHOT
After Visit Summary   10/10/2018    Trisha Bender    MRN: [id number]           Patient Information     Date Of Birth          1955        Visit Information        Provider Department      10/10/2018 2:20 PM Sushila Lovelace APRN CNP [facility]        Today's Diagnoses     Lumbar spine pain    -  1      Care Instructions    1. Please schedule at [facility] chiropractic care with Dr. Cathy Huynh     2. Please contact the clinic if pain persists at [phone].           Follow-ups after your visit        Additional Services     [facility] PT, HAND, AND CHIROPRACTIC REFERRAL       Physical Therapy, Hand Therapy and Chiropractic Care are available through:  *[facility]  *Hand Therapy (Occupational Therapy or Physical Therapy)  *[facility]    Dr. Cathy Huynh   Call one number to schedule at any of the above locations: [phone].    Physical therapy, Hand therapy and/or Chiropractic care has been recommended by your physician as an excellent treatment option to reduce pain and help people return to normal activities, including sports.  Therapy and/or chiropractic care services are a great complement or alternative to expensive and invasive surgery, injections, or long-term use of prescription medications. The primary goal is to identify the underlying problem and provide you the tools to manage your condition on your own.     Please be aware that coverage of these services is subject to the terms and limitations of your health insurance plan.  Call member services at your health plan with any benefit or coverage questions.      Please bring the following to your appointment:  *Your personal calendar for scheduling future appointments  *Comfortable clothing                  Future tests that were ordered for you today     Open Future Orders        Priority Expected Expires Ordered    [facility] PT, HAND, AND CHIROPRACTIC  "REFERRAL Routine  10/10/2019 10/10/2018            Who to contact     If you have questions or need follow up information about today's clinic visit or your schedule please contact [facility] directly at [phone].  Normal or non-critical lab and imaging results will be communicated to you by [employer], letter or phone within 4 business days after the clinic has received the results. If you do not hear from us within 7 days, please contact the clinic through [employer] or phone. If you have a critical or abnormal lab result, we will notify you by phone as soon as possible.  Submit refill requests through [employer] or call your pharmacy and they will forward the refill request to us. Please allow 3 business days for your refill to be completed.          Additional Information About Your Visit        [employer] Information     [employer] gives you secure access to your electronic health record. If you see a primary care provider, you can also send messages to your care team and make appointments. If you have questions, please call your primary care clinic.  If you do not have a primary care provider, please call [phone] and they will assist you.        Care EveryWhere ID     This is your Care EveryWhere ID. This could be used by other organizations to access your [facility] [place] medical records  [id number]        Your Vitals Were     Pulse Height Last Period Pulse Oximetry BMI (Body Mass Index)       103 5' 10\" (1.778 m) 09/29/2005 95% 25.83 kg/m2        Blood Pressure from Last 3 Encounters:   10/10/18 122/73   10/03/18 (!) 140/100   10/03/18 140/81    Weight from Last 3 Encounters:   10/10/18 180 lb (81.6 kg)   10/03/18 183 lb 14.4 oz (83.4 kg)   10/02/18 185 lb (83.9 kg)                 Where to get your medicines      Some of these will need a paper prescription and others can be bought over the counter.  Ask your nurse if you have questions.     Bring a paper prescription for each of these medications "     HYDROcodone-acetaminophen 5-325 MG per tablet          Primary Care Provider Office Phone # Fax #    Bandar Weinberg [phone]       [address]  [facility] [address]        Equal Access to Services     KAMILA ALLEN : Hadii aad ku hadbriano Soomaali, waaxda luqadaha, qaybta kaalmada adeegyada, jonathan blandn frank gamezleora burgess. So [facility] [phone].    ATENCIÓN: Si habla español, tiene a madison disposición servicios gratuitos de asistencia lingüística. [facility] [phone].    We comply with applicable federal civil rights laws and [place] laws. We do not discriminate on the basis of race, color, national origin, age, disability, sex, sexual orientation, or gender identity.            Thank you!     Thank you for choosing [facility]  for your care. Our goal is always to provide you with excellent care. Hearing back from our patients is one way we can continue to improve our services. Please take a few minutes to complete the written survey that you may receive in the mail after your visit with us. Thank you!             Your Updated Medication List - Protect others around you: Learn how to safely use, store and throw away your medicines at www.disposemymeds.org.          This list is accurate as of 10/10/18  2:37 PM.  Always use your most recent med list.                   Brand Name Dispense Instructions for use Diagnosis    ADVAIR DISKUS 500-50 MCG/DOSE diskus inhaler   Generic drug:  fluticasone-salmeterol     3 Inhaler    INHALE 1 PUFF TWO TIMES DAILY    Pulmonary emphysema, unspecified emphysema type (H)       albuterol 108 (90 Base) MCG/ACT inhaler    VENTOLIN HFA    18 g    Inhale 2 puffs into the lungs every 6 hours as needed for shortness of breath / dyspnea or wheezing    Asthma exacerbation       ALPRAZolam 0.25 MG tablet    XANAX    40 tablet    Take 1 tablet by mouth. Every twelve hours as needed for anxiety    Anxiety       atorvastatin 10 MG  tablet    LIPITOR    36 tablet    Takes 3 times a week    Coronary artery disease involving native coronary artery of native heart without angina pectoris, Mixed hyperlipidemia       baclofen 10 MG tablet    LIORESAL    90 tablet    TAKE ONE TABLET BY MOUTH THREE TIMES A DAY AS NEEDED FOR MUSCLE SPASMS    Chronic bilateral low back pain with right-sided sciatica       [employer] FIBER GUMMIES 2.5 g Chew      Take 1 tablet by mouth daily Reported on 5/8/2017        fluticasone 50 MCG/ACT spray    FLONASE    16 g    SPRAY 1 TO 2 SPRAYS IN EACH NOSTRIL ONCE DAILY    Nasal congestion       furosemide 20 MG tablet    LASIX    180 tablet    TAKE 2 TABLETS (40 MG) BY MOUTH DAILY SEVERE SWELLING    Acute systolic congestive heart failure (H)       HYDROcodone-acetaminophen 5-325 MG per tablet    NORCO    180 tablet    Take 1-2 tablets by mouth every 8 hours as needed for pain Maximum 6 daily    Chronic bilateral low back pain without sciatica       HYDROmorphone 2 MG tablet    DILAUDID    18 tablet    Take 1 tablet (2 mg) by mouth every 8 hours as needed for severe pain    Bilateral low back pain without sciatica, unspecified chronicity, Acute bilateral thoracic back pain       ipratropium - albuterol 0.5 mg/2.5 mg/3 mL 0.5-2.5 (3) MG/3ML neb solution    DUONEB    30 vial    Take 1 vial (3 mLs) by nebulization every 6 hours as needed for shortness of breath / dyspnea or wheezing    Moderate persistent asthma without complication       lidocaine 5 % Patch    LIDODERM    30 patch    Apply up to 3 patches to painful area at once for up to 12 h within a 24 h period.  Remove after 12 hours.    Mechanical low back pain       * lisinopril 2.5 MG tablet    PRINIVIL/Zestril    90 tablet    Take 1 tablet (2.5 mg) by mouth daily    Dilated cardiomyopathy (H)       * lisinopril 2.5 MG tablet    PRINIVIL/Zestril    90 tablet    TAKE 1 TABLET BY MOUTH ONCE DAILY    Dilated cardiomyopathy (H)       montelukast 10 MG tablet    SINGULAIR    90  tablet    Take 1 tablet (10 mg) by mouth At Bedtime    Mild persistent asthma without complication       Multiple Vitamin Chew      Take 1 tablet by mouth daily        ondansetron 4 MG ODT tab    ZOFRAN ODT    20 tablet    Take 1-2 tablets (4-8 mg) by mouth every 8 hours as needed for nausea    Nausea       pantoprazole 40 MG EC tablet    PROTONIX    90 tablet    Take 1 tablet (40 mg) by mouth daily    Gastroesophageal reflux disease without esophagitis       potassium chloride 10 MEQ tablet    K-TAB,KLOR-CON    90 tablet    TAKE ONE TABLET BY MOUTH THREE TIMES A DAY    Hypokalemia       * predniSONE 5 MG tablet    DELTASONE    30 tablet    TAKE 2 TABLETS (10 MG) BY MOUTH DAILY, TAPER DOWN BY 1/2 TAB EVERY 7 DAYS UNTIL DOWN TO 1 TAB DAILY    Pulmonary emphysema, unspecified emphysema type (H)       * predniSONE 20 MG tablet    DELTASONE    20 tablet    Take 3 tabs (60 mg) by mouth daily x 3 days, 2 tabs (40 mg) daily x 3 days, 1 tab (20 mg) daily x 3 days, then 1/2 tab (10 mg) x 3 days.    COPD exacerbation (H)       roflumilast 500 MCG Tabs tablet    DALIRESP     Take 1 tablet (500 mcg) by mouth daily        SUMAtriptan 25 MG tablet    IMITREX    9 tablet    Take 1 tablet (25 mg) by mouth at onset of headache for migraine May repeat in 2 hours if needed: max 2/day;    Chronic migraine without aura without status migrainosus, not intractable       tiotropium 18 MCG capsule    SPIRIVA HANDIHALER    90 capsule    USING THE HANDIHALER, INHALE THE CONTENTS OF ONE CAPSULE BY MOUTH DAILY    Moderate persistent asthma without complication       TYLENOL EXTRA STRENGTH PO      1 TABLET EVERY 4 HOURS AS NEEDED    Abdominal pain, right upper quadrant       * Notice:  This list has 4 medication(s) that are the same as other medications prescribed for you. Read the directions carefully, and ask your doctor or other care provider to review them with you.

## 2018-10-10 NOTE — TELEPHONE ENCOUNTER
Panel Management Review      Patient has the following on her problem list:     Hypertension   Last three blood pressure readings:  BP Readings from Last 3 Encounters:   10/03/18 (!) 140/100   10/03/18 140/81   09/24/18 141/85     Blood pressure: MONITOR    HTN Guidelines:  Age 18-59 BP range:  Less than 140/90  Age 60-85 with Diabetes:  Less than 140/90  Age 60-85 without Diabetes:  less than 150/90      Composite cancer screening  Chart review shows that this patient is due/due soon for the following Mammogram  Summary:    Patient is due/failing the following:   MAMMOGRAM    Action needed:   Patient needs referral/order: none    Type of outreach:    Mammogram completed last month    Questions for provider review:    None                                                                                                                                    Madhavi Garcia MA       Chart routed to none .

## 2018-10-10 NOTE — PROGRESS NOTES
Dr. Alberto Flores  [facility]  Neurosurgery Clinic Visit          CC: low back pain     Primary care Provider: Bandar Weinberg      Reason For Visit:   I was asked by Dr. Weinberg to consult on the patient for lumbar pain.      HPI: Trisha Bender is a 63 year old female with low back pain. She notes that she has had this for 2 weeks after bending down with her new puppy. She denies any radicular pain or symptoms.  She reports that she had an injection on 9/24 for her pain and this has not helped. She reports that she has had on and off back pain for years.  She has not had recent PT.  She notes that ROM makes her pain worse. She denies any foot drop or drag. She is taking Norco for her pain.      Pain at its worst 10  Pain right now:  5    Past Medical History:   Diagnosis Date     Anxiety      CAD (coronary artery disease) 7/2/15    mild per cath     CHF (congestive heart failure) (H)     EF=20-25% per echo 6/30/15     Chronic airway obstruction, not elsewhere classified     FEV1 36% of pred; VC 67%  in Jan 06.     COPD (chronic obstructive pulmonary disease) (H)      Esophageal reflux      Mild persistent asthma 8/30/2012     OSTEOPENIA      Other and unspecified hyperlipidemia      Other chronic pain     Entire back     Other forms of migraine, without mention of intractable migraine without mention of status migrainosus      Takotsubo cardiomyopathy      Unspecified essential hypertension        Past Medical History reviewed with patient during visit.    Past Surgical History:   Procedure Laterality Date     ANGIOGRAM  07/02/2015    Minimal CAD. LV dysfunction, Mid RCA 20% stenosis, EF 20-25%, c/w Takotsubo cardiomyopathy     APPENDECTOMY       C NONSPECIFIC PROCEDURE  1974    GB and appendectomy  abstracted [id number]     C NONSPECIFIC PROCEDURE      Nasal septoplasty and mucous retention cyst     CHOLECYSTECTOMY  1974     COLONOSCOPY N/A 4/26/2018    Procedure: COLONOSCOPY;   COLONOSCOPY with biopsies;  Surgeon: Steve Acosta MD;  Location: [facility] OR     ESOPHAGOSCOPY, GASTROSCOPY, DUODENOSCOPY (EGD), COMBINED  11/15/2011    Procedure:COMBINED ESOPHAGOSCOPY, GASTROSCOPY, DUODENOSCOPY (EGD);  ESOPHAGOSCOPY, GASTROSCOPY, DUODENOSCOPY (EGD) ; Surgeon:JIM URENA; Location:[facility] GI     HC REMOVE TONSILS/ADENOIDS,<13 Y/O       INJECT EPIDURAL LUMBAR / SACRAL SINGLE N/A 4/13/2018    Procedure: INJECT EPIDURAL LUMBAR / SACRAL CONTINUAL OR INTERMITTENT;  Lumbar Epidural Steroid Injection;  Surgeon: Az Enamorado MD;  Location: [facility] OR     INJECT EPIDURAL LUMBAR / SACRAL SINGLE N/A 9/24/2018    Procedure: INJECT EPIDURAL LUMBAR / SACRAL SINGLE;  Lumbar Epidural Steroid Injection;  Surgeon: Az Enamorado MD;  Location: [facility] OR     LAPAROSCOPIC ABLATION ENDOMETRIOSIS  1998     LAPAROSCOPY DIAGNOSTIC (GYN)      endometriosis resection / lysis of adhesions     wisdom teeth       Past Surgical History reviewed with patient during visit.    Current Outpatient Prescriptions   Medication     ADVAIR DISKUS 500-50 MCG/DOSE diskus inhaler     albuterol (VENTOLIN HFA) 108 (90 BASE) MCG/ACT Inhaler     ALPRAZolam (XANAX) 0.25 MG tablet     atorvastatin (LIPITOR) 10 MG tablet     baclofen (LIORESAL) 10 MG tablet     [employer] FIBER GUMMIES 2.5 G CHEW     fluticasone (FLONASE) 50 MCG/ACT spray     furosemide (LASIX) 20 MG tablet     HYDROcodone-acetaminophen (NORCO) 5-325 MG per tablet     HYDROmorphone (DILAUDID) 2 MG tablet     ipratropium - albuterol 0.5 mg/2.5 mg/3 mL (DUONEB) 0.5-2.5 (3) MG/3ML neb solution     lidocaine (LIDODERM) 5 % Patch     lisinopril (PRINIVIL/ZESTRIL) 2.5 MG tablet     lisinopril (PRINIVIL/ZESTRIL) 2.5 MG tablet     montelukast (SINGULAIR) 10 MG tablet     Multiple Vitamin CHEW     ondansetron (ZOFRAN ODT) 4 MG ODT tab     pantoprazole (PROTONIX) 40 MG EC tablet     potassium chloride (K-TAB,KLOR-CON) 10 MEQ tablet     predniSONE (DELTASONE) 20 MG tablet     predniSONE (DELTASONE)  5 MG tablet     roflumilast (DALIRESP) 500 MCG TABS tablet     SUMAtriptan (IMITREX) 25 MG tablet     tiotropium (SPIRIVA HANDIHALER) 18 MCG capsule     TYLENOL EXTRA STRENGTH OR     No current facility-administered medications for this visit.        Allergies   Allergen Reactions     Gabapentin Swelling     Contrast Dye      Iodine     Escitalopram      Nausea and sickness     Peanuts [Nuts] Hives     Penicillins      rash     Sulfa Drugs      High family history     Tace [Chlorotrianisene]      joint swelling     Aspirin Rash     After 3 days     Ibuprofen Nausea and Vomiting     Strawberry Rash       Social History     Social History     Marital status:      Spouse name: N/A     Number of children: N/A     Years of education: N/A     Social History Main Topics     Smoking status: Former Smoker     Packs/day: 1.00     Years: 30.00     Quit date: 2006     Smokeless tobacco: Never Used      Comment: Quit in      Alcohol use No     Drug use: No     Sexual activity: No      Comment: vasectomy     Other Topics Concern     Caffeine Concern No     none     Hobby Hazards No     Stress Concern No     Special Diet Yes     low sodium     Exercise Yes     limited due to back     Social History Narrative       Family History   Problem Relation Age of Onset     Cancer Mother      Colon cancer; dxed at age 64;  at age 69     Cancer - colorectal Mother      Other Cancer Mother      GASTROINTESTINAL DISEASE Sister      Acute pancreatitis     HEART DISEASE Father      ?     Coronary Artery Disease Father      Circulatory Maternal Aunt      AAA     Circulatory Maternal Uncle      AAA     Diabetes Son          Review Of Systems  Skin: negative  Eyes: negative  Ears/Nose/Throat: negative  Respiratory: COPD, Asthma, SOB, emphysema   Cardiovascular: CHF, RBBB/HTN, HLD   Gastrointestinal: negative  Genitourinary: negative  Musculoskeletal: back pain  Neurologic: negative  Psychiatric: anxiety and  "depression  Hematologic/Lymphatic/Immunologic: negative  Endocrine: negative     ROS: 10 point ROS neg other than the symptoms noted above in the HPI.      Vital Signs: LMP 09/29/2005     /73 (BP Location: Right arm, Patient Position: Sitting, Cuff Size: Adult Regular)  Pulse 103  Ht 5' 10\" (1.778 m)  Wt 180 lb (81.6 kg)  LMP 09/29/2005  SpO2 95%  BMI 25.83 kg/m2          Examination:  Constitutional:  Alert, well nourished, NAD.  Memory: recent and remote memory intact  HEENT: Normocephalic, atraumatic.   Pulm:  Without shortness of breath   CV:  No pitting edema of BLE.    Neurological:  Awake  Alert  Oriented x 3  Speech clear  Cranial nerves II - XII intact  PERRL  EOMI  Face symmetric  Tongue midline  Motor exam   Shoulder Abduction:  Right:  5/5   Left:  5/5  Biceps:                      Right:  5/5   Left:  5/5  Triceps:                     Right:  5/5   Left:  5/5  Wrist Extensors:       Right:  5/5   Left:  5/5  Wrist Flexors:           Right:  5/5   Left:  5/5  Intrinsics:                   Right:  5/5   Left:  5/5   Hip Flexor:                Right: 5/5  Left:  5/5  Hip Adductor:             Right:  5/5  Left:  5/5  Hip Abductor:             Right:  5/5  Left:  5/5  Gastroc Soleus:        Right:  5/5  Left:  5/5  Tib/Ant:                      Right:  5/5  Left:  5/5  EHL:                          Right:  5/5  Left:  5/5   Sensation normal to bilateral upper and lower extremities  Muscle tone to bilateral upper and lower extremities normal   Gait: Able to stand from a seated position. Normal non-antalgic, non-myelopathic gait.  Able to heel/toe walk without loss of balance    Lumbar examination reveals tenderness of the spine and paraspinous muscles.  Pain with lumbar extension.  Pt has myofacial pain to bilateral flank areas.  Hip height is symmetrical. Negative SI joint, sciatic notch or greater trochanteric tenderness to palpation bilaterally.  Straight leg raise is negative bilaterally.  "         Imaging:     Level by level:      L2-L3: There is a minimal circumferential disc bulge and minimal facet  arthropathy bilaterally. There is no spinal canal or neural foraminal  stenosis at this level. No change from the comparison study.     L3-L4: There is no spinal canal or neural foraminal stenosis at this  level. No change from the comparison study.     L4-L5: There is a circumferential disc bulge with a superimposed left  paracentral/left lateral (foraminal zone) disc herniation (protrusion)  and minimal facet arthropathy bilaterally. There is no spinal canal or  neural foraminal stenosis at this level. No change from the comparison  study.     L5-S1: There is minimal facet arthropathy bilaterally. There is no  spinal canal or neural foraminal stenosis at this level. No change  from the comparison study.         IMPRESSION: Minimal degenerative changes of the lumbar spine as  described above without change from the comparison study. No evidence  for epidural or paraspinous fluid collection or infection.    Thoracic Xray:    IMPRESSION: Mild anterior wedging or superior Schmorl's node type  deformity of the T10 vertebral body that appears new since 4/16/2018.  Mild anterior wedging of the T7 vertebral body is not significantly  changed. Thoracic spine alignment appears to be within normal limits.       Assessment/Plan:     Trisha Bender is a 63 year old female with low back pain. She notes that she has had this for 2 weeks after bending down with her new puppy. She denies any radicular pain or symptoms.  She reports that she had an injection on 9/24 for her pain and this has not helped. She reports that she has had on and off back pain for years.  She has not had recent PT.  She notes that ROM makes her pain worse. She denies any foot drop or drag. She is taking Norco for her pain.  The pt is neurologically intact.  The pt has a disc herniation on the left at  L4-5. No severe stenosis noted.  She  has only noted axial back pain at this time.  Her thoracic xray shows Mild fractures at T10 and T7.  The pts pain today is more to the side of her spines and more myofacial in nature. She denies any radicular pain or weakness.  At this time I would recommend she trial chiropractic care with Dr. Cathy Huynh. She agreed.     Patient Instructions   1. Please schedule at [facility] chiropractic care with Dr. Cathy Huynh     2. Please contact the clinic if pain persists at [phone].        [facility]  [facility]  [address] [address]    Tel [phone]  Pager [phone]

## 2018-10-10 NOTE — PATIENT INSTRUCTIONS
1. Please schedule at [facility] chiropractic care with Dr. Cathy Huynh     2. Please contact the clinic if pain persists at [phone].

## 2018-10-10 NOTE — LETTER
10/10/2018         RE: Trisha Bender  [address] [address] [facility] [address]        Dear Colleague,    Thank you for referring your patient, Trisha Bender, to the [facility]. Please see a copy of my visit note below.    Dr. Alberto Flores  [facility]  Neurosurgery Clinic Visit          CC: low back pain     Primary care Provider: Bandar Weinberg      Reason For Visit:   I was asked by Dr. Weinberg to consult on the patient for lumbar pain.      HPI: Trisha Bender is a 63 year old female with low back pain. She notes that she has had this for 2 weeks after bending down with her new puppy. She denies any radicular pain or symptoms.  She reports that she had an injection on 9/24 for her pain and this has not helped. She reports that she has had on and off back pain for years.  She has not had recent PT.  She notes that ROM makes her pain worse. She denies any foot drop or drag. She is taking Norco for her pain.      Pain at its worst 10  Pain right now:  5    Past Medical History:   Diagnosis Date     Anxiety      CAD (coronary artery disease) 7/2/15    mild per cath     CHF (congestive heart failure) (H)     EF=20-25% per echo 6/30/15     Chronic airway obstruction, not elsewhere classified     FEV1 36% of pred; VC 67%  in Jan 06.     COPD (chronic obstructive pulmonary disease) (H)      Esophageal reflux      Mild persistent asthma 8/30/2012     OSTEOPENIA      Other and unspecified hyperlipidemia      Other chronic pain     Entire back     Other forms of migraine, without mention of intractable migraine without mention of status migrainosus      Takotsubo cardiomyopathy      Unspecified essential hypertension        Past Medical History reviewed with patient during visit.    Past Surgical History:   Procedure Laterality Date     ANGIOGRAM  07/02/2015    Minimal CAD. LV dysfunction, Mid RCA 20% stenosis, EF 20-25%, c/w Takotsubo cardiomyopathy      APPENDECTOMY       C NONSPECIFIC PROCEDURE  1974    GB and appendectomy  abstracted [id number]     C NONSPECIFIC PROCEDURE      Nasal septoplasty and mucous retention cyst     CHOLECYSTECTOMY  1974     COLONOSCOPY N/A 4/26/2018    Procedure: COLONOSCOPY;  COLONOSCOPY with biopsies;  Surgeon: Steve Acosta MD;  Location: [facility]     ESOPHAGOSCOPY, GASTROSCOPY, DUODENOSCOPY (EGD), COMBINED  11/15/2011    Procedure:COMBINED ESOPHAGOSCOPY, GASTROSCOPY, DUODENOSCOPY (EGD);  ESOPHAGOSCOPY, GASTROSCOPY, DUODENOSCOPY (EGD) ; Surgeon:JIM URENA; Location:[facility]     HC REMOVE TONSILS/ADENOIDS,<11 Y/O       INJECT EPIDURAL LUMBAR / SACRAL SINGLE N/A 4/13/2018    Procedure: INJECT EPIDURAL LUMBAR / SACRAL CONTINUAL OR INTERMITTENT;  Lumbar Epidural Steroid Injection;  Surgeon: Az Enamorado MD;  [facility]     INJECT EPIDURAL LUMBAR / SACRAL SINGLE N/A 9/24/2018    Procedure: INJECT EPIDURAL LUMBAR / SACRAL SINGLE;  Lumbar Epidural Steroid Injection;  Surgeon: Az Enamorado MD;  [facility]     LAPAROSCOPIC ABLATION ENDOMETRIOSIS  1998     LAPAROSCOPY DIAGNOSTIC (GYN)      endometriosis resection / lysis of adhesions     wisdom teeth       Past Surgical History reviewed with patient during visit.    Current Outpatient Prescriptions   Medication     ADVAIR DISKUS 500-50 MCG/DOSE diskus inhaler     albuterol (VENTOLIN HFA) 108 (90 BASE) MCG/ACT Inhaler     ALPRAZolam (XANAX) 0.25 MG tablet     atorvastatin (LIPITOR) 10 MG tablet     baclofen (LIORESAL) 10 MG tablet     [employer] FIBER GUMMIES 2.5 G CHEW     fluticasone (FLONASE) 50 MCG/ACT spray     furosemide (LASIX) 20 MG tablet     HYDROcodone-acetaminophen (NORCO) 5-325 MG per tablet     HYDROmorphone (DILAUDID) 2 MG tablet     ipratropium - albuterol 0.5 mg/2.5 mg/3 mL (DUONEB) 0.5-2.5 (3) MG/3ML neb solution     lidocaine (LIDODERM) 5 % Patch     lisinopril (PRINIVIL/ZESTRIL) 2.5 MG tablet     lisinopril (PRINIVIL/ZESTRIL) 2.5 MG tablet     montelukast  (SINGULAIR) 10 MG tablet     Multiple Vitamin CHEW     ondansetron (ZOFRAN ODT) 4 MG ODT tab     pantoprazole (PROTONIX) 40 MG EC tablet     potassium chloride (K-TAB,KLOR-CON) 10 MEQ tablet     predniSONE (DELTASONE) 20 MG tablet     predniSONE (DELTASONE) 5 MG tablet     roflumilast (DALIRESP) 500 MCG TABS tablet     SUMAtriptan (IMITREX) 25 MG tablet     tiotropium (SPIRIVA HANDIHALER) 18 MCG capsule     TYLENOL EXTRA STRENGTH OR     No current facility-administered medications for this visit.        Allergies   Allergen Reactions     Gabapentin Swelling     Contrast Dye      Iodine     Escitalopram      Nausea and sickness     Peanuts [Nuts] Hives     Penicillins      rash     Sulfa Drugs      High family history     Tace [Chlorotrianisene]      joint swelling     Aspirin Rash     After 3 days     Ibuprofen Nausea and Vomiting     Strawberry Rash       Social History     Social History     Marital status:      Spouse name: N/A     Number of children: N/A     Years of education: N/A     Social History Main Topics     Smoking status: Former Smoker     Packs/day: 1.00     Years: 30.00     Quit date: 2006     Smokeless tobacco: Never Used      Comment: Quit in      Alcohol use No     Drug use: No     Sexual activity: No      Comment: vasectomy     Other Topics Concern     Caffeine Concern No     none     Hobby Hazards No     Stress Concern No     Special Diet Yes     low sodium     Exercise Yes     limited due to back     Social History Narrative       Family History   Problem Relation Age of Onset     Cancer Mother      Colon cancer; dxed at age 64;  at age 69     Cancer - colorectal Mother      Other Cancer Mother      GASTROINTESTINAL DISEASE Sister      Acute pancreatitis     HEART DISEASE Father      ?     Coronary Artery Disease Father      Circulatory Maternal Aunt      AAA     Circulatory Maternal Uncle      AAA     Diabetes Son          Review Of Systems  Skin: negative  Eyes:  "negative  Ears/Nose/Throat: negative  Respiratory: COPD, Asthma, SOB, emphysema   Cardiovascular: CHF, RBBB/HTN, HLD   Gastrointestinal: negative  Genitourinary: negative  Musculoskeletal: back pain  Neurologic: negative  Psychiatric: anxiety and depression  Hematologic/Lymphatic/Immunologic: negative  Endocrine: negative     ROS: 10 point ROS neg other than the symptoms noted above in the HPI.      Vital Signs: LMP 09/29/2005     /73 (BP Location: Right arm, Patient Position: Sitting, Cuff Size: Adult Regular)  Pulse 103  Ht 5' 10\" (1.778 m)  Wt 180 lb (81.6 kg)  LMP 09/29/2005  SpO2 95%  BMI 25.83 kg/m2          Examination:  Constitutional:  Alert, well nourished, NAD.  Memory: recent and remote memory intact  HEENT: Normocephalic, atraumatic.   Pulm:  Without shortness of breath   CV:  No pitting edema of BLE.    Neurological:  Awake  Alert  Oriented x 3  Speech clear  Cranial nerves II - XII intact  PERRL  EOMI  Face symmetric  Tongue midline  Motor exam   Shoulder Abduction:  Right:  5/5   Left:  5/5  Biceps:                      Right:  5/5   Left:  5/5  Triceps:                     Right:  5/5   Left:  5/5  Wrist Extensors:       Right:  5/5   Left:  5/5  Wrist Flexors:           Right:  5/5   Left:  5/5  Intrinsics:                   Right:  5/5   Left:  5/5   Hip Flexor:                Right: 5/5  Left:  5/5  Hip Adductor:             Right:  5/5  Left:  5/5  Hip Abductor:             Right:  5/5  Left:  5/5  Gastroc Soleus:        Right:  5/5  Left:  5/5  Tib/Ant:                      Right:  5/5  Left:  5/5  EHL:                          Right:  5/5  Left:  5/5   Sensation normal to bilateral upper and lower extremities  Muscle tone to bilateral upper and lower extremities normal   Gait: Able to stand from a seated position. Normal non-antalgic, non-myelopathic gait.  Able to heel/toe walk without loss of balance    Lumbar examination reveals tenderness of the spine and paraspinous muscles.  " Pain with lumbar extension.  Pt has myofacial pain to bilateral flank areas.  Hip height is symmetrical. Negative SI joint, sciatic notch or greater trochanteric tenderness to palpation bilaterally.  Straight leg raise is negative bilaterally.          Imaging:     Level by level:      L2-L3: There is a minimal circumferential disc bulge and minimal facet  arthropathy bilaterally. There is no spinal canal or neural foraminal  stenosis at this level. No change from the comparison study.     L3-L4: There is no spinal canal or neural foraminal stenosis at this  level. No change from the comparison study.     L4-L5: There is a circumferential disc bulge with a superimposed left  paracentral/left lateral (foraminal zone) disc herniation (protrusion)  and minimal facet arthropathy bilaterally. There is no spinal canal or  neural foraminal stenosis at this level. No change from the comparison  study.     L5-S1: There is minimal facet arthropathy bilaterally. There is no  spinal canal or neural foraminal stenosis at this level. No change  from the comparison study.         IMPRESSION: Minimal degenerative changes of the lumbar spine as  described above without change from the comparison study. No evidence  for epidural or paraspinous fluid collection or infection.    Thoracic Xray:    IMPRESSION: Mild anterior wedging or superior Schmorl's node type  deformity of the T10 vertebral body that appears new since 4/16/2018.  Mild anterior wedging of the T7 vertebral body is not significantly  changed. Thoracic spine alignment appears to be within normal limits.       Assessment/Plan:     Trisha Bender is a 63 year old female with low back pain. She notes that she has had this for 2 weeks after bending down with her new puppy. She denies any radicular pain or symptoms.  She reports that she had an injection on 9/24 for her pain and this has not helped. She reports that she has had on and off back pain for years.  She has  not had recent PT.  She notes that ROM makes her pain worse. She denies any foot drop or drag. She is taking Norco for her pain.  The pt is neurologically intact.  The pt has a disc herniation on the left at  L4-5. No severe stenosis noted.  She has only noted axial back pain at this time.  Her thoracic xray shows Mild fractures at T10 and T7.  The pts pain today is more to the side of her spines and more myofacial in nature. She denies any radicular pain or weakness.  At this time I would recommend she trial chiropractic care with Dr. Cathy Huynh. She agreed.     Patient Instructions   1. Please schedule at [facility] chiropractic care with Dr. Cathy Huynh     2. Please contact the clinic if pain persists at [phone].        Sushila Lovelace CNP  [facility]  [address] [address]    Tel [phone]  Pager [phone]        Again, thank you for allowing me to participate in the care of your patient.        Sincerely,        ELLE Jordan CNP

## 2018-11-05 ENCOUNTER — MYC MEDICAL ADVICE (OUTPATIENT)
Dept: INTERNAL MEDICINE | Facility: CLINIC | Age: 63
End: 2018-11-05

## 2018-11-05 ENCOUNTER — MYC REFILL (OUTPATIENT)
Dept: INTERNAL MEDICINE | Facility: CLINIC | Age: 63
End: 2018-11-05

## 2018-11-05 DIAGNOSIS — G89.29 CHRONIC BILATERAL LOW BACK PAIN WITHOUT SCIATICA: ICD-10-CM

## 2018-11-05 DIAGNOSIS — Z79.899 CONTROLLED SUBSTANCE AGREEMENT SIGNED: Primary | ICD-10-CM

## 2018-11-05 DIAGNOSIS — M54.50 CHRONIC BILATERAL LOW BACK PAIN WITHOUT SCIATICA: ICD-10-CM

## 2018-11-05 DIAGNOSIS — F41.9 ANXIETY: ICD-10-CM

## 2018-11-05 RX ORDER — ALPRAZOLAM 0.25 MG
TABLET ORAL
Qty: 40 TABLET | Refills: 0 | Status: CANCELLED | OUTPATIENT
Start: 2018-11-05

## 2018-11-07 ENCOUNTER — MYC MEDICAL ADVICE (OUTPATIENT)
Dept: INTERNAL MEDICINE | Facility: CLINIC | Age: 63
End: 2018-11-07

## 2018-11-07 RX ORDER — HYDROCODONE BITARTRATE AND ACETAMINOPHEN 5; 325 MG/1; MG/1
1-2 TABLET ORAL EVERY 8 HOURS PRN
Qty: 180 TABLET | Refills: 0 | Status: SHIPPED | OUTPATIENT
Start: 2018-11-07 | End: 2018-12-03

## 2018-11-07 NOTE — TELEPHONE ENCOUNTER
Rx placed on folder for [facility]. Called Pt and advised urine drug screening. She agrees and will stop within 2-3 days.  Madhavi Garcia MA

## 2018-11-07 NOTE — TELEPHONE ENCOUNTER
norco  Last Written Prescription Date:  10/10/18  Last Fill Quantity: 180,   # refills: 0  Last Office Visit: 10/3/18  Future Office visit:    Next 5 appointments (look out 90 days)     Nov 26, 2018  4:20 PM CST   Elliott Rodriguez with Bandar Weinberg MD   [facility]    [address] [place]  [facility] [address]   [phone]                   Routing refill request to provider for review/approval because:  Drug not on the [facility], [facility] or  Health refill protocol or controlled substance

## 2018-11-12 ENCOUNTER — THERAPY VISIT (OUTPATIENT)
Dept: CHIROPRACTIC MEDICINE | Facility: CLINIC | Age: 63
End: 2018-11-12
Payer: COMMERCIAL

## 2018-11-12 ENCOUNTER — MYC MEDICAL ADVICE (OUTPATIENT)
Dept: INTERNAL MEDICINE | Facility: CLINIC | Age: 63
End: 2018-11-12

## 2018-11-12 DIAGNOSIS — M54.6 PAIN IN THORACIC SPINE: Primary | ICD-10-CM

## 2018-11-12 DIAGNOSIS — M54.50 LUMBAGO: ICD-10-CM

## 2018-11-12 DIAGNOSIS — M54.50 LUMBAR SPINE PAIN: ICD-10-CM

## 2018-11-12 DIAGNOSIS — M99.03 SOMATIC DYSFUNCTION OF LUMBAR REGION: ICD-10-CM

## 2018-11-12 PROCEDURE — 97810 ACUP 1/> WO ESTIM 1ST 15 MIN: CPT | Performed by: CHIROPRACTOR

## 2018-11-12 PROCEDURE — 99203 OFFICE O/P NEW LOW 30 MIN: CPT | Mod: 25 | Performed by: CHIROPRACTOR

## 2018-11-12 NOTE — PROGRESS NOTES
Chiropractic Clinic Visit    PCP: Bandar Weinberg    Trisha Bender is a 63 year old female who is seen  in consultation at the request of  Sushila Lovelace C.N.P. presenting with chronic back pain. Patient reports having lumbar spine pain for many years. The problem that brings Trisha to the clinic today is an rapid increase in the mid-back pain on Sept. 10, 2018. Patient reports she did have an x-ray of the thoracic spine which showed Schormals nodes and anterior wedging.  When asked, patient denies:, falling, slipping, bending and reaching or sleeping awkwardly. Patient reports she has been on Prednisone for COPD of the lungs for a long period of time and this might affect her bones and the potential for fractures.  Prior to onset, the patient was able to sit for 2 hours without pain, able to sleep 6 hours without waking due to pain. Patient notes that due to symptoms, they can only sit 1/2 hour. Trisha Bender notes   sleeping rated at a 8/10 difficulty  and prior to this incident it was 2/10.    Thoracic x-ray Oct. 2018:  IMPRESSION: Mild anterior wedging or superior Schmorl's node type  deformity of the T10 vertebral body that appears new since 4/16/2018.  Mild anterior wedging of the T7 vertebral body is not significantly  changed. Thoracic spine alignment appears to be within normal limits.    Injury: No known injury    Location of Pain: bilateral thoracic spine, chronic ongoing neck and lumbar spine pain at the following level(s) T7 , T10, L4 , L5  and Sacrum   Duration of Pain: Ongoing for years. A severe exacerbation of thoracic spine pain at T10 started Sept. 2018   Rating of Pain at worst: 10/10  Rating of Pain Currently: 9/10  Symptoms are better with: Vicodin takes the edge off.   Symptoms are worse with: reaching, walking, lying on her right or left side in bed  Additional Features: States it is difficult to take a deep breath due to the back pain       Health History  as reported by  the patient:    How does the patient rate their own health:   Poor    Current or past medical history:   Asthma, Emphysema, Migraines/headaches, Osteoarthritis, Pain at night/rest and other: COPD    Medical allergies  Other: PCN, gabapentin    Past Traumas/Surgeries  None    Family History  This patient has no significant family history    Medications:  Muscle relaxants, Pain and Steroids    Occupation:  Retired homemaker    Primary job tasks:   Prolonged sitting    Barriers as home/work:   none    Additional health Issues:     NA      Review of Systems  Musculoskeletal: as above  Remainder of review of systems is negative including constitutional, CV, pulmonary, GI, Skin and Neurologic except as noted in HPI or medical history.    Past Medical History:   Diagnosis Date     Anxiety      CAD (coronary artery disease) 7/2/15    mild per cath     CHF (congestive heart failure) (H)     EF=20-25% per echo 6/30/15     Chronic airway obstruction, not elsewhere classified     FEV1 36% of pred; VC 67%  in Jan 06.     COPD (chronic obstructive pulmonary disease) (H)      Esophageal reflux      Mild persistent asthma 8/30/2012     OSTEOPENIA      Other and unspecified hyperlipidemia      Other chronic pain     Entire back     Other forms of migraine, without mention of intractable migraine without mention of status migrainosus      Takotsubo cardiomyopathy      Unspecified essential hypertension      Past Surgical History:   Procedure Laterality Date     ANGIOGRAM  07/02/2015    Minimal CAD. LV dysfunction, Mid RCA 20% stenosis, EF 20-25%, c/w Takotsubo cardiomyopathy     APPENDECTOMY       C NONSPECIFIC PROCEDURE  1974    GB and appendectomy  abstracted [id number]     C NONSPECIFIC PROCEDURE      Nasal septoplasty and mucous retention cyst     CHOLECYSTECTOMY  1974     COLONOSCOPY N/A 4/26/2018    Procedure: COLONOSCOPY;  COLONOSCOPY with biopsies;  Surgeon: Steve Acosta MD;  Location:  OR     ESOPHAGOSCOPY, GASTROSCOPY,  DUODENOSCOPY (EGD), COMBINED  11/15/2011    Procedure:COMBINED ESOPHAGOSCOPY, GASTROSCOPY, DUODENOSCOPY (EGD);  ESOPHAGOSCOPY, GASTROSCOPY, DUODENOSCOPY (EGD) ; Surgeon:JIM URENA; Location:[facility]     HC REMOVE TONSILS/ADENOIDS,<11 Y/O       INJECT EPIDURAL LUMBAR / SACRAL SINGLE N/A 4/13/2018    Procedure: INJECT EPIDURAL LUMBAR / SACRAL CONTINUAL OR INTERMITTENT;  Lumbar Epidural Steroid Injection;  Surgeon: Az Enamorado MD;  [facility]     INJECT EPIDURAL LUMBAR / SACRAL SINGLE N/A 9/24/2018    Procedure: INJECT EPIDURAL LUMBAR / SACRAL SINGLE;  Lumbar Epidural Steroid Injection;  Surgeon: Az Enamorado MD;  [facility]     LAPAROSCOPIC ABLATION ENDOMETRIOSIS  1998     LAPAROSCOPY DIAGNOSTIC (GYN)      endometriosis resection / lysis of adhesions     wisdom teeth         Objective  LMP 09/29/2005    GENERAL APPEARANCE: healthy, alert and mild distress   GAIT: antalgic and wheelchair  SKIN: no suspicious lesions or rashes  NEURO: Normal strength and tone, mentation intact and speech normal  PSYCH:  mentation appears normal and affect normal/bright      Cervical Spine Exam    Range of Motion:        Slight to moderate active and passive ROM forward flexion, extension, lateral rotation, lateral flexion.    THORACIC ROM:   Moderate reduction in all motions with mid-back pain.    Inspection:         No visible deformity        Increased kyphosis    Tender:        paraspinal muscles  Very tender over the T10 region, ribs right    Non-Tender:        remainder of cervical spine area    Reflexes:        C5 (biceps) symmetric 2 bilaterally       C6 (supinator) symmetric 2 bilaterally       C7 (triceps) symmetric 2 bilaterally    Sensation:       grossly intact througout bilateral upper extremities    Special Tests:       Cervical compression-Neg  Distraction - negative    Lymphatics:        no edema noted in the upper extremities       Segmental spinal dysfunction/restrictions found at:  :  T9 Right  rotation restricted  T10 Right rotation restricted.      The following soft tissue hypotonicities were observed:T paraspinals: ache, dull pain and stiff, no    Trigger points were found in:Rhomboids    Muscle spasm found in:Rhomboids and T-spine paraspinal      Radiology:  See EPIC    Assessment:    1. Lumbar spine pain        RX ordered/plan of care  Anticipated outcomes  Possible risks and side effects    After discussing the risk and benefits of care, patient consented to treatment    Patient's condition:  Patient has restriction, however has high level of pain.     Treatment effectiveness:  Symptoms appear to be decreasing-after acupuncture    Plan:    Procedures:    Evaluation and Management:  78551 Moderate level exam 30 min    CMT:  NO SMT due to pain levels being so high-patient would not be able to tolerate this.     Modalities:  53445: Acupuncture, for 15 minutes:  Points: for mid-back pain from T7-T12   Muscle energy-gentle laying of the hands over the right thoracic spine-for 3-4 minutes    Therapeutic procedures:  None    Response to Treatment  Patient tolerated the treatment well today    Prognosis: Guarded      Treatment plan and goals:  Goals:  SLEEPING: the patient specific goal is to attain his pre-injury status of 5 hours comfortably  PAIN: the patient specific goal of thier symptoms is to attain the pre-inury state of 4-5/10 on the VAS    Frequency of care  Duration of care is estimated to be 6-8  weeks, from the initial treatment.  It is estimated that the patient will need a total of 6-8 visits to resolve this episode.  For the initial therapeutic trial of care, the frequency is recommended at 1 X week, once daily.  A reevaluation would be clinically appropriate in 8 visits, to determine progress and further course of care.    In-Office Treatment  Evaluation  66292 Moderate level exam 30 min    CMT:  NO SMT due to pain levels being so high-patient would not be able to tolerate this.   May try  at a later time if appropriate    Modalities:  94156: Acupuncture, for 15 minutes:  Points: for mid-back pain from T7-T12   Muscle energy-gentle laying of the hands over the right thoracic spine-for 3-4 minutes      Recommendations:    Instructions:ice 20 minutes every other hour as needed and heat 15 minutes every other hour as needed    Follow-up:  Return to care in one week.  Discussed due to her high pain levels she should get at thoracic MRI. Possible thoracic disc injury or compression fracture.      Disclaimer: This note consists of symbols derived from keyboarding, dictation and/or voice recognition software. As a result, there may be errors in the script that have gone undetected. Please consider this when interpreting information found in this chart.

## 2018-11-13 ENCOUNTER — MYC MEDICAL ADVICE (OUTPATIENT)
Dept: INTERNAL MEDICINE | Facility: CLINIC | Age: 63
End: 2018-11-13

## 2018-11-13 ENCOUNTER — TELEPHONE (OUTPATIENT)
Dept: NEUROSURGERY | Facility: CLINIC | Age: 63
End: 2018-11-13

## 2018-11-13 DIAGNOSIS — M54.6 PAIN IN THORACIC SPINE: Primary | ICD-10-CM

## 2018-11-13 NOTE — TELEPHONE ENCOUNTER
Pt is calling wanting to know if Dr Weinberg will order an MRI today.  Pt also is concerned about the MRI since her back hurts.  Pt is wanting something for pain.  Pls call pt [phone].

## 2018-11-13 NOTE — TELEPHONE ENCOUNTER
"Patient called to add that Dr. [facility]vel[facility] did go ahead and order the MRI for her but would prefer that primary care provider handle all pain medications.      MRI is scheduled tomorrow 11/14/18 at radiology in [facility] at 4:15 pm.  She would like to receive the same \"shot\" that primary care provider ordered when she had for a previous MRI.  Otherwise the pain of laying flat takes her breath away.  CHAI Maria R.N.    "

## 2018-11-13 NOTE — TELEPHONE ENCOUNTER
Per Sushila Lovelace CNP small fractures noted. We can order thoracic MRI. Patient needs to keep appt with pulmonology. PCP will have to manage pain.     Spoke to patient. Discussed above recommendations. Patient voiced agreement and understanding. Order for MRI in [facility]. Patient will call to schedule. Sushila will call her with results and recommendations.

## 2018-11-13 NOTE — TELEPHONE ENCOUNTER
Had appt with Cathy Huynh Chiropractor yesterday. No adjustments done d/t fracture in thoracic spine. Patient reports increased thoracic pain and very uncomfortable. Patient is currently taking Norco for pain, said it takes the edge off but reports being in a high degree of pain. Can't expand right lung to take a breath. Patient has a hx of COPD. She is following up with her Pulmonologist tomorrow. Patient has a call out to PCP about ordering a thoracic MRI. Patient saw Sushila Lovelace CNP 10/10/18. Patient wants to know what next steps would be to address pain . Will discuss with care team for recommendation.

## 2018-11-14 ENCOUNTER — TRANSFERRED RECORDS (OUTPATIENT)
Dept: HEALTH INFORMATION MANAGEMENT | Facility: CLINIC | Age: 63
End: 2018-11-14

## 2018-11-14 ENCOUNTER — MYC MEDICAL ADVICE (OUTPATIENT)
Dept: INTERNAL MEDICINE | Facility: CLINIC | Age: 63
End: 2018-11-14

## 2018-11-14 DIAGNOSIS — M54.6 ACUTE BILATERAL THORACIC BACK PAIN: ICD-10-CM

## 2018-11-14 DIAGNOSIS — M54.50 BILATERAL LOW BACK PAIN WITHOUT SCIATICA, UNSPECIFIED CHRONICITY: ICD-10-CM

## 2018-11-15 ENCOUNTER — TELEPHONE (OUTPATIENT)
Dept: NEUROSURGERY | Facility: CLINIC | Age: 63
End: 2018-11-15

## 2018-11-15 ENCOUNTER — MYC MEDICAL ADVICE (OUTPATIENT)
Dept: INTERNAL MEDICINE | Facility: CLINIC | Age: 63
End: 2018-11-15

## 2018-11-15 NOTE — TELEPHONE ENCOUNTER
Patient called to report that she was unable to complete her MRI because of her back pain is too severe to lie down flat. Patient did use 2 tablets of Vicodin as prescribed from her PCP. Patient would like to have MRI under sedation or with IV or injectable pain medications. Will forward to care team for review.

## 2018-11-16 ENCOUNTER — MYC REFILL (OUTPATIENT)
Dept: INTERNAL MEDICINE | Facility: CLINIC | Age: 63
End: 2018-11-16

## 2018-11-16 DIAGNOSIS — M54.50 BILATERAL LOW BACK PAIN WITHOUT SCIATICA, UNSPECIFIED CHRONICITY: ICD-10-CM

## 2018-11-16 DIAGNOSIS — M54.6 ACUTE BILATERAL THORACIC BACK PAIN: ICD-10-CM

## 2018-11-16 RX ORDER — HYDROMORPHONE HYDROCHLORIDE 2 MG/1
2 TABLET ORAL EVERY 8 HOURS PRN
Qty: 10 TABLET | Refills: 0 | Status: SHIPPED | OUTPATIENT
Start: 2018-11-16 | End: 2018-12-30

## 2018-11-16 RX ORDER — HYDROMORPHONE HYDROCHLORIDE 2 MG/1
2 TABLET ORAL EVERY 8 HOURS PRN
Qty: 18 TABLET | Refills: 0 | Status: CANCELLED | OUTPATIENT
Start: 2018-11-16

## 2018-11-16 NOTE — TELEPHONE ENCOUNTER
Pt reports that Dr. Weinberg is the only one who can fill the Dilaudid for her:     Last fill: 10/3/18 #18 tablets    LOV: 10/3/18    Would like Rx to go to the [facility] pharmacy.   She also sent message in separate encounter relaying that Dr. Lovelace will be ordering a sitting MRI for her-said the images won't be as good, but she should be able to tolerate this better.

## 2018-11-20 ENCOUNTER — HOSPITAL ENCOUNTER (OUTPATIENT)
Dept: MRI IMAGING | Facility: CLINIC | Age: 63
Discharge: HOME | End: 2018-11-20
Attending: NURSE PRACTITIONER | Admitting: NURSE PRACTITIONER
Payer: COMMERCIAL

## 2018-11-20 PROCEDURE — 72146 MRI CHEST SPINE W/O DYE: CPT

## 2018-11-20 NOTE — TELEPHONE ENCOUNTER
Message from MyChart:  Original authorizing provider: MD Trisha Marvin would like a refill of the following medications:  HYDROmorphone (DILAUDID) 2 MG tablet [Bandar Weinberg MD]    Preferred pharmacy: [facility] [address] [address] [employer]    Comment:  Dr. Weinberg and Nursing Staff, Per Dr. Weinberg s last letter could I please get refill on the Dilaudid prescription? Thank you.

## 2018-11-26 ENCOUNTER — TELEPHONE (OUTPATIENT)
Dept: NEUROSURGERY | Facility: CLINIC | Age: 63
End: 2018-11-26

## 2018-11-26 DIAGNOSIS — S22.078A OTHER CLOSED FRACTURE OF NINTH THORACIC VERTEBRA, INITIAL ENCOUNTER (H): Primary | ICD-10-CM

## 2018-11-26 NOTE — TELEPHONE ENCOUNTER
Thoracic MRI shows acute/subacute T9 fracture. Recc. TLSO. IR vertebroplasty also consulted.  Recc. Brace for 6 weeks.

## 2018-12-03 ENCOUNTER — MYC REFILL (OUTPATIENT)
Dept: INTERNAL MEDICINE | Facility: CLINIC | Age: 63
End: 2018-12-03

## 2018-12-03 DIAGNOSIS — G89.29 CHRONIC BILATERAL LOW BACK PAIN WITHOUT SCIATICA: ICD-10-CM

## 2018-12-03 DIAGNOSIS — M54.50 CHRONIC BILATERAL LOW BACK PAIN WITHOUT SCIATICA: ICD-10-CM

## 2018-12-05 ENCOUNTER — HOSPITAL ENCOUNTER (OUTPATIENT)
Dept: GENERAL RADIOLOGY | Facility: CLINIC | Age: 63
Discharge: HOME | End: 2018-12-05
Attending: NURSE PRACTITIONER | Admitting: NURSE PRACTITIONER
Payer: COMMERCIAL

## 2018-12-05 DIAGNOSIS — S22.078A OTHER CLOSED FRACTURE OF NINTH THORACIC VERTEBRA, INITIAL ENCOUNTER (H): ICD-10-CM

## 2018-12-05 RX ORDER — HYDROCODONE BITARTRATE AND ACETAMINOPHEN 5; 325 MG/1; MG/1
1-2 TABLET ORAL EVERY 8 HOURS PRN
Qty: 180 TABLET | Refills: 0 | Status: SHIPPED | OUTPATIENT
Start: 2018-12-05 | End: 2018-12-30

## 2018-12-05 NOTE — TELEPHONE ENCOUNTER
Norco    TAKE to [facility]     Last Written Prescription Date:  11/7/18  Last Fill Quantity: 180,   # refills: 0    Last Office Visit: 10/3/18    Future Office visit:    Next 5 appointments (look out 90 days)     Dec 17, 2018  4:00 PM CST   Elliott Rodriguez with Bandar Weinberg MD   [facility]    [address] [facility] [id number] [phone]                 Routing refill request to provider for review/approval because:  Drug not on the [facility], [facility] or [facility] refill protocol or controlled substance

## 2018-12-05 NOTE — TELEPHONE ENCOUNTER
Message from Moogihart:  Original authorizing provider: MD Trisha Marvin would like a refill of the following medications:  HYDROcodone-acetaminophen (NORCO) 5-325 MG per tablet [Bandar Weinberg MD]    [place] [facility] [place] [address] [facility]    Comment:  Dr. Weinberg, I would like to get a refill on the Hydrocodone-Acetaminophen prescription as soon as possible. Would like to pick it up on Friday. Thank you. Trisha Bender

## 2018-12-05 NOTE — PROGRESS NOTES
VERTEBROPLASTY PAIN MANAGEMENT CONSULT  Patient Name: Trisha Bender  Patient MRN: [id number]  Patient : 1955    S: Ms. Bender is a 63 year old year old female presenting for evaluation of of her mid back pain which potentially starting in 2018 while picking up her dog.  The patient was referred to a chiropractor who provided acupuncture without relief. At that time she was also requesting pain medications from her primary care provider but lumbar imaging at that time did not show any fractures.  No thoracic imaging done. On November 10 she attempted to have a MRI done but due to the pain while lying on the table, she was unable to complete the exam.  On , she was given pain medications prior to the MRI and they were able to get the thoracic and lumbar imaging completed.  This study showed a T9 acute fracture and a chronic T10 fracture.  The patient has been using some Vicodin, heat pads, and she has had to sleep in a recliner.  She does not tolerant the back brace. She has a history of COPD and has been using prednisone chronically.  In addition she has been evaluated for a lung transplant but has declined at this time.  During the evaluation, a DEXA scan showed osteoporosis in 2018.  The patient rates her pain currently is 7 out of 10 and this can significantly rise in certain positions.  Her pain is worst in the mid back.      Past Medical History:   Diagnosis Date     Anxiety      CAD (coronary artery disease) 7/2/15    mild per cath     CHF (congestive heart failure) (H)     EF=20-25% per echo 6/30/15     Chronic airway obstruction, not elsewhere classified     FEV1 36% of pred; VC 67%  in .     COPD (chronic obstructive pulmonary disease) (H)      Esophageal reflux      Mild persistent asthma 2012     OSTEOPENIA      Other and unspecified hyperlipidemia      Other chronic pain     Entire back     Other forms of migraine, without mention of intractable migraine  without mention of status migrainosus      Takotsubo cardiomyopathy      Unspecified essential hypertension        Prescription Medications as of 12/5/2018             ADVAIR DISKUS 500-50 MCG/DOSE diskus inhaler INHALE 1 PUFF TWO TIMES DAILY    albuterol (VENTOLIN HFA) 108 (90 BASE) MCG/ACT Inhaler Inhale 2 puffs into the lungs every 6 hours as needed for shortness of breath / dyspnea or wheezing    ALPRAZolam (XANAX) 0.25 MG tablet Take 1 tablet by mouth. Every twelve hours as needed for anxiety    atorvastatin (LIPITOR) 10 MG tablet Takes 3 times a week    baclofen (LIORESAL) 10 MG tablet TAKE ONE TABLET BY MOUTH THREE TIMES A DAY AS NEEDED FOR MUSCLE SPASMS    [employer] FIBER GUMMIES 2.5 G CHEW Take 1 tablet by mouth daily Reported on 5/8/2017    fluticasone (FLONASE) 50 MCG/ACT spray SPRAY 1 TO 2 SPRAYS IN EACH NOSTRIL ONCE DAILY    furosemide (LASIX) 20 MG tablet TAKE 2 TABLETS (40 MG) BY MOUTH DAILY SEVERE SWELLING    HYDROcodone-acetaminophen (NORCO) 5-325 MG per tablet Take 1-2 tablets by mouth every 8 hours as needed for pain Maximum 6 daily    HYDROmorphone (DILAUDID) 2 MG tablet Take 1 tablet (2 mg) by mouth every 8 hours as needed for severe pain    ipratropium - albuterol 0.5 mg/2.5 mg/3 mL (DUONEB) 0.5-2.5 (3) MG/3ML neb solution Take 1 vial (3 mLs) by nebulization every 6 hours as needed for shortness of breath / dyspnea or wheezing    lidocaine (LIDODERM) 5 % Patch Apply up to 3 patches to painful area at once for up to 12 h within a 24 h period.  Remove after 12 hours.    lisinopril (PRINIVIL/ZESTRIL) 2.5 MG tablet TAKE 1 TABLET BY MOUTH ONCE DAILY    lisinopril (PRINIVIL/ZESTRIL) 2.5 MG tablet Take 1 tablet (2.5 mg) by mouth daily    montelukast (SINGULAIR) 10 MG tablet Take 1 tablet (10 mg) by mouth At Bedtime    Multiple Vitamin CHEW Take 1 tablet by mouth daily     ondansetron (ZOFRAN ODT) 4 MG ODT tab Take 1-2 tablets (4-8 mg) by mouth every 8 hours as needed for nausea    pantoprazole (PROTONIX) 40  MG EC tablet Take 1 tablet (40 mg) by mouth daily    potassium chloride (K-TAB,KLOR-CON) 10 MEQ tablet TAKE ONE TABLET BY MOUTH THREE TIMES A DAY    predniSONE (DELTASONE) 20 MG tablet Take 3 tabs (60 mg) by mouth daily x 3 days, 2 tabs (40 mg) daily x 3 days, 1 tab (20 mg) daily x 3 days, then 1/2 tab (10 mg) x 3 days.    predniSONE (DELTASONE) 5 MG tablet TAKE 2 TABLETS (10 MG) BY MOUTH DAILY, TAPER DOWN BY 1/2 TAB EVERY 7 DAYS UNTIL DOWN TO 1 TAB DAILY    roflumilast (DALIRESP) 500 MCG TABS tablet Take 1 tablet (500 mcg) by mouth daily    SUMAtriptan (IMITREX) 25 MG tablet Take 1 tablet (25 mg) by mouth at onset of headache for migraine May repeat in 2 hours if needed: max 2/day;    tiotropium (SPIRIVA HANDIHALER) 18 MCG capsule USING THE HANDIHALER, INHALE THE CONTENTS OF ONE CAPSULE BY MOUTH DAILY    TYLENOL EXTRA STRENGTH OR 1 TABLET EVERY 4 HOURS AS NEEDED          Allergies   Allergen Reactions     Gabapentin Swelling     Contrast Dye      Iodine     Escitalopram      Nausea and sickness     Peanuts [Nuts] Hives     Penicillins      rash     Sulfa Drugs      High family history     Tace [Chlorotrianisene]      joint swelling     Aspirin Rash     After 3 days     Ibuprofen Nausea and Vomiting     Strawberry Rash       Review Of Systems  Respiratory: Shortness of breath- Hx of COPD  Cardiovascular: negative  Gastrointestinal: negative  Genitourinary: negative  Musculoskeletal: negative  Neurologic: negative  Hematologic/Lymphatic/Immunologic: negative    Objective:  GA: WDWN female in NAD  Heart: RRR, Normal S1 and S1  Lungs: CTAB.  BACK: TTP over the mid back maximally.  The other areas palpated were uncomfortable but the mid back was the worst.  LOWER EXTREMITIES: Normal circulation, sensation, and strength.     Imaging:   Recent Results (from the past 744 hour(s))   MR Thoracic Spine w/o Contrast    Narrative    MRI THORACIC SPINE WITHOUT CONTRAST  11/20/2018 1:56 PM     HISTORY: Pain in thoracic  spine.    TECHNIQUE: Multiplanar, multisequence MRI of the thoracic spine  without contrast.    COMPARISON: MRI 11/11/2016, x-rays 10/3/2018    FINDINGS: There is mild (5-10%) vertebral body height loss involving  the T9 vertebral body with associated T1 hypointensity along the  superior endplate with T2 hyperintensity throughout the vertebral  body. Mild irregularity and Schmorl's node formation is present  involving the T10 vertebral body superior endplate with mild (5%)  height loss. Multiple small disc protrusions are present which do not  result in significant spinal canal narrowing. Small right central disc  protrusion is present at T8-9. Spinal canal is widely patent. No  retropulsion of fragments or posterior element extension. Thoracic  cord is normal in contour and signal.      Impression    IMPRESSION:    1. Acute/subacute T9 compression fracture with mild height loss, new  compared to 2016.  2. T10 compression deformity with mild height loss, Schmorl's node  formation, and mild associated T2 hyperintensity/edema, new compared  to 2016.    REBA WILLINGHAM MD         Assessment/Plan:  1. T9 Acute/Subacute compression fracture. Reviewed with Dr. Durán who recommneded tx of T9 if clinically significant pain.  Pt has 7/10 pain would like to proceed.  Pt accompanied by her .  Due to history of COPD, I asked for a pre-operative assessment.  2. The patient should work with the primary care provider to optimize her treatment for bone density.  3. Follow-up for the vertebroplasty will occur at 1 week, 1 month, and 3 months over the phone. The patient can call [phone] if she has any questions.      Thank you for the consultation. We will continue to monitor this patient after the procedure.    I meet with this patient for 30 minutes, of which greater than 50% revolved around discussion of treatment options and coordination of care.      Rohit Ashby PA-C

## 2018-12-17 ENCOUNTER — OFFICE VISIT (OUTPATIENT)
Dept: INTERNAL MEDICINE | Facility: CLINIC | Age: 63
End: 2018-12-17
Payer: COMMERCIAL

## 2018-12-17 VITALS
HEIGHT: 70 IN | SYSTOLIC BLOOD PRESSURE: 137 MMHG | TEMPERATURE: 98.2 F | BODY MASS INDEX: 25.83 KG/M2 | DIASTOLIC BLOOD PRESSURE: 74 MMHG | HEART RATE: 91 BPM

## 2018-12-17 DIAGNOSIS — I50.22 CHRONIC SYSTOLIC CONGESTIVE HEART FAILURE (H): ICD-10-CM

## 2018-12-17 DIAGNOSIS — R11.0 NAUSEA: ICD-10-CM

## 2018-12-17 DIAGNOSIS — J42 CHRONIC BRONCHITIS, UNSPECIFIED CHRONIC BRONCHITIS TYPE (H): ICD-10-CM

## 2018-12-17 DIAGNOSIS — G43.709 CHRONIC MIGRAINE WITHOUT AURA WITHOUT STATUS MIGRAINOSUS, NOT INTRACTABLE: ICD-10-CM

## 2018-12-17 DIAGNOSIS — I10 ESSENTIAL HYPERTENSION WITH GOAL BLOOD PRESSURE LESS THAN 130/80: ICD-10-CM

## 2018-12-17 DIAGNOSIS — G89.29 CHRONIC BILATERAL LOW BACK PAIN, WITH SCIATICA PRESENCE UNSPECIFIED: ICD-10-CM

## 2018-12-17 DIAGNOSIS — Z01.818 PREOP GENERAL PHYSICAL EXAM: Primary | ICD-10-CM

## 2018-12-17 DIAGNOSIS — S32.010D CLOSED COMPRESSION FRACTURE OF L1 LUMBAR VERTEBRA WITH ROUTINE HEALING, SUBSEQUENT ENCOUNTER: ICD-10-CM

## 2018-12-17 DIAGNOSIS — M54.5 CHRONIC BILATERAL LOW BACK PAIN, WITH SCIATICA PRESENCE UNSPECIFIED: ICD-10-CM

## 2018-12-17 DIAGNOSIS — F41.9 ANXIETY: ICD-10-CM

## 2018-12-17 DIAGNOSIS — E87.6 HYPOKALEMIA: ICD-10-CM

## 2018-12-17 LAB
ERYTHROCYTE [DISTWIDTH] IN BLOOD BY AUTOMATED COUNT: 14.8 % (ref 10–15)
HCT VFR BLD AUTO: 46.3 % (ref 35–47)
HGB BLD-MCNC: 14.5 G/DL (ref 11.7–15.7)
MCH RBC QN AUTO: 30.7 PG (ref 26.5–33)
MCHC RBC AUTO-ENTMCNC: 31.3 G/DL (ref 31.5–36.5)
MCV RBC AUTO: 98 FL (ref 78–100)
PLATELET # BLD AUTO: 368 10E9/L (ref 150–450)
RBC # BLD AUTO: 4.72 10E12/L (ref 3.8–5.2)
WBC # BLD AUTO: 18.8 10E9/L (ref 4–11)

## 2018-12-17 PROCEDURE — 82306 VITAMIN D 25 HYDROXY: CPT | Performed by: INTERNAL MEDICINE

## 2018-12-17 PROCEDURE — 36415 COLL VENOUS BLD VENIPUNCTURE: CPT | Performed by: INTERNAL MEDICINE

## 2018-12-17 PROCEDURE — 84443 ASSAY THYROID STIM HORMONE: CPT | Performed by: INTERNAL MEDICINE

## 2018-12-17 PROCEDURE — 84439 ASSAY OF FREE THYROXINE: CPT | Performed by: INTERNAL MEDICINE

## 2018-12-17 PROCEDURE — 80053 COMPREHEN METABOLIC PANEL: CPT | Performed by: INTERNAL MEDICINE

## 2018-12-17 PROCEDURE — 93000 ELECTROCARDIOGRAM COMPLETE: CPT | Performed by: INTERNAL MEDICINE

## 2018-12-17 PROCEDURE — 99215 OFFICE O/P EST HI 40 MIN: CPT | Performed by: INTERNAL MEDICINE

## 2018-12-17 PROCEDURE — 80307 DRUG TEST PRSMV CHEM ANLYZR: CPT | Mod: 90 | Performed by: INTERNAL MEDICINE

## 2018-12-17 PROCEDURE — 85027 COMPLETE CBC AUTOMATED: CPT | Performed by: INTERNAL MEDICINE

## 2018-12-17 PROCEDURE — 99000 SPECIMEN HANDLING OFFICE-LAB: CPT | Performed by: INTERNAL MEDICINE

## 2018-12-17 RX ORDER — POTASSIUM CHLORIDE 750 MG/1
TABLET, EXTENDED RELEASE ORAL
Qty: 90 TABLET | Refills: 5 | Status: SHIPPED | OUTPATIENT
Start: 2018-12-17 | End: 2019-07-02

## 2018-12-17 RX ORDER — SUMATRIPTAN 25 MG/1
25 TABLET, FILM COATED ORAL
Qty: 9 TABLET | Refills: 11 | Status: SHIPPED | OUTPATIENT
Start: 2018-12-17 | End: 2020-11-28

## 2018-12-17 RX ORDER — ONDANSETRON 4 MG/1
4-8 TABLET, ORALLY DISINTEGRATING ORAL EVERY 8 HOURS PRN
Qty: 20 TABLET | Refills: 3 | Status: SHIPPED | OUTPATIENT
Start: 2018-12-17 | End: 2019-08-19

## 2018-12-17 RX ORDER — ALPRAZOLAM 0.25 MG
TABLET ORAL
Qty: 40 TABLET | Refills: 0 | Status: SHIPPED | OUTPATIENT
Start: 2018-12-17 | End: 2019-01-28

## 2018-12-17 NOTE — NURSING NOTE
"Vital signs:  Temp: 98.2  F (36.8  C) Temp src: Oral BP: 137/74 Pulse: 91           Height: 177.8 cm (5' 10\")    Estimated body mass index is 25.83 kg/m  as calculated from the following:    Height as of this encounter: 1.778 m (5' 10\").    Weight as of 10/10/18: 81.6 kg (180 lb).          "

## 2018-12-17 NOTE — PROGRESS NOTES
[place] [address]  [facility] [address]  [phone]  Dept: [phone]    PRE-OP EVALUATION:  Today's date: 2018    Trisha Bender (: 1955) presents for pre-operative evaluation assessment as requested by  .  She requires evaluation and anesthesia risk assessment prior to undergoing surgery/procedure for treatment of vertebroplasty for vertebral compression fracture .    Primary Physician: Bandar Weinberg  Type of Anesthesia Anticipated: General    Patient has a Health Care Directive or Living Will:  NO    Preop Questions 2018   Who is doing your surgery? radiology   What are you having done? vibroplasty   Date of Surgery/Procedure: none   Facility or Hospital where procedure/surgery will be performed: [facility]   1.  Do you have a history of Heart attack, stroke, stent, coronary bypass surgery, or other heart surgery? No   2.  Do you ever have any pain or discomfort in your chest? No   3.  Do you have a history of  Heart Failure? YES -    4.   Are you troubled by shortness of breath when:  walking on a level surface, or up a slight hill, or at night? YES -    5.  Do you currently have a cold, bronchitis or other respiratory infection? No   6.  Do you have a cough, shortness of breath, or wheezing? YES -    7.  Do you sometimes get pains in the calves of your legs when you walk? No   8. Do you or anyone in your family have previous history of blood clots? No   9.  Do you or does anyone in your family have a serious bleeding problem such as prolonged bleeding following surgeries or cuts? No   10. Have you ever had problems with anemia or been told to take iron pills? No   11. Have you had any abnormal blood loss such as black, tarry or bloody stools, or abnormal vaginal bleeding? No   12. Have you ever had a blood transfusion? No   13. Have you or any of your relatives ever had problems with anesthesia? No   14. Do you have sleep apnea,  excessive snoring or daytime drowsiness? No   15. Do you have any prosthetic heart valves? No   16. Do you have prosthetic joints? No   17. Is there any chance that you may be pregnant? No         HPI:     HPI related to upcoming procedure: scheduled for thoracic spine vertebral fracture treatment with vertebroplasty. Has significant back pain related to vertebral fractures or T9 nad T10. No neurologic deficits. On pain medications with not good pain control.   No acute infections , fever , cough, diarrhea or dysuria reported.   Has h/o asthma/ COPD, has chronic SOB on exertion, mild cough and wheezing. Not on oxygen. On inhaled bronchodilators and steroids, on PO systemic steroids. Follows with pulmonary for possible lung transplant. No acute exacerbation.   Has h/o CHF, mild LE edema, no weight change, no orthopnea or PND. No chest pain.   Has h/o HTN. on medical treatment. BP has been controlled. No side effects from medications. No CP, HA, dizziness. good compliance with medications and low salt diet.  Has H/O hyperlipidemia. On medical treatment and diet. No side effects. No muscle weakness, myalgias or upset stomach.         See problem list for active medical problems.  Problems all longstanding and stable, except as noted/documented.  See ROS for pertinent symptoms related to these conditions.                                                                                                                                                          .    MEDICAL HISTORY:     Patient Active Problem List    Diagnosis Date Noted     Severe episode of recurrent major depressive disorder, without psychotic features (H) 03/23/2018     Priority: Medium     Panlobular emphysema (H) 11/07/2017     Priority: Medium     Mechanical low back pain 12/30/2016     Priority: Medium     Moderate persistent asthma without complication 08/16/2016     Priority: Medium     Hypokalemia 08/16/2016     Priority: Medium     Controlled  substance agreement signed 02/23/2016     Priority: Medium     Coronary artery disease involving native coronary artery of native heart without angina pectoris 02/09/2016     Priority: Medium     Mild per cath         Mixed hyperlipidemia 02/09/2016     Priority: Medium     SOB (shortness of breath) 10/16/2015     Priority: Medium     CHF (congestive heart failure) (H)      Priority: Medium     Dilated cardiomyopathy (H) 06/30/2015     Priority: Medium     2/9/2016 - EF 45-50% by echo  10/16/2015 - EF 50-55% by echo  8/12/2015 - EF 50-55% by echo  6/30/2015 - EF 20-25% by echo       Right bundle branch block (RBBB) 06/30/2015     Priority: Medium     Lateral epicondylitis of right elbow 10/07/2013     Priority: Medium     Hair loss 05/23/2013     Priority: Medium     Chronic low back pain 04/12/2013     Priority: Medium     Mild persistent asthma without complication 08/30/2012     Priority: Medium     Diagnosis updated by automated process. Provider to review and confirm.       Anxiety 07/28/2011     Priority: Medium     Family history of thyroid cancer - 1/2 brother 07/27/2011     Priority: Medium     Advanced directives, counseling/discussion 07/05/2011     Priority: Medium     Discussed Advance Directive planning with patient; information given to patient to review.       Cystocele 05/07/2009     Priority: Medium     Chronic airway obstruction (H) 01/23/2006     Priority: Medium     FEV1 36% of pred; VC 67%  in Jan 06.  Problem list name updated by automated process. Provider to review       Essential hypertension with goal blood pressure less than 130/80 10/18/2004     Priority: Medium     Problem list name updated by automated process. Provider to review       Chronic migraine without aura without status migrainosus, not intractable 10/18/2004     Priority: Medium     Esophageal reflux 10/18/2004     Priority: Medium     Disorder of bone and cartilage 10/18/2004     Priority: Medium     Problem list name  updated by automated process. Provider to review       Family history of malignant neoplasm of gastrointestinal tract 10/18/2004     Priority: Medium      Past Medical History:   Diagnosis Date     Anxiety      CAD (coronary artery disease) 7/2/15    mild per cath     CHF (congestive heart failure) (H)     EF=20-25% per echo 6/30/15     Chronic airway obstruction, not elsewhere classified     FEV1 36% of pred; VC 67%  in Jan 06.     COPD (chronic obstructive pulmonary disease) (H)      Esophageal reflux      Mild persistent asthma 8/30/2012     OSTEOPENIA      Other and unspecified hyperlipidemia      Other chronic pain     Entire back     Other forms of migraine, without mention of intractable migraine without mention of status migrainosus      Takotsubo cardiomyopathy      Unspecified essential hypertension      Past Surgical History:   Procedure Laterality Date     ANGIOGRAM  07/02/2015    Minimal CAD. LV dysfunction, Mid RCA 20% stenosis, EF 20-25%, c/w Takotsubo cardiomyopathy     APPENDECTOMY       C NONSPECIFIC PROCEDURE  1974    GB and appendectomy  abstracted [id number]     C NONSPECIFIC PROCEDURE      Nasal septoplasty and mucous retention cyst     CHOLECYSTECTOMY  1974     COLONOSCOPY N/A 4/26/2018    Procedure: COLONOSCOPY;  COLONOSCOPY with biopsies;  Surgeon: Steve Acosta MD;  Location:  OR     ESOPHAGOSCOPY, GASTROSCOPY, DUODENOSCOPY (EGD), COMBINED  11/15/2011    Procedure:COMBINED ESOPHAGOSCOPY, GASTROSCOPY, DUODENOSCOPY (EGD);  ESOPHAGOSCOPY, GASTROSCOPY, DUODENOSCOPY (EGD) ; Surgeon:JIM URENA; Location: GI     HC REMOVE TONSILS/ADENOIDS,<13 Y/O       INJECT EPIDURAL LUMBAR / SACRAL SINGLE N/A 4/13/2018    Procedure: INJECT EPIDURAL LUMBAR / SACRAL CONTINUAL OR INTERMITTENT;  Lumbar Epidural Steroid Injection;  Surgeon: Az Enamorado MD;  Location:  OR     INJECT EPIDURAL LUMBAR / SACRAL SINGLE N/A 9/24/2018    Procedure: INJECT EPIDURAL LUMBAR / SACRAL SINGLE;  Lumbar Epidural  Steroid Injection;  Surgeon: Az Enamorado MD;  Location: [facility] OR     LAPAROSCOPIC ABLATION ENDOMETRIOSIS  1998     LAPAROSCOPY DIAGNOSTIC (GYN)      endometriosis resection / lysis of adhesions     wisdom teeth       Current Outpatient Medications   Medication Sig Dispense Refill     ADVAIR DISKUS 500-50 MCG/DOSE diskus inhaler INHALE 1 PUFF TWO TIMES DAILY 3 Inhaler 3     albuterol (VENTOLIN HFA) 108 (90 BASE) MCG/ACT Inhaler Inhale 2 puffs into the lungs every 6 hours as needed for shortness of breath / dyspnea or wheezing 18 g 3     ALPRAZolam (XANAX) 0.25 MG tablet Take 1 tablet by mouth. Every twelve hours as needed for anxiety 40 tablet 0     atorvastatin (LIPITOR) 10 MG tablet Takes 3 times a week 36 tablet 3     baclofen (LIORESAL) 10 MG tablet TAKE ONE TABLET BY MOUTH THREE TIMES A DAY AS NEEDED FOR MUSCLE SPASMS 90 tablet 1     [employer] FIBER GUMMIES 2.5 G CHEW Take 1 tablet by mouth daily Reported on 5/8/2017       fluticasone (FLONASE) 50 MCG/ACT spray SPRAY 1 TO 2 SPRAYS IN EACH NOSTRIL ONCE DAILY 16 g 7     furosemide (LASIX) 20 MG tablet TAKE 2 TABLETS (40 MG) BY MOUTH DAILY SEVERE SWELLING 180 tablet 1     HYDROcodone-acetaminophen (NORCO) 5-325 MG tablet Take 1-2 tablets by mouth every 8 hours as needed for pain Maximum 6 daily 180 tablet 0     HYDROmorphone (DILAUDID) 2 MG tablet Take 1 tablet (2 mg) by mouth every 8 hours as needed for severe pain 10 tablet 0     ipratropium - albuterol 0.5 mg/2.5 mg/3 mL (DUONEB) 0.5-2.5 (3) MG/3ML neb solution Take 1 vial (3 mLs) by nebulization every 6 hours as needed for shortness of breath / dyspnea or wheezing 30 vial 1     lidocaine (LIDODERM) 5 % Patch Apply up to 3 patches to painful area at once for up to 12 h within a 24 h period.  Remove after 12 hours. 30 patch 0     lisinopril (PRINIVIL/ZESTRIL) 2.5 MG tablet TAKE 1 TABLET BY MOUTH ONCE DAILY 90 tablet 1     lisinopril (PRINIVIL/ZESTRIL) 2.5 MG tablet Take 1 tablet (2.5 mg) by mouth daily 90 tablet 3      montelukast (SINGULAIR) 10 MG tablet Take 1 tablet (10 mg) by mouth At Bedtime 90 tablet 1     Multiple Vitamin CHEW Take 1 tablet by mouth daily        ondansetron (ZOFRAN ODT) 4 MG ODT tab Take 1-2 tablets (4-8 mg) by mouth every 8 hours as needed for nausea 20 tablet 3     pantoprazole (PROTONIX) 40 MG EC tablet Take 1 tablet (40 mg) by mouth daily 90 tablet 2     potassium chloride (K-TAB,KLOR-CON) 10 MEQ tablet TAKE ONE TABLET BY MOUTH THREE TIMES A DAY 90 tablet 5     predniSONE (DELTASONE) 20 MG tablet Take 3 tabs (60 mg) by mouth daily x 3 days, 2 tabs (40 mg) daily x 3 days, 1 tab (20 mg) daily x 3 days, then 1/2 tab (10 mg) x 3 days. 20 tablet 0     predniSONE (DELTASONE) 5 MG tablet TAKE 2 TABLETS (10 MG) BY MOUTH DAILY, TAPER DOWN BY 1/2 TAB EVERY 7 DAYS UNTIL DOWN TO 1 TAB DAILY 30 tablet 0     roflumilast (DALIRESP) 500 MCG TABS tablet Take 1 tablet (500 mcg) by mouth daily       SUMAtriptan (IMITREX) 25 MG tablet Take 1 tablet (25 mg) by mouth at onset of headache for migraine May repeat in 2 hours if needed: max 2/day; 9 tablet 11     tiotropium (SPIRIVA HANDIHALER) 18 MCG capsule USING THE HANDIHALER, INHALE THE CONTENTS OF ONE CAPSULE BY MOUTH DAILY 90 capsule 3     TYLENOL EXTRA STRENGTH OR 1 TABLET EVERY 4 HOURS AS NEEDED       OTC products: None, except as noted above    Allergies   Allergen Reactions     Gabapentin Swelling     Contrast Dye      Iodine     Escitalopram      Nausea and sickness     Peanuts [Nuts] Hives     Penicillins      rash     Sulfa Drugs      High family history     Tace [Chlorotrianisene]      joint swelling     Aspirin Rash     After 3 days     Ibuprofen Nausea and Vomiting     Strawberry Rash      Latex Allergy: NO    Social History     Tobacco Use     Smoking status: Former Smoker     Packs/day: 1.00     Years: 30.00     Pack years: 30.00     Last attempt to quit: 2006     Years since quittin.9     Smokeless tobacco: Never Used     Tobacco comment: Quit  in 2006   Substance Use Topics     Alcohol use: No     Alcohol/week: 0.0 oz     History   Drug Use No       REVIEW OF SYSTEMS:   CONSTITUTIONAL: NEGATIVE for fever, chills, change in weight  INTEGUMENTARY/SKIN: NEGATIVE for worrisome rashes, moles or lesions  EYES: NEGATIVE for vision changes or irritation  ENT/MOUTH: NEGATIVE for ear, mouth and throat problems  RESP: NEGATIVE for significant cough , + for chronic SOB on exertion   BREAST: NEGATIVE for masses, tenderness or discharge  CV: NEGATIVE for chest pain, palpitations or peripheral edema  GI: NEGATIVE for nausea, abdominal pain, heartburn, or change in bowel habits  : NEGATIVE for frequency, dysuria, or hematuria  MUSCULOSKELETAL: NEGATIVE for significant arthralgias or myalgia, + for back pain - thoracic and lower back   NEURO: NEGATIVE for weakness, dizziness or paresthesias  ENDOCRINE: NEGATIVE for temperature intolerance, skin/hair changes  HEME: NEGATIVE for bleeding problems  PSYCHIATRIC: NEGATIVE for changes in mood or affect    EXAM:   LMP 09/29/2005     GENERAL APPEARANCE: healthy, alert and no distress     EYES: EOMI, PERRL     HENT: ear canals and TM's normal and nose and mouth without ulcers or lesions     NECK: no adenopathy, no asymmetry, masses, or scars and thyroid normal to palpation     RESP: lungs clear to auscultation - no rales, rhonchi or wheezes     CV: regular rates and rhythm, normal S1 S2, no S3 or S4 and no murmur, click or rub     ABDOMEN:  soft, nontender, no HSM or masses and bowel sounds normal     MS: extremities normal- no gross deformities noted, no evidence of inflammation in joints, FROM in all extremities.     SKIN: no suspicious lesions or rashes     NEURO: Normal strength and tone, sensory exam grossly normal, mentation intact and speech normal     PSYCH: mentation appears normal. and affect normal/bright     LYMPHATICS: No cervical adenopathy    DIAGNOSTICS:     EKG: sinus tachycardia, PVCs, normal axis, normal  intervals, no acute ST/T changes c/w ischemia, no LVH by voltage criteria, unchanged from previous tracings  Labs Resulted Today:   Results for orders placed or performed in visit on 12/17/18   Vitamin D Deficiency   Result Value Ref Range    Vitamin D Deficiency screening 29 20 - 75 ug/L   CBC with platelets   Result Value Ref Range    WBC 18.8 (H) 4.0 - 11.0 10e9/L    RBC Count 4.72 3.8 - 5.2 10e12/L    Hemoglobin 14.5 11.7 - 15.7 g/dL    Hematocrit 46.3 35.0 - 47.0 %    MCV 98 78 - 100 fl    MCH 30.7 26.5 - 33.0 pg    MCHC 31.3 (L) 31.5 - 36.5 g/dL    RDW 14.8 10.0 - 15.0 %    Platelet Count 368 150 - 450 10e9/L   Comprehensive metabolic panel   Result Value Ref Range    Sodium 134 133 - 144 mmol/L    Potassium 3.9 3.4 - 5.3 mmol/L    Chloride 100 94 - 109 mmol/L    Carbon Dioxide 24 20 - 32 mmol/L    Anion Gap 10 3 - 14 mmol/L    Glucose 162 (H) 70 - 99 mg/dL    Urea Nitrogen 13 7 - 30 mg/dL    Creatinine 0.92 0.52 - 1.04 mg/dL    GFR Estimate 62 >60 mL/min/1.7m2    GFR Estimate If Black 75 >60 mL/min/1.7m2    Calcium 9.2 8.5 - 10.1 mg/dL    Bilirubin Total 0.4 0.2 - 1.3 mg/dL    Albumin 3.7 3.4 - 5.0 g/dL    Protein Total 6.7 (L) 6.8 - 8.8 g/dL    Alkaline Phosphatase 101 40 - 150 U/L    ALT 24 0 - 50 U/L    AST 10 0 - 45 U/L   TSH with free T4 reflex   Result Value Ref Range    TSH 0.39 (L) 0.40 - 4.00 mU/L   Drug  Screen Comprehensive , Urine with Reported Meds ([facility]) (Pain Care Package)   Result Value Ref Range    Pain Drug SCR UR W RPTD Meds FINAL    T4 free   Result Value Ref Range    T4 Free 1.11 0.76 - 1.46 ng/dL       Recent Labs   Lab Test 10/02/18  2102 04/16/18  0847  03/16/16  1356   HGB 13.6 13.5   < >  --     326   < >  --    INR  --  0.98  --   --     141   < >  --    POTASSIUM 4.2 3.5   < >  --    CR 0.71 0.66   < >  --    A1C  --  5.7  --  5.4    < > = values in this interval not displayed.        IMPRESSION:   Reason for surgery/procedure: vertebral compression fractures,  vertebroplasty   Diagnosis/reason for consult: preoperative evaluation/ clearance      The proposed surgical procedure is considered INTERMEDIATE risk.    REVISED CARDIAC RISK INDEX  The patient has the following serious cardiovascular risks for perioperative complications such as (MI, PE, VFib and 3  AV Block):  No serious cardiac risks  INTERPRETATION: 0 risks: Class I (very low risk - 0.4% complication rate)    The patient has the following additional risks for perioperative complications:  COPD, on chronic steroid treatment   Chronic narcotic treatment for pain       ICD-10-CM    1. Preop general physical exam Z01.818        RECOMMENDATIONS:         --Patient is to take all scheduled medications on the day of surgery EXCEPT for modifications listed below.  Hold NSAID, aspirin for one week prior to procedure     APPROVAL GIVEN to proceed with proposed procedure, without further diagnostic evaluation       Signed Electronically by: Bandar Weinberg MD    Copy of this evaluation report is provided to requesting physician.    [facility] Preop Guidelines    Revised Cardiac Risk Index

## 2018-12-18 LAB
ALBUMIN SERPL-MCNC: 3.7 G/DL (ref 3.4–5)
ALP SERPL-CCNC: 101 U/L (ref 40–150)
ALT SERPL W P-5'-P-CCNC: 24 U/L (ref 0–50)
ANION GAP SERPL CALCULATED.3IONS-SCNC: 10 MMOL/L (ref 3–14)
AST SERPL W P-5'-P-CCNC: 10 U/L (ref 0–45)
BILIRUB SERPL-MCNC: 0.4 MG/DL (ref 0.2–1.3)
BUN SERPL-MCNC: 13 MG/DL (ref 7–30)
CALCIUM SERPL-MCNC: 9.2 MG/DL (ref 8.5–10.1)
CHLORIDE SERPL-SCNC: 100 MMOL/L (ref 94–109)
CO2 SERPL-SCNC: 24 MMOL/L (ref 20–32)
CREAT SERPL-MCNC: 0.92 MG/DL (ref 0.52–1.04)
DEPRECATED CALCIDIOL+CALCIFEROL SERPL-MC: 29 UG/L (ref 20–75)
GFR SERPL CREATININE-BSD FRML MDRD: 62 ML/MIN/1.7M2
GLUCOSE SERPL-MCNC: 162 MG/DL (ref 70–99)
POTASSIUM SERPL-SCNC: 3.9 MMOL/L (ref 3.4–5.3)
PROT SERPL-MCNC: 6.7 G/DL (ref 6.8–8.8)
SODIUM SERPL-SCNC: 134 MMOL/L (ref 133–144)
T4 FREE SERPL-MCNC: 1.11 NG/DL (ref 0.76–1.46)
TSH SERPL DL<=0.005 MIU/L-ACNC: 0.39 MU/L (ref 0.4–4)

## 2018-12-20 LAB — PAIN DRUG SCR UR W RPTD MEDS: NORMAL

## 2018-12-30 ENCOUNTER — MYC REFILL (OUTPATIENT)
Dept: INTERNAL MEDICINE | Facility: CLINIC | Age: 63
End: 2018-12-30

## 2018-12-30 ENCOUNTER — HOSPITAL ENCOUNTER (EMERGENCY)
Facility: CLINIC | Age: 63
Discharge: HOME | End: 2018-12-30
Attending: EMERGENCY MEDICINE | Admitting: EMERGENCY MEDICINE
Payer: COMMERCIAL

## 2018-12-30 ENCOUNTER — APPOINTMENT (OUTPATIENT)
Dept: GENERAL RADIOLOGY | Facility: CLINIC | Age: 63
End: 2018-12-30
Attending: EMERGENCY MEDICINE
Payer: COMMERCIAL

## 2018-12-30 VITALS
HEART RATE: 69 BPM | BODY MASS INDEX: 25.81 KG/M2 | RESPIRATION RATE: 20 BRPM | WEIGHT: 179.9 LBS | SYSTOLIC BLOOD PRESSURE: 165 MMHG | DIASTOLIC BLOOD PRESSURE: 76 MMHG | OXYGEN SATURATION: 94 % | TEMPERATURE: 98.5 F

## 2018-12-30 DIAGNOSIS — M54.50 CHRONIC BILATERAL LOW BACK PAIN WITHOUT SCIATICA: ICD-10-CM

## 2018-12-30 DIAGNOSIS — R07.81 RIB PAIN: ICD-10-CM

## 2018-12-30 DIAGNOSIS — G89.29 CHRONIC BILATERAL LOW BACK PAIN WITHOUT SCIATICA: ICD-10-CM

## 2018-12-30 DIAGNOSIS — D72.829 LEUKOCYTOSIS, UNSPECIFIED TYPE: ICD-10-CM

## 2018-12-30 DIAGNOSIS — Z79.52 CURRENT CHRONIC USE OF SYSTEMIC STEROIDS: ICD-10-CM

## 2018-12-30 LAB
ALBUMIN SERPL-MCNC: 3.5 G/DL (ref 3.4–5)
ALP SERPL-CCNC: 74 U/L (ref 40–150)
ALT SERPL W P-5'-P-CCNC: 21 U/L (ref 0–50)
ANION GAP SERPL CALCULATED.3IONS-SCNC: 8 MMOL/L (ref 3–14)
AST SERPL W P-5'-P-CCNC: 9 U/L (ref 0–45)
BASOPHILS # BLD AUTO: 0.1 10E9/L (ref 0–0.2)
BASOPHILS NFR BLD AUTO: 0.4 %
BILIRUB SERPL-MCNC: 0.4 MG/DL (ref 0.2–1.3)
BUN SERPL-MCNC: 11 MG/DL (ref 7–30)
CALCIUM SERPL-MCNC: 9.2 MG/DL (ref 8.5–10.1)
CHLORIDE SERPL-SCNC: 100 MMOL/L (ref 94–109)
CO2 SERPL-SCNC: 29 MMOL/L (ref 20–32)
CREAT SERPL-MCNC: 0.78 MG/DL (ref 0.52–1.04)
D DIMER PPP FEU-MCNC: <0.3 UG/ML FEU (ref 0–0.5)
DIFFERENTIAL METHOD BLD: ABNORMAL
EOSINOPHIL # BLD AUTO: 0.1 10E9/L (ref 0–0.7)
EOSINOPHIL NFR BLD AUTO: 0.5 %
ERYTHROCYTE [DISTWIDTH] IN BLOOD BY AUTOMATED COUNT: 14.4 % (ref 10–15)
GFR SERPL CREATININE-BSD FRML MDRD: 80 ML/MIN/{1.73_M2}
GLUCOSE SERPL-MCNC: 138 MG/DL (ref 70–99)
HCT VFR BLD AUTO: 44.5 % (ref 35–47)
HGB BLD-MCNC: 13.9 G/DL (ref 11.7–15.7)
IMM GRANULOCYTES # BLD: 0.4 10E9/L (ref 0–0.4)
IMM GRANULOCYTES NFR BLD: 2.1 %
LIPASE SERPL-CCNC: 59 U/L (ref 73–393)
LYMPHOCYTES # BLD AUTO: 1.2 10E9/L (ref 0.8–5.3)
LYMPHOCYTES NFR BLD AUTO: 6.7 %
MCH RBC QN AUTO: 30.3 PG (ref 26.5–33)
MCHC RBC AUTO-ENTMCNC: 31.2 G/DL (ref 31.5–36.5)
MCV RBC AUTO: 97 FL (ref 78–100)
MONOCYTES # BLD AUTO: 0.7 10E9/L (ref 0–1.3)
MONOCYTES NFR BLD AUTO: 4 %
NEUTROPHILS # BLD AUTO: 14.8 10E9/L (ref 1.6–8.3)
NEUTROPHILS NFR BLD AUTO: 86.3 %
NRBC # BLD AUTO: 0 10*3/UL
NRBC BLD AUTO-RTO: 0 /100
NT-PROBNP SERPL-MCNC: 259 PG/ML (ref 0–900)
PLATELET # BLD AUTO: 380 10E9/L (ref 150–450)
POTASSIUM SERPL-SCNC: 3.7 MMOL/L (ref 3.4–5.3)
PROT SERPL-MCNC: 6.6 G/DL (ref 6.8–8.8)
RBC # BLD AUTO: 4.58 10E12/L (ref 3.8–5.2)
SODIUM SERPL-SCNC: 137 MMOL/L (ref 133–144)
TROPONIN I SERPL-MCNC: <0.015 UG/L (ref 0–0.04)
WBC # BLD AUTO: 17.1 10E9/L (ref 4–11)

## 2018-12-30 PROCEDURE — 83880 ASSAY OF NATRIURETIC PEPTIDE: CPT | Performed by: EMERGENCY MEDICINE

## 2018-12-30 PROCEDURE — 25000125 ZZHC RX 250: Performed by: EMERGENCY MEDICINE

## 2018-12-30 PROCEDURE — 84484 ASSAY OF TROPONIN QUANT: CPT | Performed by: EMERGENCY MEDICINE

## 2018-12-30 PROCEDURE — 99285 EMERGENCY DEPT VISIT HI MDM: CPT | Mod: 25

## 2018-12-30 PROCEDURE — 83690 ASSAY OF LIPASE: CPT | Performed by: EMERGENCY MEDICINE

## 2018-12-30 PROCEDURE — 85379 FIBRIN DEGRADATION QUANT: CPT | Performed by: EMERGENCY MEDICINE

## 2018-12-30 PROCEDURE — 71101 X-RAY EXAM UNILAT RIBS/CHEST: CPT | Mod: LT

## 2018-12-30 PROCEDURE — 85025 COMPLETE CBC W/AUTO DIFF WBC: CPT | Performed by: EMERGENCY MEDICINE

## 2018-12-30 PROCEDURE — 25000132 ZZH RX MED GY IP 250 OP 250 PS 637: Performed by: EMERGENCY MEDICINE

## 2018-12-30 PROCEDURE — 93005 ELECTROCARDIOGRAM TRACING: CPT

## 2018-12-30 PROCEDURE — 94640 AIRWAY INHALATION TREATMENT: CPT

## 2018-12-30 PROCEDURE — 80053 COMPREHEN METABOLIC PANEL: CPT | Performed by: EMERGENCY MEDICINE

## 2018-12-30 PROCEDURE — 96374 THER/PROPH/DIAG INJ IV PUSH: CPT

## 2018-12-30 PROCEDURE — 25000128 H RX IP 250 OP 636: Performed by: EMERGENCY MEDICINE

## 2018-12-30 RX ORDER — SENNOSIDES 8.6 MG
1 TABLET ORAL DAILY
Qty: 30 TABLET | Refills: 0 | Status: SHIPPED | OUTPATIENT
Start: 2018-12-30 | End: 2019-01-29

## 2018-12-30 RX ORDER — HYDROCODONE BITARTRATE AND ACETAMINOPHEN 5; 325 MG/1; MG/1
1 TABLET ORAL ONCE
Status: COMPLETED | OUTPATIENT
Start: 2018-12-30 | End: 2018-12-30

## 2018-12-30 RX ORDER — HYDROCODONE BITARTRATE AND ACETAMINOPHEN 5; 325 MG/1; MG/1
1 TABLET ORAL EVERY 6 HOURS PRN
Qty: 10 TABLET | Refills: 0 | Status: SHIPPED | OUTPATIENT
Start: 2018-12-30 | End: 2019-01-02

## 2018-12-30 RX ORDER — HYDROCODONE BITARTRATE AND ACETAMINOPHEN 5; 325 MG/1; MG/1
1-2 TABLET ORAL EVERY 8 HOURS PRN
Qty: 180 TABLET | Refills: 0 | Status: CANCELLED | OUTPATIENT
Start: 2018-12-30

## 2018-12-30 RX ORDER — IPRATROPIUM BROMIDE AND ALBUTEROL SULFATE 2.5; .5 MG/3ML; MG/3ML
3 SOLUTION RESPIRATORY (INHALATION) ONCE
Status: COMPLETED | OUTPATIENT
Start: 2018-12-30 | End: 2018-12-30

## 2018-12-30 RX ORDER — KETOROLAC TROMETHAMINE 15 MG/ML
15 INJECTION, SOLUTION INTRAMUSCULAR; INTRAVENOUS ONCE
Status: COMPLETED | OUTPATIENT
Start: 2018-12-30 | End: 2018-12-30

## 2018-12-30 RX ADMIN — IPRATROPIUM BROMIDE AND ALBUTEROL SULFATE 3 ML: .5; 3 SOLUTION RESPIRATORY (INHALATION) at 17:49

## 2018-12-30 RX ADMIN — KETOROLAC TROMETHAMINE 15 MG: 15 INJECTION, SOLUTION INTRAMUSCULAR; INTRAVENOUS at 17:32

## 2018-12-30 RX ADMIN — HYDROCODONE BITARTRATE AND ACETAMINOPHEN 1 TABLET: 5; 325 TABLET ORAL at 17:32

## 2018-12-30 ASSESSMENT — ENCOUNTER SYMPTOMS
ABDOMINAL PAIN: 0
SHORTNESS OF BREATH: 1
COUGH: 0
NAUSEA: 1
VOMITING: 0
BACK PAIN: 1

## 2018-12-30 NOTE — ED PROVIDER NOTES
History     Chief Complaint:  Back Pain    HPI   Trisha Bender is a 63 year old female with a history of CAD, emphysema and pathologic fractures secondary to chronic steroids who presents to the emergency department today with back pain. Patient reports worsening back pain that started 2 days ago (worse than her baseline chronic back pain). The pain is in her upper left back. Pain is worse with moving,standing, and walking. Patient reports some nausea and reports that the pain takes her breath away on occasion. She denies vomiting, chest pain, cough, abdominal pain, cough, runny nose. Patient reports she was supposed to have kyphoplasty given recent thoracic compression fractures but cancelled when she felt better about 15 days ago. Currently she feels as bad as she felt before she got better. Patient reports taking her chronic steroids as prescribed as she is on lung transplant list.  No other symptoms reported.     MRI 11/14/18   IMPRESSION:    1. Acute/subacute T9 compression fracture with mild height loss, new  compared to 2016.  2. T10 compression deformity with mild height loss, Schmorl's node  formation, and mild associated T2 hyperintensity/edema, new compared  to 2016.    Cardiac/PE/DVT Risk Factors:  History of hypertension - negative   History of hyperlipidemia - negative   History of diabetes - negative   History of smoking - positive    Personal history of PE/DVT - negative   Family history of PE/DVT - negative   Family history of heart complications - negative   Recent travel - negative   Recent surgery - negative   Other immobilizations - negative   Cancer - negative      Allergies:  Gabapentin  Contrast Dye  Escitalopram  Peanuts [Nuts]  Penicillins  Sulfa Drugs  Tace [Chlorotrianisene]  Aspirin  Ibuprofen  Strawberry    Medications:    Advair Diskus 500-50 Mcg/dose Diskus Inhaler  Albuterol (Ventolin Hfa) 108 (90 Base) Mcg/act Inhaler  Alprazolam (Xanax) 0.25 Mg Tablet  Atorvastatin  (Lipitor) 10 Mg Tablet  Baclofen (Lioresal) 10 Mg Tablet  Cvs Fiber Gummies 2.5 G Chew  Fluticasone (Flonase) 50 Mcg/act Spray  Furosemide (Lasix) 20 Mg Tablet  Hydrocodone-acetaminophen (Norco) 5-325 Mg Tablet  Hydromorphone (Dilaudid) 2 Mg Tablet  Ipratropium - Albuterol 0.5 Mg/2.5 Mg/3 Ml (Duoneb) 0.5-2.5 (3) Mg/3ml Neb Solution  Lidocaine (Lidoderm) 5 % Patch  Lisinopril (Prinivil/Zestril) 2.5 Mg Tablet  Lisinopril (Prinivil/Zestril) 2.5 Mg Tablet  Montelukast (Singulair) 10 Mg Tablet  Multiple Vitamin Chew  Ondansetron (Zofran Odt) 4 Mg Odt Tab  Pantoprazole (Protonix) 40 Mg Ec Tablet  Potassium Chloride Er (K-tab/klor-con) 10 Meq Cr Tablet  Prednisone (Deltasone) 20 Mg Tablet  Prednisone (Deltasone) 5 Mg Tablet  Roflumilast (Daliresp) 500 Mcg Tabs Tablet  Sumatriptan (Imitrex) 25 Mg Tablet  Tiotropium (Spiriva Handihaler) 18 Mcg Capsule  Tylenol Extra Strength Or    Past Medical History:    Severe Episode Of Recurrent Major Depressive Disorder, Without Psychotic Features (H)  Panlobular Emphysema (H)  Mechanical Low Back Pain  Moderate Persistent Asthma Without Complication  Hypokalemia  Controlled Substance Agreement Signed  Coronary Artery Disease Involving Native Coronary Artery Of Native Heart Without Angina Pectoris  Mixed Hyperlipidemia  Sob (Shortness Of Breath)  Chf (Congestive Heart Failure) (H)  Dilated Cardiomyopathy (H)  Right Bundle Branch Block (Rbbb)  Lateral Epicondylitis Of Right Elbow  Hair Loss  Chronic Low Back Pain  Mild Persistent Asthma Without Complication  Anxiety  Advanced Directives, Counseling/discussion  Cystocele  Chronic Airway Obstruction (H)  Essential Hypertension With Goal Blood Pressure Less Than 130/80  Chronic Migraine Without Aura Without Status Migrainosus, Not Intractable  Esophageal Reflux  Disorder Of Bone And Cartilage  Anxiety   Cad (Coronary Artery Disease)   Chf (Congestive Heart Failure) (H)   Chronic Airway Obstruction, Not Elsewhere Classified   Copd (Chronic  "Obstructive Pulmonary Disease) (H)   Esophageal Reflux   Mild Persistent Asthma   Osteopenia   Other And Unspecified Hyperlipidemia   Other Chronic Pain   Other Forms Of Migraine, Without Mention Of Intractable Migraine Without Mention Of Status Migrainosus   Takotsubo Cardiomyopathy   Unspecified Essential Hypertension    Past Surgical History:    Angiogram  Appendectomy  Colonoscopy  cholecystectomy  Nasal Septoplasty and mucous retention cyst  [place] teeth removal  Endometriosis ablation  Gyn surgery  EGD  Tonsillectomy  Adenoidectomy  Lumbar / sacral epidurals     Family History:    Colon cancer  Heart disease: father  CAD  AAA: Maternal Aunt  Pancreatitis  diabetes mellitus     Social History:  Former smoker: quit date 1/17/2006  Negative for alcohol use.  Patient presents with her   Marital Status:         Review of Systems   HENT: Negative for congestion.    Respiratory: Positive for shortness of breath (pain \"takes my breath away\"). Negative for cough.    Gastrointestinal: Positive for nausea. Negative for abdominal pain and vomiting.   Musculoskeletal: Positive for back pain (left).   All other systems reviewed and are negative.      Physical Exam     Patient Vitals for the past 24 hrs:   BP Temp Pulse Heart Rate Resp SpO2 Weight   12/30/18 1641 165/76 98.5  F (36.9  C) 69 69 20 94 % 81.6 kg (179 lb 14.3 oz)      Physical Exam  Vitals reviewed.  General: The patient appears uncomfortable  Head:  The scalp, face, and head appear normal  Eyes:  The pupils are equal and round    Conjunctivae and sclerae are normal  ENT:    Nose normal. Moist mucous membranes  Neck:  Normal range of motion  CV:  Regular rate and underlying rhythm     Normal S1 and S2    DP/PT pulses 2+ bilaterally  Resp:  Lungs are diminished bilaterally    Non-labored breathing    No rales    No wheezing   GI:  Abdomen is soft, there is no rigidity    No distension    No rebound tenderness     No abdominal tenderness    No " guarding  MS:  Back:    The cervical and thoracic spine is non-tender in the midline    The lumbar spine is non-tender in the midline    L. parathoracic tenderness, no overlying erythema/ecchymosis/rash    Legs:    There is normal motor strength:     Iliopsoas, quadriceps, hamstrings, tibialis anterior, gastrocnemius, EHL    Sensation intact L2-S1 on bilateral lower extremities    There is normal capillary refill to the toes  Skin:  Erythema ab igne to lower back.  No shingles noted.  Neuro: Speech is normal and fluent    Awake and alert    Gait stable    Emergency Department Course     ECG:  Indication: shortness of breath   Completed at 1735.  Read at 1738.   Sinus tachycardia with frequent premature ventricular complexes  Possible left atrial enlargement  Right bundle branch block   Abnormal ECG    Rate 124 bpm. IA interval 118. QRS duration 114. QT/QTc 358/514. P-R-T axes 73 63 61.     Imaging:  Radiology findings were communicated with the patient who voiced understanding of the findings.  Ribs XR, unilat 3 views + PA chest,  left   Final Result   IMPRESSION: No acute abnormality.      JOSÉ GONZALEZ MD      Report per radiology      Laboratory:  Laboratory findings were communicated with the patient who voiced understanding of the findings.  Troponin (Collected 1734): <0.015  CBC: AWNL (WBC 17.1, HGB 13.9, )  BNP: 259   CMP: 138 Glucose o/w WNL (Creatinine 0.78)   Lipase: 59  D Dimer (Collected 1734): <0.3      Interventions:  1732: Toradol 15 mg IV injection  1732: Norco 5-325mg 1 Tablet PO   1749: Duoneb, 3 mL, nebulization      Emergency Department Course:  Nursing notes and vitals reviewed.  1700: I performed an exam of the patient as documented above.   IV was inserted and blood was drawn for laboratory testing, results above.  The patient was sent for a Ribs XR while in the emergency department, results above.   1902: Patient rechecked and updated.  Feels better.   1903:Findings and plan explained  to the Patient. Patient discharged home with instructions regarding supportive care, medications, and reasons to return. The importance of close follow-up was reviewed. The patient was prescribed Norco and Senokot.    I personally reviewed the laboratory and imaging results with the Patient and answered all related questions prior to discharge.      Impression & Plan    Medical Decision Making:  Patient is a 63-year-old female with significant medical comorbidities most notably emphysema, on chronic steroids with history of pathologic fractures.  She presents today with reported back pain though on exam it appears to be more left-sided rib pain/parathoracic pain.  She has no bony thoracic/lumbar pain on exam and is neurovascularly intact.  EKG without focal ischemia, noted right bundle branch block which appears chronic for patient.  Screening troponin ordered in setting of presentation which was negative.  I doubt ACS to explain this patient's presentation today.  D-dimer ordered as well which was negative.  The patient underwent formal x-ray which was negative for rib fracture or focal pneumonia.  Patient with noted leukocytosis though I suspect this is more reactive in setting of steroids.  The patient is nontoxic appearing and there are no clinical signs of pneumonia.   I counseled patient I have a strong suspicion in her symptoms secondary to chronic pain.  I did discuss with patient my recommendation for her to potentially pursue her kyphoplasty as was previously scheduled as this may be contributing to her presentation today. The patient reports that she is out of her Norco so I will dispo home with a few of these on discharge.  The patient reports an allergy to NSAIDs.  She does have lidocaine patches at home which I recommended that she use as well.  I will also discharge home with Senokot as well given I discussed with patient chronic narcotics has a tendency to cause constipation.  Instructed to follow-up  with PCP 2-3 days for reevaluation.  Return precautions given.    Diagnosis:    ICD-10-CM    1. Rib pain R07.81    2. Current chronic use of systemic steroids Z79.52    3. Leukocytosis, unspecified type D72.829    4. Chronic bilateral low back pain without sciatica M54.5 HYDROcodone-acetaminophen (NORCO) 5-325 MG tablet    G89.29        Disposition:  discharged to home    Discharge Medications:     Medication List      Started    sennosides 8.6 MG tablet  Commonly known as:  SENOKOT  1 tablet, Oral, DAILY        Modified    HYDROcodone-acetaminophen 5-325 MG tablet  Commonly known as:  NORCO  1 tablet, Oral, EVERY 6 HOURS PRN, Maximum 6 daily  What changed:      how much to take    when to take this        Discontinued    HYDROmorphone 2 MG tablet  Commonly known as:  DILBARBERID          Almita Eckert  12/30/2018   [facility] EMERGENCY DEPARTMENT         Marianna Best DO  01/01/19 [id number]

## 2018-12-30 NOTE — ED TRIAGE NOTES
Pt with hx of chronic steroids use.  Has had multiple fx in t spine due to use.  C/o increased back pain since Friday after bending over.

## 2018-12-30 NOTE — ED AVS SNAPSHOT
[facility]  [address]  [facility] [address]  Phone:  [phone]  Fax:  [phone]                                    Trisha Bender   MRN: [id number]    Department:  [facility]   Date of Visit:  12/30/2018           After Visit Summary Signature Page    I have received my discharge instructions, and my questions have been answered. I have discussed any challenges I see with this plan with the nurse or doctor.    ..........................................................................................................................................  Patient/Patient Representative Signature      ..........................................................................................................................................  Patient Representative Print Name and Relationship to Patient    ..................................................               ................................................  Date                                   Time    ..........................................................................................................................................  Reviewed by Signature/Title    ...................................................              ..............................................  Date                                               Time          22EPIC Rev 08/18

## 2018-12-31 ENCOUNTER — MYC MEDICAL ADVICE (OUTPATIENT)
Dept: INTERNAL MEDICINE | Facility: CLINIC | Age: 63
End: 2018-12-31

## 2018-12-31 ENCOUNTER — TELEPHONE (OUTPATIENT)
Dept: INTERNAL MEDICINE | Facility: CLINIC | Age: 63
End: 2018-12-31

## 2018-12-31 LAB — INTERPRETATION ECG - MUSE: NORMAL

## 2018-12-31 NOTE — TELEPHONE ENCOUNTER
See her [employer] message. She has appt with [facility] on Wednesday.     She has been taking more than scheduled Norco without calling or letting clinic know.

## 2018-12-31 NOTE — TELEPHONE ENCOUNTER
Yandy from Pharmacy calls.     Pt went to ED yesterday for pain. They gave her Christine Rx for #10 pills.   Yandy states pt has CSA and is asking if OK for them to fill. This is early, and is extra pills.     Pt received Norco Rx on 12/8/18 for #180 and she is out now. Instead of 30 days, lasted 23 days. Per her [employer] message, she has been taking extra but didn't inform clinic.     Reviewed with Dr Ferrer. She states since ED already wrote the RX, OK to fill, so Pt doesn't go to another ED, but she needs to realize this is in VIOLATION of her CSA AGREEMENT. And that Dr Weinberg will be discussing this with her on Wednesday. He can terminate the agreement if needed.     Relayed this to Yandy. She agrees and will inform pt.     FYI for Dr Weinberg

## 2018-12-31 NOTE — TELEPHONE ENCOUNTER
Pt calling requesting to see PCP after ED visti, Drs schedule is full and pt would like a call back to see if she could get seen this week by Dr. Weinberg

## 2019-01-02 ENCOUNTER — OFFICE VISIT (OUTPATIENT)
Dept: INTERNAL MEDICINE | Facility: CLINIC | Age: 64
End: 2019-01-02
Payer: COMMERCIAL

## 2019-01-02 VITALS
OXYGEN SATURATION: 96 % | HEIGHT: 70 IN | DIASTOLIC BLOOD PRESSURE: 70 MMHG | HEART RATE: 73 BPM | SYSTOLIC BLOOD PRESSURE: 138 MMHG | TEMPERATURE: 98.1 F | BODY MASS INDEX: 26.77 KG/M2 | WEIGHT: 187 LBS

## 2019-01-02 DIAGNOSIS — Z09 HOSPITAL DISCHARGE FOLLOW-UP: Primary | ICD-10-CM

## 2019-01-02 DIAGNOSIS — M54.50 CHRONIC BILATERAL LOW BACK PAIN WITHOUT SCIATICA: ICD-10-CM

## 2019-01-02 DIAGNOSIS — G89.29 CHRONIC BILATERAL LOW BACK PAIN WITHOUT SCIATICA: ICD-10-CM

## 2019-01-02 DIAGNOSIS — I50.21 ACUTE SYSTOLIC CONGESTIVE HEART FAILURE (H): ICD-10-CM

## 2019-01-02 DIAGNOSIS — M54.6 ACUTE MIDLINE THORACIC BACK PAIN: ICD-10-CM

## 2019-01-02 DIAGNOSIS — F41.9 ANXIETY: ICD-10-CM

## 2019-01-02 DIAGNOSIS — J43.1 PANLOBULAR EMPHYSEMA (H): ICD-10-CM

## 2019-01-02 PROCEDURE — 99215 OFFICE O/P EST HI 40 MIN: CPT | Performed by: INTERNAL MEDICINE

## 2019-01-02 RX ORDER — HYDROCODONE BITARTRATE AND ACETAMINOPHEN 5; 325 MG/1; MG/1
1 TABLET ORAL EVERY 4 HOURS PRN
Qty: 180 TABLET | Refills: 0 | Status: SHIPPED | OUTPATIENT
Start: 2019-01-05 | End: 2019-01-28

## 2019-01-02 RX ORDER — OXYCODONE AND ACETAMINOPHEN 5; 325 MG/1; MG/1
1 TABLET ORAL EVERY 6 HOURS PRN
Qty: 12 TABLET | Refills: 0 | Status: SHIPPED | OUTPATIENT
Start: 2019-01-02 | End: 2019-01-08

## 2019-01-02 ASSESSMENT — PAIN SCALES - GENERAL: PAINLEVEL: EXTREME PAIN (8)

## 2019-01-02 ASSESSMENT — MIFFLIN-ST. JEOR: SCORE: 1483.48

## 2019-01-02 NOTE — NURSING NOTE
"Vital signs:  Temp: 98.1  F (36.7  C) Temp src: Oral BP: 138/70 Pulse: 73     SpO2: 96 %     Height: 177.8 cm (5' 10\") Weight: 84.8 kg (187 lb)  Estimated body mass index is 26.83 kg/m  as calculated from the following:    Height as of this encounter: 1.778 m (5' 10\").    Weight as of this encounter: 84.8 kg (187 lb).          "

## 2019-01-02 NOTE — PROGRESS NOTES
SUBJECTIVE:   Trisha Bender is a 63 year old female who presents to clinic today for the following health issues:      ED/UC Followup:    Facility:  [facility]  Date of visit: 12/30/18  Reason for visit: Rib pain, Chronic bilateral Low back pain without sciatica, leukocytosis  Current Status: same      Patient is seen for a follow up visit.  Seen in ED for increased back pain.   Has h/o chronic back pain related to DDD, compression fractures, OA.   On chronic treatment with narcotic analgesics. Has had increased pain , severe, interfering with normal functioning, unable to walk, stand, take deep breaths. Pain is in the mid thoracic spine. Radiates to the lateral chest. No LE numbness or weakness. X rays did not show new fractures. Has been taking increased dose of her pain medications. Currently out of Norco. Denies side effects from treatment, no sedation, weakness, dizziness.   Has h/o emphysema, chronic SOB. On steroid treatment, follows with pulmonary , advised for lung transplant.   Has h/o CHF, IHD. No current chest pain. Has SOB and mild LE edema, on treatment.   Has h/o anxiety. Increased symptoms with increasing of back pain.   Has h/o HTN. on medical treatment. BP has been controlled. No side effects from medications. No CP, HA, dizziness. good compliance with medications and low salt diet.        PROBLEMS TO ADD ON...  Has had elevated WBC count on last 3 checks, from 2- 3 months. No current symptoms of infection, but has been on steroids as possible reason.     Problem list and histories reviewed & adjusted, as indicated.  Additional history: as documented    Patient Active Problem List   Diagnosis     Essential hypertension with goal blood pressure less than 130/80     Chronic migraine without aura without status migrainosus, not intractable     Esophageal reflux     Disorder of bone and cartilage     Family history of malignant neoplasm of gastrointestinal tract     Chronic airway obstruction (H)      Cystocele     Advanced directives, counseling/discussion     Family history of thyroid cancer - 1/2 brother     Anxiety     Mild persistent asthma without complication     Chronic low back pain     Hair loss     Lateral epicondylitis of right elbow     Dilated cardiomyopathy (H)     Right bundle branch block (RBBB)     CHF (congestive heart failure) (H)     SOB (shortness of breath)     Coronary artery disease involving native coronary artery of native heart without angina pectoris     Mixed hyperlipidemia     Controlled substance agreement signed     Moderate persistent asthma without complication     Hypokalemia     Mechanical low back pain     Panlobular emphysema (H)     Severe episode of recurrent major depressive disorder, without psychotic features (H)     Past Surgical History:   Procedure Laterality Date     ANGIOGRAM  07/02/2015    Minimal CAD. LV dysfunction, Mid RCA 20% stenosis, EF 20-25%, c/w Takotsubo cardiomyopathy     APPENDECTOMY       C NONSPECIFIC PROCEDURE  1974    GB and appendectomy  abstracted [id number]     C NONSPECIFIC PROCEDURE      Nasal septoplasty and mucous retention cyst     CHOLECYSTECTOMY  1974     COLONOSCOPY N/A 4/26/2018    Procedure: COLONOSCOPY;  COLONOSCOPY with biopsies;  Surgeon: Steve Acosta MD;  Location:  OR     ESOPHAGOSCOPY, GASTROSCOPY, DUODENOSCOPY (EGD), COMBINED  11/15/2011    Procedure:COMBINED ESOPHAGOSCOPY, GASTROSCOPY, DUODENOSCOPY (EGD);  ESOPHAGOSCOPY, GASTROSCOPY, DUODENOSCOPY (EGD) ; Surgeon:JIM URENA; Location: GI     HC REMOVE TONSILS/ADENOIDS,<13 Y/O       INJECT EPIDURAL LUMBAR / SACRAL SINGLE N/A 4/13/2018    Procedure: INJECT EPIDURAL LUMBAR / SACRAL CONTINUAL OR INTERMITTENT;  Lumbar Epidural Steroid Injection;  Surgeon: Az Enamorado MD;  Location:  OR     INJECT EPIDURAL LUMBAR / SACRAL SINGLE N/A 9/24/2018    Procedure: INJECT EPIDURAL LUMBAR / SACRAL SINGLE;  Lumbar Epidural Steroid Injection;  Surgeon: Az Enamorado MD;   [facility]     LAPAROSCOPIC ABLATION ENDOMETRIOSIS       LAPAROSCOPY DIAGNOSTIC (GYN)      endometriosis resection / lysis of adhesions     wisdom teeth         Social History     Tobacco Use     Smoking status: Former Smoker     Packs/day: 1.00     Years: 30.00     Pack years: 30.00     Last attempt to quit: 2006     Years since quittin.9     Smokeless tobacco: Never Used     Tobacco comment: Quit in    Substance Use Topics     Alcohol use: No     Alcohol/week: 0.0 oz     Family History   Problem Relation Age of Onset     Cancer Mother         Colon cancer; dxed at age 64;  at age 69     Cancer - colorectal Mother      Other Cancer Mother      Gastrointestinal Disease Sister         Acute pancreatitis     Heart Disease Father         ?     Coronary Artery Disease Father      Circulatory Maternal Aunt         AAA     Circulatory Maternal Uncle         AAA     Diabetes Son          Current Outpatient Medications   Medication Sig Dispense Refill     ADVAIR DISKUS 500-50 MCG/DOSE diskus inhaler INHALE 1 PUFF TWO TIMES DAILY 3 Inhaler 3     albuterol (VENTOLIN HFA) 108 (90 BASE) MCG/ACT Inhaler Inhale 2 puffs into the lungs every 6 hours as needed for shortness of breath / dyspnea or wheezing 18 g 3     ALPRAZolam (XANAX) 0.25 MG tablet Take 1 tablet by mouth. Every twelve hours as needed for anxiety 40 tablet 0     atorvastatin (LIPITOR) 10 MG tablet Takes 3 times a week 36 tablet 3     baclofen (LIORESAL) 10 MG tablet TAKE ONE TABLET BY MOUTH THREE TIMES A DAY AS NEEDED FOR MUSCLE SPASMS 90 tablet 1     [employer] FIBER GUMMIES 2.5 G CHEW Take 1 tablet by mouth daily Reported on 2017       fluticasone (FLONASE) 50 MCG/ACT spray SPRAY 1 TO 2 SPRAYS IN EACH NOSTRIL ONCE DAILY 16 g 7     furosemide (LASIX) 20 MG tablet TAKE 2 TABLETS (40 MG) BY MOUTH DAILY SEVERE SWELLING 180 tablet 1     [START ON 2019] HYDROcodone-acetaminophen (NORCO) 5-325 MG tablet Take 1 tablet by mouth every 4 hours as  needed for pain Maximum 6 daily 180 tablet 0     ipratropium - albuterol 0.5 mg/2.5 mg/3 mL (DUONEB) 0.5-2.5 (3) MG/3ML neb solution Take 1 vial (3 mLs) by nebulization every 6 hours as needed for shortness of breath / dyspnea or wheezing 30 vial 1     lidocaine (LIDODERM) 5 % Patch Apply up to 3 patches to painful area at once for up to 12 h within a 24 h period.  Remove after 12 hours. 30 patch 0     lisinopril (PRINIVIL/ZESTRIL) 2.5 MG tablet TAKE 1 TABLET BY MOUTH ONCE DAILY 90 tablet 1     lisinopril (PRINIVIL/ZESTRIL) 2.5 MG tablet Take 1 tablet (2.5 mg) by mouth daily 90 tablet 3     montelukast (SINGULAIR) 10 MG tablet Take 1 tablet (10 mg) by mouth At Bedtime 90 tablet 1     Multiple Vitamin CHEW Take 1 tablet by mouth daily        ondansetron (ZOFRAN ODT) 4 MG ODT tab Take 1-2 tablets (4-8 mg) by mouth every 8 hours as needed for nausea 20 tablet 3     oxyCODONE-acetaminophen (PERCOCET) 5-325 MG tablet Take 1 tablet by mouth every 6 hours as needed for pain 12 tablet 0     pantoprazole (PROTONIX) 40 MG EC tablet Take 1 tablet (40 mg) by mouth daily 90 tablet 2     potassium chloride ER (K-TAB/KLOR-CON) 10 MEQ CR tablet TAKE ONE TABLET BY MOUTH THREE TIMES A DAY 90 tablet 5     predniSONE (DELTASONE) 20 MG tablet Take 3 tabs (60 mg) by mouth daily x 3 days, 2 tabs (40 mg) daily x 3 days, 1 tab (20 mg) daily x 3 days, then 1/2 tab (10 mg) x 3 days. 20 tablet 0     roflumilast (DALIRESP) 500 MCG TABS tablet Take 1 tablet (500 mcg) by mouth daily       sennosides (SENOKOT) 8.6 MG tablet Take 1 tablet by mouth daily 30 tablet 0     SUMAtriptan (IMITREX) 25 MG tablet Take 1 tablet (25 mg) by mouth at onset of headache for migraine May repeat in 2 hours if needed: max 2/day; 9 tablet 11     tiotropium (SPIRIVA HANDIHALER) 18 MCG capsule USING THE HANDIHALER, INHALE THE CONTENTS OF ONE CAPSULE BY MOUTH DAILY 90 capsule 3     TYLENOL EXTRA STRENGTH OR 1 TABLET EVERY 4 HOURS AS NEEDED       predniSONE (DELTASONE) 5  "MG tablet TAKE 2 TABLETS (10 MG) BY MOUTH DAILY, TAPER DOWN BY 1/2 TAB EVERY 7 DAYS UNTIL DOWN TO 1 TAB DAILY (Patient not taking: Reported on 1/2/2019) 30 tablet 0       Reviewed and updated as needed this visit by clinical staff       Reviewed and updated as needed this visit by Provider         ROS:  Constitutional, HEENT, cardiovascular, pulmonary, GI, , musculoskeletal, neuro, skin, endocrine and psych systems are negative, except as otherwise noted.    OBJECTIVE:     /70   Pulse 73   Temp 98.1  F (36.7  C) (Oral)   Ht 1.778 m (5' 10\")   Wt 84.8 kg (187 lb)   LMP 09/29/2005   SpO2 96%   BMI 26.83 kg/m    Body mass index is 26.83 kg/m .   GENERAL: weak, anxious, appears in pain, alert and no distress  EYES: Eyes grossly normal to inspection, PERRL and conjunctivae and sclerae normal  HENT: ear canals and TM's normal, nose and mouth without ulcers or lesions  NECK: no adenopathy, no asymmetry, masses, or scars and thyroid normal to palpation  RESP: lungs clear to auscultation - no rales, rhonchi or wheezes, diminished breaths sounds diffusely   CV: regular rate and rhythm, normal S1 S2, no S3 or S4, no murmur, click or rub,  peripheral pulses strong  ABDOMEN: soft, mildly tender RUQ, no guarding, no hepatosplenomegaly, no masses and bowel sounds normal  MS: no gross musculoskeletal defects noted, 1+ LE edema, pain in the mid thoracic spine, radiating to the left lateral chest   SKIN: no suspicious lesions or rashes  NEURO: Normal strength and tone, mentation intact and speech normal, generalized muscle weakness     Diagnostic Test Results:  Results for orders placed or performed during the hospital encounter of 12/30/18   Ribs XR, unilat 3 views + PA chest,  left    Narrative    XR RIBS & CHEST LT 3VW 12/30/2018 6:40 PM    HISTORY: Short of breath, rib pain.    COMPARISON: 5/23/2018    FINDINGS: No airspace consolidation, pleural effusion or pneumothorax.  Normal heart size. Old right seventh rib " injury. No acute fracture.      Impression    IMPRESSION: No acute abnormality.    JOSÉ GONZALEZ MD   CBC with platelets differential   Result Value Ref Range    WBC 17.1 (H) 4.0 - 11.0 10e9/L    RBC Count 4.58 3.8 - 5.2 10e12/L    Hemoglobin 13.9 11.7 - 15.7 g/dL    Hematocrit 44.5 35.0 - 47.0 %    MCV 97 78 - 100 fl    MCH 30.3 26.5 - 33.0 pg    MCHC 31.2 (L) 31.5 - 36.5 g/dL    RDW 14.4 10.0 - 15.0 %    Platelet Count 380 150 - 450 10e9/L    Diff Method Automated Method     % Neutrophils 86.3 %    % Lymphocytes 6.7 %    % Monocytes 4.0 %    % Eosinophils 0.5 %    % Basophils 0.4 %    % Immature Granulocytes 2.1 %    Nucleated RBCs 0 0 /100    Absolute Neutrophil 14.8 (H) 1.6 - 8.3 10e9/L    Absolute Lymphocytes 1.2 0.8 - 5.3 10e9/L    Absolute Monocytes 0.7 0.0 - 1.3 10e9/L    Absolute Eosinophils 0.1 0.0 - 0.7 10e9/L    Absolute Basophils 0.1 0.0 - 0.2 10e9/L    Abs Immature Granulocytes 0.4 0 - 0.4 10e9/L    Absolute Nucleated RBC 0.0    D dimer quantitative   Result Value Ref Range    D Dimer <0.3 0.0 - 0.50 ug/ml FEU   Comprehensive metabolic panel   Result Value Ref Range    Sodium 137 133 - 144 mmol/L    Potassium 3.7 3.4 - 5.3 mmol/L    Chloride 100 94 - 109 mmol/L    Carbon Dioxide 29 20 - 32 mmol/L    Anion Gap 8 3 - 14 mmol/L    Glucose 138 (H) 70 - 99 mg/dL    Urea Nitrogen 11 7 - 30 mg/dL    Creatinine 0.78 0.52 - 1.04 mg/dL    GFR Estimate 80 >60 mL/min/[1.73_m2]    GFR Estimate If Black >90 >60 mL/min/[1.73_m2]    Calcium 9.2 8.5 - 10.1 mg/dL    Bilirubin Total 0.4 0.2 - 1.3 mg/dL    Albumin 3.5 3.4 - 5.0 g/dL    Protein Total 6.6 (L) 6.8 - 8.8 g/dL    Alkaline Phosphatase 74 40 - 150 U/L    ALT 21 0 - 50 U/L    AST 9 0 - 45 U/L   Lipase   Result Value Ref Range    Lipase 59 (L) 73 - 393 U/L   Troponin I   Result Value Ref Range    Troponin I ES <0.015 0.000 - 0.045 ug/L   BNP   Result Value Ref Range    N-Terminal Pro BNP Inpatient 259 0 - 900 pg/mL   EKG 12 lead   Result Value Ref Range     Interpretation ECG Click View Image link to view waveform and result        ASSESSMENT/PLAN:     Problem List Items Addressed This Visit     Anxiety    Chronic low back pain    Relevant Medications    oxyCODONE-acetaminophen (PERCOCET) 5-325 MG tablet    HYDROcodone-acetaminophen (NORCO) 5-325 MG tablet (Start on 1/5/2019)    CHF (congestive heart failure) (H)    Panlobular emphysema (H)      Other Visit Diagnoses     Hospital discharge follow-up    -  Primary    Acute midline thoracic back pain        Relevant Medications    oxyCODONE-acetaminophen (PERCOCET) 5-325 MG tablet    HYDROcodone-acetaminophen (NORCO) 5-325 MG tablet (Start on 1/5/2019)    Other Relevant Orders    MR Thoracic Spine w/o Contrast           Assess repeated Thoracic spine MRI   Start on Percocet until able to refill her Norco in 3 days   Pending ortho spine assessment for possible vertebroplasty   Taper down steroid, follow up on CBC  With diff in 1 month, consider hematology assessment if persistent Leucocytosis   Advised for medications side effects, not to use higher than prescribed doses of analgesics   Cont current treatment, controlled HTN, no exacerbation of CHF clinically     Follow-Up:in 1 week, with MRI results     Bandar Weinberg MD  [facility]

## 2019-01-07 ENCOUNTER — MYC MEDICAL ADVICE (OUTPATIENT)
Dept: INTERNAL MEDICINE | Facility: CLINIC | Age: 64
End: 2019-01-07

## 2019-01-07 ENCOUNTER — TELEPHONE (OUTPATIENT)
Dept: INTERNAL MEDICINE | Facility: CLINIC | Age: 64
End: 2019-01-07

## 2019-01-07 DIAGNOSIS — M54.6 ACUTE MIDLINE THORACIC BACK PAIN: ICD-10-CM

## 2019-01-07 NOTE — TELEPHONE ENCOUNTER
Reason for Call:  Medication or medication refill:    Do you use a [facility]?  Name of the pharmacy and phone number for the current request:  [facility] [place] [id number] [facility] (SPECIALTY) - [phone]    Name of the medication requested: Norco    Other request: Pt also has an MRI on 01/09 and Dr Weinberg told her to call in and tell him when it was so that she could get dilaudid prescribed since it is hard for pt to lay on back.    Can we leave a detailed message on this number? YES    Phone number patient can be reached at: Home number on file [phone] (home)    Best Time: any    Call taken on 1/7/2019 at 11:39 AM by Effie Larkin

## 2019-01-08 ENCOUNTER — TELEPHONE (OUTPATIENT)
Dept: INTERNAL MEDICINE | Facility: CLINIC | Age: 64
End: 2019-01-08

## 2019-01-08 RX ORDER — OXYCODONE AND ACETAMINOPHEN 5; 325 MG/1; MG/1
1 TABLET ORAL EVERY 6 HOURS PRN
Qty: 20 TABLET | Refills: 0 | Status: ON HOLD | OUTPATIENT
Start: 2019-01-08 | End: 2019-09-08

## 2019-01-08 NOTE — TELEPHONE ENCOUNTER
Call to pt.     She would like the Percocet Rx.   This helps her breathing better.     MRI tomorrow. She would like a few extra, more that #12. To take PRN for break through pain and breathing.     Pended Rx.     . Please place in folder for [facility] pharmacy before 4 pm run today.

## 2019-01-08 NOTE — TELEPHONE ENCOUNTER
Spoke to pharmacist. Concerned with patient's narcotic requests. Has violated controlled substance agreement and recently requested narco and percocet. Pharmacy does not feel comfortable dispensing this quantity of narcotics and requesting primary care provider call directly to discuss patient narcotic use and would like patient to be managed by pain specialty clinic. Please call pharmacy asap.    Thank you.

## 2019-01-09 ENCOUNTER — HOSPITAL ENCOUNTER (OUTPATIENT)
Dept: MRI IMAGING | Facility: CLINIC | Age: 64
Discharge: HOME | End: 2019-01-09
Attending: INTERNAL MEDICINE | Admitting: INTERNAL MEDICINE
Payer: COMMERCIAL

## 2019-01-09 DIAGNOSIS — M54.6 ACUTE MIDLINE THORACIC BACK PAIN: ICD-10-CM

## 2019-01-09 PROCEDURE — 72146 MRI CHEST SPINE W/O DYE: CPT

## 2019-01-09 NOTE — TELEPHONE ENCOUNTER
Dr Weinberg states she should only have #2 just for the MRI.   Call to pt and advised.    Pt is having Vetebroloplasty on 1/17.    She doesn't want to go to the Pain clinic because of this.     Pharmacy was advised of this pill reduction.

## 2019-01-09 NOTE — TELEPHONE ENCOUNTER
Dr. Weinberg-please see phone message from earlier today-pharmacy called with concerns regarding pt's narcotic use and does not feel comfortable dispensing this Rx. Would like Dr. Weinberg to call them and discuss asap.

## 2019-01-25 ENCOUNTER — MYC REFILL (OUTPATIENT)
Dept: INTERNAL MEDICINE | Facility: CLINIC | Age: 64
End: 2019-01-25

## 2019-01-25 ENCOUNTER — MYC MEDICAL ADVICE (OUTPATIENT)
Dept: INTERNAL MEDICINE | Facility: CLINIC | Age: 64
End: 2019-01-25

## 2019-01-25 DIAGNOSIS — F41.9 ANXIETY: ICD-10-CM

## 2019-01-25 DIAGNOSIS — M54.6 ACUTE MIDLINE THORACIC BACK PAIN: ICD-10-CM

## 2019-01-25 DIAGNOSIS — M54.5 CHRONIC BILATERAL LOW BACK PAIN, WITH SCIATICA PRESENCE UNSPECIFIED: Primary | ICD-10-CM

## 2019-01-25 DIAGNOSIS — G89.29 CHRONIC BILATERAL LOW BACK PAIN, WITH SCIATICA PRESENCE UNSPECIFIED: Primary | ICD-10-CM

## 2019-01-25 RX ORDER — OXYCODONE AND ACETAMINOPHEN 5; 325 MG/1; MG/1
1 TABLET ORAL EVERY 6 HOURS PRN
Qty: 20 TABLET | Refills: 0 | Status: CANCELLED | OUTPATIENT
Start: 2019-01-25

## 2019-01-25 RX ORDER — ALPRAZOLAM 0.25 MG
TABLET ORAL
Qty: 40 TABLET | Refills: 0 | Status: CANCELLED | OUTPATIENT
Start: 2019-01-25

## 2019-01-25 NOTE — TELEPHONE ENCOUNTER
"Pt is very concerned about the fact that her pharmacist was able to give her a different amount of pain medication than what was listed on her prescription (gave her 2 pills instead of 20), and is concerned about if that is something she would need to worry about happening again in the future if she were to go ahead with her vertebroplasty, lung transplant, or any other proccedures?     She reports she is in a great deal of pain, \"truly miserable\". Has requested refill of oxycodone through [facility].     Has future appointment 2/11/19, but wondering if there is any way she can be seen sooner?     "

## 2019-01-28 ENCOUNTER — MYC REFILL (OUTPATIENT)
Dept: INTERNAL MEDICINE | Facility: CLINIC | Age: 64
End: 2019-01-28

## 2019-01-28 ENCOUNTER — MYC MEDICAL ADVICE (OUTPATIENT)
Dept: INTERNAL MEDICINE | Facility: CLINIC | Age: 64
End: 2019-01-28

## 2019-01-28 DIAGNOSIS — F41.9 ANXIETY: ICD-10-CM

## 2019-01-28 DIAGNOSIS — M54.50 CHRONIC BILATERAL LOW BACK PAIN WITHOUT SCIATICA: ICD-10-CM

## 2019-01-28 DIAGNOSIS — G89.29 CHRONIC BILATERAL LOW BACK PAIN WITHOUT SCIATICA: ICD-10-CM

## 2019-01-28 NOTE — TELEPHONE ENCOUNTER
The oxycodone was given only for the MRI , so the 2 pills are the way it was intended.   I will refer her to pain clinic to further manage her pain.   I don't recommend her to take extra Prednisone without consulting with pulmonary or me.

## 2019-01-29 DIAGNOSIS — J45.901 ASTHMA EXACERBATION: ICD-10-CM

## 2019-01-29 DIAGNOSIS — F41.9 ANXIETY: ICD-10-CM

## 2019-01-29 RX ORDER — ALPRAZOLAM 0.25 MG
TABLET ORAL
Qty: 40 TABLET | Refills: 0 | Status: SHIPPED | OUTPATIENT
Start: 2019-01-29 | End: 2019-02-28

## 2019-01-29 RX ORDER — HYDROCODONE BITARTRATE AND ACETAMINOPHEN 5; 325 MG/1; MG/1
1 TABLET ORAL EVERY 4 HOURS PRN
Qty: 180 TABLET | Refills: 0 | Status: SHIPPED | OUTPATIENT
Start: 2019-01-29 | End: 2019-03-01

## 2019-01-29 RX ORDER — ALPRAZOLAM 0.25 MG
TABLET ORAL
Qty: 40 TABLET | Refills: 0 | Status: CANCELLED | OUTPATIENT
Start: 2019-01-29

## 2019-01-29 NOTE — TELEPHONE ENCOUNTER
Controlled Substance Refill Request for Norco and Xanax  Problem List Complete:    No     PROVIDER TO CONSIDER COMPLETION OF PROBLEM LIST AND OVERVIEW/CONTROLLED SUBSTANCE AGREEMENT    Last Written Prescription Date:  Norco 1/5/19, Xanax 12/17/18  Last Fill Quantity: Norco 180, Xanax 40   # refills: 0    Last Office Visit with [facility] primary care provider: 1/2/19    Future Office visit:   Next 5 appointments (look out 90 days)    Feb 11, 2019  4:20 PM CST  Elliott Rodriguez with Bandar Weinberg MD  [facility] [address]  [facility] [address]  [phone]          Controlled substance agreement: yes  Last Urine Drug Screen:   Pain Drug SCR UR W RPTD Meds   Date Value Ref Range Status   12/17/2018 FINAL  Final     Comment:     (Note)  ====================================================================  [facility] COMP DRUG ANALYSIS,UR  ====================================================================  Test                             Result       Flag       Units        Drug Present   Hydrocodone                    1330                    ng/mg creat   Hydromorphone                  228                     ng/mg creat   Dihydrocodeine                 265                     ng/mg creat   Norhydrocodone                 1337                    ng/mg creat    Sources of hydrocodone include scheduled prescription    medications. Hydromorphone, dihydrocodeine and norhydrocodone are    expected metabolites of hydrocodone. Hydromorphone and    dihydrocodeine are also available as scheduled prescription    medications.   Baclofen                       PRESENT                               Acetaminophen                  PRESENT                              ====================================================================  Test                      Result    Flag   Units      Ref Range        Creatinine              43               mg/dL      >=20             ====================================================================  Declared Medications:  Medication list was not provided.  ====================================================================  For clinical consultation, please call [phone].  ====================================================================  Analysis performed by [facility] [facility] [employer] [place] [address]     , No results found for: COMDAT, No results found for: THC13, PCP13, COC13, MAMP13, OPI13, AMP13, BZO13, TCA13, MTD13, BAR13, OXY13, PPX13, BUP13     Processing:  Staff will hand deliver Rx to on-site pharmacy     [email or web address]       checked in past 3 months?  Unknown - access not granted for this provider

## 2019-01-29 NOTE — TELEPHONE ENCOUNTER
Duplicate request. See 1/28 [facility] refill requests. See 1/25 [employer] [employer] message regarding Percocet.

## 2019-02-07 DIAGNOSIS — I50.21 ACUTE SYSTOLIC CONGESTIVE HEART FAILURE (H): ICD-10-CM

## 2019-02-07 NOTE — TELEPHONE ENCOUNTER
"Requested Prescriptions   Pending Prescriptions Disp Refills     furosemide (LASIX) 20 MG tablet [Pharmacy Med Name: FUROSEMIDE 20 MG TABLET]  Last Written Prescription Date:  9/7/2018  Last Fill Quantity: 180,  # refills: 1   Last office visit: 1/2/2019 with prescribing provider:     Future Office Visit:   180 tablet 1     Sig: TAKE 2 TABLETS (40 MG) BY MOUTH DAILY SEVERE SWELLING    Diuretics (Including Combos) Protocol Passed - 2/7/2019  2:52 PM       Passed - Blood pressure under 140/90 in past 12 months    BP Readings from Last 3 Encounters:   01/02/19 138/70   12/30/18 165/76   12/17/18 137/74                Passed - Recent (12 mo) or future (30 days) visit within the authorizing provider's specialty    Patient had office visit in the last 12 months or has a visit in the next 30 days with authorizing provider or within the authorizing provider's specialty.  See \"Patient Info\" tab in inbasket, or \"Choose Columns\" in Meds & Orders section of the refill encounter.             Passed - Medication is active on med list       Passed - Patient is age 18 or older       Passed - No active pregancy on record       Passed - Normal serum creatinine on file in past 12 months    Recent Labs   Lab Test 12/30/18  1734   CR 0.78             Passed - Normal serum potassium on file in past 12 months    Recent Labs   Lab Test 12/30/18  1734   POTASSIUM 3.7                   Passed - Normal serum sodium on file in past 12 months    Recent Labs   Lab Test 12/30/18  1734                Passed - No positive pregnancy test in past 12 months        "

## 2019-02-11 RX ORDER — FUROSEMIDE 20 MG
TABLET ORAL
Qty: 180 TABLET | Refills: 1 | Status: SHIPPED | OUTPATIENT
Start: 2019-02-11 | End: 2019-07-29

## 2019-02-25 ENCOUNTER — MYC REFILL (OUTPATIENT)
Dept: INTERNAL MEDICINE | Facility: CLINIC | Age: 64
End: 2019-02-25

## 2019-02-25 DIAGNOSIS — M54.50 CHRONIC BILATERAL LOW BACK PAIN WITHOUT SCIATICA: ICD-10-CM

## 2019-02-25 DIAGNOSIS — G89.29 CHRONIC BILATERAL LOW BACK PAIN WITHOUT SCIATICA: ICD-10-CM

## 2019-02-25 RX ORDER — HYDROCODONE BITARTRATE AND ACETAMINOPHEN 5; 325 MG/1; MG/1
1 TABLET ORAL EVERY 4 HOURS PRN
Qty: 180 TABLET | Refills: 0 | Status: CANCELLED | OUTPATIENT
Start: 2019-02-25

## 2019-02-28 DIAGNOSIS — F41.9 ANXIETY: ICD-10-CM

## 2019-02-28 DIAGNOSIS — R09.81 NASAL CONGESTION: Primary | ICD-10-CM

## 2019-02-28 RX ORDER — ALPRAZOLAM 0.25 MG
TABLET ORAL
Qty: 40 TABLET | Refills: 0 | Status: SHIPPED | OUTPATIENT
Start: 2019-02-28 | End: 2019-04-03

## 2019-03-01 RX ORDER — HYDROCODONE BITARTRATE AND ACETAMINOPHEN 5; 325 MG/1; MG/1
1 TABLET ORAL EVERY 4 HOURS PRN
Qty: 180 TABLET | Refills: 0 | Status: SHIPPED | OUTPATIENT
Start: 2019-03-01 | End: 2019-03-24

## 2019-03-01 NOTE — TELEPHONE ENCOUNTER
Reason for Call:  Medication or medication refill:    Do you use a [facility]?  Yes    Name of the pharmacy and phone number for the current request:  [facility]    Name of the medication requested: hydrocodone    Other request: pt following up on request on 2/25    Can we leave a detailed message on this number? YES    Phone number patient can be reached at: Home number on file [phone] (home)    Best Time: asap    Call taken on 3/1/2019 at 9:16 AM by Kirstie Link

## 2019-03-01 NOTE — TELEPHONE ENCOUNTER
Norco TAKE to [facility] pharmacy        Last Written Prescription Date:  1/29/19  Last Fill Quantity: 180,   # refills: 0    Last Office Visit: 1/2/19    Future Office visit:    Next 5 appointments (look out 90 days)    Mar 08, 2019  2:40 PM CST  Elliott Rodriguez with Bandar Weinberg MD  [facility] [address] [facility] [id number] [phone]           Routing refill request to provider for review/approval because:  Drug not on the [facility], [facility] or  Health refill protocol or controlled substance

## 2019-03-01 NOTE — TELEPHONE ENCOUNTER
"Requested Prescriptions   Pending Prescriptions Disp Refills     fluticasone (FLONASE) 50 MCG/ACT nasal spray [Pharmacy Med Name: FLUTICASONE 50MCG NASAL SPRAY]  Last Written Prescription Date:  2/19/18  Last Fill Quantity: 16 G,  # refills: 7   Last office visit: 1/2/2019 with prescribing provider:  JESUS   Future Office Visit:   Next 5 appointments (look out 90 days)    Mar 08, 2019  2:40 PM CST  Elliott Rodriguez with Bandar Weinberg MD  [facility] [address]  [facility] [address]  [phone]          16 g 2     Sig: USE ONE TO TWO SPRAYS IN EACH NOSTRIL EVERY DAY    Inhaled Steroids Protocol Failed - 2/28/2019 12:34 PM       Failed - Asthma control assessment score within normal limits in last 6 months    Please review ACT score.   ACT Total Scores 4/18/2017 3/23/2018 9/27/2018   ACT TOTAL SCORE - - -   ASTHMA ER VISITS - - -   ASTHMA HOSPITALIZATIONS - - -   ACT TOTAL SCORE (Goal Greater than or Equal to 20) 11 8 14   In the past 12 months, how many times did you visit the emergency room for your asthma without being admitted to the hospital? 1 0 0   In the past 12 months, how many times were you hospitalized overnight because of your asthma? 0 0 0              Passed - Patient is age 12 or older       Passed - Medication is active on med list       Passed - Recent (6 mo) or future (30 days) visit within the authorizing provider's specialty    Patient had office visit in the last 6 months or has a visit in the next 30 days with authorizing provider or within the authorizing provider's specialty.  See \"Patient Info\" tab in inbasket, or \"Choose Columns\" in Meds & Orders section of the refill encounter.              "

## 2019-03-04 RX ORDER — FLUTICASONE PROPIONATE 50 MCG
SPRAY, SUSPENSION (ML) NASAL
Qty: 16 G | Refills: 0 | Status: SHIPPED | OUTPATIENT
Start: 2019-03-04 | End: 2019-04-24

## 2019-03-04 NOTE — TELEPHONE ENCOUNTER
Prescription approved per [facility] Refill Protocol x1 month as patient has appt this week. CHAI Maria R.N.

## 2019-03-05 DIAGNOSIS — I42.0 DILATED CARDIOMYOPATHY (H): ICD-10-CM

## 2019-03-06 NOTE — TELEPHONE ENCOUNTER
"Requested Prescriptions   Pending Prescriptions Disp Refills     lisinopril (PRINIVIL/ZESTRIL) 2.5 MG tablet [Pharmacy Med Name: LISINOPRIL 2.5 MG TABLET] 90 tablet 1    Last Written Prescription Date:  06/13/2018  Last Fill Quantity: 90,  # refills: 1   Last office visit: 1/2/2019 with prescribing provider:     Future Office Visit:   Sig: TAKE 1 TABLET BY MOUTH EVERY DAY    ACE Inhibitors (Including Combos) Protocol Passed - 3/5/2019  1:33 AM       Passed - Blood pressure under 140/90 in past 12 months    BP Readings from Last 3 Encounters:   01/02/19 138/70   12/30/18 165/76   12/17/18 137/74                Passed - Recent (12 mo) or future (30 days) visit within the authorizing provider's specialty    Patient had office visit in the last 12 months or has a visit in the next 30 days with authorizing provider or within the authorizing provider's specialty.  See \"Patient Info\" tab in inbasket, or \"Choose Columns\" in Meds & Orders section of the refill encounter.             Passed - Medication is active on med list       Passed - Patient is age 18 or older       Passed - No active pregnancy on record       Passed - Normal serum creatinine on file in past 12 months    Recent Labs   Lab Test 12/30/18  1734   CR 0.78            Passed - Normal serum potassium on file in past 12 months    Recent Labs   Lab Test 12/30/18  1734   POTASSIUM 3.7            Passed - No positive pregnancy test within past 12 months        "

## 2019-03-07 RX ORDER — LISINOPRIL 2.5 MG/1
TABLET ORAL
Qty: 90 TABLET | Refills: 2 | Status: ON HOLD | OUTPATIENT
Start: 2019-03-07 | End: 2019-09-08

## 2019-03-24 ENCOUNTER — MYC REFILL (OUTPATIENT)
Dept: INTERNAL MEDICINE | Facility: CLINIC | Age: 64
End: 2019-03-24

## 2019-03-24 DIAGNOSIS — M54.50 CHRONIC BILATERAL LOW BACK PAIN WITHOUT SCIATICA: ICD-10-CM

## 2019-03-24 DIAGNOSIS — G89.29 CHRONIC BILATERAL LOW BACK PAIN WITHOUT SCIATICA: ICD-10-CM

## 2019-03-27 NOTE — TELEPHONE ENCOUNTER
Controlled Substance Refill Request for Norco  Problem List Complete:    No     PROVIDER TO CONSIDER COMPLETION OF PROBLEM LIST AND OVERVIEW/CONTROLLED SUBSTANCE AGREEMENT    Last Written Prescription Date:  3-1-19  Last Fill Quantity: 180,   # refills: 0    THE MOST RECENT OFFICE VISIT MUST BE WITHIN THE PAST 3 MONTHS. AT LEAST ONE FACE TO FACE VISIT MUST OCCUR EVERY 6 MONTHS. ADDITIONAL VISITS CAN BE VIRTUAL.  (THIS STATEMENT SHOULD BE DELETED.)    Last Office Visit with [facility] primary care provider: 1-2-19    Future Office visit:     Controlled substance agreement:   Encounter-Level CSA - 02/23/2016:    Controlled Substance Agreement - Scan on 2/24/2016  1:37 PM: [place] CONTROLLED SUBSTANCE AGREEMENT, 2/23/16 (below)       Patient-Level CSA:    There are no patient-level csa.         Last Urine Drug Screen:   Pain Drug SCR UR W RPTD Meds   Date Value Ref Range Status   12/17/2018 FINAL  Final     Comment:     (Note)  ====================================================================  TOXASSURE COMP DRUG ANALYSIS,UR  ====================================================================  Test                             Result       Flag       Units        Drug Present   Hydrocodone                    1330                    ng/mg creat   Hydromorphone                  228                     ng/mg creat   Dihydrocodeine                 265                     ng/mg creat   Norhydrocodone                 1337                    ng/mg creat    Sources of hydrocodone include scheduled prescription    medications. Hydromorphone, dihydrocodeine and norhydrocodone are    expected metabolites of hydrocodone. Hydromorphone and    dihydrocodeine are also available as scheduled prescription    medications.   Baclofen                       PRESENT                               Acetaminophen                  PRESENT                              ====================================================================  Test                       Result    Flag   Units      Ref Range        Creatinine              43               mg/dL      >=20            ====================================================================  Declared Medications:  Medication list was not provided.  ====================================================================  For clinical consultation, please call [phone].  ====================================================================  Analysis performed by [employer] [place] [address]     , No results found for: COMDAT, No results found for: THC13, PCP13, COC13, MAMP13, OPI13, AMP13, BZO13, TCA13, MTD13, BAR13, OXY13, PPX13, BUP13     Processing:  Staff will hand deliver Rx to on-site pharmacy--[facility]    [email or web address]       checked in past 3 months?  Access not granted by provider.    Please advise, thanks.

## 2019-03-27 NOTE — TELEPHONE ENCOUNTER
Controlled Substance Refill Request for Hydrocodone   Problem List Complete:  Yes   checked in past 3 months?  Yes 01/02/2019     Last Written Prescription Date:  03/01/2019  Last Fill Quantity: 180,  # refills: 0   Last office visit: 1/2/2019 with prescribing provider:  Dr Weinberg   Future Office Visit:        Problem List Collapse by Default   Collapse by Default        Essential hypertension with goal blood pressure less than 130/80   Chronic migraine without aura without status migrainosus, not intractable   Esophageal reflux   Disorder of bone and cartilage   Family history of malignant neoplasm of gastrointestinal tract   Chronic airway obstruction (H)   Cystocele   Advanced directives, counseling/discussion   Family history of thyroid cancer - 1/2 brother   Anxiety   Mild persistent asthma without complication   Chronic low back pain   Hair loss   Lateral epicondylitis of right elbow   Dilated cardiomyopathy (H)   Right bundle branch block (RBBB)   CHF (congestive heart failure) (H)   SOB (shortness of breath)   Coronary artery disease involving native coronary artery of native heart without angina pectoris   Mixed hyperlipidemia   Controlled substance agreement signed   Moderate persistent asthma without complication   Hypokalemia   Mechanical low back pain   Panlobular emphysema (H)   Severe episode of recurrent major depressive disorder, without psychotic features (H)

## 2019-03-28 RX ORDER — HYDROCODONE BITARTRATE AND ACETAMINOPHEN 5; 325 MG/1; MG/1
1 TABLET ORAL EVERY 4 HOURS PRN
Qty: 180 TABLET | Refills: 0 | Status: SHIPPED | OUTPATIENT
Start: 2019-03-28 | End: 2019-04-21

## 2019-03-28 NOTE — TELEPHONE ENCOUNTER
Rx for Fletcher put in [facility] pharmacy folder for .  GEOVANNA CHRISTIAN CMA

## 2019-04-02 DIAGNOSIS — F41.9 ANXIETY: ICD-10-CM

## 2019-04-03 RX ORDER — ALPRAZOLAM 0.25 MG
TABLET ORAL
Qty: 40 TABLET | Refills: 0 | Status: SHIPPED | OUTPATIENT
Start: 2019-04-03 | End: 2019-04-29

## 2019-04-21 ENCOUNTER — MYC REFILL (OUTPATIENT)
Dept: INTERNAL MEDICINE | Facility: CLINIC | Age: 64
End: 2019-04-21

## 2019-04-21 DIAGNOSIS — G89.29 CHRONIC BILATERAL LOW BACK PAIN WITHOUT SCIATICA: ICD-10-CM

## 2019-04-21 DIAGNOSIS — M54.50 CHRONIC BILATERAL LOW BACK PAIN WITHOUT SCIATICA: ICD-10-CM

## 2019-04-23 RX ORDER — HYDROCODONE BITARTRATE AND ACETAMINOPHEN 5; 325 MG/1; MG/1
1 TABLET ORAL EVERY 4 HOURS PRN
Qty: 180 TABLET | Refills: 0 | Status: SHIPPED | OUTPATIENT
Start: 2019-04-23 | End: 2019-05-19

## 2019-04-23 NOTE — TELEPHONE ENCOUNTER
Norco   Take to  Pharmacy         Last Written Prescription Date:  3/28/19  Last Fill Quantity: 180,   # refills: 0    Last Office Visit: 1/2/19    Future Office visit:       Routing refill request to provider for review/approval because:  Drug not on the [facility], UMP or [facility] refill protocol or controlled substance

## 2019-04-24 DIAGNOSIS — M54.41 CHRONIC BILATERAL LOW BACK PAIN WITH RIGHT-SIDED SCIATICA: ICD-10-CM

## 2019-04-24 DIAGNOSIS — F41.9 ANXIETY: ICD-10-CM

## 2019-04-24 DIAGNOSIS — G89.29 CHRONIC BILATERAL LOW BACK PAIN WITH RIGHT-SIDED SCIATICA: ICD-10-CM

## 2019-04-24 DIAGNOSIS — R09.81 NASAL CONGESTION: ICD-10-CM

## 2019-04-24 NOTE — TELEPHONE ENCOUNTER
"Requested Prescriptions   Pending Prescriptions Disp Refills     baclofen (LIORESAL) 10 MG tablet [Pharmacy Med Name: BACLOFEN 10MG TABS] 90 tablet 0     Sig: TAKE ONE TABLET BY MOUTH THREE TIMES A DAY AS NEEDED FOR MUSCLE SPASMS   Last Written Prescription Date:  09/14/2018  Last Fill Quantity: 90,  # refills: 1   Last office visit: 1/2/2019 with prescribing provider:     Future Office Visit:      There is no refill protocol information for this order        fluticasone (FLONASE) 50 MCG/ACT nasal spray [Pharmacy Med Name: FLUTICASONE 50MCG NASAL SPRAY] 16 g 0     Sig: SPRAY ONE TO TWO SPRAYS IN EACH NOSTRIL EVERY DAY   Last Written Prescription Date:  03/04/2019  Last Fill Quantity: 16 g,  # refills: 0   Last office visit: 1/2/2019 with prescribing provider:     Future Office Visit:      Inhaled Steroids Protocol Failed - 4/24/2019 11:13 AM        Failed - Asthma control assessment score within normal limits in last 6 months     Please review ACT score.           Passed - Patient is age 12 or older        Passed - Medication is active on med list        Passed - Recent (6 mo) or future (30 days) visit within the authorizing provider's specialty     Patient had office visit in the last 6 months or has a visit in the next 30 days with authorizing provider or within the authorizing provider's specialty.  See \"Patient Info\" tab in inbasket, or \"Choose Columns\" in Meds & Orders section of the refill encounter.            "

## 2019-04-25 NOTE — TELEPHONE ENCOUNTER
ACT Total Scores 4/18/2017 3/23/2018 9/27/2018   ACT TOTAL SCORE - - -   ASTHMA ER VISITS - - -   ASTHMA HOSPITALIZATIONS - - -   ACT TOTAL SCORE (Goal Greater than or Equal to 20) 11 8 14   In the past 12 months, how many times did you visit the emergency room for your asthma without being admitted to the hospital? 1 0 0   In the past 12 months, how many times were you hospitalized overnight because of your asthma? 0 0 0     Routing refill request to provider for review/approval because:  ACT is out of range for RN to approve.  Baclofen is also not on RN protocol.  Mony Ramsey RN

## 2019-04-26 NOTE — TELEPHONE ENCOUNTER
Xanax   Last Written Prescription Date:  4/3/19  Last Fill Quantity: 40,   # refills: 0    Last Office Visit: 1/2/19    Future Office visit:       Routing refill request to provider for review/approval because:  Drug not on the [facility] or [facility] refill protocol or controlled substance    CSA signed.

## 2019-04-29 RX ORDER — FLUTICASONE PROPIONATE 50 MCG
SPRAY, SUSPENSION (ML) NASAL
Qty: 16 G | Refills: 0 | Status: SHIPPED | OUTPATIENT
Start: 2019-04-29 | End: 2019-05-21

## 2019-04-29 RX ORDER — ALPRAZOLAM 0.25 MG
TABLET ORAL
Qty: 40 TABLET | Refills: 0 | Status: SHIPPED | OUTPATIENT
Start: 2019-04-29 | End: 2019-05-22

## 2019-04-29 RX ORDER — BACLOFEN 10 MG/1
TABLET ORAL
Qty: 90 TABLET | Refills: 0 | Status: SHIPPED | OUTPATIENT
Start: 2019-04-29 | End: 2019-07-16

## 2019-05-07 DIAGNOSIS — K21.9 GASTROESOPHAGEAL REFLUX DISEASE WITHOUT ESOPHAGITIS: ICD-10-CM

## 2019-05-07 RX ORDER — PANTOPRAZOLE SODIUM 40 MG/1
TABLET, DELAYED RELEASE ORAL
Qty: 90 TABLET | Refills: 2 | Status: ON HOLD | OUTPATIENT
Start: 2019-05-07 | End: 2019-09-08

## 2019-05-07 NOTE — TELEPHONE ENCOUNTER
"Requested Prescriptions   Pending Prescriptions Disp Refills     pantoprazole (PROTONIX) 40 MG EC tablet [Pharmacy Med Name:   PANTOPRAZOLE SOD DR 40 MG TAB]    Last Written Prescription Date:  9/5/18  Last Fill Quantity: 90,  # refills: 2   Last office visit: 1/2/2019 with prescribing provider:  Sultana   Future Office Visit:     90 tablet 2     Sig: TAKE 1 TABLET BY MOUTH EVERY DAY       PPI Protocol Passed - 5/7/2019  1:42 AM        Passed - Not on Clopidogrel (unless Pantoprazole ordered)        Passed - No diagnosis of osteoporosis on record        Passed - Recent (12 mo) or future (30 days) visit within the authorizing provider's specialty     Patient had office visit in the last 12 months or has a visit in the next 30 days with authorizing provider or within the authorizing provider's specialty.  See \"Patient Info\" tab in inbasket, or \"Choose Columns\" in Meds & Orders section of the refill encounter.              Passed - Medication is active on med list        Passed - Patient is age 18 or older        Passed - No active pregnacy on record        Passed - No positive pregnancy test in past 12 months          "

## 2019-05-16 DIAGNOSIS — E78.2 MIXED HYPERLIPIDEMIA: ICD-10-CM

## 2019-05-16 DIAGNOSIS — I25.10 CORONARY ARTERY DISEASE INVOLVING NATIVE CORONARY ARTERY OF NATIVE HEART WITHOUT ANGINA PECTORIS: ICD-10-CM

## 2019-05-16 RX ORDER — ATORVASTATIN CALCIUM 10 MG/1
TABLET, FILM COATED ORAL
Qty: 36 TABLET | Refills: 0 | Status: SHIPPED | OUTPATIENT
Start: 2019-05-16 | End: 2020-08-19

## 2019-05-16 NOTE — LETTER
May 16, 2019       TO: Trisha Bedner  [id number] [facility] [address]       Dear Trisha Bender,    You have requested a medication refill for atorvastatin and our records indicate that you have not been seen by one of our providers for your annual office visit.  We will refill your medication for 3 months, which will allow you enough time to be seen by one of our providers.    Please call our clinic at [phone] to schedule your appointment as soon as possible.    Thank you,    [facility]

## 2019-05-16 NOTE — TELEPHONE ENCOUNTER
Received refill request for:  Atorvastatin   Last OV was: 2/5/2018 with Dr. Odell  Labs/EKG: last lipid 4/16/2018  F/U scheduled: overdue.  Note to pharmacy and refill letter sent  New script sent to: [facility]

## 2019-05-17 ENCOUNTER — TRANSFERRED RECORDS (OUTPATIENT)
Dept: HEALTH INFORMATION MANAGEMENT | Facility: CLINIC | Age: 64
End: 2019-05-17

## 2019-05-19 ENCOUNTER — MYC REFILL (OUTPATIENT)
Dept: INTERNAL MEDICINE | Facility: CLINIC | Age: 64
End: 2019-05-19

## 2019-05-19 DIAGNOSIS — G89.29 CHRONIC BILATERAL LOW BACK PAIN WITHOUT SCIATICA: ICD-10-CM

## 2019-05-19 DIAGNOSIS — M54.50 CHRONIC BILATERAL LOW BACK PAIN WITHOUT SCIATICA: ICD-10-CM

## 2019-05-20 RX ORDER — HYDROCODONE BITARTRATE AND ACETAMINOPHEN 5; 325 MG/1; MG/1
1 TABLET ORAL EVERY 4 HOURS PRN
Qty: 180 TABLET | Refills: 0 | Status: SHIPPED | OUTPATIENT
Start: 2019-05-20 | End: 2019-06-16

## 2019-05-20 NOTE — TELEPHONE ENCOUNTER
Norco     Take to [facility] pharmacy        Last Written Prescription Date:  4/23/19  Last Fill Quantity: 180,   # refills: 0    Last Office Visit: 1/2/19    Future Office visit:       Routing refill request to provider for review/approval because:  Drug not on the [facility], [facility]MP or  Health refill protocol or controlled substance

## 2019-05-21 DIAGNOSIS — R09.81 NASAL CONGESTION: ICD-10-CM

## 2019-05-21 NOTE — TELEPHONE ENCOUNTER
"Requested Prescriptions   Pending Prescriptions Disp Refills     fluticasone (FLONASE) 50 MCG/ACT nasal spray [Pharmacy Med Name:  Last Written Prescription Date:  4/29/2019  Last Fill Quantity: 16g,  # refills: 0   Last office visit: 1/2/2019 with prescribing provider:     Future Office Visit:   FLUTICASONE 50MCG NASAL SPRAY] 16 g 0     Sig: USE ONE TO TWO SPRAYS IN EACH NOSTRIL EVERY DAY       Inhaled Steroids Protocol Failed - 5/21/2019 11:18 AM        Failed - Asthma control assessment score within normal limits in last 6 months     Please review ACT score.           Passed - Patient is age 12 or older        Passed - Medication is active on med list        Passed - Recent (6 mo) or future (30 days) visit within the authorizing provider's specialty     Patient had office visit in the last 6 months or has a visit in the next 30 days with authorizing provider or within the authorizing provider's specialty.  See \"Patient Info\" tab in inbasket, or \"Choose Columns\" in Meds & Orders section of the refill encounter.            "

## 2019-05-21 NOTE — TELEPHONE ENCOUNTER
Routing refill request to provider for review/approval because:  ACT score outdated    Last office visit 1/2/19 for hospital follow up, please advise when patient is due for an office visit

## 2019-05-22 ENCOUNTER — MYC REFILL (OUTPATIENT)
Dept: INTERNAL MEDICINE | Facility: CLINIC | Age: 64
End: 2019-05-22

## 2019-05-22 DIAGNOSIS — F41.9 ANXIETY: ICD-10-CM

## 2019-05-22 RX ORDER — ALPRAZOLAM 0.25 MG
TABLET ORAL
Qty: 40 TABLET | Refills: 0 | Status: SHIPPED | OUTPATIENT
Start: 2019-05-22 | End: 2019-06-17

## 2019-05-22 RX ORDER — FLUTICASONE PROPIONATE 50 MCG
SPRAY, SUSPENSION (ML) NASAL
Qty: 16 G | Refills: 0 | Status: SHIPPED | OUTPATIENT
Start: 2019-05-22 | End: 2019-06-16

## 2019-05-22 NOTE — TELEPHONE ENCOUNTER
Xanax   Last Written Prescription Date:  4/29/19  Last Fill Quantity: 40,   # refills: 0    Last Office Visit: 1/2/19  Future Office visit:       Routing refill request to provider for review/approval because:  Drug not on the FM[facility], P or [facility] refill protocol or controlled substance

## 2019-06-16 ENCOUNTER — MYC REFILL (OUTPATIENT)
Dept: INTERNAL MEDICINE | Facility: CLINIC | Age: 64
End: 2019-06-16

## 2019-06-16 DIAGNOSIS — M54.50 CHRONIC BILATERAL LOW BACK PAIN WITHOUT SCIATICA: ICD-10-CM

## 2019-06-16 DIAGNOSIS — G89.29 CHRONIC BILATERAL LOW BACK PAIN WITHOUT SCIATICA: ICD-10-CM

## 2019-06-16 DIAGNOSIS — R09.81 NASAL CONGESTION: ICD-10-CM

## 2019-06-17 DIAGNOSIS — F41.9 ANXIETY: ICD-10-CM

## 2019-06-17 RX ORDER — ALPRAZOLAM 0.25 MG
TABLET ORAL
Qty: 40 TABLET | Refills: 0 | Status: SHIPPED | OUTPATIENT
Start: 2019-06-17 | End: 2019-07-17

## 2019-06-17 NOTE — TELEPHONE ENCOUNTER
"Requested Prescriptions   Pending Prescriptions Disp Refills     fluticasone (FLONASE) 50 MCG/ACT nasal spray [Pharmacy Med Name: FLUTICASONE 50MCG NASAL SPRAY]  Last Written Prescription Date:  5/22/2019  Last Fill Quantity: 16g,  # refills: 0   Last office visit: 1/2/2019 with prescribing provider:     Future Office Visit:   16 g 0     Sig: SPRAY ONE TO TWO SPRAYS IN EACH NOSTRIL ONCE DAILY       Inhaled Steroids Protocol Failed - 6/16/2019  6:30 PM        Failed - Asthma control assessment score within normal limits in last 6 months     Please review ACT score.           Passed - Patient is age 12 or older        Passed - Medication is active on med list        Passed - Recent (6 mo) or future (30 days) visit within the authorizing provider's specialty     Patient had office visit in the last 6 months or has a visit in the next 30 days with authorizing provider or within the authorizing provider's specialty.  See \"Patient Info\" tab in inbasket, or \"Choose Columns\" in Meds & Orders section of the refill encounter.            "

## 2019-06-18 RX ORDER — HYDROCODONE BITARTRATE AND ACETAMINOPHEN 5; 325 MG/1; MG/1
1 TABLET ORAL EVERY 4 HOURS PRN
Qty: 180 TABLET | Refills: 0 | Status: SHIPPED | OUTPATIENT
Start: 2019-06-18 | End: 2019-07-14

## 2019-06-18 RX ORDER — FLUTICASONE PROPIONATE 50 MCG
SPRAY, SUSPENSION (ML) NASAL
Qty: 16 G | Refills: 0 | Status: SHIPPED | OUTPATIENT
Start: 2019-06-18 | End: 2020-06-23

## 2019-06-18 NOTE — TELEPHONE ENCOUNTER
Norco   Take to [facility] pharmacy        Last Written Prescription Date:  5/20/19  Last Fill Quantity: 180,   # refills: 0    Last Office Visit: 1/2/19    Future Office visit:       Routing refill request to provider for review/approval because:  Drug not on the [facility], UMP or  Health refill protocol or controlled substance

## 2019-06-18 NOTE — TELEPHONE ENCOUNTER
Routing refill request to provider for review/approval because:  ACT outdated    Last office visit 1/2/19

## 2019-07-02 DIAGNOSIS — E87.6 HYPOKALEMIA: ICD-10-CM

## 2019-07-02 NOTE — TELEPHONE ENCOUNTER
"Requested Prescriptions   Pending Prescriptions Disp Refills     potassium chloride ER (K-TAB/KLOR-CON) 10 MEQ CR tablet [Pharmacy Med Name: POTASSIUM CL ER 10 MEQ TABLET] 90 tablet 5     Sig: TAKE 1 TABLET BY MOUTH THREE TIMES A DAY   Last Written Prescription Date:  12/17/2018  Last Fill Quantity: 90,  # refills: 5   Last office visit: 1/2/2019 with prescribing provider:     Future Office Visit:      Potassium Supplements Protocol Passed - 7/2/2019  8:31 AM        Passed - Recent (12 mo) or future (30 days) visit within the authorizing provider's specialty     Patient had office visit in the last 12 months or has a visit in the next 30 days with authorizing provider or within the authorizing provider's specialty.  See \"Patient Info\" tab in inbasket, or \"Choose Columns\" in Meds & Orders section of the refill encounter.              Passed - Medication is active on med list        Passed - Patient is age 18 or older        Passed - Normal serum potassium in past 12 months     Recent Labs   Lab Test 12/30/18  1734   POTASSIUM 3.7                    "

## 2019-07-03 RX ORDER — POTASSIUM CHLORIDE 750 MG/1
TABLET, EXTENDED RELEASE ORAL
Qty: 90 TABLET | Refills: 4 | Status: SHIPPED | OUTPATIENT
Start: 2019-07-03 | End: 2019-11-09

## 2019-07-14 ENCOUNTER — MYC REFILL (OUTPATIENT)
Dept: INTERNAL MEDICINE | Facility: CLINIC | Age: 64
End: 2019-07-14

## 2019-07-14 DIAGNOSIS — M54.50 CHRONIC BILATERAL LOW BACK PAIN WITHOUT SCIATICA: ICD-10-CM

## 2019-07-14 DIAGNOSIS — G89.29 CHRONIC BILATERAL LOW BACK PAIN WITHOUT SCIATICA: ICD-10-CM

## 2019-07-15 RX ORDER — HYDROCODONE BITARTRATE AND ACETAMINOPHEN 5; 325 MG/1; MG/1
1 TABLET ORAL EVERY 4 HOURS PRN
Qty: 180 TABLET | Refills: 0 | Status: SHIPPED | OUTPATIENT
Start: 2019-07-15 | End: 2019-08-11

## 2019-07-15 NOTE — TELEPHONE ENCOUNTER
Norco   TAKE to [facility]         Last Written Prescription Date:  6/18/19  Last Fill Quantity: 180,   # refills: 0    Last Office Visit: 1/2/19    Future Office visit:       Routing refill request to provider for review/approval because:  Drug not on the [facility], UMP or  Health refill protocol or controlled substance    CSA signed on 2/24/16

## 2019-07-16 DIAGNOSIS — R09.81 NASAL CONGESTION: ICD-10-CM

## 2019-07-16 DIAGNOSIS — M54.41 CHRONIC BILATERAL LOW BACK PAIN WITH RIGHT-SIDED SCIATICA: ICD-10-CM

## 2019-07-16 DIAGNOSIS — F41.9 ANXIETY: ICD-10-CM

## 2019-07-16 DIAGNOSIS — G89.29 CHRONIC BILATERAL LOW BACK PAIN WITH RIGHT-SIDED SCIATICA: ICD-10-CM

## 2019-07-16 NOTE — TELEPHONE ENCOUNTER
Please forward to provider covering for Dr Weinberg for pts with last name starting with M - (should be Deborah hernandez)

## 2019-07-17 RX ORDER — FLUTICASONE PROPIONATE 50 MCG
SPRAY, SUSPENSION (ML) NASAL
Qty: 16 G | Refills: 3 | Status: ON HOLD | OUTPATIENT
Start: 2019-07-17 | End: 2019-09-08

## 2019-07-17 RX ORDER — ALPRAZOLAM 0.25 MG
TABLET ORAL
Qty: 40 TABLET | Refills: 0 | Status: SHIPPED | OUTPATIENT
Start: 2019-07-17 | End: 2019-08-14

## 2019-07-17 RX ORDER — BACLOFEN 10 MG/1
TABLET ORAL
Qty: 90 TABLET | Refills: 0 | Status: SHIPPED | OUTPATIENT
Start: 2019-07-17 | End: 2019-09-13

## 2019-07-17 NOTE — TELEPHONE ENCOUNTER
Baclofen and Flonase refill request.     Last OV 1/2/19    Provider approval needed.     ACT Total Scores 4/18/2017 3/23/2018 9/27/2018   ACT TOTAL SCORE - - -   ASTHMA ER VISITS - - -   ASTHMA HOSPITALIZATIONS - - -   ACT TOTAL SCORE (Goal Greater than or Equal to 20) 11 8 14   In the past 12 months, how many times did you visit the emergency room for your asthma without being admitted to the hospital? 1 0 0   In the past 12 months, how many times were you hospitalized overnight because of your asthma? 0 0 0

## 2019-07-29 DIAGNOSIS — I50.21 ACUTE SYSTOLIC CONGESTIVE HEART FAILURE (H): ICD-10-CM

## 2019-07-30 RX ORDER — FUROSEMIDE 20 MG
TABLET ORAL
Qty: 180 TABLET | Refills: 0 | Status: SHIPPED | OUTPATIENT
Start: 2019-07-30 | End: 2019-09-27

## 2019-07-30 NOTE — TELEPHONE ENCOUNTER
"Requested Prescriptions   Pending Prescriptions Disp Refills     furosemide (LASIX) 20 MG tablet [Pharmacy Med Name: FUROSEMIDE 20 MG   TABLET]    Last Written Prescription Date:  2/11/19  Last Fill Quantity: 180,  # refills: 1   Last office visit: 1/2/2019 with prescribing provider:  Sultana   Future Office Visit:     180 tablet 1     Sig: TAKE 2 TABLETS (40 MG) BY MOUTH DAILY SEVERE SWELLING       Diuretics (Including Combos) Protocol Passed - 7/29/2019 11:08 PM        Passed - Blood pressure under 140/90 in past 12 months     BP Readings from Last 3 Encounters:   01/02/19 138/70   12/30/18 165/76   12/17/18 137/74                 Passed - Recent (12 mo) or future (30 days) visit within the authorizing provider's specialty     Patient had office visit in the last 12 months or has a visit in the next 30 days with authorizing provider or within the authorizing provider's specialty.  See \"Patient Info\" tab in inbasket, or \"Choose Columns\" in Meds & Orders section of the refill encounter.              Passed - Medication is active on med list        Passed - Patient is age 18 or older        Passed - No active pregancy on record        Passed - Normal serum creatinine on file in past 12 months     Recent Labs   Lab Test 12/30/18  1734   CR 0.78              Passed - Normal serum potassium on file in past 12 months     Recent Labs   Lab Test 12/30/18  1734   POTASSIUM 3.7                    Passed - Normal serum sodium on file in past 12 months     Recent Labs   Lab Test 12/30/18  1734                 Passed - No positive pregnancy test in past 12 months          "

## 2019-08-11 ENCOUNTER — MYC REFILL (OUTPATIENT)
Dept: INTERNAL MEDICINE | Facility: CLINIC | Age: 64
End: 2019-08-11

## 2019-08-11 DIAGNOSIS — M54.50 CHRONIC BILATERAL LOW BACK PAIN WITHOUT SCIATICA: ICD-10-CM

## 2019-08-11 DIAGNOSIS — G89.29 CHRONIC BILATERAL LOW BACK PAIN WITHOUT SCIATICA: ICD-10-CM

## 2019-08-13 RX ORDER — HYDROCODONE BITARTRATE AND ACETAMINOPHEN 5; 325 MG/1; MG/1
1 TABLET ORAL EVERY 4 HOURS PRN
Qty: 180 TABLET | Refills: 0 | Status: ON HOLD | OUTPATIENT
Start: 2019-08-13 | End: 2019-09-10

## 2019-08-13 NOTE — TELEPHONE ENCOUNTER
Controlled Substance Refill Request for Norco 5-325 mg  Problem List Complete:    No     PROVIDER TO CONSIDER COMPLETION OF PROBLEM LIST AND OVERVIEW/CONTROLLED SUBSTANCE AGREEMENT    Last Written Prescription Date:  7/15/19  Last Fill Quantity: 180,   # refills: 0    Last Office Visit with [facility] primary care provider: 1/2/19    Future Office visit:     Controlled substance agreement:   Encounter-Level CSA - 02/23/2016:    Controlled Substance Agreement - Scan on 2/24/2016  1:37 PM: [place] CONTROLLED SUBSTANCE AGREEMENT, 2/23/16 (below)       Patient-Level CSA:    There are no patient-level csa.         Last Urine Drug Screen:   Pain Drug SCR UR W RPTD Meds   Date Value Ref Range Status   12/17/2018 FINAL  Final     Comment:     (Note)  ====================================================================  TOXASSURE COMP DRUG ANALYSIS,UR  ====================================================================  Test                             Result       Flag       Units        Drug Present   Hydrocodone                    1330                    ng/mg creat   Hydromorphone                  228                     ng/mg creat   Dihydrocodeine                 265                     ng/mg creat   Norhydrocodone                 1337                    ng/mg creat    Sources of hydrocodone include scheduled prescription    medications. Hydromorphone, dihydrocodeine and norhydrocodone are    expected metabolites of hydrocodone. Hydromorphone and    dihydrocodeine are also available as scheduled prescription    medications.   Baclofen                       PRESENT                               Acetaminophen                  PRESENT                              ====================================================================  Test                      Result    Flag   Units      Ref Range        Creatinine              43               mg/dL      >=20             ====================================================================  Declared Medications:  Medication list was not provided.  ====================================================================  For clinical consultation, please call [phone].  ====================================================================  Analysis performed by [employer] [place] [address]     , No results found for: COMDAT, No results found for: THC13, PCP13, COC13, MAMP13, OPI13, AMP13, BZO13, TCA13, MTD13, BAR13, OXY13, PPX13, BUP13     Processing:  Staff will hand deliver Rx to on-site pharmacy     [email or web address]       checked in past 3 months? No, access not granted by provider.

## 2019-08-14 DIAGNOSIS — F41.9 ANXIETY: ICD-10-CM

## 2019-08-14 RX ORDER — ALPRAZOLAM 0.25 MG
TABLET ORAL
Qty: 40 TABLET | Refills: 0 | Status: SHIPPED | OUTPATIENT
Start: 2019-08-14 | End: 2019-09-16

## 2019-08-19 DIAGNOSIS — R11.0 NAUSEA: ICD-10-CM

## 2019-08-19 NOTE — TELEPHONE ENCOUNTER
"Requested Prescriptions   Pending Prescriptions Disp Refills     ondansetron (ZOFRAN-ODT) 4 MG ODT tab [Pharmacy Med Name:  Last Written Prescription Date:  12/17/2018  Last Fill Quantity: 20,  # refills: 3   Last office visit: 1/2/2019 with prescribing provider:     Future Office Visit:   ONDANSETRON ODT 4 MG TABLET] 18 tablet 4     Sig: TAKE 1-2 TABLETS (4-8 MG) BY MOUTH EVERY 8 HOURS AS NEEDED FOR NAUSEA        Antivertigo/Antiemetic Agents Passed - 8/19/2019  9:23 AM        Passed - Recent (12 mo) or future (30 days) visit within the authorizing provider's specialty     Patient had office visit in the last 12 months or has a visit in the next 30 days with authorizing provider or within the authorizing provider's specialty.  See \"Patient Info\" tab in inbasket, or \"Choose Columns\" in Meds & Orders section of the refill encounter.              Passed - Medication is active on med list        Passed - Patient is 18 years of age or older        "

## 2019-08-20 RX ORDER — ONDANSETRON 4 MG/1
4-8 TABLET, ORALLY DISINTEGRATING ORAL EVERY 8 HOURS PRN
Qty: 18 TABLET | Refills: 4 | Status: SHIPPED | OUTPATIENT
Start: 2019-08-20 | End: 2020-12-01

## 2019-09-07 ENCOUNTER — HOSPITAL ENCOUNTER (OUTPATIENT)
Facility: CLINIC | Age: 64
Setting detail: OBSERVATION
Discharge: HOME | End: 2019-09-10
Attending: EMERGENCY MEDICINE | Admitting: INTERNAL MEDICINE
Payer: COMMERCIAL

## 2019-09-07 ENCOUNTER — APPOINTMENT (OUTPATIENT)
Dept: CT IMAGING | Facility: CLINIC | Age: 64
End: 2019-09-07
Attending: EMERGENCY MEDICINE
Payer: COMMERCIAL

## 2019-09-07 DIAGNOSIS — E86.0 DEHYDRATION: Primary | ICD-10-CM

## 2019-09-07 DIAGNOSIS — G89.29 CHRONIC BILATERAL LOW BACK PAIN WITHOUT SCIATICA: ICD-10-CM

## 2019-09-07 DIAGNOSIS — E87.6 HYPOKALEMIA: ICD-10-CM

## 2019-09-07 DIAGNOSIS — M54.50 CHRONIC BILATERAL LOW BACK PAIN WITHOUT SCIATICA: ICD-10-CM

## 2019-09-07 DIAGNOSIS — R19.7 DIARRHEA OF PRESUMED INFECTIOUS ORIGIN: ICD-10-CM

## 2019-09-07 DIAGNOSIS — R11.0 NAUSEA: ICD-10-CM

## 2019-09-07 LAB
ANION GAP SERPL CALCULATED.3IONS-SCNC: 10 MMOL/L (ref 3–14)
BASOPHILS # BLD AUTO: 0 10E9/L (ref 0–0.2)
BASOPHILS NFR BLD AUTO: 0.2 %
BUN SERPL-MCNC: 5 MG/DL (ref 7–30)
CALCIUM SERPL-MCNC: 9.1 MG/DL (ref 8.5–10.1)
CHLORIDE SERPL-SCNC: 95 MMOL/L (ref 94–109)
CO2 SERPL-SCNC: 27 MMOL/L (ref 20–32)
CREAT SERPL-MCNC: 0.61 MG/DL (ref 0.52–1.04)
DIFFERENTIAL METHOD BLD: ABNORMAL
EOSINOPHIL # BLD AUTO: 0 10E9/L (ref 0–0.7)
EOSINOPHIL NFR BLD AUTO: 0.1 %
ERYTHROCYTE [DISTWIDTH] IN BLOOD BY AUTOMATED COUNT: 14.1 % (ref 10–15)
GFR SERPL CREATININE-BSD FRML MDRD: >90 ML/MIN/{1.73_M2}
GLUCOSE SERPL-MCNC: 145 MG/DL (ref 70–99)
HCT VFR BLD AUTO: 45.8 % (ref 35–47)
HGB BLD-MCNC: 14.5 G/DL (ref 11.7–15.7)
IMM GRANULOCYTES # BLD: 0.2 10E9/L (ref 0–0.4)
IMM GRANULOCYTES NFR BLD: 1.1 %
LYMPHOCYTES # BLD AUTO: 1.8 10E9/L (ref 0.8–5.3)
LYMPHOCYTES NFR BLD AUTO: 11.5 %
MCH RBC QN AUTO: 30.8 PG (ref 26.5–33)
MCHC RBC AUTO-ENTMCNC: 31.7 G/DL (ref 31.5–36.5)
MCV RBC AUTO: 97 FL (ref 78–100)
MONOCYTES # BLD AUTO: 1.2 10E9/L (ref 0–1.3)
MONOCYTES NFR BLD AUTO: 7.5 %
NEUTROPHILS # BLD AUTO: 12.5 10E9/L (ref 1.6–8.3)
NEUTROPHILS NFR BLD AUTO: 79.6 %
NRBC # BLD AUTO: 0 10*3/UL
NRBC BLD AUTO-RTO: 0 /100
PLATELET # BLD AUTO: 454 10E9/L (ref 150–450)
POTASSIUM SERPL-SCNC: 3 MMOL/L (ref 3.4–5.3)
RBC # BLD AUTO: 4.71 10E12/L (ref 3.8–5.2)
SODIUM SERPL-SCNC: 132 MMOL/L (ref 133–144)
TROPONIN I SERPL-MCNC: <0.015 UG/L (ref 0–0.04)
WBC # BLD AUTO: 15.7 10E9/L (ref 4–11)

## 2019-09-07 PROCEDURE — 74176 CT ABD & PELVIS W/O CONTRAST: CPT

## 2019-09-07 PROCEDURE — 83605 ASSAY OF LACTIC ACID: CPT | Performed by: EMERGENCY MEDICINE

## 2019-09-07 PROCEDURE — 80048 BASIC METABOLIC PNL TOTAL CA: CPT | Performed by: EMERGENCY MEDICINE

## 2019-09-07 PROCEDURE — 96375 TX/PRO/DX INJ NEW DRUG ADDON: CPT

## 2019-09-07 PROCEDURE — 93005 ELECTROCARDIOGRAM TRACING: CPT

## 2019-09-07 PROCEDURE — 84484 ASSAY OF TROPONIN QUANT: CPT | Performed by: EMERGENCY MEDICINE

## 2019-09-07 PROCEDURE — 99285 EMERGENCY DEPT VISIT HI MDM: CPT | Mod: 25

## 2019-09-07 PROCEDURE — 25000128 H RX IP 250 OP 636: Performed by: EMERGENCY MEDICINE

## 2019-09-07 PROCEDURE — 80076 HEPATIC FUNCTION PANEL: CPT | Performed by: EMERGENCY MEDICINE

## 2019-09-07 PROCEDURE — 96365 THER/PROPH/DIAG IV INF INIT: CPT

## 2019-09-07 PROCEDURE — 96376 TX/PRO/DX INJ SAME DRUG ADON: CPT

## 2019-09-07 PROCEDURE — 85025 COMPLETE CBC W/AUTO DIFF WBC: CPT | Performed by: EMERGENCY MEDICINE

## 2019-09-07 PROCEDURE — 83690 ASSAY OF LIPASE: CPT | Performed by: EMERGENCY MEDICINE

## 2019-09-07 RX ORDER — HYDROMORPHONE HYDROCHLORIDE 1 MG/ML
0.5 INJECTION, SOLUTION INTRAMUSCULAR; INTRAVENOUS; SUBCUTANEOUS
Status: DISCONTINUED | OUTPATIENT
Start: 2019-09-07 | End: 2019-09-08 | Stop reason: CLARIF

## 2019-09-07 RX ORDER — ONDANSETRON 2 MG/ML
4 INJECTION INTRAMUSCULAR; INTRAVENOUS ONCE
Status: COMPLETED | OUTPATIENT
Start: 2019-09-07 | End: 2019-09-07

## 2019-09-07 RX ADMIN — HYDROMORPHONE HYDROCHLORIDE 0.5 MG: 1 INJECTION, SOLUTION INTRAMUSCULAR; INTRAVENOUS; SUBCUTANEOUS at 23:47

## 2019-09-07 RX ADMIN — SODIUM CHLORIDE 1000 ML: 9 INJECTION, SOLUTION INTRAVENOUS at 23:22

## 2019-09-07 RX ADMIN — ONDANSETRON 4 MG: 2 INJECTION INTRAMUSCULAR; INTRAVENOUS at 23:22

## 2019-09-07 RX ADMIN — ONDANSETRON 4 MG: 2 INJECTION INTRAMUSCULAR; INTRAVENOUS at 23:53

## 2019-09-07 ASSESSMENT — ENCOUNTER SYMPTOMS
DIARRHEA: 1
ABDOMINAL PAIN: 1
VOMITING: 0
APPETITE CHANGE: 1
NAUSEA: 1

## 2019-09-08 ENCOUNTER — APPOINTMENT (OUTPATIENT)
Dept: GENERAL RADIOLOGY | Facility: CLINIC | Age: 64
End: 2019-09-08
Attending: PHYSICIAN ASSISTANT
Payer: COMMERCIAL

## 2019-09-08 PROBLEM — R10.9 ABDOMINAL PAIN: Status: ACTIVE | Noted: 2019-09-08

## 2019-09-08 LAB
ALBUMIN SERPL-MCNC: 3.5 G/DL (ref 3.4–5)
ALBUMIN UR-MCNC: NEGATIVE MG/DL
ALP SERPL-CCNC: 90 U/L (ref 40–150)
ALT SERPL W P-5'-P-CCNC: 17 U/L (ref 0–50)
APPEARANCE UR: CLEAR
AST SERPL W P-5'-P-CCNC: 10 U/L (ref 0–45)
BILIRUB DIRECT SERPL-MCNC: 0.1 MG/DL (ref 0–0.2)
BILIRUB SERPL-MCNC: 0.5 MG/DL (ref 0.2–1.3)
BILIRUB UR QL STRIP: NEGATIVE
C DIFF TOX B STL QL: NEGATIVE
COLOR UR AUTO: ABNORMAL
GLUCOSE UR STRIP-MCNC: NEGATIVE MG/DL
HGB UR QL STRIP: ABNORMAL
KETONES UR STRIP-MCNC: ABNORMAL MG/DL
LACTATE BLD-SCNC: 1.4 MMOL/L (ref 0.7–2)
LACTATE BLD-SCNC: 2.3 MMOL/L (ref 0.7–2)
LEUKOCYTE ESTERASE UR QL STRIP: ABNORMAL
LIPASE SERPL-CCNC: 63 U/L (ref 73–393)
NITRATE UR QL: NEGATIVE
PH UR STRIP: 7 PH (ref 5–7)
POTASSIUM SERPL-SCNC: 3.1 MMOL/L (ref 3.4–5.3)
POTASSIUM SERPL-SCNC: 3.6 MMOL/L (ref 3.4–5.3)
PROT SERPL-MCNC: 6.7 G/DL (ref 6.8–8.8)
RBC #/AREA URNS AUTO: 2 /HPF (ref 0–2)
SOURCE: ABNORMAL
SP GR UR STRIP: 1.01 (ref 1–1.03)
SPECIMEN SOURCE: NORMAL
SQUAMOUS #/AREA URNS AUTO: <1 /HPF (ref 0–1)
UROBILINOGEN UR STRIP-MCNC: NORMAL MG/DL (ref 0–2)
WBC #/AREA URNS AUTO: 1 /HPF (ref 0–5)

## 2019-09-08 PROCEDURE — 25000128 H RX IP 250 OP 636: Performed by: INTERNAL MEDICINE

## 2019-09-08 PROCEDURE — 83605 ASSAY OF LACTIC ACID: CPT | Performed by: EMERGENCY MEDICINE

## 2019-09-08 PROCEDURE — 25000131 ZZH RX MED GY IP 250 OP 636 PS 637: Performed by: INTERNAL MEDICINE

## 2019-09-08 PROCEDURE — 71046 X-RAY EXAM CHEST 2 VIEWS: CPT

## 2019-09-08 PROCEDURE — 25800030 ZZH RX IP 258 OP 636: Performed by: INTERNAL MEDICINE

## 2019-09-08 PROCEDURE — 25000132 ZZH RX MED GY IP 250 OP 250 PS 637: Performed by: INTERNAL MEDICINE

## 2019-09-08 PROCEDURE — 84132 ASSAY OF SERUM POTASSIUM: CPT | Mod: 91 | Performed by: PHYSICIAN ASSISTANT

## 2019-09-08 PROCEDURE — 96376 TX/PRO/DX INJ SAME DRUG ADON: CPT

## 2019-09-08 PROCEDURE — 84132 ASSAY OF SERUM POTASSIUM: CPT | Mod: 91 | Performed by: INTERNAL MEDICINE

## 2019-09-08 PROCEDURE — 81001 URINALYSIS AUTO W/SCOPE: CPT | Performed by: EMERGENCY MEDICINE

## 2019-09-08 PROCEDURE — 25000132 ZZH RX MED GY IP 250 OP 250 PS 637: Performed by: PHYSICIAN ASSISTANT

## 2019-09-08 PROCEDURE — 40000275 ZZH STATISTIC RCP TIME EA 10 MIN

## 2019-09-08 PROCEDURE — 36415 COLL VENOUS BLD VENIPUNCTURE: CPT | Performed by: PHYSICIAN ASSISTANT

## 2019-09-08 PROCEDURE — 36415 COLL VENOUS BLD VENIPUNCTURE: CPT | Performed by: INTERNAL MEDICINE

## 2019-09-08 PROCEDURE — 25000128 H RX IP 250 OP 636: Performed by: EMERGENCY MEDICINE

## 2019-09-08 PROCEDURE — G0378 HOSPITAL OBSERVATION PER HR: HCPCS

## 2019-09-08 PROCEDURE — 99219 ZZC INITIAL OBSERVATION CARE,LEVL II: CPT | Performed by: INTERNAL MEDICINE

## 2019-09-08 PROCEDURE — 96361 HYDRATE IV INFUSION ADD-ON: CPT

## 2019-09-08 PROCEDURE — 93010 ELECTROCARDIOGRAM REPORT: CPT | Performed by: INTERNAL MEDICINE

## 2019-09-08 PROCEDURE — 25000128 H RX IP 250 OP 636: Performed by: PHYSICIAN ASSISTANT

## 2019-09-08 PROCEDURE — 93005 ELECTROCARDIOGRAM TRACING: CPT

## 2019-09-08 PROCEDURE — 87493 C DIFF AMPLIFIED PROBE: CPT | Performed by: EMERGENCY MEDICINE

## 2019-09-08 PROCEDURE — 96375 TX/PRO/DX INJ NEW DRUG ADDON: CPT

## 2019-09-08 RX ORDER — LIDOCAINE 40 MG/G
CREAM TOPICAL
Status: DISCONTINUED | OUTPATIENT
Start: 2019-09-08 | End: 2019-09-10 | Stop reason: HOSPADM

## 2019-09-08 RX ORDER — PROCHLORPERAZINE MALEATE 10 MG
10 TABLET ORAL EVERY 6 HOURS PRN
Status: DISCONTINUED | OUTPATIENT
Start: 2019-09-08 | End: 2019-09-10 | Stop reason: HOSPADM

## 2019-09-08 RX ORDER — HYDROCODONE BITARTRATE AND ACETAMINOPHEN 5; 325 MG/1; MG/1
1 TABLET ORAL EVERY 4 HOURS PRN
Status: DISCONTINUED | OUTPATIENT
Start: 2019-09-08 | End: 2019-09-09

## 2019-09-08 RX ORDER — NALOXONE HYDROCHLORIDE 0.4 MG/ML
.1-.4 INJECTION, SOLUTION INTRAMUSCULAR; INTRAVENOUS; SUBCUTANEOUS
Status: DISCONTINUED | OUTPATIENT
Start: 2019-09-08 | End: 2019-09-10 | Stop reason: HOSPADM

## 2019-09-08 RX ORDER — LORAZEPAM 2 MG/ML
0.5 INJECTION INTRAMUSCULAR ONCE
Status: COMPLETED | OUTPATIENT
Start: 2019-09-08 | End: 2019-09-08

## 2019-09-08 RX ORDER — PANTOPRAZOLE SODIUM 40 MG/1
40 TABLET, DELAYED RELEASE ORAL AT BEDTIME
COMMUNITY
End: 2020-01-23

## 2019-09-08 RX ORDER — ACETAMINOPHEN 325 MG/1
650 TABLET ORAL EVERY 4 HOURS PRN
Status: DISCONTINUED | OUTPATIENT
Start: 2019-09-08 | End: 2019-09-10 | Stop reason: HOSPADM

## 2019-09-08 RX ORDER — PANTOPRAZOLE SODIUM 40 MG/1
40 TABLET, DELAYED RELEASE ORAL AT BEDTIME
Status: DISCONTINUED | OUTPATIENT
Start: 2019-09-08 | End: 2019-09-10 | Stop reason: HOSPADM

## 2019-09-08 RX ORDER — ALPRAZOLAM 0.25 MG
0.25 TABLET ORAL 2 TIMES DAILY PRN
Status: DISCONTINUED | OUTPATIENT
Start: 2019-09-08 | End: 2019-09-10 | Stop reason: HOSPADM

## 2019-09-08 RX ORDER — AZITHROMYCIN 250 MG/1
250 TABLET, FILM COATED ORAL
Refills: 1 | COMMUNITY
Start: 2019-06-24 | End: 2020-11-16

## 2019-09-08 RX ORDER — LISINOPRIL 2.5 MG/1
2.5 TABLET ORAL DAILY
Status: DISCONTINUED | OUTPATIENT
Start: 2019-09-08 | End: 2019-09-10 | Stop reason: HOSPADM

## 2019-09-08 RX ORDER — PREDNISONE 10 MG/1
10 TABLET ORAL DAILY
Status: DISCONTINUED | OUTPATIENT
Start: 2019-09-08 | End: 2019-09-08

## 2019-09-08 RX ORDER — CETIRIZINE HYDROCHLORIDE 10 MG/1
10 TABLET ORAL AT BEDTIME
COMMUNITY

## 2019-09-08 RX ORDER — NITROGLYCERIN 0.4 MG/1
0.4 TABLET SUBLINGUAL EVERY 5 MIN PRN
Status: DISCONTINUED | OUTPATIENT
Start: 2019-09-08 | End: 2019-09-10 | Stop reason: HOSPADM

## 2019-09-08 RX ORDER — METOCLOPRAMIDE HYDROCHLORIDE 5 MG/ML
10 INJECTION INTRAMUSCULAR; INTRAVENOUS ONCE
Status: COMPLETED | OUTPATIENT
Start: 2019-09-08 | End: 2019-09-08

## 2019-09-08 RX ORDER — PREDNISONE 10 MG/1
15 TABLET ORAL DAILY
Refills: 12 | COMMUNITY
Start: 2019-08-14 | End: 2020-01-07

## 2019-09-08 RX ORDER — HYDROMORPHONE HYDROCHLORIDE 1 MG/ML
0.3 INJECTION, SOLUTION INTRAMUSCULAR; INTRAVENOUS; SUBCUTANEOUS
Status: DISCONTINUED | OUTPATIENT
Start: 2019-09-08 | End: 2019-09-09

## 2019-09-08 RX ORDER — SODIUM CHLORIDE 9 MG/ML
INJECTION, SOLUTION INTRAVENOUS CONTINUOUS
Status: DISCONTINUED | OUTPATIENT
Start: 2019-09-08 | End: 2019-09-09

## 2019-09-08 RX ORDER — POTASSIUM CL/LIDO/0.9 % NACL 10MEQ/0.1L
10 INTRAVENOUS SOLUTION, PIGGYBACK (ML) INTRAVENOUS
Status: DISCONTINUED | OUTPATIENT
Start: 2019-09-08 | End: 2019-09-10 | Stop reason: HOSPADM

## 2019-09-08 RX ORDER — ALBUTEROL SULFATE 90 UG/1
2 AEROSOL, METERED RESPIRATORY (INHALATION) EVERY 6 HOURS PRN
Status: DISCONTINUED | OUTPATIENT
Start: 2019-09-08 | End: 2019-09-10 | Stop reason: HOSPADM

## 2019-09-08 RX ORDER — METOCLOPRAMIDE HYDROCHLORIDE 5 MG/ML
10 INJECTION INTRAMUSCULAR; INTRAVENOUS ONCE
Status: DISCONTINUED | OUTPATIENT
Start: 2019-09-08 | End: 2019-09-08

## 2019-09-08 RX ORDER — ONDANSETRON 2 MG/ML
4 INJECTION INTRAMUSCULAR; INTRAVENOUS EVERY 6 HOURS PRN
Status: DISCONTINUED | OUTPATIENT
Start: 2019-09-08 | End: 2019-09-10 | Stop reason: HOSPADM

## 2019-09-08 RX ORDER — ONDANSETRON 4 MG/1
4 TABLET, ORALLY DISINTEGRATING ORAL EVERY 6 HOURS PRN
Status: DISCONTINUED | OUTPATIENT
Start: 2019-09-08 | End: 2019-09-10 | Stop reason: HOSPADM

## 2019-09-08 RX ORDER — POTASSIUM CHLORIDE 1500 MG/1
20-40 TABLET, EXTENDED RELEASE ORAL
Status: DISCONTINUED | OUTPATIENT
Start: 2019-09-08 | End: 2019-09-10 | Stop reason: HOSPADM

## 2019-09-08 RX ORDER — ROFLUMILAST 500 UG/1
500 TABLET ORAL DAILY
Status: DISCONTINUED | OUTPATIENT
Start: 2019-09-08 | End: 2019-09-10 | Stop reason: HOSPADM

## 2019-09-08 RX ORDER — IPRATROPIUM BROMIDE AND ALBUTEROL SULFATE 2.5; .5 MG/3ML; MG/3ML
1 SOLUTION RESPIRATORY (INHALATION) EVERY 6 HOURS PRN
Status: DISCONTINUED | OUTPATIENT
Start: 2019-09-08 | End: 2019-09-10 | Stop reason: HOSPADM

## 2019-09-08 RX ORDER — POTASSIUM CHLORIDE 7.45 MG/ML
10 INJECTION INTRAVENOUS
Status: DISCONTINUED | OUTPATIENT
Start: 2019-09-08 | End: 2019-09-10 | Stop reason: HOSPADM

## 2019-09-08 RX ORDER — BACLOFEN 10 MG/1
10 TABLET ORAL 3 TIMES DAILY PRN
Status: DISCONTINUED | OUTPATIENT
Start: 2019-09-08 | End: 2019-09-10 | Stop reason: HOSPADM

## 2019-09-08 RX ORDER — POTASSIUM CHLORIDE 7.45 MG/ML
10 INJECTION INTRAVENOUS ONCE
Status: COMPLETED | OUTPATIENT
Start: 2019-09-08 | End: 2019-09-08

## 2019-09-08 RX ORDER — POTASSIUM CHLORIDE 29.8 MG/ML
20 INJECTION INTRAVENOUS
Status: DISCONTINUED | OUTPATIENT
Start: 2019-09-08 | End: 2019-09-10 | Stop reason: HOSPADM

## 2019-09-08 RX ORDER — POTASSIUM CHLORIDE 1.5 G/1.58G
20-40 POWDER, FOR SOLUTION ORAL
Status: DISCONTINUED | OUTPATIENT
Start: 2019-09-08 | End: 2019-09-10 | Stop reason: HOSPADM

## 2019-09-08 RX ORDER — LORAZEPAM 2 MG/ML
.5-1 INJECTION INTRAMUSCULAR EVERY 4 HOURS PRN
Status: DISCONTINUED | OUTPATIENT
Start: 2019-09-08 | End: 2019-09-10

## 2019-09-08 RX ORDER — TIOTROPIUM BROMIDE 18 UG/1
18 CAPSULE ORAL; RESPIRATORY (INHALATION) DAILY
Status: DISCONTINUED | OUTPATIENT
Start: 2019-09-08 | End: 2019-09-10 | Stop reason: HOSPADM

## 2019-09-08 RX ORDER — PROCHLORPERAZINE 25 MG
25 SUPPOSITORY, RECTAL RECTAL EVERY 12 HOURS PRN
Status: DISCONTINUED | OUTPATIENT
Start: 2019-09-08 | End: 2019-09-10 | Stop reason: HOSPADM

## 2019-09-08 RX ORDER — MONTELUKAST SODIUM 10 MG/1
10 TABLET ORAL AT BEDTIME
Status: DISCONTINUED | OUTPATIENT
Start: 2019-09-08 | End: 2019-09-10 | Stop reason: HOSPADM

## 2019-09-08 RX ORDER — ACETAMINOPHEN 650 MG/1
650 SUPPOSITORY RECTAL EVERY 4 HOURS PRN
Status: DISCONTINUED | OUTPATIENT
Start: 2019-09-08 | End: 2019-09-10 | Stop reason: HOSPADM

## 2019-09-08 RX ADMIN — HYDROMORPHONE HYDROCHLORIDE 0.5 MG: 1 INJECTION, SOLUTION INTRAMUSCULAR; INTRAVENOUS; SUBCUTANEOUS at 03:01

## 2019-09-08 RX ADMIN — LORAZEPAM 1 MG: 2 INJECTION INTRAMUSCULAR; INTRAVENOUS at 13:38

## 2019-09-08 RX ADMIN — ACETAMINOPHEN 650 MG: 325 TABLET, FILM COATED ORAL at 19:34

## 2019-09-08 RX ADMIN — PANTOPRAZOLE SODIUM 40 MG: 40 TABLET, DELAYED RELEASE ORAL at 20:44

## 2019-09-08 RX ADMIN — SODIUM CHLORIDE 1000 ML: 9 INJECTION, SOLUTION INTRAVENOUS at 02:16

## 2019-09-08 RX ADMIN — METOCLOPRAMIDE 10 MG: 5 INJECTION, SOLUTION INTRAMUSCULAR; INTRAVENOUS at 01:31

## 2019-09-08 RX ADMIN — PREDNISONE 15 MG: 5 TABLET ORAL at 08:39

## 2019-09-08 RX ADMIN — HYDROMORPHONE HYDROCHLORIDE 0.3 MG: 1 INJECTION, SOLUTION INTRAMUSCULAR; INTRAVENOUS; SUBCUTANEOUS at 23:44

## 2019-09-08 RX ADMIN — SODIUM CHLORIDE: 9 INJECTION, SOLUTION INTRAVENOUS at 03:47

## 2019-09-08 RX ADMIN — Medication 10 MEQ: at 07:00

## 2019-09-08 RX ADMIN — MONTELUKAST 10 MG: 10 TABLET, FILM COATED ORAL at 20:44

## 2019-09-08 RX ADMIN — Medication 10 MEQ: at 09:59

## 2019-09-08 RX ADMIN — POTASSIUM CHLORIDE 10 MEQ: 7.46 INJECTION, SOLUTION INTRAVENOUS at 00:07

## 2019-09-08 RX ADMIN — ROFLUMILAST 500 MCG: 500 TABLET ORAL at 11:01

## 2019-09-08 RX ADMIN — HYDROCODONE BITARTRATE AND ACETAMINOPHEN 1 TABLET: 5; 325 TABLET ORAL at 16:42

## 2019-09-08 RX ADMIN — ONDANSETRON HYDROCHLORIDE 4 MG: 2 INJECTION, SOLUTION INTRAMUSCULAR; INTRAVENOUS at 07:49

## 2019-09-08 RX ADMIN — LISINOPRIL 2.5 MG: 2.5 TABLET ORAL at 09:21

## 2019-09-08 RX ADMIN — LORAZEPAM 0.5 MG: 2 INJECTION INTRAMUSCULAR; INTRAVENOUS at 05:26

## 2019-09-08 RX ADMIN — LORAZEPAM 0.5 MG: 2 INJECTION INTRAMUSCULAR; INTRAVENOUS at 09:21

## 2019-09-08 RX ADMIN — HYDROMORPHONE HYDROCHLORIDE 0.3 MG: 1 INJECTION, SOLUTION INTRAMUSCULAR; INTRAVENOUS; SUBCUTANEOUS at 07:49

## 2019-09-08 RX ADMIN — Medication 10 MEQ: at 08:50

## 2019-09-08 RX ADMIN — SODIUM CHLORIDE 1000 ML: 9 INJECTION, SOLUTION INTRAVENOUS at 00:21

## 2019-09-08 RX ADMIN — PROCHLORPERAZINE EDISYLATE 10 MG: 5 INJECTION INTRAMUSCULAR; INTRAVENOUS at 05:26

## 2019-09-08 RX ADMIN — Medication 10 MEQ: at 05:01

## 2019-09-08 RX ADMIN — HYDROCODONE BITARTRATE AND ACETAMINOPHEN 1 TABLET: 5; 325 TABLET ORAL at 20:43

## 2019-09-08 NOTE — ED TRIAGE NOTES
N/v ongoing for several week, worst last few days and unable to eat. Unable to take her meds due to nausea. Per patient, c/o ongoing sob, candidate for lung transplant but is not on list yet. ABCs intact.

## 2019-09-08 NOTE — ED NOTES
[facility]  ED Nurse Handoff Report    Trisha Bender is a 64 year old female   ED Chief complaint: Nausea & Vomiting  . ED Diagnosis:   Final diagnoses:   Nausea   Dehydration   Hypokalemia   Diarrhea of presumed infectious origin     Allergies:   Allergies   Allergen Reactions     Gabapentin Swelling     Contrast Dye      Iodine     Escitalopram      Nausea and sickness     Peanuts [Nuts] Hives     Penicillins      rash     Sulfa Drugs      High family history     Tace [Chlorotrianisene]      joint swelling     Aspirin Rash     After 3 days     Ibuprofen Nausea and Vomiting     Strawberry Rash       Code Status: Full Code  Activity level - Baseline/Home:  Independent. Activity Level - Current:   Stand by Assist. Lift room needed: No. Bariatric: No   Needed: No   Isolation: Yes. Infection: Not Applicable  C-Diff Pending.     Vital Signs:   Vitals:    09/08/19 0130 09/08/19 0145 09/08/19 0200 09/08/19 0222   BP: 130/76      Pulse: 134 141     Resp:  20 26    Temp:    97.8  F (36.6  C)   TempSrc:       SpO2: 96% 93% 95%        Cardiac Rhythm:  ,   Cardiac  Cardiac Rhythm: Sinus tachycardia  Pain level: 0-10 Pain Scale: 5  Patient confused: No. Patient Falls Risk: Yes.   Elimination Status: Has voided   Patient Report - Initial Complaint: N/v ongoing for several week, worst last few days and unable to eat. Unable to take her meds due to nausea. Per patient, c/o ongoing sob, candidate for lung transplant but is not on list yet. ABCs intact. . Focused Assessment: Cardiac - Cardiac WDL: heart sounds; rhythm  Cardiac Rhythm: apical pulse regular; tachycardic  Heart Sounds: S1, S2   Cardiac Monitoring - EKG Monitoring: Yes  Cardiac Regularity: Regular  Cardiac Rhythm: ST      Gastrointestinal - GI WDL: -WDL except; bowel sounds  GI Signs/Symptoms: diarrhea; nausea   Tests Performed: Lab, CT. Abnormal Results:   Labs Ordered and Resulted from Time of ED Arrival Up to the Time of Departure from  the ED   CBC WITH PLATELETS DIFFERENTIAL - Abnormal; Notable for the following components:       Result Value    WBC 15.7 (*)     Platelet Count 454 (*)     Absolute Neutrophil 12.5 (*)     All other components within normal limits   BASIC METABOLIC PANEL - Abnormal; Notable for the following components:    Sodium 132 (*)     Potassium 3.0 (*)     Glucose 145 (*)     Urea Nitrogen 5 (*)     All other components within normal limits   ROUTINE UA WITH MICROSCOPIC - Abnormal; Notable for the following components:    Ketones Urine Trace (*)     Blood Urine Trace (*)     Leukocyte Esterase Urine Trace (*)     All other components within normal limits   HEPATIC PANEL - Abnormal; Notable for the following components:    Protein Total 6.7 (*)     All other components within normal limits   LIPASE - Abnormal; Notable for the following components:    Lipase 63 (*)     All other components within normal limits   LACTIC ACID WHOLE BLOOD - Abnormal; Notable for the following components:    Lactic Acid 2.3 (*)     All other components within normal limits   TROPONIN I   LACTIC ACID WHOLE BLOOD   IV ACCESS   PULSE OXIMETRY NURSING   CLOSTRIDIUM DIFFICILE TOXIN B     Abd/pelvis CT no contrast - Stone Protocol   Final Result   IMPRESSION: No cause of acute pain identified in the abdomen or   pelvis.      ANNIA LYMAN MD        .   Treatments provided: See MAR  Family Comments:  present  OBS brochure/video discussed/provided to patient:  N/A  ED Medications:   Medications   0.9% sodium chloride BOLUS (1,000 mLs Intravenous Not Given 9/8/19 0213)   HYDROmorphone (PF) (DILAUDID) injection 0.5 mg (0.5 mg Intravenous Given 9/7/19 2347)   0.9% sodium chloride BOLUS (1,000 mLs Intravenous New Bag 9/8/19 0216)   ondansetron (ZOFRAN) injection 4 mg (4 mg Intravenous Given 9/7/19 2322)   0.9% sodium chloride BOLUS (0 mLs Intravenous Stopped 9/8/19 0021)   ondansetron (ZOFRAN) injection 4 mg (4 mg Intravenous Given 9/7/19 2353)    0.9% sodium chloride BOLUS (0 mLs Intravenous Stopped 9/8/19 0112)   potassium chloride 10 mEq in 100 mL sterile water intermittent infusion (premix) (0 mEq Intravenous Stopped 9/8/19 0113)   metoclopramide (REGLAN) injection 10 mg (10 mg Intravenous Given 9/8/19 0131)     Drips infusing:  Yes  For the majority of the shift, the patient's behavior Green. Interventions performed were none.     Severe Sepsis OR Septic Shock Diagnosis Present: No      ED Nurse Name/Phone Number: Stuart Adamson RN,   2:22 AM    RECEIVING UNIT ED HANDOFF REVIEW    Above ED Nurse Handoff Report was reviewed: Yes  Reviewed by: Monika Aguilera RN on September 8, 2019 at 3:03 AM

## 2019-09-08 NOTE — PLAN OF CARE
PRIMARY DIAGNOSIS: N/V/D    OUTPATIENT/OBSERVATION GOALS TO BE MET BEFORE DISCHARGE  1. Orthostatic performed: N/A    2. Tolerating PO fluid and/or antibiotics (if applicable): No    3. Nausea/Vomiting/Diarrhea symptoms improved:     4. Pain status: Improved-controlled with oral pain medications.    5. Return to near baseline physical activity: Yes    Discharge Planner Nurse   Safe discharge environment identified: Yes  Barriers to discharge: Yes       Entered by: Monika Aguilera 09/08/2019 6:15 AM      Pt requiring 2L O2 upon admission, O2 high 80's in RA. Pt requesting her home medications at admission, pt unable to tolerate taking oral medications. Pt also requesting anxiety IV med, ativan IV one time dose ordered and given. K+ 3.1, IV K+ running now. Compazine given for nausea. C-diff pending. Enteric precautions.  in room. Will continue to monitor.     Please review provider order for any additional goals.   Nurse to notify provider when observation goals have been met and patient is ready for discharge.

## 2019-09-08 NOTE — PLAN OF CARE
PRIMARY DIAGNOSIS: N/V/D    OUTPATIENT/OBSERVATION GOALS TO BE MET BEFORE DISCHARGE  1. Orthostatic performed: N/A    2. Tolerating PO fluid and/or antibiotics (if applicable): Patient did tolerate 1/2 her Amharic toast from lunch, without nausea, patient did have a loose stool after eating.      3. Nausea/Vomiting/Diarrhea symptoms improved: No,  a few loose stools    4. Pain status: Improved-controlled with oral pain medications. Patient getting norco for low back pain.     5. Return to near baseline physical activity: Patient is a little unsteady, though SBA.      Discharge Planner Nurse   Safe discharge environment identified: Yes  Barriers to discharge: No       Entered by: Jeanine Bal 09/08/2019 5:55 PM     Please review provider order for any additional goals.   Nurse to notify provider when observation goals have been met and patient is ready for discharge.    Patient continues to be tachy. Oxygen at 2L 97% after activity. Patient has low appetite.

## 2019-09-08 NOTE — PROGRESS NOTES
Update:     Patient admitted this morning with intractable nausea and vomiting. Noted to be tachycardic this admission. Per chart review this does not seem to be her baseline. She has severe COPD, she is on daily prednisone but is not on supplemental oxygen at home. Workup here does not show infection thus far.   Will continue IVF, add in tele, and obtain an ECHO in AM to further evaluate.   Meghan Ratliff PA-C, PA-C

## 2019-09-08 NOTE — ED PROVIDER NOTES
History     Chief Complaint:  Nausea    HPI   Trisha Bender is a 64 year old female with a history of HTN, HLD, and chronic back pain who presents to the emergency department for evaluation of nausea. The patient reports she has experienced several weeks of nausea which has worsened in the past 2 days. She further reports loose, yellow stools, abdominal pain, and decreased appetite accompanying her nausea, though she has not actually vomited. The patient states she has had difficulty taking medications due to her nausea.  She remarks she has been taking Zofran at home the past 2 days with no improvement in her nausea. She also notes she has had chronic shortness of breath and is a candidate for lung transplant. The patient has a history of cholecystectomy.    Allergies:  Gabapentin  Contrast Dye  Escitalopram  Peanuts [Nuts]  Penicillins  Sulfa Drugs  Tace [Chlorotrianisene]  Aspirin  Ibuprofen  Strawberry     Medications:    Advair  Albuterol  Xanax  Lipitor  Lioresal  Flonase  Lasix  Lisinopril  Norco  Singulair  Protonix  Deltasone  Potassium chloride  Daliresp  Imitrex  Percocet  Spiriva handihaler    Past Medical History:    Anxiety  CAD  CHF  COPD  Esophageal reflux  Asthma  Osteopenia  HLD  Migraine  Cardiomyopathy  HTN  RBBB  Hypokalemia    Past Surgical History:    Angiogram  Appendectomy  Cholecystectomy  Nasal septoplasty   EGD combined T&A  Inject steroid epidural lumbar/sacral  Ablation endometriosis  [place] teeth extraction    Family History:    Cancer, colon  Pancreatitis  Heart disease  CAD  Diabetes    Social History:  Presents with [facility].  Former smoker, 30 pack years, quit 2006.  Negative for alcohol use.  Marital Status:       Review of Systems   Constitutional: Positive for appetite change.   Respiratory: Shortness of breath: chronic.    Gastrointestinal: Positive for abdominal pain, diarrhea and nausea. Negative for vomiting.   All other systems reviewed and are  negative.        Physical Exam     Patient Vitals for the past 24 hrs:   BP Temp Temp src Heart Rate Resp SpO2   09/08/19 0040 -- -- -- -- 25 97 %   09/08/19 0035 146/83 -- -- 124 18 97 %   09/07/19 2335 -- -- -- 130 19 97 %   09/07/19 2330 139/96 -- -- 133 9 95 %   09/07/19 2325 130/92 -- -- 129 16 95 %   09/07/19 2246 145/100 97.5  F (36.4  C) Oral 142 18 97 %     Physical Exam  Nursing note and vitals reviewed.  Constitutional: Cooperative.   HENT:   Mouth/Throat: Mucous membranes are dry.   Cardiovascular: Tachycardic rate, regular rhythm and normal heart sounds.  No murmur.  Pulmonary/Chest: Effort normal and breath sounds normal. No respiratory distress. No wheezes. No rales.   Abdominal: Soft. Normal appearance and bowel sounds are normal. No distension. There is no rigidity and no guarding. Diffuse abdominal tenderness, more prominent on the left.  Musculoskeletal: No LE edema  Neurological: Alert. Oriented x4  Skin: Skin is warm and dry. No rash noted.   Psychiatric: Anxious appearing.     Emergency Department Course   ECG:  Time: 2259  Vent. Rate 136 bpm. MN interval 122. QRS duration 112. QT/QTc 304/457. P-R-T axis 74 92 57.  Sinus tachycardia.  Incomplete RBBB.  Possible right ventricular hypertrophy  Nonspecific ST abnormality.  Read time: 2318    Imaging:  Radiographic findings were communicated with the patient who voiced understanding of the findings.    CT Abdomen/Pelvis w/o IV contrast-stone protocol:   No cause of acute pain identified in the abdomen or pelvis.  As per radiology.     Laboratory:  CBC: WBC: 15.7 (H), HGB: 14.5, PLT: 454 (H)  BMP: Glucose 145 (H), Na 132 (L), Potassium 3.0 (L), Urea Nitrogen 5 (L), o/w WNL (Creatinine: 0.61)  Hepatic panel: Protein Total 6.7 (L), o/w WNL  Lipase: 63 (L)    Lactic acid: 2.3 (H)  Lactic acid (repeat after 2 L IVF): 1.4    2308 Troponin I: <0.015    UA with micro: Ketones Trace, Blood Trace, Leukocyte Esterase Trace, o/w negative    Clostridium  difficile toxin B PCR pending.    Interventions:  2322 NS 1L IV Bolus   Zofran 4 mg IV  2347 Dilaudid 0.5 mg IV  2353 Zofran 4 mg IV  0007 Potassium chloride 10 mEq IV  0021 NS 1L IV Bolus  0131 Reglan 10 mg IV  0216 NS 1L IV Bolus  0301 Dilaudid 0.5 mg IV    Emergency Department Course:  Nursing notes and vitals reviewed. 2338 I performed an exam of the patient as documented above.     EKG obtained in the ED, see results above.     IV inserted. Medicine administered as documented above. Blood drawn. This was sent to the lab for further testing, results above.    The patient was sent for a CT Abd/Pelvis while in the emergency department, findings above.     The patient provided a urine sample here in the emergency department. This was sent for laboratory testing, findings above.     0207 I rechecked the patient and discussed the results of her workup thus far. Patient reports having a diarrheal stool while in the ED.    0245 I spoke with Dr. Jordan, hospitalist, who agreed to admit the patient.    Findings and plan explained to the Patient who consents to admission. Discussed the patient with Dr. Jordan, who will admit the patient to an obs bed for further monitoring, evaluation, and treatment.    I personally reviewed the laboratory results with the Patient and answered all related questions prior to admission.    Impression & Plan      CMS Diagnoses: The Lactic acid level is elevated due to dehydration, at this time there is no sign of severe sepsis or septic shock.       Medical Decision Making:  Trisha Bender is a 64 year old female who presents with nausea, generalized abdominal pain, and loose stools. She is chronically on prednisone, as well as pain medication for her back. Workup here has been unrevealing as to an exact etiology of her nausea. Her CT scan of the abdomen is unremarkable, and her labs show leukocytosis and a lactic acid that improved with IV fluids. We are repleting her potassium. C diff  cultures were sent and pending, but I have would hold on antibiotics unless this culture is positive. Her EKG shows a sinus rhythm. It has fluctuated between 120 and 140 during her stay, but she has never been hypotensive. She is receiving her third liter of IV fluids at this time. I did consider withdrawal, but she is not a alcohol drinker, and it sounds like she still has some pain medication, so her symptoms would not seem to fit with withdrawal syndrome. Given her age and persistent tachycardia and nausea symptoms, she will admitted in stable condition.    Diagnosis:    ICD-10-CM   1. Dehydration E86.0   2. Nausea R11.0   3. Hypokalemia E87.6   4. Diarrhea of presumed infectious origin R19.7       Disposition:  Admitted to Dr. Jordan.    I, Mickey Goyal, am serving as a scribe on 9/7/2019 at 11:19 PM to personally document services performed by Steve Naranjo MD based on my observations and the provider's statements to me.     Mickey Goyal  9/7/2019   [facility] EMERGENCY DEPARTMENT       Steve Naranjo MD  09/08/19 0551

## 2019-09-08 NOTE — H&P
[facility]  Hospitalist Admission Note  September 8, 2019  Name: Trisha Bender    MRN: [id number]  YOB: 1955    Age: 64 year old  Date of admission: 9/7/2019  Primary care provider: Bandar Weinberg      Summary:  Patient is a 64-year-old female with a history of severe COPD chronically on prednisone 15 mg p.o. daily and FiO2 supplementation (notably, had been approved for lung transplant at the [facility] but since insurance change, patient would need to restart the pretransplant screening process at the [facility]), coronary artery disease, chronic low back pain secondary to compression fractures from chronic steroid use, GERD, hypertension, hyperlipidemia, and mild persistent asthma who presents here with intractable nausea.  Patient describes herself as someone who is generally always nauseated even as a child and could not go on rides.  She takes intermittent Dramamine which has helped in the past.  Over the past 2 to 3 days, patient reports severe intractable nausea and vomiting and was unable to take down any of her pills, food, or fluids for that matter. She even took a total of 6-8 Zofran tablets without any improvement. She denies any unusual travel/ingestion or recent sick contacts.  She has had her gallbladder taken out in the past.  She denies any abdominal discomfort for me and only reports chronic low back pain.  She had some loose stools in the ED and it was sent for C. difficile which still pending at the time of this dictation.  Given her inability to tolerate p.o. intake, patient will be admitted under observation for symptom management, watchful waiting, and consideration of further work-up if symptoms are uncontrolled.     Problem list/Plan:  1. Intractable nausea and vomiting: Appears acute exacerbation of her chronic symptoms.  No obvious findings on labs or CT of the abdomen.  She has a nonsurgical abdomen and in fact is nontender.  C.  difficile tox is pending.  Continue supportive cares with IV fluids, antiemetics, and pain control.  C. difficile tox has been collected and pending.  2. History of COPD, steroid and O2 dependent: Chronically on prednisone 15 mg p.o. daily which she tends to take around 4:00 in the morning.  We will continue her chronic prednisone 15 mg p.o. daily.  3. Hypokalemia: Suspect GI losses secondary to problem #1.  Replace per protocol.  4. Hyponatremia: Likely hypovolemic from lack of p.o. intake.  IV fluid resuscitation and will monitor for now.  5. Leukocytosis: Does appear to be chronically elevated and suspect secondary to chronic prednisone use.  Could be a component of stress demargination also.  6. History of GERD: Continue PPI  7. Mild persistent asthma: Patient may continue      All lab work and imaging data independently reviewed by myself    Prophylaxis;  Ambulation.     Code status: Full code    Discharge: Likely home when symptoms are better controlled    Chief Complaint:   Intractable nausea and vomiting   HPI  Patient is a 64-year-old female with a history of severe COPD chronically on prednisone 15 mg p.o. daily and FiO2 supplementation (notably, had been approved for lung transplant at the [facility] but since insurance change, patient would need to restart the pretransplant screening process at the [facility]), coronary artery disease, chronic low back pain secondary to compression fractures from chronic steroid use, GERD, hypertension, hyperlipidemia, and mild persistent asthma who presents here with intractable nausea.  Patient describes herself as someone who is generally always nauseated even as a child and could not go on rides.  She takes intermittent Dramamine which has helped in the past.  Over the past 2 to 3 days, patient reports severe intractable nausea and vomiting and was unable to take down any of her pills, food, or fluids for that matter. She even took a total of 6-8 Zofran tablets  without any improvement. She denies any unusual travel/ingestion or recent sick contacts.  She has had her gallbladder taken out in the past.  She denies any abdominal discomfort for me and only reports chronic low back pain.  She had some loose stools in the ED and it was sent for C. difficile which still pending at the time of this dictation.  Given her inability to tolerate p.o. intake, patient will be admitted under observation for symptom management, watchful waiting, and consideration of further work-up if symptoms are uncontrolled.  I did discuss the case in detail with the ED physician.     Past Medical History:     Past Medical History:   Diagnosis Date     Anxiety      CAD (coronary artery disease) 7/2/15    mild per cath     CHF (congestive heart failure)     EF=20-25% per echo 6/30/15     Chronic airway obstruction     FEV1 36% of pred; VC 67%  in Jan 06.     Chronic back pain      COPD (chronic obstructive pulmonary disease)      Endometriosis      Esophageal reflux      Essential hypertension      Hyperlipidemia      Migraine      Mild persistent asthma 8/30/2012     Osteopenia      Takotsubo cardiomyopathy      Past Surgical History:     Past Surgical History:   Procedure Laterality Date     ANGIOGRAM  07/02/2015    Minimal CAD. LV dysfunction, Mid RCA 20% stenosis, EF 20-25%, c/w Takotsubo cardiomyopathy     APPENDECTOMY       CHOLECYSTECTOMY  1974     COLONOSCOPY N/A 4/26/2018    Procedure: COLONOSCOPY;  COLONOSCOPY with biopsies;  Surgeon: Steve Acosta MD;  Location:  OR     ESOPHAGOSCOPY, GASTROSCOPY, DUODENOSCOPY (EGD), COMBINED  11/15/2011    Procedure:COMBINED ESOPHAGOSCOPY, GASTROSCOPY, DUODENOSCOPY (EGD);  ESOPHAGOSCOPY, GASTROSCOPY, DUODENOSCOPY (EGD) ; Surgeon:JIM URENA; Location: GI     INJECT EPIDURAL LUMBAR / SACRAL SINGLE N/A 4/13/2018    Procedure: INJECT EPIDURAL LUMBAR / SACRAL CONTINUAL OR INTERMITTENT;  Lumbar Epidural Steroid Injection;  Surgeon: Az Enamorado  MD;  Location: [facility] OR     INJECT EPIDURAL LUMBAR / SACRAL SINGLE N/A 2018    Procedure: INJECT EPIDURAL LUMBAR / SACRAL SINGLE;  Lumbar Epidural Steroid Injection;  Surgeon: Az Enamorado MD;  Location: [facility] OR     LAPAROSCOPIC ABLATION ENDOMETRIOSIS       LAPAROSCOPY DIAGNOSTIC (GYN)      endometriosis resection / lysis of adhesions     Nasal septoplasty and mucous retention cyst       TONSILLECTOMY, ADENOIDECTOMY, COMBINED       [place] teeth extraction       Social History:     Social History     Tobacco Use     Smoking status: Former Smoker     Packs/day: 1.00     Years: 30.00     Pack years: 30.00     Last attempt to quit: 2006     Years since quittin.6     Smokeless tobacco: Never Used     Tobacco comment: Quit in    Substance Use Topics     Alcohol use: No     Alcohol/week: 0.0 oz     Drug use: No     Family History:  Family history reviewed. NO pertinent family history     Allergies:     Allergies   Allergen Reactions     Gabapentin Swelling     Contrast Dye      Iodine     Escitalopram      Nausea and sickness     Peanuts [Nuts] Hives     Penicillins      rash     Sulfa Drugs      High family history     Tace [Chlorotrianisene]      joint swelling     Aspirin Rash     After 3 days     Ibuprofen Nausea and Vomiting     Strawberry Rash     Medications:     Medications Prior to Admission   Medication Sig Dispense Refill Last Dose     ADVAIR DISKUS 500-50 MCG/DOSE diskus inhaler INHALE 1 PUFF TWO TIMES DAILY 3 Inhaler 3 Taking     albuterol (VENTOLIN HFA) 108 (90 BASE) MCG/ACT Inhaler Inhale 2 puffs into the lungs every 6 hours as needed for shortness of breath / dyspnea or wheezing 18 g 3 Taking     ALPRAZolam (XANAX) 0.25 MG tablet Take 1 tablet by mouth. Every twelve hours as needed for anxiety 40 tablet 0      atorvastatin (LIPITOR) 10 MG tablet Takes 3 times a week 36 tablet 0      baclofen (LIORESAL) 10 MG tablet TAKE ONE TABLET BY MOUTH THREE TIMES A DAY AS NEEDED FOR MUSCLE  SPASMS 90 tablet 0      [employer] FIBER GUMMIES 2.5 G CHEW Take 1 tablet by mouth daily Reported on 5/8/2017   Taking     fluticasone (FLONASE) 50 MCG/ACT nasal spray SPRAY ONE TO TWO SPRAYS IN EACH NOSTRIL ONCE DAILY 16 g 3      fluticasone (FLONASE) 50 MCG/ACT nasal spray SPRAY ONE TO TWO SPRAYS IN EACH NOSTRIL ONCE DAILY 16 g 0      fluticasone (FLONASE) 50 MCG/ACT spray SPRAY 1 TO 2 SPRAYS IN EACH NOSTRIL ONCE DAILY 16 g 7 Taking     furosemide (LASIX) 20 MG tablet TAKE 2 TABLETS (40 MG) BY MOUTH DAILY SEVERE SWELLING 180 tablet 0      HYDROcodone-acetaminophen (NORCO) 5-325 MG tablet Take 1 tablet by mouth every 4 hours as needed for pain Maximum 6 daily 180 tablet 0      ipratropium - albuterol 0.5 mg/2.5 mg/3 mL (DUONEB) 0.5-2.5 (3) MG/3ML neb solution Take 1 vial (3 mLs) by nebulization every 6 hours as needed for shortness of breath / dyspnea or wheezing 30 vial 1 Taking     lidocaine (LIDODERM) 5 % Patch Apply up to 3 patches to painful area at once for up to 12 h within a 24 h period.  Remove after 12 hours. 30 patch 0 Taking     lisinopril (PRINIVIL/ZESTRIL) 2.5 MG tablet TAKE 1 TABLET BY MOUTH EVERY DAY 90 tablet 2      lisinopril (PRINIVIL/ZESTRIL) 2.5 MG tablet Take 1 tablet (2.5 mg) by mouth daily 90 tablet 3 Taking     montelukast (SINGULAIR) 10 MG tablet Take 1 tablet (10 mg) by mouth At Bedtime 90 tablet 1 Taking     Multiple Vitamin CHEW Take 1 tablet by mouth daily    Taking     ondansetron (ZOFRAN-ODT) 4 MG ODT tab TAKE 1-2 TABLETS (4-8 MG) BY MOUTH EVERY 8 HOURS AS NEEDED FOR NAUSEA 18 tablet 4      oxyCODONE-acetaminophen (PERCOCET) 5-325 MG tablet Take 1 tablet by mouth every 6 hours as needed for breakthrough pain 20 tablet 0      pantoprazole (PROTONIX) 40 MG EC tablet TAKE 1 TABLET BY MOUTH EVERY DAY 90 tablet 2      potassium chloride ER (K-TAB/KLOR-CON) 10 MEQ CR tablet TAKE 1 TABLET BY MOUTH THREE TIMES A DAY 90 tablet 4      predniSONE (DELTASONE) 20 MG tablet Take 3 tabs (60 mg) by mouth  daily x 3 days, 2 tabs (40 mg) daily x 3 days, 1 tab (20 mg) daily x 3 days, then 1/2 tab (10 mg) x 3 days. 20 tablet 0 Taking     predniSONE (DELTASONE) 5 MG tablet TAKE 2 TABLETS (10 MG) BY MOUTH DAILY, TAPER DOWN BY 1/2 TAB EVERY 7 DAYS UNTIL DOWN TO 1 TAB DAILY (Patient not taking: Reported on 1/2/2019) 30 tablet 0 Not Taking     roflumilast (DALIRESP) 500 MCG TABS tablet Take 1 tablet (500 mcg) by mouth daily   Taking     SUMAtriptan (IMITREX) 25 MG tablet Take 1 tablet (25 mg) by mouth at onset of headache for migraine May repeat in 2 hours if needed: max 2/day; 9 tablet 11 Taking     tiotropium (SPIRIVA HANDIHALER) 18 MCG capsule USING THE HANDIHALER, INHALE THE CONTENTS OF ONE CAPSULE BY MOUTH DAILY 90 capsule 3 Taking       Review of Systems:   A Comprehensive greater than 10 system review of systems was carried out.  Pertinent positives and negatives are noted above.  Otherwise negative for contributory information.        Physical Exam:  Blood pressure (!) 150/83, pulse 141, temperature 97.4  F (36.3  C), temperature source Oral, resp. rate 22, last menstrual period 09/29/2005, SpO2 93 %, not currently breastfeeding.  Gen: Pleasant, appears mildly anxious in no acute distress.  HEENT: NCAT. EOMI. PERRL.  Neck: Normal inspection. No bruit, JVD or thyromegaly.  Lungs: Normal respiratory effort. Clear to auscultation bilaterally with no crackles or wheezes.  Card: N s1s2. RRR. No M/R/G.  Peripheral pulses present and symmetric.   Abd: Soft NT ND. No mass. Normal bowel sounds.  Skin: No rash. Warm to the touch  Extr: No edema. CMS intact  Psychiatric: Patient alert oriented ×3.  Anxious affect  Neurologic: Cranial nerves II-XII are intact.  Sensation normal.  Motor strength 5/5    Data:     Recent Labs   Lab 09/07/19  2308   WBC 15.7*   HGB 14.5   HCT 45.8   MCV 97   *     Recent Labs   Lab 09/08/19  0349 09/07/19  2308   NA  --  132*   POTASSIUM 3.1* 3.0*   CHLORIDE  --  95   CO2  --  27   ANIONGAP   --  10   GLC  --  145*   BUN  --  5*   CR  --  0.61   GFRESTIMATED  --  >90   GFRESTBLACK  --  >90   JEANNIE  --  9.1   PROTTOTAL  --  6.7*   ALBUMIN  --  3.5   BILITOTAL  --  0.5   ALKPHOS  --  90   AST  --  10   ALT  --  17     Recent Labs   Lab 09/08/19  0031   COLOR Light Yellow   APPEARANCE Clear   URINEGLC Negative   URINEBILI Negative   URINEKETONE Trace*   SG 1.007   UBLD Trace*   URINEPH 7.0   PROTEIN Negative   NITRITE Negative   LEUKEST Trace*   RBCU 2   WBCU 1       Imaging:   Recent Results (from the past 24 hour(s))   Abd/pelvis CT no contrast - [facility] Protocol    Narrative    CT ABDOMEN AND PELVIS WITHOUT CONTRAST   9/8/2019 12:03 AM     HISTORY: Abdominal pain and vomiting.    COMPARISON: 7/21/2011.    TECHNIQUE: Intravenous contrast was not administered due to history of  allergy to iodinated contrast material. Without intravenous contrast,  helical sections were acquired from the top of the diaphragm through  the pubic symphysis. Coronal reconstructions were generated. Radiation  dose for this scan was reduced using automated exposure control,  adjustment of the mA and/or kV according to the patient's size, or  iterative reconstruction technique.    FINDINGS:     Abdomen: The liver and spleen are unremarkable to the limits of a  noncontrast CT scan. Mild diffuse atrophy of the pancreas. The adrenal  glands and kidneys are unremarkable. Prior cholecystectomy. No  enlarged lymph nodes or free fluid in the upper abdomen.  Atherosclerotic calcification in the abdominal aorta.    Scan through the lower chest is unremarkable.    Pelvis: The small and large bowel are normal in caliber. The appendix  is not visualized. No bowel wall thickening, pneumatosis or free  intraperitoneal gas. The uterus is present. No enlarged lymph nodes or  free fluid in the pelvis.      Impression    IMPRESSION: No cause of acute pain identified in the abdomen or  pelvis.    MD Reva MONCADA MD Pager  [phone]

## 2019-09-08 NOTE — PLAN OF CARE
Patient No change     PRIMARY DIAGNOSIS: N/V/D     OUTPATIENT/OBSERVATION GOALS TO BE MET BEFORE DISCHARGE  1. Orthostatic performed: No     2. Tolerating PO fluid and/or antibiotics (if applicable): tolerating sips     3. Nausea/Vomiting/Diarrhea symptoms improved: No,  a few loose stools and still nauseous     4. Pain status: Improved but still requiring IV narcotics.     5. Return to near baseline physical activity: No     Discharge Planner Nurse   Safe discharge environment identified: Yes  Barriers to discharge: Yes       Entered by: Tali Jimenez 09/08/2019 12:00PM  Please review provider order for any additional goals.   Nurse to notify provider when observation goals have been met and patient is ready for discharge.        Vital signs:  Temp: 96  F (35.6  C) Temp src: Oral BP: (!) 140/68 Pulse: 127 Heart Rate: 130 Resp: 20 SpO2: 98 % O2 Device: Nasal cannula Oxygen Delivery: 2 LPM           A&Ox4, chronic back pain, IV dilaudid 0.3mg given x1 this am with improvement in pain, IV zofran x1 - nausea slightly improved, now taking small sips of clears with crackers for meds, one small loose stool this AM, cdiff negative, IVF @125ml/hr, K+ replaced (3.1), recheck at 1430, reports feeling highly anxious, ativan x1, tachycardic, on tele - sinus tach , on 2LPM NC, LS diminished and clear, +1 edema in BLE, will continue to monitor and provide supportive cares.

## 2019-09-08 NOTE — PLAN OF CARE
Patient No change    PRIMARY DIAGNOSIS: N/V/D    OUTPATIENT/OBSERVATION GOALS TO BE MET BEFORE DISCHARGE  1. Orthostatic performed: No    2. Tolerating PO fluid and/or antibiotics (if applicable): No    3. Nausea/Vomiting/Diarrhea symptoms improved: No,  a few loose stools and still nauseous    4. Pain status: Improved but still requiring IV narcotics.    5. Return to near baseline physical activity: No    Discharge Planner Nurse   Safe discharge environment identified: Yes  Barriers to discharge: Yes       Entered by: Tali Jimenez 09/08/2019 8:08 AM     Please review provider order for any additional goals.   Nurse to notify provider when observation goals have been met and patient is ready for discharge.      Vital signs:  Temp: 97.8  F (36.6  C) Temp src: Axillary BP: (!) 151/90 Pulse: 141 Heart Rate: 148 Resp: 24 SpO2: 95 % O2 Device: Nasal cannula Oxygen Delivery: 2 LPM        A&Ox4, chronic back pain, IV dilaudid 0.3mg given x1, nausea is not improving, IV zofran x1, one small loose stool this AM, stool sample sent and pending, IVF @125ml/hr, K+ replaced (3.1), reports feeling highly anxious, tachycardic, on 2LPM NC, LS diminished and clear, +1 edema in BLE, will continue to monitor and provide supportive cares.

## 2019-09-08 NOTE — PLAN OF CARE
ROOM # 226    Living Situation (if not independent, order SW consult): Home with    Facility name: n/a   : Steve spouse [phone]    Activity level at baseline: Ind  Activity level on admit: Ind      Patient registered to observation; given Patient Bill of Rights; given the opportunity to ask questions about observation status and their plan of care.  Patient has been oriented to the observation room, bathroom and call light is in place.    Discussed discharge goals and expectations with patient/family.

## 2019-09-08 NOTE — PHARMACY-ADMISSION MEDICATION HISTORY
Admission medication history interview status for this patient is complete. See [facility] admission navigator for allergy information, prior to admission medications and immunization status.     Medication history interview source(s):Patient  Medication history resources (including written lists, pill bottles, clinic record): SureScripts and Care Everywhere  Primary pharmacy: [facility] [id number] [place] [facility] [address] [address]     Changes made to PTA medication list:  Added: prednisone 15mg, cetirizine, azithromycin, and Breo Ellipta   Deleted: Advair diskus, lidocaine patch, oxycodone-acetaminophen, prednisone 20mg and 5mg  Changed: Pantoprazole once daily --> every night at bedtime     Actions taken by pharmacist (provider contacted, etc):None     Additional medication history information:None    Medication reconciliation/reorder completed by provider prior to medication history? Yes    Do you take OTC medications (eg tylenol, ibuprofen, fish oil, eye/ear drops, etc)? Yes, see list.      Prior to Admission medications    Medication Sig Last Dose Taking? Auth Provider   albuterol (VENTOLIN HFA) 108 (90 BASE) MCG/ACT Inhaler Inhale 2 puffs into the lungs every 6 hours as needed for shortness of breath / dyspnea or wheezing Past Week at Unknown time Yes Bandar Weinberg MD   ALPRAZolam (XANAX) 0.25 MG tablet Take 1 tablet by mouth. Every twelve hours as needed for anxiety 9/7/2019 at Unknown time Yes Deborah Wright NP   atorvastatin (LIPITOR) 10 MG tablet Takes 3 times a week Past Month at Unknown time Yes Michael Odell MD   azithromycin (ZITHROMAX) 250 MG tablet Take 250 mg by mouth three times a week MWF 9/4/2019 Yes Unknown, Entered By History   baclofen (LIORESAL) 10 MG tablet TAKE ONE TABLET BY MOUTH THREE TIMES A DAY AS NEEDED FOR MUSCLE SPASMS 9/6/2019 Yes Deborah Wright NP   BREO ELLIPTA 200-25 MCG/INH Inhaler Inhale 1 Dose into the lungs daily  9/7/2019 at pm Yes Unknown, Entered By  History   cetirizine (ZYRTEC) 10 MG tablet Take 10 mg by mouth daily 9/6/2019 Yes Unknown, Entered By History   [employer] FIBER GUMMIES 2.5 G CHEW Take 1 tablet by mouth daily Reported on 5/8/2017 Past Month at Unknown time Yes Unknown, Entered By History   fluticasone (FLONASE) 50 MCG/ACT nasal spray SPRAY ONE TO TWO SPRAYS IN EACH NOSTRIL ONCE DAILY 9/7/2019 at pm Yes Bandar Weinberg MD   furosemide (LASIX) 20 MG tablet TAKE 2 TABLETS (40 MG) BY MOUTH DAILY SEVERE SWELLING 9/6/2019 at Unknown time Yes Bandar Weinberg MD   HYDROcodone-acetaminophen (NORCO) 5-325 MG tablet Take 1 tablet by mouth every 4 hours as needed for pain Maximum 6 daily 9/7/2019 at Unknown time Yes Bandar Weinberg MD   ipratropium - albuterol 0.5 mg/2.5 mg/3 mL (DUONEB) 0.5-2.5 (3) MG/3ML neb solution Take 1 vial (3 mLs) by nebulization every 6 hours as needed for shortness of breath / dyspnea or wheezing Past Week at Unknown time Yes Bandar Weinberg MD   lisinopril (PRINIVIL/ZESTRIL) 2.5 MG tablet Take 1 tablet (2.5 mg) by mouth daily 9/6/2019 at pm Yes Bandar Weinberg MD   montelukast (SINGULAIR) 10 MG tablet Take 1 tablet (10 mg) by mouth At Bedtime 9/6/2019 at pm Yes Bandar Weinberg MD   Multiple Vitamin CHEW Take 1 tablet by mouth daily  Past Week at Unknown time Yes Reported, Patient   ondansetron (ZOFRAN-ODT) 4 MG ODT tab TAKE 1-2 TABLETS (4-8 MG) BY MOUTH EVERY 8 HOURS AS NEEDED FOR NAUSEA 9/7/2019 at Unknown time Yes Bandar Weinberg MD   pantoprazole (PROTONIX) 40 MG EC tablet TAKE 1 TABLET BY MOUTH EVERY DAY  Patient taking differently: TAKE 1 TABLET BY MOUTH EVERY DAY at Bedtime 9/6/2019 at pm Yes Bandar Weinberg MD   potassium chloride ER (K-TAB/KLOR-CON) 10 MEQ CR tablet TAKE 1 TABLET BY MOUTH THREE TIMES A DAY 9/5/2019 Yes Bandar Weinberg MD   predniSONE (DELTASONE) 10 MG tablet Take 15 mg by mouth daily 9/7/2019 at Unknown time Yes Unknown, Entered By History   roflumilast (DALIRESP) 500 MCG TABS  tablet Take 1 tablet (500 mcg) by mouth daily 9/7/2019 at Unknown time Yes Bandar Weinberg MD   SUMAtriptan (IMITREX) 25 MG tablet Take 1 tablet (25 mg) by mouth at onset of headache for migraine May repeat in 2 hours if needed: max 2/day; Past Month at Unknown time Yes Bandar Weinberg MD   tiotropium (SPIRIVA HANDIHALER) 18 MCG capsule USING THE HANDIHALER, INHALE THE CONTENTS OF ONE CAPSULE BY MOUTH DAILY 9/7/2019 at pm Yes Bandar Weinberg MD

## 2019-09-09 ENCOUNTER — APPOINTMENT (OUTPATIENT)
Dept: CARDIOLOGY | Facility: CLINIC | Age: 64
End: 2019-09-09
Attending: PHYSICIAN ASSISTANT
Payer: COMMERCIAL

## 2019-09-09 DIAGNOSIS — M54.50 CHRONIC BILATERAL LOW BACK PAIN WITHOUT SCIATICA: ICD-10-CM

## 2019-09-09 DIAGNOSIS — G89.29 CHRONIC BILATERAL LOW BACK PAIN WITHOUT SCIATICA: ICD-10-CM

## 2019-09-09 LAB
ANION GAP SERPL CALCULATED.3IONS-SCNC: 7 MMOL/L (ref 3–14)
BASOPHILS # BLD AUTO: 0 10E9/L (ref 0–0.2)
BASOPHILS NFR BLD AUTO: 0.4 %
BUN SERPL-MCNC: 2 MG/DL (ref 7–30)
CALCIUM SERPL-MCNC: 7.8 MG/DL (ref 8.5–10.1)
CHLORIDE SERPL-SCNC: 109 MMOL/L (ref 94–109)
CO2 SERPL-SCNC: 25 MMOL/L (ref 20–32)
CREAT SERPL-MCNC: 0.5 MG/DL (ref 0.52–1.04)
DIFFERENTIAL METHOD BLD: ABNORMAL
EOSINOPHIL # BLD AUTO: 0.1 10E9/L (ref 0–0.7)
EOSINOPHIL NFR BLD AUTO: 0.8 %
ERYTHROCYTE [DISTWIDTH] IN BLOOD BY AUTOMATED COUNT: 14.1 % (ref 10–15)
GFR SERPL CREATININE-BSD FRML MDRD: >90 ML/MIN/{1.73_M2}
GLUCOSE SERPL-MCNC: 89 MG/DL (ref 70–99)
HCT VFR BLD AUTO: 34.4 % (ref 35–47)
HGB BLD-MCNC: 10.7 G/DL (ref 11.7–15.7)
HGB BLD-MCNC: 12.1 G/DL (ref 11.7–15.7)
IMM GRANULOCYTES # BLD: 0.1 10E9/L (ref 0–0.4)
IMM GRANULOCYTES NFR BLD: 1.1 %
INTERPRETATION ECG - MUSE: NORMAL
INTERPRETATION ECG - MUSE: NORMAL
LACTATE BLD-SCNC: 0.8 MMOL/L (ref 0.7–2)
LYMPHOCYTES # BLD AUTO: 2 10E9/L (ref 0.8–5.3)
LYMPHOCYTES NFR BLD AUTO: 23.1 %
MAGNESIUM SERPL-MCNC: 1.7 MG/DL (ref 1.6–2.3)
MCH RBC QN AUTO: 31 PG (ref 26.5–33)
MCHC RBC AUTO-ENTMCNC: 31.1 G/DL (ref 31.5–36.5)
MCV RBC AUTO: 100 FL (ref 78–100)
MONOCYTES # BLD AUTO: 0.9 10E9/L (ref 0–1.3)
MONOCYTES NFR BLD AUTO: 10.3 %
NEUTROPHILS # BLD AUTO: 5.5 10E9/L (ref 1.6–8.3)
NEUTROPHILS NFR BLD AUTO: 64.3 %
NRBC # BLD AUTO: 0 10*3/UL
NRBC BLD AUTO-RTO: 0 /100
PLATELET # BLD AUTO: 302 10E9/L (ref 150–450)
POTASSIUM SERPL-SCNC: 2.7 MMOL/L (ref 3.4–5.3)
POTASSIUM SERPL-SCNC: 3.5 MMOL/L (ref 3.4–5.3)
RBC # BLD AUTO: 3.45 10E12/L (ref 3.8–5.2)
SODIUM SERPL-SCNC: 141 MMOL/L (ref 133–144)
WBC # BLD AUTO: 8.6 10E9/L (ref 4–11)

## 2019-09-09 PROCEDURE — 87506 IADNA-DNA/RNA PROBE TQ 6-11: CPT | Performed by: PHYSICIAN ASSISTANT

## 2019-09-09 PROCEDURE — 96361 HYDRATE IV INFUSION ADD-ON: CPT

## 2019-09-09 PROCEDURE — 36415 COLL VENOUS BLD VENIPUNCTURE: CPT | Performed by: PHYSICIAN ASSISTANT

## 2019-09-09 PROCEDURE — 25000132 ZZH RX MED GY IP 250 OP 250 PS 637: Performed by: INTERNAL MEDICINE

## 2019-09-09 PROCEDURE — 84132 ASSAY OF SERUM POTASSIUM: CPT | Mod: 91 | Performed by: PHYSICIAN ASSISTANT

## 2019-09-09 PROCEDURE — 25800030 ZZH RX IP 258 OP 636: Performed by: INTERNAL MEDICINE

## 2019-09-09 PROCEDURE — 25000128 H RX IP 250 OP 636: Performed by: PHYSICIAN ASSISTANT

## 2019-09-09 PROCEDURE — 83735 ASSAY OF MAGNESIUM: CPT | Performed by: PHYSICIAN ASSISTANT

## 2019-09-09 PROCEDURE — G0378 HOSPITAL OBSERVATION PER HR: HCPCS

## 2019-09-09 PROCEDURE — 96376 TX/PRO/DX INJ SAME DRUG ADON: CPT

## 2019-09-09 PROCEDURE — 36415 COLL VENOUS BLD VENIPUNCTURE: CPT | Performed by: HOSPITALIST

## 2019-09-09 PROCEDURE — 40000264 ECHOCARDIOGRAM COMPLETE

## 2019-09-09 PROCEDURE — 93306 TTE W/DOPPLER COMPLETE: CPT | Mod: 26 | Performed by: INTERNAL MEDICINE

## 2019-09-09 PROCEDURE — 25000132 ZZH RX MED GY IP 250 OP 250 PS 637: Performed by: PHYSICIAN ASSISTANT

## 2019-09-09 PROCEDURE — 25000128 H RX IP 250 OP 636: Performed by: INTERNAL MEDICINE

## 2019-09-09 PROCEDURE — 25500064 ZZH RX 255 OP 636: Performed by: PHYSICIAN ASSISTANT

## 2019-09-09 PROCEDURE — 99225 ZZC SUBSEQUENT OBSERVATION CARE,LEVEL II: CPT | Performed by: PHYSICIAN ASSISTANT

## 2019-09-09 PROCEDURE — 80048 BASIC METABOLIC PNL TOTAL CA: CPT | Performed by: PHYSICIAN ASSISTANT

## 2019-09-09 PROCEDURE — 25000131 ZZH RX MED GY IP 250 OP 636 PS 637: Performed by: INTERNAL MEDICINE

## 2019-09-09 PROCEDURE — 85018 HEMOGLOBIN: CPT | Mod: 91 | Performed by: PHYSICIAN ASSISTANT

## 2019-09-09 PROCEDURE — 85025 COMPLETE CBC W/AUTO DIFF WBC: CPT | Performed by: PHYSICIAN ASSISTANT

## 2019-09-09 PROCEDURE — 83605 ASSAY OF LACTIC ACID: CPT | Performed by: HOSPITALIST

## 2019-09-09 RX ORDER — AZITHROMYCIN 250 MG/1
250 TABLET, FILM COATED ORAL
Status: DISCONTINUED | OUTPATIENT
Start: 2019-09-09 | End: 2019-09-10 | Stop reason: HOSPADM

## 2019-09-09 RX ORDER — MAGNESIUM SULFATE HEPTAHYDRATE 40 MG/ML
4 INJECTION, SOLUTION INTRAVENOUS EVERY 4 HOURS PRN
Status: DISCONTINUED | OUTPATIENT
Start: 2019-09-09 | End: 2019-09-10 | Stop reason: HOSPADM

## 2019-09-09 RX ORDER — CETIRIZINE HYDROCHLORIDE 10 MG/1
10 TABLET ORAL DAILY
Status: DISCONTINUED | OUTPATIENT
Start: 2019-09-09 | End: 2019-09-10 | Stop reason: HOSPADM

## 2019-09-09 RX ORDER — POTASSIUM CHLORIDE 750 MG/1
10 TABLET, EXTENDED RELEASE ORAL 3 TIMES DAILY
Status: DISCONTINUED | OUTPATIENT
Start: 2019-09-09 | End: 2019-09-10 | Stop reason: HOSPADM

## 2019-09-09 RX ORDER — KETOROLAC TROMETHAMINE 15 MG/ML
15 INJECTION, SOLUTION INTRAMUSCULAR; INTRAVENOUS ONCE
Status: COMPLETED | OUTPATIENT
Start: 2019-09-09 | End: 2019-09-09

## 2019-09-09 RX ORDER — HYDROCODONE BITARTRATE AND ACETAMINOPHEN 5; 325 MG/1; MG/1
1-2 TABLET ORAL EVERY 4 HOURS PRN
Status: DISCONTINUED | OUTPATIENT
Start: 2019-09-09 | End: 2019-09-10 | Stop reason: HOSPADM

## 2019-09-09 RX ORDER — FLUTICASONE PROPIONATE 50 MCG
2 SPRAY, SUSPENSION (ML) NASAL DAILY
Status: DISCONTINUED | OUTPATIENT
Start: 2019-09-09 | End: 2019-09-10 | Stop reason: HOSPADM

## 2019-09-09 RX ADMIN — POTASSIUM CHLORIDE 10 MEQ: 750 TABLET, FILM COATED, EXTENDED RELEASE ORAL at 19:29

## 2019-09-09 RX ADMIN — MONTELUKAST 10 MG: 10 TABLET, FILM COATED ORAL at 21:15

## 2019-09-09 RX ADMIN — LISINOPRIL 2.5 MG: 2.5 TABLET ORAL at 08:12

## 2019-09-09 RX ADMIN — HYDROCODONE BITARTRATE AND ACETAMINOPHEN 1 TABLET: 5; 325 TABLET ORAL at 08:13

## 2019-09-09 RX ADMIN — ROFLUMILAST 500 MCG: 500 TABLET ORAL at 08:12

## 2019-09-09 RX ADMIN — LORAZEPAM 1 MG: 2 INJECTION INTRAMUSCULAR; INTRAVENOUS at 01:01

## 2019-09-09 RX ADMIN — PANTOPRAZOLE SODIUM 40 MG: 40 TABLET, DELAYED RELEASE ORAL at 21:15

## 2019-09-09 RX ADMIN — BACLOFEN 10 MG: 10 TABLET ORAL at 11:14

## 2019-09-09 RX ADMIN — SODIUM CHLORIDE: 9 INJECTION, SOLUTION INTRAVENOUS at 01:05

## 2019-09-09 RX ADMIN — HYDROCODONE BITARTRATE AND ACETAMINOPHEN 1 TABLET: 5; 325 TABLET ORAL at 16:49

## 2019-09-09 RX ADMIN — Medication 10 MEQ: at 14:54

## 2019-09-09 RX ADMIN — HYDROCODONE BITARTRATE AND ACETAMINOPHEN 1 TABLET: 5; 325 TABLET ORAL at 13:09

## 2019-09-09 RX ADMIN — POTASSIUM CHLORIDE 10 MEQ: 750 TABLET, FILM COATED, EXTENDED RELEASE ORAL at 13:09

## 2019-09-09 RX ADMIN — AZITHROMYCIN 250 MG: 250 TABLET, FILM COATED ORAL at 14:58

## 2019-09-09 RX ADMIN — HUMAN ALBUMIN MICROSPHERES AND PERFLUTREN 7 ML: 10; .22 INJECTION, SOLUTION INTRAVENOUS at 12:30

## 2019-09-09 RX ADMIN — ALPRAZOLAM 0.25 MG: 0.25 TABLET ORAL at 13:09

## 2019-09-09 RX ADMIN — KETOROLAC TROMETHAMINE 15 MG: 15 INJECTION, SOLUTION INTRAMUSCULAR; INTRAVENOUS at 19:29

## 2019-09-09 RX ADMIN — POTASSIUM CHLORIDE 40 MEQ: 1500 TABLET, EXTENDED RELEASE ORAL at 08:28

## 2019-09-09 RX ADMIN — HYDROCODONE BITARTRATE AND ACETAMINOPHEN 1 TABLET: 5; 325 TABLET ORAL at 00:51

## 2019-09-09 RX ADMIN — ACETAMINOPHEN 650 MG: 325 TABLET, FILM COATED ORAL at 01:01

## 2019-09-09 RX ADMIN — Medication 10 MEQ: at 13:07

## 2019-09-09 RX ADMIN — ACETAMINOPHEN 650 MG: 325 TABLET, FILM COATED ORAL at 18:57

## 2019-09-09 RX ADMIN — HYDROCODONE BITARTRATE AND ACETAMINOPHEN 1 TABLET: 5; 325 TABLET ORAL at 04:33

## 2019-09-09 RX ADMIN — CETIRIZINE HYDROCHLORIDE 10 MG: 10 TABLET, FILM COATED ORAL at 13:09

## 2019-09-09 RX ADMIN — PREDNISONE 15 MG: 5 TABLET ORAL at 08:13

## 2019-09-09 RX ADMIN — HYDROCODONE BITARTRATE AND ACETAMINOPHEN 2 TABLET: 5; 325 TABLET ORAL at 21:15

## 2019-09-09 NOTE — PLAN OF CARE
PRIMARY DIAGNOSIS: Nausea/Vomiting/diarrhea    OUTPATIENT/OBSERVATION GOALS TO BE MET BEFORE DISCHARGE  1. Orthostatic performed: N/A    2. Tolerating PO fluid and/or antibiotics (if applicable): Poor appetite, drinking  fluids    3. Nausea/Vomiting/Diarrhea symptoms improved: Denies n/v/d    4. Pain status: Improved but still requiring PO and IV narcotics.    5. Return to near baseline physical activity: Currently SBA    Discharge Planner Nurse   Safe discharge environment identified: Yes  Barriers to discharge: No       Entered by: Florentin Mathias 09/09/2019 3:56 AM    Vitals are Temp: 97.1  F (36.2  C) Temp src: Oral BP: 123/60 Pulse: 117 Heart Rate: 107 Resp: 20 SpO2: 98 %.  Patient is Alert and Oriented x4. On 2 L of oxygen. SOB with activity.They are SBA with no assistive devices. Up to restroom x1.  On a Regular diet.  They are complaining of 8/10 pain in their back. Norco, tylenol, and ativan given. Warm pack to back.  Patient has Normal Saline 0.9% running at 125 mL per hour. On Tele- Sinus tach. Triggered sepsis alert. Lactate was negative. Resting in bed.  by beside. Will continue to monitor.       Please review provider order for any additional goals.   Nurse to notify provider when observation goals have been met and patient is ready for discharge.

## 2019-09-09 NOTE — PLAN OF CARE
PRIMARY DIAGNOSIS: N/V/D     OUTPATIENT/OBSERVATION GOALS TO BE MET BEFORE DISCHARGE  1. Orthostatic performed: N/A     2. Tolerating PO fluid and/or antibiotics (if applicable): Patient did tolerate 1/2 her Croatian toast from lunch, without nausea, patient did have a loose stool after eating.       3. Nausea/Vomiting/Diarrhea symptoms improved: No,  a few loose stools this shift.      4. Pain status: Improved-controlled with oral pain medications. Patient getting norco for low back pain.      5. Return to near baseline physical activity: Patient is a little unsteady, though SBA.       Discharge Planner Nurse   Safe discharge environment identified: Yes  Barriers to discharge: No        Please review provider order for any additional goals.   Nurse to notify provider when observation goals have been met and patient is ready for discharge.     Patient continues to be tachy. Oxygen at 2L 97% after activity. Patient has low appetite.  Patient had no complaints of nausea this shift. Patient will have an ECHO in the AM.

## 2019-09-09 NOTE — PLAN OF CARE
PRIMARY DIAGNOSIS: Nausea/Vomiting/diarrhea    OUTPATIENT/OBSERVATION GOALS TO BE MET BEFORE DISCHARGE  1. Orthostatic performed: N/A    2. Tolerating PO fluid and/or antibiotics (if applicable): Poor appetite, drinking  fluids    3. Nausea/Vomiting/Diarrhea symptoms improved: Denies n/v/d    4. Pain status: Improved but still requiring IV narcotics.    5. Return to near baseline physical activity: Currently SBA    Discharge Planner Nurse   Safe discharge environment identified: Yes  Barriers to discharge: No       Entered by: Florentin Mathias 09/09/2019 1:39 AM    Vitals are Temp: 97.4  F (36.3  C) Temp src: Oral BP: 134/76 Pulse: 117 Heart Rate: 112 Resp: 20 SpO2: 98 %.  Patient is Alert and Oriented x4. On 2 L of oxygen. SOB with activity.They are SBA with no assistive devices .  On a Regular diet.  They are complaining of 7/10 pain in their back.  Dilaudid given for pain.  Patient has Normal Saline 0.9% running at 125 mL per hour. On Tele- Sinus tach 106. Will continue to monitor.       Please review provider order for any additional goals.   Nurse to notify provider when observation goals have been met and patient is ready for discharge.

## 2019-09-09 NOTE — PLAN OF CARE
PRIMARY DIAGNOSIS: Nausea/Vomiting/diarrhea     OUTPATIENT/OBSERVATION GOALS TO BE MET BEFORE DISCHARGE  1. Orthostatic performed: N/A     2. Tolerating PO fluid and/or antibiotics (if applicable): Yes-needs encouragement      3. Nausea/Vomiting/Diarrhea symptoms improved: Denies n/v/d     4. Pain status: Improved but still requiring PO and IV narcotics.     5. Return to near baseline physical activity: SBA w/gaitbelt-Ind at home (has a walker at home she does not use)     Discharge Planner Nurse   Safe discharge environment identified: Yes  Barriers to discharge: No        AOx4. SBA w/gaitbelt for transfers. Pain in back 7/10-PRN norco given-will reassess.  K+ 2.7 this am-replacing. ST per tele, w/ PACs, -130.  2 stools this am, small, soft. Supplemental O2-2L, plan to ween today. /65 (BP Location: Left arm)   Pulse 117   Temp 97.5  F (36.4  C) (Oral)   Resp 16   LMP 09/29/2005   SpO2 97%    Please review provider order for any additional goals.   Nurse to notify provider when observation goals have been met and patient is ready for discharge.

## 2019-09-09 NOTE — PROGRESS NOTES
[facility]  Observation Unit  Progress Note    Date of Service: 9/9/2019    Patient: Trisha Bender  MRN: [id number]  Admission Date: 9/7/2019  Hospital Day # 2    Assessment & Plan: Trisha Bender is a 64 year old female with a history of COPD, CAD, Chronic Low Back Pain 2/2 Compression Fractures, GERD, HTN, HLD, Asthma who presented to the ED today 2/2 nausea and diarrhea.    Nausea, Diarrhea - pt with a hx of chronic nausea worse over the past 3 days with associated loose stools and poor appetite.  She denies any abdominal pain or emesis.  No recent antibiotics or sick contacts.  LFTs and Cdiff on admission were negative.  CT abd/pelvis was negative for acute process.  Patient's nausea has resolved today.  She continues to have 3 loose, non bloody stools this morning without abdominal pain.  She remains afebrile.  Likely acute viral process.  - advance diet today as tolerated  - enteric panel pending    Hypokalemia - 2/2 poor oral intake and GI losses.  She is also on potassium supplement TID at baseline which was not restarted on admission.    - resume pta Potassium tablets 10mEq TID  - check magnesium  - replace per protocol    Sinus Tachycardia - HR on admission 140s improved to 110s this morning after IVF hydration and pain control.  Telemetry overnight with sinus tachycardia without arrhythmia.  - echocardiogram pending      Chronic Low Back Pain - 2/2 Compression Fractures from chronic steroid use.  Pt with known fractures of T8, 9, 10 on MRI from 1/2019.   CXR on admission notes new T5,6,7 and potentially T11 compression deformities.  Pt with no recent falls.  She did note some increased pain after bending over last week, but is currently at her baseline.  She denies any new weakness, numbness or tingling of BLE.  Discussed with patient.  She has refused to wear a TLSO brace offered to her in the past.  Vertobroplasty has been discussed in the past, but not performed.  Followed by  [facility].  No further imaging at this time.  - continue pta Norco and Baclofen prn   - follow up in clinic with [facility]    Chronic COPD - not on oxygen at baseline.  No signs of acute exacerbation on exam.  Oxygen saturations 95% on room air currently.  - continue pta chronic Prednisone 15mg daily and Azithromycin three times a week for prophylaxis.  - continue pta BreoEllipta, Albuterol, Duoneb, Singulair, Spiriva, Flonase, Daliresp    HTN, LE Edema - continue pta Lisinopril.  Lasix on hold since admission due to poor oral intake.  Continue to hold lasix today 2/2 ongoing GI losses and hypokalemia; reassess dosing in am      HLD - resume pta Atorvastatin upon discharge.    GERD - continue pta Protonix    Anemia - hgb 10.7 down from baseline ~13.  No signs of blood loss, likely component of dilution.  - repeat hgb later today      CODE: Full  DVT: ambulation  Diet/fluids: regular  Disposition: pending echocardiogram, tolerating po and normalization of potassium.  Possible discharge later today vs 9/10/19.    Mounika BARNESC  Hospitalist Physician Assistant  [facility]  Pager: [phone]      Subjective & Interval Hx:    Patient denies abdominal pain, nausea or emesis.  She had 3 soft non bloody stools this morning.  No new shortness of breath or wheezing different from her baseline.  No new chest pain.  Her back pain is at her baseline.  Denies BLE numbness, tingling or weakness.    Last 24 hr care team notes reviewed.   ROS:  4 point ROS including Respiratory, CV, GI and , other than that noted in the HPI, is negative.    Physical Exam:    Blood pressure 125/65, pulse 117, temperature 97.5  F (36.4  C), temperature source Oral, resp. rate 16, last menstrual period 09/29/2005, SpO2 95 %, not currently breastfeeding.  General: Alert, interactive, NAD, sitting up in bed  HEENT: AT/NC, sclera anicteric, PERRL, EOMI  Resp: clear to  auscultation bilaterally, no crackles or wheezes  Cardiac: regular rate and rhythm, no murmur  Abdomen: Soft, nontender, nondistended. +BS.  No HSM or masses, no rebound or guarding.  Extremities: 1+ BLE edema  Skin: Warm and dry  Neuro: Alert & oriented x 3, Cns 2-12 intact, moves all extremities    Labs & Images:  Reviewed in Epic   Medications:    Current Facility-Administered Medications   Medication     acetaminophen (TYLENOL) Suppository 650 mg     acetaminophen (TYLENOL) tablet 650 mg     albuterol (PROAIR HFA/PROVENTIL HFA/VENTOLIN HFA) 108 (90 Base) MCG/ACT inhaler 2 puff     ALPRAZolam (XANAX) tablet 0.25 mg     baclofen (LIORESAL) tablet 10 mg     fluticasone-vilanterol (BREO ELLIPTA) 200-25 MCG/INH inhaler 1 puff *Patient Supplied*     HYDROcodone-acetaminophen (NORCO) 5-325 MG per tablet 1 tablet     HYDROmorphone (PF) (DILAUDID) injection 0.3 mg     ipratropium - albuterol 0.5 mg/2.5 mg/3 mL (DUONEB) neb solution 3 mL     lidocaine (LMX4) cream     lidocaine 1 % 0.1-1 mL     lisinopril (PRINIVIL/Zestril) tablet 2.5 mg     LORazepam (ATIVAN) injection 0.5-1 mg     magnesium sulfate 4 g in 100 mL sterile water (premade)     melatonin tablet 1 mg     montelukast (SINGULAIR) tablet 10 mg     naloxone (NARCAN) injection 0.1-0.4 mg     nitroGLYcerin (NITROSTAT) sublingual tablet 0.4 mg     ondansetron (ZOFRAN-ODT) ODT tab 4 mg    Or     ondansetron (ZOFRAN) injection 4 mg     pantoprazole (PROTONIX) EC tablet 40 mg     potassium chloride (KLOR-CON) Packet 20-40 mEq     potassium chloride 10 mEq in 100 mL intermittent infusion with 10 mg lidocaine     potassium chloride 10 mEq in 100 mL sterile water intermittent infusion (premix)     potassium chloride 20 mEq in 50 mL intermittent infusion     potassium chloride ER (K-DUR/KLOR-CON M) CR tablet 20-40 mEq     predniSONE (DELTASONE) tablet 15 mg     prochlorperazine (COMPAZINE) injection 10 mg    Or     prochlorperazine (COMPAZINE) tablet 10 mg    Or      prochlorperazine (COMPAZINE) Suppository 25 mg     roflumilast (DALIRESP) tablet 500 mcg     sodium chloride (PF) 0.9% PF flush 3 mL     sodium chloride (PF) 0.9% PF flush 3 mL     sodium chloride 0.9% infusion     tiotropium (SPIRIVA) capsule 18 mcg *Patient Supplied*

## 2019-09-09 NOTE — PLAN OF CARE
PRIMARY DIAGNOSIS: Nausea/Vomiting/diarrhea     OUTPATIENT/OBSERVATION GOALS TO BE MET BEFORE DISCHARGE  1. Orthostatic performed: N/A     2. Tolerating PO fluid and/or antibiotics (if applicable): Yes-needs encouragement      3. Nausea/Vomiting/Diarrhea symptoms improved: Denies n/v- 3 soft/small stools reported     4. Pain status: Improved-managed with PRN norco q 4hrs     5. Return to near baseline physical activity: SBA w/gaitbelt-Ind at home (has a walker at home she does not use)     Discharge Planner Nurse   Safe discharge environment identified: Yes  Barriers to discharge: No     Still attempting to replace K+-not tolerating oral options d/t upset stomach-plan to finish replacing IV. Pain somewhat controlled with PRN norco q4. Will get enteric panel-awaiting stool. O2 sats stable on RA. /65 (BP Location: Left arm)   Pulse 117   Temp 97.5  F (36.4  C) (Oral)   Resp 16   LMP 09/29/2005   SpO2 95%    Please review provider order for any additional goals.   Nurse to notify provider when observation goals have been met and patient is ready for discharge.

## 2019-09-10 VITALS
DIASTOLIC BLOOD PRESSURE: 70 MMHG | TEMPERATURE: 97.4 F | OXYGEN SATURATION: 94 % | RESPIRATION RATE: 16 BRPM | SYSTOLIC BLOOD PRESSURE: 136 MMHG | HEART RATE: 130 BPM

## 2019-09-10 PROCEDURE — 25000128 H RX IP 250 OP 636: Performed by: PHYSICIAN ASSISTANT

## 2019-09-10 PROCEDURE — 40000275 ZZH STATISTIC RCP TIME EA 10 MIN

## 2019-09-10 PROCEDURE — 99217 ZZC OBSERVATION CARE DISCHARGE: CPT | Performed by: PHYSICIAN ASSISTANT

## 2019-09-10 PROCEDURE — 25000132 ZZH RX MED GY IP 250 OP 250 PS 637: Performed by: INTERNAL MEDICINE

## 2019-09-10 PROCEDURE — 25000132 ZZH RX MED GY IP 250 OP 250 PS 637: Performed by: PHYSICIAN ASSISTANT

## 2019-09-10 PROCEDURE — 96376 TX/PRO/DX INJ SAME DRUG ADON: CPT

## 2019-09-10 PROCEDURE — G0378 HOSPITAL OBSERVATION PER HR: HCPCS

## 2019-09-10 PROCEDURE — 25000131 ZZH RX MED GY IP 250 OP 636 PS 637: Performed by: INTERNAL MEDICINE

## 2019-09-10 RX ORDER — HYDROCODONE BITARTRATE AND ACETAMINOPHEN 5; 325 MG/1; MG/1
1 TABLET ORAL EVERY 4 HOURS PRN
Qty: 12 TABLET | Refills: 0 | Status: SHIPPED | OUTPATIENT
Start: 2019-09-10 | End: 2019-09-13

## 2019-09-10 RX ORDER — HYDROCODONE BITARTRATE AND ACETAMINOPHEN 5; 325 MG/1; MG/1
1 TABLET ORAL EVERY 4 HOURS PRN
Qty: 180 TABLET | Refills: 0 | Status: SHIPPED | OUTPATIENT
Start: 2019-09-10 | End: 2019-09-10

## 2019-09-10 RX ADMIN — PREDNISONE 15 MG: 5 TABLET ORAL at 09:24

## 2019-09-10 RX ADMIN — LISINOPRIL 2.5 MG: 2.5 TABLET ORAL at 08:40

## 2019-09-10 RX ADMIN — ROFLUMILAST 500 MCG: 500 TABLET ORAL at 09:25

## 2019-09-10 RX ADMIN — POTASSIUM CHLORIDE 10 MEQ: 750 TABLET, FILM COATED, EXTENDED RELEASE ORAL at 09:24

## 2019-09-10 RX ADMIN — ALPRAZOLAM 0.25 MG: 0.25 TABLET ORAL at 10:06

## 2019-09-10 RX ADMIN — TIOTROPIUM BROMIDE 18 MCG: 18 CAPSULE ORAL; RESPIRATORY (INHALATION) at 08:05

## 2019-09-10 RX ADMIN — HYDROCODONE BITARTRATE AND ACETAMINOPHEN 2 TABLET: 5; 325 TABLET ORAL at 02:11

## 2019-09-10 RX ADMIN — LORAZEPAM 1 MG: 2 INJECTION INTRAMUSCULAR; INTRAVENOUS at 07:41

## 2019-09-10 RX ADMIN — HYDROCODONE BITARTRATE AND ACETAMINOPHEN 2 TABLET: 5; 325 TABLET ORAL at 09:36

## 2019-09-10 RX ADMIN — POTASSIUM CHLORIDE 10 MEQ: 750 TABLET, FILM COATED, EXTENDED RELEASE ORAL at 13:30

## 2019-09-10 RX ADMIN — HYDROCODONE BITARTRATE AND ACETAMINOPHEN 2 TABLET: 5; 325 TABLET ORAL at 13:30

## 2019-09-10 RX ADMIN — CETIRIZINE HYDROCHLORIDE 10 MG: 10 TABLET, FILM COATED ORAL at 09:24

## 2019-09-10 NOTE — DISCHARGE SUMMARY
[facility] Outpatient / Observation Unit  Discharge Summary        Trisha Bender MRN# [id number]   YOB: 1955 Age: 64 year old     Date of Admission:  9/7/2019  Date of Discharge:  9/10/2019  Admitting Physician:  Reva Jordan MD  Discharge Physician: Cee Nayak PA-C  Discharging Service: Hospitalist      Primary Provider: Bandar Weinberg  Primary Care Physician Phone Number: [phone]         Primary Discharge Diagnoses:    Trisha Bender is a 64 year old female with a history of COPD, CAD, Chronic Low Back Pain 2/2 Compression Fractures, GERD, HTN, HLD, Asthma who presented to the ED on 9/7/19 2/2 nausea and diarrhea, likely viral process.    Nausea, Diarrhea: pt with a hx of chronic nausea worse over the 3 days leading up to admission with associated loose stools and poor appetite.  No associated abdominal pain or vomiting. No recent antibiotics or sick contacts.  LFTs and Cdiff on admission were negative.  CT abd/pelvis was negative for acute process.  Enteric panel negative. Remained afebrile and no leukocytosis during stay. Nausea improved during stay patient was able to tolerate regular diet prior to discharge. Suspect symptoms related to viral process.      Hypokalemia: resolved prior to discharge, 2/2 poor oral intake and GI losses. Magnesium WNL. Continue PTA potassium supplementation.      Sinus Tachycardia: HR on admission 140s improved with IVF hydration but would intermittently increase into the 120s the day of discharge due to feeling increasingly anxious otherwise no associated chest pain or palpitations.  Telemetry overnight on admission with sinus tachycardia without arrhythmia. Echocardiogram showed preserved EF and no changes since imaging performed in 04/2018.     Chronic Low Back Pain: 2/2 Compression Fractures from chronic steroid use.  Pt with known fractures of T8, 9, 10 on MRI from 1/2019.  CXR on admission notes new T5,6,7 and potentially T11 compression  deformities.  Pt with no recent falls. She did note some increased pain after bending over last week, but is currently at her baseline.  She denies any new weakness, numbness or tingling of BLE.  Discussed with patient during stay. She has refused to wear a TLSO brace offered to her in the past.  Vertobroplasty has been discussed in the past, but not performed.  Followed by [facility].  No further imaging at this time. Due to increased pain of recent patient has used up most of her Norco supply at home, gave patient script to fill for the next two days until she can been seen by her PCP for monthly refill. Pharmacy will not fill this prescription since it is too early, patient will plan to contact her PCP for assistance with refill.   - continue pta Norco and Baclofen prn   - follow up in clinic with [facility]     Chronic COPD: not on oxygen at baseline. No signs of acute exacerbation on exam. Oxygen saturations 94% on room air currently.  - continue pta chronic Prednisone 15mg daily and Azithromycin three times a week for prophylaxia  - continue pta BreoEllipta, Albuterol, Duoneb, Singulair, Spiriva, Flonase, Daliresp     HTN, LE Edema: continue pta Lisinopril.  Lasix on hold since admission due to poor oral intake, instructed to resume at discharge.      Anemia: stable with hgb of 12.1 down from baseline ~13.  No signs of blood loss, likely component of dilution.        Secondary Discharge Diagnoses:     Past Medical History:   Diagnosis Date     Anxiety      CAD (coronary artery disease) 7/2/15    mild per cath     CHF (congestive heart failure)     EF=20-25% per echo 6/30/15     Chronic airway obstruction     FEV1 36% of pred; VC 67%  in Jan 06.     Chronic back pain      COPD (chronic obstructive pulmonary disease)      Endometriosis      Esophageal reflux      Essential hypertension      Hyperlipidemia      Migraine      Mild persistent asthma 8/30/2012      Osteopenia      Takotsubo cardiomyopathy             Code Status:      Full Code        Brief Hospital Summary:        Reason for your hospital stay      You were admitted for concerns of nausea and diarrhea. Lab work showed no   significant metabolic or infectious source. Your stool was sent for   culture and was negative for enteric panel and c diff. We suspect this was   a viral infection that does not require antibiotics. You were treated   supportively on observation with fluids and nausea meds. You improved and   were allowed to discharge home. We recommend you stay well hydrated and   continue regular diet as you tolerate. Of note your potassium was low   initially likely related to your diarrhea. As well your heart rate has   been fast during your stay likely related to dehydration.           Please refer to initial admission history and physical for further details.   Briefly, Trisha Bender was admitted on 9/7/2019 for concerns of acute nausea, vomiting and diarrhea. Work up in ED did not show any evidence of bowel obstruction. Pt was registered to the Observation Unit for continued supportive therapy for acute nausea and diarrhea.    Pt was resuscitated with IV fluids and continued on supportive measures including anti-emetics and pain control as needed. Labs were reviewed and significant results addressed. On the day of discharge, symptoms were resolving and pt was able to tolerate PO intake. Vitals were stable, without evidence of orthostasis. Medications were reviewed and adjustments made as necessary. Pt is instructed to follow up as below.            Significant Lab During Hospitalization:      Recent Labs   Lab 09/09/19 1816 09/09/19  0650 09/07/19  2308   WBC  --  8.6 15.7*   HGB 12.1 10.7* 14.5   HCT  --  34.4* 45.8   MCV  --  100 97   PLT  --  302 454*     Recent Labs   Lab 09/09/19 1816 09/09/19  0650 09/08/19  1457  09/07/19  2308   NA  --  141  --   --  132*   POTASSIUM 3.5 2.7* 3.6    < > 3.0*   CHLORIDE  --  109  --   --  95   CO2  --  25  --   --  27   ANIONGAP  --  7  --   --  10   GLC  --  89  --   --  145*   BUN  --  2*  --   --  5*   CR  --  0.50*  --   --  0.61   GFRESTIMATED  --  >90  --   --  >90   GFRESTBLACK  --  >90  --   --  >90   JEANNIE  --  7.8*  --   --  9.1    < > = values in this interval not displayed.              Significant Imaging During Hospitalization:      Results for orders placed or performed during the hospital encounter of 09/07/19   Abd/pelvis CT no contrast - Stone Protocol    Narrative    CT ABDOMEN AND PELVIS WITHOUT CONTRAST   9/8/2019 12:03 AM     HISTORY: Abdominal pain and vomiting.    COMPARISON: 7/21/2011.    TECHNIQUE: Intravenous contrast was not administered due to history of  allergy to iodinated contrast material. Without intravenous contrast,  helical sections were acquired from the top of the diaphragm through  the pubic symphysis. Coronal reconstructions were generated. Radiation  dose for this scan was reduced using automated exposure control,  adjustment of the mA and/or kV according to the patient's size, or  iterative reconstruction technique.    FINDINGS:     Abdomen: The liver and spleen are unremarkable to the limits of a  noncontrast CT scan. Mild diffuse atrophy of the pancreas. The adrenal  glands and kidneys are unremarkable. Prior cholecystectomy. No  enlarged lymph nodes or free fluid in the upper abdomen.  Atherosclerotic calcification in the abdominal aorta.    Scan through the lower chest is unremarkable.    Pelvis: The small and large bowel are normal in caliber. The appendix  is not visualized. No bowel wall thickening, pneumatosis or free  intraperitoneal gas. The uterus is present. No enlarged lymph nodes or  free fluid in the pelvis.      Impression    IMPRESSION: No cause of acute pain identified in the abdomen or  pelvis.    ANNIA LYMAN MD   XR Chest 2 Views    Narrative    CHEST TWO VIEWS  9/8/2019 10:49 AM     HISTORY:  Hypoxia.    COMPARISON: Chest x-ray 12/30/2018. MRI thoracic spine 1/9/2019.      Impression    IMPRESSION: No acute cardiopulmonary disease. Lateral view shows  multiple compression fractures within the mid to inferior thoracic  spine. There is an MRI from 1/9/2019 that shows compression fractures  of T8, T9, and T10. However, there appears to be new T5, T6, T7, and  potentially T11 new compression deformities noted.    HARSHA OLIVIA MD     *Note: Due to a large number of results and/or encounters for the requested time period, some results have not been displayed. A complete set of results can be found in Results Review.              Pending Results:      None        Consultations This Hospital Stay:      No consultations were requested during this admission         Discharge Instructions and Follow-Up:      Follow-up Appointments     Follow-up and recommended labs and tests       Follow up with primary care provider, Bandar Weinberg, within 7 days   for hospital follow-up and pain medication refill.  No follow up labs or   test are needed.               Discharge Disposition:      Discharged to home         Discharge Medications:        Discharge Medication List as of 9/10/2019 12:16 PM      CONTINUE these medications which have CHANGED    Details   HYDROcodone-acetaminophen (NORCO) 5-325 MG tablet Take 1 tablet by mouth every 4 hours as needed for pain Maximum 6 daily, Disp-12 tablet, R-0, Local Print         CONTINUE these medications which have NOT CHANGED    Details   albuterol (VENTOLIN HFA) 108 (90 BASE) MCG/ACT Inhaler Inhale 2 puffs into the lungs every 6 hours as needed for shortness of breath / dyspnea or wheezing, Disp-18 g, R-3, E-Prescribe      ALPRAZolam (XANAX) 0.25 MG tablet Take 1 tablet by mouth. Every twelve hours as needed for anxiety, Disp-40 tablet, R-0, Local Print      atorvastatin (LIPITOR) 10 MG tablet Takes 3 times a week, Disp-36 tablet, R-0, E-PrescribePt is due for follow up.   Further refills to be provided after pt is seen in clinic.      azithromycin (ZITHROMAX) 250 MG tablet Take 250 mg by mouth three times a week MWF, R-1, Historical      baclofen (LIORESAL) 10 MG tablet TAKE ONE TABLET BY MOUTH THREE TIMES A DAY AS NEEDED FOR MUSCLE SPASMS, Disp-90 tablet, R-0, E-Prescribe      BREO ELLIPTA 200-25 MCG/INH Inhaler Inhale 1 Dose into the lungs daily , R-12, JULIO, Historical      cetirizine (ZYRTEC) 10 MG tablet Take 10 mg by mouth daily, Historical      [employer] F[employer]ER GUMMIES 2.5 G CHEW Take 1 tablet by mouth daily Reported on 5/8/2017, Historical      fluticasone (FLONASE) 50 MCG/ACT nasal spray SPRAY ONE TO TWO SPRAYS IN EACH NOSTRIL ONCE DAILY, Disp-16 g, R-0, E-Prescribe      furosemide (LASIX) 20 MG tablet TAKE 2 TABLETS (40 MG) BY MOUTH DAILY SEVERE SWELLING, Disp-180 tablet, R-0, E-Prescribe      ipratropium - albuterol 0.5 mg/2.5 mg/3 mL (DUONEB) 0.5-2.5 (3) MG/3ML neb solution Take 1 vial (3 mLs) by nebulization every 6 hours as needed for shortness of breath / dyspnea or wheezing, Disp-30 vial, R-1, E-Prescribe      lisinopril (PRINIVIL/ZESTRIL) 2.5 MG tablet Take 1 tablet (2.5 mg) by mouth daily, Disp-90 tablet, R-3, E-Prescribe      montelukast (SINGULAIR) 10 MG tablet Take 1 tablet (10 mg) by mouth At Bedtime, Disp-90 tablet, R-1, E-Prescribe      Multiple Vitamin CHEW Take 1 tablet by mouth daily , Historical      ondansetron (ZOFRAN-ODT) 4 MG ODT tab TAKE 1-2 TABLETS (4-8 MG) BY MOUTH EVERY 8 HOURS AS NEEDED FOR NAUSEA, Disp-18 tablet, R-4, E-PrescribeDX Code Needed  . R11.0      pantoprazole (PROTONIX) 40 MG EC tablet Take 40 mg by mouth At Bedtime, Historical      potassium chloride ER (K-TAB/KLOR-CON) 10 MEQ CR tablet TAKE 1 TABLET BY MOUTH THREE TIMES A DAY, Disp-90 tablet, R-4, E-Prescribe      predniSONE (DELTASONE) 10 MG tablet Take 15 mg by mouth daily, R-12, Historical      roflumilast (DALIRESP) 500 MCG TABS tablet Take 1 tablet (500 mcg) by mouth daily, Historical       SUMAtriptan (IMITREX) 25 MG tablet Take 1 tablet (25 mg) by mouth at onset of headache for migraine May repeat in 2 hours if needed: max 2/day;, Disp-9 tablet, R-11, E-Prescribe      tiotropium (SPIRIVA HANDIHALER) 18 MCG capsule USING THE HANDIHALER, INHALE THE CONTENTS OF ONE CAPSULE BY MOUTH DAILY, Disp-90 capsule, R-3, E-Prescribe                 Allergies:         Allergies   Allergen Reactions     Gabapentin Swelling     Contrast Dye      Iodine     Escitalopram      Nausea and sickness     Peanuts [Nuts] Hives     Penicillins      rash     Sulfa Drugs      High family history     Tace [Chlorotrianisene]      joint swelling     Aspirin Rash     After 3 days     Ibuprofen Nausea and Vomiting     Strawberry Rash           Condition and Physical on Discharge:      Discharge condition: Stable   Vitals: Blood pressure 136/70, pulse 130, temperature 97.4  F (36.3  C), temperature source Oral, resp. rate 16, last menstrual period 09/29/2005, SpO2 94 %, not currently breastfeeding.  0 lbs 0 oz      GENERAL:  Comfortable.  PSYCH: pleasant, oriented, No acute distress.  HEENT:  PERRLA. Normal conjunctiva, normal hearing, nasal mucosa and Oropharynx are normal.  NECK:  Supple, no neck vein distention, adenopathy or bruits, normal thyroid.  HEART:  Normal S1, S2 with no murmur, no pericardial rub, gallops or S3 or S4.  LUNGS:  Clear to auscultation, normal respiratory effort. No wheezing, rales or ronchi.  ABDOMEN:  Soft, no hepatosplenomegaly, normal bowel sounds. Non-tender, non distended.   EXTREMITIES:  No pedal edema, +2 pulses bilateral and equal.  SKIN:  Dry to touch, No rash, wound or ulcerations.  NEUROLOGIC:  CN 2-12 intact, BL 5/5 symmetric upper and lower extremity strength, sensation is intact with no focal deficits.     Cee Nayak PA-C

## 2019-09-10 NOTE — PLAN OF CARE
PRIMARY DIAGNOSIS: GASTROENTERITIS     OUTPATIENT/OBSERVATION GOALS TO BE MET BEFORE DISCHARGE  1. Orthostatic performed: N/A     2. Tolerating PO fluid: Yes     3. Nausea/Vomiting/Diarrhea symptoms improved: Yes     4. Pain status: Improved-controlled with oral pain medications and breakthrough Tramadol     5. Return to near baseline physical activity Yes      Discharge Planner Nurse   Safe discharge environment identified: Yes  Barriers to discharge: No             A&O x 4.  Up with SBA.  Denies nausea.  Tolerating regular diet.  Last stool at 1500, soft.  Enteric panel still pending.  Rates L side back pain 7/10.  PRN  IV Tramadol given with relief .  K replaced.  Recheck planned came back at 3.5 WNL.  HR 110s, asymptomatic.      Please review provider order for any additional goals.   Nurse to notify provider when observation goals have been met and patient is ready for discharge.

## 2019-09-10 NOTE — PLAN OF CARE
PRIMARY DIAGNOSIS: GASTROENTERITIS    OUTPATIENT/OBSERVATION GOALS TO BE MET BEFORE DISCHARGE  1. Orthostatic performed: N/A    2. Tolerating PO fluid: Yes    3. Nausea/Vomiting/Diarrhea symptoms improved: Yes    4. Pain status: Improved-controlled with oral pain medications.    5. Return to near baseline physical activity Yes     Discharge Planner Nurse   Safe discharge environment identified: Yes  Barriers to discharge: No       Entered by: Brooke Moeller 09/09/2019        A&O x 4.  Up with SBA.  Denies nausea.  Tolerating regular diet.  Last stool at 1500, soft.  Enteric panel still pending.  Rates L side back pain 7/10.  PRN norco given and patient repositioned with pillows.  K replaced.  Recheck planned for 1800.  HR 110s, asymptomatic.     Please review provider order for any additional goals.   Nurse to notify provider when observation goals have been met and patient is ready for discharge.

## 2019-09-10 NOTE — PLAN OF CARE
PRIMARY DIAGNOSIS: GASTROENTERITIS     OUTPATIENT/OBSERVATION GOALS TO BE MET BEFORE DISCHARGE  1. Orthostatic performed: N/A     2. Tolerating PO fluid: Yes     3. Nausea/Vomiting/Diarrhea symptoms improved: Yes     4. Pain status: Improved-controlled with oral pain medications and breakthrough Tramadol and Norco x2 tabs     5. Return to near baseline physical activity Yes      Discharge Planner Nurse   Safe discharge environment identified: Yes  Barriers to discharge: No        Vitals are Temp: 96.5  F (35.8  C) Temp src: Oral BP: (!) 166/69 Pulse: 91 Heart Rate: 108 Resp: 22 SpO2: 95 %.  Patient is Alert and Oriented x4. They are SBA with Walker.  Pt is a Regular diet.  They are complaining of pain in their lower back.  Patient is Saline locked. Patient slept well. On RA- Norco 2 tabs given twice for pain. No nausea/Vomiting\/Diarrhea this shift. Discharge home today. Continue POC.  .      Please review provider order for any additional goals.   Nurse to notify provider when observation goals have been met and patient is ready for discharge

## 2019-09-10 NOTE — PLAN OF CARE
OBSERVATION patient END time: 1400    PRIMARY DIAGNOSIS: GASTROENTERITIS    OUTPATIENT/OBSERVATION GOALS TO BE MET BEFORE DISCHARGE  1. Orthostatic performed: No    2. Tolerating PO fluid and/or antibiotics (if applicable): Yes    3. Nausea/Vomiting/Diarrhea symptoms improved: Yes    4. Pain status: Pt is at her baseline pain status     5. Return to near baseline physical activity: Yes    Discharge Planner Nurse   Safe discharge environment identified: Yes  Barriers to discharge: No       Entered by: Meghan Stephen 09/10/2019 11:56 AM   Pt was given paper script, as our pharmacy stated too soon for refill.  Pt was instructed to contact Dr. Fisher for pain medication status of refill.  It is possible that pt used too many of current supply, that leaves her with not enough at home, possibly.  Pt is ambulating to bathroom with SBA.  Sween applied to chronically dry skin overall.  Back with bruising/discolorations likely due to heating pad use at home.  Pt tolerated breakfast.  No signs/symptms of NVD, which she came into hospital with.  Discussed POC with RENO FREITAS. No further concerns noted at discharge  Please review provider order for any additional goals.   Nurse to notify provider when observation goals have been met and patient is ready for discharge.

## 2019-09-10 NOTE — TELEPHONE ENCOUNTER
Requested Prescriptions   Pending Prescriptions Disp Refills     HYDROcodone-acetaminophen (NORCO) 5-325 MG tablet Last Written Prescription Date:  ?  Last Fill Quantity: ?,  # refills: 0   Last office visit: 1/2/2019 with prescribing provider:  1/2/2019   Future Office Visit:   180 tablet 0     Sig: Take 1 tablet by mouth every 4 hours as needed for pain Maximum 6 daily       There is no refill protocol information for this order

## 2019-09-11 ENCOUNTER — TELEPHONE (OUTPATIENT)
Dept: INTERNAL MEDICINE | Facility: CLINIC | Age: 64
End: 2019-09-11

## 2019-09-11 NOTE — TELEPHONE ENCOUNTER
"Hospital/TCU/ED for chronic condition Discharge Protocol    \"Hi, my name is Rachelle Donald RN, a registered nurse, and I am calling from [facility].  I am calling to follow up and see how things are going for you after your recent emergency visit/hospital/TCU stay.\"    Tell me how you are doing now that you are home?\" Patient states she is doing ok.  Has questions regarding pain to ask primary care provider at her appointment on 9-13-19.      Discharge Instructions    \"Let's review your discharge instructions.  What is/are the follow-up recommendations?  Pt. Response: f/u with primary care provider.    \"Has an appointment with your primary care provider been scheduled?\"   Yes. (confirm)    \"When you see the provider, I would recommend that you bring your medications with you.\"    Medications    \"Tell me what changed about your medicines when you discharged?\"    Changes to chronic meds?    0-1    \"What questions do you have about your medications?\"    None     New diagnoses of heart failure, COPD, diabetes, or MI?    No        Post Discharge Medication Reconciliation Status: discharge medications reconciled, continue medications without change.-- patient to follow according to the discharge instructions.  Was MTM referral placed (*Make sure to put transitions as reason for referral)?   No    Call Summary    \"What questions or concerns do you have about your recent visit and your follow-up care?\"     Has questions regarding her back pain.. Advice given: none given. Pt states she will follow up with pcp at her appt.    \"If you have questions or things don't continue to improve, we encourage you contact us through the main clinic number (give number).  Even if the clinic is not open, triage nurses are available 24/7 to help you.     We would like you to know that our clinic has extended hours (provide information).  We also have urgent care (provide details on closest location and hours/contact info)\"      \"Thank " "you for your time and take care!\"               "

## 2019-09-11 NOTE — TELEPHONE ENCOUNTER
IP F/U    Date: 9-10-19  Diagnosis: nausea, dehydration  Is patient active in care coordination? No  Was patient in TCU? No    Next 5 appointments (look out 90 days)    Sep 13, 2019 11:20 AM CDT  Office Visit with Bandar Weinberg MD  [facility] ([facility]) [address]  [facility] [address]  [phone]

## 2019-09-13 ENCOUNTER — OFFICE VISIT (OUTPATIENT)
Dept: INTERNAL MEDICINE | Facility: CLINIC | Age: 64
End: 2019-09-13
Payer: COMMERCIAL

## 2019-09-13 VITALS
SYSTOLIC BLOOD PRESSURE: 128 MMHG | TEMPERATURE: 98.1 F | DIASTOLIC BLOOD PRESSURE: 84 MMHG | WEIGHT: 174 LBS | RESPIRATION RATE: 12 BRPM | HEIGHT: 70 IN | BODY MASS INDEX: 24.91 KG/M2 | OXYGEN SATURATION: 95 % | HEART RATE: 129 BPM

## 2019-09-13 DIAGNOSIS — I10 ESSENTIAL HYPERTENSION WITH GOAL BLOOD PRESSURE LESS THAN 130/80: Primary | ICD-10-CM

## 2019-09-13 DIAGNOSIS — Z09 HOSPITAL DISCHARGE FOLLOW-UP: ICD-10-CM

## 2019-09-13 DIAGNOSIS — M54.41 CHRONIC BILATERAL LOW BACK PAIN WITH RIGHT-SIDED SCIATICA: ICD-10-CM

## 2019-09-13 DIAGNOSIS — Z23 NEED FOR PROPHYLACTIC VACCINATION AND INOCULATION AGAINST INFLUENZA: ICD-10-CM

## 2019-09-13 DIAGNOSIS — G89.29 CHRONIC BILATERAL LOW BACK PAIN WITH RIGHT-SIDED SCIATICA: ICD-10-CM

## 2019-09-13 DIAGNOSIS — M54.50 CHRONIC BILATERAL LOW BACK PAIN WITHOUT SCIATICA: ICD-10-CM

## 2019-09-13 DIAGNOSIS — G89.29 CHRONIC BILATERAL LOW BACK PAIN WITHOUT SCIATICA: ICD-10-CM

## 2019-09-13 LAB
ANION GAP SERPL CALCULATED.3IONS-SCNC: 10 MMOL/L (ref 3–14)
BUN SERPL-MCNC: 5 MG/DL (ref 7–30)
CALCIUM SERPL-MCNC: 9.6 MG/DL (ref 8.5–10.1)
CHLORIDE SERPL-SCNC: 101 MMOL/L (ref 94–109)
CO2 SERPL-SCNC: 27 MMOL/L (ref 20–32)
CREAT SERPL-MCNC: 0.56 MG/DL (ref 0.52–1.04)
ERYTHROCYTE [DISTWIDTH] IN BLOOD BY AUTOMATED COUNT: 14.7 % (ref 10–15)
GFR SERPL CREATININE-BSD FRML MDRD: >90 ML/MIN/{1.73_M2}
GLUCOSE SERPL-MCNC: 113 MG/DL (ref 70–99)
HCT VFR BLD AUTO: 47.3 % (ref 35–47)
HGB BLD-MCNC: 14.8 G/DL (ref 11.7–15.7)
MCH RBC QN AUTO: 30.6 PG (ref 26.5–33)
MCHC RBC AUTO-ENTMCNC: 31.3 G/DL (ref 31.5–36.5)
MCV RBC AUTO: 98 FL (ref 78–100)
PLATELET # BLD AUTO: 489 10E9/L (ref 150–450)
POTASSIUM SERPL-SCNC: 3.4 MMOL/L (ref 3.4–5.3)
RBC # BLD AUTO: 4.83 10E12/L (ref 3.8–5.2)
SODIUM SERPL-SCNC: 138 MMOL/L (ref 133–144)
WBC # BLD AUTO: 9.6 10E9/L (ref 4–11)

## 2019-09-13 PROCEDURE — 90682 RIV4 VACC RECOMBINANT DNA IM: CPT | Performed by: INTERNAL MEDICINE

## 2019-09-13 PROCEDURE — 85027 COMPLETE CBC AUTOMATED: CPT | Performed by: INTERNAL MEDICINE

## 2019-09-13 PROCEDURE — 99495 TRANSJ CARE MGMT MOD F2F 14D: CPT | Mod: 25 | Performed by: INTERNAL MEDICINE

## 2019-09-13 PROCEDURE — 90471 IMMUNIZATION ADMIN: CPT | Performed by: INTERNAL MEDICINE

## 2019-09-13 PROCEDURE — 36415 COLL VENOUS BLD VENIPUNCTURE: CPT | Performed by: INTERNAL MEDICINE

## 2019-09-13 PROCEDURE — 80048 BASIC METABOLIC PNL TOTAL CA: CPT | Performed by: INTERNAL MEDICINE

## 2019-09-13 RX ORDER — BACLOFEN 10 MG/1
20 TABLET ORAL 3 TIMES DAILY
Qty: 180 TABLET | Refills: 0 | Status: SHIPPED | OUTPATIENT
Start: 2019-09-13 | End: 2019-10-17

## 2019-09-13 RX ORDER — HYDROCODONE BITARTRATE AND ACETAMINOPHEN 5; 325 MG/1; MG/1
1 TABLET ORAL EVERY 4 HOURS PRN
Qty: 180 TABLET | Refills: 0 | Status: SHIPPED | OUTPATIENT
Start: 2019-09-13 | End: 2019-10-11

## 2019-09-13 ASSESSMENT — MIFFLIN-ST. JEOR: SCORE: 1419.51

## 2019-09-13 NOTE — PROGRESS NOTES
Subjective     Trisha Bender is a 64 year old female who presents to clinic today for the following health issues:    [facility] [place]       Hospital Follow-up Visit:    Hospital/Nursing Home/IP Rehab Facility: [facility]  Date of Admission: 09/07/19  Date of Discharge: 09/10/19  Reason(s) for Admission: Dehydration, nausea, Hypokalemia, diarrhea, Chronic LBP without sciatica            Problems taking medications regularly:  None       Medication changes since discharge: None       Problems adhering to non-medication therapy:  None    Summary of hospitalization:  [facility] discharge summary reviewed  Diagnostic Tests/Treatments reviewed.  Follow up needed: lab work on K   Other Healthcare Providers Involved in Patient s Care:         None  Update since discharge: improved.     Post Discharge Medication Reconciliation: discharge medications reconciled, continue medications without change.  Plan of care communicated with patient     Coding guidelines for this visit:  Type of Medical   Decision Making Face-to-Face Visit       within 7 Days of discharge Face-to-Face Visit        within 14 days of discharge   Moderate Complexity [id number]   High Complexity [id number]          Patient is seen for a follow up visit.  Recently hospitalized for nausea, diarrhea, dehydration, hypokalemia , likely viral etiology.   Improved with supportive measures. Now with 3 BMs a day, loose, but no diarrhea. No fever, chills. Has hypokalemia, corrected with IV potassium.   Has problems with back pain. Has vertebral compression fractures, pain is not well controlled. Has episodes of severe pain, feels weak, crying , discouraged. On Norco, Baclofen, Tylenol, topical analgesics. Has seen pain clinic in the past, not good results. Seen ortho /spine , has had PT and injections in the past, that made the pain worse.   Has COPD, SOB on exertion. Follows with pulmonary, plan for lung transplant assessment. On chronic Prednisone  treatment.   Has h/o CHF. On Lasix , has chronic LE edema, worse as she is unable to sleep in bed with her legs up , because of her back pain.         Patient Active Problem List   Diagnosis     Essential hypertension with goal blood pressure less than 130/80     Chronic migraine without aura without status migrainosus, not intractable     Esophageal reflux     Disorder of bone and cartilage     Family history of malignant neoplasm of gastrointestinal tract     Chronic airway obstruction (H)     Cystocele     Advanced directives, counseling/discussion     Family history of thyroid cancer - 1/2 brother     Anxiety     Mild persistent asthma without complication     Chronic low back pain     Hair loss     Lateral epicondylitis of right elbow     Dilated cardiomyopathy (H)     Right bundle branch block (RBBB)     CHF (congestive heart failure) (H)     SOB (shortness of breath)     Coronary artery disease involving native coronary artery of native heart without angina pectoris     Mixed hyperlipidemia     Controlled substance agreement signed     Moderate persistent asthma without complication     Hypokalemia     Mechanical low back pain     Panlobular emphysema (H)     Severe episode of recurrent major depressive disorder, without psychotic features (H)     Abdominal pain     Past Surgical History:   Procedure Laterality Date     ANGIOGRAM  07/02/2015    Minimal CAD. LV dysfunction, Mid RCA 20% stenosis, EF 20-25%, c/w Takotsubo cardiomyopathy     APPENDECTOMY       CHOLECYSTECTOMY  1974     COLONOSCOPY N/A 4/26/2018    Procedure: COLONOSCOPY;  COLONOSCOPY with biopsies;  Surgeon: Steve Acosta MD;  Location:  OR     ESOPHAGOSCOPY, GASTROSCOPY, DUODENOSCOPY (EGD), COMBINED  11/15/2011    Procedure:COMBINED ESOPHAGOSCOPY, GASTROSCOPY, DUODENOSCOPY (EGD);  ESOPHAGOSCOPY, GASTROSCOPY, DUODENOSCOPY (EGD) ; Surgeon:JIM URENA; Location: GI     INJECT EPIDURAL LUMBAR / SACRAL SINGLE N/A 4/13/2018    Procedure:  INJECT EPIDURAL LUMBAR / SACRAL CONTINUAL OR INTERMITTENT;  Lumbar Epidural Steroid Injection;  Surgeon: Az Enamorado MD;  Location: [facility] OR     INJECT EPIDURAL LUMBAR / SACRAL SINGLE N/A 2018    Procedure: INJECT EPIDURAL LUMBAR / SACRAL SINGLE;  Lumbar Epidural Steroid Injection;  Surgeon: Az Enamorado MD;  Location: [facility] OR     LAPAROSCOPIC ABLATION ENDOMETRIOSIS       LAPAROSCOPY DIAGNOSTIC (GYN)      endometriosis resection / lysis of adhesions     Nasal septoplasty and mucous retention cyst       TONSILLECTOMY, ADENOIDECTOMY, COMBINED       [place] teeth extraction         Social History     Tobacco Use     Smoking status: Former Smoker     Packs/day: 1.00     Years: 30.00     Pack years: 30.00     Last attempt to quit: 2006     Years since quittin.6     Smokeless tobacco: Never Used     Tobacco comment: Quit in    Substance Use Topics     Alcohol use: No     Alcohol/week: 0.0 oz     Family History   Problem Relation Age of Onset     Cancer Mother         Colon cancer; dxed at age 64;  at age 69     Cancer - colorectal Mother      Other Cancer Mother      Gastrointestinal Disease Sister         Acute pancreatitis     Heart Disease Father         ?     Coronary Artery Disease Father      Circulatory Maternal Aunt         AAA     Circulatory Maternal Uncle         AAA     Diabetes Son          Current Outpatient Medications   Medication Sig Dispense Refill     albuterol (VENTOLIN HFA) 108 (90 BASE) MCG/ACT Inhaler Inhale 2 puffs into the lungs every 6 hours as needed for shortness of breath / dyspnea or wheezing 18 g 3     ALPRAZolam (XANAX) 0.25 MG tablet Take 1 tablet by mouth. Every twelve hours as needed for anxiety 40 tablet 0     atorvastatin (LIPITOR) 10 MG tablet Takes 3 times a week 36 tablet 0     azithromycin (ZITHROMAX) 250 MG tablet Take 250 mg by mouth three times a week MWF  1     baclofen (LIORESAL) 10 MG tablet Take 2 tablets (20 mg) by mouth 3 times daily 180  tablet 0     BREO ELLIPTA 200-25 MCG/INH Inhaler Inhale 1 Dose into the lungs daily   12     cetirizine (ZYRTEC) 10 MG tablet Take 10 mg by mouth daily       [employer] FIBER GUMMIES 2.5 G CHEW Take 1 tablet by mouth daily Reported on 5/8/2017       fluticasone (FLONASE) 50 MCG/ACT nasal spray SPRAY ONE TO TWO SPRAYS IN EACH NOSTRIL ONCE DAILY 16 g 0     furosemide (LASIX) 20 MG tablet TAKE 2 TABLETS (40 MG) BY MOUTH DAILY SEVERE SWELLING 180 tablet 0     HYDROcodone-acetaminophen (NORCO) 5-325 MG tablet Take 1 tablet by mouth every 4 hours as needed for pain Maximum 6 daily 180 tablet 0     ipratropium - albuterol 0.5 mg/2.5 mg/3 mL (DUONEB) 0.5-2.5 (3) MG/3ML neb solution Take 1 vial (3 mLs) by nebulization every 6 hours as needed for shortness of breath / dyspnea or wheezing 30 vial 1     lisinopril (PRINIVIL/ZESTRIL) 2.5 MG tablet Take 1 tablet (2.5 mg) by mouth daily 90 tablet 3     montelukast (SINGULAIR) 10 MG tablet Take 1 tablet (10 mg) by mouth At Bedtime 90 tablet 1     Multiple Vitamin CHEW Take 1 tablet by mouth daily        ondansetron (ZOFRAN-ODT) 4 MG ODT tab TAKE 1-2 TABLETS (4-8 MG) BY MOUTH EVERY 8 HOURS AS NEEDED FOR NAUSEA 18 tablet 4     pantoprazole (PROTONIX) 40 MG EC tablet Take 40 mg by mouth At Bedtime       potassium chloride ER (K-TAB/KLOR-CON) 10 MEQ CR tablet TAKE 1 TABLET BY MOUTH THREE TIMES A DAY 90 tablet 4     predniSONE (DELTASONE) 10 MG tablet Take 15 mg by mouth daily  12     roflumilast (DALIRESP) 500 MCG TABS tablet Take 1 tablet (500 mcg) by mouth daily       SUMAtriptan (IMITREX) 25 MG tablet Take 1 tablet (25 mg) by mouth at onset of headache for migraine May repeat in 2 hours if needed: max 2/day; 9 tablet 11     tiotropium (SPIRIVA HANDIHALER) 18 MCG capsule USING THE HANDIHALER, INHALE THE CONTENTS OF ONE CAPSULE BY MOUTH DAILY 90 capsule 3         Reviewed and updated as needed this visit by Provider         Review of Systems   ROS COMP: Constitutional, HEENT, cardiovascular,  pulmonary, GI, , musculoskeletal, neuro, skin, endocrine and psych systems are negative, except as otherwise noted.      Objective    LMP 09/29/2005   There is no height or weight on file to calculate BMI.  Physical Exam   GENERAL: weak, frail, in a wheelchair, alert and , in mild distress  EYES: Eyes grossly normal to inspection, PERRL and conjunctivae and sclerae normal  HENT: ear canals and TM's normal, nose and mouth without ulcers or lesions  NECK: no adenopathy, no asymmetry, masses, or scars and thyroid normal to palpation  RESP: lungs clear to auscultation - no rales, rhonchi or wheezes  CV: regular rate and rhythm, normal S1 S2, no S3 or S4, no murmur, click or rub  ABDOMEN: soft, nontender, no hepatosplenomegaly, no masses and bowel sounds normal  MS: no gross musculoskeletal defects noted,2+ LE edema, pronounced thoracic lordosis, palpatory tender lower and mid back   SKIN: no suspicious lesions or rashes  NEURO: generalized muscle weakness, mentation intact and speech normal    Diagnostic Test Results:  Labs reviewed in Epic        Assessment & Plan   Problem List Items Addressed This Visit     Essential hypertension with goal blood pressure less than 130/80 - Primary    Relevant Orders    CBC with platelets (Completed)    Basic metabolic panel (Completed)    Chronic low back pain    Relevant Medications    baclofen (LIORESAL) 10 MG tablet    HYDROcodone-acetaminophen (NORCO) 5-325 MG tablet    Other Relevant Orders    PAIN MANAGEMENT REFERRAL    PAIN MANAGEMENT REFERRAL      Other Visit Diagnoses     Need for prophylactic vaccination and inoculation against influenza        Relevant Orders    INFLUENZA QUAD, RECOMBINANT, P-FREE (RIV4) (FLUBLOCK) [99815] (Completed)    Vaccine Administration, Initial [79787] (Completed)    Hospital discharge follow-up             Recheck lab work post hospitalization   Improved diarrheal syndrome, viral likely   Keep fluids, assess for electrolyte abnormality   Refer  "to pain clinic  Increase dose of Baclofen, continue Norco, Tylenol   Follow up with pulmonary   Immunized     BMI:   Estimated body mass index is 24.97 kg/m  as calculated from the following:    Height as of this encounter: 1.778 m (5' 10\").    Weight as of this encounter: 78.9 kg (174 lb).           See Patient Instructions  Return in about 4 weeks (around 10/11/2019) for follow up on acute problem if persists.    Bandar Weinberg MD  [facility]        "

## 2019-09-13 NOTE — LETTER
[facility]  [address], [address]  [place] [address]  [phone]        September 16, 2019    Trisha Marie [place]  [address] [facility] [address]            Dear Ms. Trisha Bender:      The results of your recent labs were NORMAL.      If you have any further questions or problems, please contact our office.      Sincerely,        Bandar Weinberg M.D.

## 2019-09-13 NOTE — NURSING NOTE
"Vital signs:  Temp: 98.1  F (36.7  C) Temp src: Oral BP: 128/84 Pulse: 129   Resp: 12 SpO2: 95 %     Height: 177.8 cm (5' 10\") Weight: 78.9 kg (174 lb)  Estimated body mass index is 24.97 kg/m  as calculated from the following:    Height as of this encounter: 1.778 m (5' 10\").    Weight as of this encounter: 78.9 kg (174 lb).          "

## 2019-09-14 NOTE — TELEPHONE ENCOUNTER
Controlled Substance Refill Request for Alprazolam 0.25 mg  Problem List Complete:    No     PROVIDER TO CONSIDER COMPLETION OF PROBLEM LIST AND OVERVIEW/CONTROLLED SUBSTANCE AGREEMENT    Last Written Prescription Date:  2/28/19  Last Fill Quantity: 40,   # refills: 0    Last Office Visit with [facility] primary care provider: 1/2/19    Future Office visit:     Controlled substance agreement:   Encounter-Level CSA - 02/23/2016:    Controlled Substance Agreement - Scan on 2/24/2016  1:37 PM: [facility] CONTROLLED SUBSTANCE AGREEMENT, 2/23/16 (below)       Patient-Level CSA:    There are no patient-level csa.         Last Urine Drug Screen:   Pain Drug SCR UR W RPTD Meds   Date Value Ref Range Status   12/17/2018 FINAL  Final     Comment:     (Note)  ====================================================================  [facility] COMP DRUG ANALYSIS,UR  ====================================================================  Test                             Result       Flag       Units        Drug Present   Hydrocodone                    1330                    ng/mg creat   Hydromorphone                  228                     ng/mg creat   Dihydrocodeine                 265                     ng/mg creat   Norhydrocodone                 1337                    ng/mg creat    Sources of hydrocodone include scheduled prescription    medications. Hydromorphone, dihydrocodeine and norhydrocodone are    expected metabolites of hydrocodone. Hydromorphone and    dihydrocodeine are also available as scheduled prescription    medications.   Baclofen                       PRESENT                               Acetaminophen                  PRESENT                              ====================================================================  Test                      Result    Flag   Units      Ref Range        Creatinine              43               mg/dL      >=20             ====================================================================  Declared Medications:  Medication list was not provided.  ====================================================================  For clinical consultation, please call [phone].  ====================================================================  Analysis performed by [employer] [place] [address]     , No results found for: COMDAT, No results found for: THC13, PCP13, COC13, MAMP13, OPI13, AMP13, BZO13, TCA13, MTD13, BAR13, OXY13, PPX13, BUP13     Processing:  Staff will hand deliver Rx to on-site pharmacy     [email or web address]      RX monitoring [place] ([place]) reviewed: Access not granted by provider   English

## 2019-09-16 ENCOUNTER — TELEPHONE (OUTPATIENT)
Dept: PALLIATIVE MEDICINE | Facility: CLINIC | Age: 64
End: 2019-09-16

## 2019-09-16 DIAGNOSIS — F41.9 ANXIETY: ICD-10-CM

## 2019-09-16 NOTE — TELEPHONE ENCOUNTER
[facility] Management Center Referral      1. Confirmed address with patient? Yes  2. Confirmed phone number with patient? Yes  3. Confirmed referring provider? Yes  4. Is the PCP the same as the referring provider? Yes  5. Has the patient been to any previous pain clinics? Yes [facility]  (If yes, send NATHANIEL with welcome letter)  6. Which insurance are we to bill for this appointment?  BC    7. Informed pt of cancellation (48 hour) policy? Yes    REGARDING OPIOID MEDICATIONS: We will always address appropriateness of opioid pain medications, but we generally will not automatically take on a prescribing role. When we do take on prescribing of opioids for chronic pain, it is in collaboration with the referring physician for an intermediate period of time (months), with an expectation that the primary physician or provider will assume the prescribing role if medications are effective at stable doses with demonstrated compliance. Therefore, please do not assume that your prescribing responsibilities end on the day of pain clinic consultation.  8. Informed pt of prescribing policy? Yes    9.Please be aware that once you are established with a pain provider and location, you will need to continue have all future visits with that provider and location. It is best to determine what location is the most convenient for you and schedule with that one.    ** PATIENT INFORMED OF THIS POLICY Yes      9. Referring Provider: Bandar Weinberg     10. Criteria for Triage Eval:   -Missed/Failed 1st DUAL appointment? N/A   -Medication Focused? N/A   -Mental Health Concerns? (e.g. Recent psych hospitalization/snap shot)? N/A   -Active substance abuse? N/A   -Patient behaviors (e.g. Offensive language/raised voice)? N/A

## 2019-09-17 NOTE — TELEPHONE ENCOUNTER
Requested Prescriptions   Pending Prescriptions Disp Refills     ALPRAZolam (XANAX) 0.25 MG tablet [Pharmacy Med Name: ALPRAZOLAM  Last Written Prescription Date:  8/14/2019  Last Fill Quantity: 40,  # refills: 0   Last office visit: 9/13/2019 with prescribing provider:     Future Office Visit:   0.25MG TABS] 40 tablet 0     Sig: TAKE ONE TABLET BY MOUTH EVERY 12 HOURS AS NEEDED FOR ANXIETY       There is no refill protocol information for this order

## 2019-09-18 RX ORDER — ALPRAZOLAM 0.25 MG
TABLET ORAL
Qty: 40 TABLET | Refills: 0 | Status: SHIPPED | OUTPATIENT
Start: 2019-09-18 | End: 2019-10-17

## 2019-09-18 NOTE — TELEPHONE ENCOUNTER
Alprazolam      Last Written Prescription Date:  8/14/19  Last Fill Quantity: 40,   # refills: 0  Last Office Visit: 9/13/19  Future Office visit:       Routing refill request to provider for review/approval because:  Drug not on the [facility], [facility] or [facility] refill protocol or controlled substance

## 2019-09-27 DIAGNOSIS — I50.21 ACUTE SYSTOLIC CONGESTIVE HEART FAILURE (H): ICD-10-CM

## 2019-09-30 ENCOUNTER — MYC MEDICAL ADVICE (OUTPATIENT)
Dept: INTERNAL MEDICINE | Facility: CLINIC | Age: 64
End: 2019-09-30

## 2019-09-30 RX ORDER — FUROSEMIDE 20 MG
TABLET ORAL
Qty: 180 TABLET | Refills: 1 | Status: SHIPPED | OUTPATIENT
Start: 2019-09-30 | End: 2020-07-08

## 2019-09-30 NOTE — TELEPHONE ENCOUNTER
"Requested Prescriptions   Pending Prescriptions Disp Refills     furosemide (LASIX) 20 MG tablet [Pharmacy Med Name: FUROSEMIDE 20 MG TABLET] 180 tablet 0     Sig: TAKE 2 TABLETS (40 MG) BY MOUTH DAILY SEVERE SWELLING   Last Written Prescription Date:  07/30/2019  Last Fill Quantity: 180,  # refills: 0   Last office visit: 9/13/2019 with prescribing provider:     Future Office Visit:      Diuretics (Including Combos) Protocol Passed - 9/27/2019  7:02 PM        Passed - Blood pressure under 140/90 in past 12 months     BP Readings from Last 3 Encounters:   09/13/19 128/84   09/10/19 136/70   01/02/19 138/70                 Passed - Recent (12 mo) or future (30 days) visit within the authorizing provider's specialty     Patient has had an office visit with the authorizing provider or a provider within the authorizing providers department within the previous 12 mos or has a future within next 30 days. See \"Patient Info\" tab in inbasket, or \"Choose Columns\" in Meds & Orders section of the refill encounter.              Passed - Medication is active on med list        Passed - Patient is age 18 or older        Passed - No active pregancy on record        Passed - Normal serum creatinine on file in past 12 months     Recent Labs   Lab Test 09/13/19  1241   CR 0.56              Passed - Normal serum potassium on file in past 12 months     Recent Labs   Lab Test 09/13/19  1241   POTASSIUM 3.4                    Passed - Normal serum sodium on file in past 12 months     Recent Labs   Lab Test 09/13/19  1241                 Passed - No positive pregnancy test in past 12 months        "

## 2019-10-03 ENCOUNTER — HEALTH MAINTENANCE LETTER (OUTPATIENT)
Age: 64
End: 2019-10-03

## 2019-10-06 ENCOUNTER — MYC REFILL (OUTPATIENT)
Dept: INTERNAL MEDICINE | Facility: CLINIC | Age: 64
End: 2019-10-06

## 2019-10-06 DIAGNOSIS — M54.50 CHRONIC BILATERAL LOW BACK PAIN WITHOUT SCIATICA: ICD-10-CM

## 2019-10-06 DIAGNOSIS — G89.29 CHRONIC BILATERAL LOW BACK PAIN WITHOUT SCIATICA: ICD-10-CM

## 2019-10-08 NOTE — TELEPHONE ENCOUNTER
Controlled Substance Refill Request for norco  Problem List Complete:    No     PROVIDER TO CONSIDER COMPLETION OF PROBLEM LIST AND OVERVIEW/CONTROLLED SUBSTANCE AGREEMENT    Last Written Prescription Date:  09/13/19  Last Fill Quantity: 180,   # refills: 0    THE MOST RECENT OFFICE VISIT MUST BE WITHIN THE PAST 3 MONTHS. AT LEAST ONE FACE TO FACE VISIT MUST OCCUR EVERY 6 MONTHS. ADDITIONAL VISITS CAN BE VIRTUAL.  (THIS STATEMENT SHOULD BE DELETED.)    Last Office Visit with [facility] primary care provider: 09/13/19    Future Office visit:   Next 5 appointments (look out 90 days)    Oct 11, 2019 11:20 AM CDT  SHORT with Bandar Weinberg MD  [facility] ([facility]) [address]  [facility] [address]  [phone]          Controlled substance agreement:   Encounter-Level CSA - 02/23/2016:    Controlled Substance Agreement - Scan on 2/24/2016  1:37 PM: [place] CONTROLLED SUBSTANCE AGREEMENT, 2/23/16     Patient-Level CSA:    There are no patient-level csa.         Last Urine Drug Screen:   Pain Drug SCR UR W RPTD Meds   Date Value Ref Range Status   12/17/2018 FINAL  Final     Comment:     (Note)  ====================================================================  [facility] COMP DRUG ANALYSIS,UR  ====================================================================  Test                             Result       Flag       Units        Drug Present   Hydrocodone                    1330                    ng/mg creat   Hydromorphone                  228                     ng/mg creat   Dihydrocodeine                 265                     ng/mg creat   Norhydrocodone                 1337                    ng/mg creat    Sources of hydrocodone include scheduled prescription    medications. Hydromorphone, dihydrocodeine and norhydrocodone are    expected metabolites of hydrocodone. Hydromorphone and    dihydrocodeine are also available as scheduled prescription     medications.   Baclofen                       PRESENT                               Acetaminophen                  PRESENT                              ====================================================================  Test                      Result    Flag   Units      Ref Range        Creatinine              43               mg/dL      >=20            ====================================================================  Declared Medications:  Medication list was not provided.  ====================================================================  For clinical consultation, please call [phone].  ====================================================================  Analysis performed by [email or web address] [place] [address]     , No results found for: COMDAT, No results found for: THC13, PCP13, COC13, MAMP13, OPI13, AMP13, BZO13, TCA13, MTD13, BAR13, OXY13, PPX13, BUP13     Processing:  Rx to be electronically transmitted to pharmacy by provider      [email or web address]       checked in past 3 months?  No, route to RN

## 2019-10-09 RX ORDER — HYDROCODONE BITARTRATE AND ACETAMINOPHEN 5; 325 MG/1; MG/1
1 TABLET ORAL EVERY 4 HOURS PRN
Qty: 180 TABLET | Refills: 0 | OUTPATIENT
Start: 2019-10-09

## 2019-10-09 NOTE — TELEPHONE ENCOUNTER
Routing refill request to provider for review/approval because:  Drug not on the [facility] refill protocol   Writer is not authorized to check  for provider

## 2019-10-11 ENCOUNTER — TELEPHONE (OUTPATIENT)
Dept: INTERNAL MEDICINE | Facility: CLINIC | Age: 64
End: 2019-10-11

## 2019-10-11 ENCOUNTER — ANCILLARY PROCEDURE (OUTPATIENT)
Dept: GENERAL RADIOLOGY | Facility: CLINIC | Age: 64
End: 2019-10-11
Attending: INTERNAL MEDICINE
Payer: COMMERCIAL

## 2019-10-11 ENCOUNTER — MYC MEDICAL ADVICE (OUTPATIENT)
Dept: INTERNAL MEDICINE | Facility: CLINIC | Age: 64
End: 2019-10-11

## 2019-10-11 ENCOUNTER — OFFICE VISIT (OUTPATIENT)
Dept: INTERNAL MEDICINE | Facility: CLINIC | Age: 64
End: 2019-10-11
Payer: COMMERCIAL

## 2019-10-11 VITALS
SYSTOLIC BLOOD PRESSURE: 129 MMHG | WEIGHT: 171 LBS | DIASTOLIC BLOOD PRESSURE: 78 MMHG | OXYGEN SATURATION: 94 % | BODY MASS INDEX: 24.48 KG/M2 | HEART RATE: 72 BPM | HEIGHT: 70 IN | TEMPERATURE: 97.9 F | RESPIRATION RATE: 12 BRPM

## 2019-10-11 DIAGNOSIS — G89.29 CHRONIC BILATERAL LOW BACK PAIN, UNSPECIFIED WHETHER SCIATICA PRESENT: ICD-10-CM

## 2019-10-11 DIAGNOSIS — S22.000D COMPRESSION FRACTURE OF THORACIC VERTEBRA WITH ROUTINE HEALING, UNSPECIFIED THORACIC VERTEBRAL LEVEL, SUBSEQUENT ENCOUNTER: ICD-10-CM

## 2019-10-11 DIAGNOSIS — M54.50 CHRONIC BILATERAL LOW BACK PAIN, UNSPECIFIED WHETHER SCIATICA PRESENT: ICD-10-CM

## 2019-10-11 DIAGNOSIS — K29.50 OTHER CHRONIC GASTRITIS WITHOUT HEMORRHAGE: ICD-10-CM

## 2019-10-11 DIAGNOSIS — J42 CHRONIC BRONCHITIS, UNSPECIFIED CHRONIC BRONCHITIS TYPE (H): ICD-10-CM

## 2019-10-11 DIAGNOSIS — S22.000D COMPRESSION FRACTURE OF THORACIC VERTEBRA WITH ROUTINE HEALING, UNSPECIFIED THORACIC VERTEBRAL LEVEL, SUBSEQUENT ENCOUNTER: Primary | ICD-10-CM

## 2019-10-11 DIAGNOSIS — I25.10 CORONARY ARTERY DISEASE INVOLVING NATIVE CORONARY ARTERY OF NATIVE HEART WITHOUT ANGINA PECTORIS: ICD-10-CM

## 2019-10-11 DIAGNOSIS — R00.0 TACHYCARDIA: ICD-10-CM

## 2019-10-11 DIAGNOSIS — F33.2 SEVERE EPISODE OF RECURRENT MAJOR DEPRESSIVE DISORDER, WITHOUT PSYCHOTIC FEATURES (H): ICD-10-CM

## 2019-10-11 DIAGNOSIS — E87.6 HYPOKALEMIA: ICD-10-CM

## 2019-10-11 PROCEDURE — 99215 OFFICE O/P EST HI 40 MIN: CPT | Performed by: INTERNAL MEDICINE

## 2019-10-11 PROCEDURE — 72100 X-RAY EXAM L-S SPINE 2/3 VWS: CPT

## 2019-10-11 PROCEDURE — 93000 ELECTROCARDIOGRAM COMPLETE: CPT | Performed by: INTERNAL MEDICINE

## 2019-10-11 PROCEDURE — 72072 X-RAY EXAM THORAC SPINE 3VWS: CPT

## 2019-10-11 RX ORDER — ALENDRONATE SODIUM 70 MG/1
70 TABLET ORAL
Qty: 12 TABLET | Refills: 3 | Status: SHIPPED | OUTPATIENT
Start: 2019-10-11 | End: 2020-07-28

## 2019-10-11 RX ORDER — SUCRALFATE ORAL 1 G/10ML
1 SUSPENSION ORAL 2 TIMES DAILY PRN
Qty: 200 ML | Refills: 3 | Status: SHIPPED | OUTPATIENT
Start: 2019-10-11 | End: 2020-01-07

## 2019-10-11 RX ORDER — KETOROLAC TROMETHAMINE 10 MG/1
10 TABLET, FILM COATED ORAL 3 TIMES DAILY PRN
Qty: 15 TABLET | Refills: 0 | Status: SHIPPED | OUTPATIENT
Start: 2019-10-11 | End: 2019-12-01

## 2019-10-11 RX ORDER — HYDROCODONE BITARTRATE AND ACETAMINOPHEN 10; 325 MG/1; MG/1
1 TABLET ORAL EVERY 4 HOURS PRN
Qty: 120 TABLET | Refills: 0 | Status: SHIPPED | OUTPATIENT
Start: 2019-10-11 | End: 2019-10-11

## 2019-10-11 RX ORDER — HYDROCODONE BITARTRATE AND ACETAMINOPHEN 10; 325 MG/1; MG/1
1 TABLET ORAL EVERY 4 HOURS PRN
Qty: 180 TABLET | Refills: 0 | Status: SHIPPED | OUTPATIENT
Start: 2019-10-11 | End: 2019-11-03

## 2019-10-11 RX ORDER — POTASSIUM CHLORIDE 1.5 G/1.58G
20 POWDER, FOR SOLUTION ORAL 2 TIMES DAILY
Qty: 60 PACKET | Refills: 3 | Status: SHIPPED | OUTPATIENT
Start: 2019-10-11 | End: 2020-01-07

## 2019-10-11 ASSESSMENT — PATIENT HEALTH QUESTIONNAIRE - PHQ9: SUM OF ALL RESPONSES TO PHQ QUESTIONS 1-9: 13

## 2019-10-11 ASSESSMENT — MIFFLIN-ST. JEOR: SCORE: 1405.9

## 2019-10-11 NOTE — TELEPHONE ENCOUNTER
Patient informed directions changed to take up to 6 tabs per day.    Left message on pharmacy's voice mail of the corrected directions.

## 2019-10-11 NOTE — TELEPHONE ENCOUNTER
Reason for Call:  medication    Detailed comments: Patient called to ask why Dr Weinberg reduced the dose of Norco. Please call her back to discuss.    Phone Number Patient can be reached at: Home number on file [phone] (home)    Best Time: any    Can we leave a detailed message on this number? YES    Call taken on 10/11/2019 at 3:05 PM by Ruthann Escobar

## 2019-10-11 NOTE — TELEPHONE ENCOUNTER
Duplicate message.  Patient sent a [employer] message today @ 2:59pm with the same question.  And her [employer] message was routed to primary care provider.

## 2019-10-11 NOTE — NURSING NOTE
"Vital signs:  Temp: 97.9  F (36.6  C) Temp src: Oral BP: 129/78 Pulse: 72   Resp: 12 SpO2: 94 %     Height: 177.8 cm (5' 10\") Weight: 77.6 kg (171 lb)  Estimated body mass index is 24.54 kg/m  as calculated from the following:    Height as of this encounter: 1.778 m (5' 10\").    Weight as of this encounter: 77.6 kg (171 lb).          "

## 2019-10-11 NOTE — TELEPHONE ENCOUNTER
Please see patient's mychart message below regarding daily quantity of Norco.    Last office visit 10-11-19    Please advise, thanks.

## 2019-10-11 NOTE — PROGRESS NOTES
Subjective     Trisha Bender is a 64 year old female who presents to clinic today for the following health issues:    HPI   Back pain:  Stomach pain, nausea:    Patient is seen for a follow up visit.  Concerns for ongoing severe back pain. Has h/o vertebral compression fractures. Pain has been increasing despite analgesics - yesterday trying to stand up felt increased pain and after that had difficulty ambulating , getting up , pain radiates to the legs. No leg weakness or numbness. No stool or urine incontinence.   I have recently increase her dose of Norco with some improved pain control.   Denies side effects of sedation, confusion, SOB, constipation.   Takes Tylenol and has started to take Ibuprofen, that seems to help, but has increased abdominal pain and nausea. Has h/o gastritis.   Able to use a walker,but needs help to get up from sitting.   Pain clinic assessment pending.   Has seen ortho in the past, a year ago. Does not follow currently.   Has h/o COPD. Progressive, follows with pulmonary. On inhaled steroid, bronchodilator treatment, no increased cough, wheezing, SOB.   Has h/o IHD, cardiomyopathy, CHF. Not able to tolerate BB because of side effects on breathing. BP is controlled. Has chronic LE edema. On diuretics. Also does not take regularly because of need to take concurrent potassium, unable to tolerate it - cause stomach pain.   Has h/o depression. On medical treatment, has been chronically depressed,  no side effects. No  suicidal ideation.          Patient Active Problem List   Diagnosis     Essential hypertension with goal blood pressure less than 130/80     Chronic migraine without aura without status migrainosus, not intractable     Esophageal reflux     Disorder of bone and cartilage     Family history of malignant neoplasm of gastrointestinal tract     Chronic airway obstruction (H)     Cystocele     Advanced directives, counseling/discussion     Family history of thyroid cancer - 1/2  brother     Anxiety     Mild persistent asthma without complication     Chronic low back pain     Hair loss     Lateral epicondylitis of right elbow     Dilated cardiomyopathy (H)     Right bundle branch block (RBBB)     CHF (congestive heart failure) (H)     SOB (shortness of breath)     Coronary artery disease involving native coronary artery of native heart without angina pectoris     Mixed hyperlipidemia     Controlled substance agreement signed     Moderate persistent asthma without complication     Hypokalemia     Mechanical low back pain     Panlobular emphysema (H)     Severe episode of recurrent major depressive disorder, without psychotic features (H)     Abdominal pain     Past Surgical History:   Procedure Laterality Date     ANGIOGRAM  07/02/2015    Minimal CAD. LV dysfunction, Mid RCA 20% stenosis, EF 20-25%, c/w Takotsubo cardiomyopathy     APPENDECTOMY       CHOLECYSTECTOMY  1974     COLONOSCOPY N/A 4/26/2018    Procedure: COLONOSCOPY;  COLONOSCOPY with biopsies;  Surgeon: Steve Acosta MD;  Location: [facility] OR     ESOPHAGOSCOPY, GASTROSCOPY, DUODENOSCOPY (EGD), COMBINED  11/15/2011    Procedure:COMBINED ESOPHAGOSCOPY, GASTROSCOPY, DUODENOSCOPY (EGD);  ESOPHAGOSCOPY, GASTROSCOPY, DUODENOSCOPY (EGD) ; Surgeon:JIM URENA; Location:[facility] GI     INJECT EPIDURAL LUMBAR / SACRAL SINGLE N/A 4/13/2018    Procedure: INJECT EPIDURAL LUMBAR / SACRAL CONTINUAL OR INTERMITTENT;  Lumbar Epidural Steroid Injection;  Surgeon: Az Enamorado MD;  Location: [facility] OR     INJECT EPIDURAL LUMBAR / SACRAL SINGLE N/A 9/24/2018    Procedure: INJECT EPIDURAL LUMBAR / SACRAL SINGLE;  Lumbar Epidural Steroid Injection;  Surgeon: Az Enamorado MD;  Location: [facility] OR     LAPAROSCOPIC ABLATION ENDOMETRIOSIS  1998     LAPAROSCOPY DIAGNOSTIC (GYN)      endometriosis resection / lysis of adhesions     Nasal septoplasty and mucous retention cyst       TONSILLECTOMY, ADENOIDECTOMY, COMBINED       Gray teeth extraction          Social History     Tobacco Use     Smoking status: Former Smoker     Packs/day: 1.00     Years: 30.00     Pack years: 30.00     Last attempt to quit: 2006     Years since quittin.7     Smokeless tobacco: Never Used     Tobacco comment: Quit in    Substance Use Topics     Alcohol use: No     Alcohol/week: 0.0 standard drinks     Family History   Problem Relation Age of Onset     Cancer Mother         Colon cancer; dxed at age 64;  at age 69     Cancer - colorectal Mother      Other Cancer Mother      Gastrointestinal Disease Sister         Acute pancreatitis     Heart Disease Father         ?     Coronary Artery Disease Father      Circulatory Maternal Aunt         AAA     Circulatory Maternal Uncle         AAA     Diabetes Son          Current Outpatient Medications   Medication Sig Dispense Refill     albuterol (VENTOLIN HFA) 108 (90 BASE) MCG/ACT Inhaler Inhale 2 puffs into the lungs every 6 hours as needed for shortness of breath / dyspnea or wheezing 18 g 3     alendronate (FOSAMAX) 70 MG tablet Take 1 tablet (70 mg) by mouth every 7 days 12 tablet 3     ALPRAZolam (XANAX) 0.25 MG tablet TAKE ONE TABLET BY MOUTH EVERY 12 HOURS AS NEEDED FOR ANXIETY 40 tablet 0     atorvastatin (LIPITOR) 10 MG tablet Takes 3 times a week 36 tablet 0     azithromycin (ZITHROMAX) 250 MG tablet Take 250 mg by mouth three times a week MWF  1     baclofen (LIORESAL) 10 MG tablet Take 2 tablets (20 mg) by mouth 3 times daily 180 tablet 0     BREO ELLIPTA 200-25 MCG/INH Inhaler Inhale 1 Dose into the lungs daily   12     cetirizine (ZYRTEC) 10 MG tablet Take 10 mg by mouth daily       [employer] FIBER GUMMIES 2.5 G CHEW Take 1 tablet by mouth daily Reported on 2017       diltiazem ER COATED BEADS (CARDIAZEM LA) 120 MG 24 hr tablet Take 1 tablet (120 mg) by mouth daily 30 tablet 3     fluticasone (FLONASE) 50 MCG/ACT nasal spray SPRAY ONE TO TWO SPRAYS IN EACH NOSTRIL ONCE DAILY 16 g 0     furosemide (LASIX) 20 MG tablet  TAKE 2 TABLETS (40 MG) BY MOUTH DAILY SEVERE SWELLING 180 tablet 1     ipratropium - albuterol 0.5 mg/2.5 mg/3 mL (DUONEB) 0.5-2.5 (3) MG/3ML neb solution Take 1 vial (3 mLs) by nebulization every 6 hours as needed for shortness of breath / dyspnea or wheezing 30 vial 1     ketorolac (TORADOL) 10 MG tablet Take 1 tablet (10 mg) by mouth 3 times daily as needed for moderate pain 15 tablet 0     lisinopril (PRINIVIL/ZESTRIL) 2.5 MG tablet Take 1 tablet (2.5 mg) by mouth daily 90 tablet 3     montelukast (SINGULAIR) 10 MG tablet Take 1 tablet (10 mg) by mouth At Bedtime 90 tablet 1     Multiple Vitamin CHEW Take 1 tablet by mouth daily        naloxone (NARCAN) 4 MG/0.1ML nasal spray Spray 1 spray (4 mg) into one nostril alternating nostrils once as needed for opioid reversal Every 2-3 minutes until patient responsive or EMS arrives 0.2 mL 0     ondansetron (ZOFRAN-ODT) 4 MG ODT tab TAKE 1-2 TABLETS (4-8 MG) BY MOUTH EVERY 8 HOURS AS NEEDED FOR NAUSEA 18 tablet 4     pantoprazole (PROTONIX) 40 MG EC tablet Take 40 mg by mouth At Bedtime       potassium chloride (KLOR-CON) 20 MEQ packet Take 20 mEq by mouth 2 times daily 60 packet 3     potassium chloride ER (K-TAB/KLOR-CON) 10 MEQ CR tablet TAKE 1 TABLET BY MOUTH THREE TIMES A DAY 90 tablet 4     predniSONE (DELTASONE) 10 MG tablet Take 15 mg by mouth daily  12     roflumilast (DALIRESP) 500 MCG TABS tablet Take 1 tablet (500 mcg) by mouth daily       sucralfate (CARAFATE) 1 GM/10ML suspension Take 10 mLs (1 g) by mouth 2 times daily as needed for nausea (indigestion) 200 mL 3     SUMAtriptan (IMITREX) 25 MG tablet Take 1 tablet (25 mg) by mouth at onset of headache for migraine May repeat in 2 hours if needed: max 2/day; 9 tablet 11     tiotropium (SPIRIVA HANDIHALER) 18 MCG capsule USING THE HANDIHALER, INHALE THE CONTENTS OF ONE CAPSULE BY MOUTH DAILY 90 capsule 3     HYDROcodone-acetaminophen (NORCO)  MG per tablet Take 1 tablet by mouth every 4 hours as  "needed for severe pain Take up to 6 tablets a day 180 tablet 0         Reviewed and updated as needed this visit by Provider         Review of Systems   ROS COMP: Constitutional, HEENT, cardiovascular, pulmonary, GI, , musculoskeletal, neuro, skin, endocrine and psych systems are negative, except as otherwise noted.      Objective    Ht 1.778 m (5' 10\")   Wt 77.6 kg (171 lb)   LMP 09/29/2005   BMI 24.54 kg/m    Body mass index is 24.54 kg/m .  Physical Exam   GENERAL: frail, in a wheelchair, alert and no distress  EYES: Eyes grossly normal to inspection  NECK: no adenopathy, no asymmetry, masses, or scars and thyroid normal to palpation  RESP: lungs clear to auscultation - no rales, rhonchi or wheezes, diminished breaths sounds diffusely   CV: regular rate and rhythm, normal S1 S2, no S3 or S4, no murmur, click or rub ABDOMEN: soft, nontender, no hepatosplenomegaly, no masses and bowel sounds normal  MS: no gross musculoskeletal defects noted, 1+ LE edema  Palpatory tenderness LS spine and right paravertebral   SKIN: no suspicious lesions or rashes  NEURO: LE weakness, unable to get up from chair without help, related to back pain, mentation intact and speech normal    Diagnostic Test Results:  Labs reviewed in Epic  No results found. However, due to the size of the patient record, not all encounters were searched. Please check Results Review for a complete set of results.        Assessment & Plan   Problem List Items Addressed This Visit     Chronic airway obstruction (H)    Chronic low back pain    Relevant Medications    ketorolac (TORADOL) 10 MG tablet    naloxone (NARCAN) 4 MG/0.1ML nasal spray    Coronary artery disease involving native coronary artery of native heart without angina pectoris    Relevant Medications    diltiazem ER COATED BEADS (CARDIAZEM LA) 120 MG 24 hr tablet    Hypokalemia    Relevant Medications    potassium chloride (KLOR-CON) 20 MEQ packet    Severe episode of recurrent major " depressive disorder, without psychotic features (H)      Other Visit Diagnoses     Compression fracture of thoracic vertebra with routine healing, unspecified thoracic vertebral level, subsequent encounter    -  Primary    Relevant Medications    ketorolac (TORADOL) 10 MG tablet    naloxone (NARCAN) 4 MG/0.1ML nasal spray    alendronate (FOSAMAX) 70 MG tablet    Other Relevant Orders    ORTHOPEDICS ADULT REFERRAL    XR Lumbar Spine 2/3 Views (Completed)    XR Thoracic Spine 3 Views (Completed)    Tachycardia        Relevant Medications    diltiazem ER COATED BEADS (CARDIAZEM LA) 120 MG 24 hr tablet    Other Relevant Orders    EKG 12-lead complete w/read - Clinics (Completed)    Other chronic gastritis without hemorrhage        Relevant Medications    sucralfate (CARAFATE) 1 GM/10ML suspension         Assess EKG- sinus tachycardia, start on Cardizem, advised for side effects   X rays - new compression fractures - increase Norco to 10/325 every 4 hours , continue Tylenol, add PRN Toradol, refer to [facility]   Pending pain clinic assessment   Osteoporosis - start on Fosamax.   Start on Carafate to protect from medications, NSAID gastropathy   Cont LAsix, will change K to liquid form         See Patient Instructions  Return in about 4 weeks (around 11/8/2019) for Routine Visit.    Bandar Weinberg MD  [facility]

## 2019-10-12 ENCOUNTER — MYC MEDICAL ADVICE (OUTPATIENT)
Dept: INTERNAL MEDICINE | Facility: CLINIC | Age: 64
End: 2019-10-12

## 2019-10-14 DIAGNOSIS — E87.6 HYPOKALEMIA: ICD-10-CM

## 2019-10-15 ENCOUNTER — TELEPHONE (OUTPATIENT)
Dept: INTERNAL MEDICINE | Facility: CLINIC | Age: 64
End: 2019-10-15

## 2019-10-15 DIAGNOSIS — I10 ESSENTIAL HYPERTENSION WITH GOAL BLOOD PRESSURE LESS THAN 130/80: Primary | ICD-10-CM

## 2019-10-15 RX ORDER — DILTIAZEM HYDROCHLORIDE 120 MG/1
120 CAPSULE, COATED, EXTENDED RELEASE ORAL DAILY
Qty: 90 CAPSULE | Refills: 3 | Status: SHIPPED | OUTPATIENT
Start: 2019-10-15 | End: 2019-11-11

## 2019-10-15 NOTE — TELEPHONE ENCOUNTER
Fax received from pharmacy stating that Cardizem LA 120MG os not covered.  Preferred alternative is DITAZEMCAPMGER, CARTIAXTCAPHR, DITTXRCAPMG, DILTIAZEMCAPMGER.  Please send new RX or advise if PA should be initiated.

## 2019-10-16 RX ORDER — POTASSIUM CHLORIDE 750 MG/1
TABLET, EXTENDED RELEASE ORAL
Qty: 270 TABLET | Refills: 1 | OUTPATIENT
Start: 2019-10-16

## 2019-10-16 NOTE — TELEPHONE ENCOUNTER
Medication refused, prescription refilled 7/3/19 for 90 day supply with 4 refills.       Called pharmacy and they stated she only has 180 days left. Pharmacy is going to fill 2 (30) day supplies and then call us back.

## 2019-10-17 DIAGNOSIS — M54.41 CHRONIC BILATERAL LOW BACK PAIN WITH RIGHT-SIDED SCIATICA: ICD-10-CM

## 2019-10-17 DIAGNOSIS — G89.29 CHRONIC BILATERAL LOW BACK PAIN WITH RIGHT-SIDED SCIATICA: ICD-10-CM

## 2019-10-17 DIAGNOSIS — F41.9 ANXIETY: ICD-10-CM

## 2019-10-18 NOTE — TELEPHONE ENCOUNTER
Requested Prescriptions   Pending Prescriptions Disp Refills     baclofen (LIORESAL) 10 MG tablet [Pharmacy Med Name: BACLOFEN 10MG TABS] 180 tablet 0     Sig: TAKE 2 TABLETS BY MOUTH THREE TIMES A DAY   Last Written Prescription Date:  09/13/2019  Last Fill Quantity: 180,  # refills: 0   Last office visit: 10/11/2019 with prescribing provider:     Future Office Visit:   Next 5 appointments (look out 90 days)    Nov 11, 2019  4:20 PM CST  MyChart Long with Bandar Weinberg MD  [facility] [address] [facility] [id number] [phone]           There is no refill protocol information for this order        ALPRAZolam (XANAX) 0.25 MG tablet [Pharmacy Med Name: ALPRAZOLAM 0.25MG TABS] 40 tablet 0     Sig: TAKE ONE TABLET BY MOUTH EVERY 12 HOURS AS NEEDED FOR ANXIETY       There is no refill protocol information for this order      Last Written Prescription Date:  09/18/2019  Last Fill Quantity: 40,  # refills: 0   Last office visit: 10/11/2019 with prescribing provider:     Future Office Visit:   Next 5 appointments (look out 90 days)    Nov 11, 2019  4:20 PM CST  MyChart Long with Bandar Weinberg MD  [facility] [address] [place] [id number] [phone]

## 2019-10-19 RX ORDER — BACLOFEN 10 MG/1
TABLET ORAL
Qty: 180 TABLET | Refills: 0 | Status: SHIPPED | OUTPATIENT
Start: 2019-10-19 | End: 2019-11-20

## 2019-10-19 RX ORDER — ALPRAZOLAM 0.25 MG
TABLET ORAL
Qty: 40 TABLET | Refills: 0 | Status: SHIPPED | OUTPATIENT
Start: 2019-10-19 | End: 2019-11-20

## 2019-10-19 NOTE — TELEPHONE ENCOUNTER
Controlled Substance Refill Request for Alprazolam and Baclofen  Problem List Complete:    No     PROVIDER TO CONSIDER COMPLETION OF PROBLEM LIST AND OVERVIEW/CONTROLLED SUBSTANCE AGREEMENT    Last Written Prescription Date:  9-18-19, 9-13-19  Last Fill Quantity: 40, 180,   # refills: 0 for both    THE MOST RECENT OFFICE VISIT MUST BE WITHIN THE PAST 3 MONTHS. AT LEAST ONE FACE TO FACE VISIT MUST OCCUR EVERY 6 MONTHS. ADDITIONAL VISITS CAN BE VIRTUAL.  (THIS STATEMENT SHOULD BE DELETED.)    Last Office Visit with [facility] primary care provider: 10-11-19    Future Office visit:   Next 5 appointments (look out 90 days)    Nov 11, 2019  4:20 PM CST  Elliott Rodriguez with Bandar Weinberg MD  [facility] [address]  [facility] [id number] [phone]          Controlled substance agreement:   Encounter-Level CSA - 02/23/2016:    Controlled Substance Agreement - Scan on 2/24/2016  1:37 PM: [place] CONTROLLED SUBSTANCE AGREEMENT, 2/23/16     Patient-Level CSA:    There are no patient-level csa.         Last Urine Drug Screen:   Pain Drug SCR UR W RPTD Meds   Date Value Ref Range Status   12/17/2018 FINAL  Final     Comment:     (Note)  ====================================================================  TOX[facility]RE COMP DRUG ANALYSIS,UR  ====================================================================  Test                             Result       Flag       Units        Drug Present   Hydrocodone                    1330                    ng/mg creat   Hydromorphone                  228                     ng/mg creat   Dihydrocodeine                 265                     ng/mg creat   Norhydrocodone                 1337                    ng/mg creat    Sources of hydrocodone include scheduled prescription    medications. Hydromorphone, dihydrocodeine and norhydrocodone are    expected metabolites of hydrocodone. Hydromorphone and    dihydrocodeine are also  available as scheduled prescription    medications.   Baclofen                       PRESENT                               Acetaminophen                  PRESENT                              ====================================================================  Test                      Result    Flag   Units      Ref Range        Creatinine              43               mg/dL      >=20            ====================================================================  Declared Medications:  Medication list was not provided.  ====================================================================  For clinical consultation, please call [phone].  ====================================================================  Analysis performed by [employer] [place] [address]     , No results found for: COMDAT, No results found for: THC13, PCP13, COC13, MAMP13, OPI13, AMP13, BZO13, TCA13, MTD13, BAR13, OXY13, PPX13, BUP13     Processing:  Rx to be electronically transmitted to pharmacy by provider      [email or web address]       checked in past 3 months?  No d/t access not granted by provider.    Please advise, thanks.

## 2019-10-28 ENCOUNTER — OFFICE VISIT (OUTPATIENT)
Dept: PALLIATIVE MEDICINE | Facility: CLINIC | Age: 64
End: 2019-10-28
Payer: COMMERCIAL

## 2019-10-28 VITALS — HEART RATE: 59 BPM | SYSTOLIC BLOOD PRESSURE: 133 MMHG | DIASTOLIC BLOOD PRESSURE: 63 MMHG | OXYGEN SATURATION: 93 %

## 2019-10-28 DIAGNOSIS — G89.29 CHRONIC MIDLINE LOW BACK PAIN WITHOUT SCIATICA: ICD-10-CM

## 2019-10-28 DIAGNOSIS — M54.50 CHRONIC MIDLINE LOW BACK PAIN WITHOUT SCIATICA: ICD-10-CM

## 2019-10-28 DIAGNOSIS — M80.88XD OSTEOPOROTIC COMPRESSION FRACTURE OF SPINE, WITH ROUTINE HEALING, SUBSEQUENT ENCOUNTER: Primary | ICD-10-CM

## 2019-10-28 PROCEDURE — 99244 OFF/OP CNSLTJ NEW/EST MOD 40: CPT | Performed by: PHYSICAL MEDICINE & REHABILITATION

## 2019-10-28 ASSESSMENT — PAIN SCALES - GENERAL: PAINLEVEL: WORST PAIN (10)

## 2019-10-28 NOTE — PROGRESS NOTES
[facility] Consultation    Date of visit: 10/28/2019    Reason for consultation:    Trisha Bender is a 64 year old female who is seen in consultation today at the request of her primary care physician, Bandar Weinberg.     Consultation and Evaluation for: Back pain    Please see the [facility] health questionnaire which the patient completed and reviewed with me in detail.    Review of [place] Prescription Monitoring Program (): No concern for abuse or misuse of controlled medications based on this report.     Pain medications are being prescribed by PCP.     Trisha Bender has been seen at a pain clinic in the past.  She has been seen at [facility] in 2014 by Dr. Lion.     Chief Complaint:    Chief Complaint   Patient presents with     Pain     Pain history:  Trisha Bender is a 64 year old female who presents for initial evaluation of chief pain complaint of back pain. She has a long hx of chronic low back pain. Over the past year since November 2018 has had several vertebral body compression fractures. She has had T7,8.9, 10,11 compression fractures. Most recently she has had 2 new compression fractures at L2,3. These occurred on 10/9/2019. She was on the toilet and when she stood up noticed onset of pain. She has a referral to be seen by Orthopedics but has not made an appointment yet. Just started on treatment for osteoporosis. Severity/Intensity: 4/10 at best, 10/10 at worst, 7-9/10 on average.      Her chronic back pain is located all through the spine from cervical to lumbar spine. She describes it as throbbing, sharp, burning and constant. Aggravated with standing, walking, laying down. Somewhat relieved with sitting in a reclining chair and using a heating pad. Over the past year she reports getting deconditioned. Walking less than she used to. Now since her most recent fractures is only  getting up to use the restroom and then going back to her reclining chair.        The patient otherwise denies bowel or bladder incontinence, parasthesias, weakness, saddle anesthesia, unintentional weight loss, or fever/chills/sweats.     Pain Treatments:  (H--helped; HI--Helped initially; S[facility]--Somewhat helpful; NH--No help; W--worse; SE--side effects; ?--Unsure if helpful)   1. Medications:       Current pain medications:   Baclofen 20 mg TID - ?   Norco  mg 1 tab q 4 hours prn - [facility] (6 tabs per day)    Ketorolac 10 mg TID prn - tried once this past week - NH, SE potential GI upset   Lidocaine patches - [facility]    Narcan  Xanax    Current calculated MME: 60    1. Previous Pain Relevant Medications:  NOTE: This medication information taken from patient's intake form, not medical records.    Opiates: Tramadol - NH   NSAIDS: None    Muscle Relaxants: None   Anti-migraine mediations: None   Anti-depressants: amitriptyline - SE,  Cymbalta - SE   Sleep aids:None   Anxiolytics: Ativan - H   Neuropathics:  gabapentin - SE ankle swelling, Lyrica - NH   Topicals: Voltaren 1% gel - NH   Other medications not covered above: Tylenol - ?, prednisone - ?    2. Physical Therapy: Yes - in the past - ?  3. Pain Psychology: Yes - had one session in 2014  4. Surgery: no spine surgeries  5. Injections: L5-S1 ILESI - [facility]  6. Chiropractic: Yes - in the past - [facility]  7. Acupuncture: Yes - H  8. TENS Unit: None  9. Other: heating pad- [facility]    Diagnostic tests:  MRI of Lumbar Spine was completed on 10/02/2018 was reviewed with the patient and shows:  FINDINGS: There are five lumbar-type vertebrae for the purposes of  this dictation.      There is continued normal alignment of the lumbar vertebrae. Vertebral  body heights of the lumbar spine are normal. Marrow signal throughout  the lumbar vertebrae is normal. There is no evidence for fracture or  pathologic bony lesion of the lumbar spine.     There is minimal posterior disc bulging at all  levels of the lumbar  spine with exception of the normal-appearing L1-L2 disc.     The tip of the conus medullaris is at the mid L1 level which is within  normal limits. There is no evidence for intrathecal abnormality.      Level by level:      L2-L3: There is a minimal circumferential disc bulge and minimal facet  arthropathy bilaterally. There is no spinal canal or neural foraminal  stenosis at this level. No change from the comparison study.     L3-L4: There is no spinal canal or neural foraminal stenosis at this  level. No change from the comparison study.     L4-L5: There is a circumferential disc bulge with a superimposed left  paracentral/left lateral (foraminal zone) disc herniation (protrusion)  and minimal facet arthropathy bilaterally. There is no spinal canal or  neural foraminal stenosis at this level. No change from the comparison  study.     L5-S1: There is minimal facet arthropathy bilaterally. There is no  spinal canal or neural foraminal stenosis at this level. No change  from the comparison study.                                                                      IMPRESSION: Minimal degenerative changes of the lumbar spine as  described above without change from the comparison study. No evidence  for epidural or paraspinous fluid collection or infection.    MRI Thoracic Spine completed 1/9/2019 and showed:   FINDINGS:  Compression fractures of T8, T9, and T10 are noted. The  fractures of T9 and T10 are chronic and were present on the scan of  11/20/2018. The compression fracture of T8 is new since the previous  scan and there is bone marrow edema throughout the vertebral body  which would indicate a recent fracture. No retropulsed fragments.  There is loss of about 30% of vertebral body height. The remainder of  the thoracic vertebrae appear intact. The thoracic cord appears  normal. No thoracic disc herniations are seen.                                                                       IMPRESSION:    1. Acute appearing compression fracture of T8 with loss of about 30%  of vertebral body height. This is new since 11/20/2018.  2. Chronic compression fractures of T9 and T10. These were present on  the scan of 11/20/2018.     XR of Lumbar Spine was completed 10/11/2019 and shows:  IMPRESSION: L2 and L3 compression fractures are present, new compared  to 10/2/2018. Alignment is significant for levoconvex curvature.  Moderate degenerative disc disease and facet arthropathy are present.  Vascular calcifications and cholecystectomy clips noted.    XR of Thoracic Spine was completed on 10/11/2019 and showed:  IMPRESSION: T7, T8, T9, T10, and T11 compression deformities are  present with mild height loss. T10 vertebral body height loss is  worsened compared to the prior exam. T7, T8, and T9 compression  deformities are not appreciably changed. There is new wedging of  multiple upper thoracic vertebral bodies, more conspicuous compared to  the prior exam. Repeat MRI could be performed if indicated.    EMG/Testing:  None    Labs:   Creatinine 0.56, GFR >90    Past Medical History:  Past Medical History:   Diagnosis Date     Anxiety      CAD (coronary artery disease) 7/2/15    mild per cath     CHF (congestive heart failure)     EF=20-25% per echo 6/30/15     Chronic airway obstruction     FEV1 36% of pred; VC 67%  in Jan 06.     Chronic back pain      COPD (chronic obstructive pulmonary disease)      Endometriosis      Esophageal reflux      Essential hypertension      Hyperlipidemia      Migraine      Mild persistent asthma 8/30/2012     Osteopenia      Takotsubo cardiomyopathy        Past Surgical History:  Past Surgical History:   Procedure Laterality Date     ANGIOGRAM  07/02/2015    Minimal CAD. LV dysfunction, Mid RCA 20% stenosis, EF 20-25%, c/w Takotsubo cardiomyopathy     APPENDECTOMY       CHOLECYSTECTOMY  1974     COLONOSCOPY N/A 4/26/2018    Procedure: COLONOSCOPY;  COLONOSCOPY with biopsies;   Surgeon: Steve Acosta MD;  Location: [facility] OR     ESOPHAGOSCOPY, GASTROSCOPY, DUODENOSCOPY (EGD), COMBINED  11/15/2011    Procedure:COMBINED ESOPHAGOSCOPY, GASTROSCOPY, DUODENOSCOPY (EGD);  ESOPHAGOSCOPY, GASTROSCOPY, DUODENOSCOPY (EGD) ; Surgeon:JIM URENA; Location:[facility] GI     INJECT EPIDURAL LUMBAR / SACRAL SINGLE N/A 4/13/2018    Procedure: INJECT EPIDURAL LUMBAR / SACRAL CONTINUAL OR INTERMITTENT;  Lumbar Epidural Steroid Injection;  Surgeon: Az Enamorado MD;  Location: [facility] OR     INJECT EPIDURAL LUMBAR / SACRAL SINGLE N/A 9/24/2018    Procedure: INJECT EPIDURAL LUMBAR / SACRAL SINGLE;  Lumbar Epidural Steroid Injection;  Surgeon: Az Enamorado MD;  Location: [facility] OR     LAPAROSCOPIC ABLATION ENDOMETRIOSIS  1998     LAPAROSCOPY DIAGNOSTIC (GYN)      endometriosis resection / lysis of adhesions     Nasal septoplasty and mucous retention cyst       TONSILLECTOMY, ADENOIDECTOMY, COMBINED       [place] teeth extraction         Medications:  Current Outpatient Medications   Medication Sig Dispense Refill     albuterol (VENTOLIN HFA) 108 (90 BASE) MCG/ACT Inhaler Inhale 2 puffs into the lungs every 6 hours as needed for shortness of breath / dyspnea or wheezing 18 g 3     alendronate (FOSAMAX) 70 MG tablet Take 1 tablet (70 mg) by mouth every 7 days 12 tablet 3     ALPRAZolam (XANAX) 0.25 MG tablet TAKE ONE TABLET BY MOUTH EVERY 12 HOURS AS NEEDED FOR ANXIETY 40 tablet 0     atorvastatin (LIPITOR) 10 MG tablet Takes 3 times a week 36 tablet 0     azithromycin (ZITHROMAX) 250 MG tablet Take 250 mg by mouth three times a week MWF  1     baclofen (LIORESAL) 10 MG tablet TAKE 2 TABLETS BY MOUTH THREE TIMES A  tablet 0     BREO ELLIPTA 200-25 MCG/INH Inhaler Inhale 1 Dose into the lungs daily   12     cetirizine (ZYRTEC) 10 MG tablet Take 10 mg by mouth daily       [employer] FIBER GUMMIES 2.5 G CHEW Take 1 tablet by mouth daily Reported on 5/8/2017       diltiazem ER COATED BEADS (CARDIAZEM LA) 120 MG 24 hr  tablet Take 1 tablet (120 mg) by mouth daily 30 tablet 3     diltiazem ER COATED BEADS (CARTIA XT) 120 MG 24 hr capsule Take 1 capsule (120 mg) by mouth daily 90 capsule 3     fluticasone (FLONASE) 50 MCG/ACT nasal spray SPRAY ONE TO TWO SPRAYS IN EACH NOSTRIL ONCE DAILY 16 g 0     furosemide (LASIX) 20 MG tablet TAKE 2 TABLETS (40 MG) BY MOUTH DAILY SEVERE SWELLING 180 tablet 1     HYDROcodone-acetaminophen (NORCO)  MG per tablet Take 1 tablet by mouth every 4 hours as needed for severe pain Take up to 6 tablets a day 180 tablet 0     ipratropium - albuterol 0.5 mg/2.5 mg/3 mL (DUONEB) 0.5-2.5 (3) MG/3ML neb solution Take 1 vial (3 mLs) by nebulization every 6 hours as needed for shortness of breath / dyspnea or wheezing 30 vial 1     ketorolac (TORADOL) 10 MG tablet Take 1 tablet (10 mg) by mouth 3 times daily as needed for moderate pain 15 tablet 0     lisinopril (PRINIVIL/ZESTRIL) 2.5 MG tablet Take 1 tablet (2.5 mg) by mouth daily 90 tablet 3     montelukast (SINGULAIR) 10 MG tablet Take 1 tablet (10 mg) by mouth At Bedtime 90 tablet 1     Multiple Vitamin CHEW Take 1 tablet by mouth daily        naloxone (NARCAN) 4 MG/0.1ML nasal spray Spray 1 spray (4 mg) into one nostril alternating nostrils once as needed for opioid reversal Every 2-3 minutes until patient responsive or EMS arrives 0.2 mL 0     ondansetron (ZOFRAN-ODT) 4 MG ODT tab TAKE 1-2 TABLETS (4-8 MG) BY MOUTH EVERY 8 HOURS AS NEEDED FOR NAUSEA 18 tablet 4     pantoprazole (PROTONIX) 40 MG EC tablet Take 40 mg by mouth At Bedtime       potassium chloride (KLOR-CON) 20 MEQ packet Take 20 mEq by mouth 2 times daily 60 packet 3     potassium chloride ER (K-TAB/KLOR-CON) 10 MEQ CR tablet TAKE 1 TABLET BY MOUTH THREE TIMES A DAY 90 tablet 4     predniSONE (DELTASONE) 10 MG tablet Take 15 mg by mouth daily  12     roflumilast (DALIRESP) 500 MCG TABS tablet Take 1 tablet (500 mcg) by mouth daily       sucralfate (CARAFATE) 1 GM/10ML suspension Take 10  mLs (1 g) by mouth 2 times daily as needed for nausea (indigestion) 200 mL 3     SUMAtriptan (IMITREX) 25 MG tablet Take 1 tablet (25 mg) by mouth at onset of headache for migraine May repeat in 2 hours if needed: max 2/day; 9 tablet 11     tiotropium (SPIRIVA HANDIHALER) 18 MCG capsule USING THE HANDIHALER, INHALE THE CONTENTS OF ONE CAPSULE BY MOUTH DAILY 90 capsule 3       Allergies:     Allergies   Allergen Reactions     Gabapentin Swelling     Contrast Dye      Iodine     Escitalopram      Nausea and sickness     Peanuts [Nuts] Hives     Penicillins      rash     Sulfa Drugs      High family history     Tace [Chlorotrianisene]      joint swelling     Aspirin Rash     After 3 days     Ibuprofen Nausea and Vomiting     Strawberry Rash       Social History:  Home situation: Lives in [place], [place]  Occupation/Schooling: Does not work  Tobacco use: none  Drug use: none  Alcohol use: none    Family history:  Family History   Problem Relation Age of Onset     Cancer Mother         Colon cancer; dxed at age 64;  at age 69     Cancer - colorectal Mother      Other Cancer Mother      Gastrointestinal Disease Sister         Acute pancreatitis     Heart Disease Father         ?     Coronary Artery Disease Father      Circulatory Maternal Aunt         AAA     Circulatory Maternal Uncle         AAA     Diabetes Son        Review of Systems:    POSTIVE IN BOLD  GENERAL: fever/chills, fatigue, general unwell feeling, weight gain/loss.  HEAD/EYES:  headache, dizziness, or vision changes.    EARS/NOSE/THROAT:  Nosebleeds, hearing loss, sinus infection, earache, tinnitus.  IMMUNE:  Allergies, cancer, immune deficiency, or infections.  SKIN:  Urticaria, rash, hives  HEME/Lymphatic:   anemia, easy bruising, easy bleeding.  RESPIRATORY:  cough, wheezing, or shortness of breath  CARDIOVASCULAR/Circulation:  Extremity edema, syncope, hypertension, tachycardia, or angina.  GASTROINTESTINAL:  abdominal pain, nausea/emesis, diarrhea,  constipation,  hematochezia, or melena.  ENDOCRINE:  Diabetes, steroid use,  thyroid disease or osteoporosis.  MUSCULOSKELETAL: neck pain, back pain, arthralgia, arthritis, or gout.  GENITOURINARY:  frequency, urgency, dysuria, difficulty voiding, hematuria or incontinence.  NEUROLOGIC:  weakness, numbness, paresthesias, seizure, tremor, stroke or memory loss.  PSYCHIATRIC:  depression, anxiety, stress, suicidal thoughts or mood swings.     Physical Exam:  Vitals:    10/28/19 1504   BP: 133/63   Pulse: 59   SpO2: 93%     Exam:  Constitutional: Well developed, well nourished, appears stated age.  HEENT: Head atraumatic, normocephalic. Eyes without conjunctival injection or jaundice. Oropharynx clear. Neck supple. No obvious neck masses.  Respiratory:Breathing unlabored on room air.   Gastrointestinal: Abdomen soft, non-tender, without guarding/rebound.  Skin: No rash, lesions, or petechiae of exposed skin.   Extremities:  No clubbing, cyanosis, or edema.  Psychiatric/mental status: Alert, without lethargy or stupor. Speech fluent. Tangential during conversation. Appropriate affect. Mood normal. Able to follow commands without difficulty.     Musculoskeletal exam:  Exam is limited due to discomfort with standing and walking.   Gait/Station/Posture: Presents to clinic in a manual wheelchair. Needs assist of 1-2 to stand from seated position. Stands with a forward flexed posture.   Normal bulk and tone. Unremarkable spinal curvature.   Lumbar spine: Unable flex and extend due to discomfort with standing. She has no midline spinous process tenderness. Tender to palpation of bilateral lumbar paraspinals.     Neurologic exam:  CN:  Cranial nerves 2-12 are grossly intact  Motor Strength:  5/5 LE strength     Reflexes:     Patella L4:  R:  1/4 L: 1/4   Achilles S1:  R:  1/4 L: 1/4     No ankle clonus bilaterally     Sensory:  ( lower extremities):   Light touch: normal    Allodynia: absent    Hyperalgesia: absent     DIRE  Score for ongoing opioid management is calculated as follows:   Diagnosis = 2 pts (slowly progressive; moderate pain/objective findings)   Intractability = 1 pt (few therapies tried; passive patient role)   Risk    Psych = 2 pts (personality dysfunction/mental illness that moderately interferes with care)    Chem Hlth = 3 pts (no history of chemical dependency; not drug-focused)   Reliability = 3 pts (highly reliable with meds, appointments, treatments)   Social = 2 pts (reduction in some relationships/life rolls)   (Psych + Chem hlth + Reliability + Social) = 13     Efficacy = 1 pt (poor function; minimal pain relief despite mod/high med dose)         DIRE Score = 14        7-13: likely NOT suitable candidate for long-term opioid analgesia       14-21: may be a suitable candidate for long-term opioid analgesia     Assessment:  Trisha Bender is a 64 year old female with a past medical history significant for chronic migraine, chronic back pain, asthma, HLD, HTN, cardiomyopathy, CHF, CAD, anxiety, depression who presents with complaints of acute on chronic back pain.     1. Acute L2,L3 vertebral body compression fractures: Occurred on 10/9/2019. She has a referral for orthopedics but has not scheduled an appointment yet. Using Norco 10 mg tablets 6 times daily which is somewhat helpful. Baseline dosing is 5 mg 6 times daily.   2. Chronic vertebral compression fractures of T8-T11.  3. Lumbar spondylosis  4. Mental Health - the patient's mental health concerns, specifically depression and axiety, affect her experience of pain and contribute to her clinically significant distress.  5. Chronic opioid use: Currently on 60 OME. Prescribed by PCP.     Plan:  The following recommendations were given to the patient. Diagnosis, treatment options, risks, benefits, and alternatives were discussed, and all questions were answered. The patient expressed understanding of the plan for management.     At this time she has acute  vertebral compression fractures which will need time to heal. After 6-8 weeks I am recommending a multidisciplinary treatment plan to help this patient better manage her pain.  This includes:     1. Therapy: At this time recommend PT to work on increasing mobility. She is becoming deconditioned by staying in her recliner for most of the day. She is not interested in starting PT at this time. Once her fractures have had adequate time to heal recommend starting pain PT.   2. Clinical Health Pain Psychologist: Recommend starting pain psychology after I see her in follow up in 6-8 weeks.  Therapy can help reduce physical and psychosocial triggers or reinforcers of pain by adapting thoughts, feelings and behaviors to reduce symptoms and increase quality of life.  Evidence indicates that the practice of relaxation, meditation, and mindfulness techniques can significantly affect pain levels and overall well being.  3. Diagnostic Studies: No additional imaging needed  4. Medication Management: No medication changes made today. Am not taking over prescribing of opioids at this time. If she is interested in participating in a multidisciplinary program once her fractures have healed will discuss taking over prescribing at that time. She can use OTC lidocaine 4% patches to place over painful areas of her back.  5. Further procedures recommended: None  6. Referrals:   1. Orthotics referral placed for a lumbar corset brace. She previously had a TLSO which she did not want to wear.   2. She has an orthopedic referral placed by PCP. Encouraged her to make an appointment.   7. Follow up: 6-8 weeks    Review of Electronic Chart: Today I have also reviewed available medical information in the patient's medical record at [facility] ([facility]), including relevant provider notes, laboratory work, and imaging.     I spent 60 minutes of time face to face with the patient.  Greater than 50% of this time was spent in patient counseling and/or  coordination of care regarding principles of multidisciplinary care, medication management, and treatment options as discussed above.     Isaura Harley MD  [facility]

## 2019-10-28 NOTE — PATIENT INSTRUCTIONS
Thank you for coming to [facility] for your care. It is my goal to partner with you to help you reach your optimal state of health.     You were seen today for your chronic back pain. As discussed during your visit these are the recommendations:    1. Medications:  - No medications changes made today.   - Continue using lidocaine 4% patches (aspercreme) to place over painful area of back.   2.Other:    - Referral given for a lumbar corset brace. You will be called by the Orthotics department to schedule this appointment.   - Follow up with orthopedic appointment for acute compression fractures.   3. Follow up:  Once you have had adequate time for healing of these fractures (6-8 weeks) you can schedule a follow up appointment with me to discuss starting working with our pain program.   ----------------------------------------------------------------  Clinic Number:  [phone]     Call with any questions about your care and for scheduling assistance.     Calls are returned Monday through Friday between 8 AM and 4:30 PM. We usually get back to you within 2 business days depending on the issue/request.    If we are prescribing your medications:    For opioid medication refills, call the clinic or send a [employer]in message 7 days in advance.  Please include:    Name of requested medication    Name of the pharmacy.    For non-opioid medications, call your pharmacy directly to request a refill. Please allow 3-4 days to be processed.     Per [place] State Law:    All controlled substance prescriptions must be filled within 30 days of being written.      For those controlled substances allowing refills, pickup must occur within 30 days of last fill.      We believe regular attendance is key to your success in our program!      Any time you are unable to keep your appointment we ask that you call us at least 24 hours in advance to cancel.This will allow us to offer the appointment time to another patient.      Multiple missed appointments may lead to dismissal from the clinic.

## 2019-11-05 ENCOUNTER — MYC MEDICAL ADVICE (OUTPATIENT)
Dept: INTERNAL MEDICINE | Facility: CLINIC | Age: 64
End: 2019-11-05

## 2019-11-08 NOTE — TELEPHONE ENCOUNTER
Call to pharmacy, pharmacy stated they have the script, it could not be filled until today. Sent patient [employer] message reply regarding medication being filled at pharmacy.

## 2019-11-09 DIAGNOSIS — E87.6 HYPOKALEMIA: ICD-10-CM

## 2019-11-11 ENCOUNTER — OFFICE VISIT (OUTPATIENT)
Dept: INTERNAL MEDICINE | Facility: CLINIC | Age: 64
End: 2019-11-11
Payer: COMMERCIAL

## 2019-11-11 VITALS
RESPIRATION RATE: 12 BRPM | HEIGHT: 70 IN | BODY MASS INDEX: 24.2 KG/M2 | HEART RATE: 63 BPM | WEIGHT: 169 LBS | TEMPERATURE: 98 F | SYSTOLIC BLOOD PRESSURE: 132 MMHG | DIASTOLIC BLOOD PRESSURE: 78 MMHG | OXYGEN SATURATION: 96 %

## 2019-11-11 DIAGNOSIS — I42.0 DILATED CARDIOMYOPATHY (H): ICD-10-CM

## 2019-11-11 DIAGNOSIS — M54.59 MECHANICAL LOW BACK PAIN: ICD-10-CM

## 2019-11-11 DIAGNOSIS — I10 ESSENTIAL HYPERTENSION WITH GOAL BLOOD PRESSURE LESS THAN 130/80: Primary | ICD-10-CM

## 2019-11-11 DIAGNOSIS — F41.9 ANXIETY: ICD-10-CM

## 2019-11-11 DIAGNOSIS — J43.1 PANLOBULAR EMPHYSEMA (H): ICD-10-CM

## 2019-11-11 PROCEDURE — 99214 OFFICE O/P EST MOD 30 MIN: CPT | Performed by: INTERNAL MEDICINE

## 2019-11-11 PROCEDURE — 80048 BASIC METABOLIC PNL TOTAL CA: CPT | Performed by: INTERNAL MEDICINE

## 2019-11-11 PROCEDURE — 36415 COLL VENOUS BLD VENIPUNCTURE: CPT | Performed by: INTERNAL MEDICINE

## 2019-11-11 ASSESSMENT — MIFFLIN-ST. JEOR: SCORE: 1396.83

## 2019-11-11 NOTE — PROGRESS NOTES
Subjective     Trisha Bender is a 64 year old female who presents to clinic today for the following health issues:    HPI   1 month F/U:    Patient is seen for a follow up visit.  Has had increased LBP, findings of compression vertebral fracture. Improved with pain medications. Seen pain clinic. Advised for PT. Pending ortho assessment.   Pain worse with standing, walking.   Has h/o HTN. on medical treatment. BP has been controlled. No side effects from medications. No CP, HA, dizziness. good compliance with medications and low salt diet.  Has h/o COPD. Follows with pulmonary. On Prednisone , inhalers.   Has h/o CHF. On Lasix. Needs recheck of K levels. Difficult to take K supplements because of upset stomach.   Has h/o anxiety. Currently controlled. No flare ups.         Patient Active Problem List   Diagnosis     Essential hypertension with goal blood pressure less than 130/80     Chronic migraine without aura without status migrainosus, not intractable     Esophageal reflux     Disorder of bone and cartilage     Family history of malignant neoplasm of gastrointestinal tract     Chronic airway obstruction (H)     Cystocele     Advanced directives, counseling/discussion     Family history of thyroid cancer - 1/2 brother     Anxiety     Mild persistent asthma without complication     Chronic low back pain     Hair loss     Lateral epicondylitis of right elbow     Dilated cardiomyopathy (H)     Right bundle branch block (RBBB)     CHF (congestive heart failure) (H)     SOB (shortness of breath)     Coronary artery disease involving native coronary artery of native heart without angina pectoris     Mixed hyperlipidemia     Controlled substance agreement signed     Moderate persistent asthma without complication     Hypokalemia     Mechanical low back pain     Panlobular emphysema (H)     Severe episode of recurrent major depressive disorder, without psychotic features (H)     Abdominal pain     Past Surgical  History:   Procedure Laterality Date     ANGIOGRAM  2015    Minimal CAD. LV dysfunction, Mid RCA 20% stenosis, EF 20-25%, c/w Takotsubo cardiomyopathy     APPENDECTOMY       CHOLECYSTECTOMY       COLONOSCOPY N/A 2018    Procedure: COLONOSCOPY;  COLONOSCOPY with biopsies;  Surgeon: Steve Acosta MD;  Location: [facility]     ESOPHAGOSCOPY, GASTROSCOPY, DUODENOSCOPY (EGD), COMBINED  11/15/2011    Procedure:COMBINED ESOPHAGOSCOPY, GASTROSCOPY, DUODENOSCOPY (EGD);  ESOPHAGOSCOPY, GASTROSCOPY, DUODENOSCOPY (EGD) ; Surgeon:JIM URENA; Location:[facility]     INJECT EPIDURAL LUMBAR / SACRAL SINGLE N/A 2018    Procedure: INJECT EPIDURAL LUMBAR / SACRAL CONTINUAL OR INTERMITTENT;  Lumbar Epidural Steroid Injection;  Surgeon: Az Enamorado MD;  Location: [facility]     INJECT EPIDURAL LUMBAR / SACRAL SINGLE N/A 2018    Procedure: INJECT EPIDURAL LUMBAR / SACRAL SINGLE;  Lumbar Epidural Steroid Injection;  Surgeon: Az Enamorado MD;  Location: [facility]     LAPAROSCOPIC ABLATION ENDOMETRIOSIS       LAPAROSCOPY DIAGNOSTIC (GYN)      endometriosis resection / lysis of adhesions     Nasal septoplasty and mucous retention cyst       TONSILLECTOMY, ADENOIDECTOMY, COMBINED       [place] teeth extraction         Social History     Tobacco Use     Smoking status: Former Smoker     Packs/day: 1.00     Years: 30.00     Pack years: 30.00     Last attempt to quit: 2006     Years since quittin.8     Smokeless tobacco: Never Used     Tobacco comment: Quit in    Substance Use Topics     Alcohol use: No     Alcohol/week: 0.0 standard drinks     Family History   Problem Relation Age of Onset     Cancer Mother         Colon cancer; dxed at age 64;  at age 69     Cancer - colorectal Mother      Other Cancer Mother      Gastrointestinal Disease Sister         Acute pancreatitis     Heart Disease Father         ?     Coronary Artery Disease Father      Circulatory Maternal Aunt         AAA      Circulatory Maternal Uncle         AAA     Diabetes Son          Current Outpatient Medications   Medication Sig Dispense Refill     albuterol (VENTOLIN HFA) 108 (90 BASE) MCG/ACT Inhaler Inhale 2 puffs into the lungs every 6 hours as needed for shortness of breath / dyspnea or wheezing 18 g 3     alendronate (FOSAMAX) 70 MG tablet Take 1 tablet (70 mg) by mouth every 7 days 12 tablet 3     ALPRAZolam (XANAX) 0.25 MG tablet TAKE ONE TABLET BY MOUTH EVERY 12 HOURS AS NEEDED FOR ANXIETY 40 tablet 0     atorvastatin (LIPITOR) 10 MG tablet Takes 3 times a week 36 tablet 0     azithromycin (ZITHROMAX) 250 MG tablet Take 250 mg by mouth three times a week MWF  1     baclofen (LIORESAL) 10 MG tablet TAKE 2 TABLETS BY MOUTH THREE TIMES A  tablet 0     BREO ELLIPTA 200-25 MCG/INH Inhaler Inhale 1 Dose into the lungs daily   12     cetirizine (ZYRTEC) 10 MG tablet Take 10 mg by mouth daily       [employer] FIBER GUMMIES 2.5 G CHEW Take 1 tablet by mouth daily Reported on 5/8/2017       fluticasone (FLONASE) 50 MCG/ACT nasal spray SPRAY ONE TO TWO SPRAYS IN EACH NOSTRIL ONCE DAILY 16 g 0     furosemide (LASIX) 20 MG tablet TAKE 2 TABLETS (40 MG) BY MOUTH DAILY SEVERE SWELLING 180 tablet 1     HYDROcodone-acetaminophen (NORCO)  MG per tablet Take 1 tablet by mouth every 4 hours as needed for severe pain Take up to 6 tablets a day 180 tablet 0     ipratropium - albuterol 0.5 mg/2.5 mg/3 mL (DUONEB) 0.5-2.5 (3) MG/3ML neb solution Take 1 vial (3 mLs) by nebulization every 6 hours as needed for shortness of breath / dyspnea or wheezing 30 vial 1     ketorolac (TORADOL) 10 MG tablet Take 1 tablet (10 mg) by mouth 3 times daily as needed for moderate pain 15 tablet 0     lisinopril (PRINIVIL/ZESTRIL) 2.5 MG tablet Take 1 tablet (2.5 mg) by mouth daily 90 tablet 3     montelukast (SINGULAIR) 10 MG tablet Take 1 tablet (10 mg) by mouth At Bedtime 90 tablet 1     Multiple Vitamin CHEW Take 1 tablet by mouth daily        naloxone  "(NARCAN) 4 MG/0.1ML nasal spray Spray 1 spray (4 mg) into one nostril alternating nostrils once as needed for opioid reversal Every 2-3 minutes until patient responsive or EMS arrives 0.2 mL 0     ondansetron (ZOFRAN-ODT) 4 MG ODT tab TAKE 1-2 TABLETS (4-8 MG) BY MOUTH EVERY 8 HOURS AS NEEDED FOR NAUSEA 18 tablet 4     pantoprazole (PROTONIX) 40 MG EC tablet Take 40 mg by mouth At Bedtime       potassium chloride (KLOR-CON) 20 MEQ packet Take 20 mEq by mouth 2 times daily 60 packet 3     potassium chloride ER (K-TAB/KLOR-CON) 10 MEQ CR tablet TAKE 1 TABLET BY MOUTH THREE TIMES A DAY 90 tablet 4     predniSONE (DELTASONE) 10 MG tablet Take 15 mg by mouth daily  12     roflumilast (DALIRESP) 500 MCG TABS tablet Take 1 tablet (500 mcg) by mouth daily       sucralfate (CARAFATE) 1 GM/10ML suspension Take 10 mLs (1 g) by mouth 2 times daily as needed for nausea (indigestion) 200 mL 3     SUMAtriptan (IMITREX) 25 MG tablet Take 1 tablet (25 mg) by mouth at onset of headache for migraine May repeat in 2 hours if needed: max 2/day; 9 tablet 11     tiotropium (SPIRIVA HANDIHALER) 18 MCG capsule USING THE HANDIHALER, INHALE THE CONTENTS OF ONE CAPSULE BY MOUTH DAILY 90 capsule 3         Reviewed and updated as needed this visit by Provider         Review of Systems   ROS COMP: Constitutional, HEENT, cardiovascular, pulmonary, GI, , musculoskeletal, neuro, skin, endocrine and psych systems are negative, except as otherwise noted.      Objective    Pulse 63   Temp 98  F (36.7  C) (Oral)   Resp 12   Ht 1.778 m (5' 10\")   Wt 76.7 kg (169 lb)   LMP 09/29/2005   SpO2 96%   BMI 24.25 kg/m    Body mass index is 24.25 kg/m .  Physical Exam   GENERAL:weak, in wheelchair, alert and no distress  NECK: no adenopathy, no asymmetry, masses, or scars and thyroid normal to palpation  RESP: lungs clear to auscultation - no rales, rhonchi or wheezes  CV: regular rate and rhythm, normal S1 S2, no S3 or S4, no murmur, click or rub, no " peripheral edema and peripheral pulses strong  ABDOMEN: soft, nontender, no hepatosplenomegaly, no masses and bowel sounds normal  MS: no gross musculoskeletal defects noted, no edema, palpatory tender in the LS spine   SKIN: no suspicious lesions or rashes  NEURO: generalized weakness, mentation intact and speech normal    Diagnostic Test Results:  Labs reviewed in Epic        Assessment & Plan   Problem List Items Addressed This Visit     Essential hypertension with goal blood pressure less than 130/80 - Primary    Relevant Orders    Basic metabolic panel (Completed)    Anxiety    Dilated cardiomyopathy (H)    Mechanical low back pain    Panlobular emphysema (H)         Continue treatment   Needs PT and ortho   Monitor lab work       See Patient Instructions  Return in about 4 weeks (around 12/9/2019) for follow up on acute problem if persists.    Bandar Weinberg MD  [facility]

## 2019-11-11 NOTE — TELEPHONE ENCOUNTER
"Requested Prescriptions   Pending Prescriptions Disp Refills     potassium chloride ER (K-TAB/KLOR-CON) 10 MEQ CR tablet [Pharmacy Med Name: POTASSIUM CL ER 10 MEQ TABLET] 270 tablet 1     Sig: TAKE 1 TABLET BY MOUTH THREE TIMES A DAY   Last Written Prescription Date:  07/03/2019  Last Fill Quantity: 90,  # refills: 04   Last office visit: 10/11/2019 with prescribing provider:     Future Office Visit:   Next 5 appointments (look out 90 days)    Nov 11, 2019  4:20 PM CST  Elliott Rodriguez with Bandar Weinberg MD  [facility] ([facility]) [address]  [facility] [address]  [phone]           Potassium Supplements Protocol Passed - 11/9/2019  7:34 AM        Passed - Recent (12 mo) or future (30 days) visit within the authorizing provider's department     Patient has had an office visit with the authorizing provider or a provider within the authorizing providers department within the previous 12 mos or has a future within next 30 days. See \"Patient Info\" tab in inbasket, or \"Choose Columns\" in Meds & Orders section of the refill encounter.              Passed - Medication is active on med list        Passed - Patient is age 18 or older        Passed - Normal serum potassium in past 12 months     Recent Labs   Lab Test 09/13/19  1241   POTASSIUM 3.4                    "

## 2019-11-11 NOTE — NURSING NOTE
"Vital signs:  Temp: 98  F (36.7  C) Temp src: Oral BP: 132/78 Pulse: 63   Resp: 12 SpO2: 96 %     Height: 177.8 cm (5' 10\") Weight: 76.7 kg (169 lb)  Estimated body mass index is 24.25 kg/m  as calculated from the following:    Height as of this encounter: 1.778 m (5' 10\").    Weight as of this encounter: 76.7 kg (169 lb).          "

## 2019-11-11 NOTE — TELEPHONE ENCOUNTER
Routing refill request to provider for review/approval because:  Drug not active on patient's medication list    Patient has potassium chloride packets on medication list, and is now requesting tablets. Please advise.

## 2019-11-11 NOTE — LETTER
[facility]  [address], [address]  [place] [address]  [phone]        November 12, 2019    Trisha Marie Napoleon  [address] [facility] [address]            Dear Ms. Trisha Bender:      The results of your recent labs were NORMAL.      If you have any further questions or problems, please contact our office.      Sincerely,        Bandar Weinberg M.D.

## 2019-11-12 LAB
ANION GAP SERPL CALCULATED.3IONS-SCNC: 9 MMOL/L (ref 3–14)
BUN SERPL-MCNC: 8 MG/DL (ref 7–30)
CALCIUM SERPL-MCNC: 9.3 MG/DL (ref 8.5–10.1)
CHLORIDE SERPL-SCNC: 100 MMOL/L (ref 94–109)
CO2 SERPL-SCNC: 26 MMOL/L (ref 20–32)
CREAT SERPL-MCNC: 0.51 MG/DL (ref 0.52–1.04)
GFR SERPL CREATININE-BSD FRML MDRD: >90 ML/MIN/{1.73_M2}
GLUCOSE SERPL-MCNC: 99 MG/DL (ref 70–99)
POTASSIUM SERPL-SCNC: 3.7 MMOL/L (ref 3.4–5.3)
SODIUM SERPL-SCNC: 135 MMOL/L (ref 133–144)

## 2019-11-12 RX ORDER — POTASSIUM CHLORIDE 750 MG/1
TABLET, EXTENDED RELEASE ORAL
Qty: 270 TABLET | Refills: 1 | Status: SHIPPED | OUTPATIENT
Start: 2019-11-12 | End: 2020-11-10

## 2019-11-12 NOTE — TELEPHONE ENCOUNTER
Message from [employer]:  Original authorizing provider: MD Trisha Frye would like a refill of the following medications:  HYDROcodone-acetaminophen (NORCO) 5-325 MG per tablet [Ibis Sam MD]    Preferred pharmacy: [place], [place] - [facility] [address]    Comment:  Dr. Weinberg and Nursing Staff, I would like to get a refill on the Hydrocodone-acetaminophen prescription as soon as possible. Thank you. Trisha Bender   13-Nov-2019 16:18

## 2019-11-20 DIAGNOSIS — G89.29 CHRONIC BILATERAL LOW BACK PAIN WITH RIGHT-SIDED SCIATICA: ICD-10-CM

## 2019-11-20 DIAGNOSIS — M54.41 CHRONIC BILATERAL LOW BACK PAIN WITH RIGHT-SIDED SCIATICA: ICD-10-CM

## 2019-11-20 DIAGNOSIS — F41.9 ANXIETY: ICD-10-CM

## 2019-11-20 DIAGNOSIS — R09.81 NASAL CONGESTION: ICD-10-CM

## 2019-11-20 NOTE — TELEPHONE ENCOUNTER
"Requested Prescriptions   Pending Prescriptions Disp Refills     fluticasone (FLONASE) 50 MCG/ACT nasal spray [Pharmacy Med Name: FLUTICASONE 50MCG NASAL SPRAY] 16 g 3     Sig: USE ONE TO TWO SPRAYS IN EACH NOSTRIL EVERY DAY   Last Written Prescription Date:  06/18/2019  Last Fill Quantity: 16 g,  # refills: 0   Last office visit: 11/11/2019 with prescribing provider:     Future Office Visit:   Next 5 appointments (look out 90 days)    Dec 09, 2019  4:20 PM CST  [facility] with Bandar Weinberg MD  [facility] [address]  [facility] [address]  [phone]           Inhaled Steroids Protocol Failed - 11/20/2019  9:51 AM        Failed - Asthma control assessment score within normal limits in last 6 months     Please review ACT score.           Passed - Patient is age 12 or older        Passed - Medication is active on med list        Passed - Recent (6 mo) or future (30 days) visit within the authorizing provider's specialty     Patient had office visit in the last 6 months or has a visit in the next 30 days with authorizing provider or within the authorizing provider's specialty.  See \"Patient Info\" tab in inbasket, or \"Choose Columns\" in Meds & Orders section of the refill encounter.            "

## 2019-11-20 NOTE — TELEPHONE ENCOUNTER
Requested Prescriptions   Pending Prescriptions Disp Refills     ALPRAZolam (XANAX) 0.25 MG tablet [Pharmacy Med Name: ALPRAZOLAM 0.25MG TABS] 40 tablet 0     Sig: TAKE ONE TABLET BY MOUTH EVERY 12 HOURS AS NEEDED FOR ANXIETY   Last Written Prescription Date:  10/19/2019  Last Fill Quantity: 40,  # refills: 0   Last office visit: 11/11/2019 with prescribing provider:     Future Office Visit:   Next 5 appointments (look out 90 days)    Dec 09, 2019  4:20 PM CST  SHORT with Bandar Weinberg MD  [facility]) [address]  [facility] [id number] [phone]           There is no refill protocol information for this order        baclofen (LIORESAL) 10 MG tablet [Pharmacy Med Name: BACLOFEN 10MG TABS] 180 tablet 0     Sig: TAKE TWO TABLETS BY MOUTH THREE TIMES A DAY       There is no refill protocol information for this order      Last Written Prescription Date:  10/19/2019  Last Fill Quantity: 180,  # refills: 0   Last office visit: 11/11/2019 with prescribing provider:     Future Office Visit:   Next 5 appointments (look out 90 days)    Dec 09, 2019  4:20 PM CST  SHORT with Bandar Weinberg MD  [facility] ([facility]) [address]  [facility] [id number] [phone]

## 2019-11-21 RX ORDER — FLUTICASONE PROPIONATE 50 MCG
SPRAY, SUSPENSION (ML) NASAL
Qty: 16 G | Refills: 3 | Status: SHIPPED | OUTPATIENT
Start: 2019-11-21 | End: 2020-06-11

## 2019-11-21 RX ORDER — BACLOFEN 10 MG/1
TABLET ORAL
Qty: 180 TABLET | Refills: 0 | Status: SHIPPED | OUTPATIENT
Start: 2019-11-21 | End: 2020-01-29

## 2019-11-21 RX ORDER — ALPRAZOLAM 0.25 MG
TABLET ORAL
Qty: 40 TABLET | Refills: 0 | Status: SHIPPED | OUTPATIENT
Start: 2019-11-21 | End: 2019-12-30

## 2019-11-21 NOTE — TELEPHONE ENCOUNTER
Medication is being filled for 1 time refill only due to:  pt has upcoming appointment    Placed in appointment notes that patient needs ACT completed.

## 2019-11-21 NOTE — TELEPHONE ENCOUNTER
Controlled Substance Refill Request for xanax and baclofen  Problem List Complete:    No     PROVIDER TO CONSIDER COMPLETION OF PROBLEM LIST AND OVERVIEW/CONTROLLED SUBSTANCE AGREEMENT    Last Written Prescription Date:  10/19/19  Last Fill Quantity: 40, 180,   # refills: 0    THE MOST RECENT OFFICE VISIT MUST BE WITHIN THE PAST 3 MONTHS. AT LEAST ONE FACE TO FACE VISIT MUST OCCUR EVERY 6 MONTHS. ADDITIONAL VISITS CAN BE VIRTUAL.      [place] Office Visit with [facility] [place]: 11/11/19    Future Office visit:   Next 5 appointments (look out 90 days)    Dec 09, 2019  4:20 PM CST  SHORT with Bandar Weinberg MD  [facility] ([facility]) [address]  [facility] [address]  [phone]          Controlled substance agreement:   Encounter-Level CSA - 02/23/2016:    Controlled Substance Agreement - Scan on 2/24/2016  1:37 PM: [place] CONTROLLED SUBSTANCE AGREEMENT, 2/23/16     Patient-Level CSA:    There are no patient-level csa.         Last Urine Drug Screen:   Pain Drug SCR UR W RPTD Meds   Date Value Ref Range Status   12/17/2018 FINAL  Final     Comment:     (Note)  ====================================================================  [facility] COMP DRUG ANALYSIS,UR  ====================================================================  Test                             Result       Flag       Units        Drug Present   Hydrocodone                    1330                    ng/mg creat   Hydromorphone                  228                     ng/mg creat   Dihydrocodeine                 265                     ng/mg creat   Norhydrocodone                 1337                    ng/mg creat    Sources of hydrocodone include scheduled prescription    medications. Hydromorphone, dihydrocodeine and norhydrocodone are    expected metabolites of hydrocodone. Hydromorphone and    dihydrocodeine are also available as scheduled prescription    medications.   Baclofen                        PRESENT                               Acetaminophen                  PRESENT                              ====================================================================  Test                      Result    Flag   Units      Ref Range        Creatinine              43               mg/dL      >=20            ====================================================================  Declared Medications:  Medication list was not provided.  ====================================================================  For clinical consultation, please call [phone].  ====================================================================  Analysis performed by [employer] [place] [address]     , No results found for: COMDAT, No results found for: THC13, PCP13, COC13, MAMP13, OPI13, AMP13, BZO13, TCA13, MTD13, BAR13, OXY13, PPX13, BUP13     Processing:  Rx to be electronically transmitted to pharmacy by provider      [email or web address]      Writer unable to check -not authorized by provider

## 2019-12-01 ENCOUNTER — MYC REFILL (OUTPATIENT)
Dept: INTERNAL MEDICINE | Facility: CLINIC | Age: 64
End: 2019-12-01

## 2019-12-01 DIAGNOSIS — G89.29 CHRONIC BILATERAL LOW BACK PAIN, UNSPECIFIED WHETHER SCIATICA PRESENT: ICD-10-CM

## 2019-12-01 DIAGNOSIS — M54.50 CHRONIC BILATERAL LOW BACK PAIN, UNSPECIFIED WHETHER SCIATICA PRESENT: ICD-10-CM

## 2019-12-01 DIAGNOSIS — S22.000D COMPRESSION FRACTURE OF THORACIC VERTEBRA WITH ROUTINE HEALING, UNSPECIFIED THORACIC VERTEBRAL LEVEL, SUBSEQUENT ENCOUNTER: ICD-10-CM

## 2019-12-03 DIAGNOSIS — I42.0 DILATED CARDIOMYOPATHY (H): ICD-10-CM

## 2019-12-03 RX ORDER — KETOROLAC TROMETHAMINE 10 MG/1
10 TABLET, FILM COATED ORAL 3 TIMES DAILY PRN
Qty: 15 TABLET | Refills: 0 | Status: SHIPPED | OUTPATIENT
Start: 2019-12-03 | End: 2019-12-31

## 2019-12-03 RX ORDER — HYDROCODONE BITARTRATE AND ACETAMINOPHEN 10; 325 MG/1; MG/1
1 TABLET ORAL EVERY 4 HOURS PRN
Qty: 180 TABLET | Refills: 0 | Status: SHIPPED | OUTPATIENT
Start: 2019-12-03 | End: 2019-12-26

## 2019-12-03 NOTE — TELEPHONE ENCOUNTER
[place] and Toradol refill request.     Last refills on norco on 11/8/19 for #180  Last Toradol refill on 10/11/19 for #15    Last OV on 11/11/19    Routing refill request to provider for review/approval because:  Drug not on the [facility] refill protocol

## 2019-12-03 NOTE — TELEPHONE ENCOUNTER
"Requested Prescriptions   Pending Prescriptions Disp Refills     lisinopril (PRINIVIL/ZESTRIL) 2.5 MG tablet [Pharmacy Med Name: LISINOPRIL 2.5 MG TABLET] 90 tablet 2     Sig: TAKE 1 TABLET BY MOUTH EVERY DAY  Last Written Prescription Date:  11/6/17  Last Fill Quantity: 90 tab,  # refills: 3   Last office visit: 11/11/2019 with prescribing provider:  [place]   Future Office Visit:   Next 5 appointments (look out 90 days)    Dec 09, 2019  4:20 PM CST  SHORT with Bandar Weinberg MD  [facility]) [address]  [facility] [address]  [phone]             ACE Inhibitors (Including Combos) Protocol Failed - 12/3/2019  6:36 AM        Failed - Normal serum creatinine on file in past 12 months     Recent Labs   Lab Test 11/11/19  1704   CR 0.51*             Passed - Blood pressure under 140/90 in past 12 months     BP Readings from Last 3 Encounters:   11/11/19 132/78   10/28/19 133/63   10/11/19 129/78                 Passed - Recent (12 mo) or future (30 days) visit within the authorizing provider's specialty     Patient has had an office visit with the authorizing provider or a provider within the authorizing providers department within the previous 12 mos or has a future within next 30 days. See \"Patient Info\" tab in inbasket, or \"Choose Columns\" in Meds & Orders section of the refill encounter.              Passed - Medication is active on med list        Passed - Patient is age 18 or older        Passed - No active pregnancy on record        Passed - Normal serum potassium on file in past 12 months     Recent Labs   Lab Test 11/11/19  1704   POTASSIUM 3.7             Passed - No positive pregnancy test within past 12 months           "

## 2019-12-04 RX ORDER — LISINOPRIL 2.5 MG/1
TABLET ORAL
Qty: 90 TABLET | Refills: 2 | Status: SHIPPED | OUTPATIENT
Start: 2019-12-04 | End: 2020-08-11

## 2019-12-04 NOTE — TELEPHONE ENCOUNTER
Appt on 12.9.19  Routing refill request to provider for review/approval because:  Labs out of range:     ACE Inhibitors (Including Combos) Protocol Failed - 12/3/2019  6:36 AM           Failed - Normal serum creatinine on file in past 12 months           Recent Labs   Lab Test 11/11/19  1704   CR 0.51*

## 2019-12-05 NOTE — ADDENDUM NOTE
Week 29-32    -Your body is preparing for labor, it is common to feel uncomfortable.   -You may begin to experience Soso-Franco contractions. These are \"practice\" contractions.  They can be just tightening of the abdomen or can be painful.  Resting, drinking 2 large glasses of water, emptying your bladder, and changing position will usually help these resolve or become less frequent.  If none of these interventions are working, contact the clinic for advice.   -Women tend to gain more weight at this point of the pregnancy due to an increase in fluids, extra blood, and fat deposits.  -Your breasts will begin to grow as your body gets ready to feed the baby. They may be more tender. You may also notice a slight yellow or white discharge from the nipples.  -Discharge from your vagina may increase. If your underwear become saturated or you are constantly leaking fluid, contact the clinic.  -Any bleeding that is not associated with intercourse or is bright red in color and accompanied with cramping, contact the clinic.  -If you have not felt your baby move in some time, drink a sugary drink such as orange juice, rest on your side, poke baby and monitor for movement. If the baby is not responding or slow to respond, call the clinic.  -Any headaches that are not relieved with tylenol, right upper gastric pain, changes in your vision that are not associated with position change, call the clinic.  -Some women experience hemorrhoids. Things to help avoid hemorrhoids occurring include: high fiber diet, fluids, and exercise. Avoid sitting or standing for long periods of time. If hemorrhoids due occur, the best treatment is making sure your stools stay soft and regular. Straining and pushing can irritate hemorrhoids more and cause more discomfort and bleeding.  -Over the counter remedies can be used to help shrink the hemorrhoids. Such as Tucks pads, Preparation H cream, and sitz baths.   -Mood swings are common at this  Encounter addended by: Jenifer Alejandra RT on: 4/16/2018  8:37 AM<BR>     Actions taken: Charge Capture section accepted stage of pregnancy as you become more excited and anxious about your baby's arrival. Things to help manage mood swings include exercise on a regular basis, getting plenty of rest, limiting stress, talking to your support system.    Week 33-36    -Your provider will perform a vaginal culture between 35-36 weeks of pregnancy checking for Group B Strep(GBS). Group B strep (streptococcus) is a common bacteria. It can grow in a woman’s vagina, rectum, or urinary tract. It is harmless in adults but in rare cases, can infect a baby during delivery. Infection can cause serious illness in the . Treating the mother during labor reduces the risk of the baby becoming infected.   -Group B strep can infect the blood. It can also cause inflammation of the baby’s lungs, brain, or spinal cord. Long-term effects can include blindness, deafness, mental retardation, or cerebral palsy. And in rare cases, infection causes death.     You will now be seen every week until delivery. We recommend you schedule your office visits out so you can get a time and day that works best for you.             Why would I need a ?    If you’re a mom-to-be with your heart set on a vaginal birth, the news that your baby needs (or may need) to be delivered by  section might feel disappointing. Visions of childbirth the way you’ve maybe always dreamed can sometimes be suddenly displaced by worries about the surgery, being stuck in the hospital longer and the tougher recovery (not to mention the scar).  But currently in the [place], about 32 percent of babies are delivered by  -- which means, all things being equal, you have about a 1 in 3 chance of having to go that route. While any surgery is a proposition that should be taken seriously, with some mental and emotional preparation, you can feel empowered if a  seems in the cards.   What is a ?  A , or  section, is the surgical delivery  of a baby through incisions in the abdomen and uterus. It's typically only recommended in medically necessary cases, including in some high-risk pregnancies and when the baby is in the breech position and can't be flipped around before labor begins.   Reasons for a scheduled   Your doctor may peg you for the procedure in advance of your due date. A few factors that might necessitate a  include:  • Certain medical conditions. You have a chronic condition like heart disease, diabetes, high blood pressure or kidney disease that makes vaginal delivery dangerously stressful to your body (and a  birth is a safer option).   • Infections. If you’re HIV-positive or have an active genital herpes infection, a scheduled  is necessary because both viruses can be transmitted to your baby during delivery.   • Your baby’s health. An illness or a congenital condition might make the already tricky journey through the birth canal even more treacherous for your baby.   • A large baby. Sometimes your baby is too large (a condition called macrosomia) to move safely, if at all, through the birth canal.   • Your weight. Being extremely overweight or obese significantly increases your chance of needing a  delivery, partly because of the other risk factors that often accompany obesity (like gestational diabetes), and partly because obese women tend to have longer labors (which, in turn, ups your risk of ending up on the operating table).   • Your age. While being older doesn’t guarantee a  by any means, the odds of having one increase with age.   • Breech position. When your baby is either feet-first or butt-first in a breech position and can't be turned, your practitioner might decide that a  is necessary.   • Multiples. The likelihood of the procedure increases with the number of babies you’re carrying.   • Placental problems. If the placenta is partly or completely blocking the  cervical opening (placenta previa) or has  from the uterine wall (placental abruption), a  is likely safer for you and your baby.   • Other complications. If you develop preeclampsia (pregnancy-induced high blood pressure) or eclampsia (a very rare progression of preeclampsia that affects the central nervous system, causing seizures) and treatment isn't working, your practitioner might opt for a  to protect both of you.   • A previous . While having a first  raises your risk of subsequent procedures, vaginal birth after , or , is frequently successful and often recommended.  If your practitioner says that a  is necessary -- or likely necessary -- ask for a detailed explanation of the reasons and any possible alternatives.     Reasons for an unplanned   Far more frequently, the need for a  section isn't obvious until a woman is well into labor. A few of the most common reasons for an unscheduled or unplanned  include:  • Labor doesn’t start. If your labor just can't seem to get moving in the first place (your cervix isn't dilating even though you're having contractions) -- after 24 or 25 hours in a first-time mother and fewer for subsequent deliveries -- your doctor might decide on surgery.   • Labor stalls. You might sail right through the early stage of labor (dilation to 3 centimeters), only to grind to a halt. While it's possible that Pitocin or another labor stimulator could jump-start the contractions, if the problem is that your baby's head is too big to fit through your pelvis (which is the case about half the time) a  birth is necessary.   • Exhaustion or fetal distress. If your doctor determines that you’re becoming too exhausted, or if the fetal monitor is picking up signs of your baby in distress, he or she may opt for a .   • A prolapsed umbilical cord. If the umbilical cord slips into the birth  canal before baby does, it will be compressed as your little one comes through, which could cut off her oxygen supply.   • Uterine rupture. If your uterus tears, a  is required.  •   How long does a  take?  It’s quick, with the procedure itself lasting 10 minutes or less, followed by another 30 minutes to stitch you back up.  What happens during a  section?  Whether it's a scheduled surgery or a last-minute decision, the typical  is straightforward and follows a tightly-scripted game plan.   Most hospitals strive to make a  delivery as family-friendly as possible, with Mom awake (but appropriately numb), her partner in the room, and a chance to meet, greet, cuddle and possibly breastfeed baby right after delivery if there's no medical reason not to. Because you aren't preoccupied with pushing or pain, you're often able to relax (at least to some degree) and marvel at the birth. Here’s a play-by-play of what you can expect.  Preparation and anesthesia  A  begins with a routine IV and anesthesia -- usually an epidural or spinal block, so the lower half of your body will be numb but you'll stay awake. Then you'll be prepped by having your abdomen shaved (if necessary) and washed with an antiseptic solution. The operating room staff will insert a catheter into your bladder and place sterile drapes over your tummy. Your birthing  or partner will be outfitted in sterile garb and allowed to sit near your head and hold your hand.  The emergency room staff will place a short screen blocking your vision of your abdomen, so the field remains sterile and so you don't have to watch yourself getting cut. If you're opting for a \"gentle ,\" the drape will be clear; otherwise you can also ask for a mirror to watch. Even if you don't want to see the cut, you will want to catch a glimpse of your baby as she emerges, so ask your practitioner to lift the little cutie up  for a quick peek after delivery.   If you're having an emergency , there might not be time to numb you, in which case (and fortunately this is rarely the case) you'll be completely conked out with general anesthesia for the duration of the procedure -- which usually lasts no more than a few minutes. When you wake up, you may feel groggy, disoriented and possibly queasy. You also may have a sore throat from the endotracheal tube that was inserted during surgery.  Incision and delivery  Once you're either totally numb or fully asleep, the doctor will make a small incision in your lower abdomen -- it may feel like your skin is being unzipped -- just above your pubic hair line. With some neat suturing, the scar should be fairly unnoticeable and will fade more and more over time. Your doctor will then make another incision in the lower part of your uterus. For both incisions, two options are possible (and your two may not be the same):  • A low-transverse incision. This cut, across the lower part of the uterus, is used in 95 percent of C-sections, because the muscle at the bottom of the uterus is thinner (which results in less bleeding) and is also less likely to tear during subsequent vaginal deliveries.   • A vertical cut. This incision, down the middle of your uterus, is usually only required if the baby is nestled low in your uterus or in another unusual position.  Next, the amniotic fluid will be suctioned out and right after that your baby will be brought into the world (you might feel a bit of tugging). Because the excess mucus in her respiratory tract wasn't squeezed out during a journey through the birth canal, some extra suctioning will be necessary to clear those little lungs before you hear that first cry.  Meeting your baby  After the umbilical cord is cut, the surgeon will remove your placenta and quickly do a routine check of your reproductive organs. Then you’ll be stitched up with absorbable  stitches in your uterus (the kind that won't later need to be removed) and either stitches or surgical staples on the abdominal incision. That procedure can take 30 minutes or longer.  You may receive antibiotics (to minimize your risk of infection) and oxytocin (to control bleeding and help contract the uterus) in your IV. Your blood pressure, pulse, rate of breathing and amount of bleeding will be checked regularly.  You'll then probably have time to get to know the newest person in your life. Some women are able to nurse on the operating table -- or, at the very least, in the recovery room. If you're too tired, don't sweat it. You'll have ample time later for bonding (and your baby won't notice the difference). If your baby needs to be whisked away to the NICU nursery, don’t let it get you down. This is standard procedure in many hospitals following a  delivery, and it's more likely to indicate a precaution than a problem with your baby’s condition.    What are possible  complications?  While C-sections are extremely safe, very rarely, complications happen. For Mom, these can include reactions to medications or anesthesia used during the procedure, blood loss and infection (which is why it’s so important to carefully follow your doctor’s post-op care instructions).  Blood clots in the legs, pelvic organs or lungs can also occasionally occur. While your doctor will take steps to prevent this from happening, it helps to walk after surgery as soon as you’re able. Surrounding organs (like your bowel or bladder) can be injured and require additional surgery to fix.  Very rarely, the lining of the uterus becomes inflamed or irritated (called endometritis). So if you notice increasing pelvic pain, unusual discharge or a fever after your baby's birth, call your doctor right away.  Babies born by  are at a higher risk of transient tachypnea, rapid breathing caused by leftover fluid in the lungs.  While the condition sounds frightening, it usually only lasts for a day after delivery and then goes away on its own.  If the surgery was performed before week 39 of pregnancy, your baby may be at greater risk of breathing problems if her lungs aren’t fully mature -- but your doctor will be keeping a very close eye on her and treating any suspected problems while you’re in the hospital.    recovery tips  While you may be eager to start caring for your marilia (and needy) , the physical and emotional recovery after a  takes longer than it does after a vaginal birth. You can expect to spend around three to four days in the hospital, and it will take at least four to six weeks at home before you’ll feel back to normal.  So remember, slow and steady wins this race. And that’s probably exactly what your doctor ordered: to scale back your activity so that you don’t put undue strain on your incision and your energy level. Ignoring that advice will only lead to a longer  recovery, so keep these strategies in mind over the next few weeks to cope with the pain and speed recovery:  Lower your expectations. In addition to the soreness you’ll feel at the incision site, you’ll be dealing with virtually the same suite of symptoms during your recovery after a  as you would experience in a vaginal delivery: postpartum fatigue (from the operation and sleep deprivation), after-pains (as your uterus contracts -- you may be given oxytocin to speed the process), postpartum bleeding or discharge (lochia), perineal pain (especially if you tried to deliver vaginally before going into surgery), breast engorgement, and (if you thought that wasn’t enough) raging hormones. If you give yourself a realistic timeframe to recover, you'll be better off.   Be cautious. Expect your scar to be sore for at least a few weeks, so forgo holding and carrying most things -- except the baby. When cuddling or nursing  (but not lifting and carrying, yet), place the baby on a pillow over your incision to protect the tender area.  Give yourself a break. Yes, you have a new baby to care for. But remember that you need to take care of yourself too. So take as many shortcuts as you can. Have other people (your partner, your in-laws) bring the baby to you instead of getting up yourself. Don’t waste your energy preparing meals -- order in or have your partner take care of the cooking or heating up. Let the laundry pile up, or send it out to be cleaned. Use paper plates so you don’t have to worry about washing dishes. And of course, let your partner take regular shifts with the baby so you can catch up on your Zs. Do whatever it takes to make your life easier -- and try to do it guilt-free.   Keep an eye on your incision. Speed the healing of your  incision by keeping the wound clean (ask your practitioner how) and wearing loose tops and pants that don’t chafe your belly. Itching and pulling sensations around the incision as well as numbness are all normal and will pass (as will the funky pink and purple colors it turns before fading). If you have a fever, feel a lot of pain (beyond what your practitioner said you would), or the wound gets red or oozy, call your doctor since these could be signs of infection. A small amount of clear fluid discharge is usually normal, but report it to your doctor anyway.  Medicate. Take the pain-relieving drugs your doctor provides immediately after the surgery. If you’re nursing, don't worry -- the drugs won't pass into your colostrum. When your milk comes in, use the over-the-counter painkillers recommended by your physician.  Take off the pressure. Gas buildup can lead to discomfort by causing your intestines to put pressure on the incision, and anesthesia can slow activity in your bowels. So try to avoid it by steering clear of foods or drinks that you know make you gassy. And if you do feel  indigestion, try lying on your left side or back while drawing up your knees, holding your incision site, and taking deep breaths.  Keep regular. Constipation after pregnancy is another symptom that can crop up no matter what your childbirth experience was, so try to relax (no pushing when you're on the toilet), eat some fiber-rich foods (whole wheat bread, vegetables and fruits), and drink plenty of fluids. Your doctor may prescribe a stool softener or mild laxative if you need it.  Nourish yourself. Keep healthy snacks and water nearby to maintain your strength and energy. Good picks include nuts, whole-wheat pretzels, string cheese, fiber-rich fruits (fresh and dried), and baby carrots with dip. Steer clear of constipating foods like bananas, since they won't help if you're a little backed up.  Get moving. You’ll have to wait a few weeks and until your incision heals before your practitioner gives postpartum workouts the green light. But in the meantime, even when you’re lying down, you can speed recovery by boosting your circulation and muscle tone. For starters, regularly flex your feet, stretch your calf muscles, and wiggle your legs around as much as you can. When you feel up to it, stroll around your house slowly, which will help with gas and constipation too. As soon as you feel tired, sit down. Build up your stamina by walking around for as long as you’re comfortable each day, and head outside to walk when you feel ready. Soon enough, you’ll be back on your feet for good.   Do your Kegels. Another exercise you can do right away: Kegels. While you'll have a definite edge when it comes to your \"down there\" regions, pregnancy still took its toll on those pelvic floor muscles. You should still avoid having sex or putting anything in your vagina for four to six weeks.  Although it’ll be hard to keep from taking charge at home, remember to be kind to yourself and, when you can, relax. In a matter of weeks you’ll  be well on your way to full-steam-ahead mommyhood.   Just so you know, What to Expect may earn commissions from shopping links.   Survival Kit: 10 Must-Haves After a  Delivery View All   Advil [employer]-Gels See Now         What are the benefits of having a ?  Even though it's still considered major surgery (be it the happiest kind since it results in a baby at the end), C-sections are usually very safe. In fact, if it's deemed medically necessary, it can be the safest way for your baby to enter the world. A  is also often healthier for you, especially if vaginal delivery is risky.   A bonus, albeit a cosmetic one, is that your baby will be born with a nicely rounded head, rather than the pointy conehead of a  who has made the tight squeeze through the vaginal canal.  Another small but nice benefit: If the  takes place before you've done any or much pushing, you'll avoid bloodshot eyes and bruises on your face. Plus it typically prevents the pelvic wear-and-tear that can result in pain (from tearing or from an episiotomy) -- which means sitting will be a lot easier for you than for vaginal deliveries.  One last piece of good news: Studies show that having a  doesn't negatively impact your future fertility or how many babies you can have.  In the end, the best birth is the one that's safest. Any delivery that brings a healthy baby into the world and into your arms is a perfect delivery.   From the What to Expect editorial team and Araceli Pena, author of What to Expect When You're Expecting. Health information on this site is based on peer-reviewed medical journals and highly respected health organizations and institutions including ACOG (American College of Obstetricians and Gynecologists), CDC (Centers for Disease Control and Prevention) and AAP (American Academy of Pediatrics), as well as the What to Expect books by Araceli Pena.

## 2019-12-16 ENCOUNTER — HEALTH MAINTENANCE LETTER (OUTPATIENT)
Age: 64
End: 2019-12-16

## 2019-12-26 ENCOUNTER — MYC REFILL (OUTPATIENT)
Dept: INTERNAL MEDICINE | Facility: CLINIC | Age: 64
End: 2019-12-26

## 2019-12-26 DIAGNOSIS — M54.50 CHRONIC BILATERAL LOW BACK PAIN, UNSPECIFIED WHETHER SCIATICA PRESENT: ICD-10-CM

## 2019-12-26 DIAGNOSIS — G89.29 CHRONIC BILATERAL LOW BACK PAIN, UNSPECIFIED WHETHER SCIATICA PRESENT: ICD-10-CM

## 2019-12-26 DIAGNOSIS — F41.9 ANXIETY: ICD-10-CM

## 2019-12-27 RX ORDER — HYDROCODONE BITARTRATE AND ACETAMINOPHEN 10; 325 MG/1; MG/1
1 TABLET ORAL EVERY 4 HOURS PRN
Qty: 180 TABLET | Refills: 0 | Status: SHIPPED | OUTPATIENT
Start: 2020-01-02 | End: 2020-01-23

## 2019-12-27 NOTE — TELEPHONE ENCOUNTER
Controlled Substance Refill Request for norco  Problem List Complete:    No     PROVIDER TO CONSIDER COMPLETION OF PROBLEM LIST AND OVERVIEW/CONTROLLED SUBSTANCE AGREEMENT    Last Written Prescription Date:  12/3/19  Last Fill Quantity: 180,   # refills: 0    THE MOST RECENT OFFICE VISIT MUST BE WITHIN THE PAST 3 MONTHS. AT LEAST ONE FACE TO FACE VISIT MUST OCCUR EVERY 6 MONTHS. ADDITIONAL VISITS CAN BE VIRTUAL.     Last Office Visit with [facility] primary care provider: 11/11/19    Future Office visit:   Next 5 appointments (look out 90 days)    Jan 07, 2020  5:20 PM CST  Elliott Rodriguez with Bandar Weinberg MD  [facility] [address]  [facility] [address]  [phone]          Controlled substance agreement:   Encounter-Level CSA - 02/23/2016:    Controlled Substance Agreement - Scan on 2/24/2016  1:37 PM: [place] CONTROLLED SUBSTANCE AGREEMENT, 2/23/16     Patient-Level CSA:    There are no patient-level csa.         Last Urine Drug Screen:   Pain Drug SCR UR W RPTD Meds   Date Value Ref Range Status   12/17/2018 FINAL  Final     Comment:     (Note)  ====================================================================  TOXASSURE COMP DRUG ANALYSIS,UR  ====================================================================  Test                             Result       Flag       Units        Drug Present   Hydrocodone                    1330                    ng/mg creat   Hydromorphone                  228                     ng/mg creat   Dihydrocodeine                 265                     ng/mg creat   Norhydrocodone                 1337                    ng/mg creat    Sources of hydrocodone include scheduled prescription    medications. Hydromorphone, dihydrocodeine and norhydrocodone are    expected metabolites of hydrocodone. Hydromorphone and    dihydrocodeine are also available as scheduled prescription    medications.   Baclofen                        PRESENT                               Acetaminophen                  PRESENT                              ====================================================================  Test                      Result    Flag   Units      Ref Range        Creatinine              43               mg/dL      >=20            ====================================================================  Declared Medications:  Medication list was not provided.  ====================================================================  For clinical consultation, please call [phone].  ====================================================================  Analysis performed by [employer] [place] [address]     , No results found for: COMDAT, No results found for: THC13, PCP13, COC13, MAMP13, OPI13, AMP13, BZO13, TCA13, MTD13, BAR13, OXY13, PPX13, BUP13     Processing:  Rx to be electronically transmitted to pharmacy by provider      [email or web address]      Writer unable to check -not authorized  Provider please check .  thanks

## 2019-12-27 NOTE — TELEPHONE ENCOUNTER
Requested Prescriptions   Pending Prescriptions Disp Refills     ALPRAZolam (XANAX) 0.25 MG tablet [Pharmacy Med Name: ALPRAZOLAM  Last Written Prescription Date:  11/21/2019  Last Fill Quantity: 40,  # refills: 0   Last office visit: 11/11/2019 with prescribing provider:     Future Office Visit:   Next 5 appointments (look out 90 days)    Jan 07, 2020  5:20 PM CST  Elliott Rodriguez with Bandar Weinberg MD  [facility] [address] [place] [facility] [id number] [phone]        0.25MG TABS] 40 tablet 0     Sig: TAKE ONE TABLET BY MOUTH EVERY 12 HOURS AS NEEDED FOR ANXIETY       There is no refill protocol information for this order

## 2019-12-29 DIAGNOSIS — S22.000D COMPRESSION FRACTURE OF THORACIC VERTEBRA WITH ROUTINE HEALING, UNSPECIFIED THORACIC VERTEBRAL LEVEL, SUBSEQUENT ENCOUNTER: ICD-10-CM

## 2019-12-30 RX ORDER — ALPRAZOLAM 0.25 MG
TABLET ORAL
Qty: 40 TABLET | Refills: 0 | Status: SHIPPED | OUTPATIENT
Start: 2019-12-30 | End: 2020-01-31

## 2019-12-30 NOTE — TELEPHONE ENCOUNTER
Requested Prescriptions   Pending Prescriptions Disp Refills     ketorolac (TORADOL) 10 MG tablet [Pharmacy Med Name: KETOROLAC  Last Written Prescription Date:  12/3/2019  Last Fill Quantity: 15,  # refills: 0   Last office visit: 11/11/2019 with prescribing provider:     Future Office Visit:   Next 5 appointments (look out 90 days)    Jan 07, 2020  5:20 PM CST  Elliott Rodriguez with Bandar Weinberg MD  [facility]) [address] [place] [address]  [phone]        TROMETHAMINE 10MG TABS] 15 tablet 0     Sig: TAKE ONE TABLET BY MOUTH THREE TIMES A DAY AS NEEDED FOR MODERATE PAIN       There is no refill protocol information for this order

## 2019-12-30 NOTE — TELEPHONE ENCOUNTER
Alprazolam refill request    Last refill on 11/21/19 for #40     Last OV on 11/11/19    Routing refill request to provider for review/approval because:  Drug not on the [facility] refill protocol

## 2019-12-31 RX ORDER — KETOROLAC TROMETHAMINE 10 MG/1
TABLET, FILM COATED ORAL
Qty: 15 TABLET | Refills: 0 | Status: SHIPPED | OUTPATIENT
Start: 2019-12-31 | End: 2020-01-07

## 2019-12-31 NOTE — TELEPHONE ENCOUNTER
Ketorolac refill request.     Last re fill on 12/3/19 for #15, no refills.     Last OV on 11/11/19    Pt has upcoming appt.     Provider approval needed.

## 2020-01-07 ENCOUNTER — OFFICE VISIT (OUTPATIENT)
Dept: INTERNAL MEDICINE | Facility: CLINIC | Age: 65
End: 2020-01-07
Payer: COMMERCIAL

## 2020-01-07 ENCOUNTER — ANCILLARY PROCEDURE (OUTPATIENT)
Dept: GENERAL RADIOLOGY | Facility: CLINIC | Age: 65
End: 2020-01-07
Attending: INTERNAL MEDICINE
Payer: COMMERCIAL

## 2020-01-07 VITALS
BODY MASS INDEX: 25.62 KG/M2 | DIASTOLIC BLOOD PRESSURE: 88 MMHG | HEART RATE: 134 BPM | SYSTOLIC BLOOD PRESSURE: 143 MMHG | HEIGHT: 70 IN | OXYGEN SATURATION: 94 % | RESPIRATION RATE: 12 BRPM | TEMPERATURE: 99 F | WEIGHT: 179 LBS

## 2020-01-07 DIAGNOSIS — S22.000D COMPRESSION FRACTURE OF THORACIC VERTEBRA WITH ROUTINE HEALING, UNSPECIFIED THORACIC VERTEBRAL LEVEL, SUBSEQUENT ENCOUNTER: ICD-10-CM

## 2020-01-07 DIAGNOSIS — F41.9 ANXIETY: ICD-10-CM

## 2020-01-07 DIAGNOSIS — R06.02 SOB (SHORTNESS OF BREATH): ICD-10-CM

## 2020-01-07 DIAGNOSIS — I10 ESSENTIAL HYPERTENSION WITH GOAL BLOOD PRESSURE LESS THAN 130/80: ICD-10-CM

## 2020-01-07 DIAGNOSIS — J42 CHRONIC BRONCHITIS, UNSPECIFIED CHRONIC BRONCHITIS TYPE (H): ICD-10-CM

## 2020-01-07 DIAGNOSIS — J42 CHRONIC BRONCHITIS, UNSPECIFIED CHRONIC BRONCHITIS TYPE (H): Primary | ICD-10-CM

## 2020-01-07 LAB
ALBUMIN UR-MCNC: NEGATIVE MG/DL
APPEARANCE UR: CLEAR
BILIRUB UR QL STRIP: NEGATIVE
COLOR UR AUTO: YELLOW
ERYTHROCYTE [DISTWIDTH] IN BLOOD BY AUTOMATED COUNT: 15.4 % (ref 10–15)
GLUCOSE UR STRIP-MCNC: NEGATIVE MG/DL
HCT VFR BLD AUTO: 45.9 % (ref 35–47)
HGB BLD-MCNC: 13.7 G/DL (ref 11.7–15.7)
HGB UR QL STRIP: ABNORMAL
KETONES UR STRIP-MCNC: NEGATIVE MG/DL
LEUKOCYTE ESTERASE UR QL STRIP: NEGATIVE
MCH RBC QN AUTO: 28.2 PG (ref 26.5–33)
MCHC RBC AUTO-ENTMCNC: 29.8 G/DL (ref 31.5–36.5)
MCV RBC AUTO: 95 FL (ref 78–100)
NITRATE UR QL: NEGATIVE
PH UR STRIP: 7 PH (ref 5–7)
PLATELET # BLD AUTO: 409 10E9/L (ref 150–450)
RBC # BLD AUTO: 4.85 10E12/L (ref 3.8–5.2)
RBC #/AREA URNS AUTO: ABNORMAL /HPF
SOURCE: ABNORMAL
SP GR UR STRIP: 1.01 (ref 1–1.03)
UROBILINOGEN UR STRIP-ACNC: 0.2 EU/DL (ref 0.2–1)
WBC # BLD AUTO: 14.1 10E9/L (ref 4–11)
WBC #/AREA URNS AUTO: ABNORMAL /HPF

## 2020-01-07 PROCEDURE — 84484 ASSAY OF TROPONIN QUANT: CPT | Performed by: INTERNAL MEDICINE

## 2020-01-07 PROCEDURE — 80053 COMPREHEN METABOLIC PANEL: CPT | Performed by: INTERNAL MEDICINE

## 2020-01-07 PROCEDURE — 71046 X-RAY EXAM CHEST 2 VIEWS: CPT

## 2020-01-07 PROCEDURE — 85027 COMPLETE CBC AUTOMATED: CPT | Performed by: INTERNAL MEDICINE

## 2020-01-07 PROCEDURE — 36415 COLL VENOUS BLD VENIPUNCTURE: CPT | Performed by: INTERNAL MEDICINE

## 2020-01-07 PROCEDURE — 81001 URINALYSIS AUTO W/SCOPE: CPT | Performed by: INTERNAL MEDICINE

## 2020-01-07 PROCEDURE — 85379 FIBRIN DEGRADATION QUANT: CPT | Performed by: INTERNAL MEDICINE

## 2020-01-07 PROCEDURE — 99214 OFFICE O/P EST MOD 30 MIN: CPT | Performed by: INTERNAL MEDICINE

## 2020-01-07 RX ORDER — PREDNISONE 10 MG/1
10 TABLET ORAL DAILY
Qty: 30 TABLET | Refills: 0 | Status: SHIPPED | OUTPATIENT
Start: 2020-01-07 | End: 2020-11-16

## 2020-01-07 RX ORDER — KETOROLAC TROMETHAMINE 10 MG/1
TABLET, FILM COATED ORAL
Qty: 15 TABLET | Refills: 0 | Status: SHIPPED | OUTPATIENT
Start: 2020-01-07 | End: 2020-01-20

## 2020-01-07 RX ORDER — PREDNISONE 5 MG/1
5 TABLET ORAL DAILY
Qty: 30 TABLET | Refills: 0 | Status: SHIPPED | OUTPATIENT
Start: 2020-01-07 | End: 2020-11-16

## 2020-01-07 ASSESSMENT — MIFFLIN-ST. JEOR: SCORE: 1442.19

## 2020-01-07 NOTE — PROGRESS NOTES
Subjective     Trisha Bender is a 64 year old female who presents to clinic today for the following health issues:    HPI   2 month F/U;    Patient is seen for a follow up visit.  Concerns for increased SOB, past 2 days. Has h/o COPD. On chronic steroid treatment. Considered for lung transplant.   Has mild cough, non productive. Feels SOB on exertion. No chest pain. No fever/ chills. Has been taking inhalers.   Has h/o CHF. No LE edema noted. No palpitations or orthopnea.   Has h/o HTN. on medical treatment. BP has been controlled. No side effects from medications. No CP, HA, dizziness. good compliance with medications and low salt diet.  Patient is on chronic narcotic analgesics for back pain, related to DDD, OA, vertebral compression fractures of the L and Th spine . Has had increased pain in the mid thoracic spine. No neurologic deficits in the LE. Has been weak. Poor ambulation. Has not seen spine specialist yet.   Has h/o anxiety. Increased related to SOB and chronic pain.         Patient Active Problem List   Diagnosis     Essential hypertension with goal blood pressure less than 130/80     Chronic migraine without aura without status migrainosus, not intractable     Esophageal reflux     Disorder of bone and cartilage     Family history of malignant neoplasm of gastrointestinal tract     Chronic airway obstruction (H)     Cystocele     Advanced directives, counseling/discussion     Family history of thyroid cancer - 1/2 brother     Anxiety     Mild persistent asthma without complication     Chronic low back pain     Hair loss     Lateral epicondylitis of right elbow     Dilated cardiomyopathy (H)     Right bundle branch block (RBBB)     CHF (congestive heart failure) (H)     SOB (shortness of breath)     Coronary artery disease involving native coronary artery of native heart without angina pectoris     Mixed hyperlipidemia     Controlled substance agreement signed     Moderate persistent asthma without  complication     Hypokalemia     Mechanical low back pain     Panlobular emphysema (H)     Severe episode of recurrent major depressive disorder, without psychotic features (H)     Abdominal pain     Past Surgical History:   Procedure Laterality Date     ANGIOGRAM  2015    Minimal CAD. LV dysfunction, Mid RCA 20% stenosis, EF 20-25%, c/w Takotsubo cardiomyopathy     APPENDECTOMY       CHOLECYSTECTOMY       COLONOSCOPY N/A 2018    Procedure: COLONOSCOPY;  COLONOSCOPY with biopsies;  Surgeon: Steve Acosta MD;  Location: [facility] OR     ESOPHAGOSCOPY, GASTROSCOPY, DUODENOSCOPY (EGD), COMBINED  11/15/2011    Procedure:COMBINED ESOPHAGOSCOPY, GASTROSCOPY, DUODENOSCOPY (EGD);  ESOPHAGOSCOPY, GASTROSCOPY, DUODENOSCOPY (EGD) ; Surgeon:JIM URENA; Location:[facility] GI     INJECT EPIDURAL LUMBAR / SACRAL SINGLE N/A 2018    Procedure: INJECT EPIDURAL LUMBAR / SACRAL CONTINUAL OR INTERMITTENT;  Lumbar Epidural Steroid Injection;  Surgeon: Az Enamorado MD;  Location: [facility] OR     INJECT EPIDURAL LUMBAR / SACRAL SINGLE N/A 2018    Procedure: INJECT EPIDURAL LUMBAR / SACRAL SINGLE;  Lumbar Epidural Steroid Injection;  Surgeon: Az Enamorado MD;  Location: [facility] OR     LAPAROSCOPIC ABLATION ENDOMETRIOSIS       LAPAROSCOPY DIAGNOSTIC (GYN)      endometriosis resection / lysis of adhesions     Nasal septoplasty and mucous retention cyst       TONSILLECTOMY, ADENOIDECTOMY, COMBINED       [place] teeth extraction         Social History     Tobacco Use     Smoking status: Former Smoker     Packs/day: 1.00     Years: 30.00     Pack years: 30.00     Last attempt to quit: 2006     Years since quittin.9     Smokeless tobacco: Never Used     Tobacco comment: Quit in    Substance Use Topics     Alcohol use: No     Alcohol/week: 0.0 standard drinks     Family History   Problem Relation Age of Onset     Cancer Mother         Colon cancer; dxed at age 64;  at age 69     Cancer - colorectal Mother       Other Cancer Mother      Gastrointestinal Disease Sister         Acute pancreatitis     Heart Disease Father         ?     Coronary Artery Disease Father      Circulatory Maternal Aunt         AAA     Circulatory Maternal Uncle         AAA     Diabetes Son          Current Outpatient Medications   Medication Sig Dispense Refill     albuterol (VENTOLIN HFA) 108 (90 BASE) MCG/ACT Inhaler Inhale 2 puffs into the lungs every 6 hours as needed for shortness of breath / dyspnea or wheezing 18 g 3     alendronate (FOSAMAX) 70 MG tablet Take 1 tablet (70 mg) by mouth every 7 days 12 tablet 3     ALPRAZolam (XANAX) 0.25 MG tablet TAKE ONE TABLET BY MOUTH EVERY 12 HOURS AS NEEDED FOR ANXIETY 40 tablet 0     atorvastatin (LIPITOR) 10 MG tablet Takes 3 times a week 36 tablet 0     azithromycin (ZITHROMAX) 250 MG tablet Take 250 mg by mouth three times a week MWF  1     baclofen (LIORESAL) 10 MG tablet TAKE TWO TABLETS BY MOUTH THREE TIMES A  tablet 0     BREO ELLIPTA 200-25 MCG/INH Inhaler Inhale 1 Dose into the lungs daily   12     cetirizine (ZYRTEC) 10 MG tablet Take 10 mg by mouth daily       [employer] FIBER GUMMIES 2.5 G CHEW Take 1 tablet by mouth daily Reported on 5/8/2017       fluticasone (FLONASE) 50 MCG/ACT nasal spray USE ONE TO TWO SPRAYS IN EACH NOSTRIL EVERY DAY 16 g 3     fluticasone (FLONASE) 50 MCG/ACT nasal spray SPRAY ONE TO TWO SPRAYS IN EACH NOSTRIL ONCE DAILY 16 g 0     furosemide (LASIX) 20 MG tablet TAKE 2 TABLETS (40 MG) BY MOUTH DAILY SEVERE SWELLING 180 tablet 1     HYDROcodone-acetaminophen (NORCO)  MG per tablet Take 1 tablet by mouth every 4 hours as needed for severe pain Take up to 6 tablets a day 180 tablet 0     ipratropium - albuterol 0.5 mg/2.5 mg/3 mL (DUONEB) 0.5-2.5 (3) MG/3ML neb solution Take 1 vial (3 mLs) by nebulization every 6 hours as needed for shortness of breath / dyspnea or wheezing 30 vial 1     ketorolac (TORADOL) 10 MG tablet TAKE ONE TABLET BY MOUTH THREE TIMES A  "DAY AS NEEDED FOR MODERATE PAIN 15 tablet 0     lisinopril (PRINIVIL/ZESTRIL) 2.5 MG tablet TAKE 1 TABLET BY MOUTH EVERY DAY 90 tablet 2     montelukast (SINGULAIR) 10 MG tablet Take 1 tablet (10 mg) by mouth At Bedtime 90 tablet 1     Multiple Vitamin CHEW Take 1 tablet by mouth daily        naloxone (NARCAN) 4 MG/0.1ML nasal spray Spray 1 spray (4 mg) into one nostril alternating nostrils once as needed for opioid reversal Every 2-3 minutes until patient responsive or EMS arrives 0.2 mL 0     ondansetron (ZOFRAN-ODT) 4 MG ODT tab TAKE 1-2 TABLETS (4-8 MG) BY MOUTH EVERY 8 HOURS AS NEEDED FOR NAUSEA 18 tablet 4     pantoprazole (PROTONIX) 40 MG EC tablet Take 40 mg by mouth At Bedtime       potassium chloride ER (K-TAB/KLOR-CON) 10 MEQ CR tablet TAKE 1 TABLET BY MOUTH THREE TIMES A  tablet 1     predniSONE (DELTASONE) 10 MG tablet Take 1 tablet (10 mg) by mouth daily 30 tablet 0     predniSONE (DELTASONE) 5 MG tablet Take 1 tablet (5 mg) by mouth daily 30 tablet 0     roflumilast (DALIRESP) 500 MCG TABS tablet Take 1 tablet (500 mcg) by mouth daily       SUMAtriptan (IMITREX) 25 MG tablet Take 1 tablet (25 mg) by mouth at onset of headache for migraine May repeat in 2 hours if needed: max 2/day; 9 tablet 11     tiotropium (SPIRIVA HANDIHALER) 18 MCG capsule USING THE HANDIHALER, INHALE THE CONTENTS OF ONE CAPSULE BY MOUTH DAILY 90 capsule 3         Reviewed and updated as needed this visit by Provider         Review of Systems   ROS COMP: Constitutional, HEENT, cardiovascular, pulmonary, GI, , musculoskeletal, neuro, skin, endocrine and psych systems are negative, except as otherwise noted.      Objective    Ht 1.778 m (5' 10\")   LMP 09/29/2005   BMI 24.25 kg/m    Body mass index is 24.25 kg/m .  Physical Exam   GENERAL: weak, frail, alert and no distress  EYES: Eyes grossly normal to inspection, PERRL and conjunctivae and sclerae normal  NECK: no adenopathy, no asymmetry, masses, or scars and thyroid " normal to palpation  RESP: lungs clear to auscultation - no rales, rhonchi or wheezes, diminished breaths sounds diffusely   CV: regular rate and rhythm, normal S1 S2, no S3 or S4, no murmur, click or rub,  peripheral pulses strong  ABDOMEN: soft, nontender, no hepatosplenomegaly, no masses and bowel sounds normal  MS: no gross musculoskeletal defects noted, trace LE edema, LS and thoracic spine palpatory tenderness  SKIN: no suspicious lesions or rashes  NEURO: generalized weakness, non focal, mentation intact and speech normal    Diagnostic Test Results:  Labs reviewed in Epic        Assessment & Plan   Problem List Items Addressed This Visit     Essential hypertension with goal blood pressure less than 130/80    Chronic airway obstruction (H) - Primary    Relevant Medications    predniSONE (DELTASONE) 10 MG tablet    predniSONE (DELTASONE) 5 MG tablet    Other Relevant Orders    XR Chest 2 Views    CBC with platelets (Completed)    Comprehensive metabolic panel (Completed)    UA with Microscopic reflex to Culture (Completed)    Anxiety    SOB (shortness of breath)    Relevant Medications    predniSONE (DELTASONE) 10 MG tablet    predniSONE (DELTASONE) 5 MG tablet    Other Relevant Orders    XR Chest 2 Views    CBC with platelets (Completed)    Comprehensive metabolic panel (Completed)    UA with Microscopic reflex to Culture (Completed)    Troponin I (Completed)    D dimer, quantitative (Completed)      Other Visit Diagnoses     Compression fracture of thoracic vertebra with routine healing, unspecified thoracic vertebral level, subsequent encounter        Relevant Medications    predniSONE (DELTASONE) 10 MG tablet    predniSONE (DELTASONE) 5 MG tablet    ketorolac (TORADOL) 10 MG tablet         Increased SOB- assess CXR, lab work for infection, PE  Continue Prednisone, inhalers. Follow up with pulmonary   Needs to see ortho /spine, continue pain medications, advised for side effects   Started on Fosamax ,  continue treatment, monitor DEXA in 2 years   If increased SOB/ CP, needs to go to ED         See Patient Instructions  Return in about 3 months (around 4/7/2020) for follow up on acute problem if persists, Routine Visit.    Bandar Weinberg MD  [facility]

## 2020-01-07 NOTE — NURSING NOTE
"Vital signs:  Temp: 99  F (37.2  C) Temp src: Oral BP: (!) 143/88 Pulse: 134   Resp: 12 SpO2: 94 %     Height: 177.8 cm (5' 10\") Weight: 81.2 kg (179 lb)  Estimated body mass index is 25.68 kg/m  as calculated from the following:    Height as of this encounter: 1.778 m (5' 10\").    Weight as of this encounter: 81.2 kg (179 lb).        "

## 2020-01-08 LAB
ALBUMIN SERPL-MCNC: 3.5 G/DL (ref 3.4–5)
ALP SERPL-CCNC: 78 U/L (ref 40–150)
ALT SERPL W P-5'-P-CCNC: 23 U/L (ref 0–50)
ANION GAP SERPL CALCULATED.3IONS-SCNC: 8 MMOL/L (ref 3–14)
AST SERPL W P-5'-P-CCNC: 9 U/L (ref 0–45)
BILIRUB SERPL-MCNC: 0.2 MG/DL (ref 0.2–1.3)
BUN SERPL-MCNC: 6 MG/DL (ref 7–30)
CALCIUM SERPL-MCNC: 8.9 MG/DL (ref 8.5–10.1)
CHLORIDE SERPL-SCNC: 102 MMOL/L (ref 94–109)
CO2 SERPL-SCNC: 28 MMOL/L (ref 20–32)
CREAT SERPL-MCNC: 0.58 MG/DL (ref 0.52–1.04)
D DIMER PPP FEU-MCNC: <0.3 UG/ML FEU (ref 0–0.5)
GFR SERPL CREATININE-BSD FRML MDRD: >90 ML/MIN/{1.73_M2}
GLUCOSE SERPL-MCNC: 124 MG/DL (ref 70–99)
POTASSIUM SERPL-SCNC: 3.8 MMOL/L (ref 3.4–5.3)
PROT SERPL-MCNC: 6.8 G/DL (ref 6.8–8.8)
SODIUM SERPL-SCNC: 138 MMOL/L (ref 133–144)
TROPONIN I SERPL-MCNC: <0.015 UG/L (ref 0–0.04)

## 2020-01-16 DIAGNOSIS — S22.000D COMPRESSION FRACTURE OF THORACIC VERTEBRA WITH ROUTINE HEALING, UNSPECIFIED THORACIC VERTEBRAL LEVEL, SUBSEQUENT ENCOUNTER: ICD-10-CM

## 2020-01-17 NOTE — TELEPHONE ENCOUNTER
Requested Prescriptions   Pending Prescriptions Disp Refills     ketorolac (TORADOL) 10 MG tablet [Pharmacy Med Name: KETOROLAC TROMETHAMINE 10MG TABS] 15 tablet 0     Sig: TAKE ONE TABLET BY MOUTH THREE TIMES A DAY AS NEEDED FOR MODERATE PAIN       There is no refill protocol information for this order      Last Written Prescription Date:  01/07/2020  Last Fill Quantity: 15,  # refills: 0   Last office visit: 1/7/2020 with prescribing provider:     Future Office Visit:

## 2020-01-20 RX ORDER — KETOROLAC TROMETHAMINE 10 MG/1
TABLET, FILM COATED ORAL
Qty: 15 TABLET | Refills: 0 | Status: SHIPPED | OUTPATIENT
Start: 2020-01-20 | End: 2020-01-29

## 2020-01-22 DIAGNOSIS — K21.9 GASTROESOPHAGEAL REFLUX DISEASE WITHOUT ESOPHAGITIS: Primary | ICD-10-CM

## 2020-01-22 NOTE — TELEPHONE ENCOUNTER
"Requested Prescriptions   Pending Prescriptions Disp Refills     pantoprazole (PROTONIX) 40 MG EC tablet [Pharmacy Med Name: PANTOPRAZOLE SOD DR 40 MG TAB] 90 tablet 2     Sig: TAKE 1 TABLET BY MOUTH EVERY DAY   Last Written Prescription Date:  historic  Last Fill Quantity: n/a,  # refills: n/a   Last office visit: 1/7/2020 with prescribing provider:     Future Office Visit:      PPI Protocol Passed - 1/22/2020  2:04 AM        Passed - Not on Clopidogrel (unless Pantoprazole ordered)        Passed - No diagnosis of osteoporosis on record        Passed - Recent (12 mo) or future (30 days) visit within the authorizing provider's specialty     Patient has had an office visit with the authorizing provider or a provider within the authorizing providers department within the previous 12 mos or has a future within next 30 days. See \"Patient Info\" tab in inbasket, or \"Choose Columns\" in Meds & Orders section of the refill encounter.              Passed - Medication is active on med list        Passed - Patient is age 18 or older        Passed - No active pregnacy on record        Passed - No positive pregnancy test in past 12 months        "

## 2020-01-23 ENCOUNTER — MYC REFILL (OUTPATIENT)
Dept: INTERNAL MEDICINE | Facility: CLINIC | Age: 65
End: 2020-01-23

## 2020-01-23 DIAGNOSIS — G89.29 CHRONIC BILATERAL LOW BACK PAIN, UNSPECIFIED WHETHER SCIATICA PRESENT: ICD-10-CM

## 2020-01-23 DIAGNOSIS — M54.50 CHRONIC BILATERAL LOW BACK PAIN, UNSPECIFIED WHETHER SCIATICA PRESENT: ICD-10-CM

## 2020-01-24 RX ORDER — PANTOPRAZOLE SODIUM 40 MG/1
TABLET, DELAYED RELEASE ORAL
Qty: 90 TABLET | Refills: 3 | Status: SHIPPED | OUTPATIENT
Start: 2020-01-24 | End: 2021-01-15

## 2020-01-27 NOTE — TELEPHONE ENCOUNTER
Refill is too early. Has had Norco 5/325 already filled.   She can take 2 tablets four times a day , that will be the same dose.

## 2020-01-27 NOTE — TELEPHONE ENCOUNTER
Norco refill request    Last refill on 1/2/20 for #180    Last OV on 1/7/20    Routing refill request to provider for review/approval because:  Drug not on the [facility] refill protocol

## 2020-01-28 ENCOUNTER — MYC REFILL (OUTPATIENT)
Dept: INTERNAL MEDICINE | Facility: CLINIC | Age: 65
End: 2020-01-28

## 2020-01-28 DIAGNOSIS — M54.41 CHRONIC BILATERAL LOW BACK PAIN WITH RIGHT-SIDED SCIATICA: ICD-10-CM

## 2020-01-28 DIAGNOSIS — S22.000D COMPRESSION FRACTURE OF THORACIC VERTEBRA WITH ROUTINE HEALING, UNSPECIFIED THORACIC VERTEBRAL LEVEL, SUBSEQUENT ENCOUNTER: ICD-10-CM

## 2020-01-28 DIAGNOSIS — G89.29 CHRONIC BILATERAL LOW BACK PAIN, UNSPECIFIED WHETHER SCIATICA PRESENT: ICD-10-CM

## 2020-01-28 DIAGNOSIS — G89.29 CHRONIC BILATERAL LOW BACK PAIN WITH RIGHT-SIDED SCIATICA: ICD-10-CM

## 2020-01-28 DIAGNOSIS — M54.50 CHRONIC BILATERAL LOW BACK PAIN, UNSPECIFIED WHETHER SCIATICA PRESENT: ICD-10-CM

## 2020-01-28 RX ORDER — HYDROCODONE BITARTRATE AND ACETAMINOPHEN 10; 325 MG/1; MG/1
1 TABLET ORAL EVERY 4 HOURS PRN
Qty: 180 TABLET | Refills: 0 | Status: CANCELLED | OUTPATIENT
Start: 2020-01-28

## 2020-01-29 ENCOUNTER — TELEPHONE (OUTPATIENT)
Dept: INTERNAL MEDICINE | Facility: CLINIC | Age: 65
End: 2020-01-29

## 2020-01-29 RX ORDER — KETOROLAC TROMETHAMINE 10 MG/1
TABLET, FILM COATED ORAL
Qty: 15 TABLET | Refills: 0 | Status: SHIPPED | OUTPATIENT
Start: 2020-01-29 | End: 2021-10-06

## 2020-01-29 RX ORDER — BACLOFEN 10 MG/1
TABLET ORAL
Qty: 180 TABLET | Refills: 5 | Status: SHIPPED | OUTPATIENT
Start: 2020-01-29 | End: 2020-05-06

## 2020-01-29 RX ORDER — HYDROCODONE BITARTRATE AND ACETAMINOPHEN 10; 325 MG/1; MG/1
1 TABLET ORAL EVERY 4 HOURS PRN
Qty: 180 TABLET | Refills: 0 | Status: SHIPPED | OUTPATIENT
Start: 2020-01-29 | End: 2020-02-26

## 2020-01-29 NOTE — TELEPHONE ENCOUNTER
Baclofen and Toradol refill requests.     Last OV on 1/7/20    Toradol last refilled on 1/20/20 for #15.     Baclofen last refilled on 11/21/19 for #180 (One month), with no refills.     Provider approval needed.

## 2020-01-29 NOTE — TELEPHONE ENCOUNTER
Requested Prescriptions   Pending Prescriptions Disp Refills     ketorolac (TORADOL) 10 MG tablet [Pharmacy Med Name: KETOROLAC TROMETHAMINE 10MG TABS] 15 tablet 0     Sig: TAKE ONE TABLET BY MOUTH THREE TIMES A DAY AS NEEDED FOR MODERATE PAIN   Last Written Prescription Date:  01/20/2020  Last Fill Quantity: 15,  # refills: 0   Last office visit: 1/7/2020 with prescribing provider:     Future Office Visit:      There is no refill protocol information for this order        baclofen (LIORESAL) 10 MG tablet [Pharmacy Med Name: BACLOFEN 10MG TABS] 180 tablet 0     Sig: TAKE TWO TABLETS BY MOUTH THREE TIMES A DAY       There is no refill protocol information for this order      Last Written Prescription Date:  11/21/2019  Last Fill Quantity: 180,  # refills: 0   Last office visit: 1/7/2020 with prescribing provider:     Future Office Visit:

## 2020-01-29 NOTE — TELEPHONE ENCOUNTER
Per UpToDate regarding ketorolac use: Ketorolac is indicated for the short-term (up to 5 days in adults) management of moderately severe acute pain that requires analgesia at the opioid level. Oral ketorolac is only indicated as continuation treatment following intravenous (IV) or intramuscular (IM) dosing of ketorolac, if necessary. The total combined duration of use of ketorolac tablets and injection should not exceed 5 days.    Per pharmacy records has been filled 4 times since December and just received another order as 5th fill, please re-evaluate use of this medication and notify pharmacy if still wish to proceed with continuing therapy, thanks!     Yandy Cason, PharmD    [facility]  Phone:  [phone]  Fax:  [phone]

## 2020-01-31 DIAGNOSIS — F41.9 ANXIETY: ICD-10-CM

## 2020-01-31 RX ORDER — ALPRAZOLAM 0.25 MG
TABLET ORAL
Qty: 40 TABLET | Refills: 0 | Status: SHIPPED | OUTPATIENT
Start: 2020-01-31 | End: 2020-03-03

## 2020-01-31 NOTE — TELEPHONE ENCOUNTER
Alprazolam refill request.     Last OV on 1/7/20     Last refill on 12/30/19 for #40 with no refills.     Routing refill request to provider for review/approval because:  Drug not on the [facility] refill protocol

## 2020-01-31 NOTE — TELEPHONE ENCOUNTER
Requested Prescriptions   Pending Prescriptions Disp Refills     ALPRAZolam (XANAX) 0.25 MG tablet [Pharmacy Med Name: ALPRAZOLAM 0.25MG TABS] 40 tablet 0     Sig: TAKE ONE TABLET BY MOUTH EVERY 12 HOURS AS NEEDED FOR ANXIETY       There is no refill protocol information for this order      Last Written Prescription Date:  12/30/2019  Last Fill Quantity: 40,  # refills: 0   Last office visit: 1/7/2020 with prescribing provider:     Future Office Visit:

## 2020-02-02 ENCOUNTER — MYC REFILL (OUTPATIENT)
Dept: INTERNAL MEDICINE | Facility: CLINIC | Age: 65
End: 2020-02-02

## 2020-02-02 DIAGNOSIS — S22.000D COMPRESSION FRACTURE OF THORACIC VERTEBRA WITH ROUTINE HEALING, UNSPECIFIED THORACIC VERTEBRAL LEVEL, SUBSEQUENT ENCOUNTER: ICD-10-CM

## 2020-02-02 RX ORDER — KETOROLAC TROMETHAMINE 10 MG/1
TABLET, FILM COATED ORAL
Qty: 15 TABLET | Refills: 0 | Status: CANCELLED | OUTPATIENT
Start: 2020-02-02

## 2020-02-04 ENCOUNTER — MYC MEDICAL ADVICE (OUTPATIENT)
Dept: INTERNAL MEDICINE | Facility: CLINIC | Age: 65
End: 2020-02-04

## 2020-02-04 NOTE — TELEPHONE ENCOUNTER
Pharmacy has not filled this yet.     Please advise if OK to fill now or hold. Please see their message below and advise.

## 2020-02-10 DIAGNOSIS — R06.02 SOB (SHORTNESS OF BREATH): ICD-10-CM

## 2020-02-10 DIAGNOSIS — J42 CHRONIC BRONCHITIS, UNSPECIFIED CHRONIC BRONCHITIS TYPE (H): ICD-10-CM

## 2020-02-10 NOTE — TELEPHONE ENCOUNTER
Requested Prescriptions   Pending Prescriptions Disp Refills     predniSONE (DELTASONE) 10 MG tablet [Pharmacy Med Name: PREDNISONE  Last Written Prescription Date:  1/7/2020  Last Fill Quantity: 30,  # refills: 0   Last office visit: 1/7/2020 with prescribing provider:     Future Office Visit:   10MG TABS] 30 tablet 0     Sig: TAKE ONE TABLET BY MOUTH ONCE DAILY       There is no refill protocol information for this order        predniSONE (DELTASONE) 5 MG tablet [Pharmacy Med Name: PREDNISONE  Last Written Prescription Date:  1/7/2020  Last Fill Quantity: 30,  # refills: 0   Last office visit: 1/7/2020 with prescribing provider:     Future Office Visit:   5MG TABS] 30 tablet 0     Sig: TAKE ONE TABLET BY MOUTH  DAILY       There is no refill protocol information for this order

## 2020-02-12 RX ORDER — PREDNISONE 10 MG/1
TABLET ORAL
Qty: 30 TABLET | Refills: 0 | OUTPATIENT
Start: 2020-02-12

## 2020-02-12 RX ORDER — PREDNISONE 5 MG/1
TABLET ORAL
Qty: 30 TABLET | Refills: 0 | OUTPATIENT
Start: 2020-02-12

## 2020-02-23 ENCOUNTER — MYC REFILL (OUTPATIENT)
Dept: INTERNAL MEDICINE | Facility: CLINIC | Age: 65
End: 2020-02-23

## 2020-02-23 DIAGNOSIS — G89.29 CHRONIC BILATERAL LOW BACK PAIN, UNSPECIFIED WHETHER SCIATICA PRESENT: ICD-10-CM

## 2020-02-23 DIAGNOSIS — M54.50 CHRONIC BILATERAL LOW BACK PAIN, UNSPECIFIED WHETHER SCIATICA PRESENT: ICD-10-CM

## 2020-02-25 ENCOUNTER — TELEPHONE (OUTPATIENT)
Dept: INTERNAL MEDICINE | Facility: CLINIC | Age: 65
End: 2020-02-25

## 2020-02-25 NOTE — TELEPHONE ENCOUNTER
Norco refill request.     Last refill on 1/29/20 for #180     Last OV on 1/7/20     Routing refill request to provider for review/approval because:  Drug not on the [facility] refill protocol

## 2020-02-25 NOTE — TELEPHONE ENCOUNTER
Patient called to request an RX for Doxycycline due to congestion and cough for 1 week. Patient discussed calling in for RX if she was having symptoms with Dr. Weinberg at last office visit. Please send to [facility] the [facility].

## 2020-02-25 NOTE — TELEPHONE ENCOUNTER
HYDROcodone-acetaminophen      Last Written Prescription Date:  1/29/20  Last Fill Quantity: 180,   # refills: 0  Last Office Visit: 1/7/20  Future Office visit:       Routing refill request to provider for review/approval because:  Drug not on the [facility], [facility] or [facility] refill protocol or controlled substance\

## 2020-02-25 NOTE — TELEPHONE ENCOUNTER
Pt calls for Rx for Doxycycline. She states she has URI for one week. Same as when she had her OV on 1/7/20.     Please advise.     Could have her send E-visit since has been over one month?   Or call Pulmonary?

## 2020-02-26 ENCOUNTER — E-VISIT (OUTPATIENT)
Dept: INTERNAL MEDICINE | Facility: CLINIC | Age: 65
End: 2020-02-26
Payer: COMMERCIAL

## 2020-02-26 DIAGNOSIS — G89.29 CHRONIC BILATERAL LOW BACK PAIN, UNSPECIFIED WHETHER SCIATICA PRESENT: ICD-10-CM

## 2020-02-26 DIAGNOSIS — M54.50 CHRONIC BILATERAL LOW BACK PAIN, UNSPECIFIED WHETHER SCIATICA PRESENT: ICD-10-CM

## 2020-02-26 DIAGNOSIS — J06.9 UPPER RESPIRATORY TRACT INFECTION, UNSPECIFIED TYPE: Primary | ICD-10-CM

## 2020-02-26 PROCEDURE — 99421 OL DIG E/M SVC 5-10 MIN: CPT | Performed by: INTERNAL MEDICINE

## 2020-02-26 RX ORDER — HYDROCODONE BITARTRATE AND ACETAMINOPHEN 10; 325 MG/1; MG/1
1 TABLET ORAL EVERY 4 HOURS PRN
Qty: 180 TABLET | Refills: 0 | OUTPATIENT
Start: 2020-02-26

## 2020-02-26 RX ORDER — HYDROCODONE BITARTRATE AND ACETAMINOPHEN 10; 325 MG/1; MG/1
1 TABLET ORAL EVERY 4 HOURS PRN
Qty: 180 TABLET | Refills: 0 | Status: SHIPPED | OUTPATIENT
Start: 2020-02-26 | End: 2020-03-22

## 2020-02-26 RX ORDER — DOXYCYCLINE 100 MG/1
100 CAPSULE ORAL 2 TIMES DAILY
Qty: 20 CAPSULE | Refills: 0 | Status: SHIPPED | OUTPATIENT
Start: 2020-02-26 | End: 2020-11-16

## 2020-02-26 NOTE — TELEPHONE ENCOUNTER
Patient calls stating that she is due for her medication 2/27/20. Per patient pharmacy will let her pick it up 2 days early, patient is aware that primary care provider is not in the office tomorrow and requesting this be done today.

## 2020-02-26 NOTE — TELEPHONE ENCOUNTER
The patient said she should be able to fill this medication on 2/27/20 but Dr Weinberg will be out of the office that date.

## 2020-02-26 NOTE — PATIENT INSTRUCTIONS
Presents with increased cough, productive, congestion. Has h/o COPD. Has had frequent URI.   Will call in Doxycycline. Continue regular inhalers.   If not better needs recheck in clinic.

## 2020-03-01 DIAGNOSIS — F41.9 ANXIETY: ICD-10-CM

## 2020-03-02 NOTE — TELEPHONE ENCOUNTER
Requested Prescriptions   Pending Prescriptions Disp Refills     ALPRAZolam (XANAX) 0.25 MG tablet [Pharmacy Med Name: ALPRAZOLAM  Last Written Prescription Date:  1/31/2020  Last Fill Quantity: 40,  # refills: 0   Last office visit: 1/7/2020 with prescribing provider:     Future Office Visit:   0.25MG TABS] 40 tablet 0     Sig: TAKE ONE TABLET BY MOUTH EVERY 12 HOURS AS NEEDED FOR ANXIETY       There is no refill protocol information for this order

## 2020-03-03 ENCOUNTER — MYC REFILL (OUTPATIENT)
Dept: INTERNAL MEDICINE | Facility: CLINIC | Age: 65
End: 2020-03-03

## 2020-03-03 DIAGNOSIS — J45.30 MILD PERSISTENT ASTHMA WITHOUT COMPLICATION: ICD-10-CM

## 2020-03-03 DIAGNOSIS — F41.9 ANXIETY: ICD-10-CM

## 2020-03-03 DIAGNOSIS — J42 CHRONIC BRONCHITIS, UNSPECIFIED CHRONIC BRONCHITIS TYPE (H): ICD-10-CM

## 2020-03-03 DIAGNOSIS — R06.02 SOB (SHORTNESS OF BREATH): ICD-10-CM

## 2020-03-03 RX ORDER — ALPRAZOLAM 0.25 MG
TABLET ORAL
Qty: 40 TABLET | Refills: 0 | Status: SHIPPED | OUTPATIENT
Start: 2020-03-03 | End: 2020-03-26

## 2020-03-03 RX ORDER — ALPRAZOLAM 0.25 MG
TABLET ORAL
Qty: 40 TABLET | Refills: 0 | Status: CANCELLED | OUTPATIENT
Start: 2020-03-03

## 2020-03-03 RX ORDER — PREDNISONE 5 MG/1
5 TABLET ORAL DAILY
Qty: 30 TABLET | Refills: 0 | Status: CANCELLED | OUTPATIENT
Start: 2020-03-03

## 2020-03-03 NOTE — TELEPHONE ENCOUNTER
Controlled Substance Refill Request for Alprazolam   Problem List Complete:    No     PROVIDER TO CONSIDER COMPLETION OF PROBLEM LIST AND OVERVIEW/CONTROLLED SUBSTANCE AGREEMENT    Last Written Prescription Date:  1/31/20  Last Fill Quantity: 40,   # refills: 0    THE MOST RECENT OFFICE VISIT MUST BE WITHIN THE PAST 3 MONTHS. AT LEAST ONE FACE TO FACE VISIT MUST OCCUR EVERY 6 MONTHS. ADDITIONAL VISITS CAN BE VIRTUAL.  (THIS STATEMENT SHOULD BE DELETED.)    Last Office Visit with [facility] primary care provider: 1/7/20    Future Office visit:     Controlled substance agreement:   Encounter-Level CSA - 02/23/2016:    Controlled Substance Agreement - Scan on 2/24/2016  1:37 PM: [place] CONTROLLED SUBSTANCE AGREEMENT, 2/23/16     Patient-Level CSA:    There are no patient-level csa.         Last Urine Drug Screen:   Pain Drug SCR UR W RPTD Meds   Date Value Ref Range Status   12/17/2018 FINAL  Final     Comment:     (Note)  ====================================================================  TOXASSURE COMP DRUG ANALYSIS,UR  ====================================================================  Test                             Result       Flag       Units        Drug Present   Hydrocodone                    1330                    ng/mg creat   Hydromorphone                  228                     ng/mg creat   Dihydrocodeine                 265                     ng/mg creat   Norhydrocodone                 1337                    ng/mg creat    Sources of hydrocodone include scheduled prescription    medications. Hydromorphone, dihydrocodeine and norhydrocodone are    expected metabolites of hydrocodone. Hydromorphone and    dihydrocodeine are also available as scheduled prescription    medications.   Baclofen                       PRESENT                               Acetaminophen                  PRESENT                              ====================================================================  Test                       Result    Flag   Units      Ref Range        Creatinine              43               mg/dL      >=20            ====================================================================  Declared Medications:  Medication list was not provided.  ====================================================================  For clinical consultation, please call [phone].  ====================================================================  Analysis performed by [employer] [place] [address]     , No results found for: COMDAT, No results found for: THC13, PCP13, COC13, MAMP13, OPI13, AMP13, BZO13, TCA13, MTD13, BAR13, OXY13, PPX13, BUP13     Writer is not authorized to check  for provider     [email or web address]

## 2020-03-05 RX ORDER — MONTELUKAST SODIUM 10 MG/1
1 TABLET ORAL AT BEDTIME
Qty: 90 TABLET | Refills: 1 | Status: SHIPPED | OUTPATIENT
Start: 2020-03-05 | End: 2020-10-30

## 2020-03-05 NOTE — TELEPHONE ENCOUNTER
See her [employer] message.   Provider approval needed for the Singulair.   Last OV on 1/7/20       The Xanax was faxed in on 3/3.  Pt already got her Prednisone and was supposed to follow up with Pulmonary. Dr Weinberg has denied this once already.     2/12/20:  Bandar Weinberg MD      4:12 PM   Note      I cannot refill her prednisone.   She should follow up with pulmonary medicine for this.

## 2020-03-22 ENCOUNTER — MYC REFILL (OUTPATIENT)
Dept: INTERNAL MEDICINE | Facility: CLINIC | Age: 65
End: 2020-03-22

## 2020-03-22 DIAGNOSIS — M54.50 CHRONIC BILATERAL LOW BACK PAIN, UNSPECIFIED WHETHER SCIATICA PRESENT: ICD-10-CM

## 2020-03-22 DIAGNOSIS — G89.29 CHRONIC BILATERAL LOW BACK PAIN, UNSPECIFIED WHETHER SCIATICA PRESENT: ICD-10-CM

## 2020-03-23 RX ORDER — HYDROCODONE BITARTRATE AND ACETAMINOPHEN 10; 325 MG/1; MG/1
1 TABLET ORAL EVERY 4 HOURS PRN
Qty: 180 TABLET | Refills: 0 | Status: SHIPPED | OUTPATIENT
Start: 2020-03-23 | End: 2020-04-20

## 2020-03-23 NOTE — TELEPHONE ENCOUNTER
Norco refill request.     Last refill on 2/26/20 for #180 with no refills.     Last OV on 1/7/20     Routing refill request to provider for review/approval because:  Drug not on the [facility] refill protocol     CSA signed on 2/24/16

## 2020-03-26 DIAGNOSIS — F41.9 ANXIETY: ICD-10-CM

## 2020-03-26 RX ORDER — ALPRAZOLAM 0.25 MG
TABLET ORAL
Qty: 40 TABLET | Refills: 0 | Status: SHIPPED | OUTPATIENT
Start: 2020-03-26 | End: 2020-04-12

## 2020-03-26 NOTE — TELEPHONE ENCOUNTER
Alprazolam refill request.     Last refill on 3/3/20 for #40 with no refills.     Last E-visit on 2/26/20, for cough.    Last OV on 1/7/20.      Routing refill request to provider for review/approval because:  Drug not on the [facility] refill protocol

## 2020-03-26 NOTE — TELEPHONE ENCOUNTER
Requested Prescriptions   Pending Prescriptions Disp Refills     ALPRAZolam (XANAX) 0.25 MG tablet [Pharmacy Med Name: ALPRAZOLAM 0.25MG TABS] 40 tablet 0     Sig: TAKE ONE TABLET BY MOUTH EVERY 12 HOURS AS NEEDED FOR ANXIETY       There is no refill protocol information for this order      Last Written Prescription Date:  03/03/2020  Last Fill Quantity: 40,  # refills: 0   Last office visit: 1/7/2020 with prescribing provider:     Future Office Visit:

## 2020-04-12 ENCOUNTER — MYC REFILL (OUTPATIENT)
Dept: INTERNAL MEDICINE | Facility: CLINIC | Age: 65
End: 2020-04-12

## 2020-04-12 DIAGNOSIS — F41.9 ANXIETY: ICD-10-CM

## 2020-04-14 ENCOUNTER — MYC MEDICAL ADVICE (OUTPATIENT)
Dept: INTERNAL MEDICINE | Facility: CLINIC | Age: 65
End: 2020-04-14

## 2020-04-14 RX ORDER — ALPRAZOLAM 0.25 MG
0.25 TABLET ORAL 2 TIMES DAILY PRN
Qty: 60 TABLET | Refills: 0 | Status: SHIPPED | OUTPATIENT
Start: 2020-04-14 | End: 2020-05-11

## 2020-04-19 ENCOUNTER — MYC MEDICAL ADVICE (OUTPATIENT)
Dept: INTERNAL MEDICINE | Facility: CLINIC | Age: 65
End: 2020-04-19

## 2020-04-20 ENCOUNTER — MYC REFILL (OUTPATIENT)
Dept: INTERNAL MEDICINE | Facility: CLINIC | Age: 65
End: 2020-04-20

## 2020-04-20 DIAGNOSIS — G89.29 CHRONIC BILATERAL LOW BACK PAIN, UNSPECIFIED WHETHER SCIATICA PRESENT: ICD-10-CM

## 2020-04-20 DIAGNOSIS — M54.50 CHRONIC BILATERAL LOW BACK PAIN, UNSPECIFIED WHETHER SCIATICA PRESENT: ICD-10-CM

## 2020-04-20 RX ORDER — HYDROCODONE BITARTRATE AND ACETAMINOPHEN 10; 325 MG/1; MG/1
1 TABLET ORAL EVERY 4 HOURS PRN
Qty: 180 TABLET | Refills: 0 | Status: SHIPPED | OUTPATIENT
Start: 2020-04-20 | End: 2020-05-17

## 2020-04-20 NOTE — TELEPHONE ENCOUNTER
Norco refill request. Would like to  on Friday    Last refill request on 3/23/20 for #180    Last OV on 1/7/20     Routing refill request to provider for review/approval because:  Drug not on the [facility] refill protocol

## 2020-05-05 DIAGNOSIS — G89.29 CHRONIC BILATERAL LOW BACK PAIN WITH RIGHT-SIDED SCIATICA: ICD-10-CM

## 2020-05-05 DIAGNOSIS — M54.41 CHRONIC BILATERAL LOW BACK PAIN WITH RIGHT-SIDED SCIATICA: ICD-10-CM

## 2020-05-06 RX ORDER — BACLOFEN 10 MG/1
TABLET ORAL
Qty: 180 TABLET | Refills: 5 | Status: SHIPPED | OUTPATIENT
Start: 2020-05-06 | End: 2021-07-15

## 2020-05-06 NOTE — TELEPHONE ENCOUNTER
Last OV on 1/7/20.     Provider approval needed.     Call to pharmacy, they never received the Rx from 1/29/20.   Please fax new Rx.     Requested Prescriptions   Pending Prescriptions Disp Refills     baclofen (LIORESAL) 10 MG tablet [Pharmacy Med Name: BACLOFEN 10MG TABS] 180 tablet 0     Sig: TAKE TWO TABLETS BY MOUTH THREE TIMES A DAY       There is no refill protocol information for this order

## 2020-05-11 DIAGNOSIS — F41.9 ANXIETY: ICD-10-CM

## 2020-05-11 RX ORDER — ALPRAZOLAM 0.25 MG
0.25 TABLET ORAL 2 TIMES DAILY PRN
Qty: 60 TABLET | Refills: 0 | Status: SHIPPED | OUTPATIENT
Start: 2020-05-11 | End: 2020-06-09

## 2020-05-11 NOTE — TELEPHONE ENCOUNTER
Controlled Substance Refill Request for xanax  Problem List Complete:    No     PROVIDER TO CONSIDER COMPLETION OF PROBLEM LIST AND OVERVIEW/CONTROLLED SUBSTANCE AGREEMENT    Last Written Prescription Date:  4/14/20  Last Fill Quantity: 60,   # refills: 0    Last Office Visit with [facility] primary care provider: 1/7/20    Future Office visit:     Controlled substance agreement:   Encounter-Level CSA - 02/23/2016:    Controlled Substance Agreement - Scan on 2/24/2016  1:37 PM: [facility] CONTROLLED SUBSTANCE AGREEMENT, 2/23/16     Patient-Level CSA:    There are no patient-level csa.         Last Urine Drug Screen:   Pain Drug SCR UR W RPTD Meds   Date Value Ref Range Status   12/17/2018 FINAL  Final     Comment:     (Note)  ====================================================================  TOXASSURE COMP DRUG ANALYSIS,UR  ====================================================================  Test                             Result       Flag       Units        Drug Present   Hydrocodone                    1330                    ng/mg creat   Hydromorphone                  228                     ng/mg creat   Dihydrocodeine                 265                     ng/mg creat   Norhydrocodone                 1337                    ng/mg creat    Sources of hydrocodone include scheduled prescription    medications. Hydromorphone, dihydrocodeine and norhydrocodone are    expected metabolites of hydrocodone. Hydromorphone and    dihydrocodeine are also available as scheduled prescription    medications.   Baclofen                       PRESENT                               Acetaminophen                  PRESENT                              ====================================================================  Test                      Result    Flag   Units      Ref Range        Creatinine              43               mg/dL      >=20            ====================================================================  Declared  Medications:  Medication list was not provided.  ====================================================================  For clinical consultation, please call [phone].  ====================================================================  Analysis performed by [employer] [place] [address]     , No results found for: COMDAT, No results found for: THC13, PCP13, COC13, MAMP13, OPI13, AMP13, BZO13, TCA13, MTD13, BAR13, OXY13, PPX13, BUP13     Processing:  Rx to be electronically transmitted to pharmacy by provider      [email or web address]      Writer unable to check -not authorized by provider

## 2020-05-17 ENCOUNTER — MYC REFILL (OUTPATIENT)
Dept: INTERNAL MEDICINE | Facility: CLINIC | Age: 65
End: 2020-05-17

## 2020-05-17 DIAGNOSIS — M54.50 CHRONIC BILATERAL LOW BACK PAIN, UNSPECIFIED WHETHER SCIATICA PRESENT: ICD-10-CM

## 2020-05-17 DIAGNOSIS — G89.29 CHRONIC BILATERAL LOW BACK PAIN, UNSPECIFIED WHETHER SCIATICA PRESENT: ICD-10-CM

## 2020-05-18 RX ORDER — HYDROCODONE BITARTRATE AND ACETAMINOPHEN 10; 325 MG/1; MG/1
1 TABLET ORAL EVERY 4 HOURS PRN
Qty: 180 TABLET | Refills: 0 | Status: SHIPPED | OUTPATIENT
Start: 2020-05-18 | End: 2020-06-14

## 2020-05-18 NOTE — TELEPHONE ENCOUNTER
Norco refill request.     Last OV on 1/7/20    Last refill on 4/20/20 for #180.     Routing refill request to provider for review/approval because:  Drug not on the [facility] refill protocol

## 2020-06-05 ENCOUNTER — TRANSFERRED RECORDS (OUTPATIENT)
Dept: HEALTH INFORMATION MANAGEMENT | Facility: CLINIC | Age: 65
End: 2020-06-05

## 2020-06-07 DIAGNOSIS — F41.9 ANXIETY: ICD-10-CM

## 2020-06-09 RX ORDER — ALPRAZOLAM 0.25 MG
0.25 TABLET ORAL 2 TIMES DAILY PRN
Qty: 60 TABLET | Refills: 0 | Status: SHIPPED | OUTPATIENT
Start: 2020-06-09 | End: 2020-07-02

## 2020-06-09 NOTE — TELEPHONE ENCOUNTER
Alprazolam refill request.     Last OV on 2/26/20     Last refill on 5/11/20 for #60 with no refills.     Routing refill request to provider for review/approval because:  Drug not on the [facility] refill protocol

## 2020-06-11 ENCOUNTER — VIRTUAL VISIT (OUTPATIENT)
Dept: CARDIOLOGY | Facility: CLINIC | Age: 65
End: 2020-06-11
Payer: COMMERCIAL

## 2020-06-11 DIAGNOSIS — E78.2 MIXED HYPERLIPIDEMIA: ICD-10-CM

## 2020-06-11 DIAGNOSIS — I25.10 CORONARY ARTERY DISEASE INVOLVING NATIVE CORONARY ARTERY OF NATIVE HEART WITHOUT ANGINA PECTORIS: Primary | ICD-10-CM

## 2020-06-11 DIAGNOSIS — R06.02 SOB (SHORTNESS OF BREATH): ICD-10-CM

## 2020-06-11 DIAGNOSIS — R00.0 TACHYCARDIA: ICD-10-CM

## 2020-06-11 DIAGNOSIS — J43.8 OTHER EMPHYSEMA (H): ICD-10-CM

## 2020-06-11 PROCEDURE — 99214 OFFICE O/P EST MOD 30 MIN: CPT | Mod: 95 | Performed by: INTERNAL MEDICINE

## 2020-06-11 RX ORDER — DILTIAZEM HYDROCHLORIDE 120 MG/1
120 TABLET, FILM COATED ORAL DAILY
COMMUNITY
End: 2020-07-21

## 2020-06-11 NOTE — LETTER
6/11/2020    Bandar Weinberg MD  [address] [facility] [address]    RE: Trisha Bender       Dear Colleague,    I had the pleasure of seeing Trisha Bender in the [facility].    Trisha Bender is a 64 year old female who is being evaluated via a billable video visit.         REFERRING PROVIDER:  Dr. Bandar Weinberg      HISTORY OF PRESENT ILLNESS:  Ms. Bender is a pleasant 64-year-old female who has been seen by Dr. Odell in the past.  She has a history of idiopathic cardiomyopathy thought to be related to takotsubo.  Her last echocardiogram in 09/2019 suggested normal LV function with an EF estimated 55%-60%.  She has severe end-stage COPD and has been on a lung transplant list.  She sounds like she was taken off of it for a while but in the last several months she has had progressive shortness of breath, worsening and so she has been asked by her primary to contact the transplant team yet again.  She was noted to have sinus tachycardia on exam and was recently put on diltiazem a couple months ago by her primary, 120 mg daily.  She has noted some decrease in her heart rate but it still remains elevated over 100 at rest.  She states she had been on carvedilol and tolerated this without any reactive airway disease and wonders if she should go back on this instead of the diltiazem.  She has been monitoring her oxygen levels, both before and after she exerts herself and these have remained above 92% without supplemental oxygen.  Her weight is 185 pounds.  She was not able to self-report her blood pressure today.       SUMMARY:  Ms. Bender is a very pleasant 64-year-old female with end-stage COPD, history of idiopathic cardiomyopathy and sinus tachycardia.  She has been experiencing progressive worsening of her breathing over the last several months.  She was recommended to follow up with Cardiology.  I would recommend that we perform a repeat echocardiogram to  reassess her LV function and I would also like her to wear a heart monitor to assess for any arrhythmias and her average heart rates.  I explained both of these tests to her.  I would like her to wear the cardiac event monitor for real-time data.  She is agreeable.  We will get these scheduled and a followup visit to go over these test results once complete.  Please feel free to contact me with any questions you have in regards to her care.       Thank you for allowing me to participate in the care of your patient.    Sincerely,     Justina Finney, DO     [facility]

## 2020-06-11 NOTE — PROGRESS NOTES
Service Date: 06/11/2020      VIDEO CLINIC FOLLOWUP VISIT      REFERRING PROVIDER:  Dr. Bandar Weinberg      HISTORY OF PRESENT ILLNESS:  Ms. Bender is a pleasant 64-year-old female who has been seen by Dr. Odell in the past.  She has a history of idiopathic cardiomyopathy thought to be related to takotsubo.  Her last echocardiogram in 09/2019 suggested normal LV function with an EF estimated 55%-60%.  She has severe end-stage COPD and has been on a lung transplant list.  She sounds like she was taken off of it for a while but in the last several months she has had progressive shortness of breath, worsening and so she has been asked by her primary to contact the transplant team yet again.  She was noted to have sinus tachycardia on exam and was recently put on diltiazem a couple months ago by her primary, 120 mg daily.  She has noted some decrease in her heart rate but it still remains elevated over 100 at rest.  She states she had been on carvedilol and tolerated this without any reactive airway disease and wonders if she should go back on this instead of the diltiazem.  She has been monitoring her oxygen levels, both before and after she exerts herself and these have remained above 92% without supplemental oxygen.  Her weight is 185 pounds.  She was not able to self-report her blood pressure today.       SUMMARY:  Ms. Bender is a very pleasant 64-year-old female with end-stage COPD, history of idiopathic cardiomyopathy and sinus tachycardia.  She has been experiencing progressive worsening of her breathing over the last several months.  She was recommended to follow up with Cardiology.  I would recommend that we perform a repeat echocardiogram to reassess her LV function and I would also like her to wear a heart monitor to assess for any arrhythmias and her average heart rates.  I explained both of these tests to her.  I would like her to wear the cardiac event monitor for real-time data.  She is agreeable.  We  will get these scheduled and a followup visit to go over these test results once complete.  Please feel free to contact me with any questions you have in regards to her care.      cc:   Bandar Weinberg MD    [facility]    [address], [facility] [address]   [place] [address]         RUTH REAVES DO             D: 2020   T: 2020   MT: HOMAR      Name:     MOSES TIMMONS   MRN:      [id number]        Account:      [id number]   :      1955           Service Date: 2020      Document: [id number]

## 2020-06-11 NOTE — PROGRESS NOTES
"Trisha Bender is a 64 year old female who is being evaluated via a billable video visit.      The patient has been notified of following:     \"This video visit will be conducted via a call between you and your physician/provider. We have found that certain health care needs can be provided without the need for an in-person physical exam.  This service lets us provide the care you need with a video conversation.  If a prescription is necessary we can send it directly to your pharmacy.  If lab work is needed we can place an order for that and you can then stop by our lab to have the test done at a later time.    Video visits are billed at different rates depending on your insurance coverage.  Please reach out to your insurance provider with any questions.    If during the course of the call the physician/provider feels a video visit is not appropriate, you will not be charged for this service.\"    Patient has given verbal consent for Video visit? Yes    Text [phone]    Will anyone else be joining your video visit? No      Patient reported vitals:  BP: n/a  Heart rate:  Weight: 185lb    Review Of Systems  Skin: negative  Eyes: glasses  Ears/Nose/Throat: negative  Respiratory: Shortness of breath-  and Dyspnea on exertion-   Cardiovascular: lower extremity edema  Gastrointestinal: negative  Genitourinary: negative  Musculoskeletal: back pain  Neurologic: negative and migraine headaches  Psychiatric: negative  Hematologic/Lymphatic/Immunologic: negative  Endocrine: negative    Sapna Reyes CMA 6/11/2020  General:  no apparent distress, normal body habitus, sitting upright.  ENT/Mouth:  no nasal discharge.  Normal head shape, no apparent injury or laceration.  Eyes:  no scleral icterus, normal conjunctivae.  No observed jaundice.  Neck:  no apparent neck swelling.   Chest/Lungs:  No breathing difficulty while speaking.  No audible wheezing.  No cough during conversation.  Cardiovascular:  No obviously elevated " jugular venous pressure.  No apparent edema bilaterally in LE.   Abdomen:  no obvious abdominal distention.   Extremities:  no apparent cyanosis.  Skin:  no xanthelasma.  No facial lacerations.  Neurologic:  Normal arm motion bilateral, no tremors.    Psychiatric:  Alert and oriented x3, calm demeanor    The rest of the comprehensive physical examination is deferred due to public health emergency video visit restrictions.      Video-Visit Details    Type of service:  Video Visit    Video Start Time: 11:26A  Video End Time: 11:40A    Originating Location (pt. Location): Home    Distant Location (provider location):  [facility]     Platform used for Video Visit: Alysha CERVANTES DO

## 2020-06-14 ENCOUNTER — MYC REFILL (OUTPATIENT)
Dept: INTERNAL MEDICINE | Facility: CLINIC | Age: 65
End: 2020-06-14

## 2020-06-14 DIAGNOSIS — M54.50 CHRONIC BILATERAL LOW BACK PAIN, UNSPECIFIED WHETHER SCIATICA PRESENT: ICD-10-CM

## 2020-06-14 DIAGNOSIS — G89.29 CHRONIC BILATERAL LOW BACK PAIN, UNSPECIFIED WHETHER SCIATICA PRESENT: ICD-10-CM

## 2020-06-16 ENCOUNTER — MYC MEDICAL ADVICE (OUTPATIENT)
Dept: INTERNAL MEDICINE | Facility: CLINIC | Age: 65
End: 2020-06-16

## 2020-06-16 RX ORDER — HYDROCODONE BITARTRATE AND ACETAMINOPHEN 10; 325 MG/1; MG/1
1 TABLET ORAL EVERY 4 HOURS PRN
Qty: 180 TABLET | Refills: 0 | Status: SHIPPED | OUTPATIENT
Start: 2020-06-16 | End: 2020-07-10

## 2020-06-16 NOTE — TELEPHONE ENCOUNTER
Controlled Substance Refill Request for Norco  Problem List Complete:    No     PROVIDER TO CONSIDER COMPLETION OF PROBLEM LIST AND OVERVIEW/CONTROLLED SUBSTANCE AGREEMENT    Last Written Prescription Date:  5/18/20  Last Fill Quantity: 180,   # refills: 0    THE MOST RECENT OFFICE VISIT MUST BE WITHIN THE PAST 3 MONTHS. AT LEAST ONE FACE TO FACE VISIT MUST OCCUR EVERY 6 MONTHS. ADDITIONAL VISITS CAN BE VIRTUAL.    Last Office Visit with [facility] primary care provider: 2/26/20 evisit for cough    Future Office visit:     Controlled substance agreement:   Encounter-Level CSA - 02/23/2016:    Controlled Substance Agreement - Scan on 2/24/2016  1:37 PM: [facility] CONTROLLED SUBSTANCE AGREEMENT, 2/23/16     Patient-Level CSA:    There are no patient-level csa.         Last Urine Drug Screen:   Pain Drug SCR UR W RPTD Meds   Date Value Ref Range Status   12/17/2018 FINAL  Final     Comment:     (Note)  ====================================================================  TOXASSURE COMP DRUG ANALYSIS,UR  ====================================================================  Test                             Result       Flag       Units        Drug Present   Hydrocodone                    1330                    ng/mg creat   Hydromorphone                  228                     ng/mg creat   Dihydrocodeine                 265                     ng/mg creat   Norhydrocodone                 1337                    ng/mg creat    Sources of hydrocodone include scheduled prescription    medications. Hydromorphone, dihydrocodeine and norhydrocodone are    expected metabolites of hydrocodone. Hydromorphone and    dihydrocodeine are also available as scheduled prescription    medications.   Baclofen                       PRESENT                               Acetaminophen                  PRESENT                              ====================================================================  Test                      Result    Flag    Units      Ref Range        Creatinine              43               mg/dL      >=20            ====================================================================  Declared Medications:  Medication list was not provided.  ====================================================================  For clinical consultation, please call [phone].  ====================================================================  Analysis performed by [employer] [place] [address]     , No results found for: COMDAT, No results found for: THC13, PCP13, COC13, MAMP13, OPI13, AMP13, BZO13, TCA13, MTD13, BAR13, OXY13, PPX13, BUP13     Processing:  Rx to be electronically transmitted to pharmacy by provider      [email or web address]       checked in past 3 months?  Unable to access for this provider

## 2020-06-21 DIAGNOSIS — R09.81 NASAL CONGESTION: ICD-10-CM

## 2020-06-23 RX ORDER — FLUTICASONE PROPIONATE 50 MCG
SPRAY, SUSPENSION (ML) NASAL
Qty: 16 G | Refills: 1 | Status: SHIPPED | OUTPATIENT
Start: 2020-06-23 | End: 2020-08-31

## 2020-06-23 NOTE — TELEPHONE ENCOUNTER
Pending Prescriptions:                       Disp   Refills    fluticasone (FLONASE) 50 MCG/ACT nasal sp*16 g   1            Sig: USE ONE TO TWO SPRAYS IN EACH NOSTRIL EVERY DAY    Prescription approved per [facility] Refill Protocol.

## 2020-06-25 ENCOUNTER — HOSPITAL ENCOUNTER (OUTPATIENT)
Dept: CARDIOLOGY | Facility: CLINIC | Age: 65
Discharge: HOME | End: 2020-06-25
Attending: INTERNAL MEDICINE | Admitting: INTERNAL MEDICINE
Payer: COMMERCIAL

## 2020-06-25 ENCOUNTER — TELEPHONE (OUTPATIENT)
Dept: CARDIOLOGY | Facility: CLINIC | Age: 65
End: 2020-06-25

## 2020-06-25 DIAGNOSIS — R06.02 SOB (SHORTNESS OF BREATH): ICD-10-CM

## 2020-06-25 DIAGNOSIS — E78.2 MIXED HYPERLIPIDEMIA: ICD-10-CM

## 2020-06-25 DIAGNOSIS — R00.0 TACHYCARDIA: ICD-10-CM

## 2020-06-25 DIAGNOSIS — I25.10 CORONARY ARTERY DISEASE INVOLVING NATIVE CORONARY ARTERY OF NATIVE HEART WITHOUT ANGINA PECTORIS: ICD-10-CM

## 2020-06-25 DIAGNOSIS — J43.8 OTHER EMPHYSEMA (H): ICD-10-CM

## 2020-06-25 PROCEDURE — 93270 REMOTE 30 DAY ECG REV/REPORT: CPT

## 2020-06-25 PROCEDURE — 25500064 ZZH RX 255 OP 636: Performed by: INTERNAL MEDICINE

## 2020-06-25 PROCEDURE — 93272 ECG/REVIEW INTERPRET ONLY: CPT | Performed by: INTERNAL MEDICINE

## 2020-06-25 PROCEDURE — 93306 TTE W/DOPPLER COMPLETE: CPT | Mod: 26 | Performed by: INTERNAL MEDICINE

## 2020-06-25 PROCEDURE — 40000264 ECHOCARDIOGRAM COMPLETE

## 2020-06-25 RX ADMIN — HUMAN ALBUMIN MICROSPHERES AND PERFLUTREN 3 ML: 10; .22 INJECTION, SOLUTION INTRAVENOUS at 13:26

## 2020-06-25 NOTE — TELEPHONE ENCOUNTER
Pt had appt with Dr. Finney on 6/11/20.  Per Dr. Finney's note:  She has been experiencing progressive worsening of her breathing over the last several months.  She was recommended to follow up with Cardiology.  I would recommend that we perform a repeat echocardiogram to reassess her LV function and I would also like her to wear a heart monitor to assess for any arrhythmias and her average heart rates.     Pt had echo done today:  Interpretation Summary    The visual ejection fraction is estimated at 40-45%.  Left ventricular systolic function is mildly reduced.  The rhythm was undetermined.  Tachycardic at 134 BPM  Technically difficult, suboptimal study.

## 2020-06-30 ENCOUNTER — DOCUMENTATION ONLY (OUTPATIENT)
Dept: CARDIOLOGY | Facility: CLINIC | Age: 65
End: 2020-06-30

## 2020-06-30 NOTE — PROGRESS NOTES
Received 24 hour transmission report from [employer]    6/25/20 13:53:27 CDT  Findings: baseline - sinus tachycardia with IVCD  HR: 129  Symptoms: symptom other than listed  Activities: baseline recording

## 2020-06-30 NOTE — PROGRESS NOTES
Received 24 hour transmission report from [employer]    6/28/20 06:54:18 CDT  Findings: sinus tachycardia with IVCD and ventricular tachycardia onset 15 beats, rate 181 bpm  HR: 181  Symptoms: none indicated   Activities: automatic event     6/28/20 06:54:24 CDT  Findings: ventricular tachycardia 15 beats, rate 181 bpm offset into sinus tachycardia with PAC  HR: 181  Symptoms: none indicated   Activities: automatic event

## 2020-07-02 ENCOUNTER — MYC MEDICAL ADVICE (OUTPATIENT)
Dept: INTERNAL MEDICINE | Facility: CLINIC | Age: 65
End: 2020-07-02

## 2020-07-02 ENCOUNTER — MYC REFILL (OUTPATIENT)
Dept: INTERNAL MEDICINE | Facility: CLINIC | Age: 65
End: 2020-07-02

## 2020-07-02 DIAGNOSIS — F41.9 ANXIETY: ICD-10-CM

## 2020-07-03 RX ORDER — ALPRAZOLAM 0.25 MG
0.25 TABLET ORAL 2 TIMES DAILY PRN
Qty: 60 TABLET | Refills: 0 | Status: SHIPPED | OUTPATIENT
Start: 2020-07-03 | End: 2020-08-03

## 2020-07-03 NOTE — TELEPHONE ENCOUNTER
I don't agree with increasing her dose.   Increased dose poses more danger than benefit with her other medications.

## 2020-07-03 NOTE — TELEPHONE ENCOUNTER
See the other [employer] message. She is asking for an extra 10 a month.     Pended new Rx if OK. Needs to change Dispense amount to #70.     Last refill on 6/9/20 for #60 with no refills.     Routing refill request to provider for review/approval because:  Drug not on the [facility] refill protocol     [place] Early [place]ill on [place] last month. 6 days early. Please review.     Last OV on 1/7/20      (0) independent

## 2020-07-08 DIAGNOSIS — I50.21 ACUTE SYSTOLIC CONGESTIVE HEART FAILURE (H): ICD-10-CM

## 2020-07-08 RX ORDER — FUROSEMIDE 20 MG
TABLET ORAL
Qty: 180 TABLET | Refills: 1 | Status: SHIPPED | OUTPATIENT
Start: 2020-07-08 | End: 2020-10-13

## 2020-07-08 NOTE — TELEPHONE ENCOUNTER
Pending Prescriptions:                       Disp   Refills    furosemide (LASIX) 20 MG tablet [Pharmacy *180 ta*1        Sig: TAKE 2 TABLETS (40 MG) BY MOUTH DAILY SEVERE SWELLING    Routing refill request to provider for review/approval because:  Blood pressures out of range      BP Readings from Last 3 Encounters:   01/07/20 (!) 143/88   11/11/19 132/78   10/28/19 133/63

## 2020-07-10 ENCOUNTER — MYC REFILL (OUTPATIENT)
Dept: INTERNAL MEDICINE | Facility: CLINIC | Age: 65
End: 2020-07-10

## 2020-07-10 DIAGNOSIS — G89.29 CHRONIC BILATERAL LOW BACK PAIN, UNSPECIFIED WHETHER SCIATICA PRESENT: ICD-10-CM

## 2020-07-10 DIAGNOSIS — M54.50 CHRONIC BILATERAL LOW BACK PAIN, UNSPECIFIED WHETHER SCIATICA PRESENT: ICD-10-CM

## 2020-07-10 NOTE — TELEPHONE ENCOUNTER
Norco refill request    Last refill on 6/16/20 for #180     Last OV on 1/7/20    Routing refill request to provider for review/approval because:  Drug not on the [facility] refill protocol     [place] today, no concerns.

## 2020-07-12 ENCOUNTER — MYC MEDICAL ADVICE (OUTPATIENT)
Dept: INTERNAL MEDICINE | Facility: CLINIC | Age: 65
End: 2020-07-12

## 2020-07-13 RX ORDER — HYDROCODONE BITARTRATE AND ACETAMINOPHEN 10; 325 MG/1; MG/1
1 TABLET ORAL EVERY 4 HOURS PRN
Qty: 180 TABLET | Refills: 0 | Status: SHIPPED | OUTPATIENT
Start: 2020-07-13 | End: 2020-08-04

## 2020-07-13 NOTE — TELEPHONE ENCOUNTER
Pt sent [employer] message asking about this [place] refill.     Sent message back to pt and Advised pt that last month refill was done on 6/16; therefore Dr Weinberg will probably address tomorrow when he returns.

## 2020-07-14 ENCOUNTER — TELEPHONE (OUTPATIENT)
Dept: CARDIOLOGY | Facility: CLINIC | Age: 65
End: 2020-07-14

## 2020-07-14 NOTE — TELEPHONE ENCOUNTER
Reviewed pt's event monitor strips and echo results with Dr. Finney.   Please see previous encounter for event monitor results showing sinus tachycardia and 15 beats of Vtach.   Dr. Finney noted that reduced LVEF (40-45%) on echo as well compared to 55-60% on echo from 9/2019.   Dr. Finney recommends that pt has follow up with an [facility] to review results and complete risk and benefits for a right and left heart cath.   -----------------------------------------------------------------------------------------------------    Spoke with pt about results and Dr. Finney's recommendations.   Pt gave verbal understanding and was scheduled to have virtual visit with Yi Snell on 7/21/20.

## 2020-07-14 NOTE — PROGRESS NOTES
Received 24 hour transmission report from [employer]     7/11/20 07:48:07 CDT  Findings: sinus tachycardia with IVCD and PSVT 25 beats  HR: 188  Symptoms: none indicated   Activities: automatic event      7/11/20 07:48:17 CDT  Findings: PSVT 25 beats, rate 188 bpm offset into sinus tachycardia with IVCD  HR: 188  Symptoms: none indicated   Activities: automatic event

## 2020-07-20 ENCOUNTER — DOCUMENTATION ONLY (OUTPATIENT)
Dept: CARDIOLOGY | Facility: CLINIC | Age: 65
End: 2020-07-20

## 2020-07-20 NOTE — PROGRESS NOTES
"Trisha Bender is a 64 year old female who is being evaluated via a billable video visit.  This visit is being conducted as a virtual visit due to the emphasis on mitigation of the COVID-19 virus pandemic. The clinician has decided that the risk of an in-office visit outweighs the benefit for this patient.     The patient has been notified of following:   \"This video visit will be conducted via a call between you and your physician/provider. We have found that certain health care needs can be provided without the need for an in-person physical exam.  This service lets us provide the care you need with a video conversation.  If a prescription is necessary we can send it directly to your pharmacy.  If lab work is needed we can place an order for that and you can then stop by our lab to have the test done at a later time.  If during the course of the call the physician/provider feels a video visit is not appropriate, you will not be charged for this service.\"     Patient has given verbal consent for Video visit? Yes    Patient would like the video invitation sent by: Other e-mail: my Chart    Video Start Time: 14:05 pm        Review Of Systems  Skin: negative  Eyes: glasses  Ears/Nose/Throat: negative  Respiratory: Shortness of breath- sometimes and Dyspnea on exertion- mostly with any movement   Cardiovascular: Palpitations, swelling in ankles and feet   Gastrointestinal: reflux and controlled with meds   Genitourinary: negative  Musculoskeletal: back pain, arthritis and joint pain  Neurologic: migraine headaches and headaches  Psychiatric: excessive stress, anxiety and depression  Hematologic/Lymphatic/Immunologic: negative  Endocrine: negative       I have reviewed and updated the patient's Past Medical History, Social History, Family History and Medication List.    PROBLEM LIST  Patient Active Problem List   Diagnosis     Essential hypertension with goal blood pressure less than 130/80     Chronic migraine " without aura without status migrainosus, not intractable     Esophageal reflux     Disorder of bone and cartilage     Family history of malignant neoplasm of gastrointestinal tract     Chronic airway obstruction (H)     Cystocele     Advanced directives, counseling/discussion     Family history of thyroid cancer - 1/2 brother     Anxiety     Mild persistent asthma without complication     Chronic low back pain     Hair loss     Lateral epicondylitis of right elbow     Dilated cardiomyopathy (H)     Right bundle branch block (RBBB)     CHF (congestive heart failure) (H)     SOB (shortness of breath)     Coronary artery disease involving native coronary artery of native heart without angina pectoris     Mixed hyperlipidemia     Controlled substance agreement signed     Moderate persistent asthma without complication     Hypokalemia     Mechanical low back pain     Panlobular emphysema (H)     Severe episode of recurrent major depressive disorder, without psychotic features (H)     Abdominal pain       ALLERGIES  Gabapentin; Contrast dye; Escitalopram; Peanuts [nuts]; Penicillins; Sulfa drugs; Tace [chlorotrianisene]; Aspirin; Ibuprofen; and Strawberry    MEDICATIONS  Current Outpatient Medications   Medication Sig Dispense Refill     albuterol (VENTOLIN HFA) 108 (90 BASE) MCG/ACT Inhaler Inhale 2 puffs into the lungs every 6 hours as needed for shortness of breath / dyspnea or wheezing 18 g 3     alendronate (FOSAMAX) 70 MG tablet Take 1 tablet (70 mg) by mouth every 7 days 12 tablet 3     ALPRAZolam (XANAX) 0.25 MG tablet Take 1 tablet (0.25 mg) by mouth 2 times daily as needed for anxiety 60 tablet 0     atorvastatin (LIPITOR) 10 MG tablet Takes 3 times a week 36 tablet 0     azithromycin (ZITHROMAX) 250 MG tablet Take 250 mg by mouth three times a week MWF  1     baclofen (LIORESAL) 10 MG tablet TAKE TWO TABLETS BY MOUTH THREE TIMES A  tablet 5     BREO ELLIPTA 200-25 MCG/INH Inhaler Inhale 1 Dose into the  lungs daily   12     cetirizine (ZYRTEC) 10 MG tablet Take 10 mg by mouth daily       [employer] FIBER GUMMIES 2.5 G CHEW Take 1 tablet by mouth daily Reported on 5/8/2017       diltiazem (CARDIZEM) 120 MG tablet Take 120 mg by mouth daily       doxycycline hyclate (VIBRAMYCIN) 100 MG capsule Take 1 capsule (100 mg) by mouth 2 times daily (Patient not taking: Reported on 6/11/2020) 20 capsule 0     fluticasone (FLONASE) 50 MCG/ACT nasal spray USE ONE TO TWO SPRAYS IN EACH NOSTRIL EVERY DAY 16 g 1     furosemide (LASIX) 20 MG tablet TAKE 2 TABLETS (40 MG) BY MOUTH DAILY SEVERE SWELLING 180 tablet 1     HYDROcodone-acetaminophen (NORCO)  MG per tablet Take 1 tablet by mouth every 4 hours as needed for severe pain Take up to 6 tablets a day 180 tablet 0     ipratropium - albuterol 0.5 mg/2.5 mg/3 mL (DUONEB) 0.5-2.5 (3) MG/3ML neb solution Take 1 vial (3 mLs) by nebulization every 6 hours as needed for shortness of breath / dyspnea or wheezing 30 vial 1     ketorolac (TORADOL) 10 MG tablet TAKE ONE TABLET BY MOUTH THREE TIMES A DAY AS NEEDED FOR MODERATE PAIN (Patient not taking: Reported on 6/11/2020) 15 tablet 0     lisinopril (PRINIVIL/ZESTRIL) 2.5 MG tablet TAKE 1 TABLET BY MOUTH EVERY DAY 90 tablet 2     montelukast (SINGULAIR) 10 MG tablet Take 1 tablet (10 mg) by mouth At Bedtime 90 tablet 1     Multiple Vitamin CHEW Take 1 tablet by mouth daily        naloxone (NARCAN) 4 MG/0.1ML nasal spray Spray 1 spray (4 mg) into one nostril alternating nostrils once as needed for opioid reversal Every 2-3 minutes until patient responsive or EMS arrives (Patient not taking: Reported on 6/11/2020) 0.2 mL 0     ondansetron (ZOFRAN-ODT) 4 MG ODT tab TAKE 1-2 TABLETS (4-8 MG) BY MOUTH EVERY 8 HOURS AS NEEDED FOR NAUSEA 18 tablet 4     pantoprazole (PROTONIX) 40 MG EC tablet TAKE 1 TABLET BY MOUTH EVERY DAY 90 tablet 3     potassium chloride ER (K-TAB/KLOR-CON) 10 MEQ CR tablet TAKE 1 TABLET BY MOUTH THREE TIMES A  tablet  1     predniSONE (DELTASONE) 10 MG tablet Take 1 tablet (10 mg) by mouth daily 30 tablet 0     predniSONE (DELTASONE) 5 MG tablet Take 1 tablet (5 mg) by mouth daily 30 tablet 0     roflumilast (DALIRESP) 500 MCG TABS tablet Take 1 tablet (500 mcg) by mouth daily       SUMAtriptan (IMITREX) 25 MG tablet Take 1 tablet (25 mg) by mouth at onset of headache for migraine May repeat in 2 hours if needed: max 2/day; 9 tablet 11     tiotropium (SPIRIVA HANDIHALER) 18 MCG capsule USING THE HANDIHALER, INHALE THE CONTENTS OF ONE CAPSULE BY MOUTH DAILY 90 capsule 3       ROS:  12-pt ROS is negative except for as noted above.    EXAM:  A limited exam was conducted via video.  Constitutional: Patient is pleasant, alert, in no distress. overweight, upright.  ENT: Membranes moist, no nasal discharge or bleeding gums. Normal head shape, no evidence of injury or laceration.  EYES: No scleral icterus, normal conjunctivae. EOM's appear intact. No observed jaundice  Neck: No evidence of thyromegaly.   Chest/Lungs: appears SOB with talking. No audible wheezing, no cough  Equal chest wall expansion.   Cardiovascular: No obvious elevated JVP. No apparent pitting edema bilaterally.  Abdomen: No evidence of abdominal distention.   Extremities: No edema, erythema, cyanosis noted.  Skin: No rashes or lesions appreciated on visualized skin, normal skin color.  Neurologic: Normal mentation. Normal arm motion bilaterally, no tremors. No evidence of focal defect.  Psychiatric: Appropriate affect. A&Ox3.  Calm demeanor      HISTORY OF PRESENT ILLNESS  This is a 64 year old female who follows with [facility].  She is a patient of Dr. Finney seen in our clinic for idiopathic CM, severe COPD, sinus tachycardia, HTN, hyperlipidemia.    Ms Bender suffered CHF (2015)  An ECHO at that time showed LVEF 20-25%  Coronary angiography revealed mild nonobstructive CAD and she was diagnosed with idiopathic CM (likely takotsubo)  Fortunately on  appropriate CV meds, her LVEF improved to 45-50%      A repeat ECHO (9/2019) showed LVEF 55-60%, normal RV function, mild MR/TR    Ms Bender does have an extensive history of asthma and severe COPD   Lung transplantation has been recommended. She saw Dr. Finney last month and complained of several month history of worsening SOB.  She has underlying sinus tachycardia treated with Diltiazem.  Her HR remains > 100 bpm much of the time.  Her O2 sats drop to 92% with exertion.  An ECHO and a heart monitor were ordered.      Our clinic was notified of a 15 beat run of V-tach and her ECHO showed a decline in her LVEF to 40-45%  Our visit today is for further review and discuss proceeding with C and C.    Ms Bender gets very winded with any amount of activity.  She is very sedentary and uses a wheelchair when going far distances due to her SOB.  She admits to being like this for 1 yr.  She tells me that her O2 sats stay around 97% with ambulation and she has not qualified for O2 support.  She denies any CP.  She is concerned with the tachycardia that she notices on her FitBit and wonders if it is causing her to be more SOB.  She occasionally feels palpitations.  She denies any near-syncope.      Ms Bender sleeps in the recliner and admits to poor sleep.  She takes Lasix for ankle swelling.  She is notices that more salt in her diet causes more leg swelling.  She wears compression socks and states that her leg swelling is minimal right now.        I reviewed her ECHO (6/25/20)  This showed a decline in her LVEF 40-45%  She was noted to be tachycardic at that time (134 bpm) poor visualization could not exclude RWMA.  Her RV function was normal.  RVSP 33 mmHg  No significant valvular pathology.      Her 21 day cardiac event monitor was placed on (6/25/20) Thus far, we have received reports of ST with IVCD and PSVT up to 25 beats at 188 bpm.  She also had a 15 beat run of V-Tach at 181 bpm.She was asymptomatic with both  episodes.  Full report is pending.             IMPRESSION AND PLAN    Cardiomyopathy:  -hx of stress-induced CM (2015) which normalized within 3 months  -Decline in LVEF to 40-45% per ECHO (6/25/20) but LVEF may be falsely lowered due to tachycardia  -c/o of several month history of worsening exertional dyspnea and tachycardia.  No CP  -plan for RHC and LHC:  Discussed procedure, risks, and benefits to include but not limited to:  Bleeding, hematoma, reaction to sedation, IV-dye nephropathy, arterial injury, MI, stroke, emergent CABG, or death.  Pt in agreement and willing to proceed.  -will pre-treat for dye allergy  -On Lisinopril, will change Diltiazem to Coreg      Asymptomatic V-Tach  -15 beats noted on event monitor  -in setting of CM, will plan LHC  -will start Beta-blocker    Sinus Tachycardia  -likely due to her underlying severe lung disease  -episodes of PSVT up to 188 bpm  -will change Diltiazem to Coreg 3.125 mg  -consider EP evaluation    Severe COPD  -longstanding hx of asthma  -Previously told that she needs a lung transplant.  Considering this in the near future  -multiple inhalers, prednisone, lasix 40 mg    HTN  -has not noted any SBP > 130 mmHg recently    Hyperlipidemia:  -On Atorvastatin 10 mg  -needs lipid panel            Video-Visit Details  Type of service:  Video Visit  Video End Time (time video stopped): 14:40 pm  Originating Location (pt. Location): Home  Distant Location (provider location):  [facility]   Mode of Communication:  Video Conference via [employer]    ELLE Medina, BERTA  [facility]

## 2020-07-21 ENCOUNTER — TELEPHONE (OUTPATIENT)
Dept: CARDIOLOGY | Facility: CLINIC | Age: 65
End: 2020-07-21

## 2020-07-21 ENCOUNTER — VIRTUAL VISIT (OUTPATIENT)
Dept: CARDIOLOGY | Facility: CLINIC | Age: 65
End: 2020-07-21
Payer: COMMERCIAL

## 2020-07-21 DIAGNOSIS — E78.2 MIXED HYPERLIPIDEMIA: ICD-10-CM

## 2020-07-21 DIAGNOSIS — R06.02 SOB (SHORTNESS OF BREATH): ICD-10-CM

## 2020-07-21 DIAGNOSIS — I25.10 CORONARY ARTERY DISEASE INVOLVING NATIVE CORONARY ARTERY OF NATIVE HEART WITHOUT ANGINA PECTORIS: Primary | ICD-10-CM

## 2020-07-21 DIAGNOSIS — J43.8 OTHER EMPHYSEMA (H): ICD-10-CM

## 2020-07-21 DIAGNOSIS — R00.0 TACHYCARDIA: ICD-10-CM

## 2020-07-21 DIAGNOSIS — I25.10 CORONARY ARTERY DISEASE INVOLVING NATIVE CORONARY ARTERY OF NATIVE HEART WITHOUT ANGINA PECTORIS: ICD-10-CM

## 2020-07-21 PROCEDURE — 99214 OFFICE O/P EST MOD 30 MIN: CPT | Mod: 95 | Performed by: NURSE PRACTITIONER

## 2020-07-21 RX ORDER — CARVEDILOL 3.12 MG/1
3.12 TABLET ORAL 2 TIMES DAILY WITH MEALS
Qty: 180 TABLET | Refills: 1 | Status: SHIPPED | OUTPATIENT
Start: 2020-07-21 | End: 2020-08-03

## 2020-07-21 RX ORDER — PREDNISONE 20 MG/1
TABLET ORAL
Qty: 6 TABLET | Refills: 0 | Status: SHIPPED | OUTPATIENT
Start: 2020-07-21 | End: 2020-09-11

## 2020-07-21 NOTE — LETTER
7/21/2020    Bandar Weinberg MD  [address] [facility] [address]    RE: Trisha Bender       Dear Colleague,    I had the pleasure of seeing Trisha Bender in the [facility].    Trisha Bender is a 64 year old female who is being evaluated via a billable video visit.  This visit is being conducted as a virtual visit due to the emphasis on mitigation of the COVID-19 virus pandemic. The clinician has decided that the risk of an in-office visit outweighs the benefit for this patient.       Review Of Systems  Skin: negative  Eyes: glasses  Ears/Nose/Throat: negative  Respiratory: Shortness of breath- sometimes and Dyspnea on exertion- mostly with any movement   Cardiovascular: Palpitations, swelling in ankles and feet   Gastrointestinal: reflux and controlled with meds   Genitourinary: negative  Musculoskeletal: back pain, arthritis and joint pain  Neurologic: migraine headaches and headaches  Psychiatric: excessive stress, anxiety and depression  Hematologic/Lymphatic/Immunologic: negative  Endocrine: negative       I have reviewed and updated the patient's Past Medical History, Social History, Family History and Medication List.    PROBLEM LIST  Patient Active Problem List   Diagnosis     Essential hypertension with goal blood pressure less than 130/80     Chronic migraine without aura without status migrainosus, not intractable     Esophageal reflux     Disorder of bone and cartilage     Family history of malignant neoplasm of gastrointestinal tract     Chronic airway obstruction (H)     Cystocele     Advanced directives, counseling/discussion     Family history of thyroid cancer - 1/2 brother     Anxiety     Mild persistent asthma without complication     Chronic low back pain     Hair loss     Lateral epicondylitis of right elbow     Dilated cardiomyopathy (H)     Right bundle branch block (RBBB)     CHF (congestive heart failure) (H)     SOB (shortness of  breath)     Coronary artery disease involving native coronary artery of native heart without angina pectoris     Mixed hyperlipidemia     Controlled substance agreement signed     Moderate persistent asthma without complication     Hypokalemia     Mechanical low back pain     Panlobular emphysema (H)     Severe episode of recurrent major depressive disorder, without psychotic features (H)     Abdominal pain       ALLERGIES  Gabapentin; Contrast dye; Escitalopram; Peanuts [nuts]; Penicillins; Sulfa drugs; Tace [chlorotrianisene]; Aspirin; Ibuprofen; and Strawberry    MEDICATIONS  Current Outpatient Medications   Medication Sig Dispense Refill     albuterol (VENTOLIN HFA) 108 (90 BASE) MCG/ACT Inhaler Inhale 2 puffs into the lungs every 6 hours as needed for shortness of breath / dyspnea or wheezing 18 g 3     alendronate (FOSAMAX) 70 MG tablet Take 1 tablet (70 mg) by mouth every 7 days 12 tablet 3     ALPRAZolam (XANAX) 0.25 MG tablet Take 1 tablet (0.25 mg) by mouth 2 times daily as needed for anxiety 60 tablet 0     atorvastatin (LIPITOR) 10 MG tablet Takes 3 times a week 36 tablet 0     azithromycin (ZITHROMAX) 250 MG tablet Take 250 mg by mouth three times a week MWF  1     baclofen (LIORESAL) 10 MG tablet TAKE TWO TABLETS BY MOUTH THREE TIMES A  tablet 5     BREO ELLIPTA 200-25 MCG/INH Inhaler Inhale 1 Dose into the lungs daily   12     cetirizine (ZYRTEC) 10 MG tablet Take 10 mg by mouth daily       [employer] FIBER GUMMIES 2.5 G CHEW Take 1 tablet by mouth daily Reported on 5/8/2017       diltiazem (CARDIZEM) 120 MG tablet Take 120 mg by mouth daily       doxycycline hyclate (VIBRAMYCIN) 100 MG capsule Take 1 capsule (100 mg) by mouth 2 times daily (Patient not taking: Reported on 6/11/2020) 20 capsule 0     fluticasone (FLONASE) 50 MCG/ACT nasal spray USE ONE TO TWO SPRAYS IN EACH NOSTRIL EVERY DAY 16 g 1     furosemide (LASIX) 20 MG tablet TAKE 2 TABLETS (40 MG) BY MOUTH DAILY SEVERE SWELLING 180 tablet 1      HYDROcodone-acetaminophen (NORCO)  MG per tablet Take 1 tablet by mouth every 4 hours as needed for severe pain Take up to 6 tablets a day 180 tablet 0     ipratropium - albuterol 0.5 mg/2.5 mg/3 mL (DUONEB) 0.5-2.5 (3) MG/3ML neb solution Take 1 vial (3 mLs) by nebulization every 6 hours as needed for shortness of breath / dyspnea or wheezing 30 vial 1     ketorolac (TORADOL) 10 MG tablet TAKE ONE TABLET BY MOUTH THREE TIMES A DAY AS NEEDED FOR MODERATE PAIN (Patient not taking: Reported on 6/11/2020) 15 tablet 0     lisinopril (PRINIVIL/ZESTRIL) 2.5 MG tablet TAKE 1 TABLET BY MOUTH EVERY DAY 90 tablet 2     montelukast (SINGULAIR) 10 MG tablet Take 1 tablet (10 mg) by mouth At Bedtime 90 tablet 1     Multiple Vitamin CHEW Take 1 tablet by mouth daily        naloxone (NARCAN) 4 MG/0.1ML nasal spray Spray 1 spray (4 mg) into one nostril alternating nostrils once as needed for opioid reversal Every 2-3 minutes until patient responsive or EMS arrives (Patient not taking: Reported on 6/11/2020) 0.2 mL 0     ondansetron (ZOFRAN-ODT) 4 MG ODT tab TAKE 1-2 TABLETS (4-8 MG) BY MOUTH EVERY 8 HOURS AS NEEDED FOR NAUSEA 18 tablet 4     pantoprazole (PROTONIX) 40 MG EC tablet TAKE 1 TABLET BY MOUTH EVERY DAY 90 tablet 3     potassium chloride ER (K-TAB/KLOR-CON) 10 MEQ CR tablet TAKE 1 TABLET BY MOUTH THREE TIMES A  tablet 1     predniSONE (DELTASONE) 10 MG tablet Take 1 tablet (10 mg) by mouth daily 30 tablet 0     predniSONE (DELTASONE) 5 MG tablet Take 1 tablet (5 mg) by mouth daily 30 tablet 0     roflumilast (DALIRESP) 500 MCG TABS tablet Take 1 tablet (500 mcg) by mouth daily       SUMAtriptan (IMITREX) 25 MG tablet Take 1 tablet (25 mg) by mouth at onset of headache for migraine May repeat in 2 hours if needed: max 2/day; 9 tablet 11     tiotropium (SPIRIVA HANDIHALER) 18 MCG capsule USING THE HANDIHALER, INHALE THE CONTENTS OF ONE CAPSULE BY MOUTH DAILY 90 capsule 3       ROS:  12-pt ROS is negative  except for as noted above.    EXAM:  A limited exam was conducted via video.  Constitutional: Patient is pleasant, alert, in no distress. overweight, upright.  ENT: Membranes moist, no nasal discharge or bleeding gums. Normal head shape, no evidence of injury or laceration.  EYES: No scleral icterus, normal conjunctivae. EOM's appear intact. No observed jaundice  Neck: No evidence of thyromegaly.   Chest/Lungs: appears SOB with talking. No audible wheezing, no cough  Equal chest wall expansion.   Cardiovascular: No obvious elevated JVP. No apparent pitting edema bilaterally.  Abdomen: No evidence of abdominal distention.   Extremities: No edema, erythema, cyanosis noted.  Skin: No rashes or lesions appreciated on visualized skin, normal skin color.  Neurologic: Normal mentation. Normal arm motion bilaterally, no tremors. No evidence of focal defect.  Psychiatric: Appropriate affect. A&Ox3.  Calm demeanor      HISTORY OF PRESENT ILLNESS  This is a 64 year old female who follows with [facility].  She is a patient of Dr. Finney seen in our clinic for idiopathic CM, severe COPD, sinus tachycardia, HTN, hyperlipidemia.    Ms Bender suffered CHF (2015)  An ECHO at that time showed LVEF 20-25%  Coronary angiography revealed mild nonobstructive CAD and she was diagnosed with idiopathic CM (likely takotsubo)  Fortunately on appropriate CV meds, her LVEF improved to 45-50%      A repeat ECHO (9/2019) showed LVEF 55-60%, normal RV function, mild MR/TR    Ms Bender does have an extensive history of asthma and severe COPD   Lung transplantation has been recommended. She saw Dr. Finney last month and complained of several month history of worsening SOB.  She has underlying sinus tachycardia treated with Diltiazem.  Her HR remains > 100 bpm much of the time.  Her O2 sats drop to 92% with exertion.  An ECHO and a heart monitor were ordered.      Our clinic was notified of a 15 beat run of V-tach and her ECHO showed  a decline in her LVEF to 40-45%  Our visit today is for further review and discuss proceeding with RHC and LHC.    Ms Bender gets very winded with any amount of activity.  She is very sedentary and uses a wheelchair when going far distances due to her SOB.  She admits to being like this for 1 yr.  She tells me that her O2 sats stay around 97% with ambulation and she has not qualified for O2 support.  She denies any CP.  She is concerned with the tachycardia that she notices on her FitBit and wonders if it is causing her to be more SOB.  She occasionally feels palpitations.  She denies any near-syncope.      Ms Bender sleeps in the recliner and admits to poor sleep.  She takes Lasix for ankle swelling.  She is notices that more salt in her diet causes more leg swelling.  She wears compression socks and states that her leg swelling is minimal right now.        I reviewed her ECHO (6/25/20)  This showed a decline in her LVEF 40-45%  She was noted to be tachycardic at that time (134 bpm) poor visualization could not exclude RWMA.  Her RV function was normal.  RVSP 33 mmHg  No significant valvular pathology.      Her 21 day cardiac event monitor was placed on (6/25/20) Thus far, we have received reports of ST with IVCD and PSVT up to 25 beats at 188 bpm.  She also had a 15 beat run of V-Tach at 181 bpm.She was asymptomatic with both episodes.  Full report is pending.         IMPRESSION AND PLAN    Cardiomyopathy:  -hx of stress-induced CM (2015) which normalized within 3 months  -Decline in LVEF to 40-45% per ECHO (6/25/20) but LVEF may be falsely lowered due to tachycardia  -c/o of several month history of worsening exertional dyspnea and tachycardia.  No CP  -plan for RHC and LHC:  Discussed procedure, risks, and benefits to include but not limited to:  Bleeding, hematoma, reaction to sedation, IV-dye nephropathy, arterial injury, MI, stroke, emergent CABG, or death.  Pt in agreement and willing to proceed.  -will  pre-treat for dye allergy  -On Lisinopril, will change Diltiazem to Coreg      Asymptomatic V-Tach  -15 beats noted on event monitor  -in setting of CM, will plan [facility]  -will start Beta-blocker    Sinus Tachycardia  -likely due to her underlying severe lung disease  -episodes of PSVT up to 188 bpm  -will change Diltiazem to Coreg 3.125 mg  -consider EP evaluation    Severe COPD  -longstanding hx of asthma  -Previously told that she needs a lung transplant.  Considering this in the near future  -multiple inhalers, prednisone, lasix 40 mg    HTN  -has not noted any SBP > 130 mmHg recently    Hyperlipidemia:  -On Atorvastatin 10 mg  -needs lipid panel        Thank you for allowing me to participate in the care of your patient.    Sincerely,     ELLE Landa CNP     [facility] [place]

## 2020-07-23 DIAGNOSIS — Z11.59 ENCOUNTER FOR SCREENING FOR OTHER VIRAL DISEASES: Primary | ICD-10-CM

## 2020-07-27 DIAGNOSIS — S22.000D COMPRESSION FRACTURE OF THORACIC VERTEBRA WITH ROUTINE HEALING, UNSPECIFIED THORACIC VERTEBRAL LEVEL, SUBSEQUENT ENCOUNTER: ICD-10-CM

## 2020-07-28 ENCOUNTER — TELEPHONE (OUTPATIENT)
Dept: CARDIOLOGY | Facility: CLINIC | Age: 65
End: 2020-07-28

## 2020-07-28 DIAGNOSIS — Z11.59 ENCOUNTER FOR SCREENING FOR OTHER VIRAL DISEASES: ICD-10-CM

## 2020-07-28 RX ORDER — ALENDRONATE SODIUM 70 MG/1
TABLET ORAL
Qty: 12 TABLET | Refills: 3 | Status: SHIPPED | OUTPATIENT
Start: 2020-07-28 | End: 2021-06-16

## 2020-07-28 NOTE — TELEPHONE ENCOUNTER
Coronary angiogram/RHC/LHC prep instructions.     Patient is scheduled for [facility] Left [facility]. Check in time is at 10am and procedure time is at 12noon.  Advised patient not eat or drink after midnight on 7/29/20.     Patient is not taking Metformin or other diabetic medications.     Patient is not taking an anticoagulant.     Patient is taking Lasix (diuretics) and advised pt to hold medication.    Advised to take 325mg of Aspirin the day before the procedure and the morning of the procedure. Advised to take their other daily medications the morning of the procedure with small sips of water.     Verified patient does have an allergy to contrast dye.   7/29 Pt to take Prednisone 40 mg at noon and 8pm.   7/30 (day of procedure) Take Prednisone 40 mg and Benadryl 25 mg at 11am.     Verified patient has someone available to drive them home from the hospital and can stay with them for 24 hours after the procedure.     Patient had no questions about their prep instructions.     COVID test done 7/28/20, negative.   JACKIE Sullivan  [facility]

## 2020-07-29 DIAGNOSIS — I25.10 CORONARY ARTERY DISEASE INVOLVING NATIVE CORONARY ARTERY OF NATIVE HEART WITHOUT ANGINA PECTORIS: ICD-10-CM

## 2020-07-29 DIAGNOSIS — I50.22 CHRONIC SYSTOLIC CONGESTIVE HEART FAILURE (H): Primary | ICD-10-CM

## 2020-07-29 DIAGNOSIS — R06.09 DOE (DYSPNEA ON EXERTION): ICD-10-CM

## 2020-07-29 DIAGNOSIS — R06.02 SOB (SHORTNESS OF BREATH): ICD-10-CM

## 2020-07-29 DIAGNOSIS — R00.0 TACHYCARDIA: ICD-10-CM

## 2020-07-29 LAB
SARS-COV-2 RNA SPEC QL NAA+PROBE: NOT DETECTED
SPECIMEN SOURCE: NORMAL

## 2020-07-29 RX ORDER — ASPIRIN 81 MG/1
325 TABLET ORAL DAILY
Status: CANCELLED | OUTPATIENT
Start: 2020-07-29 | End: 2020-07-31

## 2020-07-29 RX ORDER — POTASSIUM CHLORIDE 750 MG/1
20 TABLET, EXTENDED RELEASE ORAL
Status: CANCELLED | OUTPATIENT
Start: 2020-07-29

## 2020-07-29 RX ORDER — SODIUM CHLORIDE 9 MG/ML
INJECTION, SOLUTION INTRAVENOUS CONTINUOUS
Status: CANCELLED | OUTPATIENT
Start: 2020-07-29

## 2020-07-29 RX ORDER — LIDOCAINE 40 MG/G
CREAM TOPICAL
Status: CANCELLED | OUTPATIENT
Start: 2020-07-29

## 2020-07-30 ENCOUNTER — SURGERY (OUTPATIENT)
Age: 65
End: 2020-07-30
Payer: COMMERCIAL

## 2020-07-30 ENCOUNTER — HOSPITAL ENCOUNTER (OUTPATIENT)
Facility: CLINIC | Age: 65
Discharge: HOME | End: 2020-07-30
Admitting: INTERNAL MEDICINE
Payer: COMMERCIAL

## 2020-07-30 VITALS
BODY MASS INDEX: 27.2 KG/M2 | TEMPERATURE: 99.4 F | RESPIRATION RATE: 20 BRPM | HEIGHT: 70 IN | WEIGHT: 190 LBS | OXYGEN SATURATION: 93 % | SYSTOLIC BLOOD PRESSURE: 141 MMHG | DIASTOLIC BLOOD PRESSURE: 106 MMHG

## 2020-07-30 DIAGNOSIS — I25.10 CORONARY ARTERY DISEASE INVOLVING NATIVE CORONARY ARTERY OF NATIVE HEART WITHOUT ANGINA PECTORIS: ICD-10-CM

## 2020-07-30 DIAGNOSIS — R06.02 SOB (SHORTNESS OF BREATH): ICD-10-CM

## 2020-07-30 DIAGNOSIS — R00.0 TACHYCARDIA: ICD-10-CM

## 2020-07-30 DIAGNOSIS — R06.09 DOE (DYSPNEA ON EXERTION): ICD-10-CM

## 2020-07-30 DIAGNOSIS — I50.22 CHRONIC SYSTOLIC CONGESTIVE HEART FAILURE (H): ICD-10-CM

## 2020-07-30 PROBLEM — Z98.890 STATUS POST CORONARY ANGIOGRAM: Status: ACTIVE | Noted: 2020-07-30

## 2020-07-30 LAB
ANION GAP SERPL CALCULATED.3IONS-SCNC: 6 MMOL/L (ref 3–14)
APTT PPP: 24 SEC (ref 22–37)
BASE EXCESS BLDV CALC-SCNC: 4.8 MMOL/L
BUN SERPL-MCNC: 10 MG/DL (ref 7–30)
CALCIUM SERPL-MCNC: 9.1 MG/DL (ref 8.5–10.1)
CATH EF ESTIMATED: 45 %
CHLORIDE SERPL-SCNC: 101 MMOL/L (ref 94–109)
CO2 SERPL-SCNC: 29 MMOL/L (ref 20–32)
CREAT SERPL-MCNC: 0.43 MG/DL (ref 0.52–1.04)
ERYTHROCYTE [DISTWIDTH] IN BLOOD BY AUTOMATED COUNT: 14.6 % (ref 10–15)
GFR SERPL CREATININE-BSD FRML MDRD: >90 ML/MIN/{1.73_M2}
GLUCOSE SERPL-MCNC: 126 MG/DL (ref 70–99)
HCO3 BLDV-SCNC: 31 MMOL/L (ref 21–28)
HCT VFR BLD AUTO: 44.4 % (ref 35–47)
HGB BLD-MCNC: 13.5 G/DL (ref 11.7–15.7)
INR PPP: 0.91 (ref 0.86–1.14)
MCH RBC QN AUTO: 29.6 PG (ref 26.5–33)
MCHC RBC AUTO-ENTMCNC: 30.4 G/DL (ref 31.5–36.5)
MCV RBC AUTO: 97 FL (ref 78–100)
O2/TOTAL GAS SETTING VFR VENT: ABNORMAL %
OXYHGB MFR BLDV: 62 %
PCO2 BLDV: 51 MM HG (ref 40–50)
PH BLDV: 7.39 PH (ref 7.32–7.43)
PLATELET # BLD AUTO: 411 10E9/L (ref 150–450)
PO2 BLDV: 33 MM HG (ref 25–47)
POTASSIUM SERPL-SCNC: 3.8 MMOL/L (ref 3.4–5.3)
RBC # BLD AUTO: 4.56 10E12/L (ref 3.8–5.2)
SODIUM SERPL-SCNC: 136 MMOL/L (ref 133–144)
WBC # BLD AUTO: 16.6 10E9/L (ref 4–11)

## 2020-07-30 PROCEDURE — C1894 INTRO/SHEATH, NON-LASER: HCPCS | Performed by: INTERNAL MEDICINE

## 2020-07-30 PROCEDURE — 99152 MOD SED SAME PHYS/QHP 5/>YRS: CPT | Performed by: INTERNAL MEDICINE

## 2020-07-30 PROCEDURE — 85730 THROMBOPLASTIN TIME PARTIAL: CPT | Performed by: NURSE PRACTITIONER

## 2020-07-30 PROCEDURE — 85610 PROTHROMBIN TIME: CPT | Performed by: NURSE PRACTITIONER

## 2020-07-30 PROCEDURE — 93010 ELECTROCARDIOGRAM REPORT: CPT | Performed by: INTERNAL MEDICINE

## 2020-07-30 PROCEDURE — 93460 R&L HRT ART/VENTRICLE ANGIO: CPT | Performed by: INTERNAL MEDICINE

## 2020-07-30 PROCEDURE — 25000132 ZZH RX MED GY IP 250 OP 250 PS 637: Performed by: NURSE PRACTITIONER

## 2020-07-30 PROCEDURE — 25000128 H RX IP 250 OP 636: Performed by: INTERNAL MEDICINE

## 2020-07-30 PROCEDURE — 85027 COMPLETE CBC AUTOMATED: CPT | Performed by: NURSE PRACTITIONER

## 2020-07-30 PROCEDURE — C1725 CATH, TRANSLUMIN NON-LASER: HCPCS | Performed by: INTERNAL MEDICINE

## 2020-07-30 PROCEDURE — 99152 MOD SED SAME PHYS/QHP 5/>YRS: CPT | Mod: 59 | Performed by: INTERNAL MEDICINE

## 2020-07-30 PROCEDURE — 80048 BASIC METABOLIC PNL TOTAL CA: CPT | Performed by: NURSE PRACTITIONER

## 2020-07-30 PROCEDURE — 82805 BLOOD GASES W/O2 SATURATION: CPT | Performed by: INTERNAL MEDICINE

## 2020-07-30 PROCEDURE — 25000125 ZZHC RX 250: Performed by: INTERNAL MEDICINE

## 2020-07-30 PROCEDURE — 93460 R&L HRT ART/VENTRICLE ANGIO: CPT | Mod: 26 | Performed by: INTERNAL MEDICINE

## 2020-07-30 PROCEDURE — 25800030 ZZH RX IP 258 OP 636: Performed by: NURSE PRACTITIONER

## 2020-07-30 PROCEDURE — 27210794 ZZH OR GENERAL SUPPLY STERILE: Performed by: INTERNAL MEDICINE

## 2020-07-30 PROCEDURE — 25000125 ZZHC RX 250: Performed by: NURSE PRACTITIONER

## 2020-07-30 RX ORDER — LIDOCAINE 40 MG/G
CREAM TOPICAL
Status: DISCONTINUED | OUTPATIENT
Start: 2020-07-30 | End: 2020-07-30 | Stop reason: HOSPADM

## 2020-07-30 RX ORDER — ACETAMINOPHEN 325 MG/1
650 TABLET ORAL EVERY 4 HOURS PRN
Status: DISCONTINUED | OUTPATIENT
Start: 2020-07-30 | End: 2020-07-30 | Stop reason: HOSPADM

## 2020-07-30 RX ORDER — EPTIFIBATIDE 2 MG/ML
180 INJECTION, SOLUTION INTRAVENOUS EVERY 10 MIN PRN
Status: DISCONTINUED | OUTPATIENT
Start: 2020-07-30 | End: 2020-07-30 | Stop reason: HOSPADM

## 2020-07-30 RX ORDER — FUROSEMIDE 10 MG/ML
INJECTION INTRAMUSCULAR; INTRAVENOUS
Status: DISCONTINUED | OUTPATIENT
Start: 2020-07-30 | End: 2020-07-30 | Stop reason: HOSPADM

## 2020-07-30 RX ORDER — DOBUTAMINE HYDROCHLORIDE 200 MG/100ML
2-20 INJECTION INTRAVENOUS CONTINUOUS PRN
Status: DISCONTINUED | OUTPATIENT
Start: 2020-07-30 | End: 2020-07-30 | Stop reason: HOSPADM

## 2020-07-30 RX ORDER — VERAPAMIL HYDROCHLORIDE 2.5 MG/ML
INJECTION, SOLUTION INTRAVENOUS
Status: DISCONTINUED | OUTPATIENT
Start: 2020-07-30 | End: 2020-07-30 | Stop reason: HOSPADM

## 2020-07-30 RX ORDER — HEPARIN SODIUM 1000 [USP'U]/ML
INJECTION, SOLUTION INTRAVENOUS; SUBCUTANEOUS
Status: DISCONTINUED | OUTPATIENT
Start: 2020-07-30 | End: 2020-07-30 | Stop reason: HOSPADM

## 2020-07-30 RX ORDER — EPTIFIBATIDE 2 MG/ML
2 INJECTION, SOLUTION INTRAVENOUS CONTINUOUS PRN
Status: DISCONTINUED | OUTPATIENT
Start: 2020-07-30 | End: 2020-07-30 | Stop reason: HOSPADM

## 2020-07-30 RX ORDER — SODIUM CHLORIDE 9 MG/ML
INJECTION, SOLUTION INTRAVENOUS CONTINUOUS
Status: DISCONTINUED | OUTPATIENT
Start: 2020-07-30 | End: 2020-07-30 | Stop reason: HOSPADM

## 2020-07-30 RX ORDER — NITROGLYCERIN 5 MG/ML
VIAL (ML) INTRAVENOUS
Status: DISCONTINUED | OUTPATIENT
Start: 2020-07-30 | End: 2020-07-30 | Stop reason: HOSPADM

## 2020-07-30 RX ORDER — FENTANYL CITRATE 50 UG/ML
INJECTION, SOLUTION INTRAMUSCULAR; INTRAVENOUS
Status: DISCONTINUED | OUTPATIENT
Start: 2020-07-30 | End: 2020-07-30 | Stop reason: HOSPADM

## 2020-07-30 RX ORDER — IOPAMIDOL 755 MG/ML
INJECTION, SOLUTION INTRAVASCULAR
Status: DISCONTINUED | OUTPATIENT
Start: 2020-07-30 | End: 2020-07-30 | Stop reason: HOSPADM

## 2020-07-30 RX ORDER — NALOXONE HYDROCHLORIDE 0.4 MG/ML
.1-.4 INJECTION, SOLUTION INTRAMUSCULAR; INTRAVENOUS; SUBCUTANEOUS
Status: DISCONTINUED | OUTPATIENT
Start: 2020-07-30 | End: 2020-07-30 | Stop reason: HOSPADM

## 2020-07-30 RX ORDER — DOPAMINE HYDROCHLORIDE 160 MG/100ML
2-20 INJECTION, SOLUTION INTRAVENOUS CONTINUOUS PRN
Status: DISCONTINUED | OUTPATIENT
Start: 2020-07-30 | End: 2020-07-30 | Stop reason: HOSPADM

## 2020-07-30 RX ORDER — NALOXONE HYDROCHLORIDE 0.4 MG/ML
.2-.4 INJECTION, SOLUTION INTRAMUSCULAR; INTRAVENOUS; SUBCUTANEOUS
Status: DISCONTINUED | OUTPATIENT
Start: 2020-07-30 | End: 2020-07-30 | Stop reason: HOSPADM

## 2020-07-30 RX ORDER — NITROGLYCERIN 5 MG/ML
VIAL (ML) INTRAVENOUS
Status: DISCONTINUED
Start: 2020-07-30 | End: 2020-07-30 | Stop reason: HOSPADM

## 2020-07-30 RX ORDER — POTASSIUM CHLORIDE 1500 MG/1
TABLET, EXTENDED RELEASE ORAL
Status: DISCONTINUED
Start: 2020-07-30 | End: 2020-07-30 | Stop reason: HOSPADM

## 2020-07-30 RX ORDER — ATROPINE SULFATE 0.1 MG/ML
0.5 INJECTION INTRAVENOUS EVERY 5 MIN PRN
Status: DISCONTINUED | OUTPATIENT
Start: 2020-07-30 | End: 2020-07-30 | Stop reason: HOSPADM

## 2020-07-30 RX ORDER — VERAPAMIL HYDROCHLORIDE 2.5 MG/ML
INJECTION, SOLUTION INTRAVENOUS
Status: DISCONTINUED
Start: 2020-07-30 | End: 2020-07-30 | Stop reason: HOSPADM

## 2020-07-30 RX ORDER — HEPARIN SODIUM 10000 [USP'U]/100ML
100-1000 INJECTION, SOLUTION INTRAVENOUS CONTINUOUS PRN
Status: DISCONTINUED | OUTPATIENT
Start: 2020-07-30 | End: 2020-07-30 | Stop reason: HOSPADM

## 2020-07-30 RX ORDER — FENTANYL CITRATE 50 UG/ML
INJECTION, SOLUTION INTRAMUSCULAR; INTRAVENOUS
Status: DISCONTINUED
Start: 2020-07-30 | End: 2020-07-30 | Stop reason: HOSPADM

## 2020-07-30 RX ORDER — FENTANYL CITRATE 50 UG/ML
25-50 INJECTION, SOLUTION INTRAMUSCULAR; INTRAVENOUS
Status: DISCONTINUED | OUTPATIENT
Start: 2020-07-30 | End: 2020-07-30 | Stop reason: HOSPADM

## 2020-07-30 RX ORDER — POTASSIUM CHLORIDE 1500 MG/1
20 TABLET, EXTENDED RELEASE ORAL
Status: COMPLETED | OUTPATIENT
Start: 2020-07-30 | End: 2020-07-30

## 2020-07-30 RX ORDER — HEPARIN SODIUM 1000 [USP'U]/ML
INJECTION, SOLUTION INTRAVENOUS; SUBCUTANEOUS
Status: DISCONTINUED
Start: 2020-07-30 | End: 2020-07-30 | Stop reason: HOSPADM

## 2020-07-30 RX ORDER — LIDOCAINE HYDROCHLORIDE 10 MG/ML
INJECTION, SOLUTION EPIDURAL; INFILTRATION; INTRACAUDAL; PERINEURAL
Status: DISCONTINUED
Start: 2020-07-30 | End: 2020-07-30 | Stop reason: HOSPADM

## 2020-07-30 RX ORDER — FLUMAZENIL 0.1 MG/ML
0.2 INJECTION, SOLUTION INTRAVENOUS
Status: DISCONTINUED | OUTPATIENT
Start: 2020-07-30 | End: 2020-07-30 | Stop reason: HOSPADM

## 2020-07-30 RX ORDER — FUROSEMIDE 10 MG/ML
INJECTION INTRAMUSCULAR; INTRAVENOUS
Status: DISCONTINUED
Start: 2020-07-30 | End: 2020-07-30 | Stop reason: HOSPADM

## 2020-07-30 RX ADMIN — FUROSEMIDE 40 MG: 10 INJECTION, SOLUTION INTRAVENOUS at 13:05

## 2020-07-30 RX ADMIN — POTASSIUM CHLORIDE 20 MEQ: 1500 TABLET, EXTENDED RELEASE ORAL at 11:29

## 2020-07-30 RX ADMIN — LIDOCAINE HYDROCHLORIDE 8 MG: 10 INJECTION, SOLUTION EPIDURAL; INFILTRATION; INTRACAUDAL; PERINEURAL at 12:36

## 2020-07-30 RX ADMIN — IOPAMIDOL 100 ML: 755 INJECTION, SOLUTION INTRAVENOUS at 13:19

## 2020-07-30 RX ADMIN — LIDOCAINE HYDROCHLORIDE 2 ML: 10 INJECTION, SOLUTION EPIDURAL; INFILTRATION; INTRACAUDAL; PERINEURAL at 12:46

## 2020-07-30 RX ADMIN — MIDAZOLAM 1 MG: 1 INJECTION INTRAMUSCULAR; INTRAVENOUS at 12:52

## 2020-07-30 RX ADMIN — FENTANYL CITRATE 25 MCG: 50 INJECTION, SOLUTION INTRAMUSCULAR; INTRAVENOUS at 12:51

## 2020-07-30 RX ADMIN — SODIUM CHLORIDE: 9 INJECTION, SOLUTION INTRAVENOUS at 11:21

## 2020-07-30 RX ADMIN — FENTANYL CITRATE 75 MCG: 50 INJECTION, SOLUTION INTRAMUSCULAR; INTRAVENOUS at 12:35

## 2020-07-30 RX ADMIN — VERAPAMIL HYDROCHLORIDE 2.5 MG: 2.5 INJECTION, SOLUTION INTRAVENOUS at 12:48

## 2020-07-30 RX ADMIN — NITROGLYCERIN 200 MCG: 5 INJECTION, SOLUTION INTRAVENOUS at 12:48

## 2020-07-30 RX ADMIN — HEPARIN SODIUM 5000 UNITS: 1000 INJECTION INTRAVENOUS; SUBCUTANEOUS at 12:51

## 2020-07-30 RX ADMIN — MIDAZOLAM 1 MG: 1 INJECTION INTRAMUSCULAR; INTRAVENOUS at 12:31

## 2020-07-30 ASSESSMENT — MIFFLIN-ST. JEOR: SCORE: 1492.08

## 2020-07-30 NOTE — IP AVS SNAPSHOT
MRN:[id number]                      After Visit Summary   7/30/2020    Trisha Bender    MRN: [id number]           Visit Information        Department      7/30/2020 10:16 AM [facility] Cardiac [facility]          Review of your medicines      UNREVIEWED medicines. Ask your doctor about these medicines       Dose / Directions   albuterol 108 (90 Base) MCG/ACT inhaler  Commonly known as:  Ventolin HFA  Used for:  Asthma exacerbation      Dose:  2 puff  Inhale 2 puffs into the lungs every 6 hours as needed for shortness of breath / dyspnea or wheezing  Quantity:  18 g  Refills:  3     alendronate 70 MG tablet  Commonly known as:  FOSAMAX  Used for:  Compression fracture of thoracic vertebra with routine healing, unspecified thoracic vertebral level, subsequent encounter      TAKE 1 TABLET BY MOUTH EVERY 7 DAYS  Quantity:  12 tablet  Refills:  3     ALPRAZolam 0.25 MG tablet  Commonly known as:  XANAX  Used for:  Anxiety      Dose:  0.25 mg  Take 1 tablet (0.25 mg) by mouth 2 times daily as needed for anxiety  Quantity:  60 tablet  Refills:  0     atorvastatin 10 MG tablet  Commonly known as:  LIPITOR  Used for:  Coronary artery disease involving native coronary artery of native heart without angina pectoris, Mixed hyperlipidemia      Takes 3 times a week  Quantity:  36 tablet  Refills:  0     azithromycin 250 MG tablet  Commonly known as:  ZITHROMAX      Dose:  250 mg  Take 250 mg by mouth three times a week MWF  Refills:  1     baclofen 10 MG tablet  Commonly known as:  LIORESAL  Used for:  Chronic bilateral low back pain with right-sided sciatica      TAKE TWO TABLETS BY MOUTH THREE TIMES A DAY  Quantity:  180 tablet  Refills:  5     Breo Ellipta 200-25 MCG/INH inhaler  Generic drug:  fluticasone-vilanterol      Dose:  1 Dose  Inhale 1 Dose into the lungs daily  Refills:  12     carvedilol 3.125 MG tablet  Commonly known as:  COREG  Used for:  Tachycardia      Dose:  3.125 mg  Take 1 tablet  (3.125 mg) by mouth 2 times daily (with meals)  Quantity:  180 tablet  Refills:  1     cetirizine 10 MG tablet  Commonly known as:  zyrTEC      Dose:  10 mg  Take 10 mg by mouth daily  Refills:  0     [employer] Fiber Gummies 2.5 g Chew      Dose:  1 tablet  Take 1 tablet by mouth daily Reported on 5/8/2017  Refills:  0     doxycycline hyclate 100 MG capsule  Commonly known as:  VIBRAMYCIN  Used for:  Upper respiratory tract infection, unspecified type      Dose:  100 mg  Take 1 capsule (100 mg) by mouth 2 times daily  Quantity:  20 capsule  Refills:  0     fluticasone 50 MCG/ACT nasal spray  Commonly known as:  FLONASE  Used for:  Nasal congestion      USE ONE TO TWO SPRAYS IN EACH NOSTRIL EVERY DAY  Quantity:  16 g  Refills:  1     furosemide 20 MG tablet  Commonly known as:  LASIX  Used for:  Acute systolic congestive heart failure (H)      TAKE 2 TABLETS (40 MG) BY MOUTH DAILY SEVERE SWELLING  Quantity:  180 tablet  Refills:  1     HYDROcodone-acetaminophen  MG per tablet  Commonly known as:  Norco  Used for:  Chronic bilateral low back pain, unspecified whether sciatica present      Dose:  1 tablet  Take 1 tablet by mouth every 4 hours as needed for severe pain Take up to 6 tablets a day  Quantity:  180 tablet  Refills:  0     ipratropium - albuterol 0.5 mg/2.5 mg/3 mL 0.5-2.5 (3) MG/3ML neb solution  Commonly known as:  DUONEB  Used for:  Moderate persistent asthma without complication      Dose:  1 vial  Take 1 vial (3 mLs) by nebulization every 6 hours as needed for shortness of breath / dyspnea or wheezing  Quantity:  30 vial  Refills:  1     ketorolac 10 MG tablet  Commonly known as:  TORADOL  Used for:  Compression fracture of thoracic vertebra with routine healing, unspecified thoracic vertebral level, subsequent encounter      TAKE ONE TABLET BY MOUTH THREE TIMES A DAY AS NEEDED FOR MODERATE PAIN  Quantity:  15 tablet  Refills:  0     lisinopril 2.5 MG tablet  Commonly known as:  ZESTRIL  Used for:   Dilated cardiomyopathy (H)      TAKE 1 TABLET BY MOUTH EVERY DAY  Quantity:  90 tablet  Refills:  2     montelukast 10 MG tablet  Commonly known as:  SINGULAIR  Used for:  Mild persistent asthma without complication      Dose:  1 tablet  Take 1 tablet (10 mg) by mouth At Bedtime  Quantity:  90 tablet  Refills:  1     Multiple Vitamin Chew      Dose:  1 tablet  Take 1 tablet by mouth daily  Refills:  0     naloxone 4 MG/0.1ML nasal spray  Commonly known as:  NARCAN  Used for:  Compression fracture of thoracic vertebra with routine healing, unspecified thoracic vertebral level, subsequent encounter, Chronic bilateral low back pain, unspecified whether sciatica present      Dose:  4 mg  Spray 1 spray (4 mg) into one nostril alternating nostrils once as needed for opioid reversal Every 2-3 minutes until patient responsive or EMS arrives  Quantity:  0.2 mL  Refills:  0     ondansetron 4 MG ODT tab  Commonly known as:  ZOFRAN-ODT  Used for:  Nausea      Dose:  4-8 mg  TAKE 1-2 TABLETS (4-8 MG) BY MOUTH EVERY 8 HOURS AS NEEDED FOR NAUSEA  Quantity:  18 tablet  Refills:  4     pantoprazole 40 MG EC tablet  Commonly known as:  PROTONIX  Used for:  Gastroesophageal reflux disease without esophagitis      TAKE 1 TABLET BY MOUTH EVERY DAY  Quantity:  90 tablet  Refills:  3     potassium chloride ER 10 MEQ CR tablet  Commonly known as:  K-TAB/KLOR-CON  Used for:  Hypokalemia      TAKE 1 TABLET BY MOUTH THREE TIMES A DAY  Quantity:  270 tablet  Refills:  1     * predniSONE 10 MG tablet  Commonly known as:  DELTASONE  Used for:  Chronic bronchitis, unspecified chronic bronchitis type (H), SOB (shortness of breath)      Dose:  10 mg  Take 1 tablet (10 mg) by mouth daily  Quantity:  30 tablet  Refills:  0     * predniSONE 5 MG tablet  Commonly known as:  DELTASONE  Used for:  Chronic bronchitis, unspecified chronic bronchitis type (H), SOB (shortness of breath)      Dose:  5 mg  Take 1 tablet (5 mg) by mouth daily  Quantity:  30  tablet  Refills:  0     * predniSONE 20 MG tablet  Commonly known as:  DELTASONE  Used for:  Coronary artery disease involving native coronary artery of native heart without angina pectoris      Take 2 tablets (40 mg) every 8 hours, for a total of 3 doses. Third dose should be taken 1 hour prior to the procedure.  Quantity:  6 tablet  Refills:  0     roflumilast 500 MCG Tabs tablet  Commonly known as:  DALIRESP      Dose:  500 mcg  Take 1 tablet (500 mcg) by mouth daily  Refills:  0     SUMAtriptan 25 MG tablet  Commonly known as:  Imitrex  Used for:  Chronic migraine without aura without status migrainosus, not intractable      Dose:  25 mg  Take 1 tablet (25 mg) by mouth at onset of headache for migraine May repeat in 2 hours if needed: max 2/day;  Quantity:  9 tablet  Refills:  11     tiotropium 18 MCG inhaled capsule  Commonly known as:  Spiriva HandiHaler  Used for:  Moderate persistent asthma without complication      USING THE HANDIHALER, INHALE THE CONTENTS OF ONE CAPSULE BY MOUTH DAILY  Quantity:  90 capsule  Refills:  3         * This list has 3 medication(s) that are the same as other medications prescribed for you. Read the directions carefully, and ask your doctor or other care provider to review them with you.                  Protect others around you: Learn how to safely use, store and throw away your medicines at www.disposemymeds.org.       Follow-ups after your visit       Your next 10 appointments already scheduled    Aug 11, 2020  1:10 PM CDT  Video Visit with ELLE Medina CNP  [facility] ([facility]) [id number] [address] [address] [phone]   [facility]  Note: this is not an onsite visit; there is no need to come to the facility.  Please have a list of all current medications available for appointment.         Care Instructions       After Care Instructions     Discharge  Instructions - IF on Metformin (Glucophage or Glucovance) or Metformin containing medications      IF on Metformin (Glucophage or Glucovance) or Metformin containing medications , schedule a Basic Metabolic Panel at [facility] or Primary Clinic in 48 - 72 hours post procedure and PRIOR TO resuming the Metformin or Metformin containing medications.  Hold Metformin (Glucophage or Glucovance) or Metformin containing medications until after the Basic Metabolic Panel on the 2nd or 3rd day following the procedure.  May resume after blood draw is complete.           Further instructions from your care team             Interventional Cardiology  Discharge Instructions   Post Angiogram (Cardiac)      AFTER YOU GO HOME:    DO NOT drive or operate machinery at home or at work for at least 24 hours    If you were given sedation: we recommend that an adult stay with you for 24 hours    DO relax and take it easy for 24 hours    DO drink plenty of fluids    DO resume your regular diet, unless otherwise instructed by your Primary Physician    DO NOT SMOKE FOR AT LEAST 24 HOURS. If you have been given any mediations that were to help you relax or sedate you during your procedure    DO NOT drink alcoholic beverages the day of your procedure    DO NOT do any strenuous exercise or lifting (>10 lbs) for at least 3-5 days following your procedure    DO NOT take a tub bath, get in a hot tub, or pool for at least 2 days following your procedure    Do Not scrub the procedure site vigorously    DO NOT make any important or legal decisions for 24 hours following your procedure    CALL YOUR PRIMARY PHYSICIAN IF:    You start bleeding from the procedure site. If you do start to bleed from that site, lie down flat and hold pressure on the site. Your physician will tell you if you need to return to the hospital. It is common to have a small bruise or lump at the site    You have numbness, coolness or change in color of the arm or leg of the  "puncture site    You experience pain or redness at the puncture site    You develop hives or a rash or unexplained itching    You develop a temperature of 101 degrees F or greater    ADDITIONAL INSTRUCTIONS     Support the puncture site for coughing, sneezing, or moving your bowels for the first 48 hours    No lotion or powder to the puncture site for 3 days        Procedure Physician Dr. Viveros Date of Procedure: 7/30/2020    Additional Information About Your Visit       [employer] Information    [employer] gives you secure access to your electronic health record. If you see a primary care provider, you can also send messages to your care team and make appointments. If you have questions, please call your primary care clinic.  If you do not have a primary care provider, please call [phone] and they will assist you.       Care EveryWhere ID    This is your Care EveryWhere ID. This could be used by other organizations to access your [facility] medical records  [id number]       Your Vitals Were  Most recent update: 7/30/2020  3:04 PM    Blood Pressure   129/104            Temperature   99.4  F (37.4  C) (Temporal)          Respirations   20          Height   1.778 m (5' 10\")          Weight   86.2 kg (190 lb)             Last Period   09/29/2005    Pulse Oximetry   93%    BMI (Body Mass Index)   27.26 kg/m           Primary Care Provider Office Phone # Fax #    Bandar Weinberg [phone]      Equal Access to Services    [facility] AH: Hadii aad ku hadasho Soomaali, waaxda luqadaha, qaybta kaalmada adeegyada, jonathan burgess. So [facility] [phone].    ATENCIÓN: Si habla español, tiene a madison disposición servicios gratuitos de asistencia lingüística. [place] al [phone].    We comply with applicable federal and state civil rights laws, including the [place] Human Rights Act. We do not discriminate on the basis of race, color, creed, Yazdanism, national origin, marital status, " age, disability, sex, sexual orientation, or gender identity.       Thank you!    Thank you for choosing [facility] for your care. Our goal is always to provide you with excellent care. Hearing back from our patients is one way we can continue to improve our services. Please take a few minutes to complete the written survey that you may receive in the mail after you visit. If you would like to speak to someone directly about your visit please contact Patient Relations at [phone]. Thank you!              Medication List      ASK your doctor about these medications          Morning Afternoon Evening Bedtime As Needed    albuterol 108 (90 Base) MCG/ACT inhaler  Also known as:  Ventolin HFA  INSTRUCTIONS:  Inhale 2 puffs into the lungs every 6 hours as needed for shortness of breath / dyspnea or wheezing                     alendronate 70 MG tablet  Also known as:  FOSAMAX  INSTRUCTIONS:  TAKE 1 TABLET BY MOUTH EVERY 7 DAYS                     ALPRAZolam 0.25 MG tablet  Also known as:  XANAX  INSTRUCTIONS:  Take 1 tablet (0.25 mg) by mouth 2 times daily as needed for anxiety                     atorvastatin 10 MG tablet  Also known as:  LIPITOR  INSTRUCTIONS:  Takes 3 times a week  Doctor's comments:  Pt is due for follow up.  Further refills to be provided after pt is seen in clinic.                     azithromycin 250 MG tablet  Also known as:  ZITHROMAX  INSTRUCTIONS:  Take 250 mg by mouth three times a week MW                     baclofen 10 MG tablet  Also known as:  LIORESAL  INSTRUCTIONS:  TAKE TWO TABLETS BY MOUTH THREE TIMES A DAY                     Breo Ellipta 200-25 MCG/INH inhaler  INSTRUCTIONS:  Inhale 1 Dose into the lungs daily  Generic drug:  fluticasone-vilanterol                     carvedilol 3.125 MG tablet  Also known as:  COREG  INSTRUCTIONS:  Take 1 tablet (3.125 mg) by mouth 2 times daily (with meals)                     cetirizine 10 MG tablet  Also known as:   zyrTEC  INSTRUCTIONS:  Take 10 mg by mouth daily                     [employer] Fiber Gummies 2.5 g Chew  INSTRUCTIONS:  Take 1 tablet by mouth daily Reported on 5/8/2017                     doxycycline hyclate 100 MG capsule  Also known as:  VIBRAMYCIN  INSTRUCTIONS:  Take 1 capsule (100 mg) by mouth 2 times daily                     fluticasone 50 MCG/ACT nasal spray  Also known as:  FLONASE  INSTRUCTIONS:  USE ONE TO TWO SPRAYS IN EACH NOSTRIL EVERY DAY                     furosemide 20 MG tablet  Also known as:  LASIX  INSTRUCTIONS:  TAKE 2 TABLETS (40 MG) BY MOUTH DAILY SEVERE SWELLING  LAST TAKEN:  Ask your nurse or doctor                     HYDROcodone-acetaminophen  MG per tablet  Also known as:  Norco  INSTRUCTIONS:  Take 1 tablet by mouth every 4 hours as needed for severe pain Take up to 6 tablets a day                     ipratropium - albuterol 0.5 mg/2.5 mg/3 mL 0.5-2.5 (3) MG/3ML neb solution  Also known as:  DUONEB  INSTRUCTIONS:  Take 1 vial (3 mLs) by nebulization every 6 hours as needed for shortness of breath / dyspnea or wheezing                     ketorolac 10 MG tablet  Also known as:  TORADOL  INSTRUCTIONS:  TAKE ONE TABLET BY MOUTH THREE TIMES A DAY AS NEEDED FOR MODERATE PAIN                     lisinopril 2.5 MG tablet  Also known as:  ZESTRIL  INSTRUCTIONS:  TAKE 1 TABLET BY MOUTH EVERY DAY                     montelukast 10 MG tablet  Also known as:  SINGULAIR  INSTRUCTIONS:  Take 1 tablet (10 mg) by mouth At Bedtime                     Multiple Vitamin Chew  INSTRUCTIONS:  Take 1 tablet by mouth daily                     naloxone 4 MG/0.1ML nasal spray  Also known as:  NARCAN  INSTRUCTIONS:  Spray 1 spray (4 mg) into one nostril alternating nostrils once as needed for opioid reversal Every 2-3 minutes until patient responsive or EMS arrives                     ondansetron 4 MG ODT tab  Also known as:  ZOFRAN-ODT  INSTRUCTIONS:  TAKE 1-2 TABLETS (4-8 MG) BY MOUTH EVERY 8 HOURS AS NEEDED  FOR NAUSEA  Doctor's comments:  DX Code Needed  . R11.0                     pantoprazole 40 MG EC tablet  Also known as:  PROTONIX  INSTRUCTIONS:  TAKE 1 TABLET BY MOUTH EVERY DAY                     potassium chloride ER 10 MEQ CR tablet  Also known as:  K-TAB/KLOR-CON  INSTRUCTIONS:  TAKE 1 TABLET BY MOUTH THREE TIMES A DAY  LAST TAKEN:  Ask your nurse or doctor                     * predniSONE 10 MG tablet  Also known as:  DELTASONE  INSTRUCTIONS:  Take 1 tablet (10 mg) by mouth daily                     * predniSONE 5 MG tablet  Also known as:  DELTASONE  INSTRUCTIONS:  Take 1 tablet (5 mg) by mouth daily                     * predniSONE 20 MG tablet  Also known as:  DELTASONE  INSTRUCTIONS:  Take 2 tablets (40 mg) every 8 hours, for a total of 3 doses. Third dose should be taken 1 hour prior to the procedure.                     roflumilast 500 MCG Tabs tablet  Also known as:  DALIRESP  INSTRUCTIONS:  Take 1 tablet (500 mcg) by mouth daily                     SUMAtriptan 25 MG tablet  Also known as:  Imitrex  INSTRUCTIONS:  Take 1 tablet (25 mg) by mouth at onset of headache for migraine May repeat in 2 hours if needed: max 2/day;                     tiotropium 18 MCG inhaled capsule  Also known as:  Spiriva HandiHaler  INSTRUCTIONS:  USING THE HANDIHALER, INHALE THE CONTENTS OF ONE CAPSULE BY MOUTH DAILY                        * This list has 3 medication(s) that are the same as other medications prescribed for you. Read the directions carefully, and ask your doctor or other care provider to review them with you.

## 2020-07-30 NOTE — IP AVS SNAPSHOT
[facility] Cardiac Cath Lab  [address]  [facility] [address]  Phone:  [phone]  Fax:  [phone]                                    After Visit Summary   7/30/2020    Trisha Bender    MRN: [id number]           After Visit Summary Signature Page    I have received my discharge instructions, and my questions have been answered. I have discussed any challenges I see with this plan with the nurse or doctor.    ..........................................................................................................................................  Patient/Patient Representative Signature      ..........................................................................................................................................  Patient Representative Print Name and Relationship to Patient    ..................................................               ................................................  Date                                   Time    ..........................................................................................................................................  Reviewed by Signature/Title    ...................................................              ..............................................  Date                                               Time          22EPIC Rev 08/18

## 2020-07-30 NOTE — PROGRESS NOTES
VSS except tachycardia, which is not new. Patient A/O x4, tolerating PO intake, and able to ambulate. Radial site without bleeding, small hematoma and petechiae present since angiogram procedure (unchanged). Passed reverse Barbeau test. PIV removed. Discharge instructions and medications reviewed with patient and spouse. Patient left hospital in wheelchair with spouse.

## 2020-07-30 NOTE — DISCHARGE INSTRUCTIONS
Interventional Cardiology  Discharge Instructions   Post Angiogram (Cardiac)      AFTER YOU GO HOME:    DO NOT drive or operate machinery at home or at work for at least 24 hours    If you were given sedation: we recommend that an adult stay with you for 24 hours    DO relax and take it easy for 24 hours    DO drink plenty of fluids    DO resume your regular diet, unless otherwise instructed by your Primary Physician    DO NOT SMOKE FOR AT LEAST 24 HOURS. If you have been given any mediations that were to help you relax or sedate you during your procedure    DO NOT drink alcoholic beverages the day of your procedure    DO NOT do any strenuous exercise or lifting (>10 lbs) for at least 3-5 days following your procedure    DO NOT take a tub bath, get in a hot tub, or pool for at least 2 days following your procedure    Do Not scrub the procedure site vigorously    DO NOT make any important or legal decisions for 24 hours following your procedure    CALL YOUR PRIMARY PHYSICIAN IF:    You start bleeding from the procedure site. If you do start to bleed from that site, lie down flat and hold pressure on the site. Your physician will tell you if you need to return to the hospital. It is common to have a small bruise or lump at the site    You have numbness, coolness or change in color of the arm or leg of the puncture site    You experience pain or redness at the puncture site    You develop hives or a rash or unexplained itching    You develop a temperature of 101 degrees F or greater    ADDITIONAL INSTRUCTIONS     Support the puncture site for coughing, sneezing, or moving your bowels for the first 48 hours    No lotion or powder to the puncture site for 3 days        Procedure Physician Dr. Viveros Date of Procedure: 7/30/2020

## 2020-08-03 ENCOUNTER — MYC REFILL (OUTPATIENT)
Dept: INTERNAL MEDICINE | Facility: CLINIC | Age: 65
End: 2020-08-03

## 2020-08-03 ENCOUNTER — TELEPHONE (OUTPATIENT)
Dept: CARDIOLOGY | Facility: CLINIC | Age: 65
End: 2020-08-03

## 2020-08-03 ENCOUNTER — MYC MEDICAL ADVICE (OUTPATIENT)
Dept: INTERNAL MEDICINE | Facility: CLINIC | Age: 65
End: 2020-08-03

## 2020-08-03 DIAGNOSIS — F41.9 ANXIETY: ICD-10-CM

## 2020-08-03 DIAGNOSIS — R00.0 TACHYCARDIA: ICD-10-CM

## 2020-08-03 DIAGNOSIS — Z79.899 CHRONIC PRESCRIPTION BENZODIAZEPINE USE: ICD-10-CM

## 2020-08-03 DIAGNOSIS — F11.90 CHRONIC NARCOTIC USE: Primary | ICD-10-CM

## 2020-08-03 LAB — INTERPRETATION ECG - MUSE: NORMAL

## 2020-08-03 RX ORDER — ALPRAZOLAM 0.25 MG
0.25 TABLET ORAL 2 TIMES DAILY PRN
Qty: 60 TABLET | Refills: 0 | Status: SHIPPED | OUTPATIENT
Start: 2020-08-03 | End: 2020-09-01

## 2020-08-03 RX ORDER — CARVEDILOL 3.12 MG/1
6.25 TABLET ORAL 2 TIMES DAILY WITH MEALS
Qty: 180 TABLET | Refills: 1 | Status: SHIPPED | OUTPATIENT
Start: 2020-08-03 | End: 2020-08-05

## 2020-08-03 NOTE — TELEPHONE ENCOUNTER
As covering provider, I am hesitant to refill Alprazolam #60 as she is also on Hydrocodone #180.  Last CSA agreement 2/23/2016 and last drug screen 2018.  Checked  system and patient receiving Alprazolam and Hydocodone about every month.  I approved #60 of Alprazolam for now but she will need a visit with PCP to review controlled substance agreement and to decide if taking both medications together are appropriate.  I ordered a drug urine screen to be done as well.  Please notify the patient.

## 2020-08-03 NOTE — TELEPHONE ENCOUNTER
Spoke with pt about Yi's recommendations. Pt will increase her carvedilol dose and monitor her BP and HR. This will be discussed further at her follow up appt. Pt gave verbal understanding.

## 2020-08-03 NOTE — TELEPHONE ENCOUNTER
Pt called to ask about increasing her carvedilol medication.   Pt was started on the carvedilol after her appt with Yi Snell on 7/21/20 at 3.125mg BID.   Pt is also on 2.5mg of lisinopril daily.   Pt reports that her pulse is sitting around 112 bpm and she is still SOB.   Pt said that she has noticed that her BP has been elevated the last few times she has been in the hospital or clinic. Pt recently had elevated BPs when she had her right & left heart cath.   Pt denies any dizziness or lightheadedness.   Informed pt that she should think about buying a home BP cuff to check her readings at home. Pt said she would buy one today or tomorrow.   Informed pt this would be reviewed by Yi Snell to see if an increase in any of her medications is needed.

## 2020-08-03 NOTE — TELEPHONE ENCOUNTER
Pt requesting refill of alprazolam.  Last OV 1/7/20, last filled 7/3/20 qty 60.      Pt would like to [facility].      Routing refill request to provider for review/approval because:  Drug not on the [facility] refill protocol

## 2020-08-03 NOTE — TELEPHONE ENCOUNTER
SBP during cath was 132-128 mmHg  She was given IV lasix and at discharge her oral lasix was increased due to increased right and left filling pressures.    We could try increasing her Coreg to 6.25mg BID.  I would like for her to get a BP cuff.  I sent in new prescription.  She may need increased lasix, but will assess at next visit.  New prescription was sent.  Thanks JAREN

## 2020-08-04 ENCOUNTER — MYC REFILL (OUTPATIENT)
Dept: INTERNAL MEDICINE | Facility: CLINIC | Age: 65
End: 2020-08-04

## 2020-08-04 ENCOUNTER — MYC MEDICAL ADVICE (OUTPATIENT)
Dept: INTERNAL MEDICINE | Facility: CLINIC | Age: 65
End: 2020-08-04

## 2020-08-04 DIAGNOSIS — M54.50 CHRONIC BILATERAL LOW BACK PAIN, UNSPECIFIED WHETHER SCIATICA PRESENT: ICD-10-CM

## 2020-08-04 DIAGNOSIS — G89.29 CHRONIC BILATERAL LOW BACK PAIN, UNSPECIFIED WHETHER SCIATICA PRESENT: ICD-10-CM

## 2020-08-04 RX ORDER — ALPRAZOLAM 0.25 MG
0.25 TABLET ORAL 2 TIMES DAILY PRN
Qty: 60 TABLET | Refills: 0
Start: 2020-08-04

## 2020-08-04 NOTE — TELEPHONE ENCOUNTER
Sent [employer] message.   This may be difficult for her to get to lab as she has severe COPD, Cardiomyopathy. Needs lung transplant.

## 2020-08-05 DIAGNOSIS — R00.0 TACHYCARDIA: ICD-10-CM

## 2020-08-05 RX ORDER — CARVEDILOL 6.25 MG/1
6.25 TABLET ORAL 2 TIMES DAILY WITH MEALS
Qty: 180 TABLET | Refills: 1 | Status: SHIPPED | OUTPATIENT
Start: 2020-08-05 | End: 2020-09-02

## 2020-08-05 RX ORDER — HYDROCODONE BITARTRATE AND ACETAMINOPHEN 10; 325 MG/1; MG/1
1 TABLET ORAL EVERY 4 HOURS PRN
Qty: 180 TABLET | Refills: 0 | Status: SHIPPED | OUTPATIENT
Start: 2020-08-05 | End: 2020-08-30

## 2020-08-05 NOTE — TELEPHONE ENCOUNTER
Norco refill request.   She is requesting to pick this up on the weekend. 8/8-8/9.     Last OV on 1/7/20    Last refill on 7/13/20 for #180.     Routing refill request to provider for review/approval because:  Drug not on the [facility] refill protocol     Reviewed [place]  today, no concerns. Last filled on 7/13/20.

## 2020-08-11 ENCOUNTER — VIRTUAL VISIT (OUTPATIENT)
Dept: CARDIOLOGY | Facility: CLINIC | Age: 65
End: 2020-08-11
Payer: COMMERCIAL

## 2020-08-11 DIAGNOSIS — I25.10 CORONARY ARTERY DISEASE INVOLVING NATIVE CORONARY ARTERY OF NATIVE HEART WITHOUT ANGINA PECTORIS: Primary | ICD-10-CM

## 2020-08-11 DIAGNOSIS — I42.0 DILATED CARDIOMYOPATHY (H): ICD-10-CM

## 2020-08-11 PROCEDURE — 99214 OFFICE O/P EST MOD 30 MIN: CPT | Mod: 95 | Performed by: NURSE PRACTITIONER

## 2020-08-11 RX ORDER — LISINOPRIL 5 MG/1
5 TABLET ORAL DAILY
Qty: 90 TABLET | Refills: 1 | Status: SHIPPED | OUTPATIENT
Start: 2020-08-11 | End: 2020-12-17

## 2020-08-11 NOTE — PROGRESS NOTES
"Trisha Bender is a 65 year old female who is being evaluated via a billable video visit.  This visit is being conducted as a virtual visit due to the emphasis on mitigation of the COVID-19 virus pandemic. The clinician has decided that the risk of an in-office visit outweighs the benefit for this patient.     The patient has been notified of following:   \"This video visit will be conducted via a call between you and your physician/provider. We have found that certain health care needs can be provided without the need for an in-person physical exam.  This service lets us provide the care you need with a video conversation.  If a prescription is necessary we can send it directly to your pharmacy.  If lab work is needed we can place an order for that and you can then stop by our lab to have the test done at a later time.  If during the course of the call the physician/provider feels a video visit is not appropriate, you will not be charged for this service.\"     Patient has given verbal consent for Video visit? Yes    Patient would like the video invitation sent by: Other e-mail: [employer]    Video Start Time: 13:21 pm        Review Of Systems  Skin: negative  Eyes: glasses  Ears/Nose/Throat: negative  Respiratory: No shortness of breath, dyspnea on exertion, cough, or hemoptysis  Cardiovascular: negative  Gastrointestinal: negative  Genitourinary: negative  Musculoskeletal: negative  Neurologic: negative  Psychiatric: negative  Hematologic/Lymphatic/Immunologic: negative  Endocrine: negative     Vitals - Patient Reported  Systolic (Patient Reported): (!) 145  Diastolic (Patient Reported): 83  Weight (Patient Reported): 83.9 kg (185 lb)    Sapna Reyes CMA 8/11/2020      I have reviewed and updated the patient's Past Medical History, Social History, Family History and Medication List.    PROBLEM LIST  Patient Active Problem List   Diagnosis     Essential hypertension with goal blood pressure less than 130/80     " Chronic migraine without aura without status migrainosus, not intractable     Esophageal reflux     Disorder of bone and cartilage     Family history of malignant neoplasm of gastrointestinal tract     Chronic airway obstruction (H)     Cystocele     Advanced directives, counseling/discussion     Family history of thyroid cancer - 1/2 brother     Anxiety     Mild persistent asthma without complication     Chronic low back pain     Hair loss     Lateral epicondylitis of right elbow     Dilated cardiomyopathy (H)     Right bundle branch block (RBBB)     CHF (congestive heart failure) (H)     SOB (shortness of breath)     Coronary artery disease involving native coronary artery of native heart without angina pectoris     Mixed hyperlipidemia     Controlled substance agreement signed     Moderate persistent asthma without complication     Hypokalemia     Mechanical low back pain     Panlobular emphysema (H)     Severe episode of recurrent major depressive disorder, without psychotic features (H)     Abdominal pain     Tachycardia     Status post coronary angiogram       ALLERGIES  Gabapentin; Contrast dye; Escitalopram; Peanuts [nuts]; Penicillins; Sulfa drugs; Tace [chlorotrianisene]; Aspirin; Ibuprofen; and Strawberry    MEDICATIONS  Current Outpatient Medications   Medication Sig Dispense Refill     albuterol (VENTOLIN HFA) 108 (90 BASE) MCG/ACT Inhaler Inhale 2 puffs into the lungs every 6 hours as needed for shortness of breath / dyspnea or wheezing 18 g 3     alendronate (FOSAMAX) 70 MG tablet TAKE 1 TABLET BY MOUTH EVERY 7 DAYS 12 tablet 3     ALPRAZolam (XANAX) 0.25 MG tablet Take 1 tablet (0.25 mg) by mouth 2 times daily as needed for anxiety 60 tablet 0     atorvastatin (LIPITOR) 10 MG tablet Takes 3 times a week 36 tablet 0     azithromycin (ZITHROMAX) 250 MG tablet Take 250 mg by mouth three times a week MWF  1     baclofen (LIORESAL) 10 MG tablet TAKE TWO TABLETS BY MOUTH THREE TIMES A  tablet 5      BREO ELLIPTA 200-25 MCG/INH Inhaler Inhale 1 Dose into the lungs daily   12     carvedilol (COREG) 6.25 MG tablet Take 1 tablet (6.25 mg) by mouth 2 times daily (with meals) 180 tablet 1     cetirizine (ZYRTEC) 10 MG tablet Take 10 mg by mouth daily       [employer] FIBER GUMMIES 2.5 G CHEW Take 1 tablet by mouth daily Reported on 5/8/2017       doxycycline hyclate (VIBRAMYCIN) 100 MG capsule Take 1 capsule (100 mg) by mouth 2 times daily 20 capsule 0     fluticasone (FLONASE) 50 MCG/ACT nasal spray USE ONE TO TWO SPRAYS IN EACH NOSTRIL EVERY DAY 16 g 1     furosemide (LASIX) 20 MG tablet TAKE 2 TABLETS (40 MG) BY MOUTH DAILY SEVERE SWELLING 180 tablet 1     HYDROcodone-acetaminophen (NORCO)  MG per tablet Take 1 tablet by mouth every 4 hours as needed for severe pain Take up to 6 tablets a day 180 tablet 0     ipratropium - albuterol 0.5 mg/2.5 mg/3 mL (DUONEB) 0.5-2.5 (3) MG/3ML neb solution Take 1 vial (3 mLs) by nebulization every 6 hours as needed for shortness of breath / dyspnea or wheezing 30 vial 1     ketorolac (TORADOL) 10 MG tablet TAKE ONE TABLET BY MOUTH THREE TIMES A DAY AS NEEDED FOR MODERATE PAIN 15 tablet 0     lisinopril (ZESTRIL) 5 MG tablet Take 1 tablet (5 mg) by mouth daily 90 tablet 1     montelukast (SINGULAIR) 10 MG tablet Take 1 tablet (10 mg) by mouth At Bedtime 90 tablet 1     Multiple Vitamin CHEW Take 1 tablet by mouth daily        naloxone (NARCAN) 4 MG/0.1ML nasal spray Spray 1 spray (4 mg) into one nostril alternating nostrils once as needed for opioid reversal Every 2-3 minutes until patient responsive or EMS arrives 0.2 mL 0     ondansetron (ZOFRAN-ODT) 4 MG ODT tab TAKE 1-2 TABLETS (4-8 MG) BY MOUTH EVERY 8 HOURS AS NEEDED FOR NAUSEA 18 tablet 4     pantoprazole (PROTONIX) 40 MG EC tablet TAKE 1 TABLET BY MOUTH EVERY DAY 90 tablet 3     potassium chloride ER (K-TAB/KLOR-CON) 10 MEQ CR tablet TAKE 1 TABLET BY MOUTH THREE TIMES A  tablet 1     predniSONE (DELTASONE) 10 MG  tablet Take 1 tablet (10 mg) by mouth daily 30 tablet 0     predniSONE (DELTASONE) 5 MG tablet Take 1 tablet (5 mg) by mouth daily 30 tablet 0     roflumilast (DALIRESP) 500 MCG TABS tablet Take 1 tablet (500 mcg) by mouth daily       SUMAtriptan (IMITREX) 25 MG tablet Take 1 tablet (25 mg) by mouth at onset of headache for migraine May repeat in 2 hours if needed: max 2/day; 9 tablet 11     tiotropium (SPIRIVA HANDIHALER) 18 MCG capsule USING THE HANDIHALER, INHALE THE CONTENTS OF ONE CAPSULE BY MOUTH DAILY 90 capsule 3     predniSONE (DELTASONE) 20 MG tablet Take 2 tablets (40 mg) every 8 hours, for a total of 3 doses. Third dose should be taken 1 hour prior to the procedure. (Patient not taking: Reported on 8/11/2020) 6 tablet 0       ROS:  12-pt ROS is negative except for as noted above.    EXAM:  A limited exam was conducted via video.  Constitutional: Patient is pleasant, alert, in no distress. overweight, upright.  ENT: Membranes moist, no nasal discharge or bleeding gums. Normal head shape, no evidence of injury or laceration.  EYES: No scleral icterus, normal conjunctivae. EOM's appear intact. No observed jaundice  Neck: No evidence of thyromegaly.   Chest/Lungs: Appears to have normal respiratory effort. Normal breathing while talking. No audible wheezing, no cough  Equal chest wall expansion.   Cardiovascular: No obvious elevated JVP. No apparent pitting edema bilaterally.  Abdomen: No evidence of abdominal distention.   Extremities: No edema, erythema, cyanosis noted. She does have some resolving ecchymosis around right radial puncture site.  No hematoma  Skin: No rashes or lesions appreciated on visualized skin, normal skin color.  Neurologic: Normal mentation. Normal arm motion bilaterally, no tremors. No evidence of focal defect.  Psychiatric: Appropriate affect. A&Ox3.  Calm demeanor      HISTORY OF PRESENT ILLNESS  This is a 65 year old female who follows with [facility].  She is a  patient of Dr. Finney seen in our clinic for idiopathic CM, severe COPD, sinus tachycardia, HTN, hyperlipidemia.     Ms Bender suffered CHF (2015)  An ECHO at that time showed LVEF 20-25%  Coronary angiography revealed mild nonobstructive CAD and she was diagnosed with idiopathic CM (likely takotsubo)  Fortunately on appropriate CV meds, her LVEF improved to 45-50%       A repeat ECHO (9/2019) showed LVEF 55-60%, normal RV function, mild MR/TR     Ms Bender does have an extensive history of asthma and severe COPD   Lung transplantation has been recommended. She saw Dr. Finney (6/2020) with complaints of several month history of worsening SOB.  She has underlying sinus tachycardia treated with Diltiazem.  Her HR remains > 100 bpm much of the time.  Her O2 sats drop to 92% with exertion.      A holter monitor revealed runs of nonsustained V-Tach and PSVT.  An ECHO (6/25/20) showed decline in LVEF to 40-45% with a HR of 134 bpm.  There was poor visualization fo her wall motion.  Her RV function was normal.  RVSP 33 mmHg and no significant valvular pathology  Her Diltiazem was changed to Coreg.    She underwent a RHC + LHC (7/30/20) showing minimal CAD, severely elevated right and left heart filling pressures ( mean RA 21 mmHg, wedge 28, LVEDP 25 mmHg)  Her mild/mod pulmonary HTN felt to be due to chronically elevated left heart filling pressures  LVEF 45%  Her Lasix was increased to 20 mg BID  Since her procedure, her Coreg has been increased to 6.25mg BID due to ongoing HTN.  Our visit today is for further review    Ms Bender notes some improvement in her breathing after receiving the IV lasix. Her foot swelling is resolved. She states that she lost 8 lbs right away.  She still has limiting SOB due to her lung issues.  She is using her inhaler/nebs less often.  She also notes lower resting HRs (90 bpm) since the increase in her Coreg.  Her SBP is still running 140-145 mmHg.  She denies any significant palpitations or  lightheadedness  Her weights have stayed around 184-185 for the past week.  She did have an episode of mid-back pain when sitting on the toilet for a BM  She did not note any CP or arm pain during this issue            IMPRESSION AND PLAN    Nonischemic CM:  -LVEF 40-45%  -cath (7/30/20) showed minimal nonobstructive CAD and elevated right and left filling pressures  -now on higher doses of Coreg and Lasix  -she is taking 30 meq of KCL  -will check BMP later this week  -could consider increasing Lasix.      HTN:  -on Coreg 6.25mg, Lisinopril 2.5 mg, Lasix 20 mg BID  -BP still elevated at home  -will increase Lisinopril to 5 mg    Asymptomatic NSVT  -on beta-blocker in setting of mild CM    Severe COPD  -longstanding history of asthma  -considering lung transplantation  -on multiple inhalers, prednisone and lasix    ST  -likely due to her underlying severe lung disease  -HRs now 90 bpm on Coreg  -episodes of PSVT up to 188 bpm  -consider EP evaluation    Hyperlipidemia  -On Atorvastatin  -will check lipid panel later this week    Video-Visit Details  Type of service:  Video Visit  Video End Time (time video stopped): 13:48 pm  Originating Location (pt. Location): Home  Distant Location (provider location):  [facility]   Mode of Communication:  Video Conference via [employer]LE Dash, BERTA  [facility]

## 2020-08-11 NOTE — LETTER
8/11/2020    Bandar Weinberg MD  [address] [facility] [address]    RE: Trisha Bender       Dear Colleague,    I had the pleasure of seeing Trisha Bender in the [facility].    Trisha Bender is a 65 year old female who is being evaluated via a billable video visit.  This visit is being conducted as a virtual visit due to the emphasis on mitigation of the COVID-19 virus pandemic. The clinician has decided that the risk of an in-office visit outweighs the benefit for this patient.       I have reviewed and updated the patient's Past Medical History, Social History, Family History and Medication List.    PROBLEM LIST  Patient Active Problem List   Diagnosis     Essential hypertension with goal blood pressure less than 130/80     Chronic migraine without aura without status migrainosus, not intractable     Esophageal reflux     Disorder of bone and cartilage     Family history of malignant neoplasm of gastrointestinal tract     Chronic airway obstruction (H)     Cystocele     Advanced directives, counseling/discussion     Family history of thyroid cancer - 1/2 brother     Anxiety     Mild persistent asthma without complication     Chronic low back pain     Hair loss     Lateral epicondylitis of right elbow     Dilated cardiomyopathy (H)     Right bundle branch block (RBBB)     CHF (congestive heart failure) (H)     SOB (shortness of breath)     Coronary artery disease involving native coronary artery of native heart without angina pectoris     Mixed hyperlipidemia     Controlled substance agreement signed     Moderate persistent asthma without complication     Hypokalemia     Mechanical low back pain     Panlobular emphysema (H)     Severe episode of recurrent major depressive disorder, without psychotic features (H)     Abdominal pain     Tachycardia     Status post coronary angiogram       ALLERGIES  Gabapentin; Contrast dye; Escitalopram; Peanuts [nuts];  Penicillins; Sulfa drugs; Tace [chlorotrianisene]; Aspirin; Ibuprofen; and Strawberry    MEDICATIONS  Current Outpatient Medications   Medication Sig Dispense Refill     albuterol (VENTOLIN HFA) 108 (90 BASE) MCG/ACT Inhaler Inhale 2 puffs into the lungs every 6 hours as needed for shortness of breath / dyspnea or wheezing 18 g 3     alendronate (FOSAMAX) 70 MG tablet TAKE 1 TABLET BY MOUTH EVERY 7 DAYS 12 tablet 3     ALPRAZolam (XANAX) 0.25 MG tablet Take 1 tablet (0.25 mg) by mouth 2 times daily as needed for anxiety 60 tablet 0     atorvastatin (LIPITOR) 10 MG tablet Takes 3 times a week 36 tablet 0     azithromycin (ZITHROMAX) 250 MG tablet Take 250 mg by mouth three times a week MWF  1     baclofen (LIORESAL) 10 MG tablet TAKE TWO TABLETS BY MOUTH THREE TIMES A  tablet 5     BREO ELLIPTA 200-25 MCG/INH Inhaler Inhale 1 Dose into the lungs daily   12     carvedilol (COREG) 6.25 MG tablet Take 1 tablet (6.25 mg) by mouth 2 times daily (with meals) 180 tablet 1     cetirizine (ZYRTEC) 10 MG tablet Take 10 mg by mouth daily       [employer] FIBER GUMMIES 2.5 G CHEW Take 1 tablet by mouth daily Reported on 5/8/2017       doxycycline hyclate (VIBRAMYCIN) 100 MG capsule Take 1 capsule (100 mg) by mouth 2 times daily 20 capsule 0     fluticasone (FLONASE) 50 MCG/ACT nasal spray USE ONE TO TWO SPRAYS IN EACH NOSTRIL EVERY DAY 16 g 1     furosemide (LASIX) 20 MG tablet TAKE 2 TABLETS (40 MG) BY MOUTH DAILY SEVERE SWELLING 180 tablet 1     HYDROcodone-acetaminophen (NORCO)  MG per tablet Take 1 tablet by mouth every 4 hours as needed for severe pain Take up to 6 tablets a day 180 tablet 0     ipratropium - albuterol 0.5 mg/2.5 mg/3 mL (DUONEB) 0.5-2.5 (3) MG/3ML neb solution Take 1 vial (3 mLs) by nebulization every 6 hours as needed for shortness of breath / dyspnea or wheezing 30 vial 1     ketorolac (TORADOL) 10 MG tablet TAKE ONE TABLET BY MOUTH THREE TIMES A DAY AS NEEDED FOR MODERATE PAIN 15 tablet 0      lisinopril (ZESTRIL) 5 MG tablet Take 1 tablet (5 mg) by mouth daily 90 tablet 1     montelukast (SINGULAIR) 10 MG tablet Take 1 tablet (10 mg) by mouth At Bedtime 90 tablet 1     Multiple Vitamin CHEW Take 1 tablet by mouth daily        naloxone (NARCAN) 4 MG/0.1ML nasal spray Spray 1 spray (4 mg) into one nostril alternating nostrils once as needed for opioid reversal Every 2-3 minutes until patient responsive or EMS arrives 0.2 mL 0     ondansetron (ZOFRAN-ODT) 4 MG ODT tab TAKE 1-2 TABLETS (4-8 MG) BY MOUTH EVERY 8 HOURS AS NEEDED FOR NAUSEA 18 tablet 4     pantoprazole (PROTONIX) 40 MG EC tablet TAKE 1 TABLET BY MOUTH EVERY DAY 90 tablet 3     potassium chloride ER (K-TAB/KLOR-CON) 10 MEQ CR tablet TAKE 1 TABLET BY MOUTH THREE TIMES A  tablet 1     predniSONE (DELTASONE) 10 MG tablet Take 1 tablet (10 mg) by mouth daily 30 tablet 0     predniSONE (DELTASONE) 5 MG tablet Take 1 tablet (5 mg) by mouth daily 30 tablet 0     roflumilast (DALIRESP) 500 MCG TABS tablet Take 1 tablet (500 mcg) by mouth daily       SUMAtriptan (IMITREX) 25 MG tablet Take 1 tablet (25 mg) by mouth at onset of headache for migraine May repeat in 2 hours if needed: max 2/day; 9 tablet 11     tiotropium (SPIRIVA HANDIHALER) 18 MCG capsule USING THE HANDIHALER, INHALE THE CONTENTS OF ONE CAPSULE BY MOUTH DAILY 90 capsule 3     predniSONE (DELTASONE) 20 MG tablet Take 2 tablets (40 mg) every 8 hours, for a total of 3 doses. Third dose should be taken 1 hour prior to the procedure. (Patient not taking: Reported on 8/11/2020) 6 tablet 0       ROS:  12-pt ROS is negative except for as noted above.    EXAM:  A limited exam was conducted via video.  Constitutional: Patient is pleasant, alert, in no distress. overweight, upright.  ENT: Membranes moist, no nasal discharge or bleeding gums. Normal head shape, no evidence of injury or laceration.  EYES: No scleral icterus, normal conjunctivae. EOM's appear intact. No observed jaundice  Neck:  No evidence of thyromegaly.   Chest/Lungs: Appears to have normal respiratory effort. Normal breathing while talking. No audible wheezing, no cough  Equal chest wall expansion.   Cardiovascular: No obvious elevated JVP. No apparent pitting edema bilaterally.  Abdomen: No evidence of abdominal distention.   Extremities: No edema, erythema, cyanosis noted. She does have some resolving ecchymosis around right radial puncture site.  No hematoma  Skin: No rashes or lesions appreciated on visualized skin, normal skin color.  Neurologic: Normal mentation. Normal arm motion bilaterally, no tremors. No evidence of focal defect.  Psychiatric: Appropriate affect. A&Ox3.  Calm demeanor      HISTORY OF PRESENT ILLNESS  This is a 65 year old female who follows with [facility].  She is a patient of Dr. Finney seen in our clinic for idiopathic CM, severe COPD, sinus tachycardia, HTN, hyperlipidemia.     Ms Bender suffered CHF (2015)  An ECHO at that time showed LVEF 20-25%  Coronary angiography revealed mild nonobstructive CAD and she was diagnosed with idiopathic CM (likely takotsubo)  Fortunately on appropriate CV meds, her LVEF improved to 45-50%       A repeat ECHO (9/2019) showed LVEF 55-60%, normal RV function, mild MR/TR     Ms Bender does have an extensive history of asthma and severe COPD   Lung transplantation has been recommended. She saw Dr. Finney (6/2020) with complaints of several month history of worsening SOB.  She has underlying sinus tachycardia treated with Diltiazem.  Her HR remains > 100 bpm much of the time.  Her O2 sats drop to 92% with exertion.      A holter monitor revealed runs of nonsustained V-Tach and PSVT.  An ECHO (6/25/20) showed decline in LVEF to 40-45% with a HR of 134 bpm.  There was poor visualization fo her wall motion.  Her RV function was normal.  RVSP 33 mmHg and no significant valvular pathology  Her Diltiazem was changed to Coreg.    She underwent a RHC + LHC (7/30/20)  showing minimal CAD, severely elevated right and left heart filling pressures ( mean RA 21 mmHg, wedge 28, LVEDP 25 mmHg)  Her mild/mod pulmonary HTN felt to be due to chronically elevated left heart filling pressures  LVEF 45%  Her Lasix was increased to 20 mg BID  Since her procedure, her Coreg has been increased to 6.25mg BID due to ongoing HTN.  Our visit today is for further review    Ms Bender notes some improvement in her breathing after receiving the IV lasix. Her foot swelling is resolved. She states that she lost 8 lbs right away.  She still has limiting SOB due to her lung issues.  She is using her inhaler/nebs less often.  She also notes lower resting HRs (90 bpm) since the increase in her Coreg.  Her SBP is still running 140-145 mmHg.  She denies any significant palpitations or lightheadedness  Her weights have stayed around 184-185 for the past week.  She did have an episode of mid-back pain when sitting on the toilet for a BM  She did not note any CP or arm pain during this issue            IMPRESSION AND PLAN    Nonischemic CM:  -LVEF 40-45%  -cath (7/30/20) showed minimal nonobstructive CAD and elevated right and left filling pressures  -now on higher doses of Coreg and Lasix  -she is taking 30 meq of KCL  -will check BMP later this week  -could consider increasing Lasix.      HTN:  -on Coreg 6.25mg, Lisinopril 2.5 mg, Lasix 20 mg BID  -BP still elevated at home  -will increase Lisinopril to 5 mg    Asymptomatic NSVT  -on beta-blocker in setting of mild CM    Severe COPD  -longstanding history of asthma  -considering lung transplantation  -on multiple inhalers, prednisone and lasix    ST  -likely due to her underlying severe lung disease  -HRs now 90 bpm on Coreg  -episodes of PSVT up to 188 bpm  -consider EP evaluation    Hyperlipidemia  -On Atorvastatin  -will check lipid panel later this week      Thank you for allowing me to participate in the care of your patient.    Sincerely,     Yi VIVEROS  ELLE Snell CNP     [facility]

## 2020-08-18 ENCOUNTER — TELEPHONE (OUTPATIENT)
Dept: CARDIOLOGY | Facility: CLINIC | Age: 65
End: 2020-08-18

## 2020-08-19 ENCOUNTER — TELEPHONE (OUTPATIENT)
Dept: CARDIOLOGY | Facility: CLINIC | Age: 65
End: 2020-08-19

## 2020-08-19 DIAGNOSIS — I25.10 CORONARY ARTERY DISEASE INVOLVING NATIVE CORONARY ARTERY OF NATIVE HEART WITHOUT ANGINA PECTORIS: ICD-10-CM

## 2020-08-19 DIAGNOSIS — E78.2 MIXED HYPERLIPIDEMIA: ICD-10-CM

## 2020-08-19 LAB
ALT SERPL W P-5'-P-CCNC: 16 U/L (ref 0–50)
ANION GAP SERPL CALCULATED.3IONS-SCNC: 4 MMOL/L (ref 3–14)
BUN SERPL-MCNC: 8 MG/DL (ref 7–30)
CALCIUM SERPL-MCNC: 8.2 MG/DL (ref 8.5–10.1)
CHLORIDE SERPL-SCNC: 99 MMOL/L (ref 94–109)
CHOLEST SERPL-MCNC: 238 MG/DL
CO2 SERPL-SCNC: 31 MMOL/L (ref 20–32)
CREAT SERPL-MCNC: 0.54 MG/DL (ref 0.52–1.04)
GFR SERPL CREATININE-BSD FRML MDRD: >90 ML/MIN/{1.73_M2}
GLUCOSE SERPL-MCNC: 123 MG/DL (ref 70–99)
HDLC SERPL-MCNC: 52 MG/DL
LDLC SERPL CALC-MCNC: ABNORMAL MG/DL
NONHDLC SERPL-MCNC: 186 MG/DL
POTASSIUM SERPL-SCNC: 4.2 MMOL/L (ref 3.4–5.3)
SODIUM SERPL-SCNC: 134 MMOL/L (ref 133–144)
TRIGL SERPL-MCNC: 478 MG/DL

## 2020-08-19 PROCEDURE — 84460 ALANINE AMINO (ALT) (SGPT): CPT | Performed by: NURSE PRACTITIONER

## 2020-08-19 PROCEDURE — 36415 COLL VENOUS BLD VENIPUNCTURE: CPT | Performed by: NURSE PRACTITIONER

## 2020-08-19 PROCEDURE — 80061 LIPID PANEL: CPT | Performed by: NURSE PRACTITIONER

## 2020-08-19 PROCEDURE — 80048 BASIC METABOLIC PNL TOTAL CA: CPT | Performed by: NURSE PRACTITIONER

## 2020-08-19 RX ORDER — ATORVASTATIN CALCIUM 10 MG/1
10 TABLET, FILM COATED ORAL DAILY
Qty: 90 TABLET | Refills: 1 | Status: SHIPPED | OUTPATIENT
Start: 2020-08-19 | End: 2020-11-19

## 2020-08-19 NOTE — TELEPHONE ENCOUNTER
Last visit 8/11/20 w/ Yi Snell CNP for NICM,   On KCL 30 mEq, coreg 6.25 mg, lisinopril 2.5 mg, lasix 20 mg BID, atorvastatin 10 mg three times per week.   BP elevated at home.  Increased lisinopril to 5 mg.   Yi noted to check BMP and lipid panel. Would consider increasing lasix.   Ordered DYLON follow-up ~8/25/20 however nothing scheduled.       ----- Message from ELLE Medina CNP sent at 8/19/2020  1:13 PM CDT -----  Cholesterol elevated.  Pls see if patient can increase your Atorvastatin to daily  Continue with current diuretic and Lisinopril  Thanks, JAREN    --------------------------------------------------------------    Called pt, reviewed June's recommendations. Pt agrees to increase atorvastatin from 10 mg three times weekly to every day. Sent updated Rx to [facility] per her request.   Scheduled for Medium virtual visit through [employer] on 9/2/20 w/ June (next avail).   Pt will call if any questions/concerns.   Luis Carlos RN, BSN  08/19/20 1:45 PM

## 2020-08-30 ENCOUNTER — MYC REFILL (OUTPATIENT)
Dept: INTERNAL MEDICINE | Facility: CLINIC | Age: 65
End: 2020-08-30

## 2020-08-30 ENCOUNTER — MYC MEDICAL ADVICE (OUTPATIENT)
Dept: INTERNAL MEDICINE | Facility: CLINIC | Age: 65
End: 2020-08-30

## 2020-08-30 DIAGNOSIS — F41.9 ANXIETY: ICD-10-CM

## 2020-08-30 DIAGNOSIS — M54.50 CHRONIC BILATERAL LOW BACK PAIN, UNSPECIFIED WHETHER SCIATICA PRESENT: ICD-10-CM

## 2020-08-30 DIAGNOSIS — G89.29 CHRONIC BILATERAL LOW BACK PAIN, UNSPECIFIED WHETHER SCIATICA PRESENT: ICD-10-CM

## 2020-08-30 DIAGNOSIS — Z79.899 CHRONIC PRESCRIPTION BENZODIAZEPINE USE: ICD-10-CM

## 2020-08-31 DIAGNOSIS — R09.81 NASAL CONGESTION: ICD-10-CM

## 2020-08-31 RX ORDER — FLUTICASONE PROPIONATE 50 MCG
SPRAY, SUSPENSION (ML) NASAL
Qty: 16 G | Refills: 0 | Status: SHIPPED | OUTPATIENT
Start: 2020-08-31 | End: 2021-07-15

## 2020-08-31 NOTE — TELEPHONE ENCOUNTER
Medication is being filled for 1 time refill only due to:  Patient needs to be seen because need annual exam.   Jessenia Basilio RN

## 2020-08-31 NOTE — TELEPHONE ENCOUNTER
Controlled Substance Refill Request for Alprazolam  Problem List Complete:    No     PROVIDER TO CONSIDER COMPLETION OF PROBLEM LIST AND OVERVIEW/CONTROLLED SUBSTANCE AGREEMENT    Last Written Prescription Date:  8-3-20  Last Fill Quantity: 60,   # refills: 0    THE MOST RECENT OFFICE VISIT MUST BE WITHIN THE PAST 3 MONTHS. AT LEAST ONE FACE TO FACE VISIT MUST OCCUR EVERY 6 MONTHS. ADDITIONAL VISITS CAN BE VIRTUAL.  (THIS STATEMENT SHOULD BE DELETED.)    Last Office Visit with [facility] primary care provider: 1-7-20    Future Office visit:     Controlled substance agreement:   Encounter-Level CSA - 02/23/2016:    Controlled Substance Agreement - Scan on 2/24/2016  1:37 PM: [place] CONTROLLED SUBSTANCE AGREEMENT, 2/23/16     Patient-Level CSA:    There are no patient-level csa.         Last Urine Drug Screen:   Pain Drug SCR UR W RPTD Meds   Date Value Ref Range Status   12/17/2018 FINAL  Final     Comment:     (Note)  ====================================================================  [facility] COMP DRUG ANALYSIS,UR  ====================================================================  Test                             Result       Flag       Units        Drug Present   Hydrocodone                    1330                    ng/mg creat   Hydromorphone                  228                     ng/mg creat   Dihydrocodeine                 265                     ng/mg creat   Norhydrocodone                 1337                    ng/mg creat    Sources of hydrocodone include scheduled prescription    medications. Hydromorphone, dihydrocodeine and norhydrocodone are    expected metabolites of hydrocodone. Hydromorphone and    dihydrocodeine are also available as scheduled prescription    medications.   Baclofen                       PRESENT                               Acetaminophen                  PRESENT                              ====================================================================  Test                       Result    Flag   Units      Ref Range        Creatinine              43               mg/dL      >=20            ====================================================================  Declared Medications:  Medication list was not provided.  ====================================================================  For clinical consultation, please call [phone].  ====================================================================  Analysis performed by [facility] [place] [address]     , No results found for: COMDAT, No results found for: THC13, PCP13, COC13, MAMP13, OPI13, AMP13, BZO13, TCA13, MTD13, BAR13, OXY13, PPX13, BUP13     Processing:  Rx to be electronically transmitted to pharmacy by provider      [email or web address]       checked in past 3 months?  No due to access not granted by provider.    Please advise, thanks.

## 2020-09-01 RX ORDER — HYDROCODONE BITARTRATE AND ACETAMINOPHEN 10; 325 MG/1; MG/1
1 TABLET ORAL EVERY 4 HOURS PRN
Qty: 180 TABLET | Refills: 0 | Status: SHIPPED | OUTPATIENT
Start: 2020-09-01 | End: 2020-09-27

## 2020-09-01 RX ORDER — ALPRAZOLAM 0.25 MG
0.25 TABLET ORAL 2 TIMES DAILY PRN
Qty: 30 TABLET | Refills: 0 | Status: SHIPPED | OUTPATIENT
Start: 2020-09-01 | End: 2020-09-11

## 2020-09-01 NOTE — PROGRESS NOTES
"Trisha Bender is a 65 year old female who is being evaluated via a billable video visit.  This visit is being conducted as a virtual visit due to the emphasis on mitigation of the COVID-19 virus pandemic. The clinician has decided that the risk of an in-office visit outweighs the benefit for this patient.     The patient has been notified of following:   \"This video visit will be conducted via a call between you and your physician/provider. We have found that certain health care needs can be provided without the need for an in-person physical exam.  This service lets us provide the care you need with a video conversation.  If a prescription is necessary we can send it directly to your pharmacy.  If lab work is needed we can place an order for that and you can then stop by our lab to have the test done at a later time.  If during the course of the call the physician/provider feels a video visit is not appropriate, you will not be charged for this service.\"     Patient has given verbal consent for Video visit? Yes    Patient would like the video invitation sent by: [email or web address]    Video Start Time: 16:00 pm      Review Of Systems  Skin: Negative  Eyes: Positive for glasses  Ears/Nose/Throat:   Respiratory: Positive for dyspnea with exertion, sleeping in a recliner, asthma  Cardiovascular: Positive for palpitations, edema has greatly improved; fatigue  Gastrointestinal: Negative  Genitourinary: Negative  Musculoskeletal: Positive for back pain, joint pain, arthritis  Neurologic: Negative  Psychiatric: Positive for sleep disturbances, anxiety, stress, depression  Hematologic/Lymphatic/Immunologic: Negative  Endocrine: Negative     Patient reported vitals:  BP: 140/90  Heart rate: 99  Weight: 184 lbs      I have reviewed and updated the patient's Past Medical History, Social History, Family History and Medication List.    PROBLEM LIST  Patient Active Problem List   Diagnosis     Essential hypertension with goal blood " pressure less than 130/80     Chronic migraine without aura without status migrainosus, not intractable     Esophageal reflux     Disorder of bone and cartilage     Family history of malignant neoplasm of gastrointestinal tract     Chronic airway obstruction (H)     Cystocele     Advanced directives, counseling/discussion     Family history of thyroid cancer - 1/2 brother     Anxiety     Mild persistent asthma without complication     Chronic low back pain     Hair loss     Lateral epicondylitis of right elbow     Dilated cardiomyopathy (H)     Right bundle branch block (RBBB)     CHF (congestive heart failure) (H)     SOB (shortness of breath)     Coronary artery disease involving native coronary artery of native heart without angina pectoris     Mixed hyperlipidemia     Controlled substance agreement signed     Moderate persistent asthma without complication     Hypokalemia     Mechanical low back pain     Panlobular emphysema (H)     Severe episode of recurrent major depressive disorder, without psychotic features (H)     Abdominal pain     Tachycardia     Status post coronary angiogram       ALLERGIES  Gabapentin; Contrast dye; Escitalopram; Peanuts [nuts]; Penicillins; Sulfa drugs; Tace [chlorotrianisene]; Aspirin; Ibuprofen; and Strawberry    MEDICATIONS  Current Outpatient Medications   Medication Sig Dispense Refill     albuterol (VENTOLIN HFA) 108 (90 BASE) MCG/ACT Inhaler Inhale 2 puffs into the lungs every 6 hours as needed for shortness of breath / dyspnea or wheezing 18 g 3     alendronate (FOSAMAX) 70 MG tablet TAKE 1 TABLET BY MOUTH EVERY 7 DAYS 12 tablet 3     ALPRAZolam (XANAX) 0.25 MG tablet TAKE 1 TABLET (0.25 MG) BY MOUTH 2 TIMES DAILY AS NEEDED FOR ANXIETY 30 tablet 0     atorvastatin (LIPITOR) 10 MG tablet Take 1 tablet (10 mg) by mouth daily 90 tablet 1     azithromycin (ZITHROMAX) 250 MG tablet Take 250 mg by mouth three times a week MWF  1     baclofen (LIORESAL) 10 MG tablet TAKE TWO  TABLETS BY MOUTH THREE TIMES A  tablet 5     BREO ELLIPTA 200-25 MCG/INH Inhaler Inhale 1 Dose into the lungs daily   12     carvedilol (COREG) 6.25 MG tablet Take 1 tablet (6.25 mg) by mouth 2 times daily (with meals) 180 tablet 1     cetirizine (ZYRTEC) 10 MG tablet Take 10 mg by mouth daily       [employer] FIBER GUMMIES 2.5 G CHEW Take 1 tablet by mouth daily Reported on 5/8/2017       doxycycline hyclate (VIBRAMYCIN) 100 MG capsule Take 1 capsule (100 mg) by mouth 2 times daily 20 capsule 0     fluticasone (FLONASE) 50 MCG/ACT nasal spray Spray 1-2 sprays in each nostril daily-+++NEED ANNUAL EXAM+++ 16 g 0     furosemide (LASIX) 20 MG tablet TAKE 2 TABLETS (40 MG) BY MOUTH DAILY SEVERE SWELLING 180 tablet 1     HYDROcodone-acetaminophen (NORCO)  MG per tablet Take 1 tablet by mouth every 4 hours as needed for severe pain Take up to 6 tablets a day 180 tablet 0     ipratropium - albuterol 0.5 mg/2.5 mg/3 mL (DUONEB) 0.5-2.5 (3) MG/3ML neb solution Take 1 vial (3 mLs) by nebulization every 6 hours as needed for shortness of breath / dyspnea or wheezing 30 vial 1     ketorolac (TORADOL) 10 MG tablet TAKE ONE TABLET BY MOUTH THREE TIMES A DAY AS NEEDED FOR MODERATE PAIN 15 tablet 0     lisinopril (ZESTRIL) 5 MG tablet Take 1 tablet (5 mg) by mouth daily 90 tablet 1     montelukast (SINGULAIR) 10 MG tablet Take 1 tablet (10 mg) by mouth At Bedtime 90 tablet 1     Multiple Vitamin CHEW Take 1 tablet by mouth daily        naloxone (NARCAN) 4 MG/0.1ML nasal spray Spray 1 spray (4 mg) into one nostril alternating nostrils once as needed for opioid reversal Every 2-3 minutes until patient responsive or EMS arrives 0.2 mL 0     ondansetron (ZOFRAN-ODT) 4 MG ODT tab TAKE 1-2 TABLETS (4-8 MG) BY MOUTH EVERY 8 HOURS AS NEEDED FOR NAUSEA 18 tablet 4     pantoprazole (PROTONIX) 40 MG EC tablet TAKE 1 TABLET BY MOUTH EVERY DAY 90 tablet 3     potassium chloride ER (K-TAB/KLOR-CON) 10 MEQ CR tablet TAKE 1 TABLET BY MOUTH  THREE TIMES A  tablet 1     predniSONE (DELTASONE) 10 MG tablet Take 1 tablet (10 mg) by mouth daily 30 tablet 0     predniSONE (DELTASONE) 20 MG tablet Take 2 tablets (40 mg) every 8 hours, for a total of 3 doses. Third dose should be taken 1 hour prior to the procedure. (Patient not taking: Reported on 8/11/2020) 6 tablet 0     predniSONE (DELTASONE) 5 MG tablet Take 1 tablet (5 mg) by mouth daily 30 tablet 0     roflumilast (DALIRESP) 500 MCG TABS tablet Take 1 tablet (500 mcg) by mouth daily       SUMAtriptan (IMITREX) 25 MG tablet Take 1 tablet (25 mg) by mouth at onset of headache for migraine May repeat in 2 hours if needed: max 2/day; 9 tablet 11     tiotropium (SPIRIVA HANDIHALER) 18 MCG capsule USING THE HANDIHALER, INHALE THE CONTENTS OF ONE CAPSULE BY MOUTH DAILY 90 capsule 3       ROS:  12-pt ROS is negative except for as noted above.    EXAM:  A limited exam was conducted via video.  Constitutional: Patient is pleasant, alert, in no distress. Normal body habitus, upright.  ENT: Membranes moist, no nasal discharge or bleeding gums. Normal head shape, no evidence of injury or laceration.  EYES: No scleral icterus, normal conjunctivae. EOM's appear intact. No observed jaundice  Neck: No evidence of thyromegaly.   Chest/Lungs: Appears to have normal respiratory effort. Normal breathing while talking. No audible wheezing, no cough  Equal chest wall expansion.   Cardiovascular: No obvious elevated JVP. No apparent pitting edema bilaterally.  Abdomen: No evidence of abdominal distention.   Extremities: No edema, erythema, cyanosis noted.  Skin: No rashes or lesions appreciated on visualized skin, normal skin color.  Neurologic: Normal mentation. Normal arm motion bilaterally, no tremors. No evidence of focal defect.  Psychiatric: Appropriate affect. A&Ox3.  Calm demeanor      HISTORY OF PRESENT ILLNESS  This is a 65 year old female who follows with [facility].  She is a patient of   [facility] seen in our clinic for idiopathic CM, severe COPD, sinus tachycardia, HTN, hyperlipidemia.     Ms Bender suffered CHF (2015)  An ECHO at that time showed LVEF 20-25%  Coronary angiography revealed mild nonobstructive CAD and she was diagnosed with idiopathic CM (likely takotsubo)  Fortunately on appropriate CV meds, her LVEF improved to 45-50%      A repeat ECHO (9/2019) showed LVEF 55-60%, normal RV function, mild MR/TR     Ms Bender does have an extensive history of asthma and severe COPD   Lung transplantation has been recommended.     She saw Dr. Finney (6/2020) with complaints of several month history of worsening SOB.  She has underlying sinus tachycardia treated with Diltiazem.  Her HR remains > 100 bpm much of the time.  Her O2 sats drops to 92% with exertion.     An event monitor (6/25/20) revealed runs of nonsustained V-Tach and PSVT  Her HRs were mainly > 120 bpm up to 188 bpm.  An ECHO (6/25/20) showed decline in LVEF to 40-45% with a HR of 134 bpm.  There was poor visualization.  Her RV function was normal.  RVSP 33 mmHg and no significant valvular pathology  Her Diltiazem was changed to Coreg.    She underwent a RHC + LHC (7/30/20) showing minimal CAD, severely elevated right and left heart filling pressures ( mean RA 21 mmHg, wedge 28, LVEDP 25 mmHg)  Her mild/mod pulmonary HTN felt to be due to chronically elevated left heart filling pressures  LVEF 45%  Her Lasix was increased to 20 mg BID  Since her procedure, her Coreg has been increased to 6.25mg BID due to ongoing HTN.     With the increase in her Lasix, her weight declined, her lower extremity edema resolved and her SOB improved some.  I increased her Lisinopril to 5 mg to better control her BP and asked her to increase her Atorvastatin to daily (as opposed to 3x/wk) last month  Our visit today is for further review.    Ms Bender overall is feeling better.  Her weight is down 6 lbs and her foot swelling in almost completely gone.  She still  has SOB when doing any exertion.  She denies any  CP or SOB at rest.  She does not have any significant palpitations.  She is trying to cut down on her food intake and reduce the sugars in her diet.  She has no dizziness or orthopnea.    BMP (8/19/20)  Sodium: 134  Potassium: 4.2  BUN: 8  Creatinine: 0.54    IMPRESSION AND PLAN    Nonischemic CM:  -LVEF 40-45%  -cath (7/30/20) showed minimal nonobstructive CAD and elevated right and left filling pressures  -now on higher doses of Coreg and Lasix + KCL 30 meq  -weight down 6 lbs in 1 month  -recheck BMP in 1 month  -could consider increasing Lasix.      HTN:  -on Coreg 6.25mg, Lisinopril 5 mg, Lasix 20 mg BID  -BP at home 140/90  -will increase Coreg cautiously to 9.375 mg BID  -Return visit in 1 month       Asymptomatic NSVT  -on beta-blocker in setting of mild CM    Severe COPD  -longstanding history of asthma  -considering lung transplantation  -on multiple inhalers, prednisone and lasix     ST  -likely due to her underlying severe lung disease  -episodes of PSVT up to 188 bpm on event monitor (6/2020)  -HRs now  bpm on Coreg  -consider EP evaluation in future if need be    Mixed Hyperlipidemia  - Atorvastatin 10 mg increased to daily due to total cholesterol 238 and triglyceride of 478 (unable to calculate LDL)  -will check lipid panel next month        Video-Visit Details  Type of service:  Video Visit  Video End Time (time video stopped): 16:15 pm  Originating Location (pt. Location): Home  Distant Location (provider location):  [facility]   Mode of Communication:  Video Conference via ELLE Garrett, BERTA  [facility]

## 2020-09-01 NOTE — TELEPHONE ENCOUNTER
Norco refill request.     Last OV on 1/7/20    Last refill on 8/5/20 for #180     Routing refill request to provider for review/approval because:  Drug not on the [facility] refill protocol

## 2020-09-01 NOTE — TELEPHONE ENCOUNTER
Patient calling again wanting refill of pain medication early.  Patient states she just really blew it this month and she has already been lectured about it.  Patient is requesting to speak with Dr. Weinberg directly to refill.    Patient wants to remind Dr. Weinberg that she has care at home and she is still interested in having the Toradol back taken away previously.

## 2020-09-02 ENCOUNTER — VIRTUAL VISIT (OUTPATIENT)
Dept: CARDIOLOGY | Facility: CLINIC | Age: 65
End: 2020-09-02
Payer: COMMERCIAL

## 2020-09-02 DIAGNOSIS — I25.10 CORONARY ARTERY DISEASE INVOLVING NATIVE CORONARY ARTERY OF NATIVE HEART WITHOUT ANGINA PECTORIS: ICD-10-CM

## 2020-09-02 DIAGNOSIS — I42.0 DILATED CARDIOMYOPATHY (H): ICD-10-CM

## 2020-09-02 DIAGNOSIS — R00.0 TACHYCARDIA: ICD-10-CM

## 2020-09-02 PROCEDURE — 99214 OFFICE O/P EST MOD 30 MIN: CPT | Mod: 95 | Performed by: NURSE PRACTITIONER

## 2020-09-02 RX ORDER — CARVEDILOL 6.25 MG/1
9.38 TABLET ORAL 2 TIMES DAILY WITH MEALS
Qty: 180 TABLET | Refills: 1 | Status: SHIPPED | OUTPATIENT
Start: 2020-09-02 | End: 2020-09-14

## 2020-09-02 NOTE — LETTER
"9/2/2020    Bandar Weinberg MD  [address] [facility] [address]    RE: Trisha Bender       Dear Colleague,    I had the pleasure of seeing Trisha Bender in the [facility].    Trisha Bender is a 65 year old female who is being evaluated via a billable video visit.  This visit is being conducted as a virtual visit due to the emphasis on mitigation of the COVID-19 virus pandemic. The clinician has decided that the risk of an in-office visit outweighs the benefit for this patient.     The patient has been notified of following:   \"This video visit will be conducted via a call between you and your physician/provider. We have found that certain health care needs can be provided without the need for an in-person physical exam.  This service lets us provide the care you need with a video conversation.  If a prescription is necessary we can send it directly to your pharmacy.  If lab work is needed we can place an order for that and you can then stop by our lab to have the test done at a later time.  If during the course of the call the physician/provider feels a video visit is not appropriate, you will not be charged for this service.\"     Patient has given verbal consent for Video visit? Yes    Patient would like the video invitation sent by: [email or web address]    Video Start Time: 16:00 pm      Review Of Systems  Skin: Negative  Eyes: Positive for glasses  Ears/Nose/Throat:   Respiratory: Positive for dyspnea with exertion, sleeping in a recliner, asthma  Cardiovascular: Positive for palpitations, edema has greatly improved; fatigue  Gastrointestinal: Negative  Genitourinary: Negative  Musculoskeletal: Positive for back pain, joint pain, arthritis  Neurologic: Negative  Psychiatric: Positive for sleep disturbances, anxiety, stress, depression  Hematologic/Lymphatic/Immunologic: Negative  Endocrine: Negative     Patient reported vitals:  BP: 140/90  Heart rate: 99  Weight: " 184 lbs      I have reviewed and updated the patient's Past Medical History, Social History, Family History and Medication List.    PROBLEM LIST  Patient Active Problem List   Diagnosis     Essential hypertension with goal blood pressure less than 130/80     Chronic migraine without aura without status migrainosus, not intractable     Esophageal reflux     Disorder of bone and cartilage     Family history of malignant neoplasm of gastrointestinal tract     Chronic airway obstruction (H)     Cystocele     Advanced directives, counseling/discussion     Family history of thyroid cancer - 1/2 brother     Anxiety     Mild persistent asthma without complication     Chronic low back pain     Hair loss     Lateral epicondylitis of right elbow     Dilated cardiomyopathy (H)     Right bundle branch block (RBBB)     CHF (congestive heart failure) (H)     SOB (shortness of breath)     Coronary artery disease involving native coronary artery of native heart without angina pectoris     Mixed hyperlipidemia     Controlled substance agreement signed     Moderate persistent asthma without complication     Hypokalemia     Mechanical low back pain     Panlobular emphysema (H)     Severe episode of recurrent major depressive disorder, without psychotic features (H)     Abdominal pain     Tachycardia     Status post coronary angiogram       ALLERGIES  Gabapentin; Contrast dye; Escitalopram; Peanuts [nuts]; Penicillins; Sulfa drugs; Tace [chlorotrianisene]; Aspirin; Ibuprofen; and Strawberry    MEDICATIONS  Current Outpatient Medications   Medication Sig Dispense Refill     albuterol (VENTOLIN HFA) 108 (90 BASE) MCG/ACT Inhaler Inhale 2 puffs into the lungs every 6 hours as needed for shortness of breath / dyspnea or wheezing 18 g 3     alendronate (FOSAMAX) 70 MG tablet TAKE 1 TABLET BY MOUTH EVERY 7 DAYS 12 tablet 3     ALPRAZolam (XANAX) 0.25 MG tablet TAKE 1 TABLET (0.25 MG) BY MOUTH 2 TIMES DAILY AS NEEDED FOR ANXIETY 30 tablet 0      atorvastatin (LIPITOR) 10 MG tablet Take 1 tablet (10 mg) by mouth daily 90 tablet 1     azithromycin (ZITHROMAX) 250 MG tablet Take 250 mg by mouth three times a week MWF  1     baclofen (LIORESAL) 10 MG tablet TAKE TWO TABLETS BY MOUTH THREE TIMES A  tablet 5     BREO ELLIPTA 200-25 MCG/INH Inhaler Inhale 1 Dose into the lungs daily   12     carvedilol (COREG) 6.25 MG tablet Take 1 tablet (6.25 mg) by mouth 2 times daily (with meals) 180 tablet 1     cetirizine (ZYRTEC) 10 MG tablet Take 10 mg by mouth daily       [employer] FIBER GUMMIES 2.5 G CHEW Take 1 tablet by mouth daily Reported on 5/8/2017       doxycycline hyclate (VIBRAMYCIN) 100 MG capsule Take 1 capsule (100 mg) by mouth 2 times daily 20 capsule 0     fluticasone (FLONASE) 50 MCG/ACT nasal spray Spray 1-2 sprays in each nostril daily-+++NEED ANNUAL EXAM+++ 16 g 0     furosemide (LASIX) 20 MG tablet TAKE 2 TABLETS (40 MG) BY MOUTH DAILY SEVERE SWELLING 180 tablet 1     HYDROcodone-acetaminophen (NORCO)  MG per tablet Take 1 tablet by mouth every 4 hours as needed for severe pain Take up to 6 tablets a day 180 tablet 0     ipratropium - albuterol 0.5 mg/2.5 mg/3 mL (DUONEB) 0.5-2.5 (3) MG/3ML neb solution Take 1 vial (3 mLs) by nebulization every 6 hours as needed for shortness of breath / dyspnea or wheezing 30 vial 1     ketorolac (TORADOL) 10 MG tablet TAKE ONE TABLET BY MOUTH THREE TIMES A DAY AS NEEDED FOR MODERATE PAIN 15 tablet 0     lisinopril (ZESTRIL) 5 MG tablet Take 1 tablet (5 mg) by mouth daily 90 tablet 1     montelukast (SINGULAIR) 10 MG tablet Take 1 tablet (10 mg) by mouth At Bedtime 90 tablet 1     Multiple Vitamin CHEW Take 1 tablet by mouth daily        naloxone (NARCAN) 4 MG/0.1ML nasal spray Spray 1 spray (4 mg) into one nostril alternating nostrils once as needed for opioid reversal Every 2-3 minutes until patient responsive or EMS arrives 0.2 mL 0     ondansetron (ZOFRAN-ODT) 4 MG ODT tab TAKE 1-2 TABLETS (4-8 MG) BY  MOUTH EVERY 8 HOURS AS NEEDED FOR NAUSEA 18 tablet 4     pantoprazole (PROTONIX) 40 MG EC tablet TAKE 1 TABLET BY MOUTH EVERY DAY 90 tablet 3     potassium chloride ER (K-TAB/KLOR-CON) 10 MEQ CR tablet TAKE 1 TABLET BY MOUTH THREE TIMES A  tablet 1     predniSONE (DELTASONE) 10 MG tablet Take 1 tablet (10 mg) by mouth daily 30 tablet 0     predniSONE (DELTASONE) 20 MG tablet Take 2 tablets (40 mg) every 8 hours, for a total of 3 doses. Third dose should be taken 1 hour prior to the procedure. (Patient not taking: Reported on 8/11/2020) 6 tablet 0     predniSONE (DELTASONE) 5 MG tablet Take 1 tablet (5 mg) by mouth daily 30 tablet 0     roflumilast (DALIRESP) 500 MCG TABS tablet Take 1 tablet (500 mcg) by mouth daily       SUMAtriptan (IMITREX) 25 MG tablet Take 1 tablet (25 mg) by mouth at onset of headache for migraine May repeat in 2 hours if needed: max 2/day; 9 tablet 11     tiotropium (SPIRIVA HANDIHALER) 18 MCG capsule USING THE HANDIHALER, INHALE THE CONTENTS OF ONE CAPSULE BY MOUTH DAILY 90 capsule 3       ROS:  12-pt ROS is negative except for as noted above.    EXAM:  A limited exam was conducted via video.  Constitutional: Patient is pleasant, alert, in no distress. Normal body habitus, upright.  ENT: Membranes moist, no nasal discharge or bleeding gums. Normal head shape, no evidence of injury or laceration.  EYES: No scleral icterus, normal conjunctivae. EOM's appear intact. No observed jaundice  Neck: No evidence of thyromegaly.   Chest/Lungs: Appears to have normal respiratory effort. Normal breathing while talking. No audible wheezing, no cough  Equal chest wall expansion.   Cardiovascular: No obvious elevated JVP. No apparent pitting edema bilaterally.  Abdomen: No evidence of abdominal distention.   Extremities: No edema, erythema, cyanosis noted.  Skin: No rashes or lesions appreciated on visualized skin, normal skin color.  Neurologic: Normal mentation. Normal arm motion bilaterally, no  tremors. No evidence of focal defect.  Psychiatric: Appropriate affect. A&Ox3.  Calm demeanor      HISTORY OF PRESENT ILLNESS  This is a 65 year old female who follows with [facility].  She is a patient of Dr. Finney seen in our clinic for idiopathic CM, severe COPD, sinus tachycardia, HTN, hyperlipidemia.     Ms Bender suffered CHF (2015)  An ECHO at that time showed LVEF 20-25%  Coronary angiography revealed mild nonobstructive CAD and she was diagnosed with idiopathic CM (likely takotsubo)  Fortunately on appropriate CV meds, her LVEF improved to 45-50%      A repeat ECHO (9/2019) showed LVEF 55-60%, normal RV function, mild MR/TR     Ms Bender does have an extensive history of asthma and severe COPD   Lung transplantation has been recommended.     She saw Dr. Finney (6/2020) with complaints of several month history of worsening SOB.  She has underlying sinus tachycardia treated with Diltiazem.  Her HR remains > 100 bpm much of the time.  Her O2 sats drops to 92% with exertion.     An event monitor (6/25/20) revealed runs of nonsustained V-Tach and PSVT  Her HRs were mainly > 120 bpm up to 188 bpm.  An ECHO (6/25/20) showed decline in LVEF to 40-45% with a HR of 134 bpm.  There was poor visualization.  Her RV function was normal.  RVSP 33 mmHg and no significant valvular pathology  Her Diltiazem was changed to Coreg.    She underwent a RHC + LHC (7/30/20) showing minimal CAD, severely elevated right and left heart filling pressures ( mean RA 21 mmHg, wedge 28, LVEDP 25 mmHg)  Her mild/mod pulmonary HTN felt to be due to chronically elevated left heart filling pressures  LVEF 45%  Her Lasix was increased to 20 mg BID  Since her procedure, her Coreg has been increased to 6.25mg BID due to ongoing HTN.     With the increase in her Lasix, her weight declined, her lower extremity edema resolved and her SOB improved some.  I increased her Lisinopril to 5 mg to better control her BP and asked her to  increase her Atorvastatin to daily (as opposed to 3x/wk) last month  Our visit today is for further review.    Ms Bender overall is feeling better.  Her weight is down 6 lbs and her foot swelling in almost completely gone.  She still has SOB when doing any exertion.  She denies any  CP or SOB at rest.  She does not have any significant palpitations.  She is trying to cut down on her food intake and reduce the sugars in her diet.  She has no dizziness or orthopnea.    BMP (8/19/20)  Sodium: 134  Potassium: 4.2  BUN: 8  Creatinine: 0.54    IMPRESSION AND PLAN    Nonischemic CM:  -LVEF 40-45%  -cath (7/30/20) showed minimal nonobstructive CAD and elevated right and left filling pressures  -now on higher doses of Coreg and Lasix + KCL 30 meq  -weight down 6 lbs in 1 month  -recheck BMP in 1 month  -could consider increasing Lasix.      HTN:  -on Coreg 6.25mg, Lisinopril 5 mg, Lasix 20 mg BID  -BP at home 140/90  -will increase Coreg cautiously to 9.375 mg BID  -Return visit in 1 month       Asymptomatic NSVT  -on beta-blocker in setting of mild CM    Severe COPD  -longstanding history of asthma  -considering lung transplantation  -on multiple inhalers, prednisone and lasix     ST  -likely due to her underlying severe lung disease  -episodes of PSVT up to 188 bpm on event monitor (6/2020)  -HRs now  bpm on Coreg  -consider EP evaluation in future if need be    Mixed Hyperlipidemia  - Atorvastatin 10 mg increased to daily due to total cholesterol 238 and triglyceride of 478 (unable to calculate LDL)  -will check lipid panel next month        Video-Visit Details  Type of service:  Video Visit  Video End Time (time video stopped): 16:15 pm  Originating Location (pt. Location): Home  Distant Location (provider location):  [facility]   Mode of Communication:  Video Conference via [employer]    ELLE Medina, BERTA  [facility]    Thank you for allowing  me to participate in the care of your patient.      Sincerely,     ELLE Landa CNP     [facility]

## 2020-09-02 NOTE — TELEPHONE ENCOUNTER
Routing refill request to provider for review/approval because:  BP out of range    BP Readings from Last 3 Encounters:   07/30/20 (!) 141/106   01/07/20 (!) 143/88   11/11/19 132/78

## 2020-09-03 RX ORDER — LISINOPRIL 2.5 MG/1
TABLET ORAL
Qty: 90 TABLET | Refills: 2 | Status: SHIPPED | OUTPATIENT
Start: 2020-09-03 | End: 2020-09-11

## 2020-09-11 ENCOUNTER — MYC MEDICAL ADVICE (OUTPATIENT)
Dept: INTERNAL MEDICINE | Facility: CLINIC | Age: 65
End: 2020-09-11

## 2020-09-11 ENCOUNTER — VIRTUAL VISIT (OUTPATIENT)
Dept: INTERNAL MEDICINE | Facility: CLINIC | Age: 65
End: 2020-09-11
Payer: COMMERCIAL

## 2020-09-11 VITALS — DIASTOLIC BLOOD PRESSURE: 70 MMHG | SYSTOLIC BLOOD PRESSURE: 120 MMHG

## 2020-09-11 DIAGNOSIS — J43.1 PANLOBULAR EMPHYSEMA (H): ICD-10-CM

## 2020-09-11 DIAGNOSIS — G89.29 CHRONIC MIDLINE LOW BACK PAIN WITHOUT SCIATICA: Primary | ICD-10-CM

## 2020-09-11 DIAGNOSIS — Z79.899 CHRONIC PRESCRIPTION BENZODIAZEPINE USE: ICD-10-CM

## 2020-09-11 DIAGNOSIS — I10 ESSENTIAL HYPERTENSION WITH GOAL BLOOD PRESSURE LESS THAN 130/80: ICD-10-CM

## 2020-09-11 DIAGNOSIS — F41.9 ANXIETY: ICD-10-CM

## 2020-09-11 DIAGNOSIS — I42.0 DILATED CARDIOMYOPATHY (H): ICD-10-CM

## 2020-09-11 DIAGNOSIS — M54.50 CHRONIC MIDLINE LOW BACK PAIN WITHOUT SCIATICA: Primary | ICD-10-CM

## 2020-09-11 PROCEDURE — 99214 OFFICE O/P EST MOD 30 MIN: CPT | Mod: 95 | Performed by: INTERNAL MEDICINE

## 2020-09-11 RX ORDER — ALPRAZOLAM 0.25 MG
0.25 TABLET ORAL 2 TIMES DAILY PRN
Qty: 30 TABLET | Refills: 0 | Status: SHIPPED | OUTPATIENT
Start: 2020-09-11 | End: 2020-09-30

## 2020-09-11 NOTE — TELEPHONE ENCOUNTER
See her [employer] message. Dispense amount is #30, but pt takes twice daily.     Please clarify.     If OK, since Rx was already faxed in today, maybe change the next one in 2 weeks.

## 2020-09-11 NOTE — PROGRESS NOTES
"Trisha Bender is a 65 year old female who is being evaluated via a billable video visit.      The patient has been notified of following:     \"This video visit will be conducted via a call between you and your physician/provider. We have found that certain health care needs can be provided without the need for an in-person physical exam.  This service lets us provide the care you need with a video conversation.  If a prescription is necessary we can send it directly to your pharmacy.  If lab work is needed we can place an order for that and you can then stop by our lab to have the test done at a later time.    Video visits are billed at different rates depending on your insurance coverage.  Please reach out to your insurance provider with any questions.    If during the course of the call the physician/provider feels a video visit is not appropriate, you will not be charged for this service.\"    Patient has given verbal consent for Video visit? Yes  How would you like to obtain your AVS? Mail a copy  If you are dropped from the video visit, the video invite should be resent to: Text to cell phone: [phone]  Will anyone else be joining your video visit? No    Subjective     Trisha Bender is a 65 year old female who presents today via video visit for the following health issues:  Patient is being seen for an medication check.  HPI    Patient is seen for a follow up visit.    Patient has h/o COPD, on oxygen treatment. Seeing pulmonary , has been advised for lung transplant.   Has chronic SOB, no increased cough or wheezing.     Has h/o idiopathic CMP , last EF of 40-45%. Has had angiogram with no significant CAD.   No chest pain. Has gained weight and has LE edema. Has orthopnea.   Seen cardiology. titrating up her Lisinopril and Carvedilol. Also on Furosemide.   Has tachycardia, triggered by her chronic SOB.     Has h/o anxiety. On Xanax, takes 0.25 mg bid, helps with anxiety and SOB. No side effect of " sedation, no dizziness.     Hypertension Follow-up  Has h/o HTN. on medical treatment. BP has been controlled. No side effects from medications. No CP, HA, dizziness. good compliance with medications and low salt diet.      Do you check your blood pressure regularly outside of the clinic? Yes     Are you following a low salt diet? No    Are your blood pressures ever more than 140 on the top number (systolic) OR more   than 90 on the bottom number (diastolic), for example 140/90? Yes      How many servings of fruits and vegetables do you eat daily?  3-4    On average, how many sweetened beverages do you drink each day (Examples: soda, juice, sweet tea, etc.  Do NOT count diet or artificially sweetened beverages)?   1    How many days per week do you exercise enough to make your heart beat faster? 3 or less    How many minutes a day do you exercise enough to make your heart beat faster? 9 or less    How many days per week do you miss taking your medication? 0    Has h/o chronic LBP, related to DDD, compression fractures. On chronic narcotic analgesics. Pain is constant , medications help with ability to function, walk.        Video Start Time: 11:52 PM        Review of Systems   Constitutional, HEENT, cardiovascular, pulmonary, gi and gu systems are negative, except as otherwise noted.      Objective           Vitals:  No vitals were obtained today due to virtual visit.    Physical Exam     GENERAL: frail, alert and no distress  EYES: Eyes grossly normal to inspection.  No discharge or erythema, or obvious scleral/conjunctival abnormalities.  RESP: No audible wheeze, cough, or visible cyanosis.  No visible retractions or increased work of breathing.    SKIN: Visible skin clear. No significant rash, abnormal pigmentation or lesions.  NEURO: Cranial nerves grossly intact.  Mentation and speech appropriate for age.  PSYCH: Mentation appears normal, affect normal/bright, judgement and insight intact, normal speech and  "appearance well-groomed.              Assessment & Plan   Problem List Items Addressed This Visit     Essential hypertension with goal blood pressure less than 130/80    Anxiety    Relevant Medications    ALPRAZolam (XANAX) 0.25 MG tablet    Chronic low back pain - Primary    Dilated cardiomyopathy (H)    Panlobular emphysema (H)      Other Visit Diagnoses     Chronic prescription benzodiazepine use        Relevant Medications    ALPRAZolam (XANAX) 0.25 MG tablet             BMI:   Estimated body mass index is 27.26 kg/m  as calculated from the following:    Height as of 7/30/20: 1.778 m (5' 10\").    Weight as of 7/30/20: 86.2 kg (190 lb).     Continue treatment   Monitor for medications side effects   Follow up with cardiology and pulmonary         See Patient Instructions  Return in about 6 months (around 3/11/2021) for Physical Exam.    Bandar Weinberg MD  [facility]      Video-Visit Details    Type of service:  Video Visit    Video End Time:12:10 PM    Originating Location (pt. Location): Home    Distant Location (provider location):  [facility]     Platform used for Video Visit: Alysha        "

## 2020-09-14 ENCOUNTER — TELEPHONE (OUTPATIENT)
Dept: CARDIOLOGY | Facility: CLINIC | Age: 65
End: 2020-09-14

## 2020-09-14 DIAGNOSIS — R00.0 TACHYCARDIA: ICD-10-CM

## 2020-09-14 RX ORDER — CARVEDILOL 3.12 MG/1
3.12 TABLET ORAL 2 TIMES DAILY WITH MEALS
Qty: 180 TABLET | Refills: 1 | Status: SHIPPED | OUTPATIENT
Start: 2020-09-14 | End: 2020-10-13

## 2020-09-14 RX ORDER — CARVEDILOL 6.25 MG/1
6.25 TABLET ORAL 2 TIMES DAILY WITH MEALS
Qty: 180 TABLET | Refills: 1 | Status: SHIPPED | OUTPATIENT
Start: 2020-09-14 | End: 2020-10-13

## 2020-09-14 NOTE — TELEPHONE ENCOUNTER
Received a call from patient. Yi Snell NP recently increased Coreg to 9.375 mg BID    Patient is having difficulty cutting the pills in half.     Sent in Rx for 6.25 mg and 3.125 mg tabs (to be taken together) BID.      Autumn MEJIA, BSN  [facility] ~ [place], [place]

## 2020-09-24 ENCOUNTER — MYC REFILL (OUTPATIENT)
Dept: INTERNAL MEDICINE | Facility: CLINIC | Age: 65
End: 2020-09-24

## 2020-09-24 DIAGNOSIS — G89.29 CHRONIC BILATERAL LOW BACK PAIN, UNSPECIFIED WHETHER SCIATICA PRESENT: ICD-10-CM

## 2020-09-24 DIAGNOSIS — M54.50 CHRONIC BILATERAL LOW BACK PAIN, UNSPECIFIED WHETHER SCIATICA PRESENT: ICD-10-CM

## 2020-09-25 ENCOUNTER — MYC REFILL (OUTPATIENT)
Dept: INTERNAL MEDICINE | Facility: CLINIC | Age: 65
End: 2020-09-25

## 2020-09-25 ENCOUNTER — MYC MEDICAL ADVICE (OUTPATIENT)
Dept: INTERNAL MEDICINE | Facility: CLINIC | Age: 65
End: 2020-09-25

## 2020-09-25 ENCOUNTER — NURSE TRIAGE (OUTPATIENT)
Dept: NURSING | Facility: CLINIC | Age: 65
End: 2020-09-25

## 2020-09-25 DIAGNOSIS — M54.59 MECHANICAL LOW BACK PAIN: Primary | ICD-10-CM

## 2020-09-25 DIAGNOSIS — M54.50 CHRONIC BILATERAL LOW BACK PAIN, UNSPECIFIED WHETHER SCIATICA PRESENT: ICD-10-CM

## 2020-09-25 DIAGNOSIS — G89.29 CHRONIC BILATERAL LOW BACK PAIN, UNSPECIFIED WHETHER SCIATICA PRESENT: ICD-10-CM

## 2020-09-25 RX ORDER — HYDROCODONE BITARTRATE AND ACETAMINOPHEN 10; 325 MG/1; MG/1
1 TABLET ORAL EVERY 4 HOURS PRN
Qty: 180 TABLET | Refills: 0 | OUTPATIENT
Start: 2020-09-25

## 2020-09-25 RX ORDER — HYDROCODONE BITARTRATE AND ACETAMINOPHEN 10; 325 MG/1; MG/1
1 TABLET ORAL EVERY 4 HOURS PRN
Qty: 180 TABLET | Refills: 0 | Status: CANCELLED | OUTPATIENT
Start: 2020-09-25

## 2020-09-25 RX ORDER — TRAMADOL HYDROCHLORIDE 50 MG/1
50 TABLET ORAL EVERY 6 HOURS PRN
Qty: 20 TABLET | Refills: 0 | Status: SHIPPED | OUTPATIENT
Start: 2020-09-25 | End: 2020-10-23

## 2020-09-25 NOTE — TELEPHONE ENCOUNTER
Dr Grady denied the medication. It is too early. Per her pain contract, she cannot get refills early.     She needs to wait until next week.   Will send message back.

## 2020-09-25 NOTE — TELEPHONE ENCOUNTER
"Pt asking for refill. She states she is out. She \"miscounted\".   Last refill on 9/1/20 for #180.     \"Dr. Weinberg,  I have made a mistake with the Hydrocodone Acetaminophen 10/325 medication again this month and will run out by tomorrow.  I believe that I miscounted the pills during the first week and a half since I didn t put them in a daily dispenser then.  I am sorry that this has happened.  I hadn t wanted to get my  involved but he has agreed to now be the one to give out the medication to me.  I have been keeping it in a drawer next to my chair where I basically reside and he is going to take charge of it instead so it won t be next to me.  I have such tremendous pain that I would just take a pill to help.  I am extremely concerned about running out and experiencing withdrawal symptoms.  I will not let this situation happen again.  Could you please refill the prescription for tomorrow and let the [facility] know to fill it early this month?  I don t want to be in the hospital.  Please also remember that I am on palliative care now.  Still working on getting my heart rate to come down.  Thank you very much.  Sincerely,  Trisha Bender\"    "

## 2020-09-25 NOTE — TELEPHONE ENCOUNTER
"Patient is calling and says she is out of medication. She states she 'mismanaged\" it.    Controlled Substance Refill Request for norco  Problem List Complete:    No     PROVIDER TO CONSIDER COMPLETION OF PROBLEM LIST AND OVERVIEW/CONTROLLED SUBSTANCE AGREEMENT    Last Written Prescription Date:  9/1/20  Last Fill Quantity: 180,   # refills: 0    THE MOST RECENT OFFICE VISIT MUST BE WITHIN THE PAST 3 MONTHS. AT LEAST ONE FACE TO FACE VISIT MUST OCCUR EVERY 6 MONTHS. ADDITIONAL VISITS CAN BE VIRTUAL.  (THIS STATEMENT SHOULD BE DELETED.)    Last Office Visit with [facility] primary care provider: 9/11/20 virtual [place]    Future Office visit:     Controlled substance agreement:   Encounter-Level CSA - 02/23/2016:    Controlled Substance Agreement - Scan on 2/24/2016  1:37 PM: [facility] CONTROLLED SUBSTANCE AGREEMENT, 2/23/16     Patient-Level CSA:    There are no patient-level csa.         Last Urine Drug Screen:   Pain Drug SCR UR W RPTD Meds   Date Value Ref Range Status   12/17/2018 FINAL  Final     Comment:     (Note)  ====================================================================  [facility] COMP DRUG ANALYSIS,UR  ====================================================================  Test                             Result       Flag       Units        Drug Present   Hydrocodone                    1330                    ng/mg creat   Hydromorphone                  228                     ng/mg creat   Dihydrocodeine                 265                     ng/mg creat   Norhydrocodone                 1337                    ng/mg creat    Sources of hydrocodone include scheduled prescription    medications. Hydromorphone, dihydrocodeine and norhydrocodone are    expected metabolites of hydrocodone. Hydromorphone and    dihydrocodeine are also available as scheduled prescription    medications.   Baclofen                       PRESENT                               Acetaminophen                  PRESENT                 "              ====================================================================  Test                      Result    Flag   Units      Ref Range        Creatinine              43               mg/dL      >=20            ====================================================================  Declared Medications:  Medication list was not provided.  ====================================================================  For clinical consultation, please call [phone].  ====================================================================  Analysis performed by [employer] [place] [address]     , No results found for: COMDAT, No results found for: THC13, PCP13, COC13, MAMP13, OPI13, AMP13, BZO13, TCA13, MTD13, BAR13, OXY13, PPX13, BUP13     Processing:  e scribe     [email or web address]       checked in past 3 months?  No, route to RN

## 2020-09-25 NOTE — TELEPHONE ENCOUNTER
Patient calling to advise she needs refill today or she will need to go to the hospital.  Patient advised to allow for 48 to 72 Business hours for all refills moving forward.

## 2020-09-25 NOTE — TELEPHONE ENCOUNTER
Patient states contacted PCP today re medication refill ([place]) and has not heard back.   Per Epic this RN read to patient the current PCP note (9/25/20 at 5:06pm)-         Conversation: Refill Request   (Newest Message First)        9/25/20 5:06 PM   Bandar Weinberg MD routed this conversation to Bandar Gore MD   9/25/20 5:06 PM   Note      Will call in Tramadol for 5 days.         Patient verbalizes understanding that the medication (Tramadol) has been called to [facility].     Protocol-  Medication Request  Care advice reviewed.   Disposition-  Per Visit Selection Guide  Information given as above.  Caller states understanding of the recommended disposition.   Advised to call back if further questions or concerns.     INGRID Willett RN  [facility]         Additional Information    Caller has medication question only, adult not sick, and triager answers question    Protocols used: MEDICATION QUESTION CALL-A-

## 2020-09-27 ENCOUNTER — MYC REFILL (OUTPATIENT)
Dept: INTERNAL MEDICINE | Facility: CLINIC | Age: 65
End: 2020-09-27

## 2020-09-27 DIAGNOSIS — G89.29 CHRONIC BILATERAL LOW BACK PAIN, UNSPECIFIED WHETHER SCIATICA PRESENT: ICD-10-CM

## 2020-09-27 DIAGNOSIS — M54.50 CHRONIC BILATERAL LOW BACK PAIN, UNSPECIFIED WHETHER SCIATICA PRESENT: ICD-10-CM

## 2020-09-29 RX ORDER — HYDROCODONE BITARTRATE AND ACETAMINOPHEN 10; 325 MG/1; MG/1
1 TABLET ORAL EVERY 4 HOURS PRN
Qty: 180 TABLET | Refills: 0 | Status: SHIPPED | OUTPATIENT
Start: 2020-09-29 | End: 2020-10-27

## 2020-09-29 NOTE — TELEPHONE ENCOUNTER
Norco refill request for tomorrow. 9/30. This would be when she is due.   Last refill on 9/1 for #180

## 2020-09-29 NOTE — TELEPHONE ENCOUNTER
Please see refill request.    Controlled Substance Refill Request for norco  Problem List Complete:    No     PROVIDER TO CONSIDER COMPLETION OF PROBLEM LIST AND OVERVIEW/CONTROLLED SUBSTANCE AGREEMENT    Last Written Prescription Date:  9/1/20  Last Fill Quantity: 180,   # refills: 0    THE MOST RECENT OFFICE VISIT MUST BE WITHIN THE PAST 3 MONTHS. AT LEAST ONE FACE TO FACE VISIT MUST OCCUR EVERY 6 MONTHS. ADDITIONAL VISITS CAN BE VIRTUAL.  (THIS STATEMENT SHOULD BE DELETED.)    [place] [facility] [place] 9/11/20 chuck Weinberg    Future Office visit:     Controlled substance agreement:   Encounter-Level CSA - 02/23/2016:    Controlled Substance Agreement - Scan on 2/24/2016  1:37 PM: [place] CONTROLLED SUBSTANCE AGREEMENT, 2/23/16     Patient-Level CSA:    There are no patient-level csa.         Last Urine Drug Screen:   Pain Drug SCR UR W RPTD Meds   Date Value Ref Range Status   12/17/2018 FINAL  Final     Comment:     (Note)  ====================================================================  [facility] COMP DRUG ANALYSIS,UR  ====================================================================  Test                             Result       Flag       Units        Drug Present   Hydrocodone                    1330                    ng/mg creat   Hydromorphone                  228                     ng/mg creat   Dihydrocodeine                 265                     ng/mg creat   Norhydrocodone                 1337                    ng/mg creat    Sources of hydrocodone include scheduled prescription    medications. Hydromorphone, dihydrocodeine and norhydrocodone are    expected metabolites of hydrocodone. Hydromorphone and    dihydrocodeine are also available as scheduled prescription    medications.   Baclofen                       PRESENT                               Acetaminophen                  PRESENT                               ====================================================================  Test                      Result    Flag   Units      Ref Range        Creatinine              43               mg/dL      >=20            ====================================================================  Declared Medications:  Medication list was not provided.  ====================================================================  For clinical consultation, please call [phone].  ====================================================================  Analysis performed by [employer] [place] [address]     , No results found for: COMDAT, No results found for: THC13, PCP13, COC13, MAMP13, OPI13, AMP13, BZO13, TCA13, MTD13, BAR13, OXY13, PPX13, BUP13     Processing:  e scribe     [email or web address]       checked in past 3 months?  No, route to RN

## 2020-09-30 ENCOUNTER — MYC REFILL (OUTPATIENT)
Dept: INTERNAL MEDICINE | Facility: CLINIC | Age: 65
End: 2020-09-30

## 2020-09-30 DIAGNOSIS — Z79.899 CHRONIC PRESCRIPTION BENZODIAZEPINE USE: ICD-10-CM

## 2020-09-30 DIAGNOSIS — F41.9 ANXIETY: ICD-10-CM

## 2020-09-30 RX ORDER — ALPRAZOLAM 0.25 MG
0.25 TABLET ORAL 2 TIMES DAILY PRN
Qty: 60 TABLET | Refills: 0 | Status: SHIPPED | OUTPATIENT
Start: 2020-09-30 | End: 2020-10-29

## 2020-09-30 NOTE — TELEPHONE ENCOUNTER
Alprazolam refill request. See [facility] message.     Last VV on 9/11/20     Last refill on 9/11/20 for #30 with no refills.   Pt taking 2 a day, so this is 2 week supply.     Routing refill request to provider for review/approval because:  Drug not on the [facility] refill protocol

## 2020-10-01 ENCOUNTER — MYC MEDICAL ADVICE (OUTPATIENT)
Dept: INTERNAL MEDICINE | Facility: CLINIC | Age: 65
End: 2020-10-01

## 2020-10-02 ENCOUNTER — E-VISIT (OUTPATIENT)
Dept: INTERNAL MEDICINE | Facility: CLINIC | Age: 65
End: 2020-10-02
Payer: COMMERCIAL

## 2020-10-02 DIAGNOSIS — H10.32 ACUTE BACTERIAL CONJUNCTIVITIS OF LEFT EYE: Primary | ICD-10-CM

## 2020-10-02 PROCEDURE — 99441 PR PHYSICIAN TELEPHONE EVALUATION 5-10 MIN: CPT | Mod: 95 | Performed by: INTERNAL MEDICINE

## 2020-10-05 ENCOUNTER — MYC MEDICAL ADVICE (OUTPATIENT)
Dept: INTERNAL MEDICINE | Facility: CLINIC | Age: 65
End: 2020-10-05

## 2020-10-05 RX ORDER — DOXYCYCLINE 100 MG/1
100 CAPSULE ORAL 2 TIMES DAILY
Qty: 20 CAPSULE | Refills: 0 | Status: SHIPPED | OUTPATIENT
Start: 2020-10-05 | End: 2020-11-16

## 2020-10-05 RX ORDER — ERYTHROMYCIN 5 MG/G
0.5 OINTMENT OPHTHALMIC AT BEDTIME
Qty: 1 G | Refills: 0 | Status: SHIPPED | OUTPATIENT
Start: 2020-10-05 | End: 2020-11-04

## 2020-10-05 NOTE — TELEPHONE ENCOUNTER
Patient calling back about the eye infection and request for   doxycycline hyclate (VIBRAMYCIN) 100 MG capsule  Eye has been draining pus but not as bad

## 2020-10-12 DIAGNOSIS — I25.10 CORONARY ARTERY DISEASE INVOLVING NATIVE CORONARY ARTERY OF NATIVE HEART WITHOUT ANGINA PECTORIS: ICD-10-CM

## 2020-10-12 DIAGNOSIS — R00.0 TACHYCARDIA: ICD-10-CM

## 2020-10-12 LAB
ALT SERPL W P-5'-P-CCNC: 21 U/L (ref 0–50)
ANION GAP SERPL CALCULATED.3IONS-SCNC: 8 MMOL/L (ref 3–14)
BUN SERPL-MCNC: 14 MG/DL (ref 7–30)
CALCIUM SERPL-MCNC: 9.1 MG/DL (ref 8.5–10.1)
CHLORIDE SERPL-SCNC: 97 MMOL/L (ref 94–109)
CHOLEST SERPL-MCNC: 173 MG/DL
CO2 SERPL-SCNC: 28 MMOL/L (ref 20–32)
CREAT SERPL-MCNC: 0.73 MG/DL (ref 0.52–1.04)
GFR SERPL CREATININE-BSD FRML MDRD: 87 ML/MIN/{1.73_M2}
GLUCOSE SERPL-MCNC: 123 MG/DL (ref 70–99)
HDLC SERPL-MCNC: 45 MG/DL
LDLC SERPL CALC-MCNC: ABNORMAL MG/DL
NONHDLC SERPL-MCNC: 128 MG/DL
POTASSIUM SERPL-SCNC: 3.7 MMOL/L (ref 3.4–5.3)
SODIUM SERPL-SCNC: 133 MMOL/L (ref 133–144)
TRIGL SERPL-MCNC: 587 MG/DL

## 2020-10-12 PROCEDURE — 84460 ALANINE AMINO (ALT) (SGPT): CPT | Performed by: INTERNAL MEDICINE

## 2020-10-12 PROCEDURE — 80061 LIPID PANEL: CPT | Performed by: INTERNAL MEDICINE

## 2020-10-12 PROCEDURE — 80048 BASIC METABOLIC PNL TOTAL CA: CPT | Performed by: INTERNAL MEDICINE

## 2020-10-12 NOTE — PROGRESS NOTES
"Trisha Bender is a 65 year old female who is being evaluated via a billable video visit.  This visit is being conducted as a virtual visit due to the emphasis on mitigation of the COVID-19 virus pandemic. The clinician has decided that the risk of an in-office visit outweighs the benefit for this patient.     The patient has been notified of following:   \"This video visit will be conducted via a call between you and your physician/provider. We have found that certain health care needs can be provided without the need for an in-person physical exam.  This service lets us provide the care you need with a video conversation.  If a prescription is necessary we can send it directly to your pharmacy.  If lab work is needed we can place an order for that and you can then stop by our lab to have the test done at a later time.  If during the course of the call the physician/provider feels a video visit is not appropriate, you will not be charged for this service.\"     Patient has given verbal consent for Video visit? Yes    Patient would like the video invitation sent by: Other e-mail: [employer]     Video Start Time: 13:15pm        Review Of Systems  Skin: negative  Eyes: glasses  Ears/Nose/Throat: negative  Respiratory: Shortness of breath- has asthma   Cardiovascular: negative  Gastrointestinal: negative  Genitourinary: negative  Musculoskeletal: back pain and arthritis  Neurologic: negative  Psychiatric: negative  Hematologic/Lymphatic/Immunologic: negative  Endocrine: thyroid disorder       BP - 104/67 IN   AT 12:43 ,   109/64  8 AM              I have reviewed and updated the patient's Past Medical History, Social History, Family History and Medication List.    PROBLEM LIST  Patient Active Problem List   Diagnosis     Essential hypertension with goal blood pressure less than 130/80     Chronic migraine without aura without status migrainosus, not intractable     Esophageal reflux     Disorder of bone and " cartilage     Family history of malignant neoplasm of gastrointestinal tract     Chronic airway obstruction (H)     Cystocele     Advanced directives, counseling/discussion     Family history of thyroid cancer - 1/2 brother     Anxiety     Mild persistent asthma without complication     Chronic low back pain     Hair loss     Lateral epicondylitis of right elbow     Dilated cardiomyopathy (H)     Right bundle branch block (RBBB)     CHF (congestive heart failure) (H)     SOB (shortness of breath)     Coronary artery disease involving native coronary artery of native heart without angina pectoris     Mixed hyperlipidemia     Controlled substance agreement signed     Moderate persistent asthma without complication     Hypokalemia     Mechanical low back pain     Panlobular emphysema (H)     Severe episode of recurrent major depressive disorder, without psychotic features (H)     Abdominal pain     Tachycardia     Status post coronary angiogram       ALLERGIES  Gabapentin, Contrast dye, Escitalopram, Peanuts [nuts], Penicillins, Sulfa drugs, Tace [chlorotrianisene], Aspirin, Ibuprofen, and Strawberry    MEDICATIONS  Current Outpatient Medications   Medication Sig Dispense Refill     albuterol (VENTOLIN HFA) 108 (90 BASE) MCG/ACT Inhaler Inhale 2 puffs into the lungs every 6 hours as needed for shortness of breath / dyspnea or wheezing 18 g 3     alendronate (FOSAMAX) 70 MG tablet TAKE 1 TABLET BY MOUTH EVERY 7 DAYS 12 tablet 3     ALPRAZolam (XANAX) 0.25 MG tablet Take 1 tablet (0.25 mg) by mouth 2 times daily as needed for anxiety 60 tablet 0     atorvastatin (LIPITOR) 10 MG tablet Take 1 tablet (10 mg) by mouth daily 90 tablet 1     azithromycin (ZITHROMAX) 250 MG tablet Take 250 mg by mouth three times a week MWF  1     baclofen (LIORESAL) 10 MG tablet TAKE TWO TABLETS BY MOUTH THREE TIMES A  tablet 5     BREO ELLIPTA 200-25 MCG/INH Inhaler Inhale 1 Dose into the lungs daily   12     carvedilol (COREG) 3.125  MG tablet Take 1 tablet (3.125 mg) by mouth 2 times daily (with meals) (to be taken with 6.25 mg tab ~ total dose 9.375 mg BID) 180 tablet 1     carvedilol (COREG) 6.25 MG tablet Take 1 tablet (6.25 mg) by mouth 2 times daily (with meals) ( to be taken with 3.125 mg tab ~ total dose 9.375 mg BID) 180 tablet 1     cetirizine (ZYRTEC) 10 MG tablet Take 10 mg by mouth daily       [employer] FIBER GUMMIES 2.5 G CHEW Take 1 tablet by mouth daily Reported on 5/8/2017       doxycycline hyclate (VIBRAMYCIN) 100 MG capsule Take 1 capsule (100 mg) by mouth 2 times daily 20 capsule 0     doxycycline hyclate (VIBRAMYCIN) 100 MG capsule Take 1 capsule (100 mg) by mouth 2 times daily 20 capsule 0     erythromycin (ROMYCIN) 5 MG/GM ophthalmic ointment Place 0.5 inches Into the left eye At Bedtime 1 g 0     fluticasone (FLONASE) 50 MCG/ACT nasal spray Spray 1-2 sprays in each nostril daily-+++NEED ANNUAL EXAM+++ 16 g 0     furosemide (LASIX) 20 MG tablet TAKE 2 TABLETS (40 MG) BY MOUTH DAILY SEVERE SWELLING 180 tablet 1     HYDROcodone-acetaminophen (NORCO)  MG per tablet Take 1 tablet by mouth every 4 hours as needed for severe pain Take up to 6 tablets a day 180 tablet 0     ipratropium - albuterol 0.5 mg/2.5 mg/3 mL (DUONEB) 0.5-2.5 (3) MG/3ML neb solution Take 1 vial (3 mLs) by nebulization every 6 hours as needed for shortness of breath / dyspnea or wheezing 30 vial 1     ketorolac (TORADOL) 10 MG tablet TAKE ONE TABLET BY MOUTH THREE TIMES A DAY AS NEEDED FOR MODERATE PAIN 15 tablet 0     lisinopril (ZESTRIL) 5 MG tablet Take 1 tablet (5 mg) by mouth daily 90 tablet 1     montelukast (SINGULAIR) 10 MG tablet Take 1 tablet (10 mg) by mouth At Bedtime 90 tablet 1     Multiple Vitamin CHEW Take 1 tablet by mouth daily        naloxone (NARCAN) 4 MG/0.1ML nasal spray Spray 1 spray (4 mg) into one nostril alternating nostrils once as needed for opioid reversal Every 2-3 minutes until patient responsive or EMS arrives 0.2 mL 0      ondansetron (ZOFRAN-ODT) 4 MG ODT tab TAKE 1-2 TABLETS (4-8 MG) BY MOUTH EVERY 8 HOURS AS NEEDED FOR NAUSEA 18 tablet 4     pantoprazole (PROTONIX) 40 MG EC tablet TAKE 1 TABLET BY MOUTH EVERY DAY 90 tablet 3     potassium chloride ER (K-TAB/KLOR-CON) 10 MEQ CR tablet TAKE 1 TABLET BY MOUTH THREE TIMES A  tablet 1     predniSONE (DELTASONE) 10 MG tablet Take 1 tablet (10 mg) by mouth daily 30 tablet 0     predniSONE (DELTASONE) 5 MG tablet Take 1 tablet (5 mg) by mouth daily 30 tablet 0     roflumilast (DALIRESP) 500 MCG TABS tablet Take 1 tablet (500 mcg) by mouth daily       SUMAtriptan (IMITREX) 25 MG tablet Take 1 tablet (25 mg) by mouth at onset of headache for migraine May repeat in 2 hours if needed: max 2/day; 9 tablet 11     tiotropium (SPIRIVA HANDIHALER) 18 MCG capsule USING THE HANDIHALER, INHALE THE CONTENTS OF ONE CAPSULE BY MOUTH DAILY 90 capsule 3       ROS:  12-pt ROS is negative except for as noted above.    EXAM:  A limited exam was conducted via video.  Constitutional: Patient is pleasant, alert, in no distress. Normal body habitus, upright.  ENT: Membranes moist, no nasal discharge or bleeding gums. Normal head shape, no evidence of injury or laceration.  EYES: No scleral icterus, normal conjunctivae. EOM's appear intact. No observed jaundice  Neck: No evidence of thyromegaly.   Chest/Lungs: Appears to have normal respiratory effort. Normal breathing while talking. No audible wheezing, no cough  Equal chest wall expansion.   Cardiovascular: No obvious elevated JVP. No apparent pitting edema bilaterally.  Abdomen: No evidence of abdominal distention.   Extremities: No edema, erythema, cyanosis noted.  Skin: No rashes or lesions appreciated on visualized skin, normal skin color.  Neurologic: Normal mentation. Normal arm motion bilaterally, no tremors. No evidence of focal defect.  Psychiatric: Appropriate affect. A&Ox3.  Calm demeanor      HISTORY OF PRESENT ILLNESS  This is a 65 year old  female who follows with [facility].  She is a patient of Dr. Finney seen in our clinic for idiopathic CM, severe COPD, sinus tachycardia, HTN, hyperlipidemia.     Ms Bender suffered CHF (2015)  An ECHO at that time showed LVEF 20-25%  Coronary angiography revealed mild nonobstructive CAD and she was diagnosed with idiopathic CM (likely takotsubo)  Fortunately on appropriate CV meds, her LVEF improved to 45-50%       A repeat ECHO (9/2019) showed LVEF 55-60%, normal RV function, mild MR/TR     Ms Bender does have an extensive history of asthma and severe COPD   Lung transplantation has been recommended.    She saw Dr. Finney (6/2020) with complaints of several month history of worsening SOB.  She has underlying sinus tachycardia treated with Diltiazem.  Her HR was > 100 bpm much of the time.  Her O2 sats did drop to 92% with exertion.      An event monitor (6/25/20) revealed runs of nonsustained V-Tach and PSVT  Her HRs were mainly > 120 bpm up to 188 bpm.  An ECHO (6/25/20) showed decline in LVEF to 40-45% with a HR of 134 bpm.  There was poor visualization.  Her RV function was normal.  RVSP 33 mmHg and no significant valvular pathology  Her Diltiazem was changed to Coreg.    She underwent a RHC + LHC (7/30/20) showing minimal CAD, severely elevated right and left heart filling pressures ( mean RA 21 mmHg, wedge 28, LVEDP 25 mmHg)  Her mild/mod pulmonary HTN felt to be due to chronically elevated left heart filling pressures  LVEF 45%  Her Lasix was increased to 20 mg BID  Since then, her Coreg has been increased to 6.25mg BID due to ongoing HTN.      With the increase in her Lasix, her weight declined, her lower extremity edema resolved and her SOB improved some.  I increased her Lisinopril to 5 mg to better control her BP and asked her to increase her Atorvastatin to daily (as opposed to 3x/wk) last month     Her Coreg was increased last month.  She returns today for reassessment and labs    Ms  "Napoleon is frustrated that her HR is still a little over 100 bpm.  She gets winded very easily and after getting her blood work done, she states that she was exhausted.  She intermittently checks her BP at home and has gotten a  most of the time.  She denies CP.  Lately, she has experienced more \"heartburn\" that she attributes to the newly added doxycycline.  We talked about her elevated triglyceride level and she admitted to eating a cracker prior to the testing.  She is on chronic narcotics for back pain and she finds it difficult to go a 12 hour period without pain pills    Ms Bender also states that she self-increased her Lasix to 40 mg BID for the past 4 days (as opposed to 20mg BID) due to worsening leg swelling.  She does not feel that the lasix is working as well.  She is avoiding salty foods.  She denies any CP or palpitations      I reviewed her labs (10/12/20)  Sodium: 133  Potassium: 3.7  BUN: 14  Creatinine: 0.73  Total Cholesterol: 173  Triglycerides: 587  HDL: 45  LDL could not be calculated due to elevated triglycerides      IMPRESSION AND PLAN  Nonischemic CM:  -LVEF 40-45%  -cath (7/30/20) showed minimal nonobstructive CAD and elevated right and left filling pressures  -tolerating Coreg.  She is to increase Coreg to 12.5mg BID  -minimal improvement in leg edema with Lasix, even on higher dose  -She is to stop Lasix and start Torsemide 20 mg /day  -she is to return in 1 wk for repeat BMP/BNP    HTN:  -On Coreg 9.375mg BID, Lisinopril 5 mg, Lasix 20 mg BID  -BP controlled at home  -will be cautious with increased dose of Coreg to 12.5mg BID    Asymptomatic NSVT  -on beta-blocker in setting of mild CM     Severe COPD  -longstanding history of asthma  -considering lung transplantation  -on multiple inhalers, prednisone and lasix    ST  -likely due to her underlying severe lung disease  -episodes of PSVT up to 188 bpm on event monitor (6/2020)  -HRs now 100 bpm on Coreg  -consider EP evaluation in " future if need be     Mixed Hyperlipidemia  - Atorvastatin 10 mg increased to daily last month due to quite elevated cholesterol numbers  -LDL unable to be calculated due to triglycerides > 500, but done nonfasting  -we will repeat lipid panel next week fasting.  I have also asked her to discuss this with her PMD              Video-Visit Details  Type of service:  Video Visit  Video End Time (time video stopped): 13:41 pm  Originating Location (pt. Location): Home  Distant Location (provider location):  [facility]   Mode of Communication:  Video Conference via [email or web address]    ELLE Medina, CNP  [facility]

## 2020-10-13 ENCOUNTER — VIRTUAL VISIT (OUTPATIENT)
Dept: CARDIOLOGY | Facility: CLINIC | Age: 65
End: 2020-10-13
Attending: NURSE PRACTITIONER
Payer: COMMERCIAL

## 2020-10-13 DIAGNOSIS — E78.2 MIXED HYPERLIPIDEMIA: ICD-10-CM

## 2020-10-13 DIAGNOSIS — R00.0 TACHYCARDIA: ICD-10-CM

## 2020-10-13 DIAGNOSIS — I50.22 CHRONIC SYSTOLIC CONGESTIVE HEART FAILURE (H): ICD-10-CM

## 2020-10-13 DIAGNOSIS — I42.0 DILATED CARDIOMYOPATHY (H): Primary | ICD-10-CM

## 2020-10-13 PROCEDURE — 99214 OFFICE O/P EST MOD 30 MIN: CPT | Mod: 95 | Performed by: NURSE PRACTITIONER

## 2020-10-13 RX ORDER — TORSEMIDE 20 MG/1
20 TABLET ORAL DAILY
Qty: 90 TABLET | Refills: 1 | Status: SHIPPED | OUTPATIENT
Start: 2020-10-13 | End: 2021-10-06

## 2020-10-13 RX ORDER — CARVEDILOL 12.5 MG/1
12.5 TABLET ORAL 2 TIMES DAILY WITH MEALS
Qty: 180 TABLET | Refills: 1 | Status: SHIPPED | OUTPATIENT
Start: 2020-10-13 | End: 2021-07-20

## 2020-10-13 NOTE — LETTER
"10/13/2020    Bandar Weinberg MD  [address] [facility] [address]    RE: Trisha Bender       Dear Colleague,    I had the pleasure of seeing Trisha Bender in the [facility].    Trisha Bender is a 65 year old female who is being evaluated via a billable video visit.  This visit is being conducted as a virtual visit due to the emphasis on mitigation of the COVID-19 virus pandemic. The clinician has decided that the risk of an in-office visit outweighs the benefit for this patient.     The patient has been notified of following:   \"This video visit will be conducted via a call between you and your physician/provider. We have found that certain health care needs can be provided without the need for an in-person physical exam.  This service lets us provide the care you need with a video conversation.  If a prescription is necessary we can send it directly to your pharmacy.  If lab work is needed we can place an order for that and you can then stop by our lab to have the test done at a later time.  If during the course of the call the physician/provider feels a video visit is not appropriate, you will not be charged for this service.\"     Patient has given verbal consent for Video visit? Yes    Patient would like the video invitation sent by: Other e-mail: [facility]     Video Start Time: 13:15pm        Review Of Systems  Skin: negative  Eyes: glasses  Ears/Nose/Throat: negative  Respiratory: Shortness of breath- has asthma   Cardiovascular: negative  Gastrointestinal: negative  Genitourinary: negative  Musculoskeletal: back pain and arthritis  Neurologic: negative  Psychiatric: negative  Hematologic/Lymphatic/Immunologic: negative  Endocrine: thyroid disorder       BP - 104/67 IN   AT 12:43 ,   109/64  8 AM              I have reviewed and updated the patient's Past Medical History, Social History, Family History and Medication List.    PROBLEM " LIST  Patient Active Problem List   Diagnosis     Essential hypertension with goal blood pressure less than 130/80     Chronic migraine without aura without status migrainosus, not intractable     Esophageal reflux     Disorder of bone and cartilage     Family history of malignant neoplasm of gastrointestinal tract     Chronic airway obstruction (H)     Cystocele     Advanced directives, counseling/discussion     Family history of thyroid cancer - 1/2 brother     Anxiety     Mild persistent asthma without complication     Chronic low back pain     Hair loss     Lateral epicondylitis of right elbow     Dilated cardiomyopathy (H)     Right bundle branch block (RBBB)     CHF (congestive heart failure) (H)     SOB (shortness of breath)     Coronary artery disease involving native coronary artery of native heart without angina pectoris     Mixed hyperlipidemia     Controlled substance agreement signed     Moderate persistent asthma without complication     Hypokalemia     Mechanical low back pain     Panlobular emphysema (H)     Severe episode of recurrent major depressive disorder, without psychotic features (H)     Abdominal pain     Tachycardia     Status post coronary angiogram       ALLERGIES  Gabapentin, Contrast dye, Escitalopram, Peanuts [nuts], Penicillins, Sulfa drugs, Tace [chlorotrianisene], Aspirin, Ibuprofen, and Strawberry    MEDICATIONS  Current Outpatient Medications   Medication Sig Dispense Refill     albuterol (VENTOLIN HFA) 108 (90 BASE) MCG/ACT Inhaler Inhale 2 puffs into the lungs every 6 hours as needed for shortness of breath / dyspnea or wheezing 18 g 3     alendronate (FOSAMAX) 70 MG tablet TAKE 1 TABLET BY MOUTH EVERY 7 DAYS 12 tablet 3     ALPRAZolam (XANAX) 0.25 MG tablet Take 1 tablet (0.25 mg) by mouth 2 times daily as needed for anxiety 60 tablet 0     atorvastatin (LIPITOR) 10 MG tablet Take 1 tablet (10 mg) by mouth daily 90 tablet 1     azithromycin (ZITHROMAX) 250 MG tablet Take 250  mg by mouth three times a week MWF  1     baclofen (LIORESAL) 10 MG tablet TAKE TWO TABLETS BY MOUTH THREE TIMES A  tablet 5     BREO ELLIPTA 200-25 MCG/INH Inhaler Inhale 1 Dose into the lungs daily   12     carvedilol (COREG) 3.125 MG tablet Take 1 tablet (3.125 mg) by mouth 2 times daily (with meals) (to be taken with 6.25 mg tab ~ total dose 9.375 mg BID) 180 tablet 1     carvedilol (COREG) 6.25 MG tablet Take 1 tablet (6.25 mg) by mouth 2 times daily (with meals) ( to be taken with 3.125 mg tab ~ total dose 9.375 mg BID) 180 tablet 1     cetirizine (ZYRTEC) 10 MG tablet Take 10 mg by mouth daily       [employer] FIBER GUMMIES 2.5 G CHEW Take 1 tablet by mouth daily Reported on 5/8/2017       doxycycline hyclate (VIBRAMYCIN) 100 MG capsule Take 1 capsule (100 mg) by mouth 2 times daily 20 capsule 0     doxycycline hyclate (VIBRAMYCIN) 100 MG capsule Take 1 capsule (100 mg) by mouth 2 times daily 20 capsule 0     erythromycin (ROMYCIN) 5 MG/GM ophthalmic ointment Place 0.5 inches Into the left eye At Bedtime 1 g 0     fluticasone (FLONASE) 50 MCG/ACT nasal spray Spray 1-2 sprays in each nostril daily-+++NEED ANNUAL EXAM+++ 16 g 0     furosemide (LASIX) 20 MG tablet TAKE 2 TABLETS (40 MG) BY MOUTH DAILY SEVERE SWELLING 180 tablet 1     HYDROcodone-acetaminophen (NORCO)  MG per tablet Take 1 tablet by mouth every 4 hours as needed for severe pain Take up to 6 tablets a day 180 tablet 0     ipratropium - albuterol 0.5 mg/2.5 mg/3 mL (DUONEB) 0.5-2.5 (3) MG/3ML neb solution Take 1 vial (3 mLs) by nebulization every 6 hours as needed for shortness of breath / dyspnea or wheezing 30 vial 1     ketorolac (TORADOL) 10 MG tablet TAKE ONE TABLET BY MOUTH THREE TIMES A DAY AS NEEDED FOR MODERATE PAIN 15 tablet 0     lisinopril (ZESTRIL) 5 MG tablet Take 1 tablet (5 mg) by mouth daily 90 tablet 1     montelukast (SINGULAIR) 10 MG tablet Take 1 tablet (10 mg) by mouth At Bedtime 90 tablet 1     Multiple Vitamin CHEW  Take 1 tablet by mouth daily        naloxone (NARCAN) 4 MG/0.1ML nasal spray Spray 1 spray (4 mg) into one nostril alternating nostrils once as needed for opioid reversal Every 2-3 minutes until patient responsive or EMS arrives 0.2 mL 0     ondansetron (ZOFRAN-ODT) 4 MG ODT tab TAKE 1-2 TABLETS (4-8 MG) BY MOUTH EVERY 8 HOURS AS NEEDED FOR NAUSEA 18 tablet 4     pantoprazole (PROTONIX) 40 MG EC tablet TAKE 1 TABLET BY MOUTH EVERY DAY 90 tablet 3     potassium chloride ER (K-TAB/KLOR-CON) 10 MEQ CR tablet TAKE 1 TABLET BY MOUTH THREE TIMES A  tablet 1     predniSONE (DELTASONE) 10 MG tablet Take 1 tablet (10 mg) by mouth daily 30 tablet 0     predniSONE (DELTASONE) 5 MG tablet Take 1 tablet (5 mg) by mouth daily 30 tablet 0     roflumilast (DALIRESP) 500 MCG TABS tablet Take 1 tablet (500 mcg) by mouth daily       SUMAtriptan (IMITREX) 25 MG tablet Take 1 tablet (25 mg) by mouth at onset of headache for migraine May repeat in 2 hours if needed: max 2/day; 9 tablet 11     tiotropium (SPIRIVA HANDIHALER) 18 MCG capsule USING THE HANDIHALER, INHALE THE CONTENTS OF ONE CAPSULE BY MOUTH DAILY 90 capsule 3       ROS:  12-pt ROS is negative except for as noted above.    EXAM:  A limited exam was conducted via video.  Constitutional: Patient is pleasant, alert, in no distress. Normal body habitus, upright.  ENT: Membranes moist, no nasal discharge or bleeding gums. Normal head shape, no evidence of injury or laceration.  EYES: No scleral icterus, normal conjunctivae. EOM's appear intact. No observed jaundice  Neck: No evidence of thyromegaly.   Chest/Lungs: Appears to have normal respiratory effort. Normal breathing while talking. No audible wheezing, no cough  Equal chest wall expansion.   Cardiovascular: No obvious elevated JVP. No apparent pitting edema bilaterally.  Abdomen: No evidence of abdominal distention.   Extremities: No edema, erythema, cyanosis noted.  Skin: No rashes or lesions appreciated on  visualized skin, normal skin color.  Neurologic: Normal mentation. Normal arm motion bilaterally, no tremors. No evidence of focal defect.  Psychiatric: Appropriate affect. A&Ox3.  Calm demeanor      HISTORY OF PRESENT ILLNESS  This is a 65 year old female who follows with [facility].  She is a patient of Dr. Finney seen in our clinic for idiopathic CM, severe COPD, sinus tachycardia, HTN, hyperlipidemia.     Ms Bender suffered CHF (2015)  An ECHO at that time showed LVEF 20-25%  Coronary angiography revealed mild nonobstructive CAD and she was diagnosed with idiopathic CM (likely takotsubo)  Fortunately on appropriate CV meds, her LVEF improved to 45-50%       A repeat ECHO (9/2019) showed LVEF 55-60%, normal RV function, mild MR/TR     Ms Bender does have an extensive history of asthma and severe COPD   Lung transplantation has been recommended.    She saw Dr. Finney (6/2020) with complaints of several month history of worsening SOB.  She has underlying sinus tachycardia treated with Diltiazem.  Her HR was > 100 bpm much of the time.  Her O2 sats did drop to 92% with exertion.      An event monitor (6/25/20) revealed runs of nonsustained V-Tach and PSVT  Her HRs were mainly > 120 bpm up to 188 bpm.  An ECHO (6/25/20) showed decline in LVEF to 40-45% with a HR of 134 bpm.  There was poor visualization.  Her RV function was normal.  RVSP 33 mmHg and no significant valvular pathology  Her Diltiazem was changed to Coreg.    She underwent a RHC + LHC (7/30/20) showing minimal CAD, severely elevated right and left heart filling pressures ( mean RA 21 mmHg, wedge 28, LVEDP 25 mmHg)  Her mild/mod pulmonary HTN felt to be due to chronically elevated left heart filling pressures  LVEF 45%  Her Lasix was increased to 20 mg BID  Since then, her Coreg has been increased to 6.25mg BID due to ongoing HTN.      With the increase in her Lasix, her weight declined, her lower extremity edema resolved and her SOB  "improved some.  I increased her Lisinopril to 5 mg to better control her BP and asked her to increase her Atorvastatin to daily (as opposed to 3x/wk) last month     Her Coreg was increased last month.  She returns today for reassessment and labs    Ms Bender is frustrated that her HR is still a little over 100 bpm.  She gets winded very easily and after getting her blood work done, she states that she was exhausted.  She intermittently checks her BP at home and has gotten a  most of the time.  She denies CP.  Lately, she has experienced more \"heartburn\" that she attributes to the newly added doxycycline.  We talked about her elevated triglyceride level and she admitted to eating a cracker prior to the testing.  She is on chronic narcotics for back pain and she finds it difficult to go a 12 hour period without pain pills    Ms Bender also states that she self-increased her Lasix to 40 mg BID for the past 4 days (as opposed to 20mg BID) due to worsening leg swelling.  She does not feel that the lasix is working as well.  She is avoiding salty foods.  She denies any CP or palpitations      I reviewed her labs (10/12/20)  Sodium: 133  Potassium: 3.7  BUN: 14  Creatinine: 0.73  Total Cholesterol: 173  Triglycerides: 587  HDL: 45  LDL could not be calculated due to elevated triglycerides      IMPRESSION AND PLAN  Nonischemic CM:  -LVEF 40-45%  -cath (7/30/20) showed minimal nonobstructive CAD and elevated right and left filling pressures  -tolerating Coreg.  She is to increase Coreg to 12.5mg BID  -minimal improvement in leg edema with Lasix, even on higher dose  -She is to stop Lasix and start Torsemide 20 mg /day  -she is to return in 1 wk for repeat BMP/BNP    HTN:  -On Coreg 9.375mg BID, Lisinopril 5 mg, Lasix 20 mg BID  -BP controlled at home  -will be cautious with increased dose of Coreg to 12.5mg BID    Asymptomatic NSVT  -on beta-blocker in setting of mild CM     Severe COPD  -longstanding history of " asthma  -considering lung transplantation  -on multiple inhalers, prednisone and lasix    ST  -likely due to her underlying severe lung disease  -episodes of PSVT up to 188 bpm on event monitor (6/2020)  -HRs now 100 bpm on Coreg  -consider EP evaluation in future if need be     Mixed Hyperlipidemia  - Atorvastatin 10 mg increased to daily last month due to quite elevated cholesterol numbers  -LDL unable to be calculated due to triglycerides > 500, but done nonfasting  -we will repeat lipid panel next week fasting.  I have also asked her to discuss this with her PMD        Video-Visit Details  Type of service:  Video Visit  Video End Time (time video stopped): 13:41 pm  Originating Location (pt. Location): Home  Distant Location (provider location):  [facility]   Mode of Communication:  Video Conference via [employer]    ELLE Medina, BERTA  [facility]    Thank you for allowing me to participate in the care of your patient.    Sincerely,     ELLE Landa CNP     [facility]

## 2020-10-22 DIAGNOSIS — M54.59 MECHANICAL LOW BACK PAIN: ICD-10-CM

## 2020-10-23 RX ORDER — TRAMADOL HYDROCHLORIDE 50 MG/1
TABLET ORAL
Qty: 20 TABLET | Refills: 0 | Status: SHIPPED | OUTPATIENT
Start: 2020-10-23 | End: 2020-11-17

## 2020-10-23 NOTE — TELEPHONE ENCOUNTER
Tramadol refill request    Last VV on 9/11/20    Last refill on 9/25/20 for #20, (5 days worth). This was when she took too many of her Norco and ran out early.     Routing refill request to provider for review/approval because:  Drug not on the [facility] refill protocol

## 2020-10-26 ENCOUNTER — MYC MEDICAL ADVICE (OUTPATIENT)
Dept: INTERNAL MEDICINE | Facility: CLINIC | Age: 65
End: 2020-10-26

## 2020-10-26 DIAGNOSIS — G89.29 CHRONIC BILATERAL LOW BACK PAIN, UNSPECIFIED WHETHER SCIATICA PRESENT: ICD-10-CM

## 2020-10-26 DIAGNOSIS — M54.50 CHRONIC BILATERAL LOW BACK PAIN, UNSPECIFIED WHETHER SCIATICA PRESENT: ICD-10-CM

## 2020-10-27 RX ORDER — HYDROCODONE BITARTRATE AND ACETAMINOPHEN 10; 325 MG/1; MG/1
1 TABLET ORAL EVERY 4 HOURS PRN
Qty: 180 TABLET | Refills: 0 | Status: SHIPPED | OUTPATIENT
Start: 2020-10-27 | End: 2020-11-17

## 2020-10-28 DIAGNOSIS — F41.9 ANXIETY: ICD-10-CM

## 2020-10-28 DIAGNOSIS — Z79.899 CHRONIC PRESCRIPTION BENZODIAZEPINE USE: ICD-10-CM

## 2020-10-28 DIAGNOSIS — H10.32 ACUTE BACTERIAL CONJUNCTIVITIS OF LEFT EYE: ICD-10-CM

## 2020-10-29 RX ORDER — DOXYCYCLINE 100 MG/1
CAPSULE ORAL
Qty: 20 CAPSULE | Refills: 0 | OUTPATIENT
Start: 2020-10-29

## 2020-10-29 RX ORDER — ALPRAZOLAM 0.25 MG
0.25 TABLET ORAL 2 TIMES DAILY PRN
Qty: 60 TABLET | Refills: 0 | Status: SHIPPED | OUTPATIENT
Start: 2020-10-29 | End: 2020-12-02

## 2020-10-29 NOTE — TELEPHONE ENCOUNTER
alprazolam      Last Written Prescription Date:  9/30/20  Last Fill Quantity: 60,   # refills: 0    doxycycline      Last Written Prescription Date:  10/5/20  Last Fill Quantity: 20,   # refills: 0  Last Office Visit: 9/11/20, also had e-visit on 10/2/20  Future Office visit:       Routing refill request to provider for review/approval because:  Drug not on the [facility], P or [facility] refill protocol or controlled substance

## 2020-10-30 DIAGNOSIS — J45.30 MILD PERSISTENT ASTHMA WITHOUT COMPLICATION: ICD-10-CM

## 2020-10-30 RX ORDER — MONTELUKAST SODIUM 10 MG/1
TABLET ORAL
Qty: 90 TABLET | Refills: 1 | Status: SHIPPED | OUTPATIENT
Start: 2020-10-30 | End: 2021-05-25

## 2020-10-30 NOTE — TELEPHONE ENCOUNTER
Routing refill request to provider for review/approval because:  ACT does not meet protocol for RN refill

## 2020-11-04 ENCOUNTER — MYC REFILL (OUTPATIENT)
Dept: INTERNAL MEDICINE | Facility: CLINIC | Age: 65
End: 2020-11-04

## 2020-11-04 ENCOUNTER — MYC MEDICAL ADVICE (OUTPATIENT)
Dept: INTERNAL MEDICINE | Facility: CLINIC | Age: 65
End: 2020-11-04

## 2020-11-04 DIAGNOSIS — H10.32 ACUTE BACTERIAL CONJUNCTIVITIS OF LEFT EYE: ICD-10-CM

## 2020-11-04 RX ORDER — DOXYCYCLINE 100 MG/1
100 CAPSULE ORAL 2 TIMES DAILY
Qty: 20 CAPSULE | Refills: 0 | OUTPATIENT
Start: 2020-11-04

## 2020-11-04 RX ORDER — ERYTHROMYCIN 5 MG/G
0.5 OINTMENT OPHTHALMIC AT BEDTIME
Qty: 1 G | Refills: 0 | Status: SHIPPED | OUTPATIENT
Start: 2020-11-04 | End: 2021-05-12

## 2020-11-04 NOTE — TELEPHONE ENCOUNTER
Recommend to continue with the topical antibiotic only.   Should see an ophthalmologist if not improved.

## 2020-11-04 NOTE — TELEPHONE ENCOUNTER
See her Modlar message. She is asking for refills.     There is another refill encounter, so will close this one.

## 2020-11-08 DIAGNOSIS — E87.6 HYPOKALEMIA: ICD-10-CM

## 2020-11-10 RX ORDER — POTASSIUM CHLORIDE 750 MG/1
TABLET, EXTENDED RELEASE ORAL
Qty: 270 TABLET | Refills: 1 | Status: SHIPPED | OUTPATIENT
Start: 2020-11-10 | End: 2021-10-06

## 2020-11-10 NOTE — TELEPHONE ENCOUNTER
Routing refill request to provider for review/approval because:  A break in medication  Raffy Espitia RN, BSN

## 2020-11-15 ENCOUNTER — MYC REFILL (OUTPATIENT)
Dept: INTERNAL MEDICINE | Facility: CLINIC | Age: 65
End: 2020-11-15

## 2020-11-15 DIAGNOSIS — H10.32 ACUTE BACTERIAL CONJUNCTIVITIS OF LEFT EYE: ICD-10-CM

## 2020-11-15 RX ORDER — DOXYCYCLINE 100 MG/1
100 CAPSULE ORAL 2 TIMES DAILY
Qty: 20 CAPSULE | Refills: 0 | Status: CANCELLED | OUTPATIENT
Start: 2020-11-15

## 2020-11-16 ENCOUNTER — VIRTUAL VISIT (OUTPATIENT)
Dept: INTERNAL MEDICINE | Facility: CLINIC | Age: 65
End: 2020-11-16
Payer: COMMERCIAL

## 2020-11-16 VITALS — BODY MASS INDEX: 26.69 KG/M2 | WEIGHT: 186 LBS

## 2020-11-16 DIAGNOSIS — H05.012 ORBITAL CELLULITIS ON LEFT: Primary | ICD-10-CM

## 2020-11-16 PROCEDURE — 99213 OFFICE O/P EST LOW 20 MIN: CPT | Mod: 95 | Performed by: NURSE PRACTITIONER

## 2020-11-16 RX ORDER — DOXYCYCLINE HYCLATE 100 MG
100 TABLET ORAL 2 TIMES DAILY
Qty: 20 TABLET | Refills: 0 | Status: SHIPPED | OUTPATIENT
Start: 2020-11-16 | End: 2020-11-26

## 2020-11-16 NOTE — PROGRESS NOTES
"Trisha Bender is a 65 year old female who is being evaluated via a billable video visit.      The patient has been notified of following:     \"This video visit will be conducted via a call between you and your physician/provider. We have found that certain health care needs can be provided without the need for an in-person physical exam.  This service lets us provide the care you need with a video conversation.  If a prescription is necessary we can send it directly to your pharmacy.  If lab work is needed we can place an order for that and you can then stop by our lab to have the test done at a later time.    Video visits are billed at different rates depending on your insurance coverage.  Please reach out to your insurance provider with any questions.    If during the course of the call the physician/provider feels a video visit is not appropriate, you will not be charged for this service.\"    Patient has given verbal consent for Video visit? Yes, Dolores Peraza MA  How would you like to obtain your AVS? MyChart  If you are dropped from the video visit, the video invite should be resent to: Text to cell phone: [phone]  Will anyone else be joining your video visit? Yes: -Steve. How would they like to receive their invitation? Text to cell phone: [phone]      Subjective     Trisha Bender is a 65 year old female who presents today via video visit for the following health issues:    HPI     She has sinus problem since this weekend. She has headaches, lots of pressure above and below eyes. Pressure on face.      Video Start Time: 11:55 AM    See above. Has been being treated by PCP for styes in my eyes with Doxycycline off and on since October, 2019. Now having a rash above left eye and forehead that is very painful; taking Ibuprofen and narcotic without any relief. Denies any blisters.  Reports eye lid swollen as swell as above left eye.    No fever or cough.  No shortness of breathe or " chest pain.    Review of Systems   Constitutional, HEENT, cardiovascular, pulmonary, gi and gu systems are negative, except as otherwise noted.      Objective           Vitals:  No vitals were obtained today due to virtual visit.    Physical Exam     GENERAL:  alert and no distress  EYES: Eyes grossly normal to inspection.  No discharge or erythema, or obvious scleral/conjunctival abnormalities.  RESP: No audible wheeze, cough, or visible cyanosis.  No visible retractions or increased work of breathing.    SKIN: Visible skin clear. No significant rash, abnormal pigmentation or lesions.  NEURO: Cranial nerves grossly intact.  Mentation and speech appropriate for age.  PSYCH: Mentation appears normal, affect normal/bright, judgement and insight intact, normal speech and appearance well-groomed.              Assessment & Plan     Orbital cellulitis on left  - suspect cellulitis/erysipelas vs shingles  - will treat with antibiotic again called:   Doxycycline 100 mg bid x 10 days  - has Erythomycin oph soln to use as eye drops in eye  - recommended warm moist compresses to eye along with artificial tears can help  - encouraged to make an eye appointment for further evaluation  - if blisters show up, notify the clinic for shingle treatment        Patient instructions:  See above.    Return if symptoms worsen or fail to improve.    Stephanie Colmenares CNP  [facility]      Video-Visit Details    Type of service:  Video Visit    Video End Time:12:09 PM    Originating Location (pt. Location): Home    Distant Location (provider location):  [facility]     Platform used for Video Visit: Alysha

## 2020-11-16 NOTE — PATIENT INSTRUCTIONS
Orbital cellulitis on left  - suspect cellulitis/erysipelas vs shingles  - will treat with antibiotic again called:   Doxycycline 100 mg twice daily x 10 days  - has Erythomycin oph soln to use as eye drops in eye  - recommended warm moist compresses to eye along with artificial tears can help  - encouraged to make an eye appointment for further evaluation  - if blisters show up, notify the clinic for shingle treatment

## 2020-11-17 ENCOUNTER — MYC REFILL (OUTPATIENT)
Dept: INTERNAL MEDICINE | Facility: CLINIC | Age: 65
End: 2020-11-17

## 2020-11-17 ENCOUNTER — TELEPHONE (OUTPATIENT)
Dept: INTERNAL MEDICINE | Facility: CLINIC | Age: 65
End: 2020-11-17

## 2020-11-17 ENCOUNTER — MYC MEDICAL ADVICE (OUTPATIENT)
Dept: INTERNAL MEDICINE | Facility: CLINIC | Age: 65
End: 2020-11-17

## 2020-11-17 ENCOUNTER — TRANSFERRED RECORDS (OUTPATIENT)
Dept: HEALTH INFORMATION MANAGEMENT | Facility: CLINIC | Age: 65
End: 2020-11-17

## 2020-11-17 DIAGNOSIS — M54.59 MECHANICAL LOW BACK PAIN: ICD-10-CM

## 2020-11-17 DIAGNOSIS — M54.50 CHRONIC BILATERAL LOW BACK PAIN, UNSPECIFIED WHETHER SCIATICA PRESENT: ICD-10-CM

## 2020-11-17 DIAGNOSIS — B02.29 POSTHERPETIC NEURALGIA: Primary | ICD-10-CM

## 2020-11-17 DIAGNOSIS — G89.29 CHRONIC BILATERAL LOW BACK PAIN, UNSPECIFIED WHETHER SCIATICA PRESENT: ICD-10-CM

## 2020-11-17 RX ORDER — TRAMADOL HYDROCHLORIDE 50 MG/1
TABLET ORAL
Qty: 20 TABLET | Refills: 0 | Status: SHIPPED | OUTPATIENT
Start: 2020-11-17 | End: 2020-12-14

## 2020-11-17 NOTE — TELEPHONE ENCOUNTER
Patient is calling because she had a virtual visit with us and shingles was suspected in her eye. Patient saw her eye doctor today and that was confirmed. Patient states she is having a lot of pain and she already takes norco but that is not touching her pain.  She says she almost went to the ED she was in so much pain.

## 2020-11-17 NOTE — TELEPHONE ENCOUNTER
Is she prescribed treatment for Shingles?  For pain she can take her Norco and add Tylenol 650 mg with it.

## 2020-11-17 NOTE — TELEPHONE ENCOUNTER
Pending Prescriptions:                       Disp   Refills    doxycycline hyclate (VIBRAMYCIN) 100 MG c*20 cap*0            Sig: Take 1 capsule (100 mg) by mouth 2 times daily      Medication e-scribed 11/16/20.

## 2020-11-17 NOTE — TELEPHONE ENCOUNTER
Pt got prescription for Valtrex, has not picked up yet.  States she has been taking her Norco and adding Tylenol, but it is not helping at all.  States the pain is excruciating , she took 9 Norco yesterday with no reflief and now she is scared of how bad it will be when she runs out.

## 2020-11-17 NOTE — TELEPHONE ENCOUNTER
Will add treatment with tramadol for short time.   Will call in.   Should not take more Norco than prescribed.

## 2020-11-18 ENCOUNTER — MYC MEDICAL ADVICE (OUTPATIENT)
Dept: INTERNAL MEDICINE | Facility: CLINIC | Age: 65
End: 2020-11-18

## 2020-11-18 RX ORDER — NORTRIPTYLINE HCL 10 MG
10 CAPSULE ORAL 2 TIMES DAILY
Qty: 20 CAPSULE | Refills: 0 | Status: SHIPPED | OUTPATIENT
Start: 2020-11-18 | End: 2020-12-11

## 2020-11-18 RX ORDER — HYDROCODONE BITARTRATE AND ACETAMINOPHEN 10; 325 MG/1; MG/1
1 TABLET ORAL EVERY 4 HOURS PRN
Qty: 180 TABLET | Refills: 0 | Status: SHIPPED | OUTPATIENT
Start: 2020-11-18 | End: 2020-12-11

## 2020-11-18 RX ORDER — NORTRIPTYLINE HCL 10 MG
10 CAPSULE ORAL 2 TIMES DAILY
Qty: 20 CAPSULE | Refills: 0 | Status: SHIPPED | OUTPATIENT
Start: 2020-11-18 | End: 2020-11-18

## 2020-11-18 NOTE — TELEPHONE ENCOUNTER
Patient is calling because the tramadol is making her nauseated. She said he face feels like it is on fire.

## 2020-11-18 NOTE — TELEPHONE ENCOUNTER
Norco refill request.     Last refill on 10/27/20 for #180      Routing refill request to provider for review/approval because:  Drug not on the [facility] refill protocol     Encounter-Level CSA - 02/23/2016:    Controlled Substance Agreement - Scan on 2/24/2016  1:37 PM: [place] CONTROLLED SUBSTANCE AGREEMENT, 2/23/16     Patient-Level CSA:    There are no patient-level csa.

## 2020-11-18 NOTE — TELEPHONE ENCOUNTER
See her [employer] message.   Her norco was refilled on 10/27 and she is asking for early refill. Possibly by this Friday.

## 2020-11-19 DIAGNOSIS — I25.10 CORONARY ARTERY DISEASE INVOLVING NATIVE CORONARY ARTERY OF NATIVE HEART WITHOUT ANGINA PECTORIS: ICD-10-CM

## 2020-11-19 DIAGNOSIS — E78.2 MIXED HYPERLIPIDEMIA: ICD-10-CM

## 2020-11-19 RX ORDER — ATORVASTATIN CALCIUM 10 MG/1
10 TABLET, FILM COATED ORAL DAILY
Qty: 90 TABLET | Refills: 3 | Status: SHIPPED | OUTPATIENT
Start: 2020-11-19 | End: 2021-11-15

## 2020-11-20 ENCOUNTER — TRANSFERRED RECORDS (OUTPATIENT)
Dept: HEALTH INFORMATION MANAGEMENT | Facility: CLINIC | Age: 65
End: 2020-11-20

## 2020-11-28 ENCOUNTER — MYC REFILL (OUTPATIENT)
Dept: INTERNAL MEDICINE | Facility: CLINIC | Age: 65
End: 2020-11-28

## 2020-11-28 DIAGNOSIS — R11.0 NAUSEA: ICD-10-CM

## 2020-11-28 DIAGNOSIS — G43.709 CHRONIC MIGRAINE WITHOUT AURA WITHOUT STATUS MIGRAINOSUS, NOT INTRACTABLE: ICD-10-CM

## 2020-11-28 RX ORDER — ONDANSETRON 4 MG/1
4-8 TABLET, ORALLY DISINTEGRATING ORAL EVERY 8 HOURS PRN
Qty: 18 TABLET | Refills: 4 | Status: CANCELLED | OUTPATIENT
Start: 2020-11-28

## 2020-12-01 DIAGNOSIS — R11.0 NAUSEA: ICD-10-CM

## 2020-12-01 RX ORDER — SUMATRIPTAN 25 MG/1
25 TABLET, FILM COATED ORAL
Qty: 9 TABLET | Refills: 11 | Status: SHIPPED | OUTPATIENT
Start: 2020-12-01 | End: 2021-12-29

## 2020-12-01 RX ORDER — ONDANSETRON 4 MG/1
TABLET, ORALLY DISINTEGRATING ORAL
Qty: 18 TABLET | Refills: 4 | Status: SHIPPED | OUTPATIENT
Start: 2020-12-01 | End: 2021-10-19

## 2020-12-01 NOTE — TELEPHONE ENCOUNTER
Imitrex refill request. This is  from 2018. It was never denied. Not sure why pharmacy saying that.      Zofran is duplicate. Refilled through the other encounter.     Last VV on 20

## 2020-12-01 NOTE — TELEPHONE ENCOUNTER
Zofran refill request. This is  medication.     pts mychart message states she has had more nausea lately. She thinks it is from the Valtrex.     Provider approval needed.

## 2020-12-02 ENCOUNTER — MYC REFILL (OUTPATIENT)
Dept: INTERNAL MEDICINE | Facility: CLINIC | Age: 65
End: 2020-12-02

## 2020-12-02 DIAGNOSIS — Z79.899 CHRONIC PRESCRIPTION BENZODIAZEPINE USE: ICD-10-CM

## 2020-12-02 DIAGNOSIS — F41.9 ANXIETY: ICD-10-CM

## 2020-12-03 NOTE — TELEPHONE ENCOUNTER
Alprazolam refill request.    Last VV on 11/16/20    Last refill on 10/29/20 for #60     Routing refill request to provider for review/approval because:  Drug not on the [facility] refill protocol     Encounter-Level CSA - 02/23/2016:    Controlled Substance Agreement - Scan on 2/24/2016  1:37 PM: [place] CONTROLLED SUBSTANCE AGREEMENT, 2/23/16     Patient-Level CSA:    There are no patient-level csa.

## 2020-12-04 RX ORDER — ALPRAZOLAM 0.25 MG
0.25 TABLET ORAL 2 TIMES DAILY PRN
Qty: 60 TABLET | Refills: 0 | Status: SHIPPED | OUTPATIENT
Start: 2020-12-04 | End: 2021-01-05

## 2020-12-09 ENCOUNTER — DOCUMENTATION ONLY (OUTPATIENT)
Dept: TRANSPLANT | Facility: CLINIC | Age: 65
End: 2020-12-09

## 2020-12-09 NOTE — LETTER
December 9, 2020    Trisha Bender  [address] [facility] [address] [address]      Dear Ms. Bender,   The purpose of this letter is to let you know that on 12/09/2020 the [facility] Lung Transplant Team reviewed your chart.  Based on the results of your chart review it was noted you were last seen by a transplant pulmonologist on 4/16/2018. Due to no active follow up within the last two years the decision was made to close your referral for consideration of lung transplant.   Important things you should know:    If you would like to discuss the decision, or if your medical status changes you may schedule a return visits with your doctor by calling [phone] and asking to speak to your transplant coordinator.    We recommend that you continue to follow up with your primary care doctor in order to manage your health concerns.  Enclosed is a letter from [facility] which describes the services offered to patients by [facility] and the Organ Procurement and Transplantation Network.  Thank you for allowing us to participate in your care.  We wish you well.  Sincerely,    Solid Organ Transplant  [facility]    Enclosures: OS Letter  cc: Care Team            The Organ Procurement and Transplantation Network  Toll-free patient services line:     Your resource for organ transplant information    If you have a question regarding your own medical care, you always should call your transplant hospital first. However, for general organ transplant-related information, you can call the Organ Procurement and Transplantation Network (OPTN) toll-free patient services line at [phone]. Anyone, including potential transplant candidates, candidates, recipients, family members, friends, living donors, and donor family members, can call this number to:          Talk about organ donation, living donation, the transplant process, the donation  process, and transplant policies.    Get a free patient information kit with helpful booklets, waiting list and transplant information, and a list of all transplant hospitals.    Ask questions about the OPTN website (https://optn.transplant.hrsa.gov/), the United Network for Organ Sharing s (UNOS) website (https://unos.org/), or the UNOS website for living donors and transplant recipients. (https://www.transplantliving.org/).    Learn how the OPTN can help you.    Talk about any concerns that you may have with a transplant hospital.    The [facility] transplant system, the OPTN, is managed under federal contract by the United Network for Organ Sharing (UNOS), which is a non-profit charitable organization. The OPTN helps create and define organ sharing policies that make the best use of donated organs. This process continuously evaluating new advances and discoveries so policies can be adapted to best serve patients waiting for transplants. To do so, the OPTN works closely with transplant professionals, transplant patients, transplant candidates, donor families, living donors, and the public. All transplant programs and organ procurement organizations throughout the country are OPTN members and are obligated to follow the policies the OPTN creates for allocating organs.    The OPTN also is responsible for:      Providing educational material for patients, the public, and professionals.    Raising awareness of the need for donated organs and tissue.    Coordinating organ procurement, matching, and placement.    Collecting information about every organ transplant and donation that occurs in the [place].    Remember, you should contact your transplant hospital directly if you have questions or concerns about your own medical care including medical records, work-up progress, and test results.    We are not your transplant hospital, and our staff will not be able to answer questions about your case, so please keep  your transplant hospital s phone number handy.    However, while you research your transplant needs and learn as much as you can about transplantation and donation, we welcome your call to our toll-free patient services line at [phone].          Updated 4/1/2019

## 2020-12-09 NOTE — PROGRESS NOTES
Last seen by transplant pulmonologist on 04/16/2018 as part of her lung transplant evaluation. Upon completing lung transplant evaluation, patient noted she was not ready to move forward with lung transplant process. At that time patient noted concerns regarding outcomes post transplant and did not feel benefits out weighed risks. Patient also updated by PFR regarding changes with her insurance (patient was originally covered by husbands insurance) in 2018 would require a high level of out of pocket expense. Plan made at that time for patient to follow up with transplant coordinator when she was ready to move forward with lung transplant and/or insurance issues resolved. Due to patient's desire to put lung transplant process on hold, MD declined to present patients case to lung transplant committee.     No active follow up with patient since 4/20/2018. Referral letter to be closed due to no active follow up for more than two years.     Referral closure letter sent to patient and care team.

## 2020-12-10 ENCOUNTER — TELEPHONE (OUTPATIENT)
Dept: TRANSPLANT | Facility: CLINIC | Age: 65
End: 2020-12-10

## 2020-12-10 ENCOUNTER — MYC REFILL (OUTPATIENT)
Dept: INTERNAL MEDICINE | Facility: CLINIC | Age: 65
End: 2020-12-10

## 2020-12-10 ENCOUNTER — MYC MEDICAL ADVICE (OUTPATIENT)
Dept: INTERNAL MEDICINE | Facility: CLINIC | Age: 65
End: 2020-12-10

## 2020-12-10 DIAGNOSIS — B02.29 POSTHERPETIC NEURALGIA: ICD-10-CM

## 2020-12-10 DIAGNOSIS — M54.50 CHRONIC BILATERAL LOW BACK PAIN, UNSPECIFIED WHETHER SCIATICA PRESENT: ICD-10-CM

## 2020-12-10 DIAGNOSIS — G89.29 CHRONIC BILATERAL LOW BACK PAIN, UNSPECIFIED WHETHER SCIATICA PRESENT: ICD-10-CM

## 2020-12-10 RX ORDER — HYDROCODONE BITARTRATE AND ACETAMINOPHEN 10; 325 MG/1; MG/1
1 TABLET ORAL EVERY 4 HOURS PRN
Qty: 180 TABLET | Refills: 0 | Status: CANCELLED | OUTPATIENT
Start: 2020-12-10

## 2020-12-10 NOTE — TELEPHONE ENCOUNTER
Returned patient call regarding lung transplant referral. Patient referred on 10/25/2017, completed and evaluation 4/16/2018. Not active follow up since evaluation.  Patient reported she would like to move forward with lung transplant at this time. Notified Lung Transplant Intake team to start a new referral. Explained to patient this would allow for her information to be updated and records and images to be requested as well as get insurance approval. All data in patient's chart regarding lung transplant was more than two years old and would need to potentially be updated to continue to move forward with listing. Patient noted understanding and reported she has new insurance since her last referral.     Provided patient with SOT office call back number if she has additional questions. Patient agrees with plan.

## 2020-12-10 NOTE — TELEPHONE ENCOUNTER
Patient Call: Received letter dated 12/09/2020 marce, she is very interested in our program and would like to be lung listed      Call back needed? Yes    Return Call Needed  Same as documented in contacts section  When to return call?: Greater than one day: Route standard priority

## 2020-12-10 NOTE — Clinical Note
Please start a new referral for patient. Her old referral was inactive for more than two years so I closed it. Patient called today to report she would like to move forward with transplant. Patient does have new insurance since last referral.

## 2020-12-11 ENCOUNTER — REFERRAL (OUTPATIENT)
Dept: TRANSPLANT | Facility: CLINIC | Age: 65
End: 2020-12-11

## 2020-12-11 DIAGNOSIS — M54.59 MECHANICAL LOW BACK PAIN: ICD-10-CM

## 2020-12-11 DIAGNOSIS — J44.9 COPD (CHRONIC OBSTRUCTIVE PULMONARY DISEASE) (H): Primary | ICD-10-CM

## 2020-12-11 RX ORDER — NORTRIPTYLINE HCL 25 MG
25 CAPSULE ORAL 2 TIMES DAILY
Qty: 60 CAPSULE | Refills: 1 | Status: SHIPPED | OUTPATIENT
Start: 2020-12-11 | End: 2021-02-03

## 2020-12-11 RX ORDER — HYDROCODONE BITARTRATE AND ACETAMINOPHEN 10; 325 MG/1; MG/1
1 TABLET ORAL EVERY 4 HOURS PRN
Qty: 180 TABLET | Refills: 0 | Status: SHIPPED | OUTPATIENT
Start: 2020-12-11 | End: 2020-12-31

## 2020-12-11 NOTE — TELEPHONE ENCOUNTER
Tramadol      Last Written Prescription Date:  11/17/20  Last Fill Quantity: 20,   # refills: 0  Last Office Visit: VV 11/16/20  Future Office visit:       Routing refill request to provider for review/approval because:  Drug not on the [facility], [facility] or [facility] refill protocol or controlled substance

## 2020-12-11 NOTE — LETTER
December 14, 2020      Trisha Cynthiarosalva Bender  [address] [facility] [facility] [address]      Dear Trisha,    Thank you for your interest in the Transplant Center at [facility], [facility]. We look forward to being a part of your care team and assisting you through the transplant process.    As we discussed, your transplant coordinator is Elisa Roger (Lung).  You may call your coordinator at any time with questions or concerns.  Your first scheduled call will be on January 7, 2021 between 10:00 am and 12:00 pm.  If this needs to change, call [phone].    Please complete the following.    1. Fill out and return the enclosed forms    Authorization for Electronic Communication    Authorization to Discuss Protected Health Information    Authorization for Release of Protected Health Information    Authorization for Care Everywhere Release of Information    2. Sign up for:    [facility], access to your electronic medical record (see enclosed pamphlet)    [email or web address], a transplant education website    You can use these tools to learn more about your transplant, communicate with your care team, and track your medical details    Sincerely,  Solid Organ Transplant  [facility], [facility]    Cc: DO Thomas Reeder MD (Referring) Bandar Weinberg MD(PCP)

## 2020-12-11 NOTE — TELEPHONE ENCOUNTER
See her [employer] message. She is asking for possible increase in Nortriptyline or other option for her shingles pain.

## 2020-12-11 NOTE — TELEPHONE ENCOUNTER
Will increase the dose of Nortriptyline.   Called in for 25 mg bid. Should not take more than prescribed of the Norco.

## 2020-12-11 NOTE — TELEPHONE ENCOUNTER
Norco refill request.     Last refill on 11/18/20 for #180    Routing refill request to provider for review/approval because:  Drug not on the [facility] refill protocol     Encounter-Level CSA - 02/23/2016:    Controlled Substance Agreement - Scan on 2/24/2016  1:37 PM: [place] CONTROLLED SUBSTANCE AGREEMENT, 2/23/16     Patient-Level CSA:    There are no patient-level csa.

## 2020-12-14 VITALS — BODY MASS INDEX: 26.48 KG/M2 | HEIGHT: 70 IN | WEIGHT: 185 LBS

## 2020-12-14 RX ORDER — TRAMADOL HYDROCHLORIDE 50 MG/1
TABLET ORAL
Qty: 20 TABLET | Refills: 0 | Status: SHIPPED | OUTPATIENT
Start: 2020-12-14 | End: 2021-01-06

## 2020-12-14 ASSESSMENT — MIFFLIN-ST. JEOR: SCORE: 1464.4

## 2020-12-14 NOTE — TELEPHONE ENCOUNTER
SOT LUNG INTAKE SONY    October 1, 2020    Referring Provider: Thomas Valerio  Primary Provider: Bandar Weinberg  Specialist: Justina Finney, Cardiology  Diagnosis:COPD  Source/Facility: [facility]    Smoking/nicotine use history: former, quit 1/2006, 30 pack years  Alcohol use history: no  Drug use history: no  Cancer history:  no  Cardiac history:  Yes- see chart  BMI:26.5  O2 supplement- None     Notes: Due to COVID, verbal consent received for Care Everywhere Authorization from the patient during this call.    Is patient in a group home/assisted living? no  Does patient have a guardian? no    Referral intake process completed.  Patient is aware that after financial approval is received, medical records will be requested.   Patient confirmed for a callback from transplant coordinator on January 7, 2021. (within 2 weeks)  Tentative evaluation date TBD. (within 4 weeks)    Confirmed coordinator will discuss evaluation process in more detail at the time of their call.   Patient is aware of the need to arrange age appropriate cancer screening, vaccinations, and dental care.  Reminded patient to complete questionnaire, complete medical records release, and review packet prior to evaluation visit .  Assessed patient for special needs (ie--wheelchair, assistance, guardian, and ):  wheelchair for distance walking   Patient instructed to call [phone] with questions.

## 2020-12-14 NOTE — TELEPHONE ENCOUNTER
Tramadol refill request for her Shingles.     Last refill on 11/17/20 for #20     Last VV on 11/16/20     Routing refill request to provider for review/approval because:  Drug not on the [facility] refill protocol     Reviewed [place]  today, Norco filled a week early but provider already aware of this.      Encounter-Level CSA - 02/23/2016:    Controlled Substance Agreement - Scan on 2/24/2016  1:37 PM: [place] CONTROLLED SUBSTANCE AGREEMENT, 2/23/16     Patient-Level CSA:    There are no patient-level csa.

## 2020-12-17 DIAGNOSIS — I25.10 CORONARY ARTERY DISEASE INVOLVING NATIVE CORONARY ARTERY OF NATIVE HEART WITHOUT ANGINA PECTORIS: ICD-10-CM

## 2020-12-17 RX ORDER — LISINOPRIL 5 MG/1
5 TABLET ORAL DAILY
Qty: 90 TABLET | Refills: 1 | Status: SHIPPED | OUTPATIENT
Start: 2020-12-17 | End: 2021-10-26

## 2020-12-20 DIAGNOSIS — I50.21 ACUTE SYSTOLIC CONGESTIVE HEART FAILURE (H): ICD-10-CM

## 2020-12-22 RX ORDER — FUROSEMIDE 20 MG
TABLET ORAL
Qty: 180 TABLET | Refills: 1 | OUTPATIENT
Start: 2020-12-22

## 2020-12-31 ENCOUNTER — MYC REFILL (OUTPATIENT)
Dept: INTERNAL MEDICINE | Facility: CLINIC | Age: 65
End: 2020-12-31

## 2020-12-31 DIAGNOSIS — M54.50 CHRONIC BILATERAL LOW BACK PAIN, UNSPECIFIED WHETHER SCIATICA PRESENT: ICD-10-CM

## 2020-12-31 DIAGNOSIS — G89.29 CHRONIC BILATERAL LOW BACK PAIN, UNSPECIFIED WHETHER SCIATICA PRESENT: ICD-10-CM

## 2021-01-02 DIAGNOSIS — Z79.899 CHRONIC PRESCRIPTION BENZODIAZEPINE USE: ICD-10-CM

## 2021-01-02 DIAGNOSIS — F41.9 ANXIETY: ICD-10-CM

## 2021-01-04 ENCOUNTER — MYC MEDICAL ADVICE (OUTPATIENT)
Dept: INTERNAL MEDICINE | Facility: CLINIC | Age: 66
End: 2021-01-04

## 2021-01-05 ENCOUNTER — APPOINTMENT (OUTPATIENT)
Dept: URGENT CARE | Facility: CLINIC | Age: 66
End: 2021-01-05
Payer: COMMERCIAL

## 2021-01-05 ENCOUNTER — MYC MEDICAL ADVICE (OUTPATIENT)
Dept: INTERNAL MEDICINE | Facility: CLINIC | Age: 66
End: 2021-01-05

## 2021-01-05 DIAGNOSIS — M54.59 MECHANICAL LOW BACK PAIN: ICD-10-CM

## 2021-01-05 RX ORDER — ALPRAZOLAM 0.25 MG
0.25 TABLET ORAL 2 TIMES DAILY PRN
Qty: 60 TABLET | Refills: 0 | Status: SHIPPED | OUTPATIENT
Start: 2021-01-05 | End: 2021-02-03

## 2021-01-05 RX ORDER — HYDROCODONE BITARTRATE AND ACETAMINOPHEN 10; 325 MG/1; MG/1
1 TABLET ORAL EVERY 4 HOURS PRN
Qty: 180 TABLET | Refills: 0 | Status: SHIPPED | OUTPATIENT
Start: 2021-01-05 | End: 2021-01-28

## 2021-01-05 NOTE — TELEPHONE ENCOUNTER
Pt was prescribed #180 on 12/11/20, sig no more than 6/day.  She should have med through 1/8/21.  Deborah Wright CNP

## 2021-01-06 RX ORDER — TRAMADOL HYDROCHLORIDE 50 MG/1
TABLET ORAL
Qty: 20 TABLET | Refills: 0 | Status: SHIPPED | OUTPATIENT
Start: 2021-01-06 | End: 2021-01-28

## 2021-01-06 NOTE — TELEPHONE ENCOUNTER
Tramadol refill request     Last OV on 11/16/20    Last refill on 12/14/20 for #20     Routing refill request to provider for review/approval because:  Drug not on the [facility] refill protocol

## 2021-01-06 NOTE — TELEPHONE ENCOUNTER
Sent message to pt that Dr Grady faxed in her NOrco. There is tramadol refill encounter already open.

## 2021-01-07 NOTE — TELEPHONE ENCOUNTER
"SOT LUNG INTAKE    2021    Trisha Bender  [id number]  Referring Provider: Thomas Valerio  [facility]Facility[facility]  Cardiology: Jennifer Brown   PCP: Dr Weinberg  Referral packet and welcome letter received? Yes  Diagnosis: COPD  65 year old    Height: 5\"10  Weight: 185  BMI: 26.5 Patient reported    Social History:    Social History     Socioeconomic History     Marital status:      Spouse name: Not on file     Number of children: Not on file     Years of education: Not on file     Highest education level: Not on file   Occupational History     Not on file   Social Needs     Financial resource strain: Not on file     Food insecurity     Worry: Not on file     Inability: Not on file     Transportation needs     Medical: Not on file     Non-medical: Not on file   Tobacco Use     Smoking status: Former Smoker     Packs/day: 1.00     Years: 30.00     Pack years: 30.00     Quit date: 2006     Years since quittin.9     Smokeless tobacco: Never Used     Tobacco comment: Quit in    Substance and Sexual Activity     Alcohol use: No     Alcohol/week: 0.0 standard drinks     Drug use: No     Sexual activity: Never     Partners: Male     Birth control/protection: Surgical     Comment: vasectomy   Lifestyle     Physical activity     Days per week: Not on file     Minutes per session: Not on file     Stress: Not on file   Relationships     Social connections     Talks on phone: Not on file     Gets together: Not on file     Attends Confucianism service: Not on file     Active member of club or organization: Not on file     Attends meetings of clubs or organizations: Not on file     Relationship status: Not on file     Intimate partner violence     Fear of current or ex partner: Not on file     Emotionally abused: Not on file     Physically abused: Not on file     Forced sexual activity: Not on file   Other Topics Concern     Parent/sibling w/ CABG, MI or angioplasty before 65F 55M? " Not Asked      Service Not Asked     Blood Transfusions Not Asked     Caffeine Concern No     Comment: none     Occupational Exposure Not Asked     Hobby Hazards No     Sleep Concern Not Asked     Stress Concern No     Weight Concern Not Asked     Special Diet Yes     Comment: low sodium     Back Care Not Asked     Exercise Yes     Comment: limited due to back     Bike Helmet Not Asked     Seat Belt Not Asked     Self-Exams Not Asked   Social History Narrative     Not on file     Smokin ppd for approximately 30 years, started at age 17 years intermittently smoking, patient noted she did not smoke during her three pregnancies.   Quit date:  - Patient's  reported her quit date at 2006     Nicotine Products: None, patient reports never using nicotine products to help quit   Quit date: n/a - Patient's  reported she use nicotine gum to help her quit     Second-hand Smoke exposure: None, patient denies     ETOH Hx: Intermittent use, rare use. Patient reported she drinks very rarely, only on special occasions and will only consume one drink.   Quit date: n/a     Recreational Drug: None, patient denies history.   Quit date: n/a     Family History:    Family History   Problem Relation Age of Onset     Cancer Mother         Colon cancer; dxed at age 64;  at age 69     Cancer - colorectal Mother      Other Cancer Mother      Gastrointestinal Disease Sister         Acute pancreatitis     Heart Disease Father         ?     Coronary Artery Disease Father      Circulatory Maternal Aunt         AAA     Circulatory Maternal Uncle         AAA     Diabetes Son      Past Medical History  Pulmonary Manifestations date: 6102-3518   Details: Patient reported she felt short of breath walking up a hill while on vacations with friends, noted her shortness of breath to be more than what she would have thought was normal. Her heart was worked up and found to have some dysfunction but through follow-up her  heart was noted to heal/normalize. Patient was told Noted she did not see a pulmonologist until 2017, did not know her lung disease was as severe.       Past Medical History:   Diagnosis Date     Anxiety      CAD (coronary artery disease) 7/2/15    mild per cath     CHF (congestive heart failure)     EF=20-25% per echo 6/30/15     Chronic airway obstruction     FEV1 36% of pred; VC 67%  in Jan 06.     Chronic back pain      COPD (chronic obstructive pulmonary disease)      Endometriosis      Esophageal reflux      Essential hypertension      Hyperlipidemia      Migraine      Mild persistent asthma 8/30/2012     Osteopenia      Takotsubo cardiomyopathy        Surgical History     Past Surgical History:   Procedure Laterality Date     ANGIOGRAM  07/02/2015    Minimal CAD. LV dysfunction, Mid RCA 20% stenosis, EF 20-25%, c/w Takotsubo cardiomyopathy     APPENDECTOMY  1974     CHOLECYSTECTOMY  1974    [facility]     COLONOSCOPY N/A 4/26/2018    Procedure: COLONOSCOPY;  COLONOSCOPY with biopsies;  Surgeon: Steve Acosta MD;  Location:  OR     CORONARY ANGIOGRAPHY ADULT ORDER       CV CORONARY ANGIOGRAM N/A 7/30/2020    Procedure: Coronary Angiogram;  Surgeon: Quinn Viveros MD;  Location:  HEART CARDIAC CATH LAB     CV LEFT HEART CATH N/A 7/30/2020    Procedure: Left Heart Cath;  Surgeon: Quinn Viveros MD;  Location:  HEART CARDIAC CATH LAB     CV LEFT VENTRICULOGRAM N/A 7/30/2020    Procedure: Left Ventriculogram;  Surgeon: Quinn Viveros MD;  Location:  HEART CARDIAC CATH LAB     CV RIGHT HEART CATH N/A 7/30/2020    Procedure: Right Heart Cath;  Surgeon: Quinn Viveros MD;  Location:  HEART CARDIAC CATH LAB     ESOPHAGOSCOPY, GASTROSCOPY, DUODENOSCOPY (EGD), COMBINED  11/15/2011    Procedure:COMBINED ESOPHAGOSCOPY, GASTROSCOPY, DUODENOSCOPY (EGD);  ESOPHAGOSCOPY, GASTROSCOPY, DUODENOSCOPY (EGD) ; Surgeon:JIM URENA; Location: GI     INJECT EPIDURAL LUMBAR / SACRAL SINGLE N/A  4/13/2018    Procedure: INJECT EPIDURAL LUMBAR / SACRAL CONTINUAL OR INTERMITTENT;  Lumbar Epidural Steroid Injection;  Surgeon: Az Enamorado MD;  [facility]     INJECT EPIDURAL LUMBAR / SACRAL SINGLE N/A 9/24/2018    Procedure: INJECT EPIDURAL LUMBAR / SACRAL SINGLE;  Lumbar Epidural Steroid Injection;  Surgeon: Az Enamorado MD;  Location: [facility] OR     LAPAROSCOPIC ABLATION ENDOMETRIOSIS  1998     LAPAROSCOPY DIAGNOSTIC (GYN)      endometriosis resection / lysis of adhesions     Nasal septoplasty and mucous retention cyst       TONSILLECTOMY, ADENOIDECTOMY, COMBINED       [place] teeth extraction         Medications  Current Outpatient Medications   Medication     albuterol (VENTOLIN HFA) 108 (90 BASE) MCG/ACT Inhaler     alendronate (FOSAMAX) 70 MG tablet     ALPRAZolam (XANAX) 0.25 MG tablet     atorvastatin (LIPITOR) 10 MG tablet     baclofen (LIORESAL) 10 MG tablet     BREO ELLIPTA 200-25 MCG/INH Inhaler     carvedilol (COREG) 12.5 MG tablet     cetirizine (ZYRTEC) 10 MG tablet     [employer] FIBER GUMMIES 2.5 G CHEW     erythromycin (ROMYCIN) 5 MG/GM ophthalmic ointment     fluticasone (FLONASE) 50 MCG/ACT nasal spray     HYDROcodone-acetaminophen (NORCO)  MG per tablet     ipratropium - albuterol 0.5 mg/2.5 mg/3 mL (DUONEB) 0.5-2.5 (3) MG/3ML neb solution     ketorolac (TORADOL) 10 MG tablet     lisinopril (ZESTRIL) 5 MG tablet     montelukast (SINGULAIR) 10 MG tablet     Multiple Vitamin CHEW     naloxone (NARCAN) 4 MG/0.1ML nasal spray     nortriptyline (PAMELOR) 25 MG capsule     ondansetron (ZOFRAN-ODT) 4 MG ODT tab     pantoprazole (PROTONIX) 40 MG EC tablet     potassium chloride ER (K-TAB/KLOR-CON) 10 MEQ CR tablet     roflumilast (DALIRESP) 500 MCG TABS tablet     SUMAtriptan (IMITREX) 25 MG tablet     tiotropium (SPIRIVA HANDIHALER) 18 MCG capsule     torsemide (DEMADEX) 20 MG tablet     traMADol (ULTRAM) 50 MG tablet     No current facility-administered medications for this visit.         Allergies  Gabapentin, Contrast dye, Escitalopram, Peanuts [nuts], Penicillins, Sulfa drugs, Tace [chlorotrianisene], Aspirin, Ibuprofen, and [place]      Primary Care  Health Maintenance   Topic Date Due     HF ACTION PLAN  1955     DEPRESSION ACTION PLAN  1955     HIV SCREENING  08/09/1970     ZOSTER IMMUNIZATION (2 of 3) 02/20/2015     LUNG CANCER SCREENING ANNUAL  04/20/2019     MAMMO SCREENING  09/13/2019     URINE DRUG SCREEN  12/17/2019     MEDICARE ANNUAL WELLNESS VISIT  08/09/2020     VENESSA ASSESSMENT  03/11/2021 (Originally 3/23/2019)     PHQ-9  03/11/2021 (Originally 4/11/2020)     BMP  04/12/2021     CBC  07/30/2021     ALT  10/12/2021     LIPID  10/12/2021     FALL RISK ASSESSMENT  11/16/2021     ADVANCE CARE PLANNING  04/03/2023     DTAP/TDAP/TD IMMUNIZATION (4 - Td) 10/11/2027     COLORECTAL CANCER SCREENING  04/26/2028     DEXA  04/16/2033     TSH W/FREE T4 REFLEX  Completed     SPIROMETRY  Completed     HEPATITIS C SCREENING  Completed     COPD ACTION PLAN  Completed     MIGRAINE ACTION PLAN  Completed     INFLUENZA VACCINE  Completed     Pneumococcal Vaccine: 65+ Years  Completed     Pneumococcal Vaccine: Pediatrics (0 to 5 Years) and At-Risk Patients (6 to 64 Years)  Aged Out     IPV IMMUNIZATION  Aged Out     MENINGITIS IMMUNIZATION  Aged Out     HEPATITIS B IMMUNIZATION  Aged Out

## 2021-01-15 ENCOUNTER — HEALTH MAINTENANCE LETTER (OUTPATIENT)
Age: 66
End: 2021-01-15

## 2021-01-15 DIAGNOSIS — K21.9 GASTROESOPHAGEAL REFLUX DISEASE WITHOUT ESOPHAGITIS: ICD-10-CM

## 2021-01-15 RX ORDER — PANTOPRAZOLE SODIUM 40 MG/1
TABLET, DELAYED RELEASE ORAL
Qty: 90 TABLET | Refills: 3 | Status: SHIPPED | OUTPATIENT
Start: 2021-01-15 | End: 2022-10-20

## 2021-01-28 ENCOUNTER — MYC REFILL (OUTPATIENT)
Dept: INTERNAL MEDICINE | Facility: CLINIC | Age: 66
End: 2021-01-28

## 2021-01-28 DIAGNOSIS — M54.59 MECHANICAL LOW BACK PAIN: ICD-10-CM

## 2021-01-28 DIAGNOSIS — M54.50 CHRONIC BILATERAL LOW BACK PAIN, UNSPECIFIED WHETHER SCIATICA PRESENT: ICD-10-CM

## 2021-01-28 DIAGNOSIS — G89.29 CHRONIC BILATERAL LOW BACK PAIN, UNSPECIFIED WHETHER SCIATICA PRESENT: ICD-10-CM

## 2021-01-28 RX ORDER — HYDROCODONE BITARTRATE AND ACETAMINOPHEN 10; 325 MG/1; MG/1
1 TABLET ORAL EVERY 4 HOURS PRN
Qty: 180 TABLET | Refills: 0 | Status: SHIPPED | OUTPATIENT
Start: 2021-02-03 | End: 2021-02-23

## 2021-01-28 RX ORDER — TRAMADOL HYDROCHLORIDE 50 MG/1
TABLET ORAL
Qty: 20 TABLET | Refills: 0 | Status: SHIPPED | OUTPATIENT
Start: 2021-01-28 | End: 2021-02-22

## 2021-01-28 NOTE — TELEPHONE ENCOUNTER
Tramadol refill request.     Refill request on 1/6/21 for #20 tablets with no refills.     Last OV on 11/16/20    Routing refill request to provider for review/approval because:  Drug not on the [facility] refill protocol     Encounter-Level CSA - 02/23/2016:    Controlled Substance Agreement - Scan on 2/24/2016  1:37 PM: [place] CONTROLLED SUBSTANCE AGREEMENT, 2/23/16     Patient-Level CSA:    There are no patient-level csa.

## 2021-01-28 NOTE — TELEPHONE ENCOUNTER
Norco refill request.   See her [employer] message. OK to fill on Monday.     Last refill on 1/5/21 for #180     Last OV on 11/16/20    Routing refill request to provider for review/approval because:  Drug not on the [facility] refill protocol     Encounter-Level CSA - 02/23/2016:    Controlled Substance Agreement - Scan on 2/24/2016  1:37 PM: [place] CONTROLLED SUBSTANCE AGREEMENT, 2/23/16     Patient-Level CSA:    There are no patient-level csa.

## 2021-02-02 DIAGNOSIS — B02.29 POSTHERPETIC NEURALGIA: ICD-10-CM

## 2021-02-02 DIAGNOSIS — F41.9 ANXIETY: ICD-10-CM

## 2021-02-02 DIAGNOSIS — Z79.899 CHRONIC PRESCRIPTION BENZODIAZEPINE USE: ICD-10-CM

## 2021-02-03 RX ORDER — NORTRIPTYLINE HCL 25 MG
CAPSULE ORAL
Qty: 60 CAPSULE | Refills: 1 | Status: SHIPPED | OUTPATIENT
Start: 2021-02-03 | End: 2021-04-09

## 2021-02-03 RX ORDER — ALPRAZOLAM 0.25 MG
0.25 TABLET ORAL 2 TIMES DAILY PRN
Qty: 60 TABLET | Refills: 0 | Status: SHIPPED | OUTPATIENT
Start: 2021-02-03 | End: 2021-03-05

## 2021-02-03 NOTE — TELEPHONE ENCOUNTER
Controlled Substance Refill Request for Alprazolam   Problem List Complete:    No     PROVIDER TO CONSIDER COMPLETION OF PROBLEM LIST AND OVERVIEW/CONTROLLED SUBSTANCE AGREEMENT    Last Written Prescription Date:  1/5/21  Last Fill Quantity: 60,   # refills: 0    Last Office Visit with [facility] [place] provider: 11/16/20    Future Office visit:     Controlled substance agreement:   Encounter-Level CSA - 02/23/2016:    Controlled Substance Agreement - Scan on 2/24/2016  1:37 PM: [facility] CONTROLLED SUBSTANCE AGREEMENT, 2/23/16     Patient-Level CSA:    There are no patient-level csa.         Last Urine Drug Screen:   Pain Drug SCR UR W RPTD Meds   Date Value Ref Range Status   12/17/2018 FINAL  Final     Comment:     (Note)  ====================================================================  [facility] COMP DRUG ANALYSIS,UR  ====================================================================  Test                             Result       Flag       Units        Drug Present   Hydrocodone                    1330                    ng/mg creat   Hydromorphone                  228                     ng/mg creat   Dihydrocodeine                 265                     ng/mg creat   Norhydrocodone                 1337                    ng/mg creat    Sources of hydrocodone include scheduled prescription    medications. Hydromorphone, dihydrocodeine and norhydrocodone are    expected metabolites of hydrocodone. Hydromorphone and    dihydrocodeine are also available as scheduled prescription    medications.   Baclofen                       PRESENT                               Acetaminophen                  PRESENT                              ====================================================================  Test                      Result    Flag   Units      Ref Range        Creatinine              43               mg/dL      >=20             ====================================================================  Declared Medications:  Medication list was not provided.  ====================================================================  For clinical consultation, please call [phone].  ====================================================================  Analysis performed by [employer] [place] [address]     , No results found for: COMDAT, No results found for: THC13, PCP13, COC13, MAMP13, OPI13, AMP13, BZO13, TCA13, MTD13, BAR13, OXY13, PPX13, BUP13       [email or web address]    Writer is not authorized to check  for provider

## 2021-02-10 NOTE — PROGRESS NOTES
[facility]  Transplant Clinic - Initial Visit -  February 11, 2021         Assessment and Plan:   Trisha Bender is a 65 year old female with history of COPD and cardiomyopathy who is seen today for evaluation for lung transplantation.    1) Evaluation for lung transplantation    Cynthia is a 64 yo woman with a past medical history of severe COPD.  She was previously evaluated for transplant about 2 years ago, however at the time she felt there were too many reservations to proceed forward with a committee evaluation.  Since that time.,  Her symptoms have become more progressive.  Her FEV1 has dropped since her last evaluation.  She has stopped going to pulmonary rehab and finds that she is living a very sedentary life.     She has a past medical history of what was presumed to be Takotsubo's cardiomyopathy with an EF of 20 to 25% which subsequently improved itself to 50 to 55%.  However, in the last year most recently in June, her EF was noted to be about 45% again.  There was some conjecture at the time that this might be related to tachycardia and she has since been started on carvedilol at 12.5 mg twice daily.  She states her heart rate is now's consistently below 100.  In July 2020 she also underwent a right heart catheterization which showed an mean RA pressure of 21 wedge of 28 LVEDP of 25, and commented that she had mild to moderate PAH and mildly reduced LV systolic function with an EF of 45%.  She states at that time she was having lower extremity swelling and afterwards she was started on torsemide to good effect and now has no lower extremity swelling.     I think my biggest concerns at this point regarding transplant would be her deconditioning, her use of chronic steroids she has been on 15 mg/day for years, and possibility of cardiomyopathy that is persistent or may put her at risk for cardiac issues in the postoperative period after  transplant.    In addition to a complete history,, I discussed transplant at length with the patient.  We reviewed the risks and benefits of transplantation.  Our discussion included the recent survival statistics.  I discussed rejection, including hyperacute, acute, chronic and antibody mediated.  I discussed the need for surveillance biopsies.  I reviewed the standard immunosuppression and typical side effects, including renal failure.  I discussed the increased risk of infection and the use of prophylactic antibiotics. I reviewed the increased risk of malignancy. I discussed the listing system, including the Lung Allocation Score.  I discussed the typical operative and postoperative course.  I reviewed the usual clinic followup.  I explained to the patient that she is required to stay in the [facility] for three months following transplantation and that she  will need to have someone accompanying her during that time.  I discussed the importance of strict medication and clinic adherence after transplantation. The patient had a number of questions which were answered to her apparent satisfaction.       She again seems a little hesitant regarding transplant outcomes and we did discuss the fact that COPD is an indication for transplant for morbidity standpoint not from a mortality standpoint.    We discussed that we will proceed forward with an echo, cardiology consult, a repeat 6-minute walk test and pulmonary rehab which I think she would benefit from in general.  Then we will present her abbreviated findings to the selection committee before deciding to go forward with full evaluation she knows that at any point time she may decide that she does not want to undergo transplant evaluation any further.    - Start with TTE  - Cards consult  - 6MWT  - Pulm Rehab    Coordinator/MD: Edouard Roger/Shital Call    RTC: 3 mos      Shital Call MD  [facility]  "  Pulmonary Transplant   PreTransplant Coordinator: Edouard Roger  Fax: [phone]  Ph: [phone]          Problem List:     1. Evaluation for lung transplantation        History of Present Illness:   Trisha Bender \"Julio Cesar" is a 65 year old female with history of  COPD who is seen today for evaluation for lung transplantation.    She began to feel short of breath walking up a hill while on vacation with friends around [phone], however, she did not see a pulmonologist until 2017. She notes in the last 2 years since undergoing evaluation last, she has had an increase in sob. She used to voluntarily go to pulmonary rehab and she has not been back in the last couple years. She becomes short of breath with getting dressed or taking showers. In the last two years, she only walks from the chair to the bathroom. They have a chair lift installed on the stairs. She can't really do stairs at all at this point. She denies any wheezing or coughing. It's really just sob.     She is currently on prednisone 15 mg/day (10mg am/ 5 mgpm), daliresp, and breo as well as spiriva and albuterol prn. She has duonebs and has not been requiring it. She hasn't needed any prednisone bursts since February 2020 and she went to 20 mg daily for a couple days. She has been on steroids for years. She is also on norco, she takes 10-325s and takes about 6 a day. She takes fosamax and calcium as well as vitamin D.     She suffered from CHF with an idiopathic CM thought to be Takotsubo's in 2015 with a TTE showing EF 20-25%, coronary angiogram at that time revealed mild nonobstructive CAD. Her EF improved to 45-50% with optimization of cardiac meds. Repeat TTE in 9/2019 showed EF of 55-60% with normal RV function and mild MR/TR. Her last TTEt 6/25/20 showed a decline in her EF of 40-45% with RVSP of 33 mmHg. This was in the setting of persistent tachycardia with concern that it may have falsely depressed her EF due to the tachycardia. They've " "since put her on carvedilol and she's on 12.5 mg twice a day with HR consistently now below 100 at rest. She has had a history of LE swelling and has been put on torsemide and since then her swelling has improved. She denies orthopnea, or PND.      She denies any significant weight loss, but thinks she's been gradually gaining weight. She currently weighs 185 lbs and is 5'10\".          Review of Systems:   Please see HPI, otherwise the complete 10 point ROS is negative.   Review Of Systems  Skin: fragile skin  Eyes: Denies any blurred vision or floaters  Ears/Nose/Throat: denies sinus pressure, headaches, or postnasal drainage, is prone to summer allergies  Respiratory:as per HPI  Cardiovascular: Used to have palpitations and racing heartbeat which is improved after starting carvedilol  Gastrointestinal: negative for dysphagia, denies aspiration, takes pantoprazole for heartburn occurs maybe 1-2x/week, no bloating, no constipation or diarrhea.   : Increase in frequency with torsemide, denies dysuria  Musculoskeletal: Denies any focal weakness   Neurologic: No seizure history, no numbness, tingling, tremors  Psychiatric: Intermittent anxiety  Hematologic/Lymphatic/Immunologic: easy bruising with legs with skin thinning on her arms (per her).   Endocrine: no history of diabetes or thyroid issues            Past Medical and Surgical History:     Past Medical History:   Diagnosis Date     Anxiety      CAD (coronary artery disease) 7/2/15    mild per cath     CHF (congestive heart failure)     EF=20-25% per echo 6/30/15     Chronic airway obstruction     FEV1 36% of pred; VC 67%  in Jan 06.     Chronic back pain      COPD (chronic obstructive pulmonary disease)      Endometriosis      Esophageal reflux      Essential hypertension      Hyperlipidemia      Migraine      Mild persistent asthma 8/30/2012     Osteopenia      Takotsubo cardiomyopathy      Past Surgical History:   Procedure Laterality Date     ANGIOGRAM  " 07/02/2015    Minimal CAD. LV dysfunction, Mid RCA 20% stenosis, EF 20-25%, c/w Takotsubo cardiomyopathy     APPENDECTOMY  1974     CHOLECYSTECTOMY  1974    [facility]     COLONOSCOPY N/A 4/26/2018    Procedure: COLONOSCOPY;  COLONOSCOPY with biopsies;  Surgeon: Steve Acosta MD;  Location: [facility] OR     CORONARY ANGIOGRAPHY ADULT ORDER       CV CORONARY ANGIOGRAM N/A 7/30/2020    Procedure: Coronary Angiogram;  Surgeon: Quinn Viveros MD;  Location: [facility] HEART CARDIAC CATH LAB     CV LEFT HEART CATH N/A 7/30/2020    Procedure: Left Heart Cath;  Surgeon: Quinn Viveros MD;  Location: [facility]ARD[facility] C[facility]     CV LEFT VENTRICULOGRAM N/A 7/30/2020    Procedure: Left Ventriculogram;  Surgeon: Quinn Viveros MD;  Location: [facility]     CV RIGHT HEART CATH MEASUREMENTS RECORDED N/A 7/30/2020    Procedure: Right Heart Cath;  Surgeon: Quinn Viveros MD;  Location: [facility]ARD[facility] C[facility]     ESOPHAGOSCOPY, GASTROSCOPY, DUODENOSCOPY (EGD), COMBINED  11/15/2011    Procedure:COMBINED ESOPHAGOSCOPY, GASTROSCOPY, DUODENOSCOPY (EGD);  ESOPHAGOSCOPY, GASTROSCOPY, DUODENOSCOPY (EGD) ; Surgeon:JIM URENA; Location: GI     INJECT EPIDURAL LUMBAR / SACRAL SINGLE N/A 4/13/2018    Procedure: INJECT EPIDURAL LUMBAR / SACRAL CONTINUAL OR INTERMITTENT;  Lumbar Epidural Steroid Injection;  Surgeon: Az Enamorado MD;  Location: [facility] OR     INJECT EPIDURAL LUMBAR / SACRAL SINGLE N/A 9/24/2018    Procedure: INJECT EPIDURAL LUMBAR / SACRAL SINGLE;  Lumbar Epidural Steroid Injection;  Surgeon: Az Enamorado MD;  Location: [facility] OR     LAPAROSCOPIC ABLATION ENDOMETRIOSIS  1998     LAPAROSCOPY DIAGNOSTIC (GYN)      endometriosis resection / lysis of adhesions     Nasal septoplasty and mucous retention cyst       TONSILLECTOMY, ADENOIDECTOMY, COMBINED       Whipple teeth extraction             Family History:     Family History   Problem Relation Age of Onset     Cancer Mother         Colon  cancer; dxed at age 64;  at age 69     Cancer - colorectal Mother      Other Cancer Mother      Gastrointestinal Disease Sister         Acute pancreatitis     Heart Disease Father         ?     Coronary Artery Disease Father      Circulatory Maternal Aunt         AAA     Circulatory Maternal Uncle         AAA     Diabetes Son      Adopted by current father at age 3. No history of bleeding or clotting disorders in her or her family.          Social History:     Social History Narrative     Not on file     , lives with .   Tobacco: 1ppd x30 years, started at age 17, quit in . No secondhand smoke exposure.   ETOH: Intermittent rare use. Reports very rare alcohol use, only on special occasions and only 1 drink  Recreational Drugs: Denies any use or history of abuse         Medications:     Current Outpatient Medications   Medication     albuterol (VENTOLIN HFA) 108 (90 BASE) MCG/ACT Inhaler     alendronate (FOSAMAX) 70 MG tablet     ALPRAZolam (XANAX) 0.25 MG tablet     atorvastatin (LIPITOR) 10 MG tablet     baclofen (LIORESAL) 10 MG tablet     BREO ELLIPTA 200-25 MCG/INH Inhaler     carvedilol (COREG) 12.5 MG tablet     cetirizine (ZYRTEC) 10 MG tablet     [employer] FIBER GUMMIES 2.5 G CHEW     erythromycin (ROMYCIN) 5 MG/GM ophthalmic ointment     fluticasone (FLONASE) 50 MCG/ACT nasal spray     HYDROcodone-acetaminophen (NORCO)  MG per tablet     ipratropium - albuterol 0.5 mg/2.5 mg/3 mL (DUONEB) 0.5-2.5 (3) MG/3ML neb solution     ketorolac (TORADOL) 10 MG tablet     lisinopril (ZESTRIL) 5 MG tablet     montelukast (SINGULAIR) 10 MG tablet     Multiple Vitamin CHEW     naloxone (NARCAN) 4 MG/0.1ML nasal spray     nortriptyline (PAMELOR) 25 MG capsule     ondansetron (ZOFRAN-ODT) 4 MG ODT tab     pantoprazole (PROTONIX) 40 MG EC tablet     potassium chloride ER (K-TAB/KLOR-CON) 10 MEQ CR tablet     roflumilast (DALIRESP) 500 MCG TABS tablet     SUMAtriptan (IMITREX) 25 MG tablet     tiotropium  (SPIRIVA HANDIHALER) 18 MCG capsule     torsemide (DEMADEX) 20 MG tablet     traMADol (ULTRAM) 50 MG tablet     No current facility-administered medications for this visit.             Physical Exam:   [facility] 09/29/2005    Exam limited by virtual nature of visit    Constitutional - looks well, in no apparent distress  Eyes - no redness or discharge  Respiratory -breathing appears comfortable.  No cough. Speaking in full sentences, not conversationally dyspneic. No accessory muscle use.  Skin - No appreciable discoloration or lesions (very limited exam)  Neurological - No apparent tremors. Speech fluent and articlate  Psychiatric - no signs of delirium or anxiety           Data:   All laboratory and imaging data reviewed.      No results found for this or any previous visit (from the past 168 hour(s)).      PFT interpretation: February 11, 2021    Very severe obstructive ventilatory defect.  Decreased from previous.  Valid Maneuver    Date Place TLC (%) FVC (%) FEV1 (%) FEV1/FVC DLCO (%) Note   6/15/20 [facility]   1.72 48 0.70 26 41      5/17/19 [facility]   1.91 48 0.79 26 41      11/14/18 [facility]   1.98 50 0.86 28 43      7/18/18 [facility]   2.19 54 0.90 29 41        6MWT Distance:  4/17/18: 1,185 ft (361m) parisa 91% on RA    Serology:  CMV:  EBV:  HSV:     Micro:                 Imaging:   CXR 7/2/20  Flattened diaphragms, hyperinflation appreciated, no bronchiectasis, trachea midline             Cardiac:     Most Recent TTE:  6/25/20     Interpretation Summary     The visual ejection fraction is estimated at 40-45%.  Left ventricular systolic function is mildly reduced.  The rhythm was undetermined.  Tachycardic at 134 BPM  Technically difficult, suboptimal study.  _____________________________________________________________________________  __  Left Ventricle  The left ventricle is normal in size. LVEDD 5.1 cm. The visual ejection  fraction is estimated at 40-45%. Left ventricular systolic function is mildly  reduced.  Rapid ventricular rates may falsely lower the assessment of left  ventricular systolic function and ejection fraction. Left ventricular  diastolic function is indeterminate. There is mild global hypokinesia of the  left ventricle. Regional wall motion abnormalities cannot be excluded due to  limited visualization.     Right Ventricle  The right ventricle is normal in size and function.     Atria  The left atrium is borderline dilated. Right atrial size is normal.     Mitral Valve  There is mild mitral annular calcification. There is trace mitral  regurgitation.        Tricuspid Valve  There is trace tricuspid regurgitation. The right ventricular systolic  pressure is approximated at 33.8 mmHg plus the right atrial pressure.     Aortic Valve  The aortic valve is not well visualized. No aortic regurgitation is present.  No hemodynamically significant valvular aortic stenosis.     Pulmonic Valve  The pulmonic valve is not well visualized. There is no pulmonic valvular  regurgitation.     Vessels  Mild aortic root dilatation. Inferior vena cava not well visualized for  estimation of right atrial pressure.     Pericardium  There is no pericardial effusion.     Rhythm  The rhythm was undetermined. Tachycardic at 134 BPM.    Right Heart Cath 7/30/20  1. Minimal coronary artery disease  2. Severely elevated right and left heart filling pressures (Mean RA 21 mmHg, mean Wedge 28 mmHg, LVEDP 25 mmHg)  3. Mild to moderate pulmonary HTN due to chronically elevated left heart filling pressure  4. Mildly reduced LV systolic function (EF   45%)         GI:   EGD  [place]    Colonoscopy:  4/26/18  Findings:        The perianal and digital rectal examinations were normal.        Nine sessile polyps were found in the descending colon, transverse colon        and ascending colon. The polyps were 4 to 10 mm in size. These polyps        were removed with a hot snare. Resection and retrieval were complete.        Verification of patient  identification for the specimen was done.        Estimated blood loss was minimal.        The exam was otherwise without abnormality on direct and retroflexion        views.                                                                                     Impression:               - Nine 4 to 10 mm polyps in the descending colon,                             in the transverse colon and in the ascending colon,                             removed with a hot snare. Resected and retrieved.                             - The examination was otherwise normal on direct                             and retroflexion views.   Recommendation:           - Await pathology results.                             - Repeat colonoscopy date to be determined after                             pending pathology results are reviewed for                             surveillance based on pathology results.          Endo   DEXA:

## 2021-02-11 ENCOUNTER — VIRTUAL VISIT (OUTPATIENT)
Dept: TRANSPLANT | Facility: CLINIC | Age: 66
End: 2021-02-11
Attending: INTERNAL MEDICINE
Payer: COMMERCIAL

## 2021-02-11 DIAGNOSIS — Z01.818 ENCOUNTER FOR PRE-TRANSPLANT EVALUATION FOR LUNG TRANSPLANT: ICD-10-CM

## 2021-02-11 DIAGNOSIS — J43.1 PANLOBULAR EMPHYSEMA (H): Primary | ICD-10-CM

## 2021-02-11 PROCEDURE — 99205 OFFICE O/P NEW HI 60 MIN: CPT | Mod: 95 | Performed by: INTERNAL MEDICINE

## 2021-02-11 NOTE — NURSING NOTE
LUNG TRANSPLANT NEW PATIENT VISIT   Transplant Nurse Coordinator Note    Trisha Bender 65 year old  [id number]    Data Unavailable  0 lbs 0 oz  There is no height or weight on file to calculate BMI.  Ht and Wt not completed today due to video visit.     Patient accompanied by: Patient came to NPT appointment with her  and primary social support Naun. All were asking good questions and engaged in patient care.    Current activity level: Very limited due to shortness of breath, patient reported she has difficulty going from room to room within her home.   Pulmonary Rehab: Not currently attending due to COVID 19. Patient reports attending Pulmonary rehab at [facility] up until May 2019.   Karnofsky Score: 50% requires considerable assist or frequent medical care; Limited Mobilty   PFT: Not completed this visit, due to virtual visit. Last completed: 11/20/2020 Results scanned into media tab  6MW: Not completed this visit, due to virtual visit. Last completed: More than two years ago   MD requesting to have patient complete asap to assess for need for oxygen.   Labs: Not completed this visit, due to virtual visit  CT Scan:  Not completed this visit, due to virtual visit  Current oxygen use:  Not currently on oxygen  Diabetic status: No a diabetic  Medication changes: No changes made this visit  Primary Care: Reviewed with patient the importance of remaining current with primary care testing such as Mammogram, pap smear, colonoscopy and dental cares. Patient verbalized understanding.     Patient status/transplant tab updated.     MD Recommendations:   - Cardiology Consult at [facility]   - ECHO Complete to further access abnormal low EF and left side heart failure  - 6MW to validate if patient requires oxygen support  - Pulmonary Rehabilitation to address concerns for progressive deconditioning     Plan: Transplant coordinator to reach out to patient to discuss scheduling an ECHO, 6MW and Cardiology consult at  [facility]. Transplant coordinator to send orders to Pulmonary Rehab at [facility]. Findings from additional testing and consult will be presented to lung transplant selection committee to determine if patient is a candidate for lung transplant or if heart dysfunction is a contraindication for lung transplant. Patient aware if she is deemed a lung transplant candidate she will need to re-do many of the lung transplant tests as they were last completed in 2018.

## 2021-02-11 NOTE — PATIENT INSTRUCTIONS
Pulmonary Rehab: Please remember to stay active.  Continue exercises learned in pulmonary rehab or continue participating in pulmonary rehab, if able.     Current oxygen use: Not on oxygen    Data Unavailable 0 lbs 0 oz There is no height or weight on file to calculate BMI. Appointment conduct via video visit. Goal BMI greater than 18, less than 30 for lung transplant.     Medication changes: No changes made this visit  Future orders: ECHO Complete, Six Minute Walk Test, Pulmonary Rehabilitation  Next appointment: RTC in three months, your transplant coordinator will contact you regarding scheduling the testing.    Transplant Plan of Care: Based on findings of the testing Dr Call is requesting, your case with be presented to the lung transplant committee to determine next steps. You transplant coordinator will follow up with you after you have been presented to the committee to review outcomes.      Please remember to stay up to date with your primary care requirements including:     Annual check-ups with primary care physician   Mammogram   Pap smear (as appropriate for women)    PSA (for men over 50)   Dental visits   Annual flu shots/ immunizations as needed   Colonoscopy (patients over 50).     Thank-you for allowing us to participate in your care.    If your condition should change, please contact your transplant coordinator. This includes: worsening symptoms, need for antibiotics, hospitalizations, transfusions.    Thoracic Transplant Office phone [phone], fax [phone]    Office Hours 8:30 - 5:00 pm

## 2021-02-11 NOTE — PROGRESS NOTES
Trisha is a 65 year old who is being evaluated via a billable video visit.      How would you like to obtain your AVS? MyChart  If the video visit is dropped, the invitation should be resent by: Text to cell phone: [phone]  Will anyone else be joining your video visit? No      Video Start Time: 9:03 am  Video-Visit Details    Type of service:  Video Visit    Video End Time:10:02 AM    Originating Location (pt. Location): Home    Distant Location (provider location):  [facility]     Platform used for Video Visit: [employer]

## 2021-02-11 NOTE — LETTER
2/11/2021         RE: Trisha Bender  [address] [facility] [address]        Dear Colleague,    Thank you for referring your patient, Trisha Bender, to the [facility] TRANSPLANT CLINIC. Please see a copy of my visit note below.    [facility]  Transplant Clinic - Initial Visit -  February 11, 2021         Assessment and Plan:   Trisha Bender is a 65 year old female with history of COPD and cardiomyopathy who is seen today for evaluation for lung transplantation.    1) Evaluation for lung transplantation    Cynthia is a 66 yo woman with a past medical history of severe COPD.  She was previously evaluated for transplant about 2 years ago, however at the time she felt there were too many reservations to proceed forward with a committee evaluation.  Since that time.,  Her symptoms have become more progressive.  Her FEV1 has dropped since her last evaluation.  She has stopped going to pulmonary rehab and finds that she is living a very sedentary life.     She has a past medical history of what was presumed to be Takotsubo's cardiomyopathy with an EF of 20 to 25% which subsequently improved itself to 50 to 55%.  However, in the last year most recently in June, her EF was noted to be about 45% again.  There was some conjecture at the time that this might be related to tachycardia and she has since been started on carvedilol at 12.5 mg twice daily.  She states her heart rate is now's consistently below 100.  In July 2020 she also underwent a right heart catheterization which showed an mean RA pressure of 21 wedge of 28 LVEDP of 25, and commented that she had mild to moderate PAH and mildly reduced LV systolic function with an EF of 45%.  She states at that time she was having lower extremity swelling and afterwards she was started on torsemide to good effect and now has no lower extremity swelling.     I think my biggest concerns at  this point regarding transplant would be her deconditioning, her use of chronic steroids she has been on 15 mg/day for years, and possibility of cardiomyopathy that is persistent or may put her at risk for cardiac issues in the postoperative period after transplant.    In addition to a complete history,, I discussed transplant at length with the patient.  We reviewed the risks and benefits of transplantation.  Our discussion included the recent survival statistics.  I discussed rejection, including hyperacute, acute, chronic and antibody mediated.  I discussed the need for surveillance biopsies.  I reviewed the standard immunosuppression and typical side effects, including renal failure.  I discussed the increased risk of infection and the use of prophylactic antibiotics. I reviewed the increased risk of malignancy. I discussed the listing system, including the Lung Allocation Score.  I discussed the typical operative and postoperative course.  I reviewed the usual clinic followup.  I explained to the patient that she is required to stay in the [facility] for three months following transplantation and that she  will need to have someone accompanying her during that time.  I discussed the importance of strict medication and clinic adherence after transplantation. The patient had a number of questions which were answered to her apparent satisfaction.       She again seems a little hesitant regarding transplant outcomes and we did discuss the fact that COPD is an indication for transplant for morbidity standpoint not from a mortality standpoint.    We discussed that we will proceed forward with an echo, cardiology consult, a repeat 6-minute walk test and pulmonary rehab which I think she would benefit from in general.  Then we will present her abbreviated findings to the selection committee before deciding to go forward with full evaluation she knows that at any point time she may decide that she does not want to  "undergo transplant evaluation any further.    - Start with TTE  - Cards consult  - 6MWT  - Pulm Rehab    Coordinator/MD: Edouard Roger/Shital Call    RTC: 3 mos      Shital Call MD  [facility]   Pulmonary Transplant   PreTransplant Coordinator: Edouard Roger  Fax: [phone]  Ph: [phone]          Problem List:     1. Evaluation for lung transplantation        History of Present Illness:   Trisha Bender \"Julio Cesar" is a 65 year old female with history of  COPD who is seen today for evaluation for lung transplantation.    She began to feel short of breath walking up a hill while on vacation with friends around [id number], however, she did not see a pulmonologist until 2017. She notes in the last 2 years since undergoing evaluation last, she has had an increase in sob. She used to voluntarily go to pulmonary rehab and she has not been back in the last couple years. She becomes short of breath with getting dressed or taking showers. In the last two years, she only walks from the chair to the bathroom. They have a chair lift installed on the stairs. She can't really do stairs at all at this point. She denies any wheezing or coughing. It's really just sob.     She is currently on prednisone 15 mg/day (10mg am/ 5 mgpm), daliresp, and breo as well as spiriva and albuterol prn. She has duonebs and has not been requiring it. She hasn't needed any prednisone bursts since February 2020 and she went to 20 mg daily for a couple days. She has been on steroids for years. She is also on norco, she takes 10-325s and takes about 6 a day. She takes fosamax and calcium as well as vitamin D.     She suffered from CHF with an idiopathic CM thought to be Takotsubo's in 2015 with a TTE showing EF 20-25%, coronary angiogram at that time revealed mild nonobstructive CAD. Her EF improved to 45-50% with optimization of cardiac meds. Repeat TTE in 9/2019 showed EF of 55-60% with " "normal RV function and mild MR/TR. Her last TTEt 6/25/20 showed a decline in her EF of 40-45% with RVSP of 33 mmHg. This was in the setting of persistent tachycardia with concern that it may have falsely depressed her EF due to the tachycardia. They've since put her on carvedilol and she's on 12.5 mg twice a day with HR consistently now below 100 at rest. She has had a history of LE swelling and has been put on torsemide and since then her swelling has improved. She denies orthopnea, or PND.      She denies any significant weight loss, but thinks she's been gradually gaining weight. She currently weighs 185 lbs and is 5'10\".          Review of Systems:   Please see HPI, otherwise the complete 10 point ROS is negative.   Review Of Systems  Skin: fragile skin  Eyes: Denies any blurred vision or floaters  Ears/Nose/Throat: denies sinus pressure, headaches, or postnasal drainage, is prone to summer allergies  Respiratory:as per HPI  Cardiovascular: Used to have palpitations and racing heartbeat which is improved after starting carvedilol  Gastrointestinal: negative for dysphagia, denies aspiration, takes pantoprazole for heartburn occurs maybe 1-2x/week, no bloating, no constipation or diarrhea.   : Increase in frequency with torsemide, denies dysuria  Musculoskeletal: Denies any focal weakness   Neurologic: No seizure history, no numbness, tingling, tremors  Psychiatric: Intermittent anxiety  Hematologic/Lymphatic/Immunologic: easy bruising with legs with skin thinning on her arms (per her).   Endocrine: no history of diabetes or thyroid issues            Past Medical and Surgical History:     Past Medical History:   Diagnosis Date     Anxiety      CAD (coronary artery disease) 7/2/15    mild per cath     CHF (congestive heart failure)     EF=20-25% per echo 6/30/15     Chronic airway obstruction     FEV1 36% of pred; VC 67%  in Jan 06.     Chronic back pain      COPD (chronic obstructive pulmonary disease)      " Endometriosis      Esophageal reflux      Essential hypertension      Hyperlipidemia      Migraine      Mild persistent asthma 8/30/2012     Osteopenia      Takotsubo cardiomyopathy      Past Surgical History:   Procedure Laterality Date     ANGIOGRAM  07/02/2015    Minimal CAD. LV dysfunction, Mid RCA 20% stenosis, EF 20-25%, c/w Takotsubo cardiomyopathy     APPENDECTOMY  1974     CHOLECYSTECTOMY  1974    [facility]     COLONOSCOPY N/A 4/26/2018    Procedure: COLONOSCOPY;  COLONOSCOPY with biopsies;  Surgeon: Steve Acosta MD;  Location: [facility] OR     CORONARY ANGIOGRAPHY ADULT ORDER       CV CORONARY ANGIOGRAM N/A 7/30/2020    Procedure: Coronary Angiogram;  Surgeon: Quinn Viveros MD;  Location: [facility] HEART CARDIAC CATH LAB     CV LEFT HEART CATH N/A 7/30/2020    Procedure: Left Heart Cath;  Surgeon: Quinn Viveros MD;  Location: [facility] HEART CARDIAC CATH LAB     CV LEFT VENTRICULOGRAM N/A 7/30/2020    Procedure: Left Ventriculogram;  Surgeon: Quinn Viveros MD;  Location:  HEART CARDIAC CATH LAB     CV RIGHT HEART CATH MEASUREMENTS RECORDED N/A 7/30/2020    Procedure: Right Heart Cath;  Surgeon: Quinn Viveros MD;  Location:  HEART CARDIAC CATH LAB     ESOPHAGOSCOPY, GASTROSCOPY, DUODENOSCOPY (EGD), COMBINED  11/15/2011    Procedure:COMBINED ESOPHAGOSCOPY, GASTROSCOPY, DUODENOSCOPY (EGD);  ESOPHAGOSCOPY, GASTROSCOPY, DUODENOSCOPY (EGD) ; Surgeon:JIM URENA; Location:[facility] GI     INJECT EPIDURAL LUMBAR / SACRAL SINGLE N/A 4/13/2018    Procedure: INJECT EPIDURAL LUMBAR / SACRAL CONTINUAL OR INTERMITTENT;  Lumbar Epidural Steroid Injection;  Surgeon: Az Enamorado MD;  Location: [facility] OR     INJECT EPIDURAL LUMBAR / SACRAL SINGLE N/A 9/24/2018    Procedure: INJECT EPIDURAL LUMBAR / SACRAL SINGLE;  Lumbar Epidural Steroid Injection;  Surgeon: Az Enamorado MD;  Location: [facility] OR     LAPAROSCOPIC ABLATION ENDOMETRIOSIS  1998     LAPAROSCOPY DIAGNOSTIC (GYN)      endometriosis resection /  lysis of adhesions     Nasal septoplasty and mucous retention cyst       TONSILLECTOMY, ADENOIDECTOMY, COMBINED       [place] teeth extraction             Family History:     Family History   Problem Relation Age of Onset     Cancer Mother         Colon cancer; dxed at age 64;  at age 69     Cancer - colorectal Mother      Other Cancer Mother      Gastrointestinal Disease Sister         Acute pancreatitis     Heart Disease Father         ?     Coronary Artery Disease Father      Circulatory Maternal Aunt         AAA     Circulatory Maternal Uncle         AAA     Diabetes Son      Adopted by current father at age 3. No history of bleeding or clotting disorders in her or her family.          Social History:     Social History Narrative     Not on file     , lives with .   Tobacco: 1ppd x30 years, started at age 17, quit in . No secondhand smoke exposure.   ETOH: Intermittent rare use. Reports very rare alcohol use, only on special occasions and only 1 drink  Recreational Drugs: Denies any use or history of abuse         Medications:     Current Outpatient Medications   Medication     albuterol (VENTOLIN HFA) 108 (90 BASE) MCG/ACT Inhaler     alendronate (FOSAMAX) 70 MG tablet     ALPRAZolam (XANAX) 0.25 MG tablet     atorvastatin (LIPITOR) 10 MG tablet     baclofen (LIORESAL) 10 MG tablet     BREO ELLIPTA 200-25 MCG/INH Inhaler     carvedilol (COREG) 12.5 MG tablet     cetirizine (ZYRTEC) 10 MG tablet     [employer] FIBER GUMMIES 2.5 G CHEW     erythromycin (ROMYCIN) 5 MG/GM ophthalmic ointment     fluticasone (FLONASE) 50 MCG/ACT nasal spray     HYDROcodone-acetaminophen (NORCO)  MG per tablet     ipratropium - albuterol 0.5 mg/2.5 mg/3 mL (DUONEB) 0.5-2.5 (3) MG/3ML neb solution     ketorolac (TORADOL) 10 MG tablet     lisinopril (ZESTRIL) 5 MG tablet     montelukast (SINGULAIR) 10 MG tablet     Multiple Vitamin CHEW     naloxone (NARCAN) 4 MG/0.1ML nasal spray     nortriptyline (PAMELOR) 25 MG  capsule     ondansetron (ZOFRAN-ODT) 4 MG ODT tab     pantoprazole (PROTONIX) 40 MG EC tablet     potassium chloride ER (K-TAB/KLOR-CON) 10 MEQ CR tablet     roflumilast (DALIRESP) 500 MCG TABS tablet     SUMAtriptan (IMITREX) 25 MG tablet     tiotropium (SPIRIVA HANDIHALER) 18 MCG capsule     torsemide (DEMADEX) 20 MG tablet     traMADol (ULTRAM) 50 MG tablet     No current facility-administered medications for this visit.             Physical Exam:   [facility] 09/29/2005    Exam limited by virtual nature of visit    Constitutional - looks well, in no apparent distress  Eyes - no redness or discharge  Respiratory -breathing appears comfortable.  No cough. Speaking in full sentences, not conversationally dyspneic. No accessory muscle use.  Skin - No appreciable discoloration or lesions (very limited exam)  Neurological - No apparent tremors. Speech fluent and articlate  Psychiatric - no signs of delirium or anxiety           Data:   All laboratory and imaging data reviewed.      No results found for this or any previous visit (from the past 168 hour(s)).      PFT interpretation: February 11, 2021    Very severe obstructive ventilatory defect.  Decreased from previous.  Valid Maneuver    Date Place TLC (%) FVC (%) FEV1 (%) FEV1/FVC DLCO (%) Note   6/15/20 [facility]   1.72 48 0.70 26 41      5/17/19 [facility]   1.91 48 0.79 26 41      11/14/18 [facility]   1.98 50 0.86 28 43      7/18/18 [facility]   2.19 54 0.90 29 41        6MWT Distance:  4/17/18: 1,185 ft (361m) parisa 91% on RA    Serology:  CMV:  EBV:  HSV:     Micro:                 Imaging:   CXR 7/2/20  Flattened diaphragms, hyperinflation appreciated, no bronchiectasis, trachea midline             Cardiac:     Most Recent TTE:  6/25/20     Interpretation Summary     The visual ejection fraction is estimated at 40-45%.  Left ventricular systolic function is mildly reduced.  The rhythm was undetermined.  Tachycardic at 134 BPM  Technically difficult, suboptimal  study.  _____________________________________________________________________________  __  Left Ventricle  The left ventricle is normal in size. LVEDD 5.1 cm. The visual ejection  fraction is estimated at 40-45%. Left ventricular systolic function is mildly  reduced. Rapid ventricular rates may falsely lower the assessment of left  ventricular systolic function and ejection fraction. Left ventricular  diastolic function is indeterminate. There is mild global hypokinesia of the  left ventricle. Regional wall motion abnormalities cannot be excluded due to  limited visualization.     Right Ventricle  The right ventricle is normal in size and function.     Atria  The left atrium is borderline dilated. Right atrial size is normal.     Mitral Valve  There is mild mitral annular calcification. There is trace mitral  regurgitation.        Tricuspid Valve  There is trace tricuspid regurgitation. The right ventricular systolic  pressure is approximated at 33.8 mmHg plus the right atrial pressure.     Aortic Valve  The aortic valve is not well visualized. No aortic regurgitation is present.  No hemodynamically significant valvular aortic stenosis.     Pulmonic Valve  The pulmonic valve is not well visualized. There is no pulmonic valvular  regurgitation.     Vessels  Mild aortic root dilatation. Inferior vena cava not well visualized for  estimation of right atrial pressure.     Pericardium  There is no pericardial effusion.     Rhythm  The rhythm was undetermined. Tachycardic at 134 BPM.    Right Heart Cath 7/30/20  1. Minimal coronary artery disease  2. Severely elevated right and left heart filling pressures (Mean RA 21 mmHg, mean Wedge 28 mmHg, LVEDP 25 mmHg)  3. Mild to moderate pulmonary HTN due to chronically elevated left heart filling pressure  4. Mildly reduced LV systolic function (EF   45%)         GI:   EGD  [place]    Colonoscopy:  4/26/18  Findings:        The perianal and digital rectal examinations were  normal.        Nine sessile polyps were found in the descending colon, transverse colon        and ascending colon. The polyps were 4 to 10 mm in size. These polyps        were removed with a hot snare. Resection and retrieval were complete.        Verification of patient identification for the specimen was done.        Estimated blood loss was minimal.        The exam was otherwise without abnormality on direct and retroflexion        views.                                                                                     Impression:               - Nine 4 to 10 mm polyps in the descending colon,                             in the transverse colon and in the ascending colon,                             removed with a hot snare. Resected and retrieved.                             - The examination was otherwise normal on direct                             and retroflexion views.   Recommendation:           - Await pathology results.                             - Repeat colonoscopy date to be determined after                             pending pathology results are reviewed for                             surveillance based on pathology results.          Endo   DEXA:                        Trisha is a 65 year old who is being evaluated via a billable video visit.      How would you like to obtain your AVS? MyChart  If the video visit is dropped, the invitation should be resent by: Text to cell phone: [phone]  Will anyone else be joining your video visit? No      Video Start Time: 9:03 am  Video-Visit Details    Type of service:  Video Visit    Video End Time:10:02 AM    Originating Location (pt. Location): Home    Distant Location (provider location):  [facility] TRANSPLANT CLINIC     Platform used for Video Visit: Zinit[employer]        Again, thank you for allowing me to participate in the care of your patient.        Sincerely,        Shital Call MD

## 2021-02-21 ENCOUNTER — MYC MEDICAL ADVICE (OUTPATIENT)
Dept: INTERNAL MEDICINE | Facility: CLINIC | Age: 66
End: 2021-02-21

## 2021-02-21 DIAGNOSIS — G89.29 CHRONIC BILATERAL LOW BACK PAIN, UNSPECIFIED WHETHER SCIATICA PRESENT: ICD-10-CM

## 2021-02-21 DIAGNOSIS — M54.59 MECHANICAL LOW BACK PAIN: ICD-10-CM

## 2021-02-21 DIAGNOSIS — M54.50 CHRONIC BILATERAL LOW BACK PAIN, UNSPECIFIED WHETHER SCIATICA PRESENT: ICD-10-CM

## 2021-02-22 RX ORDER — TRAMADOL HYDROCHLORIDE 50 MG/1
TABLET ORAL
Qty: 20 TABLET | Refills: 0 | Status: SHIPPED | OUTPATIENT
Start: 2021-02-22 | End: 2021-02-25

## 2021-02-22 NOTE — TELEPHONE ENCOUNTER
See her M[employer] [place] message.   She is asking for refill of Tramadol.     Not sure if she needs Norco now or not? It is too soon regardless. Last refill on 2/3/21 for #180    Last Tramadol refill on 1/28/21 for #20     Routing refill request to provider for review/approval because:  Drug not on the [facility] refill protocol     Encounter-Level CSA - 02/23/2016:    Controlled Substance Agreement - Scan on 2/24/2016  1:37 PM: [place] CONTROLLED SUBSTANCE AGREEMENT, 2/23/16     Patient-Level CSA:    There are no patient-level csa.       Reviewed [place]  today. Appropriate refills last month.

## 2021-02-23 ENCOUNTER — MYC MEDICAL ADVICE (OUTPATIENT)
Dept: INTERNAL MEDICINE | Facility: CLINIC | Age: 66
End: 2021-02-23

## 2021-02-23 RX ORDER — HYDROCODONE BITARTRATE AND ACETAMINOPHEN 10; 325 MG/1; MG/1
1 TABLET ORAL EVERY 4 HOURS PRN
Qty: 180 TABLET | Refills: 0 | Status: SHIPPED | OUTPATIENT
Start: 2021-02-23 | End: 2021-03-21

## 2021-02-23 NOTE — TELEPHONE ENCOUNTER
No need to repeat Valaciclovir.   Will try to refill the pain medication.   I am not sure when [place] will administer the COVID immunization.

## 2021-02-24 ENCOUNTER — MYC MEDICAL ADVICE (OUTPATIENT)
Dept: INTERNAL MEDICINE | Facility: CLINIC | Age: 66
End: 2021-02-24

## 2021-02-24 DIAGNOSIS — M54.59 MECHANICAL LOW BACK PAIN: ICD-10-CM

## 2021-02-25 RX ORDER — TRAMADOL HYDROCHLORIDE 50 MG/1
TABLET ORAL
Qty: 20 TABLET | Refills: 0 | Status: SHIPPED | OUTPATIENT
Start: 2021-02-25 | End: 2021-03-19

## 2021-02-25 NOTE — TELEPHONE ENCOUNTER
See her message. She would like Tramadol refill on Friday.     Last refill on 2/22/21 for #20. She may not be able to  until Saturday.     Routing refill request to provider for review/approval because:  Drug not on the [facility] refill protocol

## 2021-02-28 DIAGNOSIS — I42.0 DILATED CARDIOMYOPATHY (H): ICD-10-CM

## 2021-03-02 RX ORDER — LISINOPRIL 2.5 MG/1
TABLET ORAL
Qty: 90 TABLET | Refills: 2 | OUTPATIENT
Start: 2021-03-02

## 2021-03-05 DIAGNOSIS — Z79.899 CHRONIC PRESCRIPTION BENZODIAZEPINE USE: ICD-10-CM

## 2021-03-05 DIAGNOSIS — F41.9 ANXIETY: ICD-10-CM

## 2021-03-05 RX ORDER — ALPRAZOLAM 0.25 MG
0.25 TABLET ORAL 2 TIMES DAILY PRN
Qty: 60 TABLET | Refills: 0 | Status: SHIPPED | OUTPATIENT
Start: 2021-03-05 | End: 2021-04-07

## 2021-03-05 NOTE — TELEPHONE ENCOUNTER
Xanax refill request.     Last OV on 11/16/20    Last refill on 2/3/21 for #60     Routing refill request to provider for review/approval because:  Drug not on the [facility] refill protocol

## 2021-03-16 DIAGNOSIS — E78.2 MIXED HYPERLIPIDEMIA: ICD-10-CM

## 2021-03-16 DIAGNOSIS — I42.0 DILATED CARDIOMYOPATHY (H): ICD-10-CM

## 2021-03-16 DIAGNOSIS — I50.22 CHRONIC SYSTOLIC CONGESTIVE HEART FAILURE (H): ICD-10-CM

## 2021-03-16 LAB
ANION GAP SERPL CALCULATED.3IONS-SCNC: 8 MMOL/L (ref 3–14)
BUN SERPL-MCNC: 7 MG/DL (ref 7–30)
CALCIUM SERPL-MCNC: 8.7 MG/DL (ref 8.5–10.1)
CHLORIDE SERPL-SCNC: 98 MMOL/L (ref 94–109)
CHOLEST SERPL-MCNC: 175 MG/DL
CO2 SERPL-SCNC: 29 MMOL/L (ref 20–32)
CREAT SERPL-MCNC: 0.5 MG/DL (ref 0.52–1.04)
GFR SERPL CREATININE-BSD FRML MDRD: >90 ML/MIN/{1.73_M2}
GLUCOSE SERPL-MCNC: 144 MG/DL (ref 70–99)
HDLC SERPL-MCNC: 55 MG/DL
LDLC SERPL CALC-MCNC: ABNORMAL MG/DL
NONHDLC SERPL-MCNC: 120 MG/DL
NT-PROBNP SERPL-MCNC: 203 PG/ML (ref 0–125)
POTASSIUM SERPL-SCNC: 3.8 MMOL/L (ref 3.4–5.3)
SODIUM SERPL-SCNC: 135 MMOL/L (ref 133–144)
TRIGL SERPL-MCNC: 450 MG/DL

## 2021-03-16 PROCEDURE — 36415 COLL VENOUS BLD VENIPUNCTURE: CPT | Performed by: NURSE PRACTITIONER

## 2021-03-16 PROCEDURE — 80048 BASIC METABOLIC PNL TOTAL CA: CPT | Performed by: NURSE PRACTITIONER

## 2021-03-16 PROCEDURE — 80061 LIPID PANEL: CPT | Performed by: NURSE PRACTITIONER

## 2021-03-16 PROCEDURE — 83880 ASSAY OF NATRIURETIC PEPTIDE: CPT | Performed by: NURSE PRACTITIONER

## 2021-03-19 DIAGNOSIS — M54.59 MECHANICAL LOW BACK PAIN: ICD-10-CM

## 2021-03-19 RX ORDER — TRAMADOL HYDROCHLORIDE 50 MG/1
TABLET ORAL
Qty: 20 TABLET | Refills: 0 | Status: SHIPPED | OUTPATIENT
Start: 2021-03-19 | End: 2021-04-23

## 2021-03-19 NOTE — TELEPHONE ENCOUNTER
Controlled Substance Refill Request for Tramadol  Problem List Complete:    No     PROVIDER TO CONSIDER COMPLETION OF PROBLEM LIST AND OVERVIEW/CONTROLLED SUBSTANCE AGREEMENT    Last Written Prescription Date:  2/25/21  Last Fill Quantity: 20,   # refills: 0    Last Office Visit with [facility] [place] care provider: 11/16/20    Future Office visit:     Controlled substance agreement:   Encounter-Level CSA - 02/23/2016:    Controlled Substance Agreement - Scan on 2/24/2016  1:37 PM: [place] CONTROLLED SUBSTANCE AGREEMENT, 2/23/16     Patient-Level CSA:    There are no patient-level csa.         Last Urine Drug Screen:   Pain Drug SCR UR W RPTD Meds   Date Value Ref Range Status   12/17/2018 FINAL  Final     Comment:     (Note)  ====================================================================  [facility] COMP DRUG ANALYSIS,UR  ====================================================================  Test                             Result       Flag       Units        Drug Present   Hydrocodone                    1330                    ng/mg creat   Hydromorphone                  228                     ng/mg creat   Dihydrocodeine                 265                     ng/mg creat   Norhydrocodone                 1337                    ng/mg creat    Sources of hydrocodone include scheduled prescription    medications. Hydromorphone, dihydrocodeine and norhydrocodone are    expected metabolites of hydrocodone. Hydromorphone and    dihydrocodeine are also available as scheduled prescription    medications.   Baclofen                       PRESENT                               Acetaminophen                  PRESENT                              ====================================================================  Test                      Result    Flag   Units      Ref Range        Creatinine              43               mg/dL      >=20             ====================================================================  Declared Medications:  Medication list was not provided.  ====================================================================  For clinical consultation, please call [phone].  ====================================================================  Analysis performed by [employer] [place] [address]     , No results found for: COMDAT, No results found for: THC13, PCP13, COC13, MAMP13, OPI13, AMP13, BZO13, TCA13, MTD13, BAR13, OXY13, PPX13, BUP13     Writer is not authorized to check  for provider     [email or web address]

## 2021-03-21 ENCOUNTER — MYC REFILL (OUTPATIENT)
Dept: INTERNAL MEDICINE | Facility: CLINIC | Age: 66
End: 2021-03-21

## 2021-03-21 DIAGNOSIS — M54.50 CHRONIC BILATERAL LOW BACK PAIN, UNSPECIFIED WHETHER SCIATICA PRESENT: ICD-10-CM

## 2021-03-21 DIAGNOSIS — G89.29 CHRONIC BILATERAL LOW BACK PAIN, UNSPECIFIED WHETHER SCIATICA PRESENT: ICD-10-CM

## 2021-03-21 NOTE — PROGRESS NOTES
"Trisha Bender is a 65 year old female who is being evaluated via a billable video visit.  This visit is being conducted as a virtual visit due to the emphasis on mitigation of the COVID-19 virus pandemic. The clinician has decided that the risk of an in-office visit outweighs the benefit for this patient.     The patient has been notified of following:   \"This video visit will be conducted via a call between you and your physician/provider. We have found that certain health care needs can be provided without the need for an in-person physical exam.  This service lets us provide the care you need with a video conversation.  If a prescription is necessary we can send it directly to your pharmacy.  If lab work is needed we can place an order for that and you can then stop by our lab to have the test done at a later time.  If during the course of the call the physician/provider feels a video visit is not appropriate, you will not be charged for this service.\"     Patient has given verbal consent for Video visit? Yes        Video Start Time: 13:40 pm        Review Of Systems  Skin: bruising  Eyes: glasses  Ears/Nose/Throat: negative  Respiratory: Dyspnea on exertion-   Cardiovascular: tachycardia  Gastrointestinal: nausea and heartburn  Genitourinary: negative  Musculoskeletal: back pain  Neurologic: migraine headaches  Psychiatric: sleep disturbance and anxiety  Hematologic/Lymphatic/Immunologic: allergies  Endocrine: negative     SHowley CMA      I have reviewed and updated the patient's Past Medical History, Social History, Family History and Medication List.    PROBLEM LIST  Patient Active Problem List   Diagnosis     Essential hypertension with goal blood pressure less than 130/80     Chronic migraine without aura without status migrainosus, not intractable     Esophageal reflux     Disorder of bone and cartilage     Family history of malignant neoplasm of gastrointestinal tract     Chronic airway obstruction " (H)     Cystocele     Advanced directives, counseling/discussion     Family history of thyroid cancer - 1/2 brother     Anxiety     Mild persistent asthma without complication     Chronic low back pain     Hair loss     Lateral epicondylitis of right elbow     Dilated cardiomyopathy (H)     Right bundle branch block (RBBB)     CHF (congestive heart failure) (H)     SOB (shortness of breath)     Coronary artery disease involving native coronary artery of native heart without angina pectoris     Mixed hyperlipidemia     Controlled substance agreement signed     Moderate persistent asthma without complication     Hypokalemia     Mechanical low back pain     Panlobular emphysema (H)     Severe episode of recurrent major depressive disorder, without psychotic features (H)     Abdominal pain     Tachycardia     Status post coronary angiogram       ALLERGIES  Gabapentin, Contrast dye, Escitalopram, Peanuts [nuts], Penicillins, Sulfa drugs, Tace [chlorotrianisene], Aspirin, Ibuprofen, and Strawberry    MEDICATIONS  Current Outpatient Medications   Medication Sig Dispense Refill     albuterol (VENTOLIN HFA) 108 (90 BASE) MCG/ACT Inhaler Inhale 2 puffs into the lungs every 6 hours as needed for shortness of breath / dyspnea or wheezing 18 g 3     alendronate (FOSAMAX) 70 MG tablet TAKE 1 TABLET BY MOUTH EVERY 7 DAYS 12 tablet 3     ALPRAZolam (XANAX) 0.25 MG tablet TAKE 1 TABLET (0.25 MG) BY MOUTH 2 TIMES DAILY AS NEEDED FOR ANXIETY 60 tablet 0     atorvastatin (LIPITOR) 10 MG tablet Take 1 tablet (10 mg) by mouth daily 90 tablet 3     baclofen (LIORESAL) 10 MG tablet TAKE TWO TABLETS BY MOUTH THREE TIMES A  tablet 5     BREO ELLIPTA 200-25 MCG/INH Inhaler Inhale 1 Dose into the lungs daily   12     carvedilol (COREG) 12.5 MG tablet Take 1 tablet (12.5 mg) by mouth 2 times daily (with meals) 180 tablet 1     cetirizine (ZYRTEC) 10 MG tablet Take 10 mg by mouth daily       [employer] FIBER GUMMIES 2.5 G CHEW Take 1 tablet by  mouth daily Reported on 5/8/2017       erythromycin (ROMYCIN) 5 MG/GM ophthalmic ointment Place 0.5 inches Into the left eye At Bedtime 1 g 0     fluticasone (FLONASE) 50 MCG/ACT nasal spray Spray 1-2 sprays in each nostril daily-+++NEED ANNUAL EXAM+++ 16 g 0     HYDROcodone-acetaminophen (NORCO)  MG per tablet Take 1 tablet by mouth every 4 hours as needed for severe pain Take up to 6 tablets a day 180 tablet 0     ipratropium - albuterol 0.5 mg/2.5 mg/3 mL (DUONEB) 0.5-2.5 (3) MG/3ML neb solution Take 1 vial (3 mLs) by nebulization every 6 hours as needed for shortness of breath / dyspnea or wheezing 30 vial 1     ketorolac (TORADOL) 10 MG tablet TAKE ONE TABLET BY MOUTH THREE TIMES A DAY AS NEEDED FOR MODERATE PAIN 15 tablet 0     lisinopril (ZESTRIL) 5 MG tablet Take 1 tablet (5 mg) by mouth daily 90 tablet 1     montelukast (SINGULAIR) 10 MG tablet TAKE ONE TABLET BY MOUTH AT BEDTIME 90 tablet 1     Multiple Vitamin CHEW Take 1 tablet by mouth daily        naloxone (NARCAN) 4 MG/0.1ML nasal spray Spray 1 spray (4 mg) into one nostril alternating nostrils once as needed for opioid reversal Every 2-3 minutes until patient responsive or EMS arrives 0.2 mL 0     nortriptyline (PAMELOR) 25 MG capsule TAKE 1 CAPSULE (25 MG) BY MOUTH TWO TIMES A DAY 60 capsule 1     ondansetron (ZOFRAN-ODT) 4 MG ODT tab TAKE 1-2 TABLETS BY MOUTH EVERY 8 HOURS AS NEEDED FOR NAUSEA 18 tablet 4     pantoprazole (PROTONIX) 40 MG EC tablet TAKE 1 TABLET BY MOUTH EVERY DAY 90 tablet 3     potassium chloride ER (K-TAB/KLOR-CON) 10 MEQ CR tablet TAKE 1 TABLET BY MOUTH THREE TIMES A  tablet 1     roflumilast (DALIRESP) 500 MCG TABS tablet Take 1 tablet (500 mcg) by mouth daily       SUMAtriptan (IMITREX) 25 MG tablet Take 1 tablet (25 mg) by mouth at onset of headache for migraine May repeat in 2 hours if needed: max 2/day; 9 tablet 11     tiotropium (SPIRIVA HANDIHALER) 18 MCG capsule USING THE HANDIHALER, INHALE THE CONTENTS OF  ONE CAPSULE BY MOUTH DAILY 90 capsule 3     torsemide (DEMADEX) 20 MG tablet Take 1 tablet (20 mg) by mouth daily 90 tablet 1     traMADol (ULTRAM) 50 MG tablet TAKE 1 TABLET(50 MG) BY MOUTH EVERY 6 HOURS AS NEEDED FOR SEVERE PAIN 20 tablet 0       ROS:  12-pt ROS is negative except for as noted above.    EXAM:  A limited exam was conducted via video.  Constitutional: Patient is pleasant, alert, in no distress. Normal body habitus, upright.  ENT: Membranes moist, no nasal discharge or bleeding gums. Normal head shape, no evidence of injury or laceration.  EYES: No scleral icterus, normal conjunctivae. EOM's appear intact. No observed jaundice  Neck: No evidence of thyromegaly.   Chest/Lungs: Appears to have normal respiratory effort. Normal breathing while talking. No audible wheezing, no cough  Equal chest wall expansion.   Cardiovascular: No obvious elevated JVP. No apparent pitting edema bilaterally.  Skin: No rashes or lesions appreciated on visualized skin, normal skin color.  Neurologic: Normal mentation. Normal arm motion bilaterally, no tremors. No evidence of focal defect.  Psychiatric: Appropriate affect. A&Ox3.  Calm demeanor        HISTORY OF PRESENT ILLNESS  This is a 65 year old female who follows with [facility].  She is a patient of Dr. Finney seen in our clinic for idiopathic cardiomyopathy, severe COPD, pulmonary hypertension, sinus tachycardia, hypertension, hyperlipidemia.    Ms Bender suffered heart failure (2015) Her ECHO showed LVEF 20-25%  Coronary angiography revealed mild nonobstructive CAD and she was diagnosed with idiopathic CM (likely takotsubo)  Fortunately on appropriate CV meds, her LVEF improved to 45-50%    A repeat ECHO (9/2019) showed LVEF 55-60%, normal RV function, mild MR/TR     Ms Bender does have an extensive history of asthma and severe COPD   Lung transplantation has been recommended.    Due to worsening shortness of breath and resting sinus tachycardia,  Diltiazem was started last year.  She does note that her O2 sats drop to 92% with exertion.    A heart monitor (6/2020) showed runs of nonsustained VT and paroxysmal supraventricular tachycardia.  Average heart rate of 120 bpm    ECHO (6/2020) showed LVEF 40-45%, normal RV function and no significant valvular pathology.  Her heart rate was 134 bpm during testing.  Her Diltiazem was changed to Coreg then.    She underwent a right and left heart cath (7/2020)  This showed minimal nonobstructive coronary artery disease.  She was noted to have severe right and left heart filling pressures (RA mean 21 mmHg, wedge 28 mmHg, LVEDp 25 mmHg)  It was felt that her pulmonary hypertension was partly due to her chronically elevated left heart filling pressures  Therefore, her lasix and Coreg were increased  Following, she noted improvement in her heart rate and peripheral edema.    She met with the lung transplantation team (2/11/21) and they recommended proceeding with an ECHO, 6-minute walk test and pulmonary rehab prior to transplantation.  However, Mrs Bender decided against proceeding with lung transplantation due to the risks outweighing the benefits.    Our visit today is for further review and labs    Ms Bender states that she does not want to proceed with lung transplantation due to the associated risks. She is still suffering form shingle side effects.  She admits to occasional anxiety episodes which ativan helps.  There have been two occasions where she noticed that her heart rate was > 110 bpm and she took an additional Coreg 3.125 mg prior to sleep.  She also self-lowered her Lisinopril to 2.5 mg a few months ago due to soft SBPs.  Recently, she has noted that her systolic BP has risen to 140 mmHg.  She denies any chest pain, orthopnea, or significant lightheadedness.  She states that ever since she started Torsemide, her leg swelling significantly improved.  She is taking a total of 15 mg of Prednisone a day for her  lung disease.  She admits to being overdue to see her pulmonologist      I reviewed her labs (3/16/21)  NT pro BNP: 203, sodium: 135  Potassium: 3.8  BUN: 7  Creatinine: 0.50 glucose: 144  Total Cholesterol: 175  Triglycerides: 450  HDL: 55  Unable to calculate LDL  She admitted to 8 hr fasting as opposed to 12 hour.    Patient reported vitals:  BP:130/80  Heart rate:114  Weight:185 (stable, down 5 lbs in 8 months)    IMPRESSION AND PLAN    Nonischemic Cardiomyopathy:  -LVEF 40-45%  -cath (7/30/20) showed minimal nonobstructive CAD and elevated right and left filling pressures  -on Coreg 12.5 mg BID, Torsemide 20mg/day  -improved leg edema with Torsemide  - not concerning for heart failure  -she will call with worsening leg edema/weight gain  -continue with Coreg 12.5 mg BID.  Consider increasing to 18.75 mg BID in future pending BP     HTN:  -On Coreg 12.5mg BID, Lisinopril 2.5 mg, Torsemide 20 mg  -recently her BP borderline elevated  -she will increase Lisinopril back to 5 mg/day  -return in 3-4 months for review    Severe COPD  -lung transplantation evaluation, but the patient has declined.  -she is interested in pulmonary rehab and will discuss this with her pulmonologist  -on Prednisone 15 mg/day      Pulmonary Hypertension  -mean RA pressure 21, wedge 28 (7/2020)  -severe COPD   -Torsemide 20 mg    Nonsustained VT  -on Coreg    Hyperlipidemia:  -on Atorvastatin 10 mg  -LDL not calculated due to high triglycerides  Admitted to not fasting a full 12 hours    Prediabetes  -recent blood fasting sugar 144  -referred back to PMD    The total time for the visit was 35 minutes which includes more than 50% spent in coordination of outpatient cardiovascular care as described.  The level of medical decision making during this visit was of moderate/severe complexity.  Thank you for allowing me to participate in their care.  I spent extensive time review outside records      Video-Visit Details  Type of service:   Video Visit  Video End Time (time video stopped): 14:10 pm  Originating Location (pt. Location): Home  Distant Location (provider location):  [facility]   Mode of Communication:  Video Conference via ELLE Garrett CNP [facility]

## 2021-03-22 ENCOUNTER — VIRTUAL VISIT (OUTPATIENT)
Dept: CARDIOLOGY | Facility: CLINIC | Age: 66
End: 2021-03-22
Payer: COMMERCIAL

## 2021-03-22 ENCOUNTER — DOCUMENTATION ONLY (OUTPATIENT)
Dept: OTHER | Facility: CLINIC | Age: 66
End: 2021-03-22

## 2021-03-22 DIAGNOSIS — I42.0 DILATED CARDIOMYOPATHY (H): ICD-10-CM

## 2021-03-22 PROCEDURE — 99214 OFFICE O/P EST MOD 30 MIN: CPT | Mod: 95 | Performed by: NURSE PRACTITIONER

## 2021-03-22 RX ORDER — HYDROCODONE BITARTRATE AND ACETAMINOPHEN 10; 325 MG/1; MG/1
1 TABLET ORAL EVERY 4 HOURS PRN
Qty: 180 TABLET | Refills: 0 | Status: SHIPPED | OUTPATIENT
Start: 2021-03-30 | End: 2021-04-27

## 2021-03-22 NOTE — TELEPHONE ENCOUNTER
Controlled Substance Refill Request for Norco  Problem List Complete:    No     PROVIDER TO CONSIDER COMPLETION OF PROBLEM LIST AND OVERVIEW/CONTROLLED SUBSTANCE AGREEMENT    Last Written Prescription Date:  2/23/21  Last Fill Quantity: 180,   # refills: 0    Last Office Visit with [facility] primary care provider: 11/16/20    Future Office visit:     Controlled substance agreement:   Encounter-Level CSA - 02/23/2016:    Controlled Substance Agreement - Scan on 2/24/2016  1:37 PM: [facility]ERY CONTROLLED SUBSTANCE AGREEMENT, 2/23/16     Patient-Level CSA:    There are no patient-level csa.         Last Urine Drug Screen:   Pain Drug SCR UR W RPTD Meds   Date Value Ref Range Status   12/17/2018 FINAL  Final     Comment:     (Note)  ====================================================================  [facility] COMP DRUG ANALYSIS,UR  ====================================================================  Test                             Result       Flag       Units        Drug Present   Hydrocodone                    1330                    ng/mg creat   Hydromorphone                  228                     ng/mg creat   Dihydrocodeine                 265                     ng/mg creat   Norhydrocodone                 1337                    ng/mg creat    Sources of hydrocodone include scheduled prescription    medications. Hydromorphone, dihydrocodeine and norhydrocodone are    expected metabolites of hydrocodone. Hydromorphone and    dihydrocodeine are also available as scheduled prescription    medications.   Baclofen                       PRESENT                               Acetaminophen                  PRESENT                              ====================================================================  Test                      Result    Flag   Units      Ref Range        Creatinine              43               mg/dL      >=20             ====================================================================  Declared Medications:  Medication list was not provided.  ====================================================================  For clinical consultation, please call [phone].  ====================================================================  Analysis performed by [employer] [place] [address]     , No results found for: COMDAT, No results found for: THC13, PCP13, COC13, MAMP13, OPI13, AMP13, BZO13, TCA13, MTD13, BAR13, OXY13, PPX13, BUP13    [email or web address]    Writer is not authorized to check  for provider

## 2021-03-22 NOTE — LETTER
"3/22/2021    Bandar Weinberg MD  [address] [facility] [address]    RE: Trisha Bender       Dear Colleague,    I had the pleasure of seeing Trisha Bender in the [facility].    Trisha Bender is a 65 year old female who is being evaluated via a billable video visit.  This visit is being conducted as a virtual visit due to the emphasis on mitigation of the COVID-19 virus pandemic. The clinician has decided that the risk of an in-office visit outweighs the benefit for this patient.     The patient has been notified of following:   \"This video visit will be conducted via a call between you and your physician/provider. We have found that certain health care needs can be provided without the need for an in-person physical exam.  This service lets us provide the care you need with a video conversation.  If a prescription is necessary we can send it directly to your pharmacy.  If lab work is needed we can place an order for that and you can then stop by our lab to have the test done at a later time.  If during the course of the call the physician/provider feels a video visit is not appropriate, you will not be charged for this service.\"     Patient has given verbal consent for Video visit? Yes        Video Start Time: 13:40 pm        Review Of Systems  Skin: bruising  Eyes: glasses  Ears/Nose/Throat: negative  Respiratory: Dyspnea on exertion-   Cardiovascular: tachycardia  Gastrointestinal: nausea and heartburn  Genitourinary: negative  Musculoskeletal: back pain  Neurologic: migraine headaches  Psychiatric: sleep disturbance and anxiety  Hematologic/Lymphatic/Immunologic: allergies  Endocrine: negative     S[place] CMA      I have reviewed and updated the patient's Past Medical History, Social History, Family History and Medication List.    PROBLEM LIST  Patient Active Problem List   Diagnosis     Essential hypertension with goal blood " pressure less than 130/80     Chronic migraine without aura without status migrainosus, not intractable     Esophageal reflux     Disorder of bone and cartilage     Family history of malignant neoplasm of gastrointestinal tract     Chronic airway obstruction (H)     Cystocele     Advanced directives, counseling/discussion     Family history of thyroid cancer - 1/2 brother     Anxiety     Mild persistent asthma without complication     Chronic low back pain     Hair loss     Lateral epicondylitis of right elbow     Dilated cardiomyopathy (H)     Right bundle branch block (RBBB)     CHF (congestive heart failure) (H)     SOB (shortness of breath)     Coronary artery disease involving native coronary artery of native heart without angina pectoris     Mixed hyperlipidemia     Controlled substance agreement signed     Moderate persistent asthma without complication     Hypokalemia     Mechanical low back pain     Panlobular emphysema (H)     Severe episode of recurrent major depressive disorder, without psychotic features (H)     Abdominal pain     Tachycardia     Status post coronary angiogram       ALLERGIES  Gabapentin, Contrast dye, Escitalopram, Peanuts [nuts], Penicillins, Sulfa drugs, Tace [chlorotrianisene], Aspirin, Ibuprofen, and Strawberry    MEDICATIONS  Current Outpatient Medications   Medication Sig Dispense Refill     albuterol (VENTOLIN HFA) 108 (90 BASE) MCG/ACT Inhaler Inhale 2 puffs into the lungs every 6 hours as needed for shortness of breath / dyspnea or wheezing 18 g 3     alendronate (FOSAMAX) 70 MG tablet TAKE 1 TABLET BY MOUTH EVERY 7 DAYS 12 tablet 3     ALPRAZolam (XANAX) 0.25 MG tablet TAKE 1 TABLET (0.25 MG) BY MOUTH 2 TIMES DAILY AS NEEDED FOR ANXIETY 60 tablet 0     atorvastatin (LIPITOR) 10 MG tablet Take 1 tablet (10 mg) by mouth daily 90 tablet 3     baclofen (LIORESAL) 10 MG tablet TAKE TWO TABLETS BY MOUTH THREE TIMES A  tablet 5     BREO ELLIPTA 200-25 MCG/INH Inhaler Inhale 1  Dose into the lungs daily   12     carvedilol (COREG) 12.5 MG tablet Take 1 tablet (12.5 mg) by mouth 2 times daily (with meals) 180 tablet 1     cetirizine (ZYRTEC) 10 MG tablet Take 10 mg by mouth daily       [employer] FIBER GUMMIES 2.5 G CHEW Take 1 tablet by mouth daily Reported on 5/8/2017       erythromycin (ROMYCIN) 5 MG/GM ophthalmic ointment Place 0.5 inches Into the left eye At Bedtime 1 g 0     fluticasone (FLONASE) 50 MCG/ACT nasal spray Spray 1-2 sprays in each nostril daily-+++NEED ANNUAL EXAM+++ 16 g 0     HYDROcodone-acetaminophen (NORCO)  MG per tablet Take 1 tablet by mouth every 4 hours as needed for severe pain Take up to 6 tablets a day 180 tablet 0     ipratropium - albuterol 0.5 mg/2.5 mg/3 mL (DUONEB) 0.5-2.5 (3) MG/3ML neb solution Take 1 vial (3 mLs) by nebulization every 6 hours as needed for shortness of breath / dyspnea or wheezing 30 vial 1     ketorolac (TORADOL) 10 MG tablet TAKE ONE TABLET BY MOUTH THREE TIMES A DAY AS NEEDED FOR MODERATE PAIN 15 tablet 0     lisinopril (ZESTRIL) 5 MG tablet Take 1 tablet (5 mg) by mouth daily 90 tablet 1     montelukast (SINGULAIR) 10 MG tablet TAKE ONE TABLET BY MOUTH AT BEDTIME 90 tablet 1     Multiple Vitamin CHEW Take 1 tablet by mouth daily        naloxone (NARCAN) 4 MG/0.1ML nasal spray Spray 1 spray (4 mg) into one nostril alternating nostrils once as needed for opioid reversal Every 2-3 minutes until patient responsive or EMS arrives 0.2 mL 0     nortriptyline (PAMELOR) 25 MG capsule TAKE 1 CAPSULE (25 MG) BY MOUTH TWO TIMES A DAY 60 capsule 1     ondansetron (ZOFRAN-ODT) 4 MG ODT tab TAKE 1-2 TABLETS BY MOUTH EVERY 8 HOURS AS NEEDED FOR NAUSEA 18 tablet 4     pantoprazole (PROTONIX) 40 MG EC tablet TAKE 1 TABLET BY MOUTH EVERY DAY 90 tablet 3     potassium chloride ER (K-TAB/KLOR-CON) 10 MEQ CR tablet TAKE 1 TABLET BY MOUTH THREE TIMES A  tablet 1     roflumilast (DALIRESP) 500 MCG TABS tablet Take 1 tablet (500 mcg) by mouth daily        SUMAtriptan (IMITREX) 25 MG tablet Take 1 tablet (25 mg) by mouth at onset of headache for migraine May repeat in 2 hours if needed: max 2/day; 9 tablet 11     tiotropium (SPIRIVA HANDIHALER) 18 MCG capsule USING THE HANDIHALER, INHALE THE CONTENTS OF ONE CAPSULE BY MOUTH DAILY 90 capsule 3     torsemide (DEMADEX) 20 MG tablet Take 1 tablet (20 mg) by mouth daily 90 tablet 1     traMADol (ULTRAM) 50 MG tablet TAKE 1 TABLET(50 MG) BY MOUTH EVERY 6 HOURS AS NEEDED FOR SEVERE PAIN 20 tablet 0       ROS:  12-pt ROS is negative except for as noted above.    EXAM:  A limited exam was conducted via video.  Constitutional: Patient is pleasant, alert, in no distress. Normal body habitus, upright.  ENT: Membranes moist, no nasal discharge or bleeding gums. Normal head shape, no evidence of injury or laceration.  EYES: No scleral icterus, normal conjunctivae. EOM's appear intact. No observed jaundice  Neck: No evidence of thyromegaly.   Chest/Lungs: Appears to have normal respiratory effort. Normal breathing while talking. No audible wheezing, no cough  Equal chest wall expansion.   Cardiovascular: No obvious elevated JVP. No apparent pitting edema bilaterally.  Skin: No rashes or lesions appreciated on visualized skin, normal skin color.  Neurologic: Normal mentation. Normal arm motion bilaterally, no tremors. No evidence of focal defect.  Psychiatric: Appropriate affect. A&Ox3.  Calm demeanor        HISTORY OF PRESENT ILLNESS  This is a 65 year old female who follows with [facility].  She is a patient of Dr. Finney seen in our clinic for idiopathic cardiomyopathy, severe COPD, pulmonary hypertension, sinus tachycardia, hypertension, hyperlipidemia.    Ms Bender suffered heart failure (2015) Her ECHO showed LVEF 20-25%  Coronary angiography revealed mild nonobstructive CAD and she was diagnosed with idiopathic CM (likely takotsubo)  Fortunately on appropriate CV meds, her LVEF improved to 45-50%    A  repeat ECHO (9/2019) showed LVEF 55-60%, normal RV function, mild MR/TR     Ms Bender does have an extensive history of asthma and severe COPD   Lung transplantation has been recommended.    Due to worsening shortness of breath and resting sinus tachycardia, Diltiazem was started last year.  She does note that her O2 sats drop to 92% with exertion.    A heart monitor (6/2020) showed runs of nonsustained VT and paroxysmal supraventricular tachycardia.  Average heart rate of 120 bpm    ECHO (6/2020) showed LVEF 40-45%, normal RV function and no significant valvular pathology.  Her heart rate was 134 bpm during testing.  Her Diltiazem was changed to Coreg then.    She underwent a right and left heart cath (7/2020)  This showed minimal nonobstructive coronary artery disease.  She was noted to have severe right and left heart filling pressures (RA mean 21 mmHg, wedge 28 mmHg, LVEDp 25 mmHg)  It was felt that her pulmonary hypertension was partly due to her chronically elevated left heart filling pressures  Therefore, her lasix and Coreg were increased  Following, she noted improvement in her heart rate and peripheral edema.    She met with the lung transplantation team (2/11/21) and they recommended proceeding with an ECHO, 6-minute walk test and pulmonary rehab prior to transplantation.  However, Mrs Bender decided against proceeding with lung transplantation due to the risks outweighing the benefits.    Our visit today is for further review and labs    Ms Bender states that she does not want to proceed with lung transplantation due to the associated risks. She is still suffering form shingle side effects.  She admits to occasional anxiety episodes which ativan helps.  There have been two occasions where she noticed that her heart rate was > 110 bpm and she took an additional Coreg 3.125 mg prior to sleep.  She also self-lowered her Lisinopril to 2.5 mg a few months ago due to soft SBPs.  Recently, she has noted that her  systolic BP has risen to 140 mmHg.  She denies any chest pain, orthopnea, or significant lightheadedness.  She states that ever since she started Torsemide, her leg swelling significantly improved.  She is taking a total of 15 mg of Prednisone a day for her lung disease.  She admits to being overdue to see her pulmonologist      I reviewed her labs (3/16/21)  NT pro BNP: 203, sodium: 135  Potassium: 3.8  BUN: 7  Creatinine: 0.50 glucose: 144  Total Cholesterol: 175  Triglycerides: 450  HDL: 55  Unable to calculate LDL  She admitted to 8 hr fasting as opposed to 12 hour.    Patient reported vitals:  BP:130/80  Heart rate:114  Weight:185 (stable, down 5 lbs in 8 months)    IMPRESSION AND PLAN    Nonischemic Cardiomyopathy:  -LVEF 40-45%  -cath (7/30/20) showed minimal nonobstructive CAD and elevated right and left filling pressures  -on Coreg 12.5 mg BID, Torsemide 20mg/day  -improved leg edema with Torsemide  - not concerning for heart failure  -she will call with worsening leg edema/weight gain  -continue with Coreg 12.5 mg BID.  Consider increasing to 18.75 mg BID in future pending BP     HTN:  -On Coreg 12.5mg BID, Lisinopril 2.5 mg, Torsemide 20 mg  -recently her BP borderline elevated  -she will increase Lisinopril back to 5 mg/day  -return in 3-4 months for review    Severe COPD  -lung transplantation evaluation, but the patient has declined.  -she is interested in pulmonary rehab and will discuss this with her pulmonologist  -on Prednisone 15 mg/day      Pulmonary Hypertension  -mean RA pressure 21, wedge 28 (7/2020)  -severe COPD   -Torsemide 20 mg    Nonsustained VT  -on Coreg    Hyperlipidemia:  -on Atorvastatin 10 mg  -LDL not calculated due to high triglycerides  Admitted to not fasting a full 12 hours    Prediabetes  -recent blood fasting sugar 144  -referred back to PMD    The total time for the visit was 35 minutes which includes more than 50% spent in coordination of outpatient cardiovascular  care as described.  The level of medical decision making during this visit was of moderate/severe complexity.  Thank you for allowing me to participate in their care.  I spent extensive time review outside records      Video-Visit Details  Type of service:  Video Visit  Video End Time (time video stopped): 14:10 pm  Originating Location (pt. Location): Home  Distant Location (provider location):  [facility]   Mode of Communication:  Video Conference via [employer]    ELLE Medina, BERTA  [facility]

## 2021-03-23 ENCOUNTER — MYC MEDICAL ADVICE (OUTPATIENT)
Dept: INTERNAL MEDICINE | Facility: CLINIC | Age: 66
End: 2021-03-23

## 2021-03-23 DIAGNOSIS — M54.59 MECHANICAL LOW BACK PAIN: ICD-10-CM

## 2021-03-23 DIAGNOSIS — G89.29 CHRONIC BILATERAL LOW BACK PAIN, UNSPECIFIED WHETHER SCIATICA PRESENT: ICD-10-CM

## 2021-03-23 DIAGNOSIS — M54.50 CHRONIC BILATERAL LOW BACK PAIN, UNSPECIFIED WHETHER SCIATICA PRESENT: ICD-10-CM

## 2021-03-24 ENCOUNTER — MYC MEDICAL ADVICE (OUTPATIENT)
Dept: INTERNAL MEDICINE | Facility: CLINIC | Age: 66
End: 2021-03-24

## 2021-03-24 RX ORDER — TRAMADOL HYDROCHLORIDE 50 MG/1
50 TABLET ORAL DAILY PRN
Qty: 10 TABLET | Refills: 0 | Status: SHIPPED | OUTPATIENT
Start: 2021-03-24 | End: 2021-04-22

## 2021-03-24 RX ORDER — TRAMADOL HYDROCHLORIDE 50 MG/1
TABLET ORAL
Qty: 20 TABLET | Refills: 0 | Status: CANCELLED | OUTPATIENT
Start: 2021-03-24

## 2021-03-24 RX ORDER — HYDROCODONE BITARTRATE AND ACETAMINOPHEN 10; 325 MG/1; MG/1
1 TABLET ORAL EVERY 4 HOURS PRN
Qty: 180 TABLET | Refills: 0 | Status: CANCELLED | OUTPATIENT
Start: 2021-03-30

## 2021-03-24 NOTE — TELEPHONE ENCOUNTER
I see that the patient is taking up to 6 pills of hydrocodone a day and as needed tramadol which was given 20 pills on 3/19/2021.       checked.  Hydrocodone was filled by patient on 2/28/2021.  It is only  25 days of having pain medication and she should be easily having another 5 days of pills left.  So I cannot approve early refill on hydrocodone and this needs to wait for the primary care.    If she is running out of pain medications sooner, meaning she may be taking more than recommended.    Advised her to reduce the frequency of hydrocodone and tramadol so she does not have to worry about withdrawal symptoms.    Margarito Hahn MD

## 2021-03-24 NOTE — TELEPHONE ENCOUNTER
Patient taking more than recommended dose of narcotic pain medication is clearly a violation of pain contract.  Unfortunately early refill of  hydrocodone cannot be done.  That is the pain medication policy.     She has to discuss with primary care about managing her controlled substance medication or in fact getting it down as she managed with less pills and did not have withdrawal.  Also I am concerned about unintentional overdose per patient history.  Would advise seeing the PCP in the clinic sooner to discuss and cut back the medication if possible.  I am also concerned if she is able to manage her medication or she needs help managing the medication in future to avoid this happening again.    If she develops withdrawal symptoms, unfortunately she needs to go to emergency room.  I will give only 10 pills of tramadol to help in the meantime.    Margarito Hahn MD

## 2021-03-24 NOTE — TELEPHONE ENCOUNTER
Call to patient, per patient states that when she started taking Narco this month, she took too many at the beginning of the month, lost count of how many she was taking daily for 1 week. States that it was most likely more than 6/daily, states that her  usually manages her medications but has been working a lot so she has had to mange them herself. Per patient normally takes 6/daily but started cutting down to 3 per day due to knowing she would be short this month. Is now completely out of medication. Per patient when this happened in the past the Tramadol has been prescribed to bridge her over until she could refill the Narco. Patient states she was taking Tramadol 1 tablet x4 times daily. Per patient does not have any tramadol left either. Please advise,     Thank you

## 2021-03-24 NOTE — TELEPHONE ENCOUNTER
RN, please call her and clarify what is going on.  I do not understand what she means by mismanaging her medications.  Was she taking more hydrocodone than she is recommended to take?   Is there any concern for unintentional overdose which is risky at her age especially having 180 pills of hydrocodone in 1 month.  How many pills of hydrocodone/ tramadol she has now?    Margarito Hahn MD

## 2021-03-27 ENCOUNTER — NURSE TRIAGE (OUTPATIENT)
Dept: NURSING | Facility: CLINIC | Age: 66
End: 2021-03-27

## 2021-03-27 ENCOUNTER — HOSPITAL ENCOUNTER (EMERGENCY)
Facility: CLINIC | Age: 66
Discharge: HOME | End: 2021-03-27
Attending: EMERGENCY MEDICINE | Admitting: EMERGENCY MEDICINE
Payer: COMMERCIAL

## 2021-03-27 VITALS
SYSTOLIC BLOOD PRESSURE: 115 MMHG | OXYGEN SATURATION: 96 % | RESPIRATION RATE: 21 BRPM | DIASTOLIC BLOOD PRESSURE: 78 MMHG | TEMPERATURE: 97.8 F | HEART RATE: 118 BPM

## 2021-03-27 DIAGNOSIS — G89.29 CHRONIC BILATERAL LOW BACK PAIN WITHOUT SCIATICA: ICD-10-CM

## 2021-03-27 DIAGNOSIS — M54.50 CHRONIC BILATERAL LOW BACK PAIN WITHOUT SCIATICA: ICD-10-CM

## 2021-03-27 DIAGNOSIS — R19.7 DIARRHEA OF PRESUMED INFECTIOUS ORIGIN: ICD-10-CM

## 2021-03-27 DIAGNOSIS — F11.20 CONTINUOUS OPIOID DEPENDENCE (H): ICD-10-CM

## 2021-03-27 LAB
ALBUMIN SERPL-MCNC: 3.1 G/DL (ref 3.4–5)
ALP SERPL-CCNC: 46 U/L (ref 40–150)
ALT SERPL W P-5'-P-CCNC: 23 U/L (ref 0–50)
ANION GAP SERPL CALCULATED.3IONS-SCNC: 12 MMOL/L (ref 3–14)
AST SERPL W P-5'-P-CCNC: 23 U/L (ref 0–45)
BASOPHILS # BLD AUTO: 0.1 10E9/L (ref 0–0.2)
BASOPHILS NFR BLD AUTO: 0.3 %
BILIRUB SERPL-MCNC: 0.6 MG/DL (ref 0.2–1.3)
BUN SERPL-MCNC: 5 MG/DL (ref 7–30)
CALCIUM SERPL-MCNC: 8.6 MG/DL (ref 8.5–10.1)
CHLORIDE SERPL-SCNC: 94 MMOL/L (ref 94–109)
CO2 SERPL-SCNC: 24 MMOL/L (ref 20–32)
CREAT SERPL-MCNC: 0.44 MG/DL (ref 0.52–1.04)
DIFFERENTIAL METHOD BLD: ABNORMAL
EOSINOPHIL # BLD AUTO: 0 10E9/L (ref 0–0.7)
EOSINOPHIL NFR BLD AUTO: 0.2 %
ERYTHROCYTE [DISTWIDTH] IN BLOOD BY AUTOMATED COUNT: 14.2 % (ref 10–15)
GASTROCULT GAST QL: ABNORMAL
GFR SERPL CREATININE-BSD FRML MDRD: >90 ML/MIN/{1.73_M2}
GLUCOSE SERPL-MCNC: 83 MG/DL (ref 70–99)
HCT VFR BLD AUTO: 42.5 % (ref 35–47)
HEMOCCULT STL QL: POSITIVE
HGB BLD-MCNC: 13.6 G/DL (ref 11.7–15.7)
IMM GRANULOCYTES # BLD: 0.3 10E9/L (ref 0–0.4)
IMM GRANULOCYTES NFR BLD: 1.9 %
LYMPHOCYTES # BLD AUTO: 1.9 10E9/L (ref 0.8–5.3)
LYMPHOCYTES NFR BLD AUTO: 11 %
MCH RBC QN AUTO: 30 PG (ref 26.5–33)
MCHC RBC AUTO-ENTMCNC: 32 G/DL (ref 31.5–36.5)
MCV RBC AUTO: 94 FL (ref 78–100)
MONOCYTES # BLD AUTO: 1.2 10E9/L (ref 0–1.3)
MONOCYTES NFR BLD AUTO: 6.7 %
NEUTROPHILS # BLD AUTO: 14 10E9/L (ref 1.6–8.3)
NEUTROPHILS NFR BLD AUTO: 79.9 %
NRBC # BLD AUTO: 0 10*3/UL
NRBC BLD AUTO-RTO: 0 /100
PH GAST: ABNORMAL PH
PLATELET # BLD AUTO: 428 10E9/L (ref 150–450)
POTASSIUM SERPL-SCNC: 4.2 MMOL/L (ref 3.4–5.3)
PROT SERPL-MCNC: 6.5 G/DL (ref 6.8–8.8)
RBC # BLD AUTO: 4.53 10E12/L (ref 3.8–5.2)
SODIUM SERPL-SCNC: 130 MMOL/L (ref 133–144)
WBC # BLD AUTO: 17.6 10E9/L (ref 4–11)

## 2021-03-27 PROCEDURE — 258N000003 HC RX IP 258 OP 636: Performed by: EMERGENCY MEDICINE

## 2021-03-27 PROCEDURE — 80053 COMPREHEN METABOLIC PANEL: CPT | Performed by: EMERGENCY MEDICINE

## 2021-03-27 PROCEDURE — 82271 OCCULT BLOOD OTHER SOURCES: CPT | Performed by: EMERGENCY MEDICINE

## 2021-03-27 PROCEDURE — 82272 OCCULT BLD FECES 1-3 TESTS: CPT | Performed by: EMERGENCY MEDICINE

## 2021-03-27 PROCEDURE — 85025 COMPLETE CBC W/AUTO DIFF WBC: CPT | Performed by: EMERGENCY MEDICINE

## 2021-03-27 PROCEDURE — 99284 EMERGENCY DEPT VISIT MOD MDM: CPT | Mod: 25

## 2021-03-27 PROCEDURE — 250N000013 HC RX MED GY IP 250 OP 250 PS 637: Performed by: EMERGENCY MEDICINE

## 2021-03-27 PROCEDURE — 96360 HYDRATION IV INFUSION INIT: CPT

## 2021-03-27 RX ORDER — OXYCODONE AND ACETAMINOPHEN 5; 325 MG/1; MG/1
1 TABLET ORAL ONCE
Status: COMPLETED | OUTPATIENT
Start: 2021-03-27 | End: 2021-03-27

## 2021-03-27 RX ORDER — SODIUM CHLORIDE 9 MG/ML
INJECTION, SOLUTION INTRAVENOUS CONTINUOUS
Status: DISCONTINUED | OUTPATIENT
Start: 2021-03-27 | End: 2021-03-27 | Stop reason: HOSPADM

## 2021-03-27 RX ADMIN — OXYCODONE HYDROCHLORIDE AND ACETAMINOPHEN 1 TABLET: 5; 325 TABLET ORAL at 20:34

## 2021-03-27 RX ADMIN — SODIUM CHLORIDE 1000 ML: 9 INJECTION, SOLUTION INTRAVENOUS at 17:26

## 2021-03-27 RX ADMIN — OXYCODONE HYDROCHLORIDE AND ACETAMINOPHEN 1 TABLET: 5; 325 TABLET ORAL at 17:26

## 2021-03-27 ASSESSMENT — ENCOUNTER SYMPTOMS
BACK PAIN: 1
MYALGIAS: 0
FEVER: 0
DIARRHEA: 1

## 2021-03-27 NOTE — ED TRIAGE NOTES
Chronic back pain taking Vicodin for the pain. Ran out of Vicodin but unable to pickup a new prescription until 03/30/21.  Last dose on Thursday.  Having diarrhea for the past 3 days and nausea, taking zofran for the nausea.  Back pain 9/10 currently and not able to sleep.

## 2021-03-27 NOTE — ED PROVIDER NOTES
History   Chief Complaint:  Diarrhea       HPI   Trisha Bender is a 65 year old female with history of CHF, CAD,and esophogeal reflux who presents with diarrhea. The patient says that she has been off Vicodin that she takes for her back pain since 3/25. She says that she will not be able to  a new prescription until 3/30. The patient says that she saw another doctor, but was only given 10 pills of Tramadol, which she says does not help her pain. The patient also says that she has been having diarrhea and nausea for the past 3 days, with blood in her stool since yesterday. She says that her last bowel movement was earlier today and she noticed more blood then. She endorses some back pain, but notes that this is chronic and worse due to being out of pain medications, rating it has a 9/10. She denies any fever or body aches. The patient says that she has had diarrhea in the past when she was off opioids. The patient admits to overusing her Vicodin this month and none of the doctors she has seen are willing to fill her prescription.         Review of Systems   Constitutional: Negative for fever.   Gastrointestinal: Positive for diarrhea.   Musculoskeletal: Positive for back pain. Negative for myalgias.   All other systems reviewed and are negative.      Allergies:  Gabapentin  Contrast Dye  Escitalopram  Peanuts [Nuts]  Penicillins  Sulfa Drugs  Tace [Chlorotrianisene]  Aspirin  Ibuprofen  Strawberry    Medications:  Ultram  Demadex  Spiriva  Imitrex  Deliresp  K-tab  Protonix  Zofran  Pamelor  Narcan  Lisinopril  Toradol  Duoneb  Flonase  Romycin  Zyrtec  Coreg  Breo Ellipta  Lioresal  Lipitor  Xanax  Fosamax  Albuterol     Past Medical History:    Takotsubo cardiomyopathy  Osteopenia   Asthma  Migraine  Hyperlipidemia   Hypertension   Esophageal reflux  Endometriosis  COPD  CHF  CAD  Anxiety   Disorder of bone and cartilage   Cystocele   Lateral epicondylitis   right  bundle branch block    Panlobular  emphysema      Past Surgical History:    [place] teeth extraction  Tonsillectomy, adenoidectomy combined   Endometriosis resection, lysis of adhesions  Endometriosis ablation  Inject lumbar/ sacral single  EGD  CV right heart cath  CV left ventriculogram  CV left heart cath  CV coronary angiogram  Colonoscopy  Cholecystectomy     Family History:    Diabetes  AAA  CAD  Heart disease  Pancreatitis   Colorectal cancer  Colon cancer    Social History:  Patient presents to the ED with her .     Physical Exam     Patient Vitals for the past 24 hrs:   BP Temp Temp src Pulse Resp SpO2   03/27/21 1800 115/78 -- -- 118 21 96 %   03/27/21 1730 (!) 123/94 -- -- 124 18 96 %   03/27/21 1715 -- -- -- 123 24 96 %   03/27/21 1700 (!) 144/128 -- -- 121 15 96 %   03/27/21 1258 (!) 161/104 97.8  F (36.6  C) Oral 118 20 97 %       Physical Exam  GEN: alert    HEAD: atraumatic    EYES: pupils reactive, extraocular muscles intact, conjunctivae normal    ENT: oral pharynx clear; nose clear. Dry oral mucosa     NECK: Normal ROM, trachea midline    RESPIRATORY: no tachypnea, normal work of breathing, breath sounds clear to auscultation    CVS: normal S1/S2, no murmurs/rubs/gallops    ABDOMEN: soft, nontender, no masses or organomegaly, no rebound, positive bowel sounds  Rectal: non thrombosed external hemorrhoids, slightly bloody   Digital exam no blood in the rectal vault     MUSCULOSKELETAL: no deformities. No bony tenderness     SKIN: warm and dry, no acute rashes or ulceration, no erythema     NEURO: GCS 15, cranial nerves intact.  Motor and sensory- no focal deficits.     LYMPH: no lymphadenopathy    Emergency Department Course     Laboratory:    Stool- occult blood: positive (A)  CBC: . (H), HGB 13.6,   CMP:  (L), BUN 5 (L), creatinine 0.44 (L), albumin 3.1 (L), protein total 6.5 (L) o/w WNL     Procedures    Emergency Department Course:    Reviewed:  1535 I reviewed the patient's nursing notes, vitals, past  medical records, Care Everywhere.        Assessments:  1636 I performed an exam of the patient as documented above.   1910 Patient rechecked and updated.    2046 Patient rechecked and updated.      Interventions:  1726 NS 1 L IV   Percocet 1 tablet Oral   2034 Percocet 1 tablet Oral     Disposition:  The patient was discharged to home.       Impression & Plan                Medical Decision Making:  Trisha Bender is a 65 year old female who presents to the emergency department today for evaluation of back pain.  She states that she is run out of her Norco that is prescribed by her primary doctor who is on vacation and states that the on-call physician will not dispense her next monthly refill of Norco which consists of 180 tablets which are 10 mg each not 5 mg.  She admittedly states that she was careless this month and was using more than prescribed.  She was told by the on-call physician that she needs to taper down on the amount that she is utilizing.  Her  is concerned that because her next monthly refill 480 tablets is not due until April 2 that she is going to be having withdrawal symptoms.  He would like me to do something for her in this regard.  I have recommended that she follow-up with her PCP office on Monday to discuss the matter.    Regarding her diarrhea that is been partially bright red blood per rectum as well, she states that she thinks she is in withdrawal which is causing the diarrhea and that she has had hematochezia in the past due to hemorrhoids.  However she is due for screening colonoscopy as she had colonic polyps 3 years ago when she had her colonoscopy.  She is not made an appointment for that yet.  Examination does suggest that this may be coming from her nonthrombosed external hemorrhoids however it could be multifactorial.  Her white blood count is elevated this suggest presumed infection but she was unable to pprovide a stool specimen here despite being here for several  hours and having a oral fluid challenge.    Therefore I am sending her home with specimen cups and advising her that she make an appointment with her GI doctor to get the colonoscopy done on an urgent basis and to let them know that she is having bright red blood per rectum and has a history of polyps.  This way can be scheduled more urgently rather than being put off for 6 weeks.  I have also recommended that she bring back a stool collection cups when she is able to provide a specimen.  If she develops worsening symptoms of diarrhea with bright red blood she should return here.  There is no localized abdominal tenderness that would suggest a bleeding ulcer at this time.  And again she was here for several hours and not able to have any more bowel movements.        Diagnosis:    ICD-10-CM    1. Diarrhea of presumed infectious origin  R19.7 Enteric Bacteria and Virus Panel by HOLLIS Stool   2. Continuous opioid dependence (H)  F11.20    3. Chronic bilateral low back pain without sciatica  M54.5     G89.29        Discharge Medications:  New Prescriptions    No medications on file       Scribe Disclosure:  I, Bladimir Jones, am serving as a scribe at 3:36 PM on 3/27/2021 to document services personally performed by Jonatan Martinez MD based on my observations and the provider's statements to me.          Jonatan Martinez MD  03/27/21 3840

## 2021-03-27 NOTE — TELEPHONE ENCOUNTER
Heart rate is 127    Also has nausea and diarrhea    Has had 8 stools in the past 24 hours    States that the toilet water is also pink    The stools are loose    She is also taking zofran for her nausea    The diarrhea started about 3-4 days ago.    States that she is able to hydrate    Denies any dizziness, dry lips, abdominal pain or cramping    Advised the patient that she really should be seen today due to the severity of her symptoms.    COVID 19 Nurse Triage Plan/Patient Instructions    Please be aware that novel coronavirus (COVID-19) may be circulating in the community. If you develop symptoms such as fever, cough, or SOB or if you have concerns about the presence of another infection including coronavirus (COVID-19), please contact your health care provider or visit [email or web address].     Disposition/Instructions    In-Person Visit with provider recommended. Reference Visit Selection Guide.    Thank you for taking steps to prevent the spread of this virus.  o Limit your contact with others.  o Wear a simple mask to cover your cough.  o Wash your hands well and often.    Resources    [facility]: About COVID-19: [email or web address]    CDC: What to Do If You're Sick: www.cdc.gov/coronavirus/2019-ncov/about/steps-when-sick.html    CDC: Ending Home Isolation: www.cdc.gov/coronavirus/2019-ncov/hcp/disposition-in-home-patients.html     CDC: Caring for Someone: www.cdc.gov/coronavirus/2019-ncov/if-you-are-sick/care-for-someone.html     [facility]: Interim Guidance for Hospital Discharge to Home: www.health.[facility].mn.us/diseases/coronavirus/hcp/hospdischarge.pdf    [facility] clinical trials (COVID-19 research studies): clinicalaffairs.[email or web address]/um-clinical-trials     Below are the COVID-19 hotlines at the [place] Department of Health ([facility]). Interpreters are available.   o For health questions: Call 148-631-7805 or 1-610.210.4559 (7 a.m. to 7 p.m.)  o For questions about schools  and childcare: Call 939-400-7212 or 1-812.239.5182 (7 a.m. to 7 p.m.)       Meghan Fay RN  [place] [facility]  7:31 AM  3/27/2021      Additional Information    Negative: Shock suspected (e.g., cold/pale/clammy skin, too weak to stand, low BP, rapid pulse)    Negative: Difficult to awaken or acting confused (e.g., disoriented, slurred speech)    Negative: Sounds like a life-threatening emergency to the triager    Negative: [1] SEVERE abdominal pain (e.g., excruciating) AND [2] present > 1 hour    Negative: [1] SEVERE abdominal pain AND [2] age > 60    Negative: [1] Blood in the stool AND [2] moderate or large amount of blood    Negative: Black or tarry bowel movements  (Exception: chronic-unchanged  black-grey bowel movements AND is taking iron pills or Pepto-bismol)    Negative: [1] Drinking very little AND [2] dehydration suspected (e.g., no urine > 12 hours, very dry mouth, very lightheaded)    Negative: Patient sounds very sick or weak to the triager    [1] SEVERE diarrhea (e.g., 7 or more times / day more than normal) AND [2] age > 60 years    Protocols used: DIARRHEA-A-AH

## 2021-03-28 NOTE — DISCHARGE INSTRUCTIONS
Bring back stool as soon as possible.    Discharge Instructions  Adult Diarrhea    You have been seen today for diarrhea. This is usually caused by a virus, but some bacteria, parasites, medicines or other medical conditions can cause similar symptoms. At this time your doctor does not find that your diarrhea is a sign of anything dangerous or life-threatening. However, sometimes the signs of serious illness do not show up right away. If you have new or worse symptoms, you may need to be seen again in the Emergency Department or by your primary doctor.     Return to the Emergency Department if:  You feel you are getting dehydrated, such as being very thirsty, not urinating at least every 8-12 hours, or feeling faint or lightheaded.   You develop a new fever, or your fever continues for more than 2 days.   You have belly pain that seems worse than cramps, is in one spot, or is getting worse over time.   You have blood in your stool or your stool becomes black.  (Remember that if you take Pepto-Bismol , this will turn your stool black).   You feel very weak.  You are not starting to improve within 24 hours of your visit here.    What can I do to help myself?  The most important thing to do is to drink clear liquids.   It is best to have only small, frequent sips of liquids. Drinking too much at once may cause more diarrhea. You should also replace minerals, sodium and potassium lost with diarrhea. Pedialyte  and sports drinks can help you replace these minerals. You can also drink clear liquids such as water, weak tea, apple juice, and 7-Up . Avoid acid liquids (orange), caffeine (coffee) or alcohol. Milk products will make the diarrhea worse.   Eat only bland foods. Soda crackers, toast, plain noodles, gelatin, applesauce and bananas are good first choices. Avoid foods that have acid, are spicy, fatty or fibrous (such as meats, coarse grains, vegetables). You may start eating these foods again in about 3 days when  "you are better.   Sometimes treatment includes prescription medicine to prevent diarrhea. If your doctor prescribes these for you, take them as directed.   Nonprescription medicine is available for the treatment of diarrhea and can be very effective. If you use it, make sure you use the dose recommended on the package. Check with your healthcare provider before you use any medicine for diarrhea.   Don t take ibuprofen, or other nonsteroidal anti-inflammatory medicines without checking with your healthcare provider.   Probiotics: If you have been given an antibiotic, you may want to also take a probiotic pill or eat yogurt with live cultures. Probiotics have \"good bacteria\" to help your intestines stay healthy. Studies have shown that probiotics help prevent diarrhea and other intestine problems (including C. diff infection) when you take antibiotics. You can buy these without a prescription in the pharmacy section of the store.   If you were given a prescription for medicine here today, be sure to read all of the information (including the package insert) that comes with your prescription.  This will include important information about the medicine, its side effects, and any warnings that you need to know about.  The pharmacist who fills the prescription can provide more information and answer questions you may have about the medicine.  If you have questions or concerns that the pharmacist cannot address, please call or return to the Emergency Department.   Opioid Medication Information    Pain medications are among the most commonly prescribed medicines, so we are including this information for all our patients. If you did not receive pain medication or get a prescription for pain medicine, you can ignore it.     You may have been given a prescription for an opioid (narcotic) pain medicine and/or have received a pain medicine while here in the Emergency Department. These medicines can make you drowsy or impaired. " You must not drive, operate dangerous equipment, or engage in any other dangerous activities while taking these medications. If you drive while taking these medications, you could be arrested for DUI, or driving under the influence. Do not drink any alcohol while you are taking these medications.     Opioid pain medications can cause addiction. If you have a history of chemical dependency of any type, you are at a higher risk of becoming addicted to pain medications.  Only take these prescribed medications to treat your pain when all other options have been tried. Take it for as short a time and as few doses as possible. Store your pain pills in a secure place, as they are frequently stolen and provide a dangerous opportunity for children or visitors in your house to start abusing these powerful medications. We will not replace any lost or stolen medicine.  As soon as your pain is better, you should flush all your remaining medication.     Many prescription pain medications contain Tylenol  (acetaminophen), including Vicodin , Tylenol #3 , Norco , Lortab , and Percocet .  You should not take any extra pills of Tylenol  if you are using these prescription medications or you can get very sick.  Do not ever take more than 3000 mg of acetaminophen in any 24 hour period.    All opioids tend to cause constipation. Drink plenty of water and eat foods that have a lot of fiber, such as fruits, vegetables, prune juice, apple juice and high fiber cereal.  Take a laxative if you don t move your bowels at least every other day. Miralax , Milk of Magnesia, Colace , or Senna  can be used to keep you regular.      Remember that you can always come back to the Emergency Department if you are not able to see your regular doctor in the amount of time listed above, if you get any new symptoms, or if there is anything that worries you.

## 2021-03-29 ENCOUNTER — HOSPITAL ENCOUNTER (OUTPATIENT)
Dept: LAB | Facility: CLINIC | Age: 66
Discharge: HOME | End: 2021-03-29
Attending: EMERGENCY MEDICINE | Admitting: EMERGENCY MEDICINE
Payer: COMMERCIAL

## 2021-03-29 DIAGNOSIS — R19.7 DIARRHEA OF PRESUMED INFECTIOUS ORIGIN: ICD-10-CM

## 2021-03-29 PROCEDURE — 87506 IADNA-DNA/RNA PROBE TQ 6-11: CPT | Performed by: EMERGENCY MEDICINE

## 2021-03-30 NOTE — RESULT ENCOUNTER NOTE
Final Enteric Bacteria and Virus Panel by HOLLIS Stool is NEGATIVE for Campylobacter group, Salmonella species, Shigella species, Shiga toxin 1 gene, Shiga toxin 2 gene, Vibrio group,  Yersinia enterocolitica,  Rotavirus A,  and Norovirus I and II.  No treatment or change in treatment per [facility] Lab Result Enteric Bacteria and Virus Panel protocol.

## 2021-04-07 DIAGNOSIS — Z79.899 CHRONIC PRESCRIPTION BENZODIAZEPINE USE: ICD-10-CM

## 2021-04-07 DIAGNOSIS — F41.9 ANXIETY: ICD-10-CM

## 2021-04-07 RX ORDER — ALPRAZOLAM 0.25 MG
0.25 TABLET ORAL 2 TIMES DAILY PRN
Qty: 60 TABLET | Refills: 0 | Status: SHIPPED | OUTPATIENT
Start: 2021-04-07 | End: 2021-05-12

## 2021-04-07 NOTE — TELEPHONE ENCOUNTER
ALPRAZolam (XANAX) 0.25 MG tablet 60 tablet 0 3/5/2021  No   Sig - Route: TAKE 1 TABLET (0.25 MG) BY MOUTH 2 TIMES DAILY AS NEEDED FOR ANXIETY - Oral   Sent to pharmacy as: ALPRAZolam 0.25 MG Oral Tablet (XANAX)       Last office visit: 1/7/2020 in person  11/16/2020 - VV           Encounter-Level CSA - 02/23/2016:    Controlled Substance Agreement - Scan on 2/24/2016  1:37 PM: [place] CONTROLLED SUBSTANCE AGREEMENT, 2/23/16     Patient-Level CSA:    There are no patient-level csa.       Routing refill request to provider for review/approval because:  Drug not on the [facility] refill protocol       Tori Parks RN, BSN  [facility]

## 2021-04-08 DIAGNOSIS — B02.29 POSTHERPETIC NEURALGIA: ICD-10-CM

## 2021-04-09 RX ORDER — NORTRIPTYLINE HCL 25 MG
CAPSULE ORAL
Qty: 60 CAPSULE | Refills: 5 | Status: SHIPPED | OUTPATIENT
Start: 2021-04-09 | End: 2021-10-06

## 2021-04-09 NOTE — TELEPHONE ENCOUNTER
Pending Prescriptions:                       Disp   Refills    nortriptyline (PAMELOR) 25 MG capsule [Pha*60 cap*5        Sig: TAKE 1 CAPSULE (25 MG) BY MOUTH TWO TIMES A DAY    Routing refill request to provider for review/approval because:  Drug interaction warning

## 2021-04-20 DIAGNOSIS — M54.59 MECHANICAL LOW BACK PAIN: ICD-10-CM

## 2021-04-20 DIAGNOSIS — M54.50 CHRONIC BILATERAL LOW BACK PAIN, UNSPECIFIED WHETHER SCIATICA PRESENT: ICD-10-CM

## 2021-04-20 DIAGNOSIS — G89.29 CHRONIC BILATERAL LOW BACK PAIN, UNSPECIFIED WHETHER SCIATICA PRESENT: ICD-10-CM

## 2021-04-20 RX ORDER — TRAMADOL HYDROCHLORIDE 50 MG/1
50 TABLET ORAL DAILY PRN
Qty: 10 TABLET | Refills: 0 | Status: CANCELLED | OUTPATIENT
Start: 2021-04-20

## 2021-04-20 NOTE — TELEPHONE ENCOUNTER
Routing refill request to provider for review/approval because:  Drug not on the [facility], [facility] or [facility] refill protocol or controlled substance    Yandy B - Registered Nurse  [facility]  Acute and Diagnostic Services

## 2021-04-21 ENCOUNTER — MYC MEDICAL ADVICE (OUTPATIENT)
Dept: INTERNAL MEDICINE | Facility: CLINIC | Age: 66
End: 2021-04-21

## 2021-04-22 ENCOUNTER — MYC REFILL (OUTPATIENT)
Dept: INTERNAL MEDICINE | Facility: CLINIC | Age: 66
End: 2021-04-22

## 2021-04-22 DIAGNOSIS — M54.50 CHRONIC BILATERAL LOW BACK PAIN, UNSPECIFIED WHETHER SCIATICA PRESENT: ICD-10-CM

## 2021-04-22 DIAGNOSIS — G89.29 CHRONIC BILATERAL LOW BACK PAIN, UNSPECIFIED WHETHER SCIATICA PRESENT: ICD-10-CM

## 2021-04-22 RX ORDER — TRAMADOL HYDROCHLORIDE 50 MG/1
50 TABLET ORAL DAILY PRN
Qty: 10 TABLET | Refills: 0 | Status: SHIPPED | OUTPATIENT
Start: 2021-04-22 | End: 2021-08-03

## 2021-04-22 RX ORDER — HYDROCODONE BITARTRATE AND ACETAMINOPHEN 10; 325 MG/1; MG/1
1 TABLET ORAL EVERY 4 HOURS PRN
Qty: 180 TABLET | Refills: 0 | Status: CANCELLED | OUTPATIENT
Start: 2021-04-22

## 2021-04-22 NOTE — TELEPHONE ENCOUNTER
Cannot approve early refill.   The patient is responsible for maintaining medication at safe place.

## 2021-04-25 ENCOUNTER — MYC REFILL (OUTPATIENT)
Dept: INTERNAL MEDICINE | Facility: CLINIC | Age: 66
End: 2021-04-25

## 2021-04-25 DIAGNOSIS — G89.29 CHRONIC BILATERAL LOW BACK PAIN, UNSPECIFIED WHETHER SCIATICA PRESENT: ICD-10-CM

## 2021-04-25 DIAGNOSIS — M54.50 CHRONIC BILATERAL LOW BACK PAIN, UNSPECIFIED WHETHER SCIATICA PRESENT: ICD-10-CM

## 2021-04-25 RX ORDER — HYDROCODONE BITARTRATE AND ACETAMINOPHEN 10; 325 MG/1; MG/1
1 TABLET ORAL EVERY 4 HOURS PRN
Qty: 180 TABLET | Refills: 0 | Status: CANCELLED | OUTPATIENT
Start: 2021-04-25

## 2021-04-27 RX ORDER — HYDROCODONE BITARTRATE AND ACETAMINOPHEN 10; 325 MG/1; MG/1
1 TABLET ORAL EVERY 4 HOURS PRN
Qty: 180 TABLET | Refills: 0 | Status: SHIPPED | OUTPATIENT
Start: 2021-04-29 | End: 2021-05-25

## 2021-04-27 NOTE — TELEPHONE ENCOUNTER
Pending Prescriptions:                       Disp   Refills    HYDROcodone-acetaminophen (NORCO)  M*180 ta*0        Sig: Take 1 tablet by mouth every 4 hours as needed for           severe pain Take up to 6 tablets a day    Routing refill request to provider for review/approval because:  Drug not on the [facility] refill protocol

## 2021-05-10 DIAGNOSIS — H10.32 ACUTE BACTERIAL CONJUNCTIVITIS OF LEFT EYE: ICD-10-CM

## 2021-05-10 DIAGNOSIS — F41.9 ANXIETY: ICD-10-CM

## 2021-05-10 DIAGNOSIS — Z79.899 CHRONIC PRESCRIPTION BENZODIAZEPINE USE: ICD-10-CM

## 2021-05-12 ENCOUNTER — MYC REFILL (OUTPATIENT)
Dept: INTERNAL MEDICINE | Facility: CLINIC | Age: 66
End: 2021-05-12

## 2021-05-12 DIAGNOSIS — Z79.899 CHRONIC PRESCRIPTION BENZODIAZEPINE USE: ICD-10-CM

## 2021-05-12 DIAGNOSIS — F41.9 ANXIETY: ICD-10-CM

## 2021-05-12 DIAGNOSIS — H10.32 ACUTE BACTERIAL CONJUNCTIVITIS OF LEFT EYE: ICD-10-CM

## 2021-05-12 RX ORDER — ERYTHROMYCIN 5 MG/G
0.5 OINTMENT OPHTHALMIC AT BEDTIME
Qty: 3.5 G | Refills: 0 | Status: SHIPPED | OUTPATIENT
Start: 2021-05-12 | End: 2023-01-01

## 2021-05-12 RX ORDER — ALPRAZOLAM 0.25 MG
0.25 TABLET ORAL 2 TIMES DAILY PRN
Qty: 60 TABLET | Refills: 0 | Status: SHIPPED | OUTPATIENT
Start: 2021-05-12 | End: 2021-06-18

## 2021-05-12 NOTE — TELEPHONE ENCOUNTER
Pending Prescriptions:                       Disp   Refills    erythromycin (ROMYCIN) 5 MG/GM ophthalmic *3.5 g           Sig: Place 0.5 inches Into the left eye At Bedtime    ALPRAZolam (XANAX) 0.25 MG tablet [Pharmac*60 tab*0        Sig: TAKE 1 TABLET (0.25 MG) BY MOUTH 2 TIMES DAILY AS           NEEDED FOR ANXIETY    Routing refill request to provider for review/approval because:  Drug not on the [facility] refill protocol

## 2021-05-13 RX ORDER — ALPRAZOLAM 0.25 MG
0.25 TABLET ORAL 2 TIMES DAILY PRN
Qty: 60 TABLET | Refills: 0 | OUTPATIENT
Start: 2021-05-13

## 2021-05-13 RX ORDER — ERYTHROMYCIN 5 MG/G
0.5 OINTMENT OPHTHALMIC AT BEDTIME
Qty: 1 G | Refills: 0 | OUTPATIENT
Start: 2021-05-13

## 2021-05-23 ENCOUNTER — MYC REFILL (OUTPATIENT)
Dept: INTERNAL MEDICINE | Facility: CLINIC | Age: 66
End: 2021-05-23

## 2021-05-23 DIAGNOSIS — M54.50 CHRONIC BILATERAL LOW BACK PAIN, UNSPECIFIED WHETHER SCIATICA PRESENT: ICD-10-CM

## 2021-05-23 DIAGNOSIS — G89.29 CHRONIC BILATERAL LOW BACK PAIN, UNSPECIFIED WHETHER SCIATICA PRESENT: ICD-10-CM

## 2021-05-23 DIAGNOSIS — J45.30 MILD PERSISTENT ASTHMA WITHOUT COMPLICATION: ICD-10-CM

## 2021-05-23 RX ORDER — HYDROCODONE BITARTRATE AND ACETAMINOPHEN 10; 325 MG/1; MG/1
1 TABLET ORAL EVERY 4 HOURS PRN
Qty: 180 TABLET | Refills: 0 | Status: CANCELLED | OUTPATIENT
Start: 2021-05-23

## 2021-05-24 DIAGNOSIS — M54.50 CHRONIC BILATERAL LOW BACK PAIN, UNSPECIFIED WHETHER SCIATICA PRESENT: ICD-10-CM

## 2021-05-24 DIAGNOSIS — M54.59 MECHANICAL LOW BACK PAIN: ICD-10-CM

## 2021-05-24 DIAGNOSIS — G89.29 CHRONIC BILATERAL LOW BACK PAIN, UNSPECIFIED WHETHER SCIATICA PRESENT: ICD-10-CM

## 2021-05-24 RX ORDER — TRAMADOL HYDROCHLORIDE 50 MG/1
50 TABLET ORAL 3 TIMES DAILY PRN
Qty: 20 TABLET | Refills: 0 | Status: CANCELLED | OUTPATIENT
Start: 2021-05-24

## 2021-05-25 RX ORDER — MONTELUKAST SODIUM 10 MG/1
TABLET ORAL
Qty: 90 TABLET | Refills: 1 | Status: SHIPPED | OUTPATIENT
Start: 2021-05-25

## 2021-05-25 RX ORDER — HYDROCODONE BITARTRATE AND ACETAMINOPHEN 10; 325 MG/1; MG/1
1 TABLET ORAL EVERY 4 HOURS PRN
Qty: 180 TABLET | Refills: 0 | Status: SHIPPED | OUTPATIENT
Start: 2021-05-25 | End: 2021-06-18

## 2021-05-25 RX ORDER — TRAMADOL HYDROCHLORIDE 50 MG/1
50 TABLET ORAL 3 TIMES DAILY PRN
Qty: 20 TABLET | Refills: 0 | Status: SHIPPED | OUTPATIENT
Start: 2021-05-25 | End: 2021-06-21

## 2021-05-25 NOTE — TELEPHONE ENCOUNTER
Routing refill request to provider for review/approval because:  Drug not on the [facility] refill protocol   Raffy Espitia RN, BSN

## 2021-05-25 NOTE — TELEPHONE ENCOUNTER
Routing refill request to provider for review/approval because:  Labs not current:    ACT Total Scores 4/18/2017 3/23/2018 9/27/2018   ACT TOTAL SCORE - - -   ASTHMA ER VISITS - - -   ASTHMA HOSPITALIZATIONS - - -   ACT TOTAL SCORE (Goal Greater than or Equal to 20) 11 8 14   In the past 12 months, how many times did you visit the emergency room for your asthma without being admitted to the hospital? 1 0 0   In the past 12 months, how many times were you hospitalized overnight because of your asthma? 0 0 0       Raffy Espitia RN, BSN

## 2021-06-15 DIAGNOSIS — G89.29 CHRONIC BILATERAL LOW BACK PAIN, UNSPECIFIED WHETHER SCIATICA PRESENT: ICD-10-CM

## 2021-06-15 DIAGNOSIS — M54.59 MECHANICAL LOW BACK PAIN: ICD-10-CM

## 2021-06-15 DIAGNOSIS — M54.50 CHRONIC BILATERAL LOW BACK PAIN, UNSPECIFIED WHETHER SCIATICA PRESENT: ICD-10-CM

## 2021-06-15 DIAGNOSIS — S22.000D COMPRESSION FRACTURE OF THORACIC VERTEBRA WITH ROUTINE HEALING, UNSPECIFIED THORACIC VERTEBRAL LEVEL, SUBSEQUENT ENCOUNTER: ICD-10-CM

## 2021-06-16 RX ORDER — ALENDRONATE SODIUM 70 MG/1
TABLET ORAL
Qty: 12 TABLET | Refills: 3 | Status: ON HOLD | OUTPATIENT
Start: 2021-06-16 | End: 2023-01-01

## 2021-06-16 RX ORDER — TRAMADOL HYDROCHLORIDE 50 MG/1
50 TABLET ORAL DAILY PRN
Qty: 10 TABLET | Refills: 0 | Status: CANCELLED | OUTPATIENT
Start: 2021-06-16

## 2021-06-16 NOTE — TELEPHONE ENCOUNTER
Routing refill request to provider for review/approval because:  Protocol fails, last dexa done 2018.    Bisphosphonates Fecloh24/15/2021 12:28 AM   Dexa on file within past 2 years Protocol Details    Normal serum creatinine on file within past 12 months          Harriett Mo R.N.

## 2021-06-17 ENCOUNTER — MYC REFILL (OUTPATIENT)
Dept: INTERNAL MEDICINE | Facility: CLINIC | Age: 66
End: 2021-06-17

## 2021-06-17 DIAGNOSIS — F41.9 ANXIETY: ICD-10-CM

## 2021-06-17 DIAGNOSIS — Z79.899 CHRONIC PRESCRIPTION BENZODIAZEPINE USE: ICD-10-CM

## 2021-06-17 DIAGNOSIS — G89.29 CHRONIC BILATERAL LOW BACK PAIN, UNSPECIFIED WHETHER SCIATICA PRESENT: ICD-10-CM

## 2021-06-17 DIAGNOSIS — M54.50 CHRONIC BILATERAL LOW BACK PAIN, UNSPECIFIED WHETHER SCIATICA PRESENT: ICD-10-CM

## 2021-06-17 RX ORDER — HYDROCODONE BITARTRATE AND ACETAMINOPHEN 10; 325 MG/1; MG/1
1 TABLET ORAL EVERY 4 HOURS PRN
Qty: 180 TABLET | Refills: 0 | Status: CANCELLED | OUTPATIENT
Start: 2021-06-17

## 2021-06-18 ENCOUNTER — MYC MEDICAL ADVICE (OUTPATIENT)
Dept: INTERNAL MEDICINE | Facility: CLINIC | Age: 66
End: 2021-06-18

## 2021-06-18 ENCOUNTER — MYC REFILL (OUTPATIENT)
Dept: INTERNAL MEDICINE | Facility: CLINIC | Age: 66
End: 2021-06-18

## 2021-06-18 DIAGNOSIS — M54.59 MECHANICAL LOW BACK PAIN: ICD-10-CM

## 2021-06-18 RX ORDER — ALPRAZOLAM 0.25 MG
0.25 TABLET ORAL 2 TIMES DAILY PRN
Qty: 60 TABLET | Refills: 0 | Status: SHIPPED | OUTPATIENT
Start: 2021-06-18 | End: 2021-07-28

## 2021-06-18 RX ORDER — HYDROCODONE BITARTRATE AND ACETAMINOPHEN 10; 325 MG/1; MG/1
1 TABLET ORAL EVERY 4 HOURS PRN
Qty: 180 TABLET | Refills: 0 | Status: SHIPPED | OUTPATIENT
Start: 2021-06-18 | End: 2021-07-18

## 2021-06-18 RX ORDER — TRAMADOL HYDROCHLORIDE 50 MG/1
50 TABLET ORAL 3 TIMES DAILY PRN
Qty: 20 TABLET | Refills: 0 | Status: CANCELLED | OUTPATIENT
Start: 2021-06-18

## 2021-06-18 NOTE — TELEPHONE ENCOUNTER
Pending Prescriptions:                       Disp   Refills    ALPRAZolam (XANAX) 0.25 MG tablet [Pharmac*60 tab*0        Sig: TAKE 1 TABLET (0.25 MG) BY MOUTH 2 TIMES DAILY AS           NEEDED FOR ANXIETY    Routing refill request to provider for review/approval because:  Drug not on the [facility] refill protocol

## 2021-06-18 NOTE — TELEPHONE ENCOUNTER
Pt states her pharmacy is asking for Tramadol refill but we did not receive request.     Last refill on 5/25/21 for #20. Pt can take up to 3 a day.     Provider approval needed.

## 2021-06-19 ENCOUNTER — NURSE TRIAGE (OUTPATIENT)
Dept: NURSING | Facility: CLINIC | Age: 66
End: 2021-06-19

## 2021-06-19 NOTE — TELEPHONE ENCOUNTER
Waiting for pain medication prescription refill of her Tramadol, but there is nothing ordered.  Explained the on-call providers do not order pain medications.  Patient acknowledged she is due for an office visit, and will call back Monday.    Effie Bustamante RN  [facility]    Reason for Disposition    [1] Caller requesting a NON-URGENT new prescription or refill AND [2] triager unable to refill per unit policy    Protocols used: MEDICATION QUESTION CALL-A-AH

## 2021-06-20 ENCOUNTER — MYC MEDICAL ADVICE (OUTPATIENT)
Dept: INTERNAL MEDICINE | Facility: CLINIC | Age: 66
End: 2021-06-20

## 2021-06-21 ENCOUNTER — MYC MEDICAL ADVICE (OUTPATIENT)
Dept: INTERNAL MEDICINE | Facility: CLINIC | Age: 66
End: 2021-06-21

## 2021-06-21 RX ORDER — TRAMADOL HYDROCHLORIDE 50 MG/1
50 TABLET ORAL 3 TIMES DAILY PRN
Qty: 20 TABLET | Refills: 0 | Status: SHIPPED | OUTPATIENT
Start: 2021-06-21 | End: 2021-07-12

## 2021-07-11 DIAGNOSIS — M54.59 MECHANICAL LOW BACK PAIN: ICD-10-CM

## 2021-07-12 RX ORDER — TRAMADOL HYDROCHLORIDE 50 MG/1
TABLET ORAL
Qty: 20 TABLET | Refills: 0 | Status: SHIPPED | OUTPATIENT
Start: 2021-07-12 | End: 2021-08-10

## 2021-07-12 NOTE — TELEPHONE ENCOUNTER
Pending Prescriptions:                       Disp   Refills    traMADol (ULTRAM) 50 MG tablet [Pharmacy M*20 tab*         Sig: TAKE 1 TABLET(50 MG) BY MOUTH THREE TIMES DAILY AS           NEEDED FOR SEVERE PAIN  Routing refill request to provider for review/approval because:  Drug not on the [facility] refill protocol

## 2021-07-14 ENCOUNTER — MYC REFILL (OUTPATIENT)
Dept: INTERNAL MEDICINE | Facility: CLINIC | Age: 66
End: 2021-07-14

## 2021-07-14 DIAGNOSIS — M54.41 CHRONIC BILATERAL LOW BACK PAIN WITH RIGHT-SIDED SCIATICA: ICD-10-CM

## 2021-07-14 DIAGNOSIS — R09.81 NASAL CONGESTION: ICD-10-CM

## 2021-07-14 DIAGNOSIS — G89.29 CHRONIC BILATERAL LOW BACK PAIN WITH RIGHT-SIDED SCIATICA: ICD-10-CM

## 2021-07-14 DIAGNOSIS — G89.29 CHRONIC BILATERAL LOW BACK PAIN, UNSPECIFIED WHETHER SCIATICA PRESENT: ICD-10-CM

## 2021-07-14 DIAGNOSIS — M54.50 CHRONIC BILATERAL LOW BACK PAIN, UNSPECIFIED WHETHER SCIATICA PRESENT: ICD-10-CM

## 2021-07-15 RX ORDER — BACLOFEN 10 MG/1
TABLET ORAL
Qty: 180 TABLET | Refills: 5 | Status: SHIPPED | OUTPATIENT
Start: 2021-07-15 | End: 2023-03-21

## 2021-07-15 RX ORDER — FLUTICASONE PROPIONATE 50 MCG
SPRAY, SUSPENSION (ML) NASAL
Qty: 16 G | Refills: 1 | Status: SHIPPED | OUTPATIENT
Start: 2021-07-15 | End: 2021-12-27

## 2021-07-15 NOTE — TELEPHONE ENCOUNTER
"Requested Prescriptions   Pending Prescriptions Disp Refills     baclofen (LIORESAL) 10 MG tablet [Pharmacy Med Name: BACLOFEN 10MG TABS] 180 tablet 5     Sig: TAKE TWO TABLETS BY MOUTH THREE TIMES A DAY       There is no refill protocol information for this order        fluticasone (FLONASE) 50 MCG/ACT nasal spray [Pharmacy Med Name: FLUTICASONE NASAL SPRAY]  0     Sig: USE ONE TO TWO SPRAYS IN EACH NOSTRIL EVERY DAY (NEED TO BE SEEN IN CLINIC FOR FURTHER REFILLS)       Nasal Allergy Protocol Passed - 7/14/2021  1:43 PM        Passed - Patient is age 12 or older        Passed - Recent (12 mo) or future (30 days) visit within the authorizing provider's specialty     Patient has had an office visit with the authorizing provider or a provider within the authorizing providers department within the previous 12 mos or has a future within next 30 days. See \"Patient Info\" tab in inbasket, or \"Choose Columns\" in Meds & Orders section of the refill encounter.              Passed - Medication is active on med list             "

## 2021-07-15 NOTE — TELEPHONE ENCOUNTER
Addended by: ANSHU HOFFMANN on: 5/24/2021 09:01 AM     Modules accepted: Karlene Bustillo     Baclofen  Routing refill request to provider for review/approval because:  Drug not on the [facility] refill protocol     Flonase  Prescription approved per [facility] Refill Protocol.

## 2021-07-16 ENCOUNTER — MYC MEDICAL ADVICE (OUTPATIENT)
Dept: INTERNAL MEDICINE | Facility: CLINIC | Age: 66
End: 2021-07-16

## 2021-07-16 DIAGNOSIS — G89.29 CHRONIC BILATERAL LOW BACK PAIN, UNSPECIFIED WHETHER SCIATICA PRESENT: ICD-10-CM

## 2021-07-16 DIAGNOSIS — M54.50 CHRONIC BILATERAL LOW BACK PAIN, UNSPECIFIED WHETHER SCIATICA PRESENT: ICD-10-CM

## 2021-07-16 RX ORDER — HYDROCODONE BITARTRATE AND ACETAMINOPHEN 10; 325 MG/1; MG/1
1 TABLET ORAL EVERY 4 HOURS PRN
Qty: 180 TABLET | Refills: 0 | Status: CANCELLED | OUTPATIENT
Start: 2021-07-16

## 2021-07-16 RX ORDER — HYDROCODONE BITARTRATE AND ACETAMINOPHEN 10; 325 MG/1; MG/1
1 TABLET ORAL EVERY 4 HOURS PRN
Qty: 180 TABLET | Refills: 0 | OUTPATIENT
Start: 2021-07-16

## 2021-07-16 NOTE — TELEPHONE ENCOUNTER
See her Now In [facility] message. Please clarify if this was denied and why.     Last refill on 6/18/21 for #180

## 2021-07-18 RX ORDER — HYDROCODONE BITARTRATE AND ACETAMINOPHEN 10; 325 MG/1; MG/1
1 TABLET ORAL EVERY 4 HOURS PRN
Qty: 180 TABLET | Refills: 0 | Status: SHIPPED | OUTPATIENT
Start: 2021-07-18 | End: 2021-08-19

## 2021-07-19 NOTE — TELEPHONE ENCOUNTER
She is due for a follow up visit. I need to see her at least every 6 months.   I will refill , but needs an appointment.

## 2021-07-20 DIAGNOSIS — I50.22 CHRONIC SYSTOLIC CONGESTIVE HEART FAILURE (H): ICD-10-CM

## 2021-07-20 DIAGNOSIS — I42.0 DILATED CARDIOMYOPATHY (H): ICD-10-CM

## 2021-07-20 DIAGNOSIS — E78.2 MIXED HYPERLIPIDEMIA: Primary | ICD-10-CM

## 2021-07-20 RX ORDER — CARVEDILOL 12.5 MG/1
12.5 TABLET ORAL 2 TIMES DAILY WITH MEALS
Qty: 180 TABLET | Refills: 2 | Status: SHIPPED | OUTPATIENT
Start: 2021-07-20 | End: 2021-10-20

## 2021-07-20 NOTE — TELEPHONE ENCOUNTER
Received refill request for:  Carvedilol  Last OV was: 3/22/2021 with BERTA Azar  Labs/EKG: n/a  F/U scheduled: orders placed for follow up per last OV note.  New script sent to: [facility]

## 2021-07-20 NOTE — LETTER
Trisha Bender  [id number] [address] [id number]         Dear Trisha Bender,      Our records indicate that you are due for follow-up with your Heart Care  Provider.      As part of our effort to provide you with the best care possible, we have  expanded our services to include video visits in addition to in-person clinic visits.    Please call our office at [phone] to schedule your appointment.    Thank you for allowing [facility] to be a part of your health  care team.     Sincerely,      [facility]

## 2021-07-27 DIAGNOSIS — F41.9 ANXIETY: ICD-10-CM

## 2021-07-27 DIAGNOSIS — Z79.899 CHRONIC PRESCRIPTION BENZODIAZEPINE USE: ICD-10-CM

## 2021-07-28 RX ORDER — ALPRAZOLAM 0.25 MG
0.25 TABLET ORAL 2 TIMES DAILY PRN
Qty: 60 TABLET | Refills: 0 | Status: SHIPPED | OUTPATIENT
Start: 2021-07-28 | End: 2021-09-10

## 2021-08-03 ENCOUNTER — OFFICE VISIT (OUTPATIENT)
Dept: INTERNAL MEDICINE | Facility: CLINIC | Age: 66
End: 2021-08-03
Payer: COMMERCIAL

## 2021-08-03 VITALS
HEART RATE: 114 BPM | TEMPERATURE: 98 F | HEIGHT: 70 IN | WEIGHT: 187 LBS | OXYGEN SATURATION: 93 % | SYSTOLIC BLOOD PRESSURE: 142 MMHG | BODY MASS INDEX: 26.77 KG/M2 | DIASTOLIC BLOOD PRESSURE: 87 MMHG

## 2021-08-03 DIAGNOSIS — I10 ESSENTIAL HYPERTENSION: ICD-10-CM

## 2021-08-03 DIAGNOSIS — I77.810 THORACIC AORTIC ECTASIA (H): ICD-10-CM

## 2021-08-03 DIAGNOSIS — F33.2 SEVERE EPISODE OF RECURRENT MAJOR DEPRESSIVE DISORDER, WITHOUT PSYCHOTIC FEATURES (H): ICD-10-CM

## 2021-08-03 DIAGNOSIS — Z79.899 CONTROLLED SUBSTANCE AGREEMENT SIGNED: ICD-10-CM

## 2021-08-03 DIAGNOSIS — J42 CHRONIC BRONCHITIS, UNSPECIFIED CHRONIC BRONCHITIS TYPE (H): Primary | ICD-10-CM

## 2021-08-03 DIAGNOSIS — M54.59 MECHANICAL LOW BACK PAIN: ICD-10-CM

## 2021-08-03 DIAGNOSIS — I42.0 DILATED CARDIOMYOPATHY (H): ICD-10-CM

## 2021-08-03 DIAGNOSIS — I10 ESSENTIAL HYPERTENSION WITH GOAL BLOOD PRESSURE LESS THAN 130/80: ICD-10-CM

## 2021-08-03 DIAGNOSIS — I50.22 CHRONIC SYSTOLIC CONGESTIVE HEART FAILURE (H): ICD-10-CM

## 2021-08-03 LAB
BASOPHILS # BLD AUTO: 0.1 10E3/UL (ref 0–0.2)
BASOPHILS NFR BLD AUTO: 1 %
EOSINOPHIL # BLD AUTO: 0.1 10E3/UL (ref 0–0.7)
EOSINOPHIL NFR BLD AUTO: 1 %
ERYTHROCYTE [DISTWIDTH] IN BLOOD BY AUTOMATED COUNT: 15 % (ref 10–15)
HCT VFR BLD AUTO: 45.4 % (ref 35–47)
HGB BLD-MCNC: 13.4 G/DL (ref 11.7–15.7)
IMM GRANULOCYTES # BLD: 0.2 10E3/UL
IMM GRANULOCYTES NFR BLD: 1 %
LYMPHOCYTES # BLD AUTO: 1.3 10E3/UL (ref 0.8–5.3)
LYMPHOCYTES NFR BLD AUTO: 8 %
MCH RBC QN AUTO: 28.7 PG (ref 26.5–33)
MCHC RBC AUTO-ENTMCNC: 29.5 G/DL (ref 31.5–36.5)
MCV RBC AUTO: 97 FL (ref 78–100)
MONOCYTES # BLD AUTO: 0.9 10E3/UL (ref 0–1.3)
MONOCYTES NFR BLD AUTO: 5 %
NEUTROPHILS # BLD AUTO: 15.1 10E3/UL (ref 1.6–8.3)
NEUTROPHILS NFR BLD AUTO: 84 %
NRBC # BLD AUTO: 0 10E3/UL
NRBC BLD AUTO-RTO: 0 /100
PLATELET # BLD AUTO: 440 10E3/UL (ref 150–450)
RBC # BLD AUTO: 4.67 10E6/UL (ref 3.8–5.2)
WBC # BLD AUTO: 17.7 10E3/UL (ref 4–11)

## 2021-08-03 PROCEDURE — 84443 ASSAY THYROID STIM HORMONE: CPT | Performed by: INTERNAL MEDICINE

## 2021-08-03 PROCEDURE — 36415 COLL VENOUS BLD VENIPUNCTURE: CPT | Performed by: INTERNAL MEDICINE

## 2021-08-03 PROCEDURE — 80053 COMPREHEN METABOLIC PANEL: CPT | Performed by: INTERNAL MEDICINE

## 2021-08-03 PROCEDURE — 86769 SARS-COV-2 COVID-19 ANTIBODY: CPT | Mod: 90 | Performed by: INTERNAL MEDICINE

## 2021-08-03 PROCEDURE — 99214 OFFICE O/P EST MOD 30 MIN: CPT | Performed by: INTERNAL MEDICINE

## 2021-08-03 PROCEDURE — 99000 SPECIMEN HANDLING OFFICE-LAB: CPT | Performed by: INTERNAL MEDICINE

## 2021-08-03 PROCEDURE — 85025 COMPLETE CBC W/AUTO DIFF WBC: CPT | Performed by: INTERNAL MEDICINE

## 2021-08-03 PROCEDURE — 80306 DRUG TEST PRSMV INSTRMNT: CPT | Performed by: INTERNAL MEDICINE

## 2021-08-03 ASSESSMENT — MIFFLIN-ST. JEOR: SCORE: 1473.48

## 2021-08-03 NOTE — PROGRESS NOTES
"    Assessment & Plan     Chronic bronchitis, unspecified chronic bronchitis type (H)  Assess response to JJ vaccine , interested in repeating if not good response   - COVID-19 Arik RBD Nicole & Titer Reflex    Essential hypertension with goal blood pressure less than 130/80  Controlled on treatment     Chronic systolic congestive heart failure (H)  Continue treatment   - COVID-19 Arik RBD Nicole & Titer Reflex    Dilated cardiomyopathy (H)  Cont treatment   - CBC with platelets and differential  - Comprehensive metabolic panel (BMP + Alb, Alk Phos, ALT, AST, Total. Bili, TP)  - TSH with free T4 reflex    Mechanical low back pain  Continue pain management. CSA signed, assess UA tox    Controlled substance agreement signed    - Drug Abuse Screen Panel 13, Urine (Pain Care Package) - lab collect    Essential hypertension    - TSH with free T4 reflex    Thoracic aortic ectasia (H)  Keep good control of BP.     Severe episode of recurrent major depressive disorder, without psychotic features (H)  Improved. Monitor                BMI:   Estimated body mass index is 26.83 kg/m  as calculated from the following:    Height as of this encounter: 1.778 m (5' 10\").    Weight as of this encounter: 84.8 kg (187 lb).       See Patient Instructions    Return in about 6 months (around 2/3/2022) for Routine Visit.    Bandar Weinberg MD  [facility]    Mariama Rico is a 65 year old who presents for the following health issues     HPI   Chief Complaint   Patient presents with     RECHECK     F/U          Patient is seen for a follow up visit.  Able to walk minimally at home not using a walker.   Has h/o COPD. Improved breathing. Not on oxygen.   No increased cough. Takes her inhalers. Seeing lung specialist. In the past was on lung transplant list. Now not considering transplant.   Has h/o CHF, idiopathic CMP. On Coreg, heart rate is better controlled. BP is normal to slightly elevated. On Lisinopril " "and Torsemide. Seeing cardiology. No edema.   Has aortic ectasia. No mid abdominal pain.   Has chronic back pain and hip pain related to OA, bursitis and DDD. Legs are weak, no numbness or tingling.   Has had chronic RUQ pain, unclear etiology. On pain medications.   Has h/o facial shingles. Has had recurrent eruptions and chronic pain.   Has h/o anxiety and depression. On medical treatment, controlled, no side effects. Has chronic depressive symptoms and episodes of anxiety, no suicidal ideation.            Review of Systems   Constitutional, HEENT, cardiovascular, pulmonary, GI, , musculoskeletal, neuro, skin, endocrine and psych systems are negative, except as otherwise noted.      Objective    BP (!) 142/87 (BP Location: Other (Comment), Patient Position: Sitting, Cuff Size: Adult Regular)   Pulse 114   Temp 98  F (36.7  C) (Oral)   Ht 1.778 m (5' 10\")   Wt 84.8 kg (187 lb)   LMP 09/29/2005   SpO2 93%   BMI 26.83 kg/m    Body mass index is 26.83 kg/m .  Physical Exam   GENERAL: weak, frail, alert and no distress, in a wheelchair  EYES: Eyes grossly normal to inspection, PERRL and conjunctivae and sclerae normal  HENT: ear canals and TM's normal, nose and mouth without ulcers or lesions  NECK: no adenopathy, no asymmetry, masses, or scars and thyroid normal to palpation  RESP: lungs clear to auscultation - no rales, rhonchi or wheezes, diminished breaths sounds diffusely   CV: regular rate and rhythm, normal S1 S2, no S3 or S4, no murmur, click or rub, no peripheral edema and peripheral pulses strong  ABDOMEN: soft, nontender, no hepatosplenomegaly, no masses and bowel sounds normal, right flank tenderness   MS: no gross musculoskeletal defects noted, no edema  SKIN: no suspicious lesions or rashes  NEURO: generalized weakness, uses wheelchair,  mentation intact and speech normal    Hospital Outpatient Visit on 03/29/2021   Component Date Value Ref Range Status     Campylobacter group by HOLLIS 03/29/2021 " Not Detected  NDET^Not Detected Final     Salmonella species by HOLLIS 03/29/2021 Not Detected  NDET^Not Detected Final     Shigella species by HOLLIS 03/29/2021 Not Detected  NDET^Not Detected Final     Vibrio group by HOLLIS 03/29/2021 Not Detected  NDET^Not Detected Final     Rotavirus A by HOLLIS 03/29/2021 Not Detected  NDET^Not Detected Final     Shiga toxin 1 gene by HOLLIS 03/29/2021 Not Detected  NDET^Not Detected Final     Shiga toxin 2 gene by HOLLIS 03/29/2021 Not Detected  NDET^Not Detected Final     Norovirus I and II by HOLLIS 03/29/2021 Not Detected  NDET^Not Detected Final     Yersinia enterocolitica by HOLLIS 03/29/2021 Not Detected  NDET^Not Detected Final     Enteric pathogen comment 03/29/2021    Final                    Value:Testing performed by multiplexed, qualitative PCR using the [employer] Enteric   Pathogens Nucleic Acid Test. Results should not be used as the sole basis for diagnosis,   treatment, or other patient management decisions.      Comment: Positive results do not rule out co-infection with other organisms that are   not detected by this test, and may not be the sole or definitive cause of   patient illness.   Negative results in the setting of clinical illness compatible with   gastroenteritis may be due to infection by pathogens that are not detected by   this test or non-infectious causes such as ulcerative colitis, irritable bowel   syndrome, or Crohn's disease.   Note: Shiga toxin producing E. coli (STEC) typically harbor one or both genes   that encode for Shiga toxins 1 and 2.

## 2021-08-03 NOTE — LETTER
Opioid / Opioid Plus Controlled Substance Agreement    This is an agreement between you and your provider about the safe and appropriate use of controlled substance/opioids prescribed by your care team. Controlled substances are medicines that can cause physical and mental dependence (abuse).    There are strict laws about having and using these medicines. We here at [facility] are committing to working with you in your efforts to get better. To support you in this work, we ll help you schedule regular office appointments for medicine refills. If we must cancel or change your appointment for any reason, we ll make sure you have enough medicine to last until your next appointment.     As a Provider, I will:    Listen carefully to your concerns and treat you with respect.     Recommend a treatment plan that I believe is in your best interest. This plan may involve therapies other than opioid pain medication.     Talk with you often about the possible benefits, and the risk of harm of any medicine that we prescribe for you.     Provide a plan on how to taper (discontinue or go off) using this medicine if the decision is made to stop its use.    As a Patient, I understand that opioid(s):     Are a controlled substance prescribed by my care team to help me function or work and manage my condition(s).     Are strong medicines and can cause serious side effects such as:    Drowsiness, which can seriously affect my driving ability    A lower breathing rate, enough to cause death    Harm to my thinking ability     Depression     Abuse of and addiction to this medicine    Need to be taken exactly as prescribed. Combining opioids with certain medicines or chemicals (such as illegal drugs, sedatives, sleeping pills, and benzodiazepines) can be dangerous or even fatal. If I stop opioids suddenly, I may have severe withdrawal symptoms.    Do not work for all types of pain nor for all patients. If they re not helpful, I may  be asked to stop them.        The risks, benefits and side effects of these medicine(s) were explained to me. I agree that:  1. I will take part in other treatments as advised by my care team. This may be psychiatry or counseling, physical therapy, behavioral therapy, group treatment or a referral to a specialist.     2. I will keep all my appointments. I understand that this is part of the monitoring of opioids. My care team may require an office visit for EVERY opioid/controlled substance refill. If I miss appointments or don t follow instructions, my care team may stop my medicine.    3. I will take my medicines as prescribed. I will not change the dose or schedule unless my care team tells me to. There will be no refills if I run out early.     4. I may be asked to come to the clinic and complete a urine drug test or complete a pill count at any time. If I don t give a urine sample or participate in a pill count, the care team may stop my medicine.    5. I will only receive prescriptions from this clinic for chronic pain. If I am treated by another provider for acute pain issues, I will tell them that I am taking opioid pain medication for chronic pain and that I have a treatment agreement with this provider. I will inform my [facility] care team within one business day if I am given a prescription for any pain medication by another healthcare provider. My [facility] care team can contact other providers and pharmacists about my use of any medicines.    6. It is up to me to make sure that I don t run out of my medicines on weekends or holidays. If my care team is willing to refill my opioid prescription without a visit, I must request refills only during office hours. Refills may take up to 3 business days to process. I will use one pharmacy to fill all my opioid and other controlled substance prescriptions. I will notify the clinic about any changes to my insurance or medication  availability.    7. I am responsible for my prescriptions. If the medicine/prescription is lost, stolen or destroyed, it will not be replaced. I also agree not to share controlled substance medicines with anyone.    8. I am aware I should not use any illegal or recreational drugs. I agree not to drink alcohol unless my care team says I can.       9. If I enroll in the [place] [facility] program, I will tell my care team prior to my next refill.     10. I will tell my care team right away if I become pregnant, have a new medical problem treated outside of my regular clinic, or have a change in my medications.    11. I understand that this medicine can affect my thinking, judgment and reaction time. Alcohol and drugs affect the brain and body, which can affect the safety of my driving. Being under the influence of alcohol or drugs can affect my decision-making, behaviors, personal safety, and the safety of others. Driving while impaired (DWI) can occur if a person is driving, operating, or in physical control of a car, motorcycle, boat, snowmobile, ATV, motorbike, off-road vehicle, or any other motor vehicle ([place] Statute 169A.20). I understand the risk if I choose to drive or operate any vehicle or machinery.    I understand that if I do not follow any of the conditions above, my prescriptions or treatment may be stopped or changed.          Opioids  What You Need to Know    What are opioids?   Opioids are pain medicines that must be prescribed by a doctor. They are also known as narcotics.     Examples are:   1. morphine (MS Contin, Marilyn)  2. oxycodone (Oxycontin)  3. oxycodone and acetaminophen (Percocet)  4. hydrocodone and acetaminophen (Vicodin, Norco)   5. fentanyl patch (Duragesic)   6. hydromorphone (Dilaudid)   7. methadone  8. codeine (Tylenol #3)     What do opioids do well?   Opioids are best for severe short-term pain such as after a surgery or injury. They may work well for cancer pain. They may  help some people with long-lasting (chronic) pain.     What do opioids NOT do well?   Opioids never get rid of pain entirely, and they don t work well for most patients with chronic pain. Opioids don t reduce swelling, one of the causes of pain.                                    Other ways to manage chronic pain and improve function include:       Treat the health problem that may be causing pain    Anti-inflammation medicines, which reduce swelling and tenderness, such as ibuprofen (Advil, Motrin) or naproxen (Aleve)    Acetaminophen (Tylenol)    Antidepressants and anti-seizure medicines, especially for nerve pain    Topical treatments such as patches or creams    Injections or nerve blocks    Chiropractic or osteopathic treatment    Acupuncture, massage, deep breathing, meditation, visual imagery, aromatherapy    Use heat or ice at the pain site    Physical therapy     Exercise    Stop smoking    Take part in therapy       Risks and side effects     Talk to your doctor before you start or decide to keep taking opioids. Possible side effects include:      Lowering your breathing rate enough to cause death    Overdose, including death, especially if taking higher than prescribed doses    Worse depression symptoms; less pleasure in things you usually enjoy    Feeling tired or sluggish    Slower thoughts or cloudy thinking    Being more sensitive to pain over time; pain is harder to control    Trouble sleeping or restless sleep    Changes in hormone levels (for example, less testosterone)    Changes in sex drive or ability to have sex    Constipation    Unsafe driving    Itching and sweating    Dizziness    Nausea, throwing up and dry mouth    What else should I know about opioids?    Opioids may lead to dependence, tolerance, or addiction.      Dependence means that if you stop or reduce the medicine too quickly, you will have withdrawal symptoms. These include loose poop (diarrhea), jitters, flu-like symptoms,  nervousness and tremors. Dependence is not the same as addiction.                       Tolerance means needing higher doses over time to get the same effect. This may increase the chance of serious side effects.      Addiction is when people improperly use a substance that harms their body, their mind or their relations with others. Use of opiates can cause a relapse of addiction if you have a history of drug or alcohol abuse.      People who have used opioids for a long time may have a lower quality of life, worse depression, higher levels of pain and more visits to doctors.    You can overdose on opioids. Take these steps to lower your risk of overdose:    1. Recognize the signs:  Signs of overdose include decrease or loss of consciousness (blackout), slowed breathing, trouble waking up and blue lips. If someone is worried about overdose, they should call 911.    2. Talk to your doctor about Narcan (naloxone).   If you are at risk for overdose, you may be given a prescription for Narcan. This medicine very quickly reverses the effects of opioids.   If you overdose, a friend or family member can give you Narcan while waiting for the ambulance. They need to know the signs of overdose and how to give Narcan.     3. Don't use alcohol or street drugs.   Taking them with opioids can cause death.    4. Do not take any of these medicines unless your doctor says it s OK. Taking these with opioids can cause death:    Benzodiazepines, such as lorazepam (Ativan), alprazolam (Xanax) or diazepam (Valium)    Muscle relaxers, such as cyclobenzaprine (Flexeril)    Sleeping pills like zolpidem (Ambien)     Other opioids      How to keep you and other people safe while taking opioids:    1. Never share your opioids with others.  Opioid medicines are regulated by the Drug Enforcement Agency ([employer]). Selling or sharing medications is a criminal act.    2. Be sure to store opioids in a secure place, locked up if possible. Young children  can easily swallow them and overdose.    3. When you are traveling with your medicines, keep them in the original bottles. If you use a pill box, be sure you also carry a copy of your medicine list from your clinic or pharmacy.    4. Safe disposal of opioids    Most pharmacies have places to get rid of medicine, called disposal kiosks. Medicine disposal options are also available in every [facility]. Search your county and  medication disposal  to find more options. You can find more details at:  [email or web address]     I agree that my provider, clinic care team, and pharmacy may work with any city, state or federal law enforcement agency that investigates the misuse, sale, or other diversion of my controlled medicine. I will allow my provider to discuss my care with, or share a copy of, this agreement with any other treating provider, pharmacy or emergency room where I receive care.    I have read this agreement and have asked questions about anything I did not understand.    _______________________________________________________  Patient Signature - Trisha Bender _____________________                   Date     _______________________________________________________  Provider Signature - Bandar Weinberg MD   _____________________                   Date     _______________________________________________________  Witness Signature (required if provider not present while patient signing)   _____________________                   Date

## 2021-08-04 LAB
AMPHETAMINES UR QL: NOT DETECTED
BARBITURATES UR QL SCN: NOT DETECTED
BENZODIAZ UR QL SCN: DETECTED
BUPRENORPHINE UR QL: NOT DETECTED
CANNABINOIDS UR QL: NOT DETECTED
COCAINE UR QL SCN: NOT DETECTED
D-METHAMPHET UR QL: NOT DETECTED
METHADONE UR QL SCN: NOT DETECTED
OPIATES UR QL SCN: DETECTED
OXYCODONE UR QL SCN: NOT DETECTED
PCP UR QL SCN: NOT DETECTED
PROPOXYPH UR QL: NOT DETECTED
TRICYCLICS UR QL SCN: NOT DETECTED

## 2021-08-05 LAB
ALBUMIN SERPL-MCNC: 3.5 G/DL (ref 3.4–5)
ALP SERPL-CCNC: 46 U/L (ref 40–150)
ALT SERPL W P-5'-P-CCNC: 22 U/L (ref 0–50)
ANION GAP SERPL CALCULATED.3IONS-SCNC: 8 MMOL/L (ref 3–14)
AST SERPL W P-5'-P-CCNC: 13 U/L (ref 0–45)
BILIRUB SERPL-MCNC: 0.4 MG/DL (ref 0.2–1.3)
BUN SERPL-MCNC: 12 MG/DL (ref 7–30)
CALCIUM SERPL-MCNC: 9.1 MG/DL (ref 8.5–10.1)
CHLORIDE BLD-SCNC: 99 MMOL/L (ref 94–109)
CO2 SERPL-SCNC: 28 MMOL/L (ref 20–32)
CREAT SERPL-MCNC: 0.56 MG/DL (ref 0.52–1.04)
GFR SERPL CREATININE-BSD FRML MDRD: >90 ML/MIN/1.73M2
GLUCOSE BLD-MCNC: 166 MG/DL (ref 70–99)
POTASSIUM BLD-SCNC: 4.6 MMOL/L (ref 3.4–5.3)
PROT SERPL-MCNC: 6.5 G/DL (ref 6.8–8.8)
SARS-COV-2 AB SERPL IA-ACNC: >250 U/ML
SARS-COV-2 AB SERPL-IMP: POSITIVE
SODIUM SERPL-SCNC: 135 MMOL/L (ref 133–144)
TSH SERPL DL<=0.005 MIU/L-ACNC: 1 MU/L (ref 0.4–4)

## 2021-08-05 ASSESSMENT — PATIENT HEALTH QUESTIONNAIRE - PHQ9: SUM OF ALL RESPONSES TO PHQ QUESTIONS 1-9: 9

## 2021-08-09 ENCOUNTER — MYC MEDICAL ADVICE (OUTPATIENT)
Dept: INTERNAL MEDICINE | Facility: CLINIC | Age: 66
End: 2021-08-09

## 2021-08-09 DIAGNOSIS — M54.59 MECHANICAL LOW BACK PAIN: ICD-10-CM

## 2021-08-09 DIAGNOSIS — D72.825 BANDEMIA: Primary | ICD-10-CM

## 2021-08-09 NOTE — TELEPHONE ENCOUNTER
Will refer to hematology.   The elevated WBC may be related to infection, but she does not have symptoms of such.   Will refill the Tramadol.

## 2021-08-10 ENCOUNTER — MYC MEDICAL ADVICE (OUTPATIENT)
Dept: INTERNAL MEDICINE | Facility: CLINIC | Age: 66
End: 2021-08-10

## 2021-08-10 DIAGNOSIS — B02.9 HERPES ZOSTER WITHOUT COMPLICATION: Primary | ICD-10-CM

## 2021-08-10 RX ORDER — TRAMADOL HYDROCHLORIDE 50 MG/1
TABLET ORAL
Qty: 20 TABLET | Refills: 0 | Status: SHIPPED | OUTPATIENT
Start: 2021-08-10 | End: 2021-09-10

## 2021-08-10 NOTE — TELEPHONE ENCOUNTER
The elevated WBC can indicate infection. I would assess a UA and CXR if she has symptoms of UTI or cough.

## 2021-08-10 NOTE — TELEPHONE ENCOUNTER
See her TabbedOutt message. She would like more information from Dr Weinberg regarding her results.

## 2021-08-12 ENCOUNTER — MYC REFILL (OUTPATIENT)
Dept: INTERNAL MEDICINE | Facility: CLINIC | Age: 66
End: 2021-08-12

## 2021-08-12 DIAGNOSIS — M54.50 CHRONIC BILATERAL LOW BACK PAIN, UNSPECIFIED WHETHER SCIATICA PRESENT: ICD-10-CM

## 2021-08-12 DIAGNOSIS — G89.29 CHRONIC BILATERAL LOW BACK PAIN, UNSPECIFIED WHETHER SCIATICA PRESENT: ICD-10-CM

## 2021-08-12 RX ORDER — HYDROCODONE BITARTRATE AND ACETAMINOPHEN 10; 325 MG/1; MG/1
1 TABLET ORAL EVERY 4 HOURS PRN
Qty: 180 TABLET | Refills: 0 | Status: CANCELLED | OUTPATIENT
Start: 2021-08-12

## 2021-08-12 RX ORDER — VALACYCLOVIR HYDROCHLORIDE 1 G/1
1000 TABLET, FILM COATED ORAL 3 TIMES DAILY
Qty: 21 TABLET | Refills: 0 | Status: SHIPPED | OUTPATIENT
Start: 2021-08-12 | End: 2021-10-06

## 2021-08-12 NOTE — TELEPHONE ENCOUNTER
Please see my chart messages.  Would covering provider be willing to send anti-viral med? Or prefer virtual appointment?  Please advise.  Office note on 8/3/21 Dr Weinberg addresses h/o facial shingles.

## 2021-08-16 ENCOUNTER — MYC MEDICAL ADVICE (OUTPATIENT)
Dept: INTERNAL MEDICINE | Facility: CLINIC | Age: 66
End: 2021-08-16

## 2021-08-16 NOTE — TELEPHONE ENCOUNTER
Controlled medications are filled for 30 days.  We cannot approve early refills, only a patient's provider can.  If the medication cannot last 30 days, the patient will need to discuss that with her primary when he is returned.

## 2021-08-17 ENCOUNTER — MYC MEDICAL ADVICE (OUTPATIENT)
Dept: INTERNAL MEDICINE | Facility: CLINIC | Age: 66
End: 2021-08-17

## 2021-08-17 ENCOUNTER — MYC REFILL (OUTPATIENT)
Dept: INTERNAL MEDICINE | Facility: CLINIC | Age: 66
End: 2021-08-17

## 2021-08-17 DIAGNOSIS — G89.29 CHRONIC BILATERAL LOW BACK PAIN, UNSPECIFIED WHETHER SCIATICA PRESENT: ICD-10-CM

## 2021-08-17 DIAGNOSIS — M54.50 CHRONIC BILATERAL LOW BACK PAIN, UNSPECIFIED WHETHER SCIATICA PRESENT: ICD-10-CM

## 2021-08-17 RX ORDER — HYDROCODONE BITARTRATE AND ACETAMINOPHEN 10; 325 MG/1; MG/1
1 TABLET ORAL EVERY 4 HOURS PRN
Qty: 180 TABLET | Refills: 0 | Status: CANCELLED | OUTPATIENT
Start: 2021-08-17

## 2021-08-17 NOTE — TELEPHONE ENCOUNTER
Per , the last fill was actually done on 7/20/2021, see the previous message regarding this.  We cannot approve refill early, only her primary provider can..  It can be filled on the 19th.   It appears she has been filling it a couple days early every month.  The weight is written, 180 need to last 30 days.

## 2021-08-17 NOTE — TELEPHONE ENCOUNTER
Refill request for Norco     Last refill on 7/18/21 for #180     Routing refill request to provider for review/approval because:  Drug not on the [facility] refill protocol

## 2021-08-18 ENCOUNTER — MYC REFILL (OUTPATIENT)
Dept: INTERNAL MEDICINE | Facility: CLINIC | Age: 66
End: 2021-08-18

## 2021-08-18 DIAGNOSIS — G89.29 CHRONIC BILATERAL LOW BACK PAIN, UNSPECIFIED WHETHER SCIATICA PRESENT: ICD-10-CM

## 2021-08-18 DIAGNOSIS — M54.50 CHRONIC BILATERAL LOW BACK PAIN, UNSPECIFIED WHETHER SCIATICA PRESENT: ICD-10-CM

## 2021-08-18 NOTE — TELEPHONE ENCOUNTER
Norco refill request.     To be filled on 8/19/21.   Please route to [facility] tomorrow.     Madeline Ferrer MD    8/17/21 2:30 PM  Note     Per , the last fill was actually done on 7/20/2021, see the previous message regarding this.  We cannot approve refill early, only her primary provider can..  It can be filled on the 19th.   It appears she has been filling it a couple days early every month.  The way it is written, 180 need to last 30 days.

## 2021-08-19 RX ORDER — HYDROCODONE BITARTRATE AND ACETAMINOPHEN 10; 325 MG/1; MG/1
1 TABLET ORAL EVERY 4 HOURS PRN
Qty: 180 TABLET | Refills: 0 | OUTPATIENT
Start: 2021-08-19

## 2021-08-19 RX ORDER — HYDROCODONE BITARTRATE AND ACETAMINOPHEN 10; 325 MG/1; MG/1
1 TABLET ORAL EVERY 4 HOURS PRN
Qty: 180 TABLET | Refills: 0 | Status: SHIPPED | OUTPATIENT
Start: 2021-08-19 | End: 2021-09-13

## 2021-08-19 NOTE — TELEPHONE ENCOUNTER
Routing refill request to provider for review/approval because:  Drug not on the [facility] refill protocol     Routed to covering provider

## 2021-08-23 NOTE — PROGRESS NOTES
RECORDS STATUS - ALL OTHER DIAGNOSIS    Bandemia  RECORDS RECEIVED FROM: [facility]   DATE RECEIVED: 8/23/2021   NOTES STATUS DETAILS   OFFICE NOTE from referring provider Complete Bandar Weinberg MD   OFFICE NOTE from medical oncologist N/A    DISCHARGE SUMMARY from hospital N/A    DISCHARGE REPORT from the ER     OPERATIVE REPORT N/A    MEDICATION LIST Complete [facility]   CLINICAL TRIAL TREATMENTS TO DATE     LABS     PATHOLOGY REPORTS     ANYTHING RELATED TO DIAGNOSIS Complete Labs last updated on 8/3/2021   GENONOMIC TESTING     TYPE:     IMAGING (NEED IMAGES & REPORT)     CT SCANS     MRI     MAMMO     ULTRASOUND     PET

## 2021-09-05 ENCOUNTER — HEALTH MAINTENANCE LETTER (OUTPATIENT)
Age: 66
End: 2021-09-05

## 2021-09-10 DIAGNOSIS — Z79.899 CHRONIC PRESCRIPTION BENZODIAZEPINE USE: ICD-10-CM

## 2021-09-10 DIAGNOSIS — M54.59 MECHANICAL LOW BACK PAIN: ICD-10-CM

## 2021-09-10 DIAGNOSIS — F41.9 ANXIETY: ICD-10-CM

## 2021-09-10 RX ORDER — ALPRAZOLAM 0.25 MG
0.25 TABLET ORAL 2 TIMES DAILY PRN
Qty: 60 TABLET | Refills: 0 | Status: SHIPPED | OUTPATIENT
Start: 2021-09-10 | End: 2021-10-19

## 2021-09-10 RX ORDER — TRAMADOL HYDROCHLORIDE 50 MG/1
TABLET ORAL
Qty: 20 TABLET | Refills: 0 | Status: SHIPPED | OUTPATIENT
Start: 2021-09-10 | End: 2021-10-11

## 2021-09-13 ENCOUNTER — PRE VISIT (OUTPATIENT)
Dept: ONCOLOGY | Facility: CLINIC | Age: 66
End: 2021-09-13

## 2021-09-13 ENCOUNTER — MYC REFILL (OUTPATIENT)
Dept: INTERNAL MEDICINE | Facility: CLINIC | Age: 66
End: 2021-09-13

## 2021-09-13 DIAGNOSIS — G89.29 CHRONIC BILATERAL LOW BACK PAIN, UNSPECIFIED WHETHER SCIATICA PRESENT: ICD-10-CM

## 2021-09-13 DIAGNOSIS — M54.50 CHRONIC BILATERAL LOW BACK PAIN, UNSPECIFIED WHETHER SCIATICA PRESENT: ICD-10-CM

## 2021-09-14 RX ORDER — HYDROCODONE BITARTRATE AND ACETAMINOPHEN 10; 325 MG/1; MG/1
1 TABLET ORAL EVERY 4 HOURS PRN
Qty: 180 TABLET | Refills: 0 | Status: SHIPPED | OUTPATIENT
Start: 2021-09-14 | End: 2021-10-12

## 2021-10-05 NOTE — PROGRESS NOTES
HISTORY OF PRESENT ILLNESS    This is a 66 year old female who follows with [facility].  She is a patient of Dr. Finney seen in our clinic for idiopathic cardiomyopathy, severe COPD, pulmonary hypertension, sinus tachycardia, hypertension, hyperlipidemia, depression, and chronic back pain on narcotics.     Ms Bender suffered heart failure (2015) Her ECHO showed LVEF 20-25%  Coronary angiography revealed mild nonobstructive CAD and she was diagnosed with idiopathic CM (likely takotsubo)  Fortunately on appropriate CV meds, her LVEF improved to 45-50%     A repeat ECHO (9/2019) showed LVEF 55-60%, normal RV function, mild MR/TR     Ms Bender does have an extensive history of asthma and severe COPD   Lung transplantation has been recommended in the past, but recently deferred by the patient.     Due to worsening shortness of breath and resting sinus tachycardia, Diltiazem was started last year.  She does note that her O2 sats drop to 92% with exertion.     A heart monitor (6/2020) showed runs of nonsustained VT and paroxysmal supraventricular tachycardia.  Average heart rate of 120 bpm     ECHO (6/2020) showed LVEF 40-45%, normal RV function and no significant valvular pathology.  Her heart rate was 134 bpm during testing.  Her Diltiazem was changed to Coreg then.     She underwent a right and left heart cath (7/2020)  This showed minimal nonobstructive coronary artery disease.  She was noted to have severe right and left heart filling pressures (RA mean 21 mmHg, wedge 28 mmHg, LVEDp 25 mmHg)  It was felt that her pulmonary hypertension was partly due to her chronically elevated left heart filling pressures  Therefore, her lasix and Coreg were increased  Since then, she has noted improvement in her heart rate and peripheral edema    Our visit today is for a 6 month reassessment.     Ms Bender comes in with her  today who assists with her symptoms and history  She feels that she has been stable for  the past couple of months  She self-stopped her Torsemide > 1 month ago due to urinary urgency.  She has not noted any peripheral edema or weight gain being off of the Torsemide.  She still gets winded easily and is fairly immobile due to her lung disease and chronic back pain.  She denies palpitations, orthopnea, or chest pain.  She checks her BP, pulse, and O2 levels at home  She states that her SBP is staying < 140 mmHg, HRs 100-110s, and O2 sats 95%      .  I reviewed labs done by PMD (8/2021)  TSH: 1.0, Sodium: 135  Potassium: 4.6  BUN: 12  Creatinine: 0.56, Hgb 13.4      VITAL SIGNS:  BP: 126/76  Pulse:  119  Weight:  184 lbs (stable at home)    IMPRESSION AND PLAN     Nonischemic Cardiomyopathy:  -LVEF 40-45%, normal RV function (6/2020)  -cath (7/30/20) showed minimal nonobstructive CAD and elevated right and left filling pressures  -on Coreg 12.5 mg BID, Lisinopril  -was on Torsemide for some time, but self-stopped 2 months ago  -will start Spironolactone 25 mg and stop K-dur  -recheck BMP in 1 wk  -will repeat ECHO  Return visit in 3-4 wks     HTN:  -On Coreg 12.5mg BID, Lisinopril 5 mg  -BP controlled     Severe COPD  -has chronic bronchitis  -lung transplantation evaluation, but the patient has declined due to risks  -on Prednisone 15 mg/day and multiple inhalers    Pulmonary Hypertension  -mean RA pressure 21, wedge 28, mean PAP: 38 mmHg (7/2020)  -severe COPD   -will add spironolactone.  Some intolerance to Torsemide     Nonsustained VT  -on Coreg     Hyperlipidemia with hypertriglyceridemia  -on Atorvastatin 10 mg     Prediabetes    The total time for the visit today was 30 minutes which includes patient visit, reviewing of records, discussion, and placing of orders of the outpatient coordination of cardiovascular care as described.  The level of medical decision making during this visit was of moderate complexity.  Thank you for allowing me to participate in their care.    Orders Placed This  Encounter   Procedures     Basic metabolic panel     Follow-Up with Cardiac Advanced Practice Provider     Echocardiogram Complete       Orders Placed This Encounter   Medications     spironolactone (ALDACTONE) 25 MG tablet     Sig: Take 1 tablet (25 mg) by mouth daily     Dispense:  90 tablet     Refill:  3       Medications Discontinued During This Encounter   Medication Reason     ketorolac (TORADOL) 10 MG tablet Medication Reconciliation Clean Up     nortriptyline (PAMELOR) 25 MG capsule Medication Reconciliation Clean Up     valACYclovir (VALTREX) 1000 mg tablet Medication Reconciliation Clean Up     potassium chloride ER (K-TAB/KLOR-CON) 10 MEQ CR tablet      torsemide (DEMADEX) 20 MG tablet          Encounter Diagnoses   Name Primary?     Dilated cardiomyopathy (H)      Mixed hyperlipidemia      Chronic systolic congestive heart failure (H)        CURRENT MEDICATIONS:  Current Outpatient Medications   Medication Sig Dispense Refill     albuterol (VENTOLIN HFA) 108 (90 BASE) MCG/ACT Inhaler Inhale 2 puffs into the lungs every 6 hours as needed for shortness of breath / dyspnea or wheezing 18 g 3     alendronate (FOSAMAX) 70 MG tablet TAKE 1 TABLET BY MOUTH ONE TIME PER WEEK 12 tablet 3     ALPRAZolam (XANAX) 0.25 MG tablet TAKE 1 TABLET (0.25 MG) BY MOUTH 2 TIMES DAILY AS NEEDED FOR ANXIETY 60 tablet 0     atorvastatin (LIPITOR) 10 MG tablet Take 1 tablet (10 mg) by mouth daily 90 tablet 3     baclofen (LIORESAL) 10 MG tablet TAKE TWO TABLETS BY MOUTH THREE TIMES A  tablet 5     BREO ELLIPTA 200-25 MCG/INH Inhaler Inhale 1 Dose into the lungs daily   12     carvedilol (COREG) 12.5 MG tablet Take 1 tablet (12.5 mg) by mouth 2 times daily (with meals) 180 tablet 2     cetirizine (ZYRTEC) 10 MG tablet Take 10 mg by mouth daily       [employer] FIBER GUMMIES 2.5 G CHEW Take 1 tablet by mouth daily Reported on 5/8/2017       erythromycin (ROMYCIN) 5 MG/GM ophthalmic ointment PLACE 0.5 INCHES INTO THE LEFT EYE AT  BEDTIME 3.5 g 0     fluticasone (FLONASE) 50 MCG/ACT nasal spray USE ONE TO TWO SPRAYS IN EACH NOSTRIL EVERY DAY (NEED TO BE SEEN IN CLINIC FOR FURTHER REFILLS) 16 g 1     HYDROcodone-acetaminophen (NORCO)  MG per tablet Take 1 tablet by mouth every 4 hours as needed for severe pain Take up to 6 tablets a day 180 tablet 0     ipratropium - albuterol 0.5 mg/2.5 mg/3 mL (DUONEB) 0.5-2.5 (3) MG/3ML neb solution Take 1 vial (3 mLs) by nebulization every 6 hours as needed for shortness of breath / dyspnea or wheezing 30 vial 1     lisinopril (ZESTRIL) 5 MG tablet Take 1 tablet (5 mg) by mouth daily 90 tablet 1     montelukast (SINGULAIR) 10 MG tablet TAKE ONE TABLET BY MOUTH AT BEDTIME 90 tablet 1     Multiple Vitamin CHEW Take 1 tablet by mouth daily        naloxone (NARCAN) 4 MG/0.1ML nasal spray Spray 1 spray (4 mg) into one nostril alternating nostrils once as needed for opioid reversal Every 2-3 minutes until patient responsive or EMS arrives 0.2 mL 0     ondansetron (ZOFRAN-ODT) 4 MG ODT tab TAKE 1-2 TABLETS BY MOUTH EVERY 8 HOURS AS NEEDED FOR NAUSEA 18 tablet 4     pantoprazole (PROTONIX) 40 MG EC tablet TAKE 1 TABLET BY MOUTH EVERY DAY 90 tablet 3     roflumilast (DALIRESP) 500 MCG TABS tablet Take 1 tablet (500 mcg) by mouth daily       spironolactone (ALDACTONE) 25 MG tablet Take 1 tablet (25 mg) by mouth daily 90 tablet 3     SUMAtriptan (IMITREX) 25 MG tablet Take 1 tablet (25 mg) by mouth at onset of headache for migraine May repeat in 2 hours if needed: max 2/day; 9 tablet 11     tiotropium (SPIRIVA HANDIHALER) 18 MCG capsule USING THE HANDIHALER, INHALE THE CONTENTS OF ONE CAPSULE BY MOUTH DAILY 90 capsule 3     traMADol (ULTRAM) 50 MG tablet TAKE 1 TABLET(50 MG) BY MOUTH THREE TIMES DAILY AS NEEDED FOR SEVERE PAIN 20 tablet 0       ALLERGIES     Allergies   Allergen Reactions     Gabapentin Swelling     Contrast Dye      Iodine     Escitalopram      Nausea and sickness     Peanuts [Nuts] Hives      Penicillins      rash     Sulfa Drugs      High family history     Tace [Chlorotrianisene]      joint swelling     Aspirin Rash     After 3 days     Ibuprofen Nausea and Vomiting     Strawberry Rash       PAST MEDICAL HISTORY:  Past Medical History:   Diagnosis Date     Anxiety      CAD (coronary artery disease) 7/2/15    mild per cath     CHF (congestive heart failure)     EF=20-25% per echo 6/30/15     Chronic airway obstruction     FEV1 36% of pred; VC 67%  in Jan 06.     Chronic back pain      COPD (chronic obstructive pulmonary disease)      Endometriosis      Esophageal reflux      Essential hypertension      Hyperlipidemia      Migraine      Mild persistent asthma 8/30/2012     Osteopenia      Takotsubo cardiomyopathy        PAST SURGICAL HISTORY:  Past Surgical History:   Procedure Laterality Date     ANGIOGRAM  07/02/2015    Minimal CAD. LV dysfunction, Mid RCA 20% stenosis, EF 20-25%, c/w Takotsubo cardiomyopathy     APPENDECTOMY  1974     CHOLECYSTECTOMY  1974    [facility]     COLONOSCOPY N/A 4/26/2018    Procedure: COLONOSCOPY;  COLONOSCOPY with biopsies;  Surgeon: Steve Acosta MD;  Location: [facility] OR     CORONARY ANGIOGRAPHY ADULT ORDER       CV CORONARY ANGIOGRAM N/A 7/30/2020    Procedure: Coronary Angiogram;  Surgeon: Quinn Viveros MD;  Location:  HEART CARDIAC CATH LAB     CV LEFT HEART CATH N/A 7/30/2020    Procedure: Left Heart Cath;  Surgeon: Quinn Viveros MD;  Location:  [facility] LAB     CV LEFT VENTRICULOGRAM N/A 7/30/2020    Procedure: Left Ventriculogram;  Surgeon: Quinn Viveros MD;  Location:  HEART CARDIAC CATH LAB     CV RIGHT HEART CATH MEASUREMENTS RECORDED N/A 7/30/2020    Procedure: Right Heart Cath;  Surgeon: Quinn Viveros MD;  Location:  HEART CARDIAC CATH LAB     ESOPHAGOSCOPY, GASTROSCOPY, DUODENOSCOPY (EGD), COMBINED  11/15/2011    Procedure:COMBINED ESOPHAGOSCOPY, GASTROSCOPY, DUODENOSCOPY (EGD);  ESOPHAGOSCOPY, GASTROSCOPY,  DUODENOSCOPY (EGD) ; Surgeon:JIM URENA; Location:[facility]     INJECT EPIDURAL LUMBAR / SACRAL SINGLE N/A 2018    Procedure: INJECT EPIDURAL LUMBAR / SACRAL CONTINUAL OR INTERMITTENT;  Lumbar Epidural Steroid Injection;  Surgeon: Az Enamorado MD;  Location: [facility]     INJECT EPIDURAL LUMBAR / SACRAL SINGLE N/A 2018    Procedure: INJECT EPIDURAL LUMBAR / SACRAL SINGLE;  Lumbar Epidural Steroid Injection;  Surgeon: Az Enamorado MD;  Location: [facility]     LAPAROSCOPIC ABLATION ENDOMETRIOSIS       LAPAROSCOPY DIAGNOSTIC (GYN)      endometriosis resection / lysis of adhesions     Nasal septoplasty and mucous retention cyst       TONSILLECTOMY, ADENOIDECTOMY, COMBINED       [place] teeth extraction         FAMILY HISTORY:  Family History   Problem Relation Age of Onset     Cancer Mother         Colon cancer; dxed at age 64;  at age 69     Cancer - colorectal Mother      Other Cancer Mother      Gastrointestinal Disease Sister         Acute pancreatitis     Heart Disease Father         ?     Coronary Artery Disease Father      Circulatory Maternal Aunt         AAA     Circulatory Maternal Uncle         AAA     Diabetes Son        SOCIAL HISTORY:  Social History     Socioeconomic History     Marital status:      Spouse name: None     Number of children: None     Years of education: None     Highest education level: None   Occupational History     None   Tobacco Use     Smoking status: Former Smoker     Packs/day: 1.00     Years: 30.00     Pack years: 30.00     Quit date: 2006     Years since quitting: 15.7     Smokeless tobacco: Never Used     Tobacco comment: Quit in    Substance and Sexual Activity     Alcohol use: No     Alcohol/week: 0.0 standard drinks     Drug use: No     Sexual activity: Never     Partners: Male     Birth control/protection: Surgical     Comment: vasectomy   Other Topics Concern     Parent/sibling w/ CABG, MI or angioplasty before 65F 55M? Not Asked       Service Not Asked     Blood Transfusions Not Asked     Caffeine Concern No     Comment: none     Occupational Exposure Not Asked     Hobby Hazards No     Sleep Concern Not Asked     Stress Concern No     Weight Concern Not Asked     Special Diet Yes     Comment: low sodium     Back Care Not Asked     Exercise Yes     Comment: limited due to back     Bike Helmet Not Asked     Seat Belt Not Asked     Self-Exams Not Asked   Social History Narrative     None     Social Determinants of Health     Financial Resource Strain:      Difficulty of Paying Living Expenses:    Food Insecurity:      Worried About Running Out of Food in the Last Year:      Ran Out of Food in the Last Year:    Transportation Needs:      Lack of Transportation (Medical):      Lack of Transportation (Non-Medical):    Physical Activity:      Days of Exercise per Week:      Minutes of Exercise per Session:    Stress:      Feeling of Stress :    Social Connections:      Frequency of Communication with Friends and Family:      Frequency of Social Gatherings with Friends and Family:      Attends Restoration Services:      Active Member of Clubs or Organizations:      Attends Club or Organization Meetings:      Marital Status:    Intimate Partner Violence:      Fear of Current or Ex-Partner:      Emotionally Abused:      Physically Abused:      Sexually Abused:        Review of Systems:  Skin:  Negative       Eyes:  Positive for glasses recovering of shigles of the left eye  ENT:  Negative      Respiratory:  Positive for dyspnea on exertion;shortness of breath asthma   Cardiovascular:  Negative      Gastroenterology: Negative      Genitourinary:  Negative      Musculoskeletal:  Positive for back pain;arthritis;joint pain    Neurologic:  Positive for migraine headaches;headaches    Psychiatric:  Negative      Heme/Lymph/Imm:  Positive for easy bruising    Endocrine:  Positive for thyroid disorder      Physical Exam:  Vitals: /76 (BP Location:  "Right arm, Patient Position: Sitting, Cuff Size: Adult Regular)   Pulse 119   Ht 1.778 m (5' 10\")   LMP 09/29/2005   SpO2 92%   BMI 26.83 kg/m      Constitutional:  cooperative;well nourished        Skin:  warm and dry to the touch          Head:  normocephalic        Eyes:  sclera white        Lymph:      ENT:  no pallor or cyanosis        Neck:  JVP normal        Respiratory:  clear to auscultation prolonged expiration       Cardiac: regular rhythm;normal S1 and S2;no S3 or S4 tachycardic distant heart sounds no presence of murmur          pulses full and equal                                        GI:  not assessed this visit        Extremities and Muscular Skeletal:  no edema clubbing            Neurological:  no gross motor deficits;affect appropriate   in wheelchair    Psych:  Alert and Oriented x 3          CC  ELLE Medina CNP  [id number]  [place] [id number]                  "

## 2021-10-06 ENCOUNTER — OFFICE VISIT (OUTPATIENT)
Dept: CARDIOLOGY | Facility: CLINIC | Age: 66
End: 2021-10-06
Attending: NURSE PRACTITIONER
Payer: COMMERCIAL

## 2021-10-06 VITALS
HEART RATE: 119 BPM | BODY MASS INDEX: 26.83 KG/M2 | SYSTOLIC BLOOD PRESSURE: 126 MMHG | OXYGEN SATURATION: 92 % | HEIGHT: 70 IN | DIASTOLIC BLOOD PRESSURE: 76 MMHG

## 2021-10-06 DIAGNOSIS — E78.2 MIXED HYPERLIPIDEMIA: ICD-10-CM

## 2021-10-06 DIAGNOSIS — I50.22 CHRONIC SYSTOLIC CONGESTIVE HEART FAILURE (H): ICD-10-CM

## 2021-10-06 DIAGNOSIS — I42.0 DILATED CARDIOMYOPATHY (H): ICD-10-CM

## 2021-10-06 DIAGNOSIS — M54.59 MECHANICAL LOW BACK PAIN: ICD-10-CM

## 2021-10-06 PROCEDURE — 99214 OFFICE O/P EST MOD 30 MIN: CPT | Performed by: NURSE PRACTITIONER

## 2021-10-06 RX ORDER — SPIRONOLACTONE 25 MG/1
25 TABLET ORAL DAILY
Qty: 90 TABLET | Refills: 3 | Status: SHIPPED | OUTPATIENT
Start: 2021-10-06 | End: 2022-09-08

## 2021-10-06 NOTE — PATIENT INSTRUCTIONS
Start Spironolactone 25 mg every am    Stop potassium supplementation    Continue to stay off your Torsemide    Schedule your echo and labs    Return/virtual visit in 3 wks with myself

## 2021-10-06 NOTE — LETTER
10/6/2021    Bandar Weinberg MD  [address] [facility] [place] [address]    RE: Trisha Bender       Dear Colleague,    I had the pleasure of seeing Trisha Bender in the [facility].    HISTORY OF PRESENT ILLNESS    This is a 66 year old female who follows with [facility].  She is a patient of Dr. Finney seen in our clinic for idiopathic cardiomyopathy, severe COPD, pulmonary hypertension, sinus tachycardia, hypertension, hyperlipidemia, depression, and chronic back pain on narcotics.     Ms Bender suffered heart failure (2015) Her ECHO showed LVEF 20-25%  Coronary angiography revealed mild nonobstructive CAD and she was diagnosed with idiopathic CM (likely takotsubo)  Fortunately on appropriate CV meds, her LVEF improved to 45-50%     A repeat ECHO (9/2019) showed LVEF 55-60%, normal RV function, mild MR/TR     Ms Bender does have an extensive history of asthma and severe COPD   Lung transplantation has been recommended in the past, but recently deferred by the patient.     Due to worsening shortness of breath and resting sinus tachycardia, Diltiazem was started last year.  She does note that her O2 sats drop to 92% with exertion.     A heart monitor (6/2020) showed runs of nonsustained VT and paroxysmal supraventricular tachycardia.  Average heart rate of 120 bpm     ECHO (6/2020) showed LVEF 40-45%, normal RV function and no significant valvular pathology.  Her heart rate was 134 bpm during testing.  Her Diltiazem was changed to Coreg then.     She underwent a right and left heart cath (7/2020)  This showed minimal nonobstructive coronary artery disease.  She was noted to have severe right and left heart filling pressures (RA mean 21 mmHg, wedge 28 mmHg, LVEDp 25 mmHg)  It was felt that her pulmonary hypertension was partly due to her chronically elevated left heart filling pressures  Therefore, her lasix and Coreg were  increased  Since then, she has noted improvement in her heart rate and peripheral edema    Our visit today is for a 6 month reassessment.     Ms Bender comes in with her  today who assists with her symptoms and history  She feels that she has been stable for the past couple of months  She self-stopped her Torsemide > 1 month ago due to urinary urgency.  She has not noted any peripheral edema or weight gain being off of the Torsemide.  She still gets winded easily and is fairly immobile due to her lung disease and chronic back pain.  She denies palpitations, orthopnea, or chest pain.  She checks her BP, pulse, and O2 levels at home  She states that her SBP is staying < 140 mmHg, HRs 100-110s, and O2 sats 95%      .  I reviewed labs done by PMD (8/2021)  TSH: 1.0, Sodium: 135  Potassium: 4.6  BUN: 12  Creatinine: 0.56, Hgb 13.4      VITAL SIGNS:  BP: 126/76  Pulse:  119  Weight:  184 lbs (stable at home)    IMPRESSION AND PLAN     Nonischemic Cardiomyopathy:  -LVEF 40-45%, normal RV function (6/2020)  -cath (7/30/20) showed minimal nonobstructive CAD and elevated right and left filling pressures  -on Coreg 12.5 mg BID, Lisinopril  -was on Torsemide for some time, but self-stopped 2 months ago  -will start Spironolactone 25 mg and stop K-dur  -recheck BMP in 1 wk  -will repeat ECHO  Return visit in 3-4 wks     HTN:  -On Coreg 12.5mg BID, Lisinopril 5 mg  -BP controlled     Severe COPD  -has chronic bronchitis  -lung transplantation evaluation, but the patient has declined due to risks  -on Prednisone 15 mg/day and multiple inhalers    Pulmonary Hypertension  -mean RA pressure 21, wedge 28, mean PAP: 38 mmHg (7/2020)  -severe COPD   -will add spironolactone.  Some intolerance to Torsemide     Nonsustained VT  -on Coreg     Hyperlipidemia with hypertriglyceridemia  -on Atorvastatin 10 mg     Prediabetes    The total time for the visit today was 30 minutes which includes patient visit, reviewing of records,  discussion, and placing of orders of the outpatient coordination of cardiovascular care as described.  The level of medical decision making during this visit was of moderate complexity.  Thank you for allowing me to participate in their care.    Orders Placed This Encounter   Procedures     Basic metabolic panel     Follow-Up with Cardiac Advanced Practice Provider     Echocardiogram Complete       Orders Placed This Encounter   Medications     spironolactone (ALDACTONE) 25 MG tablet     Sig: Take 1 tablet (25 mg) by mouth daily     Dispense:  90 tablet     Refill:  3       Medications Discontinued During This Encounter   Medication Reason     ketorolac (TORADOL) 10 MG tablet Medication Reconciliation Clean Up     nortriptyline (PAMELOR) 25 MG capsule Medication Reconciliation Clean Up     valACYclovir (VALTREX) 1000 mg tablet Medication Reconciliation Clean Up     potassium chloride ER (K-TAB/KLOR-CON) 10 MEQ CR tablet      torsemide (DEMADEX) 20 MG tablet          Encounter Diagnoses   Name Primary?     Dilated cardiomyopathy (H)      Mixed hyperlipidemia      Chronic systolic congestive heart failure (H)        CURRENT MEDICATIONS:  Current Outpatient Medications   Medication Sig Dispense Refill     albuterol (VENTOLIN HFA) 108 (90 BASE) MCG/ACT Inhaler Inhale 2 puffs into the lungs every 6 hours as needed for shortness of breath / dyspnea or wheezing 18 g 3     alendronate (FOSAMAX) 70 MG tablet TAKE 1 TABLET BY MOUTH ONE TIME PER WEEK 12 tablet 3     ALPRAZolam (XANAX) 0.25 MG tablet TAKE 1 TABLET (0.25 MG) BY MOUTH 2 TIMES DAILY AS NEEDED FOR ANXIETY 60 tablet 0     atorvastatin (LIPITOR) 10 MG tablet Take 1 tablet (10 mg) by mouth daily 90 tablet 3     baclofen (LIORESAL) 10 MG tablet TAKE TWO TABLETS BY MOUTH THREE TIMES A  tablet 5     BREO ELLIPTA 200-25 MCG/INH Inhaler Inhale 1 Dose into the lungs daily   12     carvedilol (COREG) 12.5 MG tablet Take 1 tablet (12.5 mg) by mouth 2 times daily (with  meals) 180 tablet 2     cetirizine (ZYRTEC) 10 MG tablet Take 10 mg by mouth daily       [facility] FIBER GUMMIES 2.5 G CHEW Take 1 tablet by mouth daily Reported on 5/8/2017       erythromycin (ROMYCIN) 5 MG/GM ophthalmic ointment PLACE 0.5 INCHES INTO THE LEFT EYE AT BEDTIME 3.5 g 0     fluticasone (FLONASE) 50 MCG/ACT nasal spray USE ONE TO TWO SPRAYS IN EACH NOSTRIL EVERY DAY (NEED TO BE SEEN IN CLINIC FOR FURTHER REFILLS) 16 g 1     HYDROcodone-acetaminophen (NORCO)  MG per tablet Take 1 tablet by mouth every 4 hours as needed for severe pain Take up to 6 tablets a day 180 tablet 0     ipratropium - albuterol 0.5 mg/2.5 mg/3 mL (DUONEB) 0.5-2.5 (3) MG/3ML neb solution Take 1 vial (3 mLs) by nebulization every 6 hours as needed for shortness of breath / dyspnea or wheezing 30 vial 1     lisinopril (ZESTRIL) 5 MG tablet Take 1 tablet (5 mg) by mouth daily 90 tablet 1     montelukast (SINGULAIR) 10 MG tablet TAKE ONE TABLET BY MOUTH AT BEDTIME 90 tablet 1     Multiple Vitamin CHEW Take 1 tablet by mouth daily        naloxone (NARCAN) 4 MG/0.1ML nasal spray Spray 1 spray (4 mg) into one nostril alternating nostrils once as needed for opioid reversal Every 2-3 minutes until patient responsive or EMS arrives 0.2 mL 0     ondansetron (ZOFRAN-ODT) 4 MG ODT tab TAKE 1-2 TABLETS BY MOUTH EVERY 8 HOURS AS NEEDED FOR NAUSEA 18 tablet 4     pantoprazole (PROTONIX) 40 MG EC tablet TAKE 1 TABLET BY MOUTH EVERY DAY 90 tablet 3     roflumilast (DALIRESP) 500 MCG TABS tablet Take 1 tablet (500 mcg) by mouth daily       spironolactone (ALDACTONE) 25 MG tablet Take 1 tablet (25 mg) by mouth daily 90 tablet 3     SUMAtriptan (IMITREX) 25 MG tablet Take 1 tablet (25 mg) by mouth at onset of headache for migraine May repeat in 2 hours if needed: max 2/day; 9 tablet 11     tiotropium (SPIRIVA HANDIHALER) 18 MCG capsule USING THE HANDIHALER, INHALE THE CONTENTS OF ONE CAPSULE BY MOUTH DAILY 90 capsule 3     traMADol (ULTRAM) 50 MG  tablet TAKE 1 TABLET(50 MG) BY MOUTH THREE TIMES DAILY AS NEEDED FOR SEVERE PAIN 20 tablet 0       ALLERGIES     Allergies   Allergen Reactions     Gabapentin Swelling     Contrast Dye      Iodine     Escitalopram      Nausea and sickness     Peanuts [Nuts] Hives     Penicillins      rash     Sulfa Drugs      High family history     Tace [Chlorotrianisene]      joint swelling     Aspirin Rash     After 3 days     Ibuprofen Nausea and Vomiting     Strawberry Rash       PAST MEDICAL HISTORY:  Past Medical History:   Diagnosis Date     Anxiety      CAD (coronary artery disease) 7/2/15    mild per cath     CHF (congestive heart failure)     EF=20-25% per echo 6/30/15     Chronic airway obstruction     FEV1 36% of pred; VC 67%  in Jan 06.     Chronic back pain      COPD (chronic obstructive pulmonary disease)      Endometriosis      Esophageal reflux      Essential hypertension      Hyperlipidemia      Migraine      Mild persistent asthma 8/30/2012     Osteopenia      Takotsubo cardiomyopathy        PAST SURGICAL HISTORY:  Past Surgical History:   Procedure Laterality Date     ANGIOGRAM  07/02/2015    Minimal CAD. LV dysfunction, Mid RCA 20% stenosis, EF 20-25%, c/w Takotsubo cardiomyopathy     APPENDECTOMY  1974     CHOLECYSTECTOMY  1974    [facility]     COLONOSCOPY N/A 4/26/2018    Procedure: COLONOSCOPY;  COLONOSCOPY with biopsies;  Surgeon: Steve Acosta MD;  Location: [facility] OR     CORONARY ANGIOGRAPHY ADULT ORDER       CV CORONARY ANGIOGRAM N/A 7/30/2020    Procedure: Coronary Angiogram;  Surgeon: Quinn Viveros MD;  Location:  HEART CARDIAC CATH LAB     CV LEFT HEART CATH N/A 7/30/2020    Procedure: Left Heart Cath;  Surgeon: Quinn Viveros MD;  Location:  HEART CARDIAC CATH LAB     CV LEFT VENTRICULOGRAM N/A 7/30/2020    Procedure: Left Ventriculogram;  Surgeon: Quinn Viveros MD;  Location:  HEART CARDIAC CATH LAB     CV RIGHT HEART CATH MEASUREMENTS RECORDED N/A 7/30/2020     Procedure: Right Heart Cath;  Surgeon: Quinn Viveros MD;  Location: [facility]     ESOPHAGOSCOPY, GASTROSCOPY, DUODENOSCOPY (EGD), COMBINED  11/15/2011    Procedure:COMBINED ESOPHAGOSCOPY, GASTROSCOPY, DUODENOSCOPY (EGD);  ESOPHAGOSCOPY, GASTROSCOPY, DUODENOSCOPY (EGD) ; Surgeon:JIM URENA; Location:[facility] GI     INJECT EPIDURAL LUMBAR / SACRAL SINGLE N/A 2018    Procedure: INJECT EPIDURAL LUMBAR / SACRAL CONTINUAL OR INTERMITTENT;  Lumbar Epidural Steroid Injection;  Surgeon: Az Enamorado MD;  Location: [facility]     INJECT EPIDURAL LUMBAR / SACRAL SINGLE N/A 2018    Procedure: INJECT EPIDURAL LUMBAR / SACRAL SINGLE;  Lumbar Epidural Steroid Injection;  Surgeon: Az Enamorado MD;  Location: [facility]     LAPAROSCOPIC ABLATION ENDOMETRIOSIS       LAPAROSCOPY DIAGNOSTIC (GYN)      endometriosis resection / lysis of adhesions     Nasal septoplasty and mucous retention cyst       TONSILLECTOMY, ADENOIDECTOMY, COMBINED       [place] teeth extraction         FAMILY HISTORY:  Family History   Problem Relation Age of Onset     Cancer Mother         Colon cancer; dxed at age 64;  at age 69     Cancer - colorectal Mother      Other Cancer Mother      Gastrointestinal Disease Sister         Acute pancreatitis     Heart Disease Father         ?     Coronary Artery Disease Father      Circulatory Maternal Aunt         AAA     Circulatory Maternal Uncle         AAA     Diabetes Son        SOCIAL HISTORY:  Social History     Socioeconomic History     Marital status:      Spouse name: None     Number of children: None     Years of education: None     Highest education level: None   Occupational History     None   Tobacco Use     Smoking status: Former Smoker     Packs/day: 1.00     Years: 30.00     Pack years: 30.00     Quit date: 2006     Years since quitting: 15.7     Smokeless tobacco: Never Used     Tobacco comment: Quit in    Substance and Sexual Activity     Alcohol use:  No     Alcohol/week: 0.0 standard drinks     Drug use: No     Sexual activity: Never     Partners: Male     Birth control/protection: Surgical     Comment: vasectomy   Other Topics Concern     Parent/sibling w/ CABG, MI or angioplasty before 65F 55M? Not Asked      Service Not Asked     Blood Transfusions Not Asked     Caffeine Concern No     Comment: none     Occupational Exposure Not Asked     Hobby Hazards No     Sleep Concern Not Asked     Stress Concern No     Weight Concern Not Asked     Special Diet Yes     Comment: low sodium     Back Care Not Asked     Exercise Yes     Comment: limited due to back     Bike Helmet Not Asked     Seat Belt Not Asked     Self-Exams Not Asked   Social History Narrative     None     Social Determinants of Health     Financial Resource Strain:      Difficulty of Paying Living Expenses:    Food Insecurity:      Worried About Running Out of Food in the Last Year:      Ran Out of Food in the Last Year:    Transportation Needs:      Lack of Transportation (Medical):      Lack of Transportation (Non-Medical):    Physical Activity:      Days of Exercise per Week:      Minutes of Exercise per Session:    Stress:      Feeling of Stress :    Social Connections:      Frequency of Communication with Friends and Family:      Frequency of Social Gatherings with Friends and Family:      Attends Yazidism Services:      Active Member of Clubs or Organizations:      Attends Club or Organization Meetings:      Marital Status:    Intimate Partner Violence:      Fear of Current or Ex-Partner:      Emotionally Abused:      Physically Abused:      Sexually Abused:        Review of Systems:  Skin:  Negative       Eyes:  Positive for glasses recovering of shigles of the left eye  ENT:  Negative      Respiratory:  Positive for dyspnea on exertion;shortness of breath asthma   Cardiovascular:  Negative      Gastroenterology: Negative      Genitourinary:  Negative      Musculoskeletal:  Positive for  "back pain;arthritis;joint pain    Neurologic:  Positive for migraine headaches;headaches    Psychiatric:  Negative      Heme/Lymph/Imm:  Positive for easy bruising    Endocrine:  Positive for thyroid disorder      Physical Exam:  Vitals: /76 (BP Location: Right arm, Patient Position: Sitting, Cuff Size: Adult Regular)   Pulse 119   Ht 1.778 m (5' 10\")   LMP 09/29/2005   SpO2 92%   BMI 26.83 kg/m      Constitutional:  cooperative;well nourished        Skin:  warm and dry to the touch          Head:  normocephalic        Eyes:  sclera white        Lymph:      ENT:  no pallor or cyanosis        Neck:  JVP normal        Respiratory:  clear to auscultation prolonged expiration       Cardiac: regular rhythm;normal S1 and S2;no S3 or S4 tachycardic distant heart sounds no presence of murmur          pulses full and equal                                        GI:  not assessed this visit        Extremities and Muscular Skeletal:  no edema clubbing            Neurological:  no gross motor deficits;affect appropriate   in wheelchair    Psych:  Alert and Oriented x 3          CC  ELLE Medina CNP  [address]  [place] [address]                      Thank you for allowing me to participate in the care of your patient.      Sincerely,     ELLE Landa CNP     [facility]  cc:   ELLE Medina CNP  [address]  [place] [address]        "

## 2021-10-10 ENCOUNTER — MYC MEDICAL ADVICE (OUTPATIENT)
Dept: INTERNAL MEDICINE | Facility: CLINIC | Age: 66
End: 2021-10-10

## 2021-10-11 RX ORDER — TRAMADOL HYDROCHLORIDE 50 MG/1
TABLET ORAL
Qty: 20 TABLET | Refills: 0 | Status: SHIPPED | OUTPATIENT
Start: 2021-10-11 | End: 2021-11-02

## 2021-10-11 NOTE — TELEPHONE ENCOUNTER
Patient has sent 2 additional [employer] messages requesting this refill that was requested on 10/6/21.

## 2021-10-12 ENCOUNTER — TELEPHONE (OUTPATIENT)
Dept: INTERNAL MEDICINE | Facility: CLINIC | Age: 66
End: 2021-10-12

## 2021-10-12 ENCOUNTER — MYC MEDICAL ADVICE (OUTPATIENT)
Dept: INTERNAL MEDICINE | Facility: CLINIC | Age: 66
End: 2021-10-12

## 2021-10-12 DIAGNOSIS — G89.29 CHRONIC BILATERAL LOW BACK PAIN, UNSPECIFIED WHETHER SCIATICA PRESENT: ICD-10-CM

## 2021-10-12 DIAGNOSIS — M54.50 CHRONIC BILATERAL LOW BACK PAIN, UNSPECIFIED WHETHER SCIATICA PRESENT: ICD-10-CM

## 2021-10-12 NOTE — TELEPHONE ENCOUNTER
Patient says she is on a pain contract and has to  her narcotic here - however it looks like her hydrocodone was sent to [facility]  - we cannot transfer because it is a schedule 2 med - is it possible to resend here?    Thanks so much    Monika Rangel [facility]   on behalf of [facility]

## 2021-10-12 NOTE — TELEPHONE ENCOUNTER
Pending Prescriptions:                       Disp   Refills    HYDROcodone-acetaminophen (NORCO)  *180 ta*0            Sig: Take 1 tablet by mouth every 4 hours as needed           for severe pain Take up to 6 tablets a day    Routing refill request to provider for review/approval because:  Drug not on the G refill protocol   Patient can only  at [place] [facility]  [facility]

## 2021-10-13 RX ORDER — HYDROCODONE BITARTRATE AND ACETAMINOPHEN 10; 325 MG/1; MG/1
1 TABLET ORAL EVERY 4 HOURS PRN
Qty: 180 TABLET | Refills: 0 | Status: SHIPPED | OUTPATIENT
Start: 2021-10-13 | End: 2021-11-08

## 2021-10-15 DIAGNOSIS — F41.9 ANXIETY: ICD-10-CM

## 2021-10-15 DIAGNOSIS — R11.0 NAUSEA: ICD-10-CM

## 2021-10-15 DIAGNOSIS — Z79.899 CHRONIC PRESCRIPTION BENZODIAZEPINE USE: ICD-10-CM

## 2021-10-18 ENCOUNTER — TELEPHONE (OUTPATIENT)
Dept: CARDIOLOGY | Facility: CLINIC | Age: 66
End: 2021-10-18

## 2021-10-18 NOTE — TELEPHONE ENCOUNTER
Patient was scheduled for and Echo and BMP tomorrow 10-19-21.  She states that she has only been on the spironolactone 2 days and is wondering if she should delay drawing BMP until she has been on it 7-10 days.  Recommend she have BMP after she has been on spironolactone 7-10 days.  Patient verbalized understanding and will call scheduling to delay labs.  Aidee Ryan RN on 10/18/2021 at 4:07 PM

## 2021-10-19 ENCOUNTER — HOSPITAL ENCOUNTER (OUTPATIENT)
Dept: CARDIOLOGY | Facility: CLINIC | Age: 66
Discharge: HOME | End: 2021-10-19
Attending: NURSE PRACTITIONER | Admitting: NURSE PRACTITIONER
Payer: COMMERCIAL

## 2021-10-19 DIAGNOSIS — I50.22 CHRONIC SYSTOLIC CONGESTIVE HEART FAILURE (H): ICD-10-CM

## 2021-10-19 LAB — LVEF ECHO: NORMAL

## 2021-10-19 PROCEDURE — 93306 TTE W/DOPPLER COMPLETE: CPT

## 2021-10-19 PROCEDURE — 93306 TTE W/DOPPLER COMPLETE: CPT | Mod: 26 | Performed by: INTERNAL MEDICINE

## 2021-10-19 RX ORDER — ALPRAZOLAM 0.25 MG
0.25 TABLET ORAL 2 TIMES DAILY PRN
Qty: 60 TABLET | Refills: 0 | Status: SHIPPED | OUTPATIENT
Start: 2021-10-19 | End: 2021-11-23

## 2021-10-19 RX ORDER — ONDANSETRON 4 MG/1
TABLET, ORALLY DISINTEGRATING ORAL
Qty: 18 TABLET | Refills: 4 | Status: SHIPPED | OUTPATIENT
Start: 2021-10-19 | End: 2022-05-09

## 2021-10-20 ENCOUNTER — TELEPHONE (OUTPATIENT)
Dept: CARDIOLOGY | Facility: CLINIC | Age: 66
End: 2021-10-20

## 2021-10-20 DIAGNOSIS — I50.22 CHRONIC SYSTOLIC CONGESTIVE HEART FAILURE (H): Primary | ICD-10-CM

## 2021-10-20 DIAGNOSIS — I42.0 DILATED CARDIOMYOPATHY (H): ICD-10-CM

## 2021-10-20 DIAGNOSIS — R00.0 TACHYCARDIA: ICD-10-CM

## 2021-10-20 RX ORDER — CARVEDILOL 12.5 MG/1
18.75 TABLET ORAL 2 TIMES DAILY WITH MEALS
Qty: 210 TABLET | Refills: 1 | Status: SHIPPED | OUTPATIENT
Start: 2021-10-20 | End: 2021-11-09

## 2021-10-20 NOTE — TELEPHONE ENCOUNTER
ECHO reviewed  LVEF down some to 35-40% with elevated HRs  (prior EF 40-45%)  Have her increase her Coreg to 18.75 mg BID and repeat Holter Monitor  Follow up as planned next week.  Thanks, JS

## 2021-10-20 NOTE — TELEPHONE ENCOUNTER
Patient notified of results of Echocardiogram and plan to increase Coreg and will schedule heart monitor. Patient verbalized understanding and agreed with plan of care.

## 2021-10-26 ENCOUNTER — HOSPITAL ENCOUNTER (OUTPATIENT)
Dept: CARDIOLOGY | Facility: CLINIC | Age: 66
Discharge: HOME | End: 2021-10-26
Attending: NURSE PRACTITIONER | Admitting: NURSE PRACTITIONER
Payer: COMMERCIAL

## 2021-10-26 ENCOUNTER — LAB (OUTPATIENT)
Dept: LAB | Facility: CLINIC | Age: 66
End: 2021-10-26
Payer: COMMERCIAL

## 2021-10-26 DIAGNOSIS — I50.22 CHRONIC SYSTOLIC CONGESTIVE HEART FAILURE (H): ICD-10-CM

## 2021-10-26 DIAGNOSIS — R00.0 TACHYCARDIA: ICD-10-CM

## 2021-10-26 DIAGNOSIS — I25.10 CORONARY ARTERY DISEASE INVOLVING NATIVE CORONARY ARTERY OF NATIVE HEART WITHOUT ANGINA PECTORIS: ICD-10-CM

## 2021-10-26 LAB
ANION GAP SERPL CALCULATED.3IONS-SCNC: 4 MMOL/L (ref 3–14)
BUN SERPL-MCNC: 6 MG/DL (ref 7–30)
CALCIUM SERPL-MCNC: 8.7 MG/DL (ref 8.5–10.1)
CHLORIDE BLD-SCNC: 103 MMOL/L (ref 94–109)
CO2 SERPL-SCNC: 29 MMOL/L (ref 20–32)
CREAT SERPL-MCNC: 0.57 MG/DL (ref 0.52–1.04)
GFR SERPL CREATININE-BSD FRML MDRD: >90 ML/MIN/1.73M2
GLUCOSE BLD-MCNC: 125 MG/DL (ref 70–99)
POTASSIUM BLD-SCNC: 3.9 MMOL/L (ref 3.4–5.3)
SODIUM SERPL-SCNC: 136 MMOL/L (ref 133–144)

## 2021-10-26 PROCEDURE — 80048 BASIC METABOLIC PNL TOTAL CA: CPT | Performed by: NURSE PRACTITIONER

## 2021-10-26 PROCEDURE — 93225 XTRNL ECG REC<48 HRS REC: CPT

## 2021-10-26 PROCEDURE — 36415 COLL VENOUS BLD VENIPUNCTURE: CPT | Performed by: NURSE PRACTITIONER

## 2021-10-26 PROCEDURE — 93227 XTRNL ECG REC<48 HR R&I: CPT | Performed by: INTERNAL MEDICINE

## 2021-10-26 RX ORDER — LISINOPRIL 5 MG/1
5 TABLET ORAL DAILY
Qty: 90 TABLET | Refills: 0 | Status: SHIPPED | OUTPATIENT
Start: 2021-10-26 | End: 2022-03-21

## 2021-10-26 NOTE — TELEPHONE ENCOUNTER
Received refill request for:  Lisinopril  Last OV was: 10/6/21 with EROS Azar  Labs/EKG: 10/26/21 BMP  F/U scheduled:   Future Appointments   Date Time Provider Department Center   10/26/2021  2:45 PM [facility]   11/9/2021  7:30 AM Yi Snell E, ELLE CNP [facility]     [facility]

## 2021-11-02 DIAGNOSIS — M54.59 MECHANICAL LOW BACK PAIN: ICD-10-CM

## 2021-11-02 RX ORDER — TRAMADOL HYDROCHLORIDE 50 MG/1
TABLET ORAL
Qty: 20 TABLET | Refills: 0 | Status: SHIPPED | OUTPATIENT
Start: 2021-11-02 | End: 2021-12-01

## 2021-11-08 ENCOUNTER — MYC REFILL (OUTPATIENT)
Dept: INTERNAL MEDICINE | Facility: CLINIC | Age: 66
End: 2021-11-08
Payer: COMMERCIAL

## 2021-11-08 DIAGNOSIS — G89.29 CHRONIC BILATERAL LOW BACK PAIN, UNSPECIFIED WHETHER SCIATICA PRESENT: ICD-10-CM

## 2021-11-08 DIAGNOSIS — M54.50 CHRONIC BILATERAL LOW BACK PAIN, UNSPECIFIED WHETHER SCIATICA PRESENT: ICD-10-CM

## 2021-11-08 RX ORDER — HYDROCODONE BITARTRATE AND ACETAMINOPHEN 10; 325 MG/1; MG/1
1 TABLET ORAL EVERY 4 HOURS PRN
Qty: 180 TABLET | Refills: 0 | Status: SHIPPED | OUTPATIENT
Start: 2021-11-08 | End: 2021-12-05

## 2021-11-08 NOTE — PROGRESS NOTES
"Trisha Bender is a 66 year old female who is being evaluated via a billable video visit.  This visit is being conducted as a virtual visit due to the emphasis on mitigation of the COVID-19 virus pandemic. The clinician has decided that the risk of an in-office visit outweighs the benefit for this patient.     The patient has been notified of following:   \"This video visit will be conducted via a call between you and your physician/provider. We have found that certain health care needs can be provided without the need for an in-person physical exam.  This service lets us provide the care you need with a video conversation.  If a prescription is necessary we can send it directly to your pharmacy.  If lab work is needed we can place an order for that and you can then stop by our lab to have the test done at a later time.  If during the course of the call the physician/provider feels a video visit is not appropriate, you will not be charged for this service.\"     Patient has given verbal consent for Video visit? Yes    Patient would like the video invitation sent by: Text to cell phone: [phone] [email or web address]     Video Start Time: 07:30 am      Review Of Systems  Skin: Positive for shingles on left side of scalp  Eyes: Positive for glasses  Ears/Nose/Throat: Negative  Respiratory: Positive for dyspnea on exertion; asthma  Cardiovascular: Negative  Gastrointestinal: Negative  Genitourinary: Negative  Musculoskeletal: Positive for back pain; arthritis; joint pain - left shoulder  Neurologic: Positive for headaches; migraines  Psychiatric: Positive for sleep disturbances; a little anxiety   Hematologic/Lymphatic/Immunologic: Positive for easy bruising  Endocrine: Positive for thyroid disorder    Patient reported vitals:  BP: N/A  Heart rate: 105  Weight: 182 lbs      I have reviewed and updated the patient's Past Medical History, Social History, Family History and Medication List.    PROBLEM LIST  Patient Active " Problem List   Diagnosis     Essential hypertension with goal blood pressure less than 130/80     Chronic migraine without aura without status migrainosus, not intractable     Esophageal reflux     Disorder of bone and cartilage     Family history of malignant neoplasm of gastrointestinal tract     Chronic airway obstruction (H)     Cystocele     Family history of thyroid cancer - 1/2 brother     Anxiety     Mild persistent asthma without complication     Chronic low back pain     Hair loss     Lateral epicondylitis of right elbow     Dilated cardiomyopathy (H)     Right bundle branch block (RBBB)     CHF (congestive heart failure) (H)     SOB (shortness of breath)     Coronary artery disease involving native coronary artery of native heart without angina pectoris     Mixed hyperlipidemia     Controlled substance agreement signed     Moderate persistent asthma without complication     Hypokalemia     Mechanical low back pain     Panlobular emphysema (H)     Severe episode of recurrent major depressive disorder, without psychotic features (H)     Abdominal pain     Tachycardia     Status post coronary angiogram     Thoracic aortic ectasia (H)       ALLERGIES  Gabapentin, Contrast dye, Escitalopram, Peanuts [nuts], Penicillins, Sulfa drugs, Tace [chlorotrianisene], Aspirin, Ibuprofen, and Strawberry    MEDICATIONS  Current Outpatient Medications   Medication Sig Dispense Refill     albuterol (VENTOLIN HFA) 108 (90 BASE) MCG/ACT Inhaler Inhale 2 puffs into the lungs every 6 hours as needed for shortness of breath / dyspnea or wheezing 18 g 3     alendronate (FOSAMAX) 70 MG tablet TAKE 1 TABLET BY MOUTH ONE TIME PER WEEK 12 tablet 3     ALPRAZolam (XANAX) 0.25 MG tablet TAKE 1 TABLET (0.25 MG) BY MOUTH 2 TIMES DAILY AS NEEDED FOR ANXIETY 60 tablet 0     atorvastatin (LIPITOR) 10 MG tablet Take 1 tablet (10 mg) by mouth daily 90 tablet 3     BREO ELLIPTA 200-25 MCG/INH Inhaler Inhale 1 Dose into the lungs daily   12      carvedilol (COREG) 25 MG tablet Take 1 tablet (25 mg) by mouth 2 times daily (with meals) 180 tablet 3     cetirizine (ZYRTEC) 10 MG tablet Take 10 mg by mouth daily       [employer] FIBER GUMMIES 2.5 G CHEW Take 1 tablet by mouth daily Reported on 5/8/2017       erythromycin (ROMYCIN) 5 MG/GM ophthalmic ointment PLACE 0.5 INCHES INTO THE LEFT EYE AT BEDTIME (Patient taking differently: Place 0.5 inches Into the left eye nightly as needed ) 3.5 g 0     fluticasone (FLONASE) 50 MCG/ACT nasal spray USE ONE TO TWO SPRAYS IN EACH NOSTRIL EVERY DAY (NEED TO BE SEEN IN CLINIC FOR FURTHER REFILLS) 16 g 1     HYDROcodone-acetaminophen (NORCO)  MG per tablet Take 1 tablet by mouth every 4 hours as needed for severe pain Take up to 6 tablets a day 180 tablet 0     ipratropium - albuterol 0.5 mg/2.5 mg/3 mL (DUONEB) 0.5-2.5 (3) MG/3ML neb solution Take 1 vial (3 mLs) by nebulization every 6 hours as needed for shortness of breath / dyspnea or wheezing 30 vial 1     lisinopril (ZESTRIL) 5 MG tablet Take 1 tablet (5 mg) by mouth daily 90 tablet 0     montelukast (SINGULAIR) 10 MG tablet TAKE ONE TABLET BY MOUTH AT BEDTIME 90 tablet 1     Multiple Vitamin CHEW Take 1 tablet by mouth daily        naloxone (NARCAN) 4 MG/0.1ML nasal spray Spray 1 spray (4 mg) into one nostril alternating nostrils once as needed for opioid reversal Every 2-3 minutes until patient responsive or EMS arrives 0.2 mL 0     ondansetron (ZOFRAN-ODT) 4 MG ODT tab TAKE 1-2 TABLETS BY MOUTH EVERY 8 HOURS AS NEEDED FOR NAUSEA 18 tablet 4     roflumilast (DALIRESP) 500 MCG TABS tablet Take 1 tablet (500 mcg) by mouth daily       spironolactone (ALDACTONE) 25 MG tablet Take 1 tablet (25 mg) by mouth daily 90 tablet 3     SUMAtriptan (IMITREX) 25 MG tablet Take 1 tablet (25 mg) by mouth at onset of headache for migraine May repeat in 2 hours if needed: max 2/day; 9 tablet 11     tiotropium (SPIRIVA HANDIHALER) 18 MCG capsule USING THE HANDIHALER, INHALE THE  CONTENTS OF ONE CAPSULE BY MOUTH DAILY 90 capsule 3     traMADol (ULTRAM) 50 MG tablet TAKE 1 TABLET(50 MG) BY MOUTH THREE TIMES DAILY AS NEEDED FOR SEVERE PAIN 20 tablet 0     baclofen (LIORESAL) 10 MG tablet TAKE TWO TABLETS BY MOUTH THREE TIMES A DAY (Patient not taking: Reported on 11/9/2021) 180 tablet 5     pantoprazole (PROTONIX) 40 MG EC tablet TAKE 1 TABLET BY MOUTH EVERY DAY (Patient not taking: Reported on 11/9/2021) 90 tablet 3       ROS:  12-pt ROS is negative except for as noted above.    EXAM:  A limited exam was conducted via video.  Constitutional: Patient is pleasant, alert, in no distress. Normal body habitus, upright.  ENT: Membranes moist, no nasal discharge or bleeding gums. Normal head shape, no evidence of injury or laceration.  EYES: No scleral icterus, normal conjunctivae. EOM's appear intact. No observed jaundice  Neck: No evidence of thyromegaly.   Chest/Lungs: Appears to have normal respiratory effort. Normal breathing while talking. No audible wheezing, no cough  Equal chest wall expansion.   Cardiovascular: No obvious elevated JVP. No apparent pitting edema bilaterally.  Skin: No rashes or lesions appreciated on visualized skin, normal skin color.  Neurologic: Normal mentation. Normal arm motion bilaterally, no tremors. No evidence of focal defect.  Psychiatric: Appropriate affect. A&Ox3.  Calm demeanor      HISTORY OF PRESENT ILLNESS    This is a 66 year old female who follows with [facility].  She is a patient of Dr. Finney seen in our clinic for idiopathic cardiomyopathy, severe COPD, pulmonary hypertension, sinus tachycardia, hypertension, hyperlipidemia, depression, and chronic back pain on narcotics.     Ms Bender suffered heart failure (2015) Her ECHO showed LVEF 20-25%  Coronary angiography revealed mild nonobstructive CAD and she was diagnosed with idiopathic CM (likely takotsubo)  Fortunately on appropriate CV meds, her LVEF improved to 45-50%    A repeat  ECHO (9/2019) showed LVEF 55-60%, normal RV function, mild MR/TR     Ms Bender does have an extensive history of asthma and severe COPD   Lung transplantation has been recommended in the past, but recently deferred by the patient.     Due to worsening shortness of breath and resting sinus tachycardia, Diltiazem was started last year.  She does note that her O2 sats drop to 92% with exertion.    A heart monitor (6/2020) showed runs of nonsustained VT and paroxysmal supraventricular tachycardia.  Average heart rate of 120 bpm     ECHO (6/2020) showed LVEF 40-45%, normal RV function and no significant valvular pathology.  Her heart rate was 134 bpm during testing.  Her Diltiazem was changed to Coreg then.    She underwent a right and left heart cath (7/2020)  This showed minimal nonobstructive coronary artery disease.  She was noted to have severe right and left heart filling pressures (RA mean 21 mmHg, wedge 28 mmHg, LVEDp 25 mmHg)  It was felt that her pulmonary hypertension was partly due to her chronically elevated left heart filling pressures  Therefore, her lasix and Coreg were increased  Since then, she has noted improvement in her heart rate and peripheral edema    She was on Torsemide for a period of time, but she stopped it because of urinary urgency.  Spironolactone was started last month and an ECHO was repeated.    I reviewed her ECHO (10/19/21) this showed LVEF 35-40% with apical akinesis and mild/moderate global hypokinesis, mild aortic root dilatation  Her heart rate was rapid which could falsely show a reduced LVEF Her RV function was normal, 1+ MR, RVSP 13 mmHg (but could be underestimated due to incomplete TR velocity envelope) RA pressure 8 mmHg  Her heart rate during testing was 115 bpm  BP: 139/83 mmHg    Since her ECHO, she was instructed to increase her Coreg to 18.75 and to subsequently wear a Holter monitor    Our visit today is for further review.    Ms Bender is fairly immobile due to her lung  disease and chronic back pain.  However she says that she is able to do more activities easier and sleeping better since her Coreg was increased and she started on Spironolactone   Her legs are less swollen and she does not have the urinary urgency with Spironolactone like she did with Torsemide.  She is trying to avoid salty foods and has cut out potato chips  She tries to sleep with her legs elevated  At times, she sleeps in her recliner due to her back pain.  She denies any chest pain, worsening shortness of breath, orthopnea, or palpitations.  She monitor her heart rate at home and states that its staying around 100 bpm, but does elevate up to 130 at times.        I reviewed her Holter (10/26/21) This showed predominately sinus rhythm/sinus tachycardia with IVCD.  Average hr 100 bpm, minimum 75 bpm, and maximum at 137 bpm  There were no significant arrhythmias/dysrhythmias.    I reviewed her labs (10/26/21) Sodium: 136  Potassium: 3.9  BUN: 6  Creatinine: 0.57        IMPRESSION AND PLAN    Nonischemic Cardiomyopathy:  -LVEF 35-40% with global hypokinesis and apical dyskinesis (10/2021)  Prior LVEF 40-45% (6/2020)  -cath (7/30/20) showed minimal nonobstructive CAD and elevated right and left filling pressures  -etiology possibly due to tachycardia  -coreg recently increased to 18.75: subsequent average  bpm  -weight down 5 lbs from last month  -now on Spironolactone as opposed to Torsemide due to intolerance  -agreed to increase Coreg to 25 mg BID.  She states that she was on that dose several years ago and did well. She will call with any intolerance  -return visit with Dr Finney in 1-2 months        Hypertension  -On Coreg 18.7.5mg BID, Lisinopril 5 mg, spironolactone 25 mg  -BP controlled, but she has a hard time taking her own blood pressure     Severe COPD  -has chronic bronchitis  -lung transplantation evaluation, but the patient has declined due to risks  -on Prednisone 15 mg/day and multiple  inhalers    Pulmonary Hypertension  -mean RA pressure 21, wedge 28, mean PAP: 38 mmHg (7/2020)  -severe COPD   -RA pressure 8 mmHg per ECHO (10/2021)  -on spironolactone    Hyperlipidemia with hypertriglyceridemia  -on Atorvastatin 10 mg     Prediabetes            Video-Visit Details  Type of service:  Video Visit  Video End Time (time video stopped): 07:58 am  Originating Location (pt. Location): Home  Distant Location (provider location):  [facility]   Mode of Communication:  Video Conference via [employer] Dash, BERTA  [facility]

## 2021-11-09 ENCOUNTER — VIRTUAL VISIT (OUTPATIENT)
Dept: CARDIOLOGY | Facility: CLINIC | Age: 66
End: 2021-11-09
Attending: NURSE PRACTITIONER
Payer: COMMERCIAL

## 2021-11-09 DIAGNOSIS — I42.0 DILATED CARDIOMYOPATHY (H): ICD-10-CM

## 2021-11-09 DIAGNOSIS — I50.22 CHRONIC SYSTOLIC CONGESTIVE HEART FAILURE (H): ICD-10-CM

## 2021-11-09 PROCEDURE — 99214 OFFICE O/P EST MOD 30 MIN: CPT | Mod: 95 | Performed by: NURSE PRACTITIONER

## 2021-11-09 RX ORDER — CARVEDILOL 25 MG/1
25 TABLET ORAL 2 TIMES DAILY WITH MEALS
Qty: 180 TABLET | Refills: 3 | Status: SHIPPED | OUTPATIENT
Start: 2021-11-09 | End: 2022-11-03

## 2021-11-09 NOTE — LETTER
"11/9/2021    Bandar Weinberg MD  [address] [facility] [address]    RE: Trisha Bender       Dear Colleague,    I had the pleasure of seeing Trisha Bender in the [facility].    Trisha Bender is a 66 year old female who is being evaluated via a billable video visit.  This visit is being conducted as a virtual visit due to the emphasis on mitigation of the COVID-19 virus pandemic. The clinician has decided that the risk of an in-office visit outweighs the benefit for this patient.     The patient has been notified of following:   \"This video visit will be conducted via a call between you and your physician/provider. We have found that certain health care needs can be provided without the need for an in-person physical exam.  This service lets us provide the care you need with a video conversation.  If a prescription is necessary we can send it directly to your pharmacy.  If lab work is needed we can place an order for that and you can then stop by our lab to have the test done at a later time.  If during the course of the call the physician/provider feels a video visit is not appropriate, you will not be charged for this service.\"     Patient has given verbal consent for Video visit? Yes    Patient would like the video invitation sent by: Text to cell phone: [phone] [place]     Video Start Time: 07:30 am      Review Of Systems  Skin: Positive for shingles on left side of scalp  Eyes: Positive for glasses  Ears/Nose/Throat: Negative  Respiratory: Positive for dyspnea on exertion; asthma  Cardiovascular: Negative  Gastrointestinal: Negative  Genitourinary: Negative  Musculoskeletal: Positive for back pain; arthritis; joint pain - left shoulder  Neurologic: Positive for headaches; migraines  Psychiatric: Positive for sleep disturbances; a little anxiety   Hematologic/Lymphatic/Immunologic: Positive for easy bruising  Endocrine: " Positive for thyroid disorder    Patient reported vitals:  BP: N/A  Heart rate: 105  Weight: 182 lbs      I have reviewed and updated the patient's Past Medical History, Social History, Family History and Medication List.    PROBLEM LIST  Patient Active Problem List   Diagnosis     Essential hypertension with goal blood pressure less than 130/80     Chronic migraine without aura without status migrainosus, not intractable     Esophageal reflux     Disorder of bone and cartilage     Family history of malignant neoplasm of gastrointestinal tract     Chronic airway obstruction (H)     Cystocele     Family history of thyroid cancer - 1/2 brother     Anxiety     Mild persistent asthma without complication     Chronic low back pain     Hair loss     Lateral epicondylitis of right elbow     Dilated cardiomyopathy (H)     Right bundle branch block (RBBB)     CHF (congestive heart failure) (H)     SOB (shortness of breath)     Coronary artery disease involving native coronary artery of native heart without angina pectoris     Mixed hyperlipidemia     Controlled substance agreement signed     Moderate persistent asthma without complication     Hypokalemia     Mechanical low back pain     Panlobular emphysema (H)     Severe episode of recurrent major depressive disorder, without psychotic features (H)     Abdominal pain     Tachycardia     Status post coronary angiogram     Thoracic aortic ectasia (H)       ALLERGIES  Gabapentin, Contrast dye, Escitalopram, Peanuts [nuts], Penicillins, Sulfa drugs, Tace [chlorotrianisene], Aspirin, Ibuprofen, and Strawberry    MEDICATIONS  Current Outpatient Medications   Medication Sig Dispense Refill     albuterol (VENTOLIN HFA) 108 (90 BASE) MCG/ACT Inhaler Inhale 2 puffs into the lungs every 6 hours as needed for shortness of breath / dyspnea or wheezing 18 g 3     alendronate (FOSAMAX) 70 MG tablet TAKE 1 TABLET BY MOUTH ONE TIME PER WEEK 12 tablet 3     ALPRAZolam (XANAX) 0.25 MG tablet  TAKE 1 TABLET (0.25 MG) BY MOUTH 2 TIMES DAILY AS NEEDED FOR ANXIETY 60 tablet 0     atorvastatin (LIPITOR) 10 MG tablet Take 1 tablet (10 mg) by mouth daily 90 tablet 3     BREO ELLIPTA 200-25 MCG/INH Inhaler Inhale 1 Dose into the lungs daily   12     carvedilol (COREG) 25 MG tablet Take 1 tablet (25 mg) by mouth 2 times daily (with meals) 180 tablet 3     cetirizine (ZYRTEC) 10 MG tablet Take 10 mg by mouth daily       [employer] FIBER GUMMIES 2.5 G CHEW Take 1 tablet by mouth daily Reported on 5/8/2017       erythromycin (ROMYCIN) 5 MG/GM ophthalmic ointment PLACE 0.5 INCHES INTO THE LEFT EYE AT BEDTIME (Patient taking differently: Place 0.5 inches Into the left eye nightly as needed ) 3.5 g 0     fluticasone (FLONASE) 50 MCG/ACT nasal spray USE ONE TO TWO SPRAYS IN EACH NOSTRIL EVERY DAY (NEED TO BE SEEN IN CLINIC FOR FURTHER REFILLS) 16 g 1     HYDROcodone-acetaminophen (NORCO)  MG per tablet Take 1 tablet by mouth every 4 hours as needed for severe pain Take up to 6 tablets a day 180 tablet 0     ipratropium - albuterol 0.5 mg/2.5 mg/3 mL (DUONEB) 0.5-2.5 (3) MG/3ML neb solution Take 1 vial (3 mLs) by nebulization every 6 hours as needed for shortness of breath / dyspnea or wheezing 30 vial 1     lisinopril (ZESTRIL) 5 MG tablet Take 1 tablet (5 mg) by mouth daily 90 tablet 0     montelukast (SINGULAIR) 10 MG tablet TAKE ONE TABLET BY MOUTH AT BEDTIME 90 tablet 1     Multiple Vitamin CHEW Take 1 tablet by mouth daily        naloxone (NARCAN) 4 MG/0.1ML nasal spray Spray 1 spray (4 mg) into one nostril alternating nostrils once as needed for opioid reversal Every 2-3 minutes until patient responsive or EMS arrives 0.2 mL 0     ondansetron (ZOFRAN-ODT) 4 MG ODT tab TAKE 1-2 TABLETS BY MOUTH EVERY 8 HOURS AS NEEDED FOR NAUSEA 18 tablet 4     roflumilast (DALIRESP) 500 MCG TABS tablet Take 1 tablet (500 mcg) by mouth daily       spironolactone (ALDACTONE) 25 MG tablet Take 1 tablet (25 mg) by mouth daily 90 tablet  3     SUMAtriptan (IMITREX) 25 MG tablet Take 1 tablet (25 mg) by mouth at onset of headache for migraine May repeat in 2 hours if needed: max 2/day; 9 tablet 11     tiotropium (SPIRIVA HANDIHALER) 18 MCG capsule USING THE HANDIHALER, INHALE THE CONTENTS OF ONE CAPSULE BY MOUTH DAILY 90 capsule 3     traMADol (ULTRAM) 50 MG tablet TAKE 1 TABLET(50 MG) BY MOUTH THREE TIMES DAILY AS NEEDED FOR SEVERE PAIN 20 tablet 0     baclofen (LIORESAL) 10 MG tablet TAKE TWO TABLETS BY MOUTH THREE TIMES A DAY (Patient not taking: Reported on 11/9/2021) 180 tablet 5     pantoprazole (PROTONIX) 40 MG EC tablet TAKE 1 TABLET BY MOUTH EVERY DAY (Patient not taking: Reported on 11/9/2021) 90 tablet 3       ROS:  12-pt ROS is negative except for as noted above.    EXAM:  A limited exam was conducted via video.  Constitutional: Patient is pleasant, alert, in no distress. Normal body habitus, upright.  ENT: Membranes moist, no nasal discharge or bleeding gums. Normal head shape, no evidence of injury or laceration.  EYES: No scleral icterus, normal conjunctivae. EOM's appear intact. No observed jaundice  Neck: No evidence of thyromegaly.   Chest/Lungs: Appears to have normal respiratory effort. Normal breathing while talking. No audible wheezing, no cough  Equal chest wall expansion.   Cardiovascular: No obvious elevated JVP. No apparent pitting edema bilaterally.  Skin: No rashes or lesions appreciated on visualized skin, normal skin color.  Neurologic: Normal mentation. Normal arm motion bilaterally, no tremors. No evidence of focal defect.  Psychiatric: Appropriate affect. A&Ox3.  Calm demeanor      HISTORY OF PRESENT ILLNESS    This is a 66 year old female who follows with [facility].  She is a patient of Dr. Finney seen in our clinic for idiopathic cardiomyopathy, severe COPD, pulmonary hypertension, sinus tachycardia, hypertension, hyperlipidemia, depression, and chronic back pain on narcotics.     Ms Bender  suffered heart failure (2015) Her ECHO showed LVEF 20-25%  Coronary angiography revealed mild nonobstructive CAD and she was diagnosed with idiopathic CM (likely takotsubo)  Fortunately on appropriate CV meds, her LVEF improved to 45-50%    A repeat ECHO (9/2019) showed LVEF 55-60%, normal RV function, mild MR/TR     Ms Bender does have an extensive history of asthma and severe COPD   Lung transplantation has been recommended in the past, but recently deferred by the patient.     Due to worsening shortness of breath and resting sinus tachycardia, Diltiazem was started last year.  She does note that her O2 sats drop to 92% with exertion.    A heart monitor (6/2020) showed runs of nonsustained VT and paroxysmal supraventricular tachycardia.  Average heart rate of 120 bpm     ECHO (6/2020) showed LVEF 40-45%, normal RV function and no significant valvular pathology.  Her heart rate was 134 bpm during testing.  Her Diltiazem was changed to Coreg then.    She underwent a right and left heart cath (7/2020)  This showed minimal nonobstructive coronary artery disease.  She was noted to have severe right and left heart filling pressures (RA mean 21 mmHg, wedge 28 mmHg, LVEDp 25 mmHg)  It was felt that her pulmonary hypertension was partly due to her chronically elevated left heart filling pressures  Therefore, her lasix and Coreg were increased  Since then, she has noted improvement in her heart rate and peripheral edema    She was on Torsemide for a period of time, but she stopped it because of urinary urgency.  Spironolactone was started last month and an ECHO was repeated.    I reviewed her ECHO (10/19/21) this showed LVEF 35-40% with apical akinesis and mild/moderate global hypokinesis, mild aortic root dilatation  Her heart rate was rapid which could falsely show a reduced LVEF Her RV function was normal, 1+ MR, RVSP 13 mmHg (but could be underestimated due to incomplete TR velocity envelope) RA pressure 8 mmHg  Her  heart rate during testing was 115 bpm  BP: 139/83 mmHg    Since her ECHO, she was instructed to increase her Coreg to 18.75 and to subsequently wear a Holter monitor    Our visit today is for further review.    Ms Bender is fairly immobile due to her lung disease and chronic back pain.  However she says that she is able to do more activities easier and sleeping better since her Coreg was increased and she started on Spironolactone   Her legs are less swollen and she does not have the urinary urgency with Spironolactone like she did with Torsemide.  She is trying to avoid salty foods and has cut out potato chips  She tries to sleep with her legs elevated  At times, she sleeps in her recliner due to her back pain.  She denies any chest pain, worsening shortness of breath, orthopnea, or palpitations.  She monitor her heart rate at home and states that its staying around 100 bpm, but does elevate up to 130 at times.        I reviewed her Holter (10/26/21) This showed predominately sinus rhythm/sinus tachycardia with IVCD.  Average hr 100 bpm, minimum 75 bpm, and maximum at 137 bpm  There were no significant arrhythmias/dysrhythmias.    I reviewed her labs (10/26/21) Sodium: 136  Potassium: 3.9  BUN: 6  Creatinine: 0.57        IMPRESSION AND PLAN    Nonischemic Cardiomyopathy:  -LVEF 35-40% with global hypokinesis and apical dyskinesis (10/2021)  Prior LVEF 40-45% (6/2020)  -cath (7/30/20) showed minimal nonobstructive CAD and elevated right and left filling pressures  -etiology possibly due to tachycardia  -coreg recently increased to 18.75: subsequent average  bpm  -weight down 5 lbs from last month  -now on Spironolactone as opposed to Torsemide due to intolerance  -agreed to increase Coreg to 25 mg BID.  She states that she was on that dose several years ago and did well. She will call with any intolerance  -return visit with Dr Finney in 1-2 months        Hypertension  -On Coreg 18.7.5mg BID, Lisinopril 5 mg,  spironolactone 25 mg  -BP controlled, but she has a hard time taking her own blood pressure     Severe COPD  -has chronic bronchitis  -lung transplantation evaluation, but the patient has declined due to risks  -on Prednisone 15 mg/day and multiple inhalers    Pulmonary Hypertension  -mean RA pressure 21, wedge 28, mean PAP: 38 mmHg (7/2020)  -severe COPD   -RA pressure 8 mmHg per ECHO (10/2021)  -on spironolactone    Hyperlipidemia with hypertriglyceridemia  -on Atorvastatin 10 mg     Prediabetes            Video-Visit Details  Type of service:  Video Visit  Video End Time (time video stopped): 07:58 am  Originating Location (pt. Location): Home  Distant Location (provider location):  [facility]   Mode of Communication:  Video Conference via [employer]    ELLE Medina, BERTA  [facility]      Thank you for allowing me to participate in the care of your patient.      Sincerely,     ELLE Landa CNP     [facility]  cc:   ELLE Medina CNP  [address]  [place] [address]

## 2021-11-15 DIAGNOSIS — E78.2 MIXED HYPERLIPIDEMIA: ICD-10-CM

## 2021-11-15 DIAGNOSIS — I25.10 CORONARY ARTERY DISEASE INVOLVING NATIVE CORONARY ARTERY OF NATIVE HEART WITHOUT ANGINA PECTORIS: ICD-10-CM

## 2021-11-15 RX ORDER — ATORVASTATIN CALCIUM 10 MG/1
10 TABLET, FILM COATED ORAL DAILY
Qty: 90 TABLET | Refills: 3 | Status: SHIPPED | OUTPATIENT
Start: 2021-11-15 | End: 2022-11-03

## 2021-11-22 DIAGNOSIS — Z79.899 CHRONIC PRESCRIPTION BENZODIAZEPINE USE: ICD-10-CM

## 2021-11-22 DIAGNOSIS — F41.9 ANXIETY: ICD-10-CM

## 2021-11-23 RX ORDER — ALPRAZOLAM 0.25 MG
TABLET ORAL
Qty: 60 TABLET | Refills: 0 | Status: SHIPPED | OUTPATIENT
Start: 2021-11-23 | End: 2021-12-29

## 2021-11-23 NOTE — TELEPHONE ENCOUNTER
Routing refill request to provider for review/approval because:  Drug not on the [facility], P or [facility] refill protocol or controlled substance    Yandy F - Registered Nurse  [facility]  Acute and Diagnostic Services

## 2021-11-30 DIAGNOSIS — M54.59 MECHANICAL LOW BACK PAIN: ICD-10-CM

## 2021-12-01 RX ORDER — TRAMADOL HYDROCHLORIDE 50 MG/1
TABLET ORAL
Qty: 20 TABLET | Refills: 0 | Status: SHIPPED | OUTPATIENT
Start: 2021-12-01 | End: 2021-12-27

## 2021-12-01 NOTE — TELEPHONE ENCOUNTER
Routing refill request to provider for review/approval because:  Drug not on the [facility] refill protocol       Pending Prescriptions:                       Disp   Refills    traMADol (ULTRAM) 50 MG tablet [Pharmacy M*20 tab*         Sig: TAKE 1 TABLET(50 MG) BY MOUTH THREE TIMES DAILY AS           NEEDED FOR SEVERE PAIN

## 2021-12-05 ENCOUNTER — MYC REFILL (OUTPATIENT)
Dept: INTERNAL MEDICINE | Facility: CLINIC | Age: 66
End: 2021-12-05
Payer: COMMERCIAL

## 2021-12-05 DIAGNOSIS — G89.29 CHRONIC BILATERAL LOW BACK PAIN, UNSPECIFIED WHETHER SCIATICA PRESENT: ICD-10-CM

## 2021-12-05 DIAGNOSIS — M54.50 CHRONIC BILATERAL LOW BACK PAIN, UNSPECIFIED WHETHER SCIATICA PRESENT: ICD-10-CM

## 2021-12-08 DIAGNOSIS — M54.50 CHRONIC BILATERAL LOW BACK PAIN, UNSPECIFIED WHETHER SCIATICA PRESENT: ICD-10-CM

## 2021-12-08 DIAGNOSIS — G89.29 CHRONIC BILATERAL LOW BACK PAIN, UNSPECIFIED WHETHER SCIATICA PRESENT: ICD-10-CM

## 2021-12-08 RX ORDER — HYDROCODONE BITARTRATE AND ACETAMINOPHEN 10; 325 MG/1; MG/1
1 TABLET ORAL EVERY 4 HOURS PRN
Qty: 180 TABLET | Refills: 0 | Status: SHIPPED | OUTPATIENT
Start: 2021-12-08 | End: 2022-01-03

## 2021-12-10 RX ORDER — HYDROCODONE BITARTRATE AND ACETAMINOPHEN 10; 325 MG/1; MG/1
TABLET ORAL
Qty: 180 TABLET | Refills: 0 | OUTPATIENT
Start: 2021-12-10

## 2021-12-26 DIAGNOSIS — Z79.899 CHRONIC PRESCRIPTION BENZODIAZEPINE USE: ICD-10-CM

## 2021-12-26 DIAGNOSIS — F41.9 ANXIETY: ICD-10-CM

## 2021-12-26 DIAGNOSIS — G43.709 CHRONIC MIGRAINE WITHOUT AURA WITHOUT STATUS MIGRAINOSUS, NOT INTRACTABLE: ICD-10-CM

## 2021-12-27 DIAGNOSIS — M54.59 MECHANICAL LOW BACK PAIN: ICD-10-CM

## 2021-12-27 RX ORDER — TRAMADOL HYDROCHLORIDE 50 MG/1
TABLET ORAL
Qty: 20 TABLET | Refills: 0 | Status: SHIPPED | OUTPATIENT
Start: 2021-12-27 | End: 2022-01-03

## 2021-12-29 RX ORDER — SUMATRIPTAN 25 MG/1
TABLET, FILM COATED ORAL
Qty: 9 TABLET | Refills: 11 | Status: SHIPPED | OUTPATIENT
Start: 2021-12-29 | End: 2021-12-30

## 2021-12-29 RX ORDER — ALPRAZOLAM 0.25 MG
TABLET ORAL
Qty: 60 TABLET | Refills: 0 | Status: SHIPPED | OUTPATIENT
Start: 2021-12-29 | End: 2022-02-08

## 2021-12-29 NOTE — TELEPHONE ENCOUNTER
Pending Prescriptions:                       Disp   Refills    SUMAtriptan (IMITREX) 25 MG tablet [Pharma*9 tabl*11       Sig: PLEASE SEE ATTACHED FOR DETAILED DIRECTIONS    ALPRAZolam (XANAX) 0.25 MG tablet [Pharmac*60 tab*0        Sig: TAKE 1 TABLET BY MOUTH TWICE A DAY AS NEEDED FOR           ANXIETY  Routing refill request to provider for review/approval because:  Fails protocol  Last ov 8/3/2021

## 2021-12-30 ENCOUNTER — TELEPHONE (OUTPATIENT)
Dept: INTERNAL MEDICINE | Facility: CLINIC | Age: 66
End: 2021-12-30
Payer: COMMERCIAL

## 2021-12-30 DIAGNOSIS — G43.709 CHRONIC MIGRAINE WITHOUT AURA WITHOUT STATUS MIGRAINOSUS, NOT INTRACTABLE: ICD-10-CM

## 2021-12-30 RX ORDER — SUMATRIPTAN 25 MG/1
25 TABLET, FILM COATED ORAL
Qty: 9 TABLET | Refills: 11 | Status: SHIPPED | OUTPATIENT
Start: 2021-12-30 | End: 2024-01-01

## 2021-12-30 NOTE — TELEPHONE ENCOUNTER
Call to [facility] at [phone]. Spoke with pharmacy. Pharmacy states a SIG code is the direction of how to take the medication.     Currently SUMAtriptan (IMITREX) 25 MG tablet does not have directions for how to take it. Frequency, route. It only says: PLEASE SEE ATTACHED FOR DETAILED DIRECTIONS.     Pharmacy requires this medication to be resent with the full directions for how many tablets, dose, how often, and route.     Elizabeth BADILLO RN   [facility]

## 2021-12-30 NOTE — TELEPHONE ENCOUNTER
Routing to provider and covering providers to please see message below and advise.     I am not sure what a SIG CODE is.   Pharmacy needs prescription re-sent for SUMAtriptan (IMITREX) 25 MG tablet.     Elizabeth BADILLO RN   [facility]

## 2021-12-30 NOTE — TELEPHONE ENCOUNTER
I do not understand what they mean.  Please clarify with the pharmacy what they want, if they want the code for the connected condition, it is chronic migraine and the code is on her problem list.

## 2021-12-30 NOTE — TELEPHONE ENCOUNTER
"Fax received from [facility].  SUMAtriptan (IMITREX) 25 MG tablet TX issued yesterday is missing.illegible information. SIG Code - Need RX resent with \"SIG CODE\"  "

## 2022-01-01 NOTE — TELEPHONE ENCOUNTER
Hold any therapy d/t sepsis     Yandy, pharmacist at [facility] calls. Pt brought them scripts for Norco and Percocet, but Percocet wasn't signed. Yandy states Percocet was ordered for pt because she was not due for [place] yet. Yandy would rather take a verbal order from Dr. Weinberg to fill the [place] early than have pt take another narcotic.      She also ask how long should the Norco prescription last? They typically run all controlled prescriptions as a 30 day supply, however her order states she can take a maximum of 6 pills a day which would only give her a 25 day supply.     Routed to provider to review - pt is waiting in the pharmacy. Yandy will hold the percocet prescription for pt, a staff member from our office would need to come get it to have Dr. Weinberg sign if he wants pt to fill this.    *Yandy also concerned that pt was handed controlled prescriptions, she was told our office would always have a staff member deliver them for the pt.

## 2022-01-02 ENCOUNTER — MYC REFILL (OUTPATIENT)
Dept: INTERNAL MEDICINE | Facility: CLINIC | Age: 67
End: 2022-01-02
Payer: COMMERCIAL

## 2022-01-02 DIAGNOSIS — G89.29 CHRONIC BILATERAL LOW BACK PAIN, UNSPECIFIED WHETHER SCIATICA PRESENT: ICD-10-CM

## 2022-01-02 DIAGNOSIS — M54.50 CHRONIC BILATERAL LOW BACK PAIN, UNSPECIFIED WHETHER SCIATICA PRESENT: ICD-10-CM

## 2022-01-03 DIAGNOSIS — M54.59 MECHANICAL LOW BACK PAIN: ICD-10-CM

## 2022-01-03 RX ORDER — HYDROCODONE BITARTRATE AND ACETAMINOPHEN 10; 325 MG/1; MG/1
1 TABLET ORAL EVERY 4 HOURS PRN
Qty: 180 TABLET | Refills: 0 | Status: SHIPPED | OUTPATIENT
Start: 2022-01-03 | End: 2022-01-07

## 2022-01-03 RX ORDER — HYDROCODONE BITARTRATE AND ACETAMINOPHEN 10; 325 MG/1; MG/1
1 TABLET ORAL EVERY 4 HOURS PRN
Qty: 180 TABLET | Refills: 0 | OUTPATIENT
Start: 2022-01-03

## 2022-01-03 RX ORDER — TRAMADOL HYDROCHLORIDE 50 MG/1
TABLET ORAL
Qty: 20 TABLET | Refills: 0 | Status: SHIPPED | OUTPATIENT
Start: 2022-01-03 | End: 2022-01-24

## 2022-01-03 NOTE — TELEPHONE ENCOUNTER
Routing refill request to provider for review/approval because:  Drug not on the [facility] refill protocol     See patient message.  Thanks    Pending Prescriptions:                       Disp   Refills    HYDROcodone-acetaminophen (NORCO)  M*180 ta*0        Sig: Take 1 tablet by mouth every 4 hours as needed for           severe pain Take up to 6 tablets a day

## 2022-01-03 NOTE — TELEPHONE ENCOUNTER
Patient sent a myc message regarding prescription. Patient requesting medication to be dated to start on date due.      Per patient message:  Why is this medication being denied?  I have asked for the refill for later in the week.  [facility] will not fill it until Thursday at the earliest which would be 28 or 29 days since the last refill.  I would like to get this in order so that there isn t a disruption.  Is Dr. Weinberg there?  Please look again at this request and get this correct.  Thank you.  Trisha Marie

## 2022-01-06 ENCOUNTER — TELEPHONE (OUTPATIENT)
Dept: INTERNAL MEDICINE | Facility: CLINIC | Age: 67
End: 2022-01-06
Payer: COMMERCIAL

## 2022-01-06 ENCOUNTER — MYC MEDICAL ADVICE (OUTPATIENT)
Dept: INTERNAL MEDICINE | Facility: CLINIC | Age: 67
End: 2022-01-06
Payer: COMMERCIAL

## 2022-01-06 DIAGNOSIS — G89.29 CHRONIC BILATERAL LOW BACK PAIN, UNSPECIFIED WHETHER SCIATICA PRESENT: ICD-10-CM

## 2022-01-06 DIAGNOSIS — M54.50 CHRONIC BILATERAL LOW BACK PAIN, UNSPECIFIED WHETHER SCIATICA PRESENT: ICD-10-CM

## 2022-01-06 NOTE — TELEPHONE ENCOUNTER
"Prescription was sent 1/3/21:    Earliest Fill Date: 1/3/2022   Notes to Pharmacy: Dispense 1/8/22   Last fill 12/9/21     Call to patient. States she called the pharmacy and was told they would be willing to fill it today and insurance would pay for it. States she usually picks it us every 4 weeks. States she occasionally takes an extra tablet accidentally and is out of meds. She does not want to run out. Patient states Dr. Weinberg has been very lenient knowing that she lives with chronic pain and is on palliative care. States Dr. Weinberg is aware she occasionally takes more than 6 tablets per day. States she is trying work on not taking more than prescribed but she did fall recently and is having more pain. States she also purchased a \"new set up\" to help her portion her medications out better.   "

## 2022-01-06 NOTE — TELEPHONE ENCOUNTER
Patient calling  She wants to be able to  the rx for HYDROcodone-acetaminophen (NORCO)  MG per tablet today. I explained last rx was 12/9/2021. Patient would like to talk with a triage nurse. Ok to call and  [phone]

## 2022-01-07 RX ORDER — HYDROCODONE BITARTRATE AND ACETAMINOPHEN 10; 325 MG/1; MG/1
1 TABLET ORAL EVERY 4 HOURS PRN
Qty: 180 TABLET | Refills: 0 | Status: SHIPPED | OUTPATIENT
Start: 2022-01-07 | End: 2022-02-02

## 2022-01-07 RX ORDER — HYDROCODONE BITARTRATE AND ACETAMINOPHEN 10; 325 MG/1; MG/1
1 TABLET ORAL EVERY 4 HOURS PRN
Qty: 180 TABLET | Refills: 0 | Status: CANCELLED | OUTPATIENT
Start: 2022-01-07

## 2022-01-07 NOTE — TELEPHONE ENCOUNTER
Pt should be due today for Norco, 1/7.   Was last filled on 12/8, so can  today.     Please advise if OK.

## 2022-01-07 NOTE — TELEPHONE ENCOUNTER
"Patient calls back this morning  \"really need this before the weekend I had a really terrible painful night last night\"  Insurance will fill today and if provider not willing to refill today  Can patient please try Toradol or something else?  Patient has used Toradol in past.    Please advise  thanks  "

## 2022-01-23 DIAGNOSIS — M54.59 MECHANICAL LOW BACK PAIN: ICD-10-CM

## 2022-01-24 RX ORDER — TRAMADOL HYDROCHLORIDE 50 MG/1
TABLET ORAL
Qty: 20 TABLET | Refills: 0 | Status: SHIPPED | OUTPATIENT
Start: 2022-01-24 | End: 2022-02-02

## 2022-01-31 DIAGNOSIS — G89.29 CHRONIC BILATERAL LOW BACK PAIN, UNSPECIFIED WHETHER SCIATICA PRESENT: ICD-10-CM

## 2022-01-31 DIAGNOSIS — M54.50 CHRONIC BILATERAL LOW BACK PAIN, UNSPECIFIED WHETHER SCIATICA PRESENT: ICD-10-CM

## 2022-01-31 DIAGNOSIS — M54.59 MECHANICAL LOW BACK PAIN: ICD-10-CM

## 2022-01-31 NOTE — TELEPHONE ENCOUNTER
Pending Prescriptions:                       Disp   Refills    traMADol (ULTRAM) 50 MG tablet [Pharmacy M*20 tab*         Sig: TAKE 1 TABLET(50 MG) BY MOUTH THREE TIMES DAILY AS           NEEDED FOR SEVERE PAIN    Routing refill request to provider for review/approval because:  Drug not on the [facility] refill protocol     Please advise, thanks.

## 2022-02-01 ENCOUNTER — VIRTUAL VISIT (OUTPATIENT)
Dept: INTERNAL MEDICINE | Facility: CLINIC | Age: 67
End: 2022-02-01
Payer: COMMERCIAL

## 2022-02-01 DIAGNOSIS — G56.92 NEUROPATHY, ARM, LEFT: ICD-10-CM

## 2022-02-01 DIAGNOSIS — F41.9 ANXIETY: ICD-10-CM

## 2022-02-01 DIAGNOSIS — M54.59 MECHANICAL LOW BACK PAIN: Primary | ICD-10-CM

## 2022-02-01 DIAGNOSIS — I42.0 DILATED CARDIOMYOPATHY (H): ICD-10-CM

## 2022-02-01 DIAGNOSIS — R11.0 NAUSEA: ICD-10-CM

## 2022-02-01 DIAGNOSIS — J43.1 PANLOBULAR EMPHYSEMA (H): ICD-10-CM

## 2022-02-01 PROCEDURE — 99214 OFFICE O/P EST MOD 30 MIN: CPT | Mod: 95 | Performed by: INTERNAL MEDICINE

## 2022-02-01 RX ORDER — OXYCODONE HYDROCHLORIDE 5 MG/1
5 TABLET ORAL EVERY 6 HOURS PRN
Qty: 12 TABLET | Refills: 0 | Status: SHIPPED | OUTPATIENT
Start: 2022-02-01 | End: 2022-03-21

## 2022-02-01 RX ORDER — DULOXETIN HYDROCHLORIDE 20 MG/1
20 CAPSULE, DELAYED RELEASE ORAL DAILY
Qty: 30 CAPSULE | Refills: 3 | Status: SHIPPED | OUTPATIENT
Start: 2022-02-01 | End: 2022-05-20

## 2022-02-01 NOTE — PROGRESS NOTES
"Cynthia is a 66 year old who is being evaluated via a billable video visit.      How would you like to obtain your AVS? MyChart  If the video visit is dropped, the invitation should be resent by: Text to cell phone: [phone]  Will anyone else be joining your video visit? No    Video Start Time: 5:43 PM    Assessment & Plan     Mechanical low back pain  Start on Cymbalta  Has used earlier her narcotic medications - 5 days , because of increased pain  Discussed adherence to medications instructions  Will provide 3 days supply of Oxycodone for the time until her Norco is due  Advised for respiratory depression, sedation, falls related to narcotic use    - oxyCODONE (ROXICODONE) 5 MG tablet; Take 1 tablet (5 mg) by mouth every 6 hours as needed for pain  - DULoxetine (CYMBALTA) 20 MG capsule; Take 1 capsule (20 mg) by mouth daily    Anxiety  Start on [place] , titrate dose in 2 weeks as needed/ tolerated   - DULoxetine (CYMBALTA) 20 MG capsule; Take 1 capsule (20 mg) by mouth daily    Neuropathy, arm, left  Start selective serotonin reuptake inhibitor, continue pain treatment  If not improved, assess with neurology   - DULoxetine (CYMBALTA) 20 MG capsule; Take 1 capsule (20 mg) by mouth daily    Panlobular emphysema (H)  Continue inhalers, follow up with pulmonary     Dilated cardiomyopathy (H)  No symptoms of edema, has chronic SOB, continue treatment and cardiology follow up                BMI:   Estimated body mass index is 26.83 kg/m  as calculated from the following:    Height as of 10/6/21: 1.778 m (5' 10\").    Weight as of 8/3/21: 84.8 kg (187 lb).       See Patient Instructions    Return in about 3 months (around 5/1/2022) for Routine Visit.    Bandar Weinberg MD  [facility]    Subjective   Cynthia is a 66 year old who presents for the following health issues     HPI     Patient is seen for a follow up visit.  Concerns for severe LBP, chronic, increased symptoms past month. Has been " using last week one more Norco daily and is out of medications, took last one this morning.   Pain is increased, no radiation. Uses heating pad and ice. Able to ambulate. No change of BMs or urine.   Has not had side effects from medications.   Has anxiety, increased symptoms related to lack of mediations and persistent pain.   Has h/o COPD, on inhaler steroids, no increased cough. Has SOB on exertion.   Has h/o CHF. Follows with cardiology , on treatment. No edema. No orthopnea.       Review of Systems   Constitutional, HEENT, cardiovascular, pulmonary, gi and gu systems are negative, except as otherwise noted.      Objective           Vitals:  No vitals were obtained today due to virtual visit.    Physical Exam   GENERAL: Healthy, alert and no distress  EYES: Eyes grossly normal to inspection.  No discharge or erythema, or obvious scleral/conjunctival abnormalities.  RESP: No audible wheeze, cough, or visible cyanosis.  No visible retractions or increased work of breathing.    SKIN: Visible skin clear. No significant rash, abnormal pigmentation or lesions.  NEURO: Cranial nerves grossly intact.  Mentation and speech appropriate for age.  PSYCH: Mentation appears normal, affect normal/bright, judgement and insight intact, normal speech and appearance well-groomed.                Video-Visit Details    Type of service:  Video Visit    Video End Time:6:05 PM    Originating Location (pt. Location): Home    Distant Location (provider location):  [facility]     Platform used for Video Visit: [employer]Edge

## 2022-02-01 NOTE — TELEPHONE ENCOUNTER
Patient is calling for refill. She is out of her tramadol and almost out of the norco. She said she has been taking 7 norco per day.   She will schedule a virtual visit to discuss this.

## 2022-02-02 ENCOUNTER — TELEPHONE (OUTPATIENT)
Dept: INTERNAL MEDICINE | Facility: CLINIC | Age: 67
End: 2022-02-02
Payer: COMMERCIAL

## 2022-02-02 DIAGNOSIS — G89.29 CHRONIC BILATERAL LOW BACK PAIN, UNSPECIFIED WHETHER SCIATICA PRESENT: ICD-10-CM

## 2022-02-02 DIAGNOSIS — M54.50 CHRONIC BILATERAL LOW BACK PAIN, UNSPECIFIED WHETHER SCIATICA PRESENT: ICD-10-CM

## 2022-02-02 RX ORDER — HYDROCODONE BITARTRATE AND ACETAMINOPHEN 10; 325 MG/1; MG/1
1 TABLET ORAL EVERY 4 HOURS PRN
Qty: 180 TABLET | Refills: 0 | Status: SHIPPED | OUTPATIENT
Start: 2022-02-02 | End: 2022-02-28

## 2022-02-02 RX ORDER — HYDROCODONE BITARTRATE AND ACETAMINOPHEN 10; 325 MG/1; MG/1
1 TABLET ORAL EVERY 4 HOURS PRN
Qty: 180 TABLET | Refills: 0 | Status: SHIPPED | OUTPATIENT
Start: 2022-02-02 | End: 2022-02-02

## 2022-02-02 RX ORDER — TRAMADOL HYDROCHLORIDE 50 MG/1
TABLET ORAL
Qty: 20 TABLET | Refills: 0 | Status: SHIPPED | OUTPATIENT
Start: 2022-02-02 | End: 2022-02-25

## 2022-02-02 NOTE — TELEPHONE ENCOUNTER
"Pt had VV yesterday. She send [employer] message last night.     Please change amount and directions IF Appropriate.       \"Dr. Weinberg,  We had just talked about this prescription during our E-visit.  I thought that you  were going to approve this for pickup on Friday 2/4.  You even discussed raising the amount to 7 pills per day which would work out better.  Could you please go ahead and approve this so it can be ready this Friday?  Much appreciated.\"  Thank you. Trisha Bender  "

## 2022-02-02 NOTE — TELEPHONE ENCOUNTER
Patient called to say that this rx of norco  went to [facility] by mistake. The [facility] was only for her temporary supply to hold her over until the full RX could be filled. Can this recent rx of Norco please go to the  Pharmacy tonight? THANK YOU

## 2022-02-07 DIAGNOSIS — Z79.899 CHRONIC PRESCRIPTION BENZODIAZEPINE USE: ICD-10-CM

## 2022-02-07 DIAGNOSIS — F41.9 ANXIETY: ICD-10-CM

## 2022-02-08 RX ORDER — ALPRAZOLAM 0.25 MG
TABLET ORAL
Qty: 60 TABLET | Refills: 0 | Status: SHIPPED | OUTPATIENT
Start: 2022-02-08 | End: 2022-03-22

## 2022-02-20 ENCOUNTER — HEALTH MAINTENANCE LETTER (OUTPATIENT)
Age: 67
End: 2022-02-20

## 2022-02-24 DIAGNOSIS — M54.59 MECHANICAL LOW BACK PAIN: ICD-10-CM

## 2022-02-25 RX ORDER — TRAMADOL HYDROCHLORIDE 50 MG/1
TABLET ORAL
Qty: 20 TABLET | Refills: 0 | Status: SHIPPED | OUTPATIENT
Start: 2022-02-25 | End: 2022-03-16

## 2022-02-25 NOTE — TELEPHONE ENCOUNTER
Pending Prescriptions:                       Disp   Refills    traMADol (ULTRAM) 50 MG tablet [Pharmacy M*20 tab*         Sig: TAKE 1 TABLET(50 MG) BY MOUTH THREE TIMES DAILY AS           NEEDED FOR SEVERE PAIN  Routing refill request to provider for review/approval because:  Drug not on the [facility] refill protocol   Last ov 2/1/2022

## 2022-02-27 ENCOUNTER — MYC REFILL (OUTPATIENT)
Dept: INTERNAL MEDICINE | Facility: CLINIC | Age: 67
End: 2022-02-27
Payer: COMMERCIAL

## 2022-02-27 DIAGNOSIS — G89.29 CHRONIC BILATERAL LOW BACK PAIN, UNSPECIFIED WHETHER SCIATICA PRESENT: ICD-10-CM

## 2022-02-27 DIAGNOSIS — M54.50 CHRONIC BILATERAL LOW BACK PAIN, UNSPECIFIED WHETHER SCIATICA PRESENT: ICD-10-CM

## 2022-02-27 RX ORDER — HYDROCODONE BITARTRATE AND ACETAMINOPHEN 10; 325 MG/1; MG/1
1 TABLET ORAL EVERY 4 HOURS PRN
Qty: 180 TABLET | Refills: 0 | Status: CANCELLED | OUTPATIENT
Start: 2022-02-27

## 2022-02-28 RX ORDER — HYDROCODONE BITARTRATE AND ACETAMINOPHEN 10; 325 MG/1; MG/1
1 TABLET ORAL EVERY 4 HOURS PRN
Qty: 180 TABLET | Refills: 0 | Status: SHIPPED | OUTPATIENT
Start: 2022-02-28 | End: 2022-03-22

## 2022-03-16 ENCOUNTER — TELEPHONE (OUTPATIENT)
Dept: INTERNAL MEDICINE | Facility: CLINIC | Age: 67
End: 2022-03-16
Payer: COMMERCIAL

## 2022-03-16 ENCOUNTER — TRANSFERRED RECORDS (OUTPATIENT)
Dept: HEALTH INFORMATION MANAGEMENT | Facility: CLINIC | Age: 67
End: 2022-03-16
Payer: COMMERCIAL

## 2022-03-16 DIAGNOSIS — E78.2 MIXED HYPERLIPIDEMIA: Primary | ICD-10-CM

## 2022-03-16 DIAGNOSIS — M54.59 MECHANICAL LOW BACK PAIN: ICD-10-CM

## 2022-03-16 RX ORDER — TRAMADOL HYDROCHLORIDE 50 MG/1
TABLET ORAL
Qty: 20 TABLET | Refills: 0 | Status: SHIPPED | OUTPATIENT
Start: 2022-03-16 | End: 2022-03-23

## 2022-03-16 NOTE — TELEPHONE ENCOUNTER
Patient calls to request rx for ear infection. She reported swelling in ear, slight headache and slight pain while swallowing on R side.     Per RN, an in person appointment was best to visually assess the ear and throat. There are no in office visit this week.     Best number to call back [phone]

## 2022-03-17 ENCOUNTER — LAB (OUTPATIENT)
Dept: LAB | Facility: CLINIC | Age: 67
End: 2022-03-17
Payer: COMMERCIAL

## 2022-03-17 DIAGNOSIS — E78.2 MIXED HYPERLIPIDEMIA: ICD-10-CM

## 2022-03-17 LAB
ALT SERPL W P-5'-P-CCNC: 17 U/L (ref 0–50)
CHOLEST SERPL-MCNC: 135 MG/DL
FASTING STATUS PATIENT QL REPORTED: YES
HDLC SERPL-MCNC: 43 MG/DL
LDLC SERPL CALC-MCNC: 17 MG/DL
NONHDLC SERPL-MCNC: 92 MG/DL
TRIGL SERPL-MCNC: 374 MG/DL

## 2022-03-17 PROCEDURE — 84460 ALANINE AMINO (ALT) (SGPT): CPT | Performed by: INTERNAL MEDICINE

## 2022-03-17 PROCEDURE — 36415 COLL VENOUS BLD VENIPUNCTURE: CPT | Performed by: INTERNAL MEDICINE

## 2022-03-17 PROCEDURE — 80061 LIPID PANEL: CPT | Performed by: INTERNAL MEDICINE

## 2022-03-18 ENCOUNTER — VIRTUAL VISIT (OUTPATIENT)
Dept: CARDIOLOGY | Facility: CLINIC | Age: 67
End: 2022-03-18
Payer: COMMERCIAL

## 2022-03-18 DIAGNOSIS — I50.22 CHRONIC SYSTOLIC CONGESTIVE HEART FAILURE (H): ICD-10-CM

## 2022-03-18 DIAGNOSIS — J43.8 OTHER EMPHYSEMA (H): ICD-10-CM

## 2022-03-18 DIAGNOSIS — I42.0 DILATED CARDIOMYOPATHY (H): Primary | ICD-10-CM

## 2022-03-18 DIAGNOSIS — R06.09 DOE (DYSPNEA ON EXERTION): ICD-10-CM

## 2022-03-18 DIAGNOSIS — R00.0 TACHYCARDIA: ICD-10-CM

## 2022-03-18 PROCEDURE — 99213 OFFICE O/P EST LOW 20 MIN: CPT | Mod: 95 | Performed by: INTERNAL MEDICINE

## 2022-03-18 NOTE — PROGRESS NOTES
Service Date: 03/18/2022    REFERRING PROVIDER:  Dr. Bandar Weinberg.    HISTORY OF PRESENT ILLNESS:  This was a telephone visit, attempted video but due to technical difficulties was transitioned to a telephone visit.  Ms. Bender is a pleasant 66-year-old female with a history of idiopathic cardiomyopathy, severe COPD and pulmonary hypertension as well as SVT.  She had initially been diagnosed in 2015, at which time her ejection fraction was around 20%-25%.  With medication, it did improve to around 45%-50%, but unfortunately, last October she showed a deterioration in her LV function back down to about 35%-40%.  When she was initially diagnosed, we did look at her arteries with a coronary angiogram.  She had no coronary disease.  She has severe lung disease and is seen by Pulmonology on a regular basis.  Although she is not currently on oxygen, she is very limited in her breathing due to her COPD.  She has resting tachycardia and her last visit in November she had an increase in her carvedilol to its maximal dose of 25 b.i.d.  She states this has really helped with her heart rate.  It now sits between 80s and 100, rather than consistently above 100.  She is also on spironolactone for her cardiomyopathy and fluid retention.  This has been helpful for her as well.  She reported her blood pressure today at 116/73 and a heart rate of 101 with a weight of 182.    ASSESSMENT AND PLAN:  In summary, Ms. Bender is a very pleasant 66-year-old female with a history of idiopathic nonischemic cardiomyopathy, severe COPD and pulmonary hypertension as well as SVT.  She seems stable from a cardiac perspective.  Her heart rate is better on the maximal dose of carvedilol.  She is tolerating the spironolactone.  Her last assessment of her EF was in October of last year, which did show a decline from her previous.  Her EF was estimated around 35%-40%.  We talked about possibly repeating a limited echo to see if maximal titration of her  heart failure medications has helped to improve her function at all.  I would recommend doing this probably within the next 3 months.  She did not need any refills on her medications today.  We talked about that she was diagnosed with a stress cardiomyopathy initially and she has had a lot of stressors in her life including family and in the world.  I would like her to try to limit her involvement in the those because of the history of stress cardiomyopathy.  I will recommend to follow up with the results of a limited echo in about 3 months, and I am happy to continue to follow her annually or as needed.    Please feel free to contact me with any questions you have in regards to her care.    Justina Finney DO    cc:  Bandar Weinberg MD  [facility]  [address], [address]  [place] [id number]    Justina Finney DO        D: 2022   T: 2022   MT: rosalina    Name:     MOSES TIMMONS  MRN:      [id number]        Account:      [id number]   :      1955           Service Date: 2022       Document: [id number]

## 2022-03-18 NOTE — LETTER
3/18/2022       RE: Trisha Bender  [address] [facility] [address]     Dear Colleague,    Thank you for referring your patient, Trisha Bender, to the [facility]. Please see a copy of my visit note below.      Review Of Systems  Skin: Positive for shingles on left side of scalp  Eyes: Positive for glasses; cataracts  Ears/Nose/Throat: Negative  Respiratory: Positive for dyspnea on exertion; asthma; occ coughing  Cardiovascular: Negative  Gastrointestinal: Negative  Genitourinary: Negative  Musculoskeletal: Positive for back pain; arthritis; joint pain - left shoulder  Neurologic: Positive for headaches; migraines  Psychiatric: Positive for sleep disturbances; stress  Hematologic/Lymphatic/Immunologic: Positive for easy bruising; occ night sweats  Endocrine: Positive for thyroid disorder    Patient reported vitals:  BP: 116/73  Heart rate: 101  Weight: 182 lbs      Service Date: 03/18/2022    REFERRING PROVIDER:  Dr. Bandar Weinberg.    HISTORY OF PRESENT ILLNESS:  This was a telephone visit, attempted video but due to technical difficulties was transitioned to a telephone visit.  Ms. Bender is a pleasant 66-year-old female with a history of idiopathic cardiomyopathy, severe COPD and pulmonary hypertension as well as SVT.  She had initially been diagnosed in 2015, at which time her ejection fraction was around 20%-25%.  With medication, it did improve to around 45%-50%, but unfortunately, last October she showed a deterioration in her LV function back down to about 35%-40%.  When she was initially diagnosed, we did look at her arteries with a coronary angiogram.  She had no coronary disease.  She has severe lung disease and is seen by Pulmonology on a regular basis.  Although she is not currently on oxygen, she is very limited in her breathing due to her COPD.  She has resting tachycardia and her  last visit in November she had an increase in her carvedilol to its maximal dose of 25 b.i.d.  She states this has really helped with her heart rate.  It now sits between 80s and 100, rather than consistently above 100.  She is also on spironolactone for her cardiomyopathy and fluid retention.  This has been helpful for her as well.  She reported her blood pressure today at 116/73 and a heart rate of 101 with a weight of 182.    ASSESSMENT AND PLAN:  In summary, Ms. Bender is a very pleasant 66-year-old female with a history of idiopathic nonischemic cardiomyopathy, severe COPD and pulmonary hypertension as well as SVT.  She seems stable from a cardiac perspective.  Her heart rate is better on the maximal dose of carvedilol.  She is tolerating the spironolactone.  Her last assessment of her EF was in October of last year, which did show a decline from her previous.  Her EF was estimated around 35%-40%.  We talked about possibly repeating a limited echo to see if maximal titration of her heart failure medications has helped to improve her function at all.  I would recommend doing this probably within the next 3 months.  She did not need any refills on her medications today.  We talked about that she was diagnosed with a stress cardiomyopathy initially and she has had a lot of stressors in her life including family and in the world.  I would like her to try to limit her involvement in the those because of the history of stress cardiomyopathy.  I will recommend to follow up with the results of a limited echo in about 3 months, and I am happy to continue to follow her annually or as needed.    Please feel free to contact me with any questions you have in regards to her care.      Justina Finney, DO    cc:  Bandar Weinberg MD  [facility]  [address], [address]  [place] [address]        D: 03/18/2022   T: 03/18/2022   MT: rosalina    Name:     MOSES BENDER  MRN:      [id number]         Account:      [id number]   :      1955           Service Date: 2022     Document: [id number]

## 2022-03-18 NOTE — LETTER
3/18/2022    Bandar Weinberg MD  [address] [facility] [address]    RE: Trisha Bender       Dear Colleague,     I had the pleasure of seeing Trisha Bender in the [facility].  Cynthia is a 66 year old who is being evaluated via a billable video visit.      How would you like to obtain your AVS? Mail a copy  If the video visit is dropped, the invitation should be resent by: Text to cell phone: [phone] [employer]  Will anyone else be joining your video visit? No       Review Of Systems  Skin: Positive for shingles on left side of scalp  Eyes: Positive for glasses; cataracts  Ears/Nose/Throat: Negative  Respiratory: Positive for dyspnea on exertion; asthma; occ coughing  Cardiovascular: Negative  Gastrointestinal: Negative  Genitourinary: Negative  Musculoskeletal: Positive for back pain; arthritis; joint pain - left shoulder  Neurologic: Positive for headaches; migraines  Psychiatric: Positive for sleep disturbances; stress  Hematologic/Lymphatic/Immunologic: Positive for easy bruising; occ night sweats  Endocrine: Positive for thyroid disorder    Patient reported vitals:  BP: 116/73  Heart rate: 101  Weight: 182 lbs      Video Start Time: 2:42p  Video-Visit Details    Type of service:  Video Visit    Video End Time:2:59p    Originating Location (pt. Location): Home    Distant Location (provider location):  [facility]     Platform used for Video Visit: TRANSITIONED TO TELE      Thank you for allowing me to participate in the care of your patient.      Sincerely,     Justina Finney DO     [facility]  cc:   Hardeep Guerrero MD  No address on file

## 2022-03-18 NOTE — PROGRESS NOTES
Cynthia is a 66 year old who is being evaluated via a billable video visit.      How would you like to obtain your AVS? Mail a copy  If the video visit is dropped, the invitation should be resent by: Text to cell phone: [phone] [employer]  Will anyone else be joining your video visit? No       Review Of Systems  Skin: Positive for shingles on left side of scalp  Eyes: Positive for glasses; cataracts  Ears/Nose/Throat: Negative  Respiratory: Positive for dyspnea on exertion; asthma; occ coughing  Cardiovascular: Negative  Gastrointestinal: Negative  Genitourinary: Negative  Musculoskeletal: Positive for back pain; arthritis; joint pain - left shoulder  Neurologic: Positive for headaches; migraines  Psychiatric: Positive for sleep disturbances; stress  Hematologic/Lymphatic/Immunologic: Positive for easy bruising; occ night sweats  Endocrine: Positive for thyroid disorder    Patient reported vitals:  BP: 116/73  Heart rate: 101  Weight: 182 lbs      Video Start Time: 2:42p  Video-Visit Details    Type of service:  Video Visit    Video End Time:2:59p    Originating Location (pt. Location): Home    Distant Location (provider location):  [facility]     Platform used for Video Visit: TRANSITIONED TO TELE

## 2022-03-20 DIAGNOSIS — F41.9 ANXIETY: ICD-10-CM

## 2022-03-20 DIAGNOSIS — Z79.899 CHRONIC PRESCRIPTION BENZODIAZEPINE USE: ICD-10-CM

## 2022-03-21 ENCOUNTER — MYC REFILL (OUTPATIENT)
Dept: INTERNAL MEDICINE | Facility: CLINIC | Age: 67
End: 2022-03-21

## 2022-03-21 ENCOUNTER — VIRTUAL VISIT (OUTPATIENT)
Dept: INTERNAL MEDICINE | Facility: CLINIC | Age: 67
End: 2022-03-21
Payer: COMMERCIAL

## 2022-03-21 DIAGNOSIS — M54.59 MECHANICAL LOW BACK PAIN: ICD-10-CM

## 2022-03-21 DIAGNOSIS — J45.30 MILD PERSISTENT ASTHMA WITHOUT COMPLICATION: ICD-10-CM

## 2022-03-21 DIAGNOSIS — I25.10 CORONARY ARTERY DISEASE INVOLVING NATIVE CORONARY ARTERY OF NATIVE HEART WITHOUT ANGINA PECTORIS: ICD-10-CM

## 2022-03-21 DIAGNOSIS — M54.59 MECHANICAL LOW BACK PAIN: Primary | ICD-10-CM

## 2022-03-21 PROCEDURE — 99213 OFFICE O/P EST LOW 20 MIN: CPT | Mod: 95 | Performed by: INTERNAL MEDICINE

## 2022-03-21 RX ORDER — LISINOPRIL 5 MG/1
5 TABLET ORAL DAILY
Qty: 90 TABLET | Refills: 3 | Status: SHIPPED | OUTPATIENT
Start: 2022-03-21 | End: 2023-02-23

## 2022-03-21 RX ORDER — OXYCODONE HYDROCHLORIDE 5 MG/1
5 TABLET ORAL EVERY 12 HOURS PRN
Qty: 12 TABLET | Refills: 0 | Status: SHIPPED | OUTPATIENT
Start: 2022-03-21 | End: 2022-03-25

## 2022-03-21 RX ORDER — OXYCODONE HYDROCHLORIDE 5 MG/1
5 TABLET ORAL EVERY 12 HOURS PRN
Qty: 12 TABLET | Refills: 0 | Status: CANCELLED | OUTPATIENT
Start: 2022-03-21

## 2022-03-21 NOTE — PROGRESS NOTES
Cynthia is a 66 year old who is being evaluated via a billable video visit.      How would you like to obtain your AVS? MyChart  If the video visit is dropped, the invitation should be resent by: Text to cell phone: [phone]  Will anyone else be joining your video visit? No    Video Start Time: 5:35 PM    Assessment & Plan     Mechanical low back pain  Continue Cymbalta, consider increased dose,   Short supply or Oxycodone for increased pain severity  If persistent will need to be seen for assessment of pain   - oxyCODONE (ROXICODONE) 5 MG tablet; Take 1 tablet (5 mg) by mouth every 12 hours as needed for severe pain    Mild persistent asthma without complication  Continue inhalers                See Patient Instructions    Return in about 3 months (around 6/21/2022) for follow up on acute problem if persists.    Bandar Weinberg MD  [facility]    Subjective   Cynthia is a 66 year old who presents for the following health issues     HPI   Chief Complaint   Patient presents with     RECHECK     Increased pain in the back/ribs, discuss medication ( Feeling Noxious from Tramadol)     Burn     Burned skin in the back with heating pad         Patient is seen for a follow up visit.  Has h/o chronic LBP. Related to compression fractures and DDD.   On treatment with Norco, Cymbalta, Baclofen.   Has been having increased pain, uses a heating pad and has caused burns on the skin. Healing slowly.   Uses occasionally an extra Norco for pain control and is running out of her medication.   Pain is more intense. No neurologic symptoms. No falls or injury.   Has h/o COPD. On inhalers- steroid and bronchodilators. No increased cough or SOB.         Review of Systems   Constitutional, HEENT, cardiovascular, pulmonary, gi and gu systems are negative, except as otherwise noted.      Objective           Vitals:  No vitals were obtained today due to virtual visit.    Physical Exam   GENERAL: Healthy, alert and  no distress  EYES: Eyes grossly normal to inspection.  No discharge or erythema, or obvious scleral/conjunctival abnormalities.  RESP: No audible wheeze, cough, or visible cyanosis.  No visible retractions or increased work of breathing.    SKIN: Visible skin clear. No significant rash, abnormal pigmentation or lesions.  NEURO: Cranial nerves grossly intact.  Mentation and speech appropriate for age.  PSYCH: Mentation appears normal, affect normal/bright, judgement and insight intact, normal speech and appearance well-groomed.    Lab on 03/17/2022   Component Date Value Ref Range Status     Cholesterol 03/17/2022 135  <200 mg/dL Final     Triglycerides 03/17/2022 374 (A) <150 mg/dL Final     Direct Measure HDL 03/17/2022 43 (A) >=50 mg/dL Final     LDL Cholesterol Calculated 03/17/2022 17  <=100 mg/dL Final     Non HDL Cholesterol 03/17/2022 92  <130 mg/dL Final     Patient Fasting > 8hrs? 03/17/2022 Yes   Final     ALT 03/17/2022 17  0 - 50 U/L Final               Video-Visit Details    Type of service:  Video Visit    Video End Time:5:47 PM    Originating Location (pt. Location): Home    Distant Location (provider location):  [facility]     Platform used for Video Visit: [employer]

## 2022-03-22 ENCOUNTER — MYC REFILL (OUTPATIENT)
Dept: INTERNAL MEDICINE | Facility: CLINIC | Age: 67
End: 2022-03-22
Payer: COMMERCIAL

## 2022-03-22 DIAGNOSIS — M54.50 CHRONIC BILATERAL LOW BACK PAIN, UNSPECIFIED WHETHER SCIATICA PRESENT: ICD-10-CM

## 2022-03-22 DIAGNOSIS — G89.29 CHRONIC BILATERAL LOW BACK PAIN, UNSPECIFIED WHETHER SCIATICA PRESENT: ICD-10-CM

## 2022-03-22 RX ORDER — ALPRAZOLAM 0.25 MG
TABLET ORAL
Qty: 60 TABLET | Refills: 0 | Status: SHIPPED | OUTPATIENT
Start: 2022-03-22 | End: 2022-05-09

## 2022-03-23 DIAGNOSIS — M54.59 MECHANICAL LOW BACK PAIN: ICD-10-CM

## 2022-03-23 RX ORDER — TRAMADOL HYDROCHLORIDE 50 MG/1
TABLET ORAL
Qty: 20 TABLET | Refills: 0 | Status: SHIPPED | OUTPATIENT
Start: 2022-03-23 | End: 2022-04-12

## 2022-03-23 RX ORDER — HYDROCODONE BITARTRATE AND ACETAMINOPHEN 10; 325 MG/1; MG/1
1 TABLET ORAL EVERY 4 HOURS PRN
Qty: 180 TABLET | Refills: 0 | Status: SHIPPED | OUTPATIENT
Start: 2022-03-23 | End: 2022-04-20

## 2022-03-23 NOTE — TELEPHONE ENCOUNTER
Pending Prescriptions:                       Disp   Refills    oxyCODONE (ROXICODONE) 5 MG tablet         12 tab*0        Sig: Take 1 tablet (5 mg) by mouth every 12 hours as           needed for severe pain    Routing refill request to provider for review/approval because:  Drug not on the [facility] refill protocol     Please see patient's message in refill request.       Elizabeth BADILLO RN   [facility]

## 2022-03-23 NOTE — TELEPHONE ENCOUNTER
Pending Prescriptions:                       Disp   Refills    HYDROcodone-acetaminophen (NORCO)  M*180 ta*0        Sig: Take 1 tablet by mouth every 4 hours as needed for           severe pain Take up to 6 tablets a day    Routing refill request to provider for review/approval because:  Drug not on the [facility] refill protocol       Elizabeth BADILLO RN   [facility]

## 2022-03-23 NOTE — TELEPHONE ENCOUNTER
Pending Prescriptions:                       Disp   Refills    traMADol (ULTRAM) 50 MG tablet [Pharmacy M*20 tab*         Sig: TAKE 1 TABLET(50 MG) BY MOUTH THREE TIMES DAILY AS           NEEDED FOR SEVERE PAIN    Routing refill request to provider for review/approval because:  Drug not on the [facility] refill protocol       Elizabeth BADILLO RN   [facility]

## 2022-03-24 NOTE — TELEPHONE ENCOUNTER
Refill  Routing refill request to provider for review/approval because:  Drug not on the [facility] refill protocol   Please see patients message   thanks

## 2022-03-25 ENCOUNTER — TELEPHONE (OUTPATIENT)
Dept: INTERNAL MEDICINE | Facility: CLINIC | Age: 67
End: 2022-03-25
Payer: COMMERCIAL

## 2022-03-25 DIAGNOSIS — M54.59 MECHANICAL LOW BACK PAIN: ICD-10-CM

## 2022-03-25 RX ORDER — OXYCODONE HYDROCHLORIDE 5 MG/1
5 TABLET ORAL EVERY 6 HOURS PRN
Qty: 12 TABLET | Refills: 0 | Status: SHIPPED | OUTPATIENT
Start: 2022-03-25 | End: 2022-05-20

## 2022-03-25 NOTE — TELEPHONE ENCOUNTER
Can we check with pharmacy if she received the Norco and Tramadol, then should not need more Oxycodone. The oxycodone was prescribed for every 12 hours.

## 2022-03-25 NOTE — TELEPHONE ENCOUNTER
Patient calls and states her pain is horrible.  Patient states that she can not  her Norco until Monday.  The 12 Oxycodone given on 03/21 are gone because patient states she thought she was supposed to take them every 4 hours and the instructions say every 12 hours.  Patient is asking for an RX of Toradol to get her thru the weekend.  Please advise.

## 2022-03-25 NOTE — TELEPHONE ENCOUNTER
Patient received refills of Norco on 3/23/22 and Tramadol 3/23/22. Does she still need additional dose of oxycodone? Patient taking oxycodone 5 mg every 4-6 hr instead of 5 mg every 12hr. It looks like you had discussed a different dosage in the 3/21 VV but there isn't documentation stating a more frequent dose.    Please advise.    Thank you!

## 2022-03-25 NOTE — TELEPHONE ENCOUNTER
Call placed to [facility]- Has not picked it up since it sent to be filled on 3/28/22. Can approve an early if needed.    Call placed to [facility]. On hold for 20 minutes and #4 in line. Will attempt to call back later if able.

## 2022-04-12 DIAGNOSIS — M54.59 MECHANICAL LOW BACK PAIN: ICD-10-CM

## 2022-04-12 RX ORDER — TRAMADOL HYDROCHLORIDE 50 MG/1
TABLET ORAL
Qty: 20 TABLET | Refills: 0 | Status: SHIPPED | OUTPATIENT
Start: 2022-04-12 | End: 2022-04-18

## 2022-04-17 DIAGNOSIS — M54.59 MECHANICAL LOW BACK PAIN: ICD-10-CM

## 2022-04-18 RX ORDER — TRAMADOL HYDROCHLORIDE 50 MG/1
TABLET ORAL
Qty: 20 TABLET | Refills: 0 | Status: SHIPPED | OUTPATIENT
Start: 2022-04-18 | End: 2022-05-09

## 2022-04-20 ENCOUNTER — MYC REFILL (OUTPATIENT)
Dept: INTERNAL MEDICINE | Facility: CLINIC | Age: 67
End: 2022-04-20
Payer: COMMERCIAL

## 2022-04-20 DIAGNOSIS — G89.29 CHRONIC BILATERAL LOW BACK PAIN, UNSPECIFIED WHETHER SCIATICA PRESENT: ICD-10-CM

## 2022-04-20 DIAGNOSIS — M54.50 CHRONIC BILATERAL LOW BACK PAIN, UNSPECIFIED WHETHER SCIATICA PRESENT: ICD-10-CM

## 2022-04-21 RX ORDER — HYDROCODONE BITARTRATE AND ACETAMINOPHEN 10; 325 MG/1; MG/1
1 TABLET ORAL EVERY 4 HOURS PRN
Qty: 180 TABLET | Refills: 0 | Status: SHIPPED | OUTPATIENT
Start: 2022-04-21 | End: 2022-05-17

## 2022-05-06 DIAGNOSIS — F41.9 ANXIETY: ICD-10-CM

## 2022-05-06 DIAGNOSIS — Z79.899 CHRONIC PRESCRIPTION BENZODIAZEPINE USE: ICD-10-CM

## 2022-05-06 DIAGNOSIS — R11.0 NAUSEA: ICD-10-CM

## 2022-05-08 DIAGNOSIS — M54.59 MECHANICAL LOW BACK PAIN: ICD-10-CM

## 2022-05-09 RX ORDER — ONDANSETRON 4 MG/1
TABLET, ORALLY DISINTEGRATING ORAL
Qty: 18 TABLET | Refills: 4 | Status: SHIPPED | OUTPATIENT
Start: 2022-05-09 | End: 2022-09-16

## 2022-05-09 RX ORDER — ALPRAZOLAM 0.25 MG
TABLET ORAL
Qty: 60 TABLET | Refills: 0 | Status: SHIPPED | OUTPATIENT
Start: 2022-05-09 | End: 2022-06-14

## 2022-05-09 RX ORDER — TRAMADOL HYDROCHLORIDE 50 MG/1
TABLET ORAL
Qty: 20 TABLET | Refills: 0 | Status: SHIPPED | OUTPATIENT
Start: 2022-05-09 | End: 2022-05-16

## 2022-05-11 ENCOUNTER — MYC REFILL (OUTPATIENT)
Dept: INTERNAL MEDICINE | Facility: CLINIC | Age: 67
End: 2022-05-11
Payer: COMMERCIAL

## 2022-05-11 DIAGNOSIS — G89.29 CHRONIC BILATERAL LOW BACK PAIN, UNSPECIFIED WHETHER SCIATICA PRESENT: ICD-10-CM

## 2022-05-11 DIAGNOSIS — M54.50 CHRONIC BILATERAL LOW BACK PAIN, UNSPECIFIED WHETHER SCIATICA PRESENT: ICD-10-CM

## 2022-05-11 NOTE — TELEPHONE ENCOUNTER
Hydrocodone-Acetaminophen (Norco) 10/325 mg  Routing refill request to provider for review/approval because:  Drug not on the [facility] refill protocol   Patient requesting early    LOV 3/21/22    Appointments in Next Year      May 20, 2022  2:30 PM  (Arrive by 2:10 PM)  PRE OPERATIVE PHYSICAL with Bandar Weinberg MD  [facility] [phone]

## 2022-05-13 RX ORDER — HYDROCODONE BITARTRATE AND ACETAMINOPHEN 10; 325 MG/1; MG/1
1 TABLET ORAL EVERY 4 HOURS PRN
Qty: 180 TABLET | Refills: 0 | OUTPATIENT
Start: 2022-05-13

## 2022-05-16 ENCOUNTER — MYC REFILL (OUTPATIENT)
Dept: INTERNAL MEDICINE | Facility: CLINIC | Age: 67
End: 2022-05-16
Payer: COMMERCIAL

## 2022-05-16 DIAGNOSIS — M54.59 MECHANICAL LOW BACK PAIN: ICD-10-CM

## 2022-05-16 DIAGNOSIS — M54.50 CHRONIC BILATERAL LOW BACK PAIN, UNSPECIFIED WHETHER SCIATICA PRESENT: ICD-10-CM

## 2022-05-16 DIAGNOSIS — G89.29 CHRONIC BILATERAL LOW BACK PAIN, UNSPECIFIED WHETHER SCIATICA PRESENT: ICD-10-CM

## 2022-05-16 RX ORDER — TRAMADOL HYDROCHLORIDE 50 MG/1
TABLET ORAL
Qty: 20 TABLET | Refills: 0 | Status: SHIPPED | OUTPATIENT
Start: 2022-05-16 | End: 2022-06-05

## 2022-05-16 RX ORDER — HYDROCODONE BITARTRATE AND ACETAMINOPHEN 10; 325 MG/1; MG/1
1 TABLET ORAL EVERY 4 HOURS PRN
Qty: 180 TABLET | Refills: 0 | OUTPATIENT
Start: 2022-05-16

## 2022-05-16 NOTE — TELEPHONE ENCOUNTER
traMADol (ULTRAM) 50 MG tablet 20 tablet 0 5/9/2022  No   Sig: TAKE 1 TABLET(50 MG) BY MOUTH THREE TIMES DAILY AS NEEDED FOR SEVERE PAIN   Sent to pharmacy as: traMADol HCl 50 MG Oral Tablet (ULTRAM)   Class: E-Prescribe       Last office visit: 3/21/2022     Future Office Visit:        CSA -- Patient Level:    Controlled Substance Agreement - Opioid - Scan on 8/5/2021  8:41 AM: OPIOD             Routing refill request to provider for review/approval because:  Drug not on the [facility] refill protocol     Tori Parks RN, BSN  [facility]

## 2022-05-17 ENCOUNTER — MYC REFILL (OUTPATIENT)
Dept: INTERNAL MEDICINE | Facility: CLINIC | Age: 67
End: 2022-05-17
Payer: COMMERCIAL

## 2022-05-17 DIAGNOSIS — G89.29 CHRONIC BILATERAL LOW BACK PAIN, UNSPECIFIED WHETHER SCIATICA PRESENT: ICD-10-CM

## 2022-05-17 DIAGNOSIS — M54.50 CHRONIC BILATERAL LOW BACK PAIN, UNSPECIFIED WHETHER SCIATICA PRESENT: ICD-10-CM

## 2022-05-17 RX ORDER — HYDROCODONE BITARTRATE AND ACETAMINOPHEN 10; 325 MG/1; MG/1
1 TABLET ORAL EVERY 4 HOURS PRN
Qty: 180 TABLET | Refills: 0 | Status: CANCELLED | OUTPATIENT
Start: 2022-05-17

## 2022-05-17 NOTE — TELEPHONE ENCOUNTER
Refilled 05/17/22.    Shelbi Rangel [facility] [place]  [phone]

## 2022-05-17 NOTE — TELEPHONE ENCOUNTER
See her other message.     Last refill request on 4/21/22 for #180    Routing refill request to provider for review/approval because:  Drug not on the [facility] refill protocol

## 2022-05-19 ASSESSMENT — ANXIETY QUESTIONNAIRES
GAD7 TOTAL SCORE: 6
7. FEELING AFRAID AS IF SOMETHING AWFUL MIGHT HAPPEN: SEVERAL DAYS
7. FEELING AFRAID AS IF SOMETHING AWFUL MIGHT HAPPEN: SEVERAL DAYS
3. WORRYING TOO MUCH ABOUT DIFFERENT THINGS: SEVERAL DAYS
5. BEING SO RESTLESS THAT IT IS HARD TO SIT STILL: SEVERAL DAYS
6. BECOMING EASILY ANNOYED OR IRRITABLE: NOT AT ALL
1. FEELING NERVOUS, ANXIOUS, OR ON EDGE: MORE THAN HALF THE DAYS
4. TROUBLE RELAXING: SEVERAL DAYS
8. IF YOU CHECKED OFF ANY PROBLEMS, HOW DIFFICULT HAVE THESE MADE IT FOR YOU TO DO YOUR WORK, TAKE CARE OF THINGS AT HOME, OR GET ALONG WITH OTHER PEOPLE?: NOT DIFFICULT AT ALL
2. NOT BEING ABLE TO STOP OR CONTROL WORRYING: NOT AT ALL
GAD7 TOTAL SCORE: 6
GAD7 TOTAL SCORE: 6

## 2022-05-19 ASSESSMENT — PATIENT HEALTH QUESTIONNAIRE - PHQ9
SUM OF ALL RESPONSES TO PHQ QUESTIONS 1-9: 8
SUM OF ALL RESPONSES TO PHQ QUESTIONS 1-9: 8

## 2022-05-20 ENCOUNTER — OFFICE VISIT (OUTPATIENT)
Dept: INTERNAL MEDICINE | Facility: CLINIC | Age: 67
End: 2022-05-20
Payer: COMMERCIAL

## 2022-05-20 VITALS
SYSTOLIC BLOOD PRESSURE: 124 MMHG | TEMPERATURE: 98.3 F | DIASTOLIC BLOOD PRESSURE: 82 MMHG | HEIGHT: 70 IN | OXYGEN SATURATION: 95 % | WEIGHT: 186 LBS | HEART RATE: 102 BPM | BODY MASS INDEX: 26.63 KG/M2

## 2022-05-20 DIAGNOSIS — Z12.31 ENCOUNTER FOR SCREENING MAMMOGRAM FOR BREAST CANCER: ICD-10-CM

## 2022-05-20 DIAGNOSIS — Z01.818 PREOP GENERAL PHYSICAL EXAM: Primary | ICD-10-CM

## 2022-05-20 DIAGNOSIS — I50.22 CHRONIC SYSTOLIC CONGESTIVE HEART FAILURE (H): ICD-10-CM

## 2022-05-20 DIAGNOSIS — H25.9 AGE-RELATED CATARACT OF BOTH EYES, UNSPECIFIED AGE-RELATED CATARACT TYPE: ICD-10-CM

## 2022-05-20 DIAGNOSIS — J45.30 MILD PERSISTENT ASTHMA WITHOUT COMPLICATION: ICD-10-CM

## 2022-05-20 DIAGNOSIS — I10 ESSENTIAL HYPERTENSION WITH GOAL BLOOD PRESSURE LESS THAN 130/80: ICD-10-CM

## 2022-05-20 LAB
BASOPHILS # BLD AUTO: 0.1 10E3/UL (ref 0–0.2)
BASOPHILS NFR BLD AUTO: 1 %
EOSINOPHIL # BLD AUTO: 0.1 10E3/UL (ref 0–0.7)
EOSINOPHIL NFR BLD AUTO: 1 %
ERYTHROCYTE [DISTWIDTH] IN BLOOD BY AUTOMATED COUNT: 14.4 % (ref 10–15)
HCT VFR BLD AUTO: 42.6 % (ref 35–47)
HGB BLD-MCNC: 13.6 G/DL (ref 11.7–15.7)
IMM GRANULOCYTES # BLD: 0.1 10E3/UL
IMM GRANULOCYTES NFR BLD: 1 %
LYMPHOCYTES # BLD AUTO: 1.9 10E3/UL (ref 0.8–5.3)
LYMPHOCYTES NFR BLD AUTO: 15 %
MCH RBC QN AUTO: 30.2 PG (ref 26.5–33)
MCHC RBC AUTO-ENTMCNC: 31.9 G/DL (ref 31.5–36.5)
MCV RBC AUTO: 95 FL (ref 78–100)
MONOCYTES # BLD AUTO: 0.9 10E3/UL (ref 0–1.3)
MONOCYTES NFR BLD AUTO: 7 %
NEUTROPHILS # BLD AUTO: 9.3 10E3/UL (ref 1.6–8.3)
NEUTROPHILS NFR BLD AUTO: 75 %
PLATELET # BLD AUTO: 373 10E3/UL (ref 150–450)
RBC # BLD AUTO: 4.5 10E6/UL (ref 3.8–5.2)
WBC # BLD AUTO: 12.4 10E3/UL (ref 4–11)

## 2022-05-20 PROCEDURE — 85025 COMPLETE CBC W/AUTO DIFF WBC: CPT | Performed by: INTERNAL MEDICINE

## 2022-05-20 PROCEDURE — 0064A COVID-19,PF,MODERNA (18+ YRS BOOSTER .25ML): CPT | Performed by: INTERNAL MEDICINE

## 2022-05-20 PROCEDURE — 91306 COVID-19,PF,MODERNA (18+ YRS BOOSTER .25ML): CPT | Performed by: INTERNAL MEDICINE

## 2022-05-20 PROCEDURE — 80048 BASIC METABOLIC PNL TOTAL CA: CPT | Performed by: INTERNAL MEDICINE

## 2022-05-20 PROCEDURE — 99214 OFFICE O/P EST MOD 30 MIN: CPT | Mod: 25 | Performed by: INTERNAL MEDICINE

## 2022-05-20 PROCEDURE — 36415 COLL VENOUS BLD VENIPUNCTURE: CPT | Performed by: INTERNAL MEDICINE

## 2022-05-20 NOTE — PROGRESS NOTES
[place] [address] [facility] [address]  Phone: [phone]  Primary Provider: Bianka Weinberg  Pre-op Performing Provider: BIANKA WEINBERG      PREOPERATIVE EVALUATION:  Today's date: 5/20/2022    Trisha Bender is a 66 year old female who presents for a preoperative evaluation.    Surgical Information:  Surgery/Procedure: cataracts  [facility]  Surgeon: Hardy  Surgery Date: 05/26/22  Time of Surgery: 9:00  Where patient plans to recover: At home with family  Fax number for surgical facility:     Type of Anesthesia Anticipated: Local with MAC    Assessment & Plan     The proposed surgical procedure is considered LOW risk.    Preop general physical exam  Assess lab work   - BASIC METABOLIC PANEL  - CBC with platelets and differential    Age-related cataract of both eyes, unspecified age-related cataract type  Surgery pending     Essential hypertension with goal blood pressure less than 130/80  Controlled   - BASIC METABOLIC PANEL  - CBC with platelets and differential    Chronic systolic congestive heart failure (H)  Compensated , continue treatment     Mild persistent asthma without complication  Continue inhalers     Encounter for screening mammogram for breast cancer    - *MA Screening Digital Bilateral; Future         Risks and Recommendations:  The patient has the following additional risks and recommendations for perioperative complications:   - No identified additional risk factors other than previously addressed        RECOMMENDATION:  APPROVAL GIVEN to proceed with proposed procedure, without further diagnostic evaluation.        Subjective     HPI related to upcoming procedure: scheduled for bilateral cataract surgery.   No acute complaints, no medication change or new medical conditions.  Has history of CHF with EF of 40%. No increased SOB, edema, weight change.   Has h/o COPD. Follows with pulmonary, on inhaled  steroid/ bronchodilators. No wheezing, has chronic cough. No fevers.   Has h/o HTN. on medical treatment. BP has been controlled. No side effects from medications. No CP, HA, dizziness. good compliance with medications and low salt diet.  Has chronic LBP, related to DDD, OA. On chronic narcotic analgesics. Helping with pain control. Able to ambulate on short distances. Uses a wheelchair for outside.     Preop Questions 5/19/2022   1. Have you ever had a heart attack or stroke? No   2. Have you ever had surgery on your heart or blood vessels, such as a stent placement, a coronary artery bypass, or surgery on an artery in your head, neck, heart, or legs? No   3. Do you have chest pain with activity? No   4. Do you have a history of  heart failure? YES -    5. Do you currently have a cold, bronchitis or symptoms of other infection? No   6. Do you have a cough, shortness of breath, or wheezing? YES -    7. Do you or anyone in your family have previous history of blood clots? No   8. Do you or does anyone in your family have a serious bleeding problem such as prolonged bleeding following surgeries or cuts? No   9. Have you ever had problems with anemia or been told to take iron pills? No   10. Have you had any abnormal blood loss such as black, tarry or bloody stools, or abnormal vaginal bleeding? No   11. Have you ever had a blood transfusion? No   12. Are you willing to have a blood transfusion if it is medically needed before, during, or after your surgery? Yes   13. Have you or any of your relatives ever had problems with anesthesia? No   14. Do you have sleep apnea, excessive snoring or daytime drowsiness? No   15. Do you have any artifical heart valves or other implanted medical devices like a pacemaker, defibrillator, or continuous glucose monitor? No   16. Do you have artificial joints? No   17. Are you allergic to latex? No   18. Is there any chance that you may be pregnant? -       Health Care  Directive:  Patient has a Health Care Directive on file      Preoperative Review of :   reviewed - controlled substances reflected in medication list.      Status of Chronic Conditions:  See problem list for active medical problems.  Problems all longstanding and stable, except as noted/documented.  See ROS for pertinent symptoms related to these conditions.      Review of Systems  CONSTITUTIONAL: NEGATIVE for fever, chills, change in weight  INTEGUMENTARY/SKIN: NEGATIVE for worrisome rashes, moles or lesions  EYES: NEGATIVE for vision changes or irritation  ENT/MOUTH: NEGATIVE for ear, mouth and throat problems  RESP: NEGATIVE for significant cough or SOB  CV: NEGATIVE for chest pain, palpitations or peripheral edema  GI: NEGATIVE for nausea, abdominal pain, heartburn, or change in bowel habits  : NEGATIVE for frequency, dysuria, or hematuria  MUSCULOSKELETAL: NEGATIVE for significant arthralgias or myalgia  NEURO: NEGATIVE for weakness, dizziness or paresthesias  ENDOCRINE: NEGATIVE for temperature intolerance, skin/hair changes  HEME: NEGATIVE for bleeding problems  PSYCHIATRIC: NEGATIVE for changes in mood or affect    Patient Active Problem List    Diagnosis Date Noted     Thoracic aortic ectasia (H) 08/03/2021     Priority: Medium     Status post coronary angiogram 07/30/2020     Priority: Medium     Tachycardia 07/21/2020     Priority: Medium     Added automatically from request for surgery [id number]       Abdominal pain 09/08/2019     Priority: Medium     Severe episode of recurrent major depressive disorder, without psychotic features (H) 03/23/2018     Priority: Medium     Panlobular emphysema (H) 11/07/2017     Priority: Medium     Mechanical low back pain 12/30/2016     Priority: Medium     Moderate persistent asthma without complication 08/16/2016     Priority: Medium     Hypokalemia 08/16/2016     Priority: Medium     Controlled substance agreement signed 02/23/2016     Priority: Medium      Coronary artery disease involving native coronary artery of native heart without angina pectoris 02/09/2016     Priority: Medium     Mild per cath         Mixed hyperlipidemia 02/09/2016     Priority: Medium     SOB (shortness of breath) 10/16/2015     Priority: Medium     CHF (congestive heart failure) (H)      Priority: Medium     Dilated cardiomyopathy (H) 06/30/2015     Priority: Medium     2/9/2016 - EF 45-50% by echo  10/16/2015 - EF 50-55% by echo  8/12/2015 - EF 50-55% by echo  6/30/2015 - EF 20-25% by echo       Right bundle branch block (RBBB) 06/30/2015     Priority: Medium     Lateral epicondylitis of right elbow 10/07/2013     Priority: Medium     Hair loss 05/23/2013     Priority: Medium     Chronic low back pain 04/12/2013     Priority: Medium     Mild persistent asthma without complication 08/30/2012     Priority: Medium     Diagnosis updated by automated process. Provider to review and confirm.       Anxiety 07/28/2011     Priority: Medium     Family history of thyroid cancer - 1/2 brother 07/27/2011     Priority: Medium     Cystocele 05/07/2009     Priority: Medium     Chronic airway obstruction (H) 01/23/2006     Priority: Medium     FEV1 36% of pred; VC 67%  in Jan 06.  Problem list name updated by automated process. Provider to review       Essential hypertension with goal blood pressure less than 130/80 10/18/2004     Priority: Medium     Problem list name updated by automated process. Provider to review       Chronic migraine without aura without status migrainosus, not intractable 10/18/2004     Priority: Medium     Esophageal reflux 10/18/2004     Priority: Medium     Disorder of bone and cartilage 10/18/2004     Priority: Medium     Problem list name updated by automated process. Provider to review       Family history of malignant neoplasm of gastrointestinal tract 10/18/2004     Priority: Medium      Past Medical History:   Diagnosis Date     Anxiety      CAD (coronary artery disease)  7/2/15    mild per cath     CHF (congestive heart failure)     EF=20-25% per echo 6/30/15     Chronic airway obstruction     FEV1 36% of pred; VC 67%  in Jan 06.     Chronic back pain      COPD (chronic obstructive pulmonary disease)      Endometriosis      Esophageal reflux      Essential hypertension      Hyperlipidemia      Migraine      Mild persistent asthma 8/30/2012     Osteopenia      Takotsubo cardiomyopathy      Past Surgical History:   Procedure Laterality Date     ANGIOGRAM  07/02/2015    Minimal CAD. LV dysfunction, Mid RCA 20% stenosis, EF 20-25%, c/w Takotsubo cardiomyopathy     APPENDECTOMY  1974     CHOLECYSTECTOMY  1974    [facility]     COLONOSCOPY N/A 4/26/2018    Procedure: COLONOSCOPY;  COLONOSCOPY with biopsies;  Surgeon: Steve Acosta MD;  Location: [facility] OR     CORONARY ANGIOGRAPHY ADULT ORDER       CV CORONARY ANGIOGRAM N/A 7/30/2020    Procedure: Coronary Angiogram;  Surgeon: Quinn Viveros MD;  Location:  HEART CARDIAC CATH LAB     CV LEFT HEART CATH N/A 7/30/2020    Procedure: Left Heart Cath;  Surgeon: Quinn Viveros MD;  Location:  HEART CARDIAC CATH LAB     CV LEFT VENTRICULOGRAM N/A 7/30/2020    Procedure: Left Ventriculogram;  Surgeon: Quinn Viveros MD;  Location: [facility]     CV RIGHT HEART CATH MEASUREMENTS RECORDED N/A 7/30/2020    Procedure: Right Heart Cath;  Surgeon: Quinn Viveros MD;  Location:  HEART CARDIAC CATH LAB     ESOPHAGOSCOPY, GASTROSCOPY, DUODENOSCOPY (EGD), COMBINED  11/15/2011    Procedure:COMBINED ESOPHAGOSCOPY, GASTROSCOPY, DUODENOSCOPY (EGD);  ESOPHAGOSCOPY, GASTROSCOPY, DUODENOSCOPY (EGD) ; Surgeon:JIM URENA; Location:[facility] GI     INJECT EPIDURAL LUMBAR / SACRAL SINGLE N/A 4/13/2018    Procedure: INJECT EPIDURAL LUMBAR / SACRAL CONTINUAL OR INTERMITTENT;  Lumbar Epidural Steroid Injection;  Surgeon: Az Enamorado MD;  Location: [facility]     INJECT EPIDURAL LUMBAR / SACRAL SINGLE N/A 9/24/2018    Procedure:  INJECT EPIDURAL LUMBAR / SACRAL SINGLE;  Lumbar Epidural Steroid Injection;  Surgeon: Az Enamorado MD;  Location: [facility] OR     LAPAROSCOPIC ABLATION ENDOMETRIOSIS  1998     LAPAROSCOPY DIAGNOSTIC (GYN)      endometriosis resection / lysis of adhesions     Nasal septoplasty and mucous retention cyst       TONSILLECTOMY, ADENOIDECTOMY, COMBINED       [place] teeth extraction       Current Outpatient Medications   Medication Sig Dispense Refill     albuterol (VENTOLIN HFA) 108 (90 BASE) MCG/ACT Inhaler Inhale 2 puffs into the lungs every 6 hours as needed for shortness of breath / dyspnea or wheezing 18 g 3     alendronate (FOSAMAX) 70 MG tablet TAKE 1 TABLET BY MOUTH ONE TIME PER WEEK 12 tablet 3     ALPRAZolam (XANAX) 0.25 MG tablet TAKE 1 TABLET BY MOUTH TWICE A DAY AS NEEDED FOR ANXIETY 60 tablet 0     atorvastatin (LIPITOR) 10 MG tablet Take 1 tablet (10 mg) by mouth daily 90 tablet 3     baclofen (LIORESAL) 10 MG tablet TAKE TWO TABLETS BY MOUTH THREE TIMES A  tablet 5     BREO ELLIPTA 200-25 MCG/INH Inhaler Inhale 1 Dose into the lungs daily   12     carvedilol (COREG) 25 MG tablet Take 1 tablet (25 mg) by mouth 2 times daily (with meals) 180 tablet 3     cetirizine (ZYRTEC) 10 MG tablet Take 10 mg by mouth daily       [employer] FIBER GUMMIES 2.5 G CHEW Take 1 tablet by mouth daily Reported on 5/8/2017       erythromycin (ROMYCIN) 5 MG/GM ophthalmic ointment PLACE 0.5 INCHES INTO THE LEFT EYE AT BEDTIME (Patient taking differently: Place 0.5 inches Into the left eye nightly as needed) 3.5 g 0     fluticasone (FLONASE) 50 MCG/ACT nasal spray USE ONE TO TWO SPRAYS IN EACH NOSTRIL EVERY DAY-DUE IN FEBRUARY TO SEE DR. LEE 16 g 0     HYDROcodone-acetaminophen (NORCO)  MG per tablet Take 1 tablet by mouth every 4 hours as needed for severe pain Take up to 6 tablets a day 180 tablet 0     ipratropium - albuterol 0.5 mg/2.5 mg/3 mL (DUONEB) 0.5-2.5 (3) MG/3ML neb solution Take 1 vial (3 mLs) by nebulization  every 6 hours as needed for shortness of breath / dyspnea or wheezing 30 vial 1     ketorolac (TORADOL) 10 MG tablet Take 1 tablet (10 mg) by mouth every 6 hours as needed for moderate pain 20 tablet 0     lisinopril (ZESTRIL) 5 MG tablet Take 1 tablet (5 mg) by mouth daily 90 tablet 3     montelukast (SINGULAIR) 10 MG tablet TAKE ONE TABLET BY MOUTH AT BEDTIME 90 tablet 1     Multiple Vitamin CHEW Take 1 tablet by mouth daily        naloxone (NARCAN) 4 MG/0.1ML nasal spray Spray 1 spray (4 mg) into one nostril alternating nostrils once as needed for opioid reversal Every 2-3 minutes until patient responsive or EMS arrives 0.2 mL 0     ondansetron (ZOFRAN ODT) 4 MG ODT tab TAKE 1-2 TABLETS BY MOUTH EVERY 8 HOURS AS NEEDED FOR NAUSEA. 18 tablet 4     pantoprazole (PROTONIX) 40 MG EC tablet TAKE 1 TABLET BY MOUTH EVERY DAY 90 tablet 3     roflumilast (DALIRESP) 500 MCG TABS tablet Take 1 tablet (500 mcg) by mouth daily       spironolactone (ALDACTONE) 25 MG tablet Take 1 tablet (25 mg) by mouth daily 90 tablet 3     SUMAtriptan (IMITREX) 25 MG tablet Take 1 tablet (25 mg) by mouth at onset of headache for migraine 9 tablet 11     tiotropium (SPIRIVA HANDIHALER) 18 MCG capsule USING THE HANDIHALER, INHALE THE CONTENTS OF ONE CAPSULE BY MOUTH DAILY 90 capsule 3     traMADol (ULTRAM) 50 MG tablet TAKE 1 TABLET(50 MG) BY MOUTH THREE TIMES DAILY AS NEEDED FOR SEVERE PAIN 20 tablet 0     DULoxetine (CYMBALTA) 20 MG capsule Take 1 capsule (20 mg) by mouth daily 30 capsule 3     oxyCODONE (ROXICODONE) 5 MG tablet Take 1 tablet (5 mg) by mouth every 6 hours as needed for severe pain 12 tablet 0       Allergies   Allergen Reactions     Gabapentin Swelling     Contrast Dye      Iodine     Escitalopram      Nausea and sickness     Peanuts [Nuts] Hives     Penicillins      rash     Sulfa Drugs      High family history     Tace [Chlorotrianisene]      joint swelling     Aspirin Rash     After 3 days     Ibuprofen Nausea and  "Vomiting     [place] Rash        Social History     Tobacco Use     Smoking status: Former Smoker     Packs/day: 1.00     Years: 30.00     Pack years: 30.00     Quit date: 2006     Years since quittin.3     Smokeless tobacco: Never Used     Tobacco comment: Quit in 2006   Substance Use Topics     Alcohol use: No     Alcohol/week: 0.0 standard drinks     Family History   Problem Relation Age of Onset     Cancer Mother         Colon cancer; dxed at age 64;  at age 69     Cancer - colorectal Mother      Other Cancer Mother      Gastrointestinal Disease Sister         Acute pancreatitis     Heart Disease Father         ?     Coronary Artery Disease Father      Circulatory Maternal Aunt         AAA     Circulatory Maternal Uncle         AAA     Diabetes Son      History   Drug Use No         Objective     /82   Pulse 102   Temp 98.3  F (36.8  C) (Oral)   Ht 1.778 m (5' 10\")   Wt 84.4 kg (186 lb)   LMP 2005   SpO2 93%   BMI 26.69 kg/m      Physical Exam    GENERAL APPEARANCE:frail, weak, alert and no distress     EYES: EOMI, PERRL     HENT: ear canals and TM's normal and nose and mouth without ulcers or lesions     NECK: no adenopathy, no asymmetry, masses, or scars and thyroid normal to palpation     RESP: lungs clear to auscultation - no rales, rhonchi or wheezes     CV: regular rates and rhythm, normal S1 S2, no S3 or S4 and no murmur, click or rub     ABDOMEN:  soft, nontender, no HSM or masses and bowel sounds normal     MS: extremities normal- no gross deformities noted, no evidence of inflammation in joints, FROM in all extremities. LS spine palpatory tender.      SKIN: no suspicious lesions or rashes     NEURO: generalized weakness, otalgic, broad based gait, sensory exam grossly normal, mentation intact and speech normal     PSYCH: mentation appears normal. and affect normal/bright     LYMPHATICS: No cervical adenopathy    Recent Labs   Lab Test 10/26/21  1400 21  1606 " 03/27/21  1613 08/19/20  1027 07/30/20  1052   HGB  --  13.4 13.6  --  13.5   PLT  --  440 428  --  411   INR  --   --   --   --  0.91    135 130*   < > 136   POTASSIUM 3.9 4.6 4.2   < > 3.8   CR 0.57 0.56 0.44*   < > 0.43*    < > = values in this interval not displayed.        Diagnostics:  Labs pending at this time.  Results will be reviewed when available.   No EKG required for low risk surgery (cataract, skin procedure, breast biopsy, etc).    Revised Cardiac Risk Index (RCRI):  The patient has the following serious cardiovascular risks for perioperative complications:   - No serious cardiac risks = 0 points     RCRI Interpretation: 0 points: Class I (very low risk - 0.4% complication rate)           Signed Electronically by: Bandar Weinberg MD  Copy of this evaluation report is provided to requesting physician.      Answers for HPI/ROS submitted by the patient on 5/19/2022  PHQ9 TOTAL SCORE: 8  VENESSA 7 TOTAL SCORE: 6

## 2022-05-21 LAB
ANION GAP SERPL CALCULATED.3IONS-SCNC: 6 MMOL/L (ref 3–14)
BUN SERPL-MCNC: 8 MG/DL (ref 7–30)
CALCIUM SERPL-MCNC: 8.8 MG/DL (ref 8.5–10.1)
CHLORIDE BLD-SCNC: 98 MMOL/L (ref 94–109)
CO2 SERPL-SCNC: 28 MMOL/L (ref 20–32)
CREAT SERPL-MCNC: 0.53 MG/DL (ref 0.52–1.04)
GFR SERPL CREATININE-BSD FRML MDRD: >90 ML/MIN/1.73M2
GLUCOSE BLD-MCNC: 129 MG/DL (ref 70–99)
POTASSIUM BLD-SCNC: 4.6 MMOL/L (ref 3.4–5.3)
SODIUM SERPL-SCNC: 132 MMOL/L (ref 133–144)

## 2022-06-03 DIAGNOSIS — M54.59 MECHANICAL LOW BACK PAIN: ICD-10-CM

## 2022-06-05 RX ORDER — TRAMADOL HYDROCHLORIDE 50 MG/1
TABLET ORAL
Qty: 20 TABLET | Refills: 0 | Status: SHIPPED | OUTPATIENT
Start: 2022-06-05 | End: 2022-06-10

## 2022-06-09 ENCOUNTER — HOSPITAL ENCOUNTER (OUTPATIENT)
Dept: MAMMOGRAPHY | Facility: CLINIC | Age: 67
Discharge: HOME | End: 2022-06-09
Attending: INTERNAL MEDICINE | Admitting: INTERNAL MEDICINE
Payer: COMMERCIAL

## 2022-06-09 DIAGNOSIS — Z12.31 ENCOUNTER FOR SCREENING MAMMOGRAM FOR BREAST CANCER: ICD-10-CM

## 2022-06-09 PROCEDURE — 77067 SCR MAMMO BI INCL CAD: CPT

## 2022-06-10 ENCOUNTER — MYC REFILL (OUTPATIENT)
Dept: INTERNAL MEDICINE | Facility: CLINIC | Age: 67
End: 2022-06-10
Payer: COMMERCIAL

## 2022-06-10 DIAGNOSIS — G89.29 CHRONIC BILATERAL LOW BACK PAIN, UNSPECIFIED WHETHER SCIATICA PRESENT: ICD-10-CM

## 2022-06-10 DIAGNOSIS — M54.50 CHRONIC BILATERAL LOW BACK PAIN, UNSPECIFIED WHETHER SCIATICA PRESENT: ICD-10-CM

## 2022-06-10 DIAGNOSIS — M54.59 MECHANICAL LOW BACK PAIN: ICD-10-CM

## 2022-06-10 DIAGNOSIS — S22.000D COMPRESSION FRACTURE OF THORACIC VERTEBRA WITH ROUTINE HEALING, UNSPECIFIED THORACIC VERTEBRAL LEVEL, SUBSEQUENT ENCOUNTER: ICD-10-CM

## 2022-06-10 RX ORDER — KETOROLAC TROMETHAMINE 10 MG/1
TABLET, FILM COATED ORAL
Qty: 20 TABLET | Refills: 0 | OUTPATIENT
Start: 2022-06-10

## 2022-06-10 RX ORDER — TRAMADOL HYDROCHLORIDE 50 MG/1
50 TABLET ORAL
Qty: 20 TABLET | OUTPATIENT
Start: 2022-06-10

## 2022-06-13 DIAGNOSIS — Z79.899 CHRONIC PRESCRIPTION BENZODIAZEPINE USE: ICD-10-CM

## 2022-06-13 DIAGNOSIS — F41.9 ANXIETY: ICD-10-CM

## 2022-06-14 RX ORDER — ALPRAZOLAM 0.25 MG
TABLET ORAL
Qty: 60 TABLET | Refills: 0 | Status: SHIPPED | OUTPATIENT
Start: 2022-06-14 | End: 2022-08-10

## 2022-06-14 RX ORDER — HYDROCODONE BITARTRATE AND ACETAMINOPHEN 10; 325 MG/1; MG/1
1 TABLET ORAL EVERY 4 HOURS PRN
Qty: 180 TABLET | Refills: 0 | Status: SHIPPED | OUTPATIENT
Start: 2022-06-14 | End: 2022-07-08

## 2022-07-04 DIAGNOSIS — M54.59 MECHANICAL LOW BACK PAIN: ICD-10-CM

## 2022-07-05 NOTE — TELEPHONE ENCOUNTER
Attempted times 3 to place EFT probe, unable to advance into stomach. Procedure terminated.   Dr. Olman Chan office notifed Routing refill request to provider for review/approval because:  Drug not on the [facility], P or [facility] refill protocol or controlled substance    Yandy F - Registered Nurse  [facility]  Acute and Diagnostic Services

## 2022-07-06 RX ORDER — TRAMADOL HYDROCHLORIDE 50 MG/1
TABLET ORAL
Qty: 30 TABLET | Refills: 0 | Status: SHIPPED | OUTPATIENT
Start: 2022-07-06 | End: 2022-08-02

## 2022-07-10 ENCOUNTER — MYC MEDICAL ADVICE (OUTPATIENT)
Dept: INTERNAL MEDICINE | Facility: CLINIC | Age: 67
End: 2022-07-10

## 2022-07-10 DIAGNOSIS — G89.29 CHRONIC BILATERAL LOW BACK PAIN, UNSPECIFIED WHETHER SCIATICA PRESENT: ICD-10-CM

## 2022-07-10 DIAGNOSIS — M54.50 CHRONIC BILATERAL LOW BACK PAIN, UNSPECIFIED WHETHER SCIATICA PRESENT: ICD-10-CM

## 2022-07-11 RX ORDER — HYDROCODONE BITARTRATE AND ACETAMINOPHEN 10; 325 MG/1; MG/1
1 TABLET ORAL EVERY 4 HOURS PRN
Qty: 180 TABLET | Refills: 0 | Status: SHIPPED | OUTPATIENT
Start: 2022-07-11 | End: 2022-08-08

## 2022-07-26 ENCOUNTER — OFFICE VISIT (OUTPATIENT)
Dept: URGENT CARE | Facility: URGENT CARE | Age: 67
End: 2022-07-26
Payer: COMMERCIAL

## 2022-07-26 ENCOUNTER — HOSPITAL ENCOUNTER (EMERGENCY)
Facility: CLINIC | Age: 67
Discharge: HOME | End: 2022-07-26
Attending: EMERGENCY MEDICINE | Admitting: EMERGENCY MEDICINE
Payer: COMMERCIAL

## 2022-07-26 ENCOUNTER — APPOINTMENT (OUTPATIENT)
Dept: GENERAL RADIOLOGY | Facility: CLINIC | Age: 67
End: 2022-07-26
Attending: EMERGENCY MEDICINE
Payer: COMMERCIAL

## 2022-07-26 VITALS
RESPIRATION RATE: 16 BRPM | SYSTOLIC BLOOD PRESSURE: 108 MMHG | TEMPERATURE: 96.9 F | HEART RATE: 107 BPM | OXYGEN SATURATION: 92 % | DIASTOLIC BLOOD PRESSURE: 73 MMHG

## 2022-07-26 VITALS
TEMPERATURE: 98 F | OXYGEN SATURATION: 93 % | DIASTOLIC BLOOD PRESSURE: 72 MMHG | SYSTOLIC BLOOD PRESSURE: 116 MMHG | HEART RATE: 107 BPM | RESPIRATION RATE: 20 BRPM | HEIGHT: 70 IN | BODY MASS INDEX: 26.48 KG/M2 | WEIGHT: 185 LBS

## 2022-07-26 DIAGNOSIS — S81.801A OPEN WOUND OF RIGHT LOWER LEG, INITIAL ENCOUNTER: ICD-10-CM
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
## 2022-07-26 DIAGNOSIS — S81.811A LACERATION OF LEG, RIGHT, INITIAL ENCOUNTER: Primary | ICD-10-CM

## 2022-07-26 PROCEDURE — 250N000013 HC RX MED GY IP 250 OP 250 PS 637: Performed by: EMERGENCY MEDICINE

## 2022-07-26 PROCEDURE — 73590 X-RAY EXAM OF LOWER LEG: CPT | Mod: RT

## 2022-07-26 PROCEDURE — 12004 RPR S/N/AX/GEN/TRK7.6-12.5CM: CPT

## 2022-07-26 PROCEDURE — 99283 EMERGENCY DEPT VISIT LOW MDM: CPT

## 2022-07-26 PROCEDURE — 99207 PR NON-BILLABLE SERV PER CHARTING: CPT | Performed by: PHYSICIAN ASSISTANT

## 2022-07-26 RX ORDER — DOXYCYCLINE 100 MG/1
100 TABLET ORAL 2 TIMES DAILY
Qty: 14 TABLET | Refills: 0 | Status: SHIPPED | OUTPATIENT
Start: 2022-07-26 | End: 2022-08-02

## 2022-07-26 RX ORDER — BUPIVACAINE HYDROCHLORIDE 5 MG/ML
INJECTION, SOLUTION PERINEURAL
Status: DISCONTINUED
Start: 2022-07-26 | End: 2022-07-27 | Stop reason: HOSPADM

## 2022-07-26 RX ORDER — METRONIDAZOLE 500 MG/1
500 TABLET ORAL 3 TIMES DAILY
Qty: 21 TABLET | Refills: 0 | Status: SHIPPED | OUTPATIENT
Start: 2022-07-26 | End: 2022-08-02

## 2022-07-26 RX ORDER — OXYCODONE HYDROCHLORIDE 5 MG/1
5 TABLET ORAL ONCE
Status: COMPLETED | OUTPATIENT
Start: 2022-07-26 | End: 2022-07-26

## 2022-07-26 RX ORDER — HYDROCODONE BITARTRATE AND ACETAMINOPHEN 5; 325 MG/1; MG/1
1 TABLET ORAL EVERY 6 HOURS PRN
Qty: 10 TABLET | Refills: 0 | Status: SHIPPED | OUTPATIENT
Start: 2022-07-26 | End: 2022-07-29

## 2022-07-26 RX ORDER — HYDROCODONE BITARTRATE AND ACETAMINOPHEN 5; 325 MG/1; MG/1
2 TABLET ORAL ONCE
Status: COMPLETED | OUTPATIENT
Start: 2022-07-26 | End: 2022-07-26

## 2022-07-26 RX ORDER — METRONIDAZOLE 500 MG/1
500 TABLET ORAL ONCE
Status: COMPLETED | OUTPATIENT
Start: 2022-07-26 | End: 2022-07-26

## 2022-07-26 RX ORDER — DOXYCYCLINE 100 MG/1
100 CAPSULE ORAL ONCE
Status: COMPLETED | OUTPATIENT
Start: 2022-07-26 | End: 2022-07-26

## 2022-07-26 RX ORDER — PREDNISONE 5 MG/1
5 TABLET ORAL 2 TIMES DAILY
COMMUNITY
Start: 2022-06-04 | End: 2023-01-01

## 2022-07-26 RX ADMIN — HYDROCODONE BITARTRATE AND ACETAMINOPHEN 2 TABLET: 5; 325 TABLET ORAL at 20:05

## 2022-07-26 RX ADMIN — METRONIDAZOLE 500 MG: 500 TABLET ORAL at 23:01

## 2022-07-26 RX ADMIN — OXYCODONE HYDROCHLORIDE 5 MG: 5 TABLET ORAL at 23:20

## 2022-07-26 RX ADMIN — DOXYCYCLINE HYCLATE 100 MG: 100 CAPSULE ORAL at 23:01

## 2022-07-26 ASSESSMENT — ENCOUNTER SYMPTOMS: WOUND: 1

## 2022-07-26 NOTE — PROGRESS NOTES
Triage note  Patient presenting to urgent care today accompanied by her  with complaint of right lower anterior leg laceration.  She was trying to break a fight between 2 dogs.  There is a large wound over right mid anterior shin. Patient is very tender to palpation.  Given laceration size and limited pain control medication in the urgent care setting recommended further evaluation in the emergency room for laceration repair and pain management.  Transfer to ED via private car.  Patient agrees.    Ivana Eddy PA-C

## 2022-07-26 NOTE — PROGRESS NOTES
Assessment & Plan     There are no diagnoses linked to this encounter.   {2021 E&M time (Optional):243917}    {Provider  Link to [facility] Help Grid :546296}    No follow-ups on file.    Ivana Eddy PA-C  [facility] CARE JOSE Marie is a 66 year old female who presents to clinic today for the following health issues:  Chief Complaint   Patient presents with     Urgent Care     Dog Bite     Patient says there was a dog bite by her Right leg-She is unsure if her wounds are bites or scratches      HPI    ***  {UC Conditions (Optional):750840}    Review of Systems  {ROS COMP (Optional):078219}      Objective    /73   Pulse 107   Temp 96.9  F (36.1  C) (Tympanic)   Resp 16   LMP 09/29/2005   SpO2 92%   Breastfeeding No   Physical Exam   {Exam List (Optional):100893}    {Diagnostic Test Results (Optional):866813}

## 2022-07-27 NOTE — DISCHARGE INSTRUCTIONS
KEEP THE DRESSING CLEAN AND DRY AND IN PLACE FOR 2 DAYS. THEN YOU SHOULD CHANGE THE BANDAGE DAILY AND MAY GET THE WOUND WET. DO NOT SOAK THE WOUND UNDER WATER UNTIL THE STITCHES ARE REMOVED.    STITCHES SHOULD BE REMOVED IN 14 DAYS.

## 2022-07-27 NOTE — ED NOTES
Pt rounding done, pt reports 8/10 pain at this time. Medications adm. Pt reports eating food w/ pain medicine. Pt provided w/ boxed lunch and water.  Steve at bedside, denies needing anything else at this time.

## 2022-07-27 NOTE — ED TRIAGE NOTES
Here for concern of dog bite to right lower leg. Stated that she sustain a dog bite while dog sitting her son's dogs. Stated that her leg was in between two hams when the dog got into a fight and also bit her leg as well. Dog are up to date on shots. ABCs intact.     Triage Assessment     Row Name 07/26/22 [id number]       Triage Assessment (Adult)    Airway WDL WDL       Respiratory WDL    Respiratory WDL WDL       Cardiac WDL    Cardiac WDL WDL

## 2022-07-27 NOTE — ED PROVIDER NOTES
History   Chief Complaint:  Dog Bite     The history is provided by the patient and the spouse.      Trisha Bender is a 66 year old female with history of CAD, hypertension, and CHF who presents with a wound to her right shin. She reports that she had a piece of ham in her hand when her dog and her son's dog both started fighting over it and she was in the middle and either was bitten or clawed in the front of her lower right extremity at about 1400. She denies any other injuries. No numbness, tingling, or weakness. Bleeding controlled prior to arrival. Last tdap within 5 years. She denies any other symptoms. Patient not anticoagulated.     Review of Systems   Skin: Positive for wound.   All other systems reviewed and are negative.      Allergies:  Gabapentin  Contrast Dye  Escitalopram  Nuts  Penicillins  Sulfa Drugs  Chlorotrianisene  Aspirin  Ibuprofen  Strawberry    Medications:  Ultram   Imitrex   Aldactone   Daliresp   Deltasone  Protonix   Zofran   Narcan   Singulair  Zestril   Toradol   Flonase   Romycin   Zyrtec  Cored   Lioresal   Lipitor   Xanax   Fosamax     Past Medical History:     Thoracic aortic ectasia  Panlobular emphysema   Hypokalemia  Hyperlipidemia   CAD   CHF   Right bundle branch block   Dilated cardiomyopathy   Anxiety   Cystocele   Hypertension   GERD     Past Surgical History:    Angiogram   Appendectomy   Cholecystectomy   Colonoscopy   Coronary angiogram   Left heart cath   Left ventriculogram   Right heart cath  Esophagoscopy, Gastroscopy, and Duodenoscopy combined   Inject epidural lumbar   Laparoscopic ablation endometriosis   Tonsillectomy   [place] teeth extraction     Family History:    Colon cancer   Heart disease   CAD     Social History:  The patient presents to the ED with her spouse.  The patient has a pet dog.     Physical Exam     Patient Vitals for the past 24 hrs:   BP Temp Temp src Pulse Resp SpO2 Height Weight   07/26/22 2235 -- -- -- -- -- 93 % -- --   07/26/22  "2230 116/72 -- -- -- -- -- -- --   07/26/22 2225 -- -- -- -- -- 95 % -- --   07/26/22 2220 -- -- -- -- -- 94 % -- --   07/26/22 2215 -- -- -- -- -- 94 % -- --   07/26/22 2210 -- -- -- -- -- 95 % -- --   07/26/22 2200 119/81 -- -- -- -- -- -- --   07/26/22 2150 -- -- -- -- -- 93 % -- --   07/26/22 2145 -- -- -- -- -- 93 % -- --   07/26/22 2140 -- -- -- -- -- 91 % -- --   07/26/22 2135 -- -- -- -- -- 92 % -- --   07/26/22 2130 121/78 -- -- 107 -- 91 % -- --   07/26/22 2015 114/82 -- -- 113 -- -- -- --   07/26/22 2000 127/84 -- -- 111 -- -- -- --   07/1955 -- -- -- -- -- 92 % -- --   07/26/22 1950 -- -- -- -- -- 92 % -- --   07/26/22 1945 (!) 140/87 -- -- 118 -- -- -- --   07/26/22 1933 (!) 151/89 -- -- (!) 125 20 94 % -- --   07/26/22 1909 124/66 98  F (36.7  C) Temporal 115 18 93 % 1.778 m (5' 10\") 83.9 kg (185 lb)       Physical Exam  General: Well appearing, nontoxic.  Resting comfortably  Head:  Scalp, face, and head appear normal  Eyes:  Pupils are equal, round    Conjunctivae non-injected and sclerae white  ENT:    The external nose is normal    Pinnae are normal  Neck:  Trachea is in the midline  CV:  Extremities warm and well perfused. DP pulses 2+ and intact bilaterally.  Resp:  No increased work of breathing  GI:  No distention.  MS:  Normal muscular tone    Symmetric motor strength    No lower extremity edema. CMS intact bilateral lower extremities.  Skin:  Large anterior tibial wound to the right lower leg 9x5cm which is full thickness with exposure of the underlying subcutaneous tissue and exposure of the anterior tibia. Skin flap retracted medially. Continuous oozing venous bleeding from the medial wound margin. No muscle involvement. No foreign bodies. No rash or acute skin lesions noted  Neuro:  Awake and alert  Speech is normal and fluent  Moves all extremities spontaneously  Psych: Normal affect. Appropriate interactions.      Emergency Department Course   Imaging:  XR Tibia & Fibula Right 2 " "Views   Final Result   IMPRESSION: Normal tibia and fibula. No fracture or foreign body.        Report per radiology    Laboratory:  Labs Ordered and Resulted from Time of ED Arrival to Time of ED Departure - No data to display     [facility]    -Laceration Repair    Date/Time: 7/26/2022 8:48 PM  Performed by: Bo Cuadra MD  Authorized by: Bo Cuadra MD     Risks, benefits and alternatives discussed.      ANESTHESIA (see MAR for exact dosages):     Anesthesia method:  Local infiltration    Local anesthetic:  Bupivacaine 0.5% w/o epi (13mL)  LACERATION DETAILS     Location:  Leg    Leg location:  R lower leg    Length (cm):  9    Depth (mm):  15    REPAIR TYPE:     Repair type:  Complex (due to expsoure of underlying bone, tissue flap, and multilayer closure)      EXPLORATION:     Hemostasis achieved with:  Tied off vessels and direct pressure    Wound exploration: wound explored through full range of motion and entire depth of wound probed and visualized      Wound extent: fascia violated      Wound extent: no foreign body, no signs of injury, no nerve damage, no tendon damage, no underlying fracture and no vascular damage      Wound extent comment:  Wound extends deep to bone with exposure of the anterior surface of the tibia.    TREATMENT:     Area cleansed with:  Saline    Amount of cleaning:  Extensive    Irrigation solution:  Sterile saline    Irrigation volume:  1L    Irrigation method:  Pressure wash ([email or web address] pulse  used)    Debridement:  Minimal    Undermining:  Minimal    SUBCUTANEOUS REPAIR     Suture size:  4-0    Suture material:  Vicryl    Suture technique:  Simple interrupted    Number of sutures:  4    SKIN REPAIR     Repair method:  Sutures (reinforced by 1/2\" steri strips)    Suture size:  3-0 (and 4-0)    Suture material:  Nylon    Suture technique:  Horizontal mattress and simple interrupted    Number of sutures:  18    APPROXIMATION     Approximation:  " Close    POST-PROCEDURE DETAILS     Dressing:  Non-adherent dressing, sterile dressing and bulky dressing        PROCEDURE    Patient Tolerance:  Patient tolerated the procedure well with no immediate complications           Emergency Department Course:     Reviewed:  I reviewed nursing notes, vitals and past medical history    ED Course as of 07/26/22 2332 Tue Jul 26, 2022 1950 I obtained history and examined the patient as reported above    2048 I rechecked the patient and numbed her wound.   2135 I rechecked the patient, irrigated her wound, and repaired it.        Interventions:  2005 Norco 2 tablets oral   2301 Flagyl 500 mg tablet  2301 Vibramycin 100 mg oral  2320 Roxicodone 5 mg oral    Disposition:  The patient was discharged to home.     Impression & Plan   Medical Decision Making:  Trisha Bender is a 66 year old female who presents for evaluation of complex wound to the right anterior lower leg as a result of either a dog bite or scratch which tore the soft tissues of the anterior right lower leg.  Tetanus is up-to-date.  Wound is extensive with tissue flap and exposure of the underlying surface of the anterior tibia.  Periosteum is intact.  No muscular injury.  No neurovascular compromise.  No foreign bodies visualized.  Radiographs negative for fracture or retained foreign body.  Given the extent of the wound primary closure was felt to be the best treatment option.  Wound is at high risk for infection given animal bite versus scratch.  Extensive irrigation with [employer] pulse  was performed.  Small bleeding venule was tied off with Vicryl subcutaneous tissue was closed with Vicryl skin was closed with buttressing horizontal mattress sutures and simple interrupted sutures along the wound edges.  I discussed with the patient that this wound is high risk wound for multiple reasons including its location, full-thickness nature, and due to animal bite versus scratch.  Patient has a  penicillin allergy therefore she was started on doxycycline and Flagyl.  First dose given in the emergency department.  Patient to leave the initial dressing on for 2 days keeping it clean and dry.  I stressed the importance of close primary care follow-up in 3 days for wound recheck.  Sutures should stay in place for 14 days.  If patient has any complications or difficulty with healing she should follow-up at the [facility] wound center or return to the emergency department.  Patient is agreeable with the plan of care.  Close return precautions are provided and she was discharged in stable and improved condition.    Diagnosis:    ICD-10-CM    1. Open wound of right lower leg, initial encounter  S81.801A    2. Dog bite, initial encounter  W54.0XXA        Discharge Medications:  New Prescriptions    DOXYCYCLINE MONOHYDRATE (ADOXA) 100 MG TABLET    Take 1 tablet (100 mg) by mouth 2 times daily for 7 days    HYDROCODONE-ACETAMINOPHEN (NORCO) 5-325 MG TABLET    Take 1 tablet by mouth every 6 hours as needed for severe pain    METRONIDAZOLE (FLAGYL) 500 MG TABLET    Take 1 tablet (500 mg) by mouth 3 times daily for 7 days       Scribe Disclosure:  I, Jluis Biggs, am serving as a scribe at 7:39 PM on 7/26/2022 to document services personally performed by Bo Cuadra MD based on my observations and the provider's statements to me.      Bo Cuadra MD  07/27/22 [id number]

## 2022-08-01 DIAGNOSIS — M54.59 MECHANICAL LOW BACK PAIN: ICD-10-CM

## 2022-08-02 RX ORDER — TRAMADOL HYDROCHLORIDE 50 MG/1
TABLET ORAL
Qty: 30 TABLET | Refills: 0 | Status: SHIPPED | OUTPATIENT
Start: 2022-08-02 | End: 2022-08-26

## 2022-08-02 NOTE — TELEPHONE ENCOUNTER
Pending Prescriptions:                       Disp   Refills    traMADol (ULTRAM) 50 MG tablet [Pharmacy M*30 tab*         Sig: TAKE 1 TABLET(50 MG) BY MOUTH EVERY 6 HOURS AS NEEDED           FOR SEVERE PAIN    Routing refill request to provider for review/approval because:  Drug not on the [facility] refill protocol

## 2022-08-03 ENCOUNTER — OFFICE VISIT (OUTPATIENT)
Dept: INTERNAL MEDICINE | Facility: CLINIC | Age: 67
End: 2022-08-03
Payer: COMMERCIAL

## 2022-08-03 VITALS
HEIGHT: 70 IN | BODY MASS INDEX: 26.54 KG/M2 | OXYGEN SATURATION: 94 % | DIASTOLIC BLOOD PRESSURE: 69 MMHG | HEART RATE: 107 BPM | RESPIRATION RATE: 20 BRPM | SYSTOLIC BLOOD PRESSURE: 105 MMHG

## 2022-08-03 DIAGNOSIS — S81.811D LACERATION OF RIGHT LOWER EXTREMITY, SUBSEQUENT ENCOUNTER: Primary | ICD-10-CM

## 2022-08-03 PROBLEM — R10.9 ABDOMINAL PAIN: Status: RESOLVED | Noted: 2019-09-08 | Resolved: 2022-08-03

## 2022-08-03 PROCEDURE — 99213 OFFICE O/P EST LOW 20 MIN: CPT | Performed by: INTERNAL MEDICINE

## 2022-08-03 ASSESSMENT — ENCOUNTER SYMPTOMS
GASTROINTESTINAL NEGATIVE: 1
CARDIOVASCULAR NEGATIVE: 1
RESPIRATORY NEGATIVE: 1
ARTHRALGIAS: 1
WOUND: 1
CONSTITUTIONAL NEGATIVE: 1

## 2022-08-03 NOTE — PROGRESS NOTES
"  Assessment & Plan     Laceration of right lower extremity, subsequent encounter  At this time, patient appears to have no signs of infection or active bleeding from her recent laceration.  He does appear that she is healing appropriately at this point.  We did review appropriate wound care.  We also discussed signs and symptoms of intestinal infection.  Patient should leave her sutures in place until 2 weeks from the date of placement.  This would place the date for removal at approximately August 9, 2022.  Patient will continue her wound care as previously directed.  She had no further questions or concerns at this time.      Review of external notes as documented elsewhere in note  20 minutes spent on the date of the encounter doing chart review, history and exam, documentation and further activities per the note       BMI:   Estimated body mass index is 26.54 kg/m  as calculated from the following:    Height as of this encounter: 1.778 m (5' 10\").    Weight as of 7/26/22: 83.9 kg (185 lb).       See Patient Instructions    Return in about 1 week (around 8/10/2022) for Follow up laceration.    Domingo John MD  [facility]    Mariama Marie is a 66 year old, presenting for the following health issues:  ER F/U      Patient is a 66-year-old  female who presents to the clinic for an ER follow-up visit.  She was seen at the [facility] emergency department on July 26, 2022 after being bitten by a family dog.  Patient reports that to the goal for fighting over 7 food and she did inadvertently get bit.  The bite did take place on her right lower leg.  Patient did present to the emergency department after she had prolonged bleeding.  She did have an x-ray while in the emergency department did not show any evidence of fracture.  Patient did ultimately have multiple layers of suturing applied with 14 external sutures noted along the superficial surface of his right lower leg.  " "Since her stitches have been placed, she has had minimal bleeding.  Patient denies any issues with fever, chills, or drainage from the affected wound.  Patient does report some pain, but it does appear to be improving.  She was instructed to have a wound check within a few days of her ER visit.  Patient has been changing her bandages daily.       ED/UC Followup:    Facility:  [facility]  Date of visit: 7/26/22  Reason for visit: wound on right lower leg, dog bite  Current Status: improving, but painful      Review of Systems   Constitutional: Negative.    HENT: Negative.    Respiratory: Negative.    Cardiovascular: Negative.    Gastrointestinal: Negative.    Musculoskeletal: Positive for arthralgias.   Skin: Positive for wound.         Objective    LMP 09/29/2005   There is no height or weight on file to calculate BMI.   Blood pressure 105/69, pulse 107, resp. rate 20, height 1.778 m (5' 10\"), last menstrual period 09/29/2005, SpO2 94 %, not currently breastfeeding.      Physical Exam  Vitals reviewed.   Constitutional:       General: She is not in acute distress.     Appearance: She is well-developed.   HENT:      Right Ear: Tympanic membrane and external ear normal.      Left Ear: Tympanic membrane and external ear normal.      Nose: Nose normal.      Mouth/Throat:      Mouth: Mucous membranes are moist.      Pharynx: Oropharynx is clear. No oropharyngeal exudate.   Eyes:      General:         Right eye: No discharge.         Left eye: No discharge.      Conjunctiva/sclera: Conjunctivae normal.      Pupils: Pupils are equal, round, and reactive to light.   Neck:      Thyroid: No thyromegaly.      Trachea: No tracheal deviation.   Cardiovascular:      Rate and Rhythm: Regular rhythm. Tachycardia present.      Pulses: Normal pulses.      Heart sounds: Normal heart sounds, S1 normal and S2 normal. No murmur heard.    No friction rub. No S3 or S4 sounds.   Pulmonary:      Effort: Pulmonary effort is normal. No " respiratory distress.      Breath sounds: Normal breath sounds. No wheezing or rales.   Abdominal:      General: Bowel sounds are normal.      Palpations: Abdomen is soft. There is no mass.      Tenderness: There is no abdominal tenderness.   Musculoskeletal:      Cervical back: Neck supple.   Lymphadenopathy:      Cervical: No cervical adenopathy.   Skin:     Findings: Lesion (Linear laceration with approximately 14 sutures in place on right lower leg.  No active bleeding signs of infection.) present. No rash.   Neurological:      Mental Status: She is alert.      Motor: No abnormal muscle tone.      Deep Tendon Reflexes: Reflexes are normal and symmetric.              .  ..

## 2022-08-03 NOTE — PATIENT INSTRUCTIONS
Laceration is healing nicely.  No signs of infection.  We will continue with wound care as previously directed.  We will plan to remove sutures 14 days from the time of placement on July 26, 2015.

## 2022-08-06 DIAGNOSIS — Z79.899 CHRONIC PRESCRIPTION BENZODIAZEPINE USE: ICD-10-CM

## 2022-08-06 DIAGNOSIS — F41.9 ANXIETY: ICD-10-CM

## 2022-08-08 ENCOUNTER — MYC MEDICAL ADVICE (OUTPATIENT)
Dept: INTERNAL MEDICINE | Facility: CLINIC | Age: 67
End: 2022-08-08

## 2022-08-08 DIAGNOSIS — M54.50 CHRONIC BILATERAL LOW BACK PAIN, UNSPECIFIED WHETHER SCIATICA PRESENT: ICD-10-CM

## 2022-08-08 DIAGNOSIS — G89.29 CHRONIC BILATERAL LOW BACK PAIN, UNSPECIFIED WHETHER SCIATICA PRESENT: ICD-10-CM

## 2022-08-08 RX ORDER — HYDROCODONE BITARTRATE AND ACETAMINOPHEN 10; 325 MG/1; MG/1
1 TABLET ORAL EVERY 4 HOURS PRN
Qty: 180 TABLET | Refills: 0 | Status: SHIPPED | OUTPATIENT
Start: 2022-08-08 | End: 2022-09-25

## 2022-08-09 NOTE — TELEPHONE ENCOUNTER
Alprazolam 0.25 mg tab  Routing refill request to provider for review/approval because:  Drug not on the [facility] refill protocol     LOV 08/03/2022

## 2022-08-10 ENCOUNTER — ALLIED HEALTH/NURSE VISIT (OUTPATIENT)
Dept: NURSING | Facility: CLINIC | Age: 67
End: 2022-08-10
Payer: COMMERCIAL

## 2022-08-10 DIAGNOSIS — Z48.02 VISIT FOR SUTURE REMOVAL: Primary | ICD-10-CM

## 2022-08-10 RX ORDER — ALPRAZOLAM 0.25 MG
TABLET ORAL
Qty: 60 TABLET | Refills: 0 | Status: SHIPPED | OUTPATIENT
Start: 2022-08-10 | End: 2022-09-16

## 2022-08-10 NOTE — NURSING NOTE
Trishajulito Bender presents to the clinic today for removal of sutures.  The patient has had the sutures in place for 14 days.  There has been no history of infection or drainage.  24 sutures (4 mattress sutures, 20 interrupted sutures) are seen located on the right leg.  The wound is healing well with no signs of infection, Dr. Weinberg evaluated lower portion of the wound and gave approval to proceed with suture removal.  Tetanus status is up to date.   All sutures were easily removed today.  Routine wound care discussed.  The patient will follow up as needed.     Valencia Acevedo RN  [facility]

## 2022-08-24 ENCOUNTER — HOSPITAL ENCOUNTER (OUTPATIENT)
Dept: CARDIOLOGY | Facility: CLINIC | Age: 67
Discharge: HOME | End: 2022-08-24
Attending: INTERNAL MEDICINE | Admitting: INTERNAL MEDICINE
Payer: COMMERCIAL

## 2022-08-24 DIAGNOSIS — J43.8 OTHER EMPHYSEMA (H): ICD-10-CM

## 2022-08-24 DIAGNOSIS — R06.09 DOE (DYSPNEA ON EXERTION): ICD-10-CM

## 2022-08-24 DIAGNOSIS — R00.0 TACHYCARDIA: ICD-10-CM

## 2022-08-24 DIAGNOSIS — I42.0 DILATED CARDIOMYOPATHY (H): ICD-10-CM

## 2022-08-24 LAB — LVEF ECHO: NORMAL

## 2022-08-24 PROCEDURE — 93321 DOPPLER ECHO F-UP/LMTD STD: CPT | Mod: 26 | Performed by: INTERNAL MEDICINE

## 2022-08-24 PROCEDURE — 93308 TTE F-UP OR LMTD: CPT | Mod: 26 | Performed by: INTERNAL MEDICINE

## 2022-08-24 PROCEDURE — 255N000002 HC RX 255 OP 636: Performed by: INTERNAL MEDICINE

## 2022-08-24 PROCEDURE — 93321 DOPPLER ECHO F-UP/LMTD STD: CPT

## 2022-08-24 PROCEDURE — 93325 DOPPLER ECHO COLOR FLOW MAPG: CPT

## 2022-08-24 PROCEDURE — 93325 DOPPLER ECHO COLOR FLOW MAPG: CPT | Mod: 26 | Performed by: INTERNAL MEDICINE

## 2022-08-24 RX ADMIN — HUMAN ALBUMIN MICROSPHERES AND PERFLUTREN 3 ML: 10; .22 INJECTION, SOLUTION INTRAVENOUS at 15:25

## 2022-08-25 DIAGNOSIS — M54.59 MECHANICAL LOW BACK PAIN: ICD-10-CM

## 2022-08-26 RX ORDER — TRAMADOL HYDROCHLORIDE 50 MG/1
TABLET ORAL
Qty: 30 TABLET | Refills: 0 | Status: SHIPPED | OUTPATIENT
Start: 2022-08-26 | End: 2022-09-19

## 2022-09-08 ENCOUNTER — OFFICE VISIT (OUTPATIENT)
Dept: CARDIOLOGY | Facility: CLINIC | Age: 67
End: 2022-09-08
Attending: INTERNAL MEDICINE
Payer: COMMERCIAL

## 2022-09-08 ENCOUNTER — LAB (OUTPATIENT)
Dept: LAB | Facility: CLINIC | Age: 67
End: 2022-09-08

## 2022-09-08 VITALS
BODY MASS INDEX: 26.54 KG/M2 | SYSTOLIC BLOOD PRESSURE: 102 MMHG | OXYGEN SATURATION: 96 % | HEIGHT: 70 IN | DIASTOLIC BLOOD PRESSURE: 71 MMHG | HEART RATE: 98 BPM

## 2022-09-08 DIAGNOSIS — R00.0 TACHYCARDIA: ICD-10-CM

## 2022-09-08 DIAGNOSIS — I42.0 DILATED CARDIOMYOPATHY (H): ICD-10-CM

## 2022-09-08 DIAGNOSIS — I50.22 CHRONIC SYSTOLIC CONGESTIVE HEART FAILURE (H): ICD-10-CM

## 2022-09-08 DIAGNOSIS — J43.8 OTHER EMPHYSEMA (H): ICD-10-CM

## 2022-09-08 DIAGNOSIS — E87.1 HYPONATREMIA: ICD-10-CM

## 2022-09-08 DIAGNOSIS — E87.1 HYPONATREMIA: Primary | ICD-10-CM

## 2022-09-08 DIAGNOSIS — R06.09 DOE (DYSPNEA ON EXERTION): ICD-10-CM

## 2022-09-08 LAB
ANION GAP SERPL CALCULATED.3IONS-SCNC: 10 MMOL/L (ref 7–15)
BUN SERPL-MCNC: 9.1 MG/DL (ref 8–23)
CALCIUM SERPL-MCNC: 9.1 MG/DL (ref 8.8–10.2)
CHLORIDE SERPL-SCNC: 96 MMOL/L (ref 98–107)
CREAT SERPL-MCNC: 0.58 MG/DL (ref 0.51–0.95)
DEPRECATED HCO3 PLAS-SCNC: 25 MMOL/L (ref 22–29)
GFR SERPL CREATININE-BSD FRML MDRD: >90 ML/MIN/1.73M2
GLUCOSE SERPL-MCNC: 134 MG/DL (ref 70–99)
POTASSIUM SERPL-SCNC: 4.8 MMOL/L (ref 3.4–5.3)
SODIUM SERPL-SCNC: 131 MMOL/L (ref 136–145)

## 2022-09-08 PROCEDURE — 99214 OFFICE O/P EST MOD 30 MIN: CPT | Performed by: INTERNAL MEDICINE

## 2022-09-08 PROCEDURE — 80048 BASIC METABOLIC PNL TOTAL CA: CPT | Performed by: INTERNAL MEDICINE

## 2022-09-08 PROCEDURE — 36415 COLL VENOUS BLD VENIPUNCTURE: CPT | Performed by: INTERNAL MEDICINE

## 2022-09-08 RX ORDER — SPIRONOLACTONE 25 MG/1
25 TABLET ORAL DAILY
Qty: 90 TABLET | Refills: 3 | Status: SHIPPED | OUTPATIENT
Start: 2022-09-08 | End: 2023-08-22

## 2022-09-08 NOTE — LETTER
9/8/2022    Bandar Weinberg MD  [address] [facility] [address]    RE: Trisha Bender       Dear Colleague,     I had the pleasure of seeing Trisha Bender in the [facility].  HPI and Plan:   See dictation    Orders Placed This Encounter   Procedures     Basic metabolic panel     Overnight oximetry study       Orders Placed This Encounter   Medications     spironolactone (ALDACTONE) 25 MG tablet     Sig: Take 1 tablet (25 mg) by mouth daily     Dispense:  90 tablet     Refill:  3       Medications Discontinued During This Encounter   Medication Reason     spironolactone (ALDACTONE) 25 MG tablet Reorder         Encounter Diagnoses   Name Primary?     Dilated cardiomyopathy (H)      CANNON (dyspnea on exertion)      Tachycardia      Other emphysema (H)      Chronic systolic congestive heart failure (H)      Hyponatremia Yes       CURRENT MEDICATIONS:  Current Outpatient Medications   Medication Sig Dispense Refill     albuterol (VENTOLIN HFA) 108 (90 BASE) MCG/ACT Inhaler Inhale 2 puffs into the lungs every 6 hours as needed for shortness of breath / dyspnea or wheezing 18 g 3     alendronate (FOSAMAX) 70 MG tablet TAKE 1 TABLET BY MOUTH ONE TIME PER WEEK 12 tablet 3     ALPRAZolam (XANAX) 0.25 MG tablet TAKE 1 TABLET BY MOUTH TWICE A DAY AS NEEDED FOR ANXIETY 60 tablet 0     atorvastatin (LIPITOR) 10 MG tablet Take 1 tablet (10 mg) by mouth daily 90 tablet 3     baclofen (LIORESAL) 10 MG tablet TAKE TWO TABLETS BY MOUTH THREE TIMES A  tablet 5     BREO ELLIPTA 200-25 MCG/INH Inhaler Inhale 1 Dose into the lungs daily   12     carvedilol (COREG) 25 MG tablet Take 1 tablet (25 mg) by mouth 2 times daily (with meals) 180 tablet 3     cetirizine (ZYRTEC) 10 MG tablet Take 10 mg by mouth daily       [employer] FIBER GUMMIES 2.5 G CHEW Take 1 tablet by mouth daily Reported on 5/8/2017       erythromycin (ROMYCIN) 5 MG/GM ophthalmic ointment PLACE 0.5 INCHES INTO THE LEFT EYE AT  BEDTIME (Patient taking differently: Place 0.5 inches Into the left eye nightly as needed) 3.5 g 0     fluticasone (FLONASE) 50 MCG/ACT nasal spray USE ONE TO TWO SPRAYS IN EACH NOSTRIL EVERY DAY-DUE IN FEBRUARY TO SEE DR. LEE 16 g 0     HYDROcodone-acetaminophen (NORCO)  MG per tablet Take 1 tablet by mouth every 4 hours as needed for severe pain Take up to 6 tablets a day 180 tablet 0     ipratropium - albuterol 0.5 mg/2.5 mg/3 mL (DUONEB) 0.5-2.5 (3) MG/3ML neb solution Take 1 vial (3 mLs) by nebulization every 6 hours as needed for shortness of breath / dyspnea or wheezing 30 vial 1     ketorolac (TORADOL) 10 MG tablet Take 1 tablet (10 mg) by mouth every 6 hours as needed for moderate pain 20 tablet 0     lisinopril (ZESTRIL) 5 MG tablet Take 1 tablet (5 mg) by mouth daily 90 tablet 3     montelukast (SINGULAIR) 10 MG tablet TAKE ONE TABLET BY MOUTH AT BEDTIME 90 tablet 1     Multiple Vitamin CHEW Take 1 tablet by mouth daily        naloxone (NARCAN) 4 MG/0.1ML nasal spray Spray 1 spray (4 mg) into one nostril alternating nostrils once as needed for opioid reversal Every 2-3 minutes until patient responsive or EMS arrives 0.2 mL 0     ondansetron (ZOFRAN ODT) 4 MG ODT tab TAKE 1-2 TABLETS BY MOUTH EVERY 8 HOURS AS NEEDED FOR NAUSEA. 18 tablet 4     pantoprazole (PROTONIX) 40 MG EC tablet TAKE 1 TABLET BY MOUTH EVERY DAY 90 tablet 3     predniSONE (DELTASONE) 5 MG tablet TAKE 3 TABLETS BY MOUTH EVERY DAY       roflumilast (DALIRESP) 500 MCG TABS tablet Take 1 tablet (500 mcg) by mouth daily       spironolactone (ALDACTONE) 25 MG tablet Take 1 tablet (25 mg) by mouth daily 90 tablet 3     SUMAtriptan (IMITREX) 25 MG tablet Take 1 tablet (25 mg) by mouth at onset of headache for migraine 9 tablet 11     tiotropium (SPIRIVA HANDIHALER) 18 MCG capsule USING THE HANDIHALER, INHALE THE CONTENTS OF ONE CAPSULE BY MOUTH DAILY 90 capsule 3     traMADol (ULTRAM) 50 MG tablet TAKE 1 TABLET(50 MG) BY MOUTH EVERY 6  HOURS AS NEEDED FOR SEVERE PAIN 30 tablet 0     ASPIRIN NOT PRESCRIBED (INTENTIONAL) Please choose reason not prescribed from choices below. (Patient not taking: Reported on 9/8/2022)         ALLERGIES     Allergies   Allergen Reactions     Gabapentin Swelling     Contrast Dye      Iodine     Escitalopram      Nausea and sickness     Peanuts [Nuts] Hives     Penicillins      rash     Sulfa Drugs      High family history     Tace [Chlorotrianisene]      joint swelling     Aspirin Rash     After 3 days     Ibuprofen Nausea and Vomiting     Strawberry Rash       PAST MEDICAL HISTORY:  Past Medical History:   Diagnosis Date     Anxiety      CAD (coronary artery disease) 7/2/15    mild per cath     CHF (congestive heart failure)     EF=20-25% per echo 6/30/15     Chronic airway obstruction     FEV1 36% of pred; VC 67%  in Jan 06.     Chronic back pain      COPD (chronic obstructive pulmonary disease)      Endometriosis      Esophageal reflux      Essential hypertension      Hyperlipidemia      Migraine      Mild persistent asthma 8/30/2012     Osteopenia      Takotsubo cardiomyopathy        PAST SURGICAL HISTORY:  Past Surgical History:   Procedure Laterality Date     ANGIOGRAM  07/02/2015    Minimal CAD. LV dysfunction, Mid RCA 20% stenosis, EF 20-25%, c/w Takotsubo cardiomyopathy     APPENDECTOMY  1974     CHOLECYST[facility]Y  1974    [facility]     COLONOSCOPY N/A 4/26/2018    Procedure: COLONOSCOPY;  COLONOSCOPY with biopsies;  Surgeon: Steve Acosta MD;  Location:  OR     CORONARY ANGIOGRAPHY ADULT ORDER       CV CORONARY ANGIOGRAM N/A 7/30/2020    Procedure: Coronary Angiogram;  Surgeon: Quinn Viveros MD;  Location:  HEART CARDIAC CATH LAB     CV LEFT HEART CATH N/A 7/30/2020    Procedure: Left Heart Cath;  Surgeon: Quinn Viveros MD;  Location:  HEART CARDIAC CATH LAB     CV LEFT VENTRICULOGRAM N/A 7/30/2020    Procedure: Left Ventriculogram;  Surgeon: Quinn Viveros MD;  Location:   HEART CARDIAC CATH LAB     CV RIGHT HEART CATH MEASUREMENTS RECORDED N/A 2020    Procedure: Right Heart Cath;  Surgeon: Quinn Viveros MD;  Location: [facility]ART [facility]ARD[facility]AC [facility]ATH LAB     ESOPHAGOSCOPY, GASTROSCOPY, DUODENOSCOPY (EGD), COMBINED  11/15/2011    Procedure:COMBINED ESOPHAGOSCOPY, GASTROSCOPY, DUODENOSCOPY (EGD);  ESOPHAGOSCOPY, GASTROSCOPY, DUODENOSCOPY (EGD) ; Surgeon:JIM URENA; Location:[facility] GI     INJECT EPIDURAL LUMBAR / SACRAL SINGLE N/A 2018    Procedure: INJECT EPIDURAL LUMBAR / SACRAL CONTINUAL OR INTERMITTENT;  Lumbar Epidural Steroid Injection;  Surgeon: Az Enamorado MD;  Location: [facility]     INJECT EPIDURAL LUMBAR / SACRAL SINGLE N/A 2018    Procedure: INJECT EPIDURAL LUMBAR / SACRAL SINGLE;  Lumbar Epidural Steroid Injection;  Surgeon: Az Enamorado MD;  [facility]     LAPAROSCOPIC ABLATION ENDOMETRIOSIS       LAPAROSCOPY DIAGNOSTIC (GYN)      endometriosis resection / lysis of adhesions     Nasal septoplasty and mucous retention cyst       TONSILLECTOMY, ADENOIDECTOMY, COMBINED       Barron teeth extraction         FAMILY HISTORY:  Family History   Problem Relation Age of Onset     Cancer Mother         Colon cancer; dxed at age 64;  at age 69     Cancer - colorectal Mother      Other Cancer Mother      Gastrointestinal Disease Sister         Acute pancreatitis     Heart Disease Father         ?     Coronary Artery Disease Father      Circulatory Maternal Aunt         AAA     Circulatory Maternal Uncle         AAA     Diabetes Son        SOCIAL HISTORY:  Social History     Socioeconomic History     Marital status:    Tobacco Use     Smoking status: Former Smoker     Packs/day: 1.00     Years: 30.00     Pack years: 30.00     Quit date: 2006     Years since quittin.6     Smokeless tobacco: Never Used     Tobacco comment: Quit in    Vaping Use     Vaping Use: Never used   Substance and Sexual Activity     Alcohol use: No      "Alcohol/week: 0.0 standard drinks     Drug use: No     Sexual activity: Never     Partners: Male     Birth control/protection: Surgical     Comment: vasectomy   Other Topics Concern     Caffeine Concern No     Comment: none     Hobby Hazards No     Stress Concern No     Special Diet Yes     Comment: low sodium     Exercise Yes     Comment: limited due to back       Review of Systems:  Skin:          Eyes:         ENT:         Respiratory:  Positive for dyspnea on exertion;shortness of breath asthma   Cardiovascular:  Negative      Gastroenterology:        Genitourinary:         Musculoskeletal:         Neurologic:         Psychiatric:  Positive for sleep disturbances    Heme/Lymph/Imm:         Endocrine:           Physical Exam:  Vitals: /71 (BP Location: Right arm, Patient Position: Sitting, Cuff Size: Adult Regular)   Pulse 98   Ht 1.778 m (5' 10\")   LMP 09/29/2005   SpO2 96%   BMI 26.54 kg/m      Constitutional:  cooperative;well nourished        Skin:  warm and dry to the touch          Head:  normocephalic        Eyes:  sclera white        Lymph:      ENT:  no pallor or cyanosis        Neck:  JVP normal        Respiratory:  clear to auscultation prolonged expiration       Cardiac: regular rhythm   distant heart sounds            pulses full and equal                                        GI:  not assessed this visit        Extremities and Muscular Skeletal:  no edema clubbing            Neurological:  no gross motor deficits;affect appropriate   in wheelchair    Psych:  Alert and Oriented x 3          CC  Justina Finney DO  [address]  [place] [address]                    Service Date: 09/08/2022    HISTORY OF PRESENT ILLNESS:  Ms. Bender is a very pleasant 67-year-old female with a history of idiopathic cardiomyopathy and severe COPD, who is coming into clinic today for a followup visit.      I saw her back in March and had noted that her LV function had deteriorated from " previous studies.  We attempted to titrate her carvedilol dose up a little bit, but were limited by her blood pressure.  I asked her to come back in 3 months for a limited echo to see if her EF had improved any.    In the last several months, she has had stable breathing.  No change.  She denies any symptoms suggestive of orthopnea or PND or peripheral edema.      She follows with Dr. Valerio in Pulmonology and is on several inhalers for her severe COPD.  They have tested her to determine whether she needed oxygen during the day, which it does not seem that her oxygen levels drop that much during the day.  I wondered about whether she had ever been tested for nocturnal hypoxia with an overnight oximetry test and she has not, so that is one thing that we could consider doing, especially since her echo unfortunately shows that her LV function continues to be diminished with an EF around 35%-40% now.    PHYSICAL EXAMINATION:  Her blood pressure today 102/71, pulse is 98.  She weighed herself at 184 at home today.  Cardiovascular tones were distant, but regular suggesting a normal sinus rhythm.  Lungs are diminished, but otherwise clear.    IMPRESSION: In summary, Ms. Bender is a very pleasant 67-year-old female with idiopathic cardiomyopathy with mild worsening of her LV dysfunction in the last 1-2 years.  She had an EF of 45%-50% previously and is now lower at 35%-40%.      I am going to recommend an overnight oximetry to test her oxygen levels while she is sleeping as this potentially could be a contributing factor.  She may benefit from oxygen at nighttime, not necessarily during the day.      I also did refill her spironolactone for her today.  It looks like she had a low sodium level back in May and this has not been rechecked.  I am going to go ahead and have her undergo a basic metabolic panel today as well and we will get those test results to her once complete.    Please feel free to contact me with any questions  you have in regards to her care.    cc:   Bandar Weinberg MD  [facility]  [address] [facility], [address]  [place] [address]     Justina Finney DO        D: 2022   T: 2022   MT: BENJAMÍN    Name:     MOSES TIMMONS  MRN:      [id number]        Account:      [id number]   :      1955           Service Date: 2022       Document: [id number]      Thank you for allowing me to participate in the care of your patient.      Sincerely,     Justina Finney DO     [facility]  cc:   Justina Finney DO  [address]  [place] [address]

## 2022-09-08 NOTE — PROGRESS NOTES
HPI and Plan:   See dictation    Orders Placed This Encounter   Procedures     Basic metabolic panel     Overnight oximetry study       Orders Placed This Encounter   Medications     spironolactone (ALDACTONE) 25 MG tablet     Sig: Take 1 tablet (25 mg) by mouth daily     Dispense:  90 tablet     Refill:  3       Medications Discontinued During This Encounter   Medication Reason     spironolactone (ALDACTONE) 25 MG tablet Reorder         Encounter Diagnoses   Name Primary?     Dilated cardiomyopathy (H)      CANNON (dyspnea on exertion)      Tachycardia      Other emphysema (H)      Chronic systolic congestive heart failure (H)      Hyponatremia Yes       CURRENT MEDICATIONS:  Current Outpatient Medications   Medication Sig Dispense Refill     albuterol (VENTOLIN HFA) 108 (90 BASE) MCG/ACT Inhaler Inhale 2 puffs into the lungs every 6 hours as needed for shortness of breath / dyspnea or wheezing 18 g 3     alendronate (FOSAMAX) 70 MG tablet TAKE 1 TABLET BY MOUTH ONE TIME PER WEEK 12 tablet 3     ALPRAZolam (XANAX) 0.25 MG tablet TAKE 1 TABLET BY MOUTH TWICE A DAY AS NEEDED FOR ANXIETY 60 tablet 0     atorvastatin (LIPITOR) 10 MG tablet Take 1 tablet (10 mg) by mouth daily 90 tablet 3     baclofen (LIORESAL) 10 MG tablet TAKE TWO TABLETS BY MOUTH THREE TIMES A  tablet 5     BREO ELLIPTA 200-25 MCG/INH Inhaler Inhale 1 Dose into the lungs daily   12     carvedilol (COREG) 25 MG tablet Take 1 tablet (25 mg) by mouth 2 times daily (with meals) 180 tablet 3     cetirizine (ZYRTEC) 10 MG tablet Take 10 mg by mouth daily       [employer]S FIBER GUMMIES 2.5 G CHEW Take 1 tablet by mouth daily Reported on 5/8/2017       erythromycin (ROMYCIN) 5 MG/GM ophthalmic ointment PLACE 0.5 INCHES INTO THE LEFT EYE AT BEDTIME (Patient taking differently: Place 0.5 inches Into the left eye nightly as needed) 3.5 g 0     fluticasone (FLONASE) 50 MCG/ACT nasal spray USE ONE TO TWO SPRAYS IN EACH NOSTRIL EVERY DAY-DUE IN FEBRUARY TO SEE   NIKOLOV 16 g 0     HYDROcodone-acetaminophen (NORCO)  MG per tablet Take 1 tablet by mouth every 4 hours as needed for severe pain Take up to 6 tablets a day 180 tablet 0     ipratropium - albuterol 0.5 mg/2.5 mg/3 mL (DUONEB) 0.5-2.5 (3) MG/3ML neb solution Take 1 vial (3 mLs) by nebulization every 6 hours as needed for shortness of breath / dyspnea or wheezing 30 vial 1     ketorolac (TORADOL) 10 MG tablet Take 1 tablet (10 mg) by mouth every 6 hours as needed for moderate pain 20 tablet 0     lisinopril (ZESTRIL) 5 MG tablet Take 1 tablet (5 mg) by mouth daily 90 tablet 3     montelukast (SINGULAIR) 10 MG tablet TAKE ONE TABLET BY MOUTH AT BEDTIME 90 tablet 1     Multiple Vitamin CHEW Take 1 tablet by mouth daily        naloxone (NARCAN) 4 MG/0.1ML nasal spray Spray 1 spray (4 mg) into one nostril alternating nostrils once as needed for opioid reversal Every 2-3 minutes until patient responsive or EMS arrives 0.2 mL 0     ondansetron (ZOFRAN ODT) 4 MG ODT tab TAKE 1-2 TABLETS BY MOUTH EVERY 8 HOURS AS NEEDED FOR NAUSEA. 18 tablet 4     pantoprazole (PROTONIX) 40 MG EC tablet TAKE 1 TABLET BY MOUTH EVERY DAY 90 tablet 3     predniSONE (DELTASONE) 5 MG tablet TAKE 3 TABLETS BY MOUTH EVERY DAY       roflumilast (DALIRESP) 500 MCG TABS tablet Take 1 tablet (500 mcg) by mouth daily       spironolactone (ALDACTONE) 25 MG tablet Take 1 tablet (25 mg) by mouth daily 90 tablet 3     SUMAtriptan (IMITREX) 25 MG tablet Take 1 tablet (25 mg) by mouth at onset of headache for migraine 9 tablet 11     tiotropium (SPIRIVA HANDIHALER) 18 MCG capsule USING THE HANDIHALER, INHALE THE CONTENTS OF ONE CAPSULE BY MOUTH DAILY 90 capsule 3     traMADol (ULTRAM) 50 MG tablet TAKE 1 TABLET(50 MG) BY MOUTH EVERY 6 HOURS AS NEEDED FOR SEVERE PAIN 30 tablet 0     ASPIRIN NOT PRESCRIBED (INTENTIONAL) Please choose reason not prescribed from choices below. (Patient not taking: Reported on 9/8/2022)         ALLERGIES     Allergies    Allergen Reactions     Gabapentin Swelling     Contrast Dye      Iodine     Escitalopram      Nausea and sickness     Peanuts [Nuts] Hives     Penicillins      rash     Sulfa Drugs      High family history     Tace [Chlorotrianisene]      joint swelling     Aspirin Rash     After 3 days     Ibuprofen Nausea and Vomiting     Strawberry Rash       PAST MEDICAL HISTORY:  Past Medical History:   Diagnosis Date     Anxiety      CAD (coronary artery disease) 7/2/15    mild per cath     CHF (congestive heart failure)     EF=20-25% per echo 6/30/15     Chronic airway obstruction     FEV1 36% of pred; VC 67%  in Jan 06.     Chronic back pain      COPD (chronic obstructive pulmonary disease)      Endometriosis      Esophageal reflux      Essential hypertension      Hyperlipidemia      Migraine      Mild persistent asthma 8/30/2012     Osteopenia      Takotsubo cardiomyopathy        PAST SURGICAL HISTORY:  Past Surgical History:   Procedure Laterality Date     ANGIOGRAM  07/02/2015    Minimal CAD. LV dysfunction, Mid RCA 20% stenosis, EF 20-25%, c/w Takotsubo cardiomyopathy     APPENDECTOMY  1974     CHOLECYSTECTOMY  1974    [facility]     COLONOSCOPY N/A 4/26/2018    Procedure: COLONOSCOPY;  COLONOSCOPY with biopsies;  Surgeon: Steve Acosta MD;  Location: [facility] OR     CORONARY ANGIOGRAPHY ADULT ORDER       CV CORONARY ANGIOGRAM N/A 7/30/2020    Procedure: Coronary Angiogram;  Surgeon: Quinn Viveros MD;  Location:  HEART CARDIAC CATH LAB     CV LEFT HEART CATH N/A 7/30/2020    Procedure: Left Heart Cath;  Surgeon: Quinn Viveros MD;  Location:  HEART CARDIAC CATH LAB     CV LEFT VENTRICULOGRAM N/A 7/30/2020    Procedure: Left Ventriculogram;  Surgeon: Quinn Viveros MD;  Location:  HEART CARDIAC CATH LAB     CV RIGHT HEART CATH MEASUREMENTS RECORDED N/A 7/30/2020    Procedure: Right Heart Cath;  Surgeon: Quinn Viveros MD;  Location:  HEART CARDIAC CATH LAB     ESOPHAGOSCOPY, GASTROSCOPY,  DUODENOSCOPY (EGD), COMBINED  11/15/2011    Procedure:COMBINED ESOPHAGOSCOPY, GASTROSCOPY, DUODENOSCOPY (EGD);  ESOPHAGOSCOPY, GASTROSCOPY, DUODENOSCOPY (EGD) ; Surgeon:JIM URENA; Location:[facility] GI     INJECT EPIDURAL LUMBAR / SACRAL SINGLE N/A 2018    Procedure: INJECT EPIDURAL LUMBAR / SACRAL CONTINUAL OR INTERMITTENT;  Lumbar Epidural Steroid Injection;  Surgeon: Az Enamorado MD;  [facility]     INJECT EPIDURAL LUMBAR / SACRAL SINGLE N/A 2018    Procedure: INJECT EPIDURAL LUMBAR / SACRAL SINGLE;  Lumbar Epidural Steroid Injection;  Surgeon: Az Enamorado MD;  [facility]     LAPAROSCOPIC ABLATION ENDOMETRIOSIS       LAPAROSCOPY DIAGNOSTIC (GYN)      endometriosis resection / lysis of adhesions     Nasal septoplasty and mucous retention cyst       TONSILLECTOMY, ADENOIDECTOMY, COMBINED       [place] teeth extraction         FAMILY HISTORY:  Family History   Problem Relation Age of Onset     Cancer Mother         Colon cancer; dxed at age 64;  at age 69     Cancer - colorectal Mother      Other Cancer Mother      Gastrointestinal Disease Sister         Acute pancreatitis     Heart Disease Father         ?     Coronary Artery Disease Father      Circulatory Maternal Aunt         AAA     Circulatory Maternal Uncle         AAA     Diabetes Son        SOCIAL HISTORY:  Social History     Socioeconomic History     Marital status:    Tobacco Use     Smoking status: Former Smoker     Packs/day: 1.00     Years: 30.00     Pack years: 30.00     Quit date: 2006     Years since quittin.6     Smokeless tobacco: Never Used     Tobacco comment: Quit in    Vaping Use     Vaping Use: Never used   Substance and Sexual Activity     Alcohol use: No     Alcohol/week: 0.0 standard drinks     Drug use: No     Sexual activity: Never     Partners: Male     Birth control/protection: Surgical     Comment: vasectomy   Other Topics Concern     Caffeine Concern No     Comment: none      "Hobby Hazards No     Stress Concern No     Special Diet Yes     Comment: low sodium     Exercise Yes     Comment: limited due to back       Review of Systems:  Skin:          Eyes:         ENT:         Respiratory:  Positive for dyspnea on exertion;shortness of breath asthma   Cardiovascular:  Negative      Gastroenterology:        Genitourinary:         Musculoskeletal:         Neurologic:         Psychiatric:  Positive for sleep disturbances    Heme/Lymph/Imm:         Endocrine:           Physical Exam:  Vitals: /71 (BP Location: Right arm, Patient Position: Sitting, Cuff Size: Adult Regular)   Pulse 98   Ht 1.778 m (5' 10\")   LMP 09/29/2005   SpO2 96%   BMI 26.54 kg/m      Constitutional:  cooperative;well nourished        Skin:  warm and dry to the touch          Head:  normocephalic        Eyes:  sclera white        Lymph:      ENT:  no pallor or cyanosis        Neck:  JVP normal        Respiratory:  clear to auscultation prolonged expiration       Cardiac: regular rhythm   distant heart sounds            pulses full and equal                                        GI:  not assessed this visit        Extremities and Muscular Skeletal:  no edema clubbing            Neurological:  no gross motor deficits;affect appropriate   in wheelchair    Psych:  Alert and Oriented x 3          CC  Justina Finney DO  [address]  [place] [id number]                  "

## 2022-09-08 NOTE — PROGRESS NOTES
Service Date: 09/08/2022    HISTORY OF PRESENT ILLNESS:  Ms. Bender is a very pleasant 67-year-old female with a history of idiopathic cardiomyopathy and severe COPD, who is coming into clinic today for a followup visit.      I saw her back in March and had noted that her LV function had deteriorated from previous studies.  We attempted to titrate her carvedilol dose up a little bit, but were limited by her blood pressure.  I asked her to come back in 3 months for a limited echo to see if her EF had improved any.    In the last several months, she has had stable breathing.  No change.  She denies any symptoms suggestive of orthopnea or PND or peripheral edema.      She follows with Dr. Valerio in Pulmonology and is on several inhalers for her severe COPD.  They have tested her to determine whether she needed oxygen during the day, which it does not seem that her oxygen levels drop that much during the day.  I wondered about whether she had ever been tested for nocturnal hypoxia with an overnight oximetry test and she has not, so that is one thing that we could consider doing, especially since her echo unfortunately shows that her LV function continues to be diminished with an EF around 35%-40% now.    PHYSICAL EXAMINATION:  Her blood pressure today 102/71, pulse is 98.  She weighed herself at 184 at home today.  Cardiovascular tones were distant, but regular suggesting a normal sinus rhythm.  Lungs are diminished, but otherwise clear.    IMPRESSION: In summary, Ms. Bender is a very pleasant 67-year-old female with idiopathic cardiomyopathy with mild worsening of her LV dysfunction in the last 1-2 years.  She had an EF of 45%-50% previously and is now lower at 35%-40%.      I am going to recommend an overnight oximetry to test her oxygen levels while she is sleeping as this potentially could be a contributing factor.  She may benefit from oxygen at nighttime, not necessarily during the day.      I also did refill her  spironolactone for her today.  It looks like she had a low sodium level back in May and this has not been rechecked.  I am going to go ahead and have her undergo a basic metabolic panel today as well and we will get those test results to her once complete.    Please feel free to contact me with any questions you have in regards to her care.    cc:   Bandar Weinberg MD  [facility]  [address]  [place] [address]     Justina Finney DO        D: 2022   T: 2022   MT: BENJAMÍN    Name:     MOSES TIMMONS ALAN  MRN:      [id number]        Account:      [id number]   :      1955           Service Date: 2022       Document: [id number]

## 2022-09-09 ENCOUNTER — TELEPHONE (OUTPATIENT)
Dept: CARDIOLOGY | Facility: CLINIC | Age: 67
End: 2022-09-09

## 2022-09-09 NOTE — TELEPHONE ENCOUNTER
BMP results post OV 9/8/22.  Done to reassess sodium levels.  Currently on Spironolactone 25 mg daily.  Provider to advise.        Marya Aguilar RN on 9/9/2022 at 11:24 AM

## 2022-09-15 DIAGNOSIS — R11.0 NAUSEA: ICD-10-CM

## 2022-09-15 DIAGNOSIS — Z79.899 CHRONIC PRESCRIPTION BENZODIAZEPINE USE: ICD-10-CM

## 2022-09-15 DIAGNOSIS — F41.9 ANXIETY: ICD-10-CM

## 2022-09-16 RX ORDER — ALPRAZOLAM 0.25 MG
TABLET ORAL
Qty: 60 TABLET | Refills: 0 | Status: SHIPPED | OUTPATIENT
Start: 2022-09-16 | End: 2022-11-14

## 2022-09-16 RX ORDER — ONDANSETRON 4 MG/1
TABLET, ORALLY DISINTEGRATING ORAL
Qty: 18 TABLET | Refills: 4 | Status: SHIPPED | OUTPATIENT
Start: 2022-09-16 | End: 2023-02-10

## 2022-09-18 DIAGNOSIS — M54.59 MECHANICAL LOW BACK PAIN: ICD-10-CM

## 2022-09-19 RX ORDER — TRAMADOL HYDROCHLORIDE 50 MG/1
TABLET ORAL
Qty: 30 TABLET | Refills: 0 | Status: SHIPPED | OUTPATIENT
Start: 2022-09-19 | End: 2022-10-19

## 2022-09-25 ENCOUNTER — MYC REFILL (OUTPATIENT)
Dept: INTERNAL MEDICINE | Facility: CLINIC | Age: 67
End: 2022-09-25

## 2022-09-25 DIAGNOSIS — G89.29 CHRONIC BILATERAL LOW BACK PAIN, UNSPECIFIED WHETHER SCIATICA PRESENT: ICD-10-CM

## 2022-09-25 DIAGNOSIS — M54.50 CHRONIC BILATERAL LOW BACK PAIN, UNSPECIFIED WHETHER SCIATICA PRESENT: ICD-10-CM

## 2022-09-26 RX ORDER — HYDROCODONE BITARTRATE AND ACETAMINOPHEN 10; 325 MG/1; MG/1
1 TABLET ORAL EVERY 4 HOURS PRN
Qty: 180 TABLET | Refills: 0 | Status: SHIPPED | OUTPATIENT
Start: 2022-09-26 | End: 2022-10-22

## 2022-10-14 DIAGNOSIS — M54.59 MECHANICAL LOW BACK PAIN: ICD-10-CM

## 2022-10-19 RX ORDER — TRAMADOL HYDROCHLORIDE 50 MG/1
TABLET ORAL
Qty: 30 TABLET | Refills: 0 | Status: SHIPPED | OUTPATIENT
Start: 2022-10-19 | End: 2022-11-11

## 2022-10-20 ENCOUNTER — VIRTUAL VISIT (OUTPATIENT)
Dept: PHARMACY | Facility: CLINIC | Age: 67
End: 2022-10-20
Payer: COMMERCIAL

## 2022-10-20 DIAGNOSIS — G89.29 CHRONIC MIDLINE LOW BACK PAIN WITHOUT SCIATICA: ICD-10-CM

## 2022-10-20 DIAGNOSIS — F41.9 ANXIETY: ICD-10-CM

## 2022-10-20 DIAGNOSIS — M81.0 OSTEOPOROSIS, UNSPECIFIED OSTEOPOROSIS TYPE, UNSPECIFIED PATHOLOGICAL FRACTURE PRESENCE: ICD-10-CM

## 2022-10-20 DIAGNOSIS — Z71.85 VACCINE COUNSELING: ICD-10-CM

## 2022-10-20 DIAGNOSIS — G43.709 CHRONIC MIGRAINE WITHOUT AURA WITHOUT STATUS MIGRAINOSUS, NOT INTRACTABLE: ICD-10-CM

## 2022-10-20 DIAGNOSIS — I25.10 CORONARY ARTERY DISEASE INVOLVING NATIVE CORONARY ARTERY OF NATIVE HEART WITHOUT ANGINA PECTORIS: ICD-10-CM

## 2022-10-20 DIAGNOSIS — I50.22 CHRONIC SYSTOLIC CONGESTIVE HEART FAILURE (H): ICD-10-CM

## 2022-10-20 DIAGNOSIS — K21.9 GASTROESOPHAGEAL REFLUX DISEASE WITHOUT ESOPHAGITIS: ICD-10-CM

## 2022-10-20 DIAGNOSIS — E78.2 MIXED HYPERLIPIDEMIA: ICD-10-CM

## 2022-10-20 DIAGNOSIS — I10 ESSENTIAL HYPERTENSION WITH GOAL BLOOD PRESSURE LESS THAN 130/80: ICD-10-CM

## 2022-10-20 DIAGNOSIS — J30.1 ALLERGIC RHINITIS DUE TO POLLEN, UNSPECIFIED SEASONALITY: ICD-10-CM

## 2022-10-20 DIAGNOSIS — J42 CHRONIC BRONCHITIS, UNSPECIFIED CHRONIC BRONCHITIS TYPE (H): Primary | ICD-10-CM

## 2022-10-20 DIAGNOSIS — M54.50 CHRONIC MIDLINE LOW BACK PAIN WITHOUT SCIATICA: ICD-10-CM

## 2022-10-20 PROCEDURE — 99605 MTMS BY PHARM NP 15 MIN: CPT | Performed by: PHARMACIST

## 2022-10-20 PROCEDURE — 99607 MTMS BY PHARM ADDL 15 MIN: CPT | Performed by: PHARMACIST

## 2022-10-20 NOTE — LETTER
October 20, 2022  Trisha Marie Napoleon  [address] [address]    Dear BREANNA Will [facility]     Thank you for talking with me on Oct 20, 2022 about your health and medications. As a follow-up to our conversation, I have included two documents:      1. Your Recommended To-Do List has steps you should take to get the best results from your medications.  2. Your Medication List will help you keep track of your medications and how to take them.    If you want to talk about these documents, please call Sheryl Ohara RPH at phone: [phone], Monday-Friday 8-4:30pm.    I look forward to working with you and your doctors to make sure your medications work well for you.    Sincerely,  Sheryl Ohara RPH  [facility] Pharmacist, [facility]

## 2022-10-20 NOTE — PROGRESS NOTES
Medication Therapy Management (MTM) Encounter    ASSESSMENT:                            Medication Adherence/Access: No issues identified    COPD: stable per patient.    Hyperlipidemia: LDL at goal.  Triglycerides elevated but improved.  Continue with lifestyle management.    Osteoporosis: stable    Anxiety: daily use if benzo not ideal.  Did not have time to assess past medications today but could consider another trial of selective serotonin reuptake inhibitor or SNRI which may also help with back pain.    Back Pain: stable. Follow-up with Palliative care.    Allergic Rhinitis: stable    GERD: patient took herself off PPI.  Discussed history of ulcer and ketorolac/predinose use.  She will monitor for signs of bleeding.  Can us famotidine daily if needing Gas-X regularly for symptoms.    CAD/CHF/Hypertension: follow-up with Cardiology as planned    Chronic Migraine without Aura: Imitrex very effective, she is unsure why frequency of headaches has increased.  Encouraged follow-up with primary care provider if headaches continue or increase in frequency more.    Vaccines: Due for Shingrix and Hep B vaccines    PLAN:                            1.  Famotidine for heartburn - this can be taken daily if you find yourself having more symptoms off the pantoprazole.   2.  Recommend taking prednisone in the morning so this does not affect your sleep.    3.  Due for Shingrix and Hep B vaccines    Follow-up: Return in about 6 months (around 4/20/2023) for Medication Therapy Management.    SUBJECTIVE/OBJECTIVE:                          Trisha Bender is a 67 year old female called for an initial visit. She was referred to me from ailyn/Dr. Weinberg.      Reason for visit: medication review.    Allergies/ADRs: Reviewed in chart  Tobacco: She reports that she quit smoking about 16 years ago. She has a 30.00 pack-year smoking history. She has never used smokeless tobacco.  Alcohol: not currently using    Medication Adherence/Access:  Patient uses pill box(es).  No recent missed doses    COPD: Current medications: ICS/LABA- Breo 1 puff(s) once daily, Duonebs as needed, Daliresp 500 mg daily and albuterol as needed. Prednisone 5 mg twice daily (tapering down) and Spiriva once daily.   Patient rinses their mouth after using steroid inhaler.    Patient is experiencing insomnia from the prednisone.  Patient reports the following symptoms: none.  Patient does have an COPD Action Plan on file.   Has spirometry been completed: Yes   Followed by Dr. Valerio.       Hyperlipidemia: Current therapy includes atorvastatin 10 mg daily.  Patient reports no significant myalgias or other side effects.    Recent Labs   Lab Test 03/17/22  0927 03/16/21  1204 03/24/16  1402 07/01/15  0543 01/16/15  1409   CHOL 135 175   < > 156 169   HDL 43* 55   < > 43* 46*   LDL 17 Cannot estimate LDL when triglyceride exceeds 400 mg/dL   < > 59 88   TRIG 374* 450*   < > 272* 176*   CHOLHDLRATIO  --   --   --  3.6 3.7    < > = values in this interval not displayed.       Osteoporosis: Current therapy includes: alendronate (Fosamax) 70mg weekly (has been on current therapy for 10 years). Patient is not experiencing side effects.  DEXA History: -2.5 from 2018  Patient is getting the following sources of dietary calcium: cheese  Risk factors: post-menopausal  Last vitamin D level:  Lab Results   Component Value Date    VITDT 29 12/17/2018    VITDT 29 04/16/2018    VITDT 16 (L) 02/09/2015       Anxiety: Taking alprazolam 0.25 mg as needed (generally needing 1/2 tablet per day).   From chart review it appears she has been on this for at least 11 years.  Effective when needed.   Past medication duloxetine, escitalopram, bupropion and nortriptyline.    VENESSA-7 SCORE 6/4/2017 3/23/2018 5/19/2022   Total Score - - -   Total Score - - 6 (mild anxiety)   Total Score 13 19 6       Back Pain: Taking baclofen 10 mg three times daily, hydrocodone-acetaminophen  mg every 4 hours (allowed up to  6 tablets daily), ketorolac 10 mg as needed (not using at this time) and tramadol as needed (uses this as she runs out of hydrocodone-acetaminophen).    Has Narcan to use as needed and ondansetron as needed.  Needing the ondansetron twice weekly and is effective when needed.   Wakes up in pain.   Followed by Palliative Care.      Allergic Rhinitis: Current medications include cetirizine 10mg once daily, montelukast 10 mg at bedtime and fluticasone nasal spray 1 spray(s) as needed. Primary symptoms are nasal congestion. Patient feels that current therapy is effective.     GERD: Current medications include: stopped pantoprazole on her own.  Will use Gas-X as needed for reflux; maybe needing four times as needed.    She does have history of ulcer.      CAD/CHF/Hypertension: Current medications include spironolactone 25 mg daily, lisinopril 5 mg daily and carvedilol 25 mg twice daily.  Patient does not self-monitor blood pressure.  Patient reports no current medication side effects.  EF 35-40% from 8/24/22.    Followed by Cardiology, last visit on 9/8/22.  BP Readings from Last 3 Encounters:   09/08/22 102/71   08/03/22 105/69   07/26/22 116/72     Chronic Migraine without Aura: Current preventive medications include: none. Current abortive medications include: Sumatriptan 25 mg. Patient's triggers include: lights. Patient reports having 2 headache days per week.   Frequency of migraines has increased recently.        Today's Vitals: LMP 09/29/2005   ----------------      I spent 30 minutes with this patient today. All changes were made via collaborative practice agreement with Bandar Weinberg MD. A copy of the visit note was provided to the patient's provider(s).    The patient was sent via [employer] a summary of these recommendations.     Sheryl Ohara , Pharm D  [phone] (phone)  Medication Therapy Management Pharmacist     Telemedicine Visit Details  Type of service:  Telephone visit  Start Time: 1230pm  End  Time: 100pm  Originating Location (pt. Location): Home  Distant Location (provider location):  On-site  Provider has received verbal consent for a visit from the patient? Yes     Medication Therapy Recommendations  Chronic airway obstruction (H)    Current Medication: predniSONE (DELTASONE) 5 MG tablet   Rationale: Undesirable effect - Adverse medication event - Safety   Recommendation: Change Medication   Status: Patient Agreed - Adherence/Education         Esophageal reflux    Rationale: More effective medication available - Ineffective medication - Effectiveness   Recommendation: Change Medication - famotidine 20 MG tablet   Status: Accepted - no CPA Needed         Vaccine counseling    Rationale: Preventive therapy - Needs additional medication therapy - Indication   Recommendation: SHINGRIX IM   Status: Accepted - no CPA Needed

## 2022-10-20 NOTE — LETTER
"Recommended To-Do List      Prepared on: Oct 20, 2022       You can get the best results from your medications by completing the items on this \"To-Do List.\"      Bring your To-Do List when you go to your doctor. And, share it with your family or caregivers.    My To-Do List:  What we talked about: What I should do:   An issue with your medication    Change the medication you are taking from predniSONE (DELTASONE) to take in the morning so this does not affect your sleep          What we talked about: What I should do:   A medication that is not working    If you are needing the Gas-X frequently, you could consider taking famotidine every day to prevent symptoms of heartburn.          What we talked about: What I should do:   A new medication that may be of benefit to you     Due for Shingles and Hepatitis B vaccines- you can get these at the clinic          What we talked about: What I should do:                       "

## 2022-10-20 NOTE — PATIENT INSTRUCTIONS
".  Recommendations from today's MTM visit:                                                    MTM (medication therapy management) is a service provided by a clinical pharmacist designed to help you get the most of out of your medicines.   Today we reviewed what your medicines are for, how to know if they are working, that your medicines are safe and how to make your medicine regimen as easy as possible.      1.  You can take Famotidine for heartburn - this can be taken daily if you find yourself having more symptoms off the pantoprazole.    This is available over the counter.  As discussed ketorolac and prednisone may increase your risk of stomach ulcers, the pantoprazole was reducing your risk of ulcers.  If you start to notice stomach pain or signs of bleeding in your urine or stool then seek medical care and consider restarting pantoprazole.  2.  Recommend taking prednisone in the morning so this does not affect your sleep.    3.  Due for Shingles (Shingrix) and Hepatitis B vaccines    Follow-up: Return in about 6 months (around 4/20/2023) for Medication Therapy Management.    It was great speaking with you today.  I value your experience and would be very thankful for your time in providing feedback in our clinic survey. In the next few days, you may receive an email or text message from [employer] with a link to a survey related to your  clinical pharmacist.\"     To schedule another MTM appointment, please call the clinic directly or you may call the MTM scheduling line at [phone] or toll-free at [phone].     My Clinical Pharmacist's contact information:                                                      Please feel free to contact me with any questions or concerns you have.      Sheryl Ohara , Pharm D  [phone] (phone)  Medication Therapy Management Pharmacist       "

## 2022-10-20 NOTE — LETTER
_  Medication List        Prepared on: Oct 20, 2022     Bring your Medication List when you go to the doctor, hospital, or   emergency room. And, share it with your family or caregivers.     Note any changes to how you take your medications.  Cross out medications when you no longer use them.    Medication How I take it Why I use it Prescriber   albuterol (VENTOLIN HFA) 108 (90 BASE) MCG/ACT Inhaler Inhale 2 puffs into the lungs every 6 hours as needed for shortness of breath / dyspnea or wheezing Asthma exacerbation Bandar Weinberg MD   alendronate (FOSAMAX) 70 MG tablet TAKE 1 TABLET (70 mg) BY MOUTH ONE TIME PER WEEK Compression fracture of thoracic vertebra with routine healing, unspecified thoracic vertebral level, subsequent encounter Bandar Weinberg MD   ALPRAZolam (XANAX) 0.25 MG tablet TAKE 1 TABLET (0.25 mg) BY MOUTH TWICE A DAY AS NEEDED FOR ANXIETY Anxiety; Chronic prescription benzodiazepine use Bandar Weinberg MD   atorvastatin (LIPITOR) 10 MG tablet Take 1 tablet (10 mg) by mouth daily Coronary artery disease involving native coronary artery of native heart without angina pectoris; Mixed Hyperlipidemia ELLE Medina CNP   baclofen (LIORESAL) 10 MG tablet TAKE TWO TABLETS (20 mg) BY MOUTH THREE TIMES A DAY Chronic bilateral low back pain with right-sided sciatica Deborah Wright NP   BRETHELMA ELLIPTA 200-25 MCG/INH Inhaler Inhale 1 Dose into the lungs daily  COPD Dr. Valerio   carvedilol (COREG) 25 MG tablet Take 1 tablet (25 mg) by mouth 2 times daily (with meals) Dilated Cardiomyopathy (H) ELLE Medina CNP   cetirizine (ZYRTEC) 10 MG tablet Take 1 tablet (10 mg) by mouth daily Allergies Patient reported   [employer] FIBER GUMMIES 2.5 G CHEW Take 1 tablet (2.5 g) by mouth daily  Diet Supplement  Patient Reported   erythromycin (ROMYCIN) 5 MG/GM ophthalmic ointment PLACE 0.5 INCHES INTO THE LEFT EYE AT BEDTIME Acute bacterial conjunctivitis of left eye Bandar Weinberg MD   fluticasone  (FLONASE) 50 MCG/ACT nasal spray USE ONE TO TWO SPRAYS IN EACH NOSTRIL EVERY DAY Nasal Congestion Deborah Wright NP   HYDROcodone-acetaminophen (NORCO)  MG per tablet Take 1 tablet ( mg) by mouth every 4 hours as needed for severe pain  Chronic bilateral low back pain, unspecified whether sciatica present Bandar Weinberg MD   ipratropium - albuterol 0.5 mg/2.5 mg/3 mL (DUONEB) 0.5-2.5 (3) MG/3ML neb solution Take 1 vial (3 mLs) by nebulization every 6 hours as needed for shortness of breath / dyspnea or wheezing Moderate persistent asthma without complication Bandar Weinberg MD   ketorolac (TORADOL) 10 MG tablet Take 1 tablet (10 mg) by mouth every 6 hours as needed for moderate pain Compression fracture of thoracic vertebra with routine healing, unspecified thoracic vertebral level, subsequent encounter Bandar Weinberg MD   lisinopril (ZESTRIL) 5 MG tablet Take 1 tablet (5 mg) by mouth daily Coronary artery disease involving native coronary artery of native heart without angina pectoris Justina Finney DO   montelukast (SINGULAIR) 10 MG tablet TAKE ONE TABLET (10 mg) BY MOUTH AT BEDTIME Mild persistent asthma without complication Bandar Weinberg MD   Multiple Vitamin CHEW Take 1 tablet by mouth daily  Diet Supplement Patient Reported   naloxone (NARCAN) 4 MG/0.1ML nasal spray Spray 1 spray (4 mg) into one nostril alternating nostrils once as needed for opioid reversal Every 2-3 minutes until patient responsive or EMS arrives Compression fracture of thoracic vertebra with routine healing, unspecified thoracic vertebral level, subsequent encounter; Chronic bilateral low back pain, unspecified whether sciatica present Bandar Weinberg MD   ondansetron (ZOFRAN ODT) 4 MG ODT tab TAKE 1-2 TABLETS (4-8 mg) BY MOUTH EVERY 8 HOURS AS NEEDED FOR NAUSEA. Nausea Bandar Weinberg MD   predniSONE (DELTASONE) 5 MG tablet Take 2 tablets (10 mg) by mouth 2 times daily COPD Dr. Valerio    roflumilast (DALIRESP) 500 MCG TABS tablet Take 1 tablet (500 mcg) by mouth daily COPD Bandar Weinberg MD   spironolactone (ALDACTONE) 25 MG tablet Take 1 tablet (25 mg) by mouth daily Chronic systolic congestive heart failure (H) Justina Finney DO   SUMAtriptan (IMITREX) 25 MG tablet Take 1 tablet (25 mg) by mouth at onset of headache for migraine Chronic migraine without aura without status migrainosus, not intractable Madeline Ferrer MD   tiotropium (SPIRIVA HANDIHALER) 18 MCG capsule USING THE HANDIHALER, INHALE THE CONTENTS OF ONE CAPSULE BY MOUTH DAILY Moderate persistent asthma without complication Bandar Weinberg MD   traMADol (ULTRAM) 50 MG tablet TAKE 1 TABLET(50 MG) BY MOUTH EVERY 6 HOURS AS NEEDED FOR SEVERE PAIN Mechanical Low Back Pain Deborah Wright, NP         Add new medications, over-the-counter drugs, herbals, vitamins, or  minerals in the blank rows below.    Medication How I take it Why I use it Prescriber                          Allergies:      gabapentin; contrast dye; escitalopram; peanuts [nuts]; penicillins; sulfa drugs; tace [chlorotrianisene]; aspirin; ibuprofen; strawberry        Side effects I have had:               Other Information:              My notes and questions:

## 2022-10-22 ENCOUNTER — MYC REFILL (OUTPATIENT)
Dept: INTERNAL MEDICINE | Facility: CLINIC | Age: 67
End: 2022-10-22

## 2022-10-22 DIAGNOSIS — G89.29 CHRONIC BILATERAL LOW BACK PAIN, UNSPECIFIED WHETHER SCIATICA PRESENT: ICD-10-CM

## 2022-10-22 DIAGNOSIS — M54.50 CHRONIC BILATERAL LOW BACK PAIN, UNSPECIFIED WHETHER SCIATICA PRESENT: ICD-10-CM

## 2022-10-24 RX ORDER — HYDROCODONE BITARTRATE AND ACETAMINOPHEN 10; 325 MG/1; MG/1
1 TABLET ORAL EVERY 4 HOURS PRN
Qty: 180 TABLET | Refills: 0 | Status: SHIPPED | OUTPATIENT
Start: 2022-10-24 | End: 2022-11-17

## 2022-11-03 DIAGNOSIS — I42.0 DILATED CARDIOMYOPATHY (H): ICD-10-CM

## 2022-11-03 DIAGNOSIS — I25.10 CORONARY ARTERY DISEASE INVOLVING NATIVE CORONARY ARTERY OF NATIVE HEART WITHOUT ANGINA PECTORIS: ICD-10-CM

## 2022-11-03 DIAGNOSIS — E78.2 MIXED HYPERLIPIDEMIA: ICD-10-CM

## 2022-11-03 RX ORDER — ATORVASTATIN CALCIUM 10 MG/1
10 TABLET, FILM COATED ORAL DAILY
Qty: 90 TABLET | Refills: 2 | Status: SHIPPED | OUTPATIENT
Start: 2022-11-03 | End: 2023-07-25

## 2022-11-03 RX ORDER — CARVEDILOL 25 MG/1
25 TABLET ORAL 2 TIMES DAILY WITH MEALS
Qty: 180 TABLET | Refills: 2 | Status: SHIPPED | OUTPATIENT
Start: 2022-11-03 | End: 2023-07-25

## 2022-11-11 DIAGNOSIS — M54.59 MECHANICAL LOW BACK PAIN: ICD-10-CM

## 2022-11-12 NOTE — TELEPHONE ENCOUNTER
Pending Prescriptions:                       Disp   Refills    traMADol (ULTRAM) 50 MG tablet             30 tab*0          Routing refill request to provider for review/approval because:  Drug not on the [facility] refill protocol

## 2022-11-13 DIAGNOSIS — F41.9 ANXIETY: ICD-10-CM

## 2022-11-13 DIAGNOSIS — Z79.899 CHRONIC PRESCRIPTION BENZODIAZEPINE USE: ICD-10-CM

## 2022-11-14 RX ORDER — ALPRAZOLAM 0.25 MG
TABLET ORAL
Qty: 60 TABLET | Refills: 0 | Status: SHIPPED | OUTPATIENT
Start: 2022-11-14 | End: 2023-01-03

## 2022-11-16 RX ORDER — TRAMADOL HYDROCHLORIDE 50 MG/1
TABLET ORAL
Qty: 30 TABLET | Refills: 0 | Status: SHIPPED | OUTPATIENT
Start: 2022-11-16 | End: 2022-12-12

## 2022-11-16 NOTE — TELEPHONE ENCOUNTER
Patient states that the Tramadol is only a one week supply because it is Qty. 30 at 4 per day.  Please refill.

## 2022-11-17 ENCOUNTER — MYC REFILL (OUTPATIENT)
Dept: INTERNAL MEDICINE | Facility: CLINIC | Age: 67
End: 2022-11-17

## 2022-11-17 DIAGNOSIS — G89.29 CHRONIC BILATERAL LOW BACK PAIN, UNSPECIFIED WHETHER SCIATICA PRESENT: ICD-10-CM

## 2022-11-17 DIAGNOSIS — M54.50 CHRONIC BILATERAL LOW BACK PAIN, UNSPECIFIED WHETHER SCIATICA PRESENT: ICD-10-CM

## 2022-11-17 NOTE — TELEPHONE ENCOUNTER
The Tramadol is not to be taken regularly. It is only for flare up of pain, when she may need extra pain medication in addition to Norco. The combination of narcotics and sedatives, that she is taking increases the risk for falls, confusion and death. I would recommend tapering slowly the doses, not increasing them.

## 2022-11-18 NOTE — TELEPHONE ENCOUNTER
Patient is aware , she states she is very careful when taking the medication and doesn't understand how that got into chart ( about only being a weeks worth ) .      ASHLYN Geller LPN

## 2022-11-18 NOTE — TELEPHONE ENCOUNTER
Norco refill request on 10/24/22 for #180      Routing refill request to provider for review/approval because:  Drug not on the [facility] refill protocol

## 2022-11-21 RX ORDER — HYDROCODONE BITARTRATE AND ACETAMINOPHEN 10; 325 MG/1; MG/1
1 TABLET ORAL EVERY 4 HOURS PRN
Qty: 180 TABLET | Refills: 0 | Status: SHIPPED | OUTPATIENT
Start: 2022-11-21 | End: 2022-12-15

## 2022-11-22 ENCOUNTER — E-VISIT (OUTPATIENT)
Dept: INTERNAL MEDICINE | Facility: CLINIC | Age: 67
End: 2022-11-22
Payer: COMMERCIAL

## 2022-11-22 DIAGNOSIS — J44.1 COPD EXACERBATION (H): Primary | ICD-10-CM

## 2022-11-22 PROCEDURE — 99421 OL DIG E/M SVC 5-10 MIN: CPT | Performed by: INTERNAL MEDICINE

## 2022-11-22 RX ORDER — DOXYCYCLINE HYCLATE 100 MG
100 TABLET ORAL 2 TIMES DAILY
Qty: 20 TABLET | Refills: 0 | Status: SHIPPED | OUTPATIENT
Start: 2022-11-22 | End: 2022-12-05

## 2022-11-22 RX ORDER — PREDNISONE 20 MG/1
TABLET ORAL
Qty: 20 TABLET | Refills: 0 | Status: SHIPPED | OUTPATIENT
Start: 2022-11-22 | End: 2023-03-08

## 2022-11-22 NOTE — PATIENT INSTRUCTIONS
"    Dear Trisha Bender    After reviewing your responses, I've been able to diagnose you with \"Bronchitis\" which is a common infection of your lungs. While this is most commonly caused by a virus, the symptoms you have given suggest you should be treated with antibiotics.     I have sent Doxycycline and Prednisone to your pharmacy.     It is important that you take all of your prescribed medication even if your symptoms are improving after a few doses. Taking all of your medicine helps prevent the symptoms from returning.     If your symptoms worsen, you develop chest pain or shortness of breath, fevers over 101, or are not improving in 5 days, please contact your primary care provider for an appointment or visit any of our convenient Walk-in Care or Urgent Care Centers to be seen which can be found on our website here.    Thanks again for choosing us as your health care partner,    Bandar Weinberg MD    "

## 2022-12-05 DIAGNOSIS — J44.1 COPD EXACERBATION (H): ICD-10-CM

## 2022-12-05 RX ORDER — DOXYCYCLINE HYCLATE 100 MG
TABLET ORAL
Qty: 20 TABLET | Refills: 0 | Status: SHIPPED | OUTPATIENT
Start: 2022-12-05 | End: 2023-03-08

## 2022-12-05 NOTE — TELEPHONE ENCOUNTER
Patient is calling and said she still has a deep cough.  She was told if not better to come and be seen but there are no appointments.  Please advise. She is asking for a refill of the antibiotic.

## 2022-12-11 DIAGNOSIS — M54.59 MECHANICAL LOW BACK PAIN: ICD-10-CM

## 2022-12-12 RX ORDER — TRAMADOL HYDROCHLORIDE 50 MG/1
TABLET ORAL
Qty: 30 TABLET | Refills: 0 | Status: SHIPPED | OUTPATIENT
Start: 2022-12-12 | End: 2023-01-09

## 2022-12-15 ENCOUNTER — MYC REFILL (OUTPATIENT)
Dept: INTERNAL MEDICINE | Facility: CLINIC | Age: 67
End: 2022-12-15

## 2022-12-15 DIAGNOSIS — M54.50 CHRONIC BILATERAL LOW BACK PAIN, UNSPECIFIED WHETHER SCIATICA PRESENT: ICD-10-CM

## 2022-12-15 DIAGNOSIS — G89.29 CHRONIC BILATERAL LOW BACK PAIN, UNSPECIFIED WHETHER SCIATICA PRESENT: ICD-10-CM

## 2022-12-16 ENCOUNTER — TELEPHONE (OUTPATIENT)
Dept: INTERNAL MEDICINE | Facility: CLINIC | Age: 67
End: 2022-12-16

## 2022-12-16 NOTE — TELEPHONE ENCOUNTER
Patient Quality Outreach      Summary:    Patient has the following on her problem list/HM:   Depression / Dysthymia review    6 Month Remission: 4-8   month window range:     12 Month Remission: 10-14 month window range:        PHQ-9 SCORE 10/11/2019 8/5/2021 5/19/2022   PHQ-9 Total Score - - -   PHQ-9 Total Score MyChart - - 8 (Mild depression)   PHQ-9 Total Score 13 9 8       If PHQ-9 recheck is 5 or more, route to provider for next steps.    Asthma review       ACT Total Scores 9/27/2018   ACT TOTAL SCORE -   ASTHMA ER VISITS -   ASTHMA HOSPITALIZATIONS -   ACT TOTAL SCORE (Goal Greater than or Equal to 20) 14   In the past 12 months, how many times did you visit the emergency room for your asthma without being admitted to the hospital? 0   In the past 12 months, how many times were you hospitalized overnight because of your asthma? 0          Hypertension   Last three blood pressure readings:  BP Readings from Last 3 Encounters:   09/08/22 102/71   08/03/22 105/69   07/26/22 116/72     Blood pressure: Passed    HTN Guidelines:  ? 139/89     Patient is due/failing the following:   Depression  -  PHQ-9 needed and Depression follow-up visit  Physical Annual Wellness Visit    Type of outreach:    Sent [employer] message.    Questions for provider review:                                                                                                                                         Raisa Radford MA       Chart routed to .

## 2022-12-19 RX ORDER — HYDROCODONE BITARTRATE AND ACETAMINOPHEN 10; 325 MG/1; MG/1
1 TABLET ORAL EVERY 4 HOURS PRN
Qty: 180 TABLET | Refills: 0 | Status: SHIPPED | OUTPATIENT
Start: 2022-12-19 | End: 2022-12-20

## 2022-12-19 NOTE — TELEPHONE ENCOUNTER
Pending Prescriptions:                       Disp   Refills    HYDROcodone-acetaminophen (NORCO)  M*180 ta*0        Sig: Take 1 tablet by mouth every 4 hours as needed for           severe pain (7-10) Take up to 6 tablets a day    Routing refill request to provider for review/approval because:  Drug not on the [facility] refill protocol     Please advise, thanks.

## 2022-12-20 ENCOUNTER — MYC MEDICAL ADVICE (OUTPATIENT)
Dept: INTERNAL MEDICINE | Facility: CLINIC | Age: 67
End: 2022-12-20

## 2022-12-20 DIAGNOSIS — M54.50 CHRONIC BILATERAL LOW BACK PAIN, UNSPECIFIED WHETHER SCIATICA PRESENT: ICD-10-CM

## 2022-12-20 DIAGNOSIS — G89.29 CHRONIC BILATERAL LOW BACK PAIN, UNSPECIFIED WHETHER SCIATICA PRESENT: ICD-10-CM

## 2022-12-20 RX ORDER — HYDROCODONE BITARTRATE AND ACETAMINOPHEN 10; 325 MG/1; MG/1
1 TABLET ORAL EVERY 4 HOURS PRN
Qty: 180 TABLET | Refills: 0 | Status: SHIPPED | OUTPATIENT
Start: 2022-12-20 | End: 2023-02-09

## 2022-12-20 NOTE — TELEPHONE ENCOUNTER
Patient is calling to ask if Dr Weinberg will send her norco rx to the [facility] instead because her pharmacy is all out of it.

## 2023-01-01 ENCOUNTER — TELEPHONE (OUTPATIENT)
Dept: CARDIOLOGY | Facility: CLINIC | Age: 68
End: 2023-01-01
Payer: COMMERCIAL

## 2023-01-01 ENCOUNTER — MYC REFILL (OUTPATIENT)
Dept: INTERNAL MEDICINE | Facility: CLINIC | Age: 68
End: 2023-01-01
Payer: COMMERCIAL

## 2023-01-01 ENCOUNTER — MYC MEDICAL ADVICE (OUTPATIENT)
Dept: INTERNAL MEDICINE | Facility: CLINIC | Age: 68
End: 2023-01-01

## 2023-01-01 ENCOUNTER — PATIENT OUTREACH (OUTPATIENT)
Dept: CARE COORDINATION | Facility: CLINIC | Age: 68
End: 2023-01-01
Payer: COMMERCIAL

## 2023-01-01 ENCOUNTER — MYC MEDICAL ADVICE (OUTPATIENT)
Dept: INTERNAL MEDICINE | Facility: CLINIC | Age: 68
End: 2023-01-01
Payer: COMMERCIAL

## 2023-01-01 ENCOUNTER — VIRTUAL VISIT (OUTPATIENT)
Dept: INTERNAL MEDICINE | Facility: CLINIC | Age: 68
End: 2023-01-01
Payer: COMMERCIAL

## 2023-01-01 ENCOUNTER — APPOINTMENT (OUTPATIENT)
Dept: CT IMAGING | Facility: CLINIC | Age: 68
End: 2023-01-01
Attending: EMERGENCY MEDICINE
Payer: COMMERCIAL

## 2023-01-01 ENCOUNTER — HOSPITAL ENCOUNTER (OUTPATIENT)
Facility: CLINIC | Age: 68
Setting detail: OBSERVATION
Discharge: HOME | End: 2023-09-21
Attending: EMERGENCY MEDICINE | Admitting: INTERNAL MEDICINE
Payer: COMMERCIAL

## 2023-01-01 ENCOUNTER — HOSPITAL ENCOUNTER (EMERGENCY)
Facility: CLINIC | Age: 68
Discharge: HOME | End: 2023-10-04
Attending: EMERGENCY MEDICINE | Admitting: EMERGENCY MEDICINE
Payer: COMMERCIAL

## 2023-01-01 ENCOUNTER — OFFICE VISIT (OUTPATIENT)
Dept: INTERNAL MEDICINE | Facility: CLINIC | Age: 68
End: 2023-01-01
Payer: COMMERCIAL

## 2023-01-01 ENCOUNTER — APPOINTMENT (OUTPATIENT)
Dept: GENERAL RADIOLOGY | Facility: CLINIC | Age: 68
End: 2023-01-01
Attending: EMERGENCY MEDICINE
Payer: COMMERCIAL

## 2023-01-01 ENCOUNTER — VIRTUAL VISIT (OUTPATIENT)
Dept: CARDIOLOGY | Facility: CLINIC | Age: 68
End: 2023-01-01
Attending: INTERNAL MEDICINE
Payer: COMMERCIAL

## 2023-01-01 VITALS
WEIGHT: 174.6 LBS | OXYGEN SATURATION: 96 % | BODY MASS INDEX: 25 KG/M2 | TEMPERATURE: 98.4 F | RESPIRATION RATE: 20 BRPM | DIASTOLIC BLOOD PRESSURE: 76 MMHG | HEIGHT: 70 IN | SYSTOLIC BLOOD PRESSURE: 101 MMHG | HEART RATE: 96 BPM

## 2023-01-01 VITALS
WEIGHT: 153 LBS | HEIGHT: 62 IN | OXYGEN SATURATION: 97 % | SYSTOLIC BLOOD PRESSURE: 113 MMHG | RESPIRATION RATE: 20 BRPM | BODY MASS INDEX: 28.16 KG/M2 | HEART RATE: 105 BPM | DIASTOLIC BLOOD PRESSURE: 78 MMHG | TEMPERATURE: 98.1 F

## 2023-01-01 VITALS
SYSTOLIC BLOOD PRESSURE: 94 MMHG | BODY MASS INDEX: 32.2 KG/M2 | WEIGHT: 175 LBS | OXYGEN SATURATION: 94 % | HEIGHT: 62 IN | DIASTOLIC BLOOD PRESSURE: 49 MMHG | TEMPERATURE: 97.8 F | HEART RATE: 100 BPM | RESPIRATION RATE: 18 BRPM

## 2023-01-01 DIAGNOSIS — H10.32 ACUTE BACTERIAL CONJUNCTIVITIS OF LEFT EYE: ICD-10-CM

## 2023-01-01 DIAGNOSIS — S22.000D COMPRESSION FRACTURE OF THORACIC VERTEBRA WITH ROUTINE HEALING, UNSPECIFIED THORACIC VERTEBRAL LEVEL, SUBSEQUENT ENCOUNTER: ICD-10-CM

## 2023-01-01 DIAGNOSIS — Z79.899 CHRONIC PRESCRIPTION BENZODIAZEPINE USE: ICD-10-CM

## 2023-01-01 DIAGNOSIS — F41.9 ANXIETY: ICD-10-CM

## 2023-01-01 DIAGNOSIS — Z23 NEED FOR SHINGLES VACCINE: ICD-10-CM

## 2023-01-01 DIAGNOSIS — M54.50 CHRONIC BILATERAL LOW BACK PAIN, UNSPECIFIED WHETHER SCIATICA PRESENT: ICD-10-CM

## 2023-01-01 DIAGNOSIS — J44.1 COPD EXACERBATION (H): ICD-10-CM

## 2023-01-01 DIAGNOSIS — I42.0 DILATED CARDIOMYOPATHY (H): ICD-10-CM

## 2023-01-01 DIAGNOSIS — Z29.11 NEED FOR VACCINATION AGAINST RESPIRATORY SYNCYTIAL VIRUS: ICD-10-CM

## 2023-01-01 DIAGNOSIS — R00.0 TACHYCARDIA: ICD-10-CM

## 2023-01-01 DIAGNOSIS — I50.22 CHRONIC SYSTOLIC CONGESTIVE HEART FAILURE (H): ICD-10-CM

## 2023-01-01 DIAGNOSIS — Z12.31 VISIT FOR SCREENING MAMMOGRAM: ICD-10-CM

## 2023-01-01 DIAGNOSIS — E87.1 HYPONATREMIA: ICD-10-CM

## 2023-01-01 DIAGNOSIS — R11.0 NAUSEA: ICD-10-CM

## 2023-01-01 DIAGNOSIS — I10 ESSENTIAL HYPERTENSION WITH GOAL BLOOD PRESSURE LESS THAN 130/80: ICD-10-CM

## 2023-01-01 DIAGNOSIS — Z12.11 SCREEN FOR COLON CANCER: ICD-10-CM

## 2023-01-01 DIAGNOSIS — M54.59 MECHANICAL LOW BACK PAIN: ICD-10-CM

## 2023-01-01 DIAGNOSIS — I25.10 CORONARY ARTERY DISEASE INVOLVING NATIVE CORONARY ARTERY OF NATIVE HEART WITHOUT ANGINA PECTORIS: ICD-10-CM

## 2023-01-01 DIAGNOSIS — G89.29 CHRONIC BILATERAL LOW BACK PAIN, UNSPECIFIED WHETHER SCIATICA PRESENT: ICD-10-CM

## 2023-01-01 DIAGNOSIS — E78.2 MIXED HYPERLIPIDEMIA: ICD-10-CM

## 2023-01-01 DIAGNOSIS — R06.09 DOE (DYSPNEA ON EXERTION): ICD-10-CM

## 2023-01-01 DIAGNOSIS — R79.89 ELEVATED TROPONIN: ICD-10-CM

## 2023-01-01 DIAGNOSIS — Z12.11 SCREEN FOR COLON CANCER: Primary | ICD-10-CM

## 2023-01-01 DIAGNOSIS — J45.40 MODERATE PERSISTENT ASTHMA WITHOUT COMPLICATION: ICD-10-CM

## 2023-01-01 DIAGNOSIS — Z09 HOSPITAL DISCHARGE FOLLOW-UP: Primary | ICD-10-CM

## 2023-01-01 DIAGNOSIS — R91.1 PULMONARY NODULE: ICD-10-CM

## 2023-01-01 DIAGNOSIS — F43.21 GRIEF REACTION: ICD-10-CM

## 2023-01-01 DIAGNOSIS — J42 CHRONIC BRONCHITIS, UNSPECIFIED CHRONIC BRONCHITIS TYPE (H): ICD-10-CM

## 2023-01-01 LAB
ALBUMIN SERPL BCG-MCNC: 3.5 G/DL (ref 3.5–5.2)
ALBUMIN SERPL BCG-MCNC: 3.5 G/DL (ref 3.5–5.2)
ALBUMIN SERPL BCG-MCNC: 3.6 G/DL (ref 3.5–5.2)
ALBUMIN UR-MCNC: NEGATIVE MG/DL
ALBUMIN UR-MCNC: NEGATIVE MG/DL
ALP SERPL-CCNC: 40 U/L (ref 35–104)
ALP SERPL-CCNC: 42 U/L (ref 35–104)
ALP SERPL-CCNC: 55 U/L (ref 35–104)
ALT SERPL W P-5'-P-CCNC: 10 U/L (ref 0–50)
ALT SERPL W P-5'-P-CCNC: 9 U/L (ref 0–50)
ALT SERPL W P-5'-P-CCNC: 9 U/L (ref 0–50)
ANION GAP SERPL CALCULATED.3IONS-SCNC: 10 MMOL/L (ref 7–15)
ANION GAP SERPL CALCULATED.3IONS-SCNC: 10 MMOL/L (ref 7–15)
ANION GAP SERPL CALCULATED.3IONS-SCNC: 8 MMOL/L (ref 7–15)
ANION GAP SERPL CALCULATED.3IONS-SCNC: 9 MMOL/L (ref 7–15)
APPEARANCE UR: CLEAR
APPEARANCE UR: CLEAR
AST SERPL W P-5'-P-CCNC: 16 U/L (ref 0–45)
AST SERPL W P-5'-P-CCNC: 7 U/L (ref 0–45)
AST SERPL W P-5'-P-CCNC: 8 U/L (ref 0–45)
ATRIAL RATE - MUSE: 81 BPM
ATRIAL RATE - MUSE: 98 BPM
BACTERIA #/AREA URNS HPF: ABNORMAL /HPF
BACTERIA UR CULT: NORMAL
BASO+EOS+MONOS # BLD AUTO: ABNORMAL 10*3/UL
BASO+EOS+MONOS NFR BLD AUTO: ABNORMAL %
BASOPHILS # BLD AUTO: 0 10E3/UL (ref 0–0.2)
BASOPHILS # BLD AUTO: 0 10E3/UL (ref 0–0.2)
BASOPHILS NFR BLD AUTO: 0 %
BASOPHILS NFR BLD AUTO: 0 %
BILIRUB DIRECT SERPL-MCNC: <0.2 MG/DL (ref 0–0.3)
BILIRUB SERPL-MCNC: 0.3 MG/DL
BILIRUB SERPL-MCNC: 0.3 MG/DL
BILIRUB SERPL-MCNC: 0.4 MG/DL
BILIRUB UR QL STRIP: NEGATIVE
BILIRUB UR QL STRIP: NEGATIVE
BUN SERPL-MCNC: 10.6 MG/DL (ref 8–23)
BUN SERPL-MCNC: 13.2 MG/DL (ref 8–23)
BUN SERPL-MCNC: 15.7 MG/DL (ref 8–23)
BUN SERPL-MCNC: 7.5 MG/DL (ref 8–23)
CALCIUM SERPL-MCNC: 8.3 MG/DL (ref 8.8–10.2)
CALCIUM SERPL-MCNC: 8.7 MG/DL (ref 8.8–10.2)
CALCIUM SERPL-MCNC: 8.7 MG/DL (ref 8.8–10.2)
CALCIUM SERPL-MCNC: 9.1 MG/DL (ref 8.8–10.2)
CHLORIDE SERPL-SCNC: 100 MMOL/L (ref 98–107)
CHLORIDE SERPL-SCNC: 91 MMOL/L (ref 98–107)
CHLORIDE SERPL-SCNC: 91 MMOL/L (ref 98–107)
CHLORIDE SERPL-SCNC: 93 MMOL/L (ref 98–107)
COLOR UR AUTO: ABNORMAL
COLOR UR AUTO: NORMAL
CREAT SERPL-MCNC: 0.5 MG/DL (ref 0.51–0.95)
CREAT SERPL-MCNC: 0.54 MG/DL (ref 0.51–0.95)
CREAT SERPL-MCNC: 0.57 MG/DL (ref 0.51–0.95)
CREAT SERPL-MCNC: 0.76 MG/DL (ref 0.51–0.95)
CREAT UR-MCNC: 19.2 MG/DL
DEPRECATED HCO3 PLAS-SCNC: 21 MMOL/L (ref 22–29)
DEPRECATED HCO3 PLAS-SCNC: 23 MMOL/L (ref 22–29)
DEPRECATED HCO3 PLAS-SCNC: 24 MMOL/L (ref 22–29)
DEPRECATED HCO3 PLAS-SCNC: 26 MMOL/L (ref 22–29)
DIASTOLIC BLOOD PRESSURE - MUSE: NORMAL MMHG
DIASTOLIC BLOOD PRESSURE - MUSE: NORMAL MMHG
EGFRCR SERPLBLD CKD-EPI 2021: 85 ML/MIN/1.73M2
EGFRCR SERPLBLD CKD-EPI 2021: >90 ML/MIN/1.73M2
EOSINOPHIL # BLD AUTO: 0 10E3/UL (ref 0–0.7)
EOSINOPHIL # BLD AUTO: 0.3 10E3/UL (ref 0–0.7)
EOSINOPHIL NFR BLD AUTO: 0 %
EOSINOPHIL NFR BLD AUTO: 2 %
ERYTHROCYTE [DISTWIDTH] IN BLOOD BY AUTOMATED COUNT: 13.3 % (ref 10–15)
ERYTHROCYTE [DISTWIDTH] IN BLOOD BY AUTOMATED COUNT: 13.5 % (ref 10–15)
ERYTHROCYTE [DISTWIDTH] IN BLOOD BY AUTOMATED COUNT: 14.1 % (ref 10–15)
FLUAV RNA SPEC QL NAA+PROBE: NEGATIVE
FLUAV RNA SPEC QL NAA+PROBE: NEGATIVE
FLUBV RNA RESP QL NAA+PROBE: NEGATIVE
FLUBV RNA RESP QL NAA+PROBE: NEGATIVE
FRACT EXCRET NA UR+SERPL-RTO: 1.9 %
GLUCOSE SERPL-MCNC: 105 MG/DL (ref 70–99)
GLUCOSE SERPL-MCNC: 106 MG/DL (ref 70–99)
GLUCOSE SERPL-MCNC: 128 MG/DL (ref 70–99)
GLUCOSE SERPL-MCNC: 195 MG/DL (ref 70–99)
GLUCOSE UR STRIP-MCNC: NEGATIVE MG/DL
GLUCOSE UR STRIP-MCNC: NEGATIVE MG/DL
HCT VFR BLD AUTO: 37.7 % (ref 35–47)
HCT VFR BLD AUTO: 41.4 % (ref 35–47)
HCT VFR BLD AUTO: 42.7 % (ref 35–47)
HGB BLD-MCNC: 12 G/DL (ref 11.7–15.7)
HGB BLD-MCNC: 13.2 G/DL (ref 11.7–15.7)
HGB BLD-MCNC: 13.6 G/DL (ref 11.7–15.7)
HGB UR QL STRIP: NEGATIVE
HGB UR QL STRIP: NEGATIVE
HOLD SPECIMEN: NORMAL
HOLD SPECIMEN: NORMAL
HYALINE CASTS: 5 /LPF
IMM GRANULOCYTES # BLD: 0.3 10E3/UL
IMM GRANULOCYTES # BLD: 0.7 10E3/UL
IMM GRANULOCYTES NFR BLD: 2 %
IMM GRANULOCYTES NFR BLD: 4 %
INTERPRETATION ECG - MUSE: NORMAL
INTERPRETATION ECG - MUSE: NORMAL
KETONES UR STRIP-MCNC: NEGATIVE MG/DL
KETONES UR STRIP-MCNC: NEGATIVE MG/DL
LEUKOCYTE ESTERASE UR QL STRIP: NEGATIVE
LEUKOCYTE ESTERASE UR QL STRIP: NEGATIVE
LYMPHOCYTES # BLD AUTO: 1 10E3/UL (ref 0.8–5.3)
LYMPHOCYTES # BLD AUTO: 2.1 10E3/UL (ref 0.8–5.3)
LYMPHOCYTES NFR BLD AUTO: 13 %
LYMPHOCYTES NFR BLD AUTO: 5 %
MAGNESIUM SERPL-MCNC: 1.8 MG/DL (ref 1.7–2.3)
MCH RBC QN AUTO: 29.6 PG (ref 26.5–33)
MCH RBC QN AUTO: 30 PG (ref 26.5–33)
MCH RBC QN AUTO: 30.7 PG (ref 26.5–33)
MCHC RBC AUTO-ENTMCNC: 31.8 G/DL (ref 31.5–36.5)
MCHC RBC AUTO-ENTMCNC: 31.9 G/DL (ref 31.5–36.5)
MCHC RBC AUTO-ENTMCNC: 31.9 G/DL (ref 31.5–36.5)
MCV RBC AUTO: 93 FL (ref 78–100)
MCV RBC AUTO: 94 FL (ref 78–100)
MCV RBC AUTO: 96 FL (ref 78–100)
MONOCYTES # BLD AUTO: 0.5 10E3/UL (ref 0–1.3)
MONOCYTES # BLD AUTO: 1 10E3/UL (ref 0–1.3)
MONOCYTES NFR BLD AUTO: 3 %
MONOCYTES NFR BLD AUTO: 7 %
MUCOUS THREADS #/AREA URNS LPF: PRESENT /LPF
NEUTROPHILS # BLD AUTO: 12.2 10E3/UL (ref 1.6–8.3)
NEUTROPHILS # BLD AUTO: 16.7 10E3/UL (ref 1.6–8.3)
NEUTROPHILS NFR BLD AUTO: 76 %
NEUTROPHILS NFR BLD AUTO: 88 %
NITRATE UR QL: NEGATIVE
NITRATE UR QL: NEGATIVE
NRBC # BLD AUTO: 0 10E3/UL
NRBC # BLD AUTO: 0 10E3/UL
NRBC BLD AUTO-RTO: 0 /100
NRBC BLD AUTO-RTO: 0 /100
NT-PROBNP SERPL-MCNC: 140 PG/ML (ref 0–900)
NT-PROBNP SERPL-MCNC: 193 PG/ML (ref 0–900)
P AXIS - MUSE: 91 DEGREES
P AXIS - MUSE: NORMAL DEGREES
PH UR STRIP: 6.5 [PH] (ref 5–7)
PH UR STRIP: 6.5 [PH] (ref 5–7)
PLATELET # BLD AUTO: 297 10E3/UL (ref 150–450)
PLATELET # BLD AUTO: 361 10E3/UL (ref 150–450)
PLATELET # BLD AUTO: 371 10E3/UL (ref 150–450)
POTASSIUM SERPL-SCNC: 4.1 MMOL/L (ref 3.4–5.3)
POTASSIUM SERPL-SCNC: 4.2 MMOL/L (ref 3.4–5.3)
POTASSIUM SERPL-SCNC: 4.3 MMOL/L (ref 3.4–5.3)
POTASSIUM SERPL-SCNC: 4.6 MMOL/L (ref 3.4–5.3)
PR INTERVAL - MUSE: 140 MS
PR INTERVAL - MUSE: 152 MS
PROT SERPL-MCNC: 5.2 G/DL (ref 6.4–8.3)
PROT SERPL-MCNC: 5.5 G/DL (ref 6.4–8.3)
PROT SERPL-MCNC: 5.6 G/DL (ref 6.4–8.3)
QRS DURATION - MUSE: 110 MS
QRS DURATION - MUSE: 110 MS
QT - MUSE: 360 MS
QT - MUSE: 396 MS
QTC - MUSE: 459 MS
QTC - MUSE: 460 MS
R AXIS - MUSE: 66 DEGREES
R AXIS - MUSE: 91 DEGREES
RBC # BLD AUTO: 4.05 10E6/UL (ref 3.8–5.2)
RBC # BLD AUTO: 4.3 10E6/UL (ref 3.8–5.2)
RBC # BLD AUTO: 4.54 10E6/UL (ref 3.8–5.2)
RBC URINE: 1 /HPF
RBC URINE: <1 /HPF
RSV RNA SPEC NAA+PROBE: NEGATIVE
RSV RNA SPEC NAA+PROBE: NEGATIVE
SARS-COV-2 RNA RESP QL NAA+PROBE: NEGATIVE
SARS-COV-2 RNA RESP QL NAA+PROBE: NEGATIVE
SODIUM SERPL-SCNC: 123 MMOL/L (ref 136–145)
SODIUM SERPL-SCNC: 123 MMOL/L (ref 136–145)
SODIUM SERPL-SCNC: 124 MMOL/L (ref 136–145)
SODIUM SERPL-SCNC: 124 MMOL/L (ref 136–145)
SODIUM SERPL-SCNC: 125 MMOL/L (ref 135–145)
SODIUM SERPL-SCNC: 134 MMOL/L (ref 135–145)
SODIUM UR-SCNC: 77 MMOL/L
SP GR UR STRIP: 1 (ref 1–1.03)
SP GR UR STRIP: 1.01 (ref 1–1.03)
SQUAMOUS EPITHELIAL: 1 /HPF
SQUAMOUS EPITHELIAL: 2 /HPF
SYSTOLIC BLOOD PRESSURE - MUSE: NORMAL MMHG
SYSTOLIC BLOOD PRESSURE - MUSE: NORMAL MMHG
T AXIS - MUSE: 120 DEGREES
T AXIS - MUSE: 48 DEGREES
TROPONIN T SERPL HS-MCNC: 37 NG/L
TROPONIN T SERPL HS-MCNC: 37 NG/L
TROPONIN T SERPL HS-MCNC: 41 NG/L
TROPONIN T SERPL HS-MCNC: 57 NG/L
TROPONIN T SERPL HS-MCNC: 69 NG/L
UROBILINOGEN UR STRIP-MCNC: NORMAL MG/DL
UROBILINOGEN UR STRIP-MCNC: NORMAL MG/DL
VENTRICULAR RATE- MUSE: 81 BPM
VENTRICULAR RATE- MUSE: 98 BPM
WBC # BLD AUTO: 12.5 10E3/UL (ref 4–11)
WBC # BLD AUTO: 15.9 10E3/UL (ref 4–11)
WBC # BLD AUTO: 18.9 10E3/UL (ref 4–11)
WBC URINE: 1 /HPF
WBC URINE: 1 /HPF

## 2023-01-01 PROCEDURE — 84484 ASSAY OF TROPONIN QUANT: CPT | Mod: 91 | Performed by: EMERGENCY MEDICINE

## 2023-01-01 PROCEDURE — 83880 ASSAY OF NATRIURETIC PEPTIDE: CPT | Performed by: EMERGENCY MEDICINE

## 2023-01-01 PROCEDURE — 85025 COMPLETE CBC W/AUTO DIFF WBC: CPT | Performed by: EMERGENCY MEDICINE

## 2023-01-01 PROCEDURE — 36415 COLL VENOUS BLD VENIPUNCTURE: CPT | Performed by: STUDENT IN AN ORGANIZED HEALTH CARE EDUCATION/TRAINING PROGRAM

## 2023-01-01 PROCEDURE — 71046 X-RAY EXAM CHEST 2 VIEWS: CPT

## 2023-01-01 PROCEDURE — 84484 ASSAY OF TROPONIN QUANT: CPT | Performed by: EMERGENCY MEDICINE

## 2023-01-01 PROCEDURE — 96376 TX/PRO/DX INJ SAME DRUG ADON: CPT

## 2023-01-01 PROCEDURE — 250N000013 HC RX MED GY IP 250 OP 250 PS 637: Performed by: INTERNAL MEDICINE

## 2023-01-01 PROCEDURE — G0378 HOSPITAL OBSERVATION PER HR: HCPCS

## 2023-01-01 PROCEDURE — 82248 BILIRUBIN DIRECT: CPT | Performed by: EMERGENCY MEDICINE

## 2023-01-01 PROCEDURE — 74177 CT ABD & PELVIS W/CONTRAST: CPT

## 2023-01-01 PROCEDURE — 99285 EMERGENCY DEPT VISIT HI MDM: CPT | Mod: 25

## 2023-01-01 PROCEDURE — 250N000013 HC RX MED GY IP 250 OP 250 PS 637: Performed by: HOSPITALIST

## 2023-01-01 PROCEDURE — 258N000003 HC RX IP 258 OP 636: Performed by: EMERGENCY MEDICINE

## 2023-01-01 PROCEDURE — 36415 COLL VENOUS BLD VENIPUNCTURE: CPT | Performed by: INTERNAL MEDICINE

## 2023-01-01 PROCEDURE — 96375 TX/PRO/DX INJ NEW DRUG ADDON: CPT | Mod: 59

## 2023-01-01 PROCEDURE — 80053 COMPREHEN METABOLIC PANEL: CPT | Performed by: INTERNAL MEDICINE

## 2023-01-01 PROCEDURE — 87637 SARSCOV2&INF A&B&RSV AMP PRB: CPT | Performed by: EMERGENCY MEDICINE

## 2023-01-01 PROCEDURE — 84295 ASSAY OF SERUM SODIUM: CPT | Performed by: HOSPITALIST

## 2023-01-01 PROCEDURE — 90662 IIV NO PRSV INCREASED AG IM: CPT | Performed by: INTERNAL MEDICINE

## 2023-01-01 PROCEDURE — 84300 ASSAY OF URINE SODIUM: CPT | Performed by: EMERGENCY MEDICINE

## 2023-01-01 PROCEDURE — 81001 URINALYSIS AUTO W/SCOPE: CPT | Performed by: EMERGENCY MEDICINE

## 2023-01-01 PROCEDURE — 250N000013 HC RX MED GY IP 250 OP 250 PS 637: Performed by: EMERGENCY MEDICINE

## 2023-01-01 PROCEDURE — 250N000009 HC RX 250: Performed by: HOSPITALIST

## 2023-01-01 PROCEDURE — 99236 HOSP IP/OBS SAME DATE HI 85: CPT | Performed by: INTERNAL MEDICINE

## 2023-01-01 PROCEDURE — 36415 COLL VENOUS BLD VENIPUNCTURE: CPT | Performed by: HOSPITALIST

## 2023-01-01 PROCEDURE — 84295 ASSAY OF SERUM SODIUM: CPT | Performed by: INTERNAL MEDICINE

## 2023-01-01 PROCEDURE — 94640 AIRWAY INHALATION TREATMENT: CPT

## 2023-01-01 PROCEDURE — 99214 OFFICE O/P EST MOD 30 MIN: CPT | Mod: VID | Performed by: INTERNAL MEDICINE

## 2023-01-01 PROCEDURE — 250N000011 HC RX IP 250 OP 636: Performed by: EMERGENCY MEDICINE

## 2023-01-01 PROCEDURE — 36415 COLL VENOUS BLD VENIPUNCTURE: CPT | Performed by: EMERGENCY MEDICINE

## 2023-01-01 PROCEDURE — 99214 OFFICE O/P EST MOD 30 MIN: CPT | Mod: 25 | Performed by: INTERNAL MEDICINE

## 2023-01-01 PROCEDURE — 84484 ASSAY OF TROPONIN QUANT: CPT | Mod: 91 | Performed by: HOSPITALIST

## 2023-01-01 PROCEDURE — 250N000009 HC RX 250: Performed by: EMERGENCY MEDICINE

## 2023-01-01 PROCEDURE — 80053 COMPREHEN METABOLIC PANEL: CPT | Performed by: EMERGENCY MEDICINE

## 2023-01-01 PROCEDURE — 87086 URINE CULTURE/COLONY COUNT: CPT | Performed by: EMERGENCY MEDICINE

## 2023-01-01 PROCEDURE — 96361 HYDRATE IV INFUSION ADD-ON: CPT

## 2023-01-01 PROCEDURE — 82947 ASSAY GLUCOSE BLOOD QUANT: CPT | Performed by: EMERGENCY MEDICINE

## 2023-01-01 PROCEDURE — 99207 PR NO BILLABLE SERVICE THIS VISIT: CPT | Performed by: HOSPITALIST

## 2023-01-01 PROCEDURE — G0008 ADMIN INFLUENZA VIRUS VAC: HCPCS | Performed by: INTERNAL MEDICINE

## 2023-01-01 PROCEDURE — 93005 ELECTROCARDIOGRAM TRACING: CPT

## 2023-01-01 PROCEDURE — 83735 ASSAY OF MAGNESIUM: CPT | Performed by: EMERGENCY MEDICINE

## 2023-01-01 PROCEDURE — 99214 OFFICE O/P EST MOD 30 MIN: CPT | Mod: 95 | Performed by: INTERNAL MEDICINE

## 2023-01-01 PROCEDURE — 84484 ASSAY OF TROPONIN QUANT: CPT | Performed by: STUDENT IN AN ORGANIZED HEALTH CARE EDUCATION/TRAINING PROGRAM

## 2023-01-01 PROCEDURE — 250N000011 HC RX IP 250 OP 636: Mod: JZ | Performed by: INTERNAL MEDICINE

## 2023-01-01 PROCEDURE — 85027 COMPLETE CBC AUTOMATED: CPT | Performed by: INTERNAL MEDICINE

## 2023-01-01 PROCEDURE — 96374 THER/PROPH/DIAG INJ IV PUSH: CPT | Mod: 59

## 2023-01-01 PROCEDURE — 99418 PROLNG IP/OBS E/M EA 15 MIN: CPT | Performed by: INTERNAL MEDICINE

## 2023-01-01 RX ORDER — GUAIFENESIN 100 MG/5ML
10 LIQUID ORAL EVERY 4 HOURS PRN
Qty: 180 ML | Refills: 1 | Status: SHIPPED | OUTPATIENT
Start: 2023-01-01 | End: 2023-01-01

## 2023-01-01 RX ORDER — DOXYCYCLINE HYCLATE 100 MG
100 TABLET ORAL 2 TIMES DAILY
Qty: 20 TABLET | Refills: 0 | Status: SHIPPED | OUTPATIENT
Start: 2023-01-01 | End: 2024-01-01

## 2023-01-01 RX ORDER — ALPRAZOLAM 0.25 MG
0.25 TABLET ORAL 2 TIMES DAILY PRN
Qty: 60 TABLET | Refills: 0 | Status: SHIPPED | OUTPATIENT
Start: 2023-01-01 | End: 2024-01-01

## 2023-01-01 RX ORDER — HYDROCODONE BITARTRATE AND ACETAMINOPHEN 10; 325 MG/1; MG/1
1 TABLET ORAL EVERY 8 HOURS PRN
Status: DISCONTINUED | OUTPATIENT
Start: 2023-01-01 | End: 2023-01-01

## 2023-01-01 RX ORDER — HYDROCODONE BITARTRATE AND ACETAMINOPHEN 10; 325 MG/1; MG/1
1 TABLET ORAL ONCE
Status: COMPLETED | OUTPATIENT
Start: 2023-01-01 | End: 2023-01-01

## 2023-01-01 RX ORDER — PREDNISONE 20 MG/1
TABLET ORAL
Qty: 12 TABLET | Refills: 0 | Status: SHIPPED | OUTPATIENT
Start: 2023-01-01 | End: 2023-01-01

## 2023-01-01 RX ORDER — PROCHLORPERAZINE 25 MG
12.5 SUPPOSITORY, RECTAL RECTAL EVERY 12 HOURS PRN
Status: DISCONTINUED | OUTPATIENT
Start: 2023-01-01 | End: 2023-01-01 | Stop reason: HOSPADM

## 2023-01-01 RX ORDER — FLUTICASONE PROPIONATE 50 MCG
1-2 SPRAY, SUSPENSION (ML) NASAL DAILY PRN
COMMUNITY
End: 2024-01-01

## 2023-01-01 RX ORDER — HYDROCODONE BITARTRATE AND ACETAMINOPHEN 10; 325 MG/1; MG/1
2 TABLET ORAL EVERY 8 HOURS PRN
Status: DISCONTINUED | OUTPATIENT
Start: 2023-01-01 | End: 2023-01-01 | Stop reason: HOSPADM

## 2023-01-01 RX ORDER — CARVEDILOL 25 MG/1
25 TABLET ORAL 2 TIMES DAILY WITH MEALS
Qty: 180 TABLET | Refills: 0 | Status: SHIPPED | OUTPATIENT
Start: 2023-01-01 | End: 2023-01-01

## 2023-01-01 RX ORDER — ATORVASTATIN CALCIUM 10 MG/1
10 TABLET, FILM COATED ORAL DAILY
Qty: 90 TABLET | Refills: 0 | Status: SHIPPED | OUTPATIENT
Start: 2023-01-01 | End: 2023-01-01

## 2023-01-01 RX ORDER — IPRATROPIUM BROMIDE AND ALBUTEROL SULFATE 2.5; .5 MG/3ML; MG/3ML
1 SOLUTION RESPIRATORY (INHALATION) EVERY 6 HOURS PRN
Qty: 30 ML | Refills: 3 | Status: SHIPPED | OUTPATIENT
Start: 2023-01-01 | End: 2024-01-01

## 2023-01-01 RX ORDER — CARVEDILOL 25 MG/1
25 TABLET ORAL 2 TIMES DAILY WITH MEALS
Qty: 180 TABLET | Refills: 4 | Status: ON HOLD | OUTPATIENT
Start: 2023-01-01 | End: 2024-01-01

## 2023-01-01 RX ORDER — SPIRONOLACTONE 25 MG/1
12.5 TABLET ORAL DAILY
Qty: 90 TABLET | Refills: 4 | Status: SHIPPED | OUTPATIENT
Start: 2023-01-01

## 2023-01-01 RX ORDER — TRAMADOL HYDROCHLORIDE 50 MG/1
TABLET ORAL
Qty: 30 TABLET | Refills: 0 | Status: ON HOLD | OUTPATIENT
Start: 2023-01-01 | End: 2023-01-01

## 2023-01-01 RX ORDER — GUAIFENESIN 100 MG/5ML
LIQUID ORAL
Qty: 180 ML | Refills: 1 | Status: SHIPPED | OUTPATIENT
Start: 2023-01-01 | End: 2023-01-01

## 2023-01-01 RX ORDER — ALPRAZOLAM 0.25 MG
TABLET ORAL
Qty: 60 TABLET | Refills: 0 | Status: SHIPPED | OUTPATIENT
Start: 2023-01-01 | End: 2023-01-01

## 2023-01-01 RX ORDER — LISINOPRIL 5 MG/1
5 TABLET ORAL DAILY
Status: DISCONTINUED | OUTPATIENT
Start: 2023-01-01 | End: 2023-01-01 | Stop reason: HOSPADM

## 2023-01-01 RX ORDER — BACLOFEN 10 MG/1
20 TABLET ORAL 3 TIMES DAILY
Status: DISCONTINUED | OUTPATIENT
Start: 2023-01-01 | End: 2023-01-01

## 2023-01-01 RX ORDER — TRAMADOL HYDROCHLORIDE 50 MG/1
TABLET ORAL
Qty: 30 TABLET | Refills: 0 | Status: SHIPPED | OUTPATIENT
Start: 2023-01-01 | End: 2024-01-01

## 2023-01-01 RX ORDER — ERYTHROMYCIN 5 MG/G
0.5 OINTMENT OPHTHALMIC AT BEDTIME
Qty: 3.5 G | Refills: 0 | Status: SHIPPED | OUTPATIENT
Start: 2023-01-01 | End: 2023-01-01

## 2023-01-01 RX ORDER — KETOROLAC TROMETHAMINE 10 MG/1
10 TABLET, FILM COATED ORAL EVERY 6 HOURS PRN
Qty: 20 TABLET | Refills: 0 | Status: SHIPPED | OUTPATIENT
Start: 2023-01-01 | End: 2024-01-01

## 2023-01-01 RX ORDER — DOXYCYCLINE HYCLATE 20 MG
20 TABLET ORAL DAILY
Qty: 20 TABLET | Refills: 0 | Status: SHIPPED | OUTPATIENT
Start: 2023-01-01 | End: 2024-01-01

## 2023-01-01 RX ORDER — SUMATRIPTAN 25 MG/1
25 TABLET, FILM COATED ORAL
Status: DISCONTINUED | OUTPATIENT
Start: 2023-01-01 | End: 2023-01-01 | Stop reason: HOSPADM

## 2023-01-01 RX ORDER — ONDANSETRON 2 MG/ML
4 INJECTION INTRAMUSCULAR; INTRAVENOUS EVERY 6 HOURS PRN
Status: DISCONTINUED | OUTPATIENT
Start: 2023-01-01 | End: 2023-01-01 | Stop reason: HOSPADM

## 2023-01-01 RX ORDER — TIOTROPIUM BROMIDE 18 UG/1
18 CAPSULE ORAL; RESPIRATORY (INHALATION) DAILY
Status: DISCONTINUED | OUTPATIENT
Start: 2023-01-01 | End: 2023-01-01 | Stop reason: HOSPADM

## 2023-01-01 RX ORDER — DOXYCYCLINE HYCLATE 100 MG
100 TABLET ORAL 2 TIMES DAILY
Qty: 20 TABLET | Refills: 0 | OUTPATIENT
Start: 2023-01-01

## 2023-01-01 RX ORDER — BACLOFEN 10 MG/1
20 TABLET ORAL 3 TIMES DAILY PRN
Status: DISCONTINUED | OUTPATIENT
Start: 2023-01-01 | End: 2023-01-01 | Stop reason: HOSPADM

## 2023-01-01 RX ORDER — SODIUM CHLORIDE 5 %
1 OINTMENT (GRAM) OPHTHALMIC (EYE) EVERY EVENING
COMMUNITY
End: 2024-01-01

## 2023-01-01 RX ORDER — ROFLUMILAST 500 UG/1
500 TABLET ORAL DAILY
Status: DISCONTINUED | OUTPATIENT
Start: 2023-01-01 | End: 2023-01-01 | Stop reason: HOSPADM

## 2023-01-01 RX ORDER — ATORVASTATIN CALCIUM 10 MG/1
10 TABLET, FILM COATED ORAL DAILY
Qty: 90 TABLET | Refills: 4 | Status: SHIPPED | OUTPATIENT
Start: 2023-01-01 | End: 2024-01-01

## 2023-01-01 RX ORDER — SPIRONOLACTONE 25 MG/1
25 TABLET ORAL DAILY
Qty: 90 TABLET | Refills: 0 | Status: SHIPPED | OUTPATIENT
Start: 2023-01-01 | End: 2023-01-01

## 2023-01-01 RX ORDER — HYDROCODONE BITARTRATE AND ACETAMINOPHEN 10; 325 MG/1; MG/1
1-2 TABLET ORAL EVERY 4 HOURS PRN
Qty: 240 TABLET | Refills: 0 | Status: SHIPPED | OUTPATIENT
Start: 2023-01-01 | End: 2024-01-01

## 2023-01-01 RX ORDER — ONDANSETRON 2 MG/ML
4 INJECTION INTRAMUSCULAR; INTRAVENOUS ONCE
Status: COMPLETED | OUTPATIENT
Start: 2023-01-01 | End: 2023-01-01

## 2023-01-01 RX ORDER — TRAMADOL HYDROCHLORIDE 50 MG/1
TABLET ORAL
Qty: 30 TABLET | OUTPATIENT
Start: 2023-01-01

## 2023-01-01 RX ORDER — IPRATROPIUM BROMIDE AND ALBUTEROL SULFATE 2.5; .5 MG/3ML; MG/3ML
3 SOLUTION RESPIRATORY (INHALATION) ONCE
Status: COMPLETED | OUTPATIENT
Start: 2023-01-01 | End: 2023-01-01

## 2023-01-01 RX ORDER — GUAIFENESIN 200 MG/10ML
200 LIQUID ORAL EVERY 4 HOURS PRN
Qty: 180 ML | Refills: 1 | Status: SHIPPED | OUTPATIENT
Start: 2023-01-01 | End: 2023-01-01

## 2023-01-01 RX ORDER — ONDANSETRON 4 MG/1
4 TABLET, ORALLY DISINTEGRATING ORAL EVERY 6 HOURS PRN
Status: DISCONTINUED | OUTPATIENT
Start: 2023-01-01 | End: 2023-01-01 | Stop reason: HOSPADM

## 2023-01-01 RX ORDER — DIPHENHYDRAMINE HYDROCHLORIDE 50 MG/ML
25 INJECTION INTRAMUSCULAR; INTRAVENOUS ONCE
Status: COMPLETED | OUTPATIENT
Start: 2023-01-01 | End: 2023-01-01

## 2023-01-01 RX ORDER — METHYLPREDNISOLONE SODIUM SUCCINATE 125 MG/2ML
125 INJECTION, POWDER, LYOPHILIZED, FOR SOLUTION INTRAMUSCULAR; INTRAVENOUS ONCE
Status: COMPLETED | OUTPATIENT
Start: 2023-01-01 | End: 2023-01-01

## 2023-01-01 RX ORDER — ALPRAZOLAM 0.25 MG
0.25 TABLET ORAL 2 TIMES DAILY PRN
Qty: 60 TABLET | Refills: 0 | Status: SHIPPED | OUTPATIENT
Start: 2023-01-01 | End: 2023-01-01

## 2023-01-01 RX ORDER — KETOROLAC TROMETHAMINE 10 MG/1
10 TABLET, FILM COATED ORAL EVERY 6 HOURS PRN
Qty: 20 TABLET | Refills: 0 | Status: SHIPPED | OUTPATIENT
Start: 2023-01-01 | End: 2023-01-01

## 2023-01-01 RX ORDER — GUAIFENESIN 200 MG/10ML
200 LIQUID ORAL EVERY 6 HOURS PRN
Qty: 180 ML | Refills: 1 | Status: SHIPPED | OUTPATIENT
Start: 2023-01-01 | End: 2024-01-01

## 2023-01-01 RX ORDER — LISINOPRIL 5 MG/1
5 TABLET ORAL DAILY
Qty: 90 TABLET | Refills: 3 | Status: SHIPPED | OUTPATIENT
Start: 2023-01-01 | End: 2024-01-01

## 2023-01-01 RX ORDER — ONDANSETRON 4 MG/1
TABLET, ORALLY DISINTEGRATING ORAL
Qty: 18 TABLET | Refills: 3 | Status: SHIPPED | OUTPATIENT
Start: 2023-01-01 | End: 2024-01-01

## 2023-01-01 RX ORDER — NALOXONE HYDROCHLORIDE 0.4 MG/ML
0.2 INJECTION, SOLUTION INTRAMUSCULAR; INTRAVENOUS; SUBCUTANEOUS
Status: DISCONTINUED | OUTPATIENT
Start: 2023-01-01 | End: 2023-01-01 | Stop reason: HOSPADM

## 2023-01-01 RX ORDER — HYDROCODONE BITARTRATE AND ACETAMINOPHEN 10; 325 MG/1; MG/1
1-2 TABLET ORAL EVERY 4 HOURS PRN
Qty: 240 TABLET | Refills: 0 | Status: SHIPPED | OUTPATIENT
Start: 2023-01-01 | End: 2023-01-01

## 2023-01-01 RX ORDER — HYDROCODONE BITARTRATE AND ACETAMINOPHEN 5; 325 MG/1; MG/1
1 TABLET ORAL ONCE
Status: COMPLETED | OUTPATIENT
Start: 2023-01-01 | End: 2023-01-01

## 2023-01-01 RX ORDER — PREDNISONE 20 MG/1
TABLET ORAL
Qty: 12 TABLET | Refills: 0 | Status: SHIPPED | OUTPATIENT
Start: 2023-01-01 | End: 2024-01-01

## 2023-01-01 RX ORDER — MAGNESIUM HYDROXIDE/ALUMINUM HYDROXICE/SIMETHICONE 120; 1200; 1200 MG/30ML; MG/30ML; MG/30ML
30 SUSPENSION ORAL EVERY 4 HOURS PRN
Status: DISCONTINUED | OUTPATIENT
Start: 2023-01-01 | End: 2023-01-01 | Stop reason: HOSPADM

## 2023-01-01 RX ORDER — ACETAMINOPHEN 500 MG
750 TABLET ORAL EVERY 6 HOURS PRN
COMMUNITY
End: 2024-01-01

## 2023-01-01 RX ORDER — DOXYCYCLINE HYCLATE 100 MG
100 TABLET ORAL 2 TIMES DAILY
Qty: 20 TABLET | Refills: 0 | Status: SHIPPED | OUTPATIENT
Start: 2023-01-01 | End: 2023-01-01

## 2023-01-01 RX ORDER — ERYTHROMYCIN 5 MG/G
OINTMENT OPHTHALMIC
Qty: 3.5 G | Refills: 0 | Status: SHIPPED | OUTPATIENT
Start: 2023-01-01 | End: 2024-01-01

## 2023-01-01 RX ORDER — HYDROMORPHONE HCL IN WATER/PF 6 MG/30 ML
0.2 PATIENT CONTROLLED ANALGESIA SYRINGE INTRAVENOUS EVERY 6 HOURS PRN
Status: DISCONTINUED | OUTPATIENT
Start: 2023-01-01 | End: 2023-01-01

## 2023-01-01 RX ORDER — CARVEDILOL 25 MG/1
25 TABLET ORAL 2 TIMES DAILY WITH MEALS
Status: DISCONTINUED | OUTPATIENT
Start: 2023-01-01 | End: 2023-01-01 | Stop reason: HOSPADM

## 2023-01-01 RX ORDER — IOPAMIDOL 755 MG/ML
500 INJECTION, SOLUTION INTRAVASCULAR ONCE
Status: COMPLETED | OUTPATIENT
Start: 2023-01-01 | End: 2023-01-01

## 2023-01-01 RX ORDER — RESPIRATORY SYNCYTIAL VIRUS VACCINE 120MCG/0.5
0.5 KIT INTRAMUSCULAR ONCE
Qty: 1 EACH | Refills: 0 | Status: CANCELLED | OUTPATIENT
Start: 2023-01-01 | End: 2023-01-01

## 2023-01-01 RX ORDER — DOXYCYCLINE HYCLATE 20 MG
20 TABLET ORAL DAILY
COMMUNITY
End: 2023-01-01

## 2023-01-01 RX ORDER — NALOXONE HYDROCHLORIDE 0.4 MG/ML
0.4 INJECTION, SOLUTION INTRAMUSCULAR; INTRAVENOUS; SUBCUTANEOUS
Status: DISCONTINUED | OUTPATIENT
Start: 2023-01-01 | End: 2023-01-01 | Stop reason: HOSPADM

## 2023-01-01 RX ORDER — IPRATROPIUM BROMIDE AND ALBUTEROL SULFATE 2.5; .5 MG/3ML; MG/3ML
1 SOLUTION RESPIRATORY (INHALATION) EVERY 6 HOURS PRN
Status: DISCONTINUED | OUTPATIENT
Start: 2023-01-01 | End: 2023-01-01 | Stop reason: HOSPADM

## 2023-01-01 RX ORDER — PROCHLORPERAZINE MALEATE 5 MG
5 TABLET ORAL EVERY 6 HOURS PRN
Status: DISCONTINUED | OUTPATIENT
Start: 2023-01-01 | End: 2023-01-01 | Stop reason: HOSPADM

## 2023-01-01 RX ORDER — SPIRONOLACTONE 25 MG/1
25 TABLET ORAL DAILY
Status: DISCONTINUED | OUTPATIENT
Start: 2023-01-01 | End: 2023-01-01 | Stop reason: HOSPADM

## 2023-01-01 RX ORDER — DOXYCYCLINE HYCLATE 100 MG
100 TABLET ORAL 2 TIMES DAILY
COMMUNITY
End: 2023-01-01

## 2023-01-01 RX ORDER — ALPRAZOLAM 0.25 MG
0.25 TABLET ORAL 2 TIMES DAILY PRN
Status: DISCONTINUED | OUTPATIENT
Start: 2023-01-01 | End: 2023-01-01 | Stop reason: HOSPADM

## 2023-01-01 RX ORDER — BACLOFEN 10 MG/1
10 TABLET ORAL 3 TIMES DAILY PRN
Status: ON HOLD | COMMUNITY
End: 2024-01-01

## 2023-01-01 RX ORDER — GUAIFENESIN 100 MG/5ML
LIQUID ORAL
Qty: 180 ML | Refills: 1 | OUTPATIENT
Start: 2023-01-01

## 2023-01-01 RX ADMIN — DIPHENHYDRAMINE HYDROCHLORIDE 25 MG: 50 INJECTION INTRAMUSCULAR; INTRAVENOUS at 02:26

## 2023-01-01 RX ADMIN — SPIRONOLACTONE 25 MG: 25 TABLET ORAL at 09:48

## 2023-01-01 RX ADMIN — IPRATROPIUM BROMIDE AND ALBUTEROL SULFATE 3 ML: .5; 3 SOLUTION RESPIRATORY (INHALATION) at 01:12

## 2023-01-01 RX ADMIN — ONDANSETRON 4 MG: 2 INJECTION INTRAMUSCULAR; INTRAVENOUS at 04:03

## 2023-01-01 RX ADMIN — SODIUM CHLORIDE 79 ML: 9 INJECTION, SOLUTION INTRAVENOUS at 03:35

## 2023-01-01 RX ADMIN — SODIUM CHLORIDE 1000 ML: 9 INJECTION, SOLUTION INTRAVENOUS at 01:44

## 2023-01-01 RX ADMIN — ONDANSETRON 4 MG: 4 TABLET, ORALLY DISINTEGRATING ORAL at 13:15

## 2023-01-01 RX ADMIN — IPRATROPIUM BROMIDE AND ALBUTEROL SULFATE 3 ML: .5; 3 SOLUTION RESPIRATORY (INHALATION) at 09:57

## 2023-01-01 RX ADMIN — HYDROMORPHONE HYDROCHLORIDE 0.2 MG: 0.2 INJECTION, SOLUTION INTRAMUSCULAR; INTRAVENOUS; SUBCUTANEOUS at 06:39

## 2023-01-01 RX ADMIN — HYDROCODONE BITARTRATE AND ACETAMINOPHEN 1 TABLET: 10; 325 TABLET ORAL at 10:06

## 2023-01-01 RX ADMIN — ONDANSETRON 4 MG: 2 INJECTION INTRAMUSCULAR; INTRAVENOUS at 06:47

## 2023-01-01 RX ADMIN — IOPAMIDOL 58 ML: 755 INJECTION, SOLUTION INTRAVENOUS at 03:35

## 2023-01-01 RX ADMIN — ALUMINUM HYDROXIDE, MAGNESIUM HYDROXIDE, AND DIMETHICONE 30 ML: 200; 20; 200 SUSPENSION ORAL at 10:54

## 2023-01-01 RX ADMIN — HYDROCODONE BITARTRATE AND ACETAMINOPHEN 1 TABLET: 10; 325 TABLET ORAL at 09:48

## 2023-01-01 RX ADMIN — IPRATROPIUM BROMIDE AND ALBUTEROL SULFATE 3 ML: .5; 3 SOLUTION RESPIRATORY (INHALATION) at 18:31

## 2023-01-01 RX ADMIN — HYDROCODONE BITARTRATE AND ACETAMINOPHEN 1 TABLET: 5; 325 TABLET ORAL at 01:12

## 2023-01-01 RX ADMIN — METHYLPREDNISOLONE SODIUM SUCCINATE 125 MG: 125 INJECTION, POWDER, FOR SOLUTION INTRAMUSCULAR; INTRAVENOUS at 04:38

## 2023-01-01 RX ADMIN — CARVEDILOL 25 MG: 25 TABLET, FILM COATED ORAL at 09:48

## 2023-01-01 RX ADMIN — HYDROCODONE BITARTRATE AND ACETAMINOPHEN 1 TABLET: 5; 325 TABLET ORAL at 01:38

## 2023-01-01 RX ADMIN — PROCHLORPERAZINE MALEATE 5 MG: 5 TABLET ORAL at 14:17

## 2023-01-01 ASSESSMENT — ACTIVITIES OF DAILY LIVING (ADL)
ADLS_ACUITY_SCORE: 35
DEPENDENT_IADLS:: TRANSPORTATION;MEAL PREPARATION;SHOPPING;LAUNDRY;COOKING;CLEANING
ADLS_ACUITY_SCORE: 35

## 2023-01-01 ASSESSMENT — PAIN SCALES - GENERAL: PAINLEVEL: SEVERE PAIN (7)

## 2023-01-01 ASSESSMENT — PATIENT HEALTH QUESTIONNAIRE - PHQ9
5. POOR APPETITE OR OVEREATING: NOT AT ALL
SUM OF ALL RESPONSES TO PHQ QUESTIONS 1-9: 4

## 2023-01-01 ASSESSMENT — ANXIETY QUESTIONNAIRES
2. NOT BEING ABLE TO STOP OR CONTROL WORRYING: NOT AT ALL
GAD7 TOTAL SCORE: 3
IF YOU CHECKED OFF ANY PROBLEMS ON THIS QUESTIONNAIRE, HOW DIFFICULT HAVE THESE PROBLEMS MADE IT FOR YOU TO DO YOUR WORK, TAKE CARE OF THINGS AT HOME, OR GET ALONG WITH OTHER PEOPLE: SOMEWHAT DIFFICULT
6. BECOMING EASILY ANNOYED OR IRRITABLE: NOT AT ALL
7. FEELING AFRAID AS IF SOMETHING AWFUL MIGHT HAPPEN: MORE THAN HALF THE DAYS
3. WORRYING TOO MUCH ABOUT DIFFERENT THINGS: NOT AT ALL
GAD7 TOTAL SCORE: 3
1. FEELING NERVOUS, ANXIOUS, OR ON EDGE: SEVERAL DAYS
5. BEING SO RESTLESS THAT IT IS HARD TO SIT STILL: NOT AT ALL

## 2023-01-02 DIAGNOSIS — F41.9 ANXIETY: ICD-10-CM

## 2023-01-02 DIAGNOSIS — Z79.899 CHRONIC PRESCRIPTION BENZODIAZEPINE USE: ICD-10-CM

## 2023-01-03 RX ORDER — ALPRAZOLAM 0.25 MG
TABLET ORAL
Qty: 60 TABLET | Refills: 0 | Status: SHIPPED | OUTPATIENT
Start: 2023-01-03 | End: 2023-02-10

## 2023-01-08 DIAGNOSIS — D72.829 LEUKOCYTOSIS, UNSPECIFIED TYPE: Primary | ICD-10-CM

## 2023-01-09 DIAGNOSIS — M54.59 MECHANICAL LOW BACK PAIN: ICD-10-CM

## 2023-01-09 RX ORDER — TRAMADOL HYDROCHLORIDE 50 MG/1
TABLET ORAL
Qty: 30 TABLET | Refills: 0 | Status: SHIPPED | OUTPATIENT
Start: 2023-01-09 | End: 2023-02-06

## 2023-01-25 ENCOUNTER — TELEPHONE (OUTPATIENT)
Dept: CARDIOLOGY | Facility: CLINIC | Age: 68
End: 2023-01-25
Payer: COMMERCIAL

## 2023-01-25 NOTE — TELEPHONE ENCOUNTER
[facility] Call Center    Phone Message    May a detailed message be left on voicemail: no     Reason for Call: Other: Cynthia called to report she has been experiencing shortness of breath. Please reach out to her at [phone].     Action Taken: Other: [facility]    Travel Screening: Not Applicable

## 2023-01-25 NOTE — TELEPHONE ENCOUNTER
Patient states over the past week she has noted increased SOB.  Patient states her oximeter at home is around 92%.  Patient has also noted increased swelling in ankles and some weight gain but states those issues resolved with elevation and reducing sodium.  Patient now reports weight has been stable and she has no swelling.  Given patient's significant lung history will have patient contact PCP to rule out lung issues.  Patient will continue to weigh self daily and will report to cardiology if she has weight gain or swelling of ankles returns.  Patient has verbalized understanding and agreement with plan and will reach out to PCP to update on SOB.  Marya Aguilar RN on 1/25/2023 at 3:02 PM

## 2023-02-05 DIAGNOSIS — M54.59 MECHANICAL LOW BACK PAIN: ICD-10-CM

## 2023-02-06 RX ORDER — TRAMADOL HYDROCHLORIDE 50 MG/1
TABLET ORAL
Qty: 30 TABLET | Refills: 0 | Status: SHIPPED | OUTPATIENT
Start: 2023-02-06 | End: 2023-02-27

## 2023-02-09 ENCOUNTER — MYC REFILL (OUTPATIENT)
Dept: INTERNAL MEDICINE | Facility: CLINIC | Age: 68
End: 2023-02-09
Payer: COMMERCIAL

## 2023-02-09 DIAGNOSIS — F41.9 ANXIETY: ICD-10-CM

## 2023-02-09 DIAGNOSIS — R11.0 NAUSEA: ICD-10-CM

## 2023-02-09 DIAGNOSIS — G89.29 CHRONIC BILATERAL LOW BACK PAIN, UNSPECIFIED WHETHER SCIATICA PRESENT: ICD-10-CM

## 2023-02-09 DIAGNOSIS — M54.50 CHRONIC BILATERAL LOW BACK PAIN, UNSPECIFIED WHETHER SCIATICA PRESENT: ICD-10-CM

## 2023-02-09 DIAGNOSIS — Z79.899 CHRONIC PRESCRIPTION BENZODIAZEPINE USE: ICD-10-CM

## 2023-02-10 RX ORDER — ALPRAZOLAM 0.25 MG
TABLET ORAL
Qty: 60 TABLET | Refills: 0 | Status: SHIPPED | OUTPATIENT
Start: 2023-02-10 | End: 2023-03-20

## 2023-02-10 RX ORDER — ONDANSETRON 4 MG/1
TABLET, ORALLY DISINTEGRATING ORAL
Qty: 18 TABLET | Refills: 4 | Status: SHIPPED | OUTPATIENT
Start: 2023-02-10 | End: 2023-01-01

## 2023-02-10 RX ORDER — HYDROCODONE BITARTRATE AND ACETAMINOPHEN 10; 325 MG/1; MG/1
1 TABLET ORAL EVERY 4 HOURS PRN
Qty: 180 TABLET | Refills: 0 | Status: SHIPPED | OUTPATIENT
Start: 2023-02-10 | End: 2023-03-07

## 2023-02-23 DIAGNOSIS — I25.10 CORONARY ARTERY DISEASE INVOLVING NATIVE CORONARY ARTERY OF NATIVE HEART WITHOUT ANGINA PECTORIS: ICD-10-CM

## 2023-02-23 RX ORDER — LISINOPRIL 5 MG/1
5 TABLET ORAL DAILY
Qty: 90 TABLET | Refills: 1 | Status: SHIPPED | OUTPATIENT
Start: 2023-02-23 | End: 2023-08-18

## 2023-02-27 DIAGNOSIS — M54.59 MECHANICAL LOW BACK PAIN: ICD-10-CM

## 2023-02-27 RX ORDER — TRAMADOL HYDROCHLORIDE 50 MG/1
TABLET ORAL
Qty: 30 TABLET | Refills: 0 | Status: SHIPPED | OUTPATIENT
Start: 2023-02-27 | End: 2023-03-27

## 2023-03-07 ENCOUNTER — OFFICE VISIT (OUTPATIENT)
Dept: INTERNAL MEDICINE | Facility: CLINIC | Age: 68
End: 2023-03-07
Payer: COMMERCIAL

## 2023-03-07 VITALS
WEIGHT: 163 LBS | HEIGHT: 70 IN | TEMPERATURE: 98.4 F | SYSTOLIC BLOOD PRESSURE: 128 MMHG | OXYGEN SATURATION: 95 % | BODY MASS INDEX: 23.34 KG/M2 | HEART RATE: 96 BPM | DIASTOLIC BLOOD PRESSURE: 81 MMHG

## 2023-03-07 DIAGNOSIS — J43.1 PANLOBULAR EMPHYSEMA (H): ICD-10-CM

## 2023-03-07 DIAGNOSIS — I25.10 CORONARY ARTERY DISEASE INVOLVING NATIVE CORONARY ARTERY OF NATIVE HEART WITHOUT ANGINA PECTORIS: ICD-10-CM

## 2023-03-07 DIAGNOSIS — R73.9 HYPERGLYCEMIA: ICD-10-CM

## 2023-03-07 DIAGNOSIS — G89.29 CHRONIC BILATERAL LOW BACK PAIN, UNSPECIFIED WHETHER SCIATICA PRESENT: ICD-10-CM

## 2023-03-07 DIAGNOSIS — E78.2 MIXED HYPERLIPIDEMIA: ICD-10-CM

## 2023-03-07 DIAGNOSIS — M54.50 CHRONIC BILATERAL LOW BACK PAIN, UNSPECIFIED WHETHER SCIATICA PRESENT: ICD-10-CM

## 2023-03-07 DIAGNOSIS — I50.22 CHRONIC SYSTOLIC CONGESTIVE HEART FAILURE (H): ICD-10-CM

## 2023-03-07 DIAGNOSIS — F41.9 ANXIETY: ICD-10-CM

## 2023-03-07 DIAGNOSIS — I47.10 SUPRAVENTRICULAR TACHYCARDIA (H): ICD-10-CM

## 2023-03-07 DIAGNOSIS — Z00.00 ENCOUNTER FOR PREVENTATIVE ADULT HEALTH CARE EXAMINATION: Primary | ICD-10-CM

## 2023-03-07 DIAGNOSIS — Z79.899 ENCOUNTER FOR LONG-TERM (CURRENT) USE OF MEDICATIONS: ICD-10-CM

## 2023-03-07 DIAGNOSIS — I77.810 THORACIC AORTIC ECTASIA (H): ICD-10-CM

## 2023-03-07 DIAGNOSIS — Z13.220 SCREENING FOR HYPERLIPIDEMIA: ICD-10-CM

## 2023-03-07 DIAGNOSIS — I10 ESSENTIAL HYPERTENSION WITH GOAL BLOOD PRESSURE LESS THAN 130/80: ICD-10-CM

## 2023-03-07 DIAGNOSIS — L89.153 PRESSURE INJURY OF SACRAL REGION, STAGE 3 (H): ICD-10-CM

## 2023-03-07 LAB
BASOPHILS # BLD AUTO: 0 10E3/UL (ref 0–0.2)
BASOPHILS NFR BLD AUTO: 0 %
EOSINOPHIL # BLD AUTO: 0 10E3/UL (ref 0–0.7)
EOSINOPHIL NFR BLD AUTO: 0 %
ERYTHROCYTE [DISTWIDTH] IN BLOOD BY AUTOMATED COUNT: 13.4 % (ref 10–15)
HCT VFR BLD AUTO: 45.2 % (ref 35–47)
HGB BLD-MCNC: 14.2 G/DL (ref 11.7–15.7)
IMM GRANULOCYTES # BLD: 0.2 10E3/UL
IMM GRANULOCYTES NFR BLD: 1 %
LYMPHOCYTES # BLD AUTO: 1.6 10E3/UL (ref 0.8–5.3)
LYMPHOCYTES NFR BLD AUTO: 10 %
MCH RBC QN AUTO: 29.7 PG (ref 26.5–33)
MCHC RBC AUTO-ENTMCNC: 31.4 G/DL (ref 31.5–36.5)
MCV RBC AUTO: 95 FL (ref 78–100)
MONOCYTES # BLD AUTO: 0.7 10E3/UL (ref 0–1.3)
MONOCYTES NFR BLD AUTO: 5 %
NEUTROPHILS # BLD AUTO: 12.6 10E3/UL (ref 1.6–8.3)
NEUTROPHILS NFR BLD AUTO: 84 %
PLATELET # BLD AUTO: 359 10E3/UL (ref 150–450)
RBC # BLD AUTO: 4.78 10E6/UL (ref 3.8–5.2)
WBC # BLD AUTO: 15 10E3/UL (ref 4–11)

## 2023-03-07 PROCEDURE — 2894A CBC WITH PLATELETS AND DIFFERENTIAL: CPT | Performed by: INTERNAL MEDICINE

## 2023-03-07 PROCEDURE — 80306 DRUG TEST PRSMV INSTRMNT: CPT | Performed by: INTERNAL MEDICINE

## 2023-03-07 PROCEDURE — 99214 OFFICE O/P EST MOD 30 MIN: CPT | Mod: 25 | Performed by: INTERNAL MEDICINE

## 2023-03-07 PROCEDURE — 80061 LIPID PANEL: CPT | Performed by: INTERNAL MEDICINE

## 2023-03-07 PROCEDURE — 80053 COMPREHEN METABOLIC PANEL: CPT | Performed by: INTERNAL MEDICINE

## 2023-03-07 PROCEDURE — 36415 COLL VENOUS BLD VENIPUNCTURE: CPT | Performed by: INTERNAL MEDICINE

## 2023-03-07 PROCEDURE — 84443 ASSAY THYROID STIM HORMONE: CPT | Performed by: INTERNAL MEDICINE

## 2023-03-07 PROCEDURE — G0438 PPPS, INITIAL VISIT: HCPCS | Performed by: INTERNAL MEDICINE

## 2023-03-07 PROCEDURE — 85025 COMPLETE CBC W/AUTO DIFF WBC: CPT | Performed by: INTERNAL MEDICINE

## 2023-03-07 PROCEDURE — 83721 ASSAY OF BLOOD LIPOPROTEIN: CPT | Mod: 59 | Performed by: INTERNAL MEDICINE

## 2023-03-07 RX ORDER — HYDROCODONE BITARTRATE AND ACETAMINOPHEN 10; 325 MG/1; MG/1
1 TABLET ORAL EVERY 4 HOURS PRN
Qty: 200 TABLET | Refills: 0 | Status: SHIPPED | OUTPATIENT
Start: 2023-03-07 | End: 2023-03-08

## 2023-03-07 RX ORDER — DOXYCYCLINE HYCLATE 100 MG
100 TABLET ORAL 2 TIMES DAILY
Qty: 20 TABLET | Refills: 0 | Status: SHIPPED | OUTPATIENT
Start: 2023-03-07 | End: 2023-03-07

## 2023-03-07 RX ORDER — DOXYCYCLINE HYCLATE 100 MG
100 TABLET ORAL 2 TIMES DAILY
Qty: 20 TABLET | Refills: 0 | Status: SHIPPED | OUTPATIENT
Start: 2023-03-07 | End: 2023-03-20

## 2023-03-07 RX ORDER — HYDROCODONE BITARTRATE AND ACETAMINOPHEN 10; 325 MG/1; MG/1
1 TABLET ORAL EVERY 4 HOURS PRN
Qty: 200 TABLET | Refills: 0 | Status: SHIPPED | OUTPATIENT
Start: 2023-03-07 | End: 2023-03-07

## 2023-03-07 ASSESSMENT — ACTIVITIES OF DAILY LIVING (ADL)
CURRENT_FUNCTION: HOUSEWORK REQUIRES ASSISTANCE
CURRENT_FUNCTION: LAUNDRY REQUIRES ASSISTANCE
CURRENT_FUNCTION: BATHING REQUIRES ASSISTANCE
CURRENT_FUNCTION: TRANSPORTATION REQUIRES ASSISTANCE
CURRENT_FUNCTION: SHOPPING REQUIRES ASSISTANCE

## 2023-03-07 ASSESSMENT — PAIN SCALES - GENERAL: PAINLEVEL: EXTREME PAIN (8)

## 2023-03-07 NOTE — PROGRESS NOTES
"SUBJECTIVE:   Cynthia is a 67 year old who presents for Preventive Visit.    Patient has been advised of split billing requirements and indicates understanding: Yes  Are you in the first 12 months of your Medicare coverage?  No    History of Present Illness       Reason for visit:  Physical    She eats 0-1 servings of fruits and vegetables daily.She consumes 0 sweetened beverage(s) daily.She exercises with enough effort to increase her heart rate 9 or less minutes per day.  She exercises with enough effort to increase her heart rate 3 or less days per week.   She is not taking prescribed medications regularly due to Side effects.  Healthy Habits:    In general, how would you rate your overall health?  Poor    Frequency of exercise:  None    Duration of exercise:  Less than 15 minutes    Do you usually eat at least 4 servings of fruit and vegetables a day, include whole grains    & fiber and avoid regularly eating high fat or \"junk\" foods?  No    Taking medications regularly:  Yes    Barriers to taking medications:  Side effects    Medication side effects:  Other    Ability to successfully perform activities of daily living:  Transportation requires assistance, bathing requires assistance, housework requires assistance, laundry requires assistance and shopping requires assistance    Home Safety:  No safety concerns identified    Hearing Impairment:  No hearing concerns    In the past 6 months, have you been bothered by leaking of urine? Yes    In general, how would you rate your overall mental or emotional health?  Good      PHQ-2 Total Score:    Additional concerns today:  Yes      Have you ever done Advance Care Planning? (For example, a Health Directive, POLST, or a discussion with a medical provider or your loved ones about your wishes): Yes, advance care planning is on file.       Fall risk  Fallen 2 or more times in the past year?: No  Any fall with injury in the past year?: No    Cognitive Screening   1) Repeat " 3 items (Leader, Season, Table)    2) Clock draw: NORMAL  3) 3 item recall: Recalls 3 objects  Results: 3 items recalled: COGNITIVE IMPAIRMENT LESS LIKELY    Mini-CogTM Copyright S Kristal. Licensed by the author for use in [facility]; reprinted with permission (crissy@[facility]). All rights reserved.      Do you have sleep apnea, excessive snoring or daytime drowsiness?: no    Reviewed and updated as needed this visit by clinical staff   Tobacco  Allergies  Meds  Problems  Med Hx  Surg Hx  Fam Hx          Reviewed and updated as needed this visit by Provider                 Social History     Tobacco Use     Smoking status: Former     Packs/day: 1.00     Years: 30.00     Pack years: 30.00     Types: Cigarettes     Quit date: 2006     Years since quittin.1     Smokeless tobacco: Never     Tobacco comments:     Quit in    Substance Use Topics     Alcohol use: No     Alcohol/week: 0.0 standard drinks         No flowsheet data found.No flowsheet data found.        PROBLEMS TO ADD ON...  Patient has had a wound on the buttocks for 6 months. Painful with sitting, skin is red, with central gray , black color. No fevers. Uses topical antibiotic cream. Has been taking increased dose of pain medication - Norco for pain control.   Has history of chronic pain in the LS spine and right flank. On chronic narcotic analgesics. Reports controlled symptoms. No medications side effects.   Has h/o dilated CMP with CHF, aortic ectasia , no change of symptoms. On treatment, no increased SOB, edema.   Has h/o COPD. Follows with pulmonary. On inhaled steroid, bronchodilator treatment. Has SOB on exertion. No increased cough or wheezing.   Has h/o anxiety and depression. On medical treatment, controlled, no side effects. No depressive symptoms or suicidal ideation.        Current providers sharing in care for this patient include:   Patient Care Team:  Bandar Weinberg MD as PCP - General  Bandar Weinberg  MD WEI as Assigned PCP  Thomas Valerio MD as Referring Physician (Pulmonary Disease)  Michael Odell MD (Inactive) as MD (Cardiology)  Michael Odell MD (Inactive) as MD (Cardiology)  Justina Finney DO as MD (Cardiovascular Disease)  Justina Finney DO as Assigned Heart and Vascular Provider  Sheryl Ohara RPSOHA as Pharmacist (Pharmacist)  Sheryl Ohaar RPH as Assigned MTM Pharmacist  Bandar Weinberg MD as Assigned Pain Medication Provider    The following health maintenance items are reviewed in Epic and correct as of today:  Health Maintenance   Topic Date Due     HF ACTION PLAN  Never done     DEPRESSION ACTION PLAN  Never done     ZOSTER IMMUNIZATION (2 of 3) 02/20/2015     MEDICARE ANNUAL WELLNESS VISIT  08/09/2020     TSH W/FREE T4 REFLEX  08/03/2022     URINE DRUG SCREEN  08/03/2022     PHQ-9  11/20/2022     BMP  03/08/2023     ALT  03/17/2023     LIPID  03/17/2023     VENESSA ASSESSMENT  05/20/2023     ANNUAL REVIEW OF HM ORDERS  05/20/2023     CBC  05/20/2023     MAMMO SCREENING  06/09/2023     FALL RISK ASSESSMENT  03/07/2024     ADVANCE CARE PLANNING  03/22/2026     DTAP/TDAP/TD IMMUNIZATION (4 - Td or Tdap) 10/11/2027     COLORECTAL CANCER SCREENING  04/26/2028     DEXA  04/16/2033     SPIROMETRY  Completed     HEPATITIS C SCREENING  Completed     COPD ACTION PLAN  Completed     MIGRAINE ACTION PLAN  Completed     INFLUENZA VACCINE  Completed     Pneumococcal Vaccine: 65+ Years  Completed     COVID-19 Vaccine  Completed     IPV IMMUNIZATION  Aged Out     MENINGITIS IMMUNIZATION  Aged Out     PAP  Discontinued     LUNG CANCER SCREENING  Discontinued     Lab work is in process  Labs reviewed in EPIC      FHS-7:   Breast CA Risk Assessment (FHS-7) 5/19/2022 6/9/2022   Did any of your first-degree relatives have breast or ovarian cancer? No No   Did any of your relatives have bilateral breast cancer? No No   Did any man in your family have breast cancer? No No  "  Did any woman in your family have breast and ovarian cancer? No No   Did any woman in your family have breast cancer before age 50 y? No No   Do you have 2 or more relatives with breast and/or ovarian cancer? No No   Do you have 2 or more relatives with breast and/or bowel cancer? No No     click delete button to remove this line now  Mammogram Screening: Recommended mammography every 1-2 years with patient discussion and risk factor consideration  Pertinent mammograms are reviewed under the imaging tab.    Review of Systems  Constitutional, HEENT, cardiovascular, pulmonary, GI, , musculoskeletal, neuro, skin, endocrine and psych systems are negative, except as otherwise noted.    OBJECTIVE:   /81 (BP Location: Right arm, Cuff Size: Adult Regular)   Pulse 96   Temp 98.4  F (36.9  C) (Tympanic)   Ht 1.778 m (5' 10\")   Wt 73.9 kg (163 lb)   LMP 09/20/2005   SpO2 94%   BMI 23.39 kg/m   Estimated body mass index is 23.39 kg/m  as calculated from the following:    Height as of this encounter: 1.778 m (5' 10\").    Weight as of this encounter: 73.9 kg (163 lb).  Physical Exam  GENERAL:frail, in a wheelchair, alert and no distress  EYES: Eyes grossly normal to inspection, PERRL and conjunctivae and sclerae normal  HENT: ear canals and TM's normal, nose and mouth without ulcers or lesions  NECK: no adenopathy, no asymmetry, masses, or scars and thyroid normal to palpation  RESP: lungs clear to auscultation - no rales, rhonchi or wheezes  CV: regular rate and rhythm, normal S1 S2, no S3 or S4, no murmur, click or rub, no peripheral edema and peripheral pulses strong  ABDOMEN: soft, palpatory tender RUQ and flank, no guarding or rebound, no hepatosplenomegaly, no masses and bowel sounds normal  MS: no gross musculoskeletal defects noted, trace LE edema  SKIN: no suspicious lesions or rashes  Perineal and surrounding buttocks nakia large skin eschar with surrounding erythema, areas of skin sloughing, and areas " of yellow discharge , palpatory tender   NEURO: Normal strength and tone, mentation intact and speech normal, generalized weakness   PSYCH: mentation appears normal, affect normal/bright    Diagnostic Test Results:  Labs reviewed in Epic    ASSESSMENT / PLAN:       ICD-10-CM    1. Encounter for preventative adult health care examination  Z00.00 TSH WITH FREE T4 REFLEX      2. Encounter for long-term (current) use of medications  Z79.899       3. Essential hypertension with goal blood pressure less than 130/80  I10 TSH WITH FREE T4 REFLEX     CBC with platelets and differential     Comprehensive metabolic panel (BMP + Alb, Alk Phos, ALT, AST, Total. Bili, TP)      4. Mixed hyperlipidemia  E78.2       5. Screening for hyperlipidemia  Z13.220 Lipid panel reflex to direct LDL Non-fasting      6. Coronary artery disease involving native coronary artery of native heart without angina pectoris  I25.10 Lipid panel reflex to direct LDL Non-fasting      7. Chronic bilateral low back pain, unspecified whether sciatica present  M54.50 Urine Drugs of Abuse Screen    G89.29 HYDROcodone-acetaminophen (NORCO)  MG per tablet     DISCONTINUED: HYDROcodone-acetaminophen (NORCO)  MG per tablet      8. Pressure injury of sacral region, stage 3 (H)  L89.153 Wound Care Referral     doxycycline hyclate (VIBRA-TABS) 100 MG tablet     DISCONTINUED: doxycycline hyclate (VIBRA-TABS) 100 MG tablet      9. Panlobular emphysema (H)  J43.1       10. Chronic systolic congestive heart failure (H)  I50.22       11. Anxiety  F41.9       12. Supraventricular tachycardia (H)  I47.1       13. Thoracic aortic ectasia (H)  I77.810           Assess lab work   Continue treatment   Discussed medications side effects   Follow up with cariology and pulmonary   Refer to wound clinic  Start antibiotic for infected pressure ulcer     Patient has been advised of split billing requirements and indicates understanding: Yes      COUNSELING:  Reviewed  "preventive health counseling, as reflected in patient instructions       Regular exercise       Healthy diet/nutrition       Vision screening       Hearing screening       Colon cancer screening      BMI:   Estimated body mass index is 23.39 kg/m  as calculated from the following:    Height as of this encounter: 1.778 m (5' 10\").    Weight as of this encounter: 73.9 kg (163 lb).         She reports that she quit smoking about 17 years ago. She has a 30.00 pack-year smoking history. She has never used smokeless tobacco.      Appropriate preventive services were discussed with this patient, including applicable screening as appropriate for cardiovascular disease, diabetes, osteopenia/osteoporosis, and glaucoma.  As appropriate for age/gender, discussed screening for colorectal cancer, prostate cancer, breast cancer, and cervical cancer. Checklist reviewing preventive services available has been given to the patient.    Reviewed patients plan of care and provided an AVS. The Intermediate Care Plan ( asthma action plan, low back pain action plan, and migraine action plan) for Trisha meets the Care Plan requirement. This Care Plan has been established and reviewed with the Patient.          Bandar Weinberg MD  [facility]    Identified Health Risks:  "

## 2023-03-07 NOTE — LETTER
March 13, 2023      Cynthia Bender  [address] [address]        Dear ,    We are writing to inform you of your test results.    Your labs show elevated WBC, likely represents infection, continue antibiotic treatment as discussed.   High triglycerides and blood sugar. Try to keep low carb diet.   Recheck in 30 days CBC, fasting glucose, HgbA1C to assess for diabetes. Orders placed, please call to schedule a lab only appointment.      Resulted Orders   TSH WITH FREE T4 REFLEX   Result Value Ref Range    TSH 0.57 0.30 - 4.20 uIU/mL   Lipid panel reflex to direct LDL Non-fasting   Result Value Ref Range    Cholesterol 162 <200 mg/dL    Triglycerides 432 (H) <150 mg/dL    Direct Measure HDL 41 (L) >=50 mg/dL    LDL Cholesterol Calculated        Comment:      Cannot estimate LDL when triglyceride exceeds 400 mg/dL    Non HDL Cholesterol 121 <130 mg/dL    Narrative    Cholesterol  Desirable:  <200 mg/dL    Triglycerides  Normal:  Less than 150 mg/dL  Borderline High:  150-199 mg/dL  High:  200-499 mg/dL  Very High:  Greater than or equal to 500 mg/dL    Direct Measure HDL  Female:  Greater than or equal to 50 mg/dL   Male:  Greater than or equal to 40 mg/dL    LDL Cholesterol  Desirable:  <100mg/dL  Above Desirable:  100-129 mg/dL   Borderline High:  130-159 mg/dL   High:  160-189 mg/dL   Very High:  >= 190 mg/dL    Non HDL Cholesterol  Desirable:  130 mg/dL  Above Desirable:  130-159 mg/dL  Borderline High:  160-189 mg/dL  High:  190-219 mg/dL  Very High:  Greater than or equal to 220 mg/dL   Comprehensive metabolic panel (BMP + Alb, Alk Phos, ALT, AST, Total. Bili, TP)   Result Value Ref Range    Sodium 134 (L) 136 - 145 mmol/L    Potassium 4.7 3.4 - 5.3 mmol/L    Chloride 96 (L) 98 - 107 mmol/L    Carbon Dioxide (CO2) 25 22 - 29 mmol/L    Anion Gap 13 7 - 15 mmol/L    Urea Nitrogen 9.3 8.0 - 23.0 mg/dL    Creatinine 0.56 0.51 - 0.95 mg/dL    Calcium 9.1 8.8 - 10.2 mg/dL    Glucose 180 (H) 70 - 99  mg/dL    Alkaline Phosphatase 50 35 - 104 U/L    AST 16 10 - 35 U/L    ALT 13 10 - 35 U/L    Protein Total 6.3 (L) 6.4 - 8.3 g/dL    Albumin 4.0 3.5 - 5.2 g/dL    Bilirubin Total 0.3 <=1.2 mg/dL    GFR Estimate >90 >60 mL/min/1.73m2      Comment:      eGFR calculated using 2021 CKD-EPI equation.   CBC with platelets and differential   Result Value Ref Range    WBC Count 15.0 (H) 4.0 - 11.0 10e3/uL    RBC Count 4.78 3.80 - 5.20 10e6/uL    Hemoglobin 14.2 11.7 - 15.7 g/dL    Hematocrit 45.2 35.0 - 47.0 %    MCV 95 78 - 100 fL    MCH 29.7 26.5 - 33.0 pg    MCHC 31.4 (L) 31.5 - 36.5 g/dL    RDW 13.4 10.0 - 15.0 %    Platelet Count 359 150 - 450 10e3/uL    % Neutrophils 84 %    % Lymphocytes 10 %    % Monocytes 5 %    % Eosinophils 0 %    % Basophils 0 %    % Immature Granulocytes 1 %    Absolute Neutrophils 12.6 (H) 1.6 - 8.3 10e3/uL    Absolute Lymphocytes 1.6 0.8 - 5.3 10e3/uL    Absolute Monocytes 0.7 0.0 - 1.3 10e3/uL    Absolute Eosinophils 0.0 0.0 - 0.7 10e3/uL    Absolute Basophils 0.0 0.0 - 0.2 10e3/uL    Absolute Immature Granulocytes 0.2 <=0.4 10e3/uL   Urine Drugs of Abuse Screen Panel 13   Result Value Ref Range    Cannabinoids (61-zbr-9-carboxy-9-THC) Not Detected Not Detected, Indeterminate      Comment:      Cutoff for a negative cannabinoid is 50 ng/mL or less.    Phencyclidine Not Detected Not Detected, Indeterminate      Comment:      Cutoff for a negative PCP is 25 ng/mL or less.    Cocaine (Benzoylecgonine) Not Detected Not Detected, Indeterminate      Comment:      Cutoff for a negative cocaine is 150 ng/ml or less.    Methamphetamine (d-Methamphetamine) Not Detected Not Detected, Indeterminate      Comment:      Cutoff for a negative methamphetamine is 500 ng/ml or less.    Opiates (Morphine) Detected (A) Not Detected, Indeterminate      Comment:      Cutoff for a positive opiate is greater than 100 ng/ml.  This is an unconfirmed screening result to be used for medical purposes only.      Amphetamine (d-Amphetamine) Not Detected Not Detected, Indeterminate      Comment:      Cutoff for a negative amphetamine is 500 ng/mL or less.    Benzodiazepines (Nordiazepam) Not Detected Not Detected, Indeterminate      Comment:      Cutoff for a negative benzodiazepine is 150 ng/ml or less.    Tricyclic Antidepressants (Desipramine) Not Detected Not Detected, Indeterminate      Comment:      Cutoff for a negative tricyclic antidepressant is 300 ng/ml or less.    Methadone Not Detected Not Detected, Indeterminate      Comment:      Cutoff for a negative methadone is 200 ng/ml or less.    Barbiturates (Butalbital) Not Detected Not Detected, Indeterminate      Comment:      Cutoff for a negative barbituate is 200 ng/ml or less.    Oxycodone Not Detected Not Detected, Indeterminate      Comment:      Cutoff for a negative oxycodone is 100 ng/mL or less.    Propoxyphene (Norpropoxyphene) Not Detected Not Detected, Indeterminate      Comment:      Cutoff for a negative propoxyphene is 300 ng/ml or less.    Buprenorphine Not Detected Not Detected, Indeterminate      Comment:      Cutoff for a negative buprenorphine is 10 ng/ml or less.   LDL cholesterol direct   Result Value Ref Range    LDL Cholesterol Direct 52 <100 mg/dL      Comment:      Age 2-19 years:  Desirable: 0-110 mg/dL   Borderline high: 110-129 mg/dL   High: >= 130 mg/dL    Age 20 years and older:  Desirable: <100mg/dL  Above desirable: 100-129 mg/dL   Borderline high: 130-159 mg/dL   High: 160-189 mg/dL   Very high: >= 190 mg/dL       If you have any questions or concerns, please call the clinic at the number listed above.       Sincerely,      Bandar Weinberg MD        [facility]

## 2023-03-08 ENCOUNTER — MYC MEDICAL ADVICE (OUTPATIENT)
Dept: INTERNAL MEDICINE | Facility: CLINIC | Age: 68
End: 2023-03-08
Payer: COMMERCIAL

## 2023-03-08 ENCOUNTER — TELEPHONE (OUTPATIENT)
Dept: WOUND CARE | Facility: CLINIC | Age: 68
End: 2023-03-08
Payer: COMMERCIAL

## 2023-03-08 DIAGNOSIS — M54.50 CHRONIC BILATERAL LOW BACK PAIN, UNSPECIFIED WHETHER SCIATICA PRESENT: ICD-10-CM

## 2023-03-08 DIAGNOSIS — G89.29 CHRONIC BILATERAL LOW BACK PAIN, UNSPECIFIED WHETHER SCIATICA PRESENT: ICD-10-CM

## 2023-03-08 PROBLEM — I47.10 SUPRAVENTRICULAR TACHYCARDIA (H): Status: ACTIVE | Noted: 2023-03-08

## 2023-03-08 PROBLEM — F33.2 SEVERE EPISODE OF RECURRENT MAJOR DEPRESSIVE DISORDER, WITHOUT PSYCHOTIC FEATURES (H): Status: RESOLVED | Noted: 2018-03-23 | Resolved: 2023-03-08

## 2023-03-08 LAB
ALBUMIN SERPL BCG-MCNC: 4 G/DL (ref 3.5–5.2)
ALP SERPL-CCNC: 50 U/L (ref 35–104)
ALT SERPL W P-5'-P-CCNC: 13 U/L (ref 10–35)
AMPHETAMINES UR QL: NOT DETECTED
ANION GAP SERPL CALCULATED.3IONS-SCNC: 13 MMOL/L (ref 7–15)
AST SERPL W P-5'-P-CCNC: 16 U/L (ref 10–35)
BARBITURATES UR QL SCN: NOT DETECTED
BENZODIAZ UR QL SCN: NOT DETECTED
BILIRUB SERPL-MCNC: 0.3 MG/DL
BUN SERPL-MCNC: 9.3 MG/DL (ref 8–23)
BUPRENORPHINE UR QL: NOT DETECTED
CALCIUM SERPL-MCNC: 9.1 MG/DL (ref 8.8–10.2)
CANNABINOIDS UR QL: NOT DETECTED
CHLORIDE SERPL-SCNC: 96 MMOL/L (ref 98–107)
CHOLEST SERPL-MCNC: 162 MG/DL
COCAINE UR QL SCN: NOT DETECTED
CREAT SERPL-MCNC: 0.56 MG/DL (ref 0.51–0.95)
D-METHAMPHET UR QL: NOT DETECTED
DEPRECATED HCO3 PLAS-SCNC: 25 MMOL/L (ref 22–29)
GFR SERPL CREATININE-BSD FRML MDRD: >90 ML/MIN/1.73M2
GLUCOSE SERPL-MCNC: 180 MG/DL (ref 70–99)
HDLC SERPL-MCNC: 41 MG/DL
LDLC SERPL CALC-MCNC: ABNORMAL MG/DL
LDLC SERPL DIRECT ASSAY-MCNC: 52 MG/DL
METHADONE UR QL SCN: NOT DETECTED
NONHDLC SERPL-MCNC: 121 MG/DL
OPIATES UR QL SCN: DETECTED
OXYCODONE UR QL SCN: NOT DETECTED
PCP UR QL SCN: NOT DETECTED
POTASSIUM SERPL-SCNC: 4.7 MMOL/L (ref 3.4–5.3)
PROPOXYPH UR QL: NOT DETECTED
PROT SERPL-MCNC: 6.3 G/DL (ref 6.4–8.3)
SODIUM SERPL-SCNC: 134 MMOL/L (ref 136–145)
TRICYCLICS UR QL SCN: NOT DETECTED
TRIGL SERPL-MCNC: 432 MG/DL
TSH SERPL DL<=0.005 MIU/L-ACNC: 0.57 UIU/ML (ref 0.3–4.2)

## 2023-03-08 RX ORDER — HYDROCODONE BITARTRATE AND ACETAMINOPHEN 10; 325 MG/1; MG/1
1-2 TABLET ORAL EVERY 4 HOURS PRN
Qty: 240 TABLET | Refills: 0 | Status: SHIPPED | OUTPATIENT
Start: 2023-03-08 | End: 2023-04-02

## 2023-03-08 NOTE — TELEPHONE ENCOUNTER
New patient is requesting an appointment for a buttock wound. Patient is in a wheelchair and may need a dinora

## 2023-03-08 NOTE — TELEPHONE ENCOUNTER
Please schedule with Dr. Ponce or Rae ZACARIAS at [facility] [place] for next available appointment.    **If scheduling with Rae ZACARIAS please schedule a follow up 2-3 weeks after initial appointment.    Is the patient able to make their own medical decisions? Yes    Is patient a OBDULIA lift? PLEASE INQUIRE WHEN MAKING THE APPOINTMENT AND PUT IN APPOINTMENT NOTES    Routing to  Wound Healing Scheduling.

## 2023-03-16 ENCOUNTER — HOSPITAL ENCOUNTER (OUTPATIENT)
Dept: WOUND CARE | Facility: CLINIC | Age: 68
Discharge: HOME | End: 2023-03-16
Attending: SURGERY | Admitting: SURGERY
Payer: COMMERCIAL

## 2023-03-16 VITALS
WEIGHT: 163 LBS | BODY MASS INDEX: 23.34 KG/M2 | DIASTOLIC BLOOD PRESSURE: 66 MMHG | TEMPERATURE: 96.3 F | HEART RATE: 104 BPM | HEIGHT: 70 IN | SYSTOLIC BLOOD PRESSURE: 108 MMHG

## 2023-03-16 DIAGNOSIS — L89.153 PRESSURE INJURY OF SACRAL REGION, STAGE 3 (H): ICD-10-CM

## 2023-03-16 PROCEDURE — G0463 HOSPITAL OUTPT CLINIC VISIT: HCPCS | Mod: 25

## 2023-03-16 PROCEDURE — 97602 WOUND(S) CARE NON-SELECTIVE: CPT

## 2023-03-16 PROCEDURE — 99204 OFFICE O/P NEW MOD 45 MIN: CPT | Performed by: SURGERY

## 2023-03-16 RX ORDER — FLUTICASONE FUROATE, UMECLIDINIUM BROMIDE AND VILANTEROL TRIFENATATE 200; 62.5; 25 UG/1; UG/1; UG/1
1 POWDER RESPIRATORY (INHALATION) AT BEDTIME
COMMUNITY
Start: 2023-02-27

## 2023-03-16 RX ORDER — AZITHROMYCIN 250 MG/1
TABLET, FILM COATED ORAL
COMMUNITY
Start: 2023-01-20 | End: 2023-01-01

## 2023-03-16 RX ORDER — VITAMIN A 10000 UNIT
1 TABLET ORAL DAILY
Qty: 21 TABLET | Refills: 0 | OUTPATIENT
Start: 2023-03-16 | End: 2024-01-01

## 2023-03-16 RX ORDER — METHYLDOPA 250 MG
1000 TABLET ORAL DAILY
Qty: 60 TABLET | Refills: 3 | OUTPATIENT
Start: 2023-03-16 | End: 2024-01-01

## 2023-03-16 RX ORDER — LANOLIN ALCOHOL/MO/W.PET/CERES
1000 CREAM (GRAM) TOPICAL DAILY
Qty: 60 TABLET | Refills: 3 | OUTPATIENT
Start: 2023-03-16 | End: 2024-01-01

## 2023-03-16 RX ORDER — ZINC GLUCONATE 50 MG
50 TABLET ORAL DAILY
Qty: 60 TABLET | Refills: 3 | OUTPATIENT
Start: 2023-03-16 | End: 2024-01-01

## 2023-03-16 NOTE — DISCHARGE INSTRUCTIONS
Trisha Marie Napoleon      1955    A DME order for supplies has been placed to [facility]. If there are any issues with your order including not receiving the order please call [facility] at [phone] option 3. They can also provide a tracking number for you if you had supplies shipped to you.      Medications/supplements to aid in healing:  Vitamin D3 5,000 iu per day  Myra C 1,000 mg take daily  Vitamin B Complex with folic acid take 1 tablet daily   Vitamin B 12 1000 mcg daily  Zinc 30mg capsule or tablet daily (ok to take 50mg if 30mg cannot be found)  6. Vitamin A 10,000 I.unit(s). daily for three weeks then stop     -An order for a Roho cushion has been sent to [facility]. CALL [phone] to see if they have it available in their showroom otherwise they may recommend a different supply company to get it at or you can call us here at the clinic.     Wound Dressing Change: Sacrum and Left IT  -Wash your hands with soap and water before you begin your dressing change and prepare a clean surface for dressings.  -Cleanse wound with Cetaphil liquid or bar soap   -After cleansing with mild unscented soap (such as Cetaphil, Cerave or Dove) and water, Apply small amount of VASHE on gauze, lay into wound bed, let sit for 5-10 minutes, remove gauze (do not rinse) then apply dressing:  -Apply skintegrity two wound bed or to piece of xeroform  -cover with 1 sheet of xeroform to sacral wound and and 1/8th sheet of xeroform   -Cover with a karen if needed to prevent staining   -change twice per day    Repositioning:  Bed: Reposition MINIMALLY every 1-2 hours in bed to relieve pressure and promote perfusion to tissue.  Chair: When up to the chair, do not sit for longer than one hour total before returning to bed for at least 60 minutes to relieve pressure and promote perfusion to the tissue.         FREDDY Ponce M.D. March 16, 2023    Call us at [phone] if you have any questions about  your wounds, have redness or swelling around your wound, have a fever of 101 or greater or if you have any other problems or concerns. We answer the phone Monday through Friday 8 am to 4 pm, please leave a message as we check the voicemail frequently throughout the day.     If you had a positive experience please indicate that on your patient satisfaction survey form that [facility] will be sending you.    It was a pleasure meeting with you today. Thank you for allowing me and my team the privilege of caring for you today.  YOU are the reason we are here, and I truly hope we provided you with the excellent service you deserve.  Please let us know if there is anything else we can do for you so that we can be sure you are leaving completely satisfied with your care experience.      If you have any billing related questions please call the [facility] Business office at [phone]. The clinic staff does not handle billing related matters.    If you are scheduled to have a follow up appointment, you will receive a reminder call the day before your visit. On the appointment day please arrive 15 minutes prior to your appointment time. If you are unable to keep that appointment, please call the clinic to cancel or reschedule. If you are more than 10 minutes late or greater for your appointment, the clinic policy is that you may be asked to reschedule.

## 2023-03-16 NOTE — PROGRESS NOTES
[facility] Progress Note    Subject: Trisha Cynthiarosalva Bender consultation for stage II pressure injury gluteal clefts and left ischial tuberosity.  Patient is a 67-year-old female with past medical history significant for congestive heart failure dilated cardiomyopathy with ejection fraction approximately 30%, chronic obstructive pulmonary disease, hypertension, not on amlodipine or Norvasc.  Has prolonged periods of time and sleeps primarily in a chair, normal cushion, has not utilized Roho cushion, did obtain a seating cushion online was a donut, not of benefit.  She has not gotten urine or stool, nondiabetic, no current tobacco cessation, since previously.  Not on home oxygen.  Minimal ambulator with assistance.   performs dressing changes.  She has increased pain in the ulcerations, denies fevers chills sweats.  Past medical history reviewed, medications reviewed, multiple allergies.  No history of malignancy or deep venous thrombosis.  No history of anemia, creatinine 0.5 with GFR of greater than 90.  Albumin 4.0.    PMH:   Past Medical History:   Diagnosis Date     Anxiety      CAD (coronary artery disease) 7/2/15    mild per cath     CHF (congestive heart failure)     EF=20-25% per echo 6/30/15     Chronic airway obstruction     FEV1 36% of pred; VC 67%  in Jan 06.     Chronic back pain      COPD (chronic obstructive pulmonary disease)      Endometriosis      Esophageal reflux      Essential hypertension      Hyperlipidemia      Migraine      Mild persistent asthma 8/30/2012     Osteopenia      Takotsubo cardiomyopathy      Patient Active Problem List   Diagnosis     Essential hypertension with goal blood pressure less than 130/80     Chronic migraine without aura without status migrainosus, not intractable     Esophageal reflux     Disorder of bone and cartilage     Family history of malignant neoplasm of gastrointestinal tract     Chronic airway obstruction (H)     Cystocele      Family history of thyroid cancer - 1/2 brother     Anxiety     Chronic low back pain     Hair loss     Lateral epicondylitis of right elbow     Dilated cardiomyopathy (H)     Right bundle branch block (RBBB)     CHF (congestive heart failure) (H)     Coronary artery disease involving native coronary artery of native heart without angina pectoris     Mixed hyperlipidemia     Controlled substance agreement signed     Moderate persistent asthma without complication     Hypokalemia     Panlobular emphysema (H)     Tachycardia     Status post coronary angiogram     Thoracic aortic ectasia (H)     Supraventricular tachycardia (H)     Social Hx:   Social History     Socioeconomic History     Marital status:      Spouse name: Not on file     Number of children: Not on file     Years of education: Not on file     Highest education level: Not on file   Occupational History     Not on file   Tobacco Use     Smoking status: Former     Packs/day: 1.00     Years: 30.00     Pack years: 30.00     Types: Cigarettes     Quit date: 2006     Years since quittin.1     Smokeless tobacco: Never     Tobacco comments:     Quit in    Vaping Use     Vaping Use: Never used   Substance and Sexual Activity     Alcohol use: No     Alcohol/week: 0.0 standard drinks     Drug use: No     Sexual activity: Never     Partners: Male     Birth control/protection: Surgical     Comment: vasectomy   Other Topics Concern     Parent/sibling w/ CABG, MI or angioplasty before 65F 55M? Not Asked      Service Not Asked     Blood Transfusions Not Asked     Caffeine Concern No     Comment: none     Occupational Exposure Not Asked     Hobby Hazards No     Sleep Concern Not Asked     Stress Concern No     Weight Concern Not Asked     Special Diet Yes     Comment: low sodium     Back Care Not Asked     Exercise Yes     Comment: limited due to back     Bike Helmet Not Asked     Seat Belt Not Asked     Self-Exams Not Asked   Social History  Narrative     Not on file     Social Determinants of Health     Financial Resource Strain: Not on file   Food Insecurity: Not on file   Transportation Needs: Not on file   Physical Activity: Not on file   Stress: Not on file   Social Connections: Not on file   Intimate Partner Violence: Not on file   Housing Stability: Not on file       Surgical Hx:   Past Surgical History:   Procedure Laterality Date     ANGIOGRAM  07/02/2015    Minimal CAD. LV dysfunction, Mid RCA 20% stenosis, EF 20-25%, c/w Takotsubo cardiomyopathy     APPENDECTOMY  1974     CHOLECYSTECTOMY  1974    [facility]     COLONOSCOPY N/A 4/26/2018    Procedure: COLONOSCOPY;  COLONOSCOPY with biopsies;  Surgeon: Steve Acosta MD;  Location: [facility] OR     CORONARY ANGIOGRAPHY ADULT ORDER       CV CORONARY ANGIOGRAM N/A 7/30/2020    Procedure: Coronary Angiogram;  Surgeon: Quinn Viveros MD;  Location:  HEART CARDIAC CATH LAB     CV LEFT HEART CATH N/A 7/30/2020    Procedure: Left Heart Cath;  Surgeon: Quinn Viveros MD;  Location:  HEART CARDIAC CATH LAB     CV LEFT VENTRICULOGRAM N/A 7/30/2020    Procedure: Left Ventriculogram;  Surgeon: Quinn Viveros MD;  Location:  HEART CARDIAC CATH LAB     CV RIGHT HEART CATH MEASUREMENTS RECORDED N/A 7/30/2020    Procedure: Right Heart Cath;  Surgeon: Quinn Viveros MD;  Location:  HEART CARDIAC CATH LAB     ESOPHAGOSCOPY, GASTROSCOPY, DUODENOSCOPY (EGD), COMBINED  11/15/2011    Procedure:COMBINED ESOPHAGOSCOPY, GASTROSCOPY, DUODENOSCOPY (EGD);  ESOPHAGOSCOPY, GASTROSCOPY, DUODENOSCOPY (EGD) ; Surgeon:JIM URENA; Location:[facility] GI     INJECT EPIDURAL LUMBAR / SACRAL SINGLE N/A 4/13/2018    Procedure: INJECT EPIDURAL LUMBAR / SACRAL CONTINUAL OR INTERMITTENT;  Lumbar Epidural Steroid Injection;  Surgeon: Az Enamorado MD;  Location: [facility]     INJECT EPIDURAL LUMBAR / SACRAL SINGLE N/A 9/24/2018    Procedure: INJECT EPIDURAL LUMBAR / SACRAL SINGLE;  Lumbar Epidural Steroid  Injection;  Surgeon: Az Enamorado MD;  Location: [facility]     LAPAROSCOPIC ABLATION ENDOMETRIOSIS  1998     LAPAROSCOPY DIAGNOSTIC (GYN)      endometriosis resection / lysis of adhesions     Nasal septoplasty and mucous retention cyst       TONSILLECTOMY, ADENOIDECTOMY, COMBINED       Babbitt teeth extraction         Allergies:    Allergies   Allergen Reactions     Gabapentin Swelling     Contrast Dye      Iodine     Escitalopram      Nausea and sickness     Peanuts [Nuts] Hives     Penicillins      rash     Sulfa Drugs      High family history     Tace [Chlorotrianisene]      joint swelling     Aspirin Rash     After 3 days     Ibuprofen Nausea and Vomiting and Swelling     Strawberry Rash       Medications:   Current Outpatient Medications   Medication     albuterol (VENTOLIN HFA) 108 (90 BASE) MCG/ACT Inhaler     alendronate (FOSAMAX) 70 MG tablet     ALPRAZolam (XANAX) 0.25 MG tablet     atorvastatin (LIPITOR) 10 MG tablet     azithromycin (ZITHROMAX) 250 MG tablet     baclofen (LIORESAL) 10 MG tablet     BREO ELLIPTA 200-25 MCG/INH Inhaler     carvedilol (COREG) 25 MG tablet     cetirizine (ZYRTEC) 10 MG tablet     [employer] FIBER GUMMIES 2.5 G CHEW     doxycycline hyclate (VIBRA-TABS) 100 MG tablet     erythromycin (ROMYCIN) 5 MG/GM ophthalmic ointment     fluticasone (FLONASE) 50 MCG/ACT nasal spray     HYDROcodone-acetaminophen (NORCO)  MG per tablet     ipratropium - albuterol 0.5 mg/2.5 mg/3 mL (DUONEB) 0.5-2.5 (3) MG/3ML neb solution     ketorolac (TORADOL) 10 MG tablet     lisinopril (ZESTRIL) 5 MG tablet     montelukast (SINGULAIR) 10 MG tablet     Multiple Vitamin CHEW     naloxone (NARCAN) 4 MG/0.1ML nasal spray     ondansetron (ZOFRAN ODT) 4 MG ODT tab     predniSONE (DELTASONE) 5 MG tablet     roflumilast (DALIRESP) 500 MCG TABS tablet     spironolactone (ALDACTONE) 25 MG tablet     SUMAtriptan (IMITREX) 25 MG tablet     tiotropium (SPIRIVA HANDIHALER) 18 MCG capsule     traMADol (ULTRAM) 50 MG  tablet     TRELEGY ELLIPTA 200-62.5-25 MCG/ACT oral inhaler     No current facility-administered medications for this encounter.       Labs:   Recent Labs   Lab Test 03/07/23  1725 03/27/21  1613 07/30/20  1052 10/02/18  2102 04/16/18  0847   ALBUMIN 4.0   < >  --    < > 3.5   HGB 14.2   < > 13.5   < > 13.5   INR  --   --  0.91  --  0.98   WBC 15.0*   < > 16.6*   < > 11.3*   A1C  --   --   --   --  5.7    < > = values in this interval not displayed.     Creatinine   Date Value Ref Range Status   03/07/2023 0.56 0.51 - 0.95 mg/dL Final   03/27/2021 0.44 (L) 0.52 - 1.04 mg/dL Final     GFR Estimate   Date Value Ref Range Status   03/07/2023 >90 >60 mL/min/1.73m2 Final     Comment:     eGFR calculated using 2021 CKD-EPI equation.   03/27/2021 >90 >60 mL/min/[1.73_m2] Final     Comment:     Non  GFR Calc  Starting 12/18/2018, serum creatinine based estimated GFR (eGFR) will be   calculated using the Chronic Kidney Disease Epidemiology Collaboration   (CKD-EPI) equation.       GFR Estimate If Black   Date Value Ref Range Status   03/27/2021 >90 >60 mL/min/[1.73_m2] Final     Comment:      GFR Calc  Starting 12/18/2018, serum creatinine based estimated GFR (eGFR) will be   calculated using the Chronic Kidney Disease Epidemiology Collaboration   (CKD-EPI) equation.       Lab Results   Component Value Date    WBC 15.0 03/07/2023    WBC 17.6 03/27/2021     Lab Results   Component Value Date    RBC 4.78 03/07/2023    RBC 4.53 03/27/2021     Lab Results   Component Value Date    HGB 14.2 03/07/2023    HGB 13.6 03/27/2021     Lab Results   Component Value Date    HCT 45.2 03/07/2023    HCT 42.5 03/27/2021     No components found for: MCT  Lab Results   Component Value Date    MCV 95 03/07/2023    MCV 94 03/27/2021     Lab Results   Component Value Date    MCH 29.7 03/07/2023    MCH 30.0 03/27/2021     Lab Results   Component Value Date    MCHC 31.4 03/07/2023    MCHC 32.0 03/27/2021     Lab  "Results   Component Value Date    RDW 13.4 03/07/2023    RDW 14.2 03/27/2021     Lab Results   Component Value Date     03/07/2023     03/27/2021          Nutrition requirements were discussed with patient today.  Objective:  /66   Pulse 104   Temp (!) 96.3  F (35.7  C) (Temporal)   Ht 1.778 m (5' 10\")   Wt 73.9 kg (163 lb)   LMP 09/20/2005   BMI 23.39 kg/m    Wound (used by OP [facility] only) 03/16/23 0959 Left ischial tuberosity (Active)   Thickness/Stage Stage 3 03/16/23 1000   Base yellow;pink;red 03/16/23 1000   Periwound intact 03/16/23 1000   Periwound Temperature warm 03/16/23 1000   Periwound Skin Turgor soft 03/16/23 1000   Edges open 03/16/23 1000   Length (cm) 0.5 03/16/23 1000   Width (cm) 0.6 03/16/23 1000   Depth (cm) 0.2 03/16/23 1000   Wound (cm^2) 0.3 cm^2 03/16/23 1000   Wound Volume (cm^3) 0.06 cm^3 03/16/23 1000   Drainage Characteristics/Odor serosanguineous 03/16/23 1000   Drainage Amount scant 03/16/23 1000   Care, Wound non-select wound debridement performed 03/16/23 1000       Wound (used by OP [facility] only) 03/16/23 1000 sacral (Active)   Thickness/Stage partial thickness 03/16/23 1000   Base red;maroon/purple;white 03/16/23 1000   Periwound intact 03/16/23 1000   Periwound Temperature warm 03/16/23 1000   Periwound Skin Turgor soft 03/16/23 1000   Edges open 03/16/23 1000   Length (cm) 16 03/16/23 1000   Width (cm) 5 03/16/23 1000   Depth (cm) 0.1 03/16/23 1000   Wound (cm^2) 80 cm^2 03/16/23 1000   Wound Volume (cm^3) 8 cm^3 03/16/23 1000   Drainage Characteristics/Odor serosanguineous 03/16/23 1000   Drainage Amount scant 03/16/23 1000   Care, Wound non-select wound debridement performed 03/16/23 1000        General:  Patient is alert and orientated, no acute distress.  67-year-old female, conversant, not on oxygen.  Examination of the gluteal cleft reveals stage I pressure injuries, next to anus, hygiene intact, left ischial tuberosity stage II pressure injury, no " evidence of cellulitis.  Minor hemorrhoids.  No coccyx or sacral ulcerations.  No heel ulcerations.        Impression: Stage I gluteal pressure injuries, stage II left ischial tuberosity ulceration, chronic struct of pulmonary disease, cardiomyopathy, significant chair bound status, not neuropathic or paraplegic, deconditioned      Plan: Requires low-profile Roho cushion for pressure injury prevention in all seating surfaces, they will evaluate cushion options at [facility].  Discussed nutrition and hydration.  Soak wounds with hypochlorous acid Vashe for 15 minutes, then application of Xeroform and cover with a simple cloth or paper product as ABD pads will not be covered.  Change twice daily.  Hydrogel pH 6.0 to areas of injury.  Adjunctive micronutrients. Patient will return to the clinic in 3 weeks time.       Further instructions from your care team       Trisha Bender      1955    A DME order for supplies has been placed to [facility]. If there are any issues with your order including not receiving the order please call [facility] at [phone] option 3. They can also provide a tracking number for you if you had supplies shipped to you.      Medications/supplements to aid in healin. Vitamin D3 5,000 iu per day  2. Myra C 1,000 mg take daily  3. Vitamin B Complex with folic acid take 1 tablet daily   4. Vitamin B 12 1000 mcg daily  5. Zinc 30mg capsule or tablet daily (ok to take 50mg if 30mg cannot be found)  6. Vitamin A 10,000 I.unit(s). daily for three weeks then stop     -An order for a Roho cushion has been sent to [facility]. CALL [phone] to see if they have it available in their showroom otherwise they may recommend a different supply company to get it at or you can call us here at the clinic.     Wound Dressing Change: Sacrum and Left IT  -Wash your hands with soap and water before you begin your dressing change and prepare a clean surface for  dressings.  -Cleanse wound with Cetaphil liquid or bar soap   -After cleansing with mild unscented soap (such as Cetaphil, Cerave or Dove) and water, Apply small amount of VASHE on gauze, lay into wound bed, let sit for 5-10 minutes, remove gauze (do not rinse) then apply dressing:  -Apply skintegrity two wound bed or to piece of xeroform  -cover with 1 sheet of xeroform to sacral wound and and 1/8th sheet of xeroform   -Cover with a karen if needed to prevent staining   -change twice per day    Repositioning:    Bed: Reposition MINIMALLY every 1-2 hours in bed to relieve pressure and promote perfusion to tissue.    Chair: When up to the chair, do not sit for longer than one hour total before returning to bed for at least 60 minutes to relieve pressure and promote perfusion to the tissue.         FREDDY Ponce M.D. March 16, 2023    Call us at [phone] if you have any questions about your wounds, have redness or swelling around your wound, have a fever of 101 or greater or if you have any other problems or concerns. We answer the phone Monday through Friday 8 am to 4 pm, please leave a message as we check the voicemail frequently throughout the day.     If you had a positive experience please indicate that on your patient satisfaction survey form that [facility] will be sending you.    It was a pleasure meeting with you today. Thank you for allowing me and my team the privilege of caring for you today.  YOU are the reason we are here, and I truly hope we provided you with the excellent service you deserve.  Please let us know if there is anything else we can do for you so that we can be sure you are leaving completely satisfied with your care experience.      If you have any billing related questions please call the [facility] Business office at [phone]. The clinic staff does not handle billing related matters.    If you are scheduled to have a follow up appointment, you will receive a reminder call  the day before your visit. On the appointment day please arrive 15 minutes prior to your appointment time. If you are unable to keep that appointment, please call the clinic to cancel or reschedule. If you are more than 10 minutes late or greater for your appointment, the clinic policy is that you may be asked to reschedule.          Durable Medical Equipment Wound Care Orders     Wound Care Order for DME - ONLY FOR DME   As directed      DME Provider: Jennifer Hatfield Comment - [phone]    Start Date: 3/16/2023    Wound Supply Order Options: Complex Wound    Optional: .dmewound can be used to pull in order specific information into documentation    Wound Number:  Wound 1  Wound 2       Wound 1 Location: Sacrum    Wound 1 Dressing Change Frequency: BID    Wound 1 Length of Need: 30 days    Wound 1 - Dressing Supplies: Other Dressing Supplies    Wound 1 - Other Dressing Supplies Type:  Non-Adherent  Hydrogel       Wound 1 - Hydrogel Types: Other (Comments) Comment - Skintegrity    Wound 1 - Other Hydrogel Size: 1 oz    Wound 1 - Other Hydrogel Quantity: 1 bottle    Wound 1 - Non-Adherent Type: Curad Xeroform    Wound 1 - Curad Xeroform Size: 5x9    Wound 1 - Curad Xeroform Quantity: 30    Wound 2 Location: Left IT    Wound 2 Dressing Change Frequency: BID    Wound 2 Length of Need: 30 days    Wound 2 - Dressing Supplies:  Other Dressing Supplies  Cleanser       Wound 2 - Cleanser Types: Vashe    Wound 2 - Vashe Cleanser Size: 8.5 oz    Wound 2 - Vashe Cleanser Quantity: 1 bottle    Wound 2 - Other Dressing Supplies Type:  Non-Adherent  Hydrogel       Wound 2 - Hydrogel Types: Other (Comments) Comment - skintegrity hydrgel    Wound 2 - Other Hydrogel Size: 1 oz    Wound 2 - Other Hydrogel Quantity: 1 bottle    Wound 2 - Non-Adherent Type: Curad Xeroform    Wound 2 - Curad Xeroform Size: 5x9    Wound 2 - Curad Xeroform Quantity: Other (Comments) Comment - 8          Adalberto Ponce MD on 3/16/2023 at 12:18  PM          Dictated using Dragon voice recognition software which may result in transcription errors

## 2023-03-16 NOTE — PROGRESS NOTES
Patient arrived for wound care visit. Certified Wound Care Nurse time spent evaluating patient record, completed a full evaluation and documented wound(s) & jesse-wound skin; provided recommendation based on treatment plan. Applied dressing, reviewed discharge instructions, patient education, and discussed plan of care with appropriate medical team staff members and patient and/or family members.

## 2023-03-17 ENCOUNTER — TELEPHONE (OUTPATIENT)
Dept: WOUND CARE | Facility: CLINIC | Age: 68
End: 2023-03-17
Payer: COMMERCIAL

## 2023-03-17 NOTE — TELEPHONE ENCOUNTER
Reviewed recent visit notes;  Faxed on 3/16 to [facility]:   Refaxed order to [facility];  Called  to update and he offered a second fax number for [facility] that was given to him;  Writer faxed order to that second number as well;

## 2023-03-20 DIAGNOSIS — L89.153 PRESSURE INJURY OF SACRAL REGION, STAGE 3 (H): ICD-10-CM

## 2023-03-20 DIAGNOSIS — G89.29 CHRONIC BILATERAL LOW BACK PAIN WITH RIGHT-SIDED SCIATICA: ICD-10-CM

## 2023-03-20 DIAGNOSIS — F41.9 ANXIETY: ICD-10-CM

## 2023-03-20 DIAGNOSIS — M54.41 CHRONIC BILATERAL LOW BACK PAIN WITH RIGHT-SIDED SCIATICA: ICD-10-CM

## 2023-03-20 DIAGNOSIS — Z79.899 CHRONIC PRESCRIPTION BENZODIAZEPINE USE: ICD-10-CM

## 2023-03-20 RX ORDER — ALPRAZOLAM 0.25 MG
TABLET ORAL
Qty: 60 TABLET | Refills: 0 | Status: SHIPPED | OUTPATIENT
Start: 2023-03-20 | End: 2023-04-24

## 2023-03-20 RX ORDER — DOXYCYCLINE HYCLATE 100 MG
100 TABLET ORAL 2 TIMES DAILY
Qty: 20 TABLET | Refills: 0 | Status: ON HOLD | OUTPATIENT
Start: 2023-03-20 | End: 2023-01-01

## 2023-03-21 RX ORDER — BACLOFEN 10 MG/1
TABLET ORAL
Qty: 180 TABLET | Refills: 5 | Status: ON HOLD | OUTPATIENT
Start: 2023-03-21 | End: 2023-01-01

## 2023-03-23 DIAGNOSIS — M54.59 MECHANICAL LOW BACK PAIN: ICD-10-CM

## 2023-03-27 RX ORDER — TRAMADOL HYDROCHLORIDE 50 MG/1
TABLET ORAL
Qty: 30 TABLET | Refills: 0 | Status: SHIPPED | OUTPATIENT
Start: 2023-03-27 | End: 2023-04-24

## 2023-04-02 ENCOUNTER — MYC REFILL (OUTPATIENT)
Dept: INTERNAL MEDICINE | Facility: CLINIC | Age: 68
End: 2023-04-02
Payer: COMMERCIAL

## 2023-04-02 DIAGNOSIS — G89.29 CHRONIC BILATERAL LOW BACK PAIN, UNSPECIFIED WHETHER SCIATICA PRESENT: ICD-10-CM

## 2023-04-02 DIAGNOSIS — M54.50 CHRONIC BILATERAL LOW BACK PAIN, UNSPECIFIED WHETHER SCIATICA PRESENT: ICD-10-CM

## 2023-04-05 RX ORDER — HYDROCODONE BITARTRATE AND ACETAMINOPHEN 10; 325 MG/1; MG/1
1-2 TABLET ORAL EVERY 4 HOURS PRN
Qty: 240 TABLET | Refills: 0 | Status: SHIPPED | OUTPATIENT
Start: 2023-04-05 | End: 2023-05-01

## 2023-04-05 NOTE — TELEPHONE ENCOUNTER
Pending Prescriptions:                       Disp   Refills    HYDROcodone-acetaminophen (NORCO)  M*240 ta*0        Sig: Take 1-2 tablets by mouth every 4 hours as needed for           severe pain Take up to 8 tablets a day    Routing refill request to provider for review/approval because:  Drug not on the [facility] refill protocol

## 2023-04-13 ENCOUNTER — HOSPITAL ENCOUNTER (OUTPATIENT)
Dept: WOUND CARE | Facility: CLINIC | Age: 68
Discharge: HOME | End: 2023-04-13
Attending: SURGERY | Admitting: SURGERY
Payer: COMMERCIAL

## 2023-04-13 VITALS — HEART RATE: 109 BPM | DIASTOLIC BLOOD PRESSURE: 90 MMHG | TEMPERATURE: 97.2 F | SYSTOLIC BLOOD PRESSURE: 145 MMHG

## 2023-04-13 DIAGNOSIS — L89.323 PRESSURE INJURY OF LEFT ISCHIUM, STAGE 3 (H): Primary | ICD-10-CM

## 2023-04-13 DIAGNOSIS — T14.8XXA OPEN WOUND: ICD-10-CM

## 2023-04-13 PROCEDURE — 97602 WOUND(S) CARE NON-SELECTIVE: CPT

## 2023-04-13 PROCEDURE — 99212 OFFICE O/P EST SF 10 MIN: CPT | Performed by: SURGERY

## 2023-04-13 NOTE — PROGRESS NOTES
[facility] Wound Healing [place] Progress Note    Subject: Trisha Bender obtained Roho cushion at [facility], they state they have struggled somewhat with understanding how to inflate and not over inflated.  She spends a significant amount of time in a reclining chair, must have Roho protection while in the chair she is, ideally would spend some degree of time in bed either the right or left lateral decubitus position.  No diarrhea.    Patient Active Problem List   Diagnosis     Essential hypertension with goal blood pressure less than 130/80     Chronic migraine without aura without status migrainosus, not intractable     Esophageal reflux     Disorder of bone and cartilage     Family history of malignant neoplasm of gastrointestinal tract     Chronic airway obstruction (H)     Cystocele     Family history of thyroid cancer - 1/2 brother     Anxiety     Chronic low back pain     Hair loss     Lateral epicondylitis of right elbow     Dilated cardiomyopathy (H)     Right bundle branch block (RBBB)     CHF (congestive heart failure) (H)     Coronary artery disease involving native coronary artery of native heart without angina pectoris     Mixed hyperlipidemia     Controlled substance agreement signed     Moderate persistent asthma without complication     Hypokalemia     Panlobular emphysema (H)     Tachycardia     Status post coronary angiogram     Thoracic aortic ectasia (H)     Supraventricular tachycardia (H)     Past Medical History:   Diagnosis Date     Anxiety      CAD (coronary artery disease) 7/2/15    mild per cath     CHF (congestive heart failure)     EF=20-25% per echo 6/30/15     Chronic airway obstruction     FEV1 36% of pred; VC 67%  in Jan 06.     Chronic back pain      COPD (chronic obstructive pulmonary disease)      Endometriosis      Esophageal reflux      Essential hypertension      Hyperlipidemia      Migraine      Mild persistent asthma 8/30/2012     Osteopenia      Takotsubo cardiomyopathy       Exam:  BP (!) 145/90 (BP Location: Right arm, Patient Position: Sitting)   Pulse 109   Temp 97.2  F (36.2  C)   LMP 09/20/2005   Wound (used by OP [facility] only) 03/16/23 0959 Left ischial tuberosity pressure injury (Active)   Thickness/Stage Stage 3 04/13/23 1026   Base yellow;pink;red 04/13/23 1026   Periwound intact 04/13/23 1026   Periwound Temperature warm 04/13/23 1026   Periwound Skin Turgor soft 04/13/23 1026   Edges open 04/13/23 1026   Length (cm) 0.6 04/13/23 1026   Width (cm) 1.1 04/13/23 1026   Depth (cm) 0.1 04/13/23 1026   Wound (cm^2) 0.66 cm^2 04/13/23 1026   Wound Volume (cm^3) 0.07 cm^3 04/13/23 1026   Wound healing % -120 04/13/23 1026   Drainage Characteristics/Odor serosanguineous 04/13/23 1026   Drainage Amount scant 04/13/23 1026   Care, Wound non-select wound debridement performed 04/13/23 1026       Wound (used by OP [facility] only) 03/16/23 1000 sacral (Active)   Thickness/Stage partial thickness 04/13/23 1026   Base maroon/purple;epithelialization 04/13/23 1026   Periwound intact 04/13/23 1026   Periwound Temperature warm 04/13/23 1026   Periwound Skin Turgor soft 04/13/23 1026   Edges open 04/13/23 1026   Length (cm) 0.1 04/13/23 1026   Width (cm) 0.1 04/13/23 1026   Depth (cm) 0.1 04/13/23 1026   Wound (cm^2) 0.01 cm^2 04/13/23 1026   Wound Volume (cm^3) 0 cm^3 04/13/23 1026   Wound healing % 99.99 04/13/23 1026   Drainage Characteristics/Odor serosanguineous 04/13/23 1026   Drainage Amount scant 04/13/23 1026   Care, Wound non-select wound debridement performed 04/13/23 1026     Marked improvement in dermatitis and gluteal cleft, no open wounds, proximal left posterior thigh ulceration with less inflammation, no undermining, superficial in nature.        Impression: Resolved gluteal cleft ulcerations, persistent though resolving left posterior surgeries proximal thigh ulceration    Plan: Repositioning and use of Roho cushion in each seating surface important, he did obtain 1 from [employer],  no diarrhea.  Patient will return to the clinic in 4 weeks time telehealth visit, anticipate left proximal straight thigh wound will be resolved at this point.    Wound Dressing Change: Sacrum  Cleanse with mild unscented soap and water (such as Cetaphil, Cerave or Dove)   Apply moisturizing cream. We recommend Cera-Ve    Wound Dressing Change: Left IT  - Wash your hands with soap and water before you begin your dressing change and    prepare a clean surface for dressings.  - After cleansing with mild unscented soap (such as Cetaphil, Cerave or Dove) and water,  - Apply small amount of VASHE on gauze, lay into wound bed, let sit for 5-10 minutes,    remove gauze (do not rinse) then apply dressing:  - Apply pea amount of skintegrity to wound bed or to piece of xeroform  - Cover with 1/20th piece of 5x9 xeroform  - Cover with 1 medipore plus pad   Change twice per day      Further instructions from your care team           Further instructions from your care team       Trisha Bender      1955    A DME order for supplies has been placed to [facility]. If there are any issues with your order including not receiving the order please call [facility] at [phone] option 3. They can also provide a tracking number for you if you had supplies shipped to you.     Your next appointment is a telephone visit. Please text photos of your wounds the day before or the day of the appointment to [phone]. Please include a tape measure or ruler of some sort in your photo for reference.     Please also include your name and date of birth on the text to identify yourself and include the location of the wound(s).  (The cell phone is a secure line and is secure with your information)    Medications/supplements to aid in healing:  Vitamin D3 5,000 iu per day  Myra C 1,000 mg take daily  Vitamin B Complex with folic acid take 1 tablet daily  Vitamin B 12 1000 mcg daily  Zinc 30mg capsule or tablet daily  (ok to take 50mg if 30mg cannot be found)  Vitamin A 10,000 I.unit(s).(s). daily for three weeks then stop    Continue to adjust to your new ROHO cushion    Wound Dressing Change: Sacrum  Cleanse with mild unscented soap and water (such as Cetaphil, Cerave or Dove)   Apply moisturizing cream. We recommend Cera-Ve    Wound Dressing Change: Left IT  - Wash your hands with soap and water before you begin your dressing change and    prepare a clean surface for dressings.  - After cleansing with mild unscented soap (such as Cetaphil, Cerave or Dove) and water,  - Apply small amount of VASHE on gauze, lay into wound bed, let sit for 5-10 minutes,    remove gauze (do not rinse) then apply dressing:  - Apply pea amount of skintegrity to wound bed or to piece of xeroform  - Cover with 1/20th piece of 5x9 xeroform  - Cover with 1 medipore plus pad   Change twice per day    Repositioning:  Bed: Reposition MINIMALLY every 1-2 hours in bed to relieve pressure and promote  perfusion to tissue.    Chair: When up to the chair, do not sit for longer than one hour total before  returning to bed for at least 60 minutes to relieve pressure and promote perfusion to  the tissue.     FREDDY Ponce M.D. April 13, 2023    Call us at [phone] if you have any questions about your wounds, have redness or swelling around your wound, have a fever of 101 or greater or if you have any other problems or concerns. We answer the phone Monday through Friday 8 am to 4 pm, please leave a message as we check the voicemail frequently throughout the day.     If you had a positive experience please indicate that on your patient satisfaction survey form that [facility] will be sending you.    It was a pleasure meeting with you today.  Thank you for allowing me and my team the privilege of caring for you today.  YOU are the reason we are here, and I truly hope we provided you with the excellent service you deserve.  Please let us know if there is  anything else we can do for you so that we can be sure you are leaving completely satisfied with your care experience.      If you have any billing related questions please call the [facility] Business office at [phone]. The clinic staff does not handle billing related matters.    If you are scheduled to have a follow up appointment, you will receive a reminder call the day before your visit. On the appointment day please arrive 15 minutes prior to your appointment time. If you are unable to keep that appointment, please call the clinic to cancel or reschedule. If you are more than 10 minutes late or greater for your appointment, the clinic policy is that you may be asked to reschedule.                      Durable Medical Equipment Wound Care Orders     Wound Care Order for DME - ONLY FOR DME   As directed      DME Provider: Jennifer Hatfield Comment - room 471    Start Date: 4/13/2023    Wound Supply Order Options: Complex Wound    Optional: .dmewound can be used to pull in order specific information into documentation    Wound Number: Wound 1    Wound 1 Location: left IT    Wound 1 Dressing Change Frequency: BID    Wound 1 Length of Need: 30 days    Wound 1 - Dressing Supplies:  Primary  Other Dressing Supplies       Wound 1 - Primary Dressing Dispensing Instructions: Ok to Substitute    Wound 1 - Primary Dressing Types: Other (Comments)    Wound 1 - Other Primary Dressing Type: Skin Integrity Wound Gel    Wound 1 - Skin Integrity Wound Gel Size: 3 oz    Wound 1 - Skin Integrity Wound Gel Quantity: 3 oz/mo    Wound 1 - Other Dressing Supplies Type: Non-Adherent    Wound 1 - Non-Adherent Type: Medipore + Pad    Wound 1 - Medipore + Pad Size: 2 x 2.375    Wound 1 - Medipore + Pad Quantity: 30        Adalberto Ponce MD on 4/13/2023 at 10:55 AM        Dictated using Dragon voice recognition software which may result in transcription errors

## 2023-04-13 NOTE — DISCHARGE INSTRUCTIONS
Trisha Marie Bender      1955    A DME order for supplies has been placed to [facility]. If there are any issues with your order including not receiving the order please call [facility] at [phone] option 3. They can also provide a tracking number for you if you had supplies shipped to you.     Your next appointment is a telephone visit. Please text photos of your wounds the day before or the day of the appointment to [phone]. Please include a tape measure or ruler of some sort in your photo for reference.     Please also include your name and date of birth on the text to identify yourself and include the location of the wound(s).  (The cell phone is a secure line and is secure with your information)    Medications/supplements to aid in healing:  Vitamin D3 5,000 iu per day  Myra C 1,000 mg take daily  Vitamin B Complex with folic acid take 1 tablet daily  Vitamin B 12 1000 mcg daily  Zinc 30mg capsule or tablet daily (ok to take 50mg if 30mg cannot be found)  Vitamin A 10,000 I.unit(s).(s). daily for three weeks then stop    Continue to adjust to your new ROHO cushion    Wound Dressing Change: Sacrum  Cleanse with mild unscented soap and water (such as Cetaphil, Cerave or Dove)   Apply moisturizing cream. We recommend Cera-Ve    Wound Dressing Change: Left IT  - Wash your hands with soap and water before you begin your dressing change and    prepare a clean surface for dressings.  - After cleansing with mild unscented soap (such as Cetaphil, Cerave or Dove) and water,  - Apply small amount of VASHE on gauze, lay into wound bed, let sit for 5-10 minutes,    remove gauze (do not rinse) then apply dressing:  - Apply pea amount of skintegrity to wound bed or to piece of xeroform  - Cover with 1/20th piece of 5x9 xeroform  - Cover with 1 medipore plus pad   Change twice per day    Repositioning:  Bed: Reposition MINIMALLY every 1-2 hours in bed to relieve pressure and  promote  perfusion to tissue.   Chair: When up to the chair, do not sit for longer than one hour total before  returning to bed for at least 60 minutes to relieve pressure and promote perfusion to  the tissue.     FREDDY Ponce M.D. April 13, 2023    Call us at [phone] if you have any questions about your wounds, have redness or swelling around your wound, have a fever of 101 or greater or if you have any other problems or concerns. We answer the phone Monday through Friday 8 am to 4 pm, please leave a message as we check the voicemail frequently throughout the day.     If you had a positive experience please indicate that on your patient satisfaction survey form that [facility] will be sending you.    It was a pleasure meeting with you today.  Thank you for allowing me and my team the privilege of caring for you today.  YOU are the reason we are here, and I truly hope we provided you with the excellent service you deserve.  Please let us know if there is anything else we can do for you so that we can be sure you are leaving completely satisfied with your care experience.      If you have any billing related questions please call the [facility] Business office at [phone]. The clinic staff does not handle billing related matters.    If you are scheduled to have a follow up appointment, you will receive a reminder call the day before your visit. On the appointment day please arrive 15 minutes prior to your appointment time. If you are unable to keep that appointment, please call the clinic to cancel or reschedule. If you are more than 10 minutes late or greater for your appointment, the clinic policy is that you may be asked to reschedule.

## 2023-04-24 DIAGNOSIS — Z79.899 CHRONIC PRESCRIPTION BENZODIAZEPINE USE: ICD-10-CM

## 2023-04-24 DIAGNOSIS — M54.59 MECHANICAL LOW BACK PAIN: ICD-10-CM

## 2023-04-24 DIAGNOSIS — F41.9 ANXIETY: ICD-10-CM

## 2023-04-24 RX ORDER — ALPRAZOLAM 0.25 MG
TABLET ORAL
Qty: 60 TABLET | Refills: 0 | Status: SHIPPED | OUTPATIENT
Start: 2023-04-24 | End: 2023-06-06

## 2023-04-24 RX ORDER — TRAMADOL HYDROCHLORIDE 50 MG/1
TABLET ORAL
Qty: 30 TABLET | Refills: 0 | Status: SHIPPED | OUTPATIENT
Start: 2023-04-24 | End: 2023-05-23

## 2023-05-01 ENCOUNTER — MYC REFILL (OUTPATIENT)
Dept: INTERNAL MEDICINE | Facility: CLINIC | Age: 68
End: 2023-05-01
Payer: COMMERCIAL

## 2023-05-01 DIAGNOSIS — M54.50 CHRONIC BILATERAL LOW BACK PAIN, UNSPECIFIED WHETHER SCIATICA PRESENT: ICD-10-CM

## 2023-05-01 DIAGNOSIS — G89.29 CHRONIC BILATERAL LOW BACK PAIN, UNSPECIFIED WHETHER SCIATICA PRESENT: ICD-10-CM

## 2023-05-02 RX ORDER — HYDROCODONE BITARTRATE AND ACETAMINOPHEN 10; 325 MG/1; MG/1
1-2 TABLET ORAL EVERY 4 HOURS PRN
Qty: 240 TABLET | Refills: 0 | Status: SHIPPED | OUTPATIENT
Start: 2023-05-02 | End: 2023-05-28

## 2023-05-11 ENCOUNTER — MYC MEDICAL ADVICE (OUTPATIENT)
Dept: WOUND CARE | Facility: CLINIC | Age: 68
End: 2023-05-11
Payer: COMMERCIAL

## 2023-05-11 ENCOUNTER — HOSPITAL ENCOUNTER (OUTPATIENT)
Dept: WOUND CARE | Facility: CLINIC | Age: 68
Discharge: HOME | End: 2023-05-11
Attending: SURGERY
Payer: COMMERCIAL

## 2023-05-11 DIAGNOSIS — L89.323 PRESSURE ULCER OF LEFT BUTTOCK, STAGE 3 (H): ICD-10-CM

## 2023-05-11 PROCEDURE — 99213 OFFICE O/P EST LOW 20 MIN: CPT | Mod: 95 | Performed by: SURGERY

## 2023-05-11 NOTE — PROGRESS NOTES
Phone-Visit Details    Type of service:  Phone Visit    Length of call: 15 minutes    Originating Location (pt. Location): Home    Distant Location (provider location):  [facility] [place]     Mode of Communication:  Telephone    Provider has received verbal consent for a Telephone Visit from the patient? [facility] Wound [facility]aling [place] Progress Note    Subject: Trisha Bender persistent stage II pressure ulceration left gluteal cleft, sacral wounds have resolved, telehealth visit.    Patient Active Problem List   Diagnosis     Essential hypertension with goal blood pressure less than 130/80     Chronic migraine without aura without status migrainosus, not intractable     Esophageal reflux     Disorder of bone and cartilage     Family history of malignant neoplasm of gastrointestinal tract     Chronic airway obstruction (H)     Cystocele     Family history of thyroid cancer - 1/2 brother     Anxiety     Chronic low back pain     Hair loss     Lateral epicondylitis of right elbow     Dilated cardiomyopathy (H)     Right bundle branch block (RBBB)     CHF (congestive heart failure) (H)     Coronary artery disease involving native coronary artery of native heart without angina pectoris     Mixed hyperlipidemia     Controlled substance agreement signed     Moderate persistent asthma without complication     Hypokalemia     Panlobular emphysema (H)     Tachycardia     Status post coronary angiogram     Thoracic aortic ectasia (H)     Supraventricular tachycardia (H)     Pressure ulcer of left buttock, stage 3 (H)     Past Medical History:   Diagnosis Date     Anxiety      CAD (coronary artery disease) 7/2/15    mild per cath     CHF (congestive heart failure)     EF=20-25% per echo 6/30/15     Chronic airway obstruction     FEV1 36% of pred; VC 67%  in Jan 06.     Chronic back pain      COPD (chronic obstructive pulmonary disease)      Endometriosis      Esophageal reflux      Essential  hypertension      Hyperlipidemia      Migraine      Mild persistent asthma 8/30/2012     Osteopenia      Takotsubo cardiomyopathy      Exam:  LMP 09/20/2005   Wound (used by OP [facility] only) 03/16/23 0959 Left ischial tuberosity pressure injury (Active)   Thickness/Stage Stage 3 05/11/23 1036   Base yellow;pink;red 05/11/23 1036   Periwound intact 05/11/23 1036   Periwound Temperature warm 05/11/23 1036   Periwound Skin Turgor soft 05/11/23 1036   Edges open 05/11/23 1036   Length (cm) 0.6 04/13/23 1026   Width (cm) 1.1 04/13/23 1026   Depth (cm) 0.1 04/13/23 1026   Wound (cm^2) 0.66 cm^2 04/13/23 1026   Wound Volume (cm^3) 0.07 cm^3 04/13/23 1026   Wound healing % -120 04/13/23 1026   Drainage Characteristics/Odor serosanguineous 05/11/23 1036   Drainage Amount scant 05/11/23 1036   Care, Wound non-select wound debridement performed 05/11/23 0838       Wound (used by OP [facility] only) 03/16/23 1000 sacral (Active)   Thickness/Stage partial thickness 05/11/23 1036   Base maroon/purple;epithelialization 05/11/23 1036   Periwound intact 05/11/23 1036   Periwound Temperature warm 05/11/23 1036   Periwound Skin Turgor soft 05/11/23 1036   Edges open 05/11/23 1036   Length (cm) 0.1 04/13/23 1026   Width (cm) 0.1 04/13/23 1026   Depth (cm) 0.1 04/13/23 1026   Wound (cm^2) 0.01 cm^2 04/13/23 1026   Wound Volume (cm^3) 0 cm^3 04/13/23 1026   Wound healing % 99.99 04/13/23 1026   Drainage Characteristics/Odor other (see comments) 05/11/23 1036   Drainage Amount scant 05/11/23 1036   Care, Wound non-select wound debridement performed 05/11/23 0838     Photographs reviewed, persistent stage II pressure ulceration gluteal cleft      Impression: Left gluteal cleft stage II pressure ulceration    Plan: We will dress the wounds with application of hypochlorous acid Vashe for 10 minutes, then application of Activon manuka honey pH 3.9, cover with Mepilex, change every 3 days.  She has tried a Roho cushion, when using, aggravates her back  "pain.  I believe that the ongoing pressure is still the  for stage II pressure ulceration of the left gluteal cleft  Continue adjunctive micronutrients as tolerated  Ceramide-based lotion application to the coccyx.  Previous  Patient will return to the clinic in 4 weeks time in clinic visit with Dr. Geller      Further instructions from your care team       Trisha Bender      1955      Your next appointment is a telephone visit. Please text photos of your wounds the  day before or the day of the appointment to [phone]. Please include a tape  measure or ruler of some sort in your photo for reference.  Please also include your name and date of birth on the text to identify yourself and include the location of the wound(s).  (The cell phone is a secure line and is secure with your information)    UPDATES:   Order your Activon Medihoney through [employer] (should be around $11)   Your care is going to be transferred from Dr. Ponce to Dr. Geller at our Wound Healing [place]     Medications/supplements to aid in healing:  Vitamin D3 5,000 iu per day  Myra C 1,000 mg take daily  Vitamin B Complex with folic acid take 1 tablet daily  Vitamin B 12 1000 mcg daily  Zinc 30mg capsule or tablet daily (ok to take 50mg if 30mg cannot be found)    Wound Dressing Change: Sacrum  Cleanse with mild unscented soap and water (such as Cetaphil, Cerave or Dove)  Apply moisturizing cream. We recommend Cera-Ve  Daily     Wound Dressing Change: Left IT  - Wash your hands with soap and water before you begin your dressing change and prepare a clean surface for dressings.  - After cleansing with mild unscented soap (such as Cetaphil, Cerave or Dove) and water,  - Apply small amount of VASHE on gauze, lay into wound bed, let sit for 10 minutes, remove gauze (do not rinse) then apply dressing  - Apply a thin layer of Activon Medihoney to wound bed  - Cover with one 4x4\" Mepilex foam bordered dressing  Change " three times weekly and as needed     Repositioning:  Continue to adjust to your new ROHO cushion  Bed: Reposition MINIMALLY every 1-2 hours in bed to relieve pressure and promote  perfusion to tissue.    Chair: When up to the chair, do not sit for longer than one hour total before  returning to bed for at least 60 minutes to relieve pressure and promote perfusion to  the tissue.     FREDDY Ponce M.D. May 11, 2023    Call us at [phone] if you have any questions about your wounds, have redness or swelling around your wound, have a fever of 101 or greater or if you have any other problems or concerns. We answer the phone Monday through Friday 8 am to 4 pm, please leave a message as we check the voicemail frequently throughout the day.     If you had a positive experience please indicate that on your patient satisfaction survey form that [facility] will be sending you.    It was a pleasure meeting with you today.  Thank you for allowing me and my team the privilege of caring for you today.  YOU are the reason we are here, and I truly hope we provided you with the excellent service you deserve.  Please let us know if there is anything else we can do for you so that we can be sure you are leaving completely satisfied with your care experience.      If you have any billing related questions please call the [facility] Business office at [phone]. The clinic staff does not handle billing related matters.    If you are scheduled to have a follow up appointment, you will receive a reminder call the day before your visit. On the appointment day please arrive 15 minutes prior to your appointment time. If you are unable to keep that appointment, please call the clinic to cancel or reschedule. If you are more than 10 minutes late or greater for your appointment, the clinic policy is that you may be asked to reschedule.           Adalberto Ponce MD on 5/11/2023 at 10:44 AM        Dictated using Dragon voice  recognition software which may result in transcription errors

## 2023-05-11 NOTE — DISCHARGE INSTRUCTIONS
"Trisha Bender      1955    A DME order for supplies has been placed to [facility]. If there are any issues with your order including not receiving the order please call [facility] at [phone] option 3. They can also provide a tracking number for you if you had supplies shipped to you.    UPDATES:   Order your Activon Medihoney through [employer] (should be around $11)   Your care is going to be transferred from Dr. Ponce to Dr. Geller at our [facility] [place]     Medications/supplements to aid in healing:  Vitamin D3 5,000 iu per day  Myra C 1,000 mg take daily  Vitamin B Complex with folic acid take 1 tablet daily  Vitamin B 12 1000 mcg daily  Zinc 30mg capsule or tablet daily (ok to take 50mg if 30mg cannot be found)    Wound Dressing Change: Sacrum  Cleanse with mild unscented soap and water (such as Cetaphil, Cerave or Dove)  Apply moisturizing cream. We recommend Cera-Ve  Daily     Wound Dressing Change: Left IT  - Wash your hands with soap and water before you begin your dressing change and prepare a clean surface for dressings.  - After cleansing with mild unscented soap (such as Cetaphil, Cerave or Dove) and water,  - Apply small amount of VASHE on gauze, lay into wound bed, let sit for 10 minutes, remove gauze (do not rinse) then apply dressing  - Apply a thin layer of Activon Medihoney to wound bed  - Cover with one 3x3\" Mepilex foam bordered dressing  Change three times weekly and as needed     Repositioning:  Continue to adjust to your new ROHO cushion  Bed: Reposition MINIMALLY every 1-2 hours in bed to relieve pressure and promote  perfusion to tissue.   Chair: When up to the chair, do not sit for longer than one hour total before  returning to bed for at least 60 minutes to relieve pressure and promote perfusion to  the tissue.     FREDDY Ponce M.D. May 11, 2023    Call us at [phone] if you have any questions about your wounds, have redness or " swelling around your wound, have a fever of 101 or greater or if you have any other problems or concerns. We answer the phone Monday through Friday 8 am to 4 pm, please leave a message as we check the voicemail frequently throughout the day.     If you had a positive experience please indicate that on your patient satisfaction survey form that [facility] will be sending you.    It was a pleasure meeting with you today.  Thank you for allowing me and my team the privilege of caring for you today.  YOU are the reason we are here, and I truly hope we provided you with the excellent service you deserve.  Please let us know if there is anything else we can do for you so that we can be sure you are leaving completely satisfied with your care experience.      If you have any billing related questions please call the [facility] Business office at [phone]. The clinic staff does not handle billing related matters.    If you are scheduled to have a follow up appointment, you will receive a reminder call the day before your visit. On the appointment day please arrive 15 minutes prior to your appointment time. If you are unable to keep that appointment, please call the clinic to cancel or reschedule. If you are more than 10 minutes late or greater for your appointment, the clinic policy is that you may be asked to reschedule.

## 2023-05-11 NOTE — PROGRESS NOTES
Patient called for a telephone visit. Certified Wound Care Nurse time spent evaluating patient record, completed a full evaluation and documented wound(s) & jesse-wound skin; provided recommendation based on treatment plan. Reviewed discharge instructions, patient education, and discussed plan of care with appropriate medical team staff members and patient and/or family members.   Ivette Bender RN

## 2023-05-23 ENCOUNTER — PATIENT OUTREACH (OUTPATIENT)
Dept: CARE COORDINATION | Facility: CLINIC | Age: 68
End: 2023-05-23
Payer: COMMERCIAL

## 2023-05-23 DIAGNOSIS — M54.59 MECHANICAL LOW BACK PAIN: ICD-10-CM

## 2023-05-23 RX ORDER — TRAMADOL HYDROCHLORIDE 50 MG/1
TABLET ORAL
Qty: 30 TABLET | Refills: 0 | Status: SHIPPED | OUTPATIENT
Start: 2023-05-23 | End: 2023-06-20

## 2023-05-28 ENCOUNTER — MYC REFILL (OUTPATIENT)
Dept: INTERNAL MEDICINE | Facility: CLINIC | Age: 68
End: 2023-05-28
Payer: COMMERCIAL

## 2023-05-28 DIAGNOSIS — M54.50 CHRONIC BILATERAL LOW BACK PAIN, UNSPECIFIED WHETHER SCIATICA PRESENT: ICD-10-CM

## 2023-05-28 DIAGNOSIS — G89.29 CHRONIC BILATERAL LOW BACK PAIN, UNSPECIFIED WHETHER SCIATICA PRESENT: ICD-10-CM

## 2023-05-30 RX ORDER — HYDROCODONE BITARTRATE AND ACETAMINOPHEN 10; 325 MG/1; MG/1
1-2 TABLET ORAL EVERY 4 HOURS PRN
Qty: 240 TABLET | Refills: 0 | Status: SHIPPED | OUTPATIENT
Start: 2023-05-30 | End: 2023-06-25

## 2023-06-05 DIAGNOSIS — Z79.899 CHRONIC PRESCRIPTION BENZODIAZEPINE USE: ICD-10-CM

## 2023-06-05 DIAGNOSIS — F41.9 ANXIETY: ICD-10-CM

## 2023-06-06 RX ORDER — ALPRAZOLAM 0.25 MG
TABLET ORAL
Qty: 60 TABLET | Refills: 0 | Status: SHIPPED | OUTPATIENT
Start: 2023-06-06 | End: 2023-08-03

## 2023-06-20 DIAGNOSIS — M54.59 MECHANICAL LOW BACK PAIN: ICD-10-CM

## 2023-06-20 RX ORDER — TRAMADOL HYDROCHLORIDE 50 MG/1
TABLET ORAL
Qty: 30 TABLET | Refills: 0 | Status: SHIPPED | OUTPATIENT
Start: 2023-06-20 | End: 2023-07-17

## 2023-06-25 ENCOUNTER — MYC REFILL (OUTPATIENT)
Dept: INTERNAL MEDICINE | Facility: CLINIC | Age: 68
End: 2023-06-25
Payer: COMMERCIAL

## 2023-06-25 DIAGNOSIS — G89.29 CHRONIC BILATERAL LOW BACK PAIN, UNSPECIFIED WHETHER SCIATICA PRESENT: ICD-10-CM

## 2023-06-25 DIAGNOSIS — M54.50 CHRONIC BILATERAL LOW BACK PAIN, UNSPECIFIED WHETHER SCIATICA PRESENT: ICD-10-CM

## 2023-06-26 RX ORDER — HYDROCODONE BITARTRATE AND ACETAMINOPHEN 10; 325 MG/1; MG/1
1-2 TABLET ORAL EVERY 4 HOURS PRN
Qty: 240 TABLET | Refills: 0 | Status: SHIPPED | OUTPATIENT
Start: 2023-06-26 | End: 2023-07-23

## 2023-06-26 NOTE — TELEPHONE ENCOUNTER
Norco refill request    Last refill on 5/30/23 for #240     Routing refill request to provider for review/approval because:  Drug not on the [facility] refill protocol

## 2023-07-17 DIAGNOSIS — M54.59 MECHANICAL LOW BACK PAIN: ICD-10-CM

## 2023-07-17 RX ORDER — TRAMADOL HYDROCHLORIDE 50 MG/1
TABLET ORAL
Qty: 30 TABLET | Refills: 0 | Status: SHIPPED | OUTPATIENT
Start: 2023-07-17 | End: 2023-01-01

## 2023-07-23 ENCOUNTER — MYC REFILL (OUTPATIENT)
Dept: INTERNAL MEDICINE | Facility: CLINIC | Age: 68
End: 2023-07-23
Payer: COMMERCIAL

## 2023-07-23 DIAGNOSIS — M54.50 CHRONIC BILATERAL LOW BACK PAIN, UNSPECIFIED WHETHER SCIATICA PRESENT: ICD-10-CM

## 2023-07-23 DIAGNOSIS — G89.29 CHRONIC BILATERAL LOW BACK PAIN, UNSPECIFIED WHETHER SCIATICA PRESENT: ICD-10-CM

## 2023-07-25 DIAGNOSIS — R06.09 DOE (DYSPNEA ON EXERTION): ICD-10-CM

## 2023-07-25 DIAGNOSIS — E78.2 MIXED HYPERLIPIDEMIA: ICD-10-CM

## 2023-07-25 DIAGNOSIS — I42.0 DILATED CARDIOMYOPATHY (H): ICD-10-CM

## 2023-07-25 DIAGNOSIS — I25.10 CORONARY ARTERY DISEASE INVOLVING NATIVE CORONARY ARTERY OF NATIVE HEART WITHOUT ANGINA PECTORIS: Primary | ICD-10-CM

## 2023-07-25 DIAGNOSIS — R00.0 TACHYCARDIA: ICD-10-CM

## 2023-07-25 DIAGNOSIS — E87.1 HYPONATREMIA: ICD-10-CM

## 2023-07-25 RX ORDER — CARVEDILOL 25 MG/1
25 TABLET ORAL 2 TIMES DAILY WITH MEALS
Qty: 180 TABLET | Refills: 0 | Status: SHIPPED | OUTPATIENT
Start: 2023-07-25 | End: 2023-01-01

## 2023-07-25 RX ORDER — HYDROCODONE BITARTRATE AND ACETAMINOPHEN 10; 325 MG/1; MG/1
1-2 TABLET ORAL EVERY 4 HOURS PRN
Qty: 240 TABLET | Refills: 0 | Status: SHIPPED | OUTPATIENT
Start: 2023-07-26 | End: 2023-08-20

## 2023-07-25 RX ORDER — ATORVASTATIN CALCIUM 10 MG/1
10 TABLET, FILM COATED ORAL DAILY
Qty: 90 TABLET | Refills: 0 | Status: SHIPPED | OUTPATIENT
Start: 2023-07-25 | End: 2023-01-01

## 2023-07-25 NOTE — TELEPHONE ENCOUNTER
Received refill request from [facility] pharmacy for Carvedilol 25mg tab, twice daily, and atorvastatin 10mg daily    Last OV  9\8\22 with Dr. Finney    No upcoming appointments.   Order entered for return cardiology.    Will refill one time, sent [employer] message to patient that she needs to call and make appointment to see Dr. Finney.    Comfort Esteves RN on 7/25/2023 at 3:56 PM

## 2023-07-28 DIAGNOSIS — F41.9 ANXIETY: ICD-10-CM

## 2023-07-28 DIAGNOSIS — Z79.899 CHRONIC PRESCRIPTION BENZODIAZEPINE USE: ICD-10-CM

## 2023-08-02 NOTE — TELEPHONE ENCOUNTER
Alprazolam refill request on 7/28/23. Second request from pharmacy.     Last refill on 6/6/23 for #60    Routing refill request to provider for review/approval because:  Drug not on the [facility] refill protocol

## 2023-08-03 ENCOUNTER — TELEPHONE (OUTPATIENT)
Dept: WOUND CARE | Facility: CLINIC | Age: 68
End: 2023-08-03
Payer: COMMERCIAL

## 2023-08-03 RX ORDER — ALPRAZOLAM 0.25 MG
TABLET ORAL
Qty: 60 TABLET | Refills: 0 | Status: SHIPPED | OUTPATIENT
Start: 2023-08-03 | End: 2023-01-01

## 2023-08-03 NOTE — TELEPHONE ENCOUNTER
[facility]    Who is the name of the provider?:  Yimi    What is the location you see this provider at/preferred location?: [place]  Person calling / Facility: Self  Phone number:  [phone]   Nurse call back needed:  NA     Reason for call:  Please call to reschedule missed 7/25 appointment.      Pharmacy location:  NA  Outside Imaging: NA   Can we leave a detailed message on this number?  YES

## 2023-08-18 DIAGNOSIS — I25.10 CORONARY ARTERY DISEASE INVOLVING NATIVE CORONARY ARTERY OF NATIVE HEART WITHOUT ANGINA PECTORIS: ICD-10-CM

## 2023-08-18 RX ORDER — LISINOPRIL 5 MG/1
5 TABLET ORAL DAILY
Qty: 90 TABLET | Refills: 0 | Status: SHIPPED | OUTPATIENT
Start: 2023-08-18 | End: 2023-01-01

## 2023-08-20 ENCOUNTER — MYC REFILL (OUTPATIENT)
Dept: INTERNAL MEDICINE | Facility: CLINIC | Age: 68
End: 2023-08-20
Payer: COMMERCIAL

## 2023-08-20 DIAGNOSIS — M54.50 CHRONIC BILATERAL LOW BACK PAIN, UNSPECIFIED WHETHER SCIATICA PRESENT: ICD-10-CM

## 2023-08-20 DIAGNOSIS — G89.29 CHRONIC BILATERAL LOW BACK PAIN, UNSPECIFIED WHETHER SCIATICA PRESENT: ICD-10-CM

## 2023-08-22 DIAGNOSIS — I50.22 CHRONIC SYSTOLIC CONGESTIVE HEART FAILURE (H): ICD-10-CM

## 2023-08-22 RX ORDER — SPIRONOLACTONE 25 MG/1
25 TABLET ORAL DAILY
Qty: 90 TABLET | Refills: 0 | Status: SHIPPED | OUTPATIENT
Start: 2023-08-22 | End: 2023-01-01

## 2023-08-22 RX ORDER — HYDROCODONE BITARTRATE AND ACETAMINOPHEN 10; 325 MG/1; MG/1
1-2 TABLET ORAL EVERY 4 HOURS PRN
Qty: 240 TABLET | Refills: 0 | Status: SHIPPED | OUTPATIENT
Start: 2023-08-22 | End: 2023-01-01

## 2023-08-27 ENCOUNTER — HEALTH MAINTENANCE LETTER (OUTPATIENT)
Age: 68
End: 2023-08-27

## 2023-09-21 PROBLEM — R91.1 PULMONARY NODULE: Status: ACTIVE | Noted: 2023-01-01

## 2023-09-21 PROBLEM — J44.1 COPD EXACERBATION (H): Status: ACTIVE | Noted: 2023-01-01

## 2023-09-21 NOTE — ED NOTES
[facility]  ED Nurse Handoff Report    ED Chief complaint: Shortness of Breath  . ED Diagnosis:   Final diagnoses:   None       Allergies:   Allergies   Allergen Reactions    Gabapentin Swelling    Contrast Dye      Iodine    Escitalopram      Nausea and sickness    Peanuts [Nuts] Hives    Penicillins      rash    Sulfa Antibiotics      High family history    Tace [Chlorotrianisene]      joint swelling    Aspirin Rash     After 3 days    Ibuprofen Nausea and Vomiting and Swelling    Strawberry Extract Rash       Code Status: Full Code    Activity level - Baseline/Home:  independent.  Activity Level - Current:   assist of 1.   Lift room needed: No.   Bariatric: No   Needed: No   Isolation: No.   Infection: Not Applicable.     Respiratory status: Room air    Vital Signs (within 30 minutes):   Vitals:    09/21/23 0245 09/21/23 0338 09/21/23 0353 09/21/23 0408   BP:  120/71     Pulse:  88     Resp:    24   Temp:       SpO2: 97%  95% 97%       Cardiac Rhythm:  ,      Pain level:    Patient confused: No.   Patient Falls Risk: nonskid shoes/slippers when out of bed, assistive device/personal items within reach, activity supervised, and room door open.   Elimination Status: Has voided     Patient Report - Initial Complaint: SOB.   Focused Assessment: Trisha Bender is a 68 year old female with history of CHF S/P bilateral heart cath, CAD, chronic airway obstruction, COPD, asthma, and chronic low back pain who presents to the ED with her  for evaluation of shortness of breath. Cynthia reports shortness of breath and nausea worse than baseline onset tonight. Shortness of breath worsens with exertion. She administered a neb treatment and took 8 mg Zofran prior to ED arrival tonight. She states she ran out of [employer] and believes these symptoms are related. She endorses lower back pain and an episode of chest pain yesterday. She mentions she began taking Ketoralac last week and has had  hematochezia since then. No fever, cough, or abdominal pain. No alcohol or drug use. No recent travel. No history of cancer or blood clots. Cynthia notes her  has been sick and was seen at [facility] ED yesterday.      Abnormal Results:   Labs Ordered and Resulted from Time of ED Arrival to Time of ED Departure   BASIC METABOLIC PANEL - Abnormal       Result Value    Sodium 123 (*)     Potassium 4.6      Chloride 91 (*)     Carbon Dioxide (CO2) 23      Anion Gap 9      Urea Nitrogen 13.2      Creatinine 0.57      Calcium 8.7 (*)     Glucose 105 (*)     GFR Estimate >90     TROPONIN T, HIGH SENSITIVITY - Abnormal    Troponin T, High Sensitivity 69 (*)    CBC WITH PLATELETS AND DIFFERENTIAL - Abnormal    WBC Count 15.9 (*)     RBC Count 4.05      Hemoglobin 12.0      Hematocrit 37.7      MCV 93      MCH 29.6      MCHC 31.8      RDW 13.5      Platelet Count 361      % Neutrophils 76      % Lymphocytes 13      % Monocytes 7      % Eosinophils 2      % Basophils 0      % Immature Granulocytes 2      NRBCs per 100 WBC 0      Absolute Neutrophils 12.2 (*)     Absolute Lymphocytes 2.1      Absolute Monocytes 1.0      Absolute Eosinophils 0.3      Absolute Basophils 0.0      Absolute Immature Granulocytes 0.3      Absolute NRBCs 0.0     HEPATIC FUNCTION PANEL - Abnormal    Protein Total 5.5 (*)     Albumin 3.5      Bilirubin Total 0.3      Alkaline Phosphatase 40      AST 7      ALT 9      Bilirubin Direct <0.20     TROPONIN T, HIGH SENSITIVITY - Abnormal    Troponin T, High Sensitivity 57 (*)    INFLUENZA A/B, RSV, & SARS-COV2 PCR - Normal    Influenza A PCR Negative      Influenza B PCR Negative      RSV PCR Negative      SARS CoV2 PCR Negative     NT PROBNP INPATIENT - Normal    N terminal Pro BNP Inpatient 193     MAGNESIUM - Normal    Magnesium 1.8     ROUTINE UA WITH MICROSCOPIC   FRACTIONAL EXCRETION OF SODIUM   FRACTIONAL EXCRETION OF SODIUM        CT Chest (PE) Abdomen Pelvis w Contrast    (Results Pending)        Treatments provided: nausea meds, pain meds, fluids, (see MAR)  Family Comments:  at bedside  OBS brochure/video discussed/provided to patient: Yes  ED Medications:   Medications   HYDROcodone-acetaminophen (NORCO) 5-325 MG per tablet 1 tablet (1 tablet Oral $Given 9/21/23 0112)   ipratropium - albuterol 0.5 mg/2.5 mg/3 mL (DUONEB) neb solution 3 mL (3 mLs Nebulization $Given 9/21/23 0112)   HYDROcodone-acetaminophen (NORCO) 5-325 MG per tablet 1 tablet (1 tablet Oral $Given 9/21/23 0138)   sodium chloride 0.9% BOLUS 1,000 mL (1,000 mLs Intravenous $New Bag 9/21/23 0144)   diphenhydrAMINE (BENADRYL) injection 25 mg (25 mg Intravenous $Given 9/21/23 0226)   CT scan flush (79 mLs Intravenous $Given 9/21/23 0335)   iopamidol (ISOVUE-370) solution 500 mL (58 mLs Intravenous $Given 9/21/23 0335)   ondansetron (ZOFRAN) injection 4 mg (4 mg Intravenous $Given 9/21/23 0403)       Drips infusing:  No  For the majority of the shift this patient was Green.   Interventions performed were n/a.    Sepsis treatment initiated: No    Cares/treatment/interventions/medications to be completed following ED care: monitoring    ED Nurse Name: Royer Montes De Oca RN  4:09 AM    RECEIVING UNIT ED HANDOFF REVIEW    Above ED Nurse Handoff Report was reviewed: Yes  Reviewed by: Claudia Awad RN on September 21, 2023 at 6:18 AM

## 2023-09-21 NOTE — ED TRIAGE NOTES
Patient reports shortness of breath and nausea that started tonight. She states that she ran out of her pain medication and feels that her symptoms are related to this. In triage, she is able to speak full sentences, sats 99%. She took 8mg zofran prior to arrival.

## 2023-09-21 NOTE — DISCHARGE SUMMARY
"[facility]  Hospitalist Discharge Summary      Date of Admission:  9/21/2023  Date of Discharge:  9/21/2023  Discharging Provider: Kwadwo Scott MD  Discharge Service: Hospitalist Service    Discharge Diagnoses   Acute on chronic hyponatremia  Nausea, sob due to anxiety from running out of her chronic pain medications    Clinically Significant Risk Factors     # Overweight: Estimated body mass index is 25.05 kg/m  as calculated from the following:    Height as of this encounter: 1.778 m (5' 10\").    Weight as of this encounter: 79.2 kg (174 lb 9.6 oz).       Follow-ups Needed After Discharge   Follow-up Appointments     Follow-up and recommended labs and tests       Follow up with primary care provider, Bandar Weinberg, within 7 days   for hospital follow- up.  The following labs/tests are recommended: repeat   Chem 7 to check sodium levels (labs ordered, please to to the lab in the   next 1-2 days).          Unresulted Labs Ordered in the Past 30 Days of this Admission       No orders found from 8/22/2023 to 9/22/2023.        These results will be followed up by NA    Discharge Disposition   Discharged to home  Condition at discharge: Stable    Hospital Course   Trisha Bender is a 68 year old female who has a history of severe COPD, idiopathic cardiomyopathy with an EF of 35 to 40%, mild coronary artery disease, hypertension, hyperlipidemia, chronic back pain, who presents with nausea and shortness of breath which started today.  She apparently ran out of her pain medications and feels that her symptoms are related to this.  She ran out of Norco  yesterday and feels more panicky.  She states that she really when she is outside the house and that she is limited to a single room.  She is not on oxygen at baseline.  She denies any fevers or chills.  She has chronic nausea without any emesis.  She states that the reason she ran out of her pain meds as she thought she had a bout of " pancreatitis with abdominal discomfort and took extra Norco.  She then took Mylanta which helped her with her abdominal pain. She denies any fevers or chills or cough.  Discussed care with Dr. Best.  Asked to admit her     I assumed care of the patient today.  She is informing the nurse that she would like to discharge home.  I saw the patient with her .  She states that she is feeling much better and with her prescription for her pain medication and is waiting for her.  She would like to discharge home.  I did indicate to her that I was keeping her to make sure her sodium levels came up.  She understands that she has a had some issues with chronic hyponatremia.  She states she has been told to limit her free water intake in the past.  She states that at home she had been drinking quite a bit of just plain water.  I educated her on limiting her water intake and replacing some of that with fluids that have electrolytes.  She will add salt to her diet.  Again she would like to discharge home, I indicated that I could discharge her but I would like her to have labs done in the next 1 to 2 days to make sure her sodium levels are coming up.   agrees with plan.    Consultations This Hospital Stay   None    Code Status   Full Code    Time Spent on this Encounter   I, Kwadwo Scott MD, personally saw the patient today and spent greater than 30 minutes discharging this patient.       Kwadwo Scott MD  [facility] OBSERVATION DEPT  [address] [place] [address]  Phone: [phone]  ______________________________________________________________________    Physical Exam   Vital Signs: Temp: 98.4  F (36.9  C) Temp src: Oral BP: 101/76 Pulse: 96   Resp: 20 SpO2: 96 % O2 Device: None (Room air)    Weight: 174 lbs 9.6 oz  Constitutional: awake, alert, cooperative, no apparent distress, and appears stated age  Eyes: Lids and lashes normal, pupils equal, round and reactive to  light, extra ocular muscles intact, sclera clear, conjunctiva normal  ENT: Normocephalic, without obvious abnormality, atraumatic, sinuses nontender on palpation, external ears without lesions, oral pharynx with moist mucous membranes, tonsils without erythema or exudates, gums normal and good dentition.  Respiratory: No increased work of breathing, good air exchange, clear to auscultation bilaterally, no crackles or wheezing  Cardiovascular: Normal apical impulse, regular rate and rhythm, normal S1 and S2, no S3 or S4, and no murmur noted       Primary Care Physician   Bandar Weinberg    Discharge Orders      Basic metabolic panel     Reason for your hospital stay    Acute on chronic hyponatremia  Shortness of breath/nausea felt to be due to running out of chronic pain med     Follow-up and recommended labs and tests     Follow up with primary care provider, Bandar Weinberg, within 7 days for hospital follow- up.  The following labs/tests are recommended: repeat Chem 7 to check sodium levels (labs ordered, please to to the lab in the next 1-2 days).     Activity    Your activity upon discharge: activity as tolerated     Diet    Follow this diet upon discharge: Orders Placed This Encounter      Fluid restriction 750 ML FLUID      Regular Diet Adult, add salt    You were drinking too much plan water. Limit plain water to 30oz per day. Replace some of your plain water intake with fluids that have electrolytes       Significant Results and Procedures   Most Recent 3 CBC's:  Recent Labs   Lab Test 09/21/23  0053 03/07/23  1725 05/20/22  1518   WBC 15.9* 15.0* 12.4*   HGB 12.0 14.2 13.6   MCV 93 95 95    359 373     Most Recent 3 BMP's:  Recent Labs   Lab Test 09/21/23  1100 09/21/23  0546 09/21/23  0053 03/07/23  1725   * 124*  124* 123* 134*   POTASSIUM  --  4.2 4.6 4.7   CHLORIDE  --  93* 91* 96*   CO2  --  21* 23 25   BUN  --  10.6 13.2 9.3   CR  --  0.54 0.57 0.56   ANIONGAP  --  10 9 13   JEANNIE   --  8.3* 8.7* 9.1   GLC  --  106* 105* 180*     Most Recent 2 LFT's:  Recent Labs   Lab Test 09/21/23  0053 03/07/23  1725   AST 7 16   ALT 9 13   ALKPHOS 40 50   BILITOTAL 0.3 0.3   ,   Results for orders placed or performed during the hospital encounter of 09/21/23   CT Chest (PE) Abdomen Pelvis w Contrast    Narrative    EXAM: CT CHEST PE ABDOMEN PELVIS W CONTRAST  LOCATION: [facility]  DATE: 9/21/2023    INDICATION: SOB abdominal pain.  COMPARISON: CT abdomen pelvis on 09/07/2019.  TECHNIQUE: CT chest pulmonary angiogram and routine CT abdomen pelvis with IV contrast. Arterial phase through the chest and venous phase through the abdomen and pelvis. Multiplanar reformats and MIP reconstructions were performed. Dose reduction   techniques were used.   CONTRAST: 58mL Isovue 370.    FINDINGS:  ANGIOGRAM CHEST: No evidence of pulmonary embolism.      MEDIASTINUM/AXILLAE: No cardiomegaly. Trace pericardial effusion. No significant mediastinal or hilar lymphadenopathy.    LUNGS AND PLEURA: No pleural effusion or pneumothorax. Upper lobes predominant emphysematous changes. 6 mm posterior left lower lobe nodule (series 7 image 173).    CORONARY ARTERY CALCIFICATION: Mild.    ABDOMEN/PELVIS:    HEPATOBILIARY: No suspicious focal hepatic lesion. The gallbladder is surgically absent.    PANCREAS: No main pancreatic ductal dilatation or definite solid pancreatic mass.    SPLEEN: No splenomegaly.    ADRENAL GLANDS: No adrenal nodules.    KIDNEYS/BLADDER: No radiodense kidney/ureteral stones or hydronephrosis in either kidney.    BOWEL: No abnormally dilated bowel loops.    PERITONEUM: No evidence of free fluid in the abdomen and pelvis. No free peritoneal or portal venous gas.    PELVIC ORGANS: Unremarkable.    VASCULATURE: Moderate atherosclerotic vascular calcification of the abdominal aorta and iliac vessels.    LYMPH NODES: Unremarkable.    MUSCULOSKELETAL: Small fat-containing umbilical hernia.  Multilevel degenerative changes of the spine. Numerous compression deformities of the thoracolumbar spine as well as multiple old rib fractures.      Impression    IMPRESSION:  1.  No evidence of pulmonary embolism.  2.  Extensive upper lobes predominant emphysematous changes.  3.  Few scattered pulmonary nodules including 6 mm left lower lobe nodule, as per Fleischner Society criteria, recommend follow-up exam in 6-12 months to assess for interval change.               *Note: Due to a large number of results and/or encounters for the requested time period, some results have not been displayed. A complete set of results can be found in Results Review.       Discharge Medications   Current Discharge Medication List        CONTINUE these medications which have NOT CHANGED    Details   acetaminophen (TYLENOL) 500 MG tablet Take 750 mg by mouth every 6 hours as needed for mild pain      albuterol (VENTOLIN HFA) 108 (90 BASE) MCG/ACT Inhaler Inhale 2 puffs into the lungs every 6 hours as needed for shortness of breath / dyspnea or wheezing  Qty: 18 g, Refills: 3    Associated Diagnoses: Asthma exacerbation      ALPRAZolam (XANAX) 0.25 MG tablet TAKE 1 TABLET BY MOUTH TWICE A DAY AS NEEDED FOR ANXIETY  Qty: 60 tablet, Refills: 0    Comments: Not to exceed 4 additional fills before 01/30/2024  Associated Diagnoses: Anxiety; Chronic prescription benzodiazepine use      atorvastatin (LIPITOR) 10 MG tablet Take 1 tablet (10 mg) by mouth daily Need appointment for further refills  Qty: 90 tablet, Refills: 0    Associated Diagnoses: Coronary artery disease involving native coronary artery of native heart without angina pectoris; Mixed hyperlipidemia      azithromycin (ZITHROMAX) 250 MG tablet TAKE 1 TABLET BY ORAL ROUTE PER DAY ON MONDAYS, WEDNESDAYS AND FRIDAYS      baclofen (LIORESAL) 10 MG tablet Take 10 mg by mouth 3 times daily as needed for muscle spasms      BREO ELLIPTA 200-25 MCG/INH Inhaler Inhale 1 Dose into the  lungs daily   Refills: 12      carvedilol (COREG) 25 MG tablet Take 1 tablet (25 mg) by mouth 2 times daily (with meals) Needs appointment for further refills  Qty: 180 tablet, Refills: 0    Associated Diagnoses: Dilated cardiomyopathy (H)      cetirizine (ZYRTEC) 10 MG tablet Take 10 mg by mouth daily      [employer] FIBER GUMMIES 2.5 G CHEW Take 1 tablet by mouth daily Reported on 5/8/2017      doxycycline hyclate (PERIOSTAT) 20 MG tablet Take 20 mg by mouth daily      erythromycin (ROMYCIN) 5 MG/GM ophthalmic ointment Place 0.5 inches Into the left eye At Bedtime  Qty: 3.5 g, Refills: 0    Associated Diagnoses: Acute bacterial conjunctivitis of left eye      fluticasone (FLONASE) 50 MCG/ACT nasal spray Spray 1-2 sprays into both nostrils daily as needed for rhinitis or allergies      HYDROcodone-acetaminophen (NORCO)  MG per tablet Take 1-2 tablets by mouth every 4 hours as needed for severe pain Take up to 8 tablets a day  Qty: 240 tablet, Refills: 0    Associated Diagnoses: Chronic bilateral low back pain, unspecified whether sciatica present      ipratropium - albuterol 0.5 mg/2.5 mg/3 mL (DUONEB) 0.5-2.5 (3) MG/3ML neb solution Take 1 vial (3 mLs) by nebulization every 6 hours as needed for shortness of breath / dyspnea or wheezing  Qty: 30 vial, Refills: 1    Associated Diagnoses: Moderate persistent asthma without complication      ketorolac (TORADOL) 10 MG tablet Take 1 tablet (10 mg) by mouth every 6 hours as needed for moderate pain  Qty: 20 tablet, Refills: 0    Associated Diagnoses: Compression fracture of thoracic vertebra with routine healing, unspecified thoracic vertebral level, subsequent encounter      lisinopril (ZESTRIL) 5 MG tablet Take 1 tablet (5 mg) by mouth daily  Qty: 90 tablet, Refills: 0    Associated Diagnoses: Coronary artery disease involving native coronary artery of native heart without angina pectoris      montelukast (SINGULAIR) 10 MG tablet TAKE ONE TABLET BY MOUTH AT  BEDTIME  Qty: 90 tablet, Refills: 1    Associated Diagnoses: Mild persistent asthma without complication      Multiple Vitamin CHEW Take 1 tablet by mouth daily       naloxone (NARCAN) 4 MG/0.1ML nasal spray Spray 1 spray (4 mg) into one nostril alternating nostrils once as needed for opioid reversal Every 2-3 minutes until patient responsive or EMS arrives  Qty: 0.2 mL, Refills: 0    Associated Diagnoses: Compression fracture of thoracic vertebra with routine healing, unspecified thoracic vertebral level, subsequent encounter; Chronic bilateral low back pain, unspecified whether sciatica present      ondansetron (ZOFRAN ODT) 4 MG ODT tab TAKE 1-2 TABLETS BY MOUTH EVERY 8 HOURS AS NEEDED FOR NAUSEA.  Qty: 18 tablet, Refills: 3    Associated Diagnoses: Nausea      pantoprazole (PROTONIX) 40 MG EC tablet Take 1 tablet (40 mg) by mouth daily  Qty: 90 tablet, Refills: 1    Associated Diagnoses: Gastroesophageal reflux disease without esophagitis      predniSONE (DELTASONE) 5 MG tablet Take 5 mg by mouth 2 times daily      roflumilast (DALIRESP) 500 MCG TABS tablet Take 1 tablet (500 mcg) by mouth daily      sodium chloride (MOLLY 128) 5 % ophthalmic ointment Place 1 Application into both eyes every evening      spironolactone (ALDACTONE) 25 MG tablet Take 1 tablet (25 mg) by mouth daily  Qty: 90 tablet, Refills: 0    Associated Diagnoses: Chronic systolic congestive heart failure (H)      SUMAtriptan (IMITREX) 25 MG tablet Take 1 tablet (25 mg) by mouth at onset of headache for migraine  Qty: 9 tablet, Refills: 11    Associated Diagnoses: Chronic migraine without aura without status migrainosus, not intractable      tiotropium (SPIRIVA HANDIHALER) 18 MCG capsule USING THE HANDIHALER, INHALE THE CONTENTS OF ONE CAPSULE BY MOUTH DAILY  Qty: 90 capsule, Refills: 3    Associated Diagnoses: Moderate persistent asthma without complication      TRELEGY ELLIPTA 200-62.5-25 MCG/ACT oral inhaler INHALE 1 PUFF BY INHALATION ROUTE  EVERY DAY AT THE SAME TIME EACH DAY           Allergies   Allergies   Allergen Reactions    Gabapentin Swelling    Contrast Dye      Iodine    Escitalopram      Nausea and sickness    Peanuts [Nuts] Hives    Penicillins      rash    Sulfa Antibiotics      High family history    Tace [Chlorotrianisene]      joint swelling    Aspirin Rash     After 3 days    Ibuprofen Nausea and Vomiting and Swelling    Strawberry Extract Rash

## 2023-09-21 NOTE — PLAN OF CARE
PRIMARY DIAGNOSIS: NAUSEA, SOB  OUTPATIENT/OBSERVATION GOALS TO BE MET BEFORE DISCHARGE:  1. Pain Status: Improved-controlled with oral pain medications.    2. Return to near baseline physical activity: Yes    3. Cleared for discharge by consultants (if involved): N/A    Discharge Planner Nurse   Safe discharge environment identified: Yes  Barriers to discharge: Yes       Entered by: LAITH OWENS RN 09/21/2023    Vital signs:  Temp: 97  F (36.1  C) Temp src: Oral BP: (!) 138/93 Pulse: 86   Resp: 18 SpO2: 95 % O2 Device: None (Room air)   Alert and oriented x4. Reported back pain 9/10. Norco given. PRN DuoNeb given for SOB per request. Sat stable in RA. Na 124 this morning. Pt is on 750 fluid restriction. On regular diet. Na and Trop recheck @1100. On tele running SR/HR 90s. SBA with a walker and gait belt. Will continue monitor.       Please review provider order for any additional goals.   Nurse to notify provider when observation goals have been met and patient is ready for discharge.

## 2023-09-21 NOTE — PROVIDER NOTIFICATION
Says in note to monitor on tele. Would you like on tele. Could we get order?     - Cardiac monitoring ordered      Pt is also complaining of severe pain, very restless. Could we get something ordered for pain? Thanks!    -IV dilaudid ordered q2h

## 2023-09-21 NOTE — PLAN OF CARE
BMI Counseling: Body mass index is 26 42 kg/m²  The BMI is above normal  Nutrition recommendations include 3-5 servings of fruits/vegetables daily   [facility]      NAME: Nina Nixon  AGE: 64 y o  SEX: male  : 1958   MRN: [id number]    DATE: 3/4/2020  TIME: 3:22 PM    Assessment and Plan     Problem List Items Addressed This Visit     Chronic neck pain     He is still having ongoing neck pain that is radiating into his left arm and fingers  He has gone to physical therapy last year without improvement  Taking meloxicam with some improvement, but could still with considerable pain and radicular symptoms  Will sent for MRI of cervical spine  Will call with results  Possible need for epidural treatments                 Relevant Orders    MRI cervical spine wo contrast    Arthralgia of both hands     Patient still with some joint pains in his hands, but overall this is improved since being treated   For Lyme disease  Patient completed 3 weeks of doxycycline  Will continue to monitor  If symptoms persist/worsen, will refer to Rheumatology                  Lyme disease - Primary      Recent Lyme titer was positive  Patient was treated with 3 weeks of doxycycline  Overall his joint pains have improved  Essential hypertension      Patient told me that he has had prior history of mildly elevated blood pressures, but never needed to start medication  We had a long discussion regarding lifestyle modification  Patient drinks a large Monster  Beverage every day  I asked him to cut this out  Also recommend sodium reduction  Patient would prefer to remain off blood pressure med for now  But is agreeable to having blood pressure recheck in 1 month  If still suboptimal, will start med         Mixed hyperlipidemia      Last cholesterol was on  period was 281  Doing well on atorvastatin 80 mg daily  Will continue to monitor   Recommend recheck labs this PRIMARY DIAGNOSIS: NAUSEA, SOB  OUTPATIENT/OBSERVATION GOALS TO BE MET BEFORE DISCHARGE:  1. Pain Status: Improved-controlled with oral pain medications.    2. Return to near baseline physical activity: Yes    3. Cleared for discharge by consultants (if involved): N/A    Discharge Planner Nurse   Safe discharge environment identified: Yes  Barriers to discharge: Yes       Entered by: LAITH OWENS RN 09/21/2023 12:41 PM   Vital signs:  Temp: 98.4  F (36.9  C) Temp src: Oral BP: 101/76 Pulse: 96   Resp: 20 SpO2: 96 % O2 Device: None (Room air)   Alert and oriented x4. Reported back pain 9/10. Norco given. PRN DuoNeb given for SOB per request. Sat stable in RA. Na 124 this morning. Pt is on 750 fluid restriction. On regular diet. Na recheck at 1100 came back 123. On tele running SR/HR 90s. SBA with a walker and gait belt. Pt sated they would like to discharge and continue fluid restriction at home. Provider aware. Will continue monitor.        Please review provider order for any additional goals.   Nurse to notify provider when observation goals have been met and patient is ready for discharge.                         summer           Other Visit Diagnoses     BMI 26 0-26 9,adult                  Return to office in:  Will call with MRI results   recheck blood pressure in 1 month    Chief Complaint     Chief Complaint   Patient presents with    Follow-up     to neck pain,lyme disease and arthralgia of both hands  Pt states he is feeling better than last visit        History of Present Illness      Patient presents for recheck of chronic problems  He is still having ongoing neck pain that is radiating into his left arm and fingers  He has gone to physical therapy last year without improvement  Patient still with some joint pains in his hands, but overall this is improved since being treated   For Lyme disease           The following portions of the patient's history were reviewed and updated as appropriate: allergies, current medications, past family history, past medical history, past social history, past surgical history and problem list     Review of Systems   Review of Systems   Respiratory: Negative  Cardiovascular: Negative  Gastrointestinal: Negative  Genitourinary: Negative  Musculoskeletal: Positive for neck pain  Active Problem List     Patient Active Problem List   Diagnosis    Cyst of epididymis    Acute left-sided thoracic back pain    Chronic neck pain    Arthralgia of both hands    Lyme disease    Essential hypertension    Mixed hyperlipidemia       Objective   /78   Pulse 85   Temp 98 3 °F (36 8 °C) (Tympanic)   Resp 17   Wt 66 8 kg (147 lb 4 oz)   SpO2 95%   BMI 26 42 kg/m²     Physical Exam   Cardiovascular: Normal rate, regular rhythm, normal heart sounds and intact distal pulses  Carotids: no bruits  Ext: no edema   Pulmonary/Chest: Effort normal  No respiratory distress  He has no wheezes  He has no rales  Psychiatric: He has a normal mood and affect   His behavior is normal  Thought content normal        Pertinent Laboratory/Diagnostic Studies:  xray results    Current Medications     Current Outpatient Medications:     aspirin 81 MG tablet, Take 1 tablet by mouth daily, Disp: , Rfl:     atorvastatin (LIPITOR) 80 mg tablet, Take 1 tablet (80 mg total) by mouth daily, Disp: 90 tablet, Rfl: 0    meloxicam (MOBIC) 15 mg tablet, Take 1 tablet (15 mg total) by mouth daily, Disp: 30 tablet, Rfl: 0    vitamin B-12 (CYANOCOBALAMIN) 2000 MCG TABS, Take 1 tablet by mouth daily, Disp: , Rfl:     Health Maintenance     Health Maintenance   Topic Date Due    Hepatitis C Screening  1958    CRC Screening: Colonoscopy  1958    BMI: Followup Plan  07/07/1976    Annual Physical  07/07/1976    HIV Screening  03/05/2020 (Originally 7/7/1973)    Influenza Vaccine  03/31/2020 (Originally 7/1/2019)    Pneumococcal Vaccine: Pediatrics (0 to 5 Years) and At-Risk Patients (6 to 59 Years) (1 of 1 - PPSV23) 06/26/2020 (Originally [birth year]7/1964)    Depression Screening PHQ  12/26/2020    BMI: Adult  02/05/2021    Pneumococcal Vaccine: 65+ Years (1 of 2 - PCV13) 07/07/2023    DTaP,Tdap,and Td Vaccines (2 - Td) 07/02/2025    HIB Vaccine  Aged Out    Hepatitis B Vaccine  Aged Out    IPV Vaccine  Aged Out    Hepatitis A Vaccine  Aged Out    Meningococcal ACWY Vaccine  Aged Out    HPV Vaccine  Aged Out     Immunization History   Administered Date(s) Administered    Tdap 07/02/2015       Maxcine Quiet, DO  [facility]

## 2023-09-21 NOTE — ED PROVIDER NOTES
History     Chief Complaint:  Shortness of Breath    The history is provided by the patient.      Trisha Bender is a 68 year old female with history of CHF S/P bilateral heart cath, CAD, COPD, asthma, and chronic low back pain who presents to the ED with her  for evaluation of shortness of breath. Cynthia reports shortness of breath and nausea worse than baseline onset tonight. Shortness of breath worsens with exertion. She administered a neb treatment and took 8 mg Zofran prior to ED arrival tonight. She states she ran out of Ubers[employer] and believes these symptoms are related. She endorses chronic low back pain though not changed from baseline and reports a brief episode of chest pain yesterday though not currently.  lower back pain and an episode of chest pain yesterday. No fever, cough, significant abdominal pain, vomiting, dysuria, diarrhea or other symptoms. No alcohol or drug use. No recent travel. No history of cancer or blood clots. Cynthia notes her  has been sick and was seen at [facility] ED yesterday.    Independent Historian:   None - Patient Only    Review of External Notes:   I reviewed the patient's routine physical visit with Dr. Weinberg on 3/7/23. I also reviewed her echocardiogram from 8/24/22, see below.    ECHO - 08/24/2022 02:42 PM   Interpretation Summary     The visual ejection fraction is 35-40%.  There is moderate global hypokinesia of the left ventricle.  Borderline right ventricular enlargement.  The right ventricular systolic function is mildly reduced.  Right ventricular systolic pressure is elevated, consistent with mild  pulmonary hypertension.  NSR with HR   Limited views were obtained.      Medications:    Albuterol  Duoneb  Spiriva  Trelegy ellipta  Fosamax  Xanax  Lipitor  Lioresal  Coreg  Norco  Toradol  Zestril  Singulair  Protonix  Aldactone  Imitrex  Ultram  Zithromax  Zyrtec  Deltasone  Daliresp    Past Medical History:    VENESSA  MDD  CAD  CHF  Takotsubo  cardiomyopathy  Right bundle branch block  Chronic airway obstruction  Chronic back pain  COPD  Endometriosis  Esophageal reflux  Hypertension  Hyperlipidemia  Chronic migraine  Mild persistent asthma  Osteopenia  Cystocele  Chronic low back pain  Hair loss  Lateral epicondylitis of right elbow  Panlobular emphysema  Thoracic aortic ectasia  SVT  Pressure ulcer left buttock, stage 3    Past Surgical History:    Appendectomy  Cholecystectomy  Tonsillectomy  Adenoidectomy  [place] teeth extraction  Bilateral odessa catheterization  Laparoscopic ablation endometriosis  Nasal septoplasty and mucous retention cyst    Physical Exam   Patient Vitals for the past 24 hrs:   BP Temp Pulse Resp SpO2   09/21/23 0408 -- -- -- 24 97 %   09/21/23 0353 -- -- -- -- 95 %   09/21/23 0338 120/71 -- 88 -- --   09/21/23 0245 -- -- -- -- 97 %   09/21/23 0230 -- -- -- -- 96 %   09/21/23 0155 -- -- -- -- 96 %   09/21/23 0150 -- -- -- -- 95 %   09/21/23 0145 132/89 -- 79 -- 94 %   09/21/23 0039 132/69 -- -- -- --   09/21/23 0037 -- 97  F (36.1  C) 81 18 99 %     Physical Exam  Nursing note and vitals reviewed.  Constitutional: Well nourished.   Eyes: Conjunctiva normal.  Pupils are equal, round, and reactive to light.   ENT: Nose normal. Mucous membranes pink and moist.    Neck: Normal range of motion.  CVS: Sinus tachycardia  Normal heart sounds.   Pulmonary: Lungs with scant expiratory wheezing  GI: Abdomen soft. Nontender, nondistended. No rigidity or guarding.    MSK: No calf tenderness or swelling.  Neuro: Alert. Follows simple commands.  Skin: Skin is warm and dry. No rash noted.   Psychiatric: Flat affect      Emergency Department Course   ECG  ECG taken at 0046, ECG read at 0047  Normal sinus rhythm  Low voltage QRS  Incomplete Right bundle branch block  Borderline ECG   Rate 81 bpm. AR interval 152 ms. QRS duration 110 ms. QT/QTc 396/460 ms. P-R-T axes * 66 120.     Imaging:  CT Chest (PE) Abdomen Pelvis w Contrast   Preliminary  Result   IMPRESSION:   1.  No evidence of pulmonary embolism.   2.  Extensive upper lobes predominant emphysematous changes.   3.  Few scattered pulmonary nodules including 6 mm left lower lobe nodule, as per Fleischner Society criteria, recommend follow-up exam in 6-12 months to assess for interval change.                        Report per radiology    Laboratory:  Labs Ordered and Resulted from Time of ED Arrival to Time of ED Departure   BASIC METABOLIC PANEL - Abnormal       Result Value    Sodium 123 (*)     Potassium 4.6      Chloride 91 (*)     Carbon Dioxide (CO2) 23      Anion Gap 9      Urea Nitrogen 13.2      Creatinine 0.57      Calcium 8.7 (*)     Glucose 105 (*)     GFR Estimate >90     TROPONIN T, HIGH SENSITIVITY - Abnormal    Troponin T, High Sensitivity 69 (*)    CBC WITH PLATELETS AND DIFFERENTIAL - Abnormal    WBC Count 15.9 (*)     RBC Count 4.05      Hemoglobin 12.0      Hematocrit 37.7      MCV 93      MCH 29.6      MCHC 31.8      RDW 13.5      Platelet Count 361      % Neutrophils 76      % Lymphocytes 13      % Monocytes 7      % Eosinophils 2      % Basophils 0      % Immature Granulocytes 2      NRBCs per 100 WBC 0      Absolute Neutrophils 12.2 (*)     Absolute Lymphocytes 2.1      Absolute Monocytes 1.0      Absolute Eosinophils 0.3      Absolute Basophils 0.0      Absolute Immature Granulocytes 0.3      Absolute NRBCs 0.0     HEPATIC FUNCTION PANEL - Abnormal    Protein Total 5.5 (*)     Albumin 3.5      Bilirubin Total 0.3      Alkaline Phosphatase 40      AST 7      ALT 9      Bilirubin Direct <0.20     TROPONIN T, HIGH SENSITIVITY - Abnormal    Troponin T, High Sensitivity 57 (*)    INFLUENZA A/B, RSV, & SARS-COV2 PCR - Normal    Influenza A PCR Negative      Influenza B PCR Negative      RSV PCR Negative      SARS CoV2 PCR Negative     NT PROBNP INPATIENT - Normal    N terminal Pro BNP Inpatient 193     ROUTINE UA WITH MICROSCOPIC - Normal    Color Urine Straw      Appearance Urine  Clear      Glucose Urine Negative      Bilirubin Urine Negative      Ketones Urine Negative      Specific Gravity Urine 1.005      Blood Urine Negative      pH Urine 6.5      Protein Albumin Urine Negative      Urobilinogen Urine Normal      Nitrite Urine Negative      Leukocyte Esterase Urine Negative      RBC Urine 1      WBC Urine 1      Squamous Epithelials Urine 1     MAGNESIUM - Normal    Magnesium 1.8     FRACTIONAL EXCRETION OF SODIUM    Creatinine Urine mg/dL 19.2      Sodium Urine mmol/L 77      %FENA 1.9     BASIC METABOLIC PANEL   FRACTIONAL EXCRETION OF SODIUM     Procedures   None    Emergency Department Course & Assessments:    Interventions:  Medications   HYDROcodone-acetaminophen (NORCO) 5-325 MG per tablet 1 tablet (1 tablet Oral $Given 9/21/23 0112)   ipratropium - albuterol 0.5 mg/2.5 mg/3 mL (DUONEB) neb solution 3 mL (3 mLs Nebulization $Given 9/21/23 0112)   HYDROcodone-acetaminophen (NORCO) 5-325 MG per tablet 1 tablet (1 tablet Oral $Given 9/21/23 0138)   sodium chloride 0.9% BOLUS 1,000 mL (0 mLs Intravenous Stopped 9/21/23 0410)   diphenhydrAMINE (BENADRYL) injection 25 mg (25 mg Intravenous $Given 9/21/23 0226)   CT scan flush (79 mLs Intravenous $Given 9/21/23 0335)   iopamidol (ISOVUE-370) solution 500 mL (58 mLs Intravenous $Given 9/21/23 0335)   ondansetron (ZOFRAN) injection 4 mg (4 mg Intravenous $Given 9/21/23 0403)   methylPREDNISolone sodium succinate (solu-MEDROL) injection 125 mg (125 mg Intravenous $Given 9/21/23 0438)     Assessments:  0058 I obtained history and examined the patient as noted above.  0500 I rechecked the patient and explained findings.     Consultations/Discussion of Management or Tests:  0502 I spoke with Dr. Conley of the hospitalist team regarding the patient, who accepted the patient for admission.     Social Determinants of Health affecting care:   None    Disposition:  The patient was admitted to the hospital under the care of Dr. Conley.      Impression & Plan    Medical Decision Making:  Patient is a 68-year-old female presenting with predominately complaints of dyspnea and nausea.  She expresses that she feels her symptoms are secondary to being out of her pain medication.  Patient with scant expiratory wheezing on arrival.  She was given breathing treatments in addition to IV steroids given concern for mild COPD exacerbation.  She is not requiring oxygen support at this point in time.  EKG without STEMI.  Mild troponin elevation though repeat downtrended.  She denies any active chest pain.  She did undergo formal CT which is without evidence of PE or pneumonia.  Emphysematous changes identified as well as incidental pulmonary nodule which was discussed with the patient.  Labs without profound anemia or significant electrolyte derangements other than hyponatremia.  Stronger suspicion this is more prerenal as patient reports minimal p.o. intake though formal female has been sent and pending.  She was given gentle IV fluids during her time in the ED.  Clinically she does not appear fluid overloaded.  Patient is noted to have a leukocytosis though suspect this is more reactive.  I doubt underlying infectious process at this point time.  Given electrolyte derangements in particular, I do feel she would benefit from observation at this point time.  She is accepted by hospitalist for admission.    Diagnosis:    ICD-10-CM    1. Hyponatremia  E87.1       2. Elevated troponin  R77.8       3. Pulmonary nodule  R91.1       4. COPD exacerbation (H)  J44.1            Discharge Medications:  New Prescriptions    No medications on file       Scribe Disclosure:  Sergey ZARATE Hailie, am serving as a scribe at 1:31 AM on 9/21/2023 to document services personally performed by Marianna Best DO based on my observations and the provider's statements to me.   9/21/2023   Marianna Best DO McDonald, Lindsey E, DO  09/21/23 0519

## 2023-09-21 NOTE — H&P
[facility]    History and Physical - Hospitalist Service       Date of Admission:  9/21/2023    Assessment & Plan      Trisha Bender is a 68 year old female admitted on 9/21/2023. She has a history of severe COPD, idiopathic cardiomyopathy with an EF of 35 to 40%, mild coronary artery disease, hypertension, hyperlipidemia, chronic back pain, who presents with nausea and shortness of breath which started today.  She apparently ran out of her pain medications and feels that her symptoms are related to this.  She ran out of Norco yesterday and feels more panicky.  She states that she really when she is outside the house and that she is limited to a single room.  She is not on oxygen at baseline.  She denies any fevers or chills.  She has chronic nausea without any emesis.  She states that the reason she ran out of her pain meds as she thought she had a bout of pancreatitis with abdominal discomfort and took extra Norco.  She then took Mylanta which helped her with her abdominal pain.  She denies any fevers or chills or cough.  Discussed care with Dr. Best.  Asked to admit her.     Acute on chronic hyponatremia.  - she drinks at least 80 ounces of water a day.  - will start fluid restriction and monitor sodium.  - No confusion.    2. Elevated troponin w/o chest pain.  - Monitor on tele.  - trend.  - likely type 2 MI.    3. Chronic LBP.  - resume meds when reconciled.    4. COPD and ischemic CM.  - resume meds when reconciled.    5.  Abnormal CT scan with few scattered pulmonary nodules including a 6 mm left lower lobe nodule.  -Will need follow-up in 6 to 12 months to assess for interval change.       Diet:  2 gm sodium.  DVT Prophylaxis: Pneumatic Compression Devices  Quiles Catheter: Not present  Lines: None     Cardiac Monitoring: None  Code Status:  Full Code.    Clinically Significant Risk Factors Present on Admission         # Hyponatremia: Lowest Na = 123 mmol/L in last 2 days,  will monitor as appropriate          # Hypertension: Noted on problem list  # Chronic heart failure with reduced ejection fraction: last echo with EF <40%         # COPD: noted on problem list        Disposition Plan           Mary Carmen Conley MD  Hospitalist Service  [facility]  Securely message with [employer] (more info)  Text page via [facility] Paging/Directory     ______________________________________________________________________    Chief Complaint     SOB, nausea.    History is obtained from the patient    History of Present Illness   Trisha Bender is a 68 year old female who has a history of severe COPD, idiopathic cardiomyopathy with an EF of 35 to 40%, mild coronary artery disease, hypertension, hyperlipidemia, chronic back pain, who presents with nausea and shortness of breath which started today.  She apparently ran out of her pain medications and feels that her symptoms are related to this.  She ran out of Norco  yesterday and feels more panicky.  She states that she really when she is outside the house and that she is limited to a single room.  She is not on oxygen at baseline.  She denies any fevers or chills.  She has chronic nausea without any emesis.  She states that the reason she ran out of her pain meds as she thought she had a bout of pancreatitis with abdominal discomfort and took extra Norco.  She then took Mylanta which helped her with her abdominal pain. She denies any fevers or chills or cough.  Discussed care with Dr. Best.  Asked to admit her.      Past Medical History    Past Medical History:   Diagnosis Date    Anxiety     CAD (coronary artery disease) 7/2/15    mild per cath    CHF (congestive heart failure)     EF=20-25% per echo 6/30/15    Chronic airway obstruction     FEV1 36% of pred; VC 67%  in Jan 06.    Chronic back pain     COPD (chronic obstructive pulmonary disease)     Endometriosis     Esophageal reflux     Essential hypertension      Hyperlipidemia     Migraine     Mild persistent asthma 8/30/2012    Osteopenia     Takotsubo cardiomyopathy        Past Surgical History   Past Surgical History:   Procedure Laterality Date    ANGIOGRAM  07/02/2015    Minimal CAD. LV dysfunction, Mid RCA 20% stenosis, EF 20-25%, c/w Takotsubo cardiomyopathy    APPENDECTOMY  1974    CHOLECYSTECTOMY  1974    [facility]    COLONOSCOPY N/A 4/26/2018    Procedure: COLONOSCOPY;  COLONOSCOPY with biopsies;  Surgeon: Steve Acosta MD;  Location: [facility] OR    CORONARY ANGIOGRAPHY ADULT ORDER      CV CORONARY ANGIOGRAM N/A 7/30/2020    Procedure: Coronary Angiogram;  Surgeon: Quinn Viveros MD;  Location: [facility] HEART CARDIAC CATH LAB    CV LEFT HEART CATH N/A 7/30/2020    Procedure: Left Heart Cath;  Surgeon: Quinn Viveros MD;  Location: [facility] HEART CARDIAC CATH LAB    CV LEFT VENTRICULOGRAM N/A 7/30/2020    Procedure: Left Ventriculogram;  Surgeon: Quinn Viveros MD;  Location:  HEART CARDIAC CATH LAB    CV RIGHT HEART CATH MEASUREMENTS RECORDED N/A 7/30/2020    Procedure: Right Heart Cath;  Surgeon: Quinn Viveros MD;  Location:  HEART CARDIAC CATH LAB    ESOPHAGOSCOPY, GASTROSCOPY, DUODENOSCOPY (EGD), COMBINED  11/15/2011    Procedure:COMBINED ESOPHAGOSCOPY, GASTROSCOPY, DUODENOSCOPY (EGD);  ESOPHAGOSCOPY, GASTROSCOPY, DUODENOSCOPY (EGD) ; Surgeon:JIM URENA; Location:[facility] GI    INJECT EPIDURAL LUMBAR / SACRAL SINGLE N/A 4/13/2018    Procedure: INJECT EPIDURAL LUMBAR / SACRAL CONTINUAL OR INTERMITTENT;  Lumbar Epidural Steroid Injection;  Surgeon: Az Enamorado MD;  Location: [facility] OR    INJECT EPIDURAL LUMBAR / SACRAL SINGLE N/A 9/24/2018    Procedure: INJECT EPIDURAL LUMBAR / SACRAL SINGLE;  Lumbar Epidural Steroid Injection;  Surgeon: Az Enamorado MD;  Location: [facility] OR    LAPAROSCOPIC ABLATION ENDOMETRIOSIS  1998    LAPAROSCOPY DIAGNOSTIC (GYN)      endometriosis resection / lysis of adhesions    Nasal septoplasty and mucous retention  cyst      TONSILLECTOMY, ADENOIDECTOMY, COMBINED      Ethan teeth extraction         Prior to Admission Medications   Prior to Admission Medications   Prescriptions Last Dose Informant Patient Reported? Taking?   ALPRAZolam (XANAX) 0.25 MG tablet   No No   Sig: TAKE 1 TABLET BY MOUTH TWICE A DAY AS NEEDED FOR ANXIETY   BREO ELLIPTA 200-25 MCG/INH Inhaler   Yes No   Sig: Inhale 1 Dose into the lungs daily    CVS FIBER GUMMIES 2.5 G CHEW  Self Yes No   Sig: Take 1 tablet by mouth daily Reported on 5/8/2017   HYDROcodone-acetaminophen (NORCO)  MG per tablet   No No   Sig: Take 1-2 tablets by mouth every 4 hours as needed for severe pain Take up to 8 tablets a day   Multiple Vitamin CHEW  Self Yes No   Sig: Take 1 tablet by mouth daily    SUMAtriptan (IMITREX) 25 MG tablet   No No   Sig: Take 1 tablet (25 mg) by mouth at onset of headache for migraine   TRELEGY ELLIPTA 200-62.5-25 MCG/ACT oral inhaler   Yes No   Sig: INHALE 1 PUFF BY INHALATION ROUTE EVERY DAY AT THE SAME TIME EACH DAY   albuterol (VENTOLIN HFA) 108 (90 BASE) MCG/ACT Inhaler  Self No No   Sig: Inhale 2 puffs into the lungs every 6 hours as needed for shortness of breath / dyspnea or wheezing   alendronate (FOSAMAX) 70 MG tablet   No No   Sig: TAKE 1 TABLET BY MOUTH ONE TIME PER WEEK   atorvastatin (LIPITOR) 10 MG tablet   No No   Sig: Take 1 tablet (10 mg) by mouth daily Need appointment for further refills   azithromycin (ZITHROMAX) 250 MG tablet   Yes No   Sig: TAKE 1 TABLET BY ORAL ROUTE PER DAY ON MONDAYS, WEDNESDAYS AND FRIDAYS   baclofen (LIORESAL) 10 MG tablet   No No   Sig: TAKE TWO TABLETS BY MOUTH THREE TIMES A DAY   carvedilol (COREG) 25 MG tablet   No No   Sig: Take 1 tablet (25 mg) by mouth 2 times daily (with meals) Needs appointment for further refills   cetirizine (ZYRTEC) 10 MG tablet   Yes No   Sig: Take 10 mg by mouth daily   doxycycline hyclate (VIBRA-TABS) 100 MG tablet   No No   Sig: TAKE 1 TABLET (100 MG) BY MOUTH 2 TIMES  DAILY   erythromycin (ROMYCIN) 5 MG/GM ophthalmic ointment   No No   Sig: Place 0.5 inches Into the left eye At Bedtime   fluticasone (FLONASE) 50 MCG/ACT nasal spray   No No   Sig: USE ONE TO TWO SPRAYS IN EACH NOSTRIL EVERY DAY-DUE IN FEBRUARY TO SEE DR. LEE   ipratropium - albuterol 0.5 mg/2.5 mg/3 mL (DUONEB) 0.5-2.5 (3) MG/3ML neb solution  Self No No   Sig: Take 1 vial (3 mLs) by nebulization every 6 hours as needed for shortness of breath / dyspnea or wheezing   ketorolac (TORADOL) 10 MG tablet   No No   Sig: Take 1 tablet (10 mg) by mouth every 6 hours as needed for moderate pain   lisinopril (ZESTRIL) 5 MG tablet   No No   Sig: Take 1 tablet (5 mg) by mouth daily   montelukast (SINGULAIR) 10 MG tablet   No No   Sig: TAKE ONE TABLET BY MOUTH AT BEDTIME   naloxone (NARCAN) 4 MG/0.1ML nasal spray   No No   Sig: Spray 1 spray (4 mg) into one nostril alternating nostrils once as needed for opioid reversal Every 2-3 minutes until patient responsive or EMS arrives   ondansetron (ZOFRAN ODT) 4 MG ODT tab   No No   Sig: TAKE 1-2 TABLETS BY MOUTH EVERY 8 HOURS AS NEEDED FOR NAUSEA.   pantoprazole (PROTONIX) 40 MG EC tablet   No No   Sig: Take 1 tablet (40 mg) by mouth daily   predniSONE (DELTASONE) 5 MG tablet   Yes No   Sig: Take 5 mg by mouth 2 times daily   roflumilast (DALIRESP) 500 MCG TABS tablet  Self Yes No   Sig: Take 1 tablet (500 mcg) by mouth daily   spironolactone (ALDACTONE) 25 MG tablet   No No   Sig: Take 1 tablet (25 mg) by mouth daily   tiotropium (SPIRIVA HANDIHALER) 18 MCG capsule   No No   Sig: USING THE HANDIHALER, INHALE THE CONTENTS OF ONE CAPSULE BY MOUTH DAILY   traMADol (ULTRAM) 50 MG tablet   No No   Sig: TAKE 1 TABLET BY MOUTH EVERY 6 HOURS AS NEEDED FOR SEVERE PAIN      Facility-Administered Medications: None        Review of Systems    The 10 point Review of Systems is negative other than noted in the HPI or here.     Social History   I have reviewed this patient's social history  and updated it with pertinent information if needed.  Social History     Tobacco Use    Smoking status: Former     Packs/day: 1.00     Years: 30.00     Pack years: 30.00     Types: Cigarettes     Quit date: 2006     Years since quittin.6    Smokeless tobacco: Never    Tobacco comments:     Quit in 2006   Vaping Use    Vaping Use: Never used   Substance Use Topics    Alcohol use: No     Alcohol/week: 0.0 standard drinks of alcohol    Drug use: No         Family History   I have reviewed this patient's family history and updated it with pertinent information if needed.  Family History   Problem Relation Age of Onset    Cancer Mother         Colon cancer; dxed at age 64;  at age 69    Cancer - colorectal Mother     Other Cancer Mother     Gastrointestinal Disease Sister         Acute pancreatitis    Heart Disease Father         ?    Coronary Artery Disease Father     Circulatory Maternal Aunt         AAA    Circulatory Maternal Uncle         AAA    Diabetes Son          Allergies   Allergies   Allergen Reactions    Gabapentin Swelling    Contrast Dye      Iodine    Escitalopram      Nausea and sickness    Peanuts [Nuts] Hives    Penicillins      rash    Sulfa Antibiotics      High family history    Tace [Chlorotrianisene]      joint swelling    Aspirin Rash     After 3 days    Ibuprofen Nausea and Vomiting and Swelling    Strawberry Extract Rash        Physical Exam   Vital Signs: Temp: 97  F (36.1  C)   BP: 120/71 Pulse: 88   Resp: 24 SpO2: 97 %      Weight: 0 lbs 0 oz    Gen - AAO x 3 in NAD.  Lungs - diminished BS.  Heart - distant HS, RR,S1+S2 nml, no m/g/r.  Abd - soft, NT, ND, + BS.  Ext - 1+ edema.    Medical Decision Making       80 MINUTES SPENT BY ME on the date of service doing chart review, history, exam, documentation & further activities per the note.      Data     I have personally reviewed the following data over the past 24 hrs:    15.9 (H)  \   12.0   / 361     123 (L) 91 (L) 13.2 /  105  (H)   4.6 23 0.57 \     ALT: 9 AST: 7 AP: 40 TBILI: 0.3   ALB: 3.5 TOT PROTEIN: 5.5 (L) LIPASE: N/A     Trop: 57 (H) BNP: 193       Imaging results reviewed over the past 24 hrs:   Recent Results (from the past 24 hour(s))   CT Chest (PE) Abdomen Pelvis w Contrast    Narrative    EXAM: CT CHEST PE ABDOMEN PELVIS W CONTRAST  LOCATION: [facility]  DATE: 9/21/2023    INDICATION: SOB abdominal pain.  COMPARISON: CT abdomen pelvis on 09/07/2019.  TECHNIQUE: CT chest pulmonary angiogram and routine CT abdomen pelvis with IV contrast. Arterial phase through the chest and venous phase through the abdomen and pelvis. Multiplanar reformats and MIP reconstructions were performed. Dose reduction   techniques were used.   CONTRAST: 58mL Isovue 370.    FINDINGS:  ANGIOGRAM CHEST: No evidence of pulmonary embolism.      MEDIASTINUM/AXILLAE: No cardiomegaly. Trace pericardial effusion. No significant mediastinal or hilar lymphadenopathy.    LUNGS AND PLEURA: No pleural effusion or pneumothorax. Upper lobes predominant emphysematous changes. 6 mm posterior left lower lobe nodule (series 7 image 173).    CORONARY ARTERY CALCIFICATION: Mild.    ABDOMEN/PELVIS:    HEPATOBILIARY: No suspicious focal hepatic lesion. The gallbladder is surgically absent.    PANCREAS: No main pancreatic ductal dilatation or definite solid pancreatic mass.    SPLEEN: No splenomegaly.    ADRENAL GLANDS: No adrenal nodules.    KIDNEYS/BLADDER: No radiodense kidney/ureteral stones or hydronephrosis in either kidney.    BOWEL: No abnormally dilated bowel loops.    PERITONEUM: No evidence of free fluid in the abdomen and pelvis. No free peritoneal or portal venous gas.    PELVIC ORGANS: Unremarkable.    VASCULATURE: Moderate atherosclerotic vascular calcification of the abdominal aorta and iliac vessels.    LYMPH NODES: Unremarkable.    MUSCULOSKELETAL: Small fat-containing umbilical hernia. Multilevel degenerative changes of the spine.  Numerous compression deformities of the thoracolumbar spine as well as multiple old rib fractures.      Impression    IMPRESSION:  1.  No evidence of pulmonary embolism.  2.  Extensive upper lobes predominant emphysematous changes.  3.  Few scattered pulmonary nodules including 6 mm left lower lobe nodule, as per Fleischner Society criteria, recommend follow-up exam in 6-12 months to assess for interval change.

## 2023-09-21 NOTE — PHARMACY-ADMISSION MEDICATION HISTORY
Pharmacist Admission Medication History    Admission medication history is complete. The information provided in this note is only as accurate as the sources available at the time of the update.    Medication reconciliation/reorder completed by provider prior to medication history? Yes, sticky note left for MD    Information Source(s): Patient, Hospital records, and CareEverywhere/SureScripts via in-person    Pertinent Information: pt uses both breo and trelegy.  Uses breo at 1200 and trelegy at 0000.  Has spirivia but only uses that if trelegy gone.  Pt aware pts typically on one inhaler or the other.  She feels both makes her feel better, states MD aware she is using both.  Pt aware to monitor daily tylenol intake from all sources.  Only uses prn tylenol if norco is gone    Changes made to PTA medication list:  Added: baclofen 10mg tid prn, doxycycline 20mg, rachel 128, tylenol, prn flonase  Deleted: fosamax 70mg weekly, doxycycline 100mg bid, scheduled flonase, tramadol 50mg q6h prn  Changed: None    Medication Affordability:  Not including over the counter (OTC) medications, was there a time in the past 3 months when you did not take your medications as prescribed because of cost?: Unable to Assess    Allergies reviewed with patient and updates made in EHR: yes    Medication History Completed By: Jory Jefferson RPH 9/21/2023 8:51 AM    Prior to Admission medications    Medication Sig Last Dose Taking? Auth Provider Long Term End Date   acetaminophen (TYLENOL) 500 MG tablet Take 750 mg by mouth every 6 hours as needed for mild pain 9/20/2023 at only uses if norco gone Yes Unknown, Entered By History     albuterol (VENTOLIN HFA) 108 (90 BASE) MCG/ACT Inhaler Inhale 2 puffs into the lungs every 6 hours as needed for shortness of breath / dyspnea or wheezing 9/20/2023 at ? Yes Bandar Weinberg MD Yes    ALPRAZolam (XANAX) 0.25 MG tablet TAKE 1 TABLET BY MOUTH TWICE A DAY AS NEEDED FOR ANXIETY 9/20/2023 at pm Yes  Bandar Weinberg MD     atorvastatin (LIPITOR) 10 MG tablet Take 1 tablet (10 mg) by mouth daily Need appointment for further refills 9/20/2023 at pm Yes Justina Finney, DO Yes    azithromycin (ZITHROMAX) 250 MG tablet TAKE 1 TABLET BY ORAL ROUTE PER DAY ON MONDAYS, WEDNESDAYS AND FRIDAYS 9/20/2023 at am Yes Reported, Patient     baclofen (LIORESAL) 10 MG tablet Take 10 mg by mouth 3 times daily as needed for muscle spasms 9/20/2023 at ? Yes Unknown, Entered By History     BREO ELLIPTA 200-25 MCG/INH Inhaler Inhale 1 Dose into the lungs daily  9/20/2023 at 1200 Yes Unknown, Entered By History     carvedilol (COREG) 25 MG tablet Take 1 tablet (25 mg) by mouth 2 times daily (with meals) Needs appointment for further refills 9/20/2023 at pm Yes Justina Finney, DO Yes    cetirizine (ZYRTEC) 10 MG tablet Take 10 mg by mouth daily 9/20/2023 at pm Yes Unknown, Entered By History     [facility] FIBER GUMMIES 2.5 G CHEW Take 1 tablet by mouth daily Reported on 5/8/2017 9/20/2023 at am Yes Unknown, Entered By History     doxycycline hyclate (PERIOSTAT) 20 MG tablet Take 20 mg by mouth daily Past Week at ? Yes Unknown, Entered By History     erythromycin (ROMYCIN) 5 MG/GM ophthalmic ointment Place 0.5 inches Into the left eye At Bedtime 9/20/2023 at new rx started yesterday Yes Bandar Weinberg MD     fluticasone (FLONASE) 50 MCG/ACT nasal spray Spray 1-2 sprays into both nostrils daily as needed for rhinitis or allergies Past Month at ? Yes Unknown, Entered By History     HYDROcodone-acetaminophen (NORCO)  MG per tablet Take 1-2 tablets by mouth every 4 hours as needed for severe pain Take up to 8 tablets a day Past Week at ran out Yes Bandar Weinberg MD     ipratropium - albuterol 0.5 mg/2.5 mg/3 mL (DUONEB) 0.5-2.5 (3) MG/3ML neb solution Take 1 vial (3 mLs) by nebulization every 6 hours as needed for shortness of breath / dyspnea or wheezing 9/20/2023 at ? Yes Bandar Weinberg MD Yes     ketorolac (TORADOL) 10 MG tablet Take 1 tablet (10 mg) by mouth every 6 hours as needed for moderate pain 9/20/2023 at took because out of Norco Yes Bandar Weinberg MD     lisinopril (ZESTRIL) 5 MG tablet Take 1 tablet (5 mg) by mouth daily 9/20/2023 at pm Yes Justina Finney, DO Yes    montelukast (SINGULAIR) 10 MG tablet TAKE ONE TABLET BY MOUTH AT BEDTIME 9/20/2023 at pm Yes Bandar Weinberg MD Yes    Multiple Vitamin CHEW Take 1 tablet by mouth daily  9/20/2023 at am Yes Reported, Patient     naloxone (NARCAN) 4 MG/0.1ML nasal spray Spray 1 spray (4 mg) into one nostril alternating nostrils once as needed for opioid reversal Every 2-3 minutes until patient responsive or EMS arrives Unknown at no usage Yes Bandar Weinberg MD Yes    ondansetron (ZOFRAN ODT) 4 MG ODT tab TAKE 1-2 TABLETS BY MOUTH EVERY 8 HOURS AS NEEDED FOR NAUSEA. 9/20/2023 at ? Yes Bandar Weinberg MD     pantoprazole (PROTONIX) 40 MG EC tablet Take 1 tablet (40 mg) by mouth daily 9/20/2023 at pm Yes Bandar Weinberg MD     predniSONE (DELTASONE) 5 MG tablet Take 5 mg by mouth 2 times daily 9/20/2023 at pm Yes Reported, Patient     roflumilast (DALIRESP) 500 MCG TABS tablet Take 1 tablet (500 mcg) by mouth daily 9/20/2023 at pm Yes Bandar Weinberg MD Yes    sodium chloride (MOLLY 128) 5 % ophthalmic ointment Place 1 Application into both eyes every evening Past Week at ? Yes Unknown, Entered By History     spironolactone (ALDACTONE) 25 MG tablet Take 1 tablet (25 mg) by mouth daily 9/20/2023 at am Yes Justina Finney, DO Yes    SUMAtriptan (IMITREX) 25 MG tablet Take 1 tablet (25 mg) by mouth at onset of headache for migraine More than a month at ? Yes Madeline Ferrer MD     tiotropium (SPIRIVA HANDIHALER) 18 MCG capsule USING THE HANDIHALER, INHALE THE CONTENTS OF ONE CAPSULE BY MOUTH DAILY Unknown at only uses if out of trelegy inhaler Yes Bandar Weinberg MD Yes    TRELEGY ELLIPTA 200-62.5-25  MCG/ACT oral inhaler INHALE 1 PUFF BY INHALATION ROUTE EVERY DAY AT THE SAME TIME EACH DAY 9/21/2023 at 0000 Yes Reported, Patient

## 2023-09-23 NOTE — PROGRESS NOTES
"Clinic Care Coordination Contact  [facility]: Post-Discharge Note  SITUATION                                                      Admission:    Admission Date: 09/21/23   Reason for Admission: COPD exacerbation  Discharge:   Discharge Date: 09/21/23  Discharge Diagnosis: Acute on chronic hyponatremia,  Nausea, sob due to anxiety from running out of her chronic pain medications    BACKGROUND                                                      Per hospital discharge summary and inpatient provider notes:    Trisha Bender is a 68 year old female who has a history of severe COPD, idiopathic cardiomyopathy with an EF of 35 to 40%, mild coronary artery disease, hypertension, hyperlipidemia, chronic back pain, who presents with nausea and shortness of breath which started today.  She apparently ran out of her pain medications and feels that her symptoms are related to this.  She ran out of Norco  yesterday and feels more panicky.  She states that she really when she is outside the house and that she is limited to a single room.  She is not on oxygen at baseline.  She denies any fevers or chills.  She has chronic nausea without any emesis.  She states that the reason she ran out of her pain meds as she thought she had a bout of pancreatitis with abdominal discomfort and took extra Norco.  She then took Mylanta which helped her with her abdominal pain. She denies any fevers or chills or cough.  Discussed care with Dr. Best.  Asked to admit her.     ASSESSMENT      Discharge Assessment  How are you doing now that you are home?: \"Well, I am doing alright except as soon as I got home I got a cold. I have a really bad cough\"  How are your symptoms? (Red Flag symptoms escalate to triage hotline per guidelines): Improved;Other ([facility] provided nurse triage if patient would like to speak with a nurse regarding symptoms of her cold.)  Do you feel your condition is stable enough to be safe at home until your provider " visit?: Yes  Does the patient have questions regarding their discharge instructions? : Yes (see comment) (Pt wondering if IV fluid she recieved in the hospital would have had any impact on her sodium levels. [facility] acknowledged concern and suggested she do her follow up labs as instructed so they can see what her sodium is when she is not in the hospital.)  Were you started on any new medications or were there changes to any of your previous medications? : No  Does the patient have all of their medications?: Yes  Do you have questions regarding any of your medications? : No  Do you have all of your needed medical supplies or equipment (DME)?  (i.e. oxygen tank, CPAP, cane, etc.): Yes  Discharge follow-up appointment scheduled within 14 calendar days? : Yes  Discharge Follow Up Appointment Date: 09/25/23  Discharge Follow Up Appointment Scheduled with?: Primary Care Provider (Virtual)  Is patient agreeable to assistance with scheduling? : Yes ([facility] scheduled Hospital Follow Up appt with PCP)    Post-op ([facility] CTA Only)  If the patient had a surgery or procedure, do they have any questions for a nurse?: No    PLAN                                                      Outpatient Plan:      Follow up with primary care provider, Bandar Weinberg, within 7 days for hospital follow- up. The following labs/tests are recommended: repeat Chem 7 to check sodium levels (labs ordered, please to to the lab in the next 1-2 days).     Future Appointments   Date Time Provider Department Center   9/25/2023  5:30 PM Bandar Weinberg MD [facility]   10/18/2023  7:30 AM [facility] [place] [facility]   10/20/2023  4:15 PM [facility]   11/13/2023  3:00 PM Bandar Weinberg MD [facility]         For any urgent concerns, please contact our 24 hour nurse triage line: [phone] ([phone])       Mounika Powell  Community Health Worker  Connected Care Resource [facility]

## 2023-09-25 NOTE — PROGRESS NOTES
"Cynthia is a 68 year old who is being evaluated via a billable video visit.      How would you like to obtain your AVS? MyChart  If the video visit is dropped, the invitation should be resent by: Text to cell phone: [phone]  Will anyone else be joining your video visit? No          Assessment & Plan       COPD exacerbation (H)  Will treat with antibiotic, steroid taper, symptomatic mucolytic   - doxycycline hyclate (PERIOSTAT) 20 MG tablet; Take 1 tablet (20 mg) by mouth daily  - predniSONE (DELTASONE) 20 MG tablet; Take 2 tablets (40 mg) by mouth daily for 3 days, THEN 1 tablet (20 mg) daily for 3 days, THEN 0.5 tablets (10 mg) daily for 3 days.  - guaiFENesin (ROBITUSSIN) 20 mg/mL liquid; Take 10 mLs (200 mg) by mouth every 4 hours as needed for cough    Moderate persistent asthma without complication  Use PRN duoneb   - ipratropium - albuterol 0.5 mg/2.5 mg/3 mL (DUONEB) 0.5-2.5 (3) MG/3ML neb solution; Take 1 vial (3 mLs) by nebulization every 6 hours as needed for shortness of breath or wheezing    Hospital discharge follow-up  Still symptomatic with SOB, cough, wheezing, treatment as noted     Essential hypertension with goal blood pressure less than 130/80  Controlled on treatment     Coronary artery disease involving native coronary artery of native heart without angina pectoris  Follow up with cardiology. No chest pain. Elevated troponin in hospital, likely demand ischemia    Hyponatremia  Keep fluid restriction 1200 ml/ day follow up lab            MED REC REQUIRED  Post Medication Reconciliation Status: discharge medications reconciled, continue medications without change  BMI:   Estimated body mass index is 25.05 kg/m  as calculated from the following:    Height as of 9/21/23: 1.778 m (5' 10\").    Weight as of 9/21/23: 79.2 kg (174 lb 9.6 oz).       See Patient Instructions    Bandar Weinberg MD  [facility]    Mariama Marie is a 68 year old, presenting for the " "following health issues:  Hospital F/U and Cough      9/25/2023     4:19 PM   Additional Questions   Roomed by GEOVANNA Mims LPN   Accompanied by self         9/25/2023     4:19 PM   Patient Reported Additional Medications   Patient reports taking the following new medications none       HPI         9/23/2023     1:19 PM   Post Discharge Outreach   Admission Date 9/21/2023   Reason for Admission COPD exacerbation   Discharge Date 9/21/2023   Discharge Diagnosis Acute on chronic hyponatremia,  Nausea, sob due to anxiety from running out of her chronic pain medications   How are you doing now that you are home? \"Well, I am doing alright except as soon as I got home I got a cold. I have a really bad cough\"   How are your symptoms? (Red Flag symptoms escalate to triage hotline per guidelines) Improved;Other   Do you feel your condition is stable enough to be safe at home until your provider visit? Yes   Does the patient have questions regarding their discharge instructions?  Yes (see comment)   Were you started on any new medications or were there changes to any of your previous medications?  No   Does the patient have all of their medications? Yes   Do you have questions regarding any of your medications?  No   Do you have all of your needed medical supplies or equipment (DME)?  (i.e. oxygen tank, CPAP, cane, etc.) Yes   Discharge follow-up appointment scheduled within 14 calendar days?  Yes   Discharge Follow Up Appointment Date 9/25/2023   Discharge Follow Up Appointment Scheduled with? Primary Care Provider     Hospital Follow-up Visit:    Hospital/Nursing Home/IP Rehab Facility: [facility]  Date of Admission: 9/21/23  Date of Discharge: 9/21/23  Reason(s) for Admission: hyponatremia, SOB    Was your hospitalization related to COVID-19? No   Problems taking medications regularly:  None  Medication changes since discharge: None  Problems adhering to non-medication therapy:  None    Summary of " hospitalization:  [facility] discharge summary reviewed  Diagnostic Tests/Treatments reviewed.  Follow up needed: clinic visit   Other Healthcare Providers Involved in Patient s Care:         Specialist appointment - cardiology , pulmonary   Update since discharge: fluctuating course.         Plan of care communicated with patient           Patient is seen for a follow up visit.  Recently hospitalized for SOB.   Has h/o CHF, IHD, COPD. Found to have hyponatremia, elevated troponin. No chest pain or acute MI noted.   Patient had CT, negative for PE or pneumonia. Has Emphysema changes.   Pt wanted to be discharged, felt improved.   At home increased cough, scarce phlegm, feels SOB with exertion, no chest pain.   Continued inhalers, stared Doxycycline and Prednisone , had left from previous treatments. Slightly improved .   Keeps fluid restriction. No legs edema. Feels tired, no fever, chills.       Review of Systems   Constitutional, HEENT, cardiovascular, pulmonary, GI, , musculoskeletal, neuro, skin, endocrine and psych systems are negative, except as otherwise noted.      Objective    Vitals - Patient Reported  Systolic (Patient Reported): 132  Diastolic (Patient Reported): 69  SpO2 (Patient Reported): 94 (resting-as low as 88 after 30 ft with this cold)  Pulse (Patient Reported): 92 (as high as 120 with this cold)        Physical Exam   GENERAL: frail , alert and no distress  EYES: Eyes grossly normal to inspection.  No discharge or erythema, or obvious scleral/conjunctival abnormalities.  RESP: No audible wheeze, + episodes of cough, no visible cyanosis.  No visible retractions or increased work of breathing.    SKIN: Visible skin clear. No significant rash, abnormal pigmentation or lesions.  NEURO: Cranial nerves grossly intact.  Mentation and speech appropriate for age.  PSYCH: Mentation appears normal, affect normal/bright, judgement and insight intact, normal speech and appearance  well-groomed.    Admission on 09/21/2023, Discharged on 09/21/2023   Component Date Value Ref Range Status    Ventricular Rate 09/21/2023 81  BPM Final    Atrial Rate 09/21/2023 81  BPM Final    MO Interval 09/21/2023 152  ms Final    QRS Duration 09/21/2023 110  ms Final    QT 09/21/2023 396  ms Final    QTc 09/21/2023 460  ms Final    R AXIS 09/21/2023 66  degrees Final    T Axis 09/21/2023 120  degrees Final    Interpretation ECG 09/21/2023    Final                    Value:Sinus rhythm  Low voltage QRS  Incomplete right bundle branch block  Borderline ECG  When compared with ECG of 30-JUL-2020 11:34,  No significant change was found  Confirmed by - EMERGENCY ROOM, PHYSICIAN ([id number]),  MILKA HARVEY ([id number]) on 9/21/2023 8:14:04 AM      Sodium 09/21/2023 123 (L)  136 - 145 mmol/L Final    Potassium 09/21/2023 4.6  3.4 - 5.3 mmol/L Final    Chloride 09/21/2023 91 (L)  98 - 107 mmol/L Final    Carbon Dioxide (CO2) 09/21/2023 23  22 - 29 mmol/L Final    Anion Gap 09/21/2023 9  7 - 15 mmol/L Final    Urea Nitrogen 09/21/2023 13.2  8.0 - 23.0 mg/dL Final    Creatinine 09/21/2023 0.57  0.51 - 0.95 mg/dL Final    Calcium 09/21/2023 8.7 (L)  8.8 - 10.2 mg/dL Final    Glucose 09/21/2023 105 (H)  70 - 99 mg/dL Final    GFR Estimate 09/21/2023 >90  >60 mL/min/1.73m2 Final    Troponin T, High Sensitivity 09/21/2023 69 (H)  <=14 ng/L Final    Either a High Sensitivity Troponin T baseline (0 hours) value = 100 ng/L, or an increase in High Sensitivity Troponin T = 7 ng/L at 2 hours compared to 0 hours (2-0 hours), suggests myocardial injury, and urgent clinical attention is required.    If the 2-0 hours increase is <7 ng/L, a High Sensitivity Troponin T result above gender-specific reference ranges warrants further evaluation.   Recommendations for further evaluation include correlation with clinical decision-making tool (e.g., HEART), a 3rd High Sensitivity Troponin T test 2 hours after the 2nd (a 20% change from baseline  would represent concern), admission for observation, close PCC/cardiology follow-up, or urgent outpatient provocative testing.    WBC Count 09/21/2023 15.9 (H)  4.0 - 11.0 10e3/uL Final    RBC Count 09/21/2023 4.05  3.80 - 5.20 10e6/uL Final    Hemoglobin 09/21/2023 12.0  11.7 - 15.7 g/dL Final    Hematocrit 09/21/2023 37.7  35.0 - 47.0 % Final    MCV 09/21/2023 93  78 - 100 fL Final    MCH 09/21/2023 29.6  26.5 - 33.0 pg Final    MCHC 09/21/2023 31.8  31.5 - 36.5 g/dL Final    RDW 09/21/2023 13.5  10.0 - 15.0 % Final    Platelet Count 09/21/2023 361  150 - 450 10e3/uL Final    % Neutrophils 09/21/2023 76  % Final    % Lymphocytes 09/21/2023 13  % Final    % Monocytes 09/21/2023 7  % Final    % Eosinophils 09/21/2023 2  % Final    % Basophils 09/21/2023 0  % Final    % Immature Granulocytes 09/21/2023 2  % Final    NRBCs per 100 WBC 09/21/2023 0  <1 /100 Final    Absolute Neutrophils 09/21/2023 12.2 (H)  1.6 - 8.3 10e3/uL Final    Absolute Lymphocytes 09/21/2023 2.1  0.8 - 5.3 10e3/uL Final    Absolute Monocytes 09/21/2023 1.0  0.0 - 1.3 10e3/uL Final    Absolute Eosinophils 09/21/2023 0.3  0.0 - 0.7 10e3/uL Final    Absolute Basophils 09/21/2023 0.0  0.0 - 0.2 10e3/uL Final    Absolute Immature Granulocytes 09/21/2023 0.3  <=0.4 10e3/uL Final    Absolute NRBCs 09/21/2023 0.0  10e3/uL Final    Hold Specimen 09/21/2023 JIC   Final    Hold Specimen 09/21/2023 JIC   Final    Influenza A PCR 09/21/2023 Negative  Negative Final    Influenza B PCR 09/21/2023 Negative  Negative Final    RSV PCR 09/21/2023 Negative  Negative Final    SARS CoV2 PCR 09/21/2023 Negative  Negative Final    NEGATIVE: SARS-CoV-2 (COVID-19) RNA not detected, presumed negative.    N terminal Pro BNP Inpatient 09/21/2023 193  0 - 900 pg/mL Final    Reference range shown and results flagged as abnormal are suggested inpatient cut points for confirming diagnosis if CHF in an acute setting. Establishing a baseline value for each individual patient is  useful for follow-up. An inpatient or emergency department NT-proPBNP <300 pg/mL effectively rules out acute CHF, with 99% negative predictive value.    The outpatient non-acute reference range for ruling out CHF is:  0-125 pg/mL (age 18 to less than 75)  0-450 pg/mL (age 75 yrs and older)     Protein Total 09/21/2023 5.5 (L)  6.4 - 8.3 g/dL Final    Albumin 09/21/2023 3.5  3.5 - 5.2 g/dL Final    Bilirubin Total 09/21/2023 0.3  <=1.2 mg/dL Final    Alkaline Phosphatase 09/21/2023 40  35 - 104 U/L Final    AST 09/21/2023 7  0 - 45 U/L Final    Reference intervals for this test were updated on 6/12/2023 to more accurately reflect our healthy population. There may be differences in the flagging of prior results with similar values performed with this method. Interpretation of those prior results can be made in the context of the updated reference intervals.    ALT 09/21/2023 9  0 - 50 U/L Final    Reference intervals for this test were updated on 6/12/2023 to more accurately reflect our healthy population. There may be differences in the flagging of prior results with similar values performed with this method. Interpretation of those prior results can be made in the context of the updated reference intervals.      Bilirubin Direct 09/21/2023 <0.20  0.00 - 0.30 mg/dL Final    Troponin T, High Sensitivity 09/21/2023 57 (H)  <=14 ng/L Final    Either a High Sensitivity Troponin T baseline (0 hours) value = 100 ng/L, or an increase in High Sensitivity Troponin T = 7 ng/L at 2 hours compared to 0 hours (2-0 hours), suggests myocardial injury, and urgent clinical attention is required.    If the 2-0 hours increase is <7 ng/L, a High Sensitivity Troponin T result above gender-specific reference ranges warrants further evaluation.   Recommendations for further evaluation include correlation with clinical decision-making tool (e.g., HEART), a 3rd High Sensitivity Troponin T test 2 hours after the 2nd (a 20% change from  baseline would represent concern), admission for observation, close PCC/cardiology follow-up, or urgent outpatient provocative testing.    Color Urine 09/21/2023 Straw  Colorless, Straw, Light Yellow, Yellow Final    Appearance Urine 09/21/2023 Clear  Clear Final    Glucose Urine 09/21/2023 Negative  Negative mg/dL Final    Bilirubin Urine 09/21/2023 Negative  Negative Final    Ketones Urine 09/21/2023 Negative  Negative mg/dL Final    Specific Gravity Urine 09/21/2023 1.005  1.003 - 1.035 Final    Blood Urine 09/21/2023 Negative  Negative Final    pH Urine 09/21/2023 6.5  5.0 - 7.0 Final    Protein Albumin Urine 09/21/2023 Negative  Negative mg/dL Final    Urobilinogen Urine 09/21/2023 Normal  Normal, 2.0 mg/dL Final    Nitrite Urine 09/21/2023 Negative  Negative Final    Leukocyte Esterase Urine 09/21/2023 Negative  Negative Final    RBC Urine 09/21/2023 1  <=2 /HPF Final    WBC Urine 09/21/2023 1  <=5 /HPF Final    Squamous Epithelials Urine 09/21/2023 1  <=1 /HPF Final    Magnesium 09/21/2023 1.8  1.7 - 2.3 mg/dL Final    Creatinine Urine mg/dL 09/21/2023 19.2  mg/dL Final    Sodium Urine mmol/L 09/21/2023 77  mmol/L Final    %FENA 09/21/2023 1.9  % Final    Adult:    <1 percent  Indicates prerenal azotemia    >3 percent  Suggests acute tubular necrosis    Neonates: <2.5 percent  Suggest prerenal azotemia    >2.5 percent  Suggest acute tubular necrosis    Sodium 09/21/2023 124 (L)  136 - 145 mmol/L Final    Potassium 09/21/2023 4.2  3.4 - 5.3 mmol/L Final    Chloride 09/21/2023 93 (L)  98 - 107 mmol/L Final    Carbon Dioxide (CO2) 09/21/2023 21 (L)  22 - 29 mmol/L Final    Anion Gap 09/21/2023 10  7 - 15 mmol/L Final    Urea Nitrogen 09/21/2023 10.6  8.0 - 23.0 mg/dL Final    Creatinine 09/21/2023 0.54  0.51 - 0.95 mg/dL Final    Calcium 09/21/2023 8.3 (L)  8.8 - 10.2 mg/dL Final    Glucose 09/21/2023 106 (H)  70 - 99 mg/dL Final    GFR Estimate 09/21/2023 >90  >60 mL/min/1.73m2 Final    Sodium 09/21/2023 124  (L)  136 - 145 mmol/L Final    Sodium 09/21/2023 123 (L)  136 - 145 mmol/L Final    Troponin T, High Sensitivity 09/21/2023 41 (H)  <=14 ng/L Final    Either a High Sensitivity Troponin T baseline (0 hours) value = 100 ng/L, or an increase in High Sensitivity Troponin T = 7 ng/L at 2 hours compared to 0 hours (2-0 hours), suggests myocardial injury, and urgent clinical attention is required.    If the 2-0 hours increase is <7 ng/L, a High Sensitivity Troponin T result above gender-specific reference ranges warrants further evaluation.   Recommendations for further evaluation include correlation with clinical decision-making tool (e.g., HEART), a 3rd High Sensitivity Troponin T test 2 hours after the 2nd (a 20% change from baseline would represent concern), admission for observation, close PCC/cardiology follow-up, or urgent outpatient provocative testing.               Video-Visit Details    Type of service:  Video Visit   Video Start Time: 6:04 PM  Video End Time:5:40 PM    Originating Location (pt. Location): Home    Distant Location (provider location):  On-site  Platform used for Video Visit: [facility]

## 2023-10-04 NOTE — ED NOTES
Bed: ED38  Expected date: 10/4/23  Expected time: 3:53 PM  Means of arrival: Ambulance  Comments:  [id number]  68F  SOB

## 2023-10-04 NOTE — ED PROVIDER NOTES
ED ATTENDING PHYSICIAN NOTE:   ELLIOT Sharma MD evaluated this patient in conjunction with Lizet Toledo PA-C.  I have participated in the care of the patient and personally performed key elements of the history, exam, and medical decision making.     HPI:   Trisha Bender is a 68 year old female who presents with shortness of breath. The patent reports that today she was unable to get out of her reclining chair in order to go to the bathroom, prompting her presentation to the ED. She additionally explains that a few weeks ago she began developing worsening shortness of breath. She presented to the hospital and was admitted with hyponatremia. She denies using any oxygen at home. She uses nebulizer treatments occasionally and uses inhalers regularly. She states that she is currently taking a prednisone taper. She notes that she developed a cough a few days after being discharged from the hospital. She explains that the cough is productive and she occasionally coughs up clear mucous. She complains of some bruising to her right lower shin. She states that this injury occurred while she was recently in the hospital. She reports that she has a pulmonology appointment on 10/17/2023.     Independent Historian:   The patient's  is present and provides additional history in regards to her water restriction.    Review of External Notes:   Discharge summary reviewed from September 21, 2023.  The patient was admitted under observation for nausea and shortness of breath with acute on chronic hyponatremia.     EXAM:   General: No distress, very pleasant  Respiratory: Speaks in full paragraphs, scattered wheezes, no retractions  Cardiac: Regular rate and rhythm, no murmur, no peripheral edema  GI: Abdomen soft, nontender, nondistended    Independent Interpretation (X-rays, CTs, rhythm strip):  I independently reviewed the patient's chest X-ray. I note no pneumonia.     MEDICAL DECISION MAKING/ASSESSMENT  AND PLAN:   This patient presents with an episode of weakness.  She was short of breath on arrival but this improved with nebulizer treatments.  Is not entirely clear why she was weak.  She does not appear to have pneumonia or urinary tract infection.  COVID test is negative.  She may have had a flare of her COPD.  She was able to get up though here and walk with her walker at her baseline.  O2 sat did not drop below 93% while walking.  She remains very interactive and speaks in full paragraphs.  She does not require admission.    The patient has been hyponatremic.  Sodium is 125 which actually continues to trend up from previously.  She is attempting to water restrict at home per the advice of her physician.    The patient will continue her prednisone taper and doxycycline and otherwise return if worse.     DIAGNOSIS:     ICD-10-CM    1. COPD exacerbation (H)  J44.1 Primary Care - Care Coordination Referral               DISPOSITION:   [facility] EMERGENCY DEPT     Gil Sharma MD  10/04/23 2001

## 2023-10-04 NOTE — ED PROVIDER NOTES
History     Chief Complaint:  Shortness of Breath (Discharge FROM  [facility] last week for COPD, now worsening at home with talking and any activity sob.  Primary tele visits and was started on doxy and steroids and cough, today called EMS do to worsening sob.  )       TITUS Bender is a 68 year old female with a history of Takotsubo cardiomyopathy, COPD, anxiety, chronic pain, etc., who presents to the emergency department with difficulty getting out of her recliner earlier today.  She reports shortness of breath at baseline, particularly with activity, but this has been worse in the recent days and she has also been coughing for a couple of weeks.  She notes discharge from the hospital ~2 weeks ago. She saw her primary care provider over telemetry health visit and was prescribed doxycycline and steroid taper for COPD exacerbation.  As well as daily steroids and regular inhaler.  Today, she was unable to get out of her chair, which caused her to call EMS who brought her here.  She reports that EMS was rushing her and not allowing her to fully catch her breath between movements, which is why she is so short of breath here.  The nebulizer treatment she received in route has already been helpful.    She denies fevers or chills.  She denies chest pain, vomiting, or diarrhea.  She denies history of a blood clot. Denies recent surgery or long travel. She stays in bed at baseline except for walking to the bathroom.  She does note some bruising to her lower legs, related to an injury that occurred when she was recently in the hospital.  Denies pain in her legs with walking, but they are painful to palpation.  Does not feel that this injury is related to her weakness with walking.  Notably, Patient has cardiology and pulmonology appointments coming up in a couple of weeks.  Reports quitting smoking approximately 20 years ago.  Diagnosed with Takotsubo approximately 5 years ago.  She is not on oxygen at baseline  at home.    Independent Historian:   None - Patient Only    Review of External Notes:   Upon chart review, patient was here in the emergency department with shortness of breath and nausea on 9/21/2023.  She was admitted to the hospitalist but desired discharge home the same day.  She had acute on chronic hyponatremia, mild troponin elevation.  She underwent a formal chest CT which revealed emphysematous changes without PE or pneumonia.  She was not particularly fluid overloaded.  The patient requests discharge home and attributed her shortness of breath to anxiety from running out of her chronic pain medications.    I also reviewed the patient's primary care virtual visit note from 9/25/23. She was seen and treated for COPD exacerbation. Started on Doxy and Steroid taper.    Medications:    acetaminophen (TYLENOL) 500 MG tablet  albuterol (VENTOLIN HFA) 108 (90 BASE) MCG/ACT Inhaler  ALPRAZolam (XANAX) 0.25 MG tablet  atorvastatin (LIPITOR) 10 MG tablet  baclofen (LIORESAL) 10 MG tablet  benzonatate (TESSALON) 200 MG capsule  BREO ELLIPTA 200-25 MCG/INH Inhaler  carvedilol (COREG) 25 MG tablet  cetirizine (ZYRTEC) 10 MG tablet  [employer] FIBER GUMMIES 2.5 G CHEW  doxycycline hyclate (PERIOSTAT) 20 MG tablet  doxycycline hyclate (VIBRA-TABS) 100 MG tablet  fluticasone (FLONASE) 50 MCG/ACT nasal spray  guaiFENesin (ROBITUSSIN) 20 mg/mL liquid  HYDROcodone-acetaminophen (NORCO)  MG per tablet  ipratropium - albuterol 0.5 mg/2.5 mg/3 mL (DUONEB) 0.5-2.5 (3) MG/3ML neb solution  ketorolac (TORADOL) 10 MG tablet  lisinopril (ZESTRIL) 5 MG tablet  montelukast (SINGULAIR) 10 MG tablet  Multiple Vitamin CHEW  naloxone (NARCAN) 4 MG/0.1ML nasal spray  ondansetron (ZOFRAN ODT) 4 MG ODT tab  pantoprazole (PROTONIX) 40 MG EC tablet  predniSONE (DELTASONE) 20 MG tablet  roflumilast (DALIRESP) 500 MCG TABS tablet  sodium chloride (MOLLY 128) 5 % ophthalmic ointment  spironolactone (ALDACTONE) 25 MG tablet  SUMAtriptan (IMITREX) 25  MG tablet  tiotropium (SPIRIVA HANDIHALER) 18 MCG capsule  TRELEGY ELLIPTA 200-62.5-25 MCG/ACT oral inhaler        Past Medical History:    Past Medical History:   Diagnosis Date    Anxiety     CAD (coronary artery disease) 7/2/15    CHF (congestive heart failure)     Chronic airway obstruction     Chronic back pain     COPD (chronic obstructive pulmonary disease)     Endometriosis     Esophageal reflux     Essential hypertension     Hyperlipidemia     Migraine     Mild persistent asthma 8/30/2012    Osteopenia     Takotsubo cardiomyopathy        Past Surgical History:    Past Surgical History:   Procedure Laterality Date    ANGIOGRAM  07/02/2015    Minimal CAD. LV dysfunction, Mid RCA 20% stenosis, EF 20-25%, c/w Takotsubo cardiomyopathy    APPENDECTOMY  1974    CHOLECYSTECTOMY  1974    [facility]    COLONOSCOPY N/A 4/26/2018    Procedure: COLONOSCOPY;  COLONOSCOPY with biopsies;  Surgeon: Steve Acosta MD;  Location:  OR    CORONARY ANGIOGRAPHY ADULT ORDER      CV CORONARY ANGIOGRAM N/A 7/30/2020    Procedure: Coronary Angiogram;  Surgeon: Quinn Viveros MD;  Location:  HEART CARDIAC CATH LAB    CV LEFT HEART CATH N/A 7/30/2020    Procedure: Left Heart Cath;  Surgeon: Quinn Viveros MD;  Location:  HEART CARDIAC CATH LAB    CV LEFT VENTRICULOGRAM N/A 7/30/2020    Procedure: Left Ventriculogram;  Surgeon: Quinn Viveros MD;  Location:  HEART CARDIAC CATH LAB    CV RIGHT HEART CATH MEASUREMENTS RECORDED N/A 7/30/2020    Procedure: Right Heart Cath;  Surgeon: Quinn Viveros MD;  Location:  HEART CARDIAC CATH LAB    ESOPHAGOSCOPY, GASTROSCOPY, DUODENOSCOPY (EGD), COMBINED  11/15/2011    Procedure:COMBINED ESOPHAGOSCOPY, GASTROSCOPY, DUODENOSCOPY (EGD);  ESOPHAGOSCOPY, GASTROSCOPY, DUODENOSCOPY (EGD) ; Surgeon:JIM URENA; Location: GI    INJECT EPIDURAL LUMBAR / SACRAL SINGLE N/A 4/13/2018    Procedure: INJECT EPIDURAL LUMBAR / SACRAL CONTINUAL OR INTERMITTENT;  Lumbar  Epidural Steroid Injection;  Surgeon: Az Enamorado MD;  Location: [facility] OR    INJECT EPIDURAL LUMBAR / SACRAL SINGLE N/A 9/24/2018    Procedure: INJECT EPIDURAL LUMBAR / SACRAL SINGLE;  Lumbar Epidural Steroid Injection;  Surgeon: Az Enamorado MD;  Location: [facility] OR    LAPAROSCOPIC ABLATION ENDOMETRIOSIS  1998    LAPAROSCOPY DIAGNOSTIC (GYN)      endometriosis resection / lysis of adhesions    Nasal septoplasty and mucous retention cyst      TONSILLECTOMY, ADENOIDECTOMY, COMBINED      [place] teeth extraction          Physical Exam   No data found.     Physical Exam  Vital signs and nursing notes reviewed.    General:  Alert and oriented.  Speaking in full sentences and paragraphs.  Skin: Skin is warm and dry. No diaphoresis.  HEENT:   Head: Normocephalic, atraumatic. Facial features symmetric.   Eyes: Conjunctiva pink, sclera white. EOMs grossly intact.   Ears: Auricles without lesion, erythema, or edema.   Nose: Symmetric with no discharge.  Mouth and throat: Lips are moist with no lesions or edema, Buccal and oropharyngeal mucosa is pink and moist without lesions or exudate. Uvula is midline.  Neck: Normal range of motion. Neck supple with no lymphadenopathy. No tracheal deviation.   CV:  Heart RRR with no murmurs or extra heart sounds. 2+ radial and tibialis posterior pulses bilaterally. No peripheral edema.  Pulm/Chest: Diminished breath sounds diffusely.  No retractions.  Speaking in full sentences.  Hovers around 94% on room air.  Abd: Abdomen is soft and nontender to palpation in all 4 quadrants with no guarding or rebound.   M/S: Moves all extremities spontaneously.  Mild purple bruising to her bilateral anterior distal shins.  No bony tenderness.  Psych: Normal mood and affect. Behavior is normal.       Emergency Department Course     ECG results from 10/04/23   EKG 12-lead, tracing only     Value    Systolic Blood Pressure     Diastolic Blood Pressure     Ventricular Rate 98    Atrial Rate 98    MA  Interval 140    QRS Duration 110        QTc 459    P Axis 91    R AXIS 91    T Axis 48    Interpretation ECG      Sinus rhythm  Pulmonary disease pattern  Right bundle branch block  Abnormal ECG  When compared with ECG of 21-SEP-2023 00:46,  Nonspecific T wave abnormality no longer evident in Lateral leads  Confirmed by GENERATED REPORT, COMPUTER ([id number]),  Mounika Flores ([id number]) on 10/4/2023 6:51:31 PM       *Note: Due to a large number of results and/or encounters for the requested time period, some results have not been displayed. A complete set of results can be found in Results Review.     Imaging:  Chest XR,  PA & LAT   Final Result   IMPRESSION: No change. Heart size and pulmonary vessels are normal. Lungs are clear. Old right rib fractures. Multiple modest compressions of thoracic vertebral bodies.         Report per radiology    Laboratory:  Labs Ordered and Resulted from Time of ED Arrival to Time of ED Departure   COMPREHENSIVE METABOLIC PANEL - Abnormal       Result Value    Sodium 125 (*)     Potassium 4.3      Carbon Dioxide (CO2) 24      Anion Gap 10      Urea Nitrogen 15.7      Creatinine 0.50 (*)     GFR Estimate >90      Calcium 8.7 (*)     Chloride 91 (*)     Glucose 195 (*)     Alkaline Phosphatase 55      AST 8      ALT 10      Protein Total 5.6 (*)     Albumin 3.6      Bilirubin Total 0.4     TROPONIN T, HIGH SENSITIVITY - Abnormal    Troponin T, High Sensitivity 37 (*)    CBC WITH PLATELETS AND DIFFERENTIAL - Abnormal    WBC Count 18.9 (*)     RBC Count 4.54      Hemoglobin 13.6      Hematocrit 42.7      MCV 94      MCH 30.0      MCHC 31.9      RDW 13.3      Platelet Count 371      % Neutrophils 88      % Lymphocytes 5      % Monocytes 3      Mids % (Monos, Eos, Basos)        % Eosinophils 0      % Basophils 0      % Immature Granulocytes 4      NRBCs per 100 WBC 0      Absolute Neutrophils 16.7 (*)     Absolute Lymphocytes 1.0      Absolute Monocytes 0.5      Mids Abs (Monos, Eos,  Basos)        Absolute Eosinophils 0.0      Absolute Basophils 0.0      Absolute Immature Granulocytes 0.7 (*)     Absolute NRBCs 0.0     ROUTINE UA WITH MICROSCOPIC REFLEX TO CULTURE - Abnormal    Color Urine Light Yellow      Appearance Urine Clear      Glucose Urine Negative      Bilirubin Urine Negative      Ketones Urine Negative      Specific Gravity Urine 1.014      Blood Urine Negative      pH Urine 6.5      Protein Albumin Urine Negative      Urobilinogen Urine Normal      Nitrite Urine Negative      Leukocyte Esterase Urine Negative      Bacteria Urine Few (*)     Mucus Urine Present (*)     RBC Urine <1      WBC Urine 1      Squamous Epithelials Urine 2 (*)     Hyaline Casts Urine 5 (*)    TROPONIN T, HIGH SENSITIVITY - Abnormal    Troponin T, High Sensitivity 37 (*)    NT PROBNP INPATIENT - Normal    N terminal Pro BNP Inpatient 140     INFLUENZA A/B, RSV, & SARS-COV2 PCR - Normal    Influenza A PCR Negative      Influenza B PCR Negative      RSV PCR Negative      SARS CoV2 PCR Negative     URINE CULTURE        Procedures   None    Emergency Department Course & Assessments:         Interventions:  Medications   ipratropium - albuterol 0.5 mg/2.5 mg/3 mL (DUONEB) neb solution 3 mL (3 mLs Nebulization $Given 10/4/23 1831)     Independent Interpretation (X-rays, CTs, rhythm strip):  I reviewed the patient's CXR and appreciated no pneumothorax.    Consultations/Discussion of Management or Tests:  None       Assessments:  ED Course as of 10/04/23 2216   Wed Oct 04, 2023   1625 I initially assessed the patient and obtained the above history and physical exam.  Vandana leyva PA-C   1840 I reassessed the patient. She is receiving neb.  Vandana Leyva PA-C   1939 Troponin T, High Sensitivity(!): 37  Troponin is flat. Similar to baseline.   1955 I, Dr. Gil Sharma, spoke again with the patient.       Social Determinants of Health affecting care:   None    Disposition:  The patient was discharged to home.      Impression & Plan    CMS Diagnoses: None    Medical Decision Making:  Trisha Bender is a 68 year old female with a history of severe COPD and Takotsubo cardiomyopathy who presents for evaluation of episode of weakness as well as worsening shortness of breath that was improved with nebulizer treatments.  See HPI.  Her oxygen saturation is about baseline on room air are 94%.  She is afebrile.  On exam, patient is speaks in full sentences and is not in respiratory distress.  Chest x-ray is around baseline without evidence of acute pneumonia or pneumothorax.  UA is without evidence of infection.  COVID, flu, RSV are negative.  ECG is without evidence of acute ischemia or infarct or sinister arrhythmia.  Troponin is flat at 37, 2 separate x2 hours apart, which is at her baseline. Mo suggestions of ACS at this time. Low suspicion for PE at this time given the clinical context being similar to her past COPD exacerbations and no pleuritic chest pain or hemoptysis. She had negative chest CT when she was here 2 weeks ago. Her BNP is normal at 140, making acute heart failure related to her cardiomyopathy unlikely at this time.  CBC does reveal leukocytosis with a white blood cell count of 18.9.  This may be related to COPD flare or her recent steroid burst, increase from her baseline daily steroids.  The patient is found to be hyponatremic, also per her baseline.  Sodium is 125, mildly trended upward from previously.  She is already under a free water restriction diet.  The cause of the patient's weakness today, is not entirely clear, however it does sound that the patient is relatively immobile at baseline resting in her chair/bed most of the day.  We did discuss option for observation admission for PT/OT consult and further evaluation of her symptoms, but the patient is not interested in this at this time.  She is able to ambulate here with a walker without difficulty, and her O2 saturation is maintained at 93 to  94% on room air with ambulation.  Using shared decision making, and reasonable clinical judgment, the patient is safe for discharge home at this time and instructed to follow-up with her primary care provider in the next 3 days for reevaluation.  Fortunately, the patient does have appointments established with cardiology and pulmonology in the coming weeks for ongoing management of her chronic conditions.  Patient, and the patient's  who was in the room during this conversation, are agreeable to this plan and had their questions answered.  She is instructed to return to the emergency department with any further concerns or changes in her symptoms.    I staffed this patient with Dr. Sharma who agrees with the above assessment and plan.    Diagnosis:    ICD-10-CM    1. COPD exacerbation (H)  J44.1 Primary Care - Care Coordination Referral           Discharge Medications:  Discharge Medication List as of 10/4/2023  8:07 PM           Lizet Toledo PA-C on 10/4/2023 at 10:25 PM         Lizet Toledo PA-C  10/04/23 2228

## 2023-10-04 NOTE — ED TRIAGE NOTES
EMS: NEB in route, 12 lead, and 4mg zofran per EMS, 20 g R hand.Discharge FROM [facility] last week for COPD, now worsening at home with talking and any activity sob. Primary tele visits and was started on doxy and steroids and cough, today called EMS do to worsening sob.  in waiting room      Triage Assessment       Row Name 10/04/23 0893       Respiratory WDL    Respiratory WDL WDL       Skin Circulation/Temperature WDL    Skin Circulation/Temperature WDL X;circulation  vincenzo lower extrtety bruising and skin discoloration       Cardiac WDL    Cardiac WDL X       Peripheral/Neurovascular WDL    Peripheral Neurovascular WDL X;capillary refill    Capillary Refill, General greater than 3 secs    Capillary Refill, LUE less than/equal to 3 secs    Capillary Refill, RUE less than/equal to 3 secs    Capillary Refill, LLE greater than 3 secs    Capillary Refill, RLE greater than 3 secs       Cognitive/Neuro/Behavioral WDL    Cognitive/Neuro/Behavioral WDL WDL

## 2023-10-05 NOTE — PROGRESS NOTES
Clinic Care Coordination Contact  Clinic Care Coordination Contact  OUTREACH    Referral Information: RN CC contacted patient to introduce Care Coordination. She was agreeable to enrolling. Completed full assessment for enrollment in Care Coordination during outreach.   Referral Source: PCP    Primary Diagnosis: COPD    Chief Complaint   Patient presents with    Clinic Care Coordination - Initial      Universal Utilization: Risk of admission or ED visit 81.7%  Clinic Utilization  Difficulty keeping appointments:: No  Compliance Concerns: No  No-Show Concerns: No  No PCP office visit in Past Year: No  Utilization      Hospital Admissions  1             ED Visits  2             No Show Count (past year)  0                    Current as of: 10/5/2023  9:13 AM              Clinical Concerns:  Current Medical Concerns:  SOB with activity and does not qualify for supplemental oxygen.     Current Behavioral Concerns: None.     Education Provided to patient: Educated patient on Care Coordination: outreach schedule; resources available; shared responsibility of goals & goals to achieve.    Pain  Pain (GOAL):: No  Health Maintenance Reviewed: Due/Overdue (Discussed with patient.)  Clinical Pathway: None    Medication Management:  Medication review status: Medications reviewed on 10/4/23. Did not re-review during outreach.    Current Outpatient Medications   Medication    acetaminophen (TYLENOL) 500 MG tablet    albuterol (VENTOLIN HFA) 108 (90 BASE) MCG/ACT Inhaler    ALPRAZolam (XANAX) 0.25 MG tablet    atorvastatin (LIPITOR) 10 MG tablet    baclofen (LIORESAL) 10 MG tablet    benzonatate (TESSALON) 200 MG capsule    BREO ELLIPTA 200-25 MCG/INH Inhaler    carvedilol (COREG) 25 MG tablet    cetirizine (ZYRTEC) 10 MG tablet    [employer] FIBER GUMMIES 2.5 G CHEW    doxycycline hyclate (PERIOSTAT) 20 MG tablet    doxycycline hyclate (VIBRA-TABS) 100 MG tablet    fluticasone (FLONASE) 50 MCG/ACT nasal spray    guaiFENesin (ROBITUSSIN)  20 mg/mL liquid    HYDROcodone-acetaminophen (NORCO)  MG per tablet    ipratropium - albuterol 0.5 mg/2.5 mg/3 mL (DUONEB) 0.5-2.5 (3) MG/3ML neb solution    ketorolac (TORADOL) 10 MG tablet    lisinopril (ZESTRIL) 5 MG tablet    montelukast (SINGULAIR) 10 MG tablet    Multiple Vitamin CHEW    naloxone (NARCAN) 4 MG/0.1ML nasal spray    ondansetron (ZOFRAN ODT) 4 MG ODT tab    pantoprazole (PROTONIX) 40 MG EC tablet    predniSONE (DELTASONE) 20 MG tablet    roflumilast (DALIRESP) 500 MCG TABS tablet    sodium chloride (MOLLY 128) 5 % ophthalmic ointment    spironolactone (ALDACTONE) 25 MG tablet    SUMAtriptan (IMITREX) 25 MG tablet    tiotropium (SPIRIVA HANDIHALER) 18 MCG capsule    TRELEGY ELLIPTA 200-62.5-25 MCG/ACT oral inhaler     No current facility-administered medications for this visit.      Allergies   Allergen Reactions    Gabapentin Swelling    Contrast Dye      Iodine    Escitalopram      Nausea and sickness    Peanuts [Nuts] Hives    Penicillins      rash    Sulfa Antibiotics      High family history    Tace [Chlorotrianisene]      joint swelling    Aspirin Rash     After 3 days    Ibuprofen Nausea and Vomiting and Swelling    Strawberry Extract Rash     Functional Status:  Dependent ADLs:: Bathing, Dressing, Grooming (Uses a push chair when goes places.)  Dependent IADLs:: Transportation, Meal Preparation, Shopping, Laundry, Cooking, Cleaning  Bed or wheelchair confined:: No  Mobility Status: Independent w/Device  Fallen 2 or more times in the past year?: No  Any fall with injury in the past year?: No    Living Situation:  Current living arrangement:: I live in a private home with spouse  Type of residence:: Private home - stairs (Has a stair lift)    Lifestyle & Psychosocial Needs:    Social Determinants of Health     Food Insecurity: Low Risk  (10/5/2023)    Food Insecurity     Within the past 12 months, did you worry that your food would run out before you got money to buy more?: No      Within the past 12 months, did the food you bought just not last and you didn t have money to get more?: No   Depression: Not at risk (9/25/2023)    PHQ-2     PHQ-2 Score: 1   Housing Stability: Low Risk  (10/5/2023)    Housing Stability     Do you have housing? : Yes     Are you worried about losing your housing?: No   Tobacco Use: Medium Risk (9/25/2023)    Patient History     Smoking Tobacco Use: Former     Smokeless Tobacco Use: Never     Passive Exposure: Not on file   Financial Resource Strain: Low Risk  (10/5/2023)    Financial Resource Strain     Within the past 12 months, have you or your family members you live with been unable to get utilities (heat, electricity) when it was really needed?: No   Alcohol Use: Not on file   Transportation Needs: Low Risk  (10/5/2023)    Transportation Needs     Within the past 12 months, has lack of transportation kept you from medical appointments, getting your medicines, non-medical meetings or appointments, work, or from getting things that you need?: No   Physical Activity: Not on file   Interpersonal Safety: Low Risk  (10/5/2023)    Interpersonal Safety     Do you feel physically and emotionally safe where you currently live?: Yes     Within the past 12 months, have you been hit, slapped, kicked or otherwise physically hurt by someone?: No     Within the past 12 months, have you been humiliated or emotionally abused in other ways by your partner or ex-partner?: Not on file   Stress: Not on file   Social Connections: Not on file     Diet:: Regular  Inadequate nutrition (GOAL):: No  Tube Feeding: No  Inadequate activity/exercise (GOAL):: No  Significant changes in sleep pattern (GOAL): No  Transportation means:: Accessible car     Caodaism or spiritual beliefs that impact treatment:: No  Mental health DX:: No  Mental health management concern (GOAL):: No  Chemical Dependency Status: No Current Concerns  Chemical Dependency Management:  (N/A)  Informal Support system::  Spouse, Children, Neighbors      Resources and Interventions:  Current Resources:      Community Resources: None  Supplies Currently Used at Home: None  Equipment Currently Used at Home: raised toilet seat, shower chair, wheelchair, manual, other (see comments), walker, rolling (Push chair Not actual wheelchair)  Employment Status: retired     Advance Care Plan/Directive  Advanced Care Plans/Directives on file:: Yes  Advanced Care Plan/Directive Status: Declined Further Information    Referrals Placed: None    Care Plan:  Care Plan: Health Maintenance       Problem: Health Maintenance Due or Overdue       Goal: Become up-to-date with health maintenance visit(s)       Start Date: 10/5/2023 Expected End Date: 1/4/2024    This Visit's Progress: 0%    Note:     Barriers: Very short of breath with activity; Limited mobility; Provider availability - wait time to complete appointments.   Strengths: Motivated; Agreeable to Care Coordination.   Patient expressed understanding of goal: Yes.   Action steps to achieve this goal:  1. I will take my medications as prescribed.   2. I will discuss, review, schedule and complete recommended overdue health maintenance with my Primary Care Provider.   3. I will contact my care team with questions, concerns or support needs. I will use the clinic as a resource and I understand I can contact my clinic with 24/7 after hours services available. Care Coordinator will remain available as needed.                               Patient/Caregiver understanding: Patient/caregiver verbalized understanding and denies any additional questions or concerns at this time. RNCC engaged in [employer] communications during encounter.     Outreach Frequency: monthly    Future Appointments                In 1 week RU LAB [facility] RID    In 2 weeks Justina Finney DO M [facility] PSA CLIN    In 1 month Bandar Weinberg MD  [place], [place]          Plan: RN CC will send patient introduction letter with contact information and Complex Care Plan via her [employer] account. Verified patient has access to her [employer].     Cynthia Snell RN, BSN, CPHN, CM  [facility]  [facility]  Phone: [phone]  Email: [email or web address]

## 2023-10-05 NOTE — LETTER
[facility] CARE COORDINATION  [address] [facility] [address]    October 5, 2023    Trisha [place]  [address] [facility] [address]      Dear Cynthia,    I am a  clinic care coordinator who works with Bandar Weinberg MD with the [facility]. I wanted to introduce myself and provide you with my contact information for you to be able to call me with any questions or concerns. I wanted to thank you for spending the time to talk with me.  Below is a description of clinic care coordination and how I can further assist you.       The clinic care coordination team is made up of a registered nurse, , financial resource worker and community health worker who understand the health care system. The goal of clinic care coordination is to help you manage your health and improve access to the health care system. Our team works alongside your provider to assist you in determining your health and social needs. We can help you obtain health care and community resources, providing you with necessary information and education. We can work with you through any barriers and develop a care plan that helps coordinate and strengthen the communication between you and your care team.  Our services are voluntary and are offered without charge to you personally.    Please feel free to contact me with any questions or concerns regarding care coordination and what we can offer.      We are focused on providing you with the highest-quality healthcare experience possible.    Sincerely,     Cynthia Snell RN, BSN, CPHN, CM  [facility] Ambulatory Care Management  [facility]  Phone: [phone]  Email: [email or web address]      Enclosed: I have enclosed a copy of the Patient Centered Plan of Care. This has helpful information and goals that we have talked about. Please keep this in an easy to access place to use as needed.

## 2023-10-05 NOTE — LETTER
[facility]  Patient Centered Plan of Care  About Me:        Patient Name:  Trisha Bender    YOB: 1955  Age:         68 year old   [place] MRN:    [id number] Telephone Information:  Home Phone [phone]   Mobile [phone]       Address:  [address] [facility] [address] Email address:  [email or web address]      Emergency Contact(s)    Name Relationship Lgl Grd Work Phone Home Phone Mobile Phone   1. MANINDER BENDER Spouse No  [phone]   2. IGOR BENDER Son No   [phone]   3. ILIANA BENDER Son No   [phone]           Primary language:  English     needed? No   [place] [employer]:  [phone] op. 1  Other communication barriers:Other (Very Short of breath with activity)    Preferred Method of Communication:  Mail  Current living arrangement: I live in a private home with spouse    Mobility Status/ Medical Equipment: Independent w/Device    Health Maintenance  Health Maintenance Reviewed: Due/Overdue (Discussed with patient.)    My Access Plan  Medical Emergency 911   Primary Clinic Line [facility] - [phone]   24 Hour Appointment Line [phone] or  [phone]) (toll-free)   24 Hour Nurse Line [phone] (toll-free)   Preferred Urgent Care [facility], [phone]     Preferred Hospital [facility]  [phone]     Preferred Pharmacy [facility] [place], [place] - [address]     Behavioral Health Crisis Line The National Suicide Prevention Lifeline at 1-750.274.2297 or Text/Call 078     My Care Team Members  Patient Care Team         Relationship Specialty Notifications Start End    Bandar Weinberg MD PCP - General   2/15/02     Phone: [phone] Pager: [phone] Fax: [phone]        [address] [facility] [address]    Bandar Weinberg MD Assigned PCP   10/4/12     Phone: [phone]  Pager: [phone] Fax: [phone]        [address] [place] [address]    Thomas Valerio MD Referring Physician Pulmonary Disease  10/25/17     Phone: [phone] Fax: [phone]         [place] [facility] [address] [facility] [address]    Michael Odell MD (Inactive) MD Cardiology  2/16/18     Michael Odell MD (Inactive) MD Cardiology  2/26/18     Justina Finney DO MD Cardiovascular Disease  12/14/20     Phone: [phone] Fax: [phone]         [address] [place] [address]    Justina Finney DO Assigned Heart and Vascular Provider   9/24/22     Phone: [phone] Fax: [phone]         [address] [facility] [address]    Sheryl Ohara [facility] Assigned MTM Pharmacist   10/29/22     Phone: [phone] Pager: [phone]         [address] [facility] [address] [facility] [address]    Bandar Weinberg MD Assigned Pain Medication Provider   1/9/23     Phone: [phone] Pager: [phone] Fax: [phone]        [address] [place] [address]    Jessica Robles [facility] Pharmacist Pharmacist  7/31/23     Phone: [phone] Fax: [phone]         [address] [place] [address]    ASHLYN Snell, RN Lead Care Coordinator Primary Care - CC Admissions 10/5/23     Phone: [phone]                   My Care Plans  Self Management and Treatment Plan  Care Plan  Care Plan: Health Maintenance       Problem: Health Maintenance Due or Overdue       Goal: Become up-to-date with health maintenance visit(s)       Start Date: 10/5/2023 Expected End Date: 1/4/2024    This Visit's Progress: 0%    Note:     Barriers: Very short of breath with activity; Limited mobility; Provider availability - wait time to complete appointments.   Strengths: Motivated; Agreeable to Care Coordination.   Patient expressed understanding of goal: Yes.   Action steps to achieve this goal:  1. I will take my medications as prescribed.   2. I will discuss,  review, schedule and complete recommended overdue health maintenance with my Primary Care Provider.   3. I will contact my care team with questions, concerns or support needs. I will use the clinic as a resource and I understand I can contact my clinic with 24/7 after hours services available. Care Coordinator will remain available as needed.                                  Action Plans on File:     Advance Care Plans/Directives Type:   No data recorded    My Medical and Care Information  Problem List   Patient Active Problem List   Diagnosis    Essential hypertension with goal blood pressure less than 130/80    Chronic migraine without aura without status migrainosus, not intractable    Esophageal reflux    Disorder of bone and cartilage    Family history of malignant neoplasm of gastrointestinal tract    Chronic airway obstruction (H)    Cystocele    Family history of thyroid cancer - 1/2 brother    Anxiety    Chronic low back pain    Hair loss    Lateral epicondylitis of right elbow    Dilated cardiomyopathy (H)    Right bundle branch block (RBBB)    CHF (congestive heart failure) (H)    Coronary artery disease involving native coronary artery of native heart without angina pectoris    Mixed hyperlipidemia    Controlled substance agreement signed    Moderate persistent asthma without complication    Hypokalemia    Panlobular emphysema (H)    Tachycardia    Status post coronary angiogram    Thoracic aortic ectasia (H24)    Supraventricular tachycardia    Pressure ulcer of left buttock, stage 3 (H)    Hyponatremia    Pulmonary nodule    Elevated troponin    COPD exacerbation (H)      Current Medications and Allergies:    Current Outpatient Medications   Medication    acetaminophen (TYLENOL) 500 MG tablet    albuterol (VENTOLIN HFA) 108 (90 BASE) MCG/ACT Inhaler    ALPRAZolam (XANAX) 0.25 MG tablet    atorvastatin (LIPITOR) 10 MG tablet    baclofen (LIORESAL) 10 MG tablet    benzonatate (TESSALON) 200 MG capsule     BREO ELLIPTA 200-25 MCG/INH Inhaler    carvedilol (COREG) 25 MG tablet    cetirizine (ZYRTEC) 10 MG tablet    [employer] FIBER GUMMIES 2.5 G CHEW    doxycycline hyclate (PERIOSTAT) 20 MG tablet    doxycycline hyclate (VIBRA-TABS) 100 MG tablet    fluticasone (FLONASE) 50 MCG/ACT nasal spray    guaiFENesin (ROBITUSSIN) 20 mg/mL liquid    HYDROcodone-acetaminophen (NORCO)  MG per tablet    ipratropium - albuterol 0.5 mg/2.5 mg/3 mL (DUONEB) 0.5-2.5 (3) MG/3ML neb solution    ketorolac (TORADOL) 10 MG tablet    lisinopril (ZESTRIL) 5 MG tablet    montelukast (SINGULAIR) 10 MG tablet    Multiple Vitamin CHEW    naloxone (NARCAN) 4 MG/0.1ML nasal spray    ondansetron (ZOFRAN ODT) 4 MG ODT tab    pantoprazole (PROTONIX) 40 MG EC tablet    predniSONE (DELTASONE) 20 MG tablet    roflumilast (DALIRESP) 500 MCG TABS tablet    sodium chloride (MOLLY 128) 5 % ophthalmic ointment    spironolactone (ALDACTONE) 25 MG tablet    SUMAtriptan (IMITREX) 25 MG tablet    tiotropium (SPIRIVA HANDIHALER) 18 MCG capsule    TRELEGY ELLIPTA 200-62.5-25 MCG/ACT oral inhaler     No current facility-administered medications for this visit.      Allergies   Allergen Reactions    Gabapentin Swelling    Contrast Dye      Iodine    Escitalopram      Nausea and sickness    Peanuts [Nuts] Hives    Penicillins      rash    Sulfa Antibiotics      High family history    Tace [Chlorotrianisene]      joint swelling    Aspirin Rash     After 3 days    Ibuprofen Nausea and Vomiting and Swelling    Strawberry Extract Rash     Care Coordination Start Date: 10/5/2023   Frequency of Care Coordination: monthly     Form Last Updated: 10/05/2023

## 2023-10-20 NOTE — TELEPHONE ENCOUNTER
Yandy from  pharmacy calls. She states pt picked up the Norco but this is NOT the same  brand that she uses.     Pt reports to Yandy that this  has been reported as ineffective and a lot of people had side effects like nausea and headaches.   Pt told Yandy that she is NOT starting this medication.     She is asking for the [employer] brand  Norco Rx.   Yandy states they can get this brand in by Monday.     Yandy is asking if Dr Weinberg will reorder the Norco and Pt will bring in the full bottle to the pharmacy.   The [facility] has a disposal medication steel box, and they can witness pt discarding the medication, BEFORE selling her the new Norco brand.     Provider approval needed.

## 2023-10-20 NOTE — TELEPHONE ENCOUNTER
"Patient calls to report that pharmacy will order and give her the manufacture she wants - [employer] and is willing to \"exchange\" pills she picked up on 10/19/2023 with the manufacture - [employer]. Patient says she needs primary care provider to do a new script so she can get the updated medication with her requested manufacture.     Thank you,  Julien Yan, Triage RN [place]  3:44 PM 10/20/2023     "

## 2023-11-03 NOTE — PROGRESS NOTES
Clinic Care Coordination Contact  Follow Up Progress Note      Assessment: RN CC contacted patient for monthly outreach. She still is experiencing a terrible cough. She did get a new prescription for a different antibiotic and Robitussin (100% guiafenesin). Overall, she feels like she's improving. She has a productive cough still. Patient requested for RN CC to re-email contact information. Verified her email address in chart is valid.     Care Gaps:    Health Maintenance Due   Topic Date Due    HF ACTION PLAN  Never done    DEPRESSION ACTION PLAN  Never done    RSV VACCINE (Pregnancy & 60+) (1 - 1-dose 60+ series) Never done    COLORECTAL CANCER SCREENING  03/28/2021    MAMMO SCREENING  06/09/2023    INFLUENZA VACCINE (1) 09/01/2023    COVID-19 Vaccine (5 - 2023-24 season) 09/01/2023    ZOSTER IMMUNIZATION (3 of 3) 10/20/2023     Postponed to after she finishes her new antibiotic.      Care Plans  Care Plan: Health Maintenance       Problem: Health Maintenance Due or Overdue       Goal: Become up-to-date with health maintenance visit(s)       Start Date: 10/5/2023 Expected End Date: 1/4/2024    This Visit's Progress: 0%    Note:     Barriers: Very short of breath with activity; Limited mobility; Provider availability - wait time to complete appointments.   Strengths: Motivated; Agreeable to Care Coordination.   Patient expressed understanding of goal: Yes.   Action steps to achieve this goal:  1. I will take my medications as prescribed.   2. I will discuss, review, schedule and complete recommended overdue health maintenance with my Primary Care Provider.   3. I will contact my care team with questions, concerns or support needs. I will use the clinic as a resource and I understand I can contact my clinic with 24/7 after hours services available. Care Coordinator will remain available as needed.                               Intervention/Education provided during outreach: Discussed patients plan of care. CC RN  asked open ended questions, provided support, resources, and encouragement as needed. Patient will reach out to care team sooner than planned with new questions or concerns.     Plan: RN CC will continue to follow patient for any additional needs.     Care Coordinator will follow up in 1 month.     Cynthia Snell RN, BSN, CPHN, CM  [facility] Ambulatory Care Management  [facility]  Phone: [phone]  Email: [email or web address]

## 2023-11-13 NOTE — PROGRESS NOTES
Assessment & Plan       Chronic bilateral low back pain, unspecified whether sciatica present  Continue current treatment   Advised for side effects   - HYDROcodone-acetaminophen (NORCO)  MG per tablet; Take 1-2 tablets by mouth every 4 hours as needed for severe pain Take up to 8 tablets a day    Coronary artery disease involving native coronary artery of native heart without angina pectoris  No anginal symptoms   - lisinopril (ZESTRIL) 5 MG tablet; Take 1 tablet (5 mg) by mouth daily    Essential hypertension with goal blood pressure less than 130/80  Controlled on treatment   - CBC with platelets    Dilated cardiomyopathy (H)  Assess ECHO   - Echocardiogram Complete; Future    Hyponatremia  Recheck lab work , keep fluid restriction 1200 ml   - Comprehensive metabolic panel (BMP + Alb, Alk Phos, ALT, AST, Total. Bili, TP)    Grief reaction  Refer to psychology   - Adult Mental Health  Referral; Future    Chronic bronchitis, unspecified chronic bronchitis type (H)  Continue inhalers, prednisone taper, finished antibiotic treatment.              See Patient Instructions    Bandar Weinberg MD  [facility]    Mariama Marie is a 68 year old, presenting for the following health issues:  RECHECK        11/13/2023     2:59 PM   Additional Questions   Roomed by Raisa CARLOS   Accompanied by        History of Present Illness       Back Pain:  She presents for follow up of back pain. Patient's back pain is a chronic problem.  Location of back pain:  Right lower back, left lower back, right middle of back, left middle of back, right upper back, left upper back, left shoulder, left buttock and right side of waist  Description of back pain: burning, dull ache, gnawing, sharp, shooting and stabbing  Back pain spreads: right shoulder, left shoulder, right side of neck and left side of neck    Since patient first noticed back pain, pain is: always present, but gets better and  "worse  Does back pain interfere with her job:  Not applicable       Reason for visit:  General physical with attention to persistent cough present since end of September.  Medication refills.    She eats 0-1 servings of fruits and vegetables daily.She consumes 0 sweetened beverage(s) daily.She exercises with enough effort to increase her heart rate 9 or less minutes per day.  She exercises with enough effort to increase her heart rate 3 or less days per week.   She is taking medications regularly.       Patient is seen for a follow up visit.  Patient with her  here, upset for the death of her youngest son, 36 years old, found dead yesterday in his home, lives in different state.   Pt feels down depressed, desperate. Has support from her family.   Has COPD. Seen in ER a month ago. Treated with steroid taper. Had recurrent SOB, productive cough. I have prescribed Doxycycline and Prednisone taper.   On inhaled steroid, bronchodilators. Has had increased phlegm production. No fevers, chills, has chest pain with coughing. Feels week, no energy.   Has h/o dilated CMP. On Lisinopril, Spironolactone. No edema.   Has had hyponatremia, keeps fluid restriction. Decreased Spironolactone to 1/2 tablet daily.   Has chronic LBP. On narcotic analgesics, pain controlled. No side effects noted.           Review of Systems   Constitutional, HEENT, cardiovascular, pulmonary, GI, , musculoskeletal, neuro, skin, endocrine and psych systems are negative, except as otherwise noted.      Objective    /78 (BP Location: Left arm, Cuff Size: Adult Regular)   Pulse 105   Temp 98.1  F (36.7  C) (Tympanic)   Resp 20   Ht 1.575 m (5' 2\")   Wt 69.4 kg (153 lb)   LMP 09/20/2005   SpO2 97%   BMI 27.98 kg/m    Body mass index is 27.98 kg/m .  Physical Exam   GENERAL: frail , in a wheelchair, alert and no distress  NECK: no adenopathy, no asymmetry, masses, or scars and thyroid normal to palpation  RESP: lungs clear to " auscultation - no rales, rhonchi or wheezes, base fine crackles   CV: regular rate and rhythm, normal S1 S2, no S3 or S4, no murmur, click or rub, no peripheral edema and peripheral pulses strong  ABDOMEN: soft, nontender, no hepatosplenomegaly, no masses and bowel sounds normal  MS: no gross musculoskeletal defects noted, no edema    Admission on 10/04/2023, Discharged on 10/04/2023   Component Date Value Ref Range Status    Sodium 10/04/2023 125 (L)  135 - 145 mmol/L Final    Reference intervals for this test were updated on 09/26/2023 to more accurately reflect our healthy population. There may be differences in the flagging of prior results with similar values performed with this method. Interpretation of those prior results can be made in the context of the updated reference intervals.     Potassium 10/04/2023 4.3  3.4 - 5.3 mmol/L Final    Carbon Dioxide (CO2) 10/04/2023 24  22 - 29 mmol/L Final    Anion Gap 10/04/2023 10  7 - 15 mmol/L Final    Urea Nitrogen 10/04/2023 15.7  8.0 - 23.0 mg/dL Final    Creatinine 10/04/2023 0.50 (L)  0.51 - 0.95 mg/dL Final    GFR Estimate 10/04/2023 >90  >60 mL/min/1.73m2 Final    Calcium 10/04/2023 8.7 (L)  8.8 - 10.2 mg/dL Final    Chloride 10/04/2023 91 (L)  98 - 107 mmol/L Final    Glucose 10/04/2023 195 (H)  70 - 99 mg/dL Final    Alkaline Phosphatase 10/04/2023 55  35 - 104 U/L Final    AST 10/04/2023 8  0 - 45 U/L Final    Reference intervals for this test were updated on 6/12/2023 to more accurately reflect our healthy population. There may be differences in the flagging of prior results with similar values performed with this method. Interpretation of those prior results can be made in the context of the updated reference intervals.    ALT 10/04/2023 10  0 - 50 U/L Final    Reference intervals for this test were updated on 6/12/2023 to more accurately reflect our healthy population. There may be differences in the flagging of prior results with similar values  performed with this method. Interpretation of those prior results can be made in the context of the updated reference intervals.      Protein Total 10/04/2023 5.6 (L)  6.4 - 8.3 g/dL Final    Albumin 10/04/2023 3.6  3.5 - 5.2 g/dL Final    Bilirubin Total 10/04/2023 0.4  <=1.2 mg/dL Final    Troponin T, High Sensitivity 10/04/2023 37 (H)  <=14 ng/L Final    Either a High Sensitivity Troponin T baseline (0 hours) value = 100 ng/L, or an increase in High Sensitivity Troponin T = 7 ng/L at 2 hours compared to 0 hours (2-0 hours), suggests myocardial injury, and urgent clinical attention is required.    If the 2-0 hours increase is <7 ng/L, a High Sensitivity Troponin T result above gender-specific reference ranges warrants further evaluation.   Recommendations for further evaluation include correlation with clinical decision-making tool (e.g., HEART), a 3rd High Sensitivity Troponin T test 2 hours after the 2nd (a 20% change from baseline would represent concern), admission for observation, close PCC/cardiology follow-up, or urgent outpatient provocative testing.    N terminal Pro BNP Inpatient 10/04/2023 140  0 - 900 pg/mL Final    Reference range shown and results flagged as abnormal are suggested inpatient cut points for confirming diagnosis if CHF in an acute setting. Establishing a baseline value for each individual patient is useful for follow-up. An inpatient or emergency department NT-proPBNP <300 pg/mL effectively rules out acute CHF, with 99% negative predictive value.    The outpatient non-acute reference range for ruling out CHF is:  0-125 pg/mL (age 18 to less than 75)  0-450 pg/mL (age 75 yrs and older)     Influenza A PCR 10/04/2023 Negative  Negative Final    Influenza B PCR 10/04/2023 Negative  Negative Final    RSV PCR 10/04/2023 Negative  Negative Final    SARS CoV2 PCR 10/04/2023 Negative  Negative Final    NEGATIVE: SARS-CoV-2 (COVID-19) RNA not detected, presumed negative.    WBC Count 10/04/2023  18.9 (H)  4.0 - 11.0 10e3/uL Final    RBC Count 10/04/2023 4.54  3.80 - 5.20 10e6/uL Final    Hemoglobin 10/04/2023 13.6  11.7 - 15.7 g/dL Final    Hematocrit 10/04/2023 42.7  35.0 - 47.0 % Final    MCV 10/04/2023 94  78 - 100 fL Final    MCH 10/04/2023 30.0  26.5 - 33.0 pg Final    MCHC 10/04/2023 31.9  31.5 - 36.5 g/dL Final    RDW 10/04/2023 13.3  10.0 - 15.0 % Final    Platelet Count 10/04/2023 371  150 - 450 10e3/uL Final    % Neutrophils 10/04/2023 88  % Final    % Lymphocytes 10/04/2023 5  % Final    % Monocytes 10/04/2023 3  % Final    % Eosinophils 10/04/2023 0  % Final    % Basophils 10/04/2023 0  % Final    % Immature Granulocytes 10/04/2023 4  % Final    NRBCs per 100 WBC 10/04/2023 0  <1 /100 Final    Absolute Neutrophils 10/04/2023 16.7 (H)  1.6 - 8.3 10e3/uL Final    Absolute Lymphocytes 10/04/2023 1.0  0.8 - 5.3 10e3/uL Final    Absolute Monocytes 10/04/2023 0.5  0.0 - 1.3 10e3/uL Final    Absolute Eosinophils 10/04/2023 0.0  0.0 - 0.7 10e3/uL Final    Absolute Basophils 10/04/2023 0.0  0.0 - 0.2 10e3/uL Final    Absolute Immature Granulocytes 10/04/2023 0.7 (H)  <=0.4 10e3/uL Final    Absolute NRBCs 10/04/2023 0.0  10e3/uL Final    Ventricular Rate 10/04/2023 98  BPM Final    Atrial Rate 10/04/2023 98  BPM Final    ME Interval 10/04/2023 140  ms Final    QRS Duration 10/04/2023 110  ms Final    QT 10/04/2023 360  ms Final    QTc 10/04/2023 459  ms Final    P Axis 10/04/2023 91  degrees Final    R AXIS 10/04/2023 91  degrees Final    T Axis 10/04/2023 48  degrees Final    Interpretation ECG 10/04/2023    Final                    Value: Suspect arm lead reversal, interpretation assumes no reversal  Sinus rhythm  Pulmonary disease pattern  Right bundle branch block  Abnormal ECG  When compared with ECG of 21-SEP-2023 00:46,  Nonspecific T wave abnormality no longer evident in Lateral leads  Confirmed by GENERATED REPORT, COMPUTER ([id number]),  Mounika Flores ([id number]) on 10/4/2023 6:51:31 PM      Color  Urine 10/04/2023 Light Yellow  Colorless, Straw, Light Yellow, Yellow Final    Appearance Urine 10/04/2023 Clear  Clear Final    Glucose Urine 10/04/2023 Negative  Negative mg/dL Final    Bilirubin Urine 10/04/2023 Negative  Negative Final    Ketones Urine 10/04/2023 Negative  Negative mg/dL Final    Specific Gravity Urine 10/04/2023 1.014  1.003 - 1.035 Final    Blood Urine 10/04/2023 Negative  Negative Final    pH Urine 10/04/2023 6.5  5.0 - 7.0 Final    Protein Albumin Urine 10/04/2023 Negative  Negative mg/dL Final    Urobilinogen Urine 10/04/2023 Normal  Normal, 2.0 mg/dL Final    Nitrite Urine 10/04/2023 Negative  Negative Final    Leukocyte Esterase Urine 10/04/2023 Negative  Negative Final    Bacteria Urine 10/04/2023 Few (A)  None Seen /HPF Final    Mucus Urine 10/04/2023 Present (A)  None Seen /LPF Final    RBC Urine 10/04/2023 <1  <=2 /HPF Final    WBC Urine 10/04/2023 1  <=5 /HPF Final    Squamous Epithelials Urine 10/04/2023 2 (H)  <=1 /HPF Final    Hyaline Casts Urine 10/04/2023 5 (H)  <=2 /LPF Final    Culture 10/04/2023 <10,000 CFU/mL Urogenital anthony   Final    Troponin T, High Sensitivity 10/04/2023 37 (H)  <=14 ng/L Final    Either a High Sensitivity Troponin T baseline (0 hours) value = 100 ng/L, or an increase in High Sensitivity Troponin T = 7 ng/L at 2 hours compared to 0 hours (2-0 hours), suggests myocardial injury, and urgent clinical attention is required.    If the 2-0 hours increase is <7 ng/L, a High Sensitivity Troponin T result above gender-specific reference ranges warrants further evaluation.   Recommendations for further evaluation include correlation with clinical decision-making tool (e.g., HEART), a 3rd High Sensitivity Troponin T test 2 hours after the 2nd (a 20% change from baseline would represent concern), admission for observation, close PCC/cardiology follow-up, or urgent outpatient provocative testing.

## 2023-11-13 NOTE — LETTER
November 15, 2023      Cynthia Bender  [address] [facility] [id number]        Dear ,    We are writing to inform you of your test results.    Your test results fall within the expected range(s) or remain unchanged from previous results.  Please continue with current treatment plan.    Resulted Orders   CBC with platelets   Result Value Ref Range    WBC Count 12.5 (H) 4.0 - 11.0 10e3/uL    RBC Count 4.30 3.80 - 5.20 10e6/uL    Hemoglobin 13.2 11.7 - 15.7 g/dL    Hematocrit 41.4 35.0 - 47.0 %    MCV 96 78 - 100 fL    MCH 30.7 26.5 - 33.0 pg    MCHC 31.9 31.5 - 36.5 g/dL    RDW 14.1 10.0 - 15.0 %    Platelet Count 297 150 - 450 10e3/uL   Comprehensive metabolic panel (BMP + Alb, Alk Phos, ALT, AST, Total. Bili, TP)   Result Value Ref Range    Sodium 134 (L) 135 - 145 mmol/L      Comment:      Reference intervals for this test were updated on 09/26/2023 to more accurately reflect our healthy population. There may be differences in the flagging of prior results with similar values performed with this method. Interpretation of those prior results can be made in the context of the updated reference intervals.     Potassium 4.1 3.4 - 5.3 mmol/L    Carbon Dioxide (CO2) 26 22 - 29 mmol/L    Anion Gap 8 7 - 15 mmol/L    Urea Nitrogen 7.5 (L) 8.0 - 23.0 mg/dL    Creatinine 0.76 0.51 - 0.95 mg/dL    GFR Estimate 85 >60 mL/min/1.73m2    Calcium 9.1 8.8 - 10.2 mg/dL    Chloride 100 98 - 107 mmol/L    Glucose 128 (H) 70 - 99 mg/dL    Alkaline Phosphatase 42 35 - 104 U/L    AST 16 0 - 45 U/L      Comment:      Reference intervals for this test were updated on 6/12/2023 to more accurately reflect our healthy population. There may be differences in the flagging of prior results with similar values performed with this method. Interpretation of those prior results can be made in the context of the updated reference intervals.    ALT 9 0 - 50 U/L      Comment:      Reference intervals for this test were updated on 6/12/2023  to more accurately reflect our healthy population. There may be differences in the flagging of prior results with similar values performed with this method. Interpretation of those prior results can be made in the context of the updated reference intervals.      Protein Total 5.2 (L) 6.4 - 8.3 g/dL    Albumin 3.5 3.5 - 5.2 g/dL    Bilirubin Total 0.3 <=1.2 mg/dL       If you have any questions or concerns, please call the clinic at the number listed above.       Sincerely,      Bandar Weinberg MD

## 2023-11-24 NOTE — PROGRESS NOTES
Injectable Influenza Immunization Documentation    1.  Is the person to be vaccinated sick today?   No    2. Does the person to be vaccinated have an allergy to a component   of the vaccine?   No  Egg Allergy Algorithm Link    3. Has the person to be vaccinated ever had a serious reaction   to influenza vaccine in the past?   No    4. Has the person to be vaccinated ever had Guillain-Barré syndrome?   No    Form completed by Madhavi Garcia MA            Admission Reconciliation is Completed  Discharge Reconciliation is Not Complete Admission Reconciliation is Completed  Discharge Reconciliation is Completed

## 2023-11-29 NOTE — LETTER
Karlee Marie,     Here is a list of transportation resources. Please review them to see what will work for your situation. Give me a call if you need anything else.     Cynthia Snell RN, BSN, CPHN, CM  [facility] Management  [facility]  Phone: [phone]  Email: [email or web address]      Transportation Services      Telephone/Fax           Special Services and Notes     [employer] Transportation   [phone]   [email or web address]   Private pay only, Door thru Door, has an accessible Vehicle, $$$ Serves [facility] area   [address] [place] [address]     [employer]     [phone]   Medical Appointments, Door thru door, Saturday services with 48 hours notice, billed through insurance, [facility]     Aging Services for Communities  [phone]   Email: [email or web address], [email or web address]    [email or web address]    Transport through [employer]ascota[employer]ube, 365looks, [employer], and [facility], [place] [employer].  Counties served: [place], [place], [facility].   [address] [place] [address]     [employer]   [phone]   Contract with: ONLY Maxine Marcus and Jenifer Speaking drivers     ANT ([employer] Transportation) [phone]   Contract with: ONLY MNET-straight [place]     [employer] Transportation   [phone]   Contract with: Maxine, Blue Plus, [facility], [facility], [facility]     [phone]   Door thru door $$$, [facility] Transportation  [phone]   [email or web address]/transportation/    A ride service for seniors in [facility].   [address] [place] [address]     [facility] Transportation   [phone]   For disabled and/or ages 60 and older.   [address], [place] [address]     [employer] Transportation   [phone]   Non-Emergency Medical, Door thru door, $$ Metro  Area     Go Go Grandparent    [phone]   Email: support@VBOX   https://Keyword Rockstar.[employer] Freight Transport    A service that uses [employer]ix and Uber. Participants can use the 24 hotline to call rather than using a smart phone to make trips. Kids and received text messages to alert them of pick ups and drop offs. Small convenience fee is added to overall trip fare   [id number]   [place] [address]     Help at Your Door/Help for Seniors in the [facility] Area  [phone]   https://helpatyourdoor.org/   Grocery shopping for seniors and individuals with disabilities/$5 - $15 fee depending on income.    services for seniors - $35/hr minimum 2 hrs.   Transportation for seniors - $35/trip up to 20 miles round trip - $1.90 each additional mile.   to start up in October.  Will contact [employer] when transportation is available in our area.   [address], [place] [address]       [employer]     [phone]   24/7 but call ahead, ground or air. Options available as appropriate.     Lyft   Lyft.com   Curb to Curb     [employer]  [phone]   Email: [email or web address]   https://www.Worksurfers/arhr-wltccj-evhdmtotbd/transportation.html    A dtbs-ui-mnpo senior transportation service based in [place], [place].   [address]   [place] [address]     [employer]     [phone]   Email: [email or web address].    https://metrocouncil.org/Transportation/Services/Metro-Mobility-Home.aspx   Must be approved first.    Shared public transportation.   For individuals with disabilities as certified by Metro SIZESEEKER.   Reservations taken 7 days a week.  Call in advance, up to four days.     *ADA certified riders unable to schedule a ride on Metro Mobility can be reimbursed up to $20 if they use a private taxi.  Rides are for any purpose.   Application:  https://[place].RaisedDigital/Transportation/Services/Metro-Mobility-Home/Forms/Metro-Mobility-Application-Form.aspx      Certification Guide: https://[place].RaisedDigital/Transportation/Services/Metro-Mobility-Home/Guides/MM-Certification-Guide.aspx        Medical Transportation Management (MTM) [phone]   Call Center  [phone]   Door thru door     Metro Transit     [phone]   [phone]   [place]   Metro Transit Regional Ridematch/Vanpool   [email or web address]/ridematch   Metrotransit.[email or web address]/vanpool   Curb to curb, $$ [facility] and [facility] Transit Authority (TA)  [phone]   Reservations [phone]   Bus Stop to Bus Stop, $ [place], [place], [place], [place], [place], [place] and [place]     Neighborhood Network for Seniors    [phone]   Sliding Scale     [facility]   [phone]     [facility]     [phone]   MA and Private Ins or Private Pay available 24/7.     People s Express     [phone]   [facility]   HMO & Private pay - No straight MA     Reliable Senior     [phone]     Road to Recovery Program; American Cancer Society  [phone]   Cancer.org Cancer treatment appointments, Curb to curb, Free [facility] and [facility] area     Safe Ride [facility]     [phone]   Medical transport: Accept medical assistance or private pay         [facility] Care transportation    [phone]   Contract with: [employer], [employer].   [facility]   Hmong and Lebanese speaking drivers     Transit Link      [phone]   May only be used for routes outside of the normal [facility] Center[facility] transit lines   Curb to curb transportation   May reserve a ride up to five days in advance   Any type of ride   The base fare is $3.50, one way.     Uber Uber.com   Curb to Curb, $$     Assisted Transport    Telephone/Fax  Special Services and Notes     [employer]    [phone]    Email: [email or web address]    [email or web address]/    Serves [place], [place], [place], [place], [place], [place], and [facility]   Some insurances accepted.    Can be used for colonoscopies. Will bring a patient into their homes   [address], [place] [address]     [facility]    Telephone/Fax  Special Services and Notes     [facility] transport guide   https://www.co.[place].us/Transportation/GettingAround/Documents/[facility] Psychiatric HospitalTransportationResourceGuide.pdf     [employer]     [phone]   Low Cost [facility] Transportation   - Requests must be received no later than noon of the day before the service is needed.  wheelchair lift equipped   Direct- Group rides $80 an hour, Select- individual rides $23.50 plus $1.75 per mile     Dial a Ride     [phone]   https://www.Hemophilia Resources of [employer] Systems/dial-ride   Volunteer service that can do door to door for appointments     [employer]    [phone]   https://Renkoo.com/   Priority given to Veterans and medical apts, Curb to curb, Must make transportation rides two weeks in [facility] for medical appointments   Medical rides are elevated in importance over other types of rides   MUST MAKE RIDES TWO WEEKS IN ADVANCED   Transportation is free but they do accept donations

## 2023-11-29 NOTE — PROGRESS NOTES
Clinic Care Coordination Contact  Follow Up Progress Note      Assessment: RN CC received VM from patient requesting transportation resources for upcoming appointments. She has trouble getting out of the car. Patient is due for monthly outreach at this time. She had her flu shot at last appointment 11/15/23. Primary Care Provider recommended she not take the vaccinations at the same time.     Care Gaps:    Health Maintenance Due   Topic Date Due    HF ACTION PLAN  Never done    DEPRESSION ACTION PLAN  Never done    RSV VACCINE (Pregnancy & 60+) (1 - 1-dose 60+ series) Never done    COLORECTAL CANCER SCREENING  03/28/2021    MAMMO SCREENING  06/09/2023    COVID-19 Vaccine (5 - 2023-24 season) 09/01/2023    ZOSTER IMMUNIZATION (3 of 3) 10/20/2023     Postponed to space them apart.      Care Plans  Care Plan: Health Maintenance       Problem: Health Maintenance Due or Overdue       Goal: Become up-to-date with health maintenance visit(s)       Start Date: 10/5/2023 Expected End Date: 1/4/2024    This Visit's Progress: 0%    Note:     Barriers: Very short of breath with activity; Limited mobility; Provider availability - wait time to complete appointments.   Strengths: Motivated; Agreeable to Care Coordination.   Patient expressed understanding of goal: Yes.   Action steps to achieve this goal:  1. I will take my medications as prescribed.   2. I will discuss, review, schedule and complete recommended overdue health maintenance with my Primary Care Provider.   3. I will contact my care team with questions, concerns or support needs. I will use the clinic as a resource and I understand I can contact my clinic with 24/7 after hours services available. Care Coordinator will remain available as needed.                               Intervention/Education provided during outreach: Discussed patients plan of care. CC RN asked open ended questions, provided support, resources, and encouragement as needed. Patient will reach  out to care team sooner than planned with new questions or concerns.     Plan: RN CC will send patient transportation resources via her [employer] account and will continue to follow patient for any additional needs.     Care Coordinator will follow up in 1 month.     Cynthia Snell RN, BSN, CPHN, CM  [facility] Ambulatory Care Management  [facility]  Phone: [phone]  Email: [email or web address]

## 2023-12-01 NOTE — TELEPHONE ENCOUNTER
Dr. Finney, patient is having mobility issues and transportation is difficult.  Patient would like to know if this years annual visit can be virtual?  Patient seen by PCP 11/13/23:    Patient is seen for a follow up visit.  Patient with her  here, upset for the death of her youngest son, 36 years old, found dead yesterday in his home, lives in different state.   Pt feels down depressed, desperate. Has support from her family.   Has COPD. Seen in ER a month ago. Treated with steroid taper. Had recurrent SOB, productive cough. I have prescribed Doxycycline and Prednisone taper.   On inhaled steroid, bronchodilators. Has had increased phlegm production. No fevers, chills, has chest pain with coughing. Feels week, no energy.   Has h/o dilated CMP. On Lisinopril, Spironolactone. No edema.   Has had hyponatremia, keeps fluid restriction. Decreased Spironolactone to 1/2 tablet daily.   Has chronic LBP. On narcotic analgesics, pain controlled. No side effects noted.     Marya Aguilar RN on 12/1/2023 at 12:36 PM

## 2023-12-01 NOTE — TELEPHONE ENCOUNTER
[facility] Call Center    Phone Message    May a detailed message be left on voicemail: yes     Reason for Call: Other: Pt would like a call back to discuss having her appt turned into a virtual appt as she is having a hard time getting out of the car     Action Taken: Other: Cardio    Travel Screening: Not Applicable

## 2023-12-04 NOTE — TELEPHONE ENCOUNTER
[facility] Call Center    Phone Message    May a detailed message be left on voicemail: yes     Reason for Call: Other: Patient called requesting to change her in person visit that is scheduled for 12/8 at 4:45 PM to a virtual visit. Attempted to make this change but was unable. Patient is having mobility issues, and is awaiting a response. Please call patient back to further discuss and coordinate.    Action Taken: Message routed to:  Other: Cardiology    Travel Screening: Not Applicable    Thank you!  [facility]

## 2023-12-06 NOTE — TELEPHONE ENCOUNTER
Justina Finney DO Lidke, Jen M, RN  Caller: Unspecified (5 days ago, 11:47 AM)  That's fine.    Message to scheduling to offer virtual to patient.  Marya Aguilar, JACKIE on 12/6/2023 at 4:41 PM

## 2023-12-08 NOTE — PROGRESS NOTES
"Cynthia is a 68 year old who is being evaluated via a billable video visit.      How would you like to obtain your AVS? MyChart  If the video visit is dropped, the invitation should be resent by: {video visit invitation (Optional) :411943}  Will anyone else be joining your video visit? {:[id number]}  {If patient encounters technical issues they should call [phone] :311091}      Video-Visit Details    Type of service:  Video Visit   Video Start Time: {video visit start/end time for provider to select:640956}  Video End Time:{video visit start/end time for provider to select:586848}    Originating Location (pt. Location): {video visit patient location:174651::\"Home\"}  {PROVIDER LOCATION On-site should be selected for visits conducted from your clinic location or adjoining [facility] hospital, academic office, or other nearby [facility] building. Off-site should be selected for all other provider locations, including home:186732}  Distant Location (provider location):  {virtual location provider:202388}  Platform used for Video Visit: {Virtual Visit Platforms:402077::\"[employer]\"}     Patient reported vitals:  BP:123/82 (last reported Bp 11/13/23) 99/64 (last night)  Heart rate: n/a  Weight: 154 lb    Review Of Systems    Respiratory: Shortness of breath,Dyspnea on exertion, Cough  Cardiovascular: palpitationspulse rate has been high lately    Tima Coronel - EMS 12/8/23  "

## 2023-12-08 NOTE — PROGRESS NOTES
HPI and Plan:   Trisha Bender is a 68 year old female who presents for video visit with history of nonischemic cardiomyopathy with ejection fraction estimated around 35 to 40% and underlying severe COPD.  She was recently seen in the emergency department with upper respiratory symptoms and diagnosed with a COPD exacerbation.  She did have some cardiac testing including NT proBNP which was within normal range at 140 and troponin levels which were very mildly elevated at 37.  She was treated for COPD exacerbation and given antibiotics and she is slowly improving.  She is not experiencing any symptoms suggestive of orthopnea or PND and her breathing is slowly improving.  She was noted to have a very low sodium level measuring 125.  She decreased her spironolactone dose from 25 down to 12-1/2 mg and with that her sodium increased on last check November 13, to 134.  She continues to take 12-1/2 mg of spironolactone and she has not had any recurrent swelling issues on the lower dose.  Her potassium levels remain within normal range.  She was supposed to have a echocardiogram done prior to this appointment but because of her recent illness and difficulty with mobility this visit was changed to a video visit and she did not complete the echocardiogram.    Summary    1.  Nonischemic cardiomyopathy-she has recent exacerbation of her breathing however it appears that she had an upper respiratory infection and COPD exacerbation.  Clinical history does not support decompensated heart failure at this time.  She is maintained on lisinopril and half dose Aldactone due to the side effect of hyponatremia. I did renew her prescriptions for her today.  I will be happy to see her back as needed, please feel free to contact me with any questions you have regards to her care         Today's clinic visit entailed:  Review of external notes as documented elsewhere in note  Review of the result(s) of each unique test - troponin, NTBNP,  CXR, BMP  Prescription drug management    Provider  Link to [facility] Help Grid         No orders of the defined types were placed in this encounter.    Orders Placed This Encounter   Medications    atorvastatin (LIPITOR) 10 MG tablet     Sig: Take 1 tablet (10 mg) by mouth daily     Dispense:  90 tablet     Refill:  4    carvedilol (COREG) 25 MG tablet     Sig: Take 1 tablet (25 mg) by mouth 2 times daily (with meals)     Dispense:  180 tablet     Refill:  4    spironolactone (ALDACTONE) 25 MG tablet     Sig: Take 0.5 tablets (12.5 mg) by mouth daily     Dispense:  90 tablet     Refill:  4     Medications Discontinued During This Encounter   Medication Reason    atorvastatin (LIPITOR) 10 MG tablet Reorder (No AVS)    carvedilol (COREG) 25 MG tablet Reorder (No AVS)    spironolactone (ALDACTONE) 25 MG tablet Reorder (No AVS)         Encounter Diagnoses   Name Primary?    Dilated cardiomyopathy (H)     Coronary artery disease involving native coronary artery of native heart without angina pectoris     Mixed hyperlipidemia     CANNON (dyspnea on exertion)     Tachycardia     Hyponatremia     Chronic systolic congestive heart failure (H)        CURRENT MEDICATIONS:  Current Outpatient Medications   Medication Sig Dispense Refill    acetaminophen (TYLENOL) 500 MG tablet Take 750 mg by mouth every 6 hours as needed for mild pain      albuterol (VENTOLIN HFA) 108 (90 BASE) MCG/ACT Inhaler Inhale 2 puffs into the lungs every 6 hours as needed for shortness of breath / dyspnea or wheezing 18 g 3    ALPRAZolam (XANAX) 0.25 MG tablet Take 1 tablet (0.25 mg) by mouth 2 times daily as needed for anxiety 60 tablet 0    atorvastatin (LIPITOR) 10 MG tablet Take 1 tablet (10 mg) by mouth daily 90 tablet 4    baclofen (LIORESAL) 10 MG tablet Take 10 mg by mouth 3 times daily as needed for muscle spasms      BREO ELLIPTA 200-25 MCG/INH Inhaler Inhale 1 Dose into the lungs daily   12    carvedilol (COREG) 25 MG tablet Take 1 tablet (25 mg) by  mouth 2 times daily (with meals) 180 tablet 4    cetirizine (ZYRTEC) 10 MG tablet Take 10 mg by mouth daily      [employer] FIBER GUMMIES 2.5 G CHEW Take 1 tablet by mouth daily Reported on 5/8/2017      doxycycline hyclate (PERIOSTAT) 20 MG tablet Take 1 tablet (20 mg) by mouth daily 20 tablet 0    doxycycline hyclate (VIBRA-TABS) 100 MG tablet TAKE ONE TABLET BY MOUTH TWICE A DAY 20 tablet 0    fluticasone (FLONASE) 50 MCG/ACT nasal spray Spray 1-2 sprays into both nostrils daily as needed for rhinitis or allergies      guaiFENesin (CHEST CONGESTION RELIEF) 20 mg/mL liquid Take 10 mLs (200 mg) by mouth every 6 hours as needed for cough 180 mL 1    HYDROcodone-acetaminophen (NORCO)  MG per tablet Take 1-2 tablets by mouth every 4 hours as needed for severe pain Take up to 8 tablets a day 240 tablet 0    ipratropium - albuterol 0.5 mg/2.5 mg/3 mL (DUONEB) 0.5-2.5 (3) MG/3ML neb solution Take 1 vial (3 mLs) by nebulization every 6 hours as needed for shortness of breath or wheezing 30 mL 3    ketorolac (TORADOL) 10 MG tablet Take 1 tablet (10 mg) by mouth every 6 hours as needed for moderate pain 20 tablet 0    lisinopril (ZESTRIL) 5 MG tablet Take 1 tablet (5 mg) by mouth daily 90 tablet 3    montelukast (SINGULAIR) 10 MG tablet TAKE ONE TABLET BY MOUTH AT BEDTIME 90 tablet 1    Multiple Vitamin CHEW Take 1 tablet by mouth daily       naloxone (NARCAN) 4 MG/0.1ML nasal spray Spray 1 spray (4 mg) into one nostril alternating nostrils once as needed for opioid reversal Every 2-3 minutes until patient responsive or EMS arrives 0.2 mL 0    ondansetron (ZOFRAN ODT) 4 MG ODT tab TAKE 1-2 TABLETS BY MOUTH EVERY 8 HOURS AS NEEDED FOR NAUSEA. 18 tablet 3    pantoprazole (PROTONIX) 40 MG EC tablet Take 1 tablet (40 mg) by mouth daily 90 tablet 1    predniSONE (DELTASONE) 20 MG tablet TAKE 2 TABLETS (40 MG) BY MOUTH DAILY FOR 3 DAYS, THEN 1 TABLET (20 MG) DAILY FOR 3 DAYS, THEN TAKE ONE-HALF TABLET (10 MG) DAILY FOR 3 DAYS. 12  tablet 0    roflumilast (DALIRESP) 500 MCG TABS tablet Take 1 tablet (500 mcg) by mouth daily      sodium chloride (MOLLY 128) 5 % ophthalmic ointment Place 1 Application into both eyes every evening      spironolactone (ALDACTONE) 25 MG tablet Take 0.5 tablets (12.5 mg) by mouth daily 90 tablet 4    SUMAtriptan (IMITREX) 25 MG tablet Take 1 tablet (25 mg) by mouth at onset of headache for migraine 9 tablet 11    tiotropium (SPIRIVA HANDIHALER) 18 MCG capsule USING THE HANDIHALER, INHALE THE CONTENTS OF ONE CAPSULE BY MOUTH DAILY 90 capsule 3    TRELEGY ELLIPTA 200-62.5-25 MCG/ACT oral inhaler INHALE 1 PUFF BY INHALATION ROUTE EVERY DAY AT THE SAME TIME EACH DAY         ALLERGIES     Allergies   Allergen Reactions    Gabapentin Swelling    Contrast Dye      Iodine    Escitalopram      Nausea and sickness    Peanuts [Nuts] Hives    Penicillins      rash    Sulfa Antibiotics      High family history    Tace [Chlorotrianisene]      joint swelling    Aspirin Rash     After 3 days    Ibuprofen Nausea and Vomiting and Swelling    Strawberry Extract Rash       PAST MEDICAL HISTORY:  Past Medical History:   Diagnosis Date    Anxiety     CAD (coronary artery disease) 7/2/15    mild per cath    CHF (congestive heart failure)     EF=20-25% per echo 6/30/15    Chronic airway obstruction     FEV1 36% of pred; VC 67%  in Jan 06.    Chronic back pain     COPD (chronic obstructive pulmonary disease)     Endometriosis     Esophageal reflux     Essential hypertension     Hyperlipidemia     Migraine     Mild persistent asthma 8/30/2012    Osteopenia     Takotsubo cardiomyopathy        PAST SURGICAL HISTORY:  Past Surgical History:   Procedure Laterality Date    ANGIOGRAM  07/02/2015    Minimal CAD. LV dysfunction, Mid RCA 20% stenosis, EF 20-25%, c/w Takotsubo cardiomyopathy    APPENDECTOMY  1974    CHOLECYSTECTOMY  1974    [facility]    COLONOSCOPY N/A 4/26/2018    Procedure: COLONOSCOPY;  COLONOSCOPY with biopsies;   Surgeon: Steve Acosta MD;  Location: [facility] OR    CORONARY ANGIOGRAPHY ADULT ORDER      CV CORONARY ANGIOGRAM N/A 2020    Procedure: Coronary Angiogram;  Surgeon: Quinn Viveros MD;  Location: [facility] CARD[facility] CATH LAB    CV LEFT HEART CATH N/A 2020    Procedure: Left Heart Cath;  Surgeon: Quinn Viveros MD;  Location: [facility]    CV LEFT VENTRICULOGRAM N/A 2020    Procedure: Left Ventriculogram;  Surgeon: Quinn Viveros MD;  Location: [facility]    CV RIGHT HEART CATH MEASUREMENTS RECORDED N/A 2020    Procedure: Right Heart Cath;  Surgeon: Quinn Viveros MD;  Location: [facility]ARD[facility]    ESOPHAGOSCOPY, GASTROSCOPY, DUODENOSCOPY (EGD), COMBINED  11/15/2011    Procedure:COMBINED ESOPHAGOSCOPY, GASTROSCOPY, DUODENOSCOPY (EGD);  ESOPHAGOSCOPY, GASTROSCOPY, DUODENOSCOPY (EGD) ; Surgeon:JIM UREAN; Location:[facility] GI    INJECT EPIDURAL LUMBAR / SACRAL SINGLE N/A 2018    Procedure: INJECT EPIDURAL LUMBAR / SACRAL CONTINUAL OR INTERMITTENT;  Lumbar Epidural Steroid Injection;  Surgeon: Az Enamorado MD;  Location: [facility] OR    INJECT EPIDURAL LUMBAR / SACRAL SINGLE N/A 2018    Procedure: INJECT EPIDURAL LUMBAR / SACRAL SINGLE;  Lumbar Epidural Steroid Injection;  Surgeon: Az Enamorado MD;  Location: [facility] OR    LAPAROSCOPIC ABLATION ENDOMETRIOSIS      LAPAROSCOPY DIAGNOSTIC (GYN)      endometriosis resection / lysis of adhesions    Nasal septoplasty and mucous retention cyst      TONSILLECTOMY, ADENOIDECTOMY, COMBINED      Metaline teeth extraction         FAMILY HISTORY:  Family History   Problem Relation Age of Onset    Cancer Mother         Colon cancer; dxed at age 64;  at age 69    Cancer - colorectal Mother     Other Cancer Mother     Gastrointestinal Disease Sister         Acute pancreatitis    Heart Disease Father         ?    Coronary Artery Disease Father     Circulatory Maternal Aunt         AAA    Circulatory  Maternal Uncle         AAA    Diabetes Son        SOCIAL HISTORY:  Social History     Socioeconomic History    Marital status:    Tobacco Use    Smoking status: Former     Packs/day: 1.00     Years: 30.00     Additional pack years: 0.00     Total pack years: 30.00     Types: Cigarettes     Quit date: 2006     Years since quittin.9    Smokeless tobacco: Never    Tobacco comments:     Quit in    Vaping Use    Vaping Use: Never used   Substance and Sexual Activity    Alcohol use: No     Alcohol/week: 0.0 standard drinks of alcohol    Drug use: No    Sexual activity: Never     Partners: Male     Birth control/protection: Surgical     Comment: vasectomy   Other Topics Concern    Caffeine Concern No     Comment: none    Hobby Hazards No    Stress Concern No    Special Diet Yes     Comment: low sodium    Exercise Yes     Comment: limited due to back     Social Determinants of Health     Financial Resource Strain: Low Risk  (2023)    Financial Resource Strain     Within the past 12 months, have you or your family members you live with been unable to get utilities (heat, electricity) when it was really needed?: No   Food Insecurity: Low Risk  (2023)    Food Insecurity     Within the past 12 months, did you worry that your food would run out before you got money to buy more?: No     Within the past 12 months, did the food you bought just not last and you didn t have money to get more?: No   Transportation Needs: Low Risk  (2023)    Transportation Needs     Within the past 12 months, has lack of transportation kept you from medical appointments, getting your medicines, non-medical meetings or appointments, work, or from getting things that you need?: No   Interpersonal Safety: Low Risk  (10/5/2023)    Interpersonal Safety     Do you feel physically and emotionally safe where you currently live?: Yes     Within the past 12 months, have you been hit, slapped, kicked or otherwise physically  hurt by someone?: No   Housing Stability: Low Risk  (11/11/2023)    Housing Stability     Do you have housing? : Yes     Are you worried about losing your housing?: No             Recent Lab Results:  LIPID RESULTS:  Lab Results   Component Value Date    CHOL 162 03/07/2023    CHOL 175 03/16/2021    HDL 41 (L) 03/07/2023    HDL 55 03/16/2021    LDL  03/07/2023      Comment:      Cannot estimate LDL when triglyceride exceeds 400 mg/dL    LDL 52 03/07/2023    LDL  03/16/2021     Cannot estimate LDL when triglyceride exceeds 400 mg/dL    TRIG 432 (H) 03/07/2023    TRIG 450 (H) 03/16/2021    CHOLHDLRATIO 3.6 07/01/2015       LIVER ENZYME RESULTS:  Lab Results   Component Value Date    AST 16 11/13/2023    AST 23 03/27/2021    ALT 9 11/13/2023    ALT 23 03/27/2021       CBC RESULTS:  Lab Results   Component Value Date    WBC 12.5 (H) 11/13/2023    WBC 17.6 (H) 03/27/2021    RBC 4.30 11/13/2023    RBC 4.53 03/27/2021    HGB 13.2 11/13/2023    HGB 13.6 03/27/2021    HCT 41.4 11/13/2023    HCT 42.5 03/27/2021    MCV 96 11/13/2023    MCV 94 03/27/2021    MCH 30.7 11/13/2023    MCH 30.0 03/27/2021    MCHC 31.9 11/13/2023    MCHC 32.0 03/27/2021    RDW 14.1 11/13/2023    RDW 14.2 03/27/2021     11/13/2023     03/27/2021       BMP RESULTS:  Lab Results   Component Value Date     (L) 11/13/2023     (L) 03/27/2021    POTASSIUM 4.1 11/13/2023    POTASSIUM 4.6 05/20/2022    POTASSIUM 4.2 03/27/2021    CHLORIDE 100 11/13/2023    CHLORIDE 98 05/20/2022    CHLORIDE 94 03/27/2021    CO2 26 11/13/2023    CO2 28 05/20/2022    CO2 24 03/27/2021    ANIONGAP 8 11/13/2023    ANIONGAP 6 05/20/2022    ANIONGAP 12 03/27/2021     (H) 11/13/2023     (H) 05/20/2022    GLC 83 03/27/2021    BUN 7.5 (L) 11/13/2023    BUN 8 05/20/2022    BUN 5 (L) 03/27/2021    CR 0.76 11/13/2023    CR 0.44 (L) 03/27/2021    GFRESTIMATED 85 11/13/2023    GFRESTIMATED >90 03/27/2021    GFRESTBLACK >90 03/27/2021    JEANNIE 9.1  11/13/2023    JEANNIE 8.6 03/27/2021        A1C RESULTS:  Lab Results   Component Value Date    A1C 5.7 04/16/2018       INR RESULTS:  Lab Results   Component Value Date    INR 0.91 07/30/2020    INR 0.98 04/16/2018           CC  Justina Finney,   [id number]  [place] [id number]

## 2023-12-08 NOTE — LETTER
12/8/2023    Bandar Weinberg MD  [address] [facility] [address]    RE: Trisha Bender       Dear Colleague,     I had the pleasure of seeing Trisha Bender in the [facility].  HPI and Plan:   Trisha Bender is a 68 year old female who presents for video visit with history of nonischemic cardiomyopathy with ejection fraction estimated around 35 to 40% and underlying severe COPD.  She was recently seen in the emergency department with upper respiratory symptoms and diagnosed with a COPD exacerbation.  She did have some cardiac testing including NT proBNP which was within normal range at 140 and troponin levels which were very mildly elevated at 37.  She was treated for COPD exacerbation and given antibiotics and she is slowly improving.  She is not experiencing any symptoms suggestive of orthopnea or PND and her breathing is slowly improving.  She was noted to have a very low sodium level measuring 125.  She decreased her spironolactone dose from 25 down to 12-1/2 mg and with that her sodium increased on last check November 13, to 134.  She continues to take 12-1/2 mg of spironolactone and she has not had any recurrent swelling issues on the lower dose.  Her potassium levels remain within normal range.  She was supposed to have a echocardiogram done prior to this appointment but because of her recent illness and difficulty with mobility this visit was changed to a video visit and she did not complete the echocardiogram.    Summary    1.  Nonischemic cardiomyopathy-she has recent exacerbation of her breathing however it appears that she had an upper respiratory infection and COPD exacerbation.  Clinical history does not support decompensated heart failure at this time.  She is maintained on lisinopril and half dose Aldactone due to the side effect of hyponatremia. I did renew her prescriptions for her today.  I will be happy to see her back as needed, please feel free to  contact me with any questions you have regards to her care         Today's clinic visit entailed:  Review of external notes as documented elsewhere in note  Review of the result(s) of each unique test - troponin, NTBNP, CXR, BMP  Prescription drug management    Provider  Link to [facility] Help Grid         No orders of the defined types were placed in this encounter.    Orders Placed This Encounter   Medications    atorvastatin (LIPITOR) 10 MG tablet     Sig: Take 1 tablet (10 mg) by mouth daily     Dispense:  90 tablet     Refill:  4    carvedilol (COREG) 25 MG tablet     Sig: Take 1 tablet (25 mg) by mouth 2 times daily (with meals)     Dispense:  180 tablet     Refill:  4    spironolactone (ALDACTONE) 25 MG tablet     Sig: Take 0.5 tablets (12.5 mg) by mouth daily     Dispense:  90 tablet     Refill:  4     Medications Discontinued During This Encounter   Medication Reason    atorvastatin (LIPITOR) 10 MG tablet Reorder (No AVS)    carvedilol (COREG) 25 MG tablet Reorder (No AVS)    spironolactone (ALDACTONE) 25 MG tablet Reorder (No AVS)         Encounter Diagnoses   Name Primary?    Dilated cardiomyopathy (H)     Coronary artery disease involving native coronary artery of native heart without angina pectoris     Mixed hyperlipidemia     CANNON (dyspnea on exertion)     Tachycardia     Hyponatremia     Chronic systolic congestive heart failure (H)        CURRENT MEDICATIONS:  Current Outpatient Medications   Medication Sig Dispense Refill    acetaminophen (TYLENOL) 500 MG tablet Take 750 mg by mouth every 6 hours as needed for mild pain      albuterol (VENTOLIN HFA) 108 (90 BASE) MCG/ACT Inhaler Inhale 2 puffs into the lungs every 6 hours as needed for shortness of breath / dyspnea or wheezing 18 g 3    ALPRAZolam (XANAX) 0.25 MG tablet Take 1 tablet (0.25 mg) by mouth 2 times daily as needed for anxiety 60 tablet 0    atorvastatin (LIPITOR) 10 MG tablet Take 1 tablet (10 mg) by mouth daily 90 tablet 4    baclofen  (LIORESAL) 10 MG tablet Take 10 mg by mouth 3 times daily as needed for muscle spasms      BREO ELLIPTA 200-25 MCG/INH Inhaler Inhale 1 Dose into the lungs daily   12    carvedilol (COREG) 25 MG tablet Take 1 tablet (25 mg) by mouth 2 times daily (with meals) 180 tablet 4    cetirizine (ZYRTEC) 10 MG tablet Take 10 mg by mouth daily      [employer] FIBER GUMMIES 2.5 G CHEW Take 1 tablet by mouth daily Reported on 5/8/2017      doxycycline hyclate (PERIOSTAT) 20 MG tablet Take 1 tablet (20 mg) by mouth daily 20 tablet 0    doxycycline hyclate (VIBRA-TABS) 100 MG tablet TAKE ONE TABLET BY MOUTH TWICE A DAY 20 tablet 0    fluticasone (FLONASE) 50 MCG/ACT nasal spray Spray 1-2 sprays into both nostrils daily as needed for rhinitis or allergies      guaiFENesin (CHEST CONGESTION RELIEF) 20 mg/mL liquid Take 10 mLs (200 mg) by mouth every 6 hours as needed for cough 180 mL 1    HYDROcodone-acetaminophen (NORCO)  MG per tablet Take 1-2 tablets by mouth every 4 hours as needed for severe pain Take up to 8 tablets a day 240 tablet 0    ipratropium - albuterol 0.5 mg/2.5 mg/3 mL (DUONEB) 0.5-2.5 (3) MG/3ML neb solution Take 1 vial (3 mLs) by nebulization every 6 hours as needed for shortness of breath or wheezing 30 mL 3    ketorolac (TORADOL) 10 MG tablet Take 1 tablet (10 mg) by mouth every 6 hours as needed for moderate pain 20 tablet 0    lisinopril (ZESTRIL) 5 MG tablet Take 1 tablet (5 mg) by mouth daily 90 tablet 3    montelukast (SINGULAIR) 10 MG tablet TAKE ONE TABLET BY MOUTH AT BEDTIME 90 tablet 1    Multiple Vitamin CHEW Take 1 tablet by mouth daily       naloxone (NARCAN) 4 MG/0.1ML nasal spray Spray 1 spray (4 mg) into one nostril alternating nostrils once as needed for opioid reversal Every 2-3 minutes until patient responsive or EMS arrives 0.2 mL 0    ondansetron (ZOFRAN ODT) 4 MG ODT tab TAKE 1-2 TABLETS BY MOUTH EVERY 8 HOURS AS NEEDED FOR NAUSEA. 18 tablet 3    pantoprazole (PROTONIX) 40 MG EC tablet Take 1  tablet (40 mg) by mouth daily 90 tablet 1    predniSONE (DELTASONE) 20 MG tablet TAKE 2 TABLETS (40 MG) BY MOUTH DAILY FOR 3 DAYS, THEN 1 TABLET (20 MG) DAILY FOR 3 DAYS, THEN TAKE ONE-HALF TABLET (10 MG) DAILY FOR 3 DAYS. 12 tablet 0    roflumilast (DALIRESP) 500 MCG TABS tablet Take 1 tablet (500 mcg) by mouth daily      sodium chloride (MOLLY 128) 5 % ophthalmic ointment Place 1 Application into both eyes every evening      spironolactone (ALDACTONE) 25 MG tablet Take 0.5 tablets (12.5 mg) by mouth daily 90 tablet 4    SUMAtriptan (IMITREX) 25 MG tablet Take 1 tablet (25 mg) by mouth at onset of headache for migraine 9 tablet 11    tiotropium (SPIRIVA HANDIHALER) 18 MCG capsule USING THE HANDIHALER, INHALE THE CONTENTS OF ONE CAPSULE BY MOUTH DAILY 90 capsule 3    TRELEGY ELLIPTA 200-62.5-25 MCG/ACT oral inhaler INHALE 1 PUFF BY INHALATION ROUTE EVERY DAY AT THE SAME TIME EACH DAY         ALLERGIES     Allergies   Allergen Reactions    Gabapentin Swelling    Contrast Dye      Iodine    Escitalopram      Nausea and sickness    Peanuts [Nuts] Hives    Penicillins      rash    Sulfa Antibiotics      High family history    Tace [Chlorotrianisene]      joint swelling    Aspirin Rash     After 3 days    Ibuprofen Nausea and Vomiting and Swelling    Strawberry Extract Rash       PAST MEDICAL HISTORY:  Past Medical History:   Diagnosis Date    Anxiety     CAD (coronary artery disease) 7/2/15    mild per cath    CHF (congestive heart failure)     EF=20-25% per echo 6/30/15    Chronic airway obstruction     FEV1 36% of pred; VC 67%  in Jan 06.    Chronic back pain     COPD (chronic obstructive pulmonary disease)     Endometriosis     Esophageal reflux     Essential hypertension     Hyperlipidemia     Migraine     Mild persistent asthma 8/30/2012    Osteopenia     Takotsubo cardiomyopathy        PAST SURGICAL HISTORY:  Past Surgical History:   Procedure Laterality Date    ANGIOGRAM  07/02/2015    Minimal CAD. LV dysfunction,  Mid RCA 20% stenosis, EF 20-25%, c/w Takotsubo cardiomyopathy    APPENDECTOMY      CHOLECYSTECTOMY      [facility]    COLONOSCOPY N/A 2018    Procedure: COLONOSCOPY;  COLONOSCOPY with biopsies;  Surgeon: Steve Acosta MD;  Location: [facility] OR    CORONARY ANGIOGRAPHY ADULT ORDER      CV CORONARY ANGIOGRAM N/A 2020    Procedure: Coronary Angiogram;  Surgeon: Quinn Viveros MD;  Location: [facility]    CV LEFT HEART CATH N/A 2020    Procedure: Left Heart Cath;  Surgeon: Quinn Viveros MD;  Location: [facility]    CV LEFT VENTRICULOGRAM N/A 2020    Procedure: Left Ventriculogram;  Surgeon: Quinn Viveros MD;  Location: [facility]    CV RIGHT HEART CATH MEASUREMENTS RECORDED N/A 2020    Procedure: Right Heart Cath;  Surgeon: Quinn Viveros MD;  Location:  HEART CARDIAC CATH LAB    ESOPHAGOSCOPY, GASTROSCOPY, DUODENOSCOPY (EGD), COMBINED  11/15/2011    Procedure:COMBINED ESOPHAGOSCOPY, GASTROSCOPY, DUODENOSCOPY (EGD);  ESOPHAGOSCOPY, GASTROSCOPY, DUODENOSCOPY (EGD) ; Surgeon:JIM URENA; Location:[facility] GI    INJECT EPIDURAL LUMBAR / SACRAL SINGLE N/A 2018    Procedure: INJECT EPIDURAL LUMBAR / SACRAL CONTINUAL OR INTERMITTENT;  Lumbar Epidural Steroid Injection;  Surgeon: Az Enamorado MD;  Location: [facility] OR    INJECT EPIDURAL LUMBAR / SACRAL SINGLE N/A 2018    Procedure: INJECT EPIDURAL LUMBAR / SACRAL SINGLE;  Lumbar Epidural Steroid Injection;  Surgeon: Az Enamorado MD;  Location: [facility] OR    LAPAROSCOPIC ABLATION ENDOMETRIOSIS      LAPAROSCOPY DIAGNOSTIC (GYN)      endometriosis resection / lysis of adhesions    Nasal septoplasty and mucous retention cyst      TONSILLECTOMY, ADENOIDECTOMY, COMBINED      [place] teeth extraction         FAMILY HISTORY:  Family History   Problem Relation Age of Onset    Cancer Mother         Colon cancer; dxed at age 64;  at age 69    Cancer - colorectal Mother      Other Cancer Mother     Gastrointestinal Disease Sister         Acute pancreatitis    Heart Disease Father         ?    Coronary Artery Disease Father     Circulatory Maternal Aunt         AAA    Circulatory Maternal Uncle         AAA    Diabetes Son        SOCIAL HISTORY:  Social History     Socioeconomic History    Marital status:    Tobacco Use    Smoking status: Former     Packs/day: 1.00     Years: 30.00     Additional pack years: 0.00     Total pack years: 30.00     Types: Cigarettes     Quit date: 2006     Years since quittin.9    Smokeless tobacco: Never    Tobacco comments:     Quit in 2006   Vaping Use    Vaping Use: Never used   Substance and Sexual Activity    Alcohol use: No     Alcohol/week: 0.0 standard drinks of alcohol    Drug use: No    Sexual activity: Never     Partners: Male     Birth control/protection: Surgical     Comment: vasectomy   Other Topics Concern    Caffeine Concern No     Comment: none    Hobby Hazards No    Stress Concern No    Special Diet Yes     Comment: low sodium    Exercise Yes     Comment: limited due to back     Social Determinants of Health     Financial Resource Strain: Low Risk  (2023)    Financial Resource Strain     Within the past 12 months, have you or your family members you live with been unable to get utilities (heat, electricity) when it was really needed?: No   Food Insecurity: Low Risk  (2023)    Food Insecurity     Within the past 12 months, did you worry that your food would run out before you got money to buy more?: No     Within the past 12 months, did the food you bought just not last and you didn t have money to get more?: No   Transportation Needs: Low Risk  (2023)    Transportation Needs     Within the past 12 months, has lack of transportation kept you from medical appointments, getting your medicines, non-medical meetings or appointments, work, or from getting things that you need?: No   Interpersonal Safety: Low  Risk  (10/5/2023)    Interpersonal Safety     Do you feel physically and emotionally safe where you currently live?: Yes     Within the past 12 months, have you been hit, slapped, kicked or otherwise physically hurt by someone?: No   Housing Stability: Low Risk  (11/11/2023)    Housing Stability     Do you have housing? : Yes     Are you worried about losing your housing?: No             Recent Lab Results:  LIPID RESULTS:  Lab Results   Component Value Date    CHOL 162 03/07/2023    CHOL 175 03/16/2021    HDL 41 (L) 03/07/2023    HDL 55 03/16/2021    LDL  03/07/2023      Comment:      Cannot estimate LDL when triglyceride exceeds 400 mg/dL    LDL 52 03/07/2023    LDL  03/16/2021     Cannot estimate LDL when triglyceride exceeds 400 mg/dL    TRIG 432 (H) 03/07/2023    TRIG 450 (H) 03/16/2021    CHOLHDLRATIO 3.6 07/01/2015       LIVER ENZYME RESULTS:  Lab Results   Component Value Date    AST 16 11/13/2023    AST 23 03/27/2021    ALT 9 11/13/2023    ALT 23 03/27/2021       CBC RESULTS:  Lab Results   Component Value Date    WBC 12.5 (H) 11/13/2023    WBC 17.6 (H) 03/27/2021    RBC 4.30 11/13/2023    RBC 4.53 03/27/2021    HGB 13.2 11/13/2023    HGB 13.6 03/27/2021    HCT 41.4 11/13/2023    HCT 42.5 03/27/2021    MCV 96 11/13/2023    MCV 94 03/27/2021    MCH 30.7 11/13/2023    MCH 30.0 03/27/2021    MCHC 31.9 11/13/2023    MCHC 32.0 03/27/2021    RDW 14.1 11/13/2023    RDW 14.2 03/27/2021     11/13/2023     03/27/2021       BMP RESULTS:  Lab Results   Component Value Date     (L) 11/13/2023     (L) 03/27/2021    POTASSIUM 4.1 11/13/2023    POTASSIUM 4.6 05/20/2022    POTASSIUM 4.2 03/27/2021    CHLORIDE 100 11/13/2023    CHLORIDE 98 05/20/2022    CHLORIDE 94 03/27/2021    CO2 26 11/13/2023    CO2 28 05/20/2022    CO2 24 03/27/2021    ANIONGAP 8 11/13/2023    ANIONGAP 6 05/20/2022    ANIONGAP 12 03/27/2021     (H) 11/13/2023     (H) 05/20/2022    GLC 83 03/27/2021    BUN 7.5 (L)  11/13/2023    BUN 8 05/20/2022    BUN 5 (L) 03/27/2021    CR 0.76 11/13/2023    CR 0.44 (L) 03/27/2021    GFRESTIMATED 85 11/13/2023    GFRESTIMATED >90 03/27/2021    GFRESTBLACK >90 03/27/2021    JEANNIE 9.1 11/13/2023    JEANNIE 8.6 03/27/2021        A1C RESULTS:  Lab Results   Component Value Date    A1C 5.7 04/16/2018       INR RESULTS:  Lab Results   Component Value Date    INR 0.91 07/30/2020    INR 0.98 04/16/2018     CC  Justina Finney DO  [address]  [place] [address]  Thank you for allowing me to participate in the care of your patient.      Sincerely,     Justina Finney DO     [facility]

## 2023-12-29 NOTE — PROGRESS NOTES
Clinic Care Coordination Contact  Follow Up Progress Note      Assessment: RN CC contacted patient for monthly outreach. Patient hasn't followed up with her Primary Care Provider re: health maintenance. She had her flu vaccination and she plans to get her RSV & COVID at the  Pharmacy close to her home. No changes in her medications. She denied any needs at this time.     Care Gaps:    Health Maintenance Due   Topic Date Due    HF ACTION PLAN  Never done    DEPRESSION ACTION PLAN  Never done    RSV VACCINE (Pregnancy & 60+) (1 - 1-dose 60+ series) Never done    COLORECTAL CANCER SCREENING  03/28/2021    MAMMO SCREENING  06/09/2023    COVID-19 Vaccine (5 - 2023-24 season) 09/01/2023    ZOSTER IMMUNIZATION (3 of 3) 10/20/2023     Discussed with patient.     Care Plans  Care Plan: Health Maintenance       Problem: Health Maintenance Due or Overdue       Goal: Become up-to-date with health maintenance visit(s)       Start Date: 10/5/2023 Expected End Date: 1/4/2024    This Visit's Progress: 10% Recent Progress: 0%    Note:     Barriers: Very short of breath with activity; Limited mobility; Provider availability - wait time to complete appointments.   Strengths: Motivated; Agreeable to Care Coordination.   Patient expressed understanding of goal: Yes.   Action steps to achieve this goal:  1. I will take my medications as prescribed.   2. I will discuss, review, schedule and complete recommended overdue health maintenance with my Primary Care Provider.   3. I will contact my care team with questions, concerns or support needs. I will use the clinic as a resource and I understand I can contact my clinic with 24/7 after hours services available. Care Coordinator will remain available as needed.                               Intervention/Education provided during outreach: Discussed patients plan of care. CC RN asked open ended questions, provided support, resources, and encouragement as needed. Patient will reach out to  care team sooner than planned with new questions or concerns.     Plan: RN CC will continue to follow patient for any additional needs.     Care Coordinator will follow up in 1 month.     Clinic Care Coordination Contact    Situation: Patient chart reviewed by care coordinator.    Background: Patient is enrolled in Care Coordination and followed by RN CC.     Assessment: Task initiated to send patient updated Care Plan every 90 days or upon change in condition.     Plan/Recommendations: RN CC sent patient updated Care Plan via her in2apps account.     Cynthia Snell RN, BSN, CPHN, CM  [facility]  [facility]  Phone: [phone]  Email: [email or web address]

## 2023-12-29 NOTE — LETTER
Dear Cynthia,   Attached is an updated Patient Centered Plan of Care for your continued enrollment in Care Coordination. Please let us know if you have additional questions, concerns or goals that we can assist with.     Sincerely,   Cynthia Snell RN, BSN, CPHN, CM  [facility] Ambulatory Care Management  [facility]  Phone: [phone]  Email: [email or web address]  Patient Centered Plan of Care  About Me:        Patient Name:  Trisha Timmons    YOB: 1955  Age:         68 year old   [facility] MRN:    [id number] Telephone Information:  Home Phone [phone]   Mobile [phone]       Address:  [address] [place] [id number] Email address:  [email or web address]      Emergency Contact(s)    Name Relationship Lgl Grd Work Phone Home Phone Mobile Phone   1. MANINDER TIMMONS Spouse No  [phone]   2. IGOR TIMMONS Son No   [phone]   3. ILIANA TIMMONS Son No   [phone]           Primary language:  English     needed? No   [facility]:  [phone] op. 1  Other communication barriers:Other (Very Short of breath with activity)    Preferred Method of Communication:  Mail  Current living arrangement: I live in a private home with spouse    Mobility Status/ Medical Equipment: Independent w/Device    Health Maintenance  Health Maintenance Reviewed: Due/Overdue (Discussed with patient.)    My Access Plan  Medical Emergency 911   Primary Clinic Line [facility] - [phone]   24 Hour Appointment Line [phone] or  [phone]) (toll-free)   24 Hour Nurse Line [phone] (toll-free)   Preferred Urgent Care [facility], [phone]     Preferred Hospital [facility]  [phone]     Preferred Pharmacy [facility] [place], [place] - [facility] [address]     Behavioral Health  Crisis Line The National Suicide Prevention Lifeline at 1-296.232.9131 or Text/Call 988     My Care Team Members  Patient Care Team         Relationship Specialty Notifications Start End    Bandar Weinberg MD PCP - General   2/15/02     Phone: [phone] Pager: [phone] Fax: [phone]        [address] [facility] [address]    Bandar Weinberg MD Assigned PCP   10/4/12     Phone: [phone] Pager: [phone] Fax: [phone]        [address] [facility] [address]    Thomas Valerio MD Referring Physician Pulmonary Disease  10/25/17     Phone: [phone] Fax: [phone]         [place] [facility] [address] [facility] [address]    Micheal Odell MD (Inactive) MD Cardiology  2/16/18     Michael Odell MD (Inactive) MD Cardiology  2/26/18     Justina Finney DO MD Cardiovascular Disease  12/14/20     Phone: [phone] Fax: [phone] [address] [place] [address]    Justina Finney DO Assigned Heart and Vascular Provider   9/24/22     Phone: [phone] Fax: [phone] [address] [place] [address]    Sheryl Ohara [facility] Assigned MTM Pharmacist   10/29/22     Phone: [phone] Pager: [phone]         [address] [facility] [address] [facility] [address]    Bandar Weinberg MD Assigned Pain Medication Provider   1/9/23     Phone: [phone] Pager: [phone] Fax: [phone]        [address] [facility] [address]    Jessica Robles RP Pharmacist Pharmacist  7/31/23     Phone: [phone] Fax: [phone]         [address] [facility] [address]    Snell, Cynthia, RN Lead Care Coordinator Primary Care - CC Admissions 10/5/23     Phone: [phone]                   My Care Plans  Self Management and Treatment Plan    Care Plan  Care Plan: Health Maintenance       Problem: Health Maintenance Due or Overdue       Goal: Become up-to-date with health maintenance visit(s)       Start  Date: 10/5/2023 Expected End Date: 1/4/2024    This Visit's Progress: 10% Recent Progress: 0%    Note:     Barriers: Very short of breath with activity; Limited mobility; Provider availability - wait time to complete appointments.   Strengths: Motivated; Agreeable to Care Coordination.   Patient expressed understanding of goal: Yes.   Action steps to achieve this goal:  1. I will take my medications as prescribed.   2. I will discuss, review, schedule and complete recommended overdue health maintenance with my Primary Care Provider.   3. I will contact my care team with questions, concerns or support needs. I will use the clinic as a resource and I understand I can contact my clinic with 24/7 after hours services available. Care Coordinator will remain available as needed.                               Action Plans on File:     Advance Care Plans/Directives:   Advanced Care Plan/Directives on file:   Yes    Status of Document(s): No data recorded  Advanced Care Plan/Directives Type: No data recorded       My Medical and Care Information  Problem List   Patient Active Problem List   Diagnosis    Essential hypertension with goal blood pressure less than 130/80    Chronic migraine without aura without status migrainosus, not intractable    Esophageal reflux    Disorder of bone and cartilage    Family history of malignant neoplasm of gastrointestinal tract    Chronic airway obstruction (H)    Cystocele    Family history of thyroid cancer - 1/2 brother    Anxiety    Chronic low back pain    Hair loss    Lateral epicondylitis of right elbow    Dilated cardiomyopathy (H)    Right bundle branch block (RBBB)    CHF (congestive heart failure) (H)    Coronary artery disease involving native coronary artery of native heart without angina pectoris    Mixed hyperlipidemia    Controlled substance agreement signed    Moderate persistent asthma without complication    Hypokalemia    Panlobular emphysema (H)    Tachycardia    Status  post coronary angiogram    Thoracic aortic ectasia (H24)    Supraventricular tachycardia    Pressure ulcer of left buttock, stage 3 (H)    Hyponatremia    Pulmonary nodule    Elevated troponin    COPD exacerbation (H)      Current Medications and Allergies:    Current Outpatient Medications   Medication    acetaminophen (TYLENOL) 500 MG tablet    albuterol (VENTOLIN HFA) 108 (90 BASE) MCG/ACT Inhaler    ALPRAZolam (XANAX) 0.25 MG tablet    atorvastatin (LIPITOR) 10 MG tablet    baclofen (LIORESAL) 10 MG tablet    BREO ELLIPTA 200-25 MCG/INH Inhaler    carvedilol (COREG) 25 MG tablet    cetirizine (ZYRTEC) 10 MG tablet    [employer] FIBER GUMMIES 2.5 G CHEW    doxycycline hyclate (PERIOSTAT) 20 MG tablet    doxycycline hyclate (VIBRA-TABS) 100 MG tablet    erythromycin (ROMYCIN) 5 MG/GM ophthalmic ointment    fluticasone (FLONASE) 50 MCG/ACT nasal spray    guaiFENesin (CHEST CONGESTION RELIEF) 20 mg/mL liquid    HYDROcodone-acetaminophen (NORCO)  MG per tablet    ipratropium - albuterol 0.5 mg/2.5 mg/3 mL (DUONEB) 0.5-2.5 (3) MG/3ML neb solution    ketorolac (TORADOL) 10 MG tablet    lisinopril (ZESTRIL) 5 MG tablet    montelukast (SINGULAIR) 10 MG tablet    Multiple Vitamin CHEW    naloxone (NARCAN) 4 MG/0.1ML nasal spray    ondansetron (ZOFRAN ODT) 4 MG ODT tab    pantoprazole (PROTONIX) 40 MG EC tablet    predniSONE (DELTASONE) 20 MG tablet    roflumilast (DALIRESP) 500 MCG TABS tablet    sodium chloride (MOLLY 128) 5 % ophthalmic ointment    spironolactone (ALDACTONE) 25 MG tablet    SUMAtriptan (IMITREX) 25 MG tablet    tiotropium (SPIRIVA HANDIHALER) 18 MCG capsule    traMADol (ULTRAM) 50 MG tablet    TRELEGY ELLIPTA 200-62.5-25 MCG/ACT oral inhaler     No current facility-administered medications for this visit.        Allergies   Allergen Reactions    Gabapentin Swelling    Contrast Dye      Iodine    Escitalopram      Nausea and sickness    Peanuts [Nuts] Hives    Penicillins      rash    Sulfa Antibiotics       High family history    Tace [Chlorotrianisene]      joint swelling    Aspirin Rash     After 3 days    Ibuprofen Nausea and Vomiting and Swelling    Strawberry Extract Rash     Care Coordination Start Date: 10/5/2023   Frequency of Care Coordination: monthly     Form Last Updated: 12/29/2023

## 2024-01-01 ENCOUNTER — APPOINTMENT (OUTPATIENT)
Dept: PHYSICAL THERAPY | Facility: CLINIC | Age: 69
End: 2024-01-01
Payer: COMMERCIAL

## 2024-01-01 ENCOUNTER — PATIENT OUTREACH (OUTPATIENT)
Dept: CARE COORDINATION | Facility: CLINIC | Age: 69
End: 2024-01-01
Payer: COMMERCIAL

## 2024-01-01 ENCOUNTER — TELEPHONE (OUTPATIENT)
Dept: INTERNAL MEDICINE | Facility: CLINIC | Age: 69
End: 2024-01-01
Payer: COMMERCIAL

## 2024-01-01 ENCOUNTER — DOCUMENTATION ONLY (OUTPATIENT)
Dept: OTHER | Facility: CLINIC | Age: 69
End: 2024-01-01

## 2024-01-01 ENCOUNTER — APPOINTMENT (OUTPATIENT)
Dept: GENERAL RADIOLOGY | Facility: CLINIC | Age: 69
DRG: 640 | End: 2024-01-01
Attending: EMERGENCY MEDICINE
Payer: COMMERCIAL

## 2024-01-01 ENCOUNTER — MEDICAL CORRESPONDENCE (OUTPATIENT)
Dept: HEALTH INFORMATION MANAGEMENT | Facility: CLINIC | Age: 69
End: 2024-01-01

## 2024-01-01 ENCOUNTER — MYC MEDICAL ADVICE (OUTPATIENT)
Dept: INTERNAL MEDICINE | Facility: CLINIC | Age: 69
End: 2024-01-01
Payer: COMMERCIAL

## 2024-01-01 ENCOUNTER — MYC MEDICAL ADVICE (OUTPATIENT)
Dept: INTERNAL MEDICINE | Facility: CLINIC | Age: 69
End: 2024-01-01

## 2024-01-01 ENCOUNTER — VIRTUAL VISIT (OUTPATIENT)
Dept: PHARMACY | Facility: CLINIC | Age: 69
End: 2024-01-01
Payer: COMMERCIAL

## 2024-01-01 ENCOUNTER — VIRTUAL VISIT (OUTPATIENT)
Dept: RADIATION ONCOLOGY | Facility: HOSPITAL | Age: 69
End: 2024-01-01
Attending: FAMILY MEDICINE
Payer: COMMERCIAL

## 2024-01-01 ENCOUNTER — APPOINTMENT (OUTPATIENT)
Dept: CT IMAGING | Facility: CLINIC | Age: 69
End: 2024-01-01
Attending: EMERGENCY MEDICINE
Payer: COMMERCIAL

## 2024-01-01 ENCOUNTER — TELEPHONE (OUTPATIENT)
Dept: PHARMACY | Facility: OTHER | Age: 69
End: 2024-01-01
Payer: COMMERCIAL

## 2024-01-01 ENCOUNTER — MYC REFILL (OUTPATIENT)
Dept: INTERNAL MEDICINE | Facility: CLINIC | Age: 69
End: 2024-01-01
Payer: COMMERCIAL

## 2024-01-01 ENCOUNTER — APPOINTMENT (OUTPATIENT)
Dept: GENERAL RADIOLOGY | Facility: CLINIC | Age: 69
End: 2024-01-01
Attending: EMERGENCY MEDICINE
Payer: COMMERCIAL

## 2024-01-01 ENCOUNTER — MEDICAL CORRESPONDENCE (OUTPATIENT)
Dept: HEALTH INFORMATION MANAGEMENT | Facility: CLINIC | Age: 69
End: 2024-01-01
Payer: COMMERCIAL

## 2024-01-01 ENCOUNTER — HOSPITAL ENCOUNTER (INPATIENT)
Facility: CLINIC | Age: 69
LOS: 7 days | Discharge: HOSPICE | DRG: 640 | End: 2024-09-03
Attending: EMERGENCY MEDICINE | Admitting: INTERNAL MEDICINE
Payer: COMMERCIAL

## 2024-01-01 ENCOUNTER — OFFICE VISIT (OUTPATIENT)
Dept: INTERNAL MEDICINE | Facility: CLINIC | Age: 69
End: 2024-01-01
Payer: COMMERCIAL

## 2024-01-01 ENCOUNTER — TELEPHONE (OUTPATIENT)
Dept: LAB | Facility: CLINIC | Age: 69
End: 2024-01-01
Payer: COMMERCIAL

## 2024-01-01 ENCOUNTER — TELEPHONE (OUTPATIENT)
Dept: INTERNAL MEDICINE | Facility: CLINIC | Age: 69
End: 2024-01-01

## 2024-01-01 ENCOUNTER — APPOINTMENT (OUTPATIENT)
Dept: PHYSICAL THERAPY | Facility: CLINIC | Age: 69
End: 2024-01-01
Attending: STUDENT IN AN ORGANIZED HEALTH CARE EDUCATION/TRAINING PROGRAM
Payer: COMMERCIAL

## 2024-01-01 ENCOUNTER — APPOINTMENT (OUTPATIENT)
Dept: CT IMAGING | Facility: CLINIC | Age: 69
End: 2024-01-01
Attending: STUDENT IN AN ORGANIZED HEALTH CARE EDUCATION/TRAINING PROGRAM
Payer: COMMERCIAL

## 2024-01-01 ENCOUNTER — PATIENT OUTREACH (OUTPATIENT)
Dept: CARE COORDINATION | Facility: CLINIC | Age: 69
End: 2024-01-01

## 2024-01-01 ENCOUNTER — APPOINTMENT (OUTPATIENT)
Dept: CARDIOLOGY | Facility: CLINIC | Age: 69
End: 2024-01-01
Attending: NURSE PRACTITIONER
Payer: COMMERCIAL

## 2024-01-01 ENCOUNTER — APPOINTMENT (OUTPATIENT)
Dept: PHYSICAL THERAPY | Facility: CLINIC | Age: 69
DRG: 641 | End: 2024-01-01
Attending: STUDENT IN AN ORGANIZED HEALTH CARE EDUCATION/TRAINING PROGRAM
Payer: COMMERCIAL

## 2024-01-01 ENCOUNTER — HOSPITAL ENCOUNTER (OUTPATIENT)
Facility: CLINIC | Age: 69
Setting detail: OBSERVATION
Discharge: HOME | End: 2024-05-06
Attending: STUDENT IN AN ORGANIZED HEALTH CARE EDUCATION/TRAINING PROGRAM | Admitting: STUDENT IN AN ORGANIZED HEALTH CARE EDUCATION/TRAINING PROGRAM
Payer: COMMERCIAL

## 2024-01-01 ENCOUNTER — HOSPITAL ENCOUNTER (OUTPATIENT)
Facility: CLINIC | Age: 69
Setting detail: OBSERVATION
Discharge: HOME | End: 2024-07-13
Attending: EMERGENCY MEDICINE | Admitting: INTERNAL MEDICINE
Payer: COMMERCIAL

## 2024-01-01 ENCOUNTER — HOSPITAL ENCOUNTER (OUTPATIENT)
Facility: CLINIC | Age: 69
Setting detail: OBSERVATION
Discharge: HOME | End: 2024-04-09
Attending: EMERGENCY MEDICINE | Admitting: HOSPITALIST
Payer: COMMERCIAL

## 2024-01-01 ENCOUNTER — APPOINTMENT (OUTPATIENT)
Dept: ULTRASOUND IMAGING | Facility: CLINIC | Age: 69
DRG: 640 | End: 2024-01-01
Attending: EMERGENCY MEDICINE
Payer: COMMERCIAL

## 2024-01-01 ENCOUNTER — VIRTUAL VISIT (OUTPATIENT)
Dept: PALLIATIVE CARE | Facility: CLINIC | Age: 69
End: 2024-01-01
Attending: NURSE PRACTITIONER
Payer: COMMERCIAL

## 2024-01-01 ENCOUNTER — HOSPITAL ENCOUNTER (INPATIENT)
Facility: CLINIC | Age: 69
LOS: 1 days | Discharge: HOME HEALTH | DRG: 641 | End: 2024-08-04
Attending: EMERGENCY MEDICINE | Admitting: STUDENT IN AN ORGANIZED HEALTH CARE EDUCATION/TRAINING PROGRAM
Payer: COMMERCIAL

## 2024-01-01 ENCOUNTER — LAB (OUTPATIENT)
Dept: LAB | Facility: CLINIC | Age: 69
End: 2024-01-01
Payer: COMMERCIAL

## 2024-01-01 ENCOUNTER — NURSE TRIAGE (OUTPATIENT)
Dept: NURSING | Facility: CLINIC | Age: 69
End: 2024-01-01
Payer: COMMERCIAL

## 2024-01-01 VITALS
SYSTOLIC BLOOD PRESSURE: 120 MMHG | HEIGHT: 70 IN | RESPIRATION RATE: 18 BRPM | WEIGHT: 168 LBS | OXYGEN SATURATION: 94 % | DIASTOLIC BLOOD PRESSURE: 77 MMHG | HEART RATE: 119 BPM | BODY MASS INDEX: 24.05 KG/M2 | TEMPERATURE: 97.9 F

## 2024-01-01 VITALS
TEMPERATURE: 98.3 F | BODY MASS INDEX: 22.71 KG/M2 | RESPIRATION RATE: 18 BRPM | OXYGEN SATURATION: 98 % | DIASTOLIC BLOOD PRESSURE: 71 MMHG | HEART RATE: 92 BPM | SYSTOLIC BLOOD PRESSURE: 127 MMHG | WEIGHT: 158.3 LBS

## 2024-01-01 VITALS
BODY MASS INDEX: 22.08 KG/M2 | OXYGEN SATURATION: 93 % | TEMPERATURE: 98.5 F | RESPIRATION RATE: 20 BRPM | WEIGHT: 153.88 LBS | DIASTOLIC BLOOD PRESSURE: 67 MMHG | HEART RATE: 107 BPM | SYSTOLIC BLOOD PRESSURE: 117 MMHG

## 2024-01-01 VITALS
TEMPERATURE: 97.5 F | RESPIRATION RATE: 18 BRPM | BODY MASS INDEX: 22.73 KG/M2 | SYSTOLIC BLOOD PRESSURE: 123 MMHG | HEIGHT: 68 IN | OXYGEN SATURATION: 100 % | HEART RATE: 94 BPM | DIASTOLIC BLOOD PRESSURE: 58 MMHG | WEIGHT: 150 LBS

## 2024-01-01 VITALS
TEMPERATURE: 97.9 F | SYSTOLIC BLOOD PRESSURE: 133 MMHG | WEIGHT: 162.04 LBS | HEART RATE: 84 BPM | BODY MASS INDEX: 23.2 KG/M2 | OXYGEN SATURATION: 95 % | RESPIRATION RATE: 16 BRPM | HEIGHT: 70 IN | DIASTOLIC BLOOD PRESSURE: 72 MMHG

## 2024-01-01 VITALS
OXYGEN SATURATION: 94 % | WEIGHT: 178.57 LBS | SYSTOLIC BLOOD PRESSURE: 117 MMHG | HEART RATE: 88 BPM | BODY MASS INDEX: 25.56 KG/M2 | DIASTOLIC BLOOD PRESSURE: 71 MMHG | HEIGHT: 70 IN | TEMPERATURE: 97.7 F | RESPIRATION RATE: 18 BRPM

## 2024-01-01 VITALS — HEIGHT: 70 IN | BODY MASS INDEX: 23.91 KG/M2 | WEIGHT: 167 LBS

## 2024-01-01 VITALS — DIASTOLIC BLOOD PRESSURE: 70 MMHG | HEIGHT: 70 IN | BODY MASS INDEX: 24.11 KG/M2 | SYSTOLIC BLOOD PRESSURE: 130 MMHG

## 2024-01-01 DIAGNOSIS — Z79.899 CHRONIC PRESCRIPTION BENZODIAZEPINE USE: ICD-10-CM

## 2024-01-01 DIAGNOSIS — F41.9 ANXIETY: ICD-10-CM

## 2024-01-01 DIAGNOSIS — G89.29 CHRONIC MIDLINE LOW BACK PAIN WITHOUT SCIATICA: ICD-10-CM

## 2024-01-01 DIAGNOSIS — G89.29 CHRONIC MIDLINE LOW BACK PAIN WITHOUT SCIATICA: Primary | ICD-10-CM

## 2024-01-01 DIAGNOSIS — G89.29 CHRONIC BILATERAL LOW BACK PAIN, UNSPECIFIED WHETHER SCIATICA PRESENT: ICD-10-CM

## 2024-01-01 DIAGNOSIS — G89.29 OTHER CHRONIC PAIN: ICD-10-CM

## 2024-01-01 DIAGNOSIS — Z71.89 GOALS OF CARE, COUNSELING/DISCUSSION: ICD-10-CM

## 2024-01-01 DIAGNOSIS — J42 CHRONIC BRONCHITIS, UNSPECIFIED CHRONIC BRONCHITIS TYPE (H): Primary | ICD-10-CM

## 2024-01-01 DIAGNOSIS — E87.1 HYPONATREMIA: ICD-10-CM

## 2024-01-01 DIAGNOSIS — Z09 HOSPITAL DISCHARGE FOLLOW-UP: ICD-10-CM

## 2024-01-01 DIAGNOSIS — R11.0 NAUSEA: ICD-10-CM

## 2024-01-01 DIAGNOSIS — I10 ESSENTIAL HYPERTENSION WITH GOAL BLOOD PRESSURE LESS THAN 130/80: ICD-10-CM

## 2024-01-01 DIAGNOSIS — G43.709 CHRONIC MIGRAINE WITHOUT AURA WITHOUT STATUS MIGRAINOSUS, NOT INTRACTABLE: ICD-10-CM

## 2024-01-01 DIAGNOSIS — I27.21 PAH (PULMONARY ARTERY HYPERTENSION) (H): ICD-10-CM

## 2024-01-01 DIAGNOSIS — K21.9 GASTROESOPHAGEAL REFLUX DISEASE WITHOUT ESOPHAGITIS: ICD-10-CM

## 2024-01-01 DIAGNOSIS — R79.89 ELEVATED TROPONIN: ICD-10-CM

## 2024-01-01 DIAGNOSIS — Z79.899 CONTROLLED SUBSTANCE AGREEMENT SIGNED: ICD-10-CM

## 2024-01-01 DIAGNOSIS — M54.50 CHRONIC MIDLINE LOW BACK PAIN WITHOUT SCIATICA: Primary | ICD-10-CM

## 2024-01-01 DIAGNOSIS — M54.50 CHRONIC MIDLINE LOW BACK PAIN WITHOUT SCIATICA: ICD-10-CM

## 2024-01-01 DIAGNOSIS — M54.50 CHRONIC BILATERAL LOW BACK PAIN, UNSPECIFIED WHETHER SCIATICA PRESENT: ICD-10-CM

## 2024-01-01 DIAGNOSIS — Z53.9 DIAGNOSIS NOT YET DEFINED: Primary | ICD-10-CM

## 2024-01-01 DIAGNOSIS — I10 ESSENTIAL HYPERTENSION WITH GOAL BLOOD PRESSURE LESS THAN 130/80: Primary | ICD-10-CM

## 2024-01-01 DIAGNOSIS — I50.9 CONGESTIVE HEART FAILURE, UNSPECIFIED HF CHRONICITY, UNSPECIFIED HEART FAILURE TYPE (H): ICD-10-CM

## 2024-01-01 DIAGNOSIS — M54.59 MECHANICAL LOW BACK PAIN: ICD-10-CM

## 2024-01-01 DIAGNOSIS — J44.1 CHRONIC OBSTRUCTIVE PULMONARY DISEASE WITH ACUTE EXACERBATION (H): ICD-10-CM

## 2024-01-01 DIAGNOSIS — K52.9 COLITIS: ICD-10-CM

## 2024-01-01 DIAGNOSIS — I42.0 DILATED CARDIOMYOPATHY (H): ICD-10-CM

## 2024-01-01 DIAGNOSIS — J45.40 MODERATE PERSISTENT ASTHMA WITHOUT COMPLICATION: Primary | ICD-10-CM

## 2024-01-01 DIAGNOSIS — G43.709 CHRONIC MIGRAINE WITHOUT AURA WITHOUT STATUS MIGRAINOSUS, NOT INTRACTABLE: Primary | ICD-10-CM

## 2024-01-01 DIAGNOSIS — R91.1 PULMONARY NODULE: ICD-10-CM

## 2024-01-01 DIAGNOSIS — I42.0 DILATED CARDIOMYOPATHY (H): Primary | ICD-10-CM

## 2024-01-01 DIAGNOSIS — H10.32 ACUTE BACTERIAL CONJUNCTIVITIS OF LEFT EYE: ICD-10-CM

## 2024-01-01 DIAGNOSIS — J30.1 ALLERGIC RHINITIS DUE TO POLLEN, UNSPECIFIED SEASONALITY: ICD-10-CM

## 2024-01-01 DIAGNOSIS — Z00.00 ENCOUNTER FOR PREVENTATIVE ADULT HEALTH CARE EXAMINATION: Primary | ICD-10-CM

## 2024-01-01 DIAGNOSIS — J45.40 MODERATE PERSISTENT ASTHMA WITHOUT COMPLICATION: ICD-10-CM

## 2024-01-01 DIAGNOSIS — I25.10 CORONARY ARTERY DISEASE INVOLVING NATIVE CORONARY ARTERY OF NATIVE HEART WITHOUT ANGINA PECTORIS: ICD-10-CM

## 2024-01-01 DIAGNOSIS — R19.7 NAUSEA VOMITING AND DIARRHEA: ICD-10-CM

## 2024-01-01 DIAGNOSIS — R53.1 GENERALIZED WEAKNESS: ICD-10-CM

## 2024-01-01 DIAGNOSIS — E83.42 HYPOMAGNESEMIA: ICD-10-CM

## 2024-01-01 DIAGNOSIS — J44.1 COPD EXACERBATION (H): ICD-10-CM

## 2024-01-01 DIAGNOSIS — I50.9 CONGESTIVE HEART FAILURE, UNSPECIFIED HF CHRONICITY, UNSPECIFIED HEART FAILURE TYPE (H): Primary | ICD-10-CM

## 2024-01-01 DIAGNOSIS — E86.0 DEHYDRATION: ICD-10-CM

## 2024-01-01 DIAGNOSIS — L89.323 PRESSURE INJURY OF LEFT ISCHIUM, STAGE 3 (H): ICD-10-CM

## 2024-01-01 DIAGNOSIS — J43.1 PANLOBULAR EMPHYSEMA (H): ICD-10-CM

## 2024-01-01 DIAGNOSIS — K21.9 GASTROESOPHAGEAL REFLUX DISEASE WITHOUT ESOPHAGITIS: Primary | ICD-10-CM

## 2024-01-01 DIAGNOSIS — D72.829 LEUKOCYTOSIS, UNSPECIFIED TYPE: ICD-10-CM

## 2024-01-01 DIAGNOSIS — R62.7 FAILURE TO THRIVE IN ADULT: ICD-10-CM

## 2024-01-01 DIAGNOSIS — R19.7 VOMITING AND DIARRHEA: ICD-10-CM

## 2024-01-01 DIAGNOSIS — M62.81 MUSCLE WEAKNESS (GENERALIZED): ICD-10-CM

## 2024-01-01 DIAGNOSIS — G89.4 CHRONIC PAIN SYNDROME: ICD-10-CM

## 2024-01-01 DIAGNOSIS — I25.10 CORONARY ARTERY DISEASE INVOLVING NATIVE HEART WITHOUT ANGINA PECTORIS, UNSPECIFIED VESSEL OR LESION TYPE: ICD-10-CM

## 2024-01-01 DIAGNOSIS — R07.9 CHEST PAIN, UNSPECIFIED TYPE: ICD-10-CM

## 2024-01-01 DIAGNOSIS — R14.3 FLATULENCE, ERUCTATION AND GAS PAIN: ICD-10-CM

## 2024-01-01 DIAGNOSIS — E87.6 HYPOKALEMIA: ICD-10-CM

## 2024-01-01 DIAGNOSIS — G89.4 CHRONIC PAIN DISORDER: ICD-10-CM

## 2024-01-01 DIAGNOSIS — Z51.5 PALLIATIVE CARE PATIENT: Primary | ICD-10-CM

## 2024-01-01 DIAGNOSIS — L89.810 PRESSURE INJURY OF HEAD, UNSTAGEABLE (H): ICD-10-CM

## 2024-01-01 DIAGNOSIS — E78.2 MIXED HYPERLIPIDEMIA: ICD-10-CM

## 2024-01-01 DIAGNOSIS — M62.838 MUSCLE SPASM: Primary | ICD-10-CM

## 2024-01-01 DIAGNOSIS — R00.0 TACHYCARDIA: ICD-10-CM

## 2024-01-01 DIAGNOSIS — I50.22 CHRONIC SYSTOLIC CONGESTIVE HEART FAILURE (H): ICD-10-CM

## 2024-01-01 DIAGNOSIS — R11.10 VOMITING AND DIARRHEA: ICD-10-CM

## 2024-01-01 DIAGNOSIS — L89.323 PRESSURE ULCER OF LEFT BUTTOCK, STAGE 3 (H): ICD-10-CM

## 2024-01-01 DIAGNOSIS — R06.09 DOE (DYSPNEA ON EXERTION): ICD-10-CM

## 2024-01-01 DIAGNOSIS — R09.82 POSTNASAL DRIP: Primary | ICD-10-CM

## 2024-01-01 DIAGNOSIS — Z12.31 ENCOUNTER FOR SCREENING MAMMOGRAM FOR BREAST CANCER: ICD-10-CM

## 2024-01-01 DIAGNOSIS — R11.2 NAUSEA VOMITING AND DIARRHEA: ICD-10-CM

## 2024-01-01 DIAGNOSIS — J96.21 ACUTE ON CHRONIC RESPIRATORY FAILURE WITH HYPOXEMIA (H): ICD-10-CM

## 2024-01-01 DIAGNOSIS — Z79.899 CONTROLLED SUBSTANCE AGREEMENT SIGNED: Primary | ICD-10-CM

## 2024-01-01 DIAGNOSIS — R14.2 FLATULENCE, ERUCTATION AND GAS PAIN: ICD-10-CM

## 2024-01-01 DIAGNOSIS — R14.1 FLATULENCE, ERUCTATION AND GAS PAIN: ICD-10-CM

## 2024-01-01 DIAGNOSIS — R53.81 PHYSICAL DEBILITY: ICD-10-CM

## 2024-01-01 DIAGNOSIS — M81.0 OSTEOPOROSIS, UNSPECIFIED OSTEOPOROSIS TYPE, UNSPECIFIED PATHOLOGICAL FRACTURE PRESENCE: ICD-10-CM

## 2024-01-01 DIAGNOSIS — Z12.11 SCREEN FOR COLON CANCER: ICD-10-CM

## 2024-01-01 DIAGNOSIS — J44.1 COPD EXACERBATION (H): Primary | ICD-10-CM

## 2024-01-01 DIAGNOSIS — L89.323 PRESSURE ULCER OF LEFT BUTTOCK, STAGE 3 (H): Primary | ICD-10-CM

## 2024-01-01 LAB
A-OH ALPRAZ UR QL CFM: PRESENT
ALBUMIN SERPL BCG-MCNC: 2.1 G/DL (ref 3.5–5.2)
ALBUMIN SERPL BCG-MCNC: 2.2 G/DL (ref 3.5–5.2)
ALBUMIN SERPL BCG-MCNC: 2.4 G/DL (ref 3.5–5.2)
ALBUMIN SERPL BCG-MCNC: 2.6 G/DL (ref 3.5–5.2)
ALBUMIN SERPL BCG-MCNC: 2.8 G/DL (ref 3.5–5.2)
ALBUMIN SERPL BCG-MCNC: 3.3 G/DL (ref 3.5–5.2)
ALBUMIN SERPL BCG-MCNC: 3.6 G/DL (ref 3.5–5.2)
ALBUMIN SERPL BCG-MCNC: 3.6 G/DL (ref 3.5–5.2)
ALBUMIN UR-MCNC: 10 MG/DL
ALBUMIN UR-MCNC: NEGATIVE MG/DL
ALP SERPL-CCNC: 121 U/L (ref 40–150)
ALP SERPL-CCNC: 155 U/L (ref 40–150)
ALP SERPL-CCNC: 197 U/L (ref 40–150)
ALP SERPL-CCNC: 50 U/L (ref 40–150)
ALP SERPL-CCNC: 51 U/L (ref 40–150)
ALP SERPL-CCNC: 65 U/L (ref 40–150)
ALP SERPL-CCNC: 78 U/L (ref 40–150)
ALP SERPL-CCNC: ABNORMAL U/L
ALPRAZ UR QL CFM: PRESENT
ALT SERPL W P-5'-P-CCNC: 12 U/L (ref 0–50)
ALT SERPL W P-5'-P-CCNC: 7 U/L (ref 0–50)
ALT SERPL W P-5'-P-CCNC: 9 U/L (ref 0–50)
ALT SERPL W P-5'-P-CCNC: <5 U/L (ref 0–50)
ALT SERPL W P-5'-P-CCNC: ABNORMAL U/L
ANION GAP SERPL CALCULATED.3IONS-SCNC: 11 MMOL/L (ref 7–15)
ANION GAP SERPL CALCULATED.3IONS-SCNC: 12 MMOL/L (ref 7–15)
ANION GAP SERPL CALCULATED.3IONS-SCNC: 12 MMOL/L (ref 7–15)
ANION GAP SERPL CALCULATED.3IONS-SCNC: 13 MMOL/L (ref 7–15)
ANION GAP SERPL CALCULATED.3IONS-SCNC: 13 MMOL/L (ref 7–15)
ANION GAP SERPL CALCULATED.3IONS-SCNC: 14 MMOL/L (ref 7–15)
ANION GAP SERPL CALCULATED.3IONS-SCNC: 16 MMOL/L (ref 7–15)
APPEARANCE UR: CLEAR
APPEARANCE UR: CLEAR
AST SERPL W P-5'-P-CCNC: 10 U/L (ref 0–45)
AST SERPL W P-5'-P-CCNC: 12 U/L (ref 0–45)
AST SERPL W P-5'-P-CCNC: 12 U/L (ref 0–45)
AST SERPL W P-5'-P-CCNC: 13 U/L (ref 0–45)
AST SERPL W P-5'-P-CCNC: 13 U/L (ref 0–45)
AST SERPL W P-5'-P-CCNC: 14 U/L (ref 0–45)
AST SERPL W P-5'-P-CCNC: <5 U/L (ref 0–45)
AST SERPL W P-5'-P-CCNC: ABNORMAL U/L
ATRIAL RATE - MUSE: 111 BPM
ATRIAL RATE - MUSE: 92 BPM
ATRIAL RATE - MUSE: 94 BPM
ATRIAL RATE - MUSE: 97 BPM
ATRIAL RATE - MUSE: 99 BPM
BACTERIA #/AREA URNS HPF: ABNORMAL /HPF
BASE EXCESS BLDV CALC-SCNC: -0.4 MMOL/L (ref -3–3)
BASOPHILS # BLD AUTO: 0 10E3/UL (ref 0–0.2)
BASOPHILS # BLD AUTO: 0.1 10E3/UL (ref 0–0.2)
BASOPHILS NFR BLD AUTO: 0 %
BILIRUB DIRECT SERPL-MCNC: 0.32 MG/DL (ref 0–0.3)
BILIRUB DIRECT SERPL-MCNC: <0.2 MG/DL (ref 0–0.3)
BILIRUB SERPL-MCNC: 0.2 MG/DL
BILIRUB SERPL-MCNC: 0.2 MG/DL
BILIRUB SERPL-MCNC: 0.3 MG/DL
BILIRUB SERPL-MCNC: 0.4 MG/DL
BILIRUB SERPL-MCNC: 0.5 MG/DL
BILIRUB SERPL-MCNC: 0.5 MG/DL
BILIRUB UR QL STRIP: NEGATIVE
BILIRUB UR QL STRIP: NEGATIVE
BUN SERPL-MCNC: 3 MG/DL (ref 8–23)
BUN SERPL-MCNC: 3.6 MG/DL (ref 8–23)
BUN SERPL-MCNC: 4 MG/DL (ref 8–23)
BUN SERPL-MCNC: 4.1 MG/DL (ref 8–23)
BUN SERPL-MCNC: 4.4 MG/DL (ref 8–23)
BUN SERPL-MCNC: 4.5 MG/DL (ref 8–23)
BUN SERPL-MCNC: 4.5 MG/DL (ref 8–23)
BUN SERPL-MCNC: 7.1 MG/DL (ref 8–23)
BUN SERPL-MCNC: 7.6 MG/DL (ref 8–23)
C DIFF TOX B STL QL: NEGATIVE
C DIFF TOX B STL QL: NEGATIVE
CALCIUM SERPL-MCNC: 7.3 MG/DL (ref 8.8–10.4)
CALCIUM SERPL-MCNC: 7.4 MG/DL (ref 8.8–10.4)
CALCIUM SERPL-MCNC: 7.6 MG/DL (ref 8.8–10.2)
CALCIUM SERPL-MCNC: 7.8 MG/DL (ref 8.8–10.4)
CALCIUM SERPL-MCNC: 7.9 MG/DL (ref 8.8–10.2)
CALCIUM SERPL-MCNC: 8 MG/DL (ref 8.8–10.2)
CALCIUM SERPL-MCNC: 8.1 MG/DL (ref 8.8–10.2)
CALCIUM SERPL-MCNC: 8.1 MG/DL (ref 8.8–10.2)
CALCIUM SERPL-MCNC: 8.7 MG/DL (ref 8.8–10.2)
CHLORIDE SERPL-SCNC: 100 MMOL/L (ref 98–107)
CHLORIDE SERPL-SCNC: 91 MMOL/L (ref 98–107)
CHLORIDE SERPL-SCNC: 91 MMOL/L (ref 98–107)
CHLORIDE SERPL-SCNC: 93 MMOL/L (ref 98–107)
CHLORIDE SERPL-SCNC: 94 MMOL/L (ref 98–107)
CHLORIDE SERPL-SCNC: 94 MMOL/L (ref 98–107)
CHLORIDE SERPL-SCNC: 95 MMOL/L (ref 98–107)
CK SERPL-CCNC: 30 U/L (ref 26–192)
COLOR UR AUTO: ABNORMAL
COLOR UR AUTO: ABNORMAL
CREAT SERPL-MCNC: 0.44 MG/DL (ref 0.51–0.95)
CREAT SERPL-MCNC: 0.46 MG/DL (ref 0.51–0.95)
CREAT SERPL-MCNC: 0.46 MG/DL (ref 0.51–0.95)
CREAT SERPL-MCNC: 0.47 MG/DL (ref 0.51–0.95)
CREAT SERPL-MCNC: 0.47 MG/DL (ref 0.51–0.95)
CREAT SERPL-MCNC: 0.48 MG/DL (ref 0.51–0.95)
CREAT SERPL-MCNC: 0.49 MG/DL (ref 0.51–0.95)
CREAT SERPL-MCNC: 0.5 MG/DL (ref 0.51–0.95)
CREAT SERPL-MCNC: 0.52 MG/DL (ref 0.51–0.95)
CREAT UR-MCNC: 137 MG/DL
DEPRECATED HCO3 PLAS-SCNC: 20 MMOL/L (ref 22–29)
DEPRECATED HCO3 PLAS-SCNC: 20 MMOL/L (ref 22–29)
DEPRECATED HCO3 PLAS-SCNC: 21 MMOL/L (ref 22–29)
DEPRECATED HCO3 PLAS-SCNC: 23 MMOL/L (ref 22–29)
DHC UR CFM-MCNC: >5000 NG/ML
DHC/CREAT UR: ABNORMAL NG/MG{CREAT}
DIASTOLIC BLOOD PRESSURE - MUSE: NORMAL MMHG
EGFRCR SERPLBLD CKD-EPI 2021: >90 ML/MIN/1.73M2
EOSINOPHIL # BLD AUTO: 0 10E3/UL (ref 0–0.7)
EOSINOPHIL # BLD AUTO: 0.1 10E3/UL (ref 0–0.7)
EOSINOPHIL NFR BLD AUTO: 0 %
EOSINOPHIL NFR BLD AUTO: 1 %
EOSINOPHIL NFR BLD AUTO: 1 %
ERYTHROCYTE [DISTWIDTH] IN BLOOD BY AUTOMATED COUNT: 15.4 % (ref 10–15)
ERYTHROCYTE [DISTWIDTH] IN BLOOD BY AUTOMATED COUNT: 16 % (ref 10–15)
ERYTHROCYTE [DISTWIDTH] IN BLOOD BY AUTOMATED COUNT: 16.8 % (ref 10–15)
ERYTHROCYTE [DISTWIDTH] IN BLOOD BY AUTOMATED COUNT: 17.4 % (ref 10–15)
ERYTHROCYTE [DISTWIDTH] IN BLOOD BY AUTOMATED COUNT: 17.5 % (ref 10–15)
ERYTHROCYTE [DISTWIDTH] IN BLOOD BY AUTOMATED COUNT: 17.5 % (ref 10–15)
ERYTHROCYTE [DISTWIDTH] IN BLOOD BY AUTOMATED COUNT: 17.7 % (ref 10–15)
ERYTHROCYTE [DISTWIDTH] IN BLOOD BY AUTOMATED COUNT: 20.7 % (ref 10–15)
FLUAV RNA SPEC QL NAA+PROBE: NEGATIVE
FLUBV RNA RESP QL NAA+PROBE: NEGATIVE
GLUCOSE SERPL-MCNC: 103 MG/DL (ref 70–99)
GLUCOSE SERPL-MCNC: 106 MG/DL (ref 70–99)
GLUCOSE SERPL-MCNC: 108 MG/DL (ref 70–99)
GLUCOSE SERPL-MCNC: 110 MG/DL (ref 70–99)
GLUCOSE SERPL-MCNC: 114 MG/DL (ref 70–99)
GLUCOSE SERPL-MCNC: 137 MG/DL (ref 70–99)
GLUCOSE SERPL-MCNC: 148 MG/DL (ref 70–99)
GLUCOSE SERPL-MCNC: 160 MG/DL (ref 70–99)
GLUCOSE SERPL-MCNC: 171 MG/DL (ref 70–99)
GLUCOSE UR STRIP-MCNC: NEGATIVE MG/DL
GLUCOSE UR STRIP-MCNC: NEGATIVE MG/DL
HCO3 BLDV-SCNC: 25 MMOL/L (ref 21–28)
HCO3 SERPL-SCNC: 20 MMOL/L (ref 22–29)
HCO3 SERPL-SCNC: 20 MMOL/L (ref 22–29)
HCO3 SERPL-SCNC: 21 MMOL/L (ref 22–29)
HCT VFR BLD AUTO: 33.4 % (ref 35–47)
HCT VFR BLD AUTO: 34.4 % (ref 35–47)
HCT VFR BLD AUTO: 34.5 % (ref 35–47)
HCT VFR BLD AUTO: 34.7 % (ref 35–47)
HCT VFR BLD AUTO: 36.7 % (ref 35–47)
HCT VFR BLD AUTO: 40.3 % (ref 35–47)
HCT VFR BLD AUTO: 42 % (ref 35–47)
HCT VFR BLD AUTO: 42.2 % (ref 35–47)
HGB BLD-MCNC: 10 G/DL (ref 11.7–15.7)
HGB BLD-MCNC: 10.1 G/DL (ref 11.7–15.7)
HGB BLD-MCNC: 10.4 G/DL (ref 11.7–15.7)
HGB BLD-MCNC: 10.5 G/DL (ref 11.7–15.7)
HGB BLD-MCNC: 11 G/DL (ref 11.7–15.7)
HGB BLD-MCNC: 11.8 G/DL (ref 11.7–15.7)
HGB BLD-MCNC: 12.1 G/DL (ref 11.7–15.7)
HGB BLD-MCNC: 12.4 G/DL (ref 11.7–15.7)
HGB UR QL STRIP: ABNORMAL
HGB UR QL STRIP: NEGATIVE
HOLD SPECIMEN: NORMAL
HYDROCODONE UR CFM-MCNC: >9000 NG/ML
HYDROCODONE/CREAT UR: ABNORMAL
HYDROMORPHONE UR CFM-MCNC: >3000 NG/ML
HYDROMORPHONE/CREAT UR: ABNORMAL
IMM GRANULOCYTES # BLD: 0.1 10E3/UL
IMM GRANULOCYTES # BLD: 0.2 10E3/UL
IMM GRANULOCYTES # BLD: 0.5 10E3/UL
IMM GRANULOCYTES NFR BLD: 1 %
IMM GRANULOCYTES NFR BLD: 3 %
INTERPRETATION ECG - MUSE: NORMAL
KETONES UR STRIP-MCNC: 10 MG/DL
KETONES UR STRIP-MCNC: 20 MG/DL
LACTATE SERPL-SCNC: 0.9 MMOL/L (ref 0.7–2)
LACTATE SERPL-SCNC: 1.1 MMOL/L (ref 0.7–2)
LACTATE SERPL-SCNC: 1.3 MMOL/L (ref 0.7–2)
LEUKOCYTE ESTERASE UR QL STRIP: ABNORMAL
LEUKOCYTE ESTERASE UR QL STRIP: NEGATIVE
LIPASE SERPL-CCNC: 12 U/L (ref 13–60)
LIPASE SERPL-CCNC: 13 U/L (ref 13–60)
LIPASE SERPL-CCNC: 14 U/L (ref 13–60)
LIPASE SERPL-CCNC: 16 U/L (ref 13–60)
LIPASE SERPL-CCNC: 9 U/L (ref 13–60)
LVEF ECHO: NORMAL
LYMPHOCYTES # BLD AUTO: 1.2 10E3/UL (ref 0.8–5.3)
LYMPHOCYTES # BLD AUTO: 1.5 10E3/UL (ref 0.8–5.3)
LYMPHOCYTES # BLD AUTO: 1.6 10E3/UL (ref 0.8–5.3)
LYMPHOCYTES # BLD AUTO: 1.7 10E3/UL (ref 0.8–5.3)
LYMPHOCYTES # BLD AUTO: 2.4 10E3/UL (ref 0.8–5.3)
LYMPHOCYTES NFR BLD AUTO: 11 %
LYMPHOCYTES NFR BLD AUTO: 12 %
LYMPHOCYTES NFR BLD AUTO: 13 %
LYMPHOCYTES NFR BLD AUTO: 15 %
LYMPHOCYTES NFR BLD AUTO: 7 %
MAGNESIUM SERPL-MCNC: 1.5 MG/DL (ref 1.7–2.3)
MAGNESIUM SERPL-MCNC: 1.7 MG/DL (ref 1.7–2.3)
MAGNESIUM SERPL-MCNC: 1.8 MG/DL (ref 1.7–2.3)
MAGNESIUM SERPL-MCNC: 2.2 MG/DL (ref 1.7–2.3)
MAGNESIUM SERPL-MCNC: 3 MG/DL (ref 1.7–2.3)
MCH RBC QN AUTO: 24.2 PG (ref 26.5–33)
MCH RBC QN AUTO: 24.2 PG (ref 26.5–33)
MCH RBC QN AUTO: 24.5 PG (ref 26.5–33)
MCH RBC QN AUTO: 25.6 PG (ref 26.5–33)
MCH RBC QN AUTO: 25.7 PG (ref 26.5–33)
MCH RBC QN AUTO: 26.7 PG (ref 26.5–33)
MCHC RBC AUTO-ENTMCNC: 28.7 G/DL (ref 31.5–36.5)
MCHC RBC AUTO-ENTMCNC: 29.3 G/DL (ref 31.5–36.5)
MCHC RBC AUTO-ENTMCNC: 29.4 G/DL (ref 31.5–36.5)
MCHC RBC AUTO-ENTMCNC: 29.5 G/DL (ref 31.5–36.5)
MCHC RBC AUTO-ENTMCNC: 29.9 G/DL (ref 31.5–36.5)
MCHC RBC AUTO-ENTMCNC: 30 G/DL (ref 31.5–36.5)
MCHC RBC AUTO-ENTMCNC: 30 G/DL (ref 31.5–36.5)
MCHC RBC AUTO-ENTMCNC: 30.4 G/DL (ref 31.5–36.5)
MCV RBC AUTO: 83 FL (ref 78–100)
MCV RBC AUTO: 85 FL (ref 78–100)
MCV RBC AUTO: 86 FL (ref 78–100)
MCV RBC AUTO: 86 FL (ref 78–100)
MCV RBC AUTO: 88 FL (ref 78–100)
MCV RBC AUTO: 89 FL (ref 78–100)
MONOCYTES # BLD AUTO: 0.8 10E3/UL (ref 0–1.3)
MONOCYTES # BLD AUTO: 1 10E3/UL (ref 0–1.3)
MONOCYTES # BLD AUTO: 1.1 10E3/UL (ref 0–1.3)
MONOCYTES # BLD AUTO: 1.2 10E3/UL (ref 0–1.3)
MONOCYTES # BLD AUTO: 1.5 10E3/UL (ref 0–1.3)
MONOCYTES NFR BLD AUTO: 10 %
MONOCYTES NFR BLD AUTO: 5 %
MONOCYTES NFR BLD AUTO: 7 %
MONOCYTES NFR BLD AUTO: 9 %
MONOCYTES NFR BLD AUTO: 9 %
MUCOUS THREADS #/AREA URNS LPF: PRESENT /LPF
NEUTROPHILS # BLD AUTO: 11.1 10E3/UL (ref 1.6–8.3)
NEUTROPHILS # BLD AUTO: 11.4 10E3/UL (ref 1.6–8.3)
NEUTROPHILS # BLD AUTO: 13.5 10E3/UL (ref 1.6–8.3)
NEUTROPHILS # BLD AUTO: 9.7 10E3/UL (ref 1.6–8.3)
NEUTROPHILS # BLD AUTO: 9.9 10E3/UL (ref 1.6–8.3)
NEUTROPHILS NFR BLD AUTO: 73 %
NEUTROPHILS NFR BLD AUTO: 77 %
NEUTROPHILS NFR BLD AUTO: 77 %
NEUTROPHILS NFR BLD AUTO: 81 %
NEUTROPHILS NFR BLD AUTO: 85 %
NITRATE UR QL: NEGATIVE
NITRATE UR QL: NEGATIVE
NRBC # BLD AUTO: 0 10E3/UL
NRBC BLD AUTO-RTO: 0 /100
NT-PROBNP SERPL-MCNC: 243 PG/ML (ref 0–900)
NT-PROBNP SERPL-MCNC: 730 PG/ML (ref 0–900)
NT-PROBNP SERPL-MCNC: 936 PG/ML (ref 0–900)
O2/TOTAL GAS SETTING VFR VENT: 4 %
OXYHGB MFR BLDV: 79 % (ref 70–75)
P AXIS - MUSE: 52 DEGREES
P AXIS - MUSE: 58 DEGREES
P AXIS - MUSE: 58 DEGREES
P AXIS - MUSE: 60 DEGREES
P AXIS - MUSE: 66 DEGREES
PCO2 BLDV: 42 MM HG (ref 40–50)
PH BLDV: 7.38 [PH] (ref 7.32–7.43)
PH UR STRIP: 6.5 [PH] (ref 5–7)
PH UR STRIP: 6.5 [PH] (ref 5–7)
PHOSPHATE SERPL-MCNC: 3.1 MG/DL (ref 2.5–4.5)
PHOSPHATE SERPL-MCNC: 3.8 MG/DL (ref 2.5–4.5)
PLATELET # BLD AUTO: 336 10E3/UL (ref 150–450)
PLATELET # BLD AUTO: 383 10E3/UL (ref 150–450)
PLATELET # BLD AUTO: 383 10E3/UL (ref 150–450)
PLATELET # BLD AUTO: 434 10E3/UL (ref 150–450)
PLATELET # BLD AUTO: 446 10E3/UL (ref 150–450)
PLATELET # BLD AUTO: 650 10E3/UL (ref 150–450)
PLATELET # BLD AUTO: 655 10E3/UL (ref 150–450)
PLATELET # BLD AUTO: 726 10E3/UL (ref 150–450)
PO2 BLDV: 43 MM HG (ref 25–47)
POTASSIUM SERPL-SCNC: 2.8 MMOL/L (ref 3.4–5.3)
POTASSIUM SERPL-SCNC: 2.8 MMOL/L (ref 3.4–5.3)
POTASSIUM SERPL-SCNC: 2.9 MMOL/L (ref 3.4–5.3)
POTASSIUM SERPL-SCNC: 3.4 MMOL/L (ref 3.4–5.3)
POTASSIUM SERPL-SCNC: 3.5 MMOL/L (ref 3.4–5.3)
POTASSIUM SERPL-SCNC: 3.6 MMOL/L (ref 3.4–5.3)
POTASSIUM SERPL-SCNC: 3.8 MMOL/L (ref 3.4–5.3)
POTASSIUM SERPL-SCNC: 3.9 MMOL/L (ref 3.4–5.3)
POTASSIUM SERPL-SCNC: 4.3 MMOL/L (ref 3.4–5.3)
POTASSIUM SERPL-SCNC: 4.8 MMOL/L (ref 3.4–5.3)
PR INTERVAL - MUSE: 128 MS
PR INTERVAL - MUSE: 134 MS
PR INTERVAL - MUSE: 134 MS
PR INTERVAL - MUSE: 140 MS
PR INTERVAL - MUSE: 140 MS
PROCALCITONIN SERPL IA-MCNC: 1.45 NG/ML
PROT SERPL-MCNC: 3.7 G/DL (ref 6.4–8.3)
PROT SERPL-MCNC: 3.8 G/DL (ref 6.4–8.3)
PROT SERPL-MCNC: 4.4 G/DL (ref 6.4–8.3)
PROT SERPL-MCNC: 4.8 G/DL (ref 6.4–8.3)
PROT SERPL-MCNC: 4.9 G/DL (ref 6.4–8.3)
PROT SERPL-MCNC: 5 G/DL (ref 6.4–8.3)
PROT SERPL-MCNC: 5.4 G/DL (ref 6.4–8.3)
PROT SERPL-MCNC: 5.4 G/DL (ref 6.4–8.3)
QRS DURATION - MUSE: 108 MS
QRS DURATION - MUSE: 114 MS
QRS DURATION - MUSE: 116 MS
QRS DURATION - MUSE: 130 MS
QRS DURATION - MUSE: 132 MS
QT - MUSE: 354 MS
QT - MUSE: 370 MS
QT - MUSE: 378 MS
QT - MUSE: 384 MS
QT - MUSE: 574 MS
QTC - MUSE: 467 MS
QTC - MUSE: 469 MS
QTC - MUSE: 480 MS
QTC - MUSE: 481 MS
QTC - MUSE: 736 MS
R AXIS - MUSE: 210 DEGREES
R AXIS - MUSE: 66 DEGREES
R AXIS - MUSE: 74 DEGREES
R AXIS - MUSE: 76 DEGREES
R AXIS - MUSE: 79 DEGREES
RBC # BLD AUTO: 3.91 10E6/UL (ref 3.8–5.2)
RBC # BLD AUTO: 3.93 10E6/UL (ref 3.8–5.2)
RBC # BLD AUTO: 4.04 10E6/UL (ref 3.8–5.2)
RBC # BLD AUTO: 4.17 10E6/UL (ref 3.8–5.2)
RBC # BLD AUTO: 4.29 10E6/UL (ref 3.8–5.2)
RBC # BLD AUTO: 4.73 10E6/UL (ref 3.8–5.2)
RBC # BLD AUTO: 4.87 10E6/UL (ref 3.8–5.2)
RBC # BLD AUTO: 5.07 10E6/UL (ref 3.8–5.2)
RBC URINE: 1 /HPF
RBC URINE: 1 /HPF
RSV RNA SPEC NAA+PROBE: NEGATIVE
SAO2 % BLDV: 80.8 % (ref 70–75)
SARS-COV-2 RNA RESP QL NAA+PROBE: NEGATIVE
SARS-COV-2 RNA RESP QL NAA+PROBE: NEGATIVE
SODIUM SERPL-SCNC: 123 MMOL/L (ref 135–145)
SODIUM SERPL-SCNC: 126 MMOL/L (ref 135–145)
SODIUM SERPL-SCNC: 127 MMOL/L (ref 135–145)
SODIUM SERPL-SCNC: 129 MMOL/L (ref 135–145)
SODIUM SERPL-SCNC: 129 MMOL/L (ref 135–145)
SODIUM SERPL-SCNC: 130 MMOL/L (ref 135–145)
SODIUM SERPL-SCNC: 131 MMOL/L (ref 135–145)
SODIUM SERPL-SCNC: 132 MMOL/L (ref 135–145)
SODIUM SERPL-SCNC: 132 MMOL/L (ref 135–145)
SODIUM SERPL-SCNC: 133 MMOL/L (ref 135–145)
SODIUM SERPL-SCNC: 134 MMOL/L (ref 135–145)
SODIUM SERPL-SCNC: 135 MMOL/L (ref 135–145)
SODIUM SERPL-SCNC: 136 MMOL/L (ref 135–145)
SODIUM SERPL-SCNC: 137 MMOL/L (ref 135–145)
SP GR UR STRIP: 1.01 (ref 1–1.03)
SP GR UR STRIP: 1.01 (ref 1–1.03)
SQUAMOUS EPITHELIAL: 1 /HPF
SQUAMOUS EPITHELIAL: 2 /HPF
SYSTOLIC BLOOD PRESSURE - MUSE: NORMAL MMHG
T AXIS - MUSE: -12 DEGREES
T AXIS - MUSE: 28 DEGREES
T AXIS - MUSE: 35 DEGREES
T AXIS - MUSE: 41 DEGREES
T AXIS - MUSE: 49 DEGREES
TROPONIN T SERPL HS-MCNC: 53 NG/L
TROPONIN T SERPL HS-MCNC: 56 NG/L
TROPONIN T SERPL HS-MCNC: 57 NG/L
TROPONIN T SERPL HS-MCNC: 59 NG/L
TROPONIN T SERPL HS-MCNC: 64 NG/L
TROPONIN T SERPL HS-MCNC: 65 NG/L
TROPONIN T SERPL HS-MCNC: 66 NG/L
TROPONIN T SERPL HS-MCNC: 68 NG/L
TROPONIN T SERPL HS-MCNC: 71 NG/L
TROPONIN T SERPL HS-MCNC: 72 NG/L
TSH SERPL DL<=0.005 MIU/L-ACNC: 0.88 UIU/ML (ref 0.3–4.2)
UROBILINOGEN UR STRIP-MCNC: NORMAL MG/DL
UROBILINOGEN UR STRIP-MCNC: NORMAL MG/DL
VENTRICULAR RATE- MUSE: 111 BPM
VENTRICULAR RATE- MUSE: 92 BPM
VENTRICULAR RATE- MUSE: 94 BPM
VENTRICULAR RATE- MUSE: 97 BPM
VENTRICULAR RATE- MUSE: 99 BPM
WBC # BLD AUTO: 10.6 10E3/UL (ref 4–11)
WBC # BLD AUTO: 10.9 10E3/UL (ref 4–11)
WBC # BLD AUTO: 12.7 10E3/UL (ref 4–11)
WBC # BLD AUTO: 12.9 10E3/UL (ref 4–11)
WBC # BLD AUTO: 13.9 10E3/UL (ref 4–11)
WBC # BLD AUTO: 15.7 10E3/UL (ref 4–11)
WBC # BLD AUTO: 15.9 10E3/UL (ref 4–11)
WBC # BLD AUTO: 16.5 10E3/UL (ref 4–11)
WBC URINE: 2 /HPF
WBC URINE: <1 /HPF

## 2024-01-01 PROCEDURE — 250N000009 HC RX 250: Performed by: HOSPITALIST

## 2024-01-01 PROCEDURE — 99285 EMERGENCY DEPT VISIT HI MDM: CPT

## 2024-01-01 PROCEDURE — 84100 ASSAY OF PHOSPHORUS: CPT | Performed by: NURSE PRACTITIONER

## 2024-01-01 PROCEDURE — 250N000011 HC RX IP 250 OP 636: Performed by: STUDENT IN AN ORGANIZED HEALTH CARE EDUCATION/TRAINING PROGRAM

## 2024-01-01 PROCEDURE — 84295 ASSAY OF SERUM SODIUM: CPT | Performed by: INTERNAL MEDICINE

## 2024-01-01 PROCEDURE — G0378 HOSPITAL OBSERVATION PER HR: HCPCS

## 2024-01-01 PROCEDURE — 93005 ELECTROCARDIOGRAM TRACING: CPT

## 2024-01-01 PROCEDURE — 71045 X-RAY EXAM CHEST 1 VIEW: CPT

## 2024-01-01 PROCEDURE — 250N000011 HC RX IP 250 OP 636: Performed by: EMERGENCY MEDICINE

## 2024-01-01 PROCEDURE — 85025 COMPLETE CBC W/AUTO DIFF WBC: CPT | Performed by: EMERGENCY MEDICINE

## 2024-01-01 PROCEDURE — 120N000001 HC R&B MED SURG/OB

## 2024-01-01 PROCEDURE — 94640 AIRWAY INHALATION TREATMENT: CPT

## 2024-01-01 PROCEDURE — 99232 SBSQ HOSP IP/OBS MODERATE 35: CPT | Performed by: HOSPITALIST

## 2024-01-01 PROCEDURE — 96376 TX/PRO/DX INJ SAME DRUG ADON: CPT

## 2024-01-01 PROCEDURE — 250N000013 HC RX MED GY IP 250 OP 250 PS 637: Performed by: STUDENT IN AN ORGANIZED HEALTH CARE EDUCATION/TRAINING PROGRAM

## 2024-01-01 PROCEDURE — G0179 MD RECERTIFICATION HHA PT: HCPCS | Performed by: INTERNAL MEDICINE

## 2024-01-01 PROCEDURE — 99285 EMERGENCY DEPT VISIT HI MDM: CPT | Mod: 25

## 2024-01-01 PROCEDURE — 36415 COLL VENOUS BLD VENIPUNCTURE: CPT | Performed by: INTERNAL MEDICINE

## 2024-01-01 PROCEDURE — 99232 SBSQ HOSP IP/OBS MODERATE 35: CPT | Performed by: INTERNAL MEDICINE

## 2024-01-01 PROCEDURE — 250N000009 HC RX 250: Performed by: INTERNAL MEDICINE

## 2024-01-01 PROCEDURE — 96366 THER/PROPH/DIAG IV INF ADDON: CPT

## 2024-01-01 PROCEDURE — 80053 COMPREHEN METABOLIC PANEL: CPT | Performed by: EMERGENCY MEDICINE

## 2024-01-01 PROCEDURE — 83605 ASSAY OF LACTIC ACID: CPT | Performed by: PHYSICIAN ASSISTANT

## 2024-01-01 PROCEDURE — 97161 PT EVAL LOW COMPLEX 20 MIN: CPT | Mod: GP

## 2024-01-01 PROCEDURE — 258N000003 HC RX IP 258 OP 636: Performed by: EMERGENCY MEDICINE

## 2024-01-01 PROCEDURE — 36415 COLL VENOUS BLD VENIPUNCTURE: CPT | Performed by: STUDENT IN AN ORGANIZED HEALTH CARE EDUCATION/TRAINING PROGRAM

## 2024-01-01 PROCEDURE — 93970 EXTREMITY STUDY: CPT

## 2024-01-01 PROCEDURE — 250N000013 HC RX MED GY IP 250 OP 250 PS 637: Performed by: NURSE PRACTITIONER

## 2024-01-01 PROCEDURE — 87635 SARS-COV-2 COVID-19 AMP PRB: CPT | Performed by: INTERNAL MEDICINE

## 2024-01-01 PROCEDURE — 250N000013 HC RX MED GY IP 250 OP 250 PS 637: Performed by: INTERNAL MEDICINE

## 2024-01-01 PROCEDURE — 99207 PR APP CREDIT; MD BILLING SHARED VISIT: CPT | Performed by: HOSPITALIST

## 2024-01-01 PROCEDURE — 99223 1ST HOSP IP/OBS HIGH 75: CPT | Performed by: INTERNAL MEDICINE

## 2024-01-01 PROCEDURE — 83690 ASSAY OF LIPASE: CPT | Performed by: EMERGENCY MEDICINE

## 2024-01-01 PROCEDURE — 999N000157 HC STATISTIC RCP TIME EA 10 MIN

## 2024-01-01 PROCEDURE — 250N000011 HC RX IP 250 OP 636: Performed by: PHYSICIAN ASSISTANT

## 2024-01-01 PROCEDURE — 250N000011 HC RX IP 250 OP 636: Performed by: INTERNAL MEDICINE

## 2024-01-01 PROCEDURE — 83880 ASSAY OF NATRIURETIC PEPTIDE: CPT | Performed by: EMERGENCY MEDICINE

## 2024-01-01 PROCEDURE — 80048 BASIC METABOLIC PNL TOTAL CA: CPT | Performed by: PHYSICIAN ASSISTANT

## 2024-01-01 PROCEDURE — 96365 THER/PROPH/DIAG IV INF INIT: CPT

## 2024-01-01 PROCEDURE — 36415 COLL VENOUS BLD VENIPUNCTURE: CPT | Performed by: EMERGENCY MEDICINE

## 2024-01-01 PROCEDURE — 82040 ASSAY OF SERUM ALBUMIN: CPT | Performed by: EMERGENCY MEDICINE

## 2024-01-01 PROCEDURE — 80053 COMPREHEN METABOLIC PANEL: CPT | Performed by: STUDENT IN AN ORGANIZED HEALTH CARE EDUCATION/TRAINING PROGRAM

## 2024-01-01 PROCEDURE — G0481 DRUG TEST DEF 8-14 CLASSES: HCPCS | Performed by: INTERNAL MEDICINE

## 2024-01-01 PROCEDURE — 250N000011 HC RX IP 250 OP 636: Performed by: NURSE PRACTITIONER

## 2024-01-01 PROCEDURE — 87637 SARSCOV2&INF A&B&RSV AMP PRB: CPT | Performed by: EMERGENCY MEDICINE

## 2024-01-01 PROCEDURE — 250N000013 HC RX MED GY IP 250 OP 250 PS 637: Performed by: PHYSICIAN ASSISTANT

## 2024-01-01 PROCEDURE — 83735 ASSAY OF MAGNESIUM: CPT | Performed by: EMERGENCY MEDICINE

## 2024-01-01 PROCEDURE — 96361 HYDRATE IV INFUSION ADD-ON: CPT

## 2024-01-01 PROCEDURE — 96375 TX/PRO/DX INJ NEW DRUG ADDON: CPT

## 2024-01-01 PROCEDURE — 80053 COMPREHEN METABOLIC PANEL: CPT | Performed by: INTERNAL MEDICINE

## 2024-01-01 PROCEDURE — 84484 ASSAY OF TROPONIN QUANT: CPT | Performed by: EMERGENCY MEDICINE

## 2024-01-01 PROCEDURE — 250N000012 HC RX MED GY IP 250 OP 636 PS 637: Performed by: INTERNAL MEDICINE

## 2024-01-01 PROCEDURE — 85025 COMPLETE CBC W/AUTO DIFF WBC: CPT | Performed by: STUDENT IN AN ORGANIZED HEALTH CARE EDUCATION/TRAINING PROGRAM

## 2024-01-01 PROCEDURE — 94640 AIRWAY INHALATION TREATMENT: CPT | Mod: 76

## 2024-01-01 PROCEDURE — 71275 CT ANGIOGRAPHY CHEST: CPT

## 2024-01-01 PROCEDURE — 36415 COLL VENOUS BLD VENIPUNCTURE: CPT | Performed by: NURSE PRACTITIONER

## 2024-01-01 PROCEDURE — 83605 ASSAY OF LACTIC ACID: CPT | Performed by: EMERGENCY MEDICINE

## 2024-01-01 PROCEDURE — 99232 SBSQ HOSP IP/OBS MODERATE 35: CPT | Performed by: STUDENT IN AN ORGANIZED HEALTH CARE EDUCATION/TRAINING PROGRAM

## 2024-01-01 PROCEDURE — C8929 TTE W OR WO FOL WCON,DOPPLER: HCPCS

## 2024-01-01 PROCEDURE — 250N000009 HC RX 250: Performed by: NURSE PRACTITIONER

## 2024-01-01 PROCEDURE — 97530 THERAPEUTIC ACTIVITIES: CPT | Mod: GP

## 2024-01-01 PROCEDURE — 93306 TTE W/DOPPLER COMPLETE: CPT | Mod: 26 | Performed by: INTERNAL MEDICINE

## 2024-01-01 PROCEDURE — 96374 THER/PROPH/DIAG INJ IV PUSH: CPT | Mod: 59

## 2024-01-01 PROCEDURE — 83735 ASSAY OF MAGNESIUM: CPT | Performed by: INTERNAL MEDICINE

## 2024-01-01 PROCEDURE — 84295 ASSAY OF SERUM SODIUM: CPT | Mod: 91 | Performed by: PHYSICIAN ASSISTANT

## 2024-01-01 PROCEDURE — 96374 THER/PROPH/DIAG INJ IV PUSH: CPT

## 2024-01-01 PROCEDURE — 84132 ASSAY OF SERUM POTASSIUM: CPT | Performed by: STUDENT IN AN ORGANIZED HEALTH CARE EDUCATION/TRAINING PROGRAM

## 2024-01-01 PROCEDURE — 73590 X-RAY EXAM OF LOWER LEG: CPT | Mod: LT

## 2024-01-01 PROCEDURE — 97602 WOUND(S) CARE NON-SELECTIVE: CPT

## 2024-01-01 PROCEDURE — 83690 ASSAY OF LIPASE: CPT | Performed by: STUDENT IN AN ORGANIZED HEALTH CARE EDUCATION/TRAINING PROGRAM

## 2024-01-01 PROCEDURE — 250N000011 HC RX IP 250 OP 636: Performed by: CLINICAL NURSE SPECIALIST

## 2024-01-01 PROCEDURE — 250N000013 HC RX MED GY IP 250 OP 250 PS 637: Performed by: CLINICAL NURSE SPECIALIST

## 2024-01-01 PROCEDURE — 71046 X-RAY EXAM CHEST 2 VIEWS: CPT

## 2024-01-01 PROCEDURE — 87493 C DIFF AMPLIFIED PROBE: CPT

## 2024-01-01 PROCEDURE — 87493 C DIFF AMPLIFIED PROBE: CPT | Performed by: PHYSICIAN ASSISTANT

## 2024-01-01 PROCEDURE — 84484 ASSAY OF TROPONIN QUANT: CPT | Performed by: NURSE PRACTITIONER

## 2024-01-01 PROCEDURE — 99222 1ST HOSP IP/OBS MODERATE 55: CPT | Performed by: NURSE PRACTITIONER

## 2024-01-01 PROCEDURE — 82550 ASSAY OF CK (CPK): CPT | Performed by: EMERGENCY MEDICINE

## 2024-01-01 PROCEDURE — 99238 HOSP IP/OBS DSCHRG MGMT 30/<: CPT | Performed by: INTERNAL MEDICINE

## 2024-01-01 PROCEDURE — 84443 ASSAY THYROID STIM HORMONE: CPT | Performed by: INTERNAL MEDICINE

## 2024-01-01 PROCEDURE — 99495 TRANSJ CARE MGMT MOD F2F 14D: CPT | Performed by: INTERNAL MEDICINE

## 2024-01-01 PROCEDURE — 97530 THERAPEUTIC ACTIVITIES: CPT | Mod: GP | Performed by: PHYSICAL THERAPIST

## 2024-01-01 PROCEDURE — 36415 COLL VENOUS BLD VENIPUNCTURE: CPT | Performed by: PHYSICIAN ASSISTANT

## 2024-01-01 PROCEDURE — 258N000003 HC RX IP 258 OP 636: Performed by: INTERNAL MEDICINE

## 2024-01-01 PROCEDURE — 80048 BASIC METABOLIC PNL TOTAL CA: CPT | Performed by: EMERGENCY MEDICINE

## 2024-01-01 PROCEDURE — 99223 1ST HOSP IP/OBS HIGH 75: CPT | Mod: AI | Performed by: NURSE PRACTITIONER

## 2024-01-01 PROCEDURE — 85027 COMPLETE CBC AUTOMATED: CPT | Performed by: NURSE PRACTITIONER

## 2024-01-01 PROCEDURE — 99239 HOSP IP/OBS DSCHRG MGMT >30: CPT | Performed by: PHYSICIAN ASSISTANT

## 2024-01-01 PROCEDURE — 99232 SBSQ HOSP IP/OBS MODERATE 35: CPT | Performed by: CLINICAL NURSE SPECIALIST

## 2024-01-01 PROCEDURE — 84295 ASSAY OF SERUM SODIUM: CPT | Mod: 91 | Performed by: INTERNAL MEDICINE

## 2024-01-01 PROCEDURE — 84132 ASSAY OF SERUM POTASSIUM: CPT | Performed by: INTERNAL MEDICINE

## 2024-01-01 PROCEDURE — G0180 MD CERTIFICATION HHA PATIENT: HCPCS | Performed by: INTERNAL MEDICINE

## 2024-01-01 PROCEDURE — 99222 1ST HOSP IP/OBS MODERATE 55: CPT | Performed by: INTERNAL MEDICINE

## 2024-01-01 PROCEDURE — 36416 COLLJ CAPILLARY BLOOD SPEC: CPT | Performed by: EMERGENCY MEDICINE

## 2024-01-01 PROCEDURE — 99223 1ST HOSP IP/OBS HIGH 75: CPT | Performed by: PHYSICIAN ASSISTANT

## 2024-01-01 PROCEDURE — 99497 ADVNCD CARE PLAN 30 MIN: CPT | Mod: 25 | Performed by: FAMILY MEDICINE

## 2024-01-01 PROCEDURE — 81001 URINALYSIS AUTO W/SCOPE: CPT | Performed by: EMERGENCY MEDICINE

## 2024-01-01 PROCEDURE — 73502 X-RAY EXAM HIP UNI 2-3 VIEWS: CPT

## 2024-01-01 PROCEDURE — 99239 HOSP IP/OBS DSCHRG MGMT >30: CPT | Performed by: HOSPITALIST

## 2024-01-01 PROCEDURE — G0463 HOSPITAL OUTPT CLINIC VISIT: HCPCS

## 2024-01-01 PROCEDURE — 99223 1ST HOSP IP/OBS HIGH 75: CPT | Performed by: CLINICAL NURSE SPECIALIST

## 2024-01-01 PROCEDURE — 84100 ASSAY OF PHOSPHORUS: CPT | Performed by: INTERNAL MEDICINE

## 2024-01-01 PROCEDURE — 250N000009 HC RX 250: Performed by: EMERGENCY MEDICINE

## 2024-01-01 PROCEDURE — 99239 HOSP IP/OBS DSCHRG MGMT >30: CPT | Performed by: STUDENT IN AN ORGANIZED HEALTH CARE EDUCATION/TRAINING PROGRAM

## 2024-01-01 PROCEDURE — 82805 BLOOD GASES W/O2 SATURATION: CPT | Performed by: EMERGENCY MEDICINE

## 2024-01-01 PROCEDURE — 250N000013 HC RX MED GY IP 250 OP 250 PS 637: Performed by: EMERGENCY MEDICINE

## 2024-01-01 PROCEDURE — 85027 COMPLETE CBC AUTOMATED: CPT | Performed by: INTERNAL MEDICINE

## 2024-01-01 PROCEDURE — 84155 ASSAY OF PROTEIN SERUM: CPT | Performed by: EMERGENCY MEDICINE

## 2024-01-01 PROCEDURE — 80048 BASIC METABOLIC PNL TOTAL CA: CPT | Performed by: INTERNAL MEDICINE

## 2024-01-01 PROCEDURE — G0463 HOSPITAL OUTPT CLINIC VISIT: HCPCS | Mod: 25

## 2024-01-01 PROCEDURE — 258N000003 HC RX IP 258 OP 636: Performed by: STUDENT IN AN ORGANIZED HEALTH CARE EDUCATION/TRAINING PROGRAM

## 2024-01-01 PROCEDURE — 84145 PROCALCITONIN (PCT): CPT | Performed by: EMERGENCY MEDICINE

## 2024-01-01 PROCEDURE — 99205 OFFICE O/P NEW HI 60 MIN: CPT | Mod: 25 | Performed by: FAMILY MEDICINE

## 2024-01-01 PROCEDURE — 74177 CT ABD & PELVIS W/CONTRAST: CPT

## 2024-01-01 PROCEDURE — 999N000127 HC STATISTIC PERIPHERAL IV START W US GUIDANCE

## 2024-01-01 PROCEDURE — 250N000012 HC RX MED GY IP 250 OP 636 PS 637: Performed by: PHYSICIAN ASSISTANT

## 2024-01-01 PROCEDURE — 99207 PR APP CREDIT; MD BILLING SHARED VISIT: CPT | Performed by: INTERNAL MEDICINE

## 2024-01-01 PROCEDURE — 250N000012 HC RX MED GY IP 250 OP 636 PS 637: Performed by: NURSE PRACTITIONER

## 2024-01-01 PROCEDURE — 99418 PROLNG IP/OBS E/M EA 15 MIN: CPT | Performed by: CLINICAL NURSE SPECIALIST

## 2024-01-01 PROCEDURE — 255N000002 HC RX 255 OP 636: Performed by: STUDENT IN AN ORGANIZED HEALTH CARE EDUCATION/TRAINING PROGRAM

## 2024-01-01 PROCEDURE — 258N000003 HC RX IP 258 OP 636: Performed by: NURSE PRACTITIONER

## 2024-01-01 PROCEDURE — 80053 COMPREHEN METABOLIC PANEL: CPT | Performed by: NURSE PRACTITIONER

## 2024-01-01 PROCEDURE — 99605 MTMS BY PHARM NP 15 MIN: CPT | Mod: 93 | Performed by: PHARMACIST

## 2024-01-01 PROCEDURE — 83735 ASSAY OF MAGNESIUM: CPT | Performed by: NURSE PRACTITIONER

## 2024-01-01 PROCEDURE — 99607 MTMS BY PHARM ADDL 15 MIN: CPT | Mod: 93 | Performed by: PHARMACIST

## 2024-01-01 PROCEDURE — 99215 OFFICE O/P EST HI 40 MIN: CPT | Mod: 95 | Performed by: FAMILY MEDICINE

## 2024-01-01 RX ORDER — PREDNISONE 10 MG/1
10 TABLET ORAL DAILY
Status: DISCONTINUED | OUTPATIENT
Start: 2024-01-01 | End: 2024-01-01 | Stop reason: HOSPADM

## 2024-01-01 RX ORDER — ONDANSETRON 4 MG/1
TABLET, ORALLY DISINTEGRATING ORAL
Qty: 24 TABLET | Refills: 3 | Status: SHIPPED | OUTPATIENT
Start: 2024-01-01

## 2024-01-01 RX ORDER — METOPROLOL TARTRATE 1 MG/ML
2.5 INJECTION, SOLUTION INTRAVENOUS EVERY 6 HOURS
Status: DISCONTINUED | OUTPATIENT
Start: 2024-01-01 | End: 2024-01-01

## 2024-01-01 RX ORDER — MONTELUKAST SODIUM 10 MG/1
10 TABLET ORAL AT BEDTIME
Status: DISCONTINUED | OUTPATIENT
Start: 2024-01-01 | End: 2024-01-01 | Stop reason: HOSPADM

## 2024-01-01 RX ORDER — HYDROMORPHONE HYDROCHLORIDE 1 MG/ML
0.3 INJECTION, SOLUTION INTRAMUSCULAR; INTRAVENOUS; SUBCUTANEOUS
Status: DISCONTINUED | OUTPATIENT
Start: 2024-01-01 | End: 2024-01-01 | Stop reason: HOSPADM

## 2024-01-01 RX ORDER — FLUTICASONE FUROATE AND VILANTEROL 200; 25 UG/1; UG/1
1 POWDER RESPIRATORY (INHALATION) AT BEDTIME
Status: DISCONTINUED | OUTPATIENT
Start: 2024-01-01 | End: 2024-01-01 | Stop reason: HOSPADM

## 2024-01-01 RX ORDER — ONDANSETRON 2 MG/ML
INJECTION INTRAMUSCULAR; INTRAVENOUS
Status: DISCONTINUED
Start: 2024-01-01 | End: 2024-01-01 | Stop reason: HOSPADM

## 2024-01-01 RX ORDER — PROCHLORPERAZINE 25 MG
12.5 SUPPOSITORY, RECTAL RECTAL EVERY 12 HOURS PRN
Status: DISCONTINUED | OUTPATIENT
Start: 2024-01-01 | End: 2024-01-01 | Stop reason: HOSPADM

## 2024-01-01 RX ORDER — LISINOPRIL 5 MG/1
5 TABLET ORAL AT BEDTIME
Status: DISCONTINUED | OUTPATIENT
Start: 2024-01-01 | End: 2024-01-01 | Stop reason: HOSPADM

## 2024-01-01 RX ORDER — CETIRIZINE HYDROCHLORIDE 10 MG/1
10 TABLET ORAL AT BEDTIME
Status: DISCONTINUED | OUTPATIENT
Start: 2024-01-01 | End: 2024-01-01 | Stop reason: HOSPADM

## 2024-01-01 RX ORDER — ONDANSETRON 4 MG/1
4 TABLET, ORALLY DISINTEGRATING ORAL EVERY 6 HOURS PRN
Status: DISCONTINUED | OUTPATIENT
Start: 2024-01-01 | End: 2024-01-01

## 2024-01-01 RX ORDER — IPRATROPIUM BROMIDE AND ALBUTEROL SULFATE 2.5; .5 MG/3ML; MG/3ML
3 SOLUTION RESPIRATORY (INHALATION)
Status: DISCONTINUED | OUTPATIENT
Start: 2024-01-01 | End: 2024-01-01 | Stop reason: HOSPADM

## 2024-01-01 RX ORDER — IPRATROPIUM BROMIDE AND ALBUTEROL SULFATE 2.5; .5 MG/3ML; MG/3ML
3 SOLUTION RESPIRATORY (INHALATION)
Status: DISCONTINUED | OUTPATIENT
Start: 2024-01-01 | End: 2024-01-01

## 2024-01-01 RX ORDER — IPRATROPIUM BROMIDE AND ALBUTEROL SULFATE 2.5; .5 MG/3ML; MG/3ML
1 SOLUTION RESPIRATORY (INHALATION) EVERY 6 HOURS PRN
COMMUNITY

## 2024-01-01 RX ORDER — SODIUM CHLORIDE 5 %
1 OINTMENT (GRAM) OPHTHALMIC (EYE) EVERY EVENING
Status: DISCONTINUED | OUTPATIENT
Start: 2024-01-01 | End: 2024-01-01 | Stop reason: HOSPADM

## 2024-01-01 RX ORDER — IOPAMIDOL 755 MG/ML
500 INJECTION, SOLUTION INTRAVASCULAR ONCE
Status: COMPLETED | OUTPATIENT
Start: 2024-01-01 | End: 2024-01-01

## 2024-01-01 RX ORDER — ERYTHROMYCIN 5 MG/G
OINTMENT OPHTHALMIC 2 TIMES DAILY
Status: DISCONTINUED | OUTPATIENT
Start: 2024-01-01 | End: 2024-01-01 | Stop reason: HOSPADM

## 2024-01-01 RX ORDER — MAGNESIUM SULFATE HEPTAHYDRATE 40 MG/ML
2 INJECTION, SOLUTION INTRAVENOUS ONCE
Status: COMPLETED | OUTPATIENT
Start: 2024-01-01 | End: 2024-01-01

## 2024-01-01 RX ORDER — PANTOPRAZOLE SODIUM 40 MG/1
40 TABLET, DELAYED RELEASE ORAL DAILY
Qty: 90 TABLET | Refills: 1 | Status: SHIPPED | OUTPATIENT
Start: 2024-01-01

## 2024-01-01 RX ORDER — ROFLUMILAST 500 UG/1
500 TABLET ORAL AT BEDTIME
Status: DISCONTINUED | OUTPATIENT
Start: 2024-01-01 | End: 2024-01-01 | Stop reason: HOSPADM

## 2024-01-01 RX ORDER — PROCHLORPERAZINE MALEATE 5 MG
5 TABLET ORAL EVERY 6 HOURS PRN
Status: DISCONTINUED | OUTPATIENT
Start: 2024-01-01 | End: 2024-01-01 | Stop reason: HOSPADM

## 2024-01-01 RX ORDER — CARVEDILOL 6.25 MG/1
6.25 TABLET ORAL 2 TIMES DAILY WITH MEALS
Status: DISCONTINUED | OUTPATIENT
Start: 2024-01-01 | End: 2024-01-01 | Stop reason: HOSPADM

## 2024-01-01 RX ORDER — HYDROCODONE BITARTRATE AND ACETAMINOPHEN 10; 325 MG/1; MG/1
1 TABLET ORAL EVERY 4 HOURS PRN
Status: DISCONTINUED | OUTPATIENT
Start: 2024-01-01 | End: 2024-01-01 | Stop reason: HOSPADM

## 2024-01-01 RX ORDER — OXYCODONE HYDROCHLORIDE 5 MG/1
5 TABLET ORAL ONCE
Status: COMPLETED | OUTPATIENT
Start: 2024-01-01 | End: 2024-01-01

## 2024-01-01 RX ORDER — ONDANSETRON 4 MG/1
TABLET, ORALLY DISINTEGRATING ORAL
Qty: 18 TABLET | Refills: 3 | OUTPATIENT
Start: 2024-01-01

## 2024-01-01 RX ORDER — ALPRAZOLAM 0.25 MG
0.25 TABLET ORAL 2 TIMES DAILY PRN
Qty: 60 TABLET | Refills: 0 | Status: SHIPPED | OUTPATIENT
Start: 2024-01-01 | End: 2024-01-01

## 2024-01-01 RX ORDER — ONDANSETRON 4 MG/1
4 TABLET, ORALLY DISINTEGRATING ORAL 4 TIMES DAILY
Status: DISCONTINUED | OUTPATIENT
Start: 2024-01-01 | End: 2024-01-01 | Stop reason: HOSPADM

## 2024-01-01 RX ORDER — ERYTHROMYCIN 5 MG/G
OINTMENT OPHTHALMIC
Qty: 3.5 G | Refills: 0 | Status: SHIPPED | OUTPATIENT
Start: 2024-01-01 | End: 2024-01-01

## 2024-01-01 RX ORDER — ONDANSETRON 2 MG/ML
4 INJECTION INTRAMUSCULAR; INTRAVENOUS EVERY 30 MIN PRN
Status: DISCONTINUED | OUTPATIENT
Start: 2024-01-01 | End: 2024-01-01

## 2024-01-01 RX ORDER — NALOXONE HYDROCHLORIDE 0.4 MG/ML
0.2 INJECTION, SOLUTION INTRAMUSCULAR; INTRAVENOUS; SUBCUTANEOUS
Status: DISCONTINUED | OUTPATIENT
Start: 2024-01-01 | End: 2024-01-01 | Stop reason: HOSPADM

## 2024-01-01 RX ORDER — DOXYCYCLINE HYCLATE 20 MG
20 TABLET ORAL AT BEDTIME
Status: DISCONTINUED | OUTPATIENT
Start: 2024-01-01 | End: 2024-01-01 | Stop reason: HOSPADM

## 2024-01-01 RX ORDER — ACETAMINOPHEN 325 MG/1
650 TABLET ORAL EVERY 4 HOURS PRN
Status: DISCONTINUED | OUTPATIENT
Start: 2024-01-01 | End: 2024-01-01 | Stop reason: HOSPADM

## 2024-01-01 RX ORDER — OXYCODONE HYDROCHLORIDE 5 MG/1
5 TABLET ORAL EVERY 4 HOURS PRN
Status: DISCONTINUED | OUTPATIENT
Start: 2024-01-01 | End: 2024-01-01 | Stop reason: HOSPADM

## 2024-01-01 RX ORDER — NALOXONE HYDROCHLORIDE 0.4 MG/ML
0.4 INJECTION, SOLUTION INTRAMUSCULAR; INTRAVENOUS; SUBCUTANEOUS
Status: DISCONTINUED | OUTPATIENT
Start: 2024-01-01 | End: 2024-01-01 | Stop reason: HOSPADM

## 2024-01-01 RX ORDER — METHOCARBAMOL 500 MG/1
500 TABLET, FILM COATED ORAL 4 TIMES DAILY PRN
Status: DISCONTINUED | OUTPATIENT
Start: 2024-01-01 | End: 2024-01-01 | Stop reason: HOSPADM

## 2024-01-01 RX ORDER — OXYCODONE HCL 20 MG/ML
5 CONCENTRATE, ORAL ORAL EVERY 6 HOURS PRN
Status: DISCONTINUED | OUTPATIENT
Start: 2024-01-01 | End: 2024-01-01

## 2024-01-01 RX ORDER — MINERAL OIL/HYDROPHIL PETROLAT
OINTMENT (GRAM) TOPICAL
Status: DISCONTINUED | OUTPATIENT
Start: 2024-01-01 | End: 2024-01-01 | Stop reason: HOSPADM

## 2024-01-01 RX ORDER — HYDROCODONE BITARTRATE AND ACETAMINOPHEN 10; 325 MG/1; MG/1
1-2 TABLET ORAL 4 TIMES DAILY PRN
Qty: 240 TABLET | Refills: 0 | Status: SHIPPED | OUTPATIENT
Start: 2024-01-01 | End: 2024-01-01

## 2024-01-01 RX ORDER — ACETAMINOPHEN 650 MG/1
650 SUPPOSITORY RECTAL EVERY 4 HOURS PRN
Qty: 4 SUPPOSITORY | Refills: 0 | Status: SHIPPED | OUTPATIENT
Start: 2024-01-01

## 2024-01-01 RX ORDER — PREDNISONE 5 MG/1
1 TABLET ORAL EVERY EVENING
COMMUNITY
Start: 2024-01-01 | End: 2024-01-01

## 2024-01-01 RX ORDER — LORAZEPAM 2 MG/ML
0.5 INJECTION INTRAMUSCULAR
Status: DISCONTINUED | OUTPATIENT
Start: 2024-01-01 | End: 2024-01-01 | Stop reason: HOSPADM

## 2024-01-01 RX ORDER — LIDOCAINE 40 MG/G
CREAM TOPICAL
Status: DISCONTINUED | OUTPATIENT
Start: 2024-01-01 | End: 2024-01-01 | Stop reason: HOSPADM

## 2024-01-01 RX ORDER — ALPRAZOLAM 0.25 MG
0.25 TABLET ORAL 2 TIMES DAILY PRN
Status: DISCONTINUED | OUTPATIENT
Start: 2024-01-01 | End: 2024-01-01 | Stop reason: HOSPADM

## 2024-01-01 RX ORDER — AZITHROMYCIN 250 MG/1
250 TABLET, FILM COATED ORAL
Status: DISCONTINUED | OUTPATIENT
Start: 2024-01-01 | End: 2024-01-01 | Stop reason: HOSPADM

## 2024-01-01 RX ORDER — POLYETHYLENE GLYCOL 3350 17 G/17G
17 POWDER, FOR SOLUTION ORAL 2 TIMES DAILY PRN
Status: DISCONTINUED | OUTPATIENT
Start: 2024-01-01 | End: 2024-01-01 | Stop reason: HOSPADM

## 2024-01-01 RX ORDER — OXYCODONE HCL 20 MG/ML
15 CONCENTRATE, ORAL ORAL EVERY 6 HOURS PRN
Status: DISCONTINUED | OUTPATIENT
Start: 2024-01-01 | End: 2024-01-01

## 2024-01-01 RX ORDER — HYDROMORPHONE HYDROCHLORIDE 1 MG/ML
0.5 INJECTION, SOLUTION INTRAMUSCULAR; INTRAVENOUS; SUBCUTANEOUS
Status: DISCONTINUED | OUTPATIENT
Start: 2024-01-01 | End: 2024-01-01

## 2024-01-01 RX ORDER — PANTOPRAZOLE SODIUM 40 MG/1
40 TABLET, DELAYED RELEASE ORAL DAILY
Qty: 90 TABLET | Refills: 1 | Status: SHIPPED | OUTPATIENT
Start: 2024-01-01 | End: 2024-01-01

## 2024-01-01 RX ORDER — METHOCARBAMOL 500 MG/1
500 TABLET, FILM COATED ORAL 4 TIMES DAILY
Qty: 120 TABLET | Refills: 0 | Status: SHIPPED | OUTPATIENT
Start: 2024-01-01 | End: 2024-01-01

## 2024-01-01 RX ORDER — CARVEDILOL 6.25 MG/1
6.25 TABLET ORAL 2 TIMES DAILY WITH MEALS
Status: DISCONTINUED | OUTPATIENT
Start: 2024-01-01 | End: 2024-01-01

## 2024-01-01 RX ORDER — OXYCODONE HYDROCHLORIDE 5 MG/1
5 TABLET ORAL EVERY 8 HOURS PRN
Qty: 12 TABLET | Refills: 0 | Status: SHIPPED | OUTPATIENT
Start: 2024-01-01 | End: 2024-01-01

## 2024-01-01 RX ORDER — ONDANSETRON 4 MG/1
TABLET, ORALLY DISINTEGRATING ORAL
Qty: 18 TABLET | Refills: 1 | OUTPATIENT
Start: 2024-01-01

## 2024-01-01 RX ORDER — TRAMADOL HYDROCHLORIDE 50 MG/1
TABLET ORAL
Qty: 30 TABLET | Refills: 0 | Status: SHIPPED | OUTPATIENT
Start: 2024-01-01 | End: 2024-01-01

## 2024-01-01 RX ORDER — SENNOSIDES A AND B 8.6 MG/1
2 TABLET, FILM COATED ORAL 2 TIMES DAILY PRN
Qty: 100 TABLET | Refills: 0 | Status: SHIPPED | OUTPATIENT
Start: 2024-01-01

## 2024-01-01 RX ORDER — ATORVASTATIN CALCIUM 10 MG/1
10 TABLET, FILM COATED ORAL AT BEDTIME
Status: DISCONTINUED | OUTPATIENT
Start: 2024-01-01 | End: 2024-01-01 | Stop reason: HOSPADM

## 2024-01-01 RX ORDER — ACETAMINOPHEN 650 MG/1
650 SUPPOSITORY RECTAL EVERY 4 HOURS PRN
Status: DISCONTINUED | OUTPATIENT
Start: 2024-01-01 | End: 2024-01-01 | Stop reason: HOSPADM

## 2024-01-01 RX ORDER — SPIRONOLACTONE 25 MG
12.5 TABLET ORAL DAILY
Status: DISCONTINUED | OUTPATIENT
Start: 2024-01-01 | End: 2024-01-01

## 2024-01-01 RX ORDER — HYDROCODONE BITARTRATE AND ACETAMINOPHEN 10; 325 MG/1; MG/1
1 TABLET ORAL 4 TIMES DAILY PRN
Status: DISCONTINUED | OUTPATIENT
Start: 2024-01-01 | End: 2024-01-01 | Stop reason: HOSPADM

## 2024-01-01 RX ORDER — HYDROMORPHONE HCL IN WATER/PF 6 MG/30 ML
0.4 PATIENT CONTROLLED ANALGESIA SYRINGE INTRAVENOUS
Status: DISCONTINUED | OUTPATIENT
Start: 2024-01-01 | End: 2024-01-01

## 2024-01-01 RX ORDER — METHOCARBAMOL 500 MG/1
500 TABLET, FILM COATED ORAL 3 TIMES DAILY
Status: DISCONTINUED | OUTPATIENT
Start: 2024-01-01 | End: 2024-01-01 | Stop reason: HOSPADM

## 2024-01-01 RX ORDER — ONDANSETRON 4 MG/1
4 TABLET, ORALLY DISINTEGRATING ORAL EVERY 6 HOURS PRN
Status: DISCONTINUED | OUTPATIENT
Start: 2024-01-01 | End: 2024-01-01 | Stop reason: HOSPADM

## 2024-01-01 RX ORDER — HYDROCODONE BITARTRATE AND ACETAMINOPHEN 10; 325 MG/1; MG/1
1-2 TABLET ORAL EVERY 4 HOURS PRN
Status: DISCONTINUED | OUTPATIENT
Start: 2024-01-01 | End: 2024-01-01 | Stop reason: HOSPADM

## 2024-01-01 RX ORDER — NITROGLYCERIN 0.4 MG/1
0.4 TABLET SUBLINGUAL EVERY 5 MIN PRN
Status: DISCONTINUED | OUTPATIENT
Start: 2024-01-01 | End: 2024-01-01 | Stop reason: HOSPADM

## 2024-01-01 RX ORDER — POTASSIUM CHLORIDE 1500 MG/1
40 TABLET, EXTENDED RELEASE ORAL ONCE
Status: COMPLETED | OUTPATIENT
Start: 2024-01-01 | End: 2024-01-01

## 2024-01-01 RX ORDER — LISINOPRIL 5 MG/1
5 TABLET ORAL DAILY
Status: DISCONTINUED | OUTPATIENT
Start: 2024-01-01 | End: 2024-01-01 | Stop reason: HOSPADM

## 2024-01-01 RX ORDER — METHOCARBAMOL 500 MG/1
500 TABLET, FILM COATED ORAL 4 TIMES DAILY
Qty: 15 TABLET | Refills: 0 | Status: SHIPPED | OUTPATIENT
Start: 2024-01-01 | End: 2024-01-01

## 2024-01-01 RX ORDER — AMOXICILLIN 250 MG
2 CAPSULE ORAL 2 TIMES DAILY PRN
Status: DISCONTINUED | OUTPATIENT
Start: 2024-01-01 | End: 2024-01-01 | Stop reason: HOSPADM

## 2024-01-01 RX ORDER — CARVEDILOL 25 MG/1
25 TABLET ORAL 2 TIMES DAILY WITH MEALS
Status: DISCONTINUED | OUTPATIENT
Start: 2024-01-01 | End: 2024-01-01 | Stop reason: HOSPADM

## 2024-01-01 RX ORDER — FLUTICASONE FUROATE AND VILANTEROL 200; 25 UG/1; UG/1
1 POWDER RESPIRATORY (INHALATION) DAILY
Status: DISCONTINUED | OUTPATIENT
Start: 2024-01-01 | End: 2024-01-01 | Stop reason: HOSPADM

## 2024-01-01 RX ORDER — ONDANSETRON 2 MG/ML
4 INJECTION INTRAMUSCULAR; INTRAVENOUS EVERY 6 HOURS PRN
Status: DISCONTINUED | OUTPATIENT
Start: 2024-01-01 | End: 2024-01-01 | Stop reason: HOSPADM

## 2024-01-01 RX ORDER — ONDANSETRON 4 MG/1
TABLET, ORALLY DISINTEGRATING ORAL
Qty: 24 TABLET | Refills: 0 | OUTPATIENT
Start: 2024-01-01

## 2024-01-01 RX ORDER — PANTOPRAZOLE SODIUM 40 MG/1
40 TABLET, DELAYED RELEASE ORAL AT BEDTIME
COMMUNITY
End: 2024-01-01

## 2024-01-01 RX ORDER — HYDROCODONE BITARTRATE AND ACETAMINOPHEN 10; 325 MG/1; MG/1
1-2 TABLET ORAL EVERY 4 HOURS PRN
Qty: 240 TABLET | Refills: 0 | Status: SHIPPED | OUTPATIENT
Start: 2024-01-01 | End: 2024-01-01

## 2024-01-01 RX ORDER — HEPARIN SODIUM 5000 [USP'U]/.5ML
5000 INJECTION, SOLUTION INTRAVENOUS; SUBCUTANEOUS EVERY 8 HOURS
Status: DISCONTINUED | OUTPATIENT
Start: 2024-01-01 | End: 2024-01-01

## 2024-01-01 RX ORDER — PREDNISONE 5 MG/1
5 TABLET ORAL AT BEDTIME
Status: DISCONTINUED | OUTPATIENT
Start: 2024-01-01 | End: 2024-01-01 | Stop reason: HOSPADM

## 2024-01-01 RX ORDER — HYDROCODONE BITARTRATE AND ACETAMINOPHEN 5; 325 MG/1; MG/1
2 TABLET ORAL ONCE
Status: COMPLETED | OUTPATIENT
Start: 2024-01-01 | End: 2024-01-01

## 2024-01-01 RX ORDER — FLUTICASONE FUROATE AND VILANTEROL 200; 25 UG/1; UG/1
1 POWDER RESPIRATORY (INHALATION) DAILY
Status: DISCONTINUED | OUTPATIENT
Start: 2024-01-01 | End: 2024-01-01

## 2024-01-01 RX ORDER — AZITHROMYCIN 250 MG/1
250 TABLET, FILM COATED ORAL
COMMUNITY
Start: 2024-01-01

## 2024-01-01 RX ORDER — DIPHENHYDRAMINE HYDROCHLORIDE 50 MG/ML
25 INJECTION INTRAMUSCULAR; INTRAVENOUS ONCE
Status: COMPLETED | OUTPATIENT
Start: 2024-01-01 | End: 2024-01-01

## 2024-01-01 RX ORDER — CARVEDILOL 6.25 MG/1
25 TABLET ORAL 2 TIMES DAILY WITH MEALS
Status: DISCONTINUED | OUTPATIENT
Start: 2024-01-01 | End: 2024-01-01 | Stop reason: HOSPADM

## 2024-01-01 RX ORDER — AMOXICILLIN 250 MG
1 CAPSULE ORAL 2 TIMES DAILY PRN
Status: DISCONTINUED | OUTPATIENT
Start: 2024-01-01 | End: 2024-01-01

## 2024-01-01 RX ORDER — POTASSIUM CHLORIDE 7.45 MG/ML
10 INJECTION INTRAVENOUS ONCE
Status: DISCONTINUED | OUTPATIENT
Start: 2024-01-01 | End: 2024-01-01

## 2024-01-01 RX ORDER — AMOXICILLIN 250 MG
2 CAPSULE ORAL 2 TIMES DAILY PRN
Status: DISCONTINUED | OUTPATIENT
Start: 2024-01-01 | End: 2024-01-01

## 2024-01-01 RX ORDER — METHOCARBAMOL 500 MG/1
500 TABLET, FILM COATED ORAL 4 TIMES DAILY
Qty: 120 TABLET | Refills: 0 | Status: ON HOLD | OUTPATIENT
Start: 2024-01-01 | End: 2024-01-01

## 2024-01-01 RX ORDER — METHOCARBAMOL 500 MG/1
500 TABLET, FILM COATED ORAL 4 TIMES DAILY
Status: DISCONTINUED | OUTPATIENT
Start: 2024-01-01 | End: 2024-01-01

## 2024-01-01 RX ORDER — OXYCODONE HCL 5 MG/5 ML
10 SOLUTION, ORAL ORAL
Status: DISCONTINUED | OUTPATIENT
Start: 2024-01-01 | End: 2024-01-01 | Stop reason: HOSPADM

## 2024-01-01 RX ORDER — POTASSIUM CHLORIDE 1.5 G/1.58G
40 POWDER, FOR SOLUTION ORAL ONCE
Status: COMPLETED | OUTPATIENT
Start: 2024-01-01 | End: 2024-01-01

## 2024-01-01 RX ORDER — ONDANSETRON 2 MG/ML
4 INJECTION INTRAMUSCULAR; INTRAVENOUS ONCE
Status: COMPLETED | OUTPATIENT
Start: 2024-01-01 | End: 2024-01-01

## 2024-01-01 RX ORDER — HYDROCODONE BITARTRATE AND ACETAMINOPHEN 10; 325 MG/1; MG/1
2 TABLET ORAL EVERY 4 HOURS PRN
COMMUNITY
End: 2024-01-01

## 2024-01-01 RX ORDER — HYDROCODONE BITARTRATE AND ACETAMINOPHEN 5; 325 MG/1; MG/1
1-2 TABLET ORAL EVERY 6 HOURS PRN
Status: DISCONTINUED | OUTPATIENT
Start: 2024-01-01 | End: 2024-01-01

## 2024-01-01 RX ORDER — LANOLIN ALCOHOL/MO/W.PET/CERES
3 CREAM (GRAM) TOPICAL
Status: DISCONTINUED | OUTPATIENT
Start: 2024-01-01 | End: 2024-01-01 | Stop reason: HOSPADM

## 2024-01-01 RX ORDER — PANTOPRAZOLE SODIUM 40 MG/1
40 TABLET, DELAYED RELEASE ORAL DAILY
Status: DISCONTINUED | OUTPATIENT
Start: 2024-01-01 | End: 2024-01-01

## 2024-01-01 RX ORDER — AMOXICILLIN 250 MG
1 CAPSULE ORAL 2 TIMES DAILY
Status: DISCONTINUED | OUTPATIENT
Start: 2024-01-01 | End: 2024-01-01 | Stop reason: HOSPADM

## 2024-01-01 RX ORDER — ATORVASTATIN CALCIUM 10 MG/1
10 TABLET, FILM COATED ORAL DAILY
Status: DISCONTINUED | OUTPATIENT
Start: 2024-01-01 | End: 2024-01-01 | Stop reason: HOSPADM

## 2024-01-01 RX ORDER — LISINOPRIL 5 MG/1
5 TABLET ORAL AT BEDTIME
Status: ON HOLD | COMMUNITY
End: 2024-01-01

## 2024-01-01 RX ORDER — BISACODYL 10 MG
10 SUPPOSITORY, RECTAL RECTAL DAILY PRN
Status: DISCONTINUED | OUTPATIENT
Start: 2024-01-01 | End: 2024-01-01 | Stop reason: HOSPADM

## 2024-01-01 RX ORDER — ERYTHROMYCIN 5 MG/G
OINTMENT OPHTHALMIC
Qty: 3.5 G | Refills: 0 | OUTPATIENT
Start: 2024-01-01

## 2024-01-01 RX ORDER — POTASSIUM CHLORIDE 1500 MG/1
20 TABLET, EXTENDED RELEASE ORAL ONCE
Status: COMPLETED | OUTPATIENT
Start: 2024-01-01 | End: 2024-01-01

## 2024-01-01 RX ORDER — MAGNESIUM HYDROXIDE/ALUMINUM HYDROXICE/SIMETHICONE 120; 1200; 1200 MG/30ML; MG/30ML; MG/30ML
30 SUSPENSION ORAL EVERY 4 HOURS PRN
Status: DISCONTINUED | OUTPATIENT
Start: 2024-01-01 | End: 2024-01-01 | Stop reason: HOSPADM

## 2024-01-01 RX ORDER — TRAMADOL HYDROCHLORIDE 50 MG/1
50 TABLET ORAL EVERY 6 HOURS PRN
Status: DISCONTINUED | OUTPATIENT
Start: 2024-01-01 | End: 2024-01-01

## 2024-01-01 RX ORDER — HYDROMORPHONE HYDROCHLORIDE 1 MG/ML
0.5 INJECTION, SOLUTION INTRAMUSCULAR; INTRAVENOUS; SUBCUTANEOUS ONCE
Status: COMPLETED | OUTPATIENT
Start: 2024-01-01 | End: 2024-01-01

## 2024-01-01 RX ORDER — OXYCODONE HYDROCHLORIDE 10 MG/1
10 TABLET ORAL EVERY 4 HOURS PRN
Status: DISCONTINUED | OUTPATIENT
Start: 2024-01-01 | End: 2024-01-01 | Stop reason: HOSPADM

## 2024-01-01 RX ORDER — ALPRAZOLAM 0.25 MG
0.25 TABLET ORAL 2 TIMES DAILY PRN
Qty: 60 TABLET | Refills: 0 | Status: CANCELLED | OUTPATIENT
Start: 2024-01-01

## 2024-01-01 RX ORDER — HYDROCODONE BITARTRATE AND ACETAMINOPHEN 10; 325 MG/1; MG/1
1-2 TABLET ORAL EVERY 6 HOURS PRN
Status: DISCONTINUED | OUTPATIENT
Start: 2024-01-01 | End: 2024-01-01

## 2024-01-01 RX ORDER — MAGNESIUM HYDROXIDE/ALUMINUM HYDROXICE/SIMETHICONE 120; 1200; 1200 MG/30ML; MG/30ML; MG/30ML
15 SUSPENSION ORAL ONCE
Status: COMPLETED | OUTPATIENT
Start: 2024-01-01 | End: 2024-01-01

## 2024-01-01 RX ORDER — BISACODYL 10 MG
10 SUPPOSITORY, RECTAL RECTAL
Status: DISCONTINUED | OUTPATIENT
Start: 2024-01-01 | End: 2024-01-01 | Stop reason: HOSPADM

## 2024-01-01 RX ORDER — FLUTICASONE PROPIONATE 50 MCG
1-2 SPRAY, SUSPENSION (ML) NASAL DAILY PRN
Qty: 16 G | Refills: 4 | Status: SHIPPED | OUTPATIENT
Start: 2024-01-01

## 2024-01-01 RX ORDER — CARVEDILOL 25 MG/1
12.5 TABLET ORAL 2 TIMES DAILY WITH MEALS
COMMUNITY
End: 2024-01-01

## 2024-01-01 RX ORDER — CARVEDILOL 6.25 MG/1
6.25 TABLET ORAL 2 TIMES DAILY WITH MEALS
Qty: 180 TABLET | Refills: 1 | Status: SHIPPED | OUTPATIENT
Start: 2024-01-01

## 2024-01-01 RX ORDER — SIMETHICONE 80 MG
80 TABLET,CHEWABLE ORAL EVERY 6 HOURS PRN
Status: DISCONTINUED | OUTPATIENT
Start: 2024-01-01 | End: 2024-01-01

## 2024-01-01 RX ORDER — MORPHINE SULFATE 100 MG/5ML
5 SOLUTION ORAL
Qty: 30 ML | Refills: 0 | Status: SHIPPED | OUTPATIENT
Start: 2024-01-01

## 2024-01-01 RX ORDER — IPRATROPIUM BROMIDE AND ALBUTEROL SULFATE 2.5; .5 MG/3ML; MG/3ML
3 SOLUTION RESPIRATORY (INHALATION) EVERY 4 HOURS PRN
Status: DISCONTINUED | OUTPATIENT
Start: 2024-01-01 | End: 2024-01-01

## 2024-01-01 RX ORDER — DIPHENHYDRAMINE HYDROCHLORIDE 50 MG/ML
25 INJECTION INTRAMUSCULAR; INTRAVENOUS ONCE
Status: DISCONTINUED | OUTPATIENT
Start: 2024-01-01 | End: 2024-01-01

## 2024-01-01 RX ORDER — ONDANSETRON 4 MG/1
8 TABLET, ORALLY DISINTEGRATING ORAL EVERY 6 HOURS PRN
Status: DISCONTINUED | OUTPATIENT
Start: 2024-01-01 | End: 2024-01-01 | Stop reason: HOSPADM

## 2024-01-01 RX ORDER — IPRATROPIUM BROMIDE AND ALBUTEROL SULFATE 2.5; .5 MG/3ML; MG/3ML
1 SOLUTION RESPIRATORY (INHALATION) EVERY 6 HOURS PRN
Status: DISCONTINUED | OUTPATIENT
Start: 2024-01-01 | End: 2024-01-01 | Stop reason: HOSPADM

## 2024-01-01 RX ORDER — TRAMADOL HYDROCHLORIDE 50 MG/1
50 TABLET ORAL EVERY 12 HOURS PRN
Qty: 10 TABLET | Refills: 0 | Status: SHIPPED | OUTPATIENT
Start: 2024-01-01 | End: 2024-01-01

## 2024-01-01 RX ORDER — HYDROCODONE BITARTRATE AND ACETAMINOPHEN 10; 325 MG/1; MG/1
1-2 TABLET ORAL 4 TIMES DAILY PRN
COMMUNITY
End: 2024-01-01

## 2024-01-01 RX ORDER — SIMETHICONE 80 MG
80 TABLET,CHEWABLE ORAL EVERY 6 HOURS PRN
Status: DISCONTINUED | OUTPATIENT
Start: 2024-01-01 | End: 2024-01-01 | Stop reason: HOSPADM

## 2024-01-01 RX ORDER — SODIUM CHLORIDE 9 MG/ML
INJECTION, SOLUTION INTRAVENOUS CONTINUOUS
Status: DISCONTINUED | OUTPATIENT
Start: 2024-01-01 | End: 2024-01-01

## 2024-01-01 RX ORDER — ONDANSETRON 2 MG/ML
8 INJECTION INTRAMUSCULAR; INTRAVENOUS EVERY 6 HOURS PRN
Status: DISCONTINUED | OUTPATIENT
Start: 2024-01-01 | End: 2024-01-01 | Stop reason: HOSPADM

## 2024-01-01 RX ORDER — PANTOPRAZOLE SODIUM 20 MG/1
40 TABLET, DELAYED RELEASE ORAL AT BEDTIME
Status: DISCONTINUED | OUTPATIENT
Start: 2024-01-01 | End: 2024-01-01 | Stop reason: HOSPADM

## 2024-01-01 RX ORDER — CARBOXYMETHYLCELLULOSE SODIUM 5 MG/ML
1-2 SOLUTION/ DROPS OPHTHALMIC
Status: DISCONTINUED | OUTPATIENT
Start: 2024-01-01 | End: 2024-01-01 | Stop reason: HOSPADM

## 2024-01-01 RX ORDER — FLUTICASONE PROPIONATE 50 MCG
1-2 SPRAY, SUSPENSION (ML) NASAL DAILY PRN
Status: DISCONTINUED | OUTPATIENT
Start: 2024-01-01 | End: 2024-01-01 | Stop reason: HOSPADM

## 2024-01-01 RX ORDER — ONDANSETRON 2 MG/ML
4 INJECTION INTRAMUSCULAR; INTRAVENOUS EVERY 6 HOURS PRN
Status: DISCONTINUED | OUTPATIENT
Start: 2024-01-01 | End: 2024-01-01

## 2024-01-01 RX ORDER — AMOXICILLIN 250 MG
1 CAPSULE ORAL 2 TIMES DAILY PRN
Status: DISCONTINUED | OUTPATIENT
Start: 2024-01-01 | End: 2024-01-01 | Stop reason: HOSPADM

## 2024-01-01 RX ORDER — ROFLUMILAST 500 UG/1
500 TABLET ORAL DAILY
Status: DISCONTINUED | OUTPATIENT
Start: 2024-01-01 | End: 2024-01-01 | Stop reason: HOSPADM

## 2024-01-01 RX ORDER — LORAZEPAM 2 MG/ML
1 CONCENTRATE ORAL
Status: DISCONTINUED | OUTPATIENT
Start: 2024-01-01 | End: 2024-01-01 | Stop reason: HOSPADM

## 2024-01-01 RX ORDER — ALBUTEROL SULFATE 90 UG/1
2 AEROSOL, METERED RESPIRATORY (INHALATION) EVERY 6 HOURS PRN
Status: DISCONTINUED | OUTPATIENT
Start: 2024-01-01 | End: 2024-01-01 | Stop reason: HOSPADM

## 2024-01-01 RX ORDER — DEXAMETHASONE SODIUM PHOSPHATE 10 MG/ML
10 INJECTION, SOLUTION INTRAMUSCULAR; INTRAVENOUS ONCE
Status: DISCONTINUED | OUTPATIENT
Start: 2024-01-01 | End: 2024-01-01

## 2024-01-01 RX ORDER — IPRATROPIUM BROMIDE AND ALBUTEROL SULFATE 2.5; .5 MG/3ML; MG/3ML
1 SOLUTION RESPIRATORY (INHALATION) EVERY 4 HOURS PRN
Status: DISCONTINUED | OUTPATIENT
Start: 2024-01-01 | End: 2024-01-01 | Stop reason: HOSPADM

## 2024-01-01 RX ORDER — POTASSIUM CHLORIDE 1.5 G/1.58G
20 POWDER, FOR SOLUTION ORAL ONCE
Status: COMPLETED | OUTPATIENT
Start: 2024-01-01 | End: 2024-01-01

## 2024-01-01 RX ORDER — PREDNISONE 5 MG/1
10 TABLET ORAL AT BEDTIME
Status: DISCONTINUED | OUTPATIENT
Start: 2024-01-01 | End: 2024-01-01 | Stop reason: HOSPADM

## 2024-01-01 RX ORDER — ONDANSETRON 4 MG/1
4 TABLET, ORALLY DISINTEGRATING ORAL EVERY 6 HOURS PRN
Qty: 24 TABLET | Refills: 0 | Status: SHIPPED | OUTPATIENT
Start: 2024-01-01 | End: 2024-01-01

## 2024-01-01 RX ORDER — LISINOPRIL 5 MG/1
5 TABLET ORAL DAILY
Qty: 90 TABLET | Refills: 1 | Status: SHIPPED | OUTPATIENT
Start: 2024-01-01 | End: 2024-01-01

## 2024-01-01 RX ORDER — SIMETHICONE 80 MG
80 TABLET,CHEWABLE ORAL EVERY 6 HOURS PRN
Qty: 30 TABLET | Refills: 0 | Status: ON HOLD | OUTPATIENT
Start: 2024-01-01 | End: 2024-01-01

## 2024-01-01 RX ORDER — ONDANSETRON 2 MG/ML
4 INJECTION INTRAMUSCULAR; INTRAVENOUS
Status: DISCONTINUED | OUTPATIENT
Start: 2024-01-01 | End: 2024-01-01

## 2024-01-01 RX ORDER — ATROPINE SULFATE 10 MG/ML
2 SOLUTION/ DROPS OPHTHALMIC
Status: DISCONTINUED | OUTPATIENT
Start: 2024-01-01 | End: 2024-01-01 | Stop reason: HOSPADM

## 2024-01-01 RX ORDER — SUMATRIPTAN 25 MG/1
25 TABLET, FILM COATED ORAL
Status: DISCONTINUED | OUTPATIENT
Start: 2024-01-01 | End: 2024-01-01 | Stop reason: HOSPADM

## 2024-01-01 RX ORDER — HYDROCODONE BITARTRATE AND ACETAMINOPHEN 10; 325 MG/1; MG/1
2 TABLET ORAL EVERY 4 HOURS PRN
Qty: 240 TABLET | Refills: 0 | Status: SHIPPED | OUTPATIENT
Start: 2024-01-01

## 2024-01-01 RX ORDER — IPRATROPIUM BROMIDE AND ALBUTEROL SULFATE 2.5; .5 MG/3ML; MG/3ML
3 SOLUTION RESPIRATORY (INHALATION) 2 TIMES DAILY
Status: DISCONTINUED | OUTPATIENT
Start: 2024-01-01 | End: 2024-01-01

## 2024-01-01 RX ORDER — NALOXONE HYDROCHLORIDE 0.4 MG/ML
0.1 INJECTION, SOLUTION INTRAMUSCULAR; INTRAVENOUS; SUBCUTANEOUS
Status: DISCONTINUED | OUTPATIENT
Start: 2024-01-01 | End: 2024-01-01 | Stop reason: HOSPADM

## 2024-01-01 RX ORDER — PREDNISONE 5 MG/1
5 TABLET ORAL EVERY MORNING
Status: DISCONTINUED | OUTPATIENT
Start: 2024-01-01 | End: 2024-01-01 | Stop reason: HOSPADM

## 2024-01-01 RX ORDER — IPRATROPIUM BROMIDE AND ALBUTEROL SULFATE 2.5; .5 MG/3ML; MG/3ML
SOLUTION RESPIRATORY (INHALATION)
Qty: 90 ML | Refills: 1 | Status: SHIPPED | OUTPATIENT
Start: 2024-01-01 | End: 2024-01-01

## 2024-01-01 RX ORDER — HEPARIN SODIUM 5000 [USP'U]/.5ML
5000 INJECTION, SOLUTION INTRAVENOUS; SUBCUTANEOUS EVERY 8 HOURS
Status: DISCONTINUED | OUTPATIENT
Start: 2024-01-01 | End: 2024-01-01 | Stop reason: HOSPADM

## 2024-01-01 RX ORDER — ATORVASTATIN CALCIUM 10 MG/1
10 TABLET, FILM COATED ORAL AT BEDTIME
COMMUNITY
End: 2024-01-01

## 2024-01-01 RX ORDER — ONDANSETRON 4 MG/1
TABLET, ORALLY DISINTEGRATING ORAL
Qty: 18 TABLET | Refills: 1 | Status: SHIPPED | OUTPATIENT
Start: 2024-01-01 | End: 2024-01-01

## 2024-01-01 RX ORDER — METHOCARBAMOL 500 MG/1
500 TABLET, FILM COATED ORAL 4 TIMES DAILY
Status: DISCONTINUED | OUTPATIENT
Start: 2024-01-01 | End: 2024-01-01 | Stop reason: HOSPADM

## 2024-01-01 RX ORDER — TOLVAPTAN 15 MG/1
15 TABLET ORAL DAILY
Status: DISCONTINUED | OUTPATIENT
Start: 2024-01-01 | End: 2024-01-01

## 2024-01-01 RX ORDER — SODIUM CHLORIDE AND POTASSIUM CHLORIDE 150; 900 MG/100ML; MG/100ML
INJECTION, SOLUTION INTRAVENOUS CONTINUOUS
Status: DISCONTINUED | OUTPATIENT
Start: 2024-01-01 | End: 2024-01-01 | Stop reason: HOSPADM

## 2024-01-01 RX ORDER — HYDROCODONE BITARTRATE AND ACETAMINOPHEN 10; 325 MG/1; MG/1
1-2 TABLET ORAL 4 TIMES DAILY PRN
Status: DISCONTINUED | OUTPATIENT
Start: 2024-01-01 | End: 2024-01-01

## 2024-01-01 RX ORDER — PREDNISONE 5 MG/1
5 TABLET ORAL 2 TIMES DAILY
COMMUNITY

## 2024-01-01 RX ORDER — ONDANSETRON 4 MG/1
TABLET, ORALLY DISINTEGRATING ORAL
Qty: 18 TABLET | Refills: 2 | Status: ON HOLD | OUTPATIENT
Start: 2024-01-01 | End: 2024-01-01

## 2024-01-01 RX ORDER — SENNOSIDES 8.6 MG
1 TABLET ORAL 2 TIMES DAILY PRN
Status: DISCONTINUED | OUTPATIENT
Start: 2024-01-01 | End: 2024-01-01 | Stop reason: HOSPADM

## 2024-01-01 RX ORDER — PANTOPRAZOLE SODIUM 40 MG/1
40 TABLET, DELAYED RELEASE ORAL AT BEDTIME
Status: DISCONTINUED | OUTPATIENT
Start: 2024-01-01 | End: 2024-01-01 | Stop reason: HOSPADM

## 2024-01-01 RX ORDER — ERYTHROMYCIN 5 MG/G
OINTMENT OPHTHALMIC 2 TIMES DAILY
Qty: 3.5 G | Refills: 5 | Status: SHIPPED | OUTPATIENT
Start: 2024-01-01

## 2024-01-01 RX ORDER — RESPIRATORY SYNCYTIAL VIRUS VACCINE 120MCG/0.5
0.5 KIT INTRAMUSCULAR ONCE
Qty: 1 EACH | Refills: 0 | Status: CANCELLED | OUTPATIENT
Start: 2024-01-01 | End: 2024-01-01

## 2024-01-01 RX ORDER — ATROPINE SULFATE 10 MG/ML
2 SOLUTION/ DROPS OPHTHALMIC EVERY 4 HOURS PRN
Qty: 5 ML | Refills: 0 | Status: SHIPPED | OUTPATIENT
Start: 2024-01-01

## 2024-01-01 RX ORDER — GLYCOPYRROLATE 0.2 MG/ML
0.2 INJECTION, SOLUTION INTRAMUSCULAR; INTRAVENOUS EVERY 4 HOURS PRN
Status: DISCONTINUED | OUTPATIENT
Start: 2024-01-01 | End: 2024-01-01 | Stop reason: HOSPADM

## 2024-01-01 RX ORDER — PANTOPRAZOLE SODIUM 40 MG/1
40 TABLET, DELAYED RELEASE ORAL DAILY
Status: DISCONTINUED | OUTPATIENT
Start: 2024-01-01 | End: 2024-01-01 | Stop reason: HOSPADM

## 2024-01-01 RX ORDER — HYDROCODONE BITARTRATE AND ACETAMINOPHEN 10; 325 MG/1; MG/1
2 TABLET ORAL EVERY 4 HOURS PRN
Status: DISCONTINUED | OUTPATIENT
Start: 2024-01-01 | End: 2024-01-01 | Stop reason: HOSPADM

## 2024-01-01 RX ORDER — PREDNISONE 5 MG/1
10 TABLET ORAL DAILY
COMMUNITY
End: 2024-01-01

## 2024-01-01 RX ORDER — METHOCARBAMOL 500 MG/1
500 TABLET, FILM COATED ORAL 4 TIMES DAILY PRN
COMMUNITY
End: 2024-01-01

## 2024-01-01 RX ORDER — BACLOFEN 10 MG/1
10 TABLET ORAL 3 TIMES DAILY PRN
Status: DISCONTINUED | OUTPATIENT
Start: 2024-01-01 | End: 2024-01-01 | Stop reason: HOSPADM

## 2024-01-01 RX ORDER — ZINC OXIDE 20 %
OINTMENT (GRAM) TOPICAL EVERY 4 HOURS
Status: DISCONTINUED | OUTPATIENT
Start: 2024-01-01 | End: 2024-01-01 | Stop reason: HOSPADM

## 2024-01-01 RX ORDER — ZINC OXIDE 20 %
OINTMENT (GRAM) TOPICAL EVERY 4 HOURS
Qty: 28 G | Refills: 0 | Status: ON HOLD | OUTPATIENT
Start: 2024-01-01 | End: 2024-01-01

## 2024-01-01 RX ORDER — SUMATRIPTAN 25 MG/1
25 TABLET, FILM COATED ORAL
Qty: 9 TABLET | Refills: 11 | Status: SHIPPED | OUTPATIENT
Start: 2024-01-01

## 2024-01-01 RX ORDER — HYDROMORPHONE HYDROCHLORIDE 1 MG/ML
0.5 INJECTION, SOLUTION INTRAMUSCULAR; INTRAVENOUS; SUBCUTANEOUS
Status: DISCONTINUED | OUTPATIENT
Start: 2024-01-01 | End: 2024-01-01 | Stop reason: HOSPADM

## 2024-01-01 RX ORDER — IOPAMIDOL 755 MG/ML
500 INJECTION, SOLUTION INTRAVASCULAR ONCE
Status: DISCONTINUED | OUTPATIENT
Start: 2024-01-01 | End: 2024-01-01

## 2024-01-01 RX ORDER — LIDOCAINE HYDROCHLORIDE 20 MG/ML
JELLY TOPICAL
Status: DISCONTINUED | OUTPATIENT
Start: 2024-01-01 | End: 2024-01-01 | Stop reason: HOSPADM

## 2024-01-01 RX ORDER — OXYCODONE HCL 20 MG/ML
10 CONCENTRATE, ORAL ORAL EVERY 6 HOURS PRN
Status: DISCONTINUED | OUTPATIENT
Start: 2024-01-01 | End: 2024-01-01

## 2024-01-01 RX ORDER — BISACODYL 10 MG
10 SUPPOSITORY, RECTAL RECTAL DAILY PRN
Qty: 2 SUPPOSITORY | Refills: 0 | Status: SHIPPED | OUTPATIENT
Start: 2024-01-01

## 2024-01-01 RX ORDER — CALCIUM CARBONATE 500 MG/1
1000 TABLET, CHEWABLE ORAL 4 TIMES DAILY PRN
Status: DISCONTINUED | OUTPATIENT
Start: 2024-01-01 | End: 2024-01-01 | Stop reason: HOSPADM

## 2024-01-01 RX ORDER — OXYCODONE HYDROCHLORIDE 5 MG/1
5 TABLET ORAL EVERY 12 HOURS PRN
Qty: 6 TABLET | Refills: 0 | Status: SHIPPED | OUTPATIENT
Start: 2024-01-01 | End: 2024-01-01

## 2024-01-01 RX ORDER — PREDNISONE 5 MG/1
5 TABLET ORAL EVERY EVENING
Status: DISCONTINUED | OUTPATIENT
Start: 2024-01-01 | End: 2024-01-01 | Stop reason: HOSPADM

## 2024-01-01 RX ORDER — HALOPERIDOL 2 MG/ML
1 SOLUTION ORAL EVERY 6 HOURS PRN
Qty: 10 ML | Refills: 0 | Status: SHIPPED | OUTPATIENT
Start: 2024-01-01

## 2024-01-01 RX ORDER — IPRATROPIUM BROMIDE AND ALBUTEROL SULFATE 2.5; .5 MG/3ML; MG/3ML
3 SOLUTION RESPIRATORY (INHALATION) ONCE
Status: DISCONTINUED | OUTPATIENT
Start: 2024-01-01 | End: 2024-01-01

## 2024-01-01 RX ORDER — DOXYCYCLINE HYCLATE 20 MG
20 TABLET ORAL AT BEDTIME
COMMUNITY
Start: 2024-01-01 | End: 2024-01-01

## 2024-01-01 RX ORDER — METHOCARBAMOL 500 MG/1
500 TABLET, FILM COATED ORAL 4 TIMES DAILY PRN
COMMUNITY

## 2024-01-01 RX ORDER — ONDANSETRON 4 MG/1
4 TABLET, ORALLY DISINTEGRATING ORAL EVERY 6 HOURS PRN
Qty: 24 TABLET | Refills: 3 | Status: SHIPPED | OUTPATIENT
Start: 2024-01-01 | End: 2024-01-01

## 2024-01-01 RX ORDER — LORAZEPAM 2 MG/ML
0.5 CONCENTRATE ORAL EVERY 4 HOURS PRN
Qty: 30 ML | Refills: 0 | Status: SHIPPED | OUTPATIENT
Start: 2024-01-01

## 2024-01-01 RX ORDER — METHOCARBAMOL 500 MG/1
500 TABLET, FILM COATED ORAL 4 TIMES DAILY PRN
Status: DISCONTINUED | OUTPATIENT
Start: 2024-01-01 | End: 2024-01-01

## 2024-01-01 RX ORDER — TRAMADOL HYDROCHLORIDE 50 MG/1
TABLET ORAL
Qty: 30 TABLET | OUTPATIENT
Start: 2024-01-01

## 2024-01-01 RX ORDER — HYDROMORPHONE HCL IN WATER/PF 6 MG/30 ML
0.2 PATIENT CONTROLLED ANALGESIA SYRINGE INTRAVENOUS
Status: DISCONTINUED | OUTPATIENT
Start: 2024-01-01 | End: 2024-01-01

## 2024-01-01 RX ADMIN — ALUMINUM HYDROXIDE, MAGNESIUM HYDROXIDE, AND DIMETHICONE 30 ML: 200; 20; 200 SUSPENSION ORAL at 10:40

## 2024-01-01 RX ADMIN — OXYCODONE HYDROCHLORIDE 15 MG: 100 SOLUTION ORAL at 06:24

## 2024-01-01 RX ADMIN — CARVEDILOL 6.25 MG: 6.25 TABLET, FILM COATED ORAL at 17:48

## 2024-01-01 RX ADMIN — HYDROMORPHONE HYDROCHLORIDE 0.3 MG: 1 INJECTION, SOLUTION INTRAMUSCULAR; INTRAVENOUS; SUBCUTANEOUS at 18:19

## 2024-01-01 RX ADMIN — MAGNESIUM SULFATE HEPTAHYDRATE 2 G: 40 INJECTION, SOLUTION INTRAVENOUS at 22:51

## 2024-01-01 RX ADMIN — HYDROMORPHONE HYDROCHLORIDE 1 MG: 1 INJECTION, SOLUTION INTRAMUSCULAR; INTRAVENOUS; SUBCUTANEOUS at 16:52

## 2024-01-01 RX ADMIN — LISINOPRIL 5 MG: 5 TABLET ORAL at 21:31

## 2024-01-01 RX ADMIN — DEXAMETHASONE SODIUM PHOSPHATE 10 MG: 10 INJECTION, SOLUTION INTRAMUSCULAR; INTRAVENOUS at 23:11

## 2024-01-01 RX ADMIN — HYDROMORPHONE HYDROCHLORIDE 0.3 MG: 1 INJECTION, SOLUTION INTRAMUSCULAR; INTRAVENOUS; SUBCUTANEOUS at 00:07

## 2024-01-01 RX ADMIN — IPRATROPIUM BROMIDE AND ALBUTEROL SULFATE 3 ML: .5; 3 SOLUTION RESPIRATORY (INHALATION) at 18:48

## 2024-01-01 RX ADMIN — IPRATROPIUM BROMIDE AND ALBUTEROL SULFATE 3 ML: .5; 3 SOLUTION RESPIRATORY (INHALATION) at 04:48

## 2024-01-01 RX ADMIN — SODIUM CHLORIDE: 9 INJECTION, SOLUTION INTRAVENOUS at 08:29

## 2024-01-01 RX ADMIN — PROCHLORPERAZINE EDISYLATE 5 MG: 5 INJECTION INTRAMUSCULAR; INTRAVENOUS at 18:12

## 2024-01-01 RX ADMIN — PREDNISONE 5 MG: 5 TABLET ORAL at 10:08

## 2024-01-01 RX ADMIN — IPRATROPIUM BROMIDE AND ALBUTEROL SULFATE 3 ML: .5; 3 SOLUTION RESPIRATORY (INHALATION) at 23:54

## 2024-01-01 RX ADMIN — ROFLUMILAST 500 MCG: 500 TABLET ORAL at 16:36

## 2024-01-01 RX ADMIN — OXYCODONE HYDROCHLORIDE 10 MG: 10 TABLET ORAL at 02:53

## 2024-01-01 RX ADMIN — CETIRIZINE HYDROCHLORIDE 10 MG: 10 TABLET, FILM COATED ORAL at 03:03

## 2024-01-01 RX ADMIN — HYDROCODONE BITARTRATE AND ACETAMINOPHEN 2 TABLET: 10; 325 TABLET ORAL at 10:51

## 2024-01-01 RX ADMIN — SENNOSIDES AND DOCUSATE SODIUM 1 TABLET: 8.6; 5 TABLET ORAL at 07:48

## 2024-01-01 RX ADMIN — ONDANSETRON 8 MG: 4 TABLET, ORALLY DISINTEGRATING ORAL at 19:47

## 2024-01-01 RX ADMIN — HUMAN ALBUMIN MICROSPHERES AND PERFLUTREN 3 ML: 10; .22 INJECTION, SOLUTION INTRAVENOUS at 08:35

## 2024-01-01 RX ADMIN — PREDNISONE 10 MG: 10 TABLET ORAL at 08:04

## 2024-01-01 RX ADMIN — HYDROMORPHONE HYDROCHLORIDE 0.5 MG: 1 INJECTION, SOLUTION INTRAMUSCULAR; INTRAVENOUS; SUBCUTANEOUS at 23:52

## 2024-01-01 RX ADMIN — IPRATROPIUM BROMIDE AND ALBUTEROL SULFATE 3 ML: .5; 3 SOLUTION RESPIRATORY (INHALATION) at 11:37

## 2024-01-01 RX ADMIN — PROCHLORPERAZINE MALEATE 5 MG: 5 TABLET ORAL at 18:39

## 2024-01-01 RX ADMIN — IPRATROPIUM BROMIDE AND ALBUTEROL SULFATE 3 ML: .5; 3 SOLUTION RESPIRATORY (INHALATION) at 01:42

## 2024-01-01 RX ADMIN — LORAZEPAM 0.5 MG: 2 INJECTION INTRAMUSCULAR; INTRAVENOUS at 00:01

## 2024-01-01 RX ADMIN — CARVEDILOL 6.25 MG: 6.25 TABLET, FILM COATED ORAL at 00:03

## 2024-01-01 RX ADMIN — OXYCODONE HYDROCHLORIDE 15 MG: 100 SOLUTION ORAL at 04:48

## 2024-01-01 RX ADMIN — SIMETHICONE 80 MG: 80 TABLET, CHEWABLE ORAL at 15:42

## 2024-01-01 RX ADMIN — IPRATROPIUM BROMIDE AND ALBUTEROL SULFATE 3 ML: .5; 3 SOLUTION RESPIRATORY (INHALATION) at 17:20

## 2024-01-01 RX ADMIN — ROFLUMILAST 500 MCG: 500 TABLET ORAL at 08:39

## 2024-01-01 RX ADMIN — LISINOPRIL 5 MG: 5 TABLET ORAL at 20:14

## 2024-01-01 RX ADMIN — ALPRAZOLAM 0.25 MG: 0.25 TABLET ORAL at 17:45

## 2024-01-01 RX ADMIN — IPRATROPIUM BROMIDE AND ALBUTEROL SULFATE 3 ML: .5; 3 SOLUTION RESPIRATORY (INHALATION) at 22:11

## 2024-01-01 RX ADMIN — HYDROCODONE BITARTRATE AND ACETAMINOPHEN 1 TABLET: 10; 325 TABLET ORAL at 22:38

## 2024-01-01 RX ADMIN — POTASSIUM CHLORIDE AND SODIUM CHLORIDE: 900; 150 INJECTION, SOLUTION INTRAVENOUS at 13:44

## 2024-01-01 RX ADMIN — IPRATROPIUM BROMIDE AND ALBUTEROL SULFATE 3 ML: .5; 3 SOLUTION RESPIRATORY (INHALATION) at 17:50

## 2024-01-01 RX ADMIN — ONDANSETRON 8 MG: 2 INJECTION INTRAMUSCULAR; INTRAVENOUS at 14:58

## 2024-01-01 RX ADMIN — PANTOPRAZOLE SODIUM 40 MG: 40 TABLET, DELAYED RELEASE ORAL at 03:02

## 2024-01-01 RX ADMIN — METHOCARBAMOL 500 MG: 500 TABLET ORAL at 13:40

## 2024-01-01 RX ADMIN — HYDROMORPHONE HYDROCHLORIDE 1 MG: 1 INJECTION, SOLUTION INTRAMUSCULAR; INTRAVENOUS; SUBCUTANEOUS at 08:17

## 2024-01-01 RX ADMIN — METHOCARBAMOL 500 MG: 500 TABLET ORAL at 20:29

## 2024-01-01 RX ADMIN — AZITHROMYCIN DIHYDRATE 250 MG: 250 TABLET ORAL at 10:06

## 2024-01-01 RX ADMIN — ERYTHROMYCIN: 5 OINTMENT OPHTHALMIC at 02:54

## 2024-01-01 RX ADMIN — SENNOSIDES AND DOCUSATE SODIUM 1 TABLET: 8.6; 5 TABLET ORAL at 08:33

## 2024-01-01 RX ADMIN — ONDANSETRON 4 MG: 2 INJECTION INTRAMUSCULAR; INTRAVENOUS at 04:10

## 2024-01-01 RX ADMIN — IPRATROPIUM BROMIDE AND ALBUTEROL SULFATE 3 ML: .5; 3 SOLUTION RESPIRATORY (INHALATION) at 05:17

## 2024-01-01 RX ADMIN — ONDANSETRON 8 MG: 4 TABLET, ORALLY DISINTEGRATING ORAL at 22:59

## 2024-01-01 RX ADMIN — IPRATROPIUM BROMIDE AND ALBUTEROL SULFATE 3 ML: .5; 3 SOLUTION RESPIRATORY (INHALATION) at 20:26

## 2024-01-01 RX ADMIN — METHOCARBAMOL 500 MG: 500 TABLET ORAL at 19:14

## 2024-01-01 RX ADMIN — PANTOPRAZOLE SODIUM 40 MG: 40 TABLET, DELAYED RELEASE ORAL at 09:36

## 2024-01-01 RX ADMIN — CARVEDILOL 6.25 MG: 6.25 TABLET, FILM COATED ORAL at 08:54

## 2024-01-01 RX ADMIN — CARVEDILOL 6.25 MG: 6.25 TABLET, FILM COATED ORAL at 08:04

## 2024-01-01 RX ADMIN — METHOCARBAMOL 500 MG: 500 TABLET ORAL at 12:20

## 2024-01-01 RX ADMIN — SODIUM CHLORIDE 500 ML: 9 INJECTION, SOLUTION INTRAVENOUS at 19:58

## 2024-01-01 RX ADMIN — POTASSIUM CHLORIDE 40 MEQ: 1500 TABLET, EXTENDED RELEASE ORAL at 04:15

## 2024-01-01 RX ADMIN — IPRATROPIUM BROMIDE AND ALBUTEROL SULFATE 3 ML: .5; 3 SOLUTION RESPIRATORY (INHALATION) at 12:15

## 2024-01-01 RX ADMIN — HYDROMORPHONE HYDROCHLORIDE 0.3 MG: 1 INJECTION, SOLUTION INTRAMUSCULAR; INTRAVENOUS; SUBCUTANEOUS at 12:06

## 2024-01-01 RX ADMIN — CARVEDILOL 25 MG: 25 TABLET, FILM COATED ORAL at 07:48

## 2024-01-01 RX ADMIN — PREDNISONE 10 MG: 10 TABLET ORAL at 11:29

## 2024-01-01 RX ADMIN — IPRATROPIUM BROMIDE AND ALBUTEROL SULFATE 3 ML: .5; 3 SOLUTION RESPIRATORY (INHALATION) at 12:04

## 2024-01-01 RX ADMIN — ATORVASTATIN CALCIUM 10 MG: 10 TABLET, FILM COATED ORAL at 21:31

## 2024-01-01 RX ADMIN — ALUMINUM HYDROXIDE, MAGNESIUM HYDROXIDE, AND DIMETHICONE 30 ML: 200; 20; 200 SUSPENSION ORAL at 04:56

## 2024-01-01 RX ADMIN — METHOCARBAMOL 500 MG: 500 TABLET ORAL at 07:48

## 2024-01-01 RX ADMIN — PANTOPRAZOLE SODIUM 40 MG: 40 TABLET, DELAYED RELEASE ORAL at 21:31

## 2024-01-01 RX ADMIN — OXYCODONE HYDROCHLORIDE 15 MG: 100 SOLUTION ORAL at 21:49

## 2024-01-01 RX ADMIN — CARVEDILOL 6.25 MG: 6.25 TABLET, FILM COATED ORAL at 19:04

## 2024-01-01 RX ADMIN — RUGBY ZINC OXIDE 20%: 20 OINTMENT TOPICAL at 21:46

## 2024-01-01 RX ADMIN — CARVEDILOL 25 MG: 25 TABLET, FILM COATED ORAL at 20:15

## 2024-01-01 RX ADMIN — HYDROMORPHONE HYDROCHLORIDE 1 MG: 1 INJECTION, SOLUTION INTRAMUSCULAR; INTRAVENOUS; SUBCUTANEOUS at 22:35

## 2024-01-01 RX ADMIN — HYDROCODONE BITARTRATE AND ACETAMINOPHEN 2 TABLET: 10; 325 TABLET ORAL at 06:44

## 2024-01-01 RX ADMIN — LORAZEPAM 1 MG: 2 LIQUID ORAL at 09:29

## 2024-01-01 RX ADMIN — HYDROCODONE BITARTRATE AND ACETAMINOPHEN 1 TABLET: 10; 325 TABLET ORAL at 16:32

## 2024-01-01 RX ADMIN — HYDROCODONE BITARTRATE AND ACETAMINOPHEN 2 TABLET: 5; 325 TABLET ORAL at 10:40

## 2024-01-01 RX ADMIN — SIMETHICONE 80 MG: 80 TABLET, CHEWABLE ORAL at 08:22

## 2024-01-01 RX ADMIN — OXYCODONE HYDROCHLORIDE 10 MG: 10 TABLET ORAL at 20:33

## 2024-01-01 RX ADMIN — METHOCARBAMOL 500 MG: 500 TABLET ORAL at 08:54

## 2024-01-01 RX ADMIN — DIPHENHYDRAMINE HYDROCHLORIDE 25 MG: 50 INJECTION INTRAMUSCULAR; INTRAVENOUS at 23:08

## 2024-01-01 RX ADMIN — POTASSIUM CHLORIDE AND SODIUM CHLORIDE: 900; 150 INJECTION, SOLUTION INTRAVENOUS at 08:57

## 2024-01-01 RX ADMIN — ONDANSETRON 4 MG: 4 TABLET, ORALLY DISINTEGRATING ORAL at 11:11

## 2024-01-01 RX ADMIN — HYDROMORPHONE HYDROCHLORIDE 0.5 MG: 1 INJECTION, SOLUTION INTRAMUSCULAR; INTRAVENOUS; SUBCUTANEOUS at 09:54

## 2024-01-01 RX ADMIN — HYDROMORPHONE HYDROCHLORIDE 1 MG: 1 INJECTION, SOLUTION INTRAMUSCULAR; INTRAVENOUS; SUBCUTANEOUS at 03:42

## 2024-01-01 RX ADMIN — ALPRAZOLAM 0.25 MG: 0.25 TABLET ORAL at 03:49

## 2024-01-01 RX ADMIN — OXYCODONE HYDROCHLORIDE 10 MG: 5 SOLUTION ORAL at 21:58

## 2024-01-01 RX ADMIN — HYDROCODONE BITARTRATE AND ACETAMINOPHEN 2 TABLET: 10; 325 TABLET ORAL at 06:53

## 2024-01-01 RX ADMIN — POTASSIUM CHLORIDE 40 MEQ: 1500 TABLET, EXTENDED RELEASE ORAL at 10:06

## 2024-01-01 RX ADMIN — PANTOPRAZOLE SODIUM 40 MG: 40 TABLET, DELAYED RELEASE ORAL at 20:15

## 2024-01-01 RX ADMIN — PREDNISONE 10 MG: 10 TABLET ORAL at 08:54

## 2024-01-01 RX ADMIN — PANTOPRAZOLE SODIUM 40 MG: 20 TABLET, DELAYED RELEASE ORAL at 21:47

## 2024-01-01 RX ADMIN — HYDROMORPHONE HYDROCHLORIDE 0.3 MG: 1 INJECTION, SOLUTION INTRAMUSCULAR; INTRAVENOUS; SUBCUTANEOUS at 19:25

## 2024-01-01 RX ADMIN — PROCHLORPERAZINE MALEATE 5 MG: 5 TABLET ORAL at 11:44

## 2024-01-01 RX ADMIN — METHOCARBAMOL 500 MG: 500 TABLET ORAL at 08:04

## 2024-01-01 RX ADMIN — OXYCODONE HYDROCHLORIDE 10 MG: 10 TABLET ORAL at 08:53

## 2024-01-01 RX ADMIN — ONDANSETRON 4 MG: 2 INJECTION INTRAMUSCULAR; INTRAVENOUS at 08:10

## 2024-01-01 RX ADMIN — OXYCODONE HYDROCHLORIDE 10 MG: 5 SOLUTION ORAL at 19:28

## 2024-01-01 RX ADMIN — ONDANSETRON 4 MG: 2 INJECTION INTRAMUSCULAR; INTRAVENOUS at 21:58

## 2024-01-01 RX ADMIN — HYDROCODONE BITARTRATE AND ACETAMINOPHEN 2 TABLET: 10; 325 TABLET ORAL at 06:21

## 2024-01-01 RX ADMIN — MONTELUKAST 10 MG: 10 TABLET, FILM COATED ORAL at 21:31

## 2024-01-01 RX ADMIN — ONDANSETRON 4 MG: 2 INJECTION INTRAMUSCULAR; INTRAVENOUS at 20:48

## 2024-01-01 RX ADMIN — IPRATROPIUM BROMIDE AND ALBUTEROL SULFATE 3 ML: .5; 3 SOLUTION RESPIRATORY (INHALATION) at 08:14

## 2024-01-01 RX ADMIN — ONDANSETRON 4 MG: 2 INJECTION INTRAMUSCULAR; INTRAVENOUS at 03:47

## 2024-01-01 RX ADMIN — OXYCODONE HYDROCHLORIDE 10 MG: 10 TABLET ORAL at 12:53

## 2024-01-01 RX ADMIN — HYDROCODONE BITARTRATE AND ACETAMINOPHEN 2 TABLET: 10; 325 TABLET ORAL at 12:32

## 2024-01-01 RX ADMIN — METHOCARBAMOL 500 MG: 500 TABLET ORAL at 11:29

## 2024-01-01 RX ADMIN — PANTOPRAZOLE SODIUM 40 MG: 40 TABLET, DELAYED RELEASE ORAL at 08:30

## 2024-01-01 RX ADMIN — HYDROMORPHONE HYDROCHLORIDE 1 MG: 1 INJECTION, SOLUTION INTRAMUSCULAR; INTRAVENOUS; SUBCUTANEOUS at 21:25

## 2024-01-01 RX ADMIN — HYDROMORPHONE HYDROCHLORIDE 1 MG: 1 INJECTION, SOLUTION INTRAMUSCULAR; INTRAVENOUS; SUBCUTANEOUS at 03:30

## 2024-01-01 RX ADMIN — SENNOSIDES AND DOCUSATE SODIUM 1 TABLET: 8.6; 5 TABLET ORAL at 21:31

## 2024-01-01 RX ADMIN — HYDROMORPHONE HYDROCHLORIDE 1 MG: 1 INJECTION, SOLUTION INTRAMUSCULAR; INTRAVENOUS; SUBCUTANEOUS at 15:59

## 2024-01-01 RX ADMIN — ATROPINE SULFATE 2 DROP: 10 SOLUTION/ DROPS OPHTHALMIC at 18:08

## 2024-01-01 RX ADMIN — PANTOPRAZOLE SODIUM 40 MG: 40 TABLET, DELAYED RELEASE ORAL at 11:26

## 2024-01-01 RX ADMIN — PROCHLORPERAZINE EDISYLATE 5 MG: 5 INJECTION INTRAMUSCULAR; INTRAVENOUS at 11:47

## 2024-01-01 RX ADMIN — HYDROCODONE BITARTRATE AND ACETAMINOPHEN 2 TABLET: 10; 325 TABLET ORAL at 06:47

## 2024-01-01 RX ADMIN — HYDROMORPHONE HYDROCHLORIDE 1 MG: 1 INJECTION, SOLUTION INTRAMUSCULAR; INTRAVENOUS; SUBCUTANEOUS at 09:36

## 2024-01-01 RX ADMIN — METHOCARBAMOL 500 MG: 500 TABLET ORAL at 16:17

## 2024-01-01 RX ADMIN — ONDANSETRON 8 MG: 2 INJECTION INTRAMUSCULAR; INTRAVENOUS at 21:56

## 2024-01-01 RX ADMIN — IPRATROPIUM BROMIDE AND ALBUTEROL SULFATE 3 ML: .5; 3 SOLUTION RESPIRATORY (INHALATION) at 08:05

## 2024-01-01 RX ADMIN — CARVEDILOL 6.25 MG: 6.25 TABLET, FILM COATED ORAL at 17:42

## 2024-01-01 RX ADMIN — OXYCODONE HYDROCHLORIDE 10 MG: 10 TABLET ORAL at 00:52

## 2024-01-01 RX ADMIN — ONDANSETRON 4 MG: 4 TABLET, ORALLY DISINTEGRATING ORAL at 11:29

## 2024-01-01 RX ADMIN — OXYCODONE HYDROCHLORIDE 10 MG: 10 TABLET ORAL at 08:04

## 2024-01-01 RX ADMIN — ONDANSETRON 8 MG: 2 INJECTION INTRAMUSCULAR; INTRAVENOUS at 21:13

## 2024-01-01 RX ADMIN — IPRATROPIUM BROMIDE AND ALBUTEROL SULFATE 3 ML: .5; 3 SOLUTION RESPIRATORY (INHALATION) at 23:34

## 2024-01-01 RX ADMIN — PANTOPRAZOLE SODIUM 40 MG: 40 TABLET, DELAYED RELEASE ORAL at 22:34

## 2024-01-01 RX ADMIN — PREDNISONE 5 MG: 5 TABLET ORAL at 20:31

## 2024-01-01 RX ADMIN — ONDANSETRON 4 MG: 2 INJECTION INTRAMUSCULAR; INTRAVENOUS at 07:47

## 2024-01-01 RX ADMIN — CARVEDILOL 6.25 MG: 6.25 TABLET, FILM COATED ORAL at 08:14

## 2024-01-01 RX ADMIN — ONDANSETRON 8 MG: 4 TABLET, ORALLY DISINTEGRATING ORAL at 08:14

## 2024-01-01 RX ADMIN — OXYCODONE HYDROCHLORIDE 10 MG: 5 SOLUTION ORAL at 16:01

## 2024-01-01 RX ADMIN — PANTOPRAZOLE SODIUM 40 MG: 40 TABLET, DELAYED RELEASE ORAL at 08:53

## 2024-01-01 RX ADMIN — HEPARIN SODIUM 5000 UNITS: 5000 INJECTION, SOLUTION INTRAVENOUS; SUBCUTANEOUS at 06:45

## 2024-01-01 RX ADMIN — HYDROCODONE BITARTRATE AND ACETAMINOPHEN 2 TABLET: 5; 325 TABLET ORAL at 05:13

## 2024-01-01 RX ADMIN — ATORVASTATIN CALCIUM 10 MG: 10 TABLET, FILM COATED ORAL at 03:02

## 2024-01-01 RX ADMIN — HYDROCODONE BITARTRATE AND ACETAMINOPHEN 1 TABLET: 10; 325 TABLET ORAL at 04:44

## 2024-01-01 RX ADMIN — IPRATROPIUM BROMIDE AND ALBUTEROL SULFATE 3 ML: .5; 3 SOLUTION RESPIRATORY (INHALATION) at 07:44

## 2024-01-01 RX ADMIN — HYDROMORPHONE HYDROCHLORIDE 0.3 MG: 1 INJECTION, SOLUTION INTRAMUSCULAR; INTRAVENOUS; SUBCUTANEOUS at 06:17

## 2024-01-01 RX ADMIN — SIMETHICONE 80 MG: 80 TABLET, CHEWABLE ORAL at 02:57

## 2024-01-01 RX ADMIN — HYDROMORPHONE HYDROCHLORIDE 1 MG: 1 INJECTION, SOLUTION INTRAMUSCULAR; INTRAVENOUS; SUBCUTANEOUS at 22:44

## 2024-01-01 RX ADMIN — HYDROMORPHONE HYDROCHLORIDE 0.3 MG: 1 INJECTION, SOLUTION INTRAMUSCULAR; INTRAVENOUS; SUBCUTANEOUS at 03:06

## 2024-01-01 RX ADMIN — HYDROMORPHONE HYDROCHLORIDE 1 MG: 1 INJECTION, SOLUTION INTRAMUSCULAR; INTRAVENOUS; SUBCUTANEOUS at 00:02

## 2024-01-01 RX ADMIN — HYDROCODONE BITARTRATE AND ACETAMINOPHEN 2 TABLET: 5; 325 TABLET ORAL at 20:48

## 2024-01-01 RX ADMIN — PANTOPRAZOLE SODIUM 40 MG: 40 TABLET, DELAYED RELEASE ORAL at 08:14

## 2024-01-01 RX ADMIN — CARVEDILOL 25 MG: 25 TABLET, FILM COATED ORAL at 08:39

## 2024-01-01 RX ADMIN — SODIUM CHLORIDE 500 ML: 9 INJECTION, SOLUTION INTRAVENOUS at 23:37

## 2024-01-01 RX ADMIN — HYDROMORPHONE HYDROCHLORIDE 0.4 MG: 0.2 INJECTION, SOLUTION INTRAMUSCULAR; INTRAVENOUS; SUBCUTANEOUS at 19:14

## 2024-01-01 RX ADMIN — IPRATROPIUM BROMIDE AND ALBUTEROL SULFATE 3 ML: .5; 3 SOLUTION RESPIRATORY (INHALATION) at 08:10

## 2024-01-01 RX ADMIN — CARVEDILOL 25 MG: 25 TABLET, FILM COATED ORAL at 17:24

## 2024-01-01 RX ADMIN — CARVEDILOL 6.25 MG: 6.25 TABLET, FILM COATED ORAL at 17:22

## 2024-01-01 RX ADMIN — IOPAMIDOL 84 ML: 755 INJECTION, SOLUTION INTRAVENOUS at 01:02

## 2024-01-01 RX ADMIN — ACETAMINOPHEN 650 MG: 325 TABLET, FILM COATED ORAL at 14:17

## 2024-01-01 RX ADMIN — RUGBY ZINC OXIDE 20%: 20 OINTMENT TOPICAL at 12:56

## 2024-01-01 RX ADMIN — HYDROCODONE BITARTRATE AND ACETAMINOPHEN 2 TABLET: 10; 325 TABLET ORAL at 01:22

## 2024-01-01 RX ADMIN — ALUMINUM HYDROXIDE, MAGNESIUM HYDROXIDE, AND DIMETHICONE 30 ML: 200; 20; 200 SUSPENSION ORAL at 08:39

## 2024-01-01 RX ADMIN — ERYTHROMYCIN: 5 OINTMENT OPHTHALMIC at 20:32

## 2024-01-01 RX ADMIN — DIPHENHYDRAMINE HYDROCHLORIDE 25 MG: 50 INJECTION, SOLUTION INTRAMUSCULAR; INTRAVENOUS at 06:08

## 2024-01-01 RX ADMIN — LORAZEPAM 1 MG: 2 LIQUID ORAL at 05:17

## 2024-01-01 RX ADMIN — HYDROMORPHONE HYDROCHLORIDE 1 MG: 1 INJECTION, SOLUTION INTRAMUSCULAR; INTRAVENOUS; SUBCUTANEOUS at 15:05

## 2024-01-01 RX ADMIN — HYDROCODONE BITARTRATE AND ACETAMINOPHEN 2 TABLET: 10; 325 TABLET ORAL at 16:18

## 2024-01-01 RX ADMIN — HYDROMORPHONE HYDROCHLORIDE 1 MG: 1 INJECTION, SOLUTION INTRAMUSCULAR; INTRAVENOUS; SUBCUTANEOUS at 19:44

## 2024-01-01 RX ADMIN — LORAZEPAM 1 MG: 2 LIQUID ORAL at 11:10

## 2024-01-01 RX ADMIN — HYDROMORPHONE HYDROCHLORIDE 1 MG: 1 INJECTION, SOLUTION INTRAMUSCULAR; INTRAVENOUS; SUBCUTANEOUS at 15:08

## 2024-01-01 RX ADMIN — METHOCARBAMOL 500 MG: 500 TABLET ORAL at 08:33

## 2024-01-01 RX ADMIN — METHOCARBAMOL 500 MG: 500 TABLET ORAL at 20:14

## 2024-01-01 RX ADMIN — HYDROMORPHONE HYDROCHLORIDE 0.4 MG: 0.2 INJECTION, SOLUTION INTRAMUSCULAR; INTRAVENOUS; SUBCUTANEOUS at 21:48

## 2024-01-01 RX ADMIN — METHOCARBAMOL 500 MG: 500 TABLET ORAL at 02:53

## 2024-01-01 RX ADMIN — OXYCODONE HYDROCHLORIDE 15 MG: 100 SOLUTION ORAL at 12:14

## 2024-01-01 RX ADMIN — CARVEDILOL 6.25 MG: 6.25 TABLET, FILM COATED ORAL at 09:36

## 2024-01-01 RX ADMIN — CARVEDILOL 6.25 MG: 6.25 TABLET, FILM COATED ORAL at 11:29

## 2024-01-01 RX ADMIN — UMECLIDINIUM 1 PUFF: 62.5 AEROSOL, POWDER ORAL at 03:04

## 2024-01-01 RX ADMIN — OXYCODONE HYDROCHLORIDE 10 MG: 10 TABLET ORAL at 12:16

## 2024-01-01 RX ADMIN — CARVEDILOL 6.25 MG: 6.25 TABLET, FILM COATED ORAL at 11:26

## 2024-01-01 RX ADMIN — HYDROMORPHONE HYDROCHLORIDE 0.4 MG: 0.2 INJECTION, SOLUTION INTRAMUSCULAR; INTRAVENOUS; SUBCUTANEOUS at 00:21

## 2024-01-01 RX ADMIN — LORAZEPAM 1 MG: 2 LIQUID ORAL at 22:28

## 2024-01-01 RX ADMIN — HYDROMORPHONE HYDROCHLORIDE 0.4 MG: 0.2 INJECTION, SOLUTION INTRAMUSCULAR; INTRAVENOUS; SUBCUTANEOUS at 08:21

## 2024-01-01 RX ADMIN — PREDNISONE 5 MG: 5 TABLET ORAL at 03:02

## 2024-01-01 RX ADMIN — OXYCODONE HYDROCHLORIDE 15 MG: 100 SOLUTION ORAL at 13:20

## 2024-01-01 RX ADMIN — SODIUM CHLORIDE 0.5 G: 50 OINTMENT OPHTHALMIC at 21:46

## 2024-01-01 RX ADMIN — HYDROMORPHONE HYDROCHLORIDE 1 MG: 1 INJECTION, SOLUTION INTRAMUSCULAR; INTRAVENOUS; SUBCUTANEOUS at 01:53

## 2024-01-01 RX ADMIN — HYDROCODONE BITARTRATE AND ACETAMINOPHEN 2 TABLET: 10; 325 TABLET ORAL at 18:34

## 2024-01-01 RX ADMIN — SENNOSIDES AND DOCUSATE SODIUM 1 TABLET: 8.6; 5 TABLET ORAL at 20:15

## 2024-01-01 RX ADMIN — ONDANSETRON 8 MG: 2 INJECTION INTRAMUSCULAR; INTRAVENOUS at 15:08

## 2024-01-01 RX ADMIN — CARVEDILOL 6.25 MG: 6.25 TABLET, FILM COATED ORAL at 18:39

## 2024-01-01 RX ADMIN — ONDANSETRON 4 MG: 2 INJECTION INTRAMUSCULAR; INTRAVENOUS at 00:30

## 2024-01-01 RX ADMIN — SODIUM CHLORIDE, POTASSIUM CHLORIDE, SODIUM LACTATE AND CALCIUM CHLORIDE 500 ML: 600; 310; 30; 20 INJECTION, SOLUTION INTRAVENOUS at 04:14

## 2024-01-01 RX ADMIN — OXYCODONE HYDROCHLORIDE 15 MG: 100 SOLUTION ORAL at 22:10

## 2024-01-01 RX ADMIN — OXYCODONE HYDROCHLORIDE 15 MG: 100 SOLUTION ORAL at 14:21

## 2024-01-01 RX ADMIN — HYDROMORPHONE HYDROCHLORIDE 1 MG: 1 INJECTION, SOLUTION INTRAMUSCULAR; INTRAVENOUS; SUBCUTANEOUS at 20:25

## 2024-01-01 RX ADMIN — ATORVASTATIN CALCIUM 10 MG: 10 TABLET, FILM COATED ORAL at 22:34

## 2024-01-01 RX ADMIN — LORAZEPAM 1 MG: 2 LIQUID ORAL at 21:07

## 2024-01-01 RX ADMIN — HYDROCODONE BITARTRATE AND ACETAMINOPHEN 2 TABLET: 10; 325 TABLET ORAL at 00:34

## 2024-01-01 RX ADMIN — SODIUM CHLORIDE 1000 ML: 9 INJECTION, SOLUTION INTRAVENOUS at 10:34

## 2024-01-01 RX ADMIN — MONTELUKAST 10 MG: 10 TABLET, FILM COATED ORAL at 22:34

## 2024-01-01 RX ADMIN — SENNOSIDES AND DOCUSATE SODIUM 1 TABLET: 8.6; 5 TABLET ORAL at 08:39

## 2024-01-01 RX ADMIN — CARVEDILOL 25 MG: 25 TABLET, FILM COATED ORAL at 20:25

## 2024-01-01 RX ADMIN — MAGNESIUM SULFATE HEPTAHYDRATE 2 G: 40 INJECTION, SOLUTION INTRAVENOUS at 06:38

## 2024-01-01 RX ADMIN — OXYCODONE HYDROCHLORIDE 10 MG: 10 TABLET ORAL at 16:38

## 2024-01-01 RX ADMIN — CARVEDILOL 6.25 MG: 6.25 TABLET, FILM COATED ORAL at 08:53

## 2024-01-01 RX ADMIN — SODIUM CHLORIDE 61 ML: 9 INJECTION, SOLUTION INTRAVENOUS at 01:02

## 2024-01-01 RX ADMIN — ATROPINE SULFATE 2 DROP: 10 SOLUTION/ DROPS OPHTHALMIC at 12:17

## 2024-01-01 RX ADMIN — HYDROCODONE BITARTRATE AND ACETAMINOPHEN 2 TABLET: 10; 325 TABLET ORAL at 11:38

## 2024-01-01 RX ADMIN — POTASSIUM CHLORIDE AND SODIUM CHLORIDE: 900; 150 INJECTION, SOLUTION INTRAVENOUS at 21:48

## 2024-01-01 RX ADMIN — MONTELUKAST 10 MG: 10 TABLET, FILM COATED ORAL at 20:28

## 2024-01-01 RX ADMIN — METHOCARBAMOL 500 MG: 500 TABLET ORAL at 16:18

## 2024-01-01 RX ADMIN — IPRATROPIUM BROMIDE AND ALBUTEROL SULFATE 3 ML: .5; 3 SOLUTION RESPIRATORY (INHALATION) at 16:49

## 2024-01-01 RX ADMIN — HYDROCODONE BITARTRATE AND ACETAMINOPHEN 1 TABLET: 10; 325 TABLET ORAL at 11:08

## 2024-01-01 RX ADMIN — HYDROCODONE BITARTRATE AND ACETAMINOPHEN 2 TABLET: 10; 325 TABLET ORAL at 00:03

## 2024-01-01 RX ADMIN — TOLVAPTAN 15 MG: 15 TABLET ORAL at 14:17

## 2024-01-01 RX ADMIN — OXYCODONE HYDROCHLORIDE 15 MG: 100 SOLUTION ORAL at 11:57

## 2024-01-01 RX ADMIN — HYDROCODONE BITARTRATE AND ACETAMINOPHEN 2 TABLET: 10; 325 TABLET ORAL at 10:40

## 2024-01-01 RX ADMIN — HYDROMORPHONE HYDROCHLORIDE 0.5 MG: 1 INJECTION, SOLUTION INTRAMUSCULAR; INTRAVENOUS; SUBCUTANEOUS at 12:10

## 2024-01-01 RX ADMIN — ATORVASTATIN CALCIUM 10 MG: 10 TABLET, FILM COATED ORAL at 20:14

## 2024-01-01 RX ADMIN — MAGNESIUM SULFATE HEPTAHYDRATE 2 G: 40 INJECTION, SOLUTION INTRAVENOUS at 02:54

## 2024-01-01 RX ADMIN — HYDROMORPHONE HYDROCHLORIDE 1 MG: 1 INJECTION, SOLUTION INTRAMUSCULAR; INTRAVENOUS; SUBCUTANEOUS at 07:59

## 2024-01-01 RX ADMIN — HYDROMORPHONE HYDROCHLORIDE 1 MG: 1 INJECTION, SOLUTION INTRAMUSCULAR; INTRAVENOUS; SUBCUTANEOUS at 14:19

## 2024-01-01 RX ADMIN — LISINOPRIL 5 MG: 5 TABLET ORAL at 20:26

## 2024-01-01 RX ADMIN — METHOCARBAMOL 500 MG: 500 TABLET ORAL at 21:31

## 2024-01-01 RX ADMIN — ACETAMINOPHEN 650 MG: 325 TABLET, FILM COATED ORAL at 03:05

## 2024-01-01 RX ADMIN — MONTELUKAST 10 MG: 10 TABLET, FILM COATED ORAL at 03:03

## 2024-01-01 RX ADMIN — CARVEDILOL 6.25 MG: 6.25 TABLET, FILM COATED ORAL at 08:30

## 2024-01-01 RX ADMIN — METHOCARBAMOL 500 MG: 500 TABLET ORAL at 08:39

## 2024-01-01 RX ADMIN — CARVEDILOL 25 MG: 25 TABLET, FILM COATED ORAL at 08:33

## 2024-01-01 RX ADMIN — LORAZEPAM 1 MG: 2 LIQUID ORAL at 08:40

## 2024-01-01 RX ADMIN — ROFLUMILAST 500 MCG: 500 TABLET ORAL at 07:48

## 2024-01-01 RX ADMIN — METHOCARBAMOL 500 MG: 500 TABLET ORAL at 12:32

## 2024-01-01 RX ADMIN — HYDROMORPHONE HYDROCHLORIDE 1 MG: 1 INJECTION, SOLUTION INTRAMUSCULAR; INTRAVENOUS; SUBCUTANEOUS at 11:48

## 2024-01-01 RX ADMIN — LORAZEPAM 1 MG: 2 LIQUID ORAL at 12:28

## 2024-01-01 RX ADMIN — ROFLUMILAST 500 MCG: 500 TABLET ORAL at 22:34

## 2024-01-01 RX ADMIN — DIPHENHYDRAMINE HYDROCHLORIDE 25 MG: 50 INJECTION INTRAMUSCULAR; INTRAVENOUS at 09:54

## 2024-01-01 RX ADMIN — OXYCODONE HYDROCHLORIDE 15 MG: 100 SOLUTION ORAL at 00:28

## 2024-01-01 RX ADMIN — HYDROCODONE BITARTRATE AND ACETAMINOPHEN 2 TABLET: 10; 325 TABLET ORAL at 20:14

## 2024-01-01 RX ADMIN — HYDROCODONE BITARTRATE AND ACETAMINOPHEN 2 TABLET: 10; 325 TABLET ORAL at 17:24

## 2024-01-01 RX ADMIN — IOPAMIDOL 59 ML: 755 INJECTION, SOLUTION INTRAVENOUS at 06:15

## 2024-01-01 RX ADMIN — POTASSIUM CHLORIDE AND SODIUM CHLORIDE: 900; 150 INJECTION, SOLUTION INTRAVENOUS at 02:54

## 2024-01-01 RX ADMIN — ONDANSETRON 4 MG: 4 TABLET, ORALLY DISINTEGRATING ORAL at 13:40

## 2024-01-01 RX ADMIN — POTASSIUM CHLORIDE 10 MEQ: 7.46 INJECTION, SOLUTION INTRAVENOUS at 01:25

## 2024-01-01 RX ADMIN — METHOCARBAMOL 500 MG: 500 TABLET ORAL at 20:31

## 2024-01-01 RX ADMIN — PANTOPRAZOLE SODIUM 40 MG: 40 TABLET, DELAYED RELEASE ORAL at 20:26

## 2024-01-01 RX ADMIN — SODIUM CHLORIDE 1000 ML: 9 INJECTION, SOLUTION INTRAVENOUS at 00:27

## 2024-01-01 RX ADMIN — METHOCARBAMOL 500 MG: 500 TABLET ORAL at 12:05

## 2024-01-01 RX ADMIN — HYDROMORPHONE HYDROCHLORIDE 0.4 MG: 0.2 INJECTION, SOLUTION INTRAMUSCULAR; INTRAVENOUS; SUBCUTANEOUS at 15:28

## 2024-01-01 RX ADMIN — HYDROMORPHONE HYDROCHLORIDE 0.3 MG: 1 INJECTION, SOLUTION INTRAMUSCULAR; INTRAVENOUS; SUBCUTANEOUS at 00:26

## 2024-01-01 RX ADMIN — SENNOSIDES AND DOCUSATE SODIUM 1 TABLET: 8.6; 5 TABLET ORAL at 10:39

## 2024-01-01 RX ADMIN — METHOCARBAMOL 500 MG: 500 TABLET ORAL at 08:14

## 2024-01-01 RX ADMIN — OXYCODONE HYDROCHLORIDE 10 MG: 5 SOLUTION ORAL at 01:05

## 2024-01-01 RX ADMIN — CETIRIZINE HYDROCHLORIDE 10 MG: 10 TABLET, FILM COATED ORAL at 22:34

## 2024-01-01 RX ADMIN — ALUMINUM HYDROXIDE, MAGNESIUM HYDROXIDE, AND DIMETHICONE 15 ML: 200; 20; 200 SUSPENSION ORAL at 12:52

## 2024-01-01 RX ADMIN — HYDROMORPHONE HYDROCHLORIDE 0.3 MG: 1 INJECTION, SOLUTION INTRAMUSCULAR; INTRAVENOUS; SUBCUTANEOUS at 08:15

## 2024-01-01 RX ADMIN — OXYCODONE HYDROCHLORIDE 15 MG: 100 SOLUTION ORAL at 17:46

## 2024-01-01 RX ADMIN — OXYCODONE HYDROCHLORIDE 5 MG: 5 TABLET ORAL at 21:58

## 2024-01-01 RX ADMIN — HYDROMORPHONE HYDROCHLORIDE 0.4 MG: 0.2 INJECTION, SOLUTION INTRAMUSCULAR; INTRAVENOUS; SUBCUTANEOUS at 02:52

## 2024-01-01 RX ADMIN — HYDROMORPHONE HYDROCHLORIDE 1 MG: 1 INJECTION, SOLUTION INTRAMUSCULAR; INTRAVENOUS; SUBCUTANEOUS at 18:17

## 2024-01-01 RX ADMIN — LORAZEPAM 1 MG: 2 LIQUID ORAL at 21:42

## 2024-01-01 RX ADMIN — POTASSIUM CHLORIDE AND SODIUM CHLORIDE: 900; 150 INJECTION, SOLUTION INTRAVENOUS at 22:46

## 2024-01-01 RX ADMIN — CARVEDILOL 25 MG: 6.25 TABLET, FILM COATED ORAL at 10:11

## 2024-01-01 RX ADMIN — UMECLIDINIUM 1 PUFF: 62.5 AEROSOL, POWDER ORAL at 22:36

## 2024-01-01 RX ADMIN — OXYCODONE HYDROCHLORIDE 10 MG: 10 TABLET ORAL at 04:27

## 2024-01-01 RX ADMIN — ATORVASTATIN CALCIUM 10 MG: 10 TABLET, FILM COATED ORAL at 20:26

## 2024-01-01 RX ADMIN — ONDANSETRON 4 MG: 2 INJECTION INTRAMUSCULAR; INTRAVENOUS at 21:53

## 2024-01-01 RX ADMIN — IPRATROPIUM BROMIDE AND ALBUTEROL SULFATE 3 ML: .5; 3 SOLUTION RESPIRATORY (INHALATION) at 07:28

## 2024-01-01 RX ADMIN — POTASSIUM CHLORIDE 20 MEQ: 1.5 POWDER, FOR SOLUTION ORAL at 21:36

## 2024-01-01 RX ADMIN — MONTELUKAST 10 MG: 10 TABLET, FILM COATED ORAL at 20:15

## 2024-01-01 RX ADMIN — SENNOSIDES AND DOCUSATE SODIUM 1 TABLET: 8.6; 5 TABLET ORAL at 20:26

## 2024-01-01 RX ADMIN — HYDROMORPHONE HYDROCHLORIDE 0.3 MG: 1 INJECTION, SOLUTION INTRAMUSCULAR; INTRAVENOUS; SUBCUTANEOUS at 21:42

## 2024-01-01 RX ADMIN — ACETAMINOPHEN 650 MG: 325 TABLET, FILM COATED ORAL at 22:45

## 2024-01-01 RX ADMIN — METHOCARBAMOL 500 MG: 500 TABLET ORAL at 16:36

## 2024-01-01 RX ADMIN — IOPAMIDOL 85 ML: 755 INJECTION, SOLUTION INTRAVENOUS at 10:58

## 2024-01-01 RX ADMIN — ROFLUMILAST 500 MCG: 500 TABLET ORAL at 03:02

## 2024-01-01 ASSESSMENT — ACTIVITIES OF DAILY LIVING (ADL)
ADLS_ACUITY_SCORE: 56
ADLS_ACUITY_SCORE: 54
ADLS_ACUITY_SCORE: 51
ADLS_ACUITY_SCORE: 55
ADLS_ACUITY_SCORE: 39
ADLS_ACUITY_SCORE: 51
ADLS_ACUITY_SCORE: 44
DEPENDENT_IADLS:: TRANSPORTATION;MEAL PREPARATION;SHOPPING;LAUNDRY;COOKING;CLEANING
ADLS_ACUITY_SCORE: 41
ADLS_ACUITY_SCORE: 36
ADLS_ACUITY_SCORE: 54
ADLS_ACUITY_SCORE: 42
ADLS_ACUITY_SCORE: 48
ADLS_ACUITY_SCORE: 36
ADLS_ACUITY_SCORE: 38
ADLS_ACUITY_SCORE: 56
ADLS_ACUITY_SCORE: 35
ADLS_ACUITY_SCORE: 51
ADLS_ACUITY_SCORE: 47
ADLS_ACUITY_SCORE: 56
ADLS_ACUITY_SCORE: 47
ADLS_ACUITY_SCORE: 51
ADLS_ACUITY_SCORE: 51
ADLS_ACUITY_SCORE: 50
ADLS_ACUITY_SCORE: 51
ADLS_ACUITY_SCORE: 41
ADLS_ACUITY_SCORE: 47
ADLS_ACUITY_SCORE: 51
ADLS_ACUITY_SCORE: 36
ADLS_ACUITY_SCORE: 51
ADLS_ACUITY_SCORE: 44
ADLS_ACUITY_SCORE: 44
ADLS_ACUITY_SCORE: 41
ADLS_ACUITY_SCORE: 51
ADLS_ACUITY_SCORE: 40
ADLS_ACUITY_SCORE: 42
ADLS_ACUITY_SCORE: 37
ADLS_ACUITY_SCORE: 51
ADLS_ACUITY_SCORE: 51
ADLS_ACUITY_SCORE: 38
ADLS_ACUITY_SCORE: 46
ADLS_ACUITY_SCORE: 54
ADLS_ACUITY_SCORE: 51
ADLS_ACUITY_SCORE: 33
ADLS_ACUITY_SCORE: 41
ADLS_ACUITY_SCORE: 54
ADLS_ACUITY_SCORE: 41
ADLS_ACUITY_SCORE: 55
ADLS_ACUITY_SCORE: 51
ADLS_ACUITY_SCORE: 51
ADLS_ACUITY_SCORE: 36
ADLS_ACUITY_SCORE: 55
ADLS_ACUITY_SCORE: 51
ADLS_ACUITY_SCORE: 56
ADLS_ACUITY_SCORE: 36
ADLS_ACUITY_SCORE: 42
ADLS_ACUITY_SCORE: 55
ADLS_ACUITY_SCORE: 54
ADLS_ACUITY_SCORE: 51
ADLS_ACUITY_SCORE: 36
ADLS_ACUITY_SCORE: 51
ADLS_ACUITY_SCORE: 44
ADLS_ACUITY_SCORE: 41
ADLS_ACUITY_SCORE: 41
DEPENDENT_IADLS:: TRANSPORTATION;MEAL PREPARATION;SHOPPING;LAUNDRY;COOKING;CLEANING
DEPENDENT_IADLS:: CLEANING;COOKING;LAUNDRY;SHOPPING;MEAL PREPARATION;MEDICATION MANAGEMENT;MONEY MANAGEMENT;TRANSPORTATION
ADLS_ACUITY_SCORE: 54
ADLS_ACUITY_SCORE: 46
ADLS_ACUITY_SCORE: 42
ADLS_ACUITY_SCORE: 44
ADLS_ACUITY_SCORE: 38
ADLS_ACUITY_SCORE: 35
ADLS_ACUITY_SCORE: 41
ADLS_ACUITY_SCORE: 54
ADLS_ACUITY_SCORE: 56
ADLS_ACUITY_SCORE: 51
ADLS_ACUITY_SCORE: 41
ADLS_ACUITY_SCORE: 51
ADLS_ACUITY_SCORE: 55
ADLS_ACUITY_SCORE: 46
ADLS_ACUITY_SCORE: 35
ADLS_ACUITY_SCORE: 51
ADLS_ACUITY_SCORE: 54
ADLS_ACUITY_SCORE: 47
ADLS_ACUITY_SCORE: 36
ADLS_ACUITY_SCORE: 55
ADLS_ACUITY_SCORE: 55
ADLS_ACUITY_SCORE: 48
ADLS_ACUITY_SCORE: 36
ADLS_ACUITY_SCORE: 51
ADLS_ACUITY_SCORE: 38
ADLS_ACUITY_SCORE: 46
ADLS_ACUITY_SCORE: 42
ADLS_ACUITY_SCORE: 55
ADLS_ACUITY_SCORE: 44
ADLS_ACUITY_SCORE: 51
ADLS_ACUITY_SCORE: 41
ADLS_ACUITY_SCORE: 50
ADLS_ACUITY_SCORE: 42
ADLS_ACUITY_SCORE: 47
ADLS_ACUITY_SCORE: 51
ADLS_ACUITY_SCORE: 54
ADLS_ACUITY_SCORE: 54
ADLS_ACUITY_SCORE: 43
ADLS_ACUITY_SCORE: 51
ADLS_ACUITY_SCORE: 38
ADLS_ACUITY_SCORE: 46
ADLS_ACUITY_SCORE: 38
ADLS_ACUITY_SCORE: 46
ADLS_ACUITY_SCORE: 54
ADLS_ACUITY_SCORE: 46
ADLS_ACUITY_SCORE: 51
ADLS_ACUITY_SCORE: 47
ADLS_ACUITY_SCORE: 47
ADLS_ACUITY_SCORE: 56
ADLS_ACUITY_SCORE: 51
ADLS_ACUITY_SCORE: 51
ADLS_ACUITY_SCORE: 54
ADLS_ACUITY_SCORE: 54
ADLS_ACUITY_SCORE: 41
ADLS_ACUITY_SCORE: 39
ADLS_ACUITY_SCORE: 54
ADLS_ACUITY_SCORE: 39
ADLS_ACUITY_SCORE: 51
ADLS_ACUITY_SCORE: 47
ADLS_ACUITY_SCORE: 39
ADLS_ACUITY_SCORE: 36
ADLS_ACUITY_SCORE: 46
ADLS_ACUITY_SCORE: 46
ADLS_ACUITY_SCORE: 41
ADLS_ACUITY_SCORE: 46
ADLS_ACUITY_SCORE: 51
ADLS_ACUITY_SCORE: 42
ADLS_ACUITY_SCORE: 51
ADLS_ACUITY_SCORE: 44
ADLS_ACUITY_SCORE: 38
ADLS_ACUITY_SCORE: 35
ADLS_ACUITY_SCORE: 54
ADLS_ACUITY_SCORE: 38
ADLS_ACUITY_SCORE: 51
ADLS_ACUITY_SCORE: 50
ADLS_ACUITY_SCORE: 54
ADLS_ACUITY_SCORE: 55
ADLS_ACUITY_SCORE: 41
ADLS_ACUITY_SCORE: 41
ADLS_ACUITY_SCORE: 51
ADLS_ACUITY_SCORE: 36
ADLS_ACUITY_SCORE: 36
ADLS_ACUITY_SCORE: 41
ADLS_ACUITY_SCORE: 39
ADLS_ACUITY_SCORE: 43
ADLS_ACUITY_SCORE: 54
ADLS_ACUITY_SCORE: 54
ADLS_ACUITY_SCORE: 55
ADLS_ACUITY_SCORE: 50
ADLS_ACUITY_SCORE: 51
ADLS_ACUITY_SCORE: 46
ADLS_ACUITY_SCORE: 51
ADLS_ACUITY_SCORE: 44
ADLS_ACUITY_SCORE: 54
ADLS_ACUITY_SCORE: 54
ADLS_ACUITY_SCORE: 47
ADLS_ACUITY_SCORE: 42
ADLS_ACUITY_SCORE: 51
ADLS_ACUITY_SCORE: 54
ADLS_ACUITY_SCORE: 41
ADLS_ACUITY_SCORE: 51
ADLS_ACUITY_SCORE: 36
ADLS_ACUITY_SCORE: 36
ADLS_ACUITY_SCORE: 48
ADLS_ACUITY_SCORE: 46
ADLS_ACUITY_SCORE: 38
ADLS_ACUITY_SCORE: 42
CURRENT_FUNCTION: NEEDS ASSISTANCE
ADLS_ACUITY_SCORE: 44
ADLS_ACUITY_SCORE: 51
ADLS_ACUITY_SCORE: 42
ADLS_ACUITY_SCORE: 43
ADLS_ACUITY_SCORE: 51
ADLS_ACUITY_SCORE: 41
ADLS_ACUITY_SCORE: 41
ADLS_ACUITY_SCORE: 48
ADLS_ACUITY_SCORE: 51
ADLS_ACUITY_SCORE: 43
ADLS_ACUITY_SCORE: 55
ADLS_ACUITY_SCORE: 40
ADLS_ACUITY_SCORE: 51
ADLS_ACUITY_SCORE: 47
ADLS_ACUITY_SCORE: 56
ADLS_ACUITY_SCORE: 42
ADLS_ACUITY_SCORE: 51
ADLS_ACUITY_SCORE: 33
ADLS_ACUITY_SCORE: 45
ADLS_ACUITY_SCORE: 56
ADLS_ACUITY_SCORE: 37
ADLS_ACUITY_SCORE: 51
ADLS_ACUITY_SCORE: 36
ADLS_ACUITY_SCORE: 42
ADLS_ACUITY_SCORE: 46
ADLS_ACUITY_SCORE: 51
ADLS_ACUITY_SCORE: 47
ADLS_ACUITY_SCORE: 51
ADLS_ACUITY_SCORE: 54
ADLS_ACUITY_SCORE: 55
ADLS_ACUITY_SCORE: 50
ADLS_ACUITY_SCORE: 35
ADLS_ACUITY_SCORE: 38
ADLS_ACUITY_SCORE: 51
ADLS_ACUITY_SCORE: 43
ADLS_ACUITY_SCORE: 43
ADLS_ACUITY_SCORE: 38
ADLS_ACUITY_SCORE: 55
ADLS_ACUITY_SCORE: 44
ADLS_ACUITY_SCORE: 51
ADLS_ACUITY_SCORE: 41
ADLS_ACUITY_SCORE: 42
ADLS_ACUITY_SCORE: 47
ADLS_ACUITY_SCORE: 41
ADLS_ACUITY_SCORE: 47
ADLS_ACUITY_SCORE: 56
ADLS_ACUITY_SCORE: 40
ADLS_ACUITY_SCORE: 51
ADLS_ACUITY_SCORE: 51
ADLS_ACUITY_SCORE: 55
ADLS_ACUITY_SCORE: 42
ADLS_ACUITY_SCORE: 40
ADLS_ACUITY_SCORE: 56
ADLS_ACUITY_SCORE: 42
ADLS_ACUITY_SCORE: 47
ADLS_ACUITY_SCORE: 46
ADLS_ACUITY_SCORE: 46
ADLS_ACUITY_SCORE: 41
ADLS_ACUITY_SCORE: 36
ADLS_ACUITY_SCORE: 50
ADLS_ACUITY_SCORE: 51
ADLS_ACUITY_SCORE: 51
ADLS_ACUITY_SCORE: 54
ADLS_ACUITY_SCORE: 51
ADLS_ACUITY_SCORE: 51
ADLS_ACUITY_SCORE: 47
ADLS_ACUITY_SCORE: 44
ADLS_ACUITY_SCORE: 43
DEPENDENT_IADLS:: TRANSPORTATION;MEAL PREPARATION;SHOPPING
ADLS_ACUITY_SCORE: 56
ADLS_ACUITY_SCORE: 36
ADLS_ACUITY_SCORE: 36
ADLS_ACUITY_SCORE: 38
ADLS_ACUITY_SCORE: 41
DEPENDENT_IADLS:: TRANSPORTATION;MEAL PREPARATION;SHOPPING;LAUNDRY;COOKING;CLEANING
ADLS_ACUITY_SCORE: 51
ADLS_ACUITY_SCORE: 41
ADLS_ACUITY_SCORE: 33
ADLS_ACUITY_SCORE: 55
ADLS_ACUITY_SCORE: 51
ADLS_ACUITY_SCORE: 46
ADLS_ACUITY_SCORE: 36
ADLS_ACUITY_SCORE: 41
ADLS_ACUITY_SCORE: 41
ADLS_ACUITY_SCORE: 37
ADLS_ACUITY_SCORE: 47
ADLS_ACUITY_SCORE: 54
ADLS_ACUITY_SCORE: 43
ADLS_ACUITY_SCORE: 51
ADLS_ACUITY_SCORE: 54
ADLS_ACUITY_SCORE: 42
ADLS_ACUITY_SCORE: 54
ADLS_ACUITY_SCORE: 42
ADLS_ACUITY_SCORE: 51
ADLS_ACUITY_SCORE: 38
ADLS_ACUITY_SCORE: 51
ADLS_ACUITY_SCORE: 56
ADLS_ACUITY_SCORE: 38
ADLS_ACUITY_SCORE: 51
ADLS_ACUITY_SCORE: 47
ADLS_ACUITY_SCORE: 54
ADLS_ACUITY_SCORE: 51
ADLS_ACUITY_SCORE: 44
ADLS_ACUITY_SCORE: 47
ADLS_ACUITY_SCORE: 33
ADLS_ACUITY_SCORE: 36
ADLS_ACUITY_SCORE: 56
ADLS_ACUITY_SCORE: 41
ADLS_ACUITY_SCORE: 51
ADLS_ACUITY_SCORE: 43
ADLS_ACUITY_SCORE: 47
ADLS_ACUITY_SCORE: 41
ADLS_ACUITY_SCORE: 39
ADLS_ACUITY_SCORE: 54
ADLS_ACUITY_SCORE: 38
ADLS_ACUITY_SCORE: 54
ADLS_ACUITY_SCORE: 41
ADLS_ACUITY_SCORE: 54
ADLS_ACUITY_SCORE: 51
ADLS_ACUITY_SCORE: 39
ADLS_ACUITY_SCORE: 41
DEPENDENT_IADLS:: TRANSPORTATION;MEAL PREPARATION;SHOPPING;LAUNDRY;COOKING;CLEANING
ADLS_ACUITY_SCORE: 56
ADLS_ACUITY_SCORE: 51
ADLS_ACUITY_SCORE: 54
ADLS_ACUITY_SCORE: 48
ADLS_ACUITY_SCORE: 51
ADLS_ACUITY_SCORE: 50
ADLS_ACUITY_SCORE: 38
ADLS_ACUITY_SCORE: 51
ADLS_ACUITY_SCORE: 41
ADLS_ACUITY_SCORE: 48
ADLS_ACUITY_SCORE: 51
ADLS_ACUITY_SCORE: 38
ADLS_ACUITY_SCORE: 56
ADLS_ACUITY_SCORE: 54
ADLS_ACUITY_SCORE: 56
ADLS_ACUITY_SCORE: 51
ADLS_ACUITY_SCORE: 47
ADLS_ACUITY_SCORE: 51
ADLS_ACUITY_SCORE: 38
ADLS_ACUITY_SCORE: 39
ADLS_ACUITY_SCORE: 41
ADLS_ACUITY_SCORE: 42
ADLS_ACUITY_SCORE: 47
ADLS_ACUITY_SCORE: 51
ADLS_ACUITY_SCORE: 51
ADLS_ACUITY_SCORE: 41
ADLS_ACUITY_SCORE: 54
ADLS_ACUITY_SCORE: 40
ADLS_ACUITY_SCORE: 39
ADLS_ACUITY_SCORE: 41
ADLS_ACUITY_SCORE: 47
ADLS_ACUITY_SCORE: 56
ADLS_ACUITY_SCORE: 55
ADLS_ACUITY_SCORE: 43
ADLS_ACUITY_SCORE: 47
ADLS_ACUITY_SCORE: 47
ADLS_ACUITY_SCORE: 39
ADLS_ACUITY_SCORE: 54
DEPENDENT_IADLS:: TRANSPORTATION;MEAL PREPARATION;SHOPPING;LAUNDRY;COOKING;CLEANING
ADLS_ACUITY_SCORE: 43

## 2024-01-01 ASSESSMENT — COLUMBIA-SUICIDE SEVERITY RATING SCALE - C-SSRS
2. HAVE YOU ACTUALLY HAD ANY THOUGHTS OF KILLING YOURSELF IN THE PAST MONTH?: NO
6. HAVE YOU EVER DONE ANYTHING, STARTED TO DO ANYTHING, OR PREPARED TO DO ANYTHING TO END YOUR LIFE?: NO
2. HAVE YOU ACTUALLY HAD ANY THOUGHTS OF KILLING YOURSELF IN THE PAST MONTH?: NO
2. HAVE YOU ACTUALLY HAD ANY THOUGHTS OF KILLING YOURSELF IN THE PAST MONTH?: NO
6. HAVE YOU EVER DONE ANYTHING, STARTED TO DO ANYTHING, OR PREPARED TO DO ANYTHING TO END YOUR LIFE?: NO
6. HAVE YOU EVER DONE ANYTHING, STARTED TO DO ANYTHING, OR PREPARED TO DO ANYTHING TO END YOUR LIFE?: NO
1. IN THE PAST MONTH, HAVE YOU WISHED YOU WERE DEAD OR WISHED YOU COULD GO TO SLEEP AND NOT WAKE UP?: NO
6. HAVE YOU EVER DONE ANYTHING, STARTED TO DO ANYTHING, OR PREPARED TO DO ANYTHING TO END YOUR LIFE?: NO
1. IN THE PAST MONTH, HAVE YOU WISHED YOU WERE DEAD OR WISHED YOU COULD GO TO SLEEP AND NOT WAKE UP?: NO
2. HAVE YOU ACTUALLY HAD ANY THOUGHTS OF KILLING YOURSELF IN THE PAST MONTH?: NO
1. IN THE PAST MONTH, HAVE YOU WISHED YOU WERE DEAD OR WISHED YOU COULD GO TO SLEEP AND NOT WAKE UP?: NO
1. IN THE PAST MONTH, HAVE YOU WISHED YOU WERE DEAD OR WISHED YOU COULD GO TO SLEEP AND NOT WAKE UP?: NO

## 2024-01-01 ASSESSMENT — PAIN SCALES - GENERAL
PAINLEVEL: SEVERE PAIN (7)
PAINLEVEL: SEVERE PAIN (7)
PAINLEVEL: SEVERE PAIN (6)

## 2024-01-26 NOTE — PROGRESS NOTES
Clinic Care Coordination Contact  Follow Up Progress Note      Assessment: RN CC contacted patient for monthly outreach. She's doing ok. It's been a difficult couple of months for patient & her spouse. They had one of their kids pass away. 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 She was going to use [employer] for transportation for an appointment. She has since rescheduled the appointment. No changes in her medications. She has not been able to get her RSV and COVID vaccines. She will still plan to get them when she is able.     Care Gaps:    Health Maintenance Due   Topic Date Due    HF ACTION PLAN  Never done    DEPRESSION ACTION PLAN  Never done    RSV VACCINE (Pregnancy & 60+) (1 - 1-dose 60+ series) Never done    COLORECTAL CANCER SCREENING  03/28/2021    MAMMO SCREENING  06/09/2023    COVID-19 Vaccine (5 - 2023-24 season) 09/01/2023    ZOSTER IMMUNIZATION (3 of 3) 10/20/2023     Postponed to a later date.     Care Plans  Care Plan: Health Maintenance       Problem: Health Maintenance Due or Overdue       Goal: Become up-to-date with health maintenance visit(s)       Start Date: 10/5/2023 Expected End Date: 1/4/2024    This Visit's Progress: 10% Recent Progress: 0%    Note:     Barriers: Very short of breath with activity; Limited mobility; Provider availability - wait time to complete appointments.   Strengths: Motivated; Agreeable to Care Coordination.   Patient expressed understanding of goal: Yes.   Action steps to achieve this goal:  1. I will take my medications as prescribed.   2. I will discuss, review, schedule and complete recommended overdue health maintenance with my Primary Care Provider.   3. I will contact my care team with questions, concerns or support needs. I will use the clinic as a resource and I understand I can contact my clinic with 24/7 after hours services available. Care Coordinator will remain available as needed.                               Intervention/Education provided during outreach:  Discussed patients plan of care. CC RN asked open ended questions, provided support, resources, and encouragement as needed. Patient will reach out to care team sooner than planned with new questions or concerns.     Plan: RN CC will continue to follow patient for any additional needs.     Care Coordinator will follow up in 1 month.     Cynthia Snell RN, BSN, CPHN, [facility] Ambulatory Care Management  [facility]  Phone: [phone]  Email: [email or web address]

## 2024-02-06 NOTE — TELEPHONE ENCOUNTER
Patient has sent additional Appsc[employer] message regarding Rx, rerouting to PCP fore review    Catrachita MEJIA

## 2024-02-06 NOTE — TELEPHONE ENCOUNTER
See mychart encounter from 2/5/24 regarding medication refill request. Closing encounter due to duplicate request.     Catrachita MEJIA

## 2024-02-23 NOTE — PROGRESS NOTES
Clinic Care Coordination Contact  Follow Up Progress Note      Assessment: RN CC contacted patient for monthly outreach. Things aren't going so well. She fell on Tuesday landing on her back. She was unable to get up. She had to have the paramedics come help her up. Sprained left ankle, elevated heart rate. She is having a hard time walking. She thinks her toes are broken. She declined to go to the hospital. She also stated that when she uses her respiratory meds they give her terrible gas pains in her back. She stated her O2 sats have been lower than normal. At one point, she stated it was 70%. It has been 86 - 88% instead of the 93% that is normal for her. It is currently showing at 91% with rest after activity and it is coming up. RN CC recommended to the patient to contact her Primary Care Provider if this occurs again and if she is adamant about not going to the hospital. She was agreeable to this.     Care Gaps:    Health Maintenance Due   Topic Date Due    HF ACTION PLAN  Never done    DEPRESSION ACTION PLAN  Never done    RSV VACCINE (Pregnancy & 60+) (1 - 1-dose 60+ series) Never done    COLORECTAL CANCER SCREENING  03/28/2021    MAMMO SCREENING  06/09/2023    COVID-19 Vaccine (5 - 2023-24 season) 09/01/2023    ZOSTER IMMUNIZATION (3 of 3) 10/20/2023    MEDICARE ANNUAL WELLNESS VISIT  03/07/2024    LIPID  03/07/2024    URINE DRUG SCREEN  03/07/2024     Discussed with patient. She will schedule for after 3/7/24 so it will be after a year since the last MAWV.     Care Plans  Care Plan: Health Maintenance       Problem: Health Maintenance Due or Overdue       Goal: Become up-to-date with health maintenance visit(s)       Start Date: 10/5/2023 Expected End Date: 1/4/2024    This Visit's Progress: 10% Recent Progress: 0%    Note:     Barriers: Very short of breath with activity; Limited mobility; Provider availability - wait time to complete appointments.   Strengths: Motivated; Agreeable to Care Coordination.    Patient expressed understanding of goal: Yes.   Action steps to achieve this goal:  1. I will take my medications as prescribed.   2. I will discuss, review, schedule and complete recommended overdue health maintenance with my Primary Care Provider.   3. I will contact my care team with questions, concerns or support needs. I will use the clinic as a resource and I understand I can contact my clinic with 24/7 after hours services available. Care Coordinator will remain available as needed.                               Intervention/Education provided during outreach: Discussed patients plan of care. CC RN asked open ended questions, provided support, resources, and encouragement as needed. Patient will reach out to care team sooner than planned with new questions or concerns.     Plan: RN CC will continue to follow patient for any additional needs.     Care Coordinator will follow up in 1 month.     Cynthia Snell RN, BSN, CPHN, CCM  [facility] Ambulatory Care Management  [facility]  Phone: [phone]  Email: [email or web address]

## 2024-03-08 NOTE — TELEPHONE ENCOUNTER
I called in Oxycodone for acute pain.   She should not have taken more than prescribed.   In the future I may stop prescribing her pain medications and she will be referred to pain clinic if not able to follow her medications instructions.

## 2024-03-13 NOTE — TELEPHONE ENCOUNTER
MTM Recruitment: [facility] insurance     Referral outreach attempt #1 on March 13, 2024      Outcome: left voicemail    Avis Ortiz, PharmD  Medication Therapy Management Pharmacist

## 2024-03-18 NOTE — TELEPHONE ENCOUNTER
MTM Recruitment: [facility] insurance     Referral outreach attempt #2 on March 18, 2024      Outcome: left voicemail and sent "[employer]"hart message    Jessica Robles, PharmD  Medication Therapy Management Pharmacist  Voicemail: [phone]

## 2024-03-22 NOTE — PROGRESS NOTES
Clinic Care Coordination Contact  Follow Up Progress Note      Assessment: RN CC contacted patient for monthly outreach. She's doing ok. No changes to her medications. She has not discussed her care gaps with her Primary Care Provider. She inquired about a home health aide (HHA) assisting with hygiene cares. RN CC explained that she would need an order from her Primary Care Provider. It has been 5 months since she saw her Primary Care Provider so she will need an appointment to discuss home care services with him. She verbalized understanding.     Care Gaps:    Health Maintenance Due   Topic Date Due    HF ACTION PLAN  Never done    DEPRESSION ACTION PLAN  Never done    RSV VACCINE (Pregnancy & 60+) (1 - 1-dose 60+ series) Never done    COLORECTAL CANCER SCREENING  03/28/2021    MAMMO SCREENING  06/09/2023    COVID-19 Vaccine (5 - 2023-24 season) 09/01/2023    Medicare Annual MTM Pharmacist Visit (once per calendar year)  Never done    LIPID  03/07/2024    URINE DRUG SCREEN  03/07/2024    ZOSTER IMMUNIZATION (3 of 3) 10/20/2023    MEDICARE ANNUAL WELLNESS VISIT  03/07/2024    PHQ-9  03/25/2024     Postponed to next appointment patient will be scheduling.      Care Plans  Care Plan: Health Maintenance       Problem: Health Maintenance Due or Overdue       Goal: Become up-to-date with health maintenance visit(s)       Start Date: 10/5/2023 Expected End Date: 1/4/2024    This Visit's Progress: 10% Recent Progress: 0%    Note:     Barriers: Very short of breath with activity; Limited mobility; Provider availability - wait time to complete appointments.   Strengths: Motivated; Agreeable to Care Coordination.   Patient expressed understanding of goal: Yes.   Action steps to achieve this goal:  1. I will take my medications as prescribed.   2. I will discuss, review, schedule and complete recommended overdue health maintenance with my Primary Care Provider.   3. I will contact my care team with questions, concerns or  support needs. I will use the clinic as a resource and I understand I can contact my clinic with 24/7 after hours services available. Care Coordinator will remain available as needed.                               Intervention/Education provided during outreach: Discussed patients plan of care. CC RN asked open ended questions, provided support, resources, and encouragement as needed. Patient will reach out to care team sooner than planned with new questions or concerns.     Plan: RN CC will send updated Care Plan to patient via her Epoxy account. RN CC will continue to follow patient for any additional needs or questions.     Care Coordinator will follow up in 1 month.     Cynthia Snell RN, BSN, CPHN, [facility] Ambulatory Care Management  [facility]  Phone: [phone]  Email: [email or web address]

## 2024-03-22 NOTE — TELEPHONE ENCOUNTER
MTM Recruitment: [facility] insurance     Referral outreach attempt #3 on March 22, 2024      Outcome: visit scheduled

## 2024-03-22 NOTE — LETTER
[facility] COORDINATION  [address]  [facility] [address]    March 22, 2024      Trisha Timmons  [address] [facility] [address]      Dear Trisha (Cynthia),     Attached is an updated Patient Centered Plan of Care for your continued enrollment in Care Coordination. Please let us know if you have additional questions, concerns, or goals that we can assist with.    Sincerely,  Cynthia Snell, RN, BSN, CPHN, CCM  [facility] Ambulatory Care Management  [facility]  Phone: [phone]  Email: [email or web address]  Patient Centered Plan of Care  About Me:        Patient Name:  Trisha Timmons    YOB: 1955  Age:         68 year old   Marcella MRN:    [id number] Telephone Information:  Home Phone [phone]   Mobile [phone]       Address:  [address] [facility] [address] Email address:  [email or web address]      Emergency Contact(s)    Name Relationship Lgl Grd Work Phone Home Phone Mobile Phone   1. MANINDER TIMMONS Spouse No  [phone]   2. IGOR TIMMONS Son No   [phone]   3. ILIANA TIMMONS Son No   [phone]           Primary language:  English     needed? No   Marcella [employer]:  [phone] op. 1  Other communication barriers:Other (Very Short of breath with activity)    Preferred Method of Communication:  Mail  Current living arrangement: I live in a private home with spouse    Mobility Status/ Medical Equipment: Independent w/Device    Health Maintenance  Health Maintenance Reviewed: Due/Overdue (Discussed with patient.)    My Access Plan  Medical Emergency 911   Primary Clinic Line [facility] - [phone]   24 Hour Appointment Line [phone] or  [phone]) (toll-free)   24 Hour Nurse Line [phone] (toll-free)   Preferred Urgent Care [facility], [phone]      Preferred Hospital [facility]  [phone]     Preferred Pharmacy [place], [place] - [address]     Behavioral Health Crisis Line The National Suicide Prevention Lifeline at 1-226.674.7787 or Text/Call 988     My Care Team Members  Patient Care Team         Relationship Specialty Notifications Start End    Bandar Weinberg MD PCP - General   2/15/02     Phone: [phone] Pager: [phone] Fax: [phone]        [address] [facility] [address]    Bandar Weinberg MD Assigned PCP   10/4/12     Phone: [phone] Pager: [phone] Fax: [phone]        [address] [facility] [address]    Thomas Valerio MD Referring Physician Pulmonary Disease  10/25/17     Phone: [phone] Fax: [phone]         [facility] [address] [place] [address]    Michael Odell MD (Inactive) MD Cardiology  2/16/18     Michael Odell MD (Inactive) MD Cardiology  2/26/18     Justina Finney DO MD Cardiovascular Disease  12/14/20     Phone: [phone] Fax: [phone] [address] [place] [address]    Justina Finney DO Assigned Heart and Vascular Provider   9/24/22     Phone: [phone] Fax: [phone]         [address] [place] [address]    Sheryl Ohara [facility] Assigned MTM Pharmacist   10/29/22     Phone: [phone] Pager: [phone]         [address] [place] [address]    Bandar Weinberg MD Assigned Pain Medication Provider   1/9/23     Phone: [phone] Pager: [phone] Fax: [phone]        [address] [facility] [address]    Jessica Robles [facility] Pharmacist Pharmacist  7/31/23     Phone: [phone] Fax: [phone]         [address] [facility] [address]    Cynthia Snell, RN Lead Care Coordinator Primary Care - [facility] Admissions 10/5/23     Phone: [phone]         No Ref-Primary, Physician    3/13/24      Fax: [phone]                   My Care Plans  Self Management and Treatment Plan    Care Plan  Care Plan: Health Maintenance       Problem: Health Maintenance Due or Overdue       Goal: Become up-to-date with health maintenance visit(s)       Start Date: 10/5/2023 Expected End Date: 1/4/2024    This Visit's Progress: 20% Recent Progress: 10%    Note:     Barriers: Very short of breath with activity; Limited mobility; Provider availability - wait time to complete appointments.   Strengths: Motivated; Agreeable to Care Coordination.   Patient expressed understanding of goal: Yes.   Action steps to achieve this goal:  1. I will take my medications as prescribed.   2. I will discuss, review, schedule and complete recommended overdue health maintenance with my Primary Care Provider.   3. I will contact my care team with questions, concerns or support needs. I will use the clinic as a resource and I understand I can contact my clinic with 24/7 after hours services available. Care Coordinator will remain available as needed.                               Action Plans on File:     Advance Care Plans/Directives:   Advanced Care Plan/Directives on file:   Yes    Status of Document(s): No data recorded  Advanced Care Plan/Directives Type: No data recorded       My Medical and Care Information  Problem List   Patient Active Problem List   Diagnosis    Essential hypertension with goal blood pressure less than 130/80    Chronic migraine without aura without status migrainosus, not intractable    Esophageal reflux    Disorder of bone and cartilage    Family history of malignant neoplasm of gastrointestinal tract    Chronic airway obstruction (H)    Cystocele    Family history of thyroid cancer - 1/2 brother    Anxiety    Chronic low back pain    Hair loss    Lateral epicondylitis of right elbow    Dilated cardiomyopathy (H)    Right bundle branch block (RBBB)    CHF (congestive heart failure) (H)    Coronary artery disease  involving native coronary artery of native heart without angina pectoris    Mixed hyperlipidemia    Controlled substance agreement signed    Moderate persistent asthma without complication    Hypokalemia    Panlobular emphysema (H)    Tachycardia    Status post coronary angiogram    Thoracic aortic ectasia (H24)    Supraventricular tachycardia    Pressure ulcer of left buttock, stage 3 (H)    Hyponatremia    Pulmonary nodule    Elevated troponin    COPD exacerbation (H)      Current Medications and Allergies:    Current Outpatient Medications   Medication    acetaminophen (TYLENOL) 500 MG tablet    albuterol (VENTOLIN HFA) 108 (90 BASE) MCG/ACT Inhaler    ALPRAZolam (XANAX) 0.25 MG tablet    atorvastatin (LIPITOR) 10 MG tablet    baclofen (LIORESAL) 10 MG tablet    BREO ELLIPTA 200-25 MCG/INH Inhaler    carvedilol (COREG) 25 MG tablet    cetirizine (ZYRTEC) 10 MG tablet    [employer] FIBER GUMMIES 2.5 G CHEW    doxycycline hyclate (PERIOSTAT) 20 MG tablet    doxycycline hyclate (VIBRA-TABS) 100 MG tablet    erythromycin (ROMYCIN) 5 MG/GM ophthalmic ointment    fluticasone (FLONASE) 50 MCG/ACT nasal spray    guaiFENesin (CHEST CONGESTION RELIEF) 20 mg/mL liquid    HYDROcodone-acetaminophen (NORCO)  MG per tablet    ipratropium - albuterol 0.5 mg/2.5 mg/3 mL (DUONEB) 0.5-2.5 (3) MG/3ML neb solution    ketorolac (TORADOL) 10 MG tablet    lisinopril (ZESTRIL) 5 MG tablet    montelukast (SINGULAIR) 10 MG tablet    Multiple Vitamin CHEW    naloxone (NARCAN) 4 MG/0.1ML nasal spray    ondansetron (ZOFRAN ODT) 4 MG ODT tab    pantoprazole (PROTONIX) 40 MG EC tablet    predniSONE (DELTASONE) 20 MG tablet    roflumilast (DALIRESP) 500 MCG TABS tablet    sodium chloride (MOLLY 128) 5 % ophthalmic ointment    spironolactone (ALDACTONE) 25 MG tablet    SUMAtriptan (IMITREX) 25 MG tablet    tiotropium (SPIRIVA HANDIHALER) 18 MCG capsule    traMADol (ULTRAM) 50 MG tablet    TRELEGY ELLIPTA 200-62.5-25 MCG/ACT oral inhaler     No  current facility-administered medications for this visit.        Allergies   Allergen Reactions    Gabapentin Swelling    Contrast Dye      Iodine    Escitalopram      Nausea and sickness    Peanuts [Nuts] Hives    Penicillins      rash    Sulfa Antibiotics      High family history    Tace [Chlorotrianisene]      joint swelling    Aspirin Rash     After 3 days    Ibuprofen Nausea and Vomiting and Swelling    Strawberry Extract Rash     Care Coordination Start Date: 10/5/2023   Frequency of Care Coordination: monthly     Form Last Updated: 03/22/2024

## 2024-04-03 NOTE — TELEPHONE ENCOUNTER
Pt is still waiting for her refill.     Last refill on 3/4 for #240    Routing refill request to provider for review/approval because:  Drug not on the [facility] refill protocol     Due today.

## 2024-04-06 NOTE — ED NOTES
Bed: ED10  Expected date: 4/6/24  Expected time: 4:27 AM  Means of arrival:   Comments:  Julio [address] [id number]

## 2024-04-06 NOTE — PLAN OF CARE
"PRIMARY DIAGNOSIS: ACUTE PAIN  OUTPATIENT/OBSERVATION GOALS TO BE MET BEFORE DISCHARGE:  1. Pain Status: Improved-controlled with oral pain medications.    2. Return to near baseline physical activity: No    3. Cleared for discharge by consultants (if involved): No    Discharge Planner Nurse   Safe discharge environment identified: No  Barriers to discharge: Yes       Entered by: Gertrudis Bautista RN 04/06/2024 5:37 PM     Please review provider order for any additional goals.   Nurse to notify provider when observation goals have been met and patient is ready for discharge.    A&Ox4, rates pain 9/10, PRN pain med given, not oob, PW in place, PIV flushed/SL, tolerating diet per pt, safety check completed.     /78   Pulse 102   Temp 98  F (36.7  C) (Oral)   Resp 18   Ht 1.778 m (5' 10\")   Wt 81 kg (178 lb 9.2 oz)   LMP 09/20/2005   SpO2 94%   BMI 25.62 kg/m     "

## 2024-04-06 NOTE — PHARMACY-ADMISSION MEDICATION HISTORY
Pharmacist Admission Medication History    Admission medication history is complete. The information provided in this note is only as accurate as the sources available at the time of the update.    Information Source(s): Patient and Patient's pharmacy via in-person    Pertinent Information:     Changes made to PTA medication list:  Added: None  Deleted: Spirononlactone  Changed: None    Allergies reviewed with patient and updates made in EHR: no    Medication History Completed By: Loida Cazares [facility] 4/6/2024 8:28 AM    PTA Med List   Medication Sig Last Dose    albuterol (VENTOLIN HFA) 108 (90 BASE) MCG/ACT Inhaler Inhale 2 puffs into the lungs every 6 hours as needed for shortness of breath / dyspnea or wheezing Unknown    ALPRAZolam (XANAX) 0.25 MG tablet TAKE 1 TABLET BY MOUTH TWICE A DAY AS NEEDED FOR ANXIETY Unknown    atorvastatin (LIPITOR) 10 MG tablet Take 1 tablet (10 mg) by mouth daily 4/5/2024    baclofen (LIORESAL) 10 MG tablet Take 10 mg by mouth 3 times daily as needed for muscle spasms Unknown    BREO ELLIPTA 200-25 MCG/INH Inhaler Inhale 1 Dose into the lungs daily  4/5/2024    carvedilol (COREG) 25 MG tablet Take 1 tablet (25 mg) by mouth 2 times daily (with meals) 4/5/2024    cetirizine (ZYRTEC) 10 MG tablet Take 10 mg by mouth daily 4/5/2024    [employer] F[employer] GUMMIES 2.5 G CHEW Take 1 tablet by mouth daily Reported on 5/8/2017 4/5/2024    fluticasone (FLONASE) 50 MCG/ACT nasal spray Spray 1-2 sprays into both nostrils daily as needed for rhinitis or allergies Unknown    [START ON 4/8/2024] HYDROcodone-acetaminophen (NORCO)  MG per tablet Take 1-2 tablets by mouth every 4 hours as needed for severe pain Take up to 8 tablets a day 4/5/2024    ipratropium - albuterol 0.5 mg/2.5 mg/3 mL (DUONEB) 0.5-2.5 (3) MG/3ML neb solution NEBULIZE CONTENTS OF ONE VIAL EVERY 6 HOURS AS NEEDED FOR SHORTNESS OF BREATH OR WHEEZING Unknown    ketorolac (TORADOL) 10 MG tablet TAKE 1 TABLET (10 MG) BY MOUTH EVERY 6 HOURS  AS NEEDED FOR MODERATE PAIN Unknown    lisinopril (ZESTRIL) 5 MG tablet Take 1 tablet (5 mg) by mouth daily 4/5/2024    montelukast (SINGULAIR) 10 MG tablet TAKE ONE TABLET BY MOUTH AT BEDTIME 4/5/2024    Multiple Vitamin CHEW Take 1 tablet by mouth daily  4/5/2024    naloxone (NARCAN) 4 MG/0.1ML nasal spray Spray 1 spray (4 mg) into one nostril alternating nostrils once as needed for opioid reversal Every 2-3 minutes until patient responsive or EMS arrives Unknown    ondansetron (ZOFRAN ODT) 4 MG ODT tab TAKE 1-2 TABLETS BY MOUTH EVERY 8 HOURS AS NEEDED FOR NAUSEA. Unknown    pantoprazole (PROTONIX) 40 MG EC tablet TAKE 1 TABLET (40 MG) BY MOUTH DAILY 4/5/2024    roflumilast (DALIRESP) 500 MCG TABS tablet Take 1 tablet (500 mcg) by mouth daily 4/5/2024    sodium chloride (MOLLY 128) 5 % ophthalmic ointment Place 1 Application into both eyes every evening Unknown    spironolactone (ALDACTONE) 25 MG tablet Take 0.5 tablets (12.5 mg) by mouth daily 4/5/2024    SUMAtriptan (IMITREX) 25 MG tablet Take 1 tablet (25 mg) by mouth at onset of headache for migraine Unknown    traMADol (ULTRAM) 50 MG tablet TAKE 1 TABLET BY MOUTH EVERY 6 HOURS AS NEEDED FOR SEVERE PAIN Unknown    TRELEGY ELLIPTA 200-62.5-25 MCG/ACT oral inhaler INHALE 1 PUFF BY INHALATION ROUTE EVERY DAY AT THE SAME TIME EACH DAY 4/5/2024

## 2024-04-06 NOTE — ED TRIAGE NOTES
Pt BIBA from home with reproducible mid sternal chest pain that started Friday. Pt reports pain worsens with movement. Pt also reports difficulty taking deep breaths due to increased pain. 2 doses of nitroglycerin given en route by EMS with no improvement. VSS.      Triage Assessment (Adult)       Row Name 04/06/24 0448          Triage Assessment    Airway WDL WDL        Respiratory WDL    Respiratory WDL WDL        Skin Circulation/Temperature WDL    Skin Circulation/Temperature WDL WDL        Cardiac WDL    Cardiac WDL X;chest pain        Chest Pain Assessment    Chest Pain Location midsternal        Peripheral/Neurovascular WDL    Peripheral Neurovascular WDL WDL        Cognitive/Neuro/Behavioral WDL    Cognitive/Neuro/Behavioral WDL WDL

## 2024-04-06 NOTE — CONSULTS
"Cardiology Consultation      Trisha Bender MRN# [id number]   YOB: 1955 Age: 68 year old   Date of Admission: 2024     Reason for consult: Hyponatremia           Assessment and Plan:     Hyponatremia, refractory to diuretics, exacerbated by spironolactone  Trial of low-dose tolvaptan to help with hyponatremia  Discontinue spironolactone.      Nonischemic cardiomyopathy  Stable      Atypical chest discomfort, no evidence of acute coronary syndrome  The troponins are basically flat given the current scale of our troponin assay.    60 minutes spent today reviewing chart, discussing with patient and coordinating care.           Chief Complaint:   Chest Pain           History of Present Illness:   This patient is a 68 year old female well-known to cardiology clinic for nonischemic cardiomyopathy with minimal coronary artery disease on catheterization .  She has had difficulty with recurrent hyponatremia on diuretics.  She feels weak and tired and sodium is 126 on admission.    She does have some atypical chest discomfort that seems musculoskeletal.  Patient also thinks it is musculoskeletal.  Troponins are trivially elevated and within the same range as historic.  No evidence of acute coronary syndrome in a patient with no history of significant coronary artery disease.    Plan         Physical Exam:   Vitals were reviewed  Blood pressure 117/76, pulse 91, temperature 97.8  F (36.6  C), temperature source Oral, resp. rate 11, height 1.778 m (5' 10\"), weight 81 kg (178 lb 9.2 oz), last menstrual period 2005, SpO2 93%, not currently breastfeeding.  Temperatures:  Current - Temp: 97.8  F (36.6  C); Max - Temp  Av  F (36.7  C)  Min: 97.8  F (36.6  C)  Max: 98.3  F (36.8  C)  Respiration range: Resp  Avg: 15.7  Min: 10  Max: 29  Pulse range: Pulse  Av  Min: 80  Max: 97  Blood pressure range: Systolic (24hrs), Av , Min:105 , Max:130   ; Diastolic (24hrs), Av, Min:68, " Max:82    Pulse oximetry range: SpO2  Av.4 %  Min: 93 %  Max: 96 %  No intake or output data in the 24 hours ending 24 1234  Constitutional:   awake, alert, cooperative, no apparent distress, and appears stated age     Eyes:   Lids and lashes normal, pupils equal, round and reactive to light, extra ocular muscles intact, sclera clear, conjunctiva normal     Neck:   supple, symmetrical, trachea midline,      Back:   symmetric     Lungs:   Symmetric     Cardiovascular:   Regular     Abdomen:   non-tender     Musculoskeletal:   motor strength is 5 out of 5 all extremities bilaterally     Neurologic:   Grossly nonfocal     Skin:   normal skin color, texture, turgor     Additional findings:                    Past Medical History:   I have reviewed this patient's past medical history  Past Medical History:   Diagnosis Date    Anxiety     CAD (coronary artery disease) 2015    mild per cath    CHF (congestive heart failure)     EF=20-25% per echo 6/30/15    Chronic airway obstruction     FEV1 36% of pred; VC 67%  in .    Chronic back pain     COPD (chronic obstructive pulmonary disease)     Endometriosis     Esophageal reflux     Essential hypertension     Hyperlipidemia     Migraine     Mild persistent asthma 2012    Osteopenia     Takotsubo cardiomyopathy              Past Surgical History:   I have reviewed this patient's past surgical history  Past Surgical History:   Procedure Laterality Date    ANGIOGRAM  2015    Minimal CAD. LV dysfunction, Mid RCA 20% stenosis, EF 20-25%, c/w Takotsubo cardiomyopathy    APPENDECTOMY      CHOLECYSTECTOMY      [facility]    COLONOSCOPY N/A 2018    Procedure: COLONOSCOPY;  COLONOSCOPY with biopsies;  Surgeon: Steve Acosta MD;  Location:  OR    CORONARY ANGIOGRAPHY ADULT ORDER      CV CORONARY ANGIOGRAM N/A 2020    Procedure: Coronary Angiogram;  Surgeon: Quinn Viveros MD;  Location:  HEART CARDIAC CATH LAB     CV LEFT HEART CATH N/A 2020    Procedure: Left Heart Cath;  Surgeon: Quinn Viveros MD;  Location:  HEART CARDIAC CATH LAB    CV LEFT VENTRICULOGRAM N/A 2020    Procedure: Left Ventriculogram;  Surgeon: Quinn Viveros MD;  Location: [facility]ARD[facility]ATH LAB    CV RIGHT HEART CATH MEASUREMENTS RECORDED N/A 2020    Procedure: Right Heart Cath;  Surgeon: Quinn Viveros MD;  Location:  HEART CARDIAC CATH LAB    ESOPHAGOSCOPY, GASTROSCOPY, DUODENOSCOPY (EGD), COMBINED  11/15/2011    Procedure:COMBINED ESOPHAGOSCOPY, GASTROSCOPY, DUODENOSCOPY (EGD);  ESOPHAGOSCOPY, GASTROSCOPY, DUODENOSCOPY (EGD) ; Surgeon:JIM URENA; Location: GI    INJECT EPIDURAL LUMBAR / SACRAL SINGLE N/A 2018    Procedure: INJECT EPIDURAL LUMBAR / SACRAL CONTINUAL OR INTERMITTENT;  Lumbar Epidural Steroid Injection;  Surgeon: Az Enamordao MD;  Location: [facility] OR    INJECT EPIDURAL LUMBAR / SACRAL SINGLE N/A 2018    Procedure: INJECT EPIDURAL LUMBAR / SACRAL SINGLE;  Lumbar Epidural Steroid Injection;  Surgeon: Az Enamorado MD;  Location: [facility] OR    LAPAROSCOPIC ABLATION ENDOMETRIOSIS      Nasal septoplasty and mucous retention cyst      TONSILLECTOMY, ADENOIDECTOMY, COMBINED      Lamar teeth extraction                 Social History:   I have reviewed this patient's social history  Social History     Tobacco Use    Smoking status: Former     Packs/day: 1.00     Years: 30.00     Additional pack years: 0.00     Total pack years: 30.00     Types: Cigarettes     Quit date: 2006     Years since quittin.2    Smokeless tobacco: Never    Tobacco comments:     Quit in    Substance Use Topics    Alcohol use: No     Alcohol/week: 0.0 standard drinks of alcohol             Family History:   I have reviewed this patient's family history  Family History   Problem Relation Age of Onset    Cancer Mother         Colon cancer; dxed at age 64;  at age 69    Cancer - colorectal Mother      Other Cancer Mother     Gastrointestinal Disease Sister         Acute pancreatitis    Heart Disease Father         ?    Coronary Artery Disease Father     Circulatory Maternal Aunt         AAA    Circulatory Maternal Uncle         AAA    Diabetes Son              Allergies:     Allergies   Allergen Reactions    Gabapentin Swelling    Contrast Dye      Iodine    Escitalopram      Nausea and sickness    Peanuts [Nuts] Hives    Penicillins      rash    Sulfa Antibiotics      High family history    Tace [Chlorotrianisene]      joint swelling    Aspirin Rash     After 3 days    Ibuprofen Nausea and Vomiting and Swelling    Strawberry Extract Rash             Medications:   I have reviewed this patient's current medications  Medications Prior to Admission   Medication Sig Dispense Refill Last Dose    albuterol (VENTOLIN HFA) 108 (90 BASE) MCG/ACT Inhaler Inhale 2 puffs into the lungs every 6 hours as needed for shortness of breath / dyspnea or wheezing 18 g 3 Unknown    ALPRAZolam (XANAX) 0.25 MG tablet TAKE 1 TABLET BY MOUTH TWICE A DAY AS NEEDED FOR ANXIETY 60 tablet 0 Unknown    atorvastatin (LIPITOR) 10 MG tablet Take 1 tablet (10 mg) by mouth daily 90 tablet 4 4/5/2024    baclofen (LIORESAL) 10 MG tablet Take 10 mg by mouth 3 times daily as needed for muscle spasms   Unknown    BREO ELLIPTA 200-25 MCG/INH Inhaler Inhale 1 Dose into the lungs daily   12 4/5/2024    carvedilol (COREG) 25 MG tablet Take 1 tablet (25 mg) by mouth 2 times daily (with meals) 180 tablet 4 4/5/2024    cetirizine (ZYRTEC) 10 MG tablet Take 10 mg by mouth daily   4/5/2024    [employer] FIBER GUMMIES 2.5 G CHEW Take 1 tablet by mouth daily Reported on 5/8/2017 4/5/2024    fluticasone (FLONASE) 50 MCG/ACT nasal spray Spray 1-2 sprays into both nostrils daily as needed for rhinitis or allergies 16 g 4 Unknown    [START ON 4/8/2024] HYDROcodone-acetaminophen (NORCO)  MG per tablet Take 1-2 tablets by mouth every 4 hours as needed for severe pain  Take up to 8 tablets a day 240 tablet 0 4/5/2024    ipratropium - albuterol 0.5 mg/2.5 mg/3 mL (DUONEB) 0.5-2.5 (3) MG/3ML neb solution NEBULIZE CONTENTS OF ONE VIAL EVERY 6 HOURS AS NEEDED FOR SHORTNESS OF BREATH OR WHEEZING 90 mL 1 Unknown    ketorolac (TORADOL) 10 MG tablet TAKE 1 TABLET (10 MG) BY MOUTH EVERY 6 HOURS AS NEEDED FOR MODERATE PAIN 20 tablet 0 Unknown    lisinopril (ZESTRIL) 5 MG tablet Take 1 tablet (5 mg) by mouth daily 90 tablet 3 4/5/2024    montelukast (SINGULAIR) 10 MG tablet TAKE ONE TABLET BY MOUTH AT BEDTIME 90 tablet 1 4/5/2024    Multiple Vitamin CHEW Take 1 tablet by mouth daily    4/5/2024    naloxone (NARCAN) 4 MG/0.1ML nasal spray Spray 1 spray (4 mg) into one nostril alternating nostrils once as needed for opioid reversal Every 2-3 minutes until patient responsive or EMS arrives 0.2 mL 0 Unknown    ondansetron (ZOFRAN ODT) 4 MG ODT tab TAKE 1-2 TABLETS BY MOUTH EVERY 8 HOURS AS NEEDED FOR NAUSEA. 18 tablet 1 Unknown    pantoprazole (PROTONIX) 40 MG EC tablet TAKE 1 TABLET (40 MG) BY MOUTH DAILY 90 tablet 1 4/5/2024    roflumilast (DALIRESP) 500 MCG TABS tablet Take 1 tablet (500 mcg) by mouth daily   4/5/2024    sodium chloride (MOLLY 128) 5 % ophthalmic ointment Place 1 Application into both eyes every evening   Unknown    spironolactone (ALDACTONE) 25 MG tablet Take 0.5 tablets (12.5 mg) by mouth daily 90 tablet 4 4/5/2024    SUMAtriptan (IMITREX) 25 MG tablet Take 1 tablet (25 mg) by mouth at onset of headache for migraine 9 tablet 11 Unknown    traMADol (ULTRAM) 50 MG tablet TAKE 1 TABLET BY MOUTH EVERY 6 HOURS AS NEEDED FOR SEVERE PAIN 30 tablet 0 Unknown    TRELEGY ELLIPTA 200-62.5-25 MCG/ACT oral inhaler INHALE 1 PUFF BY INHALATION ROUTE EVERY DAY AT THE SAME TIME EACH DAY   4/5/2024     Current Facility-Administered Medications   Medication Dose Route Frequency Provider Last Rate Last Admin    acetaminophen (TYLENOL) tablet 650 mg  650 mg Oral Q4H PRN Lauren De La Torre, APRN CNP         Or    acetaminophen (TYLENOL) Suppository 650 mg  650 mg Rectal Q4H PRN Lauren De La Torre APRN CNP        ALPRAZolam (XANAX) tablet 0.25 mg  0.25 mg Oral BID PRN Lauren De La Torre APRN CNP        alum & mag hydroxide-simethicone (MAALOX) suspension 30 mL  30 mL Oral Q4H PRN Lauren De La Torre APRN CNP        atorvastatin (LIPITOR) tablet 10 mg  10 mg Oral Daily Lauren De La Torre APRN CNP        baclofen (LIORESAL) tablet 10 mg  10 mg Oral TID PRN Lauren De La Torre APRN CNP        carvedilol (COREG) tablet 25 mg  25 mg Oral BID w/meals Lauren De La Torre APRN CNP   25 mg at 04/06/24 0839    HYDROcodone-acetaminophen (NORCO) 5-325 MG per tablet 1-2 tablet  1-2 tablet Oral Q6H PRN Lauren De La Torre APRN CNP   2 tablet at 04/06/24 1040    lisinopril (ZESTRIL) tablet 5 mg  5 mg Oral Daily Lauren De La Torre APRN CNP        methocarbamol (ROBAXIN) tablet 500 mg  500 mg Oral 4x Daily Lauren De La Torre APRN CNP   500 mg at 04/06/24 1129    montelukast (SINGULAIR) tablet 10 mg  10 mg Oral At Bedtime Lauren De La Torre APRN CNP        nitroGLYcerin (NITROSTAT) sublingual tablet 0.4 mg  0.4 mg Sublingual Q5 Min PRN Lauren De La Torre APRN CNP        ondansetron (ZOFRAN ODT) ODT tab 4 mg  4 mg Oral Q6H PRN Lauren De La Torre APRN CNP        pantoprazole (PROTONIX) EC tablet 40 mg  40 mg Oral Daily Lauren De La Torre APRN CNP        senna-docusate (SENOKOT-S/PERICOLACE) 8.6-50 MG per tablet 1 tablet  1 tablet Oral BID Lauren De La Torre APRN CNP   1 tablet at 04/06/24 1039    tolvaptan (SAMSCA) tablet 15 mg  15 mg Oral Daily Cristhian Parada MD                 Review of Systems:     The 10 point Review of Systems is negative other than noted in the HPI            Data:   All laboratory data reviewed  Results for orders placed or performed during the hospital encounter of 04/06/24 (from the past 24 hour(s))   EKG 12-lead, tracing only   Result Value Ref Range    Systolic Blood Pressure  mmHg    Diastolic Blood Pressure  mmHg    Ventricular Rate 94 BPM     Atrial Rate 94 BPM    MS Interval 140 ms    QRS Duration 108 ms     ms    QTc 480 ms    P Axis 66 degrees    R AXIS 74 degrees    T Axis 49 degrees    Interpretation ECG       Sinus rhythm  Low voltage QRS  Incomplete right bundle branch block  Borderline ECG  When compared with ECG of 04-OCT-2023 16:11,  No significant change was found     CBC + differential    Narrative    The following orders were created for panel order CBC + differential.  Procedure                               Abnormality         Status                     ---------                               -----------         ------                     CBC with platelets and d...[[id number]]  Abnormal            Final result                 Please view results for these tests on the individual orders.   Comprehensive metabolic panel   Result Value Ref Range    Sodium 126 (L) 135 - 145 mmol/L    Potassium 4.8 3.4 - 5.3 mmol/L    Carbon Dioxide (CO2) 23 22 - 29 mmol/L    Anion Gap 12 7 - 15 mmol/L    Urea Nitrogen 7.6 (L) 8.0 - 23.0 mg/dL    Creatinine 0.48 (L) 0.51 - 0.95 mg/dL    GFR Estimate >90 >60 mL/min/1.73m2    Calcium 8.1 (L) 8.8 - 10.2 mg/dL    Chloride 91 (L) 98 - 107 mmol/L    Glucose 171 (H) 70 - 99 mg/dL    Alkaline Phosphatase 78 40 - 150 U/L    AST 12 0 - 45 U/L    ALT 9 0 - 50 U/L    Protein Total 5.4 (L) 6.4 - 8.3 g/dL    Albumin 3.6 3.5 - 5.2 g/dL    Bilirubin Total 0.4 <=1.2 mg/dL   Lipase   Result Value Ref Range    Lipase 13 13 - 60 U/L   Troponin T, High Sensitivity (now)   Result Value Ref Range    Troponin T, High Sensitivity 53 (H) <=14 ng/L   CBC with platelets and differential   Result Value Ref Range    WBC Count 15.9 (H) 4.0 - 11.0 10e3/uL    RBC Count 3.93 3.80 - 5.20 10e6/uL    Hemoglobin 10.5 (L) 11.7 - 15.7 g/dL    Hematocrit 34.5 (L) 35.0 - 47.0 %    MCV 88 78 - 100 fL    MCH 26.7 26.5 - 33.0 pg    MCHC 30.4 (L) 31.5 - 36.5 g/dL    RDW 15.4 (H) 10.0 - 15.0 %    Platelet Count 446 150 - 450 10e3/uL    % Neutrophils  85 %    % Lymphocytes 7 %    % Monocytes 5 %    % Eosinophils 0 %    % Basophils 0 %    % Immature Granulocytes 3 %    NRBCs per 100 WBC 0 <1 /100    Absolute Neutrophils 13.5 (H) 1.6 - 8.3 10e3/uL    Absolute Lymphocytes 1.2 0.8 - 5.3 10e3/uL    Absolute Monocytes 0.8 0.0 - 1.3 10e3/uL    Absolute Eosinophils 0.0 0.0 - 0.7 10e3/uL    Absolute Basophils 0.0 0.0 - 0.2 10e3/uL    Absolute Immature Granulocytes 0.5 (H) <=0.4 10e3/uL    Absolute NRBCs 0.0 10e3/uL   BNP   Result Value Ref Range    N terminal Pro BNP Inpatient 243 0 - 900 pg/mL   Chest XR,  PA & LAT    Narrative    EXAM: XR CHEST 2 VIEWS  LOCATION: [facility]  DATE: 4/6/2024    INDICATION: Chest pain.  COMPARISON: Chest x-ray 2 views 10/04/2023 at 1700 hours.      Impression    IMPRESSION: Emphysematous changes. Minor strands of linear atelectasis in the right base. Both lungs are otherwise clear. No adenopathy or effusion. Normal cardiac size and pulmonary vascularity. Atherosclerotic thoracic aorta. Degenerative changes both   shoulders and the spine. Previously healed right rib fracture deformities. No significant interval change.   CT Chest Pulmonary Embolism w Contrast    Narrative    EXAM: CT CHEST PULMONARY EMBOLISM W CONTRAST  LOCATION: [facility]  DATE: 4/6/2024    INDICATION: Lytic chest pain.  Elevated troponin; Female sex; Not pregnant; No prior imaging in the last 24 hours; Pulmonary Embolism Rule Out Criteria (PERC) score not > 0  COMPARISON: 09/21/2023.  TECHNIQUE: CT chest pulmonary angiogram during arterial phase injection of IV contrast. Multiplanar reformats and MIP reconstructions were performed. Dose reduction techniques were used.   CONTRAST: 59mL Isovue 370    FINDINGS:  ANGIOGRAM CHEST: Pulmonary arteries are normal caliber and negative for pulmonary emboli. Thoracic aorta is negative for dissection. No CT evidence of right heart strain.    LUNGS AND PLEURA: Emphysema. Mild dependent  atelectasis bilaterally. Stable 6 mm left posterior costophrenic angle nodule. Small calcified granulomas at the right lung base.    MEDIASTINUM/AXILLAE: There is no lymph node enlargement. The heart size is normal.    CORONARY ARTERY CALCIFICATION: Mild.    UPPER ABDOMEN: The gallbladder is absent.    MUSCULOSKELETAL: Multiple vertebral body compression fractures, unchanged from previous exam. Degenerative disease throughout the spine. Old mid sternal fracture.      Impression    IMPRESSION:  1.  There is no pulmonary embolus, aortic aneurysm or dissection.  2.  Emphysema.  3.  Stable 6 mm left posterior costophrenic angle nodule.    Recommendations for one or multiple incidental lung nodules < 6mm :    Low risk patients: No routine follow-up.    High risk patients: Optional follow-up CT at 12 months; if unchanged, no further follow-up.    *Low Risk: Minimal or absent history of smoking or other known risk factors.  *Nonsolid (ground glass) or partly solid nodules may require longer follow-up to exclude indolent adenocarcinoma.  *Recommendations based on Guidelines for the Management of Incidental Pulmonary Nodules Detected at CT: From the Fleischner Society 2017, Radiology 2017.     Troponin T, High Sensitivity (now)   Result Value Ref Range    Troponin T, High Sensitivity 56 (H) <=14 ng/L   Troponin T, High Sensitivity - now then in 2 hours x 3   Result Value Ref Range    Troponin T, High Sensitivity 57 (H) <=14 ng/L     *Note: Due to a large number of results and/or encounters for the requested time period, some results have not been displayed. A complete set of results can be found in Results Review.       Lab Results   Component Value Date    CHOL 162 03/07/2023    CHOL 175 03/16/2021     Lab Results   Component Value Date    HDL 41 03/07/2023    HDL 55 03/16/2021     Lab Results   Component Value Date    LDL  03/07/2023      Comment:      Cannot estimate LDL when triglyceride exceeds 400 mg/dL    LDL 52  "03/07/2023    LDL  03/16/2021     Cannot estimate LDL when triglyceride exceeds 400 mg/dL     Lab Results   Component Value Date    TRIG 432 03/07/2023    TRIG 450 03/16/2021     Lab Results   Component Value Date    CHOLHDLRATIO 3.6 07/01/2015     TSH   Date Value Ref Range Status   03/07/2023 0.57 0.30 - 4.20 uIU/mL Final   08/03/2021 1.00 0.40 - 4.00 mU/L Final   12/17/2018 0.39 (L) 0.40 - 4.00 mU/L Final         EKG results:    Echocardiology:    Cardiac stress testing:    Cardiac angiography:    Clinically Significant Risk Factors Present on Admission         # Hyponatremia: Lowest Na = 126 mmol/L in last 2 days, will monitor as appropriate  # Hypocalcemia: Lowest Ca = 8.1 mg/dL in last 2 days, will monitor and replace as appropriate         # Hypertension: Noted on problem list  # Chronic heart failure with reduced ejection fraction: last echo with EF <40%     # Overweight: Estimated body mass index is 25.62 kg/m  as calculated from the following:    Height as of this encounter: 1.778 m (5' 10\").    Weight as of this encounter: 81 kg (178 lb 9.2 oz).       # Asthma: noted on problem list     "

## 2024-04-06 NOTE — H&P
[facility]    History and Physical - Hospitalist Service       Date of Admission:  4/6/2024    Assessment & Plan      Trisha Bender is a 68 year old female with PMH significant for CAD, CHF, pulmonary hypertension, COPD, GERD, HTN, hyponatremia, chronic leukocytosis, and chronic pain admitted on 4/6/2024 for chest pain and mildly elevated troponin    ED workup notable for mildly decreased hemoglobin of 10.5, which is down from 13.2 4 months ago.  WBC is 15.0 which appears to be consistent with her baseline. Sodium is 126 which is also consistent with her baseline.  Troponin is 53, baseline appears to be around 37.  BNP is normal.  No significant changes in her CRX and Chest CT is negative for PE>    #Atypical Chest pain  #Elevated Troponin  #CHF with reduce EF  Atypical chest pain.  Patient feels this is musculoskeletal and requesting additional pain medication.  Grimacing to light touch anywhere on her body.  No relief after nitroglycerine given by EMS.  Baseline tropinin typically in the 30's.  She was admitted to [facility] back in September and had elevated troponin that peaked at 69 thought to be 2/2 to demand ischemia.  At that time she had also used up all her home narcotics that were intended to last a month.  Last echo was 8/24/2022 which showed EF of 35-40%.  Cardiac cath in 2020 showed minimal coronary artery disease.  BNP in the ED is normal.  -Admit to observation  -Telemetry  -Trending troponin  -Echo ordered  -Cardiology consult  -Continue PTA spironolactone, carvedilol, and lisinopril and statin.  Patient not on aspirin due to reported side effect of rash  -Patient is established as an outpatient with cardiology.  If troponin improves, will discharge with outpatient follow up.      #Chronic pain with opioid dependence  #Anxiety  PDMP reviewed, multiple controlled substances noted.  She has had 3 different narcotics prescribed in the last month.  She is adamant that her chest  pain muskuloskeletal and the reason she came into the hospital was for pain control.  Patient also reporting she is on palliative care and cannot move or get up to the bathroom. She mostly stays in one room in her home.  However, she does want to be full code.  I do not see that she follows with palliative care outside of the hospital.  -PTA norco ordered, will not increase her regimen today.  We discussed this would not be indicated for cardiac pain.  -Will order PRN robaxin for rib pain  -Would consider pain medicine referral at discharge to help manage chronic pain issues.     #Anemia  New onset when reviewing previous CBC.  Could be contributing factor in recent chest pain and mildly elevated troponin.  Denies melena or recent blood loss.  -FIT ordered to     #COPD  #Pulmonary hypertension  CRX negative for pneumonia.  During her previous admission, pulmonary issues thought to be responsible for her elevated troponin.  -Recommended she bring in home inhalers, roflumilast, and allergy meds from home due to observation status.        #GERD  -Continue PTA Protonix    #HTN  -Continue PTA spironolactone, carvedilol, and lisinopril.     #Hyponatremia  #hypocalcemia  Chronic 2/2 spironolactone.  During last admission patient reported non-compliance with fluid restrictions.  -NaCl overnight  -Fluid restriction 1500ml.  -Cardiac monitoring.  -Will defer adjustments in her diuretic to her cardiologist.       #Chronic leukocytosis  Low suspicion for infectious etiology, appears to be near baseline  -AM CBC       Observation Goals: List all goals to be met before discharge home: , - Serial troponins and stress test complete., - Seen and cleared by consultant if applicable, - Adequate pain control on oral analgesia, - Vital signs normal or at patient baseline, - Safe disposition plan has been identified, - Nurse to notify provider when observation goals have been met and patient is ready for discharge.    Diet: Combination  Diet Regular Diet Adult; No Caffeine Diet    DVT Prophylaxis: Pneumatic Compression Devices  Quiles Catheter: Not present  Lines: None     Cardiac Monitoring: ACTIVE order. Indication: Chest pain/ ACS rule out (24 hours)  Code Status: Full Code      Clinically Significant Risk Factors Present on Admission         # Hyponatremia: Lowest Na = 126 mmol/L in last 2 days, will monitor as appropriate  # Hypocalcemia: Lowest Ca = 8.1 mg/dL in last 2 days, will monitor and replace as appropriate         # Hypertension: Noted on problem list  # Chronic heart failure with reduced ejection fraction: last echo with EF <40%         # COPD: noted on problem list        Disposition Plan      Expected Discharge Date: 04/08/2024                The patient's care was discussed with the Attending Physician, Dr. Scott and Patient.    ELLE Medellin [facility]  Hospitalist Service  [facility]  Securely message with [employer] (more info)  Text page via [facility] Paging/Directory     ______________________________________________________________________    Chief Complaint   Chest pain    History is obtained from the patient    History of Present Illness   Trisha Bender is a 68 year old female with PMH significant for CAD, CHF, pulmonary hypertension, COPD, GERD, HTN, hyponatremia, chronic leukocytosis, and chronic pain with opioid dependence admitted on 4/6/2024 for chest pain and mildly elevated troponin.    Presented to the ED with complaints of chest pain that started yesterday and is worsened by deep breathing and movement.  Patient feels strongly this pain is musculoskeletal.   Last night she was trying to lift herself from the toilet and couldn't because of the pain which prompted her to come into the ED this morning.  She also reported symptoms of indigestion.  Last BM was yesterday, denies melena.  She denies any increased dypsnea other than pain in the sternal area with deep breaths.  Denies recent illness or  fever.      She reports last echo was a year ago.  One was ordered by her PCP, but cardiology did not feel she needed to go through with repeating it.      She reports she mostly stays in one room in bed because she is on palliative care (but does desire to be full code).  She does not go to doctors visit because she is incapable of being mobile.  The last time she went, she could not get out of the car.      Past Medical History    Past Medical History:   Diagnosis Date    Anxiety     CAD (coronary artery disease) 07/02/2015    mild per cath    CHF (congestive heart failure)     EF=20-25% per echo 6/30/15    Chronic airway obstruction     FEV1 36% of pred; VC 67%  in Jan 06.    Chronic back pain     COPD (chronic obstructive pulmonary disease)     Endometriosis     Esophageal reflux     Essential hypertension     Hyperlipidemia     Migraine     Mild persistent asthma 08/30/2012    Osteopenia     Takotsubo cardiomyopathy        Past Surgical History   Past Surgical History:   Procedure Laterality Date    ANGIOGRAM  07/02/2015    Minimal CAD. LV dysfunction, Mid RCA 20% stenosis, EF 20-25%, c/w Takotsubo cardiomyopathy    APPENDECTOMY  1974    CHOLECYSTECTOMY  1974    [facility]    COLONOSCOPY N/A 04/26/2018    Procedure: COLONOSCOPY;  COLONOSCOPY with biopsies;  Surgeon: Steve Acosta MD;  Location:  OR    CORONARY ANGIOGRAPHY ADULT ORDER      CV CORONARY ANGIOGRAM N/A 07/30/2020    Procedure: Coronary Angiogram;  Surgeon: Quinn Viveros MD;  Location:  HEART CARDIAC CATH LAB    CV LEFT HEART CATH N/A 07/30/2020    Procedure: Left Heart Cath;  Surgeon: Quinn Viveros MD;  Location:  HEART CARDIAC CATH LAB    CV LEFT VENTRICULOGRAM N/A 07/30/2020    Procedure: Left Ventriculogram;  Surgeon: Quinn Viveros MD;  Location:  HEART CARDIAC CATH LAB    CV RIGHT HEART CATH MEASUREMENTS RECORDED N/A 07/30/2020    Procedure: Right Heart Cath;  Surgeon: Quinn Viveros MD;  Location:  HEART  CARDIAC CATH LAB    ESOPHAGOSCOPY, GASTROSCOPY, DUODENOSCOPY (EGD), COMBINED  11/15/2011    Procedure:COMBINED ESOPHAGOSCOPY, GASTROSCOPY, DUODENOSCOPY (EGD);  ESOPHAGOSCOPY, GASTROSCOPY, DUODENOSCOPY (EGD) ; Surgeon:JIM URENA; Location:[facility]    INJECT EPIDURAL LUMBAR / SACRAL SINGLE N/A 04/13/2018    Procedure: INJECT EPIDURAL LUMBAR / SACRAL CONTINUAL OR INTERMITTENT;  Lumbar Epidural Steroid Injection;  Surgeon: Az Enamorado MD;  Location: [facility] OR    INJECT EPIDURAL LUMBAR / SACRAL SINGLE N/A 09/24/2018    Procedure: INJECT EPIDURAL LUMBAR / SACRAL SINGLE;  Lumbar Epidural Steroid Injection;  Surgeon: Az Enamorado MD;  Location: [facility] OR    LAPAROSCOPIC ABLATION ENDOMETRIOSIS  1998    Nasal septoplasty and mucous retention cyst      TONSILLECTOMY, ADENOIDECTOMY, COMBINED      [place] teeth extraction         Prior to Admission Medications   Prior to Admission Medications   Prescriptions Last Dose Informant Patient Reported? Taking?   ALPRAZolam (XANAX) 0.25 MG tablet   No No   Sig: TAKE 1 TABLET BY MOUTH TWICE A DAY AS NEEDED FOR ANXIETY   BREO ELLIPTA 200-25 MCG/INH Inhaler   Yes No   Sig: Inhale 1 Dose into the lungs daily    [employer] FIBER GUMMIES 2.5 G CHEW  Self Yes No   Sig: Take 1 tablet by mouth daily Reported on 5/8/2017   HYDROcodone-acetaminophen (NORCO)  MG per tablet   No No   Sig: Take 1-2 tablets by mouth every 4 hours as needed for severe pain Take up to 8 tablets a day   Multiple Vitamin CHEW  Self Yes No   Sig: Take 1 tablet by mouth daily    SUMAtriptan (IMITREX) 25 MG tablet   No No   Sig: Take 1 tablet (25 mg) by mouth at onset of headache for migraine   TRELEGY ELLIPTA 200-62.5-25 MCG/ACT oral inhaler   Yes No   Sig: INHALE 1 PUFF BY INHALATION ROUTE EVERY DAY AT THE SAME TIME EACH DAY   albuterol (VENTOLIN HFA) 108 (90 BASE) MCG/ACT Inhaler  Self No No   Sig: Inhale 2 puffs into the lungs every 6 hours as needed for shortness of breath / dyspnea or wheezing   atorvastatin  (LIPITOR) 10 MG tablet   No No   Sig: Take 1 tablet (10 mg) by mouth daily   baclofen (LIORESAL) 10 MG tablet   Yes No   Sig: Take 10 mg by mouth 3 times daily as needed for muscle spasms   carvedilol (COREG) 25 MG tablet   No No   Sig: Take 1 tablet (25 mg) by mouth 2 times daily (with meals)   cetirizine (ZYRTEC) 10 MG tablet   Yes No   Sig: Take 10 mg by mouth daily   fluticasone (FLONASE) 50 MCG/ACT nasal spray   No No   Sig: Spray 1-2 sprays into both nostrils daily as needed for rhinitis or allergies   ipratropium - albuterol 0.5 mg/2.5 mg/3 mL (DUONEB) 0.5-2.5 (3) MG/3ML neb solution   No No   Sig: NEBULIZE CONTENTS OF ONE VIAL EVERY 6 HOURS AS NEEDED FOR SHORTNESS OF BREATH OR WHEEZING   ketorolac (TORADOL) 10 MG tablet   No No   Sig: TAKE 1 TABLET (10 MG) BY MOUTH EVERY 6 HOURS AS NEEDED FOR MODERATE PAIN   lisinopril (ZESTRIL) 5 MG tablet   No No   Sig: Take 1 tablet (5 mg) by mouth daily   montelukast (SINGULAIR) 10 MG tablet   No No   Sig: TAKE ONE TABLET BY MOUTH AT BEDTIME   naloxone (NARCAN) 4 MG/0.1ML nasal spray   No No   Sig: Spray 1 spray (4 mg) into one nostril alternating nostrils once as needed for opioid reversal Every 2-3 minutes until patient responsive or EMS arrives   ondansetron (ZOFRAN ODT) 4 MG ODT tab   No No   Sig: TAKE 1-2 TABLETS BY MOUTH EVERY 8 HOURS AS NEEDED FOR NAUSEA.   pantoprazole (PROTONIX) 40 MG EC tablet   No No   Sig: TAKE 1 TABLET (40 MG) BY MOUTH DAILY   roflumilast (DALIRESP) 500 MCG TABS tablet  Self Yes No   Sig: Take 1 tablet (500 mcg) by mouth daily   sodium chloride (MOLLY 128) 5 % ophthalmic ointment   Yes No   Sig: Place 1 Application into both eyes every evening   spironolactone (ALDACTONE) 25 MG tablet   No No   Sig: Take 0.5 tablets (12.5 mg) by mouth daily   tiotropium (SPIRIVA HANDIHALER) 18 MCG capsule   No No   Sig: USING THE HANDIHALER, INHALE THE CONTENTS OF ONE CAPSULE BY MOUTH DAILY   traMADol (ULTRAM) 50 MG tablet   No No   Sig: TAKE 1 TABLET BY  MOUTH EVERY 6 HOURS AS NEEDED FOR SEVERE PAIN      Facility-Administered Medications: None           Physical Exam   Vital Signs: Temp: 97.8  F (36.6  C) Temp src: Temporal BP: 130/82 Pulse: 90   Resp: 11 SpO2: 94 %      Weight: 165 lbs 0 oz    GENERAL: Alert, grimacing to light touch  HEENT: Patient is normocephalic, atraumatic.  EYES: Anicteric without injection.  RESPIRATORY: Clear to auscultation bilaterally.  CARDIOVASCULAR: Regular rate and rhythm.  Normal S1, S2 - no m/g/r.  GASTROINTESTINAL: distended, soft.  EXTREMITIES: Warm & well perfused. No edema  NEUROLOGIC: The patient was alert and conversation was appropriate. Grossly non-focal.  PSYCHIATRIC: Mood and affect congruent.  SKIN: Exposed skin is within normal limits.      Medical Decision Making       75 MINUTES SPENT BY ME on the date of service doing chart review, history, exam, documentation & further activities per the note.      Data     I have personally reviewed the following data over the past 24 hrs:    15.9 (H)  \   10.5 (L)   / 446     126 (L) 91 (L) 7.6 (L) /  171 (H)   4.8 23 0.48 (L) \     ALT: 9 AST: 12 AP: 78 TBILI: 0.4   ALB: 3.6 TOT PROTEIN: 5.4 (L) LIPASE: 13     Trop: 56 (H) BNP: 243       Imaging results reviewed over the past 24 hrs:   Recent Results (from the past 24 hour(s))   Chest XR,  PA & LAT    Narrative    EXAM: XR CHEST 2 VIEWS  LOCATION: [facility]  DATE: 4/6/2024    INDICATION: Chest pain.  COMPARISON: Chest x-ray 2 views 10/04/2023 at 1700 hours.      Impression    IMPRESSION: Emphysematous changes. Minor strands of linear atelectasis in the right base. Both lungs are otherwise clear. No adenopathy or effusion. Normal cardiac size and pulmonary vascularity. Atherosclerotic thoracic aorta. Degenerative changes both   shoulders and the spine. Previously healed right rib fracture deformities. No significant interval change.   CT Chest Pulmonary Embolism w Contrast    Narrative    EXAM: CT CHEST  PULMONARY EMBOLISM W CONTRAST  LOCATION: [facility]  DATE: 4/6/2024    INDICATION: Lytic chest pain.  Elevated troponin; Female sex; Not pregnant; No prior imaging in the last 24 hours; Pulmonary Embolism Rule Out Criteria (PERC) score not > 0  COMPARISON: 09/21/2023.  TECHNIQUE: CT chest pulmonary angiogram during arterial phase injection of IV contrast. Multiplanar reformats and MIP reconstructions were performed. Dose reduction techniques were used.   CONTRAST: 59mL Isovue 370    FINDINGS:  ANGIOGRAM CHEST: Pulmonary arteries are normal caliber and negative for pulmonary emboli. Thoracic aorta is negative for dissection. No CT evidence of right heart strain.    LUNGS AND PLEURA: Emphysema. Mild dependent atelectasis bilaterally. Stable 6 mm left posterior costophrenic angle nodule. Small calcified granulomas at the right lung base.    MEDIASTINUM/AXILLAE: There is no lymph node enlargement. The heart size is normal.    CORONARY ARTERY CALCIFICATION: Mild.    UPPER ABDOMEN: The gallbladder is absent.    MUSCULOSKELETAL: Multiple vertebral body compression fractures, unchanged from previous exam. Degenerative disease throughout the spine. Old mid sternal fracture.      Impression    IMPRESSION:  1.  There is no pulmonary embolus, aortic aneurysm or dissection.  2.  Emphysema.  3.  Stable 6 mm left posterior costophrenic angle nodule.    Recommendations for one or multiple incidental lung nodules < 6mm :    Low risk patients: No routine follow-up.    High risk patients: Optional follow-up CT at 12 months; if unchanged, no further follow-up.    *Low Risk: Minimal or absent history of smoking or other known risk factors.  *Nonsolid (ground glass) or partly solid nodules may require longer follow-up to exclude indolent adenocarcinoma.  *Recommendations based on Guidelines for the Management of Incidental Pulmonary Nodules Detected at CT: From the Fleischner Society 2017, Radiology 2017.

## 2024-04-06 NOTE — ED PROVIDER NOTES
"  History     Chief Complaint:  Chest Pain     HPI   Trisha Bender is a 68 year old female with a history of CAD, COPD, CHF, hypertension, chronic pain who presents with chest pain. Yesterday the patient developed chest pain that remained throughout the day, worsened with deep breaths and movement. She then woke up this morning with what she thought was indigestion. She last took Norco, usually for her back pain, at 2200. She does not normally wear oxygen at home. She denies any fever or cough.  When asked the patient does note she drinks a fair amount of water every day.    Independent Historian:   None - Patient Only    Review of External Notes:   I reviewed care everywhere and updated epic      Medications:    Albuterol   Xanax   Lipitor   Lioresal   Coreg   Periostat   Duoneb   Toradol   Zestril   Singulair   Protonix   Deltasone   Imitrex   Aldactone   Daliresp   Ultram     Past Medical History:    Anxiety   CAD  CHF  Chronic back pain   COPD  Endometriosis   GERD  Hypertension   Asthma   Migraine   Osteopenia   Takotsubo cardiomyopathy   RBBB  Mixed hyperlipidemia   Panlobular emphysema     Past Surgical History:    Angiogram   Appendectomy   Cholecystectomy   Coronary angiography   Coronary angiogram   Left heart cath   EGD  Inject epidural lumbar   Laparoscopic ablation endometriosis   Nasal septoplasty   Tonsillectomy   Adenoidectomy      Physical Exam   Patient Vitals for the past 24 hrs:   BP Temp Temp src Pulse Resp SpO2 Height Weight   04/06/24 0454 -- 97.8  F (36.6  C) Temporal -- -- -- -- --   04/06/24 0451 -- -- -- 93 14 96 % -- --   04/06/24 0450 -- -- -- 95 10 95 % -- --   04/06/24 0449 -- -- -- 94 20 94 % -- --   04/06/24 0448 -- -- -- 97 18 94 % -- --   04/06/24 0447 -- -- -- 80 -- 94 % -- --   04/06/24 0446 -- -- -- 96 19 94 % 1.778 m (5' 10\") 74.8 kg (165 lb)   04/06/24 0445 130/82 -- -- -- 29 94 % -- --     Physical Exam  Nursing note and vitals reviewed.  Constitutional: Cooperative. "   HENT:   Mouth/Throat: Mucous membranes are normal.   Cardiovascular: Normal rate, regular rhythm and normal heart sounds.  No murmur.  Pulmonary/Chest: Effort normal and breath sounds normal. No respiratory distress. No wheezes. No rales. Tenderness with palpation of the anterior mid chest.  Abdominal: Soft. Normal appearance. There is no tenderness. There is no rigidity and no guarding.   Musculoskeletal: No lower extremity edema  Neurological: Alert.  Oriented x 3  Skin: Skin is warm and dry. No rash noted on chest.   Psychiatric: Anxious.    Emergency Department Course   ECG  ECG taken at 0442, ECG read at 0445  Normal sinus rhythm   Low voltage QRS  Incomplete RBBB-old   Rate 94 bpm. ID interval 140 ms. QRS duration 108 ms. QT/QTc 384/480 ms. P-R-T axes 66 74 49.     Imaging:  CT Chest Pulmonary Embolism w Contrast   Final Result   IMPRESSION:   1.  There is no pulmonary embolus, aortic aneurysm or dissection.   2.  Emphysema.   3.  Stable 6 mm left posterior costophrenic angle nodule.      Chest XR,  PA & LAT   Final Result   IMPRESSION: Emphysematous changes. Minor strands of linear atelectasis in the right base. Both lungs are otherwise clear. No adenopathy or effusion. Normal cardiac size and pulmonary vascularity. Atherosclerotic thoracic aorta. Degenerative changes both    shoulders and the spine. Previously healed right rib fracture deformities. No significant interval change.           Laboratory:  Labs Ordered and Resulted from Time of ED Arrival to Time of ED Departure   COMPREHENSIVE METABOLIC PANEL - Abnormal       Result Value    Sodium 126 (*)     Potassium 4.8      Carbon Dioxide (CO2) 23      Anion Gap 12      Urea Nitrogen 7.6 (*)     Creatinine 0.48 (*)     GFR Estimate >90      Calcium 8.1 (*)     Chloride 91 (*)     Glucose 171 (*)     Alkaline Phosphatase 78      AST 12      ALT 9      Protein Total 5.4 (*)     Albumin 3.6      Bilirubin Total 0.4     TROPONIN T, HIGH SENSITIVITY -  Abnormal    Troponin T, High Sensitivity 53 (*)    CBC WITH PLATELETS AND DIFFERENTIAL - Abnormal    WBC Count 15.9 (*)     RBC Count 3.93      Hemoglobin 10.5 (*)     Hematocrit 34.5 (*)     MCV 88      MCH 26.7      MCHC 30.4 (*)     RDW 15.4 (*)     Platelet Count 446      % Neutrophils 85      % Lymphocytes 7      % Monocytes 5      % Eosinophils 0      % Basophils 0      % Immature Granulocytes 3      NRBCs per 100 WBC 0      Absolute Neutrophils 13.5 (*)     Absolute Lymphocytes 1.2      Absolute Monocytes 0.8      Absolute Eosinophils 0.0      Absolute Basophils 0.0      Absolute Immature Granulocytes 0.5 (*)     Absolute NRBCs 0.0     LIPASE - Normal    Lipase 13     NT PROBNP INPATIENT - Normal    N terminal Pro BNP Inpatient 243        Interventions:  Medications   HYDROcodone-acetaminophen (NORCO) 5-325 MG per tablet 2 tablet (2 tablets Oral $Given 4/6/24 0513)   diphenhydrAMINE (BENADRYL) injection 25 mg (25 mg Intravenous $Given 4/6/24 0608)     Benadryl given for contrast administration during CT    Assessments:  0503 I examined the patient and obtained history as shown above  0620 I rechecked the patient and explained exam results.  CT and delta troponin ordered  0700    patient updated on reassuring CT results.  Plan for admission for serial troponins    Independent Interpretation (X-rays, CTs, rhythm strip):  None    Consultations/Discussion of Management or Tests:  0715   I discussed the case with the hospitalist service       Social Determinants of Health affecting care:   None    Disposition:  Patient admitted to the hospital    Impression & Plan      Medical Decision Making:  This is a 68-year-old female who presents with reproducible chest pain that is worse with movements.  Clinically this is very atypical for coronary equivalent, that said she has a significant elevation in her troponin at 50.  Looking back at previous admissions her troponin is ranged from 40-70.  Her creatinine is  reassuring.  CT scan of the chest shows no pulmonary embolism or other acute abnormality.  She did receive her chronic pain medication.  No aspirin was administered due to her allergy.  EKG was nonischemic.  At this time she will be admitted for serial troponin and consideration of cardiology consultation.  She was noted to be hyponatremic which has been present in the past.    Diagnosis:    ICD-10-CM    1. Chest pain, unspecified type  R07.9       2. Elevated troponin  R79.89       3. Hyponatremia  E87.1       4. Chronic pain disorder  G89.4          Scribe Disclosure:  Francisco ZARATE, am serving as a scribe at 4:55 AM on 4/6/2024 to document services personally performed by Steve Naranjo MD based on my observations and the provider's statements to me.     4/6/2024   Steve Naranjo MD Amdahl, John, MD  04/06/24 0715

## 2024-04-06 NOTE — PLAN OF CARE
"ROOM # 204-1    Living Situation (if not independent, order SW consult): house with   Facility name:  : - [facility]     Activity level at baseline: pivot to commode  Activity level on admit: assist x2, pivot/renetta steady    Who will be transporting you at discharge: unknown- unable to get in/out of car    Patient registered to observation; given Patient Bill of Rights; given the opportunity to ask questions about observation status and their plan of care.  Patient has been oriented to the observation room, bathroom and call light is in place.    Discussed discharge goals and expectations with patient/family.           "

## 2024-04-06 NOTE — ED NOTES
[facility]  ED Nurse Handoff Report    ED Chief complaint: Chest Pain  . ED Diagnosis:   Final diagnoses:   Chest pain, unspecified type   Elevated troponin   Hyponatremia   Chronic pain disorder       Allergies:   Allergies   Allergen Reactions    Gabapentin Swelling    Contrast Dye      Iodine    Escitalopram      Nausea and sickness    Peanuts [Nuts] Hives    Penicillins      rash    Sulfa Antibiotics      High family history    Tace [Chlorotrianisene]      joint swelling    Aspirin Rash     After 3 days    Ibuprofen Nausea and Vomiting and Swelling    Strawberry Extract Rash       Code Status: Full Code    Activity level - Baseline/Home:  assist of 1.  Activity Level - Current:   assist of 1 and assist of 2.   Lift room needed: No.   Bariatric: No   Needed: No   Isolation: No.   Infection: Not Applicable.     Respiratory status: Room air    Vital Signs (within 30 minutes):   Vitals:    04/06/24 0451 04/06/24 0454 04/06/24 0710 04/06/24 0715   BP:       Pulse: 93  85 90   Resp: 14  11    Temp:  97.8  F (36.6  C)     TempSrc:  Temporal     SpO2: 96%  95% 94%   Weight:       Height:           Cardiac Rhythm:  ,      Pain level:    Patient confused: No.   Patient Falls Risk: nonskid shoes/slippers when out of bed, arm band in place, patient and family education, and activity supervised.   Elimination Status: Has voided     Patient Report - Initial Complaint: Chest Pain.   Focused Assessment: HPI   Trisha Bender is a 68 year old female with a history of CAD, COPD, CHF, hypertension, chronic pain who presents with chest pain. Yesterday the patient developed chest pain that remained throughout the day, worsened with deep breaths and movement. She then woke up this morning with what she thought was indigestion. She last took Norco, usually for her back pain, at 2200. She does not normally wear oxygen at home. She denies any fever or cough.  When asked the patient does note she  drinks a fair amount of water every day.     Abnormal Results:   Labs Ordered and Resulted from Time of ED Arrival to Time of ED Departure   COMPREHENSIVE METABOLIC PANEL - Abnormal       Result Value    Sodium 126 (*)     Potassium 4.8      Carbon Dioxide (CO2) 23      Anion Gap 12      Urea Nitrogen 7.6 (*)     Creatinine 0.48 (*)     GFR Estimate >90      Calcium 8.1 (*)     Chloride 91 (*)     Glucose 171 (*)     Alkaline Phosphatase 78      AST 12      ALT 9      Protein Total 5.4 (*)     Albumin 3.6      Bilirubin Total 0.4     TROPONIN T, HIGH SENSITIVITY - Abnormal    Troponin T, High Sensitivity 53 (*)    CBC WITH PLATELETS AND DIFFERENTIAL - Abnormal    WBC Count 15.9 (*)     RBC Count 3.93      Hemoglobin 10.5 (*)     Hematocrit 34.5 (*)     MCV 88      MCH 26.7      MCHC 30.4 (*)     RDW 15.4 (*)     Platelet Count 446      % Neutrophils 85      % Lymphocytes 7      % Monocytes 5      % Eosinophils 0      % Basophils 0      % Immature Granulocytes 3      NRBCs per 100 WBC 0      Absolute Neutrophils 13.5 (*)     Absolute Lymphocytes 1.2      Absolute Monocytes 0.8      Absolute Eosinophils 0.0      Absolute Basophils 0.0      Absolute Immature Granulocytes 0.5 (*)     Absolute NRBCs 0.0     LIPASE - Normal    Lipase 13     NT PROBNP INPATIENT - Normal    N terminal Pro BNP Inpatient 243     TROPONIN T, HIGH SENSITIVITY        CT Chest Pulmonary Embolism w Contrast   Final Result   IMPRESSION:   1.  There is no pulmonary embolus, aortic aneurysm or dissection.   2.  Emphysema.   3.  Stable 6 mm left posterior costophrenic angle nodule.      Recommendations for one or multiple incidental lung nodules < 6mm :     Low risk patients: No routine follow-up.     High risk patients: Optional follow-up CT at 12 months; if unchanged, no further follow-up.      *Low Risk: Minimal or absent history of smoking or other known risk factors.   *Nonsolid (ground glass) or partly solid nodules may require longer follow-up  to exclude indolent adenocarcinoma.   *Recommendations based on Guidelines for the Management of Incidental Pulmonary Nodules Detected at CT: From the Fleischner Society 2017, Radiology 2017.         Chest XR,  PA & LAT   Final Result   IMPRESSION: Emphysematous changes. Minor strands of linear atelectasis in the right base. Both lungs are otherwise clear. No adenopathy or effusion. Normal cardiac size and pulmonary vascularity. Atherosclerotic thoracic aorta. Degenerative changes both    shoulders and the spine. Previously healed right rib fracture deformities. No significant interval change.          Treatments provided: See MAR  Family Comments: In room.   OBS brochure/video discussed/provided to patient:  Yes  ED Medications:   Medications   HYDROcodone-acetaminophen (NORCO) 5-325 MG per tablet 2 tablet (2 tablets Oral $Given 4/6/24 0556)   diphenhydrAMINE (BENADRYL) injection 25 mg (25 mg Intravenous $Given 4/6/24 0608)   sodium chloride (PF) 0.9% PF flush 100 mL (79 mLs Intravenous $Given 4/6/24 0614)   iopamidol (ISOVUE-370) solution 500 mL (59 mLs Intravenous $Given 4/6/24 0615)       Drips infusing:  No  For the majority of the shift this patient was Green.   Interventions performed were NA.    Sepsis treatment initiated: No    Cares/treatment/interventions/medications to be completed following ED care: See Orders.     ED Nurse Name: Shital Grove RN  7:39 AM      RECEIVING UNIT ED HANDOFF REVIEW    Above ED Nurse Handoff Report was reviewed: Yes  Reviewed by: Muriel Hope RN on April 6, 2024 at 8:57 AM   I Melonie called the ED to inform them the note was read: No

## 2024-04-06 NOTE — PLAN OF CARE
"Dx: Chronic pain disorder, elevated troponin, chest pain    /76 (BP Location: Right arm)   Pulse 91   Temp 97.8  F (36.6  C) (Oral)   Resp 11   Ht 1.778 m (5' 10\")   Wt 81 kg (178 lb 9.2 oz)   LMP 09/20/2005   SpO2 93%   BMI 25.62 kg/m       A&O x4. Not OOB- baseline assist x1/pivot to commode. Combo diet/no caffeine diet. Reporting pain 9/10 (see MAR). IV saline locked. Bed alarm on and call light within reach.       Problem: Adult Inpatient Plan of Care  Goal: Plan of Care Review  Description: The Plan of Care Review/Shift note should be completed every shift.  The Outcome Evaluation is a brief statement about your assessment that the patient is improving, declining, or no change.  This information will be displayed automatically on your shift  note.  Outcome: Progressing  Goal: Patient-Specific Goal (Individualized)  Description: You can add care plan individualizations to a care plan. Examples of Individualization might be:  \"Parent requests to be called daily at 9am for status\", \"I have a hard time hearing out of my right ear\", or \"Do not touch me to wake me up as it startles  me\".  Outcome: Progressing  Goal: Absence of Hospital-Acquired Illness or Injury  Outcome: Progressing  Intervention: Identify and Manage Fall Risk  Recent Flowsheet Documentation  Taken 4/6/2024 1131 by Muriel Hope, RN  Safety Promotion/Fall Prevention: activity supervised  Intervention: Prevent Infection  Recent Flowsheet Documentation  Taken 4/6/2024 1131 by Muriel Hope, RN  Infection Prevention: hand hygiene promoted  Goal: Optimal Comfort and Wellbeing  Outcome: Progressing  Goal: Readiness for Transition of Care  Outcome: Progressing  Intervention: Mutually Develop Transition Plan  Recent Flowsheet Documentation  Taken 4/6/2024 0942 by Muriel Hope, RN  Equipment Currently Used at Home: none     Problem: Pain Acute  Goal: Optimal Pain Control and Function  Outcome: Progressing     Problem: Comorbidity " Management  Goal: Maintenance of COPD Symptom Control  Outcome: Progressing  Goal: Maintenance of Heart Failure Symptom Control  Outcome: Progressing  Goal: Blood Pressure in Desired Range  Outcome: Progressing     Problem: Skin Injury Risk Increased  Goal: Skin Health and Integrity  Outcome: Progressing  Intervention: Plan: Nurse Driven Intervention: Moisture Management  Recent Flowsheet Documentation  Taken 4/6/2024 1131 by Muriel Hope, RN  Moisture Interventions:   No brief in bed   Incontinence pad   Urinary collection device  Intervention: Plan: Nurse Driven Intervention: Friction and Shear  Recent Flowsheet Documentation  Taken 4/6/2024 1131 by Muriel Hope, RN  Friction/Shear Interventions: HOB 30 degrees or less

## 2024-04-07 NOTE — PROGRESS NOTES
[facility]    Medicine Progress Note - Hospitalist Service    Date of Admission:  4/6/2024    Assessment & Plan   Trisha Bender is a 68 year old female with PMH significant for CAD, CHF, pulmonary hypertension, COPD, GERD, HTN, hyponatremia, chronic leukocytosis, and chronic pain admitted on 4/6/2024 for chest pain and mildly elevated troponin     ED workup notable for mildly decreased hemoglobin of 10.5, which is down from 13.2 4 months ago.  WBC is 15.0 which appears to be consistent with her baseline. Sodium is 126 which is also consistent with her baseline.  Troponin is 53, baseline appears to be around 37.  BNP is normal.  No significant changes in her CRX and Chest CT is negative for PE.      She was evaluated by cardiology who have very low suspicion that this is cardiac in nature.     Patient seems to be largely immobile at home so will have PT evaluated for dispo consideration.        Atypical Chest pain  Elevated Troponin  CHF with reduce EF:  Atypical chest pain.  Patient feels this is musculoskeletal and requesting additional pain medication.  Grimacing to light touch anywhere on her body.  No relief after nitroglycerin given by EMS.  Baseline tropinin typically in the 30's.  She was admitted to [facility] back in September and had elevated troponin that peaked at 69 thought to be 2/2 to demand ischemia.  At that time she had also used up all her home narcotics that were intended to last a month.  Last echo was 8/24/2022 which showed EF of 35-40%.  Cardiac cath in 2020 showed minimal coronary artery disease.  BNP in the ED is normal.  TTE here is normal.  Cardiology consulted and have no concern for ACS.  I suspect her chest pain is related to MSK.  She has pain when taking a deep breath and when sitting up.  Tenderness over the ribs and costochondral region.  She repetitively asks for more pain medications (already takes high doses PTA) so we talked about how this is not a good  plan for chest pain.   -Continue PTA spironolactone, carvedilol, and lisinopril and statin.  Patient not on aspirin due to reported side effect of rash  -I have changed norco order to be consistent with PTA regimen.  Patient reports that she can take up to 8 tablets a day but prescription says up to 6 tablets a day.  I suspect that she is over medicating with PTA norco  -Consider pain clinic if there is difficulty in controlling her chronic pains long term     Chronic pain with opioid dependence  Anxiety  PDMP reviewed, multiple controlled substances noted.  She has had 3 different narcotics prescribed in the last month.  She is adamant that her chest pain muskuloskeletal and the reason she came into the hospital was for pain control.  Patient also reporting she is on palliative care and cannot move or get up to the bathroom.  She mostly stays in one room in her home.  However, she does want to be full code.  I do not see that she follows with palliative care outside of the hospital.  -PTA norco ordered, will not increase her regimen today.  We discussed this would not be indicated for cardiac pain.  -PRN robaxin for rib pain  -Would consider pain medicine referral at discharge to help manage chronic pain issues    Generalized weakness:  Patient reports to me that she is largely bedbound at baseline.  She is able to get up and ambulate to the bathroom but experiences significant dyspnea with this.  She reports that she has been largely sequestered to her bedroom for the past 5 months.  We talked about how this is not compatible with life and is not compatible with functioning.  -PT consultation  -Consider TCU on discharge     Anemia  New onset when reviewing previous CBC.  Could be contributing factor in recent chest pain and mildly elevated troponin.  Denies melena or recent blood loss.     COPD  Pulmonary hypertension:  CRX negative for pneumonia.  During her previous admission, pulmonary issues thought to be  "responsible for her elevated troponin.  -Recommended she bring in home inhalers, roflumilast, and allergy meds from home due to observation status.          GERD  -Continue PTA Protonix     HTN  -Continue PTA spironolactone, carvedilol, and lisinopril.      Hyponatremia  Hypocalcemia  Chronic 2/2 spironolactone.  During last admission patient reported non-compliance with fluid restrictions.  -Fluid restriction 1500ml  -Will defer adjustments in her diuretic to her cardiologist        Chronic leukocytosis  Low suspicion for infectious etiology, appears to be near baseline  -AM CBC        Observation Goals: List all goals to be met before discharge home: , - Serial troponins and stress test complete., - Seen and cleared by consultant if applicable, - Adequate pain control on oral analgesia, - Vital signs normal or at patient baseline, - Safe disposition plan has been identified, - Nurse to notify provider when observation goals have been met and patient is ready for discharge.     Diet: Combination Diet Regular Diet Adult; No Caffeine Diet    DVT Prophylaxis: Pneumatic Compression Devices  Quiles Catheter: Not present  Lines: None     Cardiac Monitoring: ACTIVE order. Indication: Chest pain/ ACS rule out (24 hours)  Code Status: Full Code      Clinically Significant Risk Factors Present on Admission         # Hyponatremia: Lowest Na = 126 mmol/L in last 2 days, will monitor as appropriate  # Hypocalcemia: Lowest Ca = 8.1 mg/dL in last 2 days, will monitor and replace as appropriate         # Hypertension: Noted on problem list    # Chronic heart failure with preserved ejection fraction: heart failure noted on problem list and last echo with EF >50%     # Overweight: Estimated body mass index is 25.62 kg/m  as calculated from the following:    Height as of this encounter: 1.778 m (5' 10\").    Weight as of this encounter: 81 kg (178 lb 9.2 oz).       # Asthma: noted on problem list        Disposition Plan      Expected " Discharge Date: 04/08/2024        Discharge Comments: Pending PT recommendations            Musa Mendez DO  Hospitalist Service  [facility]  Securely message with [employer] (more info)  Text page via [employer] Paging/Directory   ______________________________________________________________________    Interval History   Patient continues to have chest pain today.  She appears comfortable.  She endorses needing increased pain management to have adequate respiration.  She reports that she is on palliative care as an outpatient.  Not sure what she means by this as she is still listed as full code and continues to present to the hospital for acute medical problems.  She reports that she is largely sequestered to her bedroom for the past 5 months.  She has difficulty even getting up and using the restroom.  She reports that she needs a paramedic to get any clinic visits.    Physical Exam   Vital Signs: Temp: 98.5  F (36.9  C) Temp src: Oral BP: 109/68 Pulse: 96   Resp: 20 SpO2: 93 % O2 Device: Nasal cannula Oxygen Delivery: 1 LPM  Weight: 178 lbs 9.16 oz    General Appearance: Awake, alert.  Anxious.  Pressured speech.  Respiratory: Lungs are clear to auscultation bilaterally productive breathing is normal on room air.  Cardiovascular: Regular rate and rhythm.  No obvious murmurs rubs or gallops.  There is no peripheral edema.  Distal extremities are warm.  GI: Obese.  Benign.  Soft.  No tenderness to palpation.  Bowel sounds are active.  Skin: No rash or lesions in exposed skin.  Other: Patient is anxious.  Mildly pressured with her speech.  Tenderness to palpation over the chest wall.    Medical Decision Making       45 MINUTES SPENT BY ME on the date of service doing chart review, history, exam, documentation & further activities per the note.      Data   ------------------------- PAST 24 HR DATA REVIEWED -----------------------------------------------    I have personally reviewed the following  data over the past 24 hrs:    N/A  \   N/A   / N/A     133 (L) N/A N/A /  N/A   N/A N/A N/A \     Trop: 71 (H) BNP: N/A       Imaging results reviewed over the past 24 hrs:   Recent Results (from the past 24 hour(s))   Echocardiogram Complete   Result Value    LVEF  55-60%    Narrative    [id number]  [id number]  [id number]  [id number]^MITCH^TREMAINE^JOEY     [facility]  Echocardiography Laboratory  [address] [place] [address]     Name: MOSES TIMMONS  MRN: [id number]  : 1955  Study Date: 2024 07:48 AM  Age: 68 yrs  Gender: Female  Patient Location: [facility]  Reason For Study: CHF  Ordering Physician: TREMAINE MEYER  Performed By: oCrinne Rock     BSA: 1.9 m2  Height: 70 in  Weight: 165 lb  BP: 130/82 mmHg  ______________________________________________________________________________  Procedure  Complete Portable Echo Adult. Optison (NDC #[id number]) given intravenously.  ______________________________________________________________________________  Interpretation Summary     The visual ejection fraction is 55-60%.  Diastolic Doppler findings (E/E' ratio and/or other parameters) suggest left  ventricular filling pressures are normal.  No regional wall motion abnormalities noted.  The right ventricle is mildly dilated.  The right ventricular systolic function is normal.  Right ventricular systolic pressure is elevated, consistent with mild  pulmonary hypertension.  No significant valvular heart disease.  ______________________________________________________________________________  Left Ventricle  The left ventricle is normal in size. There is normal left ventricular wall  thickness. Diastolic Doppler findings (E/E' ratio and/or other parameters)  suggest left ventricular filling pressures are normal. The visual ejection  fraction is 55-60%. No regional wall motion abnormalities noted.     Right Ventricle  The right ventricle is mildly dilated. The right ventricular systolic  function  is normal.     Atria  Normal left atrial size. Right atrial size is normal. There is no color  Doppler evidence of an atrial shunt.     Mitral Valve  The mitral valve leaflets are mildly thickened. There is trace mitral  regurgitation.     Tricuspid Valve  There is trace tricuspid regurgitation. IVC diameter and respiratory changes  fall into an intermediate range suggesting an RA pressure of 8 mmHg. The right  ventricular systolic pressure is approximated at 34.8 mmHg plus the right  atrial pressure. Right ventricular systolic pressure is elevated, consistent  with mild pulmonary hypertension.     Aortic Valve  There is trivial trileaflet aortic sclerosis. No aortic regurgitation is  present.     Pulmonic Valve  There is trace pulmonic valvular regurgitation.     Vessels  Normal size aorta. The aortic root is normal size.     Pericardium  Trivial pericardial effusion.     Rhythm  Sinus rhythm was noted.  ______________________________________________________________________________  MMode/2D Measurements & Calculations  IVC diam: 2.0 cm  LA Volume (BP): 40.5 ml  LA Volume Index (BP): 21.1 ml/m2  RV Base: 3.3 cm  TAPSE: 2.1 cm     Doppler Measurements & Calculations  MV E max obed: 54.5 cm/sec  MV A max obed: 88.6 cm/sec  MV E/A: 0.62  MV max PG: 3.1 mmHg  MV mean P.5 mmHg  MV V2 VTI: 16.0 cm     MV dec time: 0.19 sec  TR max obed: 295.2 cm/sec  TR max P.8 mmHg  E/E' av.5  Lateral E/e': 6.0  Medial E/e': 7.0  RV S Obed: 9.1 cm/sec     ______________________________________________________________________________  Report approved by: Darin Russo 2024 12:02 PM

## 2024-04-07 NOTE — PROGRESS NOTES
PRIMARY DIAGNOSIS: ACUTE PAIN  OUTPATIENT/OBSERVATION GOALS TO BE MET BEFORE DISCHARGE:  1. Pain Status: No improvement noted. Consider adjustment in pain regimen.    2. Return to near baseline physical activity: No    3. Cleared for discharge by consultants (if involved): No    Discharge Planner Nurse   Safe discharge environment identified: yes  Barriers to discharge: Yes       Entered by: Sylvia Snow RN 04/06/2024 9:39 PM   Pt complains of chest pain. Heat pack given.  Night meds and repositioned. Next scheduled dose at 11pm. Safety check completed.   Please review provider order for any additional goals.   Nurse to notify provider when observation goals have been met and patient is ready for discharge.

## 2024-04-07 NOTE — PROGRESS NOTES
"Cardiology Progress Note   Date of service: 24       Assessment and Plan:       Improvement in her hyponatremia with a small dose of tolvaptan  Patient did notice that the diuretic effect and her sodium improved nicely after the reduced dose was administered at 2 PM yesterday.  Sodium is drifting down a little bit, she may have additional tolvaptan in the future if needed for diuresis.      Atypical chest discomfort, noncardiac  No history of obstructive coronary artery disease, flat troponins.      Will sign off, please call with questions.    35 minutes spent today reviewing chart, discussing with patient and postvisit charting                     Interval History:     Improvement in hyponatremia after a single small dose of tolvaptan given, 2 L diuresis yesterday              Review of Systems:   As per subjective, otherwise 5 systems reviewed and negative.           Physical Exam:     Vitals were reviewed  Blood pressure 109/68, pulse 96, temperature 98.5  F (36.9  C), temperature source Oral, resp. rate 20, height 1.778 m (5' 10\"), weight 81 kg (178 lb 9.2 oz), last menstrual period 2005, SpO2 93%, not currently breastfeeding.  Temperatures:  Current - Temp: 98.5  F (36.9  C); Max - Temp  Av.1  F (36.7  C)  Min: 97.3  F (36.3  C)  Max: 98.7  F (37.1  C)  Respiration range: Resp  Av.8  Min: 18  Max: 20  Pulse range: Pulse  Av.1  Min: 91  Max: 106  Blood pressure range: Systolic (24hrs), Av , Min:100 , Max:130   ; Diastolic (24hrs), Av, Min:61, Max:84    Pulse oximetry range: SpO2  Av.9 %  Min: 90 %  Max: 96 %    Intake/Output Summary (Last 24 hours) at 2024 1544  Last data filed at 2024 1130  Gross per 24 hour   Intake 240 ml   Output 2690 ml   Net -2450 ml       Constitutional:   awake, alert, cooperative, no apparent distress, and appears stated age     Eyes:   Lids and lashes normal, pupils equal, round and reactive to light, extra ocular muscles intact, sclera " clear, conjunctiva normal     Neck:   supple, symmetrical, trachea midline, no JVD     Back:   symmetric     Lungs:   Coarse breath sounds     Cardiovascular:   tachy     Abdomen:   benign     Musculoskeletal:   motor strength is 5 out of 5 all extremities bilaterally     Neurologic:   Grossly nonfocal     Skin:   normal skin color, texture, turgor            Medications:     Current Facility-Administered Medications   Medication Dose Route Frequency Provider Last Rate Last Admin    acetaminophen (TYLENOL) tablet 650 mg  650 mg Oral Q4H PRN Lauren De La Torre APRN CNP   650 mg at 04/06/24 1417    Or    acetaminophen (TYLENOL) Suppository 650 mg  650 mg Rectal Q4H PRN Lauren De La Torre APRN CNP        ALPRAZolam (XANAX) tablet 0.25 mg  0.25 mg Oral BID PRN Lauren De La Torre APRN CNP        alum & mag hydroxide-simethicone (MAALOX) suspension 30 mL  30 mL Oral Q4H PRN Lauren De La Torre APRN CNP        atorvastatin (LIPITOR) tablet 10 mg  10 mg Oral Daily Lauren De La Torre APRN CNP   10 mg at 04/06/24 2131    baclofen (LIORESAL) tablet 10 mg  10 mg Oral TID PRN Lauren De La Torre APRN CNP        carvedilol (COREG) tablet 25 mg  25 mg Oral BID w/meals Lauren De La Torre APRN CNP   25 mg at 04/07/24 0833    fluticasone-vilanterol (BREO ELLIPTA) 200-25 MCG/ACT inhaler 1 puff  1 puff Inhalation Daily Musa Mendez DO        And    umeclidinium (INCRUSE ELLIPTA) 62.5 MCG/ACT inhaler 1 puff  1 puff Inhalation Daily Musa Mendez DO        HYDROcodone-acetaminophen (NORCO)  MG per tablet 1-2 tablet  1-2 tablet Oral Q4H PRN Musa Mendez DO        lisinopril (ZESTRIL) tablet 5 mg  5 mg Oral Daily Lauren De La Torre APRN CNP   5 mg at 04/06/24 2131    methocarbamol (ROBAXIN) tablet 500 mg  500 mg Oral 4x Daily Lauren De La Torre APRN CNP   500 mg at 04/07/24 1232    montelukast (SINGULAIR) tablet 10 mg  10 mg Oral At Bedtime Lauren De La Torre APRN CNP   10 mg at 04/06/24 2131    naloxone (NARCAN) injection 0.2 mg  0.2 mg Intravenous Q2  Min PRN Lauren De La Torre APRN CNP        Or    naloxone (NARCAN) injection 0.4 mg  0.4 mg Intravenous Q2 Min PRN Lauren De La Torre APRN CNP        Or    naloxone (NARCAN) injection 0.2 mg  0.2 mg Intramuscular Q2 Min PRN Lauren De La Torre APRN CNP        Or    naloxone (NARCAN) injection 0.4 mg  0.4 mg Intramuscular Q2 Min PRN Lauren De La Torre APRN CNP        nitroGLYcerin (NITROSTAT) sublingual tablet 0.4 mg  0.4 mg Sublingual Q5 Min PRN Lauren De La Torre APRN CNP        ondansetron (ZOFRAN ODT) ODT tab 4 mg  4 mg Oral Q6H PRN Lauren De La Torre APRN CNP        pantoprazole (PROTONIX) EC tablet 40 mg  40 mg Oral Daily Lauren De La Torre APRN CNP   40 mg at 24 2131    roflumilast (DALIRESP) tablet 500 mcg  500 mcg Oral Daily Musa Mendez DO        senna-docusate (SENOKOT-S/PERICOLACE) 8.6-50 MG per tablet 1 tablet  1 tablet Oral BID Lauren De La Torre APRN CNP   1 tablet at 24 0833                Data:   All laboratory data reviewed  Results for orders placed or performed during the hospital encounter of 24 (from the past 24 hour(s))   Troponin T, High Sensitivity   Result Value Ref Range    Troponin T, High Sensitivity 65 (H) <=14 ng/L   Sodium   Result Value Ref Range    Sodium 131 (L) 135 - 145 mmol/L   Troponin T, High Sensitivity   Result Value Ref Range    Troponin T, High Sensitivity 66 (H) <=14 ng/L   Sodium   Result Value Ref Range    Sodium 132 (L) 135 - 145 mmol/L   Troponin T, High Sensitivity   Result Value Ref Range    Troponin T, High Sensitivity 64 (H) <=14 ng/L   Sodium   Result Value Ref Range    Sodium 135 135 - 145 mmol/L   Echocardiogram Complete   Result Value Ref Range    LVEF  55-60%     Narrative    [id number]  [id number]  [id number]  [id number]^[email or web address]^LAUREN^JOEY     [facility]  Echocardiography Laboratory  [address] [place] [address]     Name: MOSES TIMMONS  MRN: [id number]  : 1955  Study Date: 2024 07:48 AM  Age: 68 yrs  Gender: Female  Patient  Location: [facility]  Reason For Study: CHF  Ordering Physician: TREMAINE MEYER  Performed By: Corinne Rock     BSA: 1.9 m2  Height: 70 in  Weight: 165 lb  BP: 130/82 mmHg  ______________________________________________________________________________  Procedure  Complete Portable Echo Adult. Optison (NDC #[id number]) given intravenously.  ______________________________________________________________________________  Interpretation Summary     The visual ejection fraction is 55-60%.  Diastolic Doppler findings (E/E' ratio and/or other parameters) suggest left  ventricular filling pressures are normal.  No regional wall motion abnormalities noted.  The right ventricle is mildly dilated.  The right ventricular systolic function is normal.  Right ventricular systolic pressure is elevated, consistent with mild  pulmonary hypertension.  No significant valvular heart disease.  ______________________________________________________________________________  Left Ventricle  The left ventricle is normal in size. There is normal left ventricular wall  thickness. Diastolic Doppler findings (E/E' ratio and/or other parameters)  suggest left ventricular filling pressures are normal. The visual ejection  fraction is 55-60%. No regional wall motion abnormalities noted.     Right Ventricle  The right ventricle is mildly dilated. The right ventricular systolic function  is normal.     Atria  Normal left atrial size. Right atrial size is normal. There is no color  Doppler evidence of an atrial shunt.     Mitral Valve  The mitral valve leaflets are mildly thickened. There is trace mitral  regurgitation.     Tricuspid Valve  There is trace tricuspid regurgitation. IVC diameter and respiratory changes  fall into an intermediate range suggesting an RA pressure of 8 mmHg. The right  ventricular systolic pressure is approximated at 34.8 mmHg plus the right  atrial pressure. Right ventricular systolic pressure is elevated, consistent  with mild  pulmonary hypertension.     Aortic Valve  There is trivial trileaflet aortic sclerosis. No aortic regurgitation is  present.     Pulmonic Valve  There is trace pulmonic valvular regurgitation.     Vessels  Normal size aorta. The aortic root is normal size.     Pericardium  Trivial pericardial effusion.     Rhythm  Sinus rhythm was noted.  ______________________________________________________________________________  MMode/2D Measurements & Calculations  IVC diam: 2.0 cm  LA Volume (BP): 40.5 ml  LA Volume Index (BP): 21.1 ml/m2  RV Base: 3.3 cm  TAPSE: 2.1 cm     Doppler Measurements & Calculations  MV E max obed: 54.5 cm/sec  MV A max obed: 88.6 cm/sec  MV E/A: 0.62  MV max PG: 3.1 mmHg  MV mean P.5 mmHg  MV V2 VTI: 16.0 cm     MV dec time: 0.19 sec  TR max obed: 295.2 cm/sec  TR max P.8 mmHg  E/E' av.5  Lateral E/e': 6.0  Medial E/e': 7.0  RV S Obed: 9.1 cm/sec     ______________________________________________________________________________  Report approved by: Darin Russo 2024 12:02 PM         Troponin T, High Sensitivity   Result Value Ref Range    Troponin T, High Sensitivity 71 (H) <=14 ng/L   Sodium   Result Value Ref Range    Sodium 133 (L) 135 - 145 mmol/L     *Note: Due to a large number of results and/or encounters for the requested time period, some results have not been displayed. A complete set of results can be found in Results Review.       All laboratory data reviewed  Lab Results   Component Value Date    CHOL 162 2023    CHOL 175 2021     Lab Results   Component Value Date    HDL 41 2023    HDL 55 2021     Lab Results   Component Value Date    LDL  2023      Comment:      Cannot estimate LDL when triglyceride exceeds 400 mg/dL    LDL 52 2023    LDL  2021     Cannot estimate LDL when triglyceride exceeds 400 mg/dL     Lab Results   Component Value Date    TRIG 432 2023    TRIG 450 2021     Lab Results   Component Value  Date    CHOLHDLRATIO 3.6 07/01/2015     TSH   Date Value Ref Range Status   03/07/2023 0.57 0.30 - 4.20 uIU/mL Final   08/03/2021 1.00 0.40 - 4.00 mU/L Final   12/17/2018 0.39 (L) 0.40 - 4.00 mU/L Final     Last Basic Metabolic Panel:  Lab Results   Component Value Date     04/07/2024     03/27/2021      Lab Results   Component Value Date    POTASSIUM 4.8 04/06/2024    POTASSIUM 4.6 05/20/2022    POTASSIUM 4.2 03/27/2021     Lab Results   Component Value Date    CHLORIDE 91 04/06/2024    CHLORIDE 98 05/20/2022    CHLORIDE 94 03/27/2021     Lab Results   Component Value Date    JEANNIE 8.1 04/06/2024    JEANNIE 8.6 03/27/2021     Lab Results   Component Value Date    CO2 23 04/06/2024    CO2 28 05/20/2022    CO2 24 03/27/2021     Lab Results   Component Value Date    BUN 7.6 04/06/2024    BUN 8 05/20/2022    BUN 5 03/27/2021     Lab Results   Component Value Date    CR 0.48 04/06/2024    CR 0.44 03/27/2021     Lab Results   Component Value Date     04/06/2024     05/20/2022    GLC 83 03/27/2021     Lab Results   Component Value Date    WBC 15.9 04/06/2024    WBC 17.6 03/27/2021     Lab Results   Component Value Date    RBC 3.93 04/06/2024    RBC 4.53 03/27/2021     Lab Results   Component Value Date    HGB 10.5 04/06/2024    HGB 13.6 03/27/2021     Lab Results   Component Value Date    HCT 34.5 04/06/2024    HCT 42.5 03/27/2021     Lab Results   Component Value Date    MCV 88 04/06/2024    MCV 94 03/27/2021     Lab Results   Component Value Date    MCH 26.7 04/06/2024    MCH 30.0 03/27/2021     Lab Results   Component Value Date    MCHC 30.4 04/06/2024    MCHC 32.0 03/27/2021     Lab Results   Component Value Date    RDW 15.4 04/06/2024    RDW 14.2 03/27/2021     Lab Results   Component Value Date     04/06/2024     03/27/2021

## 2024-04-07 NOTE — PROGRESS NOTES
"   04/07/24 1550   Appointment Info   Signing Clinician's Name / Credentials (PT) Denita Khan DPT   Quick Adds   Quick Adds Certification   Living Environment   People in Home spouse   Current Living Arrangements house   Home Accessibility no concerns  (Ramp to enter and stair lift to bedroom)   Transportation Anticipated family or friend will provide;health plan transportation   Self-Care   Usual Activity Tolerance fair   Current Activity Tolerance poor   Regular Exercise No   Equipment Currently Used at Home wheelchair, manual;walker, standard;walker, rolling   Fall history within last six months yes   Number of times patient has fallen within last six months 1   Activity/Exercise/Self-Care Comment Reports she \"basically stays in my room the entire time\"; can ambulate without AD but does have both 2WW and 4WW if necessary; tries to be IND with basic ADLs but spouse assists as needed; has commode if unable to amb to bathroom 2/2 pain; states she hasn't been in a car since she was unable to get out of one on Nov 13, 2023; states she has done virtual MD appts but has one in May that she isn't able to do virtually and she plans to \"hire someone that will take me in my transport chair\"   General Information   Onset of Illness/Injury or Date of Surgery 04/06/24   Referring Physician Musa Mendez, DO   Patient/Family Therapy Goals Statement (PT) Return home   Pertinent History of Current Problem (include personal factors and/or comorbidities that impact the POC) Trisha Bender is a 68 year old female with PMH significant for CAD, CHF, pulmonary hypertension, COPD, GERD, HTN, hyponatremia, chronic leukocytosis, and chronic pain admitted on 4/6/2024 for chest pain and mildly elevated troponin     ED workup notable for mildly decreased hemoglobin of 10.5, which is down from 13.2 4 months ago.  WBC is 15.0 which appears to be consistent with her baseline. Sodium is 126 which is also consistent with her baseline.  " Troponin is 53, baseline appears to be around 37.  BNP is normal.  No significant changes in her CRX and Chest CT is negative for PE.       She was evaluated by cardiology who have very low suspicion that this is cardiac in nature   Existing Precautions/Restrictions fall   Cognition   Affect/Mental Status (Cognition) WFL   Orientation Status (Cognition) oriented x 4   Follows Commands (Cognition) [facility]   Pain Assessment   Patient Currently in Pain   (Pt reports mild chest pain)   Integumentary/Edema   Integumentary/Edema no deficits were identifed   Posture    Posture Forward head position;Protracted shoulders   Range of Motion (ROM)   Range of Motion ROM is WFL   Strength (Manual Muscle Testing)   Strength (Manual Muscle Testing) Able to perform R SLR;Able to perform L SLR;Deficits observed during functional mobility   Bed Mobility   Comment, (Bed Mobility) Supine to sit Min A   Transfers   Comment, (Transfers) Pt declined standing d/t SOB and HR   Gait/Stairs (Locomotion)   Comment, (Gait/Stairs) Not tested d/t weakness and fatigue   Balance   Balance Comments Good seated   Sensory Examination   Sensory Perception patient reports no sensory changes   Clinical Impression   Criteria for Skilled Therapeutic Intervention Yes, treatment indicated   PT Diagnosis (PT) Impaired functional mobility and gait   Influenced by the following impairments Pain, weakness, decreased activity tolerance, impaired balance, SOB   Functional limitations due to impairments Limited functional mobility requiring AD and assist   Clinical Presentation (PT Evaluation Complexity) stable   Clinical Presentation Rationale Based on PMH, current status, and social support   Clinical Decision Making (Complexity) low complexity   Planned Therapy Interventions (PT) balance training;bed mobility training;gait training;strengthening;transfer training;wheelchair management/propulsion training;progressive activity/exercise   Risk & Benefits of therapy have  "been explained evaluation/treatment results reviewed;care plan/treatment goals reviewed;risks/benefits reviewed;current/potential barriers reviewed;participants voiced agreement with care plan;participants included;patient   PT Total Evaluation Time   PT Eval, Low Complexity Minutes (55738) 8   Therapy Certification   Start of care date 04/07/24   Certification date from 04/07/24   Certification date to 04/14/24   Medical Diagnosis Hyponatremia   Physical Therapy Goals   PT Frequency Daily   PT Predicted Duration/Target Date for Goal Attainment 04/14/24   PT Goals Bed Mobility;Transfers;Gait   PT: Bed Mobility Independent;Supine to/from sit   PT: Transfers Modified independent;Sit to/from stand;Bed to/from chair;Assistive device   PT: Gait Supervision/stand-by assist;Assistive device;50 feet   Interventions   Interventions Quick Adds Therapeutic Activity   Therapeutic Activity   Therapeutic Activities: dynamic activities to improve functional performance Minutes (63045) 20   Symptoms Noted During/After Treatment Fatigue;Shortness of breath   Treatment Detail/Skilled Intervention Pt supine in bed upon therapist arrival, agreeable to PT. Pt reported pain feeling better. Pt on 1L O2, vitals monitored throughout session. Pt sitting EOB, able to shift hips forward, SpO2 94% and . Pt very concerned about HR, reports she monitors it at home and \"does not do any mobility until her HR is under 100BPM.\" Therapist educated pt on HR increasing w/ activity and therapist monitoring during session. Therapist cueing pt for PLB, pt sat EOB for >10 minutes, -114. Pt declined standing d/t HR, despite education. Pt transferred to supine w/ CGA. Pt required total assist x2 to boost up partially in bed. Pt then able to reposition self using bed rails.  in supine. Pt in bed at end of session, all needs w/in reach, bed alarm on, HOB elevated, provider in room.   PT Discharge Planning   PT Plan Progress bed mob, assess " transfers, monitor HR and SpO2   PT Discharge Recommendation (DC Rec) Transitional Care Facility;home with assist;home with home care physical therapy   PT Rationale for DC Rec Pt below baseline, currently Ax1 for bed mobility. Pt declined transfers d/t fatigue and HR. Anticipate w/ further IP PT pt will progress, pt will need to be SBA to return home. If pt does not progress, consider TCU.   PT Brief overview of current status Min A bed mob   Total Session Time   Timed Code Treatment Minutes 20   Total Session Time (sum of timed and untimed services) 28   [facility]  OUTPATIENT PHYSICAL THERAPY EVALUATION  PLAN OF TREATMENT FOR OUTPATIENT REHABILITATION  (COMPLETE FOR INITIAL CLAIMS ONLY)  Patient's Last Name, First Name, M.I.  YOB: 1955  Trisha Bender                        Provider's Name  [facility] Medical Record No.  [id number]                             Onset Date:  04/06/24   Start of Care Date:  04/07/24   Type:     _X_PT   ___OT   ___SLP Medical Diagnosis:  Hyponatremia              PT Diagnosis:  Impaired functional mobility and gait Visits from SOC:  1     See note for plan of treatment, functional goals and certification details    I CERTIFY THE NEED FOR THESE SERVICES FURNISHED UNDER        THIS PLAN OF TREATMENT AND WHILE UNDER MY CARE     (Physician co-signature of this document indicates review and certification of the therapy plan).

## 2024-04-07 NOTE — PLAN OF CARE
"Dx: Chronic pain disorder, chest pain    /68 (BP Location: Right arm)   Pulse 96   Temp 98.5  F (36.9  C) (Oral)   Resp 20   Ht 1.778 m (5' 10\")   Wt 81 kg (178 lb 9.2 oz)   LMP 09/20/2005   SpO2 93%   BMI 25.62 kg/m       A&O x4. Assist x1/pivot to commode- did not get out of bed. Combo diet/no caffeine diet tolerated. Reporting pain 8/10. IV saline locked. O2 @ 1 LPM. IS instructions given. No BM. Bed alarm on and call light within reach.     Problem: Adult Inpatient Plan of Care  Goal: Plan of Care Review  Description: The Plan of Care Review/Shift note should be completed every shift.  The Outcome Evaluation is a brief statement about your assessment that the patient is improving, declining, or no change.  This information will be displayed automatically on your shift  note.  4/7/2024 1348 by Muriel Hope RN  Outcome: Not Progressing  Flowsheets (Taken 4/7/2024 1348)  Outcome Evaluation: O2 started at 1LPM  Plan of Care Reviewed With: patient  4/7/2024 0905 by Muriel Hope RN  Outcome: Progressing  Flowsheets (Taken 4/7/2024 0905)  Outcome Evaluation: pain 6/10 after medication  Plan of Care Reviewed With: patient     Problem: Comorbidity Management  Goal: Maintenance of COPD Symptom Control  4/7/2024 1348 by Muriel Hope RN  Outcome: Not Progressing  4/7/2024 0905 by Muriel Hope RN  Outcome: Progressing     Problem: Adult Inpatient Plan of Care  Goal: Patient-Specific Goal (Individualized)  Description: You can add care plan individualizations to a care plan. Examples of Individualization might be:  \"Parent requests to be called daily at 9am for status\", \"I have a hard time hearing out of my right ear\", or \"Do not touch me to wake me up as it startles  me\".  4/7/2024 1348 by Muriel Hope RN  Outcome: Progressing  4/7/2024 0905 by Muriel Hope RN  Outcome: Progressing  Goal: Absence of Hospital-Acquired Illness or Injury  4/7/2024 1348 by Muriel Hope, " RN  Outcome: Progressing  4/7/2024 0905 by Muriel Hope RN  Outcome: Progressing  Intervention: Identify and Manage Fall Risk  Recent Flowsheet Documentation  Taken 4/7/2024 0954 by Muriel Hope RN  Safety Promotion/Fall Prevention: activity supervised  Intervention: Prevent and Manage VTE (Venous Thromboembolism) Risk  Recent Flowsheet Documentation  Taken 4/7/2024 0954 by Muriel Hope RN  VTE Prevention/Management: SCDs (sequential compression devices) off  Intervention: Prevent Infection  Recent Flowsheet Documentation  Taken 4/7/2024 0954 by Muriel Hope RN  Infection Prevention: hand hygiene promoted  Goal: Optimal Comfort and Wellbeing  4/7/2024 1348 by Muriel Hope RN  Outcome: Progressing  4/7/2024 0905 by Muriel Hope RN  Outcome: Progressing  Intervention: Monitor Pain and Promote Comfort  Recent Flowsheet Documentation  Taken 4/7/2024 1232 by Muriel Hope RN  Pain Management Interventions: medication (see MAR)  Goal: Readiness for Transition of Care  4/7/2024 1348 by Muriel Hope RN  Outcome: Progressing  4/7/2024 0905 by Muriel Hope RN  Outcome: Progressing     Problem: Pain Acute  Goal: Optimal Pain Control and Function  4/7/2024 1348 by Muriel Hope RN  Outcome: Progressing  4/7/2024 0905 by Muriel Hope RN  Outcome: Progressing  Intervention: Develop Pain Management Plan  Recent Flowsheet Documentation  Taken 4/7/2024 1232 by Muriel Hope RN  Pain Management Interventions: medication (see MAR)     Problem: Comorbidity Management  Goal: Maintenance of Heart Failure Symptom Control  4/7/2024 1348 by Muriel Hope RN  Outcome: Progressing  4/7/2024 0905 by Muriel Hope RN  Outcome: Progressing  Goal: Blood Pressure in Desired Range  4/7/2024 1348 by Muriel Hope RN  Outcome: Progressing  4/7/2024 0905 by Muriel Hope RN  Outcome: Progressing     Problem: Skin Injury Risk Increased  Goal: Skin Health and Integrity  4/7/2024  1348 by Muriel Hope, RN  Outcome: Progressing  4/7/2024 0905 by Muriel Hope, RN  Outcome: Progressing  Intervention: Plan: Nurse Driven Intervention: Moisture Management  Recent Flowsheet Documentation  Taken 4/7/2024 0954 by Muriel Hope, RN  Moisture Interventions:   Incontinence pad   Urinary collection device  Taken 4/7/2024 0900 by Muriel Hope, RN  Moisture Interventions:   Incontinence pad   Urinary collection device  Intervention: Plan: Nurse Driven Intervention: Friction and Shear  Recent Flowsheet Documentation  Taken 4/7/2024 0954 by Muriel Hope, RN  Friction/Shear Interventions: HOB 30 degrees or less

## 2024-04-07 NOTE — PLAN OF CARE
"PRIMARY DIAGNOSIS: Chest pain/Elevated troponin.  OUTPATIENT/OBSERVATION GOALS TO BE MET BEFORE DISCHARGE:  1. Ruled out acute coronary syndrome (negative or stable Troponin):  Yes  2. Pain Status: Improved-controlled with oral pain medications.  3. Appropriate provocative testing performed: Yes  - Stress Test Procedure:   - Interpretation of cardiac rhythm per telemetry tech: SR 90    4. Cleared by Consultants (if applicable):No  5. Return to near baseline physical activity: No  Discharge Planner Nurse   Safe discharge environment identified: Yes   Barriers to discharge: Yes  A & O X 4. Lung sounds clear bilaterally to auscultation. No wheezes, or rales ausculted. Reported chest discomfort/pain when taking deep breath. Denies chills, shortness of breath, dizziness, nausea, vomit, or diarrhea. Bowel sounds present all quads and active. Ambulated to the bathroom with A X 1 walker and gait belt.  Peripheral IV saline lock. Wound to coccyx, mepilex dressing CDI. Pain is managed with scheduled Robaxin, prn Tylenol , and NORCO. Prn baclofen available for muscle spasms. Patient on cardiac monitor, cardiac rhythm SR 90s per telemetry tech. Patient on 1500 mL fluid restriction. Cardiology . Afebrile.  /61 (BP Location: Right arm, Patient Position: Semi-Davidson's, Cuff Size: Adult Small)   Pulse 97   Temp 98  F (36.7  C) (Oral)   Resp 20   Ht 1.778 m (5' 10\")   Wt 81 kg (178 lb 9.2 oz)   LMP 09/20/2005   SpO2 96%   BMI 25.62 kg/m             Entered by: Cuauhtemoc Alvarez RN 04/07/2024 5:23 AM     Problem: Adult Inpatient Plan of Care  Goal: Plan of Care Review  Description: The Plan of Care Review/Shift note should be completed every shift.  The Outcome Evaluation is a brief statement about your assessment that the patient is improving, declining, or no change.  This information will be displayed automatically on your shift  note.  Outcome: Progressing  Flowsheets (Taken 4/7/2024 0403)  Plan of Care Reviewed " "With: patient  Goal: Patient-Specific Goal (Individualized)  Description: You can add care plan individualizations to a care plan. Examples of Individualization might be:  \"Parent requests to be called daily at 9am for status\", \"I have a hard time hearing out of my right ear\", or \"Do not touch me to wake me up as it startles  me\".  Outcome: Progressing  Goal: Absence of Hospital-Acquired Illness or Injury  Outcome: Progressing  Intervention: Identify and Manage Fall Risk  Recent Flowsheet Documentation  Taken 4/7/2024 0208 by Cuauhtemoc Alvarez RN  Safety Promotion/Fall Prevention:   activity supervised   supervised activity   safety round/check completed   room near nurse's station   clutter free environment maintained  Intervention: Prevent Skin Injury  Recent Flowsheet Documentation  Taken 4/7/2024 0208 by Cuauhtemoc Alvarez RN  Body Position: supine, head elevated  Taken 4/7/2024 0134 by Cuauhtemoc Alvarez RN  Body Position: supine, head elevated  Intervention: Prevent and Manage VTE (Venous Thromboembolism) Risk  Recent Flowsheet Documentation  Taken 4/7/2024 0208 by Cuauhtemoc Alvarez RN  VTE Prevention/Management: SCDs (sequential compression devices) off  Taken 4/7/2024 0134 by Cuauhtemoc Alvarez RN  VTE Prevention/Management: SCDs (sequential compression devices) off  Intervention: Prevent Infection  Recent Flowsheet Documentation  Taken 4/7/2024 0208 by Cuauhtemoc Alvarez RN  Infection Prevention: hand hygiene promoted  Goal: Optimal Comfort and Wellbeing  Outcome: Progressing  Intervention: Monitor Pain and Promote Comfort  Recent Flowsheet Documentation  Taken 4/7/2024 0103 by Cuauhtemoc Alvarez RN  Pain Management Interventions: medication (see MAR)  Goal: Readiness for Transition of Care  Outcome: Progressing     Problem: Pain Acute  Goal: Optimal Pain Control and Function  Outcome: Progressing  Intervention: Develop Pain Management Plan  Recent Flowsheet Documentation  Taken 4/7/2024 " 0103 by Cuauhtemoc Alvarez RN  Pain Management Interventions: medication (see MAR)  Intervention: Prevent or Manage Pain  Recent Flowsheet Documentation  Taken 4/7/2024 0208 by Cuauhtemoc Alvarez RN  Medication Review/Management: medications reviewed     Problem: Comorbidity Management  Goal: Maintenance of COPD Symptom Control  Outcome: Progressing  Intervention: Maintain COPD Symptom Control  Recent Flowsheet Documentation  Taken 4/7/2024 0208 by Cuauhtemoc Alvarez RN  Medication Review/Management: medications reviewed  Goal: Maintenance of Heart Failure Symptom Control  Outcome: Progressing  Intervention: Maintain Heart Failure Management  Recent Flowsheet Documentation  Taken 4/7/2024 0208 by Cuauhtemoc Alvarez RN  Medication Review/Management: medications reviewed  Goal: Blood Pressure in Desired Range  Outcome: Progressing  Intervention: Maintain Blood Pressure Management  Recent Flowsheet Documentation  Taken 4/7/2024 0208 by Cuauhtemoc Alvarez RN  Medication Review/Management: medications reviewed     Problem: Skin Injury Risk Increased  Goal: Skin Health and Integrity  Outcome: Progressing  Intervention: Plan: Nurse Driven Intervention: Moisture Management  Recent Flowsheet Documentation  Taken 4/7/2024 0134 by Cuauhtemoc Alvarez RN  Bathing/Skin Care: incontinence care  Intervention: Optimize Skin Protection  Recent Flowsheet Documentation  Taken 4/7/2024 0208 by Cuauhtemoc Alvarez RN  Activity Management: activity adjusted per tolerance  Head of Bed (HOB) Positioning: HOB at 30-45 degrees  Taken 4/7/2024 0134 by Cuauhtemoc Alvarez RN  Activity Management:   activity adjusted per tolerance   activity encouraged  Head of Bed (HOB) Positioning: HOB at 45 degrees     Please review provider order for any additional goals.   Nurse to notify provider when observation goals have been met and patient is ready for discharge.        Plan of Care Reviewed With: patient

## 2024-04-07 NOTE — PLAN OF CARE
Goal Outcome Evaluation:    PRIMARY DIAGNOSIS: Chest pain/Elevated troponin.  OUTPATIENT/OBSERVATION GOALS TO BE MET BEFORE DISCHARGE:  1. Ruled out acute coronary syndrome (negative or stable Troponin):  Yes  2. Pain Status: Improved-controlled with oral pain medications.  3. Appropriate provocative testing performed: Yes  Problem: Adult Inpatient Plan of Care  Goal: Absence of Hospital-Acquired Illness or Injury  Intervention: Identify and Manage Fall Risk  Recent Flowsheet Documentation  Taken 4/7/2024 1543 by Jakob Machado RN  Safety Promotion/Fall Prevention: activity supervised  Intervention: Prevent Skin Injury  Recent Flowsheet Documentation  Taken 4/7/2024 1543 by Jakob Machado RN  Body Position: supine, head elevated  Intervention: Prevent and Manage VTE (Venous Thromboembolism) Risk  Recent Flowsheet Documentation  Taken 4/7/2024 1543 by Jakob Machado RN  VTE Prevention/Management: SCDs (sequential compression devices) off  Intervention: Prevent Infection  Recent Flowsheet Documentation  Taken 4/7/2024 1543 by Jakob Machado RN  Infection Prevention: hand hygiene promoted        4. Cleared by Consultants (if applicable):No  5. Return to near baseline physical activity: No  Discharge Planner Nurse   Safe discharge environment identified: Yes   Barriers to discharge: Yes.

## 2024-04-07 NOTE — PLAN OF CARE
"Dx: Chronic pain disorder, chest pain    /64 (BP Location: Left arm)   Pulse 91   Temp 97.3  F (36.3  C) (Oral)   Resp 20   Ht 1.778 m (5' 10\")   Wt 81 kg (178 lb 9.2 oz)   LMP 09/20/2005   SpO2 92%   BMI 25.62 kg/m       A&O x4. Assist x1/pivot to commode. Combo diet/no caffeine diet. Reporting pain 6/10. IV saline locked. Tele sinus BBB. Echo completed. Bed alarm on and call light within reach.       Problem: Adult Inpatient Plan of Care  Goal: Plan of Care Review  Description: The Plan of Care Review/Shift note should be completed every shift.  The Outcome Evaluation is a brief statement about your assessment that the patient is improving, declining, or no change.  This information will be displayed automatically on your shift  note.  Outcome: Progressing  Flowsheets (Taken 4/7/2024 0905)  Outcome Evaluation: pain 6/10 after medication  Plan of Care Reviewed With: patient  Goal: Patient-Specific Goal (Individualized)  Description: You can add care plan individualizations to a care plan. Examples of Individualization might be:  \"Parent requests to be called daily at 9am for status\", \"I have a hard time hearing out of my right ear\", or \"Do not touch me to wake me up as it startles  me\".  Outcome: Progressing  Goal: Absence of Hospital-Acquired Illness or Injury  Outcome: Progressing  Goal: Optimal Comfort and Wellbeing  Outcome: Progressing  Goal: Readiness for Transition of Care  Outcome: Progressing     Problem: Pain Acute  Goal: Optimal Pain Control and Function  Outcome: Progressing     Problem: Comorbidity Management  Goal: Maintenance of COPD Symptom Control  Outcome: Progressing  Goal: Maintenance of Heart Failure Symptom Control  Outcome: Progressing  Goal: Blood Pressure in Desired Range  Outcome: Progressing     Problem: Skin Injury Risk Increased  Goal: Skin Health and Integrity  Outcome: Progressing     "

## 2024-04-07 NOTE — PLAN OF CARE
"PRIMARY DIAGNOSIS: Chest pain/Elevated troponin.  OUTPATIENT/OBSERVATION GOALS TO BE MET BEFORE DISCHARGE:  1. Ruled out acute coronary syndrome (negative or stable Troponin):  Yes  2. Pain Status: Improved-controlled with oral pain medications.  3. Appropriate provocative testing performed: Yes  - Stress Test Procedure:   - Interpretation of cardiac rhythm per telemetry tech: SR 90    4. Cleared by Consultants (if applicable):No  5. Return to near baseline physical activity: No  Discharge Planner Nurse   Safe discharge environment identified: Yes   Barriers to discharge: Yes  A & O X 4. Lung sounds clear bilaterally to auscultation. No wheezes, or rales ausculted. Reported chest discomfort/pain when taking deep breath. Denies chills, shortness of breath, dizziness, nausea, vomit, or diarrhea. Bowel sounds present all quads and active. Ambulated to the bathroom with A X 1 walker and gait belt.  Peripheral IV saline lock. Pain is managed with scheduled Robaxin, prn Tylenol , and NORCO. Prn baclofen available for muscle spasms. Patient on cardiac monitor, cardiac rhythm SR 90s per telemetry tech. Patient on 1500 mL fluid restriction. Cardiology . Afebrile.  /65 (BP Location: Right arm, Patient Position: Semi-Davidson's, Cuff Size: Adult Small)   Pulse 102   Temp 98.2  F (36.8  C) (Oral)   Resp 18   Ht 1.778 m (5' 10\")   Wt 81 kg (178 lb 9.2 oz)   LMP 09/20/2005   SpO2 90%   BMI 25.62 kg/m             Entered by: Cuauhtemoc Alvarez RN 04/07/2024 4:04 AM     Problem: Adult Inpatient Plan of Care  Goal: Plan of Care Review  Description: The Plan of Care Review/Shift note should be completed every shift.  The Outcome Evaluation is a brief statement about your assessment that the patient is improving, declining, or no change.  This information will be displayed automatically on your shift  note.  Outcome: Progressing  Flowsheets (Taken 4/7/2024 0403)  Plan of Care Reviewed With: patient  Goal: Patient-Specific " "Goal (Individualized)  Description: You can add care plan individualizations to a care plan. Examples of Individualization might be:  \"Parent requests to be called daily at 9am for status\", \"I have a hard time hearing out of my right ear\", or \"Do not touch me to wake me up as it startles  me\".  Outcome: Progressing  Goal: Absence of Hospital-Acquired Illness or Injury  Outcome: Progressing  Intervention: Identify and Manage Fall Risk  Recent Flowsheet Documentation  Taken 4/7/2024 0208 by Cuauhtemoc Alvarez RN  Safety Promotion/Fall Prevention:   activity supervised   supervised activity   safety round/check completed   room near nurse's station   clutter free environment maintained  Intervention: Prevent Skin Injury  Recent Flowsheet Documentation  Taken 4/7/2024 0208 by Cuauhtemoc Alvarez RN  Body Position: supine, head elevated  Taken 4/7/2024 0134 by Cuauhtemoc Alvarez RN  Body Position: supine, head elevated  Intervention: Prevent and Manage VTE (Venous Thromboembolism) Risk  Recent Flowsheet Documentation  Taken 4/7/2024 0208 by Cuauhtemoc Alvarez RN  VTE Prevention/Management: SCDs (sequential compression devices) off  Taken 4/7/2024 0134 by Cuauhtemoc Alvarez RN  VTE Prevention/Management: SCDs (sequential compression devices) off  Intervention: Prevent Infection  Recent Flowsheet Documentation  Taken 4/7/2024 0208 by Cuauhtemoc Alvarez RN  Infection Prevention: hand hygiene promoted  Goal: Optimal Comfort and Wellbeing  Outcome: Progressing  Intervention: Monitor Pain and Promote Comfort  Recent Flowsheet Documentation  Taken 4/7/2024 0103 by Cuauhtemoc Alvarez RN  Pain Management Interventions: medication (see MAR)  Goal: Readiness for Transition of Care  Outcome: Progressing     Problem: Pain Acute  Goal: Optimal Pain Control and Function  Outcome: Progressing  Intervention: Develop Pain Management Plan  Recent Flowsheet Documentation  Taken 4/7/2024 0103 by Cuauhtemoc Alvarez RN  Pain " Management Interventions: medication (see MAR)  Intervention: Prevent or Manage Pain  Recent Flowsheet Documentation  Taken 4/7/2024 0208 by Cuauhtemoc Alvarez RN  Medication Review/Management: medications reviewed     Problem: Comorbidity Management  Goal: Maintenance of COPD Symptom Control  Outcome: Progressing  Intervention: Maintain COPD Symptom Control  Recent Flowsheet Documentation  Taken 4/7/2024 0208 by Cuauhtemoc Alvarez RN  Medication Review/Management: medications reviewed  Goal: Maintenance of Heart Failure Symptom Control  Outcome: Progressing  Intervention: Maintain Heart Failure Management  Recent Flowsheet Documentation  Taken 4/7/2024 0208 by Cuauhtemoc Alvarez RN  Medication Review/Management: medications reviewed  Goal: Blood Pressure in Desired Range  Outcome: Progressing  Intervention: Maintain Blood Pressure Management  Recent Flowsheet Documentation  Taken 4/7/2024 0208 by Cuauhtemoc Alvarez RN  Medication Review/Management: medications reviewed     Problem: Skin Injury Risk Increased  Goal: Skin Health and Integrity  Outcome: Progressing  Intervention: Plan: Nurse Driven Intervention: Moisture Management  Recent Flowsheet Documentation  Taken 4/7/2024 0134 by Cuauhtemoc Alvarez RN  Bathing/Skin Care: incontinence care  Intervention: Optimize Skin Protection  Recent Flowsheet Documentation  Taken 4/7/2024 0208 by Cuauhtemoc Alvarez RN  Activity Management: activity adjusted per tolerance  Head of Bed (HOB) Positioning: HOB at 30-45 degrees  Taken 4/7/2024 0134 by Cuauhtemoc Alvarez RN  Activity Management:   activity adjusted per tolerance   activity encouraged  Head of Bed (HOB) Positioning: HOB at 45 degrees     Please review provider order for any additional goals.   Nurse to notify provider when observation goals have been met and patient is ready for discharge.        Plan of Care Reviewed With: patient

## 2024-04-08 NOTE — DISCHARGE INSTRUCTIONS
Your home care referral was sent to [facility]  If you haven't heard from them within the next 24-48 hours,  Please call them at [phone]

## 2024-04-08 NOTE — PLAN OF CARE
"PRIMARY DIAGNOSIS: Atypical chest pain, Elevated troponin, Hyponatremia.  OUTPATIENT/OBSERVATION GOALS TO BE MET BEFORE DISCHARGE:  1. Ruled out acute coronary syndrome (negative or stable Troponin):  Yes  2. Pain Status: Improved-controlled with oral pain medications.  3. Appropriate provocative testing performed: Yes  - Stress Test Procedure:   - Interpretation of cardiac rhythm per telemetry tech: D/C    4. Cleared by Consultants (if applicable):No  5. Return to near baseline physical activity: No  Discharge Planner Nurse   Safe discharge environment identified: Yes  Barriers to discharge: Yes    A & O X 4. Lung sounds clear bilaterally to auscultation. No wheezes, or rales ausculted. Reported chest discomfort/pain when taking deep breath. Patient on 1 LPM supplemental oxygen. Denies chills, shortness of breath, dizziness, nausea, vomit, or diarrhea. Bowel sounds present all quads and active.  Peripheral IV saline lock. Wound to coccyx, mepilex dressing CDI. Pain is managed with scheduled Robaxin, prn Tylenol , and NORCO. Prn baclofen available for muscle spasms. C/O pain rating at 7 out of 10, prn NORCO given X 2- See MAR. Patient on 1500 mL fluid restriction. PT recommended TCU . PT/SW following. Afebrile.   /70 (BP Location: Left arm)   Pulse 77   Temp 97.5  F (36.4  C) (Oral)   Resp 16   Ht 1.778 m (5' 10\")   Wt 81 kg (178 lb 9.2 oz)   LMP 09/20/2005   SpO2 98%   BMI 25.62 kg/m          Entered by: Cuauhtemoc Alvarez RN 04/08/2024 6:55 AM     Problem: Adult Inpatient Plan of Care  Goal: Plan of Care Review  Description: The Plan of Care Review/Shift note should be completed every shift.  The Outcome Evaluation is a brief statement about your assessment that the patient is improving, declining, or no change.  This information will be displayed automatically on your shift  note.  Outcome: Progressing  Flowsheets (Taken 4/8/2024 0122)  Plan of Care Reviewed With: patient  Goal: Patient-Specific " "Goal (Individualized)  Description: You can add care plan individualizations to a care plan. Examples of Individualization might be:  \"Parent requests to be called daily at 9am for status\", \"I have a hard time hearing out of my right ear\", or \"Do not touch me to wake me up as it startles  me\".  Outcome: Progressing  Goal: Absence of Hospital-Acquired Illness or Injury  Outcome: Progressing  Intervention: Identify and Manage Fall Risk  Recent Flowsheet Documentation  Taken 4/8/2024 0040 by Cuauhtemoc Alvarez RN  Safety Promotion/Fall Prevention: activity supervised  Intervention: Prevent Skin Injury  Recent Flowsheet Documentation  Taken 4/8/2024 0040 by Cuauhtemoc Alvarez RN  Body Position: supine, head elevated  Skin Protection: adhesive use limited  Device Skin Pressure Protection: adhesive use limited  Intervention: Prevent and Manage VTE (Venous Thromboembolism) Risk  Recent Flowsheet Documentation  Taken 4/8/2024 0040 by Cuauhtemoc Alvarez RN  VTE Prevention/Management: SCDs (sequential compression devices) off  Intervention: Prevent Infection  Recent Flowsheet Documentation  Taken 4/8/2024 0040 by Cuauhtemoc Alvarez RN  Infection Prevention:   hand hygiene promoted   rest/sleep promoted  Goal: Optimal Comfort and Wellbeing  Outcome: Progressing  Goal: Readiness for Transition of Care  Outcome: Progressing     Problem: Pain Acute  Goal: Optimal Pain Control and Function  Outcome: Progressing  Intervention: Prevent or Manage Pain  Recent Flowsheet Documentation  Taken 4/8/2024 0040 by Cuauhtemoc Alvarez RN  Medication Review/Management: medications reviewed     Problem: Comorbidity Management  Goal: Maintenance of COPD Symptom Control  Outcome: Progressing  Intervention: Maintain COPD Symptom Control  Recent Flowsheet Documentation  Taken 4/8/2024 0040 by Cuauhtemoc Alvarez RN  Medication Review/Management: medications reviewed  Goal: Maintenance of Heart Failure Symptom Control  Outcome: " Progressing  Intervention: Maintain Heart Failure Management  Recent Flowsheet Documentation  Taken 4/8/2024 0040 by Cuauhtemoc Alvarez RN  Medication Review/Management: medications reviewed  Goal: Blood Pressure in Desired Range  Outcome: Progressing  Intervention: Maintain Blood Pressure Management  Recent Flowsheet Documentation  Taken 4/8/2024 0040 by Cuauhtemoc Alvarez RN  Medication Review/Management: medications reviewed     Problem: Skin Injury Risk Increased  Goal: Skin Health and Integrity  Outcome: Progressing  Intervention: Plan: Nurse Driven Intervention: Moisture Management  Recent Flowsheet Documentation  Taken 4/8/2024 0040 by Cuauhtemoc Alvarez RN  Moisture Interventions:   Incontinence pad   Urinary collection device  Bathing/Skin Care: incontinence care  Intervention: Plan: Nurse Driven Intervention: Friction and Shear  Recent Flowsheet Documentation  Taken 4/8/2024 0040 by Cuauhteomc Alvarez RN  Friction/Shear Interventions: HOB 30 degrees or less  Intervention: Optimize Skin Protection  Recent Flowsheet Documentation  Taken 4/8/2024 0040 by Cuauhtemoc Alvarez RN  Pressure Reduction Techniques: weight shift assistance provided  Skin Protection: adhesive use limited  Activity Management: activity adjusted per tolerance  Head of Bed (HOB) Positioning: HOB at 30-45 degrees     Please review provider order for any additional goals.   Nurse to notify provider when observation goals have been met and patient is ready for discharge.        Plan of Care Reviewed With: patient

## 2024-04-08 NOTE — PLAN OF CARE
"Goal Outcome Evaluation:    PRIMARY DIAGNOSIS: Chest pain/Elevated troponin.  OUTPATIENT/OBSERVATION GOALS TO BE MET BEFORE DISCHARGE:  1. Ruled out acute coronary syndrome (negative or stable Troponin):  Yes  2. Pain Status: Improved-controlled with oral pain medications.  3. Appropriate provocative testing performed: Yes  - Stress Test Procedure:   - Interpretation of cardiac rhythm per telemetry tech: NO     4. Cleared by Consultants (if applicable):No  5. Return to near baseline physical activity: No  Discharge Planner Nurse   Safe discharge environment identified: Yes   Barriers to discharge: Yes.    Patient alert and oriented x4.VSS on  1 LPM.Regular diet tolerated well.Pure wick in placed.Assist of 1 in bed.PIV SL.    /70 (BP Location: Right arm)   Pulse 99   Temp 98.2  F (36.8  C) (Oral)   Resp 18   Ht 1.778 m (5' 10\")   Wt 81 kg (178 lb 9.2 oz)   LMP 09/20/2005   SpO2 94%   BMI 25.62 kg/m     Problem: Adult Inpatient Plan of Care  Goal: Absence of Hospital-Acquired Illness or Injury  Intervention: Identify and Manage Fall Risk  Recent Flowsheet Documentation  Taken 4/7/2024 1543 by Jakob Machado RN  Safety Promotion/Fall Prevention: activity supervised  Intervention: Prevent Skin Injury  Recent Flowsheet Documentation  Taken 4/7/2024 1543 by Jakob Machado RN  Body Position: supine, head elevated  Intervention: Prevent and Manage VTE (Venous Thromboembolism) Risk  Recent Flowsheet Documentation  Taken 4/7/2024 1543 by Jakob Machado RN  VTE Prevention/Management: SCDs (sequential compression devices) off  Intervention: Prevent Infection  Recent Flowsheet Documentation  Taken 4/7/2024 1543 by Jakob Machado RN  Infection Prevention: hand hygiene promoted     "

## 2024-04-08 NOTE — PLAN OF CARE
"PRIMARY DIAGNOSIS: Pain Mgmt  OUTPATIENT/OBSERVATION GOALS TO BE MET BEFORE DISCHARGE:  ADLs back to baseline: Yes    Activity and level of assistance: Up with maximum assistance. Consider SW and/or PT evaluation.     Pain status: Improved-controlled with oral pain medications.    Return to near baseline physical activity: Yes     Discharge Planner Nurse   Safe discharge environment identified: No  Barriers to discharge: Yes       Entered by: Krista Rangel RN 04/08/2024 12:41 PM  Pt is Aox4, VSS, LCTA on 1L of O2 for while she is asleep. Her pain is more tolerable today, still taking PRN Norco. She was seen by PT and was able to move a few steps. Pt has been a stand pivot at home the past few months after tripping at home. Refusing TCU at this point, SW following for potential transport home.   Please review provider order for any additional goals.   Nurse to notify provider when observation goals have been met and patient is ready for discharge.  Problem: Adult Inpatient Plan of Care  Goal: Plan of Care Review  Description: The Plan of Care Review/Shift note should be completed every shift.  The Outcome Evaluation is a brief statement about your assessment that the patient is improving, declining, or no change.  This information will be displayed automatically on your shift  note.  Outcome: Progressing  Flowsheets (Taken 4/8/2024 1239)  Outcome Evaluation: pain is more manageable today  Plan of Care Reviewed With: patient  Goal: Patient-Specific Goal (Individualized)  Description: You can add care plan individualizations to a care plan. Examples of Individualization might be:  \"Parent requests to be called daily at 9am for status\", \"I have a hard time hearing out of my right ear\", or \"Do not touch me to wake me up as it startles  me\".  Outcome: Progressing  Goal: Absence of Hospital-Acquired Illness or Injury  Outcome: Progressing  Goal: Optimal Comfort and Wellbeing  Outcome: Progressing  Goal: Readiness for " Transition of Care  Outcome: Progressing     Problem: Pain Acute  Goal: Optimal Pain Control and Function  Outcome: Progressing     Problem: Comorbidity Management  Goal: Maintenance of COPD Symptom Control  Outcome: Progressing  Goal: Maintenance of Heart Failure Symptom Control  Outcome: Progressing  Goal: Blood Pressure in Desired Range  Outcome: Progressing     Problem: Skin Injury Risk Increased  Goal: Skin Health and Integrity  Outcome: Progressing   Goal Outcome Evaluation:      Plan of Care Reviewed With: patient          Outcome Evaluation: pain is more manageable today

## 2024-04-08 NOTE — PLAN OF CARE
"PRIMARY DIAGNOSIS: ACUTE PAIN  OUTPATIENT/OBSERVATION GOALS TO BE MET BEFORE DISCHARGE:  1. Pain Status: Improved-controlled with oral pain medications.    2. Return to near baseline physical activity: Yes    3. Cleared for discharge by consultants (if involved): Yes    Discharge Planner Nurse   Safe discharge environment identified: Yes  Barriers to discharge: No       Entered by: Krista Rangel RN 04/08/2024 5:25 PM  Pain has been well managed today. PT worked with patient and did recommend TCU, but patient and her  refused. Near her baseline mobility, which is a stand pivot. SW is following for potential wheelchair transport tomorrow.   Please review provider order for any additional goals.   Nurse to notify provider when observation goals have been met and patient is ready for discharge.  Problem: Adult Inpatient Plan of Care  Goal: Plan of Care Review  Description: The Plan of Care Review/Shift note should be completed every shift.  The Outcome Evaluation is a brief statement about your assessment that the patient is improving, declining, or no change.  This information will be displayed automatically on your shift  note.  4/8/2024 1724 by Krista Rangel RN  Outcome: Progressing  Flowsheets (Taken 4/8/2024 1724)  Outcome Evaluation: pain is improving  Plan of Care Reviewed With: patient  Overall Patient Progress: improving  4/8/2024 1239 by Krista Rangel RN  Outcome: Progressing  Flowsheets (Taken 4/8/2024 1239)  Outcome Evaluation: pain is more manageable today  Plan of Care Reviewed With: patient  Goal: Patient-Specific Goal (Individualized)  Description: You can add care plan individualizations to a care plan. Examples of Individualization might be:  \"Parent requests to be called daily at 9am for status\", \"I have a hard time hearing out of my right ear\", or \"Do not touch me to wake me up as it startles  me\".  4/8/2024 1724 by Krista Rangel RN  Outcome: Progressing  4/8/2024 1239 by " Krista Rangel RN  Outcome: Progressing  Goal: Absence of Hospital-Acquired Illness or Injury  4/8/2024 1724 by Krista Rangel RN  Outcome: Progressing  4/8/2024 1239 by Krista Rangel RN  Outcome: Progressing  Goal: Optimal Comfort and Wellbeing  4/8/2024 1724 by Krista Rangel RN  Outcome: Progressing  4/8/2024 1239 by Krista Rangel RN  Outcome: Progressing  Intervention: Monitor Pain and Promote Comfort  Recent Flowsheet Documentation  Taken 4/8/2024 1717 by Krista Rangel RN  Pain Management Interventions: distraction  Taken 4/8/2024 1618 by Krista Rangel RN  Pain Management Interventions: medication (see MAR)  Goal: Readiness for Transition of Care  4/8/2024 1724 by Krista Rangel RN  Outcome: Progressing  4/8/2024 1239 by Krista Rangel RN  Outcome: Progressing     Problem: Pain Acute  Goal: Optimal Pain Control and Function  4/8/2024 1724 by Krista Rangel RN  Outcome: Progressing  4/8/2024 1239 by Krista Rangel RN  Outcome: Progressing  Intervention: Develop Pain Management Plan  Recent Flowsheet Documentation  Taken 4/8/2024 1717 by Krista Rangel RN  Pain Management Interventions: distraction  Taken 4/8/2024 1618 by Krista Rangel RN  Pain Management Interventions: medication (see MAR)     Problem: Comorbidity Management  Goal: Maintenance of COPD Symptom Control  4/8/2024 1724 by Krista Rangel RN  Outcome: Progressing  4/8/2024 1239 by Krista Rangel RN  Outcome: Progressing  Goal: Maintenance of Heart Failure Symptom Control  4/8/2024 1724 by Krista Rangel RN  Outcome: Progressing  4/8/2024 1239 by Krista Rangel RN  Outcome: Progressing  Goal: Blood Pressure in Desired Range  4/8/2024 1724 by Krista Rangel RN  Outcome: Progressing  4/8/2024 1239 by Krista Rangel RN  Outcome: Progressing     Problem: Skin Injury Risk Increased  Goal: Skin Health and Integrity  4/8/2024 1724 by Krista Rangel RN  Outcome: Progressing  4/8/2024 1239 by  Krista Rangel, RN  Outcome: Progressing   Goal Outcome Evaluation:      Plan of Care Reviewed With: patient    Overall Patient Progress: improvingOverall Patient Progress: improving    Outcome Evaluation: pain is improving

## 2024-04-08 NOTE — PROGRESS NOTES
[facility]    Medicine Progress Note - Hospitalist Service    Date of Admission:  4/6/2024    Assessment & Plan   Trisha Bender is a 68 year old female with PMH significant for CAD, CHF, pulmonary hypertension, COPD, GERD, HTN, hyponatremia, chronic leukocytosis, and chronic pain admitted on 4/6/2024 for chest pain and mildly elevated troponin     ED workup notable for mildly decreased hemoglobin of 10.5, which is down from 13.2 4 months ago.  WBC is 15.0 which appears to be consistent with her baseline. Sodium is 126 which is also consistent with her baseline.  Troponin is 53, baseline appears to be around 37.  BNP is normal.  No significant changes in her CRX and Chest CT is negative for PE.      She was evaluated by cardiology who have very low suspicion that this is cardiac in nature. CP is completely reproducible to palpation    Patient seems to be largely immobile at home so will have PT evaluated for dispo consideration. Family is deciding on home vs. TCU     Musculoskeletal CP  Elevated Troponin  CHF with reduce EF:  Atypical chest pain.  Patient feels this is musculoskeletal and requesting additional pain medication.  Grimacing to light touch anywhere on her body.  No relief after nitroglycerin given by EMS.  Baseline tropinin typically in the 30's.  She was admitted to [facility] back in September and had elevated troponin that peaked at 69 thought to be 2/2 to demand ischemia.  At that time she had also used up all her home narcotics that were intended to last a month.  Last echo was 8/24/2022 which showed EF of 35-40%.  Cardiac cath in 2020 showed minimal coronary artery disease.  BNP in the ED is normal.  TTE here is normal.  Cardiology consulted and have no concern for ACS.  I suspect her chest pain is related to MSK.  She has pain when taking a deep breath and when sitting up.  Tenderness over the ribs and costochondral region.  She repetitively asks for more pain medications  (already takes high doses PTA) so we talked about how this is not a good plan for chest pain.   -Continue PTA carvedilol, and lisinopril and statin.  Patient not on aspirin due to reported side effect of rash, aldactone held due to hyponatremia  -I have changed norco order to be consistent with PTA regimen.  Patient reports that she can take up to 8 tablets a day but prescription says up to 6 tablets a day.  I suspect that she is over medicating with PTA norco  -reports pain but not uncomfortable on exam, avoid overmedicating her for subjective pain ratings     Chronic pain with opioid dependence  Anxiety  PDMP reviewed, multiple controlled substances noted.  She has had 3 different narcotics prescribed in the last month.  She is adamant that her chest pain muskuloskeletal and the reason she came into the hospital was for pain control.  Patient also reporting she is on palliative care and cannot move or get up to the bathroom.  She mostly stays in one room in her home.  However, she does want to be full code.  I do not see that she follows with palliative care outside of the hospital.  -PTA norco ordered, will not increase her regimen today.  We discussed this would not be indicated for cardiac pain.  -PRN robaxin for rib pain    Generalized weakness:  Patient reports to me that she is largely bedbound at baseline.  She is able to get up and ambulate to the bathroom but experiences significant dyspnea with this.  She reports that she has been largely sequestered to her bedroom for the past 5 months.  We talked about how this is not compatible with life and is not compatible with functioning.  -PT consulted, patient family deciding TCU vs home     Anemia  New onset when reviewing previous CBC.  Stable, no bleeding     COPD  Pulmonary hypertension:  CRX negative for pneumonia.  During her previous admission, pulmonary issues thought to be responsible for her elevated troponin.  -Recommended she bring in home inhalers,  roflumilast, and allergy meds from home due to observation status.        GERD  -Continue PTA Protonix     HTN  -Continue PTA spironolactone, carvedilol, and lisinopril.      Hyponatremia  Hypocalcemia  -Chronic 2/2 spironolactone, currently held and Na has improved  -Fluid restriction 1500ml      Chronic leukocytosis  Low suspicion for infectious etiology, appears to be near baseline        Observation Goals: List all goals to be met before discharge home: , - Serial troponins and stress test complete., - Seen and cleared by consultant if applicable, - Adequate pain control on oral analgesia, - Vital signs normal or at patient baseline, - Safe disposition plan has been identified, - Nurse to notify provider when observation goals have been met and patient is ready for discharge.  Diet: Combination Diet Regular Diet Adult; No Caffeine Diet  Fluid restriction 1500 ML FLUID    DVT Prophylaxis: Pneumatic Compression Devices  Quiles Catheter: Not present  Lines: None     Cardiac Monitoring: None  Code Status: Full Code        Disposition Plan   Home vs. TCU pending patient and family decision. Medically stable         Connor Gallo DO  Hospitalist Service  [facility]  Securely message with fflap (more info)  Text page via [facility] Paging/Directory   ______________________________________________________________________    Interval History   Still with chest pain, reproducible to palpation and deep breathing. Discussed discharge plan and patient is reluctant to go to TCU and  is reluctant to take home. Seems to be at most recent baseline though which is near bed bound    Physical Exam   Vital Signs: Temp: 98.1  F (36.7  C) Temp src: Oral BP: 93/58 Pulse: 89   Resp: 17 SpO2: 92 % O2 Device: None (Room air) Oxygen Delivery: 1 LPM  Weight: 178 lbs 9.16 oz    Constitutional: awake, alert, and cooperative  Eyes: pupils equal, round and reactive to light and conjunctiva normal  ENT: normocephalic,  without obvious abnormality, atraumatic  Respiratory: no increased work of breathing, good air exchange, and clear to auscultation  Cardiovascular: regular rate and rhythm, no murmur noted, and pain to palpation of anterior chest wall  GI: normal bowel sounds, soft, and non-tender  Skin: no bruising or bleeding  Neurologic: alert, interactive, generally weak      45 MINUTES SPENT BY ME on the date of service doing chart review, history, exam, documentation & further activities per the note.      Data   ------------------------- PAST 24 HR DATA REVIEWED -----------------------------------------------    I have personally reviewed the following data over the past 24 hrs:    N/A  \   N/A   / N/A     136 N/A N/A /  N/A   N/A N/A N/A \       Imaging results reviewed over the past 24 hrs:   No results found for this or any previous visit (from the past 24 hour(s)).

## 2024-04-08 NOTE — PLAN OF CARE
"PRIMARY DIAGNOSIS: Atypical chest pain, Elevated troponin, Hyponatremia.  OUTPATIENT/OBSERVATION GOALS TO BE MET BEFORE DISCHARGE:  1. Ruled out acute coronary syndrome (negative or stable Troponin):  Yes  2. Pain Status: Improved-controlled with oral pain medications.  3. Appropriate provocative testing performed: Yes  - Stress Test Procedure:   - Interpretation of cardiac rhythm per telemetry tech: D/C    4. Cleared by Consultants (if applicable):No  5. Return to near baseline physical activity: No  Discharge Planner Nurse   Safe discharge environment identified: Yes  Barriers to discharge: Yes    A & O X 4. Lung sounds clear bilaterally to auscultation. No wheezes, or rales ausculted. Reported chest discomfort/pain when taking deep breath. Patient on 1 LPM supplemental oxygen. Denies chills, shortness of breath, dizziness, nausea, vomit, or diarrhea. Bowel sounds present all quads and active.  Peripheral IV saline lock. Wound to coccyx, mepilex dressing CDI. Pain is managed with scheduled Robaxin, prn Tylenol , and NORCO. Prn baclofen available for muscle spasms. C/O pain rating at 6 out of 10, prn NORCO given X 1- See MAR. Patient on 1500 mL fluid restriction. PT recommended TCU . PT/SW following. Afebrile.   /70 (BP Location: Left arm)   Pulse 95   Temp 98.5  F (36.9  C) (Oral)   Resp 16   Ht 1.778 m (5' 10\")   Wt 81 kg (178 lb 9.2 oz)   LMP 09/20/2005   SpO2 95%   BMI 25.62 kg/m          Entered by: Cuauhtemoc Alvarez RN 04/08/2024 1:24 AM     Problem: Adult Inpatient Plan of Care  Goal: Plan of Care Review  Description: The Plan of Care Review/Shift note should be completed every shift.  The Outcome Evaluation is a brief statement about your assessment that the patient is improving, declining, or no change.  This information will be displayed automatically on your shift  note.  Outcome: Progressing  Flowsheets (Taken 4/8/2024 0122)  Plan of Care Reviewed With: patient  Goal: Patient-Specific " "Goal (Individualized)  Description: You can add care plan individualizations to a care plan. Examples of Individualization might be:  \"Parent requests to be called daily at 9am for status\", \"I have a hard time hearing out of my right ear\", or \"Do not touch me to wake me up as it startles  me\".  Outcome: Progressing  Goal: Absence of Hospital-Acquired Illness or Injury  Outcome: Progressing  Intervention: Identify and Manage Fall Risk  Recent Flowsheet Documentation  Taken 4/8/2024 0040 by Cuauhtemoc Alvarez RN  Safety Promotion/Fall Prevention: activity supervised  Intervention: Prevent Skin Injury  Recent Flowsheet Documentation  Taken 4/8/2024 0040 by Cuauhtemoc Alvarez RN  Body Position: supine, head elevated  Skin Protection: adhesive use limited  Device Skin Pressure Protection: adhesive use limited  Intervention: Prevent and Manage VTE (Venous Thromboembolism) Risk  Recent Flowsheet Documentation  Taken 4/8/2024 0040 by Cuauhtemoc Alvarez RN  VTE Prevention/Management: SCDs (sequential compression devices) off  Intervention: Prevent Infection  Recent Flowsheet Documentation  Taken 4/8/2024 0040 by Cuauhtemoc Alvarez RN  Infection Prevention:   hand hygiene promoted   rest/sleep promoted  Goal: Optimal Comfort and Wellbeing  Outcome: Progressing  Goal: Readiness for Transition of Care  Outcome: Progressing     Problem: Pain Acute  Goal: Optimal Pain Control and Function  Outcome: Progressing  Intervention: Prevent or Manage Pain  Recent Flowsheet Documentation  Taken 4/8/2024 0040 by Cuauhtemoc Alvarez RN  Medication Review/Management: medications reviewed     Problem: Comorbidity Management  Goal: Maintenance of COPD Symptom Control  Outcome: Progressing  Intervention: Maintain COPD Symptom Control  Recent Flowsheet Documentation  Taken 4/8/2024 0040 by Cuauhtemoc Alvarez RN  Medication Review/Management: medications reviewed  Goal: Maintenance of Heart Failure Symptom Control  Outcome: " Progressing  Intervention: Maintain Heart Failure Management  Recent Flowsheet Documentation  Taken 4/8/2024 0040 by Cuauhtemoc Alvarez RN  Medication Review/Management: medications reviewed  Goal: Blood Pressure in Desired Range  Outcome: Progressing  Intervention: Maintain Blood Pressure Management  Recent Flowsheet Documentation  Taken 4/8/2024 0040 by Cuauhtemoc Alvarez RN  Medication Review/Management: medications reviewed     Problem: Skin Injury Risk Increased  Goal: Skin Health and Integrity  Outcome: Progressing  Intervention: Plan: Nurse Driven Intervention: Moisture Management  Recent Flowsheet Documentation  Taken 4/8/2024 0040 by Cuauhtemoc Alvarez RN  Moisture Interventions:   Incontinence pad   Urinary collection device  Bathing/Skin Care: incontinence care  Intervention: Plan: Nurse Driven Intervention: Friction and Shear  Recent Flowsheet Documentation  Taken 4/8/2024 0040 by Cuauhtemoc Alvarez RN  Friction/Shear Interventions: HOB 30 degrees or less  Intervention: Optimize Skin Protection  Recent Flowsheet Documentation  Taken 4/8/2024 0040 by Cuauhtemoc Alvarez RN  Pressure Reduction Techniques: weight shift assistance provided  Skin Protection: adhesive use limited  Activity Management: activity adjusted per tolerance  Head of Bed (HOB) Positioning: HOB at 30-45 degrees     Please review provider order for any additional goals.   Nurse to notify provider when observation goals have been met and patient is ready for discharge.        Plan of Care Reviewed With: patient

## 2024-04-08 NOTE — CONSULTS
Palliative Care Consultation Note  [facility]      Patient: Trisha Bender  Date of Admission:  4/6/2024    Requesting Clinician / Team: hospitalist  Reason for consult: Goals of care  Decisional support  Patient and family support       Recommendations & Counseling     GOALS OF CARE:   Restorative without limits   Patient and spouse would like to return home with homecare  Initially,  she expressed that CPR and Intubation would not be something she would want however,  they would like to continue discussing code status and would like more time to think about it.   They would like a palliative outpatient referral for on going goals of care discussions  She is not ready for hospice at this time ( she would need to be assessed to see if she qualifies should that align with her goals)     ADVANCE CARE PLANNING:  Patient has an advance directive dated 1/24/2021.  Primary Health Care Agent Steve Bender.  Alternate(s) Walker Decker.   There is no POLST form on file, defer to patient and/or next of kin for decisions   Code status: Full Code    MEDICAL MANAGEMENT:   #Pain   [place] Board of Pharmacy Data Base Reviewed: Yes:   reviewed - controlled substances reflected in medication list.  Opioids: hydrocodone/acetaminophen (Vicodin/Norco)  Acetaminophen (Tylenol), PRN  Muscle Relaxers:Methocarbamol (Robaxin)  Relaxation, distraction     #Dyspnea  Continue with oxygen as needed    #General Weakness/Debility   Appreciate the input of therapies for discharge recommendations    PSYCHOSOCIAL/SPIRITUAL SUPPORT:  Family   [place] community: [employer]     Palliative Care will continue to follow. Thank you for the consult and allowing us to aid in the care of Trisha Bender.    These recommendations have been discussed with medical team.    Shital Salinas NP  Securely message with Vocera (more info)  Text page via [employer] Paging/Directory       Assessment      Trisha Bender is  a 68 year old female with a past medical history of CAD, CHF with reduced ef of 35-40% previously, pulmonary HTN, COPD, GERD, HTN, hyponatremia, chronic leukocytosis, chronic pain who presented on 4/6 with chest pain and mildly elevated troponin.      Today, the patient was seen for:  Goals of care  Decisional support    Palliative Care Summary:   Met with patient and spouse at the bedside.   I introduced our role as an extra layer of support and how we help patients and families dealing with serious, potentially life-limiting illnesses. I explained the composition of the palliative care team.  Palliative care helps patients and families navigate their care while focusing on the whole person; providing emotional, social and spiritual support  Palliative care often assists with symptom management, information sharing about what to expect from the illness, available treatment options and what effect those options may have on the disease course, and provide effective communication and caring support.    Prognosis, Goals, & Planning:    Functional Status just prior to this current hospitalization:  Worsening weakness- chair bound  Dyspnea on exertion    Prognosis, Goals, and/or Advance Care Planning:  Discussed what continuing restorative/life-prolonging care entails, including continued (re)admissions to the hospital, continuing with preventative and primary care, seeing disease/organ specific specialty consultations for medical treatments in hopes to prolong life for as long as possible.      Code Status was addressed today:   Yes, We discussed potential risks and rationale of attempting cardiac resuscitation, intubation, and mechanical ventilation.  We also discussed probability of survival as well as quality of life implications.  Based on this discussion, patient or surrogate response/decision: at this time she would like to think about CPR and intubation but endorses that she does not think she would want that           Patient has decision-making capacity today for complex decisions:Intact            Coping, Meaning, & Spirituality:   Mood, coping, and/or meaning in the context of serious illness were addressed today: Yes    Social:   Living situation:lives with significant other/spouse    Medications:  I have reviewed this patient's medication profile and medications from this hospitalization. Notable medications: none     ROS:  Comprehensive ROS is reviewed and is negative except as here & per HPI:     Physical Exam   Vital Signs with Ranges  Temp:  [97.5  F (36.4  C)-98.6  F (37  C)] 98.1  F (36.7  C)  Pulse:  [] 89  Resp:  [16-18] 17  BP: ()/(56-75) 93/58  SpO2:  [90 %-98 %] 92 %  178 lbs 9.16 oz    PHYSICAL EXAM:  Constitutional: alert and no distress   Cardiovascular: positive findings: tachycardia  Respiratory: positive findings: nasal canula  Psychiatric: mentation appears normal and affect normal/bright  Abdomen: Abdomen soft, non-tender. BS normal. No masses, organomegaly    Data reviewed:    Results for orders placed or performed during the hospital encounter of 04/06/24 (from the past 24 hour(s))   Sodium   Result Value Ref Range    Sodium 136 135 - 145 mmol/L     *Note: Due to a large number of results and/or encounters for the requested time period, some results have not been displayed. A complete set of results can be found in Results Review.       Medical Decision Making       90 MINUTES SPENT BY ME on the date of service doing chart review, history, exam, documentation & further activities per the note.      Advance Care Planning Discussion 4/8/2024. IShital NP met with Patient and their spouse today at the hospital to discuss Advance Care Planning. Trisha Bender does have decisional capacity and was present for this discussion.  Those present were informed of the voluntary nature of this discussion and wished to proceed.  The discussion included:  wishes and worries, code  status, functional decline, symptom management, discharge planning, family understanding . This discussion began at 1500 and ended at 1520 for a total of 20 minutes.

## 2024-04-08 NOTE — PLAN OF CARE
"Dx: Chronic pain disorder, chest pain    /75 (BP Location: Right arm)   Pulse 88   Temp 98  F (36.7  C) (Oral)   Resp 16   Ht 1.778 m (5' 10\")   Wt 81 kg (178 lb 9.2 oz)   LMP 09/20/2005   SpO2 90%   BMI 25.62 kg/m      A&O x4. Assist x1/pivot to commode. Combo diet/no caffeine diet tolerated. Reporting 6/10 pain. IV saline locked. Bed alarm on and call light within reach.       Problem: Adult Inpatient Plan of Care  Goal: Plan of Care Review  Description: The Plan of Care Review/Shift note should be completed every shift.  The Outcome Evaluation is a brief statement about your assessment that the patient is improving, declining, or no change.  This information will be displayed automatically on your shift  note.  Outcome: Progressing  Flowsheets (Taken 4/8/2024 08)  Outcome Evaluation: Reporting 6/10 pain  Plan of Care Reviewed With: patient  Goal: Patient-Specific Goal (Individualized)  Description: You can add care plan individualizations to a care plan. Examples of Individualization might be:  \"Parent requests to be called daily at 9am for status\", \"I have a hard time hearing out of my right ear\", or \"Do not touch me to wake me up as it startles  me\".  Outcome: Progressing  Goal: Absence of Hospital-Acquired Illness or Injury  Outcome: Progressing  Goal: Optimal Comfort and Wellbeing  Outcome: Progressing  Intervention: Monitor Pain and Promote Comfort  Recent Flowsheet Documentation  Taken 4/8/2024 0752 by Muriel Hope, RN  Pain Management Interventions: cold applied  Goal: Readiness for Transition of Care  Outcome: Progressing     Problem: Pain Acute  Goal: Optimal Pain Control and Function  Outcome: Progressing  Intervention: Develop Pain Management Plan  Recent Flowsheet Documentation  Taken 4/8/2024 0752 by Muriel Hope, RN  Pain Management Interventions: cold applied     Problem: Comorbidity Management  Goal: Maintenance of COPD Symptom Control  Outcome: Progressing  Goal: " Maintenance of Heart Failure Symptom Control  Outcome: Progressing  Goal: Blood Pressure in Desired Range  Outcome: Progressing     Problem: Skin Injury Risk Increased  Goal: Skin Health and Integrity  Outcome: Progressing

## 2024-04-09 NOTE — PLAN OF CARE
"PRIMARY DIAGNOSIS: Placement  OUTPATIENT/OBSERVATION GOALS TO BE MET BEFORE DISCHARGE:  ADLs back to baseline: Yes    Activity and level of assistance: Assist of 2    Pain status: Improved-controlled with oral pain medications.    Return to near baseline physical activity: No     Discharge Planner Nurse   Safe discharge environment identified: Yes  Barriers to discharge: Yes       Entered by: Krista Rangel RN 04/09/2024 10:44 AM    SW is following for transport home      Please review provider order for any additional goals.   Nurse to notify provider when observation goals have been met and patient is ready for discharge.  Problem: Adult Inpatient Plan of Care  Goal: Plan of Care Review  Description: The Plan of Care Review/Shift note should be completed every shift.  The Outcome Evaluation is a brief statement about your assessment that the patient is improving, declining, or no change.  This information will be displayed automatically on your shift  note.  Outcome: Progressing  Flowsheets (Taken 4/9/2024 1043)  Outcome Evaluation: pain is improving  Plan of Care Reviewed With: patient  Overall Patient Progress: improving  Goal: Patient-Specific Goal (Individualized)  Description: You can add care plan individualizations to a care plan. Examples of Individualization might be:  \"Parent requests to be called daily at 9am for status\", \"I have a hard time hearing out of my right ear\", or \"Do not touch me to wake me up as it startles  me\".  Outcome: Progressing  Goal: Absence of Hospital-Acquired Illness or Injury  Outcome: Progressing  Intervention: Identify and Manage Fall Risk  Recent Flowsheet Documentation  Taken 4/9/2024 0900 by Krista Rangel RN  Safety Promotion/Fall Prevention:   clutter free environment maintained   increased rounding and observation   lighting adjusted   increase visualization of patient   nonskid shoes/slippers when out of bed   room door open   room organization consistent   safety " round/check completed   supervised activity  Intervention: Prevent Skin Injury  Recent Flowsheet Documentation  Taken 4/9/2024 0900 by Krista Rangel RN  Body Position: position changed independently  Device Skin Pressure Protection: (refusing to be repositioned) absorbent pad utilized/changed  Intervention: Prevent and Manage VTE (Venous Thromboembolism) Risk  Recent Flowsheet Documentation  Taken 4/9/2024 0900 by Krista Rangel RN  VTE Prevention/Management: SCDs (sequential compression devices) off  Goal: Optimal Comfort and Wellbeing  Outcome: Progressing  Goal: Readiness for Transition of Care  Outcome: Progressing     Problem: Pain Acute  Goal: Optimal Pain Control and Function  Outcome: Progressing     Problem: Comorbidity Management  Goal: Maintenance of COPD Symptom Control  Outcome: Progressing  Goal: Maintenance of Heart Failure Symptom Control  Outcome: Progressing  Goal: Blood Pressure in Desired Range  Outcome: Progressing     Problem: Skin Injury Risk Increased  Goal: Skin Health and Integrity  Outcome: Progressing  Intervention: Plan: Nurse Driven Intervention: Moisture Management  Recent Flowsheet Documentation  Taken 4/9/2024 0900 by Krista Rangel RN  Moisture Interventions: Encourage regular toileting  Intervention: Plan: Nurse Driven Intervention: Friction and Shear  Recent Flowsheet Documentation  Taken 4/9/2024 0900 by Krista Rangel RN  Friction/Shear Interventions: HOB 30 degrees or less  Intervention: Optimize Skin Protection  Recent Flowsheet Documentation  Taken 4/9/2024 0900 by Krista Rangel RN  Head of Bed (HOB) Positioning: HOB at 30-45 degrees   Goal Outcome Evaluation:      Plan of Care Reviewed With: patient    Overall Patient Progress: improvingOverall Patient Progress: improving    Outcome Evaluation: pain is improving

## 2024-04-09 NOTE — DISCHARGE SUMMARY
"[facility]  Hospitalist Discharge Summary      Date of Admission:  4/6/2024  Date of Discharge:  4/9/2024  Discharging Provider: Kwadwo Scott MD  Discharge Service: Hospitalist Service    Discharge Diagnoses   Musculoskeletal chest pain  Chronic pain  Weakness    Clinically Significant Risk Factors     # Overweight: Estimated body mass index is 25.62 kg/m  as calculated from the following:    Height as of this encounter: 1.778 m (5' 10\").    Weight as of this encounter: 81 kg (178 lb 9.2 oz).       Follow-ups Needed After Discharge   Follow-up Appointments     Follow-up and recommended labs and tests       Follow up with primary care provider, Bandar Weinberg, within 7 days   for hospital follow- up.  The following labs/tests are recommended: chem   7, CBC with PCP.          Unresulted Labs Ordered in the Past 30 Days of this Admission       No orders found from 3/7/2024 to 4/7/2024.        These results will be followed up by NA    Discharge Disposition   Discharged to home  Condition at discharge: Stable    Hospital Course   Trisha Bender is a 68 year old female with PMH significant for CAD, CHF, pulmonary hypertension, COPD, GERD, HTN, hyponatremia, chronic leukocytosis, and chronic pain with opioid dependence admitted on 4/6/2024 for chest pain and mildly elevated troponin.     Presented to the ED with complaints of chest pain that started yesterday and is worsened by deep breathing and movement.  Patient feels strongly this pain is musculoskeletal.   Last night she was trying to lift herself from the toilet and couldn't because of the pain which prompted her to come into the ED this morning.  She also reported symptoms of indigestion.  Last BM was yesterday, denies melena.  She denies any increased dypsnea other than pain in the sternal area with deep breaths.  Denies recent illness or fever.       She reports last echo was a year ago.  One was ordered by her PCP, but " cardiology did not feel she needed to go through with repeating it.       She reports she mostly stays in one room in bed because she is on palliative care (but does desire to be full code).  She does not go to doctors visit because she is incapable of being mobile.  The last time she went, she could not get out of the car.      Musculoskeletal CP  Elevated Troponin  CHF with reduce EF:  Atypical chest pain.  Patient feels this is musculoskeletal and requesting additional pain medication.  Grimacing to light touch anywhere on her body.  No relief after nitroglycerin given by EMS.  Baseline tropinin typically in the 30's.  She was admitted to [facility] back in September and had elevated troponin that peaked at 69 thought to be 2/2 to demand ischemia.  At that time she had also used up all her home narcotics that were intended to last a month.  Last echo was 8/24/2022 which showed EF of 35-40%.  Cardiac cath in 2020 showed minimal coronary artery disease.  BNP in the ED is normal.  TTE here is normal.  Cardiology consulted and have no concern for ACS.  I suspect her chest pain is related to MSK.  She has pain when taking a deep breath and when sitting up.  Tenderness over the ribs and costochondral region.  She repetitively asks for more pain medications (already takes high doses PTA) so we talked about how this is not a good plan for chest pain.   -Continue PTA carvedilol, and lisinopril and statin.  Patient not on aspirin due to reported side effect of rash, aldactone held due to hyponatremia  -I have changed norco order to be consistent with PTA regimen.  Patient reports that she can take up to 8 tablets a day but prescription says up to 6 tablets a day.  I suspect that she is over medicating with PTA norco  -reports pain but not uncomfortable on exam, avoid overmedicating her for subjective pain ratings     Chronic pain with opioid dependence  Anxiety  PDMP reviewed, multiple controlled substances noted.  She has had 3  different narcotics prescribed in the last month.  She is adamant that her chest pain muskuloskeletal and the reason she came into the hospital was for pain control.  Patient also reporting she is on palliative care and cannot move or get up to the bathroom.  She mostly stays in one room in her home.  However, she does want to be full code.  I do not see that she follows with palliative care outside of the hospital.  -PTA norco ordered, will not increase her regimen today.  We discussed this would not be indicated for cardiac pain.  -PRN robaxin for rib pain     Generalized weakness:  Patient reports to me that she is largely bedbound at baseline.  She is able to get up and ambulate to the bathroom but experiences significant dyspnea with this.  She reports that she has been largely sequestered to her bedroom for the past 5 months.  We talked about how this is not compatible with life and is not compatible with functioning.  -PT consulted, patient family deciding TCU vs home     Anemia  New onset when reviewing previous CBC.  Stable, no bleeding     COPD  Pulmonary hypertension:  CRX negative for pneumonia.  During her previous admission, pulmonary issues thought to be responsible for her elevated troponin.  -Recommended she bring in home inhalers, roflumilast, and allergy meds from home due to observation status.        GERD  -Continue PTA Protonix     HTN  -Continue PTA spironolactone, carvedilol, and lisinopril.      Hyponatremia  Hypocalcemia  -Chronic 2/2 spironolactone, currently held and Na has improved  -Fluid restriction 1500ml      Chronic leukocytosis  Low suspicion for infectious etiology, appears to be near baseline    Patient is in bed.  in room. No new complaints. Would like to discharge home. Script for robaxin already sent. Questions answered.    Consultations This Hospital Stay   CARDIOLOGY IP CONSULT  PHYSICAL THERAPY ADULT IP CONSULT  PALLIATIVE CARE ADULT IP CONSULT  CARE MANAGEMENT /  SOCIAL WORK IP CONSULT    Code Status   Full Code    Time Spent on this Encounter   I, Kwadwo Scott MD, personally saw the patient today and spent greater than 30 minutes discharging this patient.       Kwadwo Scott MD  [facility] OBSERVATION DEPT  [address] [facility] [address]  Phone: [phone]  ______________________________________________________________________    Physical Exam   Vital Signs: Temp: 97.7  F (36.5  C) Temp src: Oral BP: 117/71 Pulse: 88   Resp: 18 SpO2: 94 % O2 Device: None (Room air) Oxygen Delivery: 1/2 LPM  Weight: 178 lbs 9.16 oz  Constitutional: awake, alert, cooperative, no apparent distress, and appears stated age  Eyes: Lids and lashes normal, pupils equal, round and reactive to light, extra ocular muscles intact, sclera clear, conjunctiva normal  ENT: Normocephalic, without obvious abnormality, atraumatic, sinuses nontender on palpation, external ears without lesions, oral pharynx with moist mucous membranes, tonsils without erythema or exudates, gums normal and good dentition.       Primary Care Physician   Bandar Weinberg    Discharge Orders      Adult Palliative Care [facility] Referral      Reason for your hospital stay    Atypical chest pain  Hyponatremia  Chronic pain     Follow-up and recommended labs and tests     Follow up with primary care provider, Bandar Weinberg, within 7 days for hospital follow- up.  The following labs/tests are recommended: chem 7, CBC with PCP.     Activity    Your activity upon discharge: activity as tolerated     Diet    Follow this diet upon discharge: Regular       Significant Results and Procedures   Most Recent 3 CBC's:  Recent Labs   Lab Test 04/06/24  0513 11/13/23  1555 10/04/23  1621   WBC 15.9* 12.5* 18.9*   HGB 10.5* 13.2 13.6   MCV 88 96 94    297 371     Most Recent 3 BMP's:  Recent Labs   Lab Test 04/09/24  0827 04/08/24  0517 04/07/24  1154 04/06/24  1812 04/06/24  0513 11/13/23  1555  10/04/23  1621    136 133*   < > 126* 134* 125*   POTASSIUM  --   --   --   --  4.8 4.1 4.3   CHLORIDE  --   --   --   --  91* 100 91*   CO2  --   --   --   --  23 26 24   BUN  --   --   --   --  7.6* 7.5* 15.7   CR  --   --   --   --  0.48* 0.76 0.50*   ANIONGAP  --   --   --   --  12 8 10   JEANNIE  --   --   --   --  8.1* 9.1 8.7*   GLC  --   --   --   --  171* 128* 195*    < > = values in this interval not displayed.     Most Recent 2 LFT's:  Recent Labs   Lab Test 04/06/24  0513 11/13/23  1555   AST 12 16   ALT 9 9   ALKPHOS 78 42   BILITOTAL 0.4 0.3   ,   Results for orders placed or performed during the hospital encounter of 04/06/24   Chest XR,  PA & LAT    Narrative    EXAM: XR CHEST 2 VIEWS  LOCATION: [facility]  DATE: 4/6/2024    INDICATION: Chest pain.  COMPARISON: Chest x-ray 2 views 10/04/2023 at 1700 hours.      Impression    IMPRESSION: Emphysematous changes. Minor strands of linear atelectasis in the right base. Both lungs are otherwise clear. No adenopathy or effusion. Normal cardiac size and pulmonary vascularity. Atherosclerotic thoracic aorta. Degenerative changes both   shoulders and the spine. Previously healed right rib fracture deformities. No significant interval change.   CT Chest Pulmonary Embolism w Contrast    Narrative    EXAM: CT CHEST PULMONARY EMBOLISM W CONTRAST  LOCATION: [facility]  DATE: 4/6/2024    INDICATION: Lytic chest pain.  Elevated troponin; Female sex; Not pregnant; No prior imaging in the last 24 hours; Pulmonary Embolism Rule Out Criteria (PERC) score not > 0  COMPARISON: 09/21/2023.  TECHNIQUE: CT chest pulmonary angiogram during arterial phase injection of IV contrast. Multiplanar reformats and MIP reconstructions were performed. Dose reduction techniques were used.   CONTRAST: 59mL Isovue 370    FINDINGS:  ANGIOGRAM CHEST: Pulmonary arteries are normal caliber and negative for pulmonary emboli. Thoracic aorta is  negative for dissection. No CT evidence of right heart strain.    LUNGS AND PLEURA: Emphysema. Mild dependent atelectasis bilaterally. Stable 6 mm left posterior costophrenic angle nodule. Small calcified granulomas at the right lung base.    MEDIASTINUM/AXILLAE: There is no lymph node enlargement. The heart size is normal.    CORONARY ARTERY CALCIFICATION: Mild.    UPPER ABDOMEN: The gallbladder is absent.    MUSCULOSKELETAL: Multiple vertebral body compression fractures, unchanged from previous exam. Degenerative disease throughout the spine. Old mid sternal fracture.      Impression    IMPRESSION:  1.  There is no pulmonary embolus, aortic aneurysm or dissection.  2.  Emphysema.  3.  Stable 6 mm left posterior costophrenic angle nodule.    Recommendations for one or multiple incidental lung nodules < 6mm :    Low risk patients: No routine follow-up.    High risk patients: Optional follow-up CT at 12 months; if unchanged, no further follow-up.    *Low Risk: Minimal or absent history of smoking or other known risk factors.  *Nonsolid (ground glass) or partly solid nodules may require longer follow-up to exclude indolent adenocarcinoma.  *Recommendations based on Guidelines for the Management of Incidental Pulmonary Nodules Detected at CT: From the Fleischner Society 2017, Radiology 2017.     Echocardiogram Complete     Value    LVEF  55-60%    Narrative    [id number]  [id number]  [id number]  [id number]^MITCH^TREMAINE^JOEY     [facility]  Echocardiography Laboratory  [address] [place] [address]     Name: MOSES TIMMONS  MRN: [id number]  : 1955  Study Date: 2024 07:48 AM  Age: 68 yrs  Gender: Female  Patient Location: [facility]  Reason For Study: CHF  Ordering Physician: TREMAINE MEYER  Performed By: Corinne Rock     BSA: 1.9 m2  Height: 70 in  Weight: 165 lb  BP: 130/82 mmHg  ______________________________________________________________________________  Procedure  Complete  Portable Echo Adult. Optison (NDC #7610-7326) given intravenously.  ______________________________________________________________________________  Interpretation Summary     The visual ejection fraction is 55-60%.  Diastolic Doppler findings (E/E' ratio and/or other parameters) suggest left  ventricular filling pressures are normal.  No regional wall motion abnormalities noted.  The right ventricle is mildly dilated.  The right ventricular systolic function is normal.  Right ventricular systolic pressure is elevated, consistent with mild  pulmonary hypertension.  No significant valvular heart disease.  ______________________________________________________________________________  Left Ventricle  The left ventricle is normal in size. There is normal left ventricular wall  thickness. Diastolic Doppler findings (E/E' ratio and/or other parameters)  suggest left ventricular filling pressures are normal. The visual ejection  fraction is 55-60%. No regional wall motion abnormalities noted.     Right Ventricle  The right ventricle is mildly dilated. The right ventricular systolic function  is normal.     Atria  Normal left atrial size. Right atrial size is normal. There is no color  Doppler evidence of an atrial shunt.     Mitral Valve  The mitral valve leaflets are mildly thickened. There is trace mitral  regurgitation.     Tricuspid Valve  There is trace tricuspid regurgitation. IVC diameter and respiratory changes  fall into an intermediate range suggesting an RA pressure of 8 mmHg. The right  ventricular systolic pressure is approximated at 34.8 mmHg plus the right  atrial pressure. Right ventricular systolic pressure is elevated, consistent  with mild pulmonary hypertension.     Aortic Valve  There is trivial trileaflet aortic sclerosis. No aortic regurgitation is  present.     Pulmonic Valve  There is trace pulmonic valvular regurgitation.     Vessels  Normal size aorta. The aortic root is normal size.      Pericardium  Trivial pericardial effusion.     Rhythm  Sinus rhythm was noted.  ______________________________________________________________________________  MMode/2D Measurements & Calculations  IVC diam: 2.0 cm  LA Volume (BP): 40.5 ml  LA Volume Index (BP): 21.1 ml/m2  RV Base: 3.3 cm  TAPSE: 2.1 cm     Doppler Measurements & Calculations  MV E max obed: 54.5 cm/sec  MV A max obed: 88.6 cm/sec  MV E/A: 0.62  MV max PG: 3.1 mmHg  MV mean P.5 mmHg  MV V2 VTI: 16.0 cm     MV dec time: 0.19 sec  TR max obed: 295.2 cm/sec  TR max P.8 mmHg  E/E' av.5  Lateral E/e': 6.0  Medial E/e': 7.0  RV S Obed: 9.1 cm/sec     ______________________________________________________________________________  Report approved by: Darin Russo 2024 12:02 PM           *Note: Due to a large number of results and/or encounters for the requested time period, some results have not been displayed. A complete set of results can be found in Results Review.       Discharge Medications   Current Discharge Medication List        START taking these medications    Details   methocarbamol (ROBAXIN) 500 MG tablet Take 1 tablet (500 mg) by mouth 4 times daily  Qty: 15 tablet, Refills: 0    Associated Diagnoses: COPD exacerbation (H); Pulmonary nodule           CONTINUE these medications which have NOT CHANGED    Details   albuterol (VENTOLIN HFA) 108 (90 BASE) MCG/ACT Inhaler Inhale 2 puffs into the lungs every 6 hours as needed for shortness of breath / dyspnea or wheezing  Qty: 18 g, Refills: 3    Associated Diagnoses: Asthma exacerbation      ALPRAZolam (XANAX) 0.25 MG tablet TAKE 1 TABLET BY MOUTH TWICE A DAY AS NEEDED FOR ANXIETY  Qty: 60 tablet, Refills: 0    Comments: Not to exceed 5 additional fills before 2024  Associated Diagnoses: Anxiety; Chronic prescription benzodiazepine use      atorvastatin (LIPITOR) 10 MG tablet Take 1 tablet (10 mg) by mouth daily  Qty: 90 tablet, Refills: 4    Associated Diagnoses:  Coronary artery disease involving native coronary artery of native heart without angina pectoris; Mixed hyperlipidemia      BREO ELLIPTA 200-25 MCG/INH Inhaler Inhale 1 Dose into the lungs daily   Refills: 12      carvedilol (COREG) 25 MG tablet Take 1 tablet (25 mg) by mouth 2 times daily (with meals)  Qty: 180 tablet, Refills: 4    Associated Diagnoses: Dilated cardiomyopathy (H)      cetirizine (ZYRTEC) 10 MG tablet Take 10 mg by mouth daily      [employer] F[employer]ER GUMMIES 2.5 G CHEW Take 1 tablet by mouth daily Reported on 5/8/2017      fluticasone (FLONASE) 50 MCG/ACT nasal spray Spray 1-2 sprays into both nostrils daily as needed for rhinitis or allergies  Qty: 16 g, Refills: 4    Associated Diagnoses: Postnasal drip      HYDROcodone-acetaminophen (NORCO)  MG per tablet Take 1-2 tablets by mouth every 4 hours as needed for severe pain Take up to 8 tablets a day  Qty: 240 tablet, Refills: 0    Associated Diagnoses: Chronic bilateral low back pain, unspecified whether sciatica present      ipratropium - albuterol 0.5 mg/2.5 mg/3 mL (DUONEB) 0.5-2.5 (3) MG/3ML neb solution NEBULIZE CONTENTS OF ONE VIAL EVERY 6 HOURS AS NEEDED FOR SHORTNESS OF BREATH OR WHEEZING  Qty: 90 mL, Refills: 1    Associated Diagnoses: Moderate persistent asthma without complication      ketorolac (TORADOL) 10 MG tablet TAKE 1 TABLET (10 MG) BY MOUTH EVERY 6 HOURS AS NEEDED FOR MODERATE PAIN  Qty: 20 tablet, Refills: 0    Associated Diagnoses: Compression fracture of thoracic vertebra with routine healing, unspecified thoracic vertebral level, subsequent encounter      lisinopril (ZESTRIL) 5 MG tablet Take 1 tablet (5 mg) by mouth daily  Qty: 90 tablet, Refills: 3    Associated Diagnoses: Coronary artery disease involving native coronary artery of native heart without angina pectoris      montelukast (SINGULAIR) 10 MG tablet TAKE ONE TABLET BY MOUTH AT BEDTIME  Qty: 90 tablet, Refills: 1    Associated Diagnoses: Mild persistent asthma without  complication      Multiple Vitamin CHEW Take 1 tablet by mouth daily       naloxone (NARCAN) 4 MG/0.1ML nasal spray Spray 1 spray (4 mg) into one nostril alternating nostrils once as needed for opioid reversal Every 2-3 minutes until patient responsive or EMS arrives  Qty: 0.2 mL, Refills: 0    Associated Diagnoses: Compression fracture of thoracic vertebra with routine healing, unspecified thoracic vertebral level, subsequent encounter; Chronic bilateral low back pain, unspecified whether sciatica present      ondansetron (ZOFRAN ODT) 4 MG ODT tab TAKE 1-2 TABLETS BY MOUTH EVERY 8 HOURS AS NEEDED FOR NAUSEA.  Qty: 18 tablet, Refills: 1    Associated Diagnoses: Nausea      pantoprazole (PROTONIX) 40 MG EC tablet TAKE 1 TABLET (40 MG) BY MOUTH DAILY  Qty: 90 tablet, Refills: 1    Associated Diagnoses: Gastroesophageal reflux disease without esophagitis      roflumilast (DALIRESP) 500 MCG TABS tablet Take 1 tablet (500 mcg) by mouth daily      sodium chloride (MOLLY 128) 5 % ophthalmic ointment Place 1 Application into both eyes every evening      spironolactone (ALDACTONE) 25 MG tablet Take 0.5 tablets (12.5 mg) by mouth daily  Qty: 90 tablet, Refills: 4    Associated Diagnoses: Chronic systolic congestive heart failure (H)      SUMAtriptan (IMITREX) 25 MG tablet Take 1 tablet (25 mg) by mouth at onset of headache for migraine  Qty: 9 tablet, Refills: 11    Associated Diagnoses: Chronic migraine without aura without status migrainosus, not intractable      traMADol (ULTRAM) 50 MG tablet TAKE 1 TABLET BY MOUTH EVERY 6 HOURS AS NEEDED FOR SEVERE PAIN  Qty: 30 tablet, Refills: 0    Associated Diagnoses: Mechanical low back pain      TRELEGY ELLIPTA 200-62.5-25 MCG/ACT oral inhaler INHALE 1 PUFF BY INHALATION ROUTE EVERY DAY AT THE SAME TIME EACH DAY           STOP taking these medications       baclofen (LIORESAL) 10 MG tablet Comments:   Reason for Stopping:             Allergies   Allergies   Allergen Reactions     Gabapentin Swelling    Contrast Dye      Iodine    Escitalopram      Nausea and sickness    Peanuts [Nuts] Hives    Penicillins      rash    Sulfa Antibiotics      High family history    Tace [Chlorotrianisene]      joint swelling    Aspirin Rash     After 3 days    Ibuprofen Nausea and Vomiting and Swelling    Strawberry Extract Rash

## 2024-04-09 NOTE — PLAN OF CARE
Patient's After Visit Summary was reviewed with patient   Patient verbalized understanding of After Visit Summary, recommended follow up and was given an opportunity to ask questions. yes  Discharge medications sent home with patient/family: YES (robaxin)  Discharged with transport tech    Goal Outcome Evaluation:      Plan of Care Reviewed With: patient    Overall Patient Progress: improvingOverall Patient Progress: improving    Outcome Evaluation: pain is improving

## 2024-04-09 NOTE — PLAN OF CARE
Physical Therapy Discharge Summary     Reason for therapy discharge:    Discharged to home with home therapy.     Progress towards therapy goal(s). See [facility] on [facility] record for goal details.  Goals partially met.  Barriers to achieving goals:   discharge from facility.     Therapy recommendation(s):    Continued therapy is recommended.  Rationale/Recommendations:  Patient would benefit from TCU however declining and instead discharging home with spouse and home PT in order to increase strength, activity tolerance and independence with mobility.  Patient requires home PT at this time as attending PT in a clinic setting would be a considerable and significantly taxing effort, limiting his ability to participate in therapy session.

## 2024-04-09 NOTE — PLAN OF CARE
"PRIMARY DIAGNOSIS: Chronic Pain  OUTPATIENT/OBSERVATION GOALS TO BE MET BEFORE DISCHARGE:  ADLs back to baseline: No    Activity and level of assistance: Ax1-2 with walker and gait belt depending on pain status    Pain status: Improved-controlled with oral pain medications.    Return to near baseline physical activity: No     Discharge Planner Nurse   Safe discharge environment identified:  SW following for home care PT  Barriers to discharge: Yes       Entered by: Krista Wood RN 04/08/2024 10:32 PM     Please review provider order for any additional goals.   Nurse to notify provider when observation goals have been met and patient is ready for discharge.    Temp: 98.1  F (36.7  C) Temp src: Oral BP: 127/79 Pulse: 95   Resp: 16 SpO2: 95 % O2 Device: Nasal cannula Oxygen Delivery: 1/2 LPM     A&Ox4. Up Ax1-2 with walker and gait belt to bedside commode, purewick in place. Pt refusing to be repositioned at this time. Complained of back/chest pain, scheduled robaxin given with prn norco, heating pad also in place, refused prn tylenol. PT recommending TCU- pt refusing TCU. Plan- SW following for HC PT, needs wheelchair transport at discharge, palliative following, pain control.      Problem: Adult Inpatient Plan of Care  Goal: Plan of Care Review  Description: The Plan of Care Review/Shift note should be completed every shift.  The Outcome Evaluation is a brief statement about your assessment that the patient is improving, declining, or no change.  This information will be displayed automatically on your shift  note.  Outcome: Progressing  Goal: Patient-Specific Goal (Individualized)  Description: You can add care plan individualizations to a care plan. Examples of Individualization might be:  \"Parent requests to be called daily at 9am for status\", \"I have a hard time hearing out of my right ear\", or \"Do not touch me to wake me up as it startles  me\".  Outcome: Progressing  Goal: Absence of Hospital-Acquired Illness " or Injury  Outcome: Progressing  Intervention: Identify and Manage Fall Risk  Recent Flowsheet Documentation  Taken 4/8/2024 2008 by Krista Wood RN  Safety Promotion/Fall Prevention:   safety round/check completed   nonskid shoes/slippers when out of bed  Intervention: Prevent Skin Injury  Recent Flowsheet Documentation  Taken 4/8/2024 2008 by Krista Wood RN  Body Position: position changed independently  Goal: Optimal Comfort and Wellbeing  Outcome: Progressing  Intervention: Monitor Pain and Promote Comfort  Recent Flowsheet Documentation  Taken 4/8/2024 2005 by Krista Wood RN  Pain Management Interventions:   medication (see MAR)   heat applied  Goal: Readiness for Transition of Care  Outcome: Progressing     Problem: Pain Acute  Goal: Optimal Pain Control and Function  Outcome: Progressing  Intervention: Develop Pain Management Plan  Recent Flowsheet Documentation  Taken 4/8/2024 2005 by Krista Wood RN  Pain Management Interventions:   medication (see MAR)   heat applied  Intervention: Prevent or Manage Pain  Recent Flowsheet Documentation  Taken 4/8/2024 2008 by Krista Wood RN  Medication Review/Management: medications reviewed     Problem: Comorbidity Management  Goal: Maintenance of COPD Symptom Control  Outcome: Progressing  Intervention: Maintain COPD Symptom Control  Recent Flowsheet Documentation  Taken 4/8/2024 2008 by Krista Wood RN  Medication Review/Management: medications reviewed  Goal: Maintenance of Heart Failure Symptom Control  Outcome: Progressing  Intervention: Maintain Heart Failure Management  Recent Flowsheet Documentation  Taken 4/8/2024 2008 by Krista Wood RN  Medication Review/Management: medications reviewed  Goal: Blood Pressure in Desired Range  Outcome: Progressing  Intervention: Maintain Blood Pressure Management  Recent Flowsheet Documentation  Taken 4/8/2024 2008 by Krista Wood RN  Medication Review/Management: medications reviewed     Problem: Skin Injury Risk  Increased  Goal: Skin Health and Integrity  Outcome: Progressing  Intervention: Plan: Nurse Driven Intervention: Moisture Management  Recent Flowsheet Documentation  Taken 4/8/2024 2008 by Krista Wood, RN  Moisture Interventions:   Urinary collection device   Incontinence pad  Bathing/Skin Care: incontinence care  Intervention: Plan: Nurse Driven Intervention: Friction and Shear  Recent Flowsheet Documentation  Taken 4/8/2024 2008 by Krista Wood, RN  Friction/Shear Interventions: HOB 30 degrees or less  Intervention: Optimize Skin Protection  Recent Flowsheet Documentation  Taken 4/8/2024 2008 by Krista Wood, RN  Activity Management: activity adjusted per tolerance

## 2024-04-09 NOTE — PROGRESS NOTES
Care Management Discharge Note    Discharge Date: 04/09/2024     Discharge Disposition: Home Care    Discharge Services: Transportation Services    Discharge DME: None    Discharge Transportation: agency    Private pay costs discussed: transportation costs    Patient/family educated on Medicare website which has current facility and service quality ratings: yes    Education Provided on the Discharge Plan: Yes  Persons Notified of Discharge Plans: Yes  Patient/Family in Agreement with the Plan: yes    Additional Information:  Pt discharging to home with  and HC RN/PT/OT/SW/HHA through [facility]. Met with pt to again offer TCU at discharge, pt and  decline the need for this stating they feel they are able to safely manage at home. They are now requesting stretcher transport instead of WC transport as pt would like to be brought right to her bed upon arrival home.     Reviewed potential out of pocket cost for [facility] stretcher transport. Current base rate is $1228.70 and $28.67 per mile to the destination. Pt/family expressed understanding and are agreeable to this.      Patient requires stretcher transportation due to CHF and being bed bound    Stretcher transportation has been arranged for [id number]. Patient and bedside nurse notified of transportation time.    Ambulance PCS form completed and placed in patient chart. Ambulance PCS form should be given to transportation team.    Orders completed and sent to HC. Pt and  deny other needs at this time.     Marta Wilder RN BSN   Inpatient Care Coordination  [facility]   Phone [phone]

## 2024-04-09 NOTE — PLAN OF CARE
"PRIMARY DIAGNOSIS: Chronic Pain  OUTPATIENT/OBSERVATION GOALS TO BE MET BEFORE DISCHARGE:  ADLs back to baseline: No     Activity and level of assistance: Ax1-2 with walker and gait belt depending on pain status     Pain status: Improved-controlled with oral pain medications.     Return to near baseline physical activity: No          Discharge Planner Nurse   Safe discharge environment identified:  SW following for home care PT  Barriers to discharge: Yes       Entered by: Krista Wood RN 04/08/2024 10:32 PM  Please review provider order for any additional goals.   Nurse to notify provider when observation goals have been met and patient is ready for discharge.     Temp: 97.9  F (36.6  C) Temp src: Oral BP: 133/76 Pulse: 86   Resp: 16 SpO2: 95 % O2 Device: Nasal cannula Oxygen Delivery: 1/2 LPM      A&Ox4. Up Ax1-2 with walker and gait belt to bedside commode, purewick in place. Pt refusing to be repositioned at this time also refusing to get oob, on bedpan x2 overnight- no results. Prn norco given x1 for chest/back pain, heating pad also in place, refused prn tylenol. Overnight complained of indigestion, prn maalox given. PT recommending TCU- pt refusing TCU. Plan- SW following for HC PT, needs wheelchair transport at discharge, palliative following, pain control.        Problem: Adult Inpatient Plan of Care  Goal: Plan of Care Review  Description: The Plan of Care Review/Shift note should be completed every shift.  The Outcome Evaluation is a brief statement about your assessment that the patient is improving, declining, or no change.  This information will be displayed automatically on your shift  note.  Outcome: Progressing  Goal: Patient-Specific Goal (Individualized)  Description: You can add care plan individualizations to a care plan. Examples of Individualization might be:  \"Parent requests to be called daily at 9am for status\", \"I have a hard time hearing out of my right ear\", or \"Do not touch me to wake " "me up as it startles  me\".  Outcome: Progressing  Goal: Absence of Hospital-Acquired Illness or Injury  Outcome: Progressing  Intervention: Identify and Manage Fall Risk  Recent Flowsheet Documentation  Taken 4/8/2024 2008 by Krista Wood RN  Safety Promotion/Fall Prevention:   safety round/check completed   nonskid shoes/slippers when out of bed  Intervention: Prevent Skin Injury  Recent Flowsheet Documentation  Taken 4/8/2024 2008 by Krista Wood RN  Body Position: position changed independently  Goal: Optimal Comfort and Wellbeing  Outcome: Progressing  Intervention: Monitor Pain and Promote Comfort  Recent Flowsheet Documentation  Taken 4/8/2024 2005 by Krista Wood RN  Pain Management Interventions:   medication (see MAR)   heat applied  Goal: Readiness for Transition of Care  Outcome: Progressing     Problem: Pain Acute  Goal: Optimal Pain Control and Function  Outcome: Progressing  Intervention: Develop Pain Management Plan  Recent Flowsheet Documentation  Taken 4/8/2024 2005 by Krista Wood RN  Pain Management Interventions:   medication (see MAR)   heat applied  Intervention: Prevent or Manage Pain  Recent Flowsheet Documentation  Taken 4/8/2024 2008 by Krista Wood RN  Medication Review/Management: medications reviewed     Problem: Comorbidity Management  Goal: Maintenance of COPD Symptom Control  Outcome: Progressing  Intervention: Maintain COPD Symptom Control  Recent Flowsheet Documentation  Taken 4/8/2024 2008 by Krista Wood RN  Medication Review/Management: medications reviewed  Goal: Maintenance of Heart Failure Symptom Control  Outcome: Progressing  Intervention: Maintain Heart Failure Management  Recent Flowsheet Documentation  Taken 4/8/2024 2008 by Krista Wood RN  Medication Review/Management: medications reviewed  Goal: Blood Pressure in Desired Range  Outcome: Progressing  Intervention: Maintain Blood Pressure Management  Recent Flowsheet Documentation  Taken 4/8/2024 2008 by [place]" Krista AYALA RN  Medication Review/Management: medications reviewed     Problem: Skin Injury Risk Increased  Goal: Skin Health and Integrity  Outcome: Progressing  Intervention: Plan: Nurse Driven Intervention: Moisture Management  Recent Flowsheet Documentation  Taken 4/8/2024 2008 by Krista Wood, RN  Moisture Interventions:   Urinary collection device   Incontinence pad  Bathing/Skin Care: incontinence care  Intervention: Plan: Nurse Driven Intervention: Friction and Shear  Recent Flowsheet Documentation  Taken 4/8/2024 2008 by Krista Wood, RN  Friction/Shear Interventions: HOB 30 degrees or less  Intervention: Optimize Skin Protection  Recent Flowsheet Documentation  Taken 4/8/2024 2008 by Krista Wood, RN  Activity Management: activity adjusted per tolerance

## 2024-04-09 NOTE — PLAN OF CARE
"PRIMARY DIAGNOSIS: Chronic Pain  OUTPATIENT/OBSERVATION GOALS TO BE MET BEFORE DISCHARGE:  ADLs back to baseline: No     Activity and level of assistance: Ax1-2 with walker and gait belt depending on pain status     Pain status: Improved-controlled with oral pain medications.     Return to near baseline physical activity: No          Discharge Planner Nurse   Safe discharge environment identified:  SW following for home care PT  Barriers to discharge: Yes       Entered by: Krista Wood RN 04/08/2024 10:32 PM  Please review provider order for any additional goals.   Nurse to notify provider when observation goals have been met and patient is ready for discharge.     Temp: 98.5  F (36.9  C) Temp src: Oral BP: 129/86 Pulse: 89   Resp: 16 SpO2: 94 % O2 Device: Nasal cannula Oxygen Delivery: 1/2 LPM      A&Ox4. Up Ax1-2 with walker and gait belt to bedside commode, purewick in place. Pt refusing to be repositioned at this time. Denies pain after scheduled robaxin/ prn norco, heating pad also in place, refused prn tylenol. PT recommending TCU- pt refusing TCU. Plan- SW following for HC PT, needs wheelchair transport at discharge, palliative following, pain control.        Problem: Adult Inpatient Plan of Care  Goal: Plan of Care Review  Description: The Plan of Care Review/Shift note should be completed every shift.  The Outcome Evaluation is a brief statement about your assessment that the patient is improving, declining, or no change.  This information will be displayed automatically on your shift  note.  Outcome: Progressing  Goal: Patient-Specific Goal (Individualized)  Description: You can add care plan individualizations to a care plan. Examples of Individualization might be:  \"Parent requests to be called daily at 9am for status\", \"I have a hard time hearing out of my right ear\", or \"Do not touch me to wake me up as it startles  me\".  Outcome: Progressing  Goal: Absence of Hospital-Acquired Illness or " Injury  Outcome: Progressing  Intervention: Identify and Manage Fall Risk  Recent Flowsheet Documentation  Taken 4/8/2024 2008 by Krista Wood RN  Safety Promotion/Fall Prevention:   safety round/check completed   nonskid shoes/slippers when out of bed  Intervention: Prevent Skin Injury  Recent Flowsheet Documentation  Taken 4/8/2024 2008 by Krista Wood RN  Body Position: position changed independently  Goal: Optimal Comfort and Wellbeing  Outcome: Progressing  Intervention: Monitor Pain and Promote Comfort  Recent Flowsheet Documentation  Taken 4/8/2024 2005 by Krista Wood RN  Pain Management Interventions:   medication (see MAR)   heat applied  Goal: Readiness for Transition of Care  Outcome: Progressing     Problem: Pain Acute  Goal: Optimal Pain Control and Function  Outcome: Progressing  Intervention: Develop Pain Management Plan  Recent Flowsheet Documentation  Taken 4/8/2024 2005 by Krista Wood RN  Pain Management Interventions:   medication (see MAR)   heat applied  Intervention: Prevent or Manage Pain  Recent Flowsheet Documentation  Taken 4/8/2024 2008 by Krista Wood RN  Medication Review/Management: medications reviewed     Problem: Comorbidity Management  Goal: Maintenance of COPD Symptom Control  Outcome: Progressing  Intervention: Maintain COPD Symptom Control  Recent Flowsheet Documentation  Taken 4/8/2024 2008 by Krista Wood RN  Medication Review/Management: medications reviewed  Goal: Maintenance of Heart Failure Symptom Control  Outcome: Progressing  Intervention: Maintain Heart Failure Management  Recent Flowsheet Documentation  Taken 4/8/2024 2008 by Krista Wood RN  Medication Review/Management: medications reviewed  Goal: Blood Pressure in Desired Range  Outcome: Progressing  Intervention: Maintain Blood Pressure Management  Recent Flowsheet Documentation  Taken 4/8/2024 2008 by Krista Wood RN  Medication Review/Management: medications reviewed     Problem: Skin Injury Risk  Increased  Goal: Skin Health and Integrity  Outcome: Progressing  Intervention: Plan: Nurse Driven Intervention: Moisture Management  Recent Flowsheet Documentation  Taken 4/8/2024 2008 by Krista Wood, RN  Moisture Interventions:   Urinary collection device   Incontinence pad  Bathing/Skin Care: incontinence care  Intervention: Plan: Nurse Driven Intervention: Friction and Shear  Recent Flowsheet Documentation  Taken 4/8/2024 2008 by Krista Wood, RN  Friction/Shear Interventions: HOB 30 degrees or less  Intervention: Optimize Skin Protection  Recent Flowsheet Documentation  Taken 4/8/2024 2008 by Krista Wood, RN  Activity Management: activity adjusted per tolerance

## 2024-04-10 NOTE — TELEPHONE ENCOUNTER
Call to [employer] and left detailed message .    Verbal order given to approve visits until certification received for MD signature.

## 2024-04-10 NOTE — TELEPHONE ENCOUNTER
Please approve delay in SOC, SOC to begin  4/14/24.  Thank you,  Ibis Lima LPN/MARTÍNEZ  [facility]  [phone]

## 2024-04-12 NOTE — PROGRESS NOTES
Clinic Care Coordination Contact  Clinic Care Coordination Contact  OUTREACH with Post Discharge Assessment    Referral Information: RN AMY contacted patient for post-hospital follow up. She is also due for monthly outreach which was completed at the same time.   Referral Source: IP Report    Primary Diagnosis: COPD    Chief Complaint   Patient presents with    Clinic Care Coordination - Post Hospital    Clinic Care Coordination - Follow-up      Universal Utilization: Risk of admission or ED visit 90.4%  Clinic Utilization  Difficulty keeping appointments:: No  Compliance Concerns: No  No-Show Concerns: No  No PCP office visit in Past Year: No  Utilization      No Show Count (past year)  0             ED Visits  3             Hospital Admissions  2                    Current as of: 4/12/2024  7:29 AM              Clinical Concerns:  Current Medical Concerns:  Continued but improving chest pain.     Current Behavioral Concerns: None.     Education Provided to patient: Discussed possibility of gas from inhaler escaping into chest cavity outside of trachea or lungs.    Pain  Pain (GOAL):: No  Health Maintenance Reviewed: Due/Overdue (Patient is unable to go to PCP office.)  Clinical Pathway: None    Admission:    Admission Date: 04/06/24   Reason for Admission: COPD exacerbation  Discharge:   Discharge Date: 04/09/24  Discharge Diagnosis: Musculoskeletal chest pain;  Chronic pain;  Weakness    Enrollment  Outreach Frequency: monthly, more frequently as needed    Discharge Assessment  How are you doing now that you are home?: Doing ok. Still having some pain.  How are your symptoms? (Red Flag symptoms escalate to triage hotline per guidelines): Improved  Do you feel your condition is stable enough to be safe at home until your provider visit?: Yes  Does the patient have their discharge instructions? : Yes  Does the patient have questions regarding their discharge instructions? : No  Were you started on any new  medications or were there changes to any of your previous medications? : Yes  Does the patient have all of their medications?: Yes  Do you have questions regarding any of your medications? : No  Do you have all of your needed medical supplies or equipment (DME)?  (i.e. oxygen tank, CPAP, cane, etc.): Yes  Discharge follow-up appointment scheduled within 14 calendar days? : Yes  Discharge Follow Up Appointment Date: 04/24/24  Discharge Follow Up Appointment Scheduled with?: Specialty Care Provider ([facility])    Post-op ([facility] Only)  If the patient had a surgery or procedure, do they have any questions for a nurse?: No    Post-op (Clinicians Only)  Did the patient have surgery or a procedure: No  Fever: No  Chills: No  Eating & Drinking: eating and drinking without complaints/concerns  PO Intake: regular diet  Bowel Function: normal  Date of last BM: 04/11/24  Urinary Status: voiding without complaint/concerns    Medication Management:  Medication review status: Medications reviewed on 4/6/24. RN CC did not re-review during outreach.   Current Outpatient Medications   Medication Sig Dispense Refill    albuterol (VENTOLIN HFA) 108 (90 BASE) MCG/ACT Inhaler Inhale 2 puffs into the lungs every 6 hours as needed for shortness of breath / dyspnea or wheezing 18 g 3    ALPRAZolam (XANAX) 0.25 MG tablet TAKE 1 TABLET BY MOUTH TWICE A DAY AS NEEDED FOR ANXIETY 60 tablet 0    atorvastatin (LIPITOR) 10 MG tablet Take 1 tablet (10 mg) by mouth daily 90 tablet 4    BREO ELLIPTA 200-25 MCG/INH Inhaler Inhale 1 Dose into the lungs daily   12    carvedilol (COREG) 25 MG tablet Take 1 tablet (25 mg) by mouth 2 times daily (with meals) 180 tablet 4    cetirizine (ZYRTEC) 10 MG tablet Take 10 mg by mouth daily      [employer] FIBER GUMMIES 2.5 G CHEW Take 1 tablet by mouth daily Reported on 5/8/2017      fluticasone (FLONASE) 50 MCG/ACT nasal spray Spray 1-2 sprays into both nostrils daily as needed for rhinitis or allergies 16 g 4     HYDROcodone-acetaminophen (NORCO)  MG per tablet Take 1-2 tablets by mouth every 4 hours as needed for severe pain Take up to 8 tablets a day 240 tablet 0    ipratropium - albuterol 0.5 mg/2.5 mg/3 mL (DUONEB) 0.5-2.5 (3) MG/3ML neb solution NEBULIZE CONTENTS OF ONE VIAL EVERY 6 HOURS AS NEEDED FOR SHORTNESS OF BREATH OR WHEEZING 90 mL 1    ketorolac (TORADOL) 10 MG tablet TAKE 1 TABLET (10 MG) BY MOUTH EVERY 6 HOURS AS NEEDED FOR MODERATE PAIN 20 tablet 0    lisinopril (ZESTRIL) 5 MG tablet Take 1 tablet (5 mg) by mouth daily 90 tablet 3    methocarbamol (ROBAXIN) 500 MG tablet Take 1 tablet (500 mg) by mouth 4 times daily 15 tablet 0    montelukast (SINGULAIR) 10 MG tablet TAKE ONE TABLET BY MOUTH AT BEDTIME 90 tablet 1    Multiple Vitamin CHEW Take 1 tablet by mouth daily       naloxone (NARCAN) 4 MG/0.1ML nasal spray Spray 1 spray (4 mg) into one nostril alternating nostrils once as needed for opioid reversal Every 2-3 minutes until patient responsive or EMS arrives 0.2 mL 0    ondansetron (ZOFRAN ODT) 4 MG ODT tab TAKE 1-2 TABLETS BY MOUTH EVERY 8 HOURS AS NEEDED FOR NAUSEA 18 tablet 2    pantoprazole (PROTONIX) 40 MG EC tablet TAKE 1 TABLET (40 MG) BY MOUTH DAILY 90 tablet 1    roflumilast (DALIRESP) 500 MCG TABS tablet Take 1 tablet (500 mcg) by mouth daily      sodium chloride (MOLLY 128) 5 % ophthalmic ointment Place 1 Application into both eyes every evening      spironolactone (ALDACTONE) 25 MG tablet Take 0.5 tablets (12.5 mg) by mouth daily 90 tablet 4    SUMAtriptan (IMITREX) 25 MG tablet Take 1 tablet (25 mg) by mouth at onset of headache for migraine 9 tablet 11    traMADol (ULTRAM) 50 MG tablet TAKE 1 TABLET BY MOUTH EVERY 6 HOURS AS NEEDED FOR SEVERE PAIN 30 tablet 0    TRELEGY ELLIPTA 200-62.5-25 MCG/ACT oral inhaler INHALE 1 PUFF BY INHALATION ROUTE EVERY DAY AT THE SAME TIME EACH DAY       No current facility-administered medications for this visit.        Allergies   Allergen Reactions     Gabapentin Swelling    Contrast Dye      Iodine    Escitalopram      Nausea and sickness    Peanuts [Nuts] Hives    Penicillins      rash    Sulfa Antibiotics      High family history    Tace [Chlorotrianisene]      joint swelling    Aspirin Rash     After 3 days    Ibuprofen Nausea and Vomiting and Swelling    Strawberry Extract Rash     Functional Status:  Dependent ADLs:: Bathing, Dressing, Grooming (Uses a push chair when goes places.)  Dependent IADLs:: Transportation, Meal Preparation, Shopping, Laundry, Cooking, Cleaning  Bed or wheelchair confined:: No  Mobility Status: Dependent/Assisted by Another  Fallen 2 or more times in the past year?: No  Any fall with injury in the past year?: No    Living Situation:  Current living arrangement:: I live in a private home with spouse  Type of residence:: Private home - stairs (Has a stair lift)    Lifestyle & Psychosocial Needs:    Social Determinants of Health     Food Insecurity: Low Risk  (11/11/2023)    Food Insecurity     Within the past 12 months, did you worry that your food would run out before you got money to buy more?: No     Within the past 12 months, did the food you bought just not last and you didn t have money to get more?: No   Depression: Not at risk (9/25/2023)    PHQ-2     PHQ-2 Score: 1   Housing Stability: Low Risk  (11/11/2023)    Housing Stability     Do you have housing? : Yes     Are you worried about losing your housing?: No   Tobacco Use: Medium Risk (11/13/2023)    Patient History     Smoking Tobacco Use: Former     Smokeless Tobacco Use: Never     Passive Exposure: Not on file   Financial Resource Strain: Low Risk  (11/11/2023)    Financial Resource Strain     Within the past 12 months, have you or your family members you live with been unable to get utilities (heat, electricity) when it was really needed?: No   Alcohol Use: Not on file   Transportation Needs: Low Risk  (11/11/2023)    Transportation Needs     Within the past 12 months,  has lack of transportation kept you from medical appointments, getting your medicines, non-medical meetings or appointments, work, or from getting things that you need?: No   Physical Activity: Not on file   Interpersonal Safety: Low Risk  (10/5/2023)    Interpersonal Safety     Do you feel physically and emotionally safe where you currently live?: Yes     Within the past 12 months, have you been hit, slapped, kicked or otherwise physically hurt by someone?: No     Within the past 12 months, have you been humiliated or emotionally abused in other ways by your partner or ex-partner?: Not on file   Stress: Not on file   Social Connections: Not on file   Health Literacy: Not on file     Diet:: Regular  Inadequate nutrition (GOAL):: No  Tube Feeding: No  Inadequate activity/exercise (GOAL):: No  Significant changes in sleep pattern (GOAL): No  Transportation means:: Accessible car     Mandaen or spiritual beliefs that impact treatment:: No  Mental health DX:: No  Mental health management concern (GOAL):: No  Chemical Dependency Status: No Current Concerns  Chemical Dependency Management:  (N/A)  Informal Support system:: Spouse, Children, Neighbors      Resources and Interventions:  Current Resources:      Community Resources: Home Care  Supplies Currently Used at Home: None  Equipment Currently Used at Home: raised toilet seat, shower chair, wheelchair, manual, other (see comments), walker, rolling (Push chair Not actual wheelchair)  Employment Status: retired     Advance Care Plan/Directive  Advanced Care Plans/Directives on file:: Yes  Status of record:: On File and Validated    Referrals Placed: None     Care Plan:   Care Plan: Health Maintenance       Problem: Health Maintenance Due or Overdue       Goal: Become up-to-date with health maintenance visit(s)       Start Date: 10/5/2023 Expected End Date: 1/4/2024    This Visit's Progress: 20% Recent Progress: 10%    Note:     Barriers: Very short of breath with  activity; Limited mobility; Provider availability - wait time to complete appointments.   Strengths: Motivated; Agreeable to Care Coordination.   Patient expressed understanding of goal: Yes.   Action steps to achieve this goal:  1. I will take my medications as prescribed.   2. I will discuss, review, schedule and complete recommended overdue health maintenance with my Primary Care Provider.   3. I will contact my care team with questions, concerns or support needs. I will use the clinic as a resource and I understand I can contact my clinic with 24/7 after hours services available. Care Coordinator will remain available as needed.                               Patient/Caregiver understanding: Patient/caregiver verbalized understanding and denies any additional questions or concerns at this time. RNCC engaged in [employer] communications during encounter.     Outreach Frequency: monthly, more frequently as needed    Future Appointments                In 1 week Konstantin Campuzano MD [facility], [facility]    In 1 week Jessica Robles RPH [place], [place]          Plan: RN CC will continue to follow patient for any additional needs. Verified patient has contact information for any future needs.     Cynthia Snell RN, BSN, CPHN, CCM  [facility]  [facility]  Phone: [phone]  Email: [email or web address]

## 2024-04-15 NOTE — TELEPHONE ENCOUNTER
FOCUS OF CARE: MUSCULOSKELETAL CHEST PAIN, CHRONIC PAIN, WEAKNESS, COPD, CHF, HTN, HYPONATREMIA    DISCIPLINE: SN 1W3 (STARTING 4/15/24), THEN EVERY OTHER WEEK THRU 6//12/24, 3 PRN  . PT EVAL WITHIN 2 WEEK  . OT EVAL WITHIN 2 WEEK  . MSW ASAP WITHIN 3 WEEKS  . HHA 1W8    SPECIFIC TREATMENT ORDERS: (WOUND/IV - IF NOT CONTAINED IN REFERRAL ORDER)  . PT OXYGEN DROPPED TO 87% WITH ACTIVITY OF STAND AND PIVOT TO COMMODE. SHE SAID THIS HAS BEEN HAPPENING. FOR QUITE SOME TIME IN THE HOSPITAL DID NOT DO ANYTHING ABOUT IT.  . CARVEDILOL HAS A LEVEL 2 INTERACTION WITH ALBUTEROL, DUONEB, BREO RLLIPTA, TRELEGY, AZITHROMYCIN  . ADDED SEVERAL MEDICATIONS PER RX LABELS IN THE HOME AND OTC DIRECTIONS: NORMAL SALINE NASAL SPRAY AS NEEDED, SENNA/COLACE 2 TAB BID PRN, SAMETHA CONE 180 MG TABLETS 2 DAILY AS NEEDED, PREDNISONE 5 MG TABLETS 3 TABLETS DAILY, HYDROCORTIZONE 1% CREAM AS NEEDED, PREVACID 15 MG TABLETS 1 TABLET DAILY AS NEEDED, AZITHROMYCIN 250 MG TABLETS 1 TABLET MONDAY WEDNESDAY AND FRIDAY, DIPHENHYDRAMINE 25 MG TABLET 1 TABLET EVERY 8 HOURS,PRN, ERYTHROMYCIN 0.5% EYE OINTMENT HALF INCH DAILY TO LEFT EYE.    Please address and approve the above, please note medication interaction and new med orders  Thank you,  Ibis Carias LPN/PCN  [facility]  [phone]

## 2024-04-15 NOTE — TELEPHONE ENCOUNTER
Called in.   
See her [employer] message. Pt was in the hospital and started on Robaxin. They stopped her baclofen.   Sent message back for pharmacy preference.     Dr Weinberg, can you increase the amount of Robaxin? Pended Rx for 30 days. .     
Sent message back.   
Asthma    Autoimmune disease    Breast lump  Benign  High cholesterol    Lupus    Myocardial infarct    Osteoporosis

## 2024-04-15 NOTE — TELEPHONE ENCOUNTER
Continue treatment.   No change is indicated.   Pt needs to be seen for after hospital follow up.

## 2024-04-16 NOTE — TELEPHONE ENCOUNTER
Client coordination note for PHYSICAL THERAPY / OCCUPATIONAL THERAPY/ / home health aide orders received via fax. Form in your mailbox to be signed.

## 2024-04-19 NOTE — TELEPHONE ENCOUNTER
In need x1 more physical therapy visit in the next 4 weeks. Pt referred following hosp due to chest pain, weakness and chronic pain.   Please approve the above  Thank you,  Ibis Carias LPN/[facility]

## 2024-04-23 NOTE — TELEPHONE ENCOUNTER
Dr Weinberg, is this OK that her  takes the Urine drug screen test to do at home?   This is usually not done at home, due to validity of the test.     Otherwise, pt has Home care nursing, maybe we could see about having Home care Nurse help with this?

## 2024-04-24 NOTE — PROGRESS NOTES
Virtual Visit Details    Type of service:  Video Visit   Video Start Time: 2:13 PM  Video End Time: 1515    Originating Location (pt. Location): Home    Distant Location (provider location):  On-site  Platform used for Video Visit: [facility]    Palliative Care Outpatient Clinic Consultation Note    Patient:  Trisha Bender    Chief Complaint:   Trisha Bender 68 year old female who is presenting to the palliative medicine clinic today at the request of Shital Ojeda for a palliative care consultation secondary to ongoing [facility] conversations.   The patient's primary care provider is:  Bandar Weinberg     History of Present Illness:  Trisha Bender is a 68 year old female with a past medical history of CAD, CHF with reduced ef of 35-40% previously, pulmonary HTN, COPD, GERD, HTN, hyponatremia, chronic leukocytosis, chronic pain who presented on 4/6 with chest pain and mildly elevated troponin. She was recently discharged from [facility] due to chest pain and difficulty moving out of chairs.  She requires ongoing assistance to get up out chairs and is working with home PT but they decided to postpone their services for three more weeks so she can 'heal' more.  Gait belts aren't effective to help her rise from sitting.  Cynthia says that once she up she is able to pivot to a commode and back to a chair and also pivot transfer to bed; she can walk about 6 feet with SBA by her  from bed to the commode.    Cynthia's chart makes many references to her being in 'palliative care' but she does not have a palliative care provider or team who cares for her.  She says that her previous [facility] doctor told her she was in 'palliative care' as she was not a transplant candidate.  She is aware of a drop off in her functional capacity since August 2018.    She has had two inpatient admissions and 1 ED visit in the past 7 months.    She was interested in learning more about hospice and I discussed what it is and isn't and what  hospice benefit covers and not.  We discussed arranging a couple of informational visits.  And with her permission I submitted a request for informational visits from [facility].    Cynthia has been thinking about resuscitation order status since her recent admission.  She is leaning toward DNAR/DNI.  I provided information about this and she decided to continue to think about it awhile longer.        Distressing Symptom/s: chronic pain from many falls and vertebral fractures--compression and spinous processes.    Patient's Disease Understanding: fair    Coping: fair; her family is important to her.  Her cat of 14 years  last week and that has been a blow for her.      Social History     Tobacco Use    Smoking status: Former     Current packs/day: 0.00     Average packs/day: 1 pack/day for 30.0 years (30.0 ttl pk-yrs)     Types: Cigarettes     Start date: 1976     Quit date: 2006     Years since quittin.2    Smokeless tobacco: Never    Tobacco comments:     Quit in    Vaping Use    Vaping status: Never Used   Substance Use Topics    Alcohol use: No     Alcohol/week: 0.0 standard drinks of alcohol    Drug use: No       Family History  Family History   Problem Relation Age of Onset    Cancer Mother         Colon cancer; dxed at age 64;  at age 69    Cancer - colorectal Mother     Other Cancer Mother     Gastrointestinal Disease Sister         Acute pancreatitis    Heart Disease Father         ?    Coronary Artery Disease Father     Circulatory Maternal Aunt         AAA    Circulatory Maternal Uncle         AAA    Diabetes Son      Patient's Involvement with Prior History of Serious Illness in Family: mother  at home with hospice support for three weeks; 36 year old son  suddenly a few months ago.    Advance Care Planning:  Advance Directive:    ACP documents in Epic  Where is written copy located: [facility]  Health Care Agent Contact Information:  Steve and  gracia Cardoso and Eros NULL:   n/a  CODE STATUS: FULL at this time    Allergies   Allergen Reactions    Gabapentin Swelling    Contrast Dye      Iodine    Escitalopram      Nausea and sickness    Peanuts [Nuts] Hives    Penicillins      rash    Sulfa Antibiotics      High family history    Tace [Chlorotrianisene]      joint swelling    Aspirin Rash     After 3 days    Ibuprofen Nausea and Vomiting and Swelling    Strawberry Extract Rash     Current Outpatient Medications   Medication Sig Dispense Refill    albuterol (VENTOLIN HFA) 108 (90 BASE) MCG/ACT Inhaler Inhale 2 puffs into the lungs every 6 hours as needed for shortness of breath / dyspnea or wheezing 18 g 3    ALPRAZolam (XANAX) 0.25 MG tablet TAKE 1 TABLET BY MOUTH TWICE A DAY AS NEEDED FOR ANXIETY 60 tablet 0    atorvastatin (LIPITOR) 10 MG tablet Take 1 tablet (10 mg) by mouth daily 90 tablet 4    BREO ELLIPTA 200-25 MCG/INH Inhaler Inhale 1 Dose into the lungs daily   12    carvedilol (COREG) 25 MG tablet Take 1 tablet (25 mg) by mouth 2 times daily (with meals) 180 tablet 4    cetirizine (ZYRTEC) 10 MG tablet Take 10 mg by mouth daily      [employer] FIBER GUMMIES 2.5 G CHEW Take 1 tablet by mouth daily Reported on 5/8/2017      fluticasone (FLONASE) 50 MCG/ACT nasal spray Spray 1-2 sprays into both nostrils daily as needed for rhinitis or allergies 16 g 4    HYDROcodone-acetaminophen (NORCO)  MG per tablet Take 1-2 tablets by mouth every 4 hours as needed for severe pain Take up to 8 tablets a day 240 tablet 0    ipratropium - albuterol 0.5 mg/2.5 mg/3 mL (DUONEB) 0.5-2.5 (3) MG/3ML neb solution NEBULIZE CONTENTS OF ONE VIAL EVERY 6 HOURS AS NEEDED FOR SHORTNESS OF BREATH OR WHEEZING 90 mL 1    ketorolac (TORADOL) 10 MG tablet TAKE 1 TABLET (10 MG) BY MOUTH EVERY 6 HOURS AS NEEDED FOR MODERATE PAIN 20 tablet 0    lisinopril (ZESTRIL) 5 MG tablet Take 1 tablet (5 mg) by mouth daily 90 tablet 3    methocarbamol (ROBAXIN) 500 MG tablet Take 1 tablet (500 mg)  by mouth 4 times daily 120 tablet 0    montelukast (SINGULAIR) 10 MG tablet TAKE ONE TABLET BY MOUTH AT BEDTIME 90 tablet 1    Multiple Vitamin CHEW Take 1 tablet by mouth daily       naloxone (NARCAN) 4 MG/0.1ML nasal spray Spray 1 spray (4 mg) into one nostril alternating nostrils once as needed for opioid reversal Every 2-3 minutes until patient responsive or EMS arrives 0.2 mL 0    ondansetron (ZOFRAN ODT) 4 MG ODT tab TAKE 1-2 TABLETS BY MOUTH EVERY 8 HOURS AS NEEDED FOR NAUSEA 18 tablet 2    pantoprazole (PROTONIX) 40 MG EC tablet TAKE 1 TABLET (40 MG) BY MOUTH DAILY 90 tablet 1    roflumilast (DALIRESP) 500 MCG TABS tablet Take 1 tablet (500 mcg) by mouth daily      sodium chloride (MOLLY 128) 5 % ophthalmic ointment Place 1 Application into both eyes every evening      spironolactone (ALDACTONE) 25 MG tablet Take 0.5 tablets (12.5 mg) by mouth daily 90 tablet 4    SUMAtriptan (IMITREX) 25 MG tablet Take 1 tablet (25 mg) by mouth at onset of headache for migraine 9 tablet 11    traMADol (ULTRAM) 50 MG tablet TAKE 1 TABLET BY MOUTH EVERY 6 HOURS AS NEEDED FOR SEVERE PAIN 30 tablet 0    TRELEGY ELLIPTA 200-62.5-25 MCG/ACT oral inhaler INHALE 1 PUFF BY INHALATION ROUTE EVERY DAY AT THE SAME TIME EACH DAY       Past Medical History:   Diagnosis Date    Anxiety     CAD (coronary artery disease) 07/02/2015    mild per cath    CHF (congestive heart failure)     EF=20-25% per echo 6/30/15    Chronic airway obstruction     FEV1 36% of pred; VC 67%  in Jan 06.    Chronic back pain     COPD (chronic obstructive pulmonary disease)     Endometriosis     Esophageal reflux     Essential hypertension     Hyperlipidemia     Migraine     Mild persistent asthma 08/30/2012    Osteopenia     Takotsubo cardiomyopathy      Past Surgical History:   Procedure Laterality Date    ANGIOGRAM  07/02/2015    Minimal CAD. LV dysfunction, Mid RCA 20% stenosis, EF 20-25%, c/w Takotsubo cardiomyopathy    APPENDECTOMY  1974    CHOLECYSTECTOMY   1974    [facility]    COLONOSCOPY N/A 04/26/2018    Procedure: COLONOSCOPY;  COLONOSCOPY with biopsies;  Surgeon: Steve Acosta MD;  Location: [facility] OR    CORONARY ANGIOGRAPHY ADULT ORDER      CV CORONARY ANGIOGRAM N/A 07/30/2020    Procedure: Coronary Angiogram;  Surgeon: Quinn Viveros MD;  Location: [facility]ARD[facility]AC CATH LAB    CV LEFT HEART CATH N/A 07/30/2020    Procedure: Left Heart Cath;  Surgeon: Quinn Viveros MD;  Location: [facility]    CV LEFT VENTRICULOGRAM N/A 07/30/2020    Procedure: Left Ventriculogram;  Surgeon: Quinn Viveros MD;  Location: [facility]    CV RIGHT HEART CATH MEASUREMENTS RECORDED N/A 07/30/2020    Procedure: Right Heart Cath;  Surgeon: Quinn Viveros MD;  Location: [facility]    ESOPHAGOSCOPY, GASTROSCOPY, DUODENOSCOPY (EGD), COMBINED  11/15/2011    Procedure:COMBINED ESOPHAGOSCOPY, GASTROSCOPY, DUODENOSCOPY (EGD);  ESOPHAGOSCOPY, GASTROSCOPY, DUODENOSCOPY (EGD) ; Surgeon:JIM URENA; Location:[facility] GI    INJECT EPIDURAL LUMBAR / SACRAL SINGLE N/A 04/13/2018    Procedure: INJECT EPIDURAL LUMBAR / SACRAL CONTINUAL OR INTERMITTENT;  Lumbar Epidural Steroid Injection;  Surgeon: Az Enamorado MD;  Location: [facility]    INJECT EPIDURAL LUMBAR / SACRAL SINGLE N/A 09/24/2018    Procedure: INJECT EPIDURAL LUMBAR / SACRAL SINGLE;  Lumbar Epidural Steroid Injection;  Surgeon: Az Enamorado MD;  Location: [facility] OR    LAPAROSCOPIC ABLATION ENDOMETRIOSIS  1998    Nasal septoplasty and mucous retention cyst      TONSILLECTOMY, ADENOIDECTOMY, COMBINED      Holliday teeth extraction         REVIEW OF SYSTEMS:   ROS: 10 point ROS neg other than the symptoms noted above in the HPI and here:  Palliative Symptom Review (0=no symptom/no concern, 1=mild, 2=moderate, 3=severe):      Pain: 2      Fatigue: 2      Nausea: 0      Constipation: 0      Diarrhea: 0      Depressive Symptoms: 1      Anxiety: 1 on alprazolam      Drowsiness:  0      Poor Appetite: 0      Shortness of Breath: 1      Insomnia: 0      Overall (0 good/no concerns, 3 very poor):  1-2      GENERAL APPEARANCE: chronically ill appearing, alert and no distress; in a dressing gown;   EYES: Eyes grossly normal to inspection, PERRLA, conjunctivae and sclerae without injection or discharge, EOM intact   RESP:  no increased work of breathing; speaks in complete sentences;   MS: No musculoskeletal defects are noted  SKIN: No suspicious lesions or rashes, hydration status appears adequate with normal skin turgor   PSYCH: Alert and oriented x3; speech- coherent , normal rate and volume; able to articulate logical thoughts, able to abstract reason, no tangential thoughts, no hallucinations or delusions, mentation appears normal, Mood is euthymic. Affect is appropriate for this mood state and bright. Thought content is free of suicidal ideation, hallucinations, and delusions.  Eye contact is good during conversation.        Data Reviewed:  LABS: April 2024  Cr 0.48; alb 3.6; TP 5.4; Hgb 10.5;   IMAGING: recent plain films, CT and TTE were reviewed.    Impressions:  Palliative Performance Score:  (100% normal, 0% death):  40%  Decision Making Capacity:  present  PDMP review:  yes, no concerns    General debility/weakness  PAH--not a transplant candidate  CAD  Chronic pain syndrome--lower back and vertebral injuries  Pan lobular emphysema    GOALS OF CARE:  04/24/2024  Cynthia worries she may be getting to the end of her life due to her functional decline. She hopes she will 'bounce back' from her recent acute hospitalization.  And she is interested in learning more about hospice and is considering enrolling if she doesn't begin to regain more strength over the next few weeks. I arranged for informational hospice visits with [facility] and [facility] for more       Advance Care Planning Discussion 4/24/2024. I, Konstantin Campuzano MD met with Patient and their spouse today at their residence  to discuss Advance Care Planning. Trisha Bender does have decisional capacity and was present for this discussion.  Those present were informed of the voluntary nature of this discussion and wished to proceed.  The discussion included:  see paragraph above. . This discussion began at 1445 and ended at 1500 for a total of 15 minutes.     Recommendations & Counseling:  Informational hospice visits arranged with [facility] and [facility].    Cynthia will let me know if she wants to enroll in hospice and I will place a hospice order.    If she chooses to not enroll she will work with her PCP to get a hospital bed at home.  He will also continue to manage her chronic pain syndrome and opiate refills.    Counseling: All of the above was explained to the patient in lay language. The patient has verbalized a clear understanding of the discussion, asked appropriate questions, which have been answered to patient's apparent satisfaction. The patient is in agreement with the above plan.    78 minutes spent on the date of the encounter doing chart review, history and exam, patient education & counseling, documentation and other activities as noted above. 15 of the 78 minute visit were spent in ACP conversation as described above.    Konstantin Campuzano MD MS FAAFP CAQHPM  [facility]  Office [phone]  Fax [phone]

## 2024-04-24 NOTE — LETTER
4/24/2024       RE: Trisha Bender  [address] [facility] [address]       Dear Colleague,    Thank you for referring your patient, [facility] Bender[facility]. Please see a copy of my visit note below.      Palliative Care Outpatient Clinic Consultation Note    Patient:  Trisha Bender    Chief Complaint:   Trisha Bender 68 year old female who is presenting to the palliative medicine clinic today at the request of Shital Ojeda for a palliative care consultation secondary to ongoing [facility] conversations.   The patient's primary care provider is:  Bandar Weinberg     History of Present Illness:  Trisha Bender is a 68 year old female with a past medical history of CAD, CHF with reduced ef of 35-40% previously, pulmonary HTN, COPD, GERD, HTN, hyponatremia, chronic leukocytosis, chronic pain who presented on 4/6 with chest pain and mildly elevated troponin. She was recently discharged from [facility] due to chest pain and difficulty moving out of chairs.  She requires ongoing assistance to get up out chairs and is working with home PT but they decided to postpone their services for three more weeks so she can 'heal' more.  Gait belts aren't effective to help her rise from sitting.  Cynthia says that once she up she is able to pivot to a commode and back to a chair and also pivot transfer to bed; she can walk about 6 feet with SBA by her  from bed to the commode.    Cynthia's chart makes many references to her being in 'palliative care' but she does not have a palliative care provider or team who cares for her.  She says that her previous PAH doctor told her she was in 'palliative care' as she was not a transplant candidate.  She is aware of a drop off in her functional capacity since August 2018.    She has had two inpatient admissions and 1 ED visit in the past 7 months.    She was interested  in learning more about hospice and I discussed what it is and isn't and what hospice benefit covers and not.  We discussed arranging a couple of informational visits.  And with her permission I submitted a request for informational visits from [facility] [place].    Cynthia has been thinking about resuscitation order status since her recent admission.  She is leaning toward DNAR/DNI.  I provided information about this and she decided to continue to think about it awhile longer.        Distressing Symptom/s: chronic pain from many falls and vertebral fractures--compression and spinous processes.    Patient's Disease Understanding: fair    Coping: fair; her family is important to her.  Her cat of 14 years  last week and that has been a blow for her.      Social History     Tobacco Use    Smoking status: Former     Current packs/day: 0.00     Average packs/day: 1 pack/day for 30.0 years (30.0 ttl pk-yrs)     Types: Cigarettes     Start date: 1976     Quit date: 2006     Years since quittin.2    Smokeless tobacco: Never    Tobacco comments:     Quit in    Vaping Use    Vaping status: Never Used   Substance Use Topics    Alcohol use: No     Alcohol/week: 0.0 standard drinks of alcohol    Drug use: No       Family History  Family History   Problem Relation Age of Onset    Cancer Mother         Colon cancer; dxed at age 64;  at age 69    Cancer - colorectal Mother     Other Cancer Mother     Gastrointestinal Disease Sister         Acute pancreatitis    Heart Disease Father         ?    Coronary Artery Disease Father     Circulatory Maternal Aunt         AAA    Circulatory Maternal Uncle         AAA    Diabetes Son      Patient's Involvement with Prior History of Serious Illness in Family: mother  at home with hospice support for three weeks; 36 year old son  suddenly a few months ago.    Advance Care Planning:  Advance Directive:    ACP documents in Epic  Where is written  copy located: [facility]  Health Care Agent Contact Information:  Steve and sons Walker and Eros NULL:   n/a  CODE STATUS: FULL at this time    Allergies   Allergen Reactions    Gabapentin Swelling    Contrast Dye      Iodine    Escitalopram      Nausea and sickness    Peanuts [Nuts] Hives    Penicillins      rash    Sulfa Antibiotics      High family history    Tace [Chlorotrianisene]      joint swelling    Aspirin Rash     After 3 days    Ibuprofen Nausea and Vomiting and Swelling    Strawberry Extract Rash     Current Outpatient Medications   Medication Sig Dispense Refill    albuterol (VENTOLIN HFA) 108 (90 BASE) MCG/ACT Inhaler Inhale 2 puffs into the lungs every 6 hours as needed for shortness of breath / dyspnea or wheezing 18 g 3    ALPRAZolam (XANAX) 0.25 MG tablet TAKE 1 TABLET BY MOUTH TWICE A DAY AS NEEDED FOR ANXIETY 60 tablet 0    atorvastatin (LIPITOR) 10 MG tablet Take 1 tablet (10 mg) by mouth daily 90 tablet 4    BREO ELLIPTA 200-25 MCG/INH Inhaler Inhale 1 Dose into the lungs daily   12    carvedilol (COREG) 25 MG tablet Take 1 tablet (25 mg) by mouth 2 times daily (with meals) 180 tablet 4    cetirizine (ZYRTEC) 10 MG tablet Take 10 mg by mouth daily      [employer] FIBER GUMMIES 2.5 G CHEW Take 1 tablet by mouth daily Reported on 5/8/2017      fluticasone (FLONASE) 50 MCG/ACT nasal spray Spray 1-2 sprays into both nostrils daily as needed for rhinitis or allergies 16 g 4    HYDROcodone-acetaminophen (NORCO)  MG per tablet Take 1-2 tablets by mouth every 4 hours as needed for severe pain Take up to 8 tablets a day 240 tablet 0    ipratropium - albuterol 0.5 mg/2.5 mg/3 mL (DUONEB) 0.5-2.5 (3) MG/3ML neb solution NEBULIZE CONTENTS OF ONE VIAL EVERY 6 HOURS AS NEEDED FOR SHORTNESS OF BREATH OR WHEEZING 90 mL 1    ketorolac (TORADOL) 10 MG tablet TAKE 1 TABLET (10 MG) BY MOUTH EVERY 6 HOURS AS NEEDED FOR MODERATE PAIN 20 tablet 0    lisinopril (ZESTRIL) 5 MG tablet Take 1 tablet (5 mg) by mouth daily  90 tablet 3    methocarbamol (ROBAXIN) 500 MG tablet Take 1 tablet (500 mg) by mouth 4 times daily 120 tablet 0    montelukast (SINGULAIR) 10 MG tablet TAKE ONE TABLET BY MOUTH AT BEDTIME 90 tablet 1    Multiple Vitamin CHEW Take 1 tablet by mouth daily       naloxone (NARCAN) 4 MG/0.1ML nasal spray Spray 1 spray (4 mg) into one nostril alternating nostrils once as needed for opioid reversal Every 2-3 minutes until patient responsive or EMS arrives 0.2 mL 0    ondansetron (ZOFRAN ODT) 4 MG ODT tab TAKE 1-2 TABLETS BY MOUTH EVERY 8 HOURS AS NEEDED FOR NAUSEA 18 tablet 2    pantoprazole (PROTONIX) 40 MG EC tablet TAKE 1 TABLET (40 MG) BY MOUTH DAILY 90 tablet 1    roflumilast (DALIRESP) 500 MCG TABS tablet Take 1 tablet (500 mcg) by mouth daily      sodium chloride (MOLLY 128) 5 % ophthalmic ointment Place 1 Application into both eyes every evening      spironolactone (ALDACTONE) 25 MG tablet Take 0.5 tablets (12.5 mg) by mouth daily 90 tablet 4    SUMAtriptan (IMITREX) 25 MG tablet Take 1 tablet (25 mg) by mouth at onset of headache for migraine 9 tablet 11    traMADol (ULTRAM) 50 MG tablet TAKE 1 TABLET BY MOUTH EVERY 6 HOURS AS NEEDED FOR SEVERE PAIN 30 tablet 0    TRELEGY ELLIPTA 200-62.5-25 MCG/ACT oral inhaler INHALE 1 PUFF BY INHALATION ROUTE EVERY DAY AT THE SAME TIME EACH DAY       Past Medical History:   Diagnosis Date    Anxiety     CAD (coronary artery disease) 07/02/2015    mild per cath    CHF (congestive heart failure)     EF=20-25% per echo 6/30/15    Chronic airway obstruction     FEV1 36% of pred; VC 67%  in Jan 06.    Chronic back pain     COPD (chronic obstructive pulmonary disease)     Endometriosis     Esophageal reflux     Essential hypertension     Hyperlipidemia     Migraine     Mild persistent asthma 08/30/2012    Osteopenia     Takotsubo cardiomyopathy      Past Surgical History:   Procedure Laterality Date    ANGIOGRAM  07/02/2015    Minimal CAD. LV dysfunction, Mid RCA 20% stenosis, EF  20-25%, c/w Takotsubo cardiomyopathy    APPENDECTOMY  1974    CHOLECYSTECTOMY  1974    [facility]    COLONOSCOPY N/A 04/26/2018    Procedure: COLONOSCOPY;  COLONOSCOPY with biopsies;  Surgeon: Steve Acosta MD;  Location: [facility] OR    CORONARY ANGIOGRAPHY ADULT ORDER      CV CORONARY ANGIOGRAM N/A 07/30/2020    Procedure: Coronary Angiogram;  Surgeon: Quinn Viveros MD;  Location: [facility] HEART CARDIAC CATH LAB    CV LEFT HEART CATH N/A 07/30/2020    Procedure: Left Heart Cath;  Surgeon: Quinn Viveros MD;  Location: [facility] C[facility]    CV LEFT VENTRICULOGRAM N/A 07/30/2020    Procedure: Left Ventriculogram;  Surgeon: Quinn Viveros MD;  Location: [facility]ARD[facility]    CV RIGHT HEART CATH MEASUREMENTS RECORDED N/A 07/30/2020    Procedure: Right Heart Cath;  Surgeon: Quinn Viveros MD;  Location: [facility] CARDIAC CATH LAB    ESOPHAGOSCOPY, GASTROSCOPY, DUODENOSCOPY (EGD), COMBINED  11/15/2011    Procedure:COMBINED ESOPHAGOSCOPY, GASTROSCOPY, DUODENOSCOPY (EGD);  ESOPHAGOSCOPY, GASTROSCOPY, DUODENOSCOPY (EGD) ; Surgeon:JIM URENA; Location:[facility] GI    INJECT EPIDURAL LUMBAR / SACRAL SINGLE N/A 04/13/2018    Procedure: INJECT EPIDURAL LUMBAR / SACRAL CONTINUAL OR INTERMITTENT;  Lumbar Epidural Steroid Injection;  Surgeon: Az Enamorado MD;  [facility]: [facility]    INJECT EPIDURAL LUMBAR / SACRAL SINGLE N/A 09/24/2018    Procedure: INJECT EPIDURAL LUMBAR / SACRAL SINGLE;  Lumbar Epidural Steroid Injection;  Surgeon: Az Enamorado MD;  Location: [facility] OR    LAPAROSCOPIC ABLATION ENDOMETRIOSIS  1998    Nasal septoplasty and mucous retention cyst      TONSILLECTOMY, ADENOIDECTOMY, COMBINED      [place] teeth extraction         REVIEW OF SYSTEMS:   ROS: 10 point ROS neg other than the symptoms noted above in the HPI and here:  Palliative Symptom Review (0=no symptom/no concern, 1=mild, 2=moderate, 3=severe):      Pain: 2      Fatigue: 2      Nausea: 0      Constipation: 0      Diarrhea:  0      Depressive Symptoms: 1      Anxiety: 1 on alprazolam      Drowsiness: 0      Poor Appetite: 0      Shortness of Breath: 1      Insomnia: 0      Overall (0 good/no concerns, 3 very poor):  1-2      GENERAL APPEARANCE: chronically ill appearing, alert and no distress; in a dressing gown;   EYES: Eyes grossly normal to inspection, PERRLA, conjunctivae and sclerae without injection or discharge, EOM intact   RESP:  no increased work of breathing; speaks in complete sentences;   MS: No musculoskeletal defects are noted  SKIN: No suspicious lesions or rashes, hydration status appears adequate with normal skin turgor   PSYCH: Alert and oriented x3; speech- coherent , normal rate and volume; able to articulate logical thoughts, able to abstract reason, no tangential thoughts, no hallucinations or delusions, mentation appears normal, Mood is euthymic. Affect is appropriate for this mood state and bright. Thought content is free of suicidal ideation, hallucinations, and delusions.  Eye contact is good during conversation.        Data Reviewed:  LABS: April 2024  Cr 0.48; alb 3.6; TP 5.4; Hgb 10.5;   IMAGING: recent plain films, CT and TTE were reviewed.    Impressions:  Palliative Performance Score:  (100% normal, 0% death):  40%  Decision Making Capacity:  present  PDMP review:  yes, no concerns    General debility/weakness  PAH--not a transplant candidate  CAD  Chronic pain syndrome--lower back and vertebral injuries  Pan lobular emphysema    GOALS OF CARE:  04/24/2024  Cynthia worries she may be getting to the end of her life due to her functional decline. She hopes she will 'bounce back' from her recent acute hospitalization.  And she is interested in learning more about hospice and is considering enrolling if she doesn't begin to regain more strength over the next few weeks. I arranged for informational hospice visits with [facility] and [facility] for more       Advance Care Planning Discussion 4/24/2024. I,  Konstantin Campzuano MD met with Patient and their spouse today at their residence to discuss Advance Care Planning. Trisha Bender does have decisional capacity and was present for this discussion.  Those present were informed of the voluntary nature of this discussion and wished to proceed.  The discussion included:  see paragraph above. . This discussion began at 1445 and ended at 1500 for a total of 15 minutes.     Recommendations & Counseling:  Informational hospice visits arranged with [facility] and [facility].    Cynthia will let me know if she wants to enroll in hospice and I will place a hospice order.    If she chooses to not enroll she will work with her PCP to get a hospital bed at home.  He will also continue to manage her chronic pain syndrome and opiate refills.    Counseling: All of the above was explained to the patient in lay language. The patient has verbalized a clear understanding of the discussion, asked appropriate questions, which have been answered to patient's apparent satisfaction. The patient is in agreement with the above plan.    78 minutes spent on the date of the encounter doing chart review, history and exam, patient education & counseling, documentation and other activities as noted above. 15 of the 78 minute visit were spent in ACP conversation as described above.        Again, thank you for allowing me to participate in the care of your patient.      Sincerely,    Konstantin Campuzano MD

## 2024-04-24 NOTE — NURSING NOTE
Is the patient currently in the state of [place]? YES    Visit mode:VIDEO    If the visit is dropped, the patient can be reconnected by: VIDEO VISIT: Send to e-mail at: [email or web address]    Will anyone else be joining the visit? NO  (If patient encounters technical issues they should call [phone]    How would you like to obtain your AVS? MyChart    Are changes needed to the allergy or medication list? No    Are refills needed on medications prescribed by this physician? NO    Reason for visit: Consult    Charisse LIGHT

## 2024-04-24 NOTE — PROGRESS NOTES
Medication Therapy Management (MTM) Encounter    ASSESSMENT:                            Medication Adherence/Access: No issues identified    COPD: Encouraged patient to discuss with pulmonologist using either Trelegy or Breo+Spiriva, but I would not recommend both. Patient agreed to just use Trelegy for now and will discuss options with pulmonologist in May.     Hyperlipidemia: LDL at goal.  Triglycerides elevated but stable.       Osteoporosis: Due for repeat DEXA     Anxiety: Daily use if benzo not ideal.  Declined trying anything else right now due to palliative/hospice being considered.     Back Pain: Stable. Follow-up with Palliative care. No interest in adjusting pain meds.     Allergic Rhinitis: stable     GERD: Stable. For gut protection with long term steroid use.     CAD/CHF/Hypertension: follow-up with Cardiology as planned     Chronic Migraine without Aura: Stable. Imitrex very effective.    PLAN:                            I would recommend either using Trelegy or Breo + Spiriva but not both. Use Trelegy for now until follow up with pulmonologist.      Follow-up: Return in about 1 year (around 4/25/2025) for Medication Therapy Management.    SUBJECTIVE/OBJECTIVE:                          Cynthia Bender is a 68 year old female called for an initial visit. She was referred to me from insurance provider.      Reason for visit: med review.    Allergies/ADRs: Reviewed in chart  Past Medical History: Reviewed in chart  Tobacco: She reports that she quit smoking about 18 years ago. She started smoking about 48 years ago. She has a 30 pack-year smoking history. She has never used smokeless tobacco.  Alcohol: not currently using    Medication Adherence/Access: Patient uses pill box(es).  Per patient, misses medication 0 times per week.   Medication barriers: none.     GERD    Pantoprazole 40 mg daily  She does have history of ulcer.         Hypertension   CAD/CHF/Hypertension:   spironolactone 12.5 mg  daily  lisinopril 5 mg daily  carvedilol 25 mg twice daily.    Patient does not self-monitor blood pressure.  Patient reports no current medication side effects.  EF 35-40% from 8/24/22.    Followed by Cardiology, last visit 12/8/23.     BP Readings from Last 3 Encounters:   04/09/24 117/71   11/13/23 113/78   10/04/23 94/49     Pulse Readings from Last 3 Encounters:   04/09/24 88   11/13/23 105   10/04/23 100       Hyperlipidemia   atorvastatin 10 mg daily  Patient reports no significant myalgias or other side effects.  The ASCVD Risk score (Arcelia GEORGE, et al., 2019) failed to calculate for the following reasons:    The systolic blood pressure is missing       Recent Labs   Lab Test 03/07/23  1725 03/17/22  0927   CHOL 162 135   HDL 41* 43*   LDL 52 17   TRIG 432* 374*       COPD:   Trelegy 1 puff daily  Breo 1 puff(s) once daily   Duonebs as needed  Albuterol rescue inhaler as needed   Daliresp 500 mg daily   Montelukast 10 mg at bedtime  Prednisone 15 mg daily - managed by pulmonologist  Azithromycin 250 mg M/W/F - prescribed by pulmonologist for chronic lung inflammation  Patient rinses their mouth after using steroid inhaler.    Reports they were trying to find if Breo + Spiriva worked the best or Trelegy, but she got to a point where she started using Trelegy in the evening and Breo at noon now. But due to chest muscle strain she hasn't been using any inhalers.  Patient is experiencing insomnia from the prednisone.  Patient reports the following symptoms: none.  Patient does have an COPD Action Plan on file.   Has spirometry been completed: Yes   Followed by Dr. Valerio, pulmonologist - next appointment is May 20th.      Osteoporosis:   Was on  alendronate (Fosamax) 70mg weekly (had been on current therapy for over 10 years).   DEXA History: -2.5 from 2018  Patient is getting the following sources of dietary calcium: cheese  Risk factors: post-menopausal  Last vitamin D level:  Lab Results   Component Value Date     VITDT 29 12/17/2018    VITDT 29 04/16/2018    VITDT 16 (L) 02/09/2015          Anxiety:   alprazolam 0.25 mg twice daily as needed (generally needing 2 tablets a day)   - may skip occasionally  From chart review it appears she has been on this for at least 13 years.  Effective for her and isn't interested in changing. Reports she is on palliative care and this helps.  Past medication duloxetine, escitalopram, bupropion and nortriptyline.         3/23/2018     4:17 PM 5/19/2022     1:35 PM 9/25/2023     4:26 PM   VENESSA-7 SCORE   Total Score  6 (mild anxiety)    Total Score 19 6 3        Back Pain:   hydrocodone-acetaminophen  mg 1-2 tablets every 4 hours (allowed up to 8 tablets daily)  ketorolac 10 mg as needed (not using at this time) - reports initially had a lot of benefit from this, but caused some stomach upset and concern for GI bleed when using, so only uses if an emergency  tramadol as needed (uses this as she runs out of hydrocodone-acetaminophen)  Methocarbamol 500 mg four times daily as needed - reports this one is the newest and was recommended to use first, reports she will use it on occasion, but really knocks her out she uses it sparingly  Has Narcan to use as needed and ondansetron as needed.  Needing the ondansetron twice weekly and is effective when needed.   Reports that she has had worsening pain lately with her chest injury, muscle strain.  Wakes up in pain.   Followed by Palliative Care.       Allergic Rhinitis:   cetirizine 10mg once daily  montelukast 10 mg at bedtime  fluticasone nasal spray 1 spray(s) as needed - rarely will need this  Primary symptoms are nasal congestion. Patient feels that current therapy is effective.         Chronic Migraine without Aura:   Current preventive medications include: none.   Current abortive medications include: Sumatriptan 25 mg as needed   Patient's triggers include: lights. Patient reports having maybe 1 headache a month or less, not too  frequent. Did have one recently. Reports Imitrex corrects headaches in about 30 min.    Today's Vitals: LMP 09/20/2005   ----------------  Post Discharge Medication Reconciliation Status: discharge medications reconciled and changed, per note/orders.    I spent 45 minutes with this patient today. All changes were made via collaborative practice agreement with Bandar Weinberg MD. A copy of the visit note was provided to the patient's provider(s).    A summary of these recommendations was sent via [employer].    Jessica Robles, PharmD  Medication Therapy Management Pharmacist  Voicemail: [phone]      Telemedicine Visit Details  Type of service:  Telephone visit  Start Time:  1:30 PM  End Time:  2:15 PM     Medication Therapy Recommendations  Chronic airway obstruction (H)    Current Medication: BREO ELLIPTA 200-25 MCG/INH Inhaler (Discontinued)   Rationale: Medication interaction - Adverse medication event - Safety   Recommendation: Discontinue Medication   Status: Contact Provider - Awaiting Response

## 2024-04-24 NOTE — PATIENT INSTRUCTIONS
It was good to see you today, Cynthia and Steve.    Here are the things we talked about:  I placed the informational consults with [facility] and Pending sale to [facility] hospices.  It will be good to meet with them and get more information about their care.    Please keep working with Dr. Weinberg for your pain medicines.  If you choose to NOT enroll in hospice Dr. Weinberg can help place an order for for a hospital bed.    Reach out to a local [employer] for the Sacr[employer] of Healing  Someone from the team will reach out to schedule a follow up appointment in 2-3 months..     How to get a hold of us:  For non-urgent matters, MyChart works best.    For more urgent matters, or if you prefer not to use MyChart, call our clinic nurse coordinator Rae Agustin RN at [phone]    We have an on-call number for evenings and weekends. Please call this only if you are having uncontrolled symptoms or serious side effects from your medicines: [phone].     For refills, please give us a week (5 working days) notice. We don't always have providers available everyday to do refills. If you call the day you run out of your medicine, we may not be able to refill it in time, so call 5 days in advance!    Konstantin Campuzano MD MS FAAFP CAQHPM  [facility]  Office [phone]  Fax [phone]

## 2024-04-25 NOTE — LETTER
_  Medication List        Prepared on: 04/25/2024     Bring your Medication List when you go to the doctor, hospital, or   emergency room. And, share it with your family or caregivers.     Note any changes to how you take your medications.  Cross out medications when you no longer use them.    Medication How I take it Why I use it Prescriber   albuterol (VENTOLIN HFA) 108 (90 BASE) MCG/ACT Inhaler Inhale 2 puffs into the lungs every 6 hours as needed for shortness of breath / dyspnea or wheezing Asthma exacerbation Bandar Weinberg MD   ALPRAZolam (XANAX) 0.25 MG tablet TAKE 1 TABLET BY MOUTH TWICE A DAY AS NEEDED FOR ANXIETY Anxiety; Chronic prescription benzodiazepine use Bandar Weinberg MD   atorvastatin (LIPITOR) 10 MG tablet Take 1 tablet (10 mg) by mouth daily Coronary artery disease involving native coronary artery of native heart without angina pectoris; Mixed Hyperlipidemia Justina Finney DO   azithromycin (ZITHROMAX) 250 MG tablet Take 250 mg by mouth Every Mon, Wed, Fri Morning  Lung inflammation  Bandar Weinberg MD   carvedilol (COREG) 25 MG tablet Take 1 tablet (25 mg) by mouth 2 times daily (with meals) Dilated Cardiomyopathy (H) Justina Finney DO   cetirizine (ZYRTEC) 10 MG tablet Take 10 mg by mouth daily  Allergies Bandar Weinberg MD   [employer] FIBER GUMMIES 2.5 G CHEW Take 1 tablet by mouth daily Reported on 5/8/2017  [facility] Bandar Weinberg MD   doxycycline hyclate (PERIOSTAT) 20 MG tablet Take 1 tablet by mouth daily at 2 pm For stye prevention  Stye Bandar Weinberg MD   fluticasone (FLONASE) 50 MCG/ACT nasal spray Spray 1-2 sprays into both nostrils daily as needed for rhinitis or allergies Postnasal Drip Bandar Weinberg MD   HYDROcodone-acetaminophen (NORCO)  MG per tablet Take 1-2 tablets by mouth every 4 hours as needed for severe pain Take up to 8 tablets a day Chronic bilateral low back pain, unspecified whether sciatica present Pattie Cesar  ELLE Tellez CNP   ipratropium - albuterol 0.5 mg/2.5 mg/3 mL (DUONEB) 0.5-2.5 (3) MG/3ML neb solution NEBULIZE CONTENTS OF ONE VIAL EVERY 6 HOURS AS NEEDED FOR SHORTNESS OF BREATH OR WHEEZING Moderate persistent asthma without complication Bandar Weinberg MD   ketorolac (TORADOL) 10 MG tablet TAKE 1 TABLET (10 MG) BY MOUTH EVERY 6 HOURS AS NEEDED FOR MODERATE PAIN Compression fracture of thoracic vertebra with routine healing, unspecified thoracic vertebral level, subsequent encounter Bandar Weinberg MD   lisinopril (ZESTRIL) 5 MG tablet Take 1 tablet (5 mg) by mouth daily Coronary artery disease involving native coronary artery of native heart without angina pectoris Bandar Weinberg MD   methocarbamol (ROBAXIN) 500 MG tablet Take 1 tablet (500 mg) by mouth 4 times daily COPD Exacerbation (H); Pulmonary Nodule Bandar Weinberg MD   montelukast (SINGULAIR) 10 MG tablet TAKE ONE TABLET BY MOUTH AT BEDTIME Mild persistent asthma without complication Bandar Weinberg MD   Multiple Vitamin CHEW Take 1 tablet by mouth daily   General Health Patient Reported   naloxone (NARCAN) 4 MG/0.1ML nasal spray Spray 1 spray (4 mg) into one nostril alternating nostrils once as needed for opioid reversal Every 2-3 minutes until patient responsive or EMS arrives Compression fracture of thoracic vertebra with routine healing, unspecified thoracic vertebral level, subsequent encounter; Chronic bilateral low back pain, unspecified whether sciatica present Bandar Weinberg MD   ondansetron (ZOFRAN ODT) 4 MG ODT tab TAKE 1-2 TABLETS BY MOUTH EVERY 8 HOURS AS NEEDED FOR NAUSEA Nausea Bandar Weinberg MD   pantoprazole (PROTONIX) 40 MG EC tablet TAKE 1 TABLET (40 MG) BY MOUTH DAILY Gastroesophageal Reflux Disease without Esophagitis Bandar Weinberg MD   predniSONE (DELTASONE) 5 MG tablet Take 3 tablets by mouth daily at 2 pm  COPD Bandar Weinberg MD   roflumilast (DALIRESP) 500 MCG TABS tablet Take 1 tablet (500 mcg)  by mouth daily  COPD Bandar Weinberg MD   sodium chloride (MOLLY 128) 5 % ophthalmic ointment Place 1 Application into both eyes every evening  Stye Bandar Weinberg MD   spironolactone (ALDACTONE) 25 MG tablet Take 0.5 tablets (12.5 mg) by mouth daily Chronic systolic congestive heart failure (H) Justina Finney DO   SUMAtriptan (IMITREX) 25 MG tablet Take 1 tablet (25 mg) by mouth at onset of headache for migraine Chronic migraine without aura without status migrainosus, not intractable Madeline Ferrer MD   traMADol (ULTRAM) 50 MG tablet TAKE 1 TABLET BY MOUTH EVERY 6 HOURS AS NEEDED FOR SEVERE PAIN Mechanical Low Back Pain Bandar Weinberg MD   TRELEGY ELLIPTA 200-62.5-25 MCG/ACT oral inhaler INHALE 1 PUFF BY INHALATION ROUTE EVERY DAY AT THE SAME TIME EACH DAY  COPD Bandar Weinberg MD         Add new medications, over-the-counter drugs, herbals, vitamins, or  minerals in the blank rows below.    Medication How I take it Why I use it Prescriber                                      Allergies:      - Gabapentin - Swelling  - Contrast Dye  - Escitalopram  - Peanuts [nuts] - Hives  - Penicillins  - Sulfa Antibiotics  - Tace [chlorotrianisene]  - Aspirin - Rash  - Ibuprofen - Nausea and Vomiting, Swelling  - Strawberry Extract - Rash        Side effects I have had:      Not on File        Other Information:              My notes and questions:

## 2024-04-25 NOTE — LETTER
April 25, 2024  [address]  [id number] [address]    Dear Ms. Bender, [facility]     Thank you for talking with me on Apr 25, 2024 about your health and medications. As a follow-up to our conversation, I have included two documents:      Your Recommended To-Do List has steps you should take to get the best results from your medications.  Your Medication List will help you keep track of your medications and how to take them.    If you want to talk about these documents, please call Jessica Robles RPH at phone: [phone], Monday-Friday 8-4:30pm.    I look forward to working with you and your doctors to make sure your medications work well for you.    Sincerely,  Jessica Robles RPH  [facility] Pharmacist, [facility]

## 2024-04-25 NOTE — PATIENT INSTRUCTIONS
"Recommendations from today's MTM visit:                                                    MTM (medication therapy management) is a service provided by a clinical pharmacist designed to help you get the most of out of your medicines.   Today we reviewed what your medicines are for, how to know if they are working, that your medicines are safe and how to make your medicine regimen as easy as possible.      I would recommend either using Trelegy or Breo + Spiriva but not both. Use Trelegy for now until follow up with pulmonologist.    Follow-up: Return in about 1 year (around 4/25/2025) for Medication Therapy Management.    It was great speaking with you today.  I value your experience and would be very thankful for your time in providing feedback in our clinic survey. In the next few days, you may receive an email or text message from [employer] with a link to a survey related to your  clinical pharmacist.\"     To schedule another MTM appointment, please call the clinic directly or you may call the MTM scheduling line at [phone] or toll-free at [phone].     My Clinical Pharmacist's contact information:                                                      Please feel free to contact me with any questions or concerns you have.      Jessica Robles, PharmD  Medication Therapy Management Pharmacist  Voicemail: [phone]     "

## 2024-04-25 NOTE — LETTER
"Recommended To-Do List      Prepared on: 04/25/2024       You can get the best results from your medications by completing the items on this \"To-Do List.\"      Bring your To-Do List when you go to your doctor. And, share it with your family or caregivers.    My To-Do List:  What we talked about: What I should do:   An issue with your medication    I would recommend either using Trelegy or Breo + Spiriva but not both. Use Trelegy for now until follow up with pulmonologist.          What we talked about: What I should do:                     "

## 2024-05-01 NOTE — TELEPHONE ENCOUNTER
See her [employer] messages. Her insurance may not be allowing her to .     Call to  pharmacy. OK to fill on 5/6.     Dr holley OK early .     Her last  date was 4/8.     Please advise.

## 2024-05-03 NOTE — TELEPHONE ENCOUNTER
Please clarify how long Tramadol prescription is to last/when it can be refilled. Last prescription sent 4/15/24 for #30. If taken as directed (1 TABLET BY MOUTH EVERY 6 HOURS AS NEEDED FOR SEVERE PAIN) this would be aproximately a 7.5 day supply.

## 2024-05-03 NOTE — TELEPHONE ENCOUNTER
Tramadol was given as a supplement to her Norco as she was running short of it. Should last for 30 days. Not to take every day.

## 2024-05-03 NOTE — TELEPHONE ENCOUNTER
She is to use the Oxycodone bid, not more.   Not due for Tramadol refill.   I would recommend pain clinic assessment if not well controlled symptoms and if recurrent problems with misuse of medications.

## 2024-05-04 NOTE — TELEPHONE ENCOUNTER
Nurse Triage SBAR    Is this a 2nd Level Triage? No    Situation/Background: Patient is calling with concern of running out of pain medication. Patient states she is due to  Rx for Monday. Patient states she has taken the medication prescribed for the weekend, is requesting a refill. Patient states she has a muscle relaxer, is on an ice pack, is having nausea and diarrhea due to being out of pain medication. Patient is offered and declines triage.   EMR reviewed.           Assessment: Patient declines triage.    Recommendation: Per disposition, Call during office hours. Reviewed Care Advice with patient and verbalizes understanding. Patient stated she will go to ED if her pain becomes unmanageable. Declines additional questions. Advised patient to call back with any new or worsening symptoms. Patient verbalized understanding and agrees with plan.     Protocol Recommended Disposition: Telephone advice    Amparo Danielle RN on 5/4/2024 at 1:28 PM  [facility]  Reason for Disposition   Caller requesting a CONTROLLED substance prescription refill (e.g., narcotics, ADHD medicines)    Protocols used: Medication Refill and Renewal Call-A-

## 2024-05-05 PROBLEM — R11.2 NAUSEA VOMITING AND DIARRHEA: Status: ACTIVE | Noted: 2024-01-01

## 2024-05-05 PROBLEM — K52.9 COLITIS: Status: ACTIVE | Noted: 2024-01-01

## 2024-05-05 PROBLEM — E87.1 HYPONATREMIA: Status: ACTIVE | Noted: 2023-01-01

## 2024-05-05 PROBLEM — R19.7 NAUSEA VOMITING AND DIARRHEA: Status: ACTIVE | Noted: 2024-01-01

## 2024-05-05 NOTE — ED PROVIDER NOTES
"Emergency Department Attending Supervision Note  5/5/2024  2:22 PM      I evaluated this patient in conjunction with Jenna Scruggs PA-C      Briefly, the patient presented with diarrhea and nausea with abdominal discomfort for the past 3 days.  She has been on azithromycin 3 times per week as well as doxycycline daily and she just recently stopped those.  She does have a history of cholecystectomy and appendectomy.  She does have chronic pain and is enrolled in palliative care secondary to COPD.  She denies any fevers or active vomiting.  No cough, runny nose or sore throat.  No chest pain.      On my exam, /80   Pulse 89   Temp 97.9  F (36.6  C) (Oral)   Resp 18   Ht 1.778 m (5' 10\")   Wt 76.9 kg (169 lb 8.5 oz)   LMP 09/20/2005   SpO2 96%   BMI 24.33 kg/m    Constitutional: Vital signs reviewed as above  General: Alert, tired appearing  HEENT: Dry mucous membranes  Eyes: Pupils are equal, round, and reactive to light.   Neck: Normal range of motion  Cardiovascular: normal rate, Regular rhythm and normal heart sounds.  No MRG  Pulmonary/Chest: Effort normal and breath sounds normal. No respiratory distress. Patient has no wheezes. Patient has no rales.   Gastrointestinal: Tender to palpation in the upper abdomen diffusely.  No masses rebound or guarding.  Musculoskeletal/Extremities: Full ROM.  Endo: No pitting edema  Neurological: Alert, no focal deficits.  Skin: Skin is warm and dry.   Psychiatric: Pleasant         My impression is nausea and diarrhea with CT confirmed colitis.  Etiology of this is uncertain at this time.  She has been on chronic antibiotics which she received discontinued.  As such C. difficile would be a possibility as she had 7 bowel movements today.  Additionally this could be a viral gastroenteritis or other bacterial causes.  She received fluids here as well as a GI cocktail which seemed to help with her pain.  She also received Dilaudid, Benadryl Zofran and fluids.  She is " clinically dry and also has a low sodium and chloride on her comprehensive metabolic panel.  Fluids have been administered.  Lactate was normal.  White count is slightly elevated again secondary to her gastrointestinal process and colitis.  Patient brought into the hospitalist service for continued symptom control, hydration and workup.        Diagnosis    ICD-10-CM    1. Nausea and diarrhea  R11.2     R19.7       2. Hyponatremia  E87.1       3. Colitis  K52.9      4.       Dehydration         MD Amrit Taylor Brent Aaron, MD  05/05/24 9780

## 2024-05-05 NOTE — ED PROVIDER NOTES
Emergency Department Note      History of Present Illness     Chief Complaint  Diarrhea, Nausea, and Abdominal Pain    HPI  Trisha Bender is a 68 year old female with a history of CAD, CHF, hypertension, COPD, and hyperlipidemia who presents to the ED via EMS for evaluation of diarrhea and nausea. The patient reports that she has had diarrhea and nausea for the past 3 days. She hasn't vomited, but is trying hard not to. Her symptoms have been getting worse and she had around 7 bowel movements this morning. Additionally, she developed upper abdominal pain today. She attributes her abdominal pain to be caused by all of the medications that she has been taking. She has been taking zofran, tylenol, and ibuprofen everyday. She hasn't been taking her norco because she hasn't been able to eat much. Also, she has stopped taking azithromycin, which she normally takes 3 times a week and doxycyline, which she takes everyday. She took gas-x to attempt to alleviate her abdominal pain. She has previously had her gallbladder and her appendix removed. The patient denies recently traveling or eating abnormal foods. Alongside this, the patient has chronic back pain. She has been on palliative care due to COPD for 5 years. Additionally, she notes that her feet are painful and easily bruised.     Independent Historian  None    Review of External Notes  I reviewed patient's discharge summary from 4/9/24.  Past Medical History   Medical History and Problem List  Anxiety  CAD   CHF   Chronic back pain  COPD   Endometriosis  Esophageal reflux  Essential hypertension  Hyperlipidemia  Migraine  Mild persistent asthma  Osteopenia  Takotsubo cardiomyopathy  Chronic migraine   Cystocele  Right elbow lateral epicondylitis  Right bundle branch block   Asthma  Panlobular emphysema  Thoracic aortic ectasia  SVT  Left buttock stage 3 pressure  "ulcer    Medications  Albuterol  Alprazolam  Atorvastatin  Azithromycin  Carvedilol  Doxycycline  Hydrocodone-acetaminophen  Ipratropium-albuterol  Ketorolac  Lisinopril  Methocarbamol  Montelukast  Naloxone  Ondansetron  Oxycodone  Pantoprazole  Prednisone  Roflumilast  Spironolactone  Sumatriptan  Tramadol  Trelegy    Surgical History   Angiogram x 2  Appendectomy  Cholecystectomy  Colonoscopy  Left ventriculogram  Right heart catherization  EGD  Spine epidural injection X 2  Laparoscopic endometriosis ablation  Nasal septoplasty and mucous retention cyst  Combined tonsillectomy and adenoidectomy  [place] teeth extraction    Physical Exam   Patient Vitals for the past 24 hrs:   BP Temp Temp src Pulse Resp SpO2 Height Weight   05/05/24 1210 118/80 -- -- -- -- 96 % -- --   05/05/24 1045 130/88 -- -- 89 -- 95 % -- --   05/05/24 1030 119/78 -- -- 88 -- 95 % -- --   05/05/24 1015 118/83 -- -- 96 -- 95 % -- --   05/05/24 1000 116/84 -- -- 96 -- 96 % -- --   05/05/24 0927 (!) 140/88 97.9  F (36.6  C) Oral 90 18 99 % 1.778 m (5' 10\") 76.9 kg (169 lb 8.5 oz)     Physical Exam  General: Alert and cooperative with exam.  Appears slightly fatigued.  Pale skin.  Patient in no apparent distress. Normal mentation.  Head:  Scalp is NC/AT  Eyes:  No scleral icterus, PERRL  ENT:  The external nose and ears are normal.  Neck:  Normal range of motion without rigidity.  CV:  Regular rate and rhythm    No pathologic murmur   Resp:  Breath sounds are clear bilaterally    Non-labored, no retractions or accessory muscle use  GI:  Epigastric and right upper quadrant abdominal pain to palpation.  All other quadrants are soft and nontender.  No distention, rebound, or guarding present.  MS:  Full range of motion of bilateral lower extremities without pain.  Tenderness to palpation around ecchymosis noted to left ankle however she has full range of motion of ankle without concern for bony injury.  Skin:  Bruising noted to bilateral lower " extremities, most significant to left ankle.  Neuro:  Oriented x 3. No gross motor deficits.    Diagnostics   Lab Results   Labs Ordered and Resulted from Time of ED Arrival to Time of ED Departure   COMPREHENSIVE METABOLIC PANEL - Abnormal       Result Value    Sodium 127 (*)     Potassium 3.6      Carbon Dioxide (CO2) 21 (*)     Anion Gap 13      Urea Nitrogen 4.1 (*)     Creatinine 0.47 (*)     GFR Estimate >90      Calcium 7.6 (*)     Chloride 93 (*)     Glucose 110 (*)     Alkaline Phosphatase 51      AST 10      ALT 7      Protein Total 4.9 (*)     Albumin 3.3 (*)     Bilirubin Total 0.4     CBC WITH PLATELETS AND DIFFERENTIAL - Abnormal    WBC Count 13.9 (*)     RBC Count 4.29      Hemoglobin 11.0 (*)     Hematocrit 36.7      MCV 86      MCH 25.6 (*)     MCHC 30.0 (*)     RDW 16.0 (*)     Platelet Count 434      % Neutrophils 81      % Lymphocytes 11      % Monocytes 7      % Eosinophils 0      % Basophils 0      % Immature Granulocytes 1      NRBCs per 100 WBC 0      Absolute Neutrophils 11.1 (*)     Absolute Lymphocytes 1.5      Absolute Monocytes 1.0      Absolute Eosinophils 0.1      Absolute Basophils 0.0      Absolute Immature Granulocytes 0.2      Absolute NRBCs 0.0     LIPASE - Normal    Lipase 14     LACTIC ACID WHOLE BLOOD   C. DIFFICILE TOXIN B PCR WITH REFLEX TO C. DIFFICILE ANTIGEN AND TOXINS A/B EIA   ENTERIC BACTERIA AND VIRUS PANEL BY PCR     Imaging  CT Abdomen Pelvis w Contrast   Final Result   IMPRESSION:    1.  Mild wall thickening of the descending and rectosigmoid colon, which could be seen with colitis (infectious, inflammatory, or nonembolic ischemic).   2.  Otherwise, no acute abnormality in the abdomen or pelvis.   3.  Unchanged 6 mm left lower lobe nodule.   4.  Additional details as described in the findings.        EKG   ECG taken at 0937, ECG read at 0938  Sinus rhythm  Low voltage QRS  Right bundle branch block   Abnormal ECG   No significant change as compared to prior, dated  4/6/2024.  Rate 92 bpm. FL interval 134 ms. QRS duration 114 ms. QT/QTc 378/467 ms. P-R-T axes 52 76 41.    ED Course    Medications Administered  Medications   ondansetron (ZOFRAN) injection 4 mg (has no administration in time range)   HYDROmorphone (PF) (DILAUDID) injection 0.5 mg (0.5 mg Intravenous $Given 5/5/24 1210)   diphenhydrAMINE (BENADRYL) injection 25 mg (25 mg Intravenous $Given 5/5/24 0954)   HYDROmorphone (PF) (DILAUDID) injection 0.5 mg (0.5 mg Intravenous $Given 5/5/24 0954)   sodium chloride 0.9% BOLUS 1,000 mL (1,000 mLs Intravenous $New Bag 5/5/24 1034)   iopamidol (ISOVUE-370) solution 500 mL (85 mLs Intravenous $Given 5/5/24 1058)   sodium chloride (PF) 0.9% PF flush 100 mL (50 mLs Intravenous $Given 5/5/24 1058)   alum & mag hydroxide-simethicone (MAALOX) suspension 15 mL (15 mLs Oral $Given 5/5/24 1252)     Discussion of Management  Admitting Hospitalist, Stormy Weaver PA-C    Social Determinants of Health adding to complexity of care  On palliative care but not currently on hospice.  is caretaker.     ED Course  ED Course as of 05/05/24 1306   Sun May 05, 2024   0933 I obtained history and examined the patient as noted above.    1224 I rechecked the patient and explained findings.    1254 I spoke with ZENA Garcia, hospitalist, regarding the patient's presentation and plan of care.        Medical Decision Making / Diagnosis   MDM  Trisha Bender is a 68 year old female who presents with severe nausea and diarrhea for the past 3 to 4 days.  Chronically on antibiotics including doxycycline and azithromycin.  On exam, she appears mildly fatigued and pale.  Noted to have epigastric and right upper quadrant abdominal pain to palpation.  No rebound or guarding present on exam, no distention.  Vitally stable and afebrile here, no vomiting has been experienced prior to arrival or here.  Patient reports numerous doses of Zofran taken this morning, holding on Zofran at this time.   Labs obtained and reviewed, remarkable for mild leukocytosis however appears to be at her baseline, new hyponatremia with sodium of 127.  I suspect hypovolemia contributing to her hyponatremia in the setting of ongoing diarrhea, IV fluids provided.  CT abdomen pelvis shows signs of inflammation to descending and rectosigmoid colon suspicious for colitis.  This patient has been chronically on antibiotics for some time, concern for possible C. difficile infection.  Ordered stool studies however patient is unable to provide stool sample here.  Discussed symptomatic control with patient and as she lives independently with her  who is her primary caretaker, she does not feel comfortable with trial of p.o. symptomatic cares at home as these have not been beneficial for the past several days and feels that staying in the hospital may provide her more benefit.  I am in complete agreement with this as she has also required multiple doses of Dilaudid here for ongoing abdominal pain.  I consulted with Stormy FREITAS with inpatient hospitalist service who agrees to accept patient's admission for observation under the care of Dr. Mendez.  Patient will provide stool sample when able.  GI cocktail provided for ongoing epigastric discomfort.     Disposition  The patient was admitted to the hospital under the care of Dr. Mendez.     ICD-10 Codes:    ICD-10-CM    1. Nausea vomiting and diarrhea  R11.2     R19.7       2. Hyponatremia  E87.1       3. Colitis  K52.9          Discharge Medications  New Prescriptions    No medications on file     Scribe Disclosure:  I, Prosper Scott, am serving as a scribe at 10:33 AM on 5/5/2024 to document services personally performed by Jenna Gross PA-C, based on my observations and the provider's statements to me.     [employer]      Jenna Gross PA-C  05/05/24 9032

## 2024-05-05 NOTE — PLAN OF CARE
"PRIMARY DIAGNOSIS: Nausea/Diarrhea    OUTPATIENT/OBSERVATION GOALS TO BE MET BEFORE DISCHARGE  1. Orthostatic performed: N/A    2. Tolerating PO fluid and/or antibiotics (if applicable): Yes    3. Nausea/Vomiting/Diarrhea symptoms improved: Yes    4. Pain status: Back pain, chronic given 1 Norco.    5. Return to near baseline physical activity: No, A-2/commode/Purwik.      Discharge Planner Nurse   Safe discharge environment identified: Yes  Barriers to discharge: No       Entered by: Shital Marquez RN 05/05/2024 4:44 PM     Please review provider order for any additional goals.   Nurse to notify provider when observation goals have been met and patient is ready for discharge.  Goal Outcome Evaluation:      Plan of Care Reviewed With: patient, spouse    Overall Patient Progress: no changeOverall Patient Progress: no change    Outcome Evaluation: New admission.  Denies nausea, denies diarrhea.  Chronic back pain; Norco given.  Tolerating clear diet, advanced diet to Regular.    Problem: Adult Inpatient Plan of Care  Goal: Plan of Care Review  Description: The Plan of Care Review/Shift note should be completed every shift.  The Outcome Evaluation is a brief statement about your assessment that the patient is improving, declining, or no change.  This information will be displayed automatically on your shift  note.  Outcome: Progressing  Flowsheets (Taken 5/5/2024 1642)  Outcome Evaluation:   New admission.  Denies nausea, denies diarrhea.  Chronic back pain   Norco given.  Tolerating clear diet, advanced diet to Regular.  Plan of Care Reviewed With:   patient   spouse  Overall Patient Progress: no change  Goal: Patient-Specific Goal (Individualized)  Description: You can add care plan individualizations to a care plan. Examples of Individualization might be:  \"Parent requests to be called daily at 9am for status\", \"I have a hard time hearing out of my right ear\", or \"Do not touch me to wake me up as it " "startles  me\".  Outcome: Progressing  Flowsheets (Taken 5/5/2024 1600)  Individualized Care Needs: i like to be sitting up  Goal: Absence of Hospital-Acquired Illness or Injury  Outcome: Progressing  Goal: Optimal Comfort and Wellbeing  Outcome: Progressing  Intervention: Monitor Pain and Promote Comfort  Recent Flowsheet Documentation  Taken 5/5/2024 1621 by Shital Marquez RN  Pain Management Interventions: medication (see MAR)  Goal: Readiness for Transition of Care  Outcome: Progressing  Intervention: Mutually Develop Transition Plan  Recent Flowsheet Documentation  Taken 5/5/2024 1600 by Shital Marquez, RN  Patient/Family Anticipated Services at Transition: home health care  Patient/Family Anticipates Transition to: home with help/services  Equipment Currently Used at Home:   raised toilet seat   shower chair   wheelchair, manual   other (see comments)   walker, rolling       "

## 2024-05-05 NOTE — ED TRIAGE NOTES
"BIBA from home with c/o diarrhea and nausea that started about 3 days ago as well as mid upper abdominal pain that started today. Patient states today when she had an episode of diarrhea there was a \"trace\" of bright red blood mixed in. Patient states she has hemorrhoids. Hx COPD, no home O2. VSS in route,         "

## 2024-05-05 NOTE — PLAN OF CARE
ROOM # 205    Living Situation (if not independent, order SW consult): Lives at home with .   Facility name:  : [facility] -     Activity level at baseline: Mostly chair bound, can walk short distances w/ walker. Uses stair lift.     Activity level on admit: Heavy assist Purwik in place.      Patient registered to observation; given Patient Bill of Rights; given the opportunity to ask questions about observation status and their plan of care.  Patient has been oriented to the observation room, bathroom and call light is in place.    Discussed discharge goals and expectations with patient/family.

## 2024-05-05 NOTE — H&P
[facility]    History and Physical - Hospitalist Service       Date of Admission:  5/5/2024    Assessment & Plan Trisha Bender is a 68 year old female with Pmhx of CAD, CHF with reduced ef of 35-40% previously, pulmonary HTN, COPD, GERD, HTN, hyponatremia, chronic leukocytosis, chronic pain, colonic polyps, functional decline recnetly established with palliative care, who was admitted on 5/5/2024 found to have colitis.    She presented for 3-4 days persistent nausea, abdominal discomfort, non bloody diarrhea. On presentation vital signs stable, afebrile.  Lab work with leukocytosis, mild anemia. Lipase, cmp unremarkable save for mild hyponatremia which she has history of.  Imaging with mild wall thickening of the descending and rectosigmoid colon. EKG with NSR, RBBB, unchanged from prior.  Given zofran, dilaudid, benadryl, fluids.     Admission was requested from hospitalist service for further cares and monitoring due to persistent nausea in the ED. Stool studies were ordered but no diarrhea in ED.  She had 7 bowel movements prior to coming to the ED. Maaylox since given in ED is helping abdominal discomfort.     Colitis  Mild, benign exam. Symptoms are improving with ED treatments. Imaging demonstrates mild wall thickening of the descending and rectosigmoid colon, suspect infectious vs inflammatory. No hematochezia, vomiting or fever.  She does have history of chronic daily doxycycline use and 3 times a week azithromycin for her pulmonary disease.  No history of C. difficile or fever.  No other antibiotics or ill contacts.  No history of IBD and last colonoscopy in 2018 with multiple polyps that were removed.  No history of ischemic colitis or symptoms of hematochezia.  Hemoglobin is stable.  Lactic acid is pending.  -[place] observation  -Continue enteric precautions.  -Send C. difficile and enteric panel.  If negative can start Imodium scheduled.  -antiemetics PRN  -Contact  dermatitis cares for bottom given bedbound status  -APAP, simethicone, maaylox prn   -Advance diet as tolerated start with full liquid diet.  -Saline lock IV fluids when tolerating p.o. diet advancement and GI losses have slowed.  -As discussed with the patient she would not want further workup but the goal is for supportive management i.e. no colonoscopy.    Hyponatremia  Historically chronic hyponatremia was thought to be secondary to spironolactone.  During last hospitalization she was placed on a 1500 cc fluid restriction.  Sodium 127 on admission from 137 when checked 3 weeks ago.   Currently suspect mild decline in sodium level is due to low solute intake and ongoing GI losses.  -had 1 L normal saline in ED, rechecking sodium level to determine what direction is heading. If worsening will not continue on IV saline.   -if sodium improving, continue to give 0.9% normal saline at 75 cc an hour-recheck sodium level in AM.   -Not instituting current fluid restriction as the patient has had minimal intake   -hold pta spironolactone     PAH, not a transplant candidate   COPD  Historically non oxygen dependent but does have chronic dyspnea. Followed  by Dr. Valerio, pulmonologist - next appointment is May 20th.   -Resuming PTA inhalers. If unable to stock or supply in the hospital use home inhalers or scheduled nebulizer treamtents.   -Resume Prednisone 15 mg daily - managed by pulmonologist, Azithromycin 250 mg M/W/F - prescribed by pulmonologist for chronic lung inflammation    Chronic leukocytosis  Noted. Does have mild findings of colitis although afebrile. White count persistently elevated near previous levels.     Nonischemic cardiomyopathy  Compensated.   -Resume Coreg, holding spironolactone as above.  Hold lisinopril given low intake and reassess 5/6    General debility, fraility  Established with Palliative care. Bed bound at baseline.   Hospital bed previously ordered for use at home.  -f/up with Palliative  "care and PCP for ongoing care   -DNR/DNI    Chronic pain with opioid dependence   Hx of lower back and vertebral injuries   Resume robaxin, hydrocodone APAP.     GERD  Hx of PUD  -continue PPI      Chronic Migraine without Aura: Stable. Imitrex very effective.     Anxiety Chronically maintained on benzodiarepenes and historically declined alternative treatmens due to  palliative/hospice being considered.  Resume home medications        Diet:  ADAT     DVT Prophylaxis: Pneumatic Compression Devices  Quiles Catheter: Not present    Cardiac Monitoring: None    Goals of care/Code Status:  DNR/DNI as discussed with the patient on admission.  She has not yet finished completion of her new POLST with primary care provider.  Goals are supportive cares. Ok for fluids, abx     Clinically Significant Risk Factors Present on Admission         # Hyponatremia: Lowest Na = 127 mmol/L in last 2 days, will monitor as appropriate  # Hypocalcemia: Lowest Ca = 7.6 mg/dL in last 2 days, will monitor and replace as appropriate     # Hypoalbuminemia: Lowest albumin = 3.3 g/dL at 5/5/2024  9:53 AM, will monitor as appropriate     # Hypertension: Noted on problem list  # Chronic heart failure with preserved ejection fraction: heart failure noted on problem list and last echo with EF >50%         # Asthma: noted on problem list        Disposition Plan      Expected Discharge Date: 05/06/2024              The patient's care was discussed with the ED, patient's spouse at bedside.       Stormy Weaver PA-C    ______________________________________________________________________    Chief Complaint   \"Diarrhea, Nausea, and Abdominal Pain\"    History is obtained from the patient    History of Present Illness   Trisha Bender is a 68 year old female who presented to the emergency department for persistent nausea, abdominal discomfort and diarrhea.  Ms. Bender reports that    On the given days she does have a small amount of nausea and reports " "that she constantly has to have \"something in her stomach \"to prevent it from being uncomfortable.  Over the past 3 to 4 days she was developing worsening nausea associated with frequent loose stools.  She had been taking Zofran at home without much relief in her symptoms and she was fearful that she was taking too much Zofran as she was dosing herself around 8 times a day with the antiemetic.  She had been having frequent loose stools estimating 15 episodes a day without blood.  She had taken Imodium 1 dose per day without much relief of her symptoms.  She started to develop abdominal discomfort as well.  Given these issues and her immobility she is having issues getting out of bed to get cleaned up and requested ED evaluation for further assessment of her symptoms.    She denies any history of C. difficile colitis.  No recent travel.  Afebrile.  She denies any vomiting but she has been restricting her intake only having a few bites of things such as a cracker.  She has had low fluid intake in addition to this. Gas x use did help abdominal pain.     She reports chronic antibiotic use, doxycycline daily, azithromycin 3 times a week.      Of note seen by Palliative Care due to progressive functinal decline 4/24/24 referred to have informational visit with hospice but not yet enrolled. She maintains that she is DNR/DNI.     On presentation vital signs stable, afebrile.  Lab work with leukocytosis, mild anemia. Lipase, cmp unremarkable save for mild hyponatremia which she has history of.  Imaging with mild wall thickening of the descending and rectosigmoid colon. EKG with NSR, RBBB, unchanged from prior.  Given zofran, dilaudid, benadryl, fluids.     Admission was requested from hospitalist service for further cares and monitoring due to persistent nausea in the ED. Stool studies were ordered but no diarrhea in ED.  She had 7 bowel movements prior to coming to the ED. Maaylox since given in ED is helping abdominal " discomfort.       Past Medical History    Past Medical History:   Diagnosis Date    Anxiety     CAD (coronary artery disease) 07/02/2015    mild per cath    CHF (congestive heart failure)     EF=20-25% per echo 6/30/15    Chronic airway obstruction     FEV1 36% of pred; VC 67%  in Jan 06.    Chronic back pain     COPD (chronic obstructive pulmonary disease)     Endometriosis     Esophageal reflux     Essential hypertension     Hyperlipidemia     Migraine     Mild persistent asthma 08/30/2012    Osteopenia     Takotsubo cardiomyopathy        Past Surgical History   Past Surgical History:   Procedure Laterality Date    ANGIOGRAM  07/02/2015    Minimal CAD. LV dysfunction, Mid RCA 20% stenosis, EF 20-25%, c/w Takotsubo cardiomyopathy    APPENDECTOMY  1974    CHOLECYSTECTOMY  1974    [facility]    COLONOSCOPY N/A 04/26/2018    Procedure: COLONOSCOPY;  COLONOSCOPY with biopsies;  Surgeon: Steve Acosta MD;  Location:  OR    CORONARY ANGIOGRAPHY ADULT ORDER      CV CORONARY ANGIOGRAM N/A 07/30/2020    Procedure: Coronary Angiogram;  Surgeon: Quinn Viveros MD;  Location:  HEART CARDIAC CATH LAB    CV LEFT HEART CATH N/A 07/30/2020    Procedure: Left Heart Cath;  Surgeon: Quinn Viveros MD;  Location:  HEART CARDIAC CATH LAB    CV LEFT VENTRICULOGRAM N/A 07/30/2020    Procedure: Left Ventriculogram;  Surgeon: Quinn Viveros MD;  Location:  HEART CARDIAC CATH LAB    CV RIGHT HEART CATH MEASUREMENTS RECORDED N/A 07/30/2020    Procedure: Right Heart Cath;  Surgeon: Quinn Viveros MD;  Location:  HEART CARDIAC CATH LAB    ESOPHAGOSCOPY, GASTROSCOPY, DUODENOSCOPY (EGD), COMBINED  11/15/2011    Procedure:COMBINED ESOPHAGOSCOPY, GASTROSCOPY, DUODENOSCOPY (EGD);  ESOPHAGOSCOPY, GASTROSCOPY, DUODENOSCOPY (EGD) ; Surgeon:JIM URENA; Location: GI    INJECT EPIDURAL LUMBAR / SACRAL SINGLE N/A 04/13/2018    Procedure: INJECT EPIDURAL LUMBAR / SACRAL CONTINUAL OR INTERMITTENT;  Lumbar Epidural  Steroid Injection;  Surgeon: Az Enamorado MD;  Location: [facility] OR    INJECT EPIDURAL LUMBAR / SACRAL SINGLE N/A 09/24/2018    Procedure: INJECT EPIDURAL LUMBAR / SACRAL SINGLE;  Lumbar Epidural Steroid Injection;  Surgeon: Az Enamorado MD;  Location: [facility] OR    LAPAROSCOPIC ABLATION ENDOMETRIOSIS  1998    Nasal septoplasty and mucous retention cyst      TONSILLECTOMY, ADENOIDECTOMY, COMBINED      [place] teeth extraction         Prior to Admission Medications   Prior to Admission Medications   Prescriptions Last Dose Informant Patient Reported? Taking?   ALPRAZolam (XANAX) 0.25 MG tablet   No No   Sig: TAKE 1 TABLET BY MOUTH TWICE A DAY AS NEEDED FOR ANXIETY   [employer] FIBER GUMMIES 2.5 G CHEW  Self Yes No   Sig: Take 1 tablet by mouth daily Reported on 5/8/2017   HYDROcodone-acetaminophen (NORCO)  MG per tablet   No No   Sig: Take 1-2 tablets by mouth every 4 hours as needed for severe pain Take up to 8 tablets a day   Multiple Vitamin CHEW  Self Yes No   Sig: Take 1 tablet by mouth daily    SUMAtriptan (IMITREX) 25 MG tablet   No No   Sig: Take 1 tablet (25 mg) by mouth at onset of headache for migraine   TRELEGY ELLIPTA 200-62.5-25 MCG/ACT oral inhaler   Yes No   Sig: INHALE 1 PUFF BY INHALATION ROUTE EVERY DAY AT THE SAME TIME EACH DAY   albuterol (VENTOLIN HFA) 108 (90 BASE) MCG/ACT Inhaler  Self No No   Sig: Inhale 2 puffs into the lungs every 6 hours as needed for shortness of breath / dyspnea or wheezing   atorvastatin (LIPITOR) 10 MG tablet   No No   Sig: Take 1 tablet (10 mg) by mouth daily   azithromycin (ZITHROMAX) 250 MG tablet   Yes No   Sig: Take 250 mg by mouth Every Mon, Wed, Fri Morning   carvedilol (COREG) 25 MG tablet   No No   Sig: Take 1 tablet (25 mg) by mouth 2 times daily (with meals)   cetirizine (ZYRTEC) 10 MG tablet   Yes No   Sig: Take 10 mg by mouth daily   doxycycline hyclate (PERIOSTAT) 20 MG tablet   Yes No   Sig: Take 1 tablet by mouth daily at 2 pm For stye prevention    fluticasone (FLONASE) 50 MCG/ACT nasal spray   No No   Sig: Spray 1-2 sprays into both nostrils daily as needed for rhinitis or allergies   ipratropium - albuterol 0.5 mg/2.5 mg/3 mL (DUONEB) 0.5-2.5 (3) MG/3ML neb solution   No No   Sig: NEBULIZE CONTENTS OF ONE VIAL EVERY 6 HOURS AS NEEDED FOR SHORTNESS OF BREATH OR WHEEZING   ketorolac (TORADOL) 10 MG tablet   No No   Sig: TAKE 1 TABLET (10 MG) BY MOUTH EVERY 6 HOURS AS NEEDED FOR MODERATE PAIN   lisinopril (ZESTRIL) 5 MG tablet   No No   Sig: Take 1 tablet (5 mg) by mouth daily   methocarbamol (ROBAXIN) 500 MG tablet   No No   Sig: Take 1 tablet (500 mg) by mouth 4 times daily   montelukast (SINGULAIR) 10 MG tablet   No No   Sig: TAKE ONE TABLET BY MOUTH AT BEDTIME   naloxone (NARCAN) 4 MG/0.1ML nasal spray   No No   Sig: Spray 1 spray (4 mg) into one nostril alternating nostrils once as needed for opioid reversal Every 2-3 minutes until patient responsive or EMS arrives   ondansetron (ZOFRAN ODT) 4 MG ODT tab   No No   Sig: TAKE 1-2 TABLETS BY MOUTH EVERY 8 HOURS AS NEEDED FOR NAUSEA   oxyCODONE (ROXICODONE) 5 MG tablet   No No   Sig: Take 1 tablet (5 mg) by mouth every 12 hours as needed for pain   pantoprazole (PROTONIX) 40 MG EC tablet   No No   Sig: TAKE 1 TABLET (40 MG) BY MOUTH DAILY   predniSONE (DELTASONE) 5 MG tablet   Yes No   Sig: Take 3 tablets by mouth daily at 2 pm   roflumilast (DALIRESP) 500 MCG TABS tablet  Self Yes No   Sig: Take 1 tablet (500 mcg) by mouth daily   sodium chloride (MOLLY 128) 5 % ophthalmic ointment   Yes No   Sig: Place 1 Application into both eyes every evening   spironolactone (ALDACTONE) 25 MG tablet   No No   Sig: Take 0.5 tablets (12.5 mg) by mouth daily   traMADol (ULTRAM) 50 MG tablet   No No   Sig: TAKE 1 TABLET BY MOUTH EVERY 6 HOURS AS NEEDED FOR SEVERE PAIN      Facility-Administered Medications: None        Review of Systems    The 10 point Review of Systems is negative other than noted in the HPI or here.      Social History   I have reviewed this patient's social history and updated it with pertinent information if needed. Comes from home with spouse.   Social History     Tobacco Use    Smoking status: Former     Current packs/day: 0.00     Average packs/day: 1 pack/day for 30.0 years (30.0 ttl pk-yrs)     Types: Cigarettes     Start date: 1976     Quit date: 2006     Years since quittin.3    Smokeless tobacco: Never    Tobacco comments:     Quit in 2006   Vaping Use    Vaping status: Never Used   Substance Use Topics    Alcohol use: No     Alcohol/week: 0.0 standard drinks of alcohol    Drug use: No        Physical Exam   Vital Signs: Temp: 97.9  F (36.6  C) Temp src: Oral BP: 118/80 Pulse: 89   Resp: 18 SpO2: 96 % O2 Device: None (Room air)    Weight: 169 lbs 8.54 oz    Constitutional: Awake, alert,  frail and chronically ill appearing.   Eyes: Conjunctiva and pupils examined and normal.  HEENT: Dry  mucous membranes, normal dentition.  Respiratory: Clear to auscultation bilaterally, no crackles or wheezing.  Cardiovascular: Regular rate and rhythm, normal S1 and S2, and no murmur noted.  GI: Soft, non-distended, mildly tender to mid and lower abdomen, bowel sounds hyperactive. No rebound tenderness or guarding.  Musculoskeletal: No gross deformities noted.  No erythema or tenderness. Moving all extremities in bed.  Neurologic: A&Ox4. No focal deficits noted. Speech is clear. Coordination and strength grossly normal.       Medical Decision Making       75 MINUTES SPENT BY ME on the date of service doing chart review, history, exam, documentation & further activities per the note.      Data   ------------------------- PAST 24 HR DATA REVIEWED -----------------------------------------------

## 2024-05-05 NOTE — PHARMACY-ADMISSION MEDICATION HISTORY
Pharmacy Intern Admission Medication History    Admission medication history is complete. The information provided in this note is only as accurate as the sources available at the time of the update.    Information Source(s): Patient, Family member, and CareEverywhere/SureScripts via in-person    Pertinent Information: NONE    Changes made to PTA medication list:  Added: None  Deleted: TRAMADOL, OXYCODONE  Changed:   ROBAXIN 500 MG TO AS NEEDED  MULTIVITAMIN 2 TABS INSTEAD OF 1   PREDNISONE 5 mg in AM and 10 mg at PM. From 15 mg (3 tablets) at pm    Allergies reviewed with patient and updates made in EHR: yes    Medication History Completed By: Jose Alejandro Toledo 5/5/2024 2:11 PM    PTA Med List   Medication Sig Last Dose    albuterol (VENTOLIN HFA) 108 (90 BASE) MCG/ACT Inhaler Inhale 2 puffs into the lungs every 6 hours as needed for shortness of breath / dyspnea or wheezing PRN at PRN    ALPRAZolam (XANAX) 0.25 MG tablet TAKE 1 TABLET BY MOUTH TWICE A DAY AS NEEDED FOR ANXIETY 5/4/2024 at PM    atorvastatin (LIPITOR) 10 MG tablet Take 10 mg by mouth at bedtime 5/4/2024 at PM    carvedilol (COREG) 25 MG tablet Take 1 tablet (25 mg) by mouth 2 times daily (with meals) 5/4/2024 at PM    cetirizine (ZYRTEC) 10 MG tablet Take 10 mg by mouth at bedtime 5/4/2024 at PM    [employer] FIBER GUMMIES 2.5 G CHEW Take 1 tablet by mouth at bedtime Reported on 5/8/2017 Past Month at -    doxycycline hyclate (PERIOSTAT) 20 MG tablet Take 20 mg by mouth at bedtime Past Week at -    fluticasone (FLONASE) 50 MCG/ACT nasal spray Spray 1-2 sprays into both nostrils daily as needed for rhinitis or allergies Past Week at -    HYDROcodone-acetaminophen (NORCO)  MG per tablet Take 1 tablet by mouth 4 times daily as needed for severe pain 5/4/2024 at PM    ipratropium - albuterol 0.5 mg/2.5 mg/3 mL (DUONEB) 0.5-2.5 (3) MG/3ML neb solution Take 1 vial by nebulization every 6 hours as needed for shortness of breath, wheezing or cough 5/4/2024 at PM     ketorolac (TORADOL) 10 MG tablet TAKE 1 TABLET (10 MG) BY MOUTH EVERY 6 HOURS AS NEEDED FOR MODERATE PAIN Past Week at -    lisinopril (ZESTRIL) 5 MG tablet Take 5 mg by mouth at bedtime 5/4/2024 at PM    methocarbamol (ROBAXIN) 500 MG tablet Take 500 mg by mouth 4 times daily as needed for muscle spasms 5/4/2024 at PM    montelukast (SINGULAIR) 10 MG tablet TAKE ONE TABLET BY MOUTH AT BEDTIME 5/4/2024 at PM    Multiple Vitamin CHEW Take 2 tablets by mouth at bedtime 5/4/2024 at PM    naloxone (NARCAN) 4 MG/0.1ML nasal spray Spray 1 spray (4 mg) into one nostril alternating nostrils once as needed for opioid reversal Every 2-3 minutes until patient responsive or EMS arrives PRN at PRN    ondansetron (ZOFRAN ODT) 4 MG ODT tab TAKE 1-2 TABLETS BY MOUTH EVERY 8 HOURS AS NEEDED FOR NAUSEA 5/5/2024 at AM    pantoprazole (PROTONIX) 40 MG EC tablet Take 40 mg by mouth at bedtime 5/4/2024 at PM    predniSONE (DELTASONE) 5 MG tablet Take 10 mg by mouth at bedtime 5/4/2024 at PM    predniSONE (DELTASONE) 5 MG tablet Take 1 tablet by mouth every morning 5/4/2024 at AM    roflumilast (DALIRESP) 500 MCG TABS tablet Take 500 mcg by mouth at bedtime 5/4/2024 at PM    sodium chloride (MOLLY 128) 5 % ophthalmic ointment Place 1 Application into both eyes every evening 5/4/2024 at PM    spironolactone (ALDACTONE) 25 MG tablet Take 0.5 tablets (12.5 mg) by mouth daily 5/4/2024 at AM    SUMAtriptan (IMITREX) 25 MG tablet Take 1 tablet (25 mg) by mouth at onset of headache for migraine Unknown at -    TRELEGY ELLIPTA 200-62.5-25 MCG/ACT oral inhaler Inhale 1 puff into the lungs daily 5/4/2024 at PM

## 2024-05-06 NOTE — PROGRESS NOTES
Clinic Care Coordination Contact  Ambulatory Care Coordination to Inpatient Care Management   Hand-In Communication    Date:  May 6, 2024  Name: Trisha Bender is enrolled in Ambulatory Care Coordination program and I am the Lead Care Coordinator.  CC Contact Information: Epic InGame Insightsket + phone  Payor Source: Payor: [facility] / Plan: [facility] MEDICARE / Product Type: HMO /   Current services in place:     Please see the CC Snaphot and Care Management Flowsheets for specific  details of this Trisha Bender care plan.   Additional details/specific concerns r/t this admission:    Discharge Disposition Home with services vs. TCU    I will follow this admission in Epic. Please feel free to contact me with questions or for further collaboration in discharge planning.    Cynthia Snell RN, BSN, CPHN, CCM  [facility] Ambulatory Care Management  [facility]  Phone: [phone]  Email: [email or web address]

## 2024-05-06 NOTE — PLAN OF CARE
PRIMARY DIAGNOSIS: ABDOMINAL PAIN, N / V/ D  OUTPATIENT/OBSERVATION GOALS TO BE MET BEFORE DISCHARGE:  1. Pain Status: Improved but still requiring IV narcotics.    2. Return to near baseline physical activity: Yes    3. Cleared for discharge by consultants (if involved): No    Discharge Planner Nurse   Safe discharge environment identified: Yes  Barriers to discharge: Yes       Entered by: Valencia Rosa RN 05/06/2024 5:05 AM  Patient is Aox4, VSS, has not been out of bed at this time, tolerating regular diet and oral medications, PIV in the R arm SL, pure wick in place and patent, WOC consult in for buttocks wounds x3, on room air, pain noted at a 9/10 in the back, PRN Norco and IV Dilaudid administered with relief, nausea, vomiting and diarrhea have resolved since admission, sleeping well, able to make needs known, will continue to monitor and provide cares.   Please review provider order for any additional goals.   Nurse to notify provider when observation goals have been met and patient is ready for discharge.  Problem: Adult Inpatient Plan of Care  Goal: Plan of Care Review  Description: The Plan of Care Review/Shift note should be completed every shift.  The Outcome Evaluation is a brief statement about your assessment that the patient is improving, declining, or no change.  This information will be displayed automatically on your shift  note.  5/6/2024 0504 by Valencia Rosa RN  Outcome: Progressing  Flowsheets (Taken 5/6/2024 0400)  Outcome Evaluation: no nausea, no vomiting and no diarrhea this shift, back pain noted, PRN's administered with relief  Plan of Care Reviewed With: patient  Overall Patient Progress: improving  5/6/2024 0229 by Valencia Rosa RN  Outcome: Progressing  Flowsheets (Taken 5/6/2024 0000)  Outcome Evaluation: back pain noted, PRN Norco, and IV Dilaudid given with relief, sleeping well  Plan of Care Reviewed With: patient  Overall Patient Progress: improving  5/6/2024 0217 by  "Valencia Rosa RN  Outcome: Progressing  Flowsheets  Taken 5/6/2024 0000  Outcome Evaluation: back pain noted, PRN Norco, and IV Dilaudid given with relief, sleeping well  Plan of Care Reviewed With: patient  Overall Patient Progress: improving  Taken 5/5/2024 2000  Outcome Evaluation: No nausea, no diarrhea, no abdominal pain, chronic back pain, resting  Plan of Care Reviewed With: patient  Overall Patient Progress: improving  Goal: Patient-Specific Goal (Individualized)  Description: You can add care plan individualizations to a care plan. Examples of Individualization might be:  \"Parent requests to be called daily at 9am for status\", \"I have a hard time hearing out of my right ear\", or \"Do not touch me to wake me up as it startles  me\".  5/6/2024 0504 by Valencia Rosa RN  Outcome: Progressing  5/6/2024 0229 by Valencia Rosa RN  Outcome: Progressing  5/6/2024 0217 by Valencia Rosa RN  Outcome: Progressing  Goal: Absence of Hospital-Acquired Illness or Injury  5/6/2024 0504 by Valencia Rosa RN  Outcome: Progressing  5/6/2024 0229 by Valencia Rosa RN  Outcome: Progressing  5/6/2024 0217 by Valencia Rosa RN  Outcome: Progressing  Intervention: Identify and Manage Fall Risk  Recent Flowsheet Documentation  Taken 5/5/2024 2139 by Valencia Rosa RN  Safety Promotion/Fall Prevention:   activity supervised   clutter free environment maintained   room organization consistent   safety round/check completed   supervised activity  Intervention: Prevent Skin Injury  Recent Flowsheet Documentation  Taken 5/5/2024 2139 by Valencia Rosa RN  Body Position: position maintained  Device Skin Pressure Protection: (wants to stay on her back)   other (see comments)   absorbent pad utilized/changed  Intervention: Prevent and Manage VTE (Venous Thromboembolism) Risk  Recent Flowsheet Documentation  Taken 5/5/2024 2139 by Valencia Rosa RN  VTE Prevention/Management: SCDs (sequential compression " devices) off  Intervention: Prevent Infection  Recent Flowsheet Documentation  Taken 5/5/2024 2139 by Valencia Rosa RN  Infection Prevention: rest/sleep promoted  Goal: Optimal Comfort and Wellbeing  5/6/2024 0504 by Valencia Rosa RN  Outcome: Progressing  5/6/2024 0229 by Valencia Rosa RN  Outcome: Progressing  5/6/2024 0217 by Valencia Rosa RN  Outcome: Progressing  Intervention: Monitor Pain and Promote Comfort  Recent Flowsheet Documentation  Taken 5/6/2024 0307 by Valencia Rosa RN  Pain Management Interventions: medication (see MAR)  Taken 5/6/2024 0036 by Valencia Rosa RN  Pain Management Interventions: medication (see MAR)  Taken 5/5/2024 2323 by Valencia Rosa RN  Pain Management Interventions: medication (see MAR)  Taken 5/5/2024 2203 by Valencia Rosa RN  Pain Management Interventions: medication (see MAR)  Taken 5/5/2024 1929 by Valencia Rsoa RN  Pain Management Interventions: medication (see MAR)  Goal: Readiness for Transition of Care  5/6/2024 0504 by Valencia Rosa RN  Outcome: Progressing  5/6/2024 0229 by Valencia Rosa RN  Outcome: Progressing  5/6/2024 0217 by Valencia Rosa RN  Outcome: Progressing     Goal Outcome Evaluation:      Plan of Care Reviewed With: patient    Overall Patient Progress: improvingOverall Patient Progress: improving    Outcome Evaluation: no nausea, no vomiting and no diarrhea this shift, back pain noted, PRN's administered with relief

## 2024-05-06 NOTE — DISCHARGE SUMMARY
[facility]  Hospitalist Discharge Summary      Date of Admission:  5/5/2024  Date of Discharge:  5/6/2024  Discharging Provider: Nallely Lynch  Discharge Service: Hospitalist Service    Provider Attestation: I agree with the information in Nallely Lynch's note. Patient presented with abdominal pain and diarrhea with evidence of mild colitis on CT imaging that after supportive care overnight has improved and she is requesting to discharge home.  She had 1 loose stool prior to discharge but unfortunately it was labeled incorrectly and she was given a jar to collect stool at home to bring back to the lab.  Mony FREITAS    Discharge Diagnoses   Colitis   Abdominal pain, diarrhea, nausea   Hyponatremia     Clinically Significant Risk Factors          Follow-ups Needed After Discharge       Unresulted Labs Ordered in the Past 30 Days of this Admission       Date and Time Order Name Status Description    5/5/2024 10:25 AM Enteric Bacteria and Virus Panel PCR In process     5/5/2024 10:25 AM C. difficile Toxin B PCR with reflex to C. difficile Antigen and Toxins A/B EIA In process           Discharge Disposition   Discharged to home  Condition at discharge: Stable    Hospital Course   Trisha Bender is a 68-year-old female with PMHx of CAD, CHF with reduced EF of 35-40%, pulmonary HTN, COPD, GERD, HTN, hyponatremia, chronic leukocytosis, chronic pain, functional decline recently established with palliative care, who was admitted on 5/5/2024 with colitis.     Patient presented to the emergency department with 3-4 days of persistent nausea, abdominal pain, and non-bloody diarrhea. Vital signs in the ER were unremarkable, patient afebrile. CBC with leukocytosis (consistent with patient's baseline) and mild anemia. Lipase and CMP unremarkable except for mild hyponatremia which patient has a history of. CT of abdomen pelvis demonstrated mild wall thickening of the descending and rectosigmoid  colon. EKG with NSR and RBBB, unchanged from prior. Patient was given zofran, dilaudid, benadryl and IVF, without significant relief of abdominal pain and nausea. Patient was admitted to the observation unit for further evaluation and management of colitis and intractable nausea.     During patient's hospitalization, she was treated symptomatically with simethicone, maalox, pantoprazole, and anti-emetics. Patient was able to give a stool sample day of discharge, this was sent to the lab and is pending. Stool was formed, no subsequent episodes of diarrhea, low suspicion for infectious etiology of diarrhea. Patient was noted to have chronic wounds to her buttocks and was treated with topical Desitin during her stay, WOC RN evaluated patient at bedside, recommending patient clean wound with MicroKlenz Spray, cover with Mepilex. Also recommending position changes every 1-2 hours at home, avoiding a brief in bed, and ensuring patient dry skin folds during bathing.     With the above treatments, patients symptoms improved. Patient was able to tolerate a regular diet and reported oral intake similar to her baseline while in the hospital. Patient is currently DNR/DNI, receiving palliative care, is not interested in further workup at this time.     With improvement of symptoms and tolerance of PO intake, patient was deemed safe for discharge to home. Patient will be discharged with prescriptions for simethicone, zofran, and topical Desitin. Recommend patient follow up with her primary care provider in 2-3 weeks for hospital follow-up. Patient can follow up with her palliative care team as needed as well.     Consultations This Hospital Stay   CARE MANAGEMENT / SOCIAL WORK IP CONSULT  WOUND OSTOMY CONTINENCE NURSE  IP CONSULT    Code Status   No CPR- Do NOT Intubate    Time Spent on this Encounter   Nallely ZARATE, personally saw the patient today and spent greater than 30 minutes discharging this patient.       Nallely  Cris MONTELONGO ([facility])  [facility] OBSERVATION DEPT  [address] [facility] [address]  Phone: [phone]  ______________________________________________________________________    Physical Exam   Vital Signs: Temp: 98  F (36.7  C) Temp src: Oral BP: 97/60 Pulse: 90   Resp: 16 SpO2: 95 % O2 Device: None (Room air)    Weight: 162 lbs .61 oz  GEN: Alert, oriented x 3, appears chronically ill, resting comfortably in bed, NAD.  NECK: Supple, no mass or thyromegaly   HEENT: Normocephalic/atraumatic, no scleral icterus, no nasal discharge, mouth moist.  CV:  Regular rate and rhythm, no murmur, S3 or S4.  LUNGS: Clear to auscultation bilaterally without rales/rhonchi/wheezing/retractions.  Symmetric chest rise on inhalation noted.  ABD: hyperactive bowel sounds, soft, non-tender/non-distended.  No rebound/guarding/rigidity.  EXT: No edema.  No cyanosis.   SKIN: Dry to touch, no exanthems noted in the visualized areas.  Neurologic: Grossly intact, non focal.    Primary Care Physician   Bandra Weinberg    Discharge Orders   No discharge procedures on file.    Significant Results and Procedures   Most Recent 3 CBC's:  Recent Labs   Lab Test 05/05/24  0953 04/06/24  0513 11/13/23  1555   WBC 13.9* 15.9* 12.5*   HGB 11.0* 10.5* 13.2   MCV 86 88 96    446 297     Most Recent 3 BMP's:  Recent Labs   Lab Test 05/06/24  0654 05/05/24  1706 05/05/24  0953 04/06/24  1812 04/06/24  0513   * 134* 127*   < > 126*   POTASSIUM 3.8  --  3.6  --  4.8   CHLORIDE 100  --  93*  --  91*   CO2 21*  --  21*  --  23   BUN 4.0*  --  4.1*  --  7.6*   CR 0.47*  --  0.47*  --  0.48*   ANIONGAP 11  --  13  --  12   JEANNIE 7.9*  --  7.6*  --  8.1*   *  --  110*  --  171*    < > = values in this interval not displayed.     Most Recent 2 LFT's:  Recent Labs   Lab Test 05/05/24  0953 04/06/24  0513   AST 10 12   ALT 7 9   ALKPHOS 51 78   BILITOTAL 0.4 0.4     Most Recent 3 Creatinines:  Recent Labs    Lab Test 05/06/24  0654 05/05/24  0953 04/06/24  0513   CR 0.47* 0.47* 0.48*     Most Recent 3 Hemoglobins:  Recent Labs   Lab Test 05/05/24  0953 04/06/24  0513 11/13/23  1555   HGB 11.0* 10.5* 13.2     Most Recent 3 Troponin's:  Recent Labs   Lab Test 01/07/20  1807 09/07/19  2308 12/30/18  1734   TROPI <0.015 <0.015 <0.015     7-Day Micro Results       Collected Updated Procedure Result Status      05/06/2024 1303 05/06/2024 1311 Enteric Bacteria and Virus Panel PCR [[id number]]   Stool from Per Rectum    In process Component Value   No component results            05/06/2024 1301 05/06/2024 1311 C. difficile Toxin B PCR with reflex to C. difficile Antigen and Toxins A/B EIA [[id number]]   Stool from Per Rectum    In process Component Value   No component results                  Most Recent Hemoglobin A1c:  Recent Labs   Lab Test 04/16/18  0847   A1C 5.7     Most Recent 6 glucoses:  Recent Labs   Lab Test 05/06/24  0654 05/05/24  0953 04/06/24  0513 11/13/23  1555 10/04/23  1621 09/21/23  0546   * 110* 171* 128* 195* 106*     Most Recent Urinalysis:  Recent Labs   Lab Test 10/04/23  1919 09/21/23  0400 01/07/20  1807   COLOR Light Yellow   < > Yellow   APPEARANCE Clear   < > Clear   URINEGLC Negative   < > Negative   URINEBILI Negative   < > Negative   URINEKETONE Negative   < > Negative   SG 1.014   < > 1.015   UBLD Negative   < > Trace*   URINEPH 6.5   < > 7.0   PROTEIN Negative   < > Negative   UROBILINOGEN  --   --  0.2   NITRITE Negative   < > Negative   LEUKEST Negative   < > Negative   RBCU <1   < > O - 2   WBCU 1   < > 0 - 5    < > = values in this interval not displayed.   ,   Results for orders placed or performed during the hospital encounter of 05/05/24   CT Abdomen Pelvis w Contrast    Narrative    EXAM: CT ABDOMEN PELVIS W CONTRAST  LOCATION: [facility]  DATE: 5/5/2024    INDICATION: epigastric, RUQ abdominal pain with nausea and diarrhea  COMPARISON: CTA chest,  CT abdomen pelvis 9/21/2023  TECHNIQUE: CT scan of the abdomen and pelvis was performed following injection of IV contrast. Multiplanar reformats were obtained. Dose reduction techniques were used.  CONTRAST: 85mL Isovue 370    FINDINGS:   LOWER CHEST: Unchanged 6 mm nodule in the left lower lobe with dependent areas of scarring. Background emphysema.    HEPATOBILIARY: Normal liver contours. Focal fat deposition adjacent to the falciform. No worrisome liver lesions. Cholecystectomy. No biliary ductal dilation.    PANCREAS: Normal.    SPLEEN: Normal.    ADRENAL GLANDS: Normal.    KIDNEYS/BLADDER: Kidneys enhance symmetrically with bilateral subcentimeter cortically based cysts, which require no follow-up. No urolithiasis or collecting system dilation. Urinary bladder is unremarkable.    BOWEL: No obstruction. Colon is decompressed, though there may be mild wall thickening of the descending and rectosigmoid colon. No free fluid or free air.    LYMPH NODES: No lymphadenopathy.    VASCULATURE: Severe atherosclerotic vascular calcifications of the abdominal aorta and its branches. No aneurysm.    PELVIC ORGANS: Uterus is present. No adnexal mass.    MUSCULOSKELETAL: Bones are demineralized with similar underlying osseous heterogeneity. The numerous superior endplate compression deformities are unchanged (T10, T12 - L4). Healed bilateral rib fractures.      Impression    IMPRESSION:   1.  Mild wall thickening of the descending and rectosigmoid colon, which could be seen with colitis (infectious, inflammatory, or nonembolic ischemic).  2.  Otherwise, no acute abnormality in the abdomen or pelvis.  3.  Unchanged 6 mm left lower lobe nodule.  4.  Additional details as described in the findings.     *Note: Due to a large number of results and/or encounters for the requested time period, some results have not been displayed. A complete set of results can be found in Results Review.       Discharge Medications   Current  Discharge Medication List        CONTINUE these medications which have NOT CHANGED    Details   albuterol (VENTOLIN HFA) 108 (90 BASE) MCG/ACT Inhaler Inhale 2 puffs into the lungs every 6 hours as needed for shortness of breath / dyspnea or wheezing  Qty: 18 g, Refills: 3    Associated Diagnoses: Asthma exacerbation      ALPRAZolam (XANAX) 0.25 MG tablet TAKE 1 TABLET BY MOUTH TWICE A DAY AS NEEDED FOR ANXIETY  Qty: 60 tablet, Refills: 0    Comments: Not to exceed 5 additional fills before 09/14/2024  Associated Diagnoses: Anxiety; Chronic prescription benzodiazepine use      atorvastatin (LIPITOR) 10 MG tablet Take 10 mg by mouth at bedtime      carvedilol (COREG) 25 MG tablet Take 1 tablet (25 mg) by mouth 2 times daily (with meals)  Qty: 180 tablet, Refills: 4    Associated Diagnoses: Dilated cardiomyopathy (H)      cetirizine (ZYRTEC) 10 MG tablet Take 10 mg by mouth at bedtime      [employer] FIBER GUMMIES 2.5 G CHEW Take 1 tablet by mouth at bedtime Reported on 5/8/2017      doxycycline hyclate (PERIOSTAT) 20 MG tablet Take 20 mg by mouth at bedtime      fluticasone (FLONASE) 50 MCG/ACT nasal spray Spray 1-2 sprays into both nostrils daily as needed for rhinitis or allergies  Qty: 16 g, Refills: 4    Associated Diagnoses: Postnasal drip      HYDROcodone-acetaminophen (NORCO)  MG per tablet Take 1 tablet by mouth 4 times daily as needed for severe pain      ipratropium - albuterol 0.5 mg/2.5 mg/3 mL (DUONEB) 0.5-2.5 (3) MG/3ML neb solution Take 1 vial by nebulization every 6 hours as needed for shortness of breath, wheezing or cough      ketorolac (TORADOL) 10 MG tablet TAKE 1 TABLET (10 MG) BY MOUTH EVERY 6 HOURS AS NEEDED FOR MODERATE PAIN  Qty: 20 tablet, Refills: 0    Associated Diagnoses: Compression fracture of thoracic vertebra with routine healing, unspecified thoracic vertebral level, subsequent encounter      lisinopril (ZESTRIL) 5 MG tablet Take 5 mg by mouth at bedtime      methocarbamol (ROBAXIN)  500 MG tablet Take 500 mg by mouth 4 times daily as needed for muscle spasms      montelukast (SINGULAIR) 10 MG tablet TAKE ONE TABLET BY MOUTH AT BEDTIME  Qty: 90 tablet, Refills: 1    Associated Diagnoses: Mild persistent asthma without complication      Multiple Vitamin CHEW Take 2 tablets by mouth at bedtime      naloxone (NARCAN) 4 MG/0.1ML nasal spray Spray 1 spray (4 mg) into one nostril alternating nostrils once as needed for opioid reversal Every 2-3 minutes until patient responsive or EMS arrives  Qty: 0.2 mL, Refills: 0    Associated Diagnoses: Compression fracture of thoracic vertebra with routine healing, unspecified thoracic vertebral level, subsequent encounter; Chronic bilateral low back pain, unspecified whether sciatica present      ondansetron (ZOFRAN ODT) 4 MG ODT tab TAKE 1-2 TABLETS BY MOUTH EVERY 8 HOURS AS NEEDED FOR NAUSEA  Qty: 18 tablet, Refills: 2    Associated Diagnoses: Nausea      pantoprazole (PROTONIX) 40 MG EC tablet Take 40 mg by mouth at bedtime      !! predniSONE (DELTASONE) 5 MG tablet Take 10 mg by mouth at bedtime      !! predniSONE (DELTASONE) 5 MG tablet Take 1 tablet by mouth every morning      roflumilast (DALIRESP) 500 MCG TABS tablet Take 500 mcg by mouth at bedtime      sodium chloride (MOLLY 128) 5 % ophthalmic ointment Place 1 Application into both eyes every evening      spironolactone (ALDACTONE) 25 MG tablet Take 0.5 tablets (12.5 mg) by mouth daily  Qty: 90 tablet, Refills: 4    Associated Diagnoses: Chronic systolic congestive heart failure (H)      SUMAtriptan (IMITREX) 25 MG tablet Take 1 tablet (25 mg) by mouth at onset of headache for migraine  Qty: 9 tablet, Refills: 11    Associated Diagnoses: Chronic migraine without aura without status migrainosus, not intractable      TRELEGY ELLIPTA 200-62.5-25 MCG/ACT oral inhaler Inhale 1 puff into the lungs daily      azithromycin (ZITHROMAX) 250 MG tablet Take 250 mg by mouth Every Mon, Wed, Fri Morning       !! -  Potential duplicate medications found. Please discuss with provider.        Allergies   Allergies   Allergen Reactions    Gabapentin Swelling    Contrast Dye      Iodine    Escitalopram      Nausea and sickness    Peanuts [Nuts] Hives    Penicillins      rash    Sulfa Antibiotics      High family history    Tace [Chlorotrianisene]      joint swelling    Aspirin Rash     After 3 days    Ibuprofen Nausea and Vomiting and Swelling    Strawberry Extract Rash

## 2024-05-06 NOTE — PROGRESS NOTES
[facility]    Patient is currently receiving home care services from [facility]. The patient is currently receiving SN PT OT MSW HHA services.  and home health team have been notified of patient admission. [facility] Liaison will continue to follow patient during stay. If appropriate provide orders to resume home care at time of discharge and make sure SHIRA date is appropriate, ensuring a safe discharge plan.     Thank you,     MELISSA Ingram, LPN  Home Care Liaison   Cell: [phone]

## 2024-05-06 NOTE — DISCHARGE INSTRUCTIONS
"Bilateral buttock and Left IT wounds: Every other day and as needed if soiled.  1. Clean wound with saline or MicroKlenz Spray, pat dry  2. Wipe / \"clean\" the surrounding periwound tissue with skin prep and allow to dry. This will help protect periwound and help dressing adherence  3. Press a Mepilex Sacral to cover sacrum and a mepilex 4x4 to cover the left IT, making sure to conform nicely to skin curvatures.   4. Time and date dressing change    If unable to maintain dressing due to stools. OK to switch to twice daily cleanse with shannon cleanse and protect perineal cleanser and cover with a thin layer of Traid Paste.  "

## 2024-05-06 NOTE — PLAN OF CARE
PRIMARY DIAGNOSIS: N / V / D  OUTPATIENT/OBSERVATION GOALS TO BE MET BEFORE DISCHARGE:  1. Pain Status: Improved but still requiring IV narcotics.    2. Return to near baseline physical activity: No    3. Cleared for discharge by consultants (if involved): No    Discharge Planner Nurse   Safe discharge environment identified: Yes  Barriers to discharge: Yes       Entered by: Valencia Rosa RN 05/06/2024 2:30 AM  Patient is Aox4, VSS, has not been out of bed at this time, tolerating regular diet and oral medications, PIV in the R arm SL, pure wick in place and patent, WOC consult in for buttocks wounds x3, on room air, pain noted at a 9/10 in the back, PRN Norco and IV Dilaudid administered with relief, sleeping well, able to make needs known, will continue to monitor and provide cares.   Please review provider order for any additional goals.   Nurse to notify provider when observation goals have been met and patient is ready for discharge.  Problem: Adult Inpatient Plan of Care  Goal: Plan of Care Review  Description: The Plan of Care Review/Shift note should be completed every shift.  The Outcome Evaluation is a brief statement about your assessment that the patient is improving, declining, or no change.  This information will be displayed automatically on your shift  note.  5/6/2024 0229 by Valencia Rosa RN  Outcome: Progressing  Flowsheets (Taken 5/6/2024 0000)  Outcome Evaluation: back pain noted, PRN Norco, and IV Dilaudid given with relief, sleeping well  Plan of Care Reviewed With: patient  Overall Patient Progress: improving  5/6/2024 0217 by Valencia Rosa RN  Outcome: Progressing  Flowsheets  Taken 5/6/2024 0000  Outcome Evaluation: back pain noted, PRN Norco, and IV Dilaudid given with relief, sleeping well  Plan of Care Reviewed With: patient  Overall Patient Progress: improving  Taken 5/5/2024 2000  Outcome Evaluation: No nausea, no diarrhea, no abdominal pain, chronic back pain, resting  Plan  "of Care Reviewed With: patient  Overall Patient Progress: improving  Goal: Patient-Specific Goal (Individualized)  Description: You can add care plan individualizations to a care plan. Examples of Individualization might be:  \"Parent requests to be called daily at 9am for status\", \"I have a hard time hearing out of my right ear\", or \"Do not touch me to wake me up as it startles  me\".  5/6/2024 0229 by Valencia Rosa RN  Outcome: Progressing  5/6/2024 0217 by Valencia Rosa RN  Outcome: Progressing  Goal: Absence of Hospital-Acquired Illness or Injury  5/6/2024 0229 by Valencia Rosa RN  Outcome: Progressing  5/6/2024 0217 by Valencia Rosa RN  Outcome: Progressing  Intervention: Identify and Manage Fall Risk  Recent Flowsheet Documentation  Taken 5/5/2024 2139 by Valencia Rosa RN  Safety Promotion/Fall Prevention:   activity supervised   clutter free environment maintained   room organization consistent   safety round/check completed   supervised activity  Intervention: Prevent Skin Injury  Recent Flowsheet Documentation  Taken 5/5/2024 2139 by Valencia Rosa RN  Body Position: position maintained  Device Skin Pressure Protection: (wants to stay on her back)   other (see comments)   absorbent pad utilized/changed  Intervention: Prevent and Manage VTE (Venous Thromboembolism) Risk  Recent Flowsheet Documentation  Taken 5/5/2024 2139 by Valencia Rosa RN  VTE Prevention/Management: SCDs (sequential compression devices) off  Intervention: Prevent Infection  Recent Flowsheet Documentation  Taken 5/5/2024 2139 by Valencia Rosa RN  Infection Prevention: rest/sleep promoted  Goal: Optimal Comfort and Wellbeing  5/6/2024 0229 by Valencia Rosa RN  Outcome: Progressing  5/6/2024 0217 by Valencia Rosa RN  Outcome: Progressing  Intervention: Monitor Pain and Promote Comfort  Recent Flowsheet Documentation  Taken 5/5/2024 2323 by Valencia Rosa RN  Pain Management Interventions: medication " (see MAR)  Taken 5/5/2024 2203 by Valencia Rosa RN  Pain Management Interventions: medication (see MAR)  Taken 5/5/2024 1929 by Valencia Rosa RN  Pain Management Interventions: medication (see MAR)  Goal: Readiness for Transition of Care  5/6/2024 0229 by Valencia Rosa, RN  Outcome: Progressing  5/6/2024 0217 by Valencia Rosa RN  Outcome: Progressing     Goal Outcome Evaluation:      Plan of Care Reviewed With: patient    Overall Patient Progress: improvingOverall Patient Progress: improving    Outcome Evaluation: back pain noted, PRN Norco, and IV Dilaudid given with relief, sleeping well

## 2024-05-06 NOTE — CONSULTS
Care Management Initial Consult    General Information  Assessment completed with: Patient, Pt - Cynthia  Type of CM/SW Visit: Initial Assessment    Primary Care Provider verified and updated as needed: Yes   Readmission within the last 30 days: current reason for admission unrelated to previous admission      Reason for Consult: discharge planning  Advance Care Planning: Advance Care Planning Reviewed: present on chart        Communication Assessment  Patient's communication style: spoken language (English or Bilingual)    Hearing Difficulty or Deaf: no   Wear Glasses or Blind: no    Cognitive  Cognitive/Neuro/Behavioral: WDL                      Living Environment:   People in home: spouse  Steve  Current living Arrangements: house      Able to return to prior arrangements: yes     Family/Social Support:  Care provided by: self, spouse/significant other, homecare agency  Provides care for: no one  Marital Status:     Steve       Description of Support System: Supportive, Involved    Support Assessment: Adequate social supports, Adequate family and caregiver support    Current Resources:   Patient receiving home care services: Yes  Skilled Home Care Services: Skilled Nursing, Home Health Aid  Community Resources: Home Care  Equipment currently used at home: raised toilet seat, shower chair, wheelchair, manual, other (see comments), walker, rolling  Supplies currently used at home:      Employment/Financial:  Employment Status:          Financial Concerns: none   Referral to Financial Worker: No     Lifestyle & Psychosocial Needs:  Social Determinants of Health     Food Insecurity: Low Risk  (11/11/2023)    Food Insecurity     Within the past 12 months, did you worry that your food would run out before you got money to buy more?: No     Within the past 12 months, did the food you bought just not last and you didn t have money to get more?: No   Depression: Not at risk (4/22/2024)    PHQ-2     PHQ-2 Score:  "2   Housing Stability: Low Risk  (11/11/2023)    Housing Stability     Do you have housing? : Yes     Are you worried about losing your housing?: No   Tobacco Use: Medium Risk (4/25/2024)    Patient History     Smoking Tobacco Use: Former     Smokeless Tobacco Use: Never     Passive Exposure: Not on file   Financial Resource Strain: Low Risk  (11/11/2023)    Financial Resource Strain     Within the past 12 months, have you or your family members you live with been unable to get utilities (heat, electricity) when it was really needed?: No   Alcohol Use: Not on file   Transportation Needs: Low Risk  (11/11/2023)    Transportation Needs     Within the past 12 months, has lack of transportation kept you from medical appointments, getting your medicines, non-medical meetings or appointments, work, or from getting things that you need?: No   Physical Activity: Not on file   Interpersonal Safety: Low Risk  (10/5/2023)    Interpersonal Safety     Do you feel physically and emotionally safe where you currently live?: Yes     Within the past 12 months, have you been hit, slapped, kicked or otherwise physically hurt by someone?: No     Within the past 12 months, have you been humiliated or emotionally abused in other ways by your partner or ex-partner?: Not on file   Stress: Not on file   Social Connections: Not on file   Health Literacy: Not on file     Mental Health Status:  Mental Health Status: No Current Concerns       Chemical Dependency Status:  Chemical Dependency Status: No Current Concerns           Values/Beliefs:  Spiritual, Cultural Beliefs, Church Practices, Values that affect care:  n/a             Additional Information:  Patient is 68 year old female admitted on 5/5/2024 with a chief complaint of \"Diarrhea, Nausea, and Abdominal Pain\" (per H&P). Pt noted to have unplanned readmission risk of 24%.     Met with pt this date and introduced self and social work role. Pt reports she anticipates being discharged " home today and requests medical transport - stretcher ride. Pt is aware of potential cost. Pt has 2 steps to get into her home and then she has a stair chair. Pt reports she is currently open to [facility] for RN/HHA. Pt is currently observation status, therefore new home care orders are not needed.     Caravan stretcher ride tentatively arranged for today between [id number]. PCS completed and placed in pt's chart. Updated bedside RN.     PATRICK Clements, LSW  Care Coordination  [phone]    PATRICK Kaur

## 2024-05-06 NOTE — PLAN OF CARE
PRIMARY DIAGNOSIS: Nausea, diarrhea, back pain     OUTPATIENT/OBSERVATION GOALS TO BE MET BEFORE DISCHARGE  1. Orthostatic performed: N/A     2. Tolerating PO fluid and/or antibiotics (if applicable): Yes     3. Nausea/Vomiting/Diarrhea symptoms improved: Yes     4. Pain status: Improved-controlled with oral pain medications.     5. Return to near baseline physical activity: Yes        Discharge Planner Nurse  Safe discharge environment identified: Yes  Barriers to discharge: no       Entered by: Ciera Rangel RN 05/06/2024      Pt A&Ox4, chairfast at baseline - A2 transfer to commode/ chair. Enteric precautions - small amount of diarrhea 1x this am, sample sent to lab for c.diff and enteric rule out.  PIV RA, SL. 3 mepilexes on wounds on buttocks removed, area cleaned and cream applied, left open for WOC to consult.   Pt rates pain in back 7-8/10. Asking for and receiving PRN pain meds as they are due.  Tolerating reg diet though breakfast made her feel discomfort and fullness in her abd. Plan is to discharge to home later this afternoon via stretcher transport.     Goal Outcome Evaluation:      Plan of Care Reviewed With: patient    Overall Patient Progress: no changeOverall Patient Progress: no change    Outcome Evaluation: no n/v/d , tolerating reg diet.  complains of back pain - controlled with prn pain meds. Pt states she is ready to retrun to home with .      Problem: Adult Inpatient Plan of Care  Goal: Plan of Care Review  Description: The Plan of Care Review/Shift note should be completed every shift.  The Outcome Evaluation is a brief statement about your assessment that the patient is improving, declining, or no change.  This information will be displayed automatically on your shift  note.  5/6/2024 1510 by Ciera Rangel, RN  Outcome: Progressing  Flowsheets (Taken 5/6/2024 1510)  Plan of Care Reviewed With: patient  Overall Patient Progress: no change  5/6/2024 1028 by Ciera Rangel,  "RN  Outcome: Progressing  Flowsheets (Taken 5/6/2024 1028)  Outcome Evaluation: no n/v/d , tolerating reg diet.  complains of back pain - controlled with prn pain meds. Pt states she is ready to retrun to home with .  Plan of Care Reviewed With: patient  Overall Patient Progress: improving  Goal: Patient-Specific Goal (Individualized)  Description: You can add care plan individualizations to a care plan. Examples of Individualization might be:  \"Parent requests to be called daily at 9am for status\", \"I have a hard time hearing out of my right ear\", or \"Do not touch me to wake me up as it startles  me\".  5/6/2024 1510 by Ciera Rangel RN  Outcome: Progressing  5/6/2024 1028 by Ciera Rangel RN  Outcome: Progressing  Goal: Absence of Hospital-Acquired Illness or Injury  5/6/2024 1510 by Ciera Rangel RN  Outcome: Progressing  5/6/2024 1028 by Ciera Rangel RN  Outcome: Progressing  Intervention: Identify and Manage Fall Risk  Recent Flowsheet Documentation  Taken 5/6/2024 1200 by Ciera Rangel RN  Safety Promotion/Fall Prevention:   activity supervised   clutter free environment maintained   nonskid shoes/slippers when out of bed   patient and family education   room organization consistent   safety round/check completed   supervised activity  Taken 5/6/2024 0800 by Ciera Rangel RN  Safety Promotion/Fall Prevention:   activity supervised   clutter free environment maintained   nonskid shoes/slippers when out of bed   patient and family education   room organization consistent   safety round/check completed   supervised activity  Intervention: Prevent Skin Injury  Recent Flowsheet Documentation  Taken 5/6/2024 1200 by Ciera Rangel RN  Body Position: position maintained  Skin Protection:   adhesive use limited   incontinence pads utilized  Device Skin Pressure Protection:   absorbent pad utilized/changed   adhesive use limited  Taken 5/6/2024 0800 by Ciera Rangel" RN  Body Position: position maintained  Intervention: Prevent and Manage VTE (Venous Thromboembolism) Risk  Recent Flowsheet Documentation  Taken 5/6/2024 1200 by Ciera Rangel RN  VTE Prevention/Management: SCDs (sequential compression devices) off  Taken 5/6/2024 0800 by Ciera Rangel RN  VTE Prevention/Management: SCDs (sequential compression devices) off  Intervention: Prevent Infection  Recent Flowsheet Documentation  Taken 5/6/2024 1200 by Ciera Rangel RN  Infection Prevention:   environmental surveillance performed   rest/sleep promoted   single patient room provided  Taken 5/6/2024 0800 by Ciera Rangel RN  Infection Prevention:   environmental surveillance performed   rest/sleep promoted   single patient room provided  Goal: Optimal Comfort and Wellbeing  5/6/2024 1510 by Ciera Rangel RN  Outcome: Progressing  5/6/2024 1028 by Ciera Rangel RN  Outcome: Progressing  Intervention: Monitor Pain and Promote Comfort  Recent Flowsheet Documentation  Taken 5/6/2024 0800 by Ciera Rangel RN  Pain Management Interventions:   medication (see MAR)   repositioned  Goal: Readiness for Transition of Care  5/6/2024 1510 by Ciera Rangel RN  Outcome: Progressing  5/6/2024 1028 by Ciera Rangel RN  Outcome: Progressing     Problem: Diarrhea  Goal: Effective Diarrhea Management  5/6/2024 1510 by Ciera Rangel RN  Outcome: Progressing  5/6/2024 1028 by Ciera Rangel RN  Outcome: Progressing  Intervention: Manage Diarrhea  Recent Flowsheet Documentation  Taken 5/6/2024 1200 by Ciera Rangel RN  Isolation Precautions: enteric precautions maintained  Medication Review/Management: medications reviewed  Taken 5/6/2024 0800 by Ciera Rangel RN  Isolation Precautions: enteric precautions maintained  Medication Review/Management: medications reviewed     Problem: Nausea and Vomiting  Goal: Nausea and Vomiting Relief  5/6/2024 1510 by Ciera Rangel RN  Outcome:  Progressing  5/6/2024 1028 by Ciera Rangel RN  Outcome: Progressing  Intervention: Prevent and Manage Nausea and Vomiting  Recent Flowsheet Documentation  Taken 5/6/2024 1200 by Ciera Rangel RN  Nausea/Vomiting Interventions: (denies) other (see comments)  Environmental Support:   calm environment promoted   personal routine supported  Taken 5/6/2024 0800 by Ciera Rangel RN  Nausea/Vomiting Interventions: (denies) other (see comments)  Environmental Support:   calm environment promoted   personal routine supported     Problem: Comorbidity Management  Goal: Maintenance of COPD Symptom Control  5/6/2024 1510 by Ciera Rangel RN  Outcome: Progressing  5/6/2024 1028 by Ciera Rangel RN  Outcome: Progressing  Intervention: Maintain COPD Symptom Control  Recent Flowsheet Documentation  Taken 5/6/2024 1200 by Ciera Rangel RN  Supportive Measures: active listening utilized  Medication Review/Management: medications reviewed  Taken 5/6/2024 0800 by Ciera Rangel RN  Supportive Measures: active listening utilized  Medication Review/Management: medications reviewed  Goal: Maintenance of Heart Failure Symptom Control  5/6/2024 1510 by Ciera Rangel RN  Outcome: Progressing  5/6/2024 1028 by Ciera Rangel RN  Outcome: Progressing  Intervention: Maintain Heart Failure Management  Recent Flowsheet Documentation  Taken 5/6/2024 1200 by Ciera Rangel RN  Medication Review/Management: medications reviewed  Taken 5/6/2024 0800 by Ciera Rangel RN  Medication Review/Management: medications reviewed  Goal: Blood Pressure in Desired Range  5/6/2024 1510 by Ciera Rangel RN  Outcome: Progressing  5/6/2024 1028 by Ciera Rangel RN  Outcome: Progressing  Intervention: Maintain Blood Pressure Management  Recent Flowsheet Documentation  Taken 5/6/2024 1200 by Ciera Rangel RN  Medication Review/Management: medications reviewed  Taken 5/6/2024 0800 by Ciera Rangel  RN  Medication Review/Management: medications reviewed

## 2024-05-06 NOTE — PROGRESS NOTES
"[facility] RCAT     Date: 5/5/2024  Admission Dx: Colitis  Pulmonary History: COPD  Home Nebulizer/MDI Use: PRN nebulizers/scheduled inhalers  Home Oxygen: None  Acuity Level (RCAT flow sheet): 4  Aerosol Therapy initiated: Scheduled nebulizer changed to PRN, as needed inhaler also available.       Pulmonary Hygiene initiated: Coughing/deep breathing      Current Oxygen Requirements: None  Current SpO2: 92-95%    Re-evaluation date: 5/8/2024    Patient Education: Pt aware of the benefits and risks of bronchodilator therapy      See \"RT Assessments\" flow sheet for patient assessment scoring and Acuity Level Details.     Bhakti Felice, RT        "

## 2024-05-06 NOTE — CONSULTS
[facility] Nurse Inpatient Assessment     Consulted for: Diarrhea- low mobility    Summary: Patient admits with 4 punched out wounds to bilateral buttock and coccyx with pressure component but appear more from friction.  And a stage 2 v 3 pressure injury present on admission to her left IT.     Patient History (according to provider note(s):      68 year old female with Pmhx of CAD, CHF with reduced ef of 35-40% previously, pulmonary HTN, COPD, GERD, HTN, hyponatremia, chronic leukocytosis, chronic pain, colonic polyps, functional decline recently established with palliative care, who was admitted on 5/5/2024 found to have colitis.     Assessment:      Areas visualized during today's visit: Sacrum/coccyx    Wound location: Bilateral buttock    Last photo: 5/6/24    Wound due to: Pressure Injury, Friction, Shear, and Moisture Associated Skin Damage (MASD), present on admission  Wound history/plan of care: Patient with limited mobility on palliative care at home. She has a recent onset of diarrhea. Patient reports that she had [facility] over a [facility] where she has used multiple wound treatments in the past. She believes these started about 1-2 months ago.   Wound base:  one wound over her left buttock two over her right buttock and on within the intergluteal crease. All with exposed dermis.      Palpation of the wound bed: normal      Drainage: small, from intergluteal crease only     Description of drainage: bloody     Measurements (length x width x depth, in cm): Left buttock 0.7cm x 1cm x 0.1cm, Right buttock 0.5cm x 0.5cm x 0.2cm, 0.4cm x 0.7cm x 0.1cm. intergluteal 0.8cm x 0.4cm x 0.2cm.      Tunneling: N/A     Undermining: N/A  Periwound skin:  chronic tissue injury      Color: purple      Temperature: normal   Odor: none  Pain: mild, tender  Pain interventions prior to dressing change: slow and gentle cares   Treatment goal: Heal  and Protection  STATUS:  "initial assessment  Supplies ordered: supplies stored on unit, discussed with RN, and discussed with patient      Pressure Injury Location: Left IT    Last photo: 5/6/24    Wound type: Pressure Injury     Pressure Injury Stage: either Stage 2 or 3 deferring definitive staging until wound base has evolved, present on admission     Wound history/plan of care:   Patient was seen about 1 year ago at the [facility] healing institute  where the wound looked similar to today's present ation. Patient reported it had healed and reopened about 1-2 months ago. Limited mobility    Wound base: 100 % thin fibrinous tissue     Palpation of the wound bed: normal      Drainage: none     Description of drainage: none     Measurements (length x width x depth, in cm) 0.8  x 0.8  x  0.2 cm      Tunneling N/A     Undermining N/A  Periwound skin: Intact      Color: normal and consistent with surrounding tissue      Temperature: normal   Odor: none  Pain: moderate, tender  Pain intervention prior to dressing change: patient tolerated well  Treatment goal: Heal  and Protection  STATUS: initial assessment  Supplies ordered: supplies stored on unit, discussed with RN, and discussed with patient       Treatment Plan:     Bilateral buttock and Left IT wound(s): Every other day and PRN if soiled.  1. Clean wound with saline or MicroKlenz Spray, pat dry  2. Wipe / \"clean\" the surrounding periwound tissue with skin prep (Cavilon No Sting Skin Prep #[id number]) and allow to dry. This will help protect periwound and help dressing adherence  3. Press a Mepilex Sacral to cover sacrum and a mepilex 4x4 to cover the left IT, making sure to conform nicely to skin curvatures.   4. Time and date dressing change    If unable to maintain dressing due to stools. OK to switch to BID cleanse with shannon cleanse and protect perineal cleanser and cover with a thin layer of Traid Paste.    Pressure Injury Prevention (PIP) Plan:  If patient is declining pressure injury " "prevention interventions: Explore reason why and address patient's concerns, Educate on pressure injury risk and prevention intervention(s), If patient is still declining, document \"informed refusal\" , and Ensure Care team is aware ( provider, charge nurse, etc)  Mattress: Follow bed algorithm, reassess daily and order specialty mattress, if indicated.  HOB: Maintain at or below 30 degrees, unless contraindicated  Repositioning in bed: Every 1-2 hours , Left/right positioning; avoid supine, and Raise foot of bed prior to raising head of bed, to reduce patient sliding down (shear)  Heels: Keep elevated off mattress and Pillows under calves  Protective Dressing: Sacral Mepilex for prevention (#879163),  especially for the agitated patient   Positioning Equipment: None  Chair positioning: Chair cushion (#[id number]) , Repositions self: patient to shift weight every 15 minutes, Assist patient to reposition hourly, and Do NOT use a donut for sitting (this increases pressure to smaller area and creates a higher potential for injury)    If patient has a buttock pressure injury, or high risk for PI use chair cushion or SPS.  Moisture Management: Perineal cleansing /protection: Follow Incontinence Protocol, Avoid brief in bed, Clean and dry skin folds with bathing , Consider InterDry (#610277) between folds, and Moisturize dry skin  Under Devices: Inspect skin under all medical devices during skin inspection , Ensure tubes are stabilized without tension, and Ensure patient is not lying on medical devices or equipment when repositioned  Ask provider to discontinue device when no longer needed.         Orders: Written    RECOMMEND PRIMARY TEAM ORDER: None, at this time  Education provided: plan of care  Discussed plan of care with: Patient and Nurse  WOC nurse follow-up plan: weekly  Notify WOC if wound(s) deteriorate.  Nursing to notify the Provider(s) and re-consult the WOC Nurse if new skin concern.    DATA:     Current support " surface: Standard  Standard Isoflex gel  Containment of urine/stool: Incontinent pad in bed and Suction based external urinary catheter   BMI: Body mass index is 23.25 kg/m .   Active diet order: Orders Placed This Encounter      Regular Diet Adult     Output: I/O last 3 completed shifts:  In: -   Out: 500 [Urine:500]     Labs:   Recent Labs   Lab 05/05/24  0953   ALBUMIN 3.3*   HGB 11.0*   WBC 13.9*     Pressure injury risk assessment:   Sensory Perception: 4-->no impairment  Moisture: 3-->occasionally moist  Activity: 2-->chairfast  Mobility: 2-->very limited  Nutrition: 3-->adequate  Friction and Shear: 2-->potential problem  Uvaldo Score: 16    Raisa Richmond RN CWOCN  Contact [facility] Nurse (Atilio)  Dept. Office Number: [phone]

## 2024-05-06 NOTE — PLAN OF CARE
PRIMARY DIAGNOSIS: Nausea, diarrhea, back pain    OUTPATIENT/OBSERVATION GOALS TO BE MET BEFORE DISCHARGE  1. Orthostatic performed: N/A    2. Tolerating PO fluid and/or antibiotics (if applicable): Yes    3. Nausea/Vomiting/Diarrhea symptoms improved: Yes    4. Pain status: Improved-controlled with oral pain medications.    5. Return to near baseline physical activity: Yes    Discharge Planner Nurse   Safe discharge environment identified: Yes  Barriers to discharge: Yes       Entered by: Ciera Rangel RN 05/06/2024    Pt A&Ox4, chairfast at baseline - A2 transfer to commode/ chair. Enteric precautions - no diarrhea since admission, need stool sample if she has a BM. PIV RA, SL. 3 mepilexes on wounds on buttocks - WOC to consult.   Pt rates pain in back 7-8/10. Asking for and receiving PRN pain meds as they are due. No Diarrhea or nausea this am, tolerating reg diet so-far. Requesting to go home.   Please review provider order for any additional goals.   Nurse to notify provider when observation goals have been met and patient is ready for discharge.Goal Outcome Evaluation:      Plan of Care Reviewed With: patient    Overall Patient Progress: improvingOverall Patient Progress: improving    Outcome Evaluation: no n/v/d , tolerating reg diet.  complains of back pain - controlled with prn pain meds. Pt states she is ready to retrun to home with .    Problem: Adult Inpatient Plan of Care  Goal: Plan of Care Review  Description: The Plan of Care Review/Shift note should be completed every shift.  The Outcome Evaluation is a brief statement about your assessment that the patient is improving, declining, or no change.  This information will be displayed automatically on your shift  note.  Outcome: Progressing  Flowsheets (Taken 5/6/2024 1028)  Outcome Evaluation: no n/v/d , tolerating reg diet.  complains of back pain - controlled with prn pain meds. Pt states she is ready to retrun to home with  ".  Plan of Care Reviewed With: patient  Overall Patient Progress: improving  Goal: Patient-Specific Goal (Individualized)  Description: You can add care plan individualizations to a care plan. Examples of Individualization might be:  \"Parent requests to be called daily at 9am for status\", \"I have a hard time hearing out of my right ear\", or \"Do not touch me to wake me up as it startles  me\".  Outcome: Progressing  Goal: Absence of Hospital-Acquired Illness or Injury  Outcome: Progressing  Goal: Optimal Comfort and Wellbeing  Outcome: Progressing  Intervention: Monitor Pain and Promote Comfort  Recent Flowsheet Documentation  Taken 5/6/2024 0800 by Ciera Rangel, RN  Pain Management Interventions:   medication (see MAR)   repositioned  Goal: Readiness for Transition of Care  Outcome: Progressing     Problem: Diarrhea  Goal: Effective Diarrhea Management  Outcome: Progressing     Problem: Nausea and Vomiting  Goal: Nausea and Vomiting Relief  Outcome: Progressing     Problem: Comorbidity Management  Goal: Maintenance of COPD Symptom Control  Outcome: Progressing  Goal: Maintenance of Heart Failure Symptom Control  Outcome: Progressing  Goal: Blood Pressure in Desired Range  Outcome: Progressing       "

## 2024-05-06 NOTE — PLAN OF CARE
"PRIMARY DIAGNOSIS: N / V / D  OUTPATIENT/OBSERVATION GOALS TO BE MET BEFORE DISCHARGE:  1. Pain Status: Improved but still requiring IV narcotics.    2. Return to near baseline physical activity: Yes    3. Cleared for discharge by consultants (if involved): No    Discharge Planner Nurse   Safe discharge environment identified: Yes  Barriers to discharge: Yes       Entered by: Valencia Rosa RN 05/06/2024 2:21 AM  Patient is Aox4, VSS, has not been out of bed at this time, tolerating regular diet and oral medications, PIV in the R arm SL, pure wick in place and patent, WOC consult in for buttocks wounds x3, on room air, no pain noted at this time, resting well, able to make needs known, will continue to monitor and provide cares.   Please review provider order for any additional goals.   Nurse to notify provider when observation goals have been met and patient is ready for discharge.  Problem: Adult Inpatient Plan of Care  Goal: Plan of Care Review  Description: The Plan of Care Review/Shift note should be completed every shift.  The Outcome Evaluation is a brief statement about your assessment that the patient is improving, declining, or no change.  This information will be displayed automatically on your shift  note.  Outcome: Progressing  Flowsheets (Taken 5/5/2024 2000)  Outcome Evaluation: No nausea, no diarrhea, no abdominal pain, chronic back pain, resting  Plan of Care Reviewed With: patient  Overall Patient Progress: improving  Goal: Patient-Specific Goal (Individualized)  Description: You can add care plan individualizations to a care plan. Examples of Individualization might be:  \"Parent requests to be called daily at 9am for status\", \"I have a hard time hearing out of my right ear\", or \"Do not touch me to wake me up as it startles  me\".  Outcome: Progressing  Goal: Absence of Hospital-Acquired Illness or Injury  Outcome: Progressing  Intervention: Identify and Manage Fall Risk  Recent Flowsheet " Documentation  Taken 5/5/2024 2139 by Valencia Rosa RN  Safety Promotion/Fall Prevention:   activity supervised   clutter free environment maintained   room organization consistent   safety round/check completed   supervised activity  Intervention: Prevent Skin Injury  Recent Flowsheet Documentation  Taken 5/5/2024 2139 by Valencia Rosa RN  Body Position: position maintained  Device Skin Pressure Protection: (wants to stay on her back)   other (see comments)   absorbent pad utilized/changed  Intervention: Prevent and Manage VTE (Venous Thromboembolism) Risk  Recent Flowsheet Documentation  Taken 5/5/2024 2139 by Valencia Rosa RN  VTE Prevention/Management: SCDs (sequential compression devices) off  Intervention: Prevent Infection  Recent Flowsheet Documentation  Taken 5/5/2024 2139 by Valencia Rosa RN  Infection Prevention: rest/sleep promoted  Goal: Optimal Comfort and Wellbeing  Outcome: Progressing  Intervention: Monitor Pain and Promote Comfort  Recent Flowsheet Documentation  Taken 5/5/2024 2323 by Valencia Rosa RN  Pain Management Interventions: medication (see MAR)  Taken 5/5/2024 2203 by Valencia Rosa RN  Pain Management Interventions: medication (see MAR)  Taken 5/5/2024 1929 by Valencia Rosa RN  Pain Management Interventions: medication (see MAR)  Goal: Readiness for Transition of Care  Outcome: Progressing     Goal Outcome Evaluation:      Plan of Care Reviewed With: patient    Overall Patient Progress: improvingOverall Patient Progress: improving    Outcome Evaluation: No nausea, no diarrhea, no abdominal pain, chronic back pain, resting

## 2024-05-06 NOTE — PLAN OF CARE
"Patient's After Visit Summary was reviewed with patient and/or spouse.   Patient verbalized understanding of After Visit Summary, recommended follow up and was given an opportunity to ask questions.   Discharge medications sent home with patient/family: YES   Discharged with transport tech    Pt well and VSS upon departure from room 205.  Discharged with stretcher transport to home with . All belonging's, paperwork, supplies for wound care and prescriptions sent home with pt's .     /72 (BP Location: Left arm)   Pulse 84   Temp 97.9  F (36.6  C) (Oral)   Resp 16   Ht 1.778 m (5' 10\")   Wt 73.5 kg (162 lb 0.6 oz)   LMP 09/20/2005   SpO2 95%   BMI 23.25 kg/m      OBSERVATION patient END time: 1700          Goal Outcome Evaluation:      Plan of Care Reviewed With: patient    Overall Patient Progress: no changeOverall Patient Progress: no change    Outcome Evaluation: no n/v/d , tolerating reg diet.  complains of back pain - controlled with prn pain meds. Pt states she is ready to retrun to home with .      Problem: Adult Inpatient Plan of Care  Goal: Plan of Care Review  Description: The Plan of Care Review/Shift note should be completed every shift.  The Outcome Evaluation is a brief statement about your assessment that the patient is improving, declining, or no change.  This information will be displayed automatically on your shift  note.  5/6/2024 1545 by Ciera Rangel, RN  Outcome: Met  5/6/2024 1510 by Ciera Rangel, RN  Outcome: Progressing  Flowsheets (Taken 5/6/2024 1510)  Plan of Care Reviewed With: patient  Overall Patient Progress: no change  5/6/2024 1028 by Ciera Rangel, RN  Outcome: Progressing  Flowsheets (Taken 5/6/2024 1028)  Outcome Evaluation: no n/v/d , tolerating reg diet.  complains of back pain - controlled with prn pain meds. Pt states she is ready to retrun to home with .  Plan of Care Reviewed With: patient  Overall Patient Progress: " "improving  Goal: Patient-Specific Goal (Individualized)  Description: You can add care plan individualizations to a care plan. Examples of Individualization might be:  \"Parent requests to be called daily at 9am for status\", \"I have a hard time hearing out of my right ear\", or \"Do not touch me to wake me up as it startles  me\".  5/6/2024 1545 by Ciera Rangel RN  Outcome: Met  5/6/2024 1510 by Ciera Rangel RN  Outcome: Progressing  5/6/2024 1028 by Ciera Rangel RN  Outcome: Progressing  Goal: Absence of Hospital-Acquired Illness or Injury  5/6/2024 1545 by Ciera Rangel RN  Outcome: Met  5/6/2024 1510 by Ciera Rangel RN  Outcome: Progressing  5/6/2024 1028 by Ciera Rangel RN  Outcome: Progressing  Intervention: Identify and Manage Fall Risk  Recent Flowsheet Documentation  Taken 5/6/2024 1200 by Ciera Rangel RN  Safety Promotion/Fall Prevention:   activity supervised   clutter free environment maintained   nonskid shoes/slippers when out of bed   patient and family education   room organization consistent   safety round/check completed   supervised activity  Taken 5/6/2024 0800 by Ciera Rangel RN  Safety Promotion/Fall Prevention:   activity supervised   clutter free environment maintained   nonskid shoes/slippers when out of bed   patient and family education   room organization consistent   safety round/check completed   supervised activity  Intervention: Prevent Skin Injury  Recent Flowsheet Documentation  Taken 5/6/2024 1200 by Ciera Rangel RN  Body Position: position maintained  Skin Protection:   adhesive use limited   incontinence pads utilized  Device Skin Pressure Protection:   absorbent pad utilized/changed   adhesive use limited  Taken 5/6/2024 0800 by Ciera Rangel RN  Body Position: position maintained  Intervention: Prevent and Manage VTE (Venous Thromboembolism) Risk  Recent Flowsheet Documentation  Taken 5/6/2024 1200 by Ciera Rangel" RN  VTE Prevention/Management: SCDs (sequential compression devices) off  Taken 5/6/2024 0800 by Ciera Rangel RN  VTE Prevention/Management: SCDs (sequential compression devices) off  Intervention: Prevent Infection  Recent Flowsheet Documentation  Taken 5/6/2024 1200 by Ciera Rangel RN  Infection Prevention:   environmental surveillance performed   rest/sleep promoted   single patient room provided  Taken 5/6/2024 0800 by Ciera Rangel RN  Infection Prevention:   environmental surveillance performed   rest/sleep promoted   single patient room provided  Goal: Optimal Comfort and Wellbeing  5/6/2024 1545 by Ciera Rangel RN  Outcome: Met  5/6/2024 1510 by Ciera Rangel RN  Outcome: Progressing  5/6/2024 1028 by Ciera Rangel RN  Outcome: Progressing  Intervention: Monitor Pain and Promote Comfort  Recent Flowsheet Documentation  Taken 5/6/2024 0800 by Ciera Rangel RN  Pain Management Interventions:   medication (see MAR)   repositioned  Goal: Readiness for Transition of Care  5/6/2024 1545 by Ciera Rangel RN  Outcome: Met  5/6/2024 1510 by Ciera Rangel RN  Outcome: Progressing  5/6/2024 1028 by Ciera Rangel RN  Outcome: Progressing     Problem: Diarrhea  Goal: Effective Diarrhea Management  5/6/2024 1545 by Ciera Rangel RN  Outcome: Met  5/6/2024 1510 by Ciera Rangel RN  Outcome: Progressing  5/6/2024 1028 by Ciera Rangel RN  Outcome: Progressing  Intervention: Manage Diarrhea  Recent Flowsheet Documentation  Taken 5/6/2024 1200 by Ciera Rangel RN  Isolation Precautions: enteric precautions maintained  Medication Review/Management: medications reviewed  Taken 5/6/2024 0800 by Ciera Rangel RN  Isolation Precautions: enteric precautions maintained  Medication Review/Management: medications reviewed     Problem: Nausea and Vomiting  Goal: Nausea and Vomiting Relief  5/6/2024 1545 by Ciera Rangel RN  Outcome: Met  5/6/2024 1510 by  Ciera Rangel RN  Outcome: Progressing  5/6/2024 1028 by Ciera Rangel RN  Outcome: Progressing  Intervention: Prevent and Manage Nausea and Vomiting  Recent Flowsheet Documentation  Taken 5/6/2024 1200 by Ciera Rangel RN  Nausea/Vomiting Interventions: (denies) other (see comments)  Environmental Support:   calm environment promoted   personal routine supported  Taken 5/6/2024 0800 by Ciera Rangel RN  Nausea/Vomiting Interventions: (denies) other (see comments)  Environmental Support:   calm environment promoted   personal routine supported     Problem: Comorbidity Management  Goal: Maintenance of COPD Symptom Control  5/6/2024 1545 by Ciera Rangel RN  Outcome: Met  5/6/2024 1510 by Ciera Rangel RN  Outcome: Progressing  5/6/2024 1028 by Ciera Rangel RN  Outcome: Progressing  Intervention: Maintain COPD Symptom Control  Recent Flowsheet Documentation  Taken 5/6/2024 1200 by Ciera Rangel RN  Supportive Measures: active listening utilized  Medication Review/Management: medications reviewed  Taken 5/6/2024 0800 by Ciera Rangel RN  Supportive Measures: active listening utilized  Medication Review/Management: medications reviewed  Goal: Maintenance of Heart Failure Symptom Control  5/6/2024 1545 by Ciera Rangel RN  Outcome: Met  5/6/2024 1510 by Ciera Rangel RN  Outcome: Progressing  5/6/2024 1028 by Ciera Rangel RN  Outcome: Progressing  Intervention: Maintain Heart Failure Management  Recent Flowsheet Documentation  Taken 5/6/2024 1200 by Ciera Rangel RN  Medication Review/Management: medications reviewed  Taken 5/6/2024 0800 by Ciera Rangel RN  Medication Review/Management: medications reviewed  Goal: Blood Pressure in Desired Range  5/6/2024 1545 by Ciear Rangel RN  Outcome: Met  5/6/2024 1510 by Ciera Rangel RN  Outcome: Progressing  5/6/2024 1028 by Ciera Rangel RN  Outcome: Progressing  Intervention: Maintain Blood  Pressure Management  Recent Flowsheet Documentation  Taken 5/6/2024 1200 by Ciera Rangel, RN  Medication Review/Management: medications reviewed  Taken 5/6/2024 0800 by Ciera Rangel, RN  Medication Review/Management: medications reviewed

## 2024-05-07 NOTE — PROGRESS NOTES
Clinic Care Coordination Contact  Clinic Care Coordination Contact  OUTREACH with Post Discharge Assessment    Referral Information: Patient returned call to RN CC for post-hospital outreach.   Referral Source: IP Report    Primary Diagnosis: COPD    Chief Complaint   Patient presents with    Clinic Care Coordination - Post Hospital    Clinic Care Coordination - Follow-up      Universal Utilization: Risk of admission or ED visit 94.2%  Clinic Utilization  Difficulty keeping appointments:: No  Compliance Concerns: No  No-Show Concerns: No  No PCP office visit in Past Year: No  Utilization      No Show Count (past year)  0             ED Visits  4             Hospital Admissions  3                    Current as of: 5/10/2024 12:16 PM              Clinical Concerns:  Current Medical Concerns:  Eating smaller meals and BMs are returning to normal.     Current Behavioral Concerns: None    Education Provided to patient: None at this time.    Pain  Pain (GOAL):: No  Health Maintenance Reviewed: Due/Overdue (Not discussed during TCM outreach.)  Clinical Pathway: None    Admission:    Admission Date: 05/05/24   Reason for Admission: Hyponatremia, Colitis, nausea/vomiting/diarrhea  Discharge:   Discharge Date: 05/06/24  Discharge Diagnosis: Hyponatremia, Colitis, nausea/vomiting/diarrhea    Enrollment  Outreach Frequency: monthly, more frequently as needed    Discharge Assessment  How are you doing now that you are home?: Doing ok. Not as fatigued.  How are your symptoms? (Red Flag symptoms escalate to triage hotline per guidelines): Improved  Do you feel your condition is stable enough to be safe at home until your provider visit?: Yes  Does the patient have their discharge instructions? : Yes  Does the patient have questions regarding their discharge instructions? : No  Does the patient have all of their medications?: Yes  Do you have questions regarding any of your medications? : No  Do you have all of your needed medical  supplies or equipment (DME)?  (i.e. oxygen tank, CPAP, cane, etc.): Yes  Discharge follow-up appointment scheduled within 14 calendar days? : Yes  Discharge Follow Up Appointment Date: 05/13/24  Discharge Follow Up Appointment Scheduled with?: Primary Care Provider    Post-op ([facility] Only)  If the patient had a surgery or procedure, do they have any questions for a nurse?: No    Post-op (Clinicians Only)  Did the patient have surgery or a procedure: No  Fever: No  Chills: No  Eating & Drinking: eating and drinking without complaints/concerns  PO Intake: full liquids;regular diet  Additional Symptoms: decreased appetite  Bowel Function:  (Closer to normal.)  Date of last BM: 05/10/24  Urinary Status: voiding without complaint/concerns    Medication Management:  Medication review status: Medications reviewed on 5/5/24. RN CC did not re-review during outreach.   Current Outpatient Medications   Medication Sig Dispense Refill    albuterol (VENTOLIN HFA) 108 (90 BASE) MCG/ACT Inhaler Inhale 2 puffs into the lungs every 6 hours as needed for shortness of breath / dyspnea or wheezing 18 g 3    ALPRAZolam (XANAX) 0.25 MG tablet TAKE 1 TABLET BY MOUTH TWICE A DAY AS NEEDED FOR ANXIETY 60 tablet 0    atorvastatin (LIPITOR) 10 MG tablet Take 10 mg by mouth at bedtime      azithromycin (ZITHROMAX) 250 MG tablet Take 250 mg by mouth Every Mon, Wed, Fri Morning      carvedilol (COREG) 25 MG tablet Take 1 tablet (25 mg) by mouth 2 times daily (with meals) 180 tablet 4    cetirizine (ZYRTEC) 10 MG tablet Take 10 mg by mouth at bedtime      [employer] FIBER GUMMIES 2.5 G CHEW Take 1 tablet by mouth at bedtime Reported on 5/8/2017      doxycycline hyclate (PERIOSTAT) 20 MG tablet Take 20 mg by mouth at bedtime      fluticasone (FLONASE) 50 MCG/ACT nasal spray Spray 1-2 sprays into both nostrils daily as needed for rhinitis or allergies 16 g 4    HYDROcodone-acetaminophen (NORCO)  MG per tablet Take 1 tablet by mouth 4 times daily as  needed for severe pain      ipratropium - albuterol 0.5 mg/2.5 mg/3 mL (DUONEB) 0.5-2.5 (3) MG/3ML neb solution Take 1 vial by nebulization every 6 hours as needed for shortness of breath, wheezing or cough      ketorolac (TORADOL) 10 MG tablet TAKE 1 TABLET (10 MG) BY MOUTH EVERY 6 HOURS AS NEEDED FOR MODERATE PAIN 20 tablet 0    methocarbamol (ROBAXIN) 500 MG tablet Take 500 mg by mouth 4 times daily as needed for muscle spasms      montelukast (SINGULAIR) 10 MG tablet TAKE ONE TABLET BY MOUTH AT BEDTIME 90 tablet 1    Multiple Vitamin CHEW Take 2 tablets by mouth at bedtime      naloxone (NARCAN) 4 MG/0.1ML nasal spray Spray 1 spray (4 mg) into one nostril alternating nostrils once as needed for opioid reversal Every 2-3 minutes until patient responsive or EMS arrives 0.2 mL 0    ondansetron (ZOFRAN ODT) 4 MG ODT tab Take 1 tablet (4 mg) by mouth every 6 hours as needed for nausea or vomiting 24 tablet 0    pantoprazole (PROTONIX) 40 MG EC tablet Take 40 mg by mouth at bedtime      predniSONE (DELTASONE) 5 MG tablet Take 10 mg by mouth at bedtime      predniSONE (DELTASONE) 5 MG tablet Take 1 tablet by mouth every morning      roflumilast (DALIRESP) 500 MCG TABS tablet Take 500 mcg by mouth at bedtime      simethicone (MYLICON) 80 MG chewable tablet Take 1 tablet (80 mg) by mouth every 6 hours as needed for cramping or other (abdominal discomfort) 30 tablet 0    sodium chloride (MOLLY 128) 5 % ophthalmic ointment Place 1 Application into both eyes every evening      spironolactone (ALDACTONE) 25 MG tablet Take 0.5 tablets (12.5 mg) by mouth daily 90 tablet 4    SUMAtriptan (IMITREX) 25 MG tablet Take 1 tablet (25 mg) by mouth at onset of headache for migraine 9 tablet 11    TRELEGY ELLIPTA 200-62.5-25 MCG/ACT oral inhaler Inhale 1 puff into the lungs daily      zinc oxide (DESITIN) 20 % external ointment Apply topically every 4 hours 28 g 0     No current facility-administered medications for this visit.         Allergies   Allergen Reactions    Gabapentin Swelling    Contrast Dye      Iodine    Escitalopram      Nausea and sickness    Peanuts [Nuts] Hives    Penicillins      rash    Sulfa Antibiotics      High family history    Tace [Chlorotrianisene]      joint swelling    Aspirin Rash     After 3 days    Ibuprofen Nausea and Vomiting and Swelling    Strawberry Extract Rash     Functional Status:  Dependent ADLs:: Bathing, Dressing, Grooming (Uses a push chair when goes places.)  Dependent IADLs:: Transportation, Meal Preparation, Shopping, Laundry, Cooking, Cleaning  Bed or wheelchair confined:: No  Mobility Status: Dependent/Assisted by Another  Fallen 2 or more times in the past year?: No  Any fall with injury in the past year?: No    Living Situation:  Current living arrangement:: I live in a private home with spouse  Type of residence:: Private home - stairs (Has a stair lift)    Lifestyle & Psychosocial Needs:    Social Determinants of Health     Food Insecurity: Low Risk  (11/11/2023)    Food Insecurity     Within the past 12 months, did you worry that your food would run out before you got money to buy more?: No     Within the past 12 months, did the food you bought just not last and you didn t have money to get more?: No   Depression: Not at risk (4/22/2024)    PHQ-2     PHQ-2 Score: 2   Housing Stability: Low Risk  (11/11/2023)    Housing Stability     Do you have housing? : Yes     Are you worried about losing your housing?: No   Tobacco Use: Medium Risk (4/25/2024)    Patient History     Smoking Tobacco Use: Former     Smokeless Tobacco Use: Never     Passive Exposure: Not on file   Financial Resource Strain: Low Risk  (11/11/2023)    Financial Resource Strain     Within the past 12 months, have you or your family members you live with been unable to get utilities (heat, electricity) when it was really needed?: No   Alcohol Use: Not on file   Transportation Needs: Low Risk  (11/11/2023)    Transportation  Needs     Within the past 12 months, has lack of transportation kept you from medical appointments, getting your medicines, non-medical meetings or appointments, work, or from getting things that you need?: No   Physical Activity: Not on file   Interpersonal Safety: Low Risk  (10/5/2023)    Interpersonal Safety     Do you feel physically and emotionally safe where you currently live?: Yes     Within the past 12 months, have you been hit, slapped, kicked or otherwise physically hurt by someone?: No     Within the past 12 months, have you been humiliated or emotionally abused in other ways by your partner or ex-partner?: Not on file   Stress: Not on file   Social Connections: Not on file   Health Literacy: Not on file     Diet:: Regular  Inadequate nutrition (GOAL):: No  Tube Feeding: No  Inadequate activity/exercise (GOAL):: No  Significant changes in sleep pattern (GOAL): No  Transportation means:: Accessible car     Catholic or spiritual beliefs that impact treatment:: No  Mental health DX:: No  Mental health management concern (GOAL):: No  Informal Support system:: Spouse, Children, Neighbors     Resources and Interventions:  Current Resources:   Skilled Home Care Services: Skilled Nursing, Home Health Aid, Physical Therapy, Occupational Therapy ()  Community Resources: Home Care  Supplies Currently Used at Home: None  Equipment Currently Used at Home: raised toilet seat, shower chair, wheelchair, manual, other (see comments), walker, rolling (Push chair Not actual wheelchair)  Employment Status: retired     Advance Care Plan/Directive  Advanced Care Plans/Directives on file:: Yes  Status of record:: On File and Validated    Referrals Placed: None     Care Plan:   Care Plan: Health Maintenance       Problem: Health Maintenance Due or Overdue       Goal: Become up-to-date with health maintenance visit(s)       Start Date: 10/5/2023 Expected End Date: 1/4/2024    This Visit's Progress: 20% Recent  Progress: 10%    Note:     Barriers: Very short of breath with activity; Limited mobility; Provider availability - wait time to complete appointments.   Strengths: Motivated; Agreeable to Care Coordination.   Patient expressed understanding of goal: Yes.   Action steps to achieve this goal:  1. I will take my medications as prescribed.   2. I will discuss, review, schedule and complete recommended overdue health maintenance with my Primary Care Provider.   3. I will contact my care team with questions, concerns or support needs. I will use the clinic as a resource and I understand I can contact my clinic with 24/7 after hours services available. Care Coordinator will remain available as needed.                               Patient/Caregiver understanding: Patient/caregiver verbalized understanding and denies any additional questions or concerns at this time. RNCC engaged in [employer] communications during encounter.     Outreach Frequency: monthly, more frequently as needed    Future Appointments                In 3 days Bandar Weinberg MD [place], [place]    In 1 month Konstantin Campuzano MD [facility] Radiation Oncology [facility]          Plan: RN CC will continue to follow patient for any additional needs.     Cynthia Snell RN, BSN, CPHN, CCM  [facility]  Phone: [phone]  Email: [email or web address]    Clinic Care Coordination Contact    Situation: Patient chart reviewed by care coordinator.    Background: RN CC contacted patient for 2nd attempt of TCM outreach. She  had just gotten out of the shower and wanted to return the call in 10 minutes.     Assessment: RN CC waited for 20 minutes with no return call before closing chart.    Plan/Recommendations: RN CC will wait for return call.     Cynthia Snell RN, BSN, CPHN, [facility]  - [facility]  Phone: [phone]  Email: [email or web address]      Clinic Care Coordination Contact  [facility]/Voicemail    Clinical Data: Care Coordinator Outreach    Outreach Documentation Number of Outreach Attempt   5/7/2024  11:13 AM 1     Left message on patient's voicemail with call back information and requested return call.    Plan: Care Coordinator will try to reach patient again in 1-2 business days.    Cynthia Snell RN, BSN, CPHN, CCM  [facility]  [facility]  Phone: [phone]  Email: [email or web address]

## 2024-05-08 NOTE — TELEPHONE ENCOUNTER
Fax received from [facility] 04/14/24 / Order Nbr. [id number] for review and signature.  Put in Dr. Weinberg's in basket.

## 2024-05-09 NOTE — TELEPHONE ENCOUNTER
[facility] * order# [id number] received via fax     Forms in Dr. mail box for review and signature.

## 2024-05-13 NOTE — PROGRESS NOTES
Assessment & Plan     Screen for colon cancer  High risk for complications with colonoscopy  Will do stool cologuards     Colitis  Symptoms improved. Monitor metabolic panel     Acute bacterial conjunctivitis of left eye  Continue Erythromycin topical treatment, chronic symptoms   - erythromycin (ROMYCIN) 5 MG/GM ophthalmic ointment; Place into both eyes 2 times daily    Encounter for screening mammogram for breast cancer    - *MA Screening Digital Bilateral; Future    Chronic systolic congestive heart failure (H)  Continue medical treatment, no symptoms of decompensated CHF   - CBC with platelets  - Comprehensive metabolic panel (BMP + Alb, Alk Phos, ALT, AST, Total. Bili, TP)  - TSH with free T4 reflex    Other chronic pain  Discussed pain management, appropriate use of mediations, side effects.   CSA signed   - Miscellaneous Order for DME - (Use only if a more specific DME order does not already exist    Dilated cardiomyopathy (H)  Follow up with cardiology     Coronary artery disease involving native coronary artery of native heart without angina pectoris  No anginal symptoms. Continue treatment     Mixed hyperlipidemia  On statin     CANNON (dyspnea on exertion)  Related to COPD, CHF , not on oxygen     Tachycardia  Controlled on BB    Hyponatremia  Reassess lab work     Controlled substance agreement signed    - Drug Confirmation Panel Urine with Creat - lab collect    Encounter for preventative adult health care examination  Reviewed preventive measures, immunizations, advanced directives           MED REC REQUIRED  Post Medication Reconciliation Status: discharge medications reconciled, continue medications without change    See Patient Instructions    Subjective   Cynthia is a 68 year old, presenting for the following health issues:  Hospital F/U        5/13/2024     4:43 PM   Additional Questions   Roomed by Raisa CARLOS   Accompanied by      Via the Health Maintenance questionnaire, the patient has  reported the following services have been completed -Mammogram, this information has been sent to the abstraction team.  History of Present Illness       Reason for visit:  Physical and hospital follow up    She eats 2-3 servings of fruits and vegetables daily.She consumes 0 sweetened beverage(s) daily.She exercises with enough effort to increase her heart rate 10 to 19 minutes per day.  She exercises with enough effort to increase her heart rate 3 or less days per week.   She is taking medications regularly.           5/10/2024    12:56 PM   Post Discharge Outreach   Discharge Date 5/7/2024   How are your symptoms? (Red Flag symptoms escalate to triage hotline per guidelines) Improved   Do you feel your condition is stable enough to be safe at home until your provider visit? Yes   Does the patient have their discharge instructions?  Yes   Does the patient have questions regarding their discharge instructions?  No   Does the patient have all of their medications? Yes   Do you have questions regarding any of your medications?  No   Do you have all of your needed medical supplies or equipment (DME)?  (i.e. oxygen tank, CPAP, cane, etc.) Yes   Discharge follow-up appointment scheduled within 14 calendar days?  Yes   Discharge Follow Up Appointment Date 5/13/2024   Discharge Follow Up Appointment Scheduled with? Primary Care Provider     Hospital Follow-up Visit:    Hospital/Nursing Home/IP Rehab Facility: [facility]  Date of Admission: 05/05/2024  Date of Discharge: 05/06/2024  Reason(s) for Admission: Colitis   Abdominal pain, diarrhea, nausea   Hyponatremia   Was the patient in the ICU or did the patient experience delirium during hospitalization?  No  Do you have any other stressors you would like to discuss with your provider? No    Problems taking medications regularly:  None  Medication changes since discharge: None  Problems adhering to non-medication therapy:  None    Summary of  hospitalization:  [facility] discharge summary reviewed  Diagnostic Tests/Treatments reviewed.  Follow up needed: clinic follow up, lab work   Other Healthcare Providers Involved in Patient s Care:         Specialist appointment - GI, cardiology   Update since discharge: improved.         Plan of care communicated with patient    Recently hospitalized for abdominal pain, diarrhea, bloating. Treated with IVF, symptomatic pain control and antiemetics.   CT showed possible colitis. Lab work for bacterial and viral pathogens were negative. Symptoms are improving now. Able to eat. No abdominal pain, nausea, vomiting.   Bowel movements are formed. No fevers. Had low sodium level. Needs recheck.              Annual Wellness Visit     Patient has been advised of split billing requirements and indicates understanding: Yes         Health Care Directive  Patient has a Health Care Directive on file  Advance care planning document is on file and is current.  In general, how would you rate your overall physical health? (!) FAIR   Discussed with patient their rating of physical health; information has been provided.   Do you have a special diet?  Low salt         No data to display              Do you see a dentist two times every year?  Yes  Have you been more tired than usual lately?  (!) YES   Discussed possible causes of fatigue.   If you drink alcohol do you typically have >3 drinks per day or >7 drinks per week? No  Do you have a current opioid prescription? (!) YES   How severe is your pain on a scale from 1-10? 3/10           Do you use any other controlled substances or medications that are not prescribed by a provider? None  Social History     Tobacco Use    Smoking status: Former     Current packs/day: 0.00     Average packs/day: 1 pack/day for 30.0 years (30.0 ttl pk-yrs)     Types: Cigarettes     Start date: 1976     Quit date: 2006     Years since quittin.3    Smokeless tobacco: Never     Tobacco comments:     Quit in 2006   Vaping Use    Vaping status: Never Used   Substance Use Topics    Alcohol use: No     Alcohol/week: 0.0 standard drinks of alcohol    Drug use: No       Needs assistance for the following daily activities: (!) TRANSPORTATION, (!) SHOPPING, (!) PREPARING MEALS, and (!) HOUSEWORK  Which of the following safety concerns are present in your home?  none identified   Do you (or your family members) have any concerns about your safety while driving?  No  Do you have any of the following hearing concerns?: No hearing concerns  In the past 6 months, have you been bothered by leaking of urine? No        11/11/2023   Social Factors   Worry food won't last until get money to buy more No   Food not last or not have enough money for food? No   Do you have housing?  Yes   Are you worried about losing your housing? No   Lack of transportation? No   Unable to get utilities (heat,electricity)? No         5/7/2024   Fall Risk   Fallen 2 or more times in the past year? No          Today's PHQ-9 Score:       4/21/2024     3:43 PM   PHQ-9 SCORE   PHQ-9 Total Score MyChart 5 (Mild depression)   PHQ-9 Total Score 5           6/9/2022   LAST FHS-7 RESULTS   1st degree relative breast or ovarian cancer No   Any relative bilateral breast cancer No   Any male have breast cancer No   Any ONE woman have BOTH breast AND ovarian cancer No   Any woman with breast cancer before 50yrs No   2 or more relatives with breast AND/OR ovarian cancer No   2 or more relatives with breast AND/OR bowel cancer No        Mammogram Screening - Mammogram every 1-2 years updated in Health Maintenance based on mutual decision making      History of abnormal Pap smear: NO - age 30- 65 PAP every 3 years recommended        7/20/2016    12:00 AM 5/23/2013    12:00 AM 5/22/2012     2:58 PM   PAP / HPV   PAP (Historical) NIL  NIL  NIL      ASCVD Risk   The 10-year ASCVD risk score (Arcelia GEORGE, et al., 2019) is: 9%    Values  used to calculate the score:      Age: 68 years      Sex: Female      Is Non- : No      Diabetic: No      Tobacco smoker: No      Systolic Blood Pressure: 120 mmHg      Is BP treated: Yes      HDL Cholesterol: 41 mg/dL      Total Cholesterol: 162 mg/dL            Reviewed and updated as needed this visit by Provider                    Lab work is in process  Labs reviewed in EPIC    Current providers sharing in care for this patient include:  Patient Care Team:  Bandar Weinberg MD as PCP - General  Bandar Weinberg MD as Assigned PCP  Thomas Valerio MD as Referring Physician (Pulmonary Disease)  Michael Odell MD (Inactive) as MD (Cardiology)  Michael Odell MD (Inactive) as MD (Cardiology)  Justina Finney DO as MD (Cardiovascular Disease)  Justina Finney DO as Assigned Heart and Vascular Provider  Bandar Weinberg MD as Assigned Pain Medication Provider  Jessica Robles RPH as Pharmacist (Pharmacist)  Cynthia Snell RN as Lead Care Coordinator (Primary Care - CC)  No Ref-Primary, Physician    The following health maintenance items are reviewed in Epic and correct as of today:  Health Maintenance   Topic Date Due    HF ACTION PLAN  Never done    DEPRESSION ACTION PLAN  Never done    HEPATITIS A IMMUNIZATION (1 of 2 - Risk 2-dose series) Never done    RSV VACCINE (Pregnancy & 60+) (1 - 1-dose 60+ series) Never done    COLORECTAL CANCER SCREENING  03/28/2021    MAMMO SCREENING  06/09/2023    COVID-19 Vaccine (5 - 2023-24 season) 09/01/2023    MEDICARE ANNUAL WELLNESS VISIT  03/07/2024    LIPID  03/07/2024    URINE DRUG SCREEN  03/07/2024    ZOSTER IMMUNIZATION (3 of 3) 10/20/2023    VENESSA ASSESSMENT  09/25/2024    ANNUAL REVIEW OF HM ORDERS  09/25/2024    PHQ-9  10/22/2024    BMP  11/06/2024    ALT  05/05/2025    FALL RISK ASSESSMENT  05/07/2025    CBC  05/13/2025    GLUCOSE  05/06/2027    DTAP/TDAP/TD IMMUNIZATION (3 - Td or Tdap) 10/11/2027     "ADVANCE CARE PLANNING  03/08/2028    DEXA  04/16/2033    TSH W/FREE T4 REFLEX  Completed    SPIROMETRY  Completed    HEPATITIS C SCREENING  Completed    COPD ACTION PLAN  Completed    MIGRAINE ACTION PLAN  Completed    INFLUENZA VACCINE  Completed    Pneumococcal Vaccine: 65+ Years  Completed    Medicare Annual [place] Pharmacist Visit (once per calendar year)  Completed    IPV IMMUNIZATION  Aged Out    HPV IMMUNIZATION  Aged Out    MENINGITIS IMMUNIZATION  Aged Out    RSV MONOCLONAL ANTIBODY  Aged Out    PAP  Discontinued    LUNG CANCER SCREENING  Discontinued       Appropriate preventive services were discussed with this patient, including applicable screening as appropriate for fall prevention, nutrition, physical activity, Tobacco-use cessation, weight loss and cognition.  Checklist reviewing preventive services available has been given to the patient.           No data to display                   Patient has history of COPD. Has chronic SOB on exertion. Has exacerbations 1-2 times a year. No current increased symptoms.   Has h/o non ischemic CMP, SOB, palpitations, edema. Improved with treatment. Now with normal EF.   Has chronic back and right flank pain, on narcotic analgesics, reviewed safety, side effects, CSA signed.       Review of Systems  Constitutional, HEENT, cardiovascular, pulmonary, GI, , musculoskeletal, neuro, skin, endocrine and psych systems are negative, except as otherwise noted.      Objective    /77 (BP Location: Right arm, Cuff Size: Adult Regular)   Pulse 119   Temp 97.9  F (36.6  C) (Oral)   Resp 18   Ht 1.778 m (5' 10\")   Wt 76.2 kg (168 lb)   LMP 09/20/2005   SpO2 94%   BMI 24.11 kg/m    Body mass index is 24.11 kg/m .  Physical Exam   GENERAL: alert and no distress, frail appearing, in a wheelchair   EYES: Eyes grossly normal to inspection, PERRL and conjunctivae and sclerae normal, proptosis of the eyes   HENT: ear canals and TM's normal, nose and mouth without ulcers or " lesions  NECK: no adenopathy, no asymmetry, masses, or scars  RESP: lungs clear to auscultation - no rales, rhonchi or wheezes  CV: regular rate and rhythm, normal S1 S2, no S3 or S4, no murmur, click or rub, no peripheral edema  ABDOMEN: soft, nontender, no hepatosplenomegaly, no masses and bowel sounds normal  MS: no gross musculoskeletal defects noted, no edema  SKIN: no suspicious lesions or rashes  NEURO: generalized weakness, non ambulatory, needs help for standing up from chair, mentation intact and speech normal  PSYCH: mentation appears normal, affect normal    Admission on 05/05/2024, Discharged on 05/06/2024   Component Date Value Ref Range Status    Ventricular Rate 05/05/2024 92  BPM Final    Atrial Rate 05/05/2024 92  BPM Final    NJ Interval 05/05/2024 134  ms Final    QRS Duration 05/05/2024 114  ms Final    QT 05/05/2024 378  ms Final    QTc 05/05/2024 467  ms Final    P Axis 05/05/2024 52  degrees Final    R AXIS 05/05/2024 76  degrees Final    T Axis 05/05/2024 41  degrees Final    Interpretation ECG 05/05/2024    Final                    Value:Sinus rhythm  Low voltage QRS  Right bundle branch block  Abnormal ECG  When compared with ECG of 06-APR-2024 04:42,  No significant change was found  Confirmed by - EMERGENCY ROOM, PHYSICIAN ([id number]),  ALAN DANIEL ([place]) on 5/6/2024 6:49:38 AM      Sodium 05/05/2024 127 (L)  135 - 145 mmol/L Final    Reference intervals for this test were updated on 09/26/2023 to more accurately reflect our healthy population. There may be differences in the flagging of prior results with similar values performed with this method. Interpretation of those prior results can be made in the context of the updated reference intervals.     Potassium 05/05/2024 3.6  3.4 - 5.3 mmol/L Final    Carbon Dioxide (CO2) 05/05/2024 21 (L)  22 - 29 mmol/L Final    Anion Gap 05/05/2024 13  7 - 15 mmol/L Final    Urea Nitrogen 05/05/2024 4.1 (L)  8.0 - 23.0 mg/dL Final    Creatinine  05/05/2024 0.47 (L)  0.51 - 0.95 mg/dL Final    GFR Estimate 05/05/2024 >90  >60 mL/min/1.73m2 Final    Calcium 05/05/2024 7.6 (L)  8.8 - 10.2 mg/dL Final    Chloride 05/05/2024 93 (L)  98 - 107 mmol/L Final    Glucose 05/05/2024 110 (H)  70 - 99 mg/dL Final    Alkaline Phosphatase 05/05/2024 51  40 - 150 U/L Final    Reference intervals for this test were updated on 11/14/2023 to more accurately reflect our healthy population. There may be differences in the flagging of prior results with similar values performed with this method. Interpretation of those prior results can be made in the context of the updated reference intervals.    AST 05/05/2024 10  0 - 45 U/L Final    Reference intervals for this test were updated on 6/12/2023 to more accurately reflect our healthy population. There may be differences in the flagging of prior results with similar values performed with this method. Interpretation of those prior results can be made in the context of the updated reference intervals.    ALT 05/05/2024 7  0 - 50 U/L Final    Reference intervals for this test were updated on 6/12/2023 to more accurately reflect our healthy population. There may be differences in the flagging of prior results with similar values performed with this method. Interpretation of those prior results can be made in the context of the updated reference intervals.      Protein Total 05/05/2024 4.9 (L)  6.4 - 8.3 g/dL Final    Albumin 05/05/2024 3.3 (L)  3.5 - 5.2 g/dL Final    Bilirubin Total 05/05/2024 0.4  <=1.2 mg/dL Final    Lipase 05/05/2024 14  13 - 60 U/L Final    WBC Count 05/05/2024 13.9 (H)  4.0 - 11.0 10e3/uL Final    RBC Count 05/05/2024 4.29  3.80 - 5.20 10e6/uL Final    Hemoglobin 05/05/2024 11.0 (L)  11.7 - 15.7 g/dL Final    Hematocrit 05/05/2024 36.7  35.0 - 47.0 % Final    MCV 05/05/2024 86  78 - 100 fL Final    MCH 05/05/2024 25.6 (L)  26.5 - 33.0 pg Final    MCHC 05/05/2024 30.0 (L)  31.5 - 36.5 g/dL Final    RDW 05/05/2024  16.0 (H)  10.0 - 15.0 % Final    Platelet Count 05/05/2024 434  150 - 450 10e3/uL Final    % Neutrophils 05/05/2024 81  % Final    % Lymphocytes 05/05/2024 11  % Final    % Monocytes 05/05/2024 7  % Final    % Eosinophils 05/05/2024 0  % Final    % Basophils 05/05/2024 0  % Final    % Immature Granulocytes 05/05/2024 1  % Final    NRBCs per 100 WBC 05/05/2024 0  <1 /100 Final    Absolute Neutrophils 05/05/2024 11.1 (H)  1.6 - 8.3 10e3/uL Final    Absolute Lymphocytes 05/05/2024 1.5  0.8 - 5.3 10e3/uL Final    Absolute Monocytes 05/05/2024 1.0  0.0 - 1.3 10e3/uL Final    Absolute Eosinophils 05/05/2024 0.1  0.0 - 0.7 10e3/uL Final    Absolute Basophils 05/05/2024 0.0  0.0 - 0.2 10e3/uL Final    Absolute Immature Granulocytes 05/05/2024 0.2  <=0.4 10e3/uL Final    Absolute NRBCs 05/05/2024 0.0  10e3/uL Final    Hold Specimen 05/05/2024 [facility]   Final    Hold Specimen 05/05/2024 [facility]   Final    C Difficile Toxin B by PCR 05/06/2024 Negative  Negative Final    A negative result does not exclude actual disease due to C. difficile and may be due to improper collection, handling and storage of the specimen or the number of organisms in the specimen is below the detection limit of the assay.    Lactic Acid 05/05/2024 0.9  0.7 - 2.0 mmol/L Final    Sodium 05/05/2024 134 (L)  135 - 145 mmol/L Final    Reference intervals for this test were updated on 09/26/2023 to more accurately reflect our healthy population. There may be differences in the flagging of prior results with similar values performed with this method. Interpretation of those prior results can be made in the context of the updated reference intervals.     Sodium 05/06/2024 132 (L)  135 - 145 mmol/L Final    Reference intervals for this test were updated on 09/26/2023 to more accurately reflect our healthy population. There may be differences in the flagging of prior results with similar values performed with this method. Interpretation of those prior results can be  made in the context of the updated reference intervals.     Potassium 05/06/2024 3.8  3.4 - 5.3 mmol/L Final    Chloride 05/06/2024 100  98 - 107 mmol/L Final    Carbon Dioxide (CO2) 05/06/2024 21 (L)  22 - 29 mmol/L Final    Anion Gap 05/06/2024 11  7 - 15 mmol/L Final    Urea Nitrogen 05/06/2024 4.0 (L)  8.0 - 23.0 mg/dL Final    Creatinine 05/06/2024 0.47 (L)  0.51 - 0.95 mg/dL Final    GFR Estimate 05/06/2024 >90  >60 mL/min/1.73m2 Final    Calcium 05/06/2024 7.9 (L)  8.8 - 10.2 mg/dL Final    Glucose 05/06/2024 148 (H)  70 - 99 mg/dL Final           Signed Electronically by: Bandar Weinberg MD

## 2024-05-13 NOTE — LETTER

## 2024-05-14 NOTE — PROGRESS NOTES
Left a message for the patient to return my phone call. C. Diff was canceled and needs to be recollected.

## 2024-05-15 NOTE — TELEPHONE ENCOUNTER
3 physical therapy visits over the next 4 weeks.  Please approve the above.  Thank you,  Ibis Carias LPN  [facility]  [phone]

## 2024-05-17 NOTE — TELEPHONE ENCOUNTER
Brigette RN with [facility] calls to report today. RN saw patient 5- and patient refused to have a weight check and refused for RN to view a sore on patient bottom. Patient Son was there for this RN visit and he followed RN out of the home. Rn stated that the patient is also refusing to let the son view the sore saying it it her prerogative.     RN did see that the patient has VOSHE wound cleanser in the home but is not aware of anyone medical who is helping the patient care for the sound.     Brigette can be reached at [phone]

## 2024-05-17 NOTE — TELEPHONE ENCOUNTER
Called patient.  Patient stated that the home care RN was very confrontational and patient had to advocate for herself.  Patient was called by home care RN to see if she could stop by within the next hour to see her.  Patient was not expecting an RN that day and had already had a visit that day with the NA.  The nursing assistant had showered her and applied a bandaid already.  Patient needs assist of 2 people to get back into bed and was too tired and exhausted.      Stated that she doesn't need any dressings.  All she uses is a bandaid.  Wound is very small-per patient.  Uses the wound cleanser after using the bathroom to keep the area clean.

## 2024-05-22 NOTE — TELEPHONE ENCOUNTER
FOCUS OF CARE: DME, ADL  DISCIPLINE: OT  SPECIFIC TREATMENT ORDERS: OCCUPATIONAL THERAPY HOME CARE SERVICES FOLLOWING HOSPITALIZATION FOR CHRONIC PAIN 3 VISITS WITHIN 3 WEEKS WITH PLANNED RECERT FOR MWC FITTING, LB DRSG AE, SHOWER RETRAINING, PU PREVENTION, HEP FALL PREVENTION AND HOME SAFETY   Please approve the above.  Thank you,  Ibis Carias LPN  [facility]  [phone]

## 2024-05-24 NOTE — TELEPHONE ENCOUNTER
[facility] * order# [id number] received via fax     Forms in Dr. mail box for review and signature.

## 2024-05-29 NOTE — TELEPHONE ENCOUNTER
Patient also asking for refill of Tramadol.   Patient reports the alprazolam was not received at [facility]. She is aksin for this to go to  pharmacy in [place] instead    Patient can be reached at [phone]

## 2024-05-31 NOTE — TELEPHONE ENCOUNTER
Patient informed via mychart of provider's message below.    
Sent  Patient Sultana's response below    
The Tramadol is stopped. She is already on narcotic for pain.   Don't recommend to use two narcotics.   
Will call in for 5 days.   
none

## 2024-06-03 NOTE — TELEPHONE ENCOUNTER
Sent [employer] message to patient.    Thank you,  Julien Yan, Triage RN Liu Alarcon  3:31 PM 6/3/2024

## 2024-06-03 NOTE — TELEPHONE ENCOUNTER
Skilled Nursing order arrived via fax from [facility] # [id number] dated 5/31/2024.    Placed in provider mailbox for signature

## 2024-06-05 NOTE — PROGRESS NOTES
Clinic Care Coordination Contact  Follow Up Progress Note      Assessment: RN CC contacted patient for monthly outreach.   Doing pretty good. Getting home care. She is able to get up from the chair and the toilet due to a raised toilet seat and an elevated chair. She does not rely on someone else to assist with getting up. No changes to her medications. She has not discussed her Care Gaps with her Primary Care Provider yet. She plans to send her Primary Care Provider a [employer] message to arrange for lab tests through home care RN and to discuss Care Gaps.       Care Gaps:    Health Maintenance Due   Topic Date Due    HF ACTION PLAN  Never done    DEPRESSION ACTION PLAN  Never done    HEPATITIS A IMMUNIZATION (1 of 2 - Risk 2-dose series) Never done    RSV VACCINE (Pregnancy & 60+) (1 - 1-dose 60+ series) Never done    COLORECTAL CANCER SCREENING  03/28/2021    MAMMO SCREENING  06/09/2023    COVID-19 Vaccine (5 - 2023-24 season) 09/01/2023    LIPID  03/07/2024    ZOSTER IMMUNIZATION (3 of 3) 10/20/2023     She has not discussed with her Primary Care Provider.     Care Plans  Care Plan: Health Maintenance       Problem: Health Maintenance Due or Overdue       Goal: Become up-to-date with health maintenance visit(s)       Start Date: 10/5/2023 Expected End Date: 1/4/2024    This Visit's Progress: 20% Recent Progress: 10%    Note:     Barriers: Very short of breath with activity; Limited mobility; Provider availability - wait time to complete appointments.   Strengths: Motivated; Agreeable to Care Coordination.   Patient expressed understanding of goal: Yes.   Action steps to achieve this goal:  1. I will take my medications as prescribed.   2. I will discuss, review, schedule and complete recommended overdue health maintenance with my Primary Care Provider.   3. I will contact my care team with questions, concerns or support needs. I will use the clinic as a resource and I understand I can contact my clinic with 24/7  after hours services available. Care Coordinator will remain available as needed.                               Intervention/Education provided during outreach: Discussed patients plan of care. CC RN asked open ended questions, provided support, resources, and encouragement as needed. Patient will reach out to care team sooner than planned with new questions or concerns.     Plan: RN CC will continue to follow patient.    Care Coordinator will follow up in 1 month.     Cynthia Snell RN, BSN, CPHN, CCM  [facility]    Phone: [phone]  Email: [email or web address]

## 2024-06-06 NOTE — TELEPHONE ENCOUNTER
[facility] calling for wound care orders. clean wounds with mild soap and water, pat dry ,apply skin prep to jesse wounds , apply hydro colloid derma film to both wounds and change every 3-5 days and PRN for soiled or dislodged dressing.    Lukasz carpenter voicemail [phone]

## 2024-06-10 NOTE — TELEPHONE ENCOUNTER
Thank you, clinician updated.         HYDROcodone-acetaminophen (NORCO)  MG per tablet 240 tablet 0 2024 --   Sig - Route: Take 1-2 tablets by mouth 4 times daily as needed for severe pain - Oral   Class: E-Prescribe   Earliest Fill Date: 2024   Notes to Pharmacy: [facility]    Number of times this order has been changed since signin         The above was also sent to clinician to update of current medication request per chart currently.    Thank you,  Ibis Carias LPN
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
## 2024-06-10 NOTE — TELEPHONE ENCOUNTER
Wound Care orders completed Thank you.    I am still in need of disciplines to be approved.  Skilled Nursin 1x Wk x8, 3 PRN, HHA 1x Wk x9.    Thank you,  Ibis Cairas LPN \  [facility]
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
## 2024-06-20 NOTE — LETTER
Dear Cynthia,   Attached is an updated Patient Centered Plan of Care for your continued enrollment in Care Coordination. Please let us know if you have additional questions, concerns or goals that we can assist with.     Sincerely,   Cynthia Snell RN, BSN, CPHN, CM  [facility]  [facility]  Phone: [phone]  Email: [email or web address]  Patient Centered Plan of Care  About Me:        Patient Name:  Trisha Timmons    YOB: 1955  Age:         68 year old   [place] MRN:    [id number] Telephone Information:  Home Phone [phone]   Mobile [phone]       Address:  [address] [place] [address] Email address:  [email or web address]      Emergency Contact(s)    Name Relationship Lgl Grd Work Phone Home Phone Mobile Phone   1. MANINDER TIMMONS Spouse No  [phone]   2. IGOR TIMMONS Son No   [phone]   3. ILIANA TIMMONS Son No   [phone]           Primary language:  English     needed? No   [place] [facility]:  [phone] op. 1  Other communication barriers:Other (Very Short of breath with activity)    Preferred Method of Communication:  Mail  Current living arrangement: I live in a private home with spouse    Mobility Status/ Medical Equipment: Dependent/Assisted by Another    Health Maintenance  Health Maintenance Reviewed: Due/Overdue (Not discussed during TCM outreach.)    My Access Plan  Medical Emergency 911   Primary Clinic Line [facility] - [phone]   24 Hour Appointment Line [phone] or  [phone]) (toll-free)   24 Hour Nurse Line [phone] (toll-free)   Preferred Urgent Care [facility], [phone]     Preferred Hospital [facility]  [phone]     Preferred Pharmacy [facility] [place], [place] - [facility] [address]      Behavioral Health Crisis Line The National Suicide Prevention Lifeline at 1-128.900.9735 or Text/Call 988     My Care Team Members  Patient Care Team         Relationship Specialty Notifications Start End    Bandar Weinberg MD PCP - General   2/15/02     Phone: [phone] Pager: [phone] Fax: [phone]        [address] [facility] [address]    Bandar Weinberg MD Assigned PCP   10/4/12     Phone: [phone] Pager: [phone] Fax: [phone]        [address] [facility] [address]    Thomas Valerio MD Referring Physician Pulmonary Disease  10/25/17     Phone: [phone] Fax: [phone]         [facility] [address] [facility] [address]    Michael Odell MD (Inactive) MD Cardiology  2/16/18     Michael Odell MD (Inactive) MD Cardiology  2/26/18     Justina Finney DO MD Cardiovascular Disease  12/14/20     Phone: [phone] Fax: [phone]         [address] [facility] [address]    Justina Finney DO Assigned Heart and Vascular Provider   9/24/22     Phone: [phone] Fax: [phone]         [address] [facility] [address]    Jessica Robles RPH Pharmacist Pharmacist  7/31/23     Phone: [phone] Fax: [phone]         [address] [facility] [address]    Cynthia Snell, JACKIE Lead Care Coordinator Primary Care - [facility] Admissions 10/5/23     Phone: [phone]         No Ref-Primary, Physician    3/13/24     Fax: [phone]         Konstantin Campuzano MD Assigned Palliative Care Provider   5/23/24     Phone: [phone] Fax: [phone]         [address] [facility] [address]    Jessica Robles RPH Assigned MTM Pharmacist   5/23/24     Phone: [phone] Fax: [phone]         [address] [facility] [address]              My Care Plans  Self Management and Treatment Plan    Care Plan  Care Plan: Health Maintenance       Problem: Health Maintenance Due or Overdue       Goal: Become  up-to-date with health maintenance visit(s)       Start Date: 10/5/2023 Expected End Date: 1/4/2024    This Visit's Progress: 30% Recent Progress: 20%    Note:     Barriers: Very short of breath with activity; Limited mobility; Provider availability - wait time to complete appointments.   Strengths: Motivated; Agreeable to Care Coordination.   Patient expressed understanding of goal: Yes.   Action steps to achieve this goal:  1. I will take my medications as prescribed.   2. I will discuss, review, schedule and complete recommended overdue health maintenance with my Primary Care Provider.   3. I will contact my care team with questions, concerns or support needs. I will use the clinic as a resource and I understand I can contact my clinic with 24/7 after hours services available. Care Coordinator will remain available as needed.                               Action Plans on File:     Advance Care Plans/Directives:   Advanced Care Plan/Directives on file:   Yes    Status of Document(s):   On File and Validated    Advanced Care Plan/Directives Type: No data recorded       My Medical and Care Information  Problem List   Patient Active Problem List   Diagnosis    Essential hypertension with goal blood pressure less than 130/80    Chronic migraine without aura without status migrainosus, not intractable    Esophageal reflux    Disorder of bone and cartilage    Family history of malignant neoplasm of gastrointestinal tract    Chronic airway obstruction (H)    Cystocele    Family history of thyroid cancer - 1/2 brother    Anxiety    Chronic low back pain    Hair loss    Lateral epicondylitis of right elbow    Dilated cardiomyopathy (H)    Right bundle branch block (RBBB)    CHF (congestive heart failure) (H)    Coronary artery disease involving native coronary artery of native heart without angina pectoris    Mixed hyperlipidemia    Controlled substance agreement signed    Moderate persistent asthma without complication     Hypokalemia    Panlobular emphysema (H)    Tachycardia    Status post coronary angiogram    Thoracic aortic ectasia (H24)    Supraventricular tachycardia (H24)    Pressure ulcer of left buttock, stage 3 (H)    Hyponatremia    Pulmonary nodule    Elevated troponin    COPD exacerbation (H)    Colitis    Nausea vomiting and diarrhea      Current Medications and Allergies:    Current Outpatient Medications   Medication Sig Dispense Refill    albuterol (VENTOLIN HFA) 108 (90 BASE) MCG/ACT Inhaler Inhale 2 puffs into the lungs every 6 hours as needed for shortness of breath / dyspnea or wheezing 18 g 3    ALPRAZolam (XANAX) 0.25 MG tablet Take 1 tablet (0.25 mg) by mouth 2 times daily as needed for anxiety 60 tablet 0    atorvastatin (LIPITOR) 10 MG tablet Take 10 mg by mouth at bedtime      azithromycin (ZITHROMAX) 250 MG tablet Take 250 mg by mouth Every Mon, Wed, Fri Morning      carvedilol (COREG) 25 MG tablet Take 1 tablet (25 mg) by mouth 2 times daily (with meals) 180 tablet 4    cetirizine (ZYRTEC) 10 MG tablet Take 10 mg by mouth at bedtime      [employer] FIBER GUMMIES 2.5 G CHEW Take 1 tablet by mouth at bedtime Reported on 5/8/2017      doxycycline hyclate (PERIOSTAT) 20 MG tablet Take 20 mg by mouth at bedtime      erythromycin (ROMYCIN) 5 MG/GM ophthalmic ointment Place into both eyes 2 times daily 3.5 g 5    fluticasone (FLONASE) 50 MCG/ACT nasal spray Spray 1-2 sprays into both nostrils daily as needed for rhinitis or allergies 16 g 4    HYDROcodone-acetaminophen (NORCO)  MG per tablet Take 1-2 tablets by mouth 4 times daily as needed for severe pain 240 tablet 0    ipratropium - albuterol 0.5 mg/2.5 mg/3 mL (DUONEB) 0.5-2.5 (3) MG/3ML neb solution Take 1 vial by nebulization every 6 hours as needed for shortness of breath, wheezing or cough      methocarbamol (ROBAXIN) 500 MG tablet Take 500 mg by mouth 4 times daily as needed for muscle spasms      montelukast (SINGULAIR) 10 MG tablet TAKE ONE TABLET BY  MOUTH AT BEDTIME 90 tablet 1    Multiple Vitamin CHEW Take 2 tablets by mouth at bedtime      naloxone (NARCAN) 4 MG/0.1ML nasal spray Spray 1 spray (4 mg) into one nostril alternating nostrils once as needed for opioid reversal Every 2-3 minutes until patient responsive or EMS arrives 0.2 mL 0    ondansetron (ZOFRAN ODT) 4 MG ODT tab Take 1 tablet (4 mg) by mouth every 6 hours as needed for nausea or vomiting 24 tablet 0    pantoprazole (PROTONIX) 40 MG EC tablet Take 40 mg by mouth at bedtime      predniSONE (DELTASONE) 5 MG tablet Take 10 mg by mouth at bedtime      predniSONE (DELTASONE) 5 MG tablet Take 1 tablet by mouth every morning      roflumilast (DALIRESP) 500 MCG TABS tablet Take 500 mcg by mouth at bedtime      simethicone (MYLICON) 80 MG chewable tablet Take 1 tablet (80 mg) by mouth every 6 hours as needed for cramping or other (abdominal discomfort) 30 tablet 0    spironolactone (ALDACTONE) 25 MG tablet Take 0.5 tablets (12.5 mg) by mouth daily 90 tablet 4    SUMAtriptan (IMITREX) 25 MG tablet Take 1 tablet (25 mg) by mouth at onset of headache for migraine 9 tablet 11    TRELEGY ELLIPTA 200-62.5-25 MCG/ACT oral inhaler Inhale 1 puff into the lungs daily      zinc oxide (DESITIN) 20 % external ointment Apply topically every 4 hours 28 g 0     No current facility-administered medications for this visit.        Allergies   Allergen Reactions    Gabapentin Swelling    Contrast Dye      Iodine    Escitalopram      Nausea and sickness    Peanuts [Nuts] Hives    Penicillins      rash    Sulfa Antibiotics      High family history    Tace [Chlorotrianisene]      joint swelling    Aspirin Rash     After 3 days    Ibuprofen Nausea and Vomiting and Swelling    Strawberry Extract Rash     Care Coordination Start Date: 10/5/2023   Frequency of Care Coordination: monthly, more frequently as needed     Form Last Updated: 06/20/2024

## 2024-07-02 NOTE — TELEPHONE ENCOUNTER
7/1/24 OCCUPATIONAL THERAPY order received via fax. Form in your mailbox to be signed.     Darrel Leon is a 75 year old male here for  Chief Complaint   Patient presents with   • Follow-up     Malignant lymphoma, small lymphocytic (CMS/HCC)     Denies latex allergy or sensitivity.    Medication verified and med list updated.  PCP and Pharmacy verified.    Social History     Tobacco Use   Smoking Status Former Smoker   • Packs/day: 1.50   • Years: 20.00   • Pack years: 30.00   • Types: Cigarettes   • Start date: 1962   • Quit date: 1982   • Years since quittin.8   Smokeless Tobacco Never Used   Tobacco Comment    did smoke 1  pkg/d x 20 yrs     Advance Directives Filed: No    ECOG:   ECOG   ECOG Performance Status        Height: No.  Ht Readings from Last 1 Encounters:   20 5' 11\" (1.803 m)     Weight:Yes, shoes on.  Wt Readings from Last 3 Encounters:   20 108 kg   20 106.6 kg   20 108 kg       BMI: There is no height or weight on file to calculate BMI.    PSYCHIATRIC: Patient denies insomnia, depression, anxiety

## 2024-07-02 NOTE — PATIENT INSTRUCTIONS
It was good to see you today, Cynthia.  I hope Steve continues to recover and you continue your recovery too!    Here are the things we talked about:  No new medications today.  Keep getting stronger.    Someone from the team will reach out to schedule a follow up appointment in 4 months.     How to get a hold of us:  For non-urgent matters, MyChart works best.    For more urgent matters, or if you prefer not to use MyChart, call our clinic nurse coordinator Rae Agustin RN at [phone]    We have an on-call number for evenings and weekends. Please call this only if you are having uncontrolled symptoms or serious side effects from your medicines: [phone].     For refills, please give us a week (5 working days) notice. We don't always have providers available everyday to do refills. If you call the day you run out of your medicine, we may not be able to refill it in time, so call 5 days in advance!    Konstantin Campuzano MD MS FAAFP CAQHPM  [facility]  Office [phone]  Fax [phone]

## 2024-07-02 NOTE — PROGRESS NOTES
Virtual Visit Details    Type of service:  Video Visit   Video Start Time:  1400  Video End Time:2:36 PM    Originating Location (pt. Location): Home    Distant Location (provider location):  On-site  Platform used for Video Visit: [facility]    Palliative Care Outpatient Clinic Progress Note    Patient Name: Trisha Bender  Primary Provider: Bandar Weinberg    Impressions:  Palliative Performance Score:  (100% normal, 0% death):  40%  Decision Making Capacity:  present  PDMP review:  yes, no concerns     General debility/weakness  PAH--not a transplant candidate  CAD  Chronic pain syndrome--lower back and vertebral injuries  Pan lobular emphysema     GOALS OF CARE:  04/24/2024  Cynthia worries she may be getting to the end of her life due to her functional decline. She hopes she will 'bounce back' from her recent acute hospitalization.  And she is interested in learning more about hospice and is considering enrolling if she doesn't begin to regain more strength over the next few weeks. [facility]          Recommendations & Counseling:  I recommended Cynthia reach out to the  at [facility] (where Steve is) for recs on independent senior living sites in the [facility].    If she chooses to not enroll she will work with her PCP to get a hospital bed at home.    PCP will also continue to manage her chronic pain syndrome and opiate refills.     I provided a lot of encouragement for Cynthia about her progress and emotional support through open, empathic listening and affirmations.    F/up in 4 months, sooner,  prn.    Counseling: All of the above was explained to the patient in lay language. The patient has verbalized a clear understanding of the discussion, asked appropriate questions, which have been answered to patient's apparent satisfaction. The patient is in agreement with the above plan.      Chief Complaint/Patient ID: Trisha Marie  [place] 68 year old female with PMHx of PAH and general debility    Last Palliative care appointment: 04/24/2024 with me     Reviewed:  Yes:   reviewed - controlled substances reflected in medication list.    Interim History:  Trisha Bender is a 68 year old female who is seen today for follow up with Palliative Care via billable video visit. She has been doing 'OK.'    Her  fell four weeks ago down the stairs and several broken ribs and later a large pleural effusion and is now on rehab at the [facility].  Had informational visit with [facility] rep.      Reviewed POLST with Dr. Weinberg who was 'surprised' she was considering hospice as she's 'been through worse.'  Cynthia is now using a walker and can get up more and walks further if she has a SBA X1;  She feels she is getting close to being able to independently walk to bathroom (currently using bedside commode) like she was 3 months ago.    Bobbi has a PCA who has been helping  hours/week     Pain:  present and under the care of    Appetite/Nausea: improving     Bowels: no concerns     Sleep: mostly OK though hard without Steve at home     Mood: some occasional 'pity' parties     Coping:  surprisingly well.    Family History- Reviewed in Epic.    Allergies   Allergen Reactions    Gabapentin Swelling    Contrast Dye      Iodine    Escitalopram      Nausea and sickness    Peanuts [Nuts] Hives    Penicillins      rash    Sulfa Antibiotics      High family history    Tace [Chlorotrianisene]      joint swelling    Aspirin Rash     After 3 days    Ibuprofen Nausea and Vomiting and Swelling    Strawberry Extract Rash       Social History:  Pertinent changes to social history/social situation since last visit:  is on week 4 of acute hospitalization/rehab stay related to fall down stairs and chest wall/intrathoracic injuries  Key support resources:  and her children to some extent  Advance Directive Status:  ACP documents in Epic    Social  History     Tobacco Use    Smoking status: Former     Current packs/day: 0.00     Average packs/day: 1 pack/day for 30.0 years (30.0 ttl pk-yrs)     Types: Cigarettes     Start date: 1976     Quit date: 2006     Years since quittin.4    Smokeless tobacco: Never    Tobacco comments:     Quit in 2006   Vaping Use    Vaping status: Never Used   Substance Use Topics    Alcohol use: No     Alcohol/week: 0.0 standard drinks of alcohol    Drug use: No         Allergies   Allergen Reactions    Gabapentin Swelling    Contrast Dye      Iodine    Escitalopram      Nausea and sickness    Peanuts [Nuts] Hives    Penicillins      rash    Sulfa Antibiotics      High family history    Tace [Chlorotrianisene]      joint swelling    Aspirin Rash     After 3 days    Ibuprofen Nausea and Vomiting and Swelling    Strawberry Extract Rash     Current Outpatient Medications   Medication Sig Dispense Refill    albuterol (VENTOLIN HFA) 108 (90 BASE) MCG/ACT Inhaler Inhale 2 puffs into the lungs every 6 hours as needed for shortness of breath / dyspnea or wheezing 18 g 3    ALPRAZolam (XANAX) 0.25 MG tablet Take 1 tablet (0.25 mg) by mouth 2 times daily as needed for anxiety 60 tablet 0    atorvastatin (LIPITOR) 10 MG tablet Take 10 mg by mouth at bedtime      azithromycin (ZITHROMAX) 250 MG tablet Take 250 mg by mouth Every Mon, Wed, Fri Morning      carvedilol (COREG) 25 MG tablet Take 1 tablet (25 mg) by mouth 2 times daily (with meals) 180 tablet 4    cetirizine (ZYRTEC) 10 MG tablet Take 10 mg by mouth at bedtime      [employer] FIBER GUMMIES 2.5 G CHEW Take 1 tablet by mouth at bedtime Reported on 2017      doxycycline hyclate (PERIOSTAT) 20 MG tablet Take 20 mg by mouth at bedtime      erythromycin (ROMYCIN) 5 MG/GM ophthalmic ointment Place into both eyes 2 times daily 3.5 g 5    fluticasone (FLONASE) 50 MCG/ACT nasal spray Spray 1-2 sprays into both nostrils daily as needed for rhinitis or allergies 16 g 4     HYDROcodone-acetaminophen (NORCO)  MG per tablet Take 1-2 tablets by mouth 4 times daily as needed for severe pain 240 tablet 0    ipratropium - albuterol 0.5 mg/2.5 mg/3 mL (DUONEB) 0.5-2.5 (3) MG/3ML neb solution Take 1 vial by nebulization every 6 hours as needed for shortness of breath, wheezing or cough      methocarbamol (ROBAXIN) 500 MG tablet Take 500 mg by mouth 4 times daily as needed for muscle spasms      montelukast (SINGULAIR) 10 MG tablet TAKE ONE TABLET BY MOUTH AT BEDTIME 90 tablet 1    Multiple Vitamin CHEW Take 2 tablets by mouth at bedtime      naloxone (NARCAN) 4 MG/0.1ML nasal spray Spray 1 spray (4 mg) into one nostril alternating nostrils once as needed for opioid reversal Every 2-3 minutes until patient responsive or EMS arrives 0.2 mL 0    ondansetron (ZOFRAN ODT) 4 MG ODT tab Take 1 tablet (4 mg) by mouth every 6 hours as needed for nausea or vomiting 24 tablet 3    pantoprazole (PROTONIX) 40 MG EC tablet Take 40 mg by mouth at bedtime      predniSONE (DELTASONE) 5 MG tablet Take 10 mg by mouth at bedtime      predniSONE (DELTASONE) 5 MG tablet Take 1 tablet by mouth every morning      roflumilast (DALIRESP) 500 MCG TABS tablet Take 500 mcg by mouth at bedtime      simethicone (MYLICON) 80 MG chewable tablet Take 1 tablet (80 mg) by mouth every 6 hours as needed for cramping or other (abdominal discomfort) 30 tablet 0    spironolactone (ALDACTONE) 25 MG tablet Take 0.5 tablets (12.5 mg) by mouth daily 90 tablet 4    SUMAtriptan (IMITREX) 25 MG tablet Take 1 tablet (25 mg) by mouth at onset of headache for migraine 9 tablet 11    TRELEGY ELLIPTA 200-62.5-25 MCG/ACT oral inhaler Inhale 1 puff into the lungs daily      zinc oxide (DESITIN) 20 % external ointment Apply topically every 4 hours 28 g 0     Past Medical History:   Diagnosis Date    Anxiety     CAD (coronary artery disease) 07/02/2015    mild per cath    CHF (congestive heart failure)     EF=20-25% per echo 6/30/15    Chronic  airway obstruction     FEV1 36% of pred; VC 67%  in Jan 06.    Chronic back pain     COPD (chronic obstructive pulmonary disease)     Endometriosis     Esophageal reflux     Essential hypertension     Hyperlipidemia     Migraine     Mild persistent asthma 08/30/2012    Osteopenia     Takotsubo cardiomyopathy      Past Surgical History:   Procedure Laterality Date    ANGIOGRAM  07/02/2015    Minimal CAD. LV dysfunction, Mid RCA 20% stenosis, EF 20-25%, c/w Takotsubo cardiomyopathy    APPENDECTOMY  1974    CHOLECYSTECTOMY  1974    [facility]    COLONOSCOPY N/A 04/26/2018    Procedure: COLONOSCOPY;  COLONOSCOPY with biopsies;  Surgeon: Steve Acosta MD;  Location: [facility] OR    CORONARY ANGIOGRAPHY ADULT ORDER      CV CORONARY ANGIOGRAM N/A 07/30/2020    Procedure: Coronary Angiogram;  Surgeon: Quinn Viveros MD;  Location:  HEART CARDIAC CATH LAB    CV LEFT HEART CATH N/A 07/30/2020    Procedure: Left Heart Cath;  Surgeon: Quinn Viveros MD;  Location:  HEART CARDIAC CATH LAB    CV LEFT VENTRICULOGRAM N/A 07/30/2020    Procedure: Left Ventriculogram;  Surgeon: Quinn Viveros MD;  Location:  HEART CARDIAC CATH LAB    CV RIGHT HEART CATH MEASUREMENTS RECORDED N/A 07/30/2020    Procedure: Right Heart Cath;  Surgeon: Quinn Viveros MD;  Location:  HEART CARDIAC CATH LAB    ESOPHAGOSCOPY, GASTROSCOPY, DUODENOSCOPY (EGD), COMBINED  11/15/2011    Procedure:COMBINED ESOPHAGOSCOPY, GASTROSCOPY, DUODENOSCOPY (EGD);  ESOPHAGOSCOPY, GASTROSCOPY, DUODENOSCOPY (EGD) ; Surgeon:JIM URENA; Location:[facility] GI    INJECT EPIDURAL LUMBAR / SACRAL SINGLE N/A 04/13/2018    Procedure: INJECT EPIDURAL LUMBAR / SACRAL CONTINUAL OR INTERMITTENT;  Lumbar Epidural Steroid Injection;  Surgeon: Az Enamorado MD;  Location: [facility] OR    INJECT EPIDURAL LUMBAR / SACRAL SINGLE N/A 09/24/2018    Procedure: INJECT EPIDURAL LUMBAR / SACRAL SINGLE;  Lumbar Epidural Steroid Injection;  Surgeon: Az Enamorado MD;   Location: [facility] OR    LAPAROSCOPIC ABLATION ENDOMETRIOSIS  1998    Nasal septoplasty and mucous retention cyst      TONSILLECTOMY, ADENOIDECTOMY, COMBINED      [place] teeth extraction         Physical Exam:   The video visit was only audio due to technical challenges for Cynthia.  PSYCH: Alert and oriented x3; speech- coherent, normal rate and volume; able to articulate logical thoughts, able to abstract reason, no tangential thoughts, no hallucinations or delusions, mentation appears normal, Mood is euthymic. Affect is appropriate for this mood state and bright. Thought content is free of suicidal ideation, hallucinations, and delusions.         Key Data Reviewed:  LABS: 2024- Cr 0.52, Albumin 3.6,  Hgb 12.1,      IMAGIN2024 CT AP W/CONTRAST  FINDINGS:   LOWER CHEST: Unchanged 6 mm nodule in the left lower lobe with dependent areas of scarring. Background emphysema.     HEPATOBILIARY: Normal liver contours. Focal fat deposition adjacent to the falciform. No worrisome liver lesions. Cholecystectomy. No biliary ductal dilation.     PANCREAS: Normal.     SPLEEN: Normal.     ADRENAL GLANDS: Normal.     KIDNEYS/BLADDER: Kidneys enhance symmetrically with bilateral subcentimeter cortically based cysts, which require no follow-up. No urolithiasis or collecting system dilation. Urinary bladder is unremarkable.     BOWEL: No obstruction. Colon is decompressed, though there may be mild wall thickening of the descending and rectosigmoid colon. No free fluid or free air.     LYMPH NODES: No lymphadenopathy.     VASCULATURE: Severe atherosclerotic vascular calcifications of the abdominal aorta and its branches. No aneurysm.     PELVIC ORGANS: Uterus is present. No adnexal mass.     MUSCULOSKELETAL: Bones are demineralized with similar underlying osseous heterogeneity. The numerous superior endplate compression deformities are unchanged (T10, T12 - L4). Healed bilateral rib fractures.                                                                       IMPRESSION:   1.  Mild wall thickening of the descending and rectosigmoid colon, which could be seen with colitis (infectious, inflammatory, or nonembolic ischemic).  2.  Otherwise, no acute abnormality in the abdomen or pelvis.  3.  Unchanged 6 mm left lower lobe nodule.  4.  Additional details as described in the findings.    40 minutes spent on the date of the encounter doing chart review, history and exam, patient education & counseling, documentation and other activities as noted above.    Konstantin Campuzano MD MS FAAFP CAQHPM  [facility] Palliative Care [facility]  Office [phone]  Fax [phone]

## 2024-07-02 NOTE — PROGRESS NOTES
Social Work - Distress Screen Intervention  [facility]    Identified Concern and Score from Distress Screenin. How concerned are you about your ability to eat? 0     2. How concerned are you about unintended weight loss or your current weight? 3     3. How concerned are you about feeling depressed or very sad?  3     4. How concerned are you about feeling anxious or very scared?  3     5. Do you struggle with the loss of meaning and gene in your life?  Somewhat     6. How concerned are you about work and home life issues that may be affected by your cancer?  (!) 9     7. How concerned are you about knowing what resources are available to help you?  1     8. Do you currently have what you would describe as Pent[employer]ostalism or spiritual struggles? Not at all     9. If you want to be contacted by one of our professionals, I can send a message to them right now.  No data recorded     Date of Distress Screen: 24  Data: At time of last visit, patient scored positive on distress screening.  did chart review. Pt does not have a cancer dx and follows with palliative care. She also has support through primary care care management .   Intervention/Education provided:  NA  Follow-up Required: Patient does not currently have a cancer diagnosis and therefore will not receive future follow-up by Oncology Social Worker. Patient encouraged to reach out to their Primary Care team for additional support    SALMA Hyman, [facility]  Adult Oncology Clinics  [place] (M,W), [place] (T) & [place] ()  *I am off Friday  Office: [phone]

## 2024-07-02 NOTE — NURSING NOTE
Current patient location: [id number] [facility] [id number]    Is the patient currently in the state of [place]? YES    Visit mode:VIDEO    If the visit is dropped, the patient can be reconnected by: VIDEO VISIT: Text to cell phone:   Telephone Information:   Mobile [phone]       Will anyone else be joining the visit? NO  (If patient encounters technical issues they should call [phone]    How would you like to obtain your AVS? MyChart    Are changes needed to the allergy or medication list? No, Pt stated no changes to allergies, and Pt stated no med changes    Are refills needed on medications prescribed by this physician? NO    Please review distress screening.     Reason for visit: RECHECK (Return Palliative)    Vielka LIGHT

## 2024-07-05 NOTE — PROGRESS NOTES
Clinic Care Coordination Contact  Follow Up Progress Note      Assessment: RN CC contacted patient for monthly outreach. Patient doing well. No new issues. She is waiting for her son to arrive along with a home care RN. Verified she has RN CC contact information if anything comes up.     Care Gaps:    Health Maintenance Due   Topic Date Due    HF ACTION PLAN  Never done    DEPRESSION ACTION PLAN  Never done    HEPATITIS A IMMUNIZATION (1 of 2 - Risk 2-dose series) Never done    RSV VACCINE (Pregnancy & 60+) (1 - 1-dose 60+ series) Never done    COLORECTAL CANCER SCREENING  03/28/2021    MAMMO SCREENING  06/09/2023    COVID-19 Vaccine (5 - 2023-24 season) 09/01/2023    LIPID  03/07/2024    ZOSTER IMMUNIZATION (3 of 3) 10/20/2023     Did not discuss due to waiting for arrival of home care RN.    Care Plans  Care Plan: Health Maintenance       Problem: Health Maintenance Due or Overdue       Goal: Become up-to-date with health maintenance visit(s)       Start Date: 10/5/2023 Expected End Date: 1/4/2024    This Visit's Progress: 30% Recent Progress: 20%    Note:     Barriers: Very short of breath with activity; Limited mobility; Provider availability - wait time to complete appointments.   Strengths: Motivated; Agreeable to Care Coordination.   Patient expressed understanding of goal: Yes.   Action steps to achieve this goal:  1. I will take my medications as prescribed.   2. I will discuss, review, schedule and complete recommended overdue health maintenance with my Primary Care Provider.   3. I will contact my care team with questions, concerns or support needs. I will use the clinic as a resource and I understand I can contact my clinic with 24/7 after hours services available. Care Coordinator will remain available as needed.                               Intervention/Education provided during outreach: Discussed patients plan of care. CC RN asked open ended questions, provided support, resources, and encouragement  as needed. Patient will reach out to care team sooner than planned with new questions or concerns.     Plan: RN CC will continue to follow patient for any additional needs. Verified she has RN CC contact information.     Care Coordinator will follow up in 1 month.     Cynthia Snell RN, BSN, CPHN, CCM  [facility] Ambulatory Care Management  [facility]  Phone: [phone]  Email: [email or web address]

## 2024-07-05 NOTE — TELEPHONE ENCOUNTER
Called and spoke with Marta from [facility] to relay provider okay to verbal orders. No further action needed.    Thank you,  Julien Yan, Triage RN Liu Alarcon  2:31 PM 7/5/2024

## 2024-07-05 NOTE — TELEPHONE ENCOUNTER
[facility] calling for FYI and orders.      [facility] is following the patient for wound care  The nurse states the patient is non compliant with her wound care and she is a volnerable adult.     New left buttock wound as of today 7/5-  the wound is not stag able.   Left buttock wound measures 0.6cm x0.4cm and 0.3 cm deep     Stage 3 wound on left upper thigh/ischium  1.5cm x 1.3cm and 0.3cm deep- chronic for a couple months     Requesting verbal order to cleanse with vashe cleanser or soap and water, pat dry, apply skin prep and per patient request use 4x4 hydrocellular foam dressing that has bordered adhesive-  apply to both wounds     These orders will be used until she can have a wound care consult.     Routing to pcp

## 2024-07-08 NOTE — TELEPHONE ENCOUNTER
Recommend to be seen for assessment in clinic. If symptomatic with dizziness, to be seen in UC. Is able to check her BP at home and update with results. If BP is less than 110 systolic, to decrease Carvedilol to 12.5 mg bid.

## 2024-07-08 NOTE — TELEPHONE ENCOUNTER
WOUND CARE FOR STAGEIII PROXIMAL, UPPER LEFT ISCHEIUM AND UNSTAGEABLE RELATED TO NECROSIS PRESSURE ULCER TO LEFT BUTTOCK:  CLEAN AREAS WITH MILD SOAP AND WATER, PAT DRY, APPLY SKIN PREP TO PERIWOUND, APPLY MEDICAL GRADE HONEY GEL TO WOUND BEDS, COVER WITH TRANSPARENT FILM COMPOSITE DRESSING, CHANGE DRESSINGS 2 X WEEK AND PRN FOR SOILED OR DISLODGED DRESSINGS. APPLY NON-DRYING BARRIER CREAM TO GLUTEAL CLEFT BID AND PRN. FOAM DRESSINGS NOT INDICATED PER MEDICARE GUIDELINES FOR WOUNDS, BUT IF PATIENT WANTS TO PURCHASE OOP MAY USE. RECONSULT WOCN IN 2 WEEKS IF NO IMPROVEMENT OR SOONER IF DECLINE. TO BE COMPLETED BY CAREGIVER ON NON- NURSING DAYS. MAY DISCONTINUE WHEN HEALED     Please approve the above.  Thank you,  Ibis Carias LPN  [facility]

## 2024-07-08 NOTE — TELEPHONE ENCOUNTER
Call to pt and Home care nurse was there at the Home.     They rechecked her BP and it was 112/68. She denies dizziness.     She verbalized understanding. She will check her BP twice daily before taking Carvedilol.     Pt also takes Lisinopril 5 mg and this was discontinued through the Hospital in May 2024. Please place back on med list.   Confirmed this with the Home care nurse that pt is taking.       05/05/24 1405 Discontinue Jose Alejandro Toledo Reason:Med Rec(No AVS / No eCancel)

## 2024-07-08 NOTE — TELEPHONE ENCOUNTER
Jadon, occupational therapist with [facility] calls w/ low patient blood pressure concern. Jadon saw patient today and the patient  BP was  80/58. After drinking some water, the BP was taken again with no change.     Jadon can be reached at [phone]    Patient can be reached at [phone]

## 2024-07-13 PROBLEM — J96.21 ACUTE ON CHRONIC RESPIRATORY FAILURE WITH HYPOXEMIA (H): Status: ACTIVE | Noted: 2024-01-01

## 2024-07-13 PROBLEM — R79.89 ELEVATED TROPONIN: Status: ACTIVE | Noted: 2023-01-01

## 2024-07-13 PROBLEM — J44.1 CHRONIC OBSTRUCTIVE PULMONARY DISEASE WITH ACUTE EXACERBATION (H): Status: ACTIVE | Noted: 2024-01-01

## 2024-07-13 PROBLEM — I50.9 CONGESTIVE HEART FAILURE, UNSPECIFIED HF CHRONICITY, UNSPECIFIED HEART FAILURE TYPE (H): Status: ACTIVE | Noted: 2024-01-01

## 2024-07-13 PROBLEM — D72.829 LEUKOCYTOSIS, UNSPECIFIED TYPE: Status: ACTIVE | Noted: 2024-01-01

## 2024-07-13 NOTE — H&P
[facility]  Hospitalist Admission Note  Name: Trisha Bender    MRN: [id number]  YOB: 1955    Age: 68 year old  Date of admission: 7/12/2024  Primary care provider: Bandar Weinberg    Chief Complaint:  abdominal pain, low oxygen    Assessment and Plan:   Acute on chronic abdominal pain  Likely large colon polyp: Presenting with pain across her entire upper abdomen described as cramping.  She does have some chronic right upper quadrant abdominal pain intermittently and indigestion for which she takes simethicone.  She had not been having normal amounts of stool output this past week so she took senna the last couple of days and has had increased cramping with this with no output until a small amount in the ER.  She has not been eating very much at all and her CT of the abdomen pelvis shows almost no stool present.  There are no dilated loops of bowel or other acute findings in the abdomen.  The CT did show a 1.8 cm enhancing mass in the sigmoid colon likely representing a colonic polyp.  She did have nine 4-10 mm polyps removed from the colon in 2018.  Doubt this is contributing to the abdominal pain as there is no evidence for obstruction.  She has been having quite a bit of nausea and decreased p.o. intake overall.  LFTs and lipase are normal.  -Schedule Zofran ODT 4 times daily  -Simethicone as needed  -Continue daily pantoprazole  -Does not need senna or any other laxatives at this time as there is no stool in the colon on CT  -Discussed likely large polyp findings with the patient and this could also represent a tumor/malignancy.  Discussed with her neck step would be a colonoscopy, however depending on what her goals are given her overall poor quality of life and somewhat limited life expectancy with or not she would like to go through risks of sedation to go through colonoscopy and if more extensive tumor found which she want to consider resection or not.  She will think  about this and discuss with her primary care provider if she would like to pursue colonoscopy as an outpatient.    Chronic hypoxemic respiratory failure  Advanced emphysema, COPD: She has advanced emphysema by CT, PFTs, and functional status yet surprisingly has not qualified for home oxygen previously.  She said she completed a sleep study 3 weeks ago and 3 hours overnight she was in the 80s so she is awaiting a prescription for at least nighttime oxygen.  She reports chronic dyspnea on exertion and has limited mobility in the home.  She is chronically on prednisone 5 mg in the morning and 10 mg in the evening, Singulair at bedtime, Roflumilast at bedtime, Trelegy Ellipta, DuoNebs, and albuterol inhaler.  She is also on doxycycline at bedtime and azithromycin MW for prophylaxis per her pulmonologist wyatt.  In the ER her O2 sats were 88% on room air at rest and now are in the 90s on 2 L via nasal cannula.  I do believe she would benefit from long-term oxygen therapy.  There does not appear to be anything acute going on from a pulmonary process with a negative CT chest PE protocol, no evidence for COPD exacerbation or CHF exacerbation at this time.  -Continuous pulse ox and supplemental oxygen as needed  -Perform home oxygen assessment during the day and ideally prescribed on discharge if meeting criteria otherwise she will need to follow-up closely with her primary care provider regarding the sleep study results and need for nocturnal oxygen supplementation  -Continue PTA prednisone, doxycycline, Singulair, Roflumilast, azithromycin, DuoNebs as needed  -Hold Trelegy Ellipta inhaler given observation status    Chronic hyponatremia  Hypokalemia: Presenting with sodium level of 129 which is where she was at in clinic a couple of months ago.  Usually in the low 130s.  This is primarily secondary to decreased solute intake.  She is also been drinking more water due to her low blood pressures.  She may have some  component of SIADH given her advanced emphysema.  Potassium is also low at 2.9 secondary to poor p.o. intake.  Has not been taking her spironolactone.  -Received 500 L NS bolus in the ER, give additional 500 L LR now  -BMP in the morning  -Give 40 mEq oral potassium now then start potassium, magnesium, phosphorus replacement protocols and check levels in the morning    Chronic pain syndrome  Chronic low back pain: She takes Robaxin 5 mg 4 times daily and 1 to 2 tablets of Norco  mg roughly every 6 hours at home.  She says she threw her back out 1 week ago and has been having increased low back pain since then.  On CT chest/ab/pelvis there are multiple mild compression deformities of the thoracolumbar spine unchanged from April and May 2024 CTs.  -Give 1 mg IV Dilaudid now due to severe pain  -Continue with 1 tablet Norco  mg for moderate pain and 2 tablets for severe pain every 4 hours as needed while here  -Continue Robaxin 500 mg 4 times daily    NICM  Mild nonobstructive CAD  Pulmonary HTN  HTN, but recent hypotension  HLD  Chronic sinus tachycardia  Elevated troponin  RBBB: History of a nonischemic cardiomyopathy with EF as low as 35-40%, now normalized to 55-60% as of April 2024 along with mild pulmonary hypertension.  Previous coronary angiogram showing mild nonobstructive disease.  Of late she has had hypotension down to the 80s systolic so her 5 mg daily lisinopril has been on hold as is her spironolactone 12.5 mg daily.  She has tried reducing her carvedilol dose from 25 to 12.5 mg twice daily and still has been holding this at times.  She has noticed her heart rate has gone up to the 110s with holding the medication.  She has chronic sinus tachycardia secondary to her advanced emphysema with rates usually .  Her troponin T is slightly elevated at 59, but she has no chest pain or pressure.  EKG shows sinus tachycardia without ischemic change.    -Repeat troponin T in the morning otherwise  no further workup for the minimally elevated troponin at this time if not rising significantly.  -Resume carvedilol at lower dose 6.25 mg twice daily, if tolerates may need to prescribe this dose on discharge as she only has 25 mg tablets at home  -Hold lisinopril for now, resume at home if blood pressure is improving given her previous cardiomyopathy  -Recommend she discontinue spironolactone given her poor solute intake and propensity for hyponatremia.  She has no CHF symptoms/findings.  -Continue atorvastatin at home    Chronic leukocytosis: WBC count 12.9 in the ER.  Chronically elevated WBC count ranging from 12-18.  She is on prednisone.  Doubt acute infection at this time.    Irregular thyroid appearance: Incidental finding of a diffusely heterogeneous thyroid on CT chest.   -Discussed with patient.  Consider outpatient ultrasound depending on overall goals of care moving forward.    Failure to thrive  Malnutrition: Overall has a failure to thrive picture with multiple chronic morbidities including advanced emphysema, chronic pain requiring high-dose opiate therapy, limited mobility, ischium pressure ulcer, and malnutrition with decreased muscle bulk on exam.  She receives PT, OT, wound care, and PCA services at home.  She has had previous falls with injury including the known sternum fracture seen on CT.  She states that she was making some progress with therapy ambulating at home with a walker until 1 week ago when her back pain worsened.  She still does not eat a lot which in part she says is from nausea, but also does not have much of an appetite.  She has muscle wasting exam and a low albumin at 2.8.  She previously met with hospice, but was not ready to enroll at that time.  -Discussed with her potential benefits from hospice when she is ready and including improved pain control and supplemental oxygen regardless of O2 sats.  She already has [facility] hospice information if she decides to go that route  in the future.  -Reporting some issues with nausea chronically so I recommend she schedule Zofran 2-3 times per day at home, but for here we will schedule 4 times daily given acute on chronic nausea she is experiencing  -Observation status, typically not able to consult dietitian.  She has not been taking any protein shakes other supplements at home which I recommended she start    Left ischium pressure ulcer, present on admission: Chronic left ischium pressure ulcer previously reported as unstageable.  Has home wound care and she states that it has been improving.  -If staying longer than today then consider WOC RN consult, usually not available on the weekend    Chronic anemia: Hemoglobin at baseline of 10-12.  She is chronically on prednisone, but does take daily PPI.  If hemoglobin dropping significantly following the 1 L fluids then would need to consider PUD given her presenting upper abdominal pain.  -CBC in the morning    GERD: Resume pantoprazole 40 mg daily.    DVT Prophylaxis: Pneumatic Compression Devices  Code Status: DNR / DNI - consistent with previous wishes  FEN: regular diet  Discharge Dispo: home.  Has home PT/OT/wound care/RN/PCA.  New home O2 Rx if qualifies   Estimated Disch Date / # of Days until Disch: admit to observation for new hypoxia and home O2 assessment during the day to see if we get this prescribed for her.  If so and abdominal pain is now worsening then she should build to discharge.  The day.      History of Present Illness:  Trisha Bender is a 68 year old female with PMH including advanced emphysema/COPD not on home oxygen but is on chronic prednisone and has chronic CANNON, pulmonary HTN, NICM now with EF 55-60%, mild nonobstructive CAD, HTN, HLD, chronic low back pain on high-dose opiates, hyponatremia, anemia, GERD, failure to thrive, malnutrition, and left ischium pressure ulcer who presents with abdominal pain and chronic shortness of breath.  For the past 1 week or so  she has had decreased stool output so she thought she was constipated and she started taking senna the last couple of days.  She reports intermittent crampy upper abdominal pain today associate with nausea, but no vomiting.  This is the primary reason she came in for evaluation.  She does chronically have some intermittent right upper quadrant pain, but not usually this diffuse.  She does report she has had some stool output, just not as much is normal although she admits she has not been eating very much at all.  She has been drinking a lot of extra water due to lower blood pressures over the last couple of weeks.  Blood pressure was 80/58 during OT on 7/8.  She has been holding her lisinopril and spironolactone and cut back on carvedilol and occasionally holds this as well.  She denies any lightheadedness when her blood pressure is low.  She says she completed a home sleep oxygen assessment 3 weeks ago and 3 hours at that time her O2 sats were in the 80s and she has not heard back about getting oxygen prescribed yet.  She denies any fevers, chills, new cough with sputum production.  She has not any chest pain or pressure sensation.  Overall she does admit to being quite debilitated, but up to 1 week ago she was ambulating in her home with a walker when previously she was almost bedbound.  This last week she had limited mobility due to throwing her back out and having increased lower back pain.  Denies any numbness or tingling going into her extremities or focal leg weakness.    History obtained from patient, medical record, and from Dr. Campbell in the emergency department.  Blood pressure 103/73, tachycardic at 111, temperature 98.3  F, oxygen is now 96% on 2 L.  When she was on room air reportedly O2 sats were 88%.  Her labs show a chronic leukocytosis at 12.9, hemoglobin 10.0, platelet count 383.  LFTs and lipase normal.  Albumin low at 2.8.  Troponin T slightly elevated 59.  NT proBNP normal at 730.  Sodium is  low at 129 as is potassium 2.9.  UA is unremarkable.  VBG shows pH 7.38, pCO2 42, pO2 43, bicarb 25.  COVID-19, influenza A/B, RSV PCR is negative.  EKG shows sinus tachycardia with PACs and RBBB, no ST elevation or depression.  CT of the chest/ab/pelvis shows no stool in the colon, 1.8 cm likely sigmoid colon polyp without any evidence for obstruction, diffusely heterogeneous thyroid, advanced emphysema change, and subacute sternum fracture.  She received Zofran, DuoNeb, 10 mg dexamethasone and Benadryl due to contrast allergy, 10 meq IV potassium, 500 L NS bolus, and 0.5 mg IV Dilaudid.  Observation bed has been requested to help her with her pain control and also to do home oxygen assessment to see if this can be prescribed on discharge.       Clinically Significant Risk Factors Present on Admission        # Hypokalemia: Lowest K = 2.9 mmol/L in last 2 days, will replace as needed  # Hyponatremia: Lowest Na = 129 mmol/L in last 2 days, will monitor as appropriate      # Hypoalbuminemia: Lowest albumin = 2.8 g/dL at 7/12/2024  9:54 PM, will monitor as appropriate     # Hypertension: Noted on problem list  # Chronic heart failure with preserved ejection fraction: heart failure noted on problem list and last echo with EF >50%   # Anemia: based on hgb <11               # Financial/Environmental Concerns:    # Asthma: noted on problem list                  Past Medical History reviewed:  Past Medical History:   Diagnosis Date    Anxiety     CAD (coronary artery disease) 07/02/2015    mild per cath    CHF (congestive heart failure)     EF=20-25% per echo 6/30/15    Chronic airway obstruction     FEV1 36% of pred; VC 67%  in Jan 06.    Chronic back pain     COPD (chronic obstructive pulmonary disease)     Endometriosis     Esophageal reflux     Essential hypertension     Hyperlipidemia     Migraine     Mild persistent asthma 08/30/2012    Osteopenia     Takotsubo cardiomyopathy      Past Surgical History reviewed:  Past  Surgical History:   Procedure Laterality Date    ANGIOGRAM  07/02/2015    Minimal CAD. LV dysfunction, Mid RCA 20% stenosis, EF 20-25%, c/w Takotsubo cardiomyopathy    APPENDECTOMY  1974    CHOLECYSTECTOMY  1974    [facility]    COLONOSCOPY N/A 04/26/2018    Procedure: COLONOSCOPY;  COLONOSCOPY with biopsies;  Surgeon: Steve Acosta MD;  Location: [facility] OR    CORONARY ANGIOGRAPHY ADULT ORDER      CV CORONARY ANGIOGRAM N/A 07/30/2020    Procedure: Coronary Angiogram;  Surgeon: Quinn Viveros MD;  Location: [facility]ARD[facility]AC CATH [facility]    CV LEFT HEART CATH N/A 07/30/2020    Procedure: Left Heart Cath;  Surgeon: Quinn Viveros MD;  Location: [facility]    CV LEFT VENTRICULOGRAM N/A 07/30/2020    Procedure: Left Ventriculogram;  Surgeon: Quinn Viveros MD;  Location: [facility]    CV RIGHT HEART CATH MEASUREMENTS RECORDED N/A 07/30/2020    Procedure: Right Heart Cath;  Surgeon: Quinn Viveros MD;  Location:  HEART CARDIAC CATH LAB    ESOPHAGOSCOPY, GASTROSCOPY, DUODENOSCOPY (EGD), COMBINED  11/15/2011    Procedure:COMBINED ESOPHAGOSCOPY, GASTROSCOPY, DUODENOSCOPY (EGD);  ESOPHAGOSCOPY, GASTROSCOPY, DUODENOSCOPY (EGD) ; Surgeon:JIM URENA; Location: GI    INJECT EPIDURAL LUMBAR / SACRAL SINGLE N/A 04/13/2018    Procedure: INJECT EPIDURAL LUMBAR / SACRAL CONTINUAL OR INTERMITTENT;  Lumbar Epidural Steroid Injection;  Surgeon: Az Enamorado MD;  Location: [facility] OR    INJECT EPIDURAL LUMBAR / SACRAL SINGLE N/A 09/24/2018    Procedure: INJECT EPIDURAL LUMBAR / SACRAL SINGLE;  Lumbar Epidural Steroid Injection;  Surgeon: Az Enamorado MD;  Location: [facility] OR    LAPAROSCOPIC ABLATION ENDOMETRIOSIS  1998    Nasal septoplasty and mucous retention cyst      TONSILLECTOMY, ADENOIDECTOMY, COMBINED      [place] teeth extraction       Social History reviewed:  Social History     Tobacco Use    Smoking status: Former     Current packs/day: 0.00     Average packs/day: 1  pack/day for 30.0 years (30.0 ttl pk-yrs)     Types: Cigarettes     Start date: 1976     Quit date: 2006     Years since quittin.4    Smokeless tobacco: Never    Tobacco comments:     Quit in 2006   Substance Use Topics    Alcohol use: No     Alcohol/week: 0.0 standard drinks of alcohol     Social History     Social History Narrative    Not on file     Family History reviewed:  Family History   Problem Relation Age of Onset    Cancer Mother         Colon cancer; dxed at age 64;  at age 69    Cancer - colorectal Mother     Other Cancer Mother     Gastrointestinal Disease Sister         Acute pancreatitis    Heart Disease Father         ?    Coronary Artery Disease Father     Circulatory Maternal Aunt         AAA    Circulatory Maternal Uncle         AAA    Diabetes Son      Allergies:  Allergies   Allergen Reactions    Gabapentin Swelling    Contrast Dye      Iodine    Escitalopram      Nausea and sickness    Peanuts [Nuts] Hives    Penicillins      rash    Sulfa Antibiotics      High family history    Tace [Chlorotrianisene]      joint swelling    Aspirin Rash     After 3 days    Ibuprofen Nausea and Vomiting and Swelling    Strawberry Extract Rash     Medications:  Prior to Admission medications    Medication Sig Last Dose Taking? Auth Provider Long Term End Date   albuterol (VENTOLIN HFA) 108 (90 BASE) MCG/ACT Inhaler Inhale 2 puffs into the lungs every 6 hours as needed for shortness of breath / dyspnea or wheezing   Bandar Weinberg MD Yes    ALPRAZolam (XANAX) 0.25 MG tablet Take 1 tablet (0.25 mg) by mouth 2 times daily as needed for anxiety   Bandar Weinberg MD     atorvastatin (LIPITOR) 10 MG tablet Take 10 mg by mouth at bedtime   Unknown, Entered By History No    azithromycin (ZITHROMAX) 250 MG tablet Take 250 mg by mouth Every Mon, Wed, Fri Morning   Reported, Patient No    carvedilol (COREG) 25 MG tablet Take 1 tablet (25 mg) by mouth 2 times daily (with meals)   Sharmin  Justina Christianson,  Yes    cetirizine (ZYRTEC) 10 MG tablet Take 10 mg by mouth at bedtime   Unknown, Entered By History     [employer] FIBER GUMMIES 2.5 G CHEW Take 1 tablet by mouth at bedtime Reported on 5/8/2017   Unknown, Entered By History     doxycycline hyclate (PERIOSTAT) 20 MG tablet Take 20 mg by mouth at bedtime   Reported, Patient     erythromycin (ROMYCIN) 5 MG/GM ophthalmic ointment Place into both eyes 2 times daily   Bandar Weinberg MD No    fluticasone (FLONASE) 50 MCG/ACT nasal spray Spray 1-2 sprays into both nostrils daily as needed for rhinitis or allergies   Bandar Weinberg MD     HYDROcodone-acetaminophen (NORCO)  MG per tablet Take 1-2 tablets by mouth 4 times daily as needed for severe pain   Bandar Weinberg MD     ipratropium - albuterol 0.5 mg/2.5 mg/3 mL (DUONEB) 0.5-2.5 (3) MG/3ML neb solution Take 1 vial by nebulization every 6 hours as needed for shortness of breath, wheezing or cough   Unknown, Entered By History No    lisinopril (ZESTRIL) 5 MG tablet Take 1 tablet (5 mg) by mouth daily   Bandar Weinberg MD Yes    methocarbamol (ROBAXIN) 500 MG tablet TAKE 1 TABLET (500 MG) BY MOUTH 4 TIMES DAILY   Pattie Tellez APRN CNP     montelukast (SINGULAIR) 10 MG tablet TAKE ONE TABLET BY MOUTH AT BEDTIME   Bandar Weinberg MD Yes    Multiple Vitamin CHEW Take 2 tablets by mouth at bedtime   Reported, Patient     naloxone (NARCAN) 4 MG/0.1ML nasal spray Spray 1 spray (4 mg) into one nostril alternating nostrils once as needed for opioid reversal Every 2-3 minutes until patient responsive or EMS arrives   Bandar Weinberg MD Yes    ondansetron (ZOFRAN ODT) 4 MG ODT tab Take 1 tablet (4 mg) by mouth every 6 hours as needed for nausea or vomiting   Bandar Weinberg MD     pantoprazole (PROTONIX) 40 MG EC tablet Take 40 mg by mouth at bedtime   Unknown, Entered By History     predniSONE (DELTASONE) 5 MG tablet Take 10 mg by mouth at bedtime   Unknown, Entered By  History     predniSONE (DELTASONE) 5 MG tablet Take 1 tablet by mouth every morning   Reported, Patient     roflumilast (DALIRESP) 500 MCG TABS tablet Take 500 mcg by mouth at bedtime   Bandar Weinberg MD Yes    simethicone (MYLICON) 80 MG chewable tablet Take 1 tablet (80 mg) by mouth every 6 hours as needed for cramping or other (abdominal discomfort)   Mony Felix PA-C     spironolactone (ALDACTONE) 25 MG tablet Take 0.5 tablets (12.5 mg) by mouth daily   Justina Finney,  Yes    SUMAtriptan (IMITREX) 25 MG tablet Take 1 tablet (25 mg) by mouth at onset of headache for migraine   Bandar Weinberg MD     TRETIANAGY ELLIPTA 200-62.5-25 MCG/ACT oral inhaler Inhale 1 puff into the lungs daily   Reported, Patient     zinc oxide (DESITIN) 20 % external ointment Apply topically every 4 hours   Mony Felix PA-C       Review of Systems:  A Comprehensive greater than 10 system review of systems was carried out.  Pertinent positives and negatives are noted above.  Otherwise negative.     Physical Exam:  Blood pressure 121/68, pulse 106, temperature 98.3  F (36.8  C), temperature source Oral, resp. rate 20, last menstrual period 09/20/2005, SpO2 95%, not currently breastfeeding.  Wt Readings from Last 1 Encounters:   05/13/24 76.2 kg (168 lb)     Exam:  Constitutional: Awake, frail appearing, no acute distress  Eyes: sclera white   HEENT: Dry mucous membranes  Respiratory: Decreased breath sounds posteriorly, but no focal crackles or wheeze.  Cardiovascular: Mild regular tachycardia without murmur  GI: Mild diffuse upper abdominal tenderness to palpation without guarding  Skin: no rash   Musculoskeletal/extremities: No lower extremity edema  Neurologic: A&O, speech clear  Psychiatric: calm, cooperative, normal affect    Lab and imaging data personally reviewed:  Labs:  Recent Labs   Lab 07/12/24  2330   PHV 7.38   PO2V 43   PCO2V 42   HCO3V 25     Recent Labs   Lab 07/12/24  2154    WBC 12.9*   HGB 10.0*   HCT 33.4*   MCV 85        Recent Labs   Lab 07/12/24  2154   *   POTASSIUM 2.9*   CHLORIDE 94*   CO2 21*   ANIONGAP 14   *   BUN 4.4*   CR 0.46*   GFRESTIMATED >90   JEANNIE 8.0*   PROTTOTAL 5.0*   ALBUMIN 2.8*   BILITOTAL 0.4   ALKPHOS 65   AST 14   ALT 7     Recent Labs   Lab 07/12/24  2154   NTBNPI 730     Recent Labs   Lab 07/13/24  0041   COLOR Light Yellow   APPEARANCE Clear   URINEGLC Negative   URINEBILI Negative   URINEKETONE 20*   SG 1.009   UBLD Trace*   URINEPH 6.5   PROTEIN Negative   NITRITE Negative   LEUKEST Negative   RBCU 1   WBCU <1     Troponin T 59  COVID-19, influenza A/B, RSV PCR negative    EKG: Sinus tachycardia, PACs, RBBB, no ST elevation or depression    Imaging:  Recent Results (from the past 24 hour(s))   CT Chest (PE) Abdomen Pelvis w Contrast    Narrative    EXAM: CT CHEST PE ABDOMEN PELVIS W CONTRAST  LOCATION: [facility]  DATE: 07/13/2024    INDICATION: COPD, CHF, hypoxia, worsening CANNON, and also constipation and abdominal pain.  COMPARISON: CT abdomen and pelvis on 05/5/2024.  TECHNIQUE: CT chest pulmonary angiogram and routine CT abdomen pelvis with IV contrast. Arterial phase through the chest and venous phase through the abdomen and pelvis. Multiplanar reformats and MIP reconstructions were performed. Dose reduction   techniques were used.   CONTRAST: 84 mL Isovue 370.    FINDINGS:  ANGIOGRAM CHEST: No evidence of pulmonary embolism. No evidence for right heart strain. No evidence of thoracic aortic aneurysm or dissection.    MEDIASTINUM/AXILLAE: Diffuse heterogenous thyroid parenchyma. No cardiomegaly or significant pericardial effusion. No significant mediastinal, hilar, or axillary lymphadenopathy.    LUNGS AND PLEURA: Trace right pleural effusion. No significant left pleural effusion. Basilar pulmonary opacities, likely atelectasis. Upper lobes predominant emphysematous changes.    CORONARY ARTERY  CALCIFICATION: None.    ABDOMEN/PELVIS:  HEPATOBILIARY: Diffuse hypodense appearance of the liver, likely due to underlying hepatic steatosis. No suspicious focal hepatic lesion. Right upper quadrant post-cholecystectomy clips.    PANCREAS: No main pancreatic ductal dilatation or definite solid pancreatic mass.    SPLEEN: No splenomegaly.    ADRENAL GLANDS: No adrenal nodules.    KIDNEYS/BLADDER: No radiodense kidney/ureteral stones or hydronephrosis in either kidney.    BOWEL: No abnormally dilated bowel loops. There is an approximately 1.8 cm enhancing mass in the sigmoid colon (series 14 image 159), could represent a colonic polyp.    PERITONEUM: No evidence of free fluid in the abdomen and pelvis. No free peritoneal or portal venous gas.    PELVIC ORGANS: Unremarkable.    VASCULATURE: Extensive atherosclerotic vascular calcification of the abdominal aorta and iliac arteries.    LYMPH NODES: Unremarkable.    MUSCULOSKELETAL: Diffuse sclerotic appearance of the spine with multiple compression deformities, which appear not significantly changed as compared to 04/06/2024 chest CTA and 05/05/2024 CT abdomen and pelvis. However, there is a likely subacute   fracture of the sternum with associated sclerotic changes. Multiple sclerotic foci in the ribs, not significantly changed as compared to the 04/06/2024 exam. Sclerotic changes of the anterior aspect of the pubic bones, not significantly changed as   compared to 05/05/2024.      Impression    IMPRESSION:  1.  No evidence of pulmonary embolism.  2.  No evidence of acute pathology in the abdomen and pelvis.  3.  Approximately 1.8 nodular enhancing area in the sigmoid colon, could represent a colonic polyp, recommend further evaluation with colonoscopy.  4.  There is likely a subacute fracture of the sternum with associated sclerotic changes; cannot exclude pathological fracture. Multiple mild compression deformities of the thoracolumbar spine, not significantly  changed as compared to the 04/06/2024 and   05/05/2024 exams. Multiple sclerotic areas in the ribs and pelvic bones are not significantly changed as compared to same prior studies.  5.  Diffuse heterogenous appearance of the thyroid gland can be further evaluated with thyroid ultrasound.  6.  Upper lobes predominant emphysematous changes.           Zbigniew Solano MD  Hospitalist  [facility]

## 2024-07-13 NOTE — PHARMACY-ADMISSION MEDICATION HISTORY
Pharmacy Intern Admission Medication History    Admission medication history is complete. The information provided in this note is only as accurate as the sources available at the time of the update.    Information Source(s): Patient and CareEverywhere/SureScripts via in-person    Pertinent Information: Pt cuts carvedilol 25 mg tab into half (12.5 mg) due to low blood pressure per PCP     Changes made to PTA medication list:  Added: None  Deleted: zinc oxide, simethicone  Changed:   Carvedilol 25 mg BID->12.5 BID  ROBAXIN QID->QID PRN    Allergies reviewed with patient and updates made in EHR: yes    Medication History Completed By: Jose Alejandro Toledo 7/13/2024 9:13 AM    PTA Med List   Medication Sig Last Dose    albuterol (VENTOLIN HFA) 108 (90 BASE) MCG/ACT Inhaler Inhale 2 puffs into the lungs every 6 hours as needed for shortness of breath / dyspnea or wheezing 7/12/2024 at -    ALPRAZolam (XANAX) 0.25 MG tablet Take 1 tablet (0.25 mg) by mouth 2 times daily as needed for anxiety 7/12/2024 at pm    atorvastatin (LIPITOR) 10 MG tablet Take 10 mg by mouth at bedtime Past Week    azithromycin (ZITHROMAX) 250 MG tablet Take 250 mg by mouth Every Mon, Wed, Fri Morning 7/10/2024    carvedilol (COREG) 25 MG tablet Take 12.5 mg by mouth 2 times daily (with meals) 7/12/2024 at am    cetirizine (ZYRTEC) 10 MG tablet Take 10 mg by mouth at bedtime Past Week    [employer] FIBER GUMMIES 2.5 G CHEW Take 1 tablet by mouth at bedtime Reported on 5/8/2017 Past Week    doxycycline hyclate (PERIOSTAT) 20 MG tablet Take 20 mg by mouth at bedtime Past Week    erythromycin (ROMYCIN) 5 MG/GM ophthalmic ointment Place into both eyes 2 times daily 7/12/2024 at am    fluticasone (FLONASE) 50 MCG/ACT nasal spray Spray 1-2 sprays into both nostrils daily as needed for rhinitis or allergies Unknown at PRN    HYDROcodone-acetaminophen (NORCO)  MG per tablet Take 1-2 tablets by mouth 4 times daily as needed for severe pain 7/12/2024 at AM     ipratropium - albuterol 0.5 mg/2.5 mg/3 mL (DUONEB) 0.5-2.5 (3) MG/3ML neb solution Take 1 vial by nebulization every 6 hours as needed for shortness of breath, wheezing or cough 7/12/2024 at -    lisinopril (ZESTRIL) 5 MG tablet Take 1 tablet (5 mg) by mouth daily Past Week at 3 DAYS AGO    methocarbamol (ROBAXIN) 500 MG tablet Take 500 mg by mouth 4 times daily as needed for muscle spasms Unknown at PRN    montelukast (SINGULAIR) 10 MG tablet TAKE ONE TABLET BY MOUTH AT BEDTIME Past Week    Multiple Vitamin CHEW Take 2 tablets by mouth at bedtime Past Week    naloxone (NARCAN) 4 MG/0.1ML nasal spray Spray 1 spray (4 mg) into one nostril alternating nostrils once as needed for opioid reversal Every 2-3 minutes until patient responsive or EMS arrives Unknown at PRN    ondansetron (ZOFRAN ODT) 4 MG ODT tab Take 1 tablet (4 mg) by mouth every 6 hours as needed for nausea or vomiting 7/12/2024 at -    pantoprazole (PROTONIX) 40 MG EC tablet Take 40 mg by mouth at bedtime Past Week    predniSONE (DELTASONE) 5 MG tablet Take 10 mg by mouth daily 7/12/2024 at AM    predniSONE (DELTASONE) 5 MG tablet Take 1 tablet by mouth every evening Past Week at PM    roflumilast (DALIRESP) 500 MCG TABS tablet Take 500 mcg by mouth at bedtime Past Week    spironolactone (ALDACTONE) 25 MG tablet Take 0.5 tablets (12.5 mg) by mouth daily Past Week at 3 DAYS AGO    SUMAtriptan (IMITREX) 25 MG tablet Take 1 tablet (25 mg) by mouth at onset of headache for migraine Unknown at PRN    TRELEGY ELLIPTA 200-62.5-25 MCG/ACT oral inhaler Inhale 1 puff into the lungs at bedtime Past Week at 2 NIGHTS AGO

## 2024-07-13 NOTE — PLAN OF CARE
"Discharge Note    Patient discharged to home via transportation service  accompanied by other N/A .  IV: Discontinued  Prescriptions N/A.   Belongings reviewed and sent with patient.   Home medications returned to patient: NA  Equipment sent with: N/A.   patient verbalizes understanding of discharge instructions. AVS given to patient.  Additional education completed?  N/A        Patient's After Visit Summary was reviewed with patient.   Patient verbalized understanding of After Visit Summary, recommended follow up and was given an opportunity to ask questions.   Discharge medications sent home with patient/family: Not applicable   Discharged with transport tech        PRIMARY DIAGNOSIS: \"GENERIC\" NURSING  OUTPATIENT/OBSERVATION GOALS TO BE MET BEFORE DISCHARGE:  ADLs back to baseline: Yes    Activity and level of assistance: Ax1 with Walker and Gait Belt    Pain status: Improved-controlled with oral pain medications.    Return to near baseline physical activity: Yes     Discharge Planner Nurse   Safe discharge environment identified: Yes  Barriers to discharge: No       Entered by: CONSUELO MCKEON RN 07/13/2024 10:41 AM     Please review provider order for any additional goals.   Nurse to notify provider when observation goals have been met and patient is ready for discharge.            Goal Outcome Evaluation:      Plan of Care Reviewed With: patient    Overall Patient Progress: no changeOverall Patient Progress: no change    Outcome Evaluation: pt ready for d/c, does not qualify for home O2, instructed to follow up with PCP      Problem: Adult Inpatient Plan of Care  Goal: Plan of Care Review  Description: The Plan of Care Review/Shift note should be completed every shift.  The Outcome Evaluation is a brief statement about your assessment that the patient is improving, declining, or no change.  This information will be displayed automatically on your shift  note.  Outcome: Adequate for Care Transition  Flowsheets " "(Taken 7/13/2024 1040)  Outcome Evaluation: pt ready for d/c, does not qualify for home O2, instructed to follow up with PCP  Plan of Care Reviewed With: patient  Overall Patient Progress: no change  Goal: Patient-Specific Goal (Individualized)  Description: You can add care plan individualizations to a care plan. Examples of Individualization might be:  \"Parent requests to be called daily at 9am for status\", \"I have a hard time hearing out of my right ear\", or \"Do not touch me to wake me up as it startles  me\".  Outcome: Adequate for Care Transition  Goal: Absence of Hospital-Acquired Illness or Injury  Outcome: Adequate for Care Transition  Intervention: Identify and Manage Fall Risk  Recent Flowsheet Documentation  Taken 7/13/2024 0800 by Maria Teresa Brasher RN  Safety Promotion/Fall Prevention:   activity supervised   assistive device/personal items within reach   clutter free environment maintained   nonskid shoes/slippers when out of bed   safety round/check completed  Intervention: Prevent Skin Injury  Recent Flowsheet Documentation  Taken 7/13/2024 0800 by Maria Teresa Brasher RN  Skin Protection:   adhesive use limited   incontinence pads utilized  Device Skin Pressure Protection:   absorbent pad utilized/changed   adhesive use limited  Intervention: Prevent Infection  Recent Flowsheet Documentation  Taken 7/13/2024 0800 by Maria Teresa Brasher RN  Infection Prevention:   environmental surveillance performed   rest/sleep promoted   single patient room provided  Goal: Optimal Comfort and Wellbeing  Outcome: Adequate for Care Transition  Intervention: Monitor Pain and Promote Comfort  Recent Flowsheet Documentation  Taken 7/13/2024 0814 by Maria Teresa Brasher RN  Pain Management Interventions:   medication (see MAR)   repositioned  Goal: Readiness for Transition of Care  Outcome: Adequate for Care Transition     "

## 2024-07-13 NOTE — CONSULTS
Care Management Initial Consult    General Information  Assessment completed with: Patient, Cynthia  Type of CM/SW Visit: Initial Assessment    Primary Care Provider verified and updated as needed: Yes   Readmission within the last 30 days:        Reason for Consult: discharge planning  Advance Care Planning: Advance Care Planning Reviewed: present on chart          Communication Assessment  Patient's communication style: spoken language (English or Bilingual)    Hearing Difficulty or Deaf: no   Wear Glasses or Blind: no    Cognitive  Cognitive/Neuro/Behavioral: WDL                      Living Environment:   People in home: spouse  Steve  Current living Arrangements: house - stairs, but has a stair lift    Able to return to prior arrangements: yes       Family/Social Support:  Care provided by: self, spouse/significant other, homecare agency  Provides care for:    Marital Status:   , Children  Steve       Description of Support System: Supportive, Involved    Support Assessment: Adequate family and caregiver support, Adequate social supports    Current Resources:   Patient receiving home care services: Yes  Skilled Home Care Services: Skilled Nursing, Home Health Aid, Physical Therapy, Occupational Therapy  Community Resources: Home Care, PCA  Equipment currently used at home: raised toilet seat, shower chair, wheelchair, manual, other (see comments), walker, rolling  Supplies currently used at home: None    Employment/Financial:  Employment Status: retired        Financial Concerns: none   Referral to Financial Worker: No       Does the patient's insurance plan have a 3 day qualifying hospital stay waiver?  Yes     Which insurance plan 3 day waiver is available? Alternative insurance waiver    Will the waiver be used for post-acute placement? No    Lifestyle & Psychosocial Needs:  Social Determinants of Health     Food Insecurity: Low Risk  (11/11/2023)    Food Insecurity     Within the past 12 months,  did you worry that your food would run out before you got money to buy more?: No     Within the past 12 months, did the food you bought just not last and you didn t have money to get more?: No   Depression: Not at risk (4/22/2024)    PHQ-2     PHQ-2 Score: 2   Housing Stability: Low Risk  (11/11/2023)    Housing Stability     Do you have housing? : Yes     Are you worried about losing your housing?: No   Tobacco Use: Medium Risk (7/2/2024)    Patient History     Smoking Tobacco Use: Former     Smokeless Tobacco Use: Never     Passive Exposure: Not on file   Financial Resource Strain: Low Risk  (11/11/2023)    Financial Resource Strain     Within the past 12 months, have you or your family members you live with been unable to get utilities (heat, electricity) when it was really needed?: No   Alcohol Use: Not on file   Transportation Needs: Low Risk  (11/11/2023)    Transportation Needs     Within the past 12 months, has lack of transportation kept you from medical appointments, getting your medicines, non-medical meetings or appointments, work, or from getting things that you need?: No   Physical Activity: Not on file   Interpersonal Safety: Low Risk  (10/5/2023)    Interpersonal Safety     Do you feel physically and emotionally safe where you currently live?: Yes     Within the past 12 months, have you been hit, slapped, kicked or otherwise physically hurt by someone?: No     Within the past 12 months, have you been humiliated or emotionally abused in other ways by your partner or ex-partner?: Not on file   Stress: Not on file   Social Connections: Not on file   Health Literacy: Not on file       Functional Status:  Prior to admission patient needed assistance:   Dependent ADLs:: Ambulation-walker, Wheelchair-independent  Dependent IADLs:: Transportation, Meal Preparation, Shopping       Mental Health Status:  Mental Health Status: No Current Concerns       Chemical Dependency Status:  Chemical Dependency Status: No  Current Concerns             Values/Beliefs:  Spiritual, Cultural Beliefs, Nondenominational Practices, Values that affect care:            Values/Beliefs Comment: Christianity        Care Management Discharge Note    Discharge Date: 07/13/2024       Discharge Disposition: Home Care, Home    Discharge Services: PCA, Home Care    Discharge DME: Wheelchair, Walker, [employer] - uses at baseline    Discharge Transportation: agency - [facility] w/c    Private pay costs discussed: transportation costs    Does the patient's insurance plan have a 3 day qualifying hospital stay waiver?  Yes     Which insurance plan 3 day waiver is available? Alternative insurance waiver    Will the waiver be used for post-acute placement? No    PAS Confirmation Code: N/A  Patient/family educated on Medicare website which has current facility and service quality ratings: yes    Education Provided on the Discharge Plan: Yes  Persons Notified of Discharge Plans: Pt, [facility]  Patient/Family in Agreement with the Plan:  yes    Handoff Referral Completed: Yes    Additional Information:  Sw met with the pt to discuss her baseline status and discharge planning.  Pt was resting in bed when Sw arrived.  Pt was in a pleasant mood and contributed appropriately to the conversation.  Pt states that she lives at home with her spouse.  She has [facility] RN/PT/OT/HHA services.  She also has a PCA for 15 hours/day.  Pt reports that she injured her back a couple of weeks ago which has limited her current mobility.  She had been more independent.  She now is a pivot for transfers.  She said that she has a commode near her bed, so she can pivot from her bed to the commode.  Pt has a walker that she uses.  They have a stair lift in their home that she said she can pivot to.  She requested [facility] w/c transport for discharge.  Flaca scheduled w/c transport for today between [id number].  Flaca sent the pt's discharge orders to [facility] via [employer] for resumption of services.  Pt asked for Flaca  to add her son Lukas to her emergency contact list P: [phone].    She told Sw that her PCA will be at the house when she gets home today.    Sw addressed the pt's questions and concerns.    Sw will continue to be available as needed until discharge.    SALMA Gonzales, [facility]  Inpatient Care Coordination  [facility]  [phone]

## 2024-07-13 NOTE — ED NOTES
[facility]  ED Nurse Handoff Report    ED Chief complaint: Abdominal Pain  . ED Diagnosis:   Final diagnoses:   Acute on chronic respiratory failure with hypoxemia (H)   Chronic obstructive pulmonary disease with acute exacerbation (H)   Congestive heart failure, unspecified HF chronicity, unspecified heart failure type (H)       Allergies:   Allergies   Allergen Reactions    Gabapentin Swelling    Contrast Dye      Iodine    Escitalopram      Nausea and sickness    Peanuts [Nuts] Hives    Penicillins      rash    Sulfa Antibiotics      High family history    Tace [Chlorotrianisene]      joint swelling    Aspirin Rash     After 3 days    Ibuprofen Nausea and Vomiting and Swelling    Strawberry Extract Rash       Code Status: DNR / DNI    Activity level - Baseline/Home:  independent.  Activity Level - Current:   assist of 1.   Lift room needed: No.   Bariatric: No   Needed: No   Isolation: No.   Infection: Not Applicable.     Respiratory status: Nasal cannula    Vital Signs (within 30 minutes):   Vitals:    07/12/24 2234 07/12/24 2235 07/12/24 2236 07/12/24 2345   BP:       Pulse:       Resp:       Temp:       TempSrc:       SpO2: 97% 97% 97% 100%       Cardiac Rhythm:  ,      Pain level:    Patient confused: No.   Patient Falls Risk: patient and family education.   Elimination Status: Has voided     Patient Report - Initial Complaint: Abd pain.   Focused Assessment: Chief Complaint:  Abdominal Pain        HPI   Trisha Bender is a 68 year old female with overall progressive decline that recently met with hospice and chose not to go that way at this time.  She has chronic back pain takes norco COPD and CHF.  She has a myriad of complaints.  Accompanied by son Wander.  Has chronic dyspnea and CANNON has been eval fro home O2 not on it.  Has resting and exertional hypoxia to upper 80's.  No CP.  No new cough.  Had temperature to 100.3 at home.  None here.  On chronic norco for back  pain.  Has chronic RUQ abdominal pain but now across upper.  Has not had a normal BM for her in a week.  Has been taking senna and stimulants last 1-2 days.  No leg swelling.  No acute injuries today.  Also PCP has been dealing with lower BP with recent medication changes.  Decreased PO intake generally.       Abnormal Results:   Labs Ordered and Resulted from Time of ED Arrival to Time of ED Departure   COMPREHENSIVE METABOLIC PANEL - Abnormal       Result Value    Sodium 129 (*)     Potassium 2.9 (*)     Carbon Dioxide (CO2) 21 (*)     Anion Gap 14      Urea Nitrogen 4.4 (*)     Creatinine 0.46 (*)     GFR Estimate >90      Calcium 8.0 (*)     Chloride 94 (*)     Glucose 103 (*)     Alkaline Phosphatase 65      AST 14      ALT 7      Protein Total 5.0 (*)     Albumin 2.8 (*)     Bilirubin Total 0.4     LIPASE - Abnormal    Lipase 9 (*)    TROPONIN T, HIGH SENSITIVITY - Abnormal    Troponin T, High Sensitivity 59 (*)    ROUTINE UA WITH MICROSCOPIC REFLEX TO CULTURE - Abnormal    Color Urine Light Yellow      Appearance Urine Clear      Glucose Urine Negative      Bilirubin Urine Negative      Ketones Urine 20 (*)     Specific Gravity Urine 1.009      Blood Urine Trace (*)     pH Urine 6.5      Protein Albumin Urine Negative      Urobilinogen Urine Normal      Nitrite Urine Negative      Leukocyte Esterase Urine Negative      Bacteria Urine Few (*)     RBC Urine 1      WBC Urine <1      Squamous Epithelials Urine 1     BLOOD GAS VENOUS - Abnormal    pH Venous 7.38      pCO2 Venous 42      pO2 Venous 43      Bicarbonate Venous 25      Base Excess/Deficit Venous -0.4      FIO2 4      Oxyhemoglobin Venous 79 (*)     O2 Sat, Venous 80.8 (*)    CBC WITH PLATELETS AND DIFFERENTIAL - Abnormal    WBC Count 12.9 (*)     RBC Count 3.91      Hemoglobin 10.0 (*)     Hematocrit 33.4 (*)     MCV 85      MCH 25.6 (*)     MCHC 29.9 (*)     RDW 17.7 (*)     Platelet Count 383      % Neutrophils 77      % Lymphocytes 12      %  Monocytes 9      % Eosinophils 1      % Basophils 0      % Immature Granulocytes 1      NRBCs per 100 WBC 0      Absolute Neutrophils 9.9 (*)     Absolute Lymphocytes 1.6      Absolute Monocytes 1.2      Absolute Eosinophils 0.1      Absolute Basophils 0.0      Absolute Immature Granulocytes 0.2      Absolute NRBCs 0.0     NT PROBNP INPATIENT - Normal    N terminal Pro BNP Inpatient 730     INFLUENZA A/B, RSV, & SARS-COV2 PCR - Normal    Influenza A PCR Negative      Influenza B PCR Negative      RSV PCR Negative      SARS CoV2 PCR Negative          CT Chest (PE) Abdomen Pelvis w Contrast    (Results Pending)       Treatments provided: See MAR. Frequent monitoring of pt.  Family Comments: Son present at bedside. Involved and supportive in pt plan of care.  OBS brochure/video discussed/provided to patient:  No  ED Medications:   Medications   potassium chloride 10 mEq in 100 mL sterile water infusion (has no administration in time range)   ondansetron (ZOFRAN) injection 4 mg (4 mg Intravenous $Given 7/12/24 2153)   ipratropium - albuterol 0.5 mg/2.5 mg/3 mL (DUONEB) neb solution 3 mL (3 mLs Nebulization $Given 7/12/24 2334)   dexAMETHasone PF (DECADRON) injection 10 mg (10 mg Intravenous $Given 7/12/24 2311)   sodium chloride 0.9% BOLUS 500 mL (500 mLs Intravenous $New Bag 7/12/24 2337)   diphenhydrAMINE (BENADRYL) injection 25 mg (25 mg Intravenous $Given 7/12/24 2308)   HYDROmorphone (PF) (DILAUDID) injection 0.5 mg (0.5 mg Intravenous $Given 7/12/24 2352)   CT scan flush (61 mLs Intravenous $Given 7/13/24 0102)   iopamidol (ISOVUE-370) solution 500 mL (84 mLs Intravenous $Given 7/13/24 0102)       Drips infusing:  No  For the majority of the shift this patient was Green.   Interventions performed were N/a.    Sepsis treatment initiated: No    Cares/treatment/interventions/medications to be completed following ED care: N/a    ED Nurse Name: Heavenly Roger RN  1:20 AM    RECEIVING UNIT ED HANDOFF REVIEW    Above  ED Nurse Handoff Report was reviewed: Yes  Reviewed by: Claudia Awad RN on July 13, 2024 at 3:54 AM

## 2024-07-13 NOTE — DISCHARGE INSTRUCTIONS
Your home care referral was sent to [facility]. If you haven't heard from them within the next 24-48 hours, please call them at [phone].

## 2024-07-13 NOTE — PROGRESS NOTES
Patient has been assessed for Home Oxygen needs. Oxygen readings:    *Pulse oximetry (SpO2) = 95% on room air at rest while awake.    *SpO2 improved to 98% on 2 liters/minute at rest.    *SpO2 = 91% on room air during activity/with exercise.    *SpO2 improved to 94% on 2 liters/minute during activity/with exercise.

## 2024-07-13 NOTE — ED TRIAGE NOTES
Pt BIBA from home, c/o abdominal pain, lower back pain, nauseas,  O2 dropped to 88% on RA, ambulance was called to bring her in for evaluation. As per EMS pt has hx of COPD, CHF and chronic back pain, EKG tachycardia, .     /73   Pulse 111   Temp 98.3  F (36.8  C) (Oral)   Resp 20   LMP 09/20/2005   SpO2 96%

## 2024-07-13 NOTE — ED PROVIDER NOTES
History     Chief Complaint:  Abdominal Pain       HPI   Trisha Bender is a 68 year old female with overall progressive decline that recently met with hospice and chose not to go that way at this time.  She has chronic back pain takes norco COPD and CHF.  She has a myriad of complaints.  Accompanied by son Wander.  Has chronic dyspnea and CANNON has been eval fro home O2 not on it.  Has resting and exertional hypoxia to upper 80's.  No CP.  No new cough.  Had temperature to 100.3 at home.  None here.  On chronic norco for back pain.  Has chronic RUQ abdominal pain but now across upper.  Has not had a normal BM for her in a week.  Has been taking senna and stimulants last 1-2 days.  No leg swelling.  No acute injuries today.  Also PCP has been dealing with lower BP with recent medication changes.  Decreased PO intake generally.        Independent Historian:    Son Wander    Review of External Notes:      Medications:    albuterol (VENTOLIN HFA) 108 (90 BASE) MCG/ACT Inhaler  ALPRAZolam (XANAX) 0.25 MG tablet  atorvastatin (LIPITOR) 10 MG tablet  azithromycin (ZITHROMAX) 250 MG tablet  carvedilol (COREG) 25 MG tablet  cetirizine (ZYRTEC) 10 MG tablet  [employer] FIBER GUMMIES 2.5 G CHEW  doxycycline hyclate (PERIOSTAT) 20 MG tablet  erythromycin (ROMYCIN) 5 MG/GM ophthalmic ointment  fluticasone (FLONASE) 50 MCG/ACT nasal spray  HYDROcodone-acetaminophen (NORCO)  MG per tablet  ipratropium - albuterol 0.5 mg/2.5 mg/3 mL (DUONEB) 0.5-2.5 (3) MG/3ML neb solution  lisinopril (ZESTRIL) 5 MG tablet  methocarbamol (ROBAXIN) 500 MG tablet  montelukast (SINGULAIR) 10 MG tablet  Multiple Vitamin CHEW  naloxone (NARCAN) 4 MG/0.1ML nasal spray  ondansetron (ZOFRAN ODT) 4 MG ODT tab  pantoprazole (PROTONIX) 40 MG EC tablet  predniSONE (DELTASONE) 5 MG tablet  predniSONE (DELTASONE) 5 MG tablet  roflumilast (DALIRESP) 500 MCG TABS tablet  simethicone (MYLICON) 80 MG chewable tablet  spironolactone (ALDACTONE) 25 MG  tablet  SUMAtriptan (IMITREX) 25 MG tablet  TRELEGY ELLIPTA 200-62.5-25 MCG/ACT oral inhaler  zinc oxide (DESITIN) 20 % external ointment        Past Medical History:    Past Medical History:   Diagnosis Date    Anxiety     CAD (coronary artery disease) 07/02/2015    CHF (congestive heart failure)     Chronic airway obstruction     Chronic back pain     COPD (chronic obstructive pulmonary disease)     Endometriosis     Esophageal reflux     Essential hypertension     Hyperlipidemia     Migraine     Mild persistent asthma 08/30/2012    Osteopenia     Takotsubo cardiomyopathy        Past Surgical History:    Past Surgical History:   Procedure Laterality Date    ANGIOGRAM  07/02/2015    Minimal CAD. LV dysfunction, Mid RCA 20% stenosis, EF 20-25%, c/w Takotsubo cardiomyopathy    APPENDECTOMY  1974    CHOLECYSTECTOMY  1974    [facility]    COLONOSCOPY N/A 04/26/2018    Procedure: COLONOSCOPY;  COLONOSCOPY with biopsies;  Surgeon: Steve Acosta MD;  Location:  OR    CORONARY ANGIOGRAPHY ADULT ORDER      CV CORONARY ANGIOGRAM N/A 07/30/2020    Procedure: Coronary Angiogram;  Surgeon: Quinn Viveros MD;  Location:  HEART CARDIAC CATH LAB    CV LEFT HEART CATH N/A 07/30/2020    Procedure: Left Heart Cath;  Surgeon: Quinn Viveros MD;  Location:  HEART CARDIAC CATH LAB    CV LEFT VENTRICULOGRAM N/A 07/30/2020    Procedure: Left Ventriculogram;  Surgeon: Quinn Viveros MD;  Location:  HEART CARDIAC CATH LAB    CV RIGHT HEART CATH MEASUREMENTS RECORDED N/A 07/30/2020    Procedure: Right Heart Cath;  Surgeon: Quinn Viveros MD;  Location:  HEART CARDIAC CATH LAB    ESOPHAGOSCOPY, GASTROSCOPY, DUODENOSCOPY (EGD), COMBINED  11/15/2011    Procedure:COMBINED ESOPHAGOSCOPY, GASTROSCOPY, DUODENOSCOPY (EGD);  ESOPHAGOSCOPY, GASTROSCOPY, DUODENOSCOPY (EGD) ; Surgeon:JIM URENA; Location: GI    INJECT EPIDURAL LUMBAR / SACRAL SINGLE N/A 04/13/2018    Procedure: INJECT EPIDURAL LUMBAR / SACRAL  CONTINUAL OR INTERMITTENT;  Lumbar Epidural Steroid Injection;  Surgeon: Az Enamorado MD;  Location: [facility]    INJECT EPIDURAL LUMBAR / SACRAL SINGLE N/A 09/24/2018    Procedure: INJECT EPIDURAL LUMBAR / SACRAL SINGLE;  Lumbar Epidural Steroid Injection;  Surgeon: Az Enamorado MD;  Location: [facility] OR    LAPAROSCOPIC ABLATION ENDOMETRIOSIS  1998    Nasal septoplasty and mucous retention cyst      TONSILLECTOMY, ADENOIDECTOMY, COMBINED      [place] teeth extraction            Physical Exam   Patient Vitals for the past 24 hrs:   BP Temp Temp src Pulse Resp SpO2   07/13/24 0131 -- -- -- -- -- 95 %   07/13/24 0130 121/68 -- -- 106 -- 96 %   07/13/24 0122 -- -- -- -- -- 97 %   07/13/24 0120 -- -- -- -- -- 97 %   07/13/24 0119 -- -- -- -- -- 97 %   07/13/24 0118 -- -- -- -- -- 98 %   07/13/24 0117 -- -- -- -- -- 98 %   07/13/24 0115 -- -- -- -- -- 97 %   07/12/24 2345 -- -- -- -- -- 100 %   07/12/24 2236 -- -- -- -- -- 97 %   07/12/24 2235 -- -- -- -- -- 97 %   07/12/24 2234 -- -- -- -- -- 97 %   07/12/24 2233 -- -- -- -- -- 97 %   07/12/24 2118 103/73 98.3  F (36.8  C) Oral 111 20 96 %        Physical Exam  Constitutional: Patient is chronically ill appearing and debilitated.  Not septic or ill at the bedside.  Slight increase in WOB.    Head: Atraumatic.  Eyes: Conjunctivae and EOM are normal. No scleral icterus.  Neck: Normal range of motion. Neck supple.   Cardiovascular: Normal rate, regular rhythm, normal heart sounds and intact distal perfusion.   Pulmonary/Chest: Breath sounds normal. No respiratory distress.  NO wheeze no rales.    Abdominal: Soft. Bowel sounds are normal. No distension. No tenderness. No rebound or guarding.   Musculoskeletal: Normal range of motion. No edema or tenderness.   Neurological: Alert and orientated to person, place, and time. No observable focal neuro deficit  Skin: Warm and dry. No rash noted. Not diaphoretic.      Emergency Department Course   ECG      Imaging:  CT Chest  (PE) Abdomen Pelvis w Contrast   Final Result   IMPRESSION:   1.  No evidence of pulmonary embolism.   2.  No evidence of acute pathology in the abdomen and pelvis.   3.  Approximately 1.8 nodular enhancing area in the sigmoid colon, could represent a colonic polyp, recommend further evaluation with colonoscopy.   4.  There is likely a subacute fracture of the sternum with associated sclerotic changes; cannot exclude pathological fracture. Multiple mild compression deformities of the thoracolumbar spine, not significantly changed as compared to the 04/06/2024 and    05/05/2024 exams. Multiple sclerotic areas in the ribs and pelvic bones are not significantly changed as compared to same prior studies.   5.  Diffuse heterogenous appearance of the thyroid gland can be further evaluated with thyroid ultrasound.   6.  Upper lobes predominant emphysematous changes.                Laboratory:  Labs Ordered and Resulted from Time of ED Arrival to Time of ED Departure   COMPREHENSIVE METABOLIC PANEL - Abnormal       Result Value    Sodium 129 (*)     Potassium 2.9 (*)     Carbon Dioxide (CO2) 21 (*)     Anion Gap 14      Urea Nitrogen 4.4 (*)     Creatinine 0.46 (*)     GFR Estimate >90      Calcium 8.0 (*)     Chloride 94 (*)     Glucose 103 (*)     Alkaline Phosphatase 65      AST 14      ALT 7      Protein Total 5.0 (*)     Albumin 2.8 (*)     Bilirubin Total 0.4     LIPASE - Abnormal    Lipase 9 (*)    TROPONIN T, HIGH SENSITIVITY - Abnormal    Troponin T, High Sensitivity 59 (*)    ROUTINE UA WITH MICROSCOPIC REFLEX TO CULTURE - Abnormal    Color Urine Light Yellow      Appearance Urine Clear      Glucose Urine Negative      Bilirubin Urine Negative      Ketones Urine 20 (*)     Specific Gravity Urine 1.009      Blood Urine Trace (*)     pH Urine 6.5      Protein Albumin Urine Negative      Urobilinogen Urine Normal      Nitrite Urine Negative      Leukocyte Esterase Urine Negative      Bacteria Urine Few (*)     RBC  Urine 1      WBC Urine <1      Squamous Epithelials Urine 1     BLOOD GAS VENOUS - Abnormal    pH Venous 7.38      pCO2 Venous 42      pO2 Venous 43      Bicarbonate Venous 25      Base Excess/Deficit Venous -0.4      FIO2 4      Oxyhemoglobin Venous 79 (*)     O2 Sat, Venous 80.8 (*)    CBC WITH PLATELETS AND DIFFERENTIAL - Abnormal    WBC Count 12.9 (*)     RBC Count 3.91      Hemoglobin 10.0 (*)     Hematocrit 33.4 (*)     MCV 85      MCH 25.6 (*)     MCHC 29.9 (*)     RDW 17.7 (*)     Platelet Count 383      % Neutrophils 77      % Lymphocytes 12      % Monocytes 9      % Eosinophils 1      % Basophils 0      % Immature Granulocytes 1      NRBCs per 100 WBC 0      Absolute Neutrophils 9.9 (*)     Absolute Lymphocytes 1.6      Absolute Monocytes 1.2      Absolute Eosinophils 0.1      Absolute Basophils 0.0      Absolute Immature Granulocytes 0.2      Absolute NRBCs 0.0     NT PROBNP INPATIENT - Normal    N terminal Pro BNP Inpatient 730     INFLUENZA A/B, RSV, & SARS-COV2 PCR - Normal    Influenza A PCR Negative      Influenza B PCR Negative      RSV PCR Negative      SARS CoV2 PCR Negative          Procedures       Emergency Department Course & Assessments:    Interventions:  Medications   potassium chloride 10 mEq in 100 mL sterile water infusion (10 mEq Intravenous $New Bag 7/13/24 0125)   ondansetron (ZOFRAN) injection 4 mg (4 mg Intravenous $Given 7/12/24 2153)   ipratropium - albuterol 0.5 mg/2.5 mg/3 mL (DUONEB) neb solution 3 mL (3 mLs Nebulization $Given 7/12/24 2334)   dexAMETHasone PF (DECADRON) injection 10 mg (10 mg Intravenous $Given 7/12/24 2311)   sodium chloride 0.9% BOLUS 500 mL (0 mLs Intravenous Stopped 7/13/24 0133)   diphenhydrAMINE (BENADRYL) injection 25 mg (25 mg Intravenous $Given 7/12/24 2308)   HYDROmorphone (PF) (DILAUDID) injection 0.5 mg (0.5 mg Intravenous $Given 7/12/24 2352)   CT scan flush (61 mLs Intravenous $Given 7/13/24 0102)   iopamidol (ISOVUE-370) solution 500 mL (84 mLs  Intravenous $Given 24 0102)        Assessments:      Independent Interpretation (X-rays, CTs, rhythm strip):  CT PE abd no large PE pneumonia or large effusions.  Ephysema.  No bowel obstruction free fluid or large enhancement.  Large number of radiologist noted subacute abnl.      Consultations/Discussion of Management or Tests:         Social Determinants of Health affecting care:       Disposition:  Admit    Impression & Plan        Medical Decision MakinYOF rather debilitated in the home.  Worsening hypoxia and dyspnea.  Comofrtable on NC in ER.  Steroids nebs and CT PE for CV resp and EKG BNP trop.  Has some nausea no vomtiing and acute on chronic abdominal pain constipation and chronic back pain with no acute changes in red flags.  Will assess abd and spine with abd runoff of PE study.  No large PE pneumonia or acute medical or surgical findings in the chest or abdomen.  Chronically elev trop without acute EKG changes.  BNP not outside of range.  Does not have home O2 and at this moment NC dependent.  Admit.      Diagnosis:    ICD-10-CM    1. Acute on chronic respiratory failure with hypoxemia (H)  J96.21       2. Chronic obstructive pulmonary disease with acute exacerbation (H)  J44.1       3. Congestive heart failure, unspecified HF chronicity, unspecified heart failure type (H)  I50.9       4. Leukocytosis, unspecified type  D72.829     chronic      5. Elevated troponin  R79.89     chronic           Discharge Medications:  New Prescriptions    No medications on file            2024   Skyler Campbell MD Stevens, Andrew C, MD  [phone]

## 2024-07-13 NOTE — PLAN OF CARE
[facility]    ED Boarding Nurse Handoff Addendum Report:    Date/time: 7/13/2024, 5:28 AM    Activity Level: assist of 1    Fall Risk: Yes:  activity supervised    Active Infusions: See MAR    Current Meds Due: See MAR    Current care needs: Monitor pain    Oxygen requirements (liters/min and/or FiO2): 3 L    Respiratory status: Nasal cannula    Vital signs (within last 30 minutes):    Vitals:    07/13/24 0300 07/13/24 0342 07/13/24 0400 07/13/24 0441   BP: 127/78 (!) 137/90 128/77 (!) 142/82   Pulse:   110    Resp:       Temp:       TempSrc:       SpO2: 94% 97% 93% 96%       Focused assessment within last 30 minutes:    AOx4, VSS, denies SOB, has pain in right lower back PRN Dilaudid given, IV and oral potassium given, IV Zofran, able to make needs known, pure wick in place.     ED Boarding Nurse name: Ivana Mcmahon RN

## 2024-07-15 NOTE — PROGRESS NOTES
Clinic Care Coordination Contact  Clinic Care Coordination Contact  OUTREACH with Post Discharge Assessment    Referral Information: RN MAY contacted patient for post-hospital follow up.   Referral Source: IP Report    Primary Diagnosis: COPD    Chief Complaint   Patient presents with    Clinic Care Coordination - Post Hospital      Universal Utilization: Risk of admission or ED visit 95.3%  Clinic Utilization  Difficulty keeping appointments:: No  Compliance Concerns: No  No-Show Concerns: No  No PCP office visit in Past Year: No  Utilization      No Show Count (past year)  0             ED Visits  5             Hospital Admissions  4                    Current as of: 7/15/2024  8:52 AM              Clinical Concerns:  Current Medical Concerns:  Malnutrition, weakness    Current Behavioral Concerns: None    Education Provided to patient: Discussed importance of transition of care follow up. Patient stated she is not willing to see a different provider for her hospital follow up. Her Primary Care Provider is out of town for a few weeks. She will schedule a follow up appointment for when he returns.    Pain  Pain (GOAL):: No  Health Maintenance Reviewed: Due/Overdue (Did not discuss during TCM outreach.)  Clinical Pathway: None    Transitions of Care Outreach    Most Recent Admission Date: 7/12/2024   Most Recent Admission Diagnosis: Elevated troponin - R79.89  Chronic obstructive pulmonary disease with acute exacerbation (H) - J44.1  Acute on chronic respiratory failure with hypoxemia (H) - J96.21  Leukocytosis, unspecified type - D72.829  Congestive heart failure, unspecified HF chronicity, unspecified heart failure type (H) - I50.9     Most Recent Discharge Date: 7/13/2024   Most Recent Discharge Diagnosis: Acute on chronic respiratory failure with hypoxemia (H) - J96.21  Chronic obstructive pulmonary disease with acute exacerbation (H) - J44.1  Congestive heart failure, unspecified HF chronicity, unspecified heart  failure type (H) - I50.9  Leukocytosis, unspecified type - D72.829  Elevated troponin - R79.89  Chronic midline low back pain without sciatica - M54.50, G89.29     Transitions of Care Assessment    Discharge Assessment  How are you doing now that you are home?: Still tired, weak but doing better.  How are your symptoms? (Red Flag symptoms escalate to triage hotline per guidelines): Improved  Do you know how to contact your clinic care team if you have future questions or changes to your health status? : Yes  Does the patient have their discharge instructions? : Yes  Does the patient have questions regarding their discharge instructions? : No  Were you started on any new medications or were there changes to any of your previous medications? : No  Does the patient have all of their medications?: Yes  Do you have questions regarding any of your medications? : No  Do you have all of your needed medical supplies or equipment (DME)?  (i.e. oxygen tank, CPAP, cane, etc.): Yes    Post-op ([facility] Only)  If the patient had a surgery or procedure, do they have any questions for a nurse?: No    Post-op (Clinicians Only)  Did the patient have surgery or a procedure: No  Fever: No  Chills: No  Eating & Drinking: eating and drinking without complaints/concerns  PO Intake:  (Higher protein diet)  Additional Symptoms: decreased appetite  Bowel Function: normal  Date of last BM: 07/15/24  Urinary Status: voiding without complaint/concerns    Care Management       Care Mgmt General Assessment  Referral  Referral Source: IP Report  Health Care Home/Utilization  Preferred Hospital: [facility]  [phone]  Preferred Urgent Care: [facility] [phone]  Living Situation  Current living arrangement:: I live in a private home with spouse  Type of residence:: Private home - stairs (Has a stair lift)  Resources  Patient receiving home care services:: Yes  Skilled Home Care Services:  Skilled Nursing;Home Health Aid;Physical Therapy;Occupational Therapy ()  Community Resources: Home Care  Supplies Currently Used at Home: None  Equipment Currently Used at Home: raised toilet seat;shower chair;wheelchair, manual;other (see comments);walker, rolling (Push chair Not actual wheelchair)  Referrals Placed: None  Employment Status: retired  Psychosocial  Sikh or spiritual beliefs that impact treatment:: No  Mental health DX:: No  Mental health management concern (GOAL):: No  Chemical Dependency Status: No Current Concerns  Chemical Dependency Management:  (N/A)  Informal Support system:: Spouse;Children;Neighbors  Functional Status  Dependent ADLs:: Bathing;Dressing;Grooming (Uses a push chair when goes places.)  Dependent IADLs:: Transportation;Meal Preparation;Shopping;Laundry;Cooking;Cleaning  Bed or wheelchair confined:: No  Mobility Status: Dependent/Assisted by Another  Fallen 2 or more times in the past year?: No  Any fall with injury in the past year?: No  Advance Care Plan/Directive  Advanced Care Plans/Directives on file:: Yes  Status of record:: On File and Validated and     Care Mgmt Encounter Assessment  Preventative Care  Routine Health maintenance Reviewed: Due/Overdue (Did not discuss during TCM outreach.)  Clinic Utilization  Difficulty keeping appointments:: No  Compliance Concerns: No  No-Show Concerns: No  No PCP office visit in Past Year: No  Transportation  Transportation means:: Accessible car     Primary Diagnosis  Primary Diagnosis: COPD  Barriers in Communication  Other concerns:: Other;Physical impairment (Very Short of breath with activity)  Pain  Pain (GOAL):: No  Medication Review  Medication adherence problem (GOAL):: No  Knowledgeable about how to use meds:: Yes  Medication side effects suspected:: No  Diet/Exercise/Sleep  Diet:: Regular  Inadequate nutrition (GOAL):: No  Tube Feeding: No  Inadequate activity/exercise (GOAL):: No  Significant changes in  sleep pattern (GOAL): No    Medication Management:  Medication review status: Medications reviewed on 7/13/24. RN CC did not re-review during TCM outreach.   Current Outpatient Medications   Medication Sig Dispense Refill    albuterol (VENTOLIN HFA) 108 (90 BASE) MCG/ACT Inhaler Inhale 2 puffs into the lungs every 6 hours as needed for shortness of breath / dyspnea or wheezing 18 g 3    ALPRAZolam (XANAX) 0.25 MG tablet Take 1 tablet (0.25 mg) by mouth 2 times daily as needed for anxiety 60 tablet 0    atorvastatin (LIPITOR) 10 MG tablet Take 10 mg by mouth at bedtime      azithromycin (ZITHROMAX) 250 MG tablet Take 250 mg by mouth Every Mon, Wed, Fri Morning      carvedilol (COREG) 25 MG tablet Take 12.5 mg by mouth 2 times daily (with meals)      cetirizine (ZYRTEC) 10 MG tablet Take 10 mg by mouth at bedtime      [employer] FIBER GUMMIES 2.5 G CHEW Take 1 tablet by mouth at bedtime Reported on 5/8/2017      doxycycline hyclate (PERIOSTAT) 20 MG tablet Take 20 mg by mouth at bedtime      erythromycin (ROMYCIN) 5 MG/GM ophthalmic ointment Place into both eyes 2 times daily 3.5 g 5    fluticasone (FLONASE) 50 MCG/ACT nasal spray Spray 1-2 sprays into both nostrils daily as needed for rhinitis or allergies 16 g 4    HYDROcodone-acetaminophen (NORCO)  MG per tablet Take 1-2 tablets by mouth 4 times daily as needed for severe pain 240 tablet 0    ipratropium - albuterol 0.5 mg/2.5 mg/3 mL (DUONEB) 0.5-2.5 (3) MG/3ML neb solution Take 1 vial by nebulization every 6 hours as needed for shortness of breath, wheezing or cough      lisinopril (ZESTRIL) 5 MG tablet Take 1 tablet (5 mg) by mouth daily 90 tablet 1    methocarbamol (ROBAXIN) 500 MG tablet Take 500 mg by mouth 4 times daily as needed for muscle spasms      montelukast (SINGULAIR) 10 MG tablet TAKE ONE TABLET BY MOUTH AT BEDTIME 90 tablet 1    Multiple Vitamin CHEW Take 2 tablets by mouth at bedtime      naloxone (NARCAN) 4 MG/0.1ML nasal spray Spray 1 spray (4  mg) into one nostril alternating nostrils once as needed for opioid reversal Every 2-3 minutes until patient responsive or EMS arrives 0.2 mL 0    ondansetron (ZOFRAN ODT) 4 MG ODT tab Take 1 tablet (4 mg) by mouth every 6 hours as needed for nausea or vomiting 24 tablet 3    pantoprazole (PROTONIX) 40 MG EC tablet Take 40 mg by mouth at bedtime      predniSONE (DELTASONE) 5 MG tablet Take 10 mg by mouth daily      predniSONE (DELTASONE) 5 MG tablet Take 1 tablet by mouth every evening      roflumilast (DALIRESP) 500 MCG TABS tablet Take 500 mcg by mouth at bedtime      spironolactone (ALDACTONE) 25 MG tablet Take 0.5 tablets (12.5 mg) by mouth daily 90 tablet 4    SUMAtriptan (IMITREX) 25 MG tablet Take 1 tablet (25 mg) by mouth at onset of headache for migraine 9 tablet 11    TRELEGY ELLIPTA 200-62.5-25 MCG/ACT oral inhaler Inhale 1 puff into the lungs at bedtime       No current facility-administered medications for this visit.      Allergies   Allergen Reactions    Gabapentin Swelling    Contrast Dye      Iodine    Escitalopram      Nausea and sickness    Peanuts [Nuts] Hives    Penicillins      rash    Sulfa Antibiotics      High family history    Tace [Chlorotrianisene]      joint swelling    Aspirin Rash     After 3 days    Ibuprofen Nausea and Vomiting and Swelling    Strawberry Extract Rash     Functional Status:  Dependent ADLs:: Bathing, Dressing, Grooming (Uses a push chair when goes places.)  Dependent IADLs:: Transportation, Meal Preparation, Shopping, Laundry, Cooking, Cleaning  Bed or wheelchair confined:: No  Mobility Status: Dependent/Assisted by Another  Fallen 2 or more times in the past year?: No  Any fall with injury in the past year?: No    Living Situation:  Current living arrangement:: I live in a private home with spouse  Type of residence:: Private home - stairs (Has a stair lift)    Lifestyle & Psychosocial Needs:    Social Determinants of Health     Food Insecurity: Low Risk   (11/11/2023)    Food Insecurity     Within the past 12 months, did you worry that your food would run out before you got money to buy more?: No     Within the past 12 months, did the food you bought just not last and you didn t have money to get more?: No   Depression: Not at risk (4/22/2024)    PHQ-2     PHQ-2 Score: 2   Housing Stability: Low Risk  (11/11/2023)    Housing Stability     Do you have housing? : Yes     Are you worried about losing your housing?: No   Tobacco Use: Medium Risk (7/2/2024)    Patient History     Smoking Tobacco Use: Former     Smokeless Tobacco Use: Never     Passive Exposure: Not on file   Financial Resource Strain: Low Risk  (11/11/2023)    Financial Resource Strain     Within the past 12 months, have you or your family members you live with been unable to get utilities (heat, electricity) when it was really needed?: No   Alcohol Use: Not on file   Transportation Needs: Low Risk  (11/11/2023)    Transportation Needs     Within the past 12 months, has lack of transportation kept you from medical appointments, getting your medicines, non-medical meetings or appointments, work, or from getting things that you need?: No   Physical Activity: Not on file   Interpersonal Safety: Low Risk  (10/5/2023)    Interpersonal Safety     Do you feel physically and emotionally safe where you currently live?: Yes     Within the past 12 months, have you been hit, slapped, kicked or otherwise physically hurt by someone?: No     Within the past 12 months, have you been humiliated or emotionally abused in other ways by your partner or ex-partner?: Not on file   Stress: Not on file   Social Connections: Not on file   Health Literacy: Not on file     Diet:: Regular  Inadequate nutrition (GOAL):: No  Tube Feeding: No  Inadequate activity/exercise (GOAL):: No  Significant changes in sleep pattern (GOAL): No  Transportation means:: Accessible car     Synagogue or spiritual beliefs that impact treatment::  No  Mental health DX:: No  Mental health management concern (GOAL):: No  Chemical Dependency Status: No Current Concerns  Chemical Dependency Management:  (N/A)  Informal Support system:: Spouse, Children, Neighbors      Resources and Interventions:  Current Resources:   Skilled Home Care Services: Skilled Nursing, Home Health Aid, Physical Therapy, Occupational Therapy ()  Community Resources: Home Care  Supplies Currently Used at Home: None  Equipment Currently Used at Home: raised toilet seat, shower chair, wheelchair, manual, other (see comments), walker, rolling (Push chair Not actual wheelchair)  Employment Status: retired     Advance Care Plan/Directive  Advanced Care Plans/Directives on file:: Yes  Status of record:: On File and Validated    Referrals Placed: None     Care Plan:   Care Plan: Health Maintenance       Problem: Health Maintenance Due or Overdue       Goal: Become up-to-date with health maintenance visit(s)       Start Date: 10/5/2023 Expected End Date: 1/4/2024    This Visit's Progress: 30% Recent Progress: 20%    Note:     Barriers: Very short of breath with activity; Limited mobility; Provider availability - wait time to complete appointments.   Strengths: Motivated; Agreeable to Care Coordination.   Patient expressed understanding of goal: Yes.   Action steps to achieve this goal:  1. I will take my medications as prescribed.   2. I will discuss, review, schedule and complete recommended overdue health maintenance with my Primary Care Provider.   3. I will contact my care team with questions, concerns or support needs. I will use the clinic as a resource and I understand I can contact my clinic with 24/7 after hours services available. Care Coordinator will remain available as needed.                               Patient/Caregiver understanding: Patient/caregiver verbalized understanding and denies any additional questions or concerns at this time. RNCC engaged in [employer]  communications during encounter.     Outreach Frequency: monthly, more frequently as needed  Future Appointments                In 3 months Konstantin Campuzano MD [facility]          Follow up Plan     Discharge Follow-Up  Discharge follow up appointment scheduled in alignment with recommended follow up timeframe or Transitions of Risk Category? (Low = within 30 days; Moderate= within 14 days; High= within 7 days): No  Patient's follow up appointment not scheduled: Patient declined scheduling support. Education on the importance of transitions of care follow up. Provided scheduling phone number.    RN CC contacted patient for post-hospital follow up and to introduce Care Coordination. Full assessment not indicated as patient is already enrolled in Care Coordination.     Future Appointments   Date Time Provider Department Center   11/7/2024 11:50 AM Konstantin Campuzano MD [facility]     Outpatient Plan as outlined on AVS reviewed with patient.    For any urgent concerns, please contact our 24 hour nurse triage line: [phone] ([phone])       Cynthia Snell RN, BSN, CPHN, CCM  [facility]  [facility]  Phone: [phone]  Email: [email or web address]

## 2024-07-17 NOTE — TELEPHONE ENCOUNTER
Please verify carvedilol and spironolactone dose.  Patient States that in the emergency room she was told to stop spironolactone She did, but then she had swelling. So she has resumed to taking it.    Patient also  states they advised her to take 6.25 MG cavedilol bid, so she is quartering a 25 MG TAB. If she is correct can MD send new rx carvedilol to pharmacy? Hospital Ed note I can see still shows her on 1/2 tab spironolactone and 25 MG carvedilol bid but pt state they have been calling her with changes.    Please clarify the above and also be aware of the above.  Thank you,  Ibis Carias LPN  [facility]

## 2024-07-19 NOTE — TELEPHONE ENCOUNTER
Wheeled mobility and seating evaluation form/  standard written order for manual wheelchair received via fax. Form in your mailbox to be signed.

## 2024-07-19 NOTE — TELEPHONE ENCOUNTER
Behavioral skilled nursing 1 time visit for recertification and 1-2 PRN VISITS within the next 4 weeks for treatment of Anxiety symptoms.      Please approve the above.  Ibis Carias LPN  [facility]

## 2024-07-22 NOTE — TELEPHONE ENCOUNTER
Please verify carvedilol and spironolactone dose.  Patient States that in the emergency room she was told to stop spironolactone She did, but then she had swelling. So she has resumed to taking it.    Patient also  states they advised her to take 6.25 MG cavedilol bid, so she is quartering a 25 MG TAB. If she is correct can MD send new rx carvedilol to pharmacy? Hospital Ed note I can see still shows her on 1/2 tab spironolactone and 25 MG carvedilol bid but pt state they have been calling her with changes.     Can covering provider please clarify the above?  Please clarify the above and also be aware of the above.  Thank you,  Ibis Carias LPN  [facility]

## 2024-07-23 NOTE — DISCHARGE SUMMARY
[facility]    Discharge Summary  Hospitalist    Date of Admission:  7/12/2024  Date of Discharge:  7/23/2024  Discharging Provider: Jazmine More MD, MD  Date of Service (when I saw the patient): 7/23/2024    Discharge Diagnoses     Acute on chronic abdominal pain  Likely large colon polyp     Chronic hypoxemic respiratory failure  Advanced emphysema, COPD:     Chronic hyponatremia  Hypokalemia:     Chronic pain syndrome  Chronic low back pain     NICM  Mild nonobstructive CAD  Pulmonary HTN  HTN, but recent hypotension  HLD  Chronic sinus tachycardia  Elevated troponin  RBBB     Chronic leukocytosis     Irregular thyroid appearance     Failure to thrive  Malnutrition     Left ischium pressure ulcer, present on admission     Chronic anemia     GERD       History of Present Illness   Trisha Bender is an 68 year old female who presented with abdominal pain. Please see hospital course for details.     Hospital Course   Acute on chronic abdominal pain  Likely large colon polyp: Presenting with pain across her entire upper abdomen described as cramping.  She does have some chronic right upper quadrant abdominal pain intermittently and indigestion for which she takes simethicone.  She had not been having normal amounts of stool output this past week so she took senna the last couple of days and has had increased cramping with this with no output until a small amount in the ER.  She has not been eating very much at all and her CT of the abdomen pelvis shows almost no stool present.  There are no dilated loops of bowel or other acute findings in the abdomen.  The CT did show a 1.8 cm enhancing mass in the sigmoid colon likely representing a colonic polyp.  She did have nine 4-10 mm polyps removed from the colon in 2018.  Doubt this is contributing to the abdominal pain as there is no evidence for obstruction.  She has been having quite a bit of nausea and decreased p.o. intake overall.   LFTs and lipase are normal.  -Schedule Zofran ODT 4 times daily  -Simethicone as needed  -Continue daily pantoprazole  -Does not need senna or any other laxatives at this time as there is no stool in the colon on CT  -Discussed likely large polyp findings with the patient and this could also represent a tumor/malignancy.  Discussed with her neck step would be a colonoscopy, however depending on what her goals are given her overall poor quality of life and somewhat limited life expectancy with or not she would like to go through risks of sedation to go through colonoscopy and if more extensive tumor found which she want to consider resection or not.  She will think about this and discuss with her primary care provider if she would like to pursue colonoscopy as an outpatient.  -Resumed HC RN/PT/OT/HHA services      Chronic hypoxemic respiratory failure  Advanced emphysema, COPD: She has advanced emphysema by CT, PFTs, and functional status yet surprisingly has not qualified for home oxygen previously.  She said she completed a sleep study 3 weeks ago and 3 hours overnight she was in the 80s so she is awaiting a prescription for at least nighttime oxygen.  She reports chronic dyspnea on exertion and has limited mobility in the home.  She is chronically on prednisone 5 mg in the morning and 10 mg in the evening, Singulair at bedtime, Roflumilast at bedtime, Trelegy Ellipta, DuoNebs, and albuterol inhaler.  She is also on doxycycline at bedtime and azithromycin MWF for prophylaxis per her pulmonologist recs.  In the ER her O2 sats were 88% on room air at rest and now are in the 90s on 2 L via nasal cannula.  I do believe she would benefit from long-term oxygen therapy.  There does not appear to be anything acute going on from a pulmonary process with a negative CT chest PE protocol, no evidence for COPD exacerbation or CHF exacerbation at this time.  -Continuous pulse ox and supplemental oxygen as needed  -Perform home  oxygen assessment during the day and ideally prescribed on discharge if meeting criteria otherwise she will need to follow-up closely with her primary care provider regarding the sleep study results and need for nocturnal oxygen supplementation  -Continue PTA prednisone, doxycycline, Singulair, Roflumilast, azithromycin, DuoNebs as needed  -Resumed Trelegy Ellipta inhaler     Chronic hyponatremia  Hypokalemia: Presenting with sodium level of 129 which is where she was at in clinic a couple of months ago.  Usually in the low 130s.  This is primarily secondary to decreased solute intake.  She is also been drinking more water due to her low blood pressures.  She may have some component of SIADH given her advanced emphysema.  Potassium is also low at 2.9 secondary to poor p.o. intake.  Has not been taking her spironolactone.  -Received 500 L NS bolus in the ER, give additional 500 L LR now  -BMP in the morning  -Give 40 mEq oral potassium now then started potassium, magnesium, phosphorus replacement protocols     Chronic pain syndrome  Chronic low back pain: She takes Robaxin 5 mg 4 times daily and 1 to 2 tablets of Norco  mg roughly every 6 hours at home.  She says she threw her back out 1 week ago and has been having increased low back pain since then.  On CT chest/ab/pelvis there are multiple mild compression deformities of the thoracolumbar spine unchanged from April and May 2024 CTs.  -Give 1 mg IV Dilaudid now due to severe pain  -Continue with 1 tablet Norco  mg for moderate pain and 2 tablets for severe pain every 4 hours as needed while here  -Continued Robaxin 500 mg 4 times daily     NICM  Mild nonobstructive CAD  Pulmonary HTN  HTN, but recent hypotension  HLD  Chronic sinus tachycardia  Elevated troponin  RBBB: History of a nonischemic cardiomyopathy with EF as low as 35-40%, now normalized to 55-60% as of April 2024 along with mild pulmonary hypertension.  Previous coronary angiogram showing  mild nonobstructive disease.  Of late she has had hypotension down to the 80s systolic so her 5 mg daily lisinopril has been on hold as is her spironolactone 12.5 mg daily.  She has tried reducing her carvedilol dose from 25 to 12.5 mg twice daily and still has been holding this at times.  She has noticed her heart rate has gone up to the 110s with holding the medication.  She has chronic sinus tachycardia secondary to her advanced emphysema with rates usually .  Her troponin T is slightly elevated at 59, but she has no chest pain or pressure.  EKG shows sinus tachycardia without ischemic change.    -Repeat troponin T in the morning otherwise no further workup for the minimally elevated troponin at this time if not rising significantly.  -Resume carvedilol at lower dose 6.25 mg twice daily, if tolerates may need to prescribe this dose on discharge as she only has 25 mg tablets at home  -Hold lisinopril for now, resume at home if blood pressure is improving given her previous cardiomyopathy  -Recommend she discontinue spironolactone given her poor solute intake and propensity for hyponatremia.  She has no CHF symptoms/findings.  -Continued atorvastatin at home     Chronic leukocytosis: WBC count 12.9 in the ER.  Chronically elevated WBC count ranging from 12-18.  She is on prednisone.  Doubt acute infection at this time.     Irregular thyroid appearance: Incidental finding of a diffusely heterogeneous thyroid on CT chest.   -Discussed with patient.  Consider outpatient ultrasound depending on overall goals of care moving forward.     Failure to thrive  Malnutrition: Overall has a failure to thrive picture with multiple chronic morbidities including advanced emphysema, chronic pain requiring high-dose opiate therapy, limited mobility, ischium pressure ulcer, and malnutrition with decreased muscle bulk on exam.  She receives PT, OT, wound care, and PCA services at home.  She has had previous falls with injury  including the known sternum fracture seen on CT.  She states that she was making some progress with therapy ambulating at home with a walker until 1 week ago when her back pain worsened.  She still does not eat a lot which in part she says is from nausea, but also does not have much of an appetite.  She has muscle wasting exam and a low albumin at 2.8.  She previously met with hospice, but was not ready to enroll at that time.  -Discussed with her potential benefits from hospice when she is ready and including improved pain control and supplemental oxygen regardless of O2 sats.  She already has [facility] hospice information if she decides to go that route in the future.  -Reporting some issues with nausea chronically so I recommend she schedule Zofran 2-3 times per day at home, but for here we will schedule 4 times daily given acute on chronic nausea she is experiencing     Left ischium pressure ulcer, present on admission: Chronic left ischium pressure ulcer previously reported as unstageable.  Has home wound care and she states that it has been improving.  -If staying longer than today then consider WOC RN consult, usually not available on the weekend     Chronic anemia: Hemoglobin at baseline of 10-12.  She is chronically on prednisone, but does take daily PPI.  If hemoglobin dropping significantly following the 1 L fluids then would need to consider PUD given her presenting upper abdominal pain.  -Monitored CBC     GERD: Resumed pantoprazole 40 mg daily.     DVT Prophylaxis: Pneumatic Compression Devices  Code Status: DNR / DNI - consistent with previous wishes      Significant Results and Procedures   Results for orders placed or performed during the hospital encounter of 07/12/24   CT Chest (PE) Abdomen Pelvis w Contrast    Narrative    EXAM: CT CHEST PE ABDOMEN PELVIS W CONTRAST  LOCATION: [facility]  DATE: 07/13/2024    INDICATION: COPD, CHF, hypoxia, worsening CANNON, and also  constipation and abdominal pain.  COMPARISON: CT abdomen and pelvis on 05/5/2024.  TECHNIQUE: CT chest pulmonary angiogram and routine CT abdomen pelvis with IV contrast. Arterial phase through the chest and venous phase through the abdomen and pelvis. Multiplanar reformats and MIP reconstructions were performed. Dose reduction   techniques were used.   CONTRAST: 84 mL Isovue 370.    FINDINGS:  ANGIOGRAM CHEST: No evidence of pulmonary embolism. No evidence for right heart strain. No evidence of thoracic aortic aneurysm or dissection.    MEDIASTINUM/AXILLAE: Diffuse heterogenous thyroid parenchyma. No cardiomegaly or significant pericardial effusion. No significant mediastinal, hilar, or axillary lymphadenopathy.    LUNGS AND PLEURA: Trace right pleural effusion. No significant left pleural effusion. Basilar pulmonary opacities, likely atelectasis. Upper lobes predominant emphysematous changes.    CORONARY ARTERY CALCIFICATION: None.    ABDOMEN/PELVIS:  HEPATOBILIARY: Diffuse hypodense appearance of the liver, likely due to underlying hepatic steatosis. No suspicious focal hepatic lesion. Right upper quadrant post-cholecystectomy clips.    PANCREAS: No main pancreatic ductal dilatation or definite solid pancreatic mass.    SPLEEN: No splenomegaly.    ADRENAL GLANDS: No adrenal nodules.    KIDNEYS/BLADDER: No radiodense kidney/ureteral stones or hydronephrosis in either kidney.    BOWEL: No abnormally dilated bowel loops. There is an approximately 1.8 cm enhancing mass in the sigmoid colon (series 14 image 159), could represent a colonic polyp.    PERITONEUM: No evidence of free fluid in the abdomen and pelvis. No free peritoneal or portal venous gas.    PELVIC ORGANS: Unremarkable.    VASCULATURE: Extensive atherosclerotic vascular calcification of the abdominal aorta and iliac arteries.    LYMPH NODES: Unremarkable.    MUSCULOSKELETAL: Diffuse sclerotic appearance of the spine with multiple compression deformities,  which appear not significantly changed as compared to 04/06/2024 chest CTA and 05/05/2024 CT abdomen and pelvis. However, there is a likely subacute   fracture of the sternum with associated sclerotic changes. Multiple sclerotic foci in the ribs, not significantly changed as compared to the 04/06/2024 exam. Sclerotic changes of the anterior aspect of the pubic bones, not significantly changed as   compared to 05/05/2024.      Impression    IMPRESSION:  1.  No evidence of pulmonary embolism.  2.  No evidence of acute pathology in the abdomen and pelvis.  3.  Approximately 1.8 nodular enhancing area in the sigmoid colon, could represent a colonic polyp, recommend further evaluation with colonoscopy.  4.  There is likely a subacute fracture of the sternum with associated sclerotic changes; cannot exclude pathological fracture. Multiple mild compression deformities of the thoracolumbar spine, not significantly changed as compared to the 04/06/2024 and   05/05/2024 exams. Multiple sclerotic areas in the ribs and pelvic bones are not significantly changed as compared to same prior studies.  5.  Diffuse heterogenous appearance of the thyroid gland can be further evaluated with thyroid ultrasound.  6.  Upper lobes predominant emphysematous changes.         *Note: Due to a large number of results and/or encounters for the requested time period, some results have not been displayed. A complete set of results can be found in Results Review.         Pending Results   None  Code Status   Full Code       Primary Care Physician   Bandar Weinberg        Discharge Disposition   Discharged to home  Condition at discharge: Stable    Consultations This Hospital Stay   CARE MANAGEMENT / SOCIAL WORK IP CONSULT    Time Spent on this Encounter   Jazmine ZARATE MD, MD, personally saw the patient today and spent greater than 30 minutes discharging this patient.    Discharge Orders      Home Care Referral      Primary Care -  Care Coordination Referral      Reason for your hospital stay    Back pain     Activity    Your activity upon discharge: activity as tolerated     Follow-up and recommended labs and tests     Follow up with primary care provider, Bandar Weinberg, within 7 days  Outpatient ultrasound of the thyroid for irregular density described on CT  Discuss with your primary care provider if you would like to pursue colonoscopy as an outpatient     Diet    Follow this diet upon discharge: Orders Placed This Encounter      Regular Diet Adult     Discharge Medications   Discharge Medication List as of 7/13/2024  1:11 PM        CONTINUE these medications which have NOT CHANGED    Details   albuterol (VENTOLIN HFA) 108 (90 BASE) MCG/ACT Inhaler Inhale 2 puffs into the lungs every 6 hours as needed for shortness of breath / dyspnea or wheezing, Disp-18 g, R-3, E-Prescribe      ALPRAZolam (XANAX) 0.25 MG tablet Take 1 tablet (0.25 mg) by mouth 2 times daily as needed for anxiety, Disp-60 tablet, R-0, E-Prescribe      atorvastatin (LIPITOR) 10 MG tablet Take 10 mg by mouth at bedtime, Historical      azithromycin (ZITHROMAX) 250 MG tablet Take 250 mg by mouth Every Mon, Wed, Fri Morning, Historical      cetirizine (ZYRTEC) 10 MG tablet Take 10 mg by mouth at bedtime, Historical      [employer] FIBER GUMMIES 2.5 G CHEW Take 1 tablet by mouth at bedtime Reported on 5/8/2017, Historical      doxycycline hyclate (PERIOSTAT) 20 MG tablet Take 20 mg by mouth at bedtime, Historical      erythromycin (ROMYCIN) 5 MG/GM ophthalmic ointment Place into both eyes 2 times dailyDisp-3.5 g, [id number]      fluticasone (FLONASE) 50 MCG/ACT nasal spray Spray 1-2 sprays into both nostrils daily as needed for rhinitis or allergies, Disp-16 g, R-4, E-Prescribe      ipratropium - albuterol 0.5 mg/2.5 mg/3 mL (DUONEB) 0.5-2.5 (3) MG/3ML neb solution Take 1 vial by nebulization every 6 hours as needed for shortness of breath, wheezing or cough, Historical       lisinopril (ZESTRIL) 5 MG tablet Take 1 tablet (5 mg) by mouth daily, Disp-90 tablet, R-1, E-Prescribe      methocarbamol (ROBAXIN) 500 MG tablet Take 500 mg by mouth 4 times daily as needed for muscle spasms, Historical      montelukast (SINGULAIR) 10 MG tablet TAKE ONE TABLET BY MOUTH AT BEDTIME, Disp-90 tablet, R-1, E-Prescribe      Multiple Vitamin CHEW Take 2 tablets by mouth at bedtime, Historical      naloxone (NARCAN) 4 MG/0.1ML nasal spray Spray 1 spray (4 mg) into one nostril alternating nostrils once as needed for opioid reversal Every 2-3 minutes until patient responsive or EMS arrives, Disp-0.2 mL, R-0, Local Print      ondansetron (ZOFRAN ODT) 4 MG ODT tab Take 1 tablet (4 mg) by mouth every 6 hours as needed for nausea or vomiting, Disp-24 tablet, R-3, E-Prescribe      pantoprazole (PROTONIX) 40 MG EC tablet Take 40 mg by mouth at bedtime, Historical      !! predniSONE (DELTASONE) 5 MG tablet Take 10 mg by mouth daily, Historical      !! predniSONE (DELTASONE) 5 MG tablet Take 1 tablet by mouth every evening, Historical      roflumilast (DALIRESP) 500 MCG TABS tablet Take 500 mcg by mouth at bedtime, Historical      spironolactone (ALDACTONE) 25 MG tablet Take 0.5 tablets (12.5 mg) by mouth daily, Disp-90 tablet, R-4, E-Prescribe      SUMAtriptan (IMITREX) 25 MG tablet Take 1 tablet (25 mg) by mouth at onset of headache for migraine, Disp-9 tablet, R-11, E-Prescribe      TRELEGY ELLIPTA 200-62.5-25 MCG/ACT oral inhaler Inhale 1 puff into the lungs at bedtime, JULIO, Historical      carvedilol (COREG) 25 MG tablet Take 12.5 mg by mouth 2 times daily (with meals), Historical      HYDROcodone-acetaminophen (NORCO)  MG per tablet Take 1-2 tablets by mouth 4 times daily as needed for severe pain, Disp-240 tablet, R-0, E-Prescribe       !! - Potential duplicate medications found. Please discuss with provider.          Allergies   Allergies   Allergen Reactions    Gabapentin Swelling    Contrast Dye       Iodine    Escitalopram      Nausea and sickness    Peanuts [Nuts] Hives    Penicillins      rash    Sulfa Antibiotics      High family history    Tace [Chlorotrianisene]      joint swelling    Aspirin Rash     After 3 days    Ibuprofen Nausea and Vomiting and Swelling    Strawberry Extract Rash     Data

## 2024-08-02 PROBLEM — R53.1 GENERALIZED WEAKNESS: Status: ACTIVE | Noted: 2024-01-01

## 2024-08-02 NOTE — TELEPHONE ENCOUNTER
Routing to LPN to accept verbal orders.    Thank you,  Julien Yan, Triage RN i[facility] Alarcon  3:49 PM 8/2/2024

## 2024-08-02 NOTE — ED TRIAGE NOTES
"Pt arrives via EMS from home for weakness and N/V/D x 2 weeks.  Per report, pt was discharged from the hospital for N/V/D about three week ago and feels like she has not improved.  EMS states that pt was vomiting last night and took zofran and meclizine at [facility].  during transport and VSS.    Upon arrival, pt is A&O x 4.  States that she feels \"very weak\" and hasn't been able to walk around at home independently, as is her baseline.  Pt states she did vomit last but has not vomited today.  States that she has been able to eat and drink today.  ABC's grossly intact.      Triage Assessment (Adult)       Row Name 08/02/24 [id number]          Triage Assessment    Airway WDL WDL        Respiratory WDL    Respiratory WDL WDL        Skin Circulation/Temperature WDL    Skin Circulation/Temperature WDL WDL        Cardiac WDL    Cardiac WDL WDL        Peripheral/Neurovascular WDL    Peripheral Neurovascular WDL WDL        Cognitive/Neuro/Behavioral WDL    Cognitive/Neuro/Behavioral WDL WDL                     "

## 2024-08-02 NOTE — TELEPHONE ENCOUNTER
Patient is currently receiving SN home care with [facility]. Requesting additional wound care order from patients PCP. Please advise.     ULCER TO LEFT BUTTOCK:  CLEAN AREAS WITH MILD SOAP AND WATER, PAT DRY,APPLY HYDROPHILIC WOUND DRESSING PASTE TO DIME THICKNESS OVER WOUND, MAY PUT NON WOVEN GAUZE OVER AREA IF CLIENT CHOSES TO PROTECT CLOTHING.   APPLY 1X DAILY OR AS NEEDED IF PASTE  RUBS OFF.   RECONSULT WOCN IN 2 WEEKS IF NO IMPROVEMENT OR SOONER IF DECLINE. TO BE COMPLETED BY CAREGIVER ON NON- NURSING DAYS. MAY DISCONTINUE WHEN HEALED.    Thanks,   MELISSA Ingram, LPN  [facility] Liaison   Cell: [phone]

## 2024-08-02 NOTE — TELEPHONE ENCOUNTER
"Loida with [facility] is calling to request verbal order.    Change wound care to:   \"Cleanse area with soap and water. Pat dry. Apply hydrophilic wound dressing paste to drying thickness over the wound. Cover with non-woven gauze over the area. Apply once daily if needed/if paste rubs off. Patient or caregiver to perform wound care on non-nurse days.\"    Can leave a detailed message to: [phone]  "

## 2024-08-03 NOTE — PROGRESS NOTES
Cross [employer]    Called for patient typically on hydrocodone-apap  prn at home but can only take it when it comes from a specific  which we do not use.  Patient requesting switch to just oxy     Discontinued hydro-apap. Started oxy 5/10mg q 4 prn mod/severe pain and apap 650 mg q 4 hours prn

## 2024-08-03 NOTE — ED PROVIDER NOTES
History     Chief Complaint:  Generalized Weakness       HPI   Trisha Bender is a 68 year old female who has a history of CHF COPD colitis and recent admission for acute on chronic abdominal pain with poor p.o. intake.  The patient was admitted to the hospital July 12 through 23, 2024.  She reports that after leaving the hospital she had nausea trouble taking p.o. and developed diarrhea.  She said the stools have not been bloody or mucousy.  The patient said she has had some abdominal pain for which she takes chronic pain medication.  The patient said with her decreased appetite and decreased p.o. intake she felt weak and a hard time getting around was concerned about electrolyte problems infection and therefore presented to the ER [facility].      Independent Historian:    The patient's PCA    Review of External Notes:  Recent discharge note dated 7/23/2024 reviewed    Medications:    albuterol (VENTOLIN HFA) 108 (90 BASE) MCG/ACT Inhaler  ALPRAZolam (XANAX) 0.25 MG tablet  atorvastatin (LIPITOR) 10 MG tablet  azithromycin (ZITHROMAX) 250 MG tablet  carvedilol (COREG) 6.25 MG tablet  cetirizine (ZYRTEC) 10 MG tablet  [employer] F[employer] GUMMIES 2.5 G CHEW  doxycycline hyclate (PERIOSTAT) 20 MG tablet  erythromycin (ROMYCIN) 5 MG/GM ophthalmic ointment  fluticasone (FLONASE) 50 MCG/ACT nasal spray  HYDROcodone-acetaminophen (NORCO)  MG per tablet  ipratropium - albuterol 0.5 mg/2.5 mg/3 mL (DUONEB) 0.5-2.5 (3) MG/3ML neb solution  lisinopril (ZESTRIL) 5 MG tablet  methocarbamol (ROBAXIN) 500 MG tablet  montelukast (SINGULAIR) 10 MG tablet  Multiple Vitamin CHEW  naloxone (NARCAN) 4 MG/0.1ML nasal spray  ondansetron (ZOFRAN ODT) 4 MG ODT tab  pantoprazole (PROTONIX) 40 MG EC tablet  predniSONE (DELTASONE) 5 MG tablet  predniSONE (DELTASONE) 5 MG tablet  roflumilast (DALIRESP) 500 MCG TABS tablet  spironolactone (ALDACTONE) 25 MG tablet  SUMAtriptan (IMITREX) 25 MG tablet  TRELEGY ELLIPTA 200-62.5-25 MCG/ACT oral  inhaler        Past Medical History:    Past Medical History:   Diagnosis Date    Anxiety     CAD (coronary artery disease) 07/02/2015    CHF (congestive heart failure)     Chronic airway obstruction     Chronic back pain     COPD (chronic obstructive pulmonary disease)     Endometriosis     Esophageal reflux     Essential hypertension     Hyperlipidemia     Migraine     Mild persistent asthma 08/30/2012    Osteopenia     Takotsubo cardiomyopathy        Past Surgical History:    Past Surgical History:   Procedure Laterality Date    ANGIOGRAM  07/02/2015    Minimal CAD. LV dysfunction, Mid RCA 20% stenosis, EF 20-25%, c/w Takotsubo cardiomyopathy    APPENDECTOMY  1974    CHOLECYSTECTOMY  1974    [facility]    COLONOSCOPY N/A 04/26/2018    Procedure: COLONOSCOPY;  COLONOSCOPY with biopsies;  Surgeon: Steve Acosta MD;  Location: [facility] OR    CORONARY ANGIOGRAPHY ADULT ORDER      CV CORONARY ANGIOGRAM N/A 07/30/2020    Procedure: Coronary Angiogram;  Surgeon: Quinn Viveros MD;  Location:  HEART CARDIAC CATH LAB    CV LEFT HEART CATH N/A 07/30/2020    Procedure: Left Heart Cath;  Surgeon: Quinn Viveros MD;  Location:  HEART CARDIAC CATH LAB    CV LEFT VENTRICULOGRAM N/A 07/30/2020    Procedure: Left Ventriculogram;  Surgeon: Quinn Viveros MD;  Location:  HEART CARDIAC CATH LAB    CV RIGHT HEART CATH MEASUREMENTS RECORDED N/A 07/30/2020    Procedure: Right Heart Cath;  Surgeon: Quinn Viveros MD;  Location:  HEART CARDIAC CATH LAB    ESOPHAGOSCOPY, GASTROSCOPY, DUODENOSCOPY (EGD), COMBINED  11/15/2011    Procedure:COMBINED ESOPHAGOSCOPY, GASTROSCOPY, DUODENOSCOPY (EGD);  ESOPHAGOSCOPY, GASTROSCOPY, DUODENOSCOPY (EGD) ; Surgeon:JIM URENA; Location:[facility] GI    INJECT EPIDURAL LUMBAR / SACRAL SINGLE N/A 04/13/2018    Procedure: INJECT EPIDURAL LUMBAR / SACRAL CONTINUAL OR INTERMITTENT;  Lumbar Epidural Steroid Injection;  Surgeon: Az Enamorado MD;  Location: [facility] OR    INJECT  EPIDURAL LUMBAR / SACRAL SINGLE N/A 09/24/2018    Procedure: INJECT EPIDURAL LUMBAR / SACRAL SINGLE;  Lumbar Epidural Steroid Injection;  Surgeon: Az Enamorado MD;  Location: [facility] OR    LAPAROSCOPIC ABLATION ENDOMETRIOSIS  1998    Nasal septoplasty and mucous retention cyst      TONSILLECTOMY, ADENOIDECTOMY, COMBINED      [place] teeth extraction            Physical Exam   Patient Vitals for the past 24 hrs:   BP Temp Temp src Pulse Resp SpO2   08/02/24 2300 114/74 -- -- 98 -- 93 %   08/02/24 2245 122/78 -- -- 98 -- 95 %   08/02/24 2211 115/74 -- -- 101 -- 97 %   08/02/24 2156 113/79 -- -- 103 -- 96 %   08/02/24 2145 (!) 126/95 -- -- 101 -- 97 %   08/02/24 2130 110/81 -- -- 99 -- 97 %   08/02/24 2126 110/81 -- -- 101 -- 96 %   08/02/24 2120 118/80 -- -- 101 -- 96 %   08/02/24 2100 130/85 -- -- 103 -- 97 %   08/02/24 2050 119/66 -- -- 101 -- 98 %   08/02/24 2045 119/66 -- -- 96 -- 96 %   08/02/24 1946 105/76 -- -- 103 -- 98 %   08/02/24 1930 (!) 112/90 -- -- 105 -- 95 %   08/02/24 1831 (!) 120/93 98.4  F (36.9  C) Oral 101 18 96 %        Physical Exam  General: The patient is alert, frail    HENT: Mucous membranes dry eyes are sunken    Cardiovascular: Regular rate and rhythm.  Delayed cap refill    Respiratory: Adequate air movement but prolonged expirations    Gastrointestinal: Abdomen soft. No guarding    Musculoskeletal: No gross deformity.     Skin: No rashes or petechiae.     Neurologic: The patient is alert generalized weakness    Psychiatric: The patient is non-tearful.     Emergency Department Course   ECG  ECG taken at 2013, ECG read at 2019  Normal sinus rhythm right bundle branch block  When compared with prior ECG, dated 7/12/2024 the morphology is similar  Rate 99 bpm. OR interval 128 ms. QRS duration 130 ms. QT/QTc 574/736 ms. P-R-T axes 58 2935.    Imaging:  No orders to display       Laboratory:  Labs Ordered and Resulted from Time of ED Arrival to Time of ED Departure   COMPREHENSIVE METABOLIC  PANEL - Abnormal       Result Value    Sodium 130 (*)     Potassium 2.8 (*)     Carbon Dioxide (CO2) 20 (*)     Anion Gap 16 (*)     Urea Nitrogen 4.5 (*)     Creatinine 0.49 (*)     GFR Estimate >90      Calcium 7.8 (*)     Chloride 94 (*)     Glucose 114 (*)     Alkaline Phosphatase 197 (*)     AST 13      ALT 7      Protein Total 4.8 (*)     Albumin 2.6 (*)     Bilirubin Total 0.3     TROPONIN T, HIGH SENSITIVITY - Abnormal    Troponin T, High Sensitivity 72 (*)    ROUTINE UA WITH MICROSCOPIC - Abnormal    Color Urine Light Yellow      Appearance Urine Clear      Glucose Urine Negative      Bilirubin Urine Negative      Ketones Urine 10 (*)     Specific Gravity Urine 1.014      Blood Urine Negative      pH Urine 6.5      Protein Albumin Urine 10 (*)     Urobilinogen Urine Normal      Nitrite Urine Negative      Leukocyte Esterase Urine Small (*)     Mucus Urine Present (*)     RBC Urine 1      WBC Urine 2      Squamous Epithelials Urine 2 (*)    CBC WITH PLATELETS AND DIFFERENTIAL - Abnormal    WBC Count 15.7 (*)     RBC Count 4.87      Hemoglobin 11.8      Hematocrit 40.3      MCV 83      MCH 24.2 (*)     MCHC 29.3 (*)     RDW 17.5 (*)     Platelet Count 726 (*)     % Neutrophils 73      % Lymphocytes 15      % Monocytes 10      % Eosinophils 1      % Basophils 0      % Immature Granulocytes 1      NRBCs per 100 WBC 0      Absolute Neutrophils 11.4 (*)     Absolute Lymphocytes 2.4      Absolute Monocytes 1.5 (*)     Absolute Eosinophils 0.1      Absolute Basophils 0.1      Absolute Immature Granulocytes 0.2      Absolute NRBCs 0.0     LIPASE - Normal    Lipase 16     LACTIC ACID WHOLE BLOOD WITH 1X REPEAT IN 2 HR WHEN >2 - Normal    Lactic Acid, Initial 1.1     MAGNESIUM - Normal    Magnesium 1.7     PHOSPHORUS - Normal    Phosphorus 3.1     C. DIFFICILE TOXIN B PCR WITH REFLEX TO C. DIFFICILE ANTIGEN AND TOXINS A/B EIA   ENTERIC BACTERIA AND VIRUS PANEL BY PCR        Procedures       Emergency Department Course  & Assessments:  I reassessed the patient after IV fluid she was still feeling frail.    Interventions:  Medications   lidocaine 1 % 0.1-1 mL (has no administration in time range)   lidocaine (LMX4) cream (has no administration in time range)   sodium chloride (PF) 0.9% PF flush 3 mL (has no administration in time range)   sodium chloride (PF) 0.9% PF flush 3 mL (has no administration in time range)   0.9% sodium chloride + KCl 20 mEq/L infusion ( Intravenous $New Bag 8/2/24 5706)   magnesium sulfate 2 g in 50 mL sterile water intermittent infusion (2 g Intravenous $New Bag 8/2/24 3218)   ALPRAZolam (XANAX) tablet 0.25 mg (has no administration in time range)   atorvastatin (LIPITOR) tablet 10 mg (has no administration in time range)   azithromycin (ZITHROMAX) tablet 250 mg (has no administration in time range)   carvedilol (COREG) tablet 6.25 mg (has no administration in time range)   cetirizine (zyrTEC) tablet 10 mg (has no administration in time range)   erythromycin (ROMYCIN) ophthalmic ointment (has no administration in time range)   HYDROcodone-acetaminophen (NORCO)  MG per tablet 1-2 tablet (has no administration in time range)   ipratropium - albuterol 0.5 mg/2.5 mg/3 mL (DUONEB) neb solution 3 mL (has no administration in time range)   fluticasone (FLONASE) 50 MCG/ACT spray 1-2 spray (has no administration in time range)   montelukast (SINGULAIR) tablet 10 mg (has no administration in time range)   naloxone (NARCAN) nasal spray 4 mg (has no administration in time range)   ondansetron (ZOFRAN ODT) ODT tab 4 mg (has no administration in time range)   pantoprazole (PROTONIX) EC tablet 40 mg (has no administration in time range)   predniSONE (DELTASONE) tablet 5 mg (has no administration in time range)   predniSONE (DELTASONE) tablet 10 mg (has no administration in time range)   roflumilast (DALIRESP) tablet 500 mcg (has no administration in time range)   SUMAtriptan (IMITREX) tablet 25 mg (has no  administration in time range)   fluticasone-vilanterol (BREO ELLIPTA) 200-25 MCG/ACT inhaler 1 puff (has no administration in time range)     And   umeclidinium (INCRUSE ELLIPTA) 62.5 MCG/ACT inhaler 1 puff (has no administration in time range)   ondansetron (ZOFRAN ODT) ODT tab 4 mg (has no administration in time range)     Or   ondansetron (ZOFRAN) injection 4 mg (has no administration in time range)   heparin ANTICOAGULANT injection 5,000 Units (has no administration in time range)   bisacodyl (DULCOLAX) suppository 10 mg (has no administration in time range)   polyethylene glycol (MIRALAX) Packet 17 g (has no administration in time range)   methocarbamol (ROBAXIN) tablet 500 mg (has no administration in time range)   sodium chloride 0.9% BOLUS 500 mL (0 mLs Intravenous Stopped 8/2/24 2137)   HYDROcodone-acetaminophen (NORCO) 5-325 MG per tablet 2 tablet (2 tablets Oral $Given 8/2/24 2048)   ondansetron (ZOFRAN) injection 4 mg (4 mg Intravenous $Given 8/2/24 2048)   potassium chloride (KLOR-CON) Packet 20 mEq (20 mEq Oral $Given 8/2/24 2136)   oxyCODONE (ROXICODONE) tablet 5 mg (5 mg Oral $Given 8/2/24 2158)   ondansetron (ZOFRAN) injection 4 mg (4 mg Intravenous $Given 8/2/24 2158)        Assessments:  Reassessed the patient after the IV fluid        Consultations/Discussion of Management or Tests:  I discussed the case with the PA for the admitting hospitalist       Social Determinants of Health affecting care:  Social Connections/Isolation     Disposition:  The patient was admitted to the hospital under the care of Dr. Dooley.    Impression & Plan           Medical Decision Making:  The patient presented from home reports that she has had decreased p.o. intake appears frail dehydrated and weak.  I question about her goals she said she would like to see if there are electrolytes we could correct she in fact does have worsening hyponatremia low potassium low magnesium I did supplement these but did not feel  the patient would likely do well at home.  The troponin is elevated but not positive I did consider that could be cause of her symptoms as well CT scan was considered however her abdominal exam was fairly benign.  A primary cardiac cause is possible though not most likely but that will need to be trended.  For all these reasons the patient was admitted to the hospital under observation status her QTc was long which may be due to her decreased magnesium which was supplemented and she was admitted in good condition.    Diagnosis:    ICD-10-CM    1. Generalized weakness  R53.1       2. Hyponatremia  E87.1       3. Hypokalemia  E87.6       4. Elevated troponin  R79.89                  8/2/2024   Sara Childers DO Farnan, Christopher M, MD  08/02/24 6954

## 2024-08-03 NOTE — PLAN OF CARE
"3 wounds noted on admission. SUZANNA LE edema. Reports poor appetite, nausea and vomiting at home. No stools since arrival. Stool to be collected for enteric rule out. C/O chronic pain- prn oxycodone, tylenol and robaxin given. A/Ox4, calls appropriately. Purewick for urine per request.     Major Shift Events   Arrival from ER, no change in condition.     Treatment Plan:   IVF, symptom management. Replace electrolytes. WOC consult.                                           Problem: Adult Inpatient Plan of Care  Goal: Plan of Care Review  Description: The Plan of Care Review/Shift note should be completed every shift.  The Outcome Evaluation is a brief statement about your assessment that the patient is improving, declining, or no change.  This information will be displayed automatically on your shift  note.  Outcome: Not Progressing  Flowsheets (Taken 8/3/2024 0558)  Plan of Care Reviewed With: patient  Overall Patient Progress: no change  Goal: Patient-Specific Goal (Individualized)  Description: You can add care plan individualizations to a care plan. Examples of Individualization might be:  \"Parent requests to be called daily at 9am for status\", \"I have a hard time hearing out of my right ear\", or \"Do not touch me to wake me up as it startles  me\".  Outcome: Not Progressing  Goal: Absence of Hospital-Acquired Illness or Injury  Outcome: Not Progressing  Intervention: Identify and Manage Fall Risk  Recent Flowsheet Documentation  Taken 8/3/2024 0100 by Debbie Gibbons, RN  Safety Promotion/Fall Prevention: safety round/check completed  Intervention: Prevent Skin Injury  Recent Flowsheet Documentation  Taken 8/3/2024 0100 by Debbie Gibbons, RN  Body Position: position changed independently  Goal: Optimal Comfort and Wellbeing  Outcome: Not Progressing  Goal: Readiness for Transition of Care  Outcome: Not Progressing   Goal Outcome Evaluation:      Plan of Care Reviewed With: patient    Overall Patient Progress: " no changeOverall Patient Progress: no change

## 2024-08-03 NOTE — H&P
[facility]    History and Physical - Hospitalist Service       Date of Admission:  8/2/2024    Assessment & Plan   Trisha Bender is a 68-year-old female with a medical history of COPD, nonobstructive CAD, CHF (echo from April 2024 showed EF of 55-60%), takotsubo cardiomyopathy, RBBB, chronic sinus tachycardia, pulmonary hypertension, hypertension, hyperlipidemia, GERD, cholecystectomy, appendectomy, endometriosis s/p laparoscopic ablation, osteopenia, and migraine headaches who presented to the [place] emergency department for the evaluation of generalized weakness, diarrhea, poor oral intake, and acute on chronic abdominal pain.    #. Generalized weakness  #. Acute on chronic abdominal pain  #. Diarrhea  #. Poor oral intake and failure to thrive   #. Hyponatremia  #. Hypokalemia   #. Leukocytosis   The patient reports weakness, increased abdominal pain, diarrhea, and poor appetite. EKG shows prolonged QTc, likely due to abnormal electrolytes. Lab results reveal hypokalemia (K 2.8), hyponatremia (Na 130), hypochloremia (Cl 94), metabolic acidosis (CO2 20, anion gap 16), hypoalbuminemia (albumin 2.6, protein 4.8), and leukocytosis (WBC 15) with a negative UA. The diarrhea raises concerns for an infectious source, prompting stool studies. The overall plan is to admit the patient to the observation unit for electrolyte replacement and monitoring.   -Cardiac monitoring   -RN managed potassium protocol   -NS with KCl 20 mEq/L at 100ml/hr     #. NICM  #. CHF   #. Hypertension   #. Hyperlipidemia   -Continue PTA Carvedilol 6.25 mg twice daily, and Atorvastatin     #. COPD   #. Chronic hypoxemic respiratory failure   -Continue PTA Trelegy Ellipta inhaler, Prednisone, Singulair, Roflumilast, and Azithromycin   -Duoneb as needed     #. Chronic pain syndrome  #. Chronic low back pain  -Continue PTA Robaxin and Percocet  -Pharmacy consult as the patient does not believe that the inpatient Percocet  and outpatient Percocet are the same    #. Left ischium pressure ulcer   -Wound team consult     #. GERD   -Continue PTA Pantoprazole     Observation Goals: -diagnostic tests and consults completed and resulted, -vital signs normal or at patient baseline, -tolerating oral intake to maintain hydration, -returns to baseline functional status, -safe disposition plan has been identified, Nurse to notify provider when observation goals have been met and patient is ready for discharge.  Diet: Regular Diet Adult    DVT Prophylaxis: Heparin SQ  Quiles Catheter: Not present  Lines: None     Cardiac Monitoring: None  Code Status: Full Code      Clinically Significant Risk Factors Present on Admission        # Hypokalemia: Lowest K = 2.8 mmol/L in last 2 days, will replace as needed       # Hypoalbuminemia: Lowest albumin = 2.6 g/dL at 8/2/2024  6:46 PM, will monitor as appropriate     # Hypertension: Noted on problem list  # Chronic heart failure with preserved ejection fraction: heart failure noted on problem list and last echo with EF >50%   # Financial/Environmental Concerns:    # Asthma: noted on problem list      Disposition Plan     Medically Ready for Discharge: Anticipated in 2-4 Days    The patient's care was discussed with the Attending Physician, Dr. Childers  .    ELLE Saucedo [facility]  Hospitalist Service  [facility]  Securely message with [employer] (more info)  Text page via [employer] Paging/Directory     ______________________________________________________________________    Chief Complaint   Generalized weakness, diarrhea, and abdominal pain.     History is obtained from the patient    History of Present Illness   Trisha Bender is a 68-year-old female with a medical history of COPD, CAD, CHF, takotsubo cardiomyopathy, hypertension, hyperlipidemia, GERD, cholecystectomy, appendectomy, endometriosis s/p laparoscopic ablation, osteopenia, and migraine headaches who presented to  the [place] emergency department for the evaluation of generalized weakness, diarrhea, poor oral intake, and acute on chronic abdominal pain.    The patient was admitted to the hospital from 7/12/24 through 7/23/24. After discharge, she experienced nausea, difficulty with oral intake, and developed diarrhea, though the stools were neither bloody nor mucous-filled. She also reported some abdominal pain, for which she takes chronic pain medication. Due to her decreased appetite and oral intake, she felt weak and had difficulty moving around. Concerned about potential electrolyte imbalances and infection, so she decided to come to the emergency department.     Past Medical History    Past Medical History:   Diagnosis Date    Anxiety     CAD (coronary artery disease) 07/02/2015    mild per cath    CHF (congestive heart failure)     EF=20-25% per echo 6/30/15    Chronic airway obstruction     FEV1 36% of pred; VC 67%  in Jan 06.    Chronic back pain     COPD (chronic obstructive pulmonary disease)     Endometriosis     Esophageal reflux     Essential hypertension     Hyperlipidemia     Migraine     Mild persistent asthma 08/30/2012    Osteopenia     Takotsubo cardiomyopathy      Past Surgical History   Past Surgical History:   Procedure Laterality Date    ANGIOGRAM  07/02/2015    Minimal CAD. LV dysfunction, Mid RCA 20% stenosis, EF 20-25%, c/w Takotsubo cardiomyopathy    APPENDECTOMY  1974    CHOLECYSTECTOMY  1974    [facility]    COLONOSCOPY N/A 04/26/2018    Procedure: COLONOSCOPY;  COLONOSCOPY with biopsies;  Surgeon: Steve Acosta MD;  Location:  OR    CORONARY ANGIOGRAPHY ADULT ORDER      CV CORONARY ANGIOGRAM N/A 07/30/2020    Procedure: Coronary Angiogram;  Surgeon: Quinn Viveros MD;  Location:  HEART CARDIAC CATH LAB    CV LEFT HEART CATH N/A 07/30/2020    Procedure: Left Heart Cath;  Surgeon: Quinn Viveros MD;  Location:  HEART CARDIAC CATH LAB    CV LEFT VENTRICULOGRAM N/A  07/30/2020    Procedure: Left Ventriculogram;  Surgeon: Quinn Viveros MD;  Location: [facility] HEART CARDIAC CATH LAB    CV RIGHT HEART CATH MEASUREMENTS RECORDED N/A 07/30/2020    Procedure: Right Heart Cath;  Surgeon: Quinn Viveros MD;  Location: [facility] HEART CARDIAC CATH LAB    ESOPHAGOSCOPY, GASTROSCOPY, DUODENOSCOPY (EGD), COMBINED  11/15/2011    Procedure:COMBINED ESOPHAGOSCOPY, GASTROSCOPY, DUODENOSCOPY (EGD);  ESOPHAGOSCOPY, GASTROSCOPY, DUODENOSCOPY (EGD) ; Surgeon:JIM URENA; Location:[facility] GI    INJECT EPIDURAL LUMBAR / SACRAL SINGLE N/A 04/13/2018    Procedure: INJECT EPIDURAL LUMBAR / SACRAL CONTINUAL OR INTERMITTENT;  Lumbar Epidural Steroid Injection;  Surgeon: Az Enamorado MD;  Location: [facility] OR    INJECT EPIDURAL LUMBAR / SACRAL SINGLE N/A 09/24/2018    Procedure: INJECT EPIDURAL LUMBAR / SACRAL SINGLE;  Lumbar Epidural Steroid Injection;  Surgeon: Az Enamorado MD;  Location: [facility] OR    LAPAROSCOPIC ABLATION ENDOMETRIOSIS  1998    Nasal septoplasty and mucous retention cyst      TONSILLECTOMY, ADENOIDECTOMY, COMBINED      [place] teeth extraction       Prior to Admission Medications   Prior to Admission Medications   Prescriptions Last Dose Informant Patient Reported? Taking?   ALPRAZolam (XANAX) 0.25 MG tablet   No No   Sig: Take 1 tablet (0.25 mg) by mouth 2 times daily as needed for anxiety   [employer] FIBER GUMMIES 2.5 G CHEW  Self Yes No   Sig: Take 1 tablet by mouth at bedtime Reported on 5/8/2017   HYDROcodone-acetaminophen (NORCO)  MG per tablet   No No   Sig: Take 1-2 tablets by mouth 4 times daily as needed for severe pain   Multiple Vitamin CHEW  Self Yes No   Sig: Take 2 tablets by mouth at bedtime   SUMAtriptan (IMITREX) 25 MG tablet   No No   Sig: Take 1 tablet (25 mg) by mouth at onset of headache for migraine   TRELEGY ELLIPTA 200-62.5-25 MCG/ACT oral inhaler   Yes No   Sig: Inhale 1 puff into the lungs at bedtime   albuterol (VENTOLIN HFA) 108 (90 BASE) MCG/ACT Inhaler  Self No  No   Sig: Inhale 2 puffs into the lungs every 6 hours as needed for shortness of breath / dyspnea or wheezing   atorvastatin (LIPITOR) 10 MG tablet   Yes No   Sig: Take 10 mg by mouth at bedtime   azithromycin (ZITHROMAX) 250 MG tablet   Yes No   Sig: Take 250 mg by mouth Every Mon, Wed, Fri Morning   carvedilol (COREG) 6.25 MG tablet   No No   Sig: Take 1 tablet (6.25 mg) by mouth 2 times daily (with meals)   cetirizine (ZYRTEC) 10 MG tablet   Yes No   Sig: Take 10 mg by mouth at bedtime   doxycycline hyclate (PERIOSTAT) 20 MG tablet   Yes No   Sig: Take 20 mg by mouth at bedtime   erythromycin (ROMYCIN) 5 MG/GM ophthalmic ointment   No No   Sig: Place into both eyes 2 times daily   fluticasone (FLONASE) 50 MCG/ACT nasal spray   No No   Sig: Spray 1-2 sprays into both nostrils daily as needed for rhinitis or allergies   ipratropium - albuterol 0.5 mg/2.5 mg/3 mL (DUONEB) 0.5-2.5 (3) MG/3ML neb solution   Yes No   Sig: Take 1 vial by nebulization every 6 hours as needed for shortness of breath, wheezing or cough   lisinopril (ZESTRIL) 5 MG tablet   No No   Sig: Take 1 tablet (5 mg) by mouth daily   methocarbamol (ROBAXIN) 500 MG tablet   Yes No   Sig: Take 500 mg by mouth 4 times daily as needed for muscle spasms   montelukast (SINGULAIR) 10 MG tablet   No No   Sig: TAKE ONE TABLET BY MOUTH AT BEDTIME   naloxone (NARCAN) 4 MG/0.1ML nasal spray   No No   Sig: Spray 1 spray (4 mg) into one nostril alternating nostrils once as needed for opioid reversal Every 2-3 minutes until patient responsive or EMS arrives   ondansetron (ZOFRAN ODT) 4 MG ODT tab   No No   Sig: Take 1 tablet (4 mg) by mouth every 6 hours as needed for nausea or vomiting   pantoprazole (PROTONIX) 40 MG EC tablet   Yes No   Sig: Take 40 mg by mouth at bedtime   predniSONE (DELTASONE) 5 MG tablet   Yes No   Sig: Take 1 tablet by mouth every evening   predniSONE (DELTASONE) 5 MG tablet   Yes No   Sig: Take 10 mg by mouth daily   roflumilast (DALIRESP)  500 MCG TABS tablet  Self Yes No   Sig: Take 500 mcg by mouth at bedtime   spironolactone (ALDACTONE) 25 MG tablet   No No   Sig: Take 0.5 tablets (12.5 mg) by mouth daily      Facility-Administered Medications: None      Review of Systems    The 10 point Review of Systems is negative other than noted in the HPI.     Physical Exam   Vital Signs: Temp: 98.4  F (36.9  C) Temp src: Oral BP: 122/78 Pulse: 98   Resp: 18 SpO2: 95 % O2 Device: None (Room air)    Weight: 0 lbs 0 oz    General: Alert but frail and ill appearing woman. Not in acute distress.   HEENT: Dry mucous membranes and sunken eyes.  Cardiovascular: Regular rate and rhythm, delayed capillary refill.  Respiratory: Adequate air movement, prolonged expirations and diminished bases.  Gastrointestinal: Soft abdomen, no guarding.  Musculoskeletal: No gross deformities.  Skin: No rashes or petechiae.  Neurologic: Alert with generalized weakness.  Psychiatric: Flat affect.     Medical Decision Making       60 MINUTES SPENT BY ME on the date of service doing chart review, history, exam, documentation & further activities per the note.      Data   ------------------------- PAST 24 HR DATA REVIEWED -----------------------------------------------    I have personally reviewed the following data over the past 24 hrs:    15.7 (H)  \   11.8   / 726 (H)     130 (L) 94 (L) 4.5 (L) /  114 (H)   2.8 (L) 20 (L) 0.49 (L) \     ALT: 7 AST: 13 AP: 197 (H) TBILI: 0.3   ALB: 2.6 (L) TOT PROTEIN: 4.8 (L) LIPASE: 16     Trop: 72 (H) BNP: N/A     Procal: N/A CRP: N/A Lactic Acid: 1.1         Imaging results reviewed over the past 24 hrs:   No results found for this or any previous visit (from the past 24 hour(s)).

## 2024-08-03 NOTE — ED NOTES
"[facility]  ED Nurse Handoff Report    ED Chief complaint: Generalized Weakness  . ED Diagnosis:   Final diagnoses:   Generalized weakness   Hyponatremia   Hypokalemia   Elevated troponin       Allergies:   Allergies   Allergen Reactions    Gabapentin Swelling    Contrast Dye      Iodine    Escitalopram      Nausea and sickness    Peanuts [Nuts] Hives    Penicillins      rash    Sulfa Antibiotics      High family history    Tace [Chlorotrianisene]      joint swelling    Aspirin Rash     After 3 days    Ibuprofen Nausea and Vomiting and Swelling    Strawberry Extract Rash       Code Status: Full Code    Activity level - Baseline/Home:  standby.  Activity Level - Current:   in bed.   Lift room needed: No.   Bariatric: No   Needed: No   Isolation: Yes.   Infection: Not Applicable  C-Diff Pending.     Respiratory status: Room air    Vital Signs (within 30 minutes):   Vitals:    08/02/24 2145 08/02/24 2156 08/02/24 2211 08/02/24 2245   BP: (!) 126/95 113/79 115/74 122/78   Pulse: 101 103 101 98   Resp:       Temp:       TempSrc:       SpO2: 97% 96% 97% 95%       Cardiac Rhythm:  ,      Pain level:    Patient confused: No.   Patient Falls Risk: arm band in place, patient and family education, assistive device/personal items within reach, and activity supervised.   Elimination Status: Has voided purewick in place    Patient Report - Initial Complaint: generalized weakness.   Focused Assessment: Pt states she was throwing up yesteday and took zofran and meclizine at home. State she was able to eat and drink \"quite well\" today. Pt has chronic pain in her back, which she rates as an 7/10. A&O x 4     Abnormal Results:   Labs Ordered and Resulted from Time of ED Arrival to Time of ED Departure   COMPREHENSIVE METABOLIC PANEL - Abnormal       Result Value    Sodium 130 (*)     Potassium 2.8 (*)     Carbon Dioxide (CO2) 20 (*)     Anion Gap 16 (*)     Urea Nitrogen 4.5 (*)     Creatinine 0.49 " (*)     GFR Estimate >90      Calcium 7.8 (*)     Chloride 94 (*)     Glucose 114 (*)     Alkaline Phosphatase 197 (*)     AST 13      ALT 7      Protein Total 4.8 (*)     Albumin 2.6 (*)     Bilirubin Total 0.3     TROPONIN T, HIGH SENSITIVITY - Abnormal    Troponin T, High Sensitivity 72 (*)    ROUTINE UA WITH MICROSCOPIC - Abnormal    Color Urine Light Yellow      Appearance Urine Clear      Glucose Urine Negative      Bilirubin Urine Negative      Ketones Urine 10 (*)     Specific Gravity Urine 1.014      Blood Urine Negative      pH Urine 6.5      Protein Albumin Urine 10 (*)     Urobilinogen Urine Normal      Nitrite Urine Negative      Leukocyte Esterase Urine Small (*)     Mucus Urine Present (*)     RBC Urine 1      WBC Urine 2      Squamous Epithelials Urine 2 (*)    CBC WITH PLATELETS AND DIFFERENTIAL - Abnormal    WBC Count 15.7 (*)     RBC Count 4.87      Hemoglobin 11.8      Hematocrit 40.3      MCV 83      MCH 24.2 (*)     MCHC 29.3 (*)     RDW 17.5 (*)     Platelet Count 726 (*)     % Neutrophils 73      % Lymphocytes 15      % Monocytes 10      % Eosinophils 1      % Basophils 0      % Immature Granulocytes 1      NRBCs per 100 WBC 0      Absolute Neutrophils 11.4 (*)     Absolute Lymphocytes 2.4      Absolute Monocytes 1.5 (*)     Absolute Eosinophils 0.1      Absolute Basophils 0.1      Absolute Immature Granulocytes 0.2      Absolute NRBCs 0.0     LIPASE - Normal    Lipase 16     LACTIC ACID WHOLE BLOOD WITH 1X REPEAT IN 2 HR WHEN >2 - Normal    Lactic Acid, Initial 1.1     MAGNESIUM - Normal    Magnesium 1.7     PHOSPHORUS - Normal    Phosphorus 3.1     C. DIFFICILE TOXIN B PCR WITH REFLEX TO C. DIFFICILE ANTIGEN AND TOXINS A/B EIA   ENTERIC BACTERIA AND VIRUS PANEL BY PCR        No orders to display       Treatments provided: IV fluids, labs, pain medication, antiemetics  Family Comments: Son and PCA at bedside for most of shift but have gone home  OBS brochure/video discussed/provided to  patient:  N/A  ED Medications:   Medications   lidocaine 1 % 0.1-1 mL (has no administration in time range)   lidocaine (LMX4) cream (has no administration in time range)   sodium chloride (PF) 0.9% PF flush 3 mL (has no administration in time range)   sodium chloride (PF) 0.9% PF flush 3 mL (has no administration in time range)   0.9% sodium chloride + KCl 20 mEq/L infusion ( Intravenous $New Bag 8/2/24 4830)   magnesium sulfate 2 g in 50 mL sterile water intermittent infusion (2 g Intravenous $New Bag 8/2/24 0153)   ALPRAZolam (XANAX) tablet 0.25 mg (has no administration in time range)   atorvastatin (LIPITOR) tablet 10 mg (has no administration in time range)   azithromycin (ZITHROMAX) tablet 250 mg (has no administration in time range)   carvedilol (COREG) tablet 6.25 mg (has no administration in time range)   cetirizine (zyrTEC) tablet 10 mg (has no administration in time range)   erythromycin (ROMYCIN) ophthalmic ointment (has no administration in time range)   HYDROcodone-acetaminophen (NORCO)  MG per tablet 1-2 tablet (has no administration in time range)   ipratropium - albuterol 0.5 mg/2.5 mg/3 mL (DUONEB) neb solution 3 mL (has no administration in time range)   fluticasone (FLONASE) 50 MCG/ACT spray 1-2 spray (has no administration in time range)   montelukast (SINGULAIR) tablet 10 mg (has no administration in time range)   naloxone (NARCAN) nasal spray 4 mg (has no administration in time range)   ondansetron (ZOFRAN ODT) ODT tab 4 mg (has no administration in time range)   pantoprazole (PROTONIX) EC tablet 40 mg (has no administration in time range)   predniSONE (DELTASONE) tablet 5 mg (has no administration in time range)   predniSONE (DELTASONE) tablet 10 mg (has no administration in time range)   roflumilast (DALIRESP) tablet 500 mcg (has no administration in time range)   SUMAtriptan (IMITREX) tablet 25 mg (has no administration in time range)   fluticasone-vilanterol (BREO ELLIPTA) 200-25  MCG/ACT inhaler 1 puff (has no administration in time range)     And   umeclidinium (INCRUSE ELLIPTA) 62.5 MCG/ACT inhaler 1 puff (has no administration in time range)   ondansetron (ZOFRAN ODT) ODT tab 4 mg (has no administration in time range)     Or   ondansetron (ZOFRAN) injection 4 mg (has no administration in time range)   heparin ANTICOAGULANT injection 5,000 Units (has no administration in time range)   bisacodyl (DULCOLAX) suppository 10 mg (has no administration in time range)   polyethylene glycol (MIRALAX) Packet 17 g (has no administration in time range)   methocarbamol (ROBAXIN) tablet 500 mg (has no administration in time range)   sodium chloride 0.9% BOLUS 500 mL (0 mLs Intravenous Stopped 8/2/24 2137)   HYDROcodone-acetaminophen (NORCO) 5-325 MG per tablet 2 tablet (2 tablets Oral $Given 8/2/24 2048)   ondansetron (ZOFRAN) injection 4 mg (4 mg Intravenous $Given 8/2/24 2048)   potassium chloride (KLOR-CON) Packet 20 mEq (20 mEq Oral $Given 8/2/24 2136)   oxyCODONE (ROXICODONE) tablet 5 mg (5 mg Oral $Given 8/2/24 2158)   ondansetron (ZOFRAN) injection 4 mg (4 mg Intravenous $Given 8/2/24 2158)       Drips infusing:  Yes  For the majority of the shift this patient was Green.   Interventions performed were N/A.    Sepsis treatment initiated: No    Cares/treatment/interventions/medications to be completed following ED care: Pain control, stool sample    ED Nurse Name: Brooke Anderson RN  11:17 PM       RECEIVING UNIT ED HANDOFF REVIEW    Above ED Nurse Handoff Report was reviewed: Yes  Reviewed by: Debbie Gibbons RN on August 3, 2024 at 12:06 AM   I Melonie called the ED to inform them the note was read: No

## 2024-08-03 NOTE — PROGRESS NOTES
[facility]    Medicine Progress Note - Hospitalist Service    Date of Admission:  8/2/2024    Assessment & Plan   Trisha Bender is a 68-year-old female with a medical history of COPD, nonobstructive CAD, CHF (echo from April 2024 showed EF of 55-60%), takotsubo cardiomyopathy, RBBB, chronic sinus tachycardia, pulmonary hypertension, hypertension, hyperlipidemia, GERD, cholecystectomy, appendectomy, endometriosis s/p laparoscopic ablation, osteopenia, and migraine headaches, who was admitted 8/2/2024 with generalized weakness, diarrhea, poor oral intake, and acute on chronic abdominal pain.    Generalized weakness  Acute on chronic abdominal pain  Diarrhea  Poor oral intake and failure to thrive   Hyponatremia  Hypokalemia   Leukocytosis   The patient reports weakness, increased abdominal pain, diarrhea, and poor appetite. EKG shows prolonged QTc, likely due to abnormal electrolytes. Lab results reveal hypokalemia (K 2.8), hyponatremia (Na 130), hypochloremia (Cl 94), metabolic acidosis (CO2 20, anion gap 16), hypoalbuminemia (albumin 2.6, protein 4.8), and leukocytosis (WBC 15) with a negative UA. The diarrhea raises concerns for an infectious source, prompting stool studies.   She has not had a BM since admission and WBC has normalized. K remains mildly low.   -Cardiac monitoring   -RN managed potassium protocol   -NS with KCl 20 mEq/L at 100ml/hr     NICM  CHF   Hypertension   Hyperlipidemia   -Continue PTA Carvedilol 6.25 mg twice daily, and Atorvastatin     Advanced COPD   Chronic hypoxemic respiratory failure   -Continue PTA Trelegy Ellipta inhaler, Prednisone, Singulair, Roflumilast, and Azithromycin   -Duoneb as needed     Chronic pain syndrome  Chronic low back pain  -Continue PTA Robaxin  -PRN oxycodone instead of PTA percocet (patient uses a specific brand)    Left ischium pressure ulcer   -Wound team consult     GERD   -Continue PTA Pantoprazole      Observation Goals:  -diagnostic tests and consults completed and resulted, -vital signs normal or at patient baseline, -tolerating oral intake to maintain hydration, -returns to baseline functional status, -safe disposition plan has been identified, Nurse to notify provider when observation goals have been met and patient is ready for discharge.  Diet: Regular Diet Adult  Snacks/Supplements Adult: Ensure Enlive; With Meals    DVT Prophylaxis: Heparin SQ  Quiles Catheter: Not present  Lines: None     Cardiac Monitoring: None  Code Status: Full Code      Clinically Significant Risk Factors Present on Admission        # Hypokalemia: Lowest K = 2.8 mmol/L in last 2 days, will replace as needed       # Hypoalbuminemia: Lowest albumin = 2.4 g/dL at 8/3/2024  5:43 AM, will monitor as appropriate     # Hypertension: Noted on problem list  # Chronic heart failure with preserved ejection fraction: heart failure noted on problem list and last echo with EF >50%   # Anemia: based on hgb <11           # Financial/Environmental Concerns:    # Asthma: noted on problem list        Disposition Plan     Medically Ready for Discharge: Anticipated Tomorrow             Diamante Silva DO  Hospitalist Service  [facility]  Securely message with [employer] (more info)  Text page via [facility] Paging/Directory   ______________________________________________________________________    Interval History   No acute overnight events.  Has not had a BM. Ate some breakfast. K still low, getting replacement. Patient reports weakness and would like to stay another night. Will involve PT but likely discharge home tomorrow as she is likely at her baseline.     Physical Exam   Vital Signs: Temp: 98  F (36.7  C) Temp src: Oral BP: 131/69 Pulse: 100   Resp: 14 SpO2: 96 % O2 Device: None (Room air)    Weight: 153 lbs 14.1 oz    Constitutional: Awake, alert, no distress, and cooperative  Cardiovascular: Regular rate and rhythm, normal S1 and S2, no S3 or  S4, and no murmur noted  Respiratory: No increased work of breathing, good air exchange, clear to auscultation bilaterally, no crackles or wheezing  Gastrointestinal: Abdomen soft, non-tender, non-distended. BS normal. No masses, organomegaly     Medical Decision Making       45 MINUTES SPENT BY ME on the date of service doing chart review, history, exam, documentation & further activities per the note.      Data     I have personally reviewed the following data over the past 24 hrs:    10.9  \   10.1 (L)   / 655 (H)     134 (L) 100 3.6 (L) /  106 (H)   2.8 (L) 21 (L) 0.46 (L) \     ALT: <5 AST: <5 AP: 155 (H) TBILI: 0.2   ALB: 2.4 (L) TOT PROTEIN: 3.8 (L) LIPASE: 16     Trop: 72 (H) BNP: N/A     Procal: N/A CRP: N/A Lactic Acid: 1.1

## 2024-08-03 NOTE — PHARMACY-ADMISSION MEDICATION HISTORY
Pharmacist Admission Medication History    Admission medication history is complete. The information provided in this note is only as accurate as the sources available at the time of the update.    Information Source(s): Patient via in-person    Pertinent Information:      Changes made to PTA medication list:  Added: None  Deleted: lisinopril, spironolactone, doxycycline  Changed: Norco 10/325 (must be [employer] brand)    Allergies reviewed with patient and updates made in EHR: yes    Medication History Completed By: Tete Harding [facility] 8/2/2024 11:31 PM    PTA Med List   Medication Sig Last Dose    albuterol (VENTOLIN HFA) 108 (90 BASE) MCG/ACT Inhaler Inhale 2 puffs into the lungs every 6 hours as needed for shortness of breath / dyspnea or wheezing     ALPRAZolam (XANAX) 0.25 MG tablet Take 1 tablet (0.25 mg) by mouth 2 times daily as needed for anxiety     atorvastatin (LIPITOR) 10 MG tablet Take 10 mg by mouth at bedtime 8/1/2024 at hs    azithromycin (ZITHROMAX) 250 MG tablet Take 250 mg by mouth Every Mon, Wed, Fri Morning Past Week    carvedilol (COREG) 6.25 MG tablet Take 1 tablet (6.25 mg) by mouth 2 times daily (with meals) 8/2/2024 at am    cetirizine (ZYRTEC) 10 MG tablet Take 10 mg by mouth at bedtime 8/1/2024 at hs    erythromycin (ROMYCIN) 5 MG/GM ophthalmic ointment Place into both eyes 2 times daily 8/2/2024 at am    fluticasone (FLONASE) 50 MCG/ACT nasal spray Spray 1-2 sprays into both nostrils daily as needed for rhinitis or allergies     HYDROcodone-acetaminophen (NORCO)  MG per tablet Take 2 tablets by mouth every 4 hours as needed for severe pain     ipratropium - albuterol 0.5 mg/2.5 mg/3 mL (DUONEB) 0.5-2.5 (3) MG/3ML neb solution Take 1 vial by nebulization every 6 hours as needed for shortness of breath, wheezing or cough     methocarbamol (ROBAXIN) 500 MG tablet Take 500 mg by mouth 4 times daily as needed for muscle spasms     montelukast (SINGULAIR) 10 MG tablet TAKE ONE  TABLET BY MOUTH AT BEDTIME     Multiple Vitamin CHEW Take 2 tablets by mouth at bedtime 8/1/2024 at hs    naloxone (NARCAN) 4 MG/0.1ML nasal spray Spray 1 spray (4 mg) into one nostril alternating nostrils once as needed for opioid reversal Every 2-3 minutes until patient responsive or EMS arrives     ondansetron (ZOFRAN ODT) 4 MG ODT tab Take 1 tablet (4 mg) by mouth every 6 hours as needed for nausea or vomiting     pantoprazole (PROTONIX) 40 MG EC tablet Take 40 mg by mouth at bedtime 8/1/2024 at hs    predniSONE (DELTASONE) 5 MG tablet Take 10 mg by mouth daily 8/2/2024 at am    predniSONE (DELTASONE) 5 MG tablet Take 1 tablet by mouth every evening 8/1/2024 at pm    roflumilast (DALIRESP) 500 MCG TABS tablet Take 500 mcg by mouth at bedtime 8/1/2024 at hs    spironolactone (ALDACTONE) 25 MG tablet Take 0.5 tablets (12.5 mg) by mouth daily 8/2/2024 at sm    SUMAtriptan (IMITREX) 25 MG tablet Take 1 tablet (25 mg) by mouth at onset of headache for migraine     TRELEGY ELLIPTA 200-62.5-25 MCG/ACT oral inhaler Inhale 1 puff into the lungs at bedtime 8/1/2024 at hs

## 2024-08-03 NOTE — PLAN OF CARE
"End of Shift Summary  For vital signs and complete assessments, please see documentation flowsheets.     Pertinent assessments:   Pt A&Ox4. Not out of bed, refusing activity. Repositions self in bed. C/o chronic low back pain, oxy given w/ some relief, aqua k applied. Nausea, zofran and compazine given w/ relief. Voiding adequate amounts into purewick. No bm, still awaiting stool sample. Dressings to buttock CDI, coccyx blanchable.     Major Shift Events: potassium replaced.     Treatment Plan: IVF, potassium protocol. symptom management. PT eval & hopeful discharge tomorrow.      Problem: Adult Inpatient Plan of Care  Goal: Plan of Care Review  Description: The Plan of Care Review/Shift note should be completed every shift.  The Outcome Evaluation is a brief statement about your assessment that the patient is improving, declining, or no change.  This information will be displayed automatically on your shift  note.  Outcome: Progressing  Flowsheets (Taken 8/3/2024 6538)  Outcome Evaluation: VSS. nausea and pain well managed. K replaced.  Overall Patient Progress: no change  Goal: Patient-Specific Goal (Individualized)  Description: You can add care plan individualizations to a care plan. Examples of Individualization might be:  \"Parent requests to be called daily at 9am for status\", \"I have a hard time hearing out of my right ear\", or \"Do not touch me to wake me up as it startles  me\".  Outcome: Progressing  Goal: Absence of Hospital-Acquired Illness or Injury  Outcome: Progressing  Intervention: Identify and Manage Fall Risk  Recent Flowsheet Documentation  Taken 8/3/2024 0751 by Susanna Izaguirre RN  Safety Promotion/Fall Prevention: safety round/check completed  Intervention: Prevent Skin Injury  Recent Flowsheet Documentation  Taken 8/3/2024 0751 by Susanna Izaguirre RN  Body Position:   weight shifting   legs elevated   heels elevated  Goal: Optimal Comfort and Wellbeing  Outcome: Progressing  Intervention: " Monitor Pain and Promote Comfort  Recent Flowsheet Documentation  Taken 8/3/2024 0751 by Susanna Izaguirre RN  Pain Management Interventions:   medication (see MAR)   repositioned  Goal: Readiness for Transition of Care  Outcome: Progressing     Problem: Skin Injury Risk Increased  Goal: Skin Health and Integrity  Outcome: Progressing  Intervention: Plan: Nurse Driven Intervention: Moisture Management  Recent Flowsheet Documentation  Taken 8/3/2024 0751 by Susanna Izaguirre RN  Moisture Interventions:   No brief in bed   Urinary collection device  Intervention: Plan: Nurse Driven Intervention: Friction and Shear  Recent Flowsheet Documentation  Taken 8/3/2024 0751 by Susanna Izaguirre RN  Friction/Shear Interventions: HOB 30 degrees or less  Intervention: Optimize Skin Protection  Recent Flowsheet Documentation  Taken 8/3/2024 0751 by Susanna Izaguirre RN  Head of Bed (HOB) Positioning: HOB at 20-30 degrees   Goal Outcome Evaluation:           Overall Patient Progress: no changeOverall Patient Progress: no change    Outcome Evaluation: VSS. nausea and pain well managed. K replaced.

## 2024-08-04 NOTE — PLAN OF CARE
PRIMARY DIAGNOSIS: ELECTROLYTE IMBALANCE, N/V, DIARRHEA  OUTPATIENT/OBSERVATION GOALS TO BE MET BEFORE DISCHARGE:  ADLs back to baseline: No    Activity and level of assistance: Ax2 with lift/renetta steady- refuses to get OOB    Pain status: Improved-controlled with oral pain medications.    Return to near baseline physical activity: No     Discharge Planner Nurse   Safe discharge environment identified: Yes  Barriers to discharge: No       Entered by: Rebeca Weaver RN 08/04/2024 1:29 AM     Please review provider order for any additional goals.   Nurse to notify provider when observation goals have been met and patient is ready for discharge.    Assumed care [id number]. A&Ox4. Ax2 with a lift/renetta steady for transfers. Refuses to get OOB. On RA. On a regular diet but reports a decreased appetite. PIV in R arm infusing NS +  mL/hr. Purewick in place draining yellow colored urine adequately. On telemetry- SR/ST w/BBB & PVCs. C/O severe lower back pain. Given PRN Oxycodone with reports of minimal relief. No BM overnight. Given a PRN neb x1 for reports of SOB. Given PRN Xanax x1 overnight for reports of increased anxiety with reports of relief. Refused to let writer change wound dressings on bottom. Hopeful discharge home today.     Goal Outcome Evaluation:      Plan of Care Reviewed With: patient    Overall Patient Progress: no changeOverall Patient Progress: no change    Outcome Evaluation: A&Ox4, VSS, tele, refused to get OOB, pain management    Problem: Adult Inpatient Plan of Care  Goal: Optimal Comfort and Wellbeing  8/4/2024 0126 by Rebeca Weaver RN  Outcome: Not Progressing  8/4/2024 0126 by Rebeca Weaver RN  Outcome: Not Progressing  Intervention: Monitor Pain and Promote Comfort  Recent Flowsheet Documentation  Taken 8/4/2024 0052 by Rebeca Weaver, RN  Pain Management Interventions: medication (see MAR)  Taken 8/3/2024 9577 by Rebeca Weaver RN  Pain Management Interventions: rest  Taken  8/3/2024 2245 by Rebeca Weaver RN  Pain Management Interventions: medication (see MAR)  Taken 8/3/2024 2118 by Rebeca Weaver RN  Pain Management Interventions: rest  Taken 8/3/2024 2033 by Rebeca Weaver RN  Pain Management Interventions: medication (see MAR)     Problem: Skin Injury Risk Increased  Goal: Skin Health and Integrity  Outcome: Not Progressing  Intervention: Plan: Nurse Driven Intervention: Moisture Management  Recent Flowsheet Documentation  Taken 8/3/2024 2033 by Rebeca Weaver RN  Moisture Interventions:   Encourage regular toileting   No brief in bed   Incontinence pad   Urinary collection device  Intervention: Plan: Nurse Driven Intervention: Friction and Shear  Recent Flowsheet Documentation  Taken 8/3/2024 2033 by Rebeca Weaver RN  Friction/Shear Interventions:   HOB 30 degrees or less   Assistive lifting device (portable/ceiling lift, etc.)  Intervention: Optimize Skin Protection  Recent Flowsheet Documentation  Taken 8/3/2024 2347 by Rebeca Weaver RN  Head of Bed (HOB) Positioning: HOB at 20-30 degrees  Taken 8/3/2024 2033 by Rebeca Weaver RN  Activity Management: activity adjusted per tolerance  Head of Bed (HOB) Positioning: HOB at 20-30 degrees     Problem: Pain Chronic (Persistent)  Goal: Optimal Pain Control and Function  Outcome: Not Progressing  Intervention: Develop Pain Management Plan  Recent Flowsheet Documentation  Taken 8/4/2024 0052 by Rebeca Weaver RN  Pain Management Interventions: medication (see MAR)  Taken 8/3/2024 2347 by Rebeca Weaver RN  Pain Management Interventions: rest  Taken 8/3/2024 2245 by Rebeca Weaver RN  Pain Management Interventions: medication (see MAR)  Taken 8/3/2024 2118 by Rebeca Weaver RN  Pain Management Interventions: rest  Taken 8/3/2024 2033 by Rebeca Weaver RN  Pain Management Interventions: medication (see MAR)  Intervention: Manage Persistent Pain  Recent Flowsheet Documentation  Taken 8/3/2024 2033 by  "Rebeca Weaver RN  Medication Review/Management: medications reviewed     Problem: Adult Inpatient Plan of Care  Goal: Plan of Care Review  Description: The Plan of Care Review/Shift note should be completed every shift.  The Outcome Evaluation is a brief statement about your assessment that the patient is improving, declining, or no change.  This information will be displayed automatically on your shift  note.  Outcome: Progressing  Flowsheets (Taken 8/4/2024 0126)  Outcome Evaluation: A&Ox4, VSS, tele, refused to get OOB, pain management  Plan of Care Reviewed With: patient  Overall Patient Progress: no change  Goal: Patient-Specific Goal (Individualized)  Description: You can add care plan individualizations to a care plan. Examples of Individualization might be:  \"Parent requests to be called daily at 9am for status\", \"I have a hard time hearing out of my right ear\", or \"Do not touch me to wake me up as it startles  me\".  Outcome: Progressing  Goal: Absence of Hospital-Acquired Illness or Injury  Outcome: Progressing  Intervention: Identify and Manage Fall Risk  Recent Flowsheet Documentation  Taken 8/4/2024 0054 by Rebeca Weaver, RN  Safety Promotion/Fall Prevention: safety round/check completed  Taken 8/3/2024 2033 by Rebeca Weaver RN  Safety Promotion/Fall Prevention:   activity supervised   lighting adjusted   room near nurse's station   safety round/check completed  Intervention: Prevent Skin Injury  Recent Flowsheet Documentation  Taken 8/3/2024 2347 by Rebeca Weaver, RN  Body Position: weight shifting  Taken 8/3/2024 2033 by Rebeca Weaver RN  Body Position:   refuses positioning   position maintained   supine, head elevated   legs elevated  Intervention: Prevent and Manage VTE (Venous Thromboembolism) Risk  Recent Flowsheet Documentation  Taken 8/3/2024 2033 by Rebeca Weaver, RN  VTE Prevention/Management: (Heparin SQ) SCDs off (sequential compression devices)  Intervention: Prevent " Infection  Recent Flowsheet Documentation  Taken 8/3/2024 2033 by Rebeca Weaver, RN  Infection Prevention:   equipment surfaces disinfected   hand hygiene promoted   personal protective equipment utilized   rest/sleep promoted   single patient room provided  Goal: Readiness for Transition of Care  Outcome: Progressing     Problem: Comorbidity Management  Goal: Maintenance of Asthma Control  Outcome: Progressing  Intervention: Maintain Asthma Symptom Control  Recent Flowsheet Documentation  Taken 8/3/2024 2033 by Rebeca Weaver, RN  Medication Review/Management: medications reviewed  Goal: Maintenance of COPD Symptom Control  Outcome: Progressing  Intervention: Maintain COPD Symptom Control  Recent Flowsheet Documentation  Taken 8/3/2024 2033 by Rebeca Weaver, RN  Medication Review/Management: medications reviewed  Goal: Maintenance of Heart Failure Symptom Control  Outcome: Progressing  Intervention: Maintain Heart Failure Management  Recent Flowsheet Documentation  Taken 8/3/2024 2033 by Rebeca Weaver, RN  Medication Review/Management: medications reviewed     Problem: Electrolyte Imbalance  Goal: Electrolyte Balance  Outcome: Progressing

## 2024-08-04 NOTE — PROGRESS NOTES
08/04/24 0974   Appointment Info   Signing Clinician's Name / Credentials (PT) WU Chung   Student Supervision Direct supervision provided;Direct Patient Contact Provided;Therapy services provided with the co-signing licensed therapist guiding and directing the services, and providing the skilled judgement and assessment throughout the session   Quick Adds   Quick Adds Certification   Living Environment   People in Home spouse   Current Living Arrangements house   Home Accessibility no concerns   Transportation Anticipated agency   Living Environment Comments Pt currently temporarily living alone,  is hospitalized.   Self-Care   Usual Activity Tolerance fair   Regular Exercise Yes   Activity/Exercise Type strength training  (leg and arm exercises and some walking with physical therapy)   Exercise Amount/Frequency daily  (exercises throughout day every day)   Equipment Currently Used at Home raised toilet seat;shower chair;wheelchair, manual;other (see comments);walker, rolling;walker, standard  (stair lift, has push chair but manual WC is coming soon. pt is also getting adjustable bed and has bed rails but they are not installed yet.)   Fall history within last six months yes   Number of times patient has fallen within last six months 1   Activity/Exercise/Self-Care Comment Pt recieves assistance with bathing, occasionally medication management, grocery shopping, occasional assistance for transfers, and housekeeping. PCA assists at night, sometimes sleeps over, works 15 hours a day every day. Pt also has a home health aid, nurse, and just recently finished HHPT services.   General Information   Onset of Illness/Injury or Date of Surgery 08/02/24   Referring Physician Diamante Silva, DO   Patient/Family Therapy Goals Statement (PT) Return home   Pertinent History of Current Problem (include personal factors and/or comorbidities that impact the POC) Per pt chart: Trisha Bender is a  68 year old female who has a history of CHF COPD colitis and recent admission for acute on chronic abdominal pain with poor p.o. intake.  The patient was admitted to the hospital July 12 through 23, 2024.  She reports that after leaving the hospital she had nausea trouble taking p.o. and developed diarrhea.  She said the stools have not been bloody or mucousy.  The patient said she has had some abdominal pain for which she takes chronic pain medication.  The patient said with her decreased appetite and decreased p.o. intake she felt weak and a hard time getting around was concerned about electrolyte problems infection and therefore presented to the ER tonight.   Existing Precautions/Restrictions fall   Cognition   Affect/Mental Status (Cognition) anxious;sad/depressed affect;agitated   Orientation Status (Cognition) oriented x 4   Follows Commands (Cognition) WNL   Behavioral Issues difficulty managing stress;overwhelmed easily   Cognitive Status Comments Pt demos heightened anxiety and fear of movement, becomes tearful after expressing frustrations/aggitation stating she has a lot going on right now.   Pain Assessment   Patient Currently in Pain Yes, see Vital Sign flowsheet  (Pain in low back and ribs)   Integumentary/Edema   Integumentary/Edema other (describe)   Integumentary/Edema Comments light redness on mid back, L side   Posture    Posture Forward head position   Range of Motion (ROM)   Range of Motion ROM deficits secondary to pain;ROM deficits secondary to weakness   Strength (Manual Muscle Testing)   Strength (Manual Muscle Testing) Deficits observed during functional mobility   Bed Mobility   Comment, (Bed Mobility) Pt SBA w/ use of bedrails for supine<>sit   Transfers   Comment, (Transfers) Min-mod Ax1 for sit<>stand with UE support at wheelchair arms/bed rails   Gait/Stairs (Locomotion)   Comment, (Gait/Stairs) not assessed d/t non-ambulatory at baseline, able to take 2-3 small steps for SPT   Balance    Balance other (describe)   Balance Comments Pt at risk for falls d/t LE weakness   Sensory Examination   Sensory Perception patient reports no sensory changes   Coordination   Coordination no deficits were identified   Muscle Tone   Muscle Tone no deficits were identified   Clinical Impression   Criteria for Skilled Therapeutic Intervention Yes, treatment indicated   PT Diagnosis (PT) Impaired functional mobility   Influenced by the following impairments pain, decreased tolerance to activity, balance, ROM, strength, IND w/ mobility   Functional limitations due to impairments bed mobility, transfers, amb   Clinical Presentation (PT Evaluation Complexity) stable   Clinical Presentation Rationale clinical judgement   Clinical Decision Making (Complexity) low complexity   Planned Therapy Interventions (PT) balance training;bed mobility training;gait training;home exercise program;patient/family education;strengthening;transfer training;progressive activity/exercise   Risk & Benefits of therapy have been explained evaluation/treatment results reviewed;care plan/treatment goals reviewed;risks/benefits reviewed;current/potential barriers reviewed;participants voiced agreement with care plan;participants included;patient   PT Total Evaluation Time   PT Eval, Low Complexity Minutes (63859) 12   Physical Therapy Goals   PT Frequency One time eval and treatment only   PT Predicted Duration/Target Date for Goal Attainment 08/04/24   PT Goals Bed Mobility;Transfers   PT: Bed Mobility Supervision/stand-by assist;Supine to/from sit;Rolling;Bridging;Goal Met   PT: Transfers Minimal assist;Sit to/from stand;Bed to/from chair;Goal Met   Interventions   Interventions Quick Adds Therapeutic Activity   Therapeutic Activity   Therapeutic Activities: dynamic activities to improve functional performance Minutes (53357) 53   Symptoms Noted During/After Treatment Increased pain;Shortness of breath;Fatigue   Treatment Detail/Skilled  Intervention Pt greeted in supine, agreeable to some therapy but self limiting d/t pain and anxiety. Throughout appointment, increased time spent for therapeutic listening, equipment retrieval, and d/t pt being tangential. Facilitated supine<>sit w/ use of bedrail, pt demonstrates increased anxiety with movement and fear of raising HR during activity. Pt reporting SOB while sitting at EOB, VSS with HR around 117, reduced to 114 with breathing activities. Pt cued for SPT into WC w/ min Ax1, requires constant reassurance of safety before and during movement. Facilitated scooting to edge of WC IND, pt only able to scoot slight bits at a time.  SPT returning to bed w/ min Ax2 d/t fatigue from first transfer and pt fear of standing from lower surface. Pt reports that the hospital WC is much lower than the one she has available at home. Pt returned to supine in bed, reporting significant fatigue and pain in ribs. Bed alarm active, all needs within reach.   PT Discharge Planning   PT Plan PT D/C   PT Discharge Recommendation (DC Rec) home with home care physical therapy   PT Rationale for DC Rec Pt functional mobility similar to baseline, pt will have multiple people available for assistance throughout day with sister coming to visit for a week and PCA assist additionally as needed (up to 15 hours per day). Pt will require Ax1-2 for transfers to/from car and rolling chair at home. Recommending continuation of HHPT for increasing strength, tolerance to activity, balance, and IND with mobility.   PT Brief overview of current status Ax2 for transfers with nursing, WC for mobility   Total Session Time   Timed Code Treatment Minutes 53   Total Session Time (sum of timed and untimed services) 65   Psychosocial Support   Trust Relationship/Rapport emotional support provided;empathic listening provided;reassurance provided;thoughts/feelings acknowledged

## 2024-08-04 NOTE — PLAN OF CARE
"End of Shift Summary  For vital signs and complete assessments, please see documentation flowsheets.     Pertinent assessments:   Pt A&Ox4. Not out of bed with nursing staff, refusing activity. PT eval completed w/ pivot to wheelchair. Repositions self in bed. C/o chronic low back pain, oxy given w/ some relief, aqua k applied. Denies nausea. Poor appetite. Voiding adequate amounts into purewick. No bm. Passing gas. Dressings to buttock CDI, coccyx blanchable. Refusing dressings to be changed.       Problem: Adult Inpatient Plan of Care  Goal: Plan of Care Review  Description: The Plan of Care Review/Shift note should be completed every shift.  The Outcome Evaluation is a brief statement about your assessment that the patient is improving, declining, or no change.  This information will be displayed automatically on your shift  note.  Outcome: Adequate for Care Transition  Flowsheets (Taken 8/4/2024 1306)  Outcome Evaluation: VSS. Denies nausea. No diarrhea. pain well controlled.  Goal: Patient-Specific Goal (Individualized)  Description: You can add care plan individualizations to a care plan. Examples of Individualization might be:  \"Parent requests to be called daily at 9am for status\", \"I have a hard time hearing out of my right ear\", or \"Do not touch me to wake me up as it startles  me\".  Outcome: Adequate for Care Transition  Goal: Absence of Hospital-Acquired Illness or Injury  Outcome: Adequate for Care Transition  Intervention: Identify and Manage Fall Risk  Recent Flowsheet Documentation  Taken 8/4/2024 0853 by Susanna Izaguirre RN  Safety Promotion/Fall Prevention: safety round/check completed  Intervention: Prevent Skin Injury  Recent Flowsheet Documentation  Taken 8/4/2024 0853 by Susanna Iazguirre RN  Body Position: weight shifting  Goal: Optimal Comfort and Wellbeing  Outcome: Adequate for Care Transition  Intervention: Monitor Pain and Promote Comfort  Recent Flowsheet Documentation  Taken 8/4/2024 " 0853 by Susanna Izaguirre RN  Pain Management Interventions: rest  Goal: Readiness for Transition of Care  Outcome: Adequate for Care Transition     Problem: Skin Injury Risk Increased  Goal: Skin Health and Integrity  Outcome: Adequate for Care Transition  Intervention: Plan: Nurse Driven Intervention: Moisture Management  Recent Flowsheet Documentation  Taken 8/4/2024 0853 by Susanna Izaguirre RN  Moisture Interventions:   Encourage regular toileting   Urinary collection device   Incontinence pad  Intervention: Plan: Nurse Driven Intervention: Friction and Shear  Recent Flowsheet Documentation  Taken 8/4/2024 0853 by Susanna Izaguirre RN  Friction/Shear Interventions: HOB 30 degrees or less  Intervention: Optimize Skin Protection  Recent Flowsheet Documentation  Taken 8/4/2024 0853 by Susanna Izaguirre RN  Head of Bed (HOB) Positioning: HOB at 20-30 degrees     Problem: Comorbidity Management  Goal: Maintenance of Asthma Control  Outcome: Adequate for Care Transition  Intervention: Maintain Asthma Symptom Control  Recent Flowsheet Documentation  Taken 8/4/2024 0853 by Susanna Izaguirre RN  Medication Review/Management: medications reviewed  Goal: Maintenance of COPD Symptom Control  Outcome: Adequate for Care Transition  Intervention: Maintain COPD Symptom Control  Recent Flowsheet Documentation  Taken 8/4/2024 0853 by Susanna Izaguirre RN  Medication Review/Management: medications reviewed  Goal: Maintenance of Heart Failure Symptom Control  Outcome: Adequate for Care Transition  Intervention: Maintain Heart Failure Management  Recent Flowsheet Documentation  Taken 8/4/2024 0853 by Susanna Izaguirre RN  Medication Review/Management: medications reviewed     Problem: Electrolyte Imbalance  Goal: Electrolyte Balance  Outcome: Adequate for Care Transition     Problem: Pain Chronic (Persistent)  Goal: Optimal Pain Control and Function  Outcome: Adequate for Care Transition  Intervention: Develop Pain  Management Plan  Recent Flowsheet Documentation  Taken 8/4/2024 0853 by Susanna Izaguirre, RN  Pain Management Interventions: rest  Intervention: Manage Persistent Pain  Recent Flowsheet Documentation  Taken 8/4/2024 0853 by Susanna Izaguirre, RN  Medication Review/Management: medications reviewed   Goal Outcome Evaluation:           Overall Patient Progress: no changeOverall Patient Progress: no change    Outcome Evaluation: VSS. Denies nausea. No diarrhea. pain well controlled.

## 2024-08-04 NOTE — PROGRESS NOTES
Discharge Note    Patient discharged to home via EMS/BLS accompanied by other EMS .  IV: Discontinued  Prescriptions N/A.   Belongings reviewed and sent with patient.   Home medications returned to patient: NA  Equipment sent with: N/A.   patient verbalizes understanding of discharge instructions. AVS given to patient.

## 2024-08-04 NOTE — CONSULTS
Care Management Initial Consult    General Information  Assessment completed with: Patient,    Type of CM/SW Visit: Offer D/C Planning    Primary Care Provider verified and updated as needed:     Readmission within the last 30 days:           Advance Care Planning: Advance Care Planning Reviewed: present on chart          Communication Assessment  Patient's communication style: spoken language (English or Bilingual)    Hearing Difficulty or Deaf: no        Cognitive  Cognitive/Neuro/Behavioral: WDL                      Living Environment:   People in home: spouse     Current living Arrangements: house      Able to return to prior arrangements: yes       Family/Social Support:  Care provided by: self, spouse/significant other, homecare agency, other (see comments) (PCA > 15hrs day)  Provides care for: no one, unable/limited ability to care for self  Marital Status:              Description of Support System:           Current Resources:   Patient receiving home care services: Yes  Skilled Home Care Services: Skilled Nursing, Home Health Aid, Physical Therapy, Occupational Therapy ()  Community Resources: Home Care, Housekeeping/Chore Agency, PCA  Equipment currently used at home: raised toilet seat, shower chair, wheelchair, manual, other (see comments), walker, rolling (new WC being built, stair lift)  Supplies currently used at home:      Employment/Financial:  Employment Status:          Financial Concerns:             Does the patient's insurance plan have a 3 day qualifying hospital stay waiver?  Yes     Which insurance plan 3 day waiver is available? Alternative insurance waiver    Will the waiver be used for post-acute placement? No    Lifestyle & Psychosocial Needs:  Social Determinants of Health     Food Insecurity: Low Risk  (11/11/2023)    Food Insecurity     Within the past 12 months, did you worry that your food would run out before you got money to buy more?: No     Within the past  12 months, did the food you bought just not last and you didn t have money to get more?: No   Depression: Not at risk (4/22/2024)    PHQ-2     PHQ-2 Score: 2   Housing Stability: Low Risk  (11/11/2023)    Housing Stability     Do you have housing? : Yes     Are you worried about losing your housing?: No   Tobacco Use: Medium Risk (7/2/2024)    Patient History     Smoking Tobacco Use: Former     Smokeless Tobacco Use: Never     Passive Exposure: Not on file   Financial Resource Strain: Low Risk  (11/11/2023)    Financial Resource Strain     Within the past 12 months, have you or your family members you live with been unable to get utilities (heat, electricity) when it was really needed?: No   Alcohol Use: Not on file   Transportation Needs: Low Risk  (11/11/2023)    Transportation Needs     Within the past 12 months, has lack of transportation kept you from medical appointments, getting your medicines, non-medical meetings or appointments, work, or from getting things that you need?: No   Physical Activity: Not on file   Interpersonal Safety: Low Risk  (10/5/2023)    Interpersonal Safety     Do you feel physically and emotionally safe where you currently live?: Yes     Within the past 12 months, have you been hit, slapped, kicked or otherwise physically hurt by someone?: No     Within the past 12 months, have you been humiliated or emotionally abused in other ways by your partner or ex-partner?: Not on file   Stress: Not on file   Social Connections: Not on file   Health Literacy: Not on file       Functional Status:  Prior to admission patient needed assistance:   Dependent ADLs:: Bathing, Dressing, Eating, Grooming, Positioning, Transfers, Wheelchair-with assist, Toileting  Dependent IADLs:: Transportation, Money Management, Shopping, Laundry, Cooking, Cleaning       Care Management Discharge Note    Discharge Date: 08/04/2024       Discharge Disposition: Home, Home Care    Discharge Services:      Discharge DME:  Wheelchair, Walker, Commode    Discharge Transportation: agency    Private pay costs discussed: transportation costs    Patient/family educated on Medicare website which has current facility and service quality ratings: yes    Education Provided on the Discharge Plan:  yes  Persons Notified of Discharge Plans: patient, home care agencies  Patient/Family in Agreement with the Plan:  yes    Handoff Referral Completed: Yes    Additional Information:  URR 30% Admitted with generalized weakness and poor po intake.  CM met with the pt to discuss her baseline status and discharge planning.  Pt was resting in bed when CM arrived.  Pt was in a pleasant mood and contributed appropriately to the conversation.  Pt states that she lives at home with her spouse who is currently hospitalized at [facility].  She has [facility] HC RN/OT/HHA services.  She also has a PCA for 15 plus hours/day.  Her PCA lives across the street and is readily available to assist. Her sister is currently in town visiting as well.  Pt reports that she injured her back awhile ago which has limited her current mobility.    She now is a pivot for transfers.  She said that she has a commode near her bed, so she can pivot from her bed to the commode.  Pt has a walker that she uses.  They have a stair lift in their home that she said she can pivot to.  She is having a new specialized manual WC built and hopes to have this soon.  She requested  [facility] w/c transport for discharge.  She states this worked well last time and will have her son or PCA there to assist getting into the home. They do have a ramp in the garage.    CM will send the pt's discharge orders to [facility] via [facility] for resumption of services.    CM will continue to be available as needed until discharge. Patient is hopeful to go home today and is currently working with PT    [facility] transport set for today [id number]    Charlotte Muhammad RN BSN OCN  Care Coordinator  [facility] [place]  [facility]  [phone]      Charlotte Muhammad RN

## 2024-08-04 NOTE — PLAN OF CARE
Physical Therapy Discharge Summary    Reason for therapy discharge:    Discharged to home with home therapy.    Progress towards therapy goal(s). See goals on Care Plan in [facility] electronic health record for goal details.  Goals partially met.  Barriers to achieving goals:   limited tolerance for therapy, discharge from facility, and discharge on same date as initial evaluation.    Therapy recommendation(s):    Continued therapy is recommended.  Rationale/Recommendations:  Recommending HHPT for increasing tolerance to activity, strength, and IND with activity. Pt previously had HHPT services which ended last week, is requesting the same therapist if possible.

## 2024-08-04 NOTE — DISCHARGE SUMMARY
[facility]  Hospitalist Discharge Summary      Date of Admission:  8/2/2024  Date of Discharge:  8/4/2024  Discharging Provider: Diamante Silva DO  Discharge Service: Hospitalist Service    Discharge Diagnoses   Generalized weakness  Acute on chronic abdominal pain  Diarrhea  Poor oral intake and failure to thrive   Hyponatremia  Hypokalemia   Leukocytosis   NICM  CHF   Hypertension   Hyperlipidemia   Advanced COPD   Chronic hypoxemic respiratory failure   Chronic pain syndrome  Chronic low back pain  Left ischium pressure ulcer   GERD     Clinically Significant Risk Factors          Follow-ups Needed After Discharge   Follow-up Appointments     Follow-up and recommended labs and tests       Follow up with primary care provider, Bandar Weinberg, within 7 days   for hospital follow- up.  The following labs/tests are recommended: BMP.            Discharge Disposition   Discharged to home  Condition at discharge: Stable    Hospital Course   Trisha Bender is a 68-year-old female with a medical history of COPD, nonobstructive CAD, CHF (echo from April 2024 showed EF of 55-60%), takotsubo cardiomyopathy, RBBB, chronic sinus tachycardia, pulmonary hypertension, hypertension, hyperlipidemia, GERD, cholecystectomy, appendectomy, endometriosis s/p laparoscopic ablation, osteopenia, and migraine headaches, who was admitted 8/2/2024 with generalized weakness, diarrhea, poor oral intake, and acute on chronic abdominal pain. Workup notable for electrolyte abnormalities, likely 2/2 diarrhea and poor oral intake. No further episodes of diarrhea while admitted. Electrolytes normalized with repletion.     Generalized weakness  Acute on chronic abdominal pain  Diarrhea  Poor oral intake and failure to thrive   Hyponatremia  Hypokalemia   Leukocytosis   The patient reports weakness, increased abdominal pain, diarrhea, and poor appetite. EKG shows prolonged QTc, likely due to abnormal electrolytes.  Lab results reveal hypokalemia (K 2.8), hyponatremia (Na 130), hypochloremia (Cl 94), metabolic acidosis (CO2 20, anion gap 16), hypoalbuminemia (albumin 2.6, protein 4.8), and leukocytosis (WBC 15) with a negative UA.   No BM since presentation and WBC normalized without intervention. Electrolytes normalized with replacement and IVF.      NICM  CHF   Hypertension   Hyperlipidemia   -Continue PTA Carvedilol 6.25 mg twice daily, and Atorvastatin     Advanced COPD   Chronic hypoxemic respiratory failure   -Continue PTA Trelegy Ellipta inhaler, Prednisone, Singulair, Roflumilast, and Azithromycin     Chronic pain syndrome  Chronic low back pain  -Continue PTA Robaxin and percocet    Left ischium pressure ulcer, present on admission  - WOC consulted during admission    GERD   -Continue PTA Pantoprazole     Consultations This Hospital Stay   PHARMACY IP CONSULT  WOUND OSTOMY CONTINENCE NURSE  IP CONSULT  PHYSICAL THERAPY ADULT IP CONSULT  CARE MANAGEMENT / SOCIAL WORK IP CONSULT    Code Status   Full Code    Time Spent on this Encounter   I, Diamante Silva DO, personally saw the patient today and spent greater than 30 minutes discharging this patient.       Diamante Silva DO  [place] [address] [facility]  [address] [place] [address]  Phone: [phone]  Fax: [phone]  ______________________________________________________________________    Physical Exam   Vital Signs: Temp: 98.5  F (36.9  C) Temp src: Oral BP: 117/67 Pulse: 107   Resp: 20 SpO2: 93 % O2 Device: None (Room air)    Weight: 153 lbs 14.1 oz  Constitutional: Awake, alert, no distress, and cooperative  Cardiovascular: Regular rate and rhythm, normal S1 and S2, no S3 or S4, and no murmur noted  Respiratory: No increased work of breathing, good air exchange, clear to auscultation bilaterally, no crackles or wheezing  Gastrointestinal: Abdomen soft, non-tender, non-distended. BS normal. No masses, organomegaly         Primary Care Physician   Bandar Weinberg    Discharge Orders      Home Care Referral      Primary Care - Care Coordination Referral      Reason for your hospital stay    You were in the hospital for low electrolytes due to diarrhea. Your electrolytes normalized with replacement. The diarrhea resolved by the time you got to the hospital so we do not know the cause.     Follow-up and recommended labs and tests     Follow up with primary care provider, Bandar Weinberg, within 7 days for hospital follow- up.  The following labs/tests are recommended: BMP.     Activity    Your activity upon discharge: activity as tolerated     Diet    Follow this diet upon discharge: Orders Placed This Encounter      Snacks/Supplements Adult: Ensure Enlive; With Meals      Regular Diet Adult       Significant Results and Procedures   Most Recent 3 CBC's:  Recent Labs   Lab Test 08/03/24  0543 08/02/24  1846 07/13/24  0633   WBC 10.9 15.7* 10.6   HGB 10.1* 11.8 10.4*   MCV 83 83 86   * 726* 383     Most Recent 3 BMP's:  Recent Labs   Lab Test 08/04/24  0736 08/03/24  1616 08/03/24  0543 08/02/24  1846 07/13/24  0633   NA  --   --  134* 130* 134*   POTASSIUM 3.4 3.5 2.8* 2.8* 3.9   CHLORIDE  --   --  100 94* 100   CO2  --   --  21* 20* 20*   BUN  --   --  3.6* 4.5* 3.0*   CR  --   --  0.46* 0.49* 0.50*   ANIONGAP  --   --  13 16* 14   JEANNIE  --   --  7.4* 7.8* 8.1*   GLC  --   --  106* 114* 160*   ,   Results for orders placed or performed during the hospital encounter of 07/12/24   CT Chest (PE) Abdomen Pelvis w Contrast    Narrative    EXAM: CT CHEST PE ABDOMEN PELVIS W CONTRAST  LOCATION: [facility]  DATE: 07/13/2024    INDICATION: COPD, CHF, hypoxia, worsening CANNON, and also constipation and abdominal pain.  COMPARISON: CT abdomen and pelvis on 05/5/2024.  TECHNIQUE: CT chest pulmonary angiogram and routine CT abdomen pelvis with IV contrast. Arterial phase through the chest and venous phase through  the abdomen and pelvis. Multiplanar reformats and MIP reconstructions were performed. Dose reduction   techniques were used.   CONTRAST: 84 mL Isovue 370.    FINDINGS:  ANGIOGRAM CHEST: No evidence of pulmonary embolism. No evidence for right heart strain. No evidence of thoracic aortic aneurysm or dissection.    MEDIASTINUM/AXILLAE: Diffuse heterogenous thyroid parenchyma. No cardiomegaly or significant pericardial effusion. No significant mediastinal, hilar, or axillary lymphadenopathy.    LUNGS AND PLEURA: Trace right pleural effusion. No significant left pleural effusion. Basilar pulmonary opacities, likely atelectasis. Upper lobes predominant emphysematous changes.    CORONARY ARTERY CALCIFICATION: None.    ABDOMEN/PELVIS:  HEPATOBILIARY: Diffuse hypodense appearance of the liver, likely due to underlying hepatic steatosis. No suspicious focal hepatic lesion. Right upper quadrant post-cholecystectomy clips.    PANCREAS: No main pancreatic ductal dilatation or definite solid pancreatic mass.    SPLEEN: No splenomegaly.    ADRENAL GLANDS: No adrenal nodules.    KIDNEYS/BLADDER: No radiodense kidney/ureteral stones or hydronephrosis in either kidney.    BOWEL: No abnormally dilated bowel loops. There is an approximately 1.8 cm enhancing mass in the sigmoid colon (series 14 image 159), could represent a colonic polyp.    PERITONEUM: No evidence of free fluid in the abdomen and pelvis. No free peritoneal or portal venous gas.    PELVIC ORGANS: Unremarkable.    VASCULATURE: Extensive atherosclerotic vascular calcification of the abdominal aorta and iliac arteries.    LYMPH NODES: Unremarkable.    MUSCULOSKELETAL: Diffuse sclerotic appearance of the spine with multiple compression deformities, which appear not significantly changed as compared to 04/06/2024 chest CTA and 05/05/2024 CT abdomen and pelvis. However, there is a likely subacute   fracture of the sternum with associated sclerotic changes. Multiple sclerotic  foci in the ribs, not significantly changed as compared to the 04/06/2024 exam. Sclerotic changes of the anterior aspect of the pubic bones, not significantly changed as   compared to 05/05/2024.      Impression    IMPRESSION:  1.  No evidence of pulmonary embolism.  2.  No evidence of acute pathology in the abdomen and pelvis.  3.  Approximately 1.8 nodular enhancing area in the sigmoid colon, could represent a colonic polyp, recommend further evaluation with colonoscopy.  4.  There is likely a subacute fracture of the sternum with associated sclerotic changes; cannot exclude pathological fracture. Multiple mild compression deformities of the thoracolumbar spine, not significantly changed as compared to the 04/06/2024 and   05/05/2024 exams. Multiple sclerotic areas in the ribs and pelvic bones are not significantly changed as compared to same prior studies.  5.  Diffuse heterogenous appearance of the thyroid gland can be further evaluated with thyroid ultrasound.  6.  Upper lobes predominant emphysematous changes.         *Note: Due to a large number of results and/or encounters for the requested time period, some results have not been displayed. A complete set of results can be found in Results Review.       Discharge Medications   Current Discharge Medication List        CONTINUE these medications which have NOT CHANGED    Details   albuterol (VENTOLIN HFA) 108 (90 BASE) MCG/ACT Inhaler Inhale 2 puffs into the lungs every 6 hours as needed for shortness of breath / dyspnea or wheezing  Qty: 18 g, Refills: 3    Associated Diagnoses: Asthma exacerbation      ALPRAZolam (XANAX) 0.25 MG tablet Take 1 tablet (0.25 mg) by mouth 2 times daily as needed for anxiety  Qty: 60 tablet, Refills: 0    Associated Diagnoses: Anxiety; Chronic prescription benzodiazepine use      atorvastatin (LIPITOR) 10 MG tablet Take 10 mg by mouth at bedtime      azithromycin (ZITHROMAX) 250 MG tablet Take 250 mg by mouth Every Mon, Wed, Fri  Morning      carvedilol (COREG) 6.25 MG tablet Take 1 tablet (6.25 mg) by mouth 2 times daily (with meals)  Qty: 180 tablet, Refills: 1    Associated Diagnoses: Essential hypertension with goal blood pressure less than 130/80; Dilated cardiomyopathy (H)      cetirizine (ZYRTEC) 10 MG tablet Take 10 mg by mouth at bedtime      erythromycin (ROMYCIN) 5 MG/GM ophthalmic ointment Place into both eyes 2 times daily  Qty: 3.5 g, Refills: 5    Associated Diagnoses: Acute bacterial conjunctivitis of left eye      fluticasone (FLONASE) 50 MCG/ACT nasal spray Spray 1-2 sprays into both nostrils daily as needed for rhinitis or allergies  Qty: 16 g, Refills: 4    Associated Diagnoses: Postnasal drip      HYDROcodone-acetaminophen (NORCO)  MG per tablet Take 2 tablets by mouth every 4 hours as needed for severe pain      ipratropium - albuterol 0.5 mg/2.5 mg/3 mL (DUONEB) 0.5-2.5 (3) MG/3ML neb solution Take 1 vial by nebulization every 6 hours as needed for shortness of breath, wheezing or cough      methocarbamol (ROBAXIN) 500 MG tablet Take 500 mg by mouth 4 times daily as needed for muscle spasms      montelukast (SINGULAIR) 10 MG tablet TAKE ONE TABLET BY MOUTH AT BEDTIME  Qty: 90 tablet, Refills: 1    Associated Diagnoses: Mild persistent asthma without complication      Multiple Vitamin CHEW Take 2 tablets by mouth at bedtime      naloxone (NARCAN) 4 MG/0.1ML nasal spray Spray 1 spray (4 mg) into one nostril alternating nostrils once as needed for opioid reversal Every 2-3 minutes until patient responsive or EMS arrives  Qty: 0.2 mL, Refills: 0    Associated Diagnoses: Compression fracture of thoracic vertebra with routine healing, unspecified thoracic vertebral level, subsequent encounter; Chronic bilateral low back pain, unspecified whether sciatica present      ondansetron (ZOFRAN ODT) 4 MG ODT tab Take 1 tablet (4 mg) by mouth every 6 hours as needed for nausea or vomiting  Qty: 24 tablet, Refills: 3     Associated Diagnoses: Nausea      pantoprazole (PROTONIX) 40 MG EC tablet Take 40 mg by mouth at bedtime      !! predniSONE (DELTASONE) 5 MG tablet Take 10 mg by mouth daily      !! predniSONE (DELTASONE) 5 MG tablet Take 1 tablet by mouth every evening      roflumilast (DALIRESP) 500 MCG TABS tablet Take 500 mcg by mouth at bedtime      spironolactone (ALDACTONE) 25 MG tablet Take 0.5 tablets (12.5 mg) by mouth daily  Qty: 90 tablet, Refills: 4    Associated Diagnoses: Chronic systolic congestive heart failure (H)      SUMAtriptan (IMITREX) 25 MG tablet Take 1 tablet (25 mg) by mouth at onset of headache for migraine  Qty: 9 tablet, Refills: 11    Associated Diagnoses: Chronic migraine without aura without status migrainosus, not intractable      TRELEGY ELLIPTA 200-62.5-25 MCG/ACT oral inhaler Inhale 1 puff into the lungs at bedtime       !! - Potential duplicate medications found. Please discuss with provider.        Allergies   Allergies   Allergen Reactions    Gabapentin Swelling    Contrast Dye      Iodine    Escitalopram      Nausea and sickness    Peanuts [Nuts] Hives    Penicillins      rash    Sulfa Antibiotics      High family history    Tace [Chlorotrianisene]      joint swelling    Aspirin Rash     After 3 days    Ibuprofen Nausea and Vomiting and Swelling    Strawberry Extract Rash

## 2024-08-04 NOTE — PLAN OF CARE
PRIMARY DIAGNOSIS: ELECTROLYTE IMBALANCE, N/V, DIARRHEA  OUTPATIENT/OBSERVATION GOALS TO BE MET BEFORE DISCHARGE:  ADLs back to baseline: No    Activity and level of assistance: Ax2 with lift/renetta steady- refuses to get OOB    Pain status: Improved-controlled with oral pain medications.    Return to near baseline physical activity: No     Discharge Planner Nurse   Safe discharge environment identified: Yes  Barriers to discharge: No       Entered by: Rebeca Weaver RN 08/04/2024 1:33 AM     Please review provider order for any additional goals.   Nurse to notify provider when observation goals have been met and patient is ready for discharge.    [facility] care [id number]. A&Ox4. Ax2 with a lift/renetta steady for transfers. Refuses to get OOB. On RA. On a regular diet but reports a decreased appetite. PIV in R arm infusing NS +  mL/hr. Purewick in place draining yellow colored urine adequately. C/O severe lower back pain. Given PRN Oxycodone with reports of minimal relief. No BM overnight. Hopeful discharge home tomorrow.

## 2024-08-04 NOTE — PLAN OF CARE
PRIMARY DIAGNOSIS: ELECTROLYTE IMBALANCE, N/V, DIARRHEA  OUTPATIENT/OBSERVATION GOALS TO BE MET BEFORE DISCHARGE:  ADLs back to baseline: No    Activity and level of assistance: Ax2 with lift/renetta steady- refuses to get OOB    Pain status: Improved-controlled with oral pain medications.    Return to near baseline physical activity: No     Discharge Planner Nurse   Safe discharge environment identified: Yes  Barriers to discharge: No       Entered by: Rebeca Weaver RN 08/04/2024 1:36 AM     Please review provider order for any additional goals.   Nurse to notify provider when observation goals have been met and patient is ready for discharge.    Assumed care [id number]. A&Ox4. Ax2 with a lift/renetta steady for transfers. Refuses to get OOB. On RA. On a regular diet but reports a decreased appetite. PIV in R arm infusing NS +  mL/hr. Purewick in place draining yellow colored urine adequately. On telemetry. C/O severe lower back pain. Given PRN Oxycodone with reports of minimal relief. No BM overnight. Given a PRN neb x1 for reports of SOB. Hopeful discharge home tomorrow.

## 2024-08-05 NOTE — PROGRESS NOTES
Clinic Care Coordination Contact    Situation: Patient chart reviewed by care coordinator.    Background: Patient is enrolled in Care Coordination and followed by RN CC.     Assessment: RN CC followed up on call to [facility] ([phone]) as have not had a response to last message left on 8/5/24. Spoke with Claudia. She sent a high priority message to Jaylin for return call to patient or RN CC re: nighttime oxygen.     Plan/Recommendations: Claudia recommended waiting another day before calling back again. RN CC will schedule follow up outreach for Friday, 8/9/24.     Cynthia Snell, RN, BSN, CPHN, CCM  [facility] Ambulatory Care Management  [facility]  Phone: [phone]  Email: [email or web address]      Clinic Care Coordination Contact  Clinic Care Coordination Contact  OUTREACH with Post Discharge Assessment    Referral Information:  Referral Source: IP Report    Primary Diagnosis: Cardiovascular - other    Chief Complaint   Patient presents with    Clinic Care Coordination - Post Hospital      Universal Utilization: Risk of admission or ED visit 95%  Clinic Utilization  Difficulty keeping appointments:: No  Compliance Concerns: No  No-Show Concerns: No  No PCP office visit in Past Year: No  Utilization      No Show Count (past year)  0             ED Visits  6             Hospital Admissions  5                    Current as of: 8/5/2024  8:56 AM              Clinical Concerns:  Current Medical Concerns:  She is eligible for night time oxygen and she has been unable to get the oxygen.     Current Behavioral Concerns: None    Education Provided to patient: Educated patient on importance of follow up appointment for transition of care.    Pain  Pain (GOAL):: No  Health Maintenance Reviewed: Due/Overdue (Did not discuss during TCM outreach.)  Clinical Pathway: None    Transitions of Care Outreach    Most Recent Admission Date: 8/2/2024   Most Recent Admission  Diagnosis: Hypokalemia - E87.6  Hyponatremia - E87.1  Generalized weakness - R53.1  Elevated troponin - R79.89     Most Recent Discharge Date: 8/4/2024   Most Recent Discharge Diagnosis: Generalized weakness - R53.1  Hyponatremia - E87.1  Hypokalemia - E87.6  Elevated troponin - R79.89  Dilated cardiomyopathy (H) - I42.0  Chronic obstructive pulmonary disease with acute exacerbation (H) - J44.1     Transitions of Care Assessment    Discharge Assessment  How are you doing now that you are home?: She's doing better. Her PCA was with her during call.  How are your symptoms? (Red Flag symptoms escalate to triage hotline per guidelines): Improved  Do you know how to contact your clinic care team if you have future questions or changes to your health status? : Yes  Does the patient have their discharge instructions? : Yes  Does the patient have questions regarding their discharge instructions? : No  Were you started on any new medications or were there changes to any of your previous medications? : No  Does the patient have all of their medications?: Yes  Do you have questions regarding any of your medications? : No  Do you have all of your needed medical supplies or equipment (DME)?  (i.e. oxygen tank, CPAP, cane, etc.): Yes    Post-op (CHW CTA Only)  If the patient had a surgery or procedure, do they have any questions for a nurse?: No    Post-op (Clinicians Only)  Did the patient have surgery or a procedure: No  Fever: No  Chills: No  Eating & Drinking: eating and drinking without complaints/concerns  PO Intake: regular diet (6 small meals)  Additional Symptoms: decreased appetite (due to breathing issues.)  Bowel Function: normal  Date of last BM: 08/04/24  Urinary Status: voiding without complaint/concerns    Care Management       Care Mgmt General Assessment  Referral  Referral Source: IP Report  Health Care Home/Utilization  Preferred Hospital: [facility]  [phone]  Preferred Urgent  Care: [facility], [phone]  Living Situation  Current living arrangement:: I live in a private home with spouse  Type of residence:: Private home - stairs (Has a stair lift)  Resources  Patient receiving home care services:: Yes  Skilled Home Care Services: Skilled Nursing;Home Health Aid;Physical Therapy;Occupational Therapy ()  Community Resources: Home Care  Supplies Currently Used at Home: None  Equipment Currently Used at Home: raised toilet seat;shower chair;wheelchair, manual;other (see comments);walker, rolling (Push chair Not actual wheelchair)  Referrals Placed: None  Employment Status: retired  Psychosocial  Anabaptist or spiritual beliefs that impact treatment:: No  Mental health DX:: No  Mental health management concern (GOAL):: No  Chemical Dependency Status: No Current Concerns  Chemical Dependency Management:  (N/A)  Informal Support system:: Spouse;Children;Neighbors  Functional Status  Dependent ADLs:: Bathing;Dressing;Grooming (Uses a push chair when goes places.)  Dependent IADLs:: Transportation;Meal Preparation;Shopping;Laundry;Cooking;Cleaning  Bed or wheelchair confined:: No  Mobility Status: Dependent/Assisted by Another  Fallen 2 or more times in the past year?: No  Any fall with injury in the past year?: No  Advance Care Plan/Directive  Advanced Care Plans/Directives on file:: Yes  Status of record:: On File and Validated and     Care Mgmt Encounter Assessment  Preventative Care  Routine Health maintenance Reviewed: Due/Overdue (Did not discuss during TCM outreach.)  Clinic Utilization  Difficulty keeping appointments:: No  Compliance Concerns: No  No-Show Concerns: No  No PCP office visit in Past Year: No  Transportation  Transportation means:: Accessible car     Primary Diagnosis  Primary Diagnosis: Cardiovascular - other  Barriers in Communication  Other concerns:: Other;Physical impairment (Very Short of breath with activity)  Pain  Pain  (GOAL):: No  Medication Review  Medication adherence problem (GOAL):: No  Knowledgeable about how to use meds:: Yes  Medication side effects suspected:: No  Diet/Exercise/Sleep  Diet:: Regular  Inadequate nutrition (GOAL):: No  Tube Feeding: No  Inadequate activity/exercise (GOAL):: No  Significant changes in sleep pattern (GOAL): No    Medication Management:  Medication review status: Medications reviewed on 8/2/24. RN CC did not re-review medication list.   Current Outpatient Medications   Medication Sig Dispense Refill    albuterol (VENTOLIN HFA) 108 (90 BASE) MCG/ACT Inhaler Inhale 2 puffs into the lungs every 6 hours as needed for shortness of breath / dyspnea or wheezing 18 g 3    ALPRAZolam (XANAX) 0.25 MG tablet Take 1 tablet (0.25 mg) by mouth 2 times daily as needed for anxiety 60 tablet 0    atorvastatin (LIPITOR) 10 MG tablet Take 10 mg by mouth at bedtime      azithromycin (ZITHROMAX) 250 MG tablet Take 250 mg by mouth Every Mon, Wed, Fri Morning      carvedilol (COREG) 6.25 MG tablet Take 1 tablet (6.25 mg) by mouth 2 times daily (with meals) 180 tablet 1    cetirizine (ZYRTEC) 10 MG tablet Take 10 mg by mouth at bedtime      erythromycin (ROMYCIN) 5 MG/GM ophthalmic ointment Place into both eyes 2 times daily 3.5 g 5    fluticasone (FLONASE) 50 MCG/ACT nasal spray Spray 1-2 sprays into both nostrils daily as needed for rhinitis or allergies 16 g 4    HYDROcodone-acetaminophen (NORCO)  MG per tablet Take 2 tablets by mouth every 4 hours as needed for severe pain      ipratropium - albuterol 0.5 mg/2.5 mg/3 mL (DUONEB) 0.5-2.5 (3) MG/3ML neb solution Take 1 vial by nebulization every 6 hours as needed for shortness of breath, wheezing or cough      methocarbamol (ROBAXIN) 500 MG tablet Take 500 mg by mouth 4 times daily as needed for muscle spasms      montelukast (SINGULAIR) 10 MG tablet TAKE ONE TABLET BY MOUTH AT BEDTIME 90 tablet 1    Multiple Vitamin CHEW Take 2 tablets by mouth at bedtime       naloxone (NARCAN) 4 MG/0.1ML nasal spray Spray 1 spray (4 mg) into one nostril alternating nostrils once as needed for opioid reversal Every 2-3 minutes until patient responsive or EMS arrives 0.2 mL 0    ondansetron (ZOFRAN ODT) 4 MG ODT tab Take 1 tablet (4 mg) by mouth every 6 hours as needed for nausea or vomiting 24 tablet 3    pantoprazole (PROTONIX) 40 MG EC tablet Take 40 mg by mouth at bedtime      predniSONE (DELTASONE) 5 MG tablet Take 10 mg by mouth daily      predniSONE (DELTASONE) 5 MG tablet Take 1 tablet by mouth every evening      roflumilast (DALIRESP) 500 MCG TABS tablet Take 500 mcg by mouth at bedtime      spironolactone (ALDACTONE) 25 MG tablet Take 0.5 tablets (12.5 mg) by mouth daily 90 tablet 4    SUMAtriptan (IMITREX) 25 MG tablet Take 1 tablet (25 mg) by mouth at onset of headache for migraine 9 tablet 11    TRELEGY ELLIPTA 200-62.5-25 MCG/ACT oral inhaler Inhale 1 puff into the lungs at bedtime       No current facility-administered medications for this visit.     Functional Status:  Dependent ADLs:: Bathing, Dressing, Grooming (Uses a push chair when goes places.)  Dependent IADLs:: Transportation, Meal Preparation, Shopping, Laundry, Cooking, Cleaning  Bed or wheelchair confined:: No  Mobility Status: Dependent/Assisted by Another  Fallen 2 or more times in the past year?: No  Any fall with injury in the past year?: No    Living Situation:  Current living arrangement:: I live in a private home with spouse  Type of residence:: Private home - stairs (Has a stair lift)    Lifestyle & Psychosocial Needs:    Social Determinants of Health     Food Insecurity: Low Risk  (11/11/2023)    Food Insecurity     Within the past 12 months, did you worry that your food would run out before you got money to buy more?: No     Within the past 12 months, did the food you bought just not last and you didn t have money to get more?: No   Depression: Not at risk (4/22/2024)    PHQ-2     PHQ-2 Score: 2    Housing Stability: Low Risk  (11/11/2023)    Housing Stability     Do you have housing? : Yes     Are you worried about losing your housing?: No   Tobacco Use: Medium Risk (7/2/2024)    Patient History     Smoking Tobacco Use: Former     Smokeless Tobacco Use: Never     Passive Exposure: Not on file   Financial Resource Strain: Low Risk  (11/11/2023)    Financial Resource Strain     Within the past 12 months, have you or your family members you live with been unable to get utilities (heat, electricity) when it was really needed?: No   Alcohol Use: Not on file   Transportation Needs: Low Risk  (11/11/2023)    Transportation Needs     Within the past 12 months, has lack of transportation kept you from medical appointments, getting your medicines, non-medical meetings or appointments, work, or from getting things that you need?: No   Physical Activity: Not on file   Interpersonal Safety: Low Risk  (10/5/2023)    Interpersonal Safety     Do you feel physically and emotionally safe where you currently live?: Yes     Within the past 12 months, have you been hit, slapped, kicked or otherwise physically hurt by someone?: No     Within the past 12 months, have you been humiliated or emotionally abused in other ways by your partner or ex-partner?: Not on file   Stress: Not on file   Social Connections: Not on file   Health Literacy: Not on file     Diet:: Regular  Inadequate nutrition (GOAL):: No  Tube Feeding: No  Inadequate activity/exercise (GOAL):: No  Significant changes in sleep pattern (GOAL): No  Transportation means:: Accessible car     Baptism or spiritual beliefs that impact treatment:: No  Mental health DX:: No  Mental health management concern (GOAL):: No  Chemical Dependency Status: No Current Concerns  Chemical Dependency Management:  (N/A)  Informal Support system:: Spouse, Children, Neighbors      Resources and Interventions:  Current Resources:   Skilled Home Care Services: Skilled Nursing, Home Health  Aid, Physical Therapy, Occupational Therapy ()  Community Resources: Home Care  Supplies Currently Used at Home: None  Equipment Currently Used at Home: raised toilet seat, shower chair, wheelchair, manual, other (see comments), walker, rolling (Push chair Not actual wheelchair)  Employment Status: retired     Advance Care Plan/Directive  Advanced Care Plans/Directives on file:: Yes  Status of record:: On File and Validated    Referrals Placed: None     Care Plan:   Care Plan: Health Maintenance       Problem: Health Maintenance Due or Overdue       Goal: Become up-to-date with health maintenance visit(s)       Start Date: 10/5/2023 Expected End Date: 1/4/2024    This Visit's Progress: 30% Recent Progress: 20%    Note:     Barriers: Very short of breath with activity; Limited mobility; Provider availability - wait time to complete appointments.   Strengths: Motivated; Agreeable to Care Coordination.   Patient expressed understanding of goal: Yes.   Action steps to achieve this goal:  1. I will take my medications as prescribed.   2. I will discuss, review, schedule and complete recommended overdue health maintenance with my Primary Care Provider.   3. I will contact my care team with questions, concerns or support needs. I will use the clinic as a resource and I understand I can contact my clinic with 24/7 after hours services available. Care Coordinator will remain available as needed.                               Patient/Caregiver understanding: Patient/caregiver verbalized understanding and denies any additional questions or concerns at this time. RNCC engaged in [employer] communications during encounter.     Outreach Frequency: monthly, more frequently as needed    Future Appointments                In 3 months Konstantin Campuzano MD [facility] Radiation Oncology [facility]          Follow up Plan     Discharge Follow-Up  Discharge follow up appointment scheduled in alignment with  recommended follow up timeframe or Transitions of Risk Category? (Low = within 30 days; Moderate= within 14 days; High= within 7 days): No  Patient's follow up appointment not scheduled: Patient declined scheduling support. Education on the importance of transitions of care follow up. Provided scheduling phone number.    RN CC contacted patient for post-hospital follow up and to introduce Care Coordination. Full assessment not indicated as patient is already enrolled.     Additional plan: Patient requested RN CC contact Dr. Valerio's office to follow up with her sleep study results. As of 7/1/24, the [facility] office merged with [place] and now has a new phone number [phone]. RN CC contacted [facility] at this number. Spoke with Chavo. He will send a message to Dr. Valerio and his care team (patient stated she heard from Jaylin last week). RN CC mentioned this to Chavo who will include Jaylin in the message. Provided contact information. Writer will await return call.     Future Appointments   Date Time Provider Department Center   11/7/2024 11:50 AM Konstantin Campuzano MD [facility]     Outpatient Plan as outlined on AVS reviewed with patient.    For any urgent concerns, please contact our 24 hour nurse triage line: [phone] ([phone])     Cynthia Snell RN, BSN, CPHN, CCM  [facility] Ambulatory Care Management  [facility]  Phone: [phone]  Email: [email or web address]    Clinic Care Coordination Contact    Situation: Patient chart reviewed by care coordinator.    Background: Patient is enrolled in Care Coordination and followed by RN CC.     Assessment: RN CC contacted patient for TCM outreach after she was admitted to [facility] from 8/2 to 8/4/24 for dilated cardiomyopathy. RN CC spoke with patient who stated she was unable to talk at that time and would call RN CC back in a few minutes.     Plan/Recommendations: RN  CC will await return call.     Cynthia Snell RN, BSN, CPHN, CCM  [facility] Ambulatory Care Management  [facility]  Phone: [phone]  Email: [email or web address]

## 2024-08-07 NOTE — TELEPHONE ENCOUNTER
FOCUS OF CARE: STAGE 3 PRESSURE ULCERS TO LEFT UPPER POSTERIOR LEG AND LEFT ISCHIUM, MUSCULOSKELETAL CHEST PAIN, CHRONIC PAIN, WEAKNESS, COPD, CHF, HTN, HYPONATREMIA, POOR ORAL INTAKE AND FAILURE TO THRIVE, DIARRHEA, NAUSEA, ABDOMINAL PAIN    DISCIPLINE: SN 1W9, 3 PRN VISITS, HHA 1W9    SPECIFIC TREATMENT ORDERS:   WOUND CARE FOR STAGEIII PROXIMAL, UPPER LEFT ISCHEIUM:    CLEAN AREAS WITH MILD SOAP AND WATER, PAT DRY, APPLY SKIN PREP TO PERIWOUND, APPLY MEDICAL GRADE HONEY GEL TO WOUND BEDS, COVER WITH TRANSPARENT FILM COMPOSITE DRESSING, CHANGE DRESSINGS 2 X WEEK AND PRN FOR SOILED OR DISLODGED DRESSINGS. APPLY NON-DRYING BARRIER CREAM TO GLUTEAL CLEFT BID AND PRN. FOAM DRESSINGS NOT INDICATED PER MEDICARE GUIDELINES FOR WOUNDS, BUT IF PATIENT WANTS TO PURCHASE OOP MAY USE. RECONSULT WOCN IN 2 WEEKS IF NO IMPROVEMENT OR SOONER IF DECLINE. TO BE COMPLETED BY CAREGIVER ON NON- NURSING DAYS. MAY DISCONTINUE WHEN HEALED    WOUND CARE TO UNSTAGABLE DUE TO NECROSIS PRESSURE ULCER TO LEFT BUTTOCK:  CLEAN AREAS WITH MILD SOAP AND WATER, PAT DRY,APPLY HYDROPHILIC WOUND DRESSING PASTE TO DIME THICKNESS OVER WOUND, MAY PUT NON WOVEN GAUZE OVER AREA IF CLIENT CHOSES TO PROTECT CLOTHING. APPLY 1X DAILY OR AS NEEDED IF PASTE RUBS OFF. RECONSULT WOCN IN 2 WEEKS IF NO IMPROVEMENT OR SOONER IF DECLINE. TO BE COMPLETED BY CAREGIVER ON NON- NURSING DAYS. MAY DISCONTINUE WHEN HEALED.    Please approve the above disciplines, and wound care orders.  Thank you,  Ibis Carias LPN  [facility]

## 2024-08-08 NOTE — TELEPHONE ENCOUNTER
Forms/Letter Request    Type of form/letter: Wound care orders 8/2/2024      Do we have the form/letter: Yes: placed in provider mailbox for signature    Who is the form from? [facility] # [id number]    Where did/will the form come from? form was faxed in    When is form/letter needed by: 5-7    How would you like the form/letter returned: Fax : [phone]    Patient Notified form requests are processed in 5-7 business days:Yes    Could we send this information to you in [employer] or would you prefer to receive a phone call?:   Patient would prefer a phone call   Okay to leave a detailed message?: Yes at Other phone number:  [phone]

## 2024-08-08 NOTE — TELEPHONE ENCOUNTER
Routing back to sender to see provider approval.    Please feel free to close encounter when done.    Thank you,  Julien Yan, Triage RN [facility]  3:03 PM 8/8/2024

## 2024-08-08 NOTE — TELEPHONE ENCOUNTER
FOCUS OF CARE: ENDURANCE AND STRENGTH   DISCIPLINE: OT  SPECIFIC TREATMENT ORDERS: REQUESTING THROUGH PEDRO LUIS REYES FROM PCP OFFICE ONGOING OCCUPATIONAL THERAPY [facility] FOLLOWING RECENT FOR 3 VISITS WITHIN 5 WEEKS FOR STANDING KEIRA, FINAL HEP, TOILETING IN BATHROOM    Please approve the above.  Thank you,  Pedro Luis Carias LPN  [facility]

## 2024-08-09 NOTE — PROGRESS NOTES
Clinic Care Coordination Contact    Situation: Patient chart reviewed by care coordinator.    Background: Patient is enrolled in Care Coordination and followed by RN CC. RN CC has contacted [facility] (Dr. Valerio - pulmonologist with [facility] merged with [facility] 7/1/24) to follow up on orders for nighttime oxygen.   Assessment: Spoke with Danial this time. He was able to find a response from Dr. Valerio on previous calls. Dr. Valerio faxed orders to  DME on 7/26/24. RN CC followed up with [facility] ([phone]) spoke with Emory. He stated they had needed additional information which they have now received. The oxygen should be dispensed by next week.     Plan/Recommendations: RN CC contacted patient with diya. KASHIF with specifics and contact information for [facility] for follow up. RN CC set generic outreach for next week to ensure [facility] is on track to dispense needed oxygen.     Addendum: RN CC contacted [facility] ([phone]) and was told that additional information was received by them at 8:45am this morning and the oxygen team is reviewing it. They have NOT dispensed patient's home oxygen yet. RN CC will set another outreach for this week to check back on status. DS    Addendum 8/14/24: [facility] still has not dispensed the oxygen to the patient.  took contact information for RN CC and sent information to the oxygen team for return call. Once they call back, RN CC will contact patient with an update. JOHN Snell RN, BSN, CPHN, [facility] Ambulatory Care Management  [facility]  Phone: [phone]  Email: [email or web address]

## 2024-08-12 NOTE — TELEPHONE ENCOUNTER
Forms/Letter Request    Type of form/letter: Occupational Therapy Plan 8/8/2024      Do we have the form/letter: Yes: placed in provider mailbox for signature    Who is the form from? [facility] # [id number]    Where did/will the form come from? form was faxed in    When is form/letter needed by: 5-7    How would you like the form/letter returned: Fax : [phone]    Patient Notified form requests are processed in 5-7 business days:Yes    Could we send this information to you in [employer] or would you prefer to receive a phone call?:   Patient would prefer a phone call   Okay to leave a detailed message?: Yes at Other phone number:  [phone]

## 2024-08-14 NOTE — PROGRESS NOTES
Clinic Care Coordination Contact  Follow Up Progress Note      Assessment: RN CC contacted patient for monthly outreach. Also to verify she received the message from [facility] that [facility] needs a NATHANIEL from her to release documentation to [facility]. Patient stated that things could be a lot better. She's still very tired. She had not understood that [facility] needed a release form so she could get her oxygen. RN CC provided her with their contact information and explained that she needs to get a completed form to [facility] before they will provide [facility] with the necessary documentation for her oxygen. Writer explained that Care Coordinator cannot accomplish this; she needs to do it. She verbalized understanding.     Care Gaps:    Health Maintenance Due   Topic Date Due    HF ACTION PLAN  Never done    HEPATITIS A IMMUNIZATION (1 of 2 - Risk 2-dose series) Never done    RSV VACCINE (Pregnancy & 60+) (1 - 1-dose 60+ series) Never done    COLORECTAL CANCER SCREENING  03/28/2021    MAMMO SCREENING  06/09/2023    COVID-19 Vaccine (5 - 2023-24 season) 09/01/2023    LIPID  03/07/2024    ZOSTER IMMUNIZATION (3 of 3) 10/20/2023     Has not addressed due to her extreme fatigue.      Care Plans  Care Plan: Health Maintenance       Problem: Health Maintenance Due or Overdue       Goal: Become up-to-date with health maintenance visit(s)       Start Date: 10/5/2023 Expected End Date: 1/4/2024    This Visit's Progress: 30% Recent Progress: 20%    Note:     Barriers: Very short of breath with activity; Limited mobility; Provider availability - wait time to complete appointments.   Strengths: Motivated; Agreeable to Care Coordination.   Patient expressed understanding of goal: Yes.   Action steps to achieve this goal:  1. I will take my medications as prescribed.   2. I will discuss, review, schedule and complete recommended overdue health maintenance with my Primary Care Provider.   3. I will contact my care team with questions,  concerns or support needs. I will use the clinic as a resource and I understand I can contact my clinic with 24/7 after hours services available. Care Coordinator will remain available as needed.                               Intervention/Education provided during outreach: Discussed patients plan of care. CC RN asked open ended questions, provided support, resources, and encouragement as needed. Patient will reach out to care team sooner than planned with new questions or concerns.     Plan: RN CC will continue to follow patient for any additional needs.     Care Coordinator will follow up in 1 month.     Addendum: RN CC received VM from patient on 8/15/24 asking if she should deliver the NATHANIEL form to Dr. Valerio's office or to the main [facility] clinic. RN CC had recommended to ask the  at the clinic so the NATHANIEL form gets to their medical records department to release the needed information to  DME. She verbalized understanding.      Cynthia Snell RN, BSN, CPHN, CCM  [facility]  [facility]  Phone: [phone]  Email: [email or web address]

## 2024-08-15 NOTE — TELEPHONE ENCOUNTER
Approval on wound types for coding to code these wounds appropriately     WOUND 1 WOUND TYPE- PROXIMAL UPPER LEFT LEG STAGE 3 PU AND WOUND 3-  LOW BUTTOCK UNSTAGEABLE PU  There is no changes to wound care, just the wound type.    Please give approval of the above wound type.  Thank you,  Ibis Carias LPN  [facility]

## 2024-08-15 NOTE — TELEPHONE ENCOUNTER
Please accept verbal orders from provider.    Thank you,  Julien Yan, Triage RN [facility]  5:30 PM 8/15/2024

## 2024-08-21 NOTE — TELEPHONE ENCOUNTER
Patient calling to follow up on request for Sims refill.  She states she knows primary care provider is out of the office on Thursdays and does not want to leave the request go until Fri. as she can  refill as early as Mon. 8/26. She also requests a specific  so that may take additional time.  She does still have 6 days of medication left.  CHAI Maria R.N.

## 2024-08-26 NOTE — LETTER
Transition Communication Hand-off for Care Transitions to Next Level of Care Provider    Name: Trisha Bender  : 1955  MRN #: [id number]  Primary Care Provider: Bandar Weinberg     Primary Clinic: [address] [place] [address]     Reason for Hospitalization:  Dehydration [E86.0]  Chronic pain syndrome [G89.4]  Hypomagnesemia [E83.42]  Hyponatremia [E87.1]  Generalized weakness [R53.1]  Failure to thrive in adult [R62.7]  Vomiting and diarrhea [R11.10, R19.7]  Congestive heart failure, unspecified HF chronicity, unspecified heart failure type (H) [I50.9]  Admit Date/Time: 2024 11:22 PM  Discharge Date: ***  Payor Source: Payor: [facility] / Plan: ARE MEDICARE / Product Type: HMO /     Readmission Assessment Measure (GRZEGORZ) Risk Score/category: ***    Plan of Care Goals/Milestone Events:   Patient Concerns: ***   Patient Goals:   Short-term ***   Long-term ***   Medical Goals   Short-term ***   Long-term ***         Reason for Communication Hand-off Referral: {CCREASONCMCTNHANDOFFREFERRALCTS:93395}    Discharge Plan:       Concern for non-adherence with plan of care:   Y/N ***  Discharge Needs Assessment:      Already enrolled in Tele-monitoring program and name of program:  ***  Follow-up specialty is recommended: {YES / NO:28267}    Follow-up plan:    Future Appointments   Date Time Provider Department Center   2024 11:50 AM Konstantin Campuzano MD [facility]       Any outstanding tests or procedures:              Key Recommendations:      [facility]    AVS/Discharge Summary is the source of truth; this is a helpful guide for improved communication of patient story

## 2024-08-27 PROBLEM — G89.4 CHRONIC PAIN SYNDROME: Status: ACTIVE | Noted: 2024-01-01

## 2024-08-27 PROBLEM — R19.7 VOMITING AND DIARRHEA: Status: ACTIVE | Noted: 2024-01-01

## 2024-08-27 PROBLEM — E86.0 DEHYDRATION: Status: ACTIVE | Noted: 2024-01-01

## 2024-08-27 PROBLEM — R62.7 FAILURE TO THRIVE IN ADULT: Status: ACTIVE | Noted: 2024-01-01

## 2024-08-27 PROBLEM — E83.42 HYPOMAGNESEMIA: Status: ACTIVE | Noted: 2024-01-01

## 2024-08-27 PROBLEM — R11.10 VOMITING AND DIARRHEA: Status: ACTIVE | Noted: 2024-01-01

## 2024-08-27 NOTE — PHARMACY-ADMISSION MEDICATION HISTORY
Pharmacist Admission Medication History    Admission medication history is complete. The information provided in this note is only as accurate as the sources available at the time of the update.    Information Source(s): Patient via in-person  Pertinent Information: patient was discharged 3 weeks ago from [facility]    Changes made to PTA medication list:  Added: None  Deleted: None  Changed: None    Allergies reviewed with patient and updates made in EHR: no  Medication History Completed By: Terry Garg [facility] 8/27/2024 9:17 AM    PTA Med List   Medication Sig Last Dose    albuterol (VENTOLIN HFA) 108 (90 BASE) MCG/ACT Inhaler Inhale 2 puffs into the lungs every 6 hours as needed for shortness of breath / dyspnea or wheezing Past Week    ALPRAZolam (XANAX) 0.25 MG tablet TAKE 1 TABLET (0.25 MG) BY MOUTH 2 TIMES DAILY AS NEEDED FOR ANXIETY Past Week    atorvastatin (LIPITOR) 10 MG tablet Take 10 mg by mouth at bedtime 8/26/2024    azithromycin (ZITHROMAX) 250 MG tablet Take 250 mg by mouth Every Mon, Wed, Fri Morning Past Week    carvedilol (COREG) 6.25 MG tablet Take 1 tablet (6.25 mg) by mouth 2 times daily (with meals) 8/26/2024 at x 2    cetirizine (ZYRTEC) 10 MG tablet Take 10 mg by mouth at bedtime 8/26/2024    erythromycin (ROMYCIN) 5 MG/GM ophthalmic ointment Place into both eyes 2 times daily Past Week    fluticasone (FLONASE) 50 MCG/ACT nasal spray Spray 1-2 sprays into both nostrils daily as needed for rhinitis or allergies Past Month    HYDROcodone-acetaminophen (NORCO)  MG per tablet Take 2 tablets by mouth every 4 hours as needed for severe pain. Past Month    ipratropium - albuterol 0.5 mg/2.5 mg/3 mL (DUONEB) 0.5-2.5 (3) MG/3ML neb solution Take 1 vial by nebulization every 6 hours as needed for shortness of breath, wheezing or cough Past Month    methocarbamol (ROBAXIN) 500 MG tablet Take 500 mg by mouth 4 times daily as needed for muscle spasms Past Month    montelukast (SINGULAIR) 10 MG tablet  TAKE ONE TABLET BY MOUTH AT BEDTIME 8/26/2024    Multiple Vitamin CHEW Take 2 tablets by mouth at bedtime Past Week    naloxone (NARCAN) 4 MG/0.1ML nasal spray Spray 1 spray (4 mg) into one nostril alternating nostrils once as needed for opioid reversal Every 2-3 minutes until patient responsive or EMS arrives More than a month    ondansetron (ZOFRAN ODT) 4 MG ODT tab DISSOLVE 1 TABLET (4 MG) BY MOUTH EVERY 6 HOURS AS NEEDED FOR NAUSEA OR VOMITING Past Month    pantoprazole (PROTONIX) 40 MG EC tablet TAKE 1 TABLET (40 MG) BY MOUTH DAILY 8/26/2024    predniSONE (DELTASONE) 5 MG tablet Take 5 mg by mouth 2 times daily. 8/26/2024 at x 2    roflumilast (DALIRESP) 500 MCG TABS tablet Take 500 mcg by mouth at bedtime 8/26/2024    spironolactone (ALDACTONE) 25 MG tablet Take 0.5 tablets (12.5 mg) by mouth daily 8/26/2024    SUMAtriptan (IMITREX) 25 MG tablet Take 1 tablet (25 mg) by mouth at onset of headache for migraine More than a month    TRELEGY ELLIPTA 200-62.5-25 MCG/ACT oral inhaler Inhale 1 puff into the lungs at bedtime 8/26/2024

## 2024-08-27 NOTE — PROGRESS NOTES
Assuming care.  Patient admitted early this morning by my partner Dr. Cardona  She has significant past medical history to include a stress cardiomyopathy, COPD, coronary artery disease, hypertension, hyperlipidemia, chronic pressure wounds and chronic pain syndrome.  He presented to the emergency department mainly with acute gastroenteritis.  She seems to be terminal and palliative/hospice team has been consulted.  Their assessment and recommendations are highly appreciated.  Plan is to send her to hospice if she does not pass here in the emergency department.

## 2024-08-27 NOTE — ED NOTES
Pt assisted w/ jesse care. New Pure Wick placed. Mepilex applied to coccyx. Repositioned w/ multiple pillows to L side to offload.

## 2024-08-27 NOTE — PLAN OF CARE
Goal Outcome Evaluation:      Plan of Care Reviewed With: patient, spouse, caregiver, sibling, child    Overall Patient Progress: decliningOverall Patient Progress: declining    Outcome Evaluation: Patient is deciding about home with hospice. Patient and family deciding between home care or hopsice.

## 2024-08-27 NOTE — H&P
[facility]    History and Physical - Hospitalist Service       Date of Admission:  8/26/2024    Assessment & Plan      Trisha Bender is a 69 year old female admitted on 8/26/2024. She has a complicated past medical history which includes coronary artery disease, chronic opioid dependence, chronic pressure wounds, hypertension, COPD, migraine, dyslipidemia, DNR/DNI status, Takotsubo cardiomyopathy, osteopenia, GERD.    Patient is being admitted after she has done poorly at home and has failed to thrive additionally is noted to be hyponatremia  1/.  Failure to thrive multifactorial in nature.  2/.  Hyponatremia sodium is low at this point in time likely related to poor oral intake and dehydration  3/.  Hypomagnesemia replace and recheck as per protocol  /.  Chronic pain issues has been on narcotic pretty much most of her adult life will give her IV hydromorphone at this time and then adjust medications/  5/.  Anasarca secondary to malnutrition which seems to be moderate to severe.  6/.  Goals of care patient is DNR/DNI is open to the idea of hospice, consult for the same has been placed will likely require placement for hospice  7/.  Pressure ulcer: Will request wound ostomy nurse consult for ischial pressure ulcer which has been present for an extended period of time        Diet:  Regular diet  DVT Prophylaxis: Heparin SQ  Quiles Catheter: Not present  Lines: None     Cardiac Monitoring: None  Code Status:  DNR/DNI    Clinically Significant Risk Factors Present on Admission         # Hyponatremia: Lowest Na = 123 mmol/L in last 2 days, will monitor as appropriate    # Hypomagnesemia: Lowest Mg = 1.5 mg/dL in last 2 days, will replace as needed   # Hypoalbuminemia: Lowest albumin = 2.1 g/dL at 8/27/2024  3:47 AM, will monitor as appropriate     # Hypertension: Noted on problem list  # Chronic heart failure with preserved ejection fraction: heart failure noted on problem list and last echo  "with EF >50%            # Financial/Environmental Concerns:    # Asthma: noted on problem list              Disposition Plan     Medically Ready for Discharge: Anticipated in 2-4 Days           Veronica Cardona MD  Hospitalist Service  [facility]  Securely message with Melonie (more info)  Text page via [employer] Paging/Directory     ______________________________________________________________________    Chief Complaint   Weakness and inability to care for self    History is obtained from the patient, electronic health record, and emergency department physician    History of Present Illness   Trisha Bender is a 69 year old female who has a complicated past medical history which includes coronary artery disease, chronic opioid dependence, chronic pressure wounds, hypertension, COPD, migraine, dyslipidemia, DNR/DNI status, Takotsubo cardiomyopathy, osteopenia, GERD.  Patient is being admitted through the emergency department with the above issue.  Patient has been having nausea, vomiting diarrhea and generalized weakness she was discharged from this facility approximately 3 weeks ago.  Patient has been predominantly bedbound has not been ambulating and there is also suggestion of decreased appetite and decreased oral intake.  Workup in the ER revealed her to have evidence of hyponatremia, hypomagnesemia and nutritional indices are as well down.  She has elevated proBNP and elevated procalcitonin.  Patient has been requested for admission in this above-mentioned setting  Patient is being evaluated in the emergency room which she is boarding, she continues to complain of chronic pain all over her body she wants pain medications she tells me she has not received anything for pain for about 12 hours at this time.  She also tells me that she would like some Maalox for heartburn.  When we talk about hospice her initial response was \"in due course \"and when I asked her as to what she meant by \"due " "course \", she is initially evasive and then she responds \"maybe now \"  She tells me she can make it on her own and she needs additional care      Past Medical History    Past Medical History:   Diagnosis Date    Anxiety     CAD (coronary artery disease) 07/02/2015    mild per cath    CHF (congestive heart failure)     EF=20-25% per echo 6/30/15    Chronic abdominal pain     Chronic airway obstruction     FEV1 36% of pred; VC 67%  in Jan 06.    Chronic back pain     Chronic pain syndrome     COPD (chronic obstructive pulmonary disease)     Endometriosis     Esophageal reflux     Essential hypertension     Hyperlipidemia     Migraine     Mild persistent asthma 08/30/2012    Osteopenia     RBBB     Takotsubo cardiomyopathy        Past Surgical History   Past Surgical History:   Procedure Laterality Date    ANGIOGRAM  07/02/2015    Minimal CAD. LV dysfunction, Mid RCA 20% stenosis, EF 20-25%, c/w Takotsubo cardiomyopathy    APPENDECTOMY  1974    CHOLECYSTECTOMY  1974    [facility]    COLONOSCOPY N/A 04/26/2018    Procedure: COLONOSCOPY;  COLONOSCOPY with biopsies;  Surgeon: Steve Acosta MD;  Location: [facility] OR    CORONARY ANGIOGRAPHY ADULT ORDER      CV CORONARY ANGIOGRAM N/A 07/30/2020    Procedure: Coronary Angiogram;  Surgeon: Quinn Viveros MD;  Location:  HEART CARDIAC CATH LAB    CV LEFT HEART CATH N/A 07/30/2020    Procedure: Left Heart Cath;  Surgeon: Quinn Viveros MD;  Location:  HEART CARDIAC CATH LAB    CV LEFT VENTRICULOGRAM N/A 07/30/2020    Procedure: Left Ventriculogram;  Surgeon: Quinn Viveros MD;  Location:  HEART CARDIAC CATH LAB    CV RIGHT HEART CATH MEASUREMENTS RECORDED N/A 07/30/2020    Procedure: Right Heart Cath;  Surgeon: Quinn Viveros MD;  Location:  HEART CARDIAC CATH LAB    ESOPHAGOSCOPY, GASTROSCOPY, DUODENOSCOPY (EGD), COMBINED  11/15/2011    Procedure:COMBINED ESOPHAGOSCOPY, GASTROSCOPY, DUODENOSCOPY (EGD);  ESOPHAGOSCOPY, GASTROSCOPY, DUODENOSCOPY " (EGD) ; Surgeon:JIM URENA; Location:[facility] GI    INJECT EPIDURAL LUMBAR / SACRAL SINGLE N/A 04/13/2018    Procedure: INJECT EPIDURAL LUMBAR / SACRAL CONTINUAL OR INTERMITTENT;  Lumbar Epidural Steroid Injection;  Surgeon: Az Enamorado MD;  Location: [facility] OR    INJECT EPIDURAL LUMBAR / SACRAL SINGLE N/A 09/24/2018    Procedure: INJECT EPIDURAL LUMBAR / SACRAL SINGLE;  Lumbar Epidural Steroid Injection;  Surgeon: Az Enamorado MD;  Location: [facility] OR    LAPAROSCOPIC ABLATION ENDOMETRIOSIS  1998    Nasal septoplasty and mucous retention cyst      TONSILLECTOMY, ADENOIDECTOMY, COMBINED      Apex teeth extraction         Prior to Admission Medications   Prior to Admission Medications   Prescriptions Last Dose Informant Patient Reported? Taking?   ALPRAZolam (XANAX) 0.25 MG tablet   No No   Sig: TAKE 1 TABLET (0.25 MG) BY MOUTH 2 TIMES DAILY AS NEEDED FOR ANXIETY   HYDROcodone-acetaminophen (NORCO)  MG per tablet   No No   Sig: Take 2 tablets by mouth every 4 hours as needed for severe pain.   Multiple Vitamin CHEW  Self Yes No   Sig: Take 2 tablets by mouth at bedtime   SUMAtriptan (IMITREX) 25 MG tablet   No No   Sig: Take 1 tablet (25 mg) by mouth at onset of headache for migraine   TRELEGY ELLIPTA 200-62.5-25 MCG/ACT oral inhaler   Yes No   Sig: Inhale 1 puff into the lungs at bedtime   albuterol (VENTOLIN HFA) 108 (90 BASE) MCG/ACT Inhaler  Self No No   Sig: Inhale 2 puffs into the lungs every 6 hours as needed for shortness of breath / dyspnea or wheezing   atorvastatin (LIPITOR) 10 MG tablet   Yes No   Sig: Take 10 mg by mouth at bedtime   azithromycin (ZITHROMAX) 250 MG tablet   Yes No   Sig: Take 250 mg by mouth Every Mon, Wed, Fri Morning   carvedilol (COREG) 6.25 MG tablet   No No   Sig: Take 1 tablet (6.25 mg) by mouth 2 times daily (with meals)   cetirizine (ZYRTEC) 10 MG tablet   Yes No   Sig: Take 10 mg by mouth at bedtime   erythromycin (ROMYCIN) 5 MG/GM ophthalmic ointment   No No   Sig:  Place into both eyes 2 times daily   fluticasone (FLONASE) 50 MCG/ACT nasal spray   No No   Sig: Spray 1-2 sprays into both nostrils daily as needed for rhinitis or allergies   ipratropium - albuterol 0.5 mg/2.5 mg/3 mL (DUONEB) 0.5-2.5 (3) MG/3ML neb solution   Yes No   Sig: Take 1 vial by nebulization every 6 hours as needed for shortness of breath, wheezing or cough   methocarbamol (ROBAXIN) 500 MG tablet   Yes No   Sig: Take 500 mg by mouth 4 times daily as needed for muscle spasms   montelukast (SINGULAIR) 10 MG tablet   No No   Sig: TAKE ONE TABLET BY MOUTH AT BEDTIME   naloxone (NARCAN) 4 MG/0.1ML nasal spray   No No   Sig: Spray 1 spray (4 mg) into one nostril alternating nostrils once as needed for opioid reversal Every 2-3 minutes until patient responsive or EMS arrives   ondansetron (ZOFRAN ODT) 4 MG ODT tab   No No   Sig: DISSOLVE 1 TABLET (4 MG) BY MOUTH EVERY 6 HOURS AS NEEDED FOR NAUSEA OR VOMITING   pantoprazole (PROTONIX) 40 MG EC tablet   No No   Sig: TAKE 1 TABLET (40 MG) BY MOUTH DAILY   predniSONE (DELTASONE) 5 MG tablet   Yes No   Sig: Take 1 tablet by mouth every evening   predniSONE (DELTASONE) 5 MG tablet   Yes No   Sig: Take 10 mg by mouth daily   roflumilast (DALIRESP) 500 MCG TABS tablet  Self Yes No   Sig: Take 500 mcg by mouth at bedtime   spironolactone (ALDACTONE) 25 MG tablet   No No   Sig: Take 0.5 tablets (12.5 mg) by mouth daily      Facility-Administered Medications: None        Review of Systems    The 10 point Review of Systems is negative other than noted in the HPI or here.     Social History   I have reviewed this patient's social history and updated it with pertinent information if needed.  Social History     Tobacco Use    Smoking status: Former     Current packs/day: 0.00     Average packs/day: 1 pack/day for 30.0 years (30.0 ttl pk-yrs)     Types: Cigarettes     Start date: 1976     Quit date: 2006     Years since quittin.6    Smokeless tobacco: Never     Tobacco comments:     Quit in 2006   Vaping Use    Vaping status: Never Used   Substance Use Topics    Alcohol use: No     Alcohol/week: 0.0 standard drinks of alcohol    Drug use: No         Family History   I have reviewed this patient's family history and updated it with pertinent information if needed.  Family History   Problem Relation Age of Onset    Cancer Mother         Colon cancer; dxed at age 64;  at age 69    Cancer - colorectal Mother     Other Cancer Mother     Gastrointestinal Disease Sister         Acute pancreatitis    Heart Disease Father         ?    Coronary Artery Disease Father     Circulatory Maternal Aunt         AAA    Circulatory Maternal Uncle         AAA    Diabetes Son          Allergies   Allergies   Allergen Reactions    Gabapentin Swelling    Contrast Dye      Iodine    Escitalopram      Nausea and sickness    Peanuts [Nuts] Hives    Penicillins      rash    Sulfa Antibiotics      High family history    Tace [Chlorotrianisene]      joint swelling    Aspirin Rash     After 3 days    Ibuprofen Nausea and Vomiting and Swelling    Strawberry Extract Rash        Physical Exam   Vital Signs: Temp: 97.6  F (36.4  C)   BP: 97/74 Pulse: 96   Resp: 20 SpO2: 94 %      Weight: 150 lbs 0 oz    General Appearance: Alert awake oriented x 3 appears chronically ill and much older than stated age  Respiratory: Diminished basal breath sounds secondary to poor inspiratory effort  Cardiovascular: S1-S2  GI: Soft nontender bowel sounds are present  Skin: Patient did not let me examine her buttocks but I believe there is initial pressure ulcer, she did not want me to turn her because of pain  Other: Marsha noted    Medical Decision Making       75 MINUTES SPENT BY ME on the date of service doing chart review, history, exam, documentation & further activities per the note.      Data   ------------------------- PAST 24 HR DATA REVIEWED -----------------------------------------------    I have personally  reviewed the following data over the past 24 hrs:    12.7 (H)  \   12.4   / 650 (H)     123 (L) 91 (L) 4.5 (L) /  137 (H)   3.6 20 (L) 0.44 (L) \     ALT: 12 AST: 13 AP: 121 TBILI: 0.5   ALB: 2.1 (L) TOT PROTEIN: 3.7 (L) LIPASE: 12 (L)     Trop: N/A BNP: 936 (H)     Procal: 1.45 (H) CRP: N/A Lactic Acid: 1.3         Imaging results reviewed over the past 24 hrs:   Recent Results (from the past 24 hour(s))   US Lower Extremity Venous Duplex Bilateral    Narrative    EXAM: US LOWER EXTREMITY VENOUS DUPLEX BILATERAL  LOCATION: [facility]  DATE: 8/27/2024    INDICATION: swelling  COMPARISON: None.  TECHNIQUE: Venous Duplex ultrasound of bilateral lower extremities with and without compression, augmentation and duplex. Color flow and spectral Doppler with waveform analysis performed.    FINDINGS: Exam includes the common femoral, femoral, popliteal veins as well as segmentally visualized deep calf veins and greater saphenous vein.     RIGHT: No deep vein thrombosis. No superficial thrombophlebitis. No popliteal cyst.    LEFT: No deep vein thrombosis. No superficial thrombophlebitis. No popliteal cyst.      Impression    IMPRESSION:  1.  No deep venous thrombosis in the bilateral lower extremities.   XR Chest 1 View    Narrative    EXAM: XR CHEST 1 VIEW  LOCATION: [facility]  DATE: 8/27/2024    INDICATION: Weakness.  COMPARISON: CT pulmonary angiogram 4/6/2024.      Impression    IMPRESSION: The patient is rotated. Emphysematous changes. Slight elevation of the left hemidiaphragm. Strands of platelike atelectasis in the lower lungs, more on the left. No adenopathy or effusion. Atherosclerotic thoracic aorta. Degenerative changes   thoracic spine. Cholecystectomy clips.   XR Pelvis w Hip Left 1 View    Narrative    EXAM: XR PELVIS AND HIP LEFT 1 VIEW  LOCATION: [facility]  DATE: 8/27/2024    INDICATION: Pain  COMPARISON: None.      Impression     IMPRESSION: No acute fracture or dislocation. Mild degenerative narrowing of bilateral hip joints. Bilateral aortoiliofemoral atherosclerotic calcifications noted.   XR Tibia and Fibula Left 2 Views    Narrative    EXAM: XR TIBIA AND FIBULA LEFT 2 VIEWS  LOCATION: [facility]  DATE: 8/27/2024    INDICATION: swelling and pain  COMPARISON: None.      Impression    IMPRESSION: No acute fracture or dislocation identified.

## 2024-08-27 NOTE — PROVIDER NOTIFICATION
Pt concerned about home medication carvedilol and heart rate, but pt very nauseous  wondering about IV option-   - IV metoprolol ordered

## 2024-08-27 NOTE — PROGRESS NOTES
Clinic Care Coordination Contact  Ambulatory Care Coordination to Inpatient Care Management   Hand-In Communication    Date:  August 27, 2024  Name: Trisha Bender is enrolled in Ambulatory Care Coordination program and I am the Lead Care Coordinator.  CC Contact Information: Epic In[employer]sket + phone  Payor Source: Payor: [facility] / Plan: [facility] MEDICARE / Product Type: HMO /   Current services in place:     Please see the CC Snaphot and Care Management Flowsheets for specific  details of this Trisha Bender care plan.   Additional details/specific concerns r/t this admission:    Discharge Disposition Home vs TCU    I will follow this admission in Epic. Please feel free to contact me with questions or for further collaboration in discharge planning.    Cynthia Snell, RN, BSN, CPHN, CCM  [facility] Ambulatory Care Management  [facility]  Phone: [phone]  Email: [email or web address]

## 2024-08-27 NOTE — PLAN OF CARE
Goal Outcome Evaluation:      Plan of Care Reviewed With: patient    Outcome Evaluation: Pt admitted to the unit. A&O. Pain mangaged with IV dilaudid. New IV placed- Pt requesting IV to be replaced, refused to take any PO medications, education provided, pt still requesing IV meds only. zofran given x1. Transferred with Ax2. External catheter in place. Poor appetite. Family at bedside.      Problem: Adult Inpatient Plan of Care  Goal: Plan of Care Review  Description: The Plan of Care Review/Shift note should be completed every shift.  The Outcome Evaluation is a brief statement about your assessment that the patient is improving, declining, or no change.  This information will be displayed automatically on your shift  note.  Outcome: Not Progressing  Flowsheets (Taken 8/27/2024 6121)  Outcome Evaluation: Pt admitted to the unit. A&O. Pain mangaged with IV dilaudid. New IV placed- Pt requesting IV to be replaced, refused to take any PO medications, education provided, pt still requesing IV meds only. zofran given x1. Transferred with Ax2. External catheter in place. Poor appetite. Family at bedside.  Plan of Care Reviewed With: patient  Goal: Absence of Hospital-Acquired Illness or Injury  Outcome: Not Progressing  Intervention: Identify and Manage Fall Risk  Recent Flowsheet Documentation  Taken 8/27/2024 0741 by Kylah Carter, RN  Safety Promotion/Fall Prevention:   activity supervised   clutter free environment maintained   increased rounding and observation   increase visualization of patient   lighting adjusted   mobility aid in reach   nonskid shoes/slippers when out of bed   patient and family education   safety round/check completed  Intervention: Prevent Skin Injury  Recent Flowsheet Documentation  Taken 8/27/2024 1302 by Kylah Carter, RN  Body Position:   weight shifting   supine, head elevated  Goal: Optimal Comfort and Wellbeing  Outcome: Not Progressing  Intervention: Monitor Pain and Promote  Comfort  Recent Flowsheet Documentation  Taken 8/27/2024 1652 by Kylah Carter, RN  Pain Management Interventions: medication (see MAR)  Goal: Readiness for Transition of Care  Outcome: Not Progressing

## 2024-08-27 NOTE — CONSULTS
[facility] Nurse Inpatient Assessment     Consulted for: hip/ischium     Patient History (according to provider note(s):      Trisha Bender is a 69 year old female admitted on 8/26/2024. She has a complicated past medical history which includes coronary artery disease, chronic opioid dependence, chronic pressure wounds, hypertension, COPD, migraine, dyslipidemia, DNR/DNI status, Takotsubo cardiomyopathy, osteopenia, GERD.     Assessment:      Areas visualized during today's visit: Sacrum/coccyx, ischium, left ear    Pressure Injury Location: Coccyx and buttocks  Last photo: 8/27/24    Wound type: Pressure Injury, Friction, Incontinence Associated Dermatitis (IAD), and Intertrigo     Pressure Injury Stage: 2, present on admission   Wound history/plan of care:   Patient reports multiple wounds which have come and gone over the last couple years.    Wound base: 5 areas ranging from 0.3x0.5cm to 2.7x0.2cm and are a mix of dermis and dry drainage.     Palpation of the wound bed: normal      Drainage: small     Description of drainage: serosanguinous     Measurements (length x width x depth, in cm) see above      Tunneling N/A     Undermining N/A  Periwound skin:  hyperpigmentation      Color: pink and purple      Temperature: normal   Odor: none  Pain: mild  Pain intervention prior to dressing change: slow and gentle cares   Treatment goal: Heal  and Protection  STATUS: initial assessment  Supplies ordered: gathered    My PI Risk Assessment     Sensory Perception: 2 - Very Limited     Moisture: 3 - Occasionally moist      Activity: 1 - Bedfast      Mobility: 2 - Very limited     Nutrition: 2 - Probably inadequate      Friction/Shear: 2 - Potential problem      TOTAL: 12    Pressure Injury Location: Left IT  Last photo: 8/27/24  Wound type: Pressure Injury     Pressure Injury Stage: 3, present on admission   Wound history/plan of care:   Chronic pressure injury which has come and gone for  a couple years.  Wound base: 80 % Moist and Pink, 20 % Fibrin and Slough     Palpation of the wound bed: normal      Drainage: moderate     Description of drainage: serosanguinous     Measurements (length x width x depth, in cm) 1  x 0.8  x  0.2 cm      Tunneling N/A     Undermining N/A  Periwound skin: Ecchymosis      Color: purple      Temperature: normal   Odor: none  Pain: moderate  Pain intervention prior to dressing change: slow and gentle cares   Treatment goal: Heal  and Remove necrotic tissue  STATUS: initial assessment  Supplies ordered: ordered Aquacel Ag    Pressure Injury Location: Left ear  Last photo: 8/27/24    Wound type: Pressure Injury     Pressure Injury Stage: Unstageable, present on admission   Wound history/plan of care:   Patient reports area has been painful for a couple months.  Patient reports she prefers to lay on this side.  Patient denies any oxygen or other medical devices used in area.  Wound base: 100 % Eschar     Palpation of the wound bed: firm      Drainage: none     Description of drainage: none     Measurements (length x width x depth, in cm) 0.7  x 0.7 cm      Tunneling N/A     Undermining N/A  Periwound skin: Erythema- blanchable      Color: pink      Temperature: normal   Odor: none  Pain: moderate  Pain intervention prior to dressing change: slow and gentle cares   Treatment goal: Protection  STATUS: initial assessment  Supplies ordered: ordered z-taty pillow      Treatment Plan:     Coccyx/buttock wound(s): Every 3 days  Cleanse with wound cleanser and dry  Apply Mepilex sacral     Left IT wound(s): Every 3 days  Cleanse with wound cleanser and dry  Apply Aquacel Ag (SPD#[id number]) cut to fit to wound  Cover with Mepilex 4x4     Left ear wound(s): Every shift  When laying on left side make sure ear is offloaded with Z-taty pillow (press center of pillow to create gap for ear to rest in)    Pressure Injury Prevention (PIP) Plan:  If patient is declining pressure injury prevention  "interventions: Explore reason why and address patient's concerns, Educate on pressure injury risk and prevention intervention(s), and If patient is still declining, document \"informed refusal\"   HOB: Maintain at or below 30 degrees, unless contraindicated  Repositioning in bed: Every 1-2 hours  and Left/right positioning; avoid supine  Heels: Pillows under calves  Protective Dressing: Sacral Mepilex for prevention (#[id number]),  especially for the agitated patient   Chair positioning: Chair cushion (#[id number])    If patient has a buttock pressure injury, or high risk for PI use chair cushion or SPS.  Moisture Management: Avoid brief in bed  Under Devices: Inspect skin under all medical devices during skin inspection , Ensure tubes are stabilized without tension, and Ensure patient is not lying on medical devices or equipment when repositioned  Ask provider to discontinue device when no longer needed.       Orders: Written    RECOMMEND PRIMARY TEAM ORDER: None, at this time  Education provided: importance of repositioning, plan of care, and Off-loading pressure  Discussed plan of care with: Patient and Nurse  WO nurse follow-up plan: weekly  Notify WOC if wound(s) deteriorate.  Nursing to notify the Provider(s) and re-consult the WO Nurse if new skin concern.    DATA:     Current support surface: Standard  Standard gel mattress (Isoflex)  Containment of urine/stool: Indwelling catheter  BMI: Body mass index is 22.81 kg/m .   Active diet order: Orders Placed This Encounter      Combination Diet 2 gm NA Diet; Low Saturated Fat Na <2400mg Diet, No Caffeine Diet     Output: No intake/output data recorded.     Labs:   Recent Labs   Lab 08/27/24  0347 08/27/24  0010   ALBUMIN 2.1* 2.2*   HGB  --  12.4   WBC  --  12.7*     Pressure injury risk assessment:                          Luis Kaiser RN CWOCN  Contact Via [facility] Nurse (Atilio)  Dept. Office Number: [phone]      "

## 2024-08-27 NOTE — CONSULTS
SPIRITUAL HEALTH SERVICES - Consult Note  ED    Referral Source/ Reason for Visit: Patient request for emotional support.    Summary and Recommendations -     Compassionately listened to patient engage in life review after she made the decision to go on comfort care.  She carefully delineated her wishes to receive care that will not burden her , who is elderly and struggles with his own health concerns.  Affirmed patient's attention to end of life matters; including designating her final resting place.      Plan: Patient has no spiritual community at this time.  She partakes in rituals and traditions from both Uatsdin and Oriental orthodox faiths.  Daughter-in-law will assist patient with these matters, if needed.  She intends to discharge to hospice.  No additional needs.  [facility] remains available upon request.    Rev. FREDDY Joseph.  Staff     [facility] available 24/7 for emergent requests/ referrals, either by paging the on-call  or by entering an ASAP/STAT consult in [employer], which will also page the on-call .      Assessment    Saw pt Trisha Bender regarding patient consult for emotional support.    Patient/Family Understanding of Illness and Goals of Care - She has recently made the decision to move to comfort care.  She has asked for a social work consult to discuss hospice options.      Distress and Loss - Patient named the death of one of her four sons as a deep grief.  She also articulated her long history of various illnesses, including heart issues and COPD.  Furthermore, she discussed one adult son's mental health condition; and she and her 's financial and emotional efforts to support him.    Strengths, Coping and Resources - She is intelligent, thoughtful, gracious, loving and sincere.  She remains mindful that her choices going forward will impact her  and extended family.  She remains practical in her decisions - wanting to make plans to ensure her own comfort  and ease for her family during this difficult time.    Meaning, Beliefs and Spirituality - Patient shared that she was raised Taoist, but adopted by a Oriental orthodox individual.  She practices parts of both traditions.  Her extended family has a burial plot at a cemetery in [place], where one of her sons is buried.  She plans to be inurned next to him.

## 2024-08-27 NOTE — CONSULTS
Care Management Initial Consult    General Information  Assessment completed with: Patient, Family, Patient, , son,Wander, sister and PCA  Type of CM/SW Visit: Initial Assessment    Primary Care Provider verified and updated as needed:     Readmission within the last 30 days: lack of support   Return Category: Exacerbation of disease  Reason for Consult: discharge planning, grief and loss, end of life/hospice  Advance Care Planning:       General Information Comments: Currently patient lives with  who is not able to provide care and PCA hours of 15 hours a day.    Communication Assessment  Patient's communication style: spoken language (English or Bilingual)             Cognitive  Cognitive/Neuro/Behavioral: .WDL except  Level of Consciousness: lethargic                   Living Environment:   People in home: spouse     Current living Arrangements: house      Able to return to prior arrangements: other (see comments) (Family is trying to figure this out. Hospice services reviewed with them)       Family/Social Support:  Care provided by: spouse/significant other, other (see comments) (15 hours of PCA hours)  Provides care for: no one, unable/limited ability to care for self  Marital Status:   Support system: , Sibling(s), Children, PCA  Naun       Description of Support System: Supportive, Involved    Support Assessment: Adequate family and caregiver support, Adequate social supports    Current Resources:   Patient receiving home care services: Yes  Skilled Home Care Services: Skilled Nursing     Community Resources: Home Care  Equipment currently used at home: cane, straight, grab bar, toilet, grab bar, tub/shower, shower chair, raised toilet seat, walker, rolling, wheelchair, manual  Supplies currently used at home: Incontinence Supplies, Chux, Wipes, Gloves    Employment/Financial:  Employment Status: retired        Financial Concerns: none           Does the patient's insurance plan have  a 3 day qualifying hospital stay waiver?  No    Lifestyle & Psychosocial Needs:  Social Determinants of Health     Food Insecurity: Low Risk  (11/11/2023)    Food Insecurity     Within the past 12 months, did you worry that your food would run out before you got money to buy more?: No     Within the past 12 months, did the food you bought just not last and you didn t have money to get more?: No   Depression: Not at risk (4/22/2024)    PHQ-2     PHQ-2 Score: 2   Housing Stability: Low Risk  (11/11/2023)    Housing Stability     Do you have housing? : Yes     Are you worried about losing your housing?: No   Tobacco Use: Medium Risk (7/2/2024)    Patient History     Smoking Tobacco Use: Former     Smokeless Tobacco Use: Never     Passive Exposure: Not on file   Financial Resource Strain: Low Risk  (11/11/2023)    Financial Resource Strain     Within the past 12 months, have you or your family members you live with been unable to get utilities (heat, electricity) when it was really needed?: No   Alcohol Use: Not on file   Transportation Needs: Low Risk  (11/11/2023)    Transportation Needs     Within the past 12 months, has lack of transportation kept you from medical appointments, getting your medicines, non-medical meetings or appointments, work, or from getting things that you need?: No   Physical Activity: Not on file   Interpersonal Safety: Low Risk  (10/5/2023)    Interpersonal Safety     Do you feel physically and emotionally safe where you currently live?: Yes     Within the past 12 months, have you been hit, slapped, kicked or otherwise physically hurt by someone?: No     Within the past 12 months, have you been humiliated or emotionally abused in other ways by your partner or ex-partner?: Not on file   Stress: Not on file   Social Connections: Not on file   Health Literacy: Not on file       Functional Status:  Prior to admission patient needed assistance:   Dependent ADLs:: Ambulation-walker, Bathing,  Dressing, Eating, Grooming, Incontinence  Dependent IADLs:: Cleaning, Cooking, Laundry, Shopping, Meal Preparation, Medication Management, Money Management, Transportation  Assesssment of Functional Status: Needs assistance with handling finances, Needs assistance with laundry/houskeeping, Needs assistance with meals, Needs assistance with dressing, Needs assistance with bathing, Needs assistance with shopping, Needs assistance with medications, Needs assistive devices after discharge, Needs assistive devices (DME), Needs assistance with transportation    Mental Health Status:  Mental Health Status: No Current Concerns       Chemical Dependency Status:  Chemical Dependency Status: No Current Concerns             Values/Beliefs:  Spiritual, Cultural Beliefs, Buddhism Practices, Values that affect care:                 Discussed  Partnership in Safe Discharge Planning  document with patient/family: yes given    Additional Information:  Met with patient and family (, sister, son,Wander and PCA). Patient wanted more information about hospice services and going home. Patient reports that she is ready for hospice. She has been thinking about it for sometime. She feels that she is not getting any better or stronger. She would like to spend her remaining days no matter how long at home. SERGIO reviewed hospice philosophy and services.    Next Steps: Patient and family will decide this evening if they want hospice and go home with it.     SERGIO will follow up with the patient and family tomorrow.    MIRIAM Clark   Inpatient Care Coordination   Supervisor  [facility]  [phone]      MIRIAM Avila

## 2024-08-27 NOTE — ED PROVIDER NOTES
Emergency Department Note      History of Present Illness     Chief Complaint   Nausea, Vomiting, & Diarrhea and Generalized Weakness    HPI   Trisha Bender is a 69 year old female with a history as noted above who presents to the emergency department for nausea, vomiting, diarrhea, generalized weakness. The patient states that she was recently discharged from the hospital after being admitted for weakness, diarrhea, and electrolyte abnormalities. She reports that since hospital discharge on 08/04/24, she has continued to experience nausea, vomiting, diarrhea, and generalized weakness. Patient notes that she is also experiencing slight hip pain. Denies any recent falls or injuries. The patient's daughter states that for 1 day, the patient has began experiencing increased left-sided leg swelling. She notes that she also observed new bruising to the patient's left leg. She reports that since the patient was discharged from the hospital, the patient has been bed bound and has not been ambulating. The patient's son states that the patient is also experiencing decreased appetite and PO intake. He notes that they have attempted to discuss hospice with the patient but the patient has been refusing hospice.The patient states that she recently began taking her maximum dose of Zofran and Dramamine as well as increasing her spironolactone dose. She adds that she has not changed her medications that she is taking for her chronic pain. Patient reports that she would like to be made DNR/DNI.    Independent Historian   Son and Daughter as detailed above.    Review of External Notes   Reviewed patient's discharge summary from 08/04/24. Admitted for weakness and electrolyte abnormalities due to diarrhea.    Past Medical History     Medical History and Problem List   Past Medical History:   Diagnosis Date    Anxiety     CAD (coronary artery disease) 07/02/2015    CHF (congestive heart failure)     Chronic abdominal pain      Chronic airway obstruction     Chronic back pain     Chronic pain syndrome     COPD (chronic obstructive pulmonary disease)     Endometriosis     Esophageal reflux     Essential hypertension     Hyperlipidemia     Migraine     Mild persistent asthma 08/30/2012    Osteopenia     RBBB     Takotsubo cardiomyopathy      Medications   albuterol (VENTOLIN HFA) 108 (90 BASE) MCG/ACT Inhaler  ALPRAZolam (XANAX) 0.25 MG tablet  atorvastatin (LIPITOR) 10 MG tablet  azithromycin (ZITHROMAX) 250 MG tablet  carvedilol (COREG) 6.25 MG tablet  cetirizine (ZYRTEC) 10 MG tablet  erythromycin (ROMYCIN) 5 MG/GM ophthalmic ointment  fluticasone (FLONASE) 50 MCG/ACT nasal spray  HYDROcodone-acetaminophen (NORCO)  MG per tablet  ipratropium - albuterol 0.5 mg/2.5 mg/3 mL (DUONEB) 0.5-2.5 (3) MG/3ML neb solution  methocarbamol (ROBAXIN) 500 MG tablet  montelukast (SINGULAIR) 10 MG tablet  Multiple Vitamin CHEW  naloxone (NARCAN) 4 MG/0.1ML nasal spray  ondansetron (ZOFRAN ODT) 4 MG ODT tab  pantoprazole (PROTONIX) 40 MG EC tablet  predniSONE (DELTASONE) 5 MG tablet  predniSONE (DELTASONE) 5 MG tablet  roflumilast (DALIRESP) 500 MCG TABS tablet  spironolactone (ALDACTONE) 25 MG tablet  SUMAtriptan (IMITREX) 25 MG tablet  TRELEGY ELLIPTA 200-62.5-25 MCG/ACT oral inhaler      Surgical History   Past Surgical History:   Procedure Laterality Date    ANGIOGRAM  07/02/2015    Minimal CAD. LV dysfunction, Mid RCA 20% stenosis, EF 20-25%, c/w Takotsubo cardiomyopathy    APPENDECTOMY  1974    CHOLECYSTECTOMY  1974    [facility]    COLONOSCOPY N/A 04/26/2018    Procedure: COLONOSCOPY;  COLONOSCOPY with biopsies;  Surgeon: Steve Acosta MD;  Location: [facility] OR    CORONARY ANGIOGRAPHY ADULT ORDER      CV CORONARY ANGIOGRAM N/A 07/30/2020    Procedure: Coronary Angiogram;  Surgeon: Quinn Viveros MD;  Location:  HEART CARDIAC CATH LAB    CV LEFT HEART CATH N/A 07/30/2020    Procedure: Left Heart Cath;  Surgeon: Quinn Viveros  "MD;  Location:  HEART CARDIAC CATH LAB    CV LEFT VENTRICULOGRAM N/A 07/30/2020    Procedure: Left Ventriculogram;  Surgeon: Quinn Viveros MD;  Location:  HEART CARDIAC CATH LAB    CV RIGHT HEART CATH MEASUREMENTS RECORDED N/A 07/30/2020    Procedure: Right Heart Cath;  Surgeon: Quinn Viveros MD;  Location:  HEART CARDIAC CATH LAB    ESOPHAGOSCOPY, GASTROSCOPY, DUODENOSCOPY (EGD), COMBINED  11/15/2011    Procedure:COMBINED ESOPHAGOSCOPY, GASTROSCOPY, DUODENOSCOPY (EGD);  ESOPHAGOSCOPY, GASTROSCOPY, DUODENOSCOPY (EGD) ; Surgeon:JIM URENA; Location:[facility] GI    INJECT EPIDURAL LUMBAR / SACRAL SINGLE N/A 04/13/2018    Procedure: INJECT EPIDURAL LUMBAR / SACRAL CONTINUAL OR INTERMITTENT;  Lumbar Epidural Steroid Injection;  Surgeon: Az Enamorado MD;  Location: [facility] OR    INJECT EPIDURAL LUMBAR / SACRAL SINGLE N/A 09/24/2018    Procedure: INJECT EPIDURAL LUMBAR / SACRAL SINGLE;  Lumbar Epidural Steroid Injection;  Surgeon: Az Enamorado MD;  Location: [facility] OR    LAPAROSCOPIC ABLATION ENDOMETRIOSIS  1998    Nasal septoplasty and mucous retention cyst      TONSILLECTOMY, ADENOIDECTOMY, COMBINED      [place] teeth extraction       Physical Exam     Patient Vitals for the past 24 hrs:   BP Temp Pulse Resp SpO2 Height Weight   08/27/24 0420 -- -- -- -- 94 % -- --   08/27/24 0200 97/74 -- 96 -- 94 % -- --   08/27/24 0130 105/80 -- 87 -- 94 % -- --   08/27/24 0100 95/87 -- 93 -- 93 % -- --   08/27/24 0030 93/77 -- 95 -- 95 % -- --   08/27/24 0010 -- -- -- -- 94 % -- --   08/27/24 0000 98/66 -- 98 -- 94 % -- --   08/26/24 2355 108/70 -- -- -- -- -- --   08/26/24 2329 138/83 97.6  F (36.4  C) 109 20 95 % 1.727 m (5' 8\") 68 kg (150 lb)     Physical Exam  Nursing note and vitals reviewed.  Constitutional: Patient has hard time keeping eyes open during conversation. Cooperative.  Chronically ill-appearing  HENT:   Mouth/Throat: Mucous membranes are dry.   Cardiovascular: Tachycardic, regular rhythm and normal " heart sounds.  No murmur.  Pulmonary/Chest: Effort normal and breath sounds normal. No respiratory distress. No wheezes. No rales.   Abdominal: Soft. Normal appearance and bowel sounds are normal. No distension. There is no tenderness. There is no rigidity and no guarding.   Musculoskeletal: External rotation of left lower extremity, possible foreshortening. Normal range of motion.   Extremities: 2-3+ edema to left leg, scant edema to right leg. Significant bruising to left leg, scattered bruising to right leg.  Neurological: Alert.  Oriented x 3.  Moving all extremities well.  Skin: Skin is warm and dry.  See above  Psychiatric: Normal mood and affect.     Diagnostics     Lab Results   Labs Ordered and Resulted from Time of ED Arrival to Time of ED Departure   BASIC METABOLIC PANEL - Abnormal       Result Value    Sodium 123 (*)     Potassium 3.6      Chloride 91 (*)     Carbon Dioxide (CO2) 20 (*)     Anion Gap 12      Urea Nitrogen 4.5 (*)     Creatinine 0.44 (*)     GFR Estimate >90      Calcium 7.3 (*)     Glucose 137 (*)    MAGNESIUM - Abnormal    Magnesium 1.5 (*)    CBC WITH PLATELETS AND DIFFERENTIAL - Abnormal    WBC Count 12.7 (*)     RBC Count 5.07      Hemoglobin 12.4      Hematocrit 42.0      MCV 83      MCH 24.5 (*)     MCHC 29.5 (*)     RDW 20.7 (*)     Platelet Count 650 (*)     % Neutrophils 77      % Lymphocytes 13      % Monocytes 9      % Eosinophils 0      % Basophils 0      % Immature Granulocytes 1      NRBCs per 100 WBC 0      Absolute Neutrophils 9.7 (*)     Absolute Lymphocytes 1.7      Absolute Monocytes 1.1      Absolute Eosinophils 0.1      Absolute Basophils 0.0      Absolute Immature Granulocytes 0.1      Absolute NRBCs 0.0     NT PROBNP INPATIENT - Abnormal    N terminal Pro BNP Inpatient 936 (*)    PROCALCITONIN - Abnormal    Procalcitonin 1.45 (*)    HEPATIC FUNCTION PANEL - Abnormal    Protein Total 4.4 (*)     Albumin 2.2 (*)     Bilirubin Total 0.5      Alkaline Phosphatase         AST        ALT        Bilirubin Direct <0.20     LIPASE - Abnormal    Lipase 12 (*)    HEPATIC FUNCTION PANEL - Abnormal    Protein Total 3.7 (*)     Albumin 2.1 (*)     Bilirubin Total 0.5      Alkaline Phosphatase 121      AST 13      ALT 12      Bilirubin Direct 0.32 (*)    LACTIC ACID WHOLE BLOOD - Normal    Lactic Acid 1.3     CK TOTAL - Normal    CK 30     ROUTINE UA WITH MICROSCOPIC: Pending collection     Imaging   XR Tibia and Fibula Left 2 Views   Final Result   IMPRESSION: No acute fracture or dislocation identified.      XR Pelvis w Hip Left 1 View   Final Result   IMPRESSION: No acute fracture or dislocation. Mild degenerative narrowing of bilateral hip joints. Bilateral aortoiliofemoral atherosclerotic calcifications noted.      XR Chest 1 View   Final Result   IMPRESSION: The patient is rotated. Emphysematous changes. Slight elevation of the left hemidiaphragm. Strands of platelike atelectasis in the lower lungs, more on the left. No adenopathy or effusion. Atherosclerotic thoracic aorta. Degenerative changes    thoracic spine. Cholecystectomy clips.      US Lower Extremity Venous Duplex Bilateral   Final Result   IMPRESSION:   1.  No deep venous thrombosis in the bilateral lower extremities.        EKG   ECG results from 08/26/24   EKG 12 lead     Value    Systolic Blood Pressure     Diastolic Blood Pressure     Ventricular Rate 97    Atrial Rate 97    RI Interval 134    QRS Duration 132        QTc 469    P Axis 58    R AXIS 210    T Axis -12    Interpretation ECG      Sinus rhythm  Right bundle branch block     Independent Interpretation   CXR: No pneumothorax or infiltrate.    ED Course      Medications Administered   Medications   sodium chloride 0.9% BOLUS 1,000 mL (1,000 mLs Intravenous $New Bag 8/27/24 0027)   ondansetron (ZOFRAN) injection 4 mg (4 mg Intravenous $Given 8/27/24 0030)   ondansetron (ZOFRAN) injection 4 mg (4 mg Intravenous $Given 8/27/24 0347)     Discussion of  Management   Admitting Hospitalist, Dr. Cardona    ED Course   ED Course as of 08/27/24 0427   Tue Aug 27, 2024   0003 I obtained history and examined the patient as noted above.      0416 I rechecked the patient, she is sleeping comfortably.     0425 I spoke with admitting hospitalist, Dr. Cardona, regarding the patient. She accepts patient admission.       Additional Documentation  None    Medical Decision Making / Diagnosis     MDM   Trisha Bender is a 69 year old female who presents with essentially failure to thrive.  I do believe that polypharmacy and over dependence on opioid pain medication is contributing to her situation.  Based on exam she was quite dehydrated.  She has multiple electrolyte abnormalities similar to her previous admission.  At that time these corrected with IV fluid resuscitation.  We have begun that with a liter of normal saline here.  No reported fever.  Vital signs otherwise reassuring.  Abdominal exam is benign without concern for an acute surgical process or distention.  I do not feel that imaging is needed.  I did review a CT scan of the chest abdomen pelvis from July that did not show any acute pathology.  She does have bruising to the legs which I suspect are related to recent falls.  No evidence of fracture or DVT on imaging as above.  She will be admitted at this time for medical management and electrolyte correction with disposition planning and hopefully discussion with family as to goals of ongoing care    Disposition   The patient was admitted to the hospital.     Diagnosis     ICD-10-CM    1. Failure to thrive in adult  R62.7       2. Vomiting and diarrhea  R11.10     R19.7       3. Dehydration  E86.0       4. Chronic pain syndrome  G89.4       5. Generalized weakness  R53.1       6. Hyponatremia  E87.1       7. Hypomagnesemia  E83.42         Scribe Disclosure:  I, Shivam Roger, am serving as a scribe at 11:57 PM on 8/26/2024 to document services personally performed  by Steve Naranjo MD based on my observations and the provider's statements to me.        Steve Naranjo MD  08/27/24 [id number]

## 2024-08-27 NOTE — CONSULTS
Palliative Care Consultation Note  [facility]      Patient: Trisha Bender  Date of Admission:  2024    Requesting Clinician / Team: Hospitalist Veronica Cardona MD   Reason for consult: enrollment in hospice        Recommendations & Counseling     GOALS OF CARE:   Comfort focused - Patient would like to return home with support of hospice    ADVANCE CARE PLANNING:  Patient has an advance directive dated 2021.  Primary Health Care Agent spouse, Steve Bender.  Alternate(s) son, Stuart Bender and second alternate son, Walker Bender.   New POLST form completed today.   Code status: No CPR- Do NOT Intubate    MEDICAL MANAGEMENT:   Comfort Care   Below are common symptoms routinely seen in patients with comfort focused goals and at the end of life. This patient may not currently exhibit all of these symptoms; however, if these were to occur our recommendations are as follows:     #Pain  Hydromorphone IV 0.5 mg q 15 minutes as needed   Roxicodone 5-10 mg every 1 hour PRN   Adjunct: Tylenol [place]/PO/      #Air hunger/Dyspnea   Hydromorphone IV 0.5 mg q 15 minutes as needed   Roxicodone 5-10 mg every 1 hour PRN   Adjunct: Tylenol [place]/PO/    #Anxiety    1st choice: Lorazepam PO/SL q 1 mg  q 3 hour as needed.   2nd choice: Lorazepam IV q 1 mg  q 3 hour as needed    #Secretion burden   Robinul 0.2 mg (PO/IV) q 4 hours as needed. If ineffective, increase up to 0.3 mg   Consider atropine if Robinul ineffective after dose increase      #Nausea   Zofran 8 mg q 6 hours as needed.   Zyprexa 5 mg q 6 hours as needed     #Agitation  Aggressive symptoms control first, then  1st choice: Zyprexa 5 mg ODT or IM   2nd choice: Increase dose of Zyprexa to 10 mg or consider switch to Haldol   3rd choice: Lorazepam as above     PSYCHOSOCIAL/SPIRITUAL SUPPORT:  Family -  has health issues and not able to assist. 3 sons (one of her twin sons  this past year)   Arely community: [employer]   Would  "appreciate Spiritual Health Services, Consult has been placed for support     Palliative Care will continue to follow. Thank you for the consult and allowing us to aid in the care of Trisha Bender.    These recommendations have been discussed with Hospitalist Enoc Savage MD, bedside nurse JACKIE Jose.    ELLE Tijerina CNS  [facility], Palliative Care  Securely message with the [employer] Web Console (learn more here) or  Text page via [facility] Paging/Directory         Assessment      Trisha Bender is a 69 year old female who presented 2024 with nausea, vomiting, diarrhea and generalized weakness. Her past medical history is significant for chronic pain, chronic pressure wounds, hypertension, dyslipidemia, CAD, Takotsubo cardiomyopathy, COPD, and migraines.     Today, the patient was seen for:  Goals of care    History of Present Illness   Met with \"Cynthia\" as she was resting in a cot in ED 22.  In introducing myself and my role, she turned to say \"I'm done.\" In asking, she clarified \"I wanted to get stronger and live, but I've gotten so weak. I know it sounds morose, but I just want to fall asleep and not wake up.\" She acknowledged speaking with outpatient Palliative provider Konstantin Campuzano MD. \"The first time I met him I was so depressed. I couldn't do anything. I hoped to get better, but it just got worse.\" She explained her PCP \"Doctor Sultana ordered a hospital bed for me and I've got an electric wheelchair coming on Thursday. What good that's going to do. I don't seem myself getting out of bed.\" I inquired about her family and Dine explained her  is in poor health and not able to assist (she requested I not call him due to his ill health). She had four sons, but one of her twins  this past year and the other \"isn't coping with life. It's about $5,000 a month and I can't help him.\"  We explored the option to continue follow up with providers and attempt to get stronger. Cynthia reiterated " "\"I'm done. I can't leave my home.\" We explored a comfort plan of care. Cynthia acknowledged \"I just want to be comfortable and not wake up in pain again.\"     Phoned the patient's son/alternate healthcare agent Stuart Bender at [phone]. He acknowledged the deterioration in his mother's health and that she had not been ready for hospice. Discussed the conversation with his mother acknowledging Cynthia's statement \"I'm done\" and her request for symptom management and comfort plan. Wander confirmed his mother would want a comfort focused plan of care and if she would survive this hospitalization, Cynthia's sister may want to help along with her mother's PCA to have her return home with support of hospice.       Met with the patient again to assure symptom management. Cynthia explained her father had passed at age 93 this past year. She plans to be buried near him in a historic grave site. She offered appreciation for meeting Chaplain Ruelas.        Prognosis, Goals, & Planning:   Functional Status just prior to this current hospitalization:  Outpatient Palliative Performance Score (PPS) 30%  Extensive disease. Bedbound & requires total care, normal or reduced intake. Normal LOC or drowsy or confused.     Prognosis, Goals, and/or Advance Care Planning:  We discussed general treatment options (full/restorative, selective/conservatives, and comfort only/hospice). We then discussed how these specifically apply to Cynthia's request for comfort.  Based on this discussion, her sonEarline has decided to honor her wishes with comfort plan of care and hospice    Code Status was addressed today:   Yes, We discussed potential risks and rationale of attempting cardiac resuscitation, intubation, and mechanical ventilation.  We also discussed probability of survival as well as quality of life implications.  Based on this discussion, patient or surrogate response/decision: No CPR- Do NOT Intubate      Patient's decision making preferences: " shared with support from loved ones        Patient has decision-making capacity today for complex decisions: Intact          Coping, Meaning, & Spirituality:   Mood, coping, and/or meaning in the context of serious illness were addressed today: Yes    Social:   Living situation:lives alone    Medications:  Reviewed this patient's medication profile and medications from this hospitalization. [place] Board of Pharmacy Data Base Reviewed: Yes:   reviewed - controlled substances prescribed by other outside provider(s).    ROS:  Comprehensive ROS is reviewed and is negative except as here & per HPI:     Physical Exam   Vital Signs with Ranges  Temp:  [97.5  F (36.4  C)-97.6  F (36.4  C)] 97.5  F (36.4  C)  Pulse:  [] 92  Resp:  [16-20] 16  BP: ()/(66-87) 112/81  SpO2:  [93 %-96 %] 96 %  Wt Readings from Last 10 Encounters:   08/26/24 68 kg (150 lb)   08/03/24 69.8 kg (153 lb 14.1 oz)   07/13/24 71.8 kg (158 lb 4.8 oz)   05/13/24 76.2 kg (168 lb)   05/05/24 73.5 kg (162 lb 0.6 oz)   04/24/24 75.8 kg (167 lb)   04/06/24 81 kg (178 lb 9.2 oz)   11/13/23 69.4 kg (153 lb)   10/04/23 79.4 kg (175 lb)   09/21/23 79.2 kg (174 lb 9.6 oz)     150 lbs 0 oz    PHYSICAL EXAM:  GEN:  Cachetic, frail and chronically ill appearing female. Alert, oriented x 3, appears uncomfortable and acknowledged she has not had pain medications since yesterday.   HEENT:  Normocephalic/atraumatic, no scleral icterus, no nasal discharge, mouth moist.  CV:  RRR, S1, S2; no murmurs or other irregularities noted.  +3 DP/PT pulses bilatererally; no edema BLE.  RESP:  Poor inspiratory effort with diminished anterior breath sounds at bases. Symmetric chest rise on inhalation noted.    ABD:  Rounded, soft, anasarca, non-tender/non-distended.  +BS  EXT:  CMS intact x 4.     M/S:   Complained of low back pain with attempt to roll over to listen to breath sounds.     SKIN:  Warm and dry to touch, patient refused full skin assessment.   PAIN  BEHAVIOR: Cooperative  Psych:  Normal affect.  Calm, cooperative, conversant appropriately.    Data reviewed:  Results for orders placed or performed during the hospital encounter of 08/26/24   US Lower Extremity Venous Duplex Bilateral     Status: None    Narrative    EXAM: US LOWER EXTREMITY VENOUS DUPLEX BILATERAL  LOCATION: [facility]  DATE: 8/27/2024    INDICATION: swelling  COMPARISON: None.  TECHNIQUE: Venous Duplex ultrasound of bilateral lower extremities with and without compression, augmentation and duplex. Color flow and spectral Doppler with waveform analysis performed.    FINDINGS: Exam includes the common femoral, femoral, popliteal veins as well as segmentally visualized deep calf veins and greater saphenous vein.     RIGHT: No deep vein thrombosis. No superficial thrombophlebitis. No popliteal cyst.    LEFT: No deep vein thrombosis. No superficial thrombophlebitis. No popliteal cyst.      Impression    IMPRESSION:  1.  No deep venous thrombosis in the bilateral lower extremities.   XR Tibia and Fibula Left 2 Views     Status: None    Narrative    EXAM: XR TIBIA AND FIBULA LEFT 2 VIEWS  LOCATION: [facility]  DATE: 8/27/2024    INDICATION: swelling and pain  COMPARISON: None.      Impression    IMPRESSION: No acute fracture or dislocation identified.   XR Chest 1 View     Status: None    Narrative    EXAM: XR CHEST 1 VIEW  LOCATION: [facility]  DATE: 8/27/2024    INDICATION: Weakness.  COMPARISON: CT pulmonary angiogram 4/6/2024.      Impression    IMPRESSION: The patient is rotated. Emphysematous changes. Slight elevation of the left hemidiaphragm. Strands of platelike atelectasis in the lower lungs, more on the left. No adenopathy or effusion. Atherosclerotic thoracic aorta. Degenerative changes   thoracic spine. Cholecystectomy clips.   XR Pelvis w Hip Left 1 View     Status: None    Narrative    EXAM: XR PELVIS AND HIP LEFT 1  VIEW  LOCATION: [facility]  DATE: 8/27/2024    INDICATION: Pain  COMPARISON: None.      Impression    IMPRESSION: No acute fracture or dislocation. Mild degenerative narrowing of bilateral hip joints. Bilateral aortoiliofemoral atherosclerotic calcifications noted.   Basic metabolic panel (BMP)     Status: Abnormal   Result Value Ref Range    Sodium 123 (L) 135 - 145 mmol/L    Potassium 3.6 3.4 - 5.3 mmol/L    Chloride 91 (L) 98 - 107 mmol/L    Carbon Dioxide (CO2) 20 (L) 22 - 29 mmol/L    Anion Gap 12 7 - 15 mmol/L    Urea Nitrogen 4.5 (L) 8.0 - 23.0 mg/dL    Creatinine 0.44 (L) 0.51 - 0.95 mg/dL    GFR Estimate >90 >60 mL/min/1.73m2    Calcium 7.3 (L) 8.8 - 10.4 mg/dL    Glucose 137 (H) 70 - 99 mg/dL   Extra Tube ([place] Draw)     Status: None    Narrative    The following orders were created for panel order Extra Tube ([place] Draw).  Procedure                               Abnormality         Status                     ---------                               -----------         ------                     Extra Blue Top Tube[[id number]]                              Final result               Extra Red Top Tube[[id number]]                               Final result                 Please view results for these tests on the individual orders.   Lactic acid whole blood     Status: Normal   Result Value Ref Range    Lactic Acid 1.3 0.7 - 2.0 mmol/L   Magnesium     Status: Abnormal   Result Value Ref Range    Magnesium 1.5 (L) 1.7 - 2.3 mg/dL   CBC with platelets and differential     Status: Abnormal   Result Value Ref Range    WBC Count 12.7 (H) 4.0 - 11.0 10e3/uL    RBC Count 5.07 3.80 - 5.20 10e6/uL    Hemoglobin 12.4 11.7 - 15.7 g/dL    Hematocrit 42.0 35.0 - 47.0 %    MCV 83 78 - 100 fL    MCH 24.5 (L) 26.5 - 33.0 pg    MCHC 29.5 (L) 31.5 - 36.5 g/dL    RDW 20.7 (H) 10.0 - 15.0 %    Platelet Count 650 (H) 150 - 450 10e3/uL    % Neutrophils 77 %    % Lymphocytes 13 %    % Monocytes 9 %    %  Eosinophils 0 %    % Basophils 0 %    % Immature Granulocytes 1 %    NRBCs per 100 WBC 0 <1 /100    Absolute Neutrophils 9.7 (H) 1.6 - 8.3 10e3/uL    Absolute Lymphocytes 1.7 0.8 - 5.3 10e3/uL    Absolute Monocytes 1.1 0.0 - 1.3 10e3/uL    Absolute Eosinophils 0.1 0.0 - 0.7 10e3/uL    Absolute Basophils 0.0 0.0 - 0.2 10e3/uL    Absolute Immature Granulocytes 0.1 <=0.4 10e3/uL    Absolute NRBCs 0.0 10e3/uL   Extra Blue Top Tube     Status: None   Result Value Ref Range    Hold Specimen JIC    Extra Red Top Tube     Status: None   Result Value Ref Range    Hold Specimen JIC    CK total     Status: Normal   Result Value Ref Range    CK 30 26 - 192 U/L   BNP     Status: Abnormal   Result Value Ref Range    N terminal Pro BNP Inpatient 936 (H) 0 - 900 pg/mL   Procalcitonin     Status: Abnormal   Result Value Ref Range    Procalcitonin 1.45 (H) <0.50 ng/mL   Hepatic function panel     Status: Abnormal   Result Value Ref Range    Protein Total 4.4 (L) 6.4 - 8.3 g/dL    Albumin 2.2 (L) 3.5 - 5.2 g/dL    Bilirubin Total 0.5 <=1.2 mg/dL    Alkaline Phosphatase      AST      ALT      Bilirubin Direct <0.20 0.00 - 0.30 mg/dL   Lipase     Status: Abnormal   Result Value Ref Range    Lipase 12 (L) 13 - 60 U/L   Hepatic panel     Status: Abnormal   Result Value Ref Range    Protein Total 3.7 (L) 6.4 - 8.3 g/dL    Albumin 2.1 (L) 3.5 - 5.2 g/dL    Bilirubin Total 0.5 <=1.2 mg/dL    Alkaline Phosphatase 121 40 - 150 U/L    AST 13 0 - 45 U/L    ALT 12 0 - 50 U/L    Bilirubin Direct 0.32 (H) 0.00 - 0.30 mg/dL   EKG 12 lead     Status: None   Result Value Ref Range    Systolic Blood Pressure  mmHg    Diastolic Blood Pressure  mmHg    Ventricular Rate 97 BPM    Atrial Rate 97 BPM    VA Interval 134 ms    QRS Duration 132 ms     ms    QTc 469 ms    P Axis 58 degrees    R AXIS 210 degrees    T Axis -12 degrees    Interpretation ECG       Sinus rhythm  Right bundle branch block  Abnormal ECG  When compared with ECG of 02-Aug-2024  20:13,  Questionable change in QRS axis  T wave inversion now evident in Inferior leads  QT has shortened  Confirmed by - EMERGENCY ROOM, PHYSICIAN ([id number]),  ALTA ZAVALA ([id number]) on 8/27/2024 7:07:03 AM     CBC + differential     Status: Abnormal    Narrative    The following orders were created for panel order CBC + differential.  Procedure                               Abnormality         Status                     ---------                               -----------         ------                     CBC with platelets and d...[[id number]]  Abnormal            Final result                 Please view results for these tests on the individual orders.       MANAGEMENT DISCUSSED with the following over the past 24 hours: Hospitalist Enoc Savage MD and bedside nurse JACKIE Jose.     9:30 AM until 11:40 AM - 130 MINUTES SPENT BY ME on the date of service doing chart review, history, exam, documentation & further activities per the note.

## 2024-08-28 NOTE — PROGRESS NOTES
PALLIATIVE CARE PROGRESS NOTE  [facility]     Patient Name: Trisha Bender  Date of Admission: 2024   Today the patient was seen for: End of life symptom management     Recommendations & Counseling     GOALS OF CARE:   Comfort focused - Patient would like to return home with support of hospice     ADVANCE CARE PLANNING:  Patient has an advance directive dated 2021.  Primary Health Care Agent spouse, Steve Bender.  Alternate(s) son, Stuart Bender and second alternate son, Walker Bender.   POLST form completed 2024 can be found in [facility] docs.   Code status: No CPR- Do NOT Intubate     MEDICAL MANAGEMENT:   Comfort Care   Below are common symptoms routinely seen in patients with comfort focused goals and at the end of life. This patient may not currently exhibit all of these symptoms; however, if these were to occur our recommendations are as follows:    #Diarrhea - potentially due to opiate withdrawal  Hold on trying imodium as patient yesterday indicated she had not had any opiates since her previous hospitalization and none at home.          #Pain  Chronic use of daily Hydrocodone-Acetaminophen  m tablets per day is equivalent to 80 oral morphine equivalents a day.   Opiate use in the past 24 hours: 7 mg IV Hydromorphone = equal analgesic to 140 mg oral morphine  Hydromorphone IV 1.0 mg q 15 minutes as needed   Roxicodone 15 mg every 1 hour PRN   Adjunct: Tylenol [place]/PO      #Air hunger/Dyspnea   Hydromorphone IV 1.0 mg q 15 minutes as needed   Roxicodone 15 mg every 1 hour PRN   Adjunct: Tylenol [place]/PO     #Anxiety    1st choice: Lorazepam PO/SL q 1 mg  q 3 hour as needed.   2nd choice: Lorazepam IV q 1 mg  q 3 hour as needed     #Secretion burden   Robinul 0.2 mg (PO/IV) q 4 hours as needed. If ineffective, increase up to 0.3 mg   Consider atropine if Robinul ineffective after dose increase      #Nausea   Zofran 8 mg q 6 hours as needed.   Zyprexa 5 mg q 6  "hours as needed      #Agitation  Aggressive symptoms control first, then  1st choice: Zyprexa 5 mg ODT or IM   2nd choice: Increase dose of Zyprexa to 10 mg or consider switch to Haldol   3rd choice: Lorazepam as above      PSYCHOSOCIAL/SPIRITUAL SUPPORT:  Family -  has health issues and not able to assist. 3 sons (one of her twin sons  this past year)   Arely community: [employer]   Would appreciate Spiritual Health Services, Consult has been placed for support     Palliative Care will continue to follow. Thank you for the consult and allowing us to aid in the care of Trisha Bender.    These recommendations have been discussed with Hospitalist Enoc Savage MD and bedside nurse JACKIE Cooney.    ELLE Tijerina CNS  [facility], Palliative Care  Securely message with the [employer] Web Console (learn more here) or  Text page via [facility] Paging/Directory        Assessment          Trisha Bender is a 69 year old female who presented 2024 with nausea, vomiting, diarrhea and generalized weakness. Her past medical history is significant for chronic pain, chronic pressure wounds, hypertension, dyslipidemia, CAD, Takotsubo cardiomyopathy, COPD, and migraines.        Interval History:     Multidisciplinary collaboration:  Appreciate input of bedside nursing staff as patient able to indicate needs for pain medications.     Patient/family narrative  Met the patient's , Steve at bedside. I inquired about Cynthia's comfort. Steve offered \"She doesn't know. She's not all there.\" We discussed Cynthia's care at length and Steve acknowledged her rapid deterioration. \"She hasn't eaten any thing.\" Steve inquired about starting IV fluids. We discussed a comfort plan at length and offered concern given Cynthia's poor nutritional status (hyponatremia, hypomagnesemia, low albumin and low protein) and third shifting fluids. Steve agreed.       Review of Systems:     Besides above, ROS was reviewed and is unremarkable      "   Physical Exam:   Pulse:  [79-95] 79  BP: (120)/(65-85) 120/65  SpO2:  [96 %] 96 %  150 lbs 0 oz    Physical Exam  GEN:  Chronically ill appearing elderly female. Lethargic, oriented to self and situation, appears comfortable, no apparent distress.  HEENT:  Normocephalic/atraumatic, no scleral icterus, no nasal discharge, mouth moist.  CV:  Tachycardic at 108, S1, S2; no murmurs or other irregularities noted.  +3 DP/PT pulses bilatererally; bilateral trace leg edema.E.  RESP:  Clear to auscultation bilaterally without rales/rhonchi/wheezing/retractions.  Symmetric chest rise on inhalation noted.  Normal respiratory effort.  ABD:  Rounded, soft, non-tender/non-distended.  +BS   SKIN:  Warm and dry to touch, no mottling.    PAIN BEHAVIOR: Cooperative  Psych:  Normal affect.  Calm, cooperative, conversant appropriately.      Data Reviewed:     No results found. However, due to the size of the patient record, not all encounters were searched. Please check Results Review for a complete set of results.      MANAGEMENT DISCUSSED with the following over the past 24 hours: Hospitalist Enoc Savage MD and bedside nurse JACKIE Cooney   1:55 Pm until 2:40 PM - 45 MINUTES SPENT BY ME on the date of service doing chart review, history, exam, documentation & further activities per the note.

## 2024-08-28 NOTE — PROGRESS NOTES
Care Management Follow Up    Length of Stay (days): 1    Expected Discharge Date: 08/30/2024     Concerns to be Addressed: adjustment to diagnosis/illness, discharge planning and hospice.    Patient plan of care discussed at interdisciplinary rounds: Yes    Anticipated Discharge Disposition:  home with hospice and family/PCA providing 24/7 care.     Anticipated Discharge Services:  hospice  Anticipated Discharge DME:  hospice to provide hospital bed, overlay for mattress, over the bed table and O2.    Patient/family educated on Medicare website which has current facility and service quality ratings:  yes  Education Provided on the Discharge Plan:  yes  Patient/Family in Agreement with the Plan:  yes    Referrals Placed by CM/SW:  referral made to [place] [facility]  Private pay costs discussed: transportation costs    Discussed  Partnership in Safe Discharge Planning  document with patient/family: Yes: given     Handoff Completed: No, handoff not indicated or clinically appropriate    Additional Information:  Met with patient,  and sister. Reviewed disposition. Family and patient have decided they want to take patient home with family/PCA caring for patient 24/7 with hospice. Family/patient has no preference on which hospice. They just want one the could start on Friday.    Next Steps: Referral made to [facility]. Waiting for call to make sure they can take patient and see them Friday 8/29/24. They would need to get DME out tomorrow.    Once equipment is set up patient can return home. Hopeful for discharge 8/29/24, Friday.     SERGIO will continue to assist with disposition.    MIRIAM Clark   Inpatient Care Coordination   Supervisor  [facility]  [phone]      MIRIAM Avila

## 2024-08-28 NOTE — PLAN OF CARE
"Pt is alert and oriented able to make needs known. Pt was given Dilaudid for pain x2. Pt is on comfort cares. Pt had several BM during the shift. Mepilex dressing was changed during the shift. Pt is on 02 2L NC for comfort. Pt is was given Zofran and Ativan during the shift. Pt is assist of two with a lift. Pt has purewick in place. Pt is sleeping in bed. Bed alarm in place and call light within reach.       Goal Outcome Evaluation:      Plan of Care Reviewed With: patient    Overall Patient Progress: no changeOverall Patient Progress: no change    Outcome Evaluation: pt is on comfort cares. pt was given Dilaudid and Ativan      Problem: Adult Inpatient Plan of Care  Goal: Plan of Care Review  Description: The Plan of Care Review/Shift note should be completed every shift.  The Outcome Evaluation is a brief statement about your assessment that the patient is improving, declining, or no change.  This information will be displayed automatically on your shift  note.  Outcome: Progressing  Flowsheets (Taken 8/28/2024 0256)  Outcome Evaluation: pt is on comfort cares. pt was given Dilaudid and Ativan  Plan of Care Reviewed With: patient  Overall Patient Progress: no change  Goal: Patient-Specific Goal (Individualized)  Description: You can add care plan individualizations to a care plan. Examples of Individualization might be:  \"Parent requests to be called daily at 9am for status\", \"I have a hard time hearing out of my right ear\", or \"Do not touch me to wake me up as it startles  me\".  Outcome: Progressing  Goal: Absence of Hospital-Acquired Illness or Injury  Outcome: Progressing  Intervention: Identify and Manage Fall Risk  Recent Flowsheet Documentation  Taken 8/27/2024 2028 by Onesimo Moss RN  Safety Promotion/Fall Prevention:   assistive device/personal items within reach   activity supervised   clutter free environment maintained   increased rounding and observation   nonskid shoes/slippers when out " of bed   patient and family education   safety round/check completed  Intervention: Prevent Skin Injury  Recent Flowsheet Documentation  Taken 8/27/2024 2028 by Onesimo Moss RN  Body Position:   turned   left   right  Goal: Optimal Comfort and Wellbeing  Outcome: Progressing  Intervention: Monitor Pain and Promote Comfort  Recent Flowsheet Documentation  Taken 8/27/2024 2024 by Onesimo Moss RN  Pain Management Interventions:   medication (see MAR)   guided imagery   emotional support   distraction  Goal: Readiness for Transition of Care  Outcome: Progressing     Problem: Skin Injury Risk Increased  Goal: Skin Health and Integrity  Outcome: Progressing  Intervention: Plan: Nurse Driven Intervention: Moisture Management  Recent Flowsheet Documentation  Taken 8/27/2024 2028 by Onesimo Moss RN  Moisture Interventions: No brief in bed  Intervention: Optimize Skin Protection  Recent Flowsheet Documentation  Taken 8/27/2024 2028 by Onesimo Moss RN  Activity Management:   activity encouraged   activity adjusted per tolerance  Head of Bed (HOB) Positioning: HOB at 20-30 degrees

## 2024-08-28 NOTE — PROGRESS NOTES
Pradeep Gilmore    Called for patient request for BB  Apparently was on BB which was discontinued due to transitioning to Comfort care.  Cynthia notes that she gets nauseous when her heart beats fast and is requesting to continue it.  Unfortunately now she is feeling nauseated and is requesting it IV    Metop 2.5 mg IV q 6 x 3 ordered  Am rounder to assess resumption of oral BB

## 2024-08-29 NOTE — PLAN OF CARE
"A&Ox4 on O2 at 2 Lpm for comfort. LS wheezes and patient placed on Duonebs after provider notified.  PRN zofran provided at start of shift which patient stated has resolved.  Dilaudid 1 mg IV provided x 2 for pain in lower back.  Ativan provided for anxiety.  2 gram sodium diet. Pt refusing turns at times with family present.  Continue with comfort care      Goal Outcome Evaluation:      Plan of Care Reviewed With: patient    Overall Patient Progress: no change    Outcome Evaluation: Pt on comfort cares and is A&Ox4 and receiving family support through out day.      Problem: Adult Inpatient Plan of Care  Goal: Plan of Care Review  Description: The Plan of Care Review/Shift note should be completed every shift.  The Outcome Evaluation is a brief statement about your assessment that the patient is improving, declining, or no change.  This information will be displayed automatically on your shift  note.  Outcome: Not Progressing  Flowsheets (Taken 8/28/2024 1954)  Outcome Evaluation: Pt on comfort cares and is A&Ox4 and receiving family support through out day.  Plan of Care Reviewed With: patient  Overall Patient Progress: no change  Goal: Patient-Specific Goal (Individualized)  Description: You can add care plan individualizations to a care plan. Examples of Individualization might be:  \"Parent requests to be called daily at 9am for status\", \"I have a hard time hearing out of my right ear\", or \"Do not touch me to wake me up as it startles  me\".  Outcome: Not Progressing  Goal: Absence of Hospital-Acquired Illness or Injury  Outcome: Not Progressing  Intervention: Identify and Manage Fall Risk  Recent Flowsheet Documentation  Taken 8/28/2024 0957 by Eros Sanchez, RN  Safety Promotion/Fall Prevention: safety round/check completed  Taken 8/28/2024 0817 by Eros Sanchez, RN  Safety Promotion/Fall Prevention: safety round/check completed  Taken 8/28/2024 1463 by Eros Sanchez, RN  Safety Promotion/Fall Prevention: safety " round/check completed  Intervention: Prevent Skin Injury  Recent Flowsheet Documentation  Taken 8/28/2024 0957 by Eros Sanchez, RN  Device Skin Pressure Protection: absorbent pad utilized/changed  Goal: Optimal Comfort and Wellbeing  Outcome: Not Progressing  Intervention: Monitor Pain and Promote Comfort  Recent Flowsheet Documentation  Taken 8/28/2024 0817 by Eros Sanchez, RN  Pain Management Interventions: medication (see MAR)  Goal: Readiness for Transition of Care  Outcome: Not Progressing     Problem: Skin Injury Risk Increased  Goal: Skin Health and Integrity  Outcome: Not Progressing

## 2024-08-29 NOTE — PLAN OF CARE
Cared for pt from [id number]. IV infiltrated on days on left arms, limb elevated on a pillow. New US IV in place by vascular access. Still complaining of extreme pain rated around 7-8/10, some relief with PRN Roxicodone, requesting more SL medications as she said that she would prefer not to have more IVs inserted. Provider notified no new orders. Will keep monitoring.

## 2024-08-29 NOTE — PROGRESS NOTES
VAT consulted to place midline. At this time VAT had not been called to assess the patient for a PIV and the need for a midline. This writer found the left forearm to have an infiltration that was weeping. The right arm had adequate veins for a 22g 1.75in ultrasound guided PIV.  Please page VAT for additional PIV's or if a midline is needed.

## 2024-08-29 NOTE — PROGRESS NOTES
[facility]    Medicine Progress Note - Hospitalist Service    Date of Admission:  8/26/2024    Assessment & Plan   Patient admitted in the early morning of 8/27.  She has significant past medical history to include a stress cardiomyopathy, COPD, coronary artery disease, hypertension, hyperlipidemia, chronic pressure wounds and chronic pain syndrome.  She presented to the emergency department mainly with acute gastroenteritis.  She seems to be terminal and palliative/hospice team has been consulted.  Their assessment, recommendations and follow up are appreciated.  Plan is to send her to hospice if she does not pass here in the hospital.    -Coccyx and Buttocks Pressure Injury Stage 2- POA     -Left Ear Pressure Injury Unstagable-POA      Comfort care with goal of home hospice     Diet: Regular Diet Adult    DVT Prophylaxis: Pneumatic Compression Devices  Quiles Catheter: Not present  Lines: None     Cardiac Monitoring: None  Code Status: No CPR- Do NOT Intubate      Clinically Significant Risk Factors              # Hypoalbuminemia: Lowest albumin = 2.1 g/dL at 8/27/2024  3:47 AM, will monitor as appropriate     # Hypertension: Noted on problem list    # Chronic heart failure with preserved ejection fraction: heart failure noted on problem list and last echo with EF >50%              # Financial/Environmental Concerns: none  # Asthma: noted on problem list       Disposition Plan     Medically Ready for Discharge: Anticipated in 2-4 Days        Enoc Savage MD  Hospitalist Service  [facility]  Securely message with [employer] (more info)  Text page via [employer] Paging/Directory   ______________________________________________________________________    Interval History   Patient denies pain at the moment of my visit, not feeling shortness of breath, denies vomiting or diarrhea.    Physical Exam   Vital Signs:     BP: 122/65 Pulse: 108     SpO2: 98 % O2 Device: Nasal cannula  Oxygen Delivery: 2 LPM  Weight: 150 lbs 0 oz    Constitutional: alert, cooperative, and mild distress  Respiratory: no increased work of breathing, mild air exchange, and coarse sounds  Cardiovascular: tachycardic with regular rhythm    Medical Decision Making       35 MINUTES SPENT BY ME on the date of service doing chart review, history, exam, documentation & further activities per the note.      Data         Imaging results reviewed over the past 24 hrs:   No results found for this or any previous visit (from the past 24 hour(s)).

## 2024-08-29 NOTE — PLAN OF CARE
"A&Ox4. Regular diet. Comfort cares.  PIV in left arm removed.   Edema in left arm and BLE.  LS Wheezes on expiration. Scheduled nebs.  Dilaudid 1 mg provided and Oxycodone 15 mg oral provided.  Denies CP.  Dressings CDI.  Purewick in place with small output.  Continue With POC.       Goal Outcome Evaluation:      Plan of Care Reviewed With: patient    Overall Patient Progress: no change    Outcome Evaluation: On comfort cares and continues to require PRN Dilaudid and PRN Oxycodone for back pain.    Problem: Adult Inpatient Plan of Care  Goal: Plan of Care Review  Description: The Plan of Care Review/Shift note should be completed every shift.  The Outcome Evaluation is a brief statement about your assessment that the patient is improving, declining, or no change.  This information will be displayed automatically on your shift  note.  Outcome: Not Progressing  Flowsheets (Taken 8/29/2024 1456)  Outcome Evaluation: On comfort cares and continues to require PRN Dilaudid and PRN Oxycodone for back pain.  Plan of Care Reviewed With: patient  Overall Patient Progress: no change  Goal: Patient-Specific Goal (Individualized)  Description: You can add care plan individualizations to a care plan. Examples of Individualization might be:  \"Parent requests to be called daily at 9am for status\", \"I have a hard time hearing out of my right ear\", or \"Do not touch me to wake me up as it startles  me\".  Outcome: Not Progressing  Goal: Absence of Hospital-Acquired Illness or Injury  Outcome: Not Progressing  Intervention: Identify and Manage Fall Risk  Recent Flowsheet Documentation  Taken 8/29/2024 1216 by Eros Sanchez, RN  Safety Promotion/Fall Prevention: safety round/check completed  Taken 8/29/2024 0817 by Eros Sanchez, RN  Safety Promotion/Fall Prevention:   clutter free environment maintained   increased rounding and observation  Intervention: Prevent Skin Injury  Recent Flowsheet Documentation  Taken 8/29/2024 1216 by Graf" Eros LYNN RN  Body Position:   turned   right  Taken 8/29/2024 0817 by Eros Sanchez RN  Device Skin Pressure Protection: absorbent pad utilized/changed  Intervention: Prevent and Manage VTE (Venous Thromboembolism) Risk  Recent Flowsheet Documentation  Taken 8/29/2024 0817 by Eros Sanchez RN  VTE Prevention/Management: SCDs off (sequential compression devices)  Intervention: Prevent Infection  Recent Flowsheet Documentation  Taken 8/29/2024 0817 by Eros Sanchez RN  Infection Prevention:   single patient room provided   equipment surfaces disinfected   environmental surveillance performed  Goal: Optimal Comfort and Wellbeing  Outcome: Not Progressing  Intervention: Monitor Pain and Promote Comfort  Recent Flowsheet Documentation  Taken 8/29/2024 1216 by Eros Sanchez RN  Pain Management Interventions:   relaxation techniques promoted   distraction  Taken 8/29/2024 1157 by Eros Sanchez RN  Pain Management Interventions: medication (see MAR)  Goal: Readiness for Transition of Care  Outcome: Not Progressing     Problem: Skin Injury Risk Increased  Goal: Skin Health and Integrity  Outcome: Not Progressing  Intervention: Plan: Nurse Driven Intervention: Moisture Management  Recent Flowsheet Documentation  Taken 8/29/2024 1216 by Eros Sanchez RN  Bathing/Skin Care: incontinence care  Taken 8/29/2024 0817 by Eros Sanchez RN  Moisture Interventions: Incontinence pad  Intervention: Plan: Nurse Driven Intervention: Friction and Shear  Recent Flowsheet Documentation  Taken 8/29/2024 0817 by Eros Sanchez RN  Friction/Shear Interventions: HOB 30 degrees or less  Intervention: Optimize Skin Protection  Recent Flowsheet Documentation  Taken 8/29/2024 1216 by Eros Sanchez RN  Activity Management: activity adjusted per tolerance  Head of Bed (HOB) Positioning: HOB at 20-30 degrees  Taken 8/29/2024 0817 by Eros Sanchez RN  Activity Management: activity adjusted per tolerance

## 2024-08-29 NOTE — PROGRESS NOTES
[facility]    Medicine Progress Note - Hospitalist Service    Date of Admission:  8/26/2024    Assessment & Plan   Patient admitted in the early morning of 8/27.  She has significant past medical history to include a stress cardiomyopathy, COPD, coronary artery disease, hypertension, hyperlipidemia, chronic pressure wounds and chronic pain syndrome.  She presented to the emergency department mainly with acute gastroenteritis.  She seems to be terminal and palliative/hospice team has been consulted.  Their assessment, recommendations and follow up are appreciated.  Plan is to send her to hospice if she does not pass here in the hospital.     Comfort care with goal of home hospice     Diet: Combination Diet 2 gm NA Diet; Low Saturated Fat Na <2400mg Diet, No Caffeine Diet    DVT Prophylaxis: Pneumatic Compression Devices  Quiles Catheter: Not present  Lines: None     Cardiac Monitoring: None  Code Status: No CPR- Do NOT Intubate      Clinically Significant Risk Factors         # Hyponatremia: Lowest Na = 123 mmol/L in last 2 days, will monitor as appropriate    # Hypomagnesemia: Lowest Mg = 1.5 mg/dL in last 2 days, will replace as needed   # Hypoalbuminemia: Lowest albumin = 2.1 g/dL at 8/27/2024  3:47 AM, will monitor as appropriate     # Hypertension: Noted on problem list  # Chronic heart failure with preserved ejection fraction: heart failure noted on problem list and last echo with EF >50%              # Financial/Environmental Concerns: none  # Asthma: noted on problem list       Disposition Plan     Medically Ready for Discharge: Anticipated in 2-4 Days        Enoc Savage MD  Hospitalist Service  [facility]  Securely message with [employer] (more info)  Text page via [employer] Paging/Directory   ______________________________________________________________________    Interval History   Patient denies pain at the moment of my visit, not feeling shortness of breath,  vomiting or diarrhea.    Physical Exam   Vital Signs:     BP: 119/62 Pulse: 107     SpO2: 99 % O2 Device: Nasal cannula Oxygen Delivery: 2 LPM  Weight: 150 lbs 0 oz    Constitutional: alert, cooperative, and mild distress  Respiratory: no increased work of breathing, mild air exchange, and coarse sounds  Cardiovascular: tachycardic with regular rhythm    Medical Decision Making       35 MINUTES SPENT BY ME on the date of service doing chart review, history, exam, documentation & further activities per the note.      Data         Imaging results reviewed over the past 24 hrs:   No results found for this or any previous visit (from the past 24 hour(s)).

## 2024-08-29 NOTE — PLAN OF CARE
"Patient on comfort cares. Complained of back pain and pain med's given. 96% on 2L 02.    Goal Outcome Evaluation:      Plan of Care Reviewed With: patient    Overall Patient Progress: no changeOverall Patient Progress: no change    Outcome Evaluation: On comfort cares.      Problem: Adult Inpatient Plan of Care  Goal: Plan of Care Review  Description: The Plan of Care Review/Shift note should be completed every shift.  The Outcome Evaluation is a brief statement about your assessment that the patient is improving, declining, or no change.  This information will be displayed automatically on your shift  note.  Outcome: Not Progressing  Flowsheets (Taken 8/29/2024 0447)  Outcome Evaluation: On comfort cares.  Plan of Care Reviewed With: patient  Overall Patient Progress: no change  Goal: Patient-Specific Goal (Individualized)  Description: You can add care plan individualizations to a care plan. Examples of Individualization might be:  \"Parent requests to be called daily at 9am for status\", \"I have a hard time hearing out of my right ear\", or \"Do not touch me to wake me up as it startles  me\".  Outcome: Not Progressing  Goal: Absence of Hospital-Acquired Illness or Injury  Outcome: Not Progressing  Intervention: Identify and Manage Fall Risk  Recent Flowsheet Documentation  Taken 8/29/2024 0115 by Jeanine Fry RN  Safety Promotion/Fall Prevention:   clutter free environment maintained   lighting adjusted   nonskid shoes/slippers when out of bed  Intervention: Prevent Skin Injury  Recent Flowsheet Documentation  Taken 8/29/2024 0115 by Jeanine Fry, RN  Device Skin Pressure Protection: absorbent pad utilized/changed  Intervention: Prevent Infection  Recent Flowsheet Documentation  Taken 8/29/2024 0115 by Jeanine Fry, RN  Infection Prevention: single patient room provided  Goal: Optimal Comfort and Wellbeing  Outcome: Not Progressing  Intervention: Monitor Pain and Promote Comfort  Recent Flowsheet " Documentation  Taken 8/29/2024 0153 by Jeanine Fry, RN  Pain Management Interventions: medication (see MAR)  Goal: Readiness for Transition of Care  Outcome: Not Progressing     Problem: Skin Injury Risk Increased  Goal: Skin Health and Integrity  Outcome: Not Progressing  Intervention: Plan: Nurse Driven Intervention: Moisture Management  Recent Flowsheet Documentation  Taken 8/29/2024 0115 by Jeanine Fry, RN  Moisture Interventions:   Incontinence pad   Urinary collection device  Intervention: Plan: Nurse Driven Intervention: Friction and Shear  Recent Flowsheet Documentation  Taken 8/29/2024 0115 by Jeanine Fry, RN  Friction/Shear Interventions: HOB 30 degrees or less  Intervention: Optimize Skin Protection  Recent Flowsheet Documentation  Taken 8/29/2024 0115 by Jeanine Fry, RN  Pressure Reduction Devices: positioning supports utilized  Activity Management: activity adjusted per tolerance

## 2024-08-30 NOTE — PLAN OF CARE
"  Goal Outcome Evaluation:      Plan of Care Reviewed With: patient, child    Overall Patient Progress: no change    Outcome Evaluation:   On comfort care , on 2 L NC , back and ankle pain controlled by prn medication.     Problem: Adult Inpatient Plan of Care  Goal: Plan of Care Review  Description: The Plan of Care Review/Shift note should be completed every shift.  The Outcome Evaluation is a brief statement about your assessment that the patient is improving, declining, or no change.  This information will be displayed automatically on your shift  note.  Outcome: Progressing  Flowsheets (Taken 8/30/2024 0539)  Outcome Evaluation: On comfort care  Plan of Care Reviewed With:   patient   child  Overall Patient Progress: no change  Goal: Patient-Specific Goal (Individualized)  Description: You can add care plan individualizations to a care plan. Examples of Individualization might be:  \"Parent requests to be called daily at 9am for status\", \"I have a hard time hearing out of my right ear\", or \"Do not touch me to wake me up as it startles  me\".  Outcome: Progressing  Goal: Optimal Comfort and Wellbeing  Outcome: Progressing  Intervention: Monitor Pain and Promote Comfort  Recent Flowsheet Documentation  Taken 8/30/2024 0111 by Diony Kim RN  Pain Management Interventions: medication (see MAR)  Taken 8/30/2024 0028 by Diony Kim RN  Pain Management Interventions: medication (see MAR)  Taken 8/29/2024 2306 by Diony Kim RN  Pain Management Interventions: medication (see MAR)  Taken 8/29/2024 2244 by Diony Kim RN  Pain Management Interventions: medication (see MAR)  Taken 8/29/2024 2210 by Diony Kim RN  Pain Management Interventions: medication (see MAR)  Taken 8/29/2024 2155 by Diony Kim, RN  Pain Management Interventions: medication (see MAR)  Taken 8/29/2024 2125 by Diony Kim RN  Pain Management Interventions: medication (see MAR)  Goal: Readiness for Transition of " Care  Outcome: Progressing     Problem: Skin Injury Risk Increased  Goal: Skin Health and Integrity  Outcome: Progressing  Intervention: Plan: Nurse Driven Intervention: Moisture Management  Recent Flowsheet Documentation  Taken 8/29/2024 1939 by Diony Kim, RN  Moisture Interventions:   Incontinence pad   Urinary collection device  Intervention: Plan: Nurse Driven Intervention: Friction and Shear  Recent Flowsheet Documentation  Taken 8/29/2024 1939 by Diony Kim, RN  Friction/Shear Interventions: HOB 30 degrees or less  Intervention: Optimize Skin Protection  Recent Flowsheet Documentation  Taken 8/29/2024 2242 by Diony Kim, RN  Head of Bed (HOB) Positioning: HOB at 20-30 degrees  Taken 8/29/2024 1939 by Diony Kim, RN  Activity Management: activity adjusted per tolerance  Head of Bed (HOB) Positioning: HOB at 30 degrees

## 2024-08-30 NOTE — PROGRESS NOTES
[facility]    Medicine Progress Note - Hospitalist Service    Date of Admission:  8/26/2024    Reason for hospital stay    Hospice care patient  Failure to thrive  Hyponatremia  Hypomagnesemia  Chronic pain  Anasarca  Moderate to severe malnutrition  Pressure ulcer present on admission    Assessment & Plan     Patient admitted in the early morning of 8/27.  She has significant past medical history to include a stress cardiomyopathy, COPD, coronary artery disease, hypertension, hyperlipidemia, chronic pressure wounds and chronic pain syndrome.  She presented to the emergency department mainly with acute gastroenteritis.  She seems to be terminal and palliative/hospice team has been consulted.  Their assessment, recommendations and follow up are appreciated.  Plan is to send her to hospice if she does not pass here in the hospital.    -Coccyx and Buttocks Pressure Injury Stage 2- POA     -Left Ear Pressure Injury Unstagable-POA      Comfort care with goal of home hospice     Diet: Regular Diet Adult    DVT Prophylaxis: Pneumatic Compression Devices  Quiles Catheter: Not present  Lines: None     Cardiac Monitoring: None  Code Status: No CPR- Do NOT Intubate      Clinically Significant Risk Factors              # Hypoalbuminemia: Lowest albumin = 2.1 g/dL at 8/27/2024  3:47 AM, will monitor as appropriate     # Hypertension: Noted on problem list    # Chronic heart failure with preserved ejection fraction: heart failure noted on problem list and last echo with EF >50%              # Financial/Environmental Concerns: none  # Asthma: noted on problem list       Disposition Plan     Medically Ready for Discharge: Anticipated in 2-4 Days likely over the weekend        Ayush Souza MD  Hospitalist Service  [facility]  Securely message with [employer] (more info)  Text page via [employer] Paging/Directory    ______________________________________________________________________    Interval History   Patient is seen and examined by me today and medical record reviewed.Overnight events noted and care discussed with nursing staff.  Patient care assumed by me this morning.  Patient care was discussed with bedside nurse.  She appears to be comfortable and somnolent.  Care plan discussed with .      Physical Exam   Vital Signs:     BP: 107/52 Pulse: 95   Resp: 18 SpO2: 98 % O2 Device: Nasal cannula Oxygen Delivery: 2 LPM  Weight: 150 lbs 0 oz    Constitutional: alert, cooperative, and mild distress  Respiratory: no increased work of breathing, mild air exchange, and coarse sounds  Cardiovascular: tachycardic with regular rhythm    Medical Decision Making       35 MINUTES SPENT BY ME on the date of service doing chart review, history, exam, documentation & further activities per the note.      Data         Imaging results reviewed over the past 24 hrs:   No results found for this or any previous visit (from the past 24 hour(s)).

## 2024-08-30 NOTE — PROGRESS NOTES
Care Management Follow Up    Length of Stay (days): 3    Expected Discharge Date: 09/03/2024     Concerns to be Addressed: disposition  Patient plan of care discussed at interdisciplinary rounds: Yes    Anticipated Discharge Disposition:  [facility]  Anticipated Discharge Services:  hospice  Anticipated Discharge DME:  hospice will provide    Patient/family educated on Medicare website which has current facility and service quality ratings:  yes  Education Provided on the Discharge Plan:  yes  Patient/Family in Agreement with the Plan:  yes    Referrals Placed by CM/SW:  yes  Private pay costs discussed: transportation costs  Handoff Completed: No, handoff not indicated or clinically appropriate    Additional Information:  Family has decided that they are until able to provide safe care for patient at home.     Next Steps: Patient is on the waiting list at [facility]. Spoke with hospice they will probably have bed for patient Tuesday 9/3/24. SW will fransisco them on 9/3/24 for bed.     MIRIAM Clark   Inpatient Care Coordination   Supervisor  [facility]  [phone]      MIRIAM Avila

## 2024-08-31 NOTE — PROVIDER NOTIFICATION
MD paged: Pt on comfort cares. Just gave pt x1 duoneb. Pt requesting another neb. Order is PRN q4hrs. Okay to give another one? Thanks.     Okay to give neb per MD.

## 2024-08-31 NOTE — CONSULTS
NUTRITION BRIEF NOTE      REASON FOR NUTRITION CONSULT:  Malnutrition screening tool (MST).    CHART CHECK:  Per review of chart and orders, patient is on comfort cares.    FOLLOW UP:   RD team will not follow per policy.  Please formally consult if needs arise.    Deidre Cordero RDN, LD  Clinical Dietitian  3rd floor/ICU: [phone]  All other floors: [phone]  Weekend/holiday: [phone]  Office: [phone]

## 2024-08-31 NOTE — PROGRESS NOTES
[facility]    Medicine Progress Note - Hospitalist Service    Date of Admission:  8/26/2024    Reason for hospital stay    Hospice care patient  Failure to thrive  Hyponatremia  Hypomagnesemia  Chronic pain  Anasarca  Moderate to severe malnutrition  Pressure ulcer present on admission    Assessment & Plan     Patient admitted in the early morning of 8/27.  She has significant past medical history to include a stress cardiomyopathy, COPD, coronary artery disease, hypertension, hyperlipidemia, chronic pressure wounds and chronic pain syndrome.  She presented to the emergency department mainly with acute gastroenteritis.  She seems to be terminal and palliative/hospice team has been consulted.  Their assessment, recommendations and follow up are appreciated.  Plan is to send her to hospice if she does not pass here in the hospital.    -Coccyx and Buttocks Pressure Injury Stage 2- POA     -Left Ear Pressure Injury Unstagable-POA      Comfort care with goal of home hospice     Diet: Regular Diet Adult    DVT Prophylaxis: Pneumatic Compression Devices  Quiles Catheter: Not present  Lines: None     Cardiac Monitoring: None  Code Status: No CPR- Do NOT Intubate      Clinically Significant Risk Factors              # Hypoalbuminemia: Lowest albumin = 2.1 g/dL at 8/27/2024  3:47 AM, will monitor as appropriate     # Hypertension: Noted on problem list    # Chronic heart failure with preserved ejection fraction: heart failure noted on problem list and last echo with EF >50%              # Financial/Environmental Concerns: none  # Asthma: noted on problem list       Disposition Plan     Medically Ready for Discharge: Anticipated in 2-4 Days likely on Tuesday        Ayush Souza MD  Hospitalist Service  [facility]  Securely message with [employer] (more info)  Text page via [employer] Paging/Directory   ______________________________________________________________________    Interval  History   Patient is seen and examined by me today and medical record reviewed.Overnight events noted and care discussed with nursing staff.  Patient care assumed by me this morning.  Patient care was discussed with bedside nurse.  She appears to be comfortable and somnolent.  Care plan discussed with .      Physical Exam   Vital Signs:     BP: 107/52 Pulse: 94   Resp: 16 SpO2: 99 % O2 Device: Nasal cannula Oxygen Delivery: 2 LPM  Weight: 150 lbs 0 oz    Constitutional: alert, cooperative, and mild distress  Respiratory: no increased work of breathing, mild air exchange, and coarse sounds  Cardiovascular: tachycardic with regular rhythm    Medical Decision Making       35 MINUTES SPENT BY ME on the date of service doing chart review, history, exam, documentation & further activities per the note.      Data         Imaging results reviewed over the past 24 hrs:   No results found for this or any previous visit (from the past 24 hour(s)).

## 2024-08-31 NOTE — PLAN OF CARE
"Pt is on comfort cares. A&Ox4. On 2L NC. Given PRN Oxy x2 for pain. Given PRN Zofran. Given PRN Ativan. Given PRN Duonebs for SOB. Purewick placed per pt request. Ax2 lift. Plan to discharge on hospice. SW following. Discharge TBD.    Goal Outcome Evaluation:      Plan of Care Reviewed With: patient    Overall Patient Progress: no change    Outcome Evaluation: Comfort cares. Given PRN pain meds and PRN Duonebs.    Problem: Adult Inpatient Plan of Care  Goal: Plan of Care Review  Description: The Plan of Care Review/Shift note should be completed every shift.  The Outcome Evaluation is a brief statement about your assessment that the patient is improving, declining, or no change.  This information will be displayed automatically on your shift  note.  Outcome: Not Progressing  Flowsheets (Taken 8/31/2024 0538)  Outcome Evaluation: Comfort cares. Given PRN pain meds and PRN Duonebs.  Plan of Care Reviewed With: patient  Overall Patient Progress: no change  Goal: Patient-Specific Goal (Individualized)  Description: You can add care plan individualizations to a care plan. Examples of Individualization might be:  \"Parent requests to be called daily at 9am for status\", \"I have a hard time hearing out of my right ear\", or \"Do not touch me to wake me up as it startles  me\".  Outcome: Not Progressing  Goal: Optimal Comfort and Wellbeing  Outcome: Not Progressing  Intervention: Monitor Pain and Promote Comfort  Recent Flowsheet Documentation  Taken 8/30/2024 2149 by Ashley Mcmahan, RN  Pain Management Interventions: medication (see MAR)  Taken 8/30/2024 2015 by Ashley Mcmahan, RN  Pain Management Interventions:   medication offered but refused   repositioned  Goal: Readiness for Transition of Care  Outcome: Not Progressing  Intervention: Mutually Develop Transition Plan  Recent Flowsheet Documentation  Taken 8/30/2024 2034 by Ashley Mcmahan, RN  Equipment Currently Used at Home:   cane, straight   grab bar, toilet   " grab bar, tub/shower   shower chair   raised toilet seat   walker, rolling   wheelchair, manual     Problem: Skin Injury Risk Increased  Goal: Skin Health and Integrity  Outcome: Not Progressing  Intervention: Optimize Skin Protection  Recent Flowsheet Documentation  Taken 8/30/2024 2015 by Ashley Mcmahan, RN  Activity Management: activity adjusted per tolerance  Head of Bed (HOB) Positioning: HOB at 30-45 degrees

## 2024-08-31 NOTE — PLAN OF CARE
"Shift: 0921-0067  A&Ox4   VSS on 2 LPM NC  Takes pills whole with water   Complains of pain in the back, dilaudid and oxycodone given see MAR  Activity: A2 lift, repo Q2. Pt refused multiple repositions this shift. Allowed staff to turn her this afternoon.  Elimination: purewick  Plan to discharge home on hospice   Comfort cares       Goal Outcome Evaluation:      Plan of Care Reviewed With: patient    Overall Patient Progress: no changeOverall Patient Progress: no change    Outcome Evaluation: comfort cares. carvedilol given twice this shift, mepilexs in bottom changed, removed purewick due to skin redness      Problem: Adult Inpatient Plan of Care  Goal: Plan of Care Review  Description: The Plan of Care Review/Shift note should be completed every shift.  The Outcome Evaluation is a brief statement about your assessment that the patient is improving, declining, or no change.  This information will be displayed automatically on your shift  note.  Outcome: Not Progressing  Flowsheets (Taken 8/30/2024 1904)  Outcome Evaluation: comfort cares. carvedilol given twice this shift, mepilexs in bottom changed, removed purewick due to skin redness  Plan of Care Reviewed With: patient  Overall Patient Progress: no change  Goal: Patient-Specific Goal (Individualized)  Description: You can add care plan individualizations to a care plan. Examples of Individualization might be:  \"Parent requests to be called daily at 9am for status\", \"I have a hard time hearing out of my right ear\", or \"Do not touch me to wake me up as it startles  me\".  Outcome: Not Progressing  Goal: Optimal Comfort and Wellbeing  Outcome: Not Progressing  Intervention: Monitor Pain and Promote Comfort  Recent Flowsheet Documentation  Taken 8/30/2024 1508 by Emily Ibarra, RN  Pain Management Interventions: medication (see MAR)  Taken 8/30/2024 1214 by Emily Ibarra, RN  Pain Management Interventions: medication (see MAR)  Taken 8/30/2024 0931 by " Emily Ibarra, RN  Pain Management Interventions:   medication (see MAR)   repositioned  Goal: Readiness for Transition of Care  Outcome: Not Progressing     Problem: Skin Injury Risk Increased  Goal: Skin Health and Integrity  Outcome: Not Progressing  Intervention: Optimize Skin Protection  Recent Flowsheet Documentation  Taken 8/30/2024 1214 by Emily Ibarra, RN  Head of Bed (HOB) Positioning: HOB at 30-45 degrees  Taken 8/30/2024 0952 by Emily Ibarra, RN  Head of Bed (HOB) Positioning: HOB at 30-45 degrees  Taken 8/30/2024 0745 by Emily Ibarra, RN  Head of Bed (HOB) Positioning: HOB at 20 degrees

## 2024-08-31 NOTE — PLAN OF CARE
"VSS on coreg. On Comfort cares. PRN Ativan,oxycodone. Repo q2 and PRN. Pt wants purwick and brief in place. Wound care provided.     Goal Outcome Evaluation:      Plan of Care Reviewed With: patient    Overall Patient Progress: no changeOverall Patient Progress: no change    Outcome Evaluation: Comfort cares, PRN Ativan,oxy.    Problem: Adult Inpatient Plan of Care  Goal: Plan of Care Review  Description: The Plan of Care Review/Shift note should be completed every shift.  The Outcome Evaluation is a brief statement about your assessment that the patient is improving, declining, or no change.  This information will be displayed automatically on your shift  note.  Outcome: Progressing  Flowsheets (Taken 8/31/2024 1042)  Outcome Evaluation: Comfort cares, PRN Ativan,oxy.  Plan of Care Reviewed With: patient  Overall Patient Progress: no change  Goal: Patient-Specific Goal (Individualized)  Description: You can add care plan individualizations to a care plan. Examples of Individualization might be:  \"Parent requests to be called daily at 9am for status\", \"I have a hard time hearing out of my right ear\", or \"Do not touch me to wake me up as it startles  me\".  Outcome: Progressing  Goal: Optimal Comfort and Wellbeing  Outcome: Progressing  Goal: Readiness for Transition of Care  Outcome: Progressing     Problem: Skin Injury Risk Increased  Goal: Skin Health and Integrity  Outcome: Progressing  Intervention: Optimize Skin Protection  Recent Flowsheet Documentation  Taken 8/31/2024 7521 by Geraldine Alvarenga, RN  Activity Management: activity adjusted per tolerance       "

## 2024-09-01 NOTE — PLAN OF CARE
"Shift Summary (1500 - 1730):    Pt A/O x4. Comfort cares. VSS. 2L O2 NC for comfort. On coreg/nebs. Ativan, oxy, dilaudid PRN given this shift. Ativan 1x for intermittent nausea. Purewick in place, 200ml out. Assist of 2 lift, repo as tolerated. Plan to discharge home on hospice.     Goal Outcome Evaluation:      Plan of Care Reviewed With: patient, family    Overall Patient Progress: no change    Outcome Evaluation: Comfort cares. PRN Dilaudid, Oxy, Ativan 1x.      Problem: Adult Inpatient Plan of Care  Goal: Plan of Care Review  Description: The Plan of Care Review/Shift note should be completed every shift.  The Outcome Evaluation is a brief statement about your assessment that the patient is improving, declining, or no change.  This information will be displayed automatically on your shift  note.  Outcome: Progressing  Flowsheets (Taken 8/31/2024 2230)  Outcome Evaluation: Comfort cares. PRN Dilaudid, Oxy, Ativan 1x.  Plan of Care Reviewed With:   patient   family  Overall Patient Progress: no change  Goal: Patient-Specific Goal (Individualized)  Description: You can add care plan individualizations to a care plan. Examples of Individualization might be:  \"Parent requests to be called daily at 9am for status\", \"I have a hard time hearing out of my right ear\", or \"Do not touch me to wake me up as it startles  me\".  Outcome: Progressing  Goal: Optimal Comfort and Wellbeing  Outcome: Progressing  Intervention: Monitor Pain and Promote Comfort  Recent Flowsheet Documentation  Taken 8/31/2024 1559 by Ramona Thapa, RN  Pain Management Interventions:   medication (see MAR)   repositioned  Goal: Readiness for Transition of Care  Outcome: Progressing     Problem: Skin Injury Risk Increased  Goal: Skin Health and Integrity  Outcome: Progressing  Intervention: Plan: Nurse Driven Intervention: Moisture Management  Recent Flowsheet Documentation  Taken 8/31/2024 2127 by Ramona Thapa, RN  Bathing/Skin Care: incontinence " care  Intervention: Optimize Skin Protection  Recent Flowsheet Documentation  Taken 8/31/2024 2127 by Ramona Thapa, RN  Activity Management: activity adjusted per tolerance  Head of Bed (HOB) Positioning: HOB at 20 degrees  Taken 8/31/2024 1559 by Ramona Thapa, RN  Activity Management: activity adjusted per tolerance  Head of Bed (HOB) Positioning: HOB at 20-30 degrees

## 2024-09-01 NOTE — PROGRESS NOTES
[facility]    Medicine Progress Note - Hospitalist Service    Date of Admission:  8/26/2024    Reason for hospital stay    Hospice care patient  Failure to thrive  Hyponatremia  Hypomagnesemia  Chronic pain  Anasarca  Moderate to severe malnutrition  Pressure ulcer present on admission    Assessment & Plan     Patient admitted in the early morning of 8/27.  She has significant past medical history to include a stress cardiomyopathy, COPD, coronary artery disease, hypertension, hyperlipidemia, chronic pressure wounds and chronic pain syndrome.  She presented to the emergency department mainly with acute gastroenteritis.  She seems to be terminal and palliative/hospice team has been consulted.  Their assessment, recommendations and follow up are appreciated.  Plan is to send her to hospice if she does not pass here in the hospital.    -Coccyx and Buttocks Pressure Injury Stage 2- POA     -Left Ear Pressure Injury Unstagable-POA      Comfort care with goal of home hospice     Diet: Regular Diet Adult    DVT Prophylaxis: Pneumatic Compression Devices  Quiles Catheter: Not present  Lines: None     Cardiac Monitoring: None  Code Status: No CPR- Do NOT Intubate      Clinically Significant Risk Factors              # Hypoalbuminemia: Lowest albumin = 2.1 g/dL at 8/27/2024  3:47 AM, will monitor as appropriate     # Hypertension: Noted on problem list    # Chronic heart failure with preserved ejection fraction: heart failure noted on problem list and last echo with EF >50%              # Financial/Environmental Concerns: none  # Asthma: noted on problem list       Disposition Plan     Medically Ready for Discharge: Anticipated in 2-4 Days likely on Tuesday        Ayush Souza MD  Hospitalist Service  [facility]  Securely message with [employer] (more info)  Text page via [employer] Paging/Directory   ______________________________________________________________________    Interval  History   Patient is seen and examined by me today and medical record reviewed.Overnight events noted and care discussed with nursing staff.  Patient is lethargic but responsive.  Care plan discussed with bedside nurse.  Appears to be declining  Physical Exam   Vital Signs:     BP: 123/58 Pulse: 94   Resp: 16 SpO2: 100 % O2 Device: Nasal cannula Oxygen Delivery: 2 LPM  Weight: 150 lbs 0 oz    Constitutional: alert, cooperative, and mild distress  Respiratory: no increased work of breathing, mild air exchange, and coarse sounds  Cardiovascular: tachycardic with regular rhythm    Medical Decision Making       35 MINUTES SPENT BY ME on the date of service doing chart review, history, exam, documentation & further activities per the note.      Data         Imaging results reviewed over the past 24 hrs:   No results found for this or any previous visit (from the past 24 hour(s)).

## 2024-09-01 NOTE — PLAN OF CARE
"Pt is on comfort cares. A&Ox4. Able to make needs known. On 2L NC. Given PRN Dilaudid and PRN Oxy for pain. Lethargic at times. Congested cough. Purewick in place. Ax2 lift. SW following. Plan to discharge on hospice. Discharge TBD.    Goal Outcome Evaluation:      Plan of Care Reviewed With: patient    Overall Patient Progress: no change    Outcome Evaluation: Comfort cares. Given PRN Dilaudid and PRN Oxy.    Problem: Adult Inpatient Plan of Care  Goal: Plan of Care Review  Description: The Plan of Care Review/Shift note should be completed every shift.  The Outcome Evaluation is a brief statement about your assessment that the patient is improving, declining, or no change.  This information will be displayed automatically on your shift  note.  Outcome: Progressing  Flowsheets (Taken 9/1/2024 0635)  Outcome Evaluation: Comfort cares. Given PRN Dilaudid and PRN Oxy.  Plan of Care Reviewed With: patient  Overall Patient Progress: no change  Goal: Patient-Specific Goal (Individualized)  Description: You can add care plan individualizations to a care plan. Examples of Individualization might be:  \"Parent requests to be called daily at 9am for status\", \"I have a hard time hearing out of my right ear\", or \"Do not touch me to wake me up as it startles  me\".  Outcome: Progressing  Goal: Optimal Comfort and Wellbeing  Outcome: Progressing  Intervention: Monitor Pain and Promote Comfort  Recent Flowsheet Documentation  Taken 9/1/2024 0624 by Ashley Mcmahan, RN  Pain Management Interventions: medication (see MAR)  Taken 9/1/2024 0330 by Ashley Mcmahan, RN  Pain Management Interventions: medication (see MAR)  Goal: Readiness for Transition of Care  Outcome: Progressing     Problem: Skin Injury Risk Increased  Goal: Skin Health and Integrity  Outcome: Progressing  Intervention: Optimize Skin Protection  Recent Flowsheet Documentation  Taken 8/31/2024 2318 by Ashley Mcmahan, RN  Head of Bed (HOB) Positioning: HOB at " 20-30 degrees

## 2024-09-01 NOTE — PLAN OF CARE
"VSS on coreg. On Comfort cares. PRN comfort meds. Repo q2 and PRN. Mouth cares provided. Pt declining. More lethargic today cool to touch.       Goal Outcome Evaluation:      Plan of Care Reviewed With: patient    Overall Patient Progress: decliningOverall Patient Progress: declining    Outcome Evaluation: Pt declining. PRN comfort meds given. Able to take PO meds this morning      Problem: Adult Inpatient Plan of Care  Goal: Plan of Care Review  Description: The Plan of Care Review/Shift note should be completed every shift.  The Outcome Evaluation is a brief statement about your assessment that the patient is improving, declining, or no change.  This information will be displayed automatically on your shift  note.  Outcome: Not Progressing  Flowsheets (Taken 9/1/2024 1025)  Outcome Evaluation: Pt declining. PRN comfort meds given. Able to take PO meds this morning  Overall Patient Progress: declining  Goal: Patient-Specific Goal (Individualized)  Description: You can add care plan individualizations to a care plan. Examples of Individualization might be:  \"Parent requests to be called daily at 9am for status\", \"I have a hard time hearing out of my right ear\", or \"Do not touch me to wake me up as it startles  me\".  Outcome: Not Progressing  Goal: Optimal Comfort and Wellbeing  Outcome: Not Progressing  Goal: Readiness for Transition of Care  Outcome: Not Progressing     Problem: Skin Injury Risk Increased  Goal: Skin Health and Integrity  Outcome: Not Progressing  Intervention: Optimize Skin Protection  Recent Flowsheet Documentation  Taken 9/1/2024 0900 by Geraldine Alvarenga RN  Activity Management: activity adjusted per tolerance     "

## 2024-09-02 NOTE — PROGRESS NOTES
Care Management Discharge Note    Discharge Date: 09/03/2024       Discharge Disposition: Hospice, residential    Discharge Services:      Discharge DME:      Discharge Transportation: agency    Private pay costs discussed: transportation costs     Patient/family educated on Medicare website which has current facility and service quality ratings: yes    Education Provided on the Discharge Plan: Yes  Persons Notified of Discharge Plans: spouse, sons and hospice facility  Patient/Family in Agreement with the Plan: yes    Handoff Referral Completed: No, handoff not indicated or clinically appropriate    Additional Information:  Pt accepted at [facility] to admit tomorrow morning. SW followed up with spouse and son to provide an update.  They have been thinking about taking pt home with hospice. They also expressed that they would like pt to stay in the hospital longer than tomorrow. SW shared provider stated pt is ready to discharge to facility tomorrow. Family wanted to have another conversation about taking pt home vs [facility].    Update: spouse and sons (Wander and Walker) made the decision for pt to go to [facility] tomorrow.  Sw informed them that spouse needs to be at admit and they would like Walker as well.     SW discussed options of stretcher transport and charges.  Spouse agreed to have [facility] stretcher. Transport scheduled and PCS form completed/submitted.   8:37-9:22. Updated [facility] and family on time.    [facility] requested COVID and fax results, completed    Provider aware of early discharge tomorrow.    SALMA Varner, [facility]  Inpatient Care Coordination  [facility]  [phone]      EMMETT DOWD, [facility] Hospital[facility]

## 2024-09-02 NOTE — PLAN OF CARE
"  Problem: Adult Inpatient Plan of Care  Goal: Plan of Care Review  Description: The Plan of Care Review/Shift note should be completed every shift.  The Outcome Evaluation is a brief statement about your assessment that the patient is improving, declining, or no change.  This information will be displayed automatically on your shift  note.  Outcome: Progressing  Flowsheets (Taken 9/2/2024 0619)  Outcome Evaluation: Comfort care, PRN dilaudid x1 given, oral care done, reposition as tolerated.  Plan of Care Reviewed With:   patient   spouse  Overall Patient Progress: declining  Goal: Patient-Specific Goal (Individualized)  Description: You can add care plan individualizations to a care plan. Examples of Individualization might be:  \"Parent requests to be called daily at 9am for status\", \"I have a hard time hearing out of my right ear\", or \"Do not touch me to wake me up as it startles  me\".  Outcome: Progressing  Goal: Optimal Comfort and Wellbeing  Outcome: Progressing  Goal: Readiness for Transition of Care  Outcome: Progressing     Problem: Skin Injury Risk Increased  Goal: Skin Health and Integrity  Outcome: Progressing  Intervention: Plan: Nurse Driven Intervention: Moisture Management  Recent Flowsheet Documentation  Taken 9/1/2024 2322 by Beatriz Stein RN  Moisture Interventions: Incontinence pad  Intervention: Plan: Nurse Driven Intervention: Friction and Shear  Recent Flowsheet Documentation  Taken 9/1/2024 2322 by Beatriz Setin, RN  Friction/Shear Interventions: HOB 30 degrees or less  Intervention: Optimize Skin Protection  Recent Flowsheet Documentation  Taken 9/1/2024 2322 by Beatriz Stein, RN  Activity Management: (Simultaneous filing. User may not have seen previous data.) activity adjusted per tolerance   Goal Outcome Evaluation:      Plan of Care Reviewed With: patient, spouse    Overall Patient Progress: decliningOverall Patient Progress: declining    Outcome Evaluation: Comfort care, PRN dilaudid x1 " given, oral care done, reposition as tolerated.

## 2024-09-02 NOTE — PLAN OF CARE
"Shift Summary (4132 - 7032):    Comfort cares. Oriented at times but declining.  Spouse at bedside. Dilaudid x Oxy given for pain/comfort. Purewick in place. Repo as tolerated. Oral cares provided.     Goal Outcome Evaluation:      Plan of Care Reviewed With: patient, spouse, family    Overall Patient Progress: declining    Outcome Evaluation: Pt very lethargic. Cool extremities. PRN meds for comfort.      Problem: Adult Inpatient Plan of Care  Goal: Plan of Care Review  Description: The Plan of Care Review/Shift note should be completed every shift.  The Outcome Evaluation is a brief statement about your assessment that the patient is improving, declining, or no change.  This information will be displayed automatically on your shift  note.  Outcome: Progressing  Flowsheets (Taken 9/1/2024 2056)  Outcome Evaluation: Pt very lethargic. Cool extremities. PRN meds for comfort.  Plan of Care Reviewed With:   patient   spouse   family  Overall Patient Progress: declining  Goal: Patient-Specific Goal (Individualized)  Description: You can add care plan individualizations to a care plan. Examples of Individualization might be:  \"Parent requests to be called daily at 9am for status\", \"I have a hard time hearing out of my right ear\", or \"Do not touch me to wake me up as it startles  me\".  Outcome: Progressing  Goal: Optimal Comfort and Wellbeing  Outcome: Progressing  Intervention: Monitor Pain and Promote Comfort  Recent Flowsheet Documentation  Taken 9/1/2024 1925 by Ramona Thapa, RN  Pain Management Interventions: medication (see MAR)  Goal: Readiness for Transition of Care  Outcome: Progressing     Problem: Skin Injury Risk Increased  Goal: Skin Health and Integrity  Outcome: Progressing  Intervention: Plan: Nurse Driven Intervention: Moisture Management  Recent Flowsheet Documentation  Taken 9/1/2024 1945 by Ramona Thapa, RN  Bathing/Skin Care: incontinence care  Intervention: Optimize Skin Protection  Recent Flowsheet " Documentation  Taken 9/1/2024 1945 by Ramona Thapa, RN  Activity Management: activity adjusted per tolerance  Head of Bed (HOB) Positioning: HOB at 20 degrees  Taken 9/1/2024 1645 by Ramona Thapa, RN  Activity Management: activity adjusted per tolerance  Head of Bed (HOB) Positioning: HOB at 20 degrees

## 2024-09-02 NOTE — PLAN OF CARE
"Pt on comfort care.  at bedside. PRN ativan x1 Hydromorphone x1.  [facility] for tentative discharge location.     Problem: Adult Inpatient Plan of Care  Goal: Plan of Care Review  Description: The Plan of Care Review/Shift note should be completed every shift.  The Outcome Evaluation is a brief statement about your assessment that the patient is improving, declining, or no change.  This information will be displayed automatically on your shift  note.  Outcome: Not Progressing  Flowsheets (Taken 9/2/2024 0954)  Outcome Evaluation: Comfort care. Repostion as tolerated.  at bedside.  Plan of Care Reviewed With:   patient   spouse  Overall Patient Progress: no change  Goal: Patient-Specific Goal (Individualized)  Description: You can add care plan individualizations to a care plan. Examples of Individualization might be:  \"Parent requests to be called daily at 9am for status\", \"I have a hard time hearing out of my right ear\", or \"Do not touch me to wake me up as it startles  me\".  Outcome: Not Progressing  Goal: Absence of Hospital-Acquired Illness or Injury  Intervention: Identify and Manage Fall Risk  Recent Flowsheet Documentation  Taken 9/2/2024 0938 by Navneet Bob, RN  Safety Promotion/Fall Prevention: safety round/check completed  Intervention: Prevent Infection  Recent Flowsheet Documentation  Taken 9/2/2024 0901 by Navneet Bob, RN  Infection Prevention:   single patient room provided   visitors restricted/screened  Goal: Optimal Comfort and Wellbeing  Outcome: Not Progressing  Goal: Readiness for Transition of Care  Outcome: Not Progressing       Goal Outcome Evaluation:      Plan of Care Reviewed With: patient, spouse    Overall Patient Progress: no changeOverall Patient Progress: no change    Outcome Evaluation: Comfort care. Repostion as tolerated.  at bedside.      "

## 2024-09-02 NOTE — PROGRESS NOTES
[facility]    Medicine Progress Note - Hospitalist Service    Date of Admission:  8/26/2024    Reason for hospital stay    Hospice care patient  Failure to thrive  Hyponatremia  Hypomagnesemia  Chronic pain  Anasarca  Moderate to severe malnutrition  Pressure ulcer present on admission    Assessment & Plan     Patient admitted in the early morning of 8/27.  She has significant past medical history to include a stress cardiomyopathy, COPD, coronary artery disease, hypertension, hyperlipidemia, chronic pressure wounds and chronic pain syndrome.  She presented to the emergency department mainly with acute gastroenteritis.  She seems to be terminal and palliative/hospice team has been consulted.  Their assessment, recommendations and follow up are appreciated.  Plan is to send her to hospice if she does not pass here in the hospital.    -Coccyx and Buttocks Pressure Injury Stage 2- POA     -Left Ear Pressure Injury Unstagable-POA      Comfort care with goal of home hospice     Diet: Regular Diet Adult    DVT Prophylaxis: Pneumatic Compression Devices  Quiles Catheter: Not present  Lines: None     Cardiac Monitoring: None  Code Status: No CPR- Do NOT Intubate      Clinically Significant Risk Factors              # Hypoalbuminemia: Lowest albumin = 2.1 g/dL at 8/27/2024  3:47 AM, will monitor as appropriate     # Hypertension: Noted on problem list    # Chronic heart failure with preserved ejection fraction: heart failure noted on problem list and last echo with EF >50%              # Financial/Environmental Concerns: none  # Asthma: noted on problem list       Disposition Plan     Medically Ready for Discharge: Anticipated Tomorrow         Ayush Souza MD  Hospitalist Service  [facility]  Securely message with [employer] (more info)  Text page via [employer] Paging/Directory   ______________________________________________________________________    Interval History   Patient is  seen and examined by me today and medical record reviewed.Overnight events noted and care discussed with nursing staff.  Patient is lethargic but responsive.  Care plan discussed with bedside nurse.  Appears to be declining  Physical Exam   Vital Signs:           Resp: 16        Weight: 150 lbs 0 oz    Constitutional: alert, cooperative, and mild distress  Respiratory: no increased work of breathing, mild air exchange, and coarse sounds  Cardiovascular: tachycardic with regular rhythm    Medical Decision Making       35 MINUTES SPENT BY ME on the date of service doing chart review, history, exam, documentation & further activities per the note.      Data         Imaging results reviewed over the past 24 hrs:   No results found for this or any previous visit (from the past 24 hour(s)).

## 2024-09-03 NOTE — PLAN OF CARE
Goal Outcome Evaluation:      Plan of Care Reviewed With: patient, family    Overall Patient Progress: decliningOverall Patient Progress: declining    Outcome Evaluation: Comfort cares. PRN comfort meds oxy & dilaudid given. Gently reposition pt as tolerated w 3ppl. Family at bedside      Problem: Adult Inpatient Plan of Care  Goal: Plan of Care Review  Description: The Plan of Care Review/Shift note should be completed every shift.  The Outcome Evaluation is a brief statement about your assessment that the patient is improving, declining, or no change.  This information will be displayed automatically on your shift  note.  Outcome: Not Progressing  Flowsheets (Taken 9/3/2024 0804)  Outcome Evaluation: Comfort cares. PRN comfort meds oxy & dilaudid given. Gently reposition pt as tolerated w 3ppl. Family at bedside  Plan of Care Reviewed With:   patient   family  Overall Patient Progress: declining

## 2024-09-03 NOTE — PROGRESS NOTES
Pt transfer at 0935 to [facility]. Family present. Pt family took discharge medication. Signed by . Discharge paperwork given to . Family took belongings.

## 2024-09-03 NOTE — PLAN OF CARE
"Goal Outcome Evaluation:    Comfort cares. PRN comfort meds given. Repositioned as tolerated. Discharge planned to [facility] for tomorrow. Family at bedside.      Plan of Care Reviewed With: patient, spouse, child    Overall Patient Progress: declining    Outcome Evaluation: Comfort cares, prn oxy, dilaudid.    Problem: Adult Inpatient Plan of Care  Goal: Plan of Care Review  Description: The Plan of Care Review/Shift note should be completed every shift.  The Outcome Evaluation is a brief statement about your assessment that the patient is improving, declining, or no change.  This information will be displayed automatically on your shift  note.  Outcome: Progressing  Flowsheets (Taken 9/2/2024 2041)  Outcome Evaluation: Comfort cares, prn oxy, dilaudid.  Plan of Care Reviewed With:   patient   spouse   child  Overall Patient Progress: declining  Goal: Patient-Specific Goal (Individualized)  Description: You can add care plan individualizations to a care plan. Examples of Individualization might be:  \"Parent requests to be called daily at 9am for status\", \"I have a hard time hearing out of my right ear\", or \"Do not touch me to wake me up as it startles  me\".  Outcome: Progressing  Goal: Optimal Comfort and Wellbeing  Outcome: Progressing  Intervention: Monitor Pain and Promote Comfort  Recent Flowsheet Documentation  Taken 9/2/2024 1600 by Arely Dee, RN  Pain Management Interventions:   medication (see MAR)   repositioned  Goal: Readiness for Transition of Care  Outcome: Progressing     Problem: Skin Injury Risk Increased  Goal: Skin Health and Integrity  Outcome: Progressing  Intervention: Optimize Skin Protection  Recent Flowsheet Documentation  Taken 9/2/2024 1600 by Arely Dee, RN  Activity Management: activity adjusted per tolerance  Head of Bed (HOB) Positioning: HOB at 20-30 degrees  "

## 2024-09-04 NOTE — PROGRESS NOTES
Clinic Care Coordination Contact  Care Coordination Clinician Chart Review    Situation: Patient chart reviewed by care coordinator.    Background:  Patient is enrolled in Care Coordination and followed by RN CC.     Assessment: Upon chart review, patient is no longer a candidate for Care Coordination.     Patient admitted into hosp[facility] at [facility].     Plan/Recommendations: Clinic Care Coordination program has been closed. RN CC will perform no further outreaches at this time and will remain available as needed. If new needs arise, a new Care Coordination Referral may be placed.    Cynthia Snell RN, BSN, CPHN, CCM  [facility] Ambulatory Care Management  [facility]  Phone: [phone]  Email: [email or web address]

## 2024-09-04 NOTE — DISCHARGE SUMMARY
[facility]  Hospitalist Discharge Summary      Date of Admission:  8/26/2024  Date of Discharge:  9/3/2024  9:35 AM  Discharging Provider: Ayush Souza MD  Discharge Service: Hospitalist Service    Discharge Diagnoses   Hospice care patient  Failure to thrive  Hyponatremia  Hypomagnesemia  Chronic pain  Anasarca  Moderate to severe malnutrition  Pressure ulcer present on admission    Clinically Significant Risk Factors          Follow-ups Needed After Discharge   Follow-up Appointments     Follow-up and recommended labs and tests       Follow up with hospice team as soon as possible            Discharge Disposition   Transferred to Hospice   Condition at discharge: Terminal    Hospital Course   Patient admitted in the early morning of 8/27.  She has significant past medical history to include a stress cardiomyopathy, COPD, coronary artery disease, hypertension, hyperlipidemia, chronic pressure wounds and chronic pain syndrome.  She presented to the emergency department mainly with acute gastroenteritis.  She seems to be terminal and palliative/hospice team has been consulted.  Their assessment, recommendations and follow up are appreciated     -Coccyx and Buttocks Pressure Injury Stage 2- POA     -Left Ear Pressure Injury Unstagable-POA     Consultations This Hospital Stay   CARE MANAGEMENT / SOCIAL WORK IP CONSULT  PALLIATIVE CARE ADULT IP CONSULT  WOUND OSTOMY CONTINENCE NURSE  IP CONSULT  SPIRITUAL HEALTH SERVICES IP CONSULT  SOCIAL WORK IP CONSULT  CARE MANAGEMENT / SOCIAL WORK IP CONSULT  VASCULAR ACCESS ADULT IP CONSULT    Code Status   Prior    Time Spent on this Encounter   I, Ayush Souza MD, personally saw the patient today and spent less than or equal to 30 minutes discharging this patient.       Ayush Souza MD  [facility] MEDICAL SURGICAL  [address] [facility] [address]  Phone: [phone]  Fax:  [phone]  ______________________________________________________________________           Primary Care Physician   Bandar Weinberg    Discharge Orders      Medication Therapy Management Referral      Hospice Referral      Reason for your hospital stay    Failure to thrive     Follow-up and recommended labs and tests     Follow up with hospice team as soon as possible     Activity    Your activity upon discharge: activity as tolerated     Diet    Follow this diet upon discharge: Current Diet:Orders Placed This Encounter      Regular Diet Adult       Significant Results and Procedures   Results for orders placed or performed during the hospital encounter of 08/26/24   US Lower Extremity Venous Duplex Bilateral    Narrative    EXAM: US LOWER EXTREMITY VENOUS DUPLEX BILATERAL  LOCATION: [facility]  DATE: 8/27/2024    INDICATION: swelling  COMPARISON: None.  TECHNIQUE: Venous Duplex ultrasound of bilateral lower extremities with and without compression, augmentation and duplex. Color flow and spectral Doppler with waveform analysis performed.    FINDINGS: Exam includes the common femoral, femoral, popliteal veins as well as segmentally visualized deep calf veins and greater saphenous vein.     RIGHT: No deep vein thrombosis. No superficial thrombophlebitis. No popliteal cyst.    LEFT: No deep vein thrombosis. No superficial thrombophlebitis. No popliteal cyst.      Impression    IMPRESSION:  1.  No deep venous thrombosis in the bilateral lower extremities.   XR Tibia and Fibula Left 2 Views    Narrative    EXAM: XR TIBIA AND FIBULA LEFT 2 VIEWS  LOCATION: [facility]  DATE: 8/27/2024    INDICATION: swelling and pain  COMPARISON: None.      Impression    IMPRESSION: No acute fracture or dislocation identified.   XR Chest 1 View    Narrative    EXAM: XR CHEST 1 VIEW  LOCATION: [facility]  DATE: 8/27/2024    INDICATION: Weakness.  COMPARISON: CT  pulmonary angiogram 4/6/2024.      Impression    IMPRESSION: The patient is rotated. Emphysematous changes. Slight elevation of the left hemidiaphragm. Strands of platelike atelectasis in the lower lungs, more on the left. No adenopathy or effusion. Atherosclerotic thoracic aorta. Degenerative changes   thoracic spine. Cholecystectomy clips.   XR Pelvis w Hip Left 1 View    Narrative    EXAM: XR PELVIS AND HIP LEFT 1 VIEW  LOCATION: [facility]  DATE: 8/27/2024    INDICATION: Pain  COMPARISON: None.      Impression    IMPRESSION: No acute fracture or dislocation. Mild degenerative narrowing of bilateral hip joints. Bilateral aortoiliofemoral atherosclerotic calcifications noted.     *Note: Due to a large number of results and/or encounters for the requested time period, some results have not been displayed. A complete set of results can be found in Results Review.       Discharge Medications   Discharge Medication List as of 9/3/2024  9:14 AM        START taking these medications    Details   acetaminophen (TYLENOL) 650 MG suppository Place 1 suppository (650 mg) rectally every 4 hours as needed for fever., Disp-4 suppository, R-0, E-Prescribe      atropine 1 % ophthalmic solution Take 2 drops by mouth, place under tongue or place inside cheek every 4 hours as needed for secretions., Disp-5 mL, R-0, E-Prescribe      bisacodyl (DULCOLAX) 10 MG suppository Place 1 suppository (10 mg) rectally daily as needed for constipation., Disp-2 suppository, R-0, E-Prescribe      haloperidol (HALDOL) 2 MG/ML (HIGH CONC) solution Take 0.5 mLs (1 mg) by mouth or place under tongue every 6 hours as needed for agitation or other (nausea)., Disp-10 mL, R-0, E-Prescribe      LORazepam (LORAZEPAM INTENSOL) 2 MG/ML (HIGH CONC) oral solution Take 0.25 mLs (0.5 mg) by mouth or place under tongue every 4 hours as needed for anxiety (restlessness)., Disp-30 mL, R-0, E-Prescribe      morphine sulfate, high concentrate,  (ROXANOL-CONCENTRATED) 20 MG/ML concentrated solution Take 0.25 mLs (5 mg) by mouth or place under tongue every 2 hours as needed for shortness of breath or pain., Disp-30 mL, R-0, E-PrescribeHospice patient. Dispense in quantities of 30 mL.      senna (SENNA LAX) 8.6 MG tablet Take 2 tablets by mouth 2 times daily as needed for constipation., Disp-100 tablet, R-0, E-Prescribe           CONTINUE these medications which have NOT CHANGED    Details   albuterol (VENTOLIN HFA) 108 (90 BASE) MCG/ACT Inhaler Inhale 2 puffs into the lungs every 6 hours as needed for shortness of breath / dyspnea or wheezing, Disp-18 g, R-3, E-Prescribe      azithromycin (ZITHROMAX) 250 MG tablet Take 250 mg by mouth Every Mon, Wed, Fri Morning, Historical      carvedilol (COREG) 6.25 MG tablet Take 1 tablet (6.25 mg) by mouth 2 times daily (with meals), Disp-180 tablet, R-1, E-Prescribe      cetirizine (ZYRTEC) 10 MG tablet Take 10 mg by mouth at bedtime, Historical      erythromycin (ROMYCIN) 5 MG/GM ophthalmic ointment Place into both eyes 2 times dailyDisp-3.5 g, [id number]      fluticasone (FLONASE) 50 MCG/ACT nasal spray Spray 1-2 sprays into both nostrils daily as needed for rhinitis or allergies, Disp-16 g, R-4, E-Prescribe      HYDROcodone-acetaminophen (NORCO)  MG per tablet Take 2 tablets by mouth every 4 hours as needed for severe pain., Disp-240 tablet, R-0, E-Prescribe      ipratropium - albuterol 0.5 mg/2.5 mg/3 mL (DUONEB) 0.5-2.5 (3) MG/3ML neb solution Take 1 vial by nebulization every 6 hours as needed for shortness of breath, wheezing or cough, Historical      methocarbamol (ROBAXIN) 500 MG tablet Take 500 mg by mouth 4 times daily as needed for muscle spasms, Historical      montelukast (SINGULAIR) 10 MG tablet TAKE ONE TABLET BY MOUTH AT BEDTIME, Disp-90 tablet, R-1, E-Prescribe      naloxone (NARCAN) 4 MG/0.1ML nasal spray Spray 1 spray (4 mg) into one nostril alternating nostrils once as needed for opioid  reversal Every 2-3 minutes until patient responsive or EMS arrives, Disp-0.2 mL, R-0, Local Print      ondansetron (ZOFRAN ODT) 4 MG ODT tab DISSOLVE 1 TABLET (4 MG) BY MOUTH EVERY 6 HOURS AS NEEDED FOR NAUSEA OR VOMITING, Disp-24 tablet, R-3, E-Prescribe      pantoprazole (PROTONIX) 40 MG EC tablet TAKE 1 TABLET (40 MG) BY MOUTH DAILY, Disp-90 tablet, R-1, E-Prescribe      predniSONE (DELTASONE) 5 MG tablet Take 5 mg by mouth 2 times daily., Historical      roflumilast (DALIRESP) 500 MCG TABS tablet Take 500 mcg by mouth at bedtime, Historical      spironolactone (ALDACTONE) 25 MG tablet Take 0.5 tablets (12.5 mg) by mouth daily, Disp-90 tablet, R-4, E-Prescribe      SUMAtriptan (IMITREX) 25 MG tablet Take 1 tablet (25 mg) by mouth at onset of headache for migraine, Disp-9 tablet, R-11, E-Prescribe      TRELEGY ELLIPTA 200-62.5-25 MCG/ACT oral inhaler Inhale 1 puff into the lungs at bedtime, JULIO, Historical           STOP taking these medications       ALPRAZolam (XANAX) 0.25 MG tablet Comments:   Reason for Stopping:         atorvastatin (LIPITOR) 10 MG tablet Comments:   Reason for Stopping:         Multiple Vitamin CHEW Comments:   Reason for Stopping:             Allergies   Allergies   Allergen Reactions    Gabapentin Swelling    Contrast Dye      Iodine    Escitalopram      Nausea and sickness    Peanuts [Nuts] Hives    Penicillins      rash    Sulfa Antibiotics      High family history    Tace [Chlorotrianisene]      joint swelling    Aspirin Rash     After 3 days    Ibuprofen Nausea and Vomiting and Swelling    Strawberry Extract Rash

## 2024-09-04 NOTE — TELEPHONE ENCOUNTER
MTM referral from: [facility] visit (referral by provider)    MTM referral outreach attempt #2 on September 4, 2024 at 2:12 PM      Outcome: Patient not reachable after several attempts, sent [employer] message    Use [facility] part d map  for the carrier/Plan on the flowsheet      MyChart Message Sent    VALENTIN Leahy   [phone]

## 2024-09-10 NOTE — TELEPHONE ENCOUNTER
Pending Prescriptions:                       Disp   Refills    HYDROcodone-acetaminophen (NORCO)  M*240 ta*0        Sig: Take 1-2 tablets by mouth every 4 hours as needed for           severe pain Take up to 8 tablets a day    Routing refill request to provider for review/approval because:  Drug not on the [facility] refill protocol        done

## 2024-10-03 NOTE — TELEPHONE ENCOUNTER
[email or web address]  URL: [email or web address]  Date: 2024  Prepared by: Enzo Gomez  Exercises  - Supine Lower Trunk Rotation  - 1-2 x daily - 7 x  Sent a message to pt recommending to stop medication.    

## 2024-12-11 NOTE — TELEPHONE ENCOUNTER
Sent message back.   
This is an early refill request.   I would recommend to keep the scheduled amount of her narcotics.   For the shingles pain, recommend to try Pamelor.   
You can access the FollowMyHealth Patient Portal offered by [facility] by registering at the following website: [email or web address]. By joining [facility]’s FollowMyHealth portal, you will also be able to view your health information using other applications (apps) compatible with our system.

## 2024-12-26 NOTE — OP NOTE
Patient: Trisha Bender Age: 63 year old   MRN: [id number] Attending: Dr. Enamorado     Date of Visit: September 24, 2018      PAIN MEDICINE CLINIC PROCEDURE NOTE    ATTENDING CLINICIAN:    Az Enamorado MD     ASSISTANT CLINICIAN:  Ian Taylor MD     PREPROCEDURE DIAGNOSES:  1.  Lumbar radiculopathy  2.  Chronic low back pain     POSTPROCEDURE DIAGNOSES:  1.  Lumbar radiculopathy  2.  Chronic low back pain       PROCEDURE(S) PERFORMED:  1.  Interlaminar lumbar epidural steroid injection   2.  Fluoroscopic guidance for the above-named procedure(s)    ANESTHESIA:  Local.    BLOOD LOSS:  Minimal.    DRAINS AND SPECIMENS:  None.    COMPLICATIONS:  None.    INDICATIONS:  Trisha Bender is a 63 year old female with a history of  chronic low back pain secondary to bilateral lumbosacral radiculopathy .  The patient stated that the patient was in their usual state of health and denied recent anticoagulant use or recent infections.  Therefore, the plan is to perform above mentioned procedures.     Procedure Details:  The patient was met in the procedure room, where the patient was identified by name, medical record number and date of birth.  All of the patient s last minute questions were answered. Written informed consent was obtained and saved in the electronic medical record, after the risks, benefits, and alternatives were discussed with the patient.      A formal time-out procedure was performed, as per protocol, including patient name, title of procedure, and site of procedure, and all in the room concurred.  Routine monitors were applied.      The patient was placed in the prone position on the procedure room table.  All pressure points were checked and comfortably padded.  Routine monitors were placed.  Vital signs were stable.    A chlorhexidine prep was completed followed by sterile draping per standard procedure.     The AP fluoroscopic view was optimized for approach at  L5/S1 interspace.  The  skin over the interspace was infiltrated with 4-5 mL of 1% Lidocaine using a 25 gauge, 1.5 inch needle.  A 20-gauge 3-1/2 inch Tuohy needle was advanced under fluoroscopic guidance with left paramedial approach until it touched lamina. Mutiple AP and lateral fluoroscopic images at this time  are taken as Tuohy needle was advanced to the epidural space. The epidural space was identified, without evidence of blood, cerebrospinal fluid, or parasthesia throughout. Needle tip placement within the epidural space was further confirmed with 1-2 mL of nonionic contrast agent, with the epidural space visualized in the AP and lateral fluoroscopic view(s) with appropriate spread of the agent with fat vacuolization and no intravascular or intrathecal spread noted. Next, 5 mL of a treatment solution containing 1 mL of preservative free 0.25% bupivacaine, 2 mL Depo-Medrol 40 mg/mL and 2 mL preservative free normal saline was administered slowly.  The needle was withdrawn.      Light pressure was held at the puncture sites to prevent ecchymosis and oozing.  The patient's skin was cleansed, and hemostasis was confirmed.  Band-aids were applied to the needle injection sites.      Condition:    The patient remained awake and alert throughout the procedure.  The patient tolerated the procedure well and was monitored for approximately 15 minutes afterward in the post procedure area.  There were no immediate post procedure complications noted.  The patient was then discharged to home as per protocol.      Pre-procedure pain score: 6/10  Post-procedure pain score: 3/10    I saw and examined the patient with the fellow and agree with the findings and the plan of care as documented in the fellow's note. I was present for the entire procedure.  Az Enamorado IV, MD   Written/Verbal instructions given to STOP Farxiga on 1/4  Stop METFORMIN 24 hours prior to surgery, do not take on day of surgery  Instructed to confirm with prescriber/PCP if directions differ

## 2025-03-04 NOTE — TELEPHONE ENCOUNTER
"Pt advised, please send in tramadol.  Advised should not take more Norco than prescribed, \"now this eye thing happened\" and she took some extra so will run out.   " 24

## (undated) DEVICE — GOWN XLG DISP 9545

## (undated) DEVICE — APPLICATOR COTTON TIP 6"X2 STERILE LF 6012

## (undated) DEVICE — MANIFOLD KIT ANGIO AUTOMATED 014613

## (undated) DEVICE — Device

## (undated) DEVICE — INTRO SHEATH 7FRX10CM PINNACLE RSS702

## (undated) DEVICE — NDL 27GA 1.25" 305136

## (undated) DEVICE — CATH DIAG 4FR ANG PIG 538453S

## (undated) DEVICE — KIT HAND CONTROL ANGIOTOUCH ACIST 65CM AT-P65

## (undated) DEVICE — SLEEVE TR BAND RADIAL COMPRESSION DEVICE 24CM TRB24-REG

## (undated) DEVICE — DEFIB PRO-PADZ LVP LQD GEL ADULT 8900-2105-01

## (undated) DEVICE — ENDO SNARE POLYPECTOMY OVAL 15MM LOOP SD-240U-15

## (undated) DEVICE — BAG RED BIOHAZARD 30.5X43" G4300XR

## (undated) DEVICE — SOL WATER IRRIG 1000ML BOTTLE 2F7114

## (undated) DEVICE — CATH DIAGNOSTIC RADIAL 5FR TIG 4.0

## (undated) DEVICE — INTRO GLIDESHEATH SLENDER 6FR 10X45CM 60-1060

## (undated) DEVICE — SPECIMEN TRAP MUCOUS SIMILAC NTRL CARE GRAD 80ML 0045860

## (undated) DEVICE — GUIDEWIRE VASC 0.035INX150CM INQWIRE J TIP IQ35F150J3F/A

## (undated) DEVICE — PREP CHLORAPREP W/ORANGE TINT 10.5ML 260715

## (undated) DEVICE — KIT PROCEDURE W/CLEAN-A-SCOPE LINERS V2 200800

## (undated) DEVICE — BAG CLEAR TRASH 1.3M 39X33" P4040C

## (undated) DEVICE — NDL EPIDURAL TUOHY 20GA 3.5" 405028

## (undated) DEVICE — KIT ENDO FIRST STEP DISINFECTANT 200ML W/POUCH EP-4

## (undated) DEVICE — TUBING IV EXT SET MICRO VOLUME MALE LL ADAPTER 36" 2N3345

## (undated) DEVICE — SUCTION CANISTER MEDIVAC LINER 3000ML W/LID 65651-530

## (undated) DEVICE — ESU GROUND PAD ADULT W/CORD E7507

## (undated) DEVICE — SYR 10ML PERFIX LL 332152

## (undated) DEVICE — GLOVE PROTEXIS POWDER FREE SMT 8.0  2D72PT80X

## (undated) DEVICE — TUBING SUCTION 12"X1/4" N612

## (undated) DEVICE — PAD CHUX UNDERPAD 30X36" P3036C

## (undated) DEVICE — TRAY PAIN INJECTION 97A 640

## (undated) DEVICE — RAD INTRODUCER KIT MICRO 5FRX10CM .018 NITINOL G/W

## (undated) DEVICE — CATH ANGIO INFINITI JR4 4FRX100CM 538421

## (undated) DEVICE — PAIN PACK

## (undated) RX ORDER — KETAMINE HYDROCHLORIDE 10 MG/ML
INJECTION INTRAMUSCULAR; INTRAVENOUS
Status: DISPENSED
Start: 2018-04-26

## (undated) RX ORDER — SODIUM CHLORIDE 9 MG/ML
INJECTION, SOLUTION INTRAVENOUS
Status: DISPENSED
Start: 2018-04-18

## (undated) RX ORDER — FENTANYL CITRATE 50 UG/ML
INJECTION, SOLUTION INTRAMUSCULAR; INTRAVENOUS
Status: DISPENSED
Start: 2018-04-26